# Patient Record
Sex: MALE | Race: WHITE | URBAN - METROPOLITAN AREA
[De-identification: names, ages, dates, MRNs, and addresses within clinical notes are randomized per-mention and may not be internally consistent; named-entity substitution may affect disease eponyms.]

---

## 2017-02-08 ENCOUNTER — EMERGENCY (EMERGENCY)
Facility: HOSPITAL | Age: 52
LOS: 0 days | Discharge: HOME | End: 2017-02-08

## 2017-02-17 PROBLEM — Z00.00 ENCOUNTER FOR PREVENTIVE HEALTH EXAMINATION: Status: ACTIVE | Noted: 2017-02-17

## 2017-02-22 ENCOUNTER — APPOINTMENT (OUTPATIENT)
Dept: ORTHOPEDIC SURGERY | Facility: CLINIC | Age: 52
End: 2017-02-22

## 2017-02-22 DIAGNOSIS — M25.562 PAIN IN RIGHT KNEE: ICD-10-CM

## 2017-02-22 DIAGNOSIS — M25.561 PAIN IN RIGHT KNEE: ICD-10-CM

## 2017-02-22 RX ORDER — INSULIN GLARGINE 100 [IU]/ML
100 INJECTION, SOLUTION SUBCUTANEOUS
Qty: 90 | Refills: 0 | Status: ACTIVE | COMMUNITY
Start: 2016-01-25

## 2017-02-22 RX ORDER — DOXYCYCLINE HYCLATE 100 MG/1
100 CAPSULE ORAL
Qty: 20 | Refills: 0 | Status: DISCONTINUED | COMMUNITY
Start: 2016-11-15

## 2017-02-22 RX ORDER — OMEPRAZOLE 40 MG/1
40 CAPSULE, DELAYED RELEASE ORAL
Qty: 60 | Refills: 0 | Status: DISCONTINUED | COMMUNITY
Start: 2016-04-16

## 2017-03-15 ENCOUNTER — RX RENEWAL (OUTPATIENT)
Age: 52
End: 2017-03-15

## 2017-03-30 ENCOUNTER — OUTPATIENT (OUTPATIENT)
Dept: OUTPATIENT SERVICES | Facility: HOSPITAL | Age: 52
LOS: 1 days | Discharge: HOME | End: 2017-03-30

## 2017-03-30 VITALS
HEIGHT: 72 IN | WEIGHT: 228.4 LBS | HEART RATE: 79 BPM | OXYGEN SATURATION: 98 % | TEMPERATURE: 98 F | RESPIRATION RATE: 18 BRPM | SYSTOLIC BLOOD PRESSURE: 122 MMHG | DIASTOLIC BLOOD PRESSURE: 85 MMHG

## 2017-03-30 DIAGNOSIS — K21.9 GASTRO-ESOPHAGEAL REFLUX DISEASE WITHOUT ESOPHAGITIS: ICD-10-CM

## 2017-03-30 DIAGNOSIS — M17.11 UNILATERAL PRIMARY OSTEOARTHRITIS, RIGHT KNEE: ICD-10-CM

## 2017-03-30 DIAGNOSIS — I10 ESSENTIAL (PRIMARY) HYPERTENSION: ICD-10-CM

## 2017-03-30 DIAGNOSIS — M25.561 PAIN IN RIGHT KNEE: ICD-10-CM

## 2017-03-30 DIAGNOSIS — E78.5 HYPERLIPIDEMIA, UNSPECIFIED: ICD-10-CM

## 2017-03-30 DIAGNOSIS — Z01.818 ENCOUNTER FOR OTHER PREPROCEDURAL EXAMINATION: ICD-10-CM

## 2017-03-30 DIAGNOSIS — B00.1 HERPESVIRAL VESICULAR DERMATITIS: ICD-10-CM

## 2017-03-30 DIAGNOSIS — E11.9 TYPE 2 DIABETES MELLITUS WITHOUT COMPLICATIONS: ICD-10-CM

## 2017-03-30 DIAGNOSIS — S83.231A COMPLEX TEAR OF MEDIAL MENISCUS, CURRENT INJURY, RIGHT KNEE, INITIAL ENCOUNTER: ICD-10-CM

## 2017-03-30 DIAGNOSIS — N20.0 CALCULUS OF KIDNEY: Chronic | ICD-10-CM

## 2017-03-30 DIAGNOSIS — Z98.890 OTHER SPECIFIED POSTPROCEDURAL STATES: Chronic | ICD-10-CM

## 2017-03-30 DIAGNOSIS — F41.9 ANXIETY DISORDER, UNSPECIFIED: ICD-10-CM

## 2017-03-30 LAB
ANION GAP SERPL CALC-SCNC: 9 MMOL/L — SIGNIFICANT CHANGE UP (ref 5–17)
BASOPHILS # BLD AUTO: 0.1 K/UL — SIGNIFICANT CHANGE UP (ref 0–0.2)
BASOPHILS NFR BLD AUTO: 1 % — SIGNIFICANT CHANGE UP (ref 0–2)
BUN SERPL-MCNC: 22 MG/DL — SIGNIFICANT CHANGE UP (ref 7–23)
CALCIUM SERPL-MCNC: 9.1 MG/DL — SIGNIFICANT CHANGE UP (ref 8.5–10.1)
CHLORIDE SERPL-SCNC: 107 MMOL/L — SIGNIFICANT CHANGE UP (ref 96–108)
CO2 SERPL-SCNC: 26 MMOL/L — SIGNIFICANT CHANGE UP (ref 22–31)
CREAT SERPL-MCNC: 1.63 MG/DL — HIGH (ref 0.5–1.3)
EOSINOPHIL # BLD AUTO: 0.2 K/UL — SIGNIFICANT CHANGE UP (ref 0–0.5)
EOSINOPHIL NFR BLD AUTO: 2 % — SIGNIFICANT CHANGE UP (ref 0–6)
GLUCOSE SERPL-MCNC: 70 MG/DL — SIGNIFICANT CHANGE UP (ref 70–99)
HCT VFR BLD CALC: 45.1 % — SIGNIFICANT CHANGE UP (ref 39–50)
HGB BLD-MCNC: 15.5 G/DL — SIGNIFICANT CHANGE UP (ref 13–17)
LYMPHOCYTES # BLD AUTO: 2.9 K/UL — SIGNIFICANT CHANGE UP (ref 1–3.3)
LYMPHOCYTES # BLD AUTO: 29.1 % — SIGNIFICANT CHANGE UP (ref 13–44)
MCHC RBC-ENTMCNC: 28.8 PG — SIGNIFICANT CHANGE UP (ref 27–34)
MCHC RBC-ENTMCNC: 34.4 GM/DL — SIGNIFICANT CHANGE UP (ref 32–36)
MCV RBC AUTO: 84 FL — SIGNIFICANT CHANGE UP (ref 80–100)
MONOCYTES # BLD AUTO: 0.7 K/UL — SIGNIFICANT CHANGE UP (ref 0–0.9)
MONOCYTES NFR BLD AUTO: 7.1 % — SIGNIFICANT CHANGE UP (ref 2–14)
NEUTROPHILS # BLD AUTO: 6.1 K/UL — SIGNIFICANT CHANGE UP (ref 1.8–7.4)
NEUTROPHILS NFR BLD AUTO: 60.9 % — SIGNIFICANT CHANGE UP (ref 43–77)
PLATELET # BLD AUTO: 349 K/UL — SIGNIFICANT CHANGE UP (ref 150–400)
POTASSIUM SERPL-MCNC: 4.3 MMOL/L — SIGNIFICANT CHANGE UP (ref 3.5–5.3)
POTASSIUM SERPL-SCNC: 4.3 MMOL/L — SIGNIFICANT CHANGE UP (ref 3.5–5.3)
RBC # BLD: 5.37 M/UL — SIGNIFICANT CHANGE UP (ref 4.2–5.8)
RBC # FLD: 13.1 % — SIGNIFICANT CHANGE UP (ref 11–15)
SODIUM SERPL-SCNC: 142 MMOL/L — SIGNIFICANT CHANGE UP (ref 135–145)
WBC # BLD: 10.1 K/UL — SIGNIFICANT CHANGE UP (ref 3.8–10.5)
WBC # FLD AUTO: 10.1 K/UL — SIGNIFICANT CHANGE UP (ref 3.8–10.5)

## 2017-03-30 NOTE — H&P PST ADULT - NSANTHOSAYNRD_GEN_A_CORE
No. GUSTAVO screening performed.  STOP BANG Legend: 0-2 = LOW Risk; 3-4 = INTERMEDIATE Risk; 5-8 = HIGH Risk

## 2017-03-30 NOTE — ASU PATIENT PROFILE, ADULT - PMH
Anxiety    DM (diabetes mellitus)    GERD (gastroesophageal reflux disease)    Herpes labialis    HLD (hyperlipidemia)    HTN (hypertension)    Kidney stones  20 years ago

## 2017-03-30 NOTE — H&P PST ADULT - PMH
Anxiety    DM (diabetes mellitus)    GERD (gastroesophageal reflux disease)    Herpes labialis    HLD (hyperlipidemia)    HTN (hypertension)

## 2017-04-12 ENCOUNTER — RESULT REVIEW (OUTPATIENT)
Age: 52
End: 2017-04-12

## 2017-04-13 ENCOUNTER — OUTPATIENT (OUTPATIENT)
Dept: OUTPATIENT SERVICES | Facility: HOSPITAL | Age: 52
LOS: 1 days | Discharge: HOME | End: 2017-04-13
Payer: MEDICARE

## 2017-04-13 ENCOUNTER — APPOINTMENT (OUTPATIENT)
Dept: ORTHOPEDIC SURGERY | Facility: HOSPITAL | Age: 52
End: 2017-04-13

## 2017-04-13 VITALS
HEART RATE: 74 BPM | WEIGHT: 227.96 LBS | DIASTOLIC BLOOD PRESSURE: 85 MMHG | OXYGEN SATURATION: 100 % | HEIGHT: 73 IN | RESPIRATION RATE: 15 BRPM | SYSTOLIC BLOOD PRESSURE: 123 MMHG | TEMPERATURE: 97 F

## 2017-04-13 VITALS
DIASTOLIC BLOOD PRESSURE: 78 MMHG | HEART RATE: 78 BPM | SYSTOLIC BLOOD PRESSURE: 117 MMHG | OXYGEN SATURATION: 98 % | TEMPERATURE: 98 F | RESPIRATION RATE: 17 BRPM

## 2017-04-13 DIAGNOSIS — Z98.890 OTHER SPECIFIED POSTPROCEDURAL STATES: Chronic | ICD-10-CM

## 2017-04-13 DIAGNOSIS — N20.0 CALCULUS OF KIDNEY: Chronic | ICD-10-CM

## 2017-04-13 PROCEDURE — 29875 ARTHRS KNEE SURG SYNVCT LMTD: CPT | Mod: 59,RT

## 2017-04-13 PROCEDURE — 29881 ARTHRS KNE SRG MNISECTMY M/L: CPT | Mod: RT

## 2017-04-13 PROCEDURE — 88304 TISSUE EXAM BY PATHOLOGIST: CPT | Mod: 26

## 2017-04-13 RX ORDER — OXYCODONE HYDROCHLORIDE 5 MG/1
1 TABLET ORAL
Qty: 28 | Refills: 0 | OUTPATIENT
Start: 2017-04-13 | End: 2017-04-20

## 2017-04-13 RX ORDER — SODIUM CHLORIDE 9 MG/ML
1000 INJECTION, SOLUTION INTRAVENOUS
Qty: 0 | Refills: 0 | Status: DISCONTINUED | OUTPATIENT
Start: 2017-04-13 | End: 2017-04-28

## 2017-04-13 RX ORDER — HYDROMORPHONE HYDROCHLORIDE 2 MG/ML
1 INJECTION INTRAMUSCULAR; INTRAVENOUS; SUBCUTANEOUS ONCE
Qty: 0 | Refills: 0 | Status: DISCONTINUED | OUTPATIENT
Start: 2017-04-13 | End: 2017-04-13

## 2017-04-13 RX ORDER — ACETAMINOPHEN 500 MG
1000 TABLET ORAL ONCE
Qty: 0 | Refills: 0 | Status: COMPLETED | OUTPATIENT
Start: 2017-04-13 | End: 2017-04-13

## 2017-04-13 RX ORDER — SODIUM CHLORIDE 9 MG/ML
3 INJECTION INTRAMUSCULAR; INTRAVENOUS; SUBCUTANEOUS EVERY 8 HOURS
Qty: 0 | Refills: 0 | Status: DISCONTINUED | OUTPATIENT
Start: 2017-04-13 | End: 2017-04-13

## 2017-04-13 RX ORDER — INSULIN GLULISINE 100 [IU]/ML
10 INJECTION, SOLUTION SUBCUTANEOUS
Qty: 0 | Refills: 0 | COMMUNITY

## 2017-04-13 RX ADMIN — Medication 400 MILLIGRAM(S): at 10:08

## 2017-04-13 RX ADMIN — SODIUM CHLORIDE 75 MILLILITER(S): 9 INJECTION, SOLUTION INTRAVENOUS at 10:07

## 2017-04-13 RX ADMIN — Medication 1000 MILLIGRAM(S): at 10:31

## 2017-04-13 RX ADMIN — HYDROMORPHONE HYDROCHLORIDE 1 MILLIGRAM(S): 2 INJECTION INTRAMUSCULAR; INTRAVENOUS; SUBCUTANEOUS at 10:31

## 2017-04-13 RX ADMIN — HYDROMORPHONE HYDROCHLORIDE 1 MILLIGRAM(S): 2 INJECTION INTRAMUSCULAR; INTRAVENOUS; SUBCUTANEOUS at 10:07

## 2017-04-13 NOTE — ASU DISCHARGE PLAN (ADULT/PEDIATRIC). - NOTIFY
Numbness, color, or temperature change to extremity/Bleeding that does not stop/Increased Irritability or Sluggishness/Pain not relieved by Medications/Inability to Tolerate Liquids or Foods/Swelling that continues/Persistent Nausea and Vomiting/Unable to Urinate/Excessive Diarrhea/Numbness, tingling/Fever greater than 101

## 2017-04-13 NOTE — BRIEF OPERATIVE NOTE - POST-OP DX
Meniscus tear  04/13/2017    Active  Kahlil Sibley  Plica of knee, right  04/13/2017    Active  Kahlil Sibley

## 2017-04-13 NOTE — ASU DISCHARGE PLAN (ADULT/PEDIATRIC). - MEDICATION SUMMARY - MEDICATIONS TO TAKE
I will START or STAY ON the medications listed below when I get home from the hospital:    oxyCODONE-acetaminophen 10mg-325mg oral tablet  -- 1 tab(s) by mouth every 6 hours, As Needed  -- Indication: For RIGHT KNEE SURGICAL ARTHROSCOPY MENISCECTOMY DEBRIDEMENT    Toujeo SoloStar  -- 60 unit(s) subcutaneous once a day  -- Indication: For Diabetes    pravastatin 40 mg oral tablet  -- 1 tab(s) by mouth once a day (at bedtime)  -- Indication: For cholerterol    Valtrex 500 mg oral tablet  -- 1 tab(s) by mouth once a day  -- Indication: For viral prophylaxis    Xanax 1 mg oral tablet  -- 1 tab(s) by mouth 4 times a day  -- Indication: For anxiety    amLODIPine 10 mg oral tablet  -- 1 tab(s) by mouth once a day  -- Indication: For htn    raNITIdine 300 mg oral tablet  -- 1 tab(s) by mouth 2 times a day  -- Indication: For gerd    omeprazole 40 mg oral delayed release capsule  -- 1 cap(s) by mouth 2 times a day  -- Indication: For gerd

## 2017-04-13 NOTE — BRIEF OPERATIVE NOTE - PROCEDURE
Arthroscopy of knee with either or both synovectomy and meniscectomy if indicated  04/13/2017    Active  AOLSWING  Arthroscopy of knee with debridement of articular cartilage  04/13/2017    Active  AOLSWING  Arthroscopy of knee with debridement of meniscus  04/13/2017    Active  AOLSWING

## 2017-04-14 LAB — SURGICAL PATHOLOGY FINAL REPORT - CH: SIGNIFICANT CHANGE UP

## 2017-04-21 DIAGNOSIS — Z79.82 LONG TERM (CURRENT) USE OF ASPIRIN: ICD-10-CM

## 2017-04-21 DIAGNOSIS — Z79.4 LONG TERM (CURRENT) USE OF INSULIN: ICD-10-CM

## 2017-04-21 DIAGNOSIS — Z87.891 PERSONAL HISTORY OF NICOTINE DEPENDENCE: ICD-10-CM

## 2017-04-21 DIAGNOSIS — E78.5 HYPERLIPIDEMIA, UNSPECIFIED: ICD-10-CM

## 2017-04-21 DIAGNOSIS — K21.9 GASTRO-ESOPHAGEAL REFLUX DISEASE WITHOUT ESOPHAGITIS: ICD-10-CM

## 2017-04-21 DIAGNOSIS — E11.9 TYPE 2 DIABETES MELLITUS WITHOUT COMPLICATIONS: ICD-10-CM

## 2017-04-21 DIAGNOSIS — F41.9 ANXIETY DISORDER, UNSPECIFIED: ICD-10-CM

## 2017-04-21 DIAGNOSIS — M67.51 PLICA SYNDROME, RIGHT KNEE: ICD-10-CM

## 2017-04-21 DIAGNOSIS — M23.221 DERANGEMENT OF POSTERIOR HORN OF MEDIAL MENISCUS DUE TO OLD TEAR OR INJURY, RIGHT KNEE: ICD-10-CM

## 2017-04-21 DIAGNOSIS — I10 ESSENTIAL (PRIMARY) HYPERTENSION: ICD-10-CM

## 2017-04-21 DIAGNOSIS — Z79.891 LONG TERM (CURRENT) USE OF OPIATE ANALGESIC: ICD-10-CM

## 2017-04-21 DIAGNOSIS — M17.11 UNILATERAL PRIMARY OSTEOARTHRITIS, RIGHT KNEE: ICD-10-CM

## 2017-04-21 DIAGNOSIS — M25.561 PAIN IN RIGHT KNEE: ICD-10-CM

## 2017-04-21 DIAGNOSIS — B00.1 HERPESVIRAL VESICULAR DERMATITIS: ICD-10-CM

## 2017-04-26 ENCOUNTER — APPOINTMENT (OUTPATIENT)
Dept: ORTHOPEDIC SURGERY | Facility: CLINIC | Age: 52
End: 2017-04-26

## 2017-04-26 DIAGNOSIS — M17.11 UNILATERAL PRIMARY OSTEOARTHRITIS, RIGHT KNEE: ICD-10-CM

## 2017-04-26 DIAGNOSIS — S83.231A COMPLEX TEAR OF MEDIAL MENISCUS, CURRENT INJURY, RIGHT KNEE, INITIAL ENCOUNTER: ICD-10-CM

## 2017-05-31 ENCOUNTER — APPOINTMENT (OUTPATIENT)
Dept: ORTHOPEDIC SURGERY | Facility: CLINIC | Age: 52
End: 2017-05-31
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
## 2017-06-27 DIAGNOSIS — M25.561 PAIN IN RIGHT KNEE: ICD-10-CM

## 2017-06-27 DIAGNOSIS — S83.91XA SPRAIN OF UNSPECIFIED SITE OF RIGHT KNEE, INITIAL ENCOUNTER: ICD-10-CM
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
## 2018-03-03 ENCOUNTER — OUTPATIENT (OUTPATIENT)
Dept: OUTPATIENT SERVICES | Facility: HOSPITAL | Age: 53
LOS: 1 days | Discharge: HOME | End: 2018-03-03

## 2018-03-03 DIAGNOSIS — E53.9 VITAMIN B DEFICIENCY, UNSPECIFIED: ICD-10-CM

## 2018-03-03 DIAGNOSIS — D64.9 ANEMIA, UNSPECIFIED: ICD-10-CM

## 2018-03-03 DIAGNOSIS — A60.02 HERPESVIRAL INFECTION OF OTHER MALE GENITAL ORGANS: ICD-10-CM

## 2018-03-03 DIAGNOSIS — E78.5 HYPERLIPIDEMIA, UNSPECIFIED: ICD-10-CM

## 2018-03-03 DIAGNOSIS — Z98.890 OTHER SPECIFIED POSTPROCEDURAL STATES: Chronic | ICD-10-CM

## 2018-03-03 DIAGNOSIS — E55.9 VITAMIN D DEFICIENCY, UNSPECIFIED: ICD-10-CM

## 2018-03-03 DIAGNOSIS — R53.83 OTHER FATIGUE: ICD-10-CM

## 2018-03-03 DIAGNOSIS — N20.0 CALCULUS OF KIDNEY: Chronic | ICD-10-CM

## 2018-03-05 LAB
C TRACH RRNA SPEC QL NAA+PROBE: SIGNIFICANT CHANGE UP
N GONORRHOEA RRNA SPEC QL NAA+PROBE: SIGNIFICANT CHANGE UP
SPECIMEN SOURCE: SIGNIFICANT CHANGE UP

## 2018-03-21 ENCOUNTER — EMERGENCY (EMERGENCY)
Facility: HOSPITAL | Age: 53
LOS: 0 days | Discharge: HOME | End: 2018-03-21
Attending: EMERGENCY MEDICINE

## 2018-03-21 VITALS
OXYGEN SATURATION: 100 % | RESPIRATION RATE: 20 BRPM | SYSTOLIC BLOOD PRESSURE: 158 MMHG | TEMPERATURE: 97 F | WEIGHT: 197.98 LBS | DIASTOLIC BLOOD PRESSURE: 97 MMHG | HEIGHT: 73 IN | HEART RATE: 82 BPM

## 2018-03-21 DIAGNOSIS — I10 ESSENTIAL (PRIMARY) HYPERTENSION: ICD-10-CM

## 2018-03-21 DIAGNOSIS — S01.01XA LACERATION WITHOUT FOREIGN BODY OF SCALP, INITIAL ENCOUNTER: ICD-10-CM

## 2018-03-21 DIAGNOSIS — Z23 ENCOUNTER FOR IMMUNIZATION: ICD-10-CM
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
## 2018-03-21 DIAGNOSIS — K21.9 GASTRO-ESOPHAGEAL REFLUX DISEASE WITHOUT ESOPHAGITIS: ICD-10-CM

## 2018-03-21 DIAGNOSIS — Z98.890 OTHER SPECIFIED POSTPROCEDURAL STATES: Chronic | ICD-10-CM
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
## 2018-03-21 DIAGNOSIS — E11.9 TYPE 2 DIABETES MELLITUS WITHOUT COMPLICATIONS: ICD-10-CM

## 2018-03-21 DIAGNOSIS — Z79.891 LONG TERM (CURRENT) USE OF OPIATE ANALGESIC: ICD-10-CM
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
## 2018-03-21 DIAGNOSIS — E78.5 HYPERLIPIDEMIA, UNSPECIFIED: ICD-10-CM

## 2018-03-21 DIAGNOSIS — S09.90XA UNSPECIFIED INJURY OF HEAD, INITIAL ENCOUNTER: ICD-10-CM

## 2018-03-21 DIAGNOSIS — N20.0 CALCULUS OF KIDNEY: Chronic | ICD-10-CM
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
## 2018-03-21 RX ORDER — CEPHALEXIN 500 MG
500 CAPSULE ORAL
Qty: 0 | Refills: 0 | Status: DISCONTINUED | OUTPATIENT
Start: 2018-03-21 | End: 2018-03-21

## 2018-03-21 RX ORDER — TETANUS TOXOID, REDUCED DIPHTHERIA TOXOID AND ACELLULAR PERTUSSIS VACCINE, ADSORBED 5; 2.5; 8; 8; 2.5 [IU]/.5ML; [IU]/.5ML; UG/.5ML; UG/.5ML; UG/.5ML
0.5 SUSPENSION INTRAMUSCULAR ONCE
Qty: 0 | Refills: 0 | Status: COMPLETED | OUTPATIENT
Start: 2018-03-21 | End: 2018-03-21

## 2018-03-21 RX ORDER — CEPHALEXIN 500 MG
1 CAPSULE ORAL
Qty: 28 | Refills: 0 | OUTPATIENT
Start: 2018-03-21 | End: 2018-03-27

## 2018-03-21 RX ADMIN — Medication 500 MILLIGRAM(S): at 02:44

## 2018-03-21 RX ADMIN — TETANUS TOXOID, REDUCED DIPHTHERIA TOXOID AND ACELLULAR PERTUSSIS VACCINE, ADSORBED 0.5 MILLILITER(S): 5; 2.5; 8; 8; 2.5 SUSPENSION INTRAMUSCULAR at 02:44

## 2018-03-21 NOTE — ED PROVIDER NOTE - OBJECTIVE STATEMENT
52 year old male past medical history of DM and Hypertension states that he was bending down in shed and hit head and has laceration. no loss of consciousness, no headache, no nausea and vomiting 52 year old male past medical history of DM and Hypertension states that he was bending down in shed and hit head and has laceration. no loss of consciousness, no headache, no nausea and vomiting no neck pain  no antiplatelet or anticoagulation

## 2018-04-01 ENCOUNTER — EMERGENCY (EMERGENCY)
Facility: HOSPITAL | Age: 53
LOS: 0 days | Discharge: HOME | End: 2018-04-01
Attending: EMERGENCY MEDICINE

## 2018-04-01 VITALS
SYSTOLIC BLOOD PRESSURE: 156 MMHG | HEART RATE: 84 BPM | DIASTOLIC BLOOD PRESSURE: 93 MMHG | TEMPERATURE: 97 F | OXYGEN SATURATION: 98 % | RESPIRATION RATE: 18 BRPM

## 2018-04-01 VITALS — HEIGHT: 73 IN | WEIGHT: 197.98 LBS

## 2018-04-01 DIAGNOSIS — Z79.2 LONG TERM (CURRENT) USE OF ANTIBIOTICS: ICD-10-CM

## 2018-04-01 DIAGNOSIS — F17.200 NICOTINE DEPENDENCE, UNSPECIFIED, UNCOMPLICATED: ICD-10-CM

## 2018-04-01 DIAGNOSIS — Z98.890 OTHER SPECIFIED POSTPROCEDURAL STATES: ICD-10-CM
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
## 2018-04-01 DIAGNOSIS — E11.9 TYPE 2 DIABETES MELLITUS WITHOUT COMPLICATIONS: ICD-10-CM

## 2018-04-01 DIAGNOSIS — Z79.4 LONG TERM (CURRENT) USE OF INSULIN: ICD-10-CM

## 2018-04-01 DIAGNOSIS — N20.0 CALCULUS OF KIDNEY: Chronic | ICD-10-CM

## 2018-04-01 DIAGNOSIS — E78.5 HYPERLIPIDEMIA, UNSPECIFIED: ICD-10-CM

## 2018-04-01 DIAGNOSIS — Z98.890 OTHER SPECIFIED POSTPROCEDURAL STATES: Chronic | ICD-10-CM
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
## 2018-04-01 DIAGNOSIS — I10 ESSENTIAL (PRIMARY) HYPERTENSION: ICD-10-CM

## 2018-04-01 DIAGNOSIS — S01.81XD LACERATION WITHOUT FOREIGN BODY OF OTHER PART OF HEAD, SUBSEQUENT ENCOUNTER: ICD-10-CM

## 2018-04-01 DIAGNOSIS — K21.9 GASTRO-ESOPHAGEAL REFLUX DISEASE WITHOUT ESOPHAGITIS: ICD-10-CM
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
## 2018-04-01 DIAGNOSIS — Z79.891 LONG TERM (CURRENT) USE OF OPIATE ANALGESIC: ICD-10-CM
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
## 2018-04-01 DIAGNOSIS — Z79.899 OTHER LONG TERM (CURRENT) DRUG THERAPY: ICD-10-CM

## 2018-04-01 NOTE — ED PROVIDER NOTE - NS ED ROS FT
Review of Systems:  	•	CONSTITUTIONAL - no fever, no diaphoresis, no weight change  	•	SKIN - no rash  	•	HEMATOLOGIC - no bleeding, no bruising  	•	EYES - no eye pain, no blurred vision  	•	ENT - no change in hearing, no pain  	•	RESPIRATORY - no shortness of breath, no cough  	•	CARDIAC - no chest pain, no palpitations  	•	GI - no abd pain, no nausea, no vomiting, no diarrhea, no constipation, no bleeding  	•	GENITO-URINARY - no discharge, no dysuria; no hematuria, LMP-   	•	ENDO - no polydypsia, no polyurea, no heat/no cold intolerance  	•	MUSCULOSKELETAL - no joint paint, no swelling, no redness  	•	NEUROLOGIC - no weakness, no headache, no anesthesia, no paresthesias  	•	PSYCH - no anxiety, non suicidal, non homicidal, no hallucination, no depression  	•	ALLERGY - no rhinitis

## 2018-04-01 NOTE — ED PROVIDER NOTE - SKIN, MLM
Skin normal color for race, warm, dry and intact. No evidence of rash. well-healed scalp laceration with 5 staples

## 2018-04-01 NOTE — ED ADULT NURSE NOTE - NS ED NURSE DC INFO COMPLEXITY
Verbalized Understanding/Moderate: Comprehensive teaching Simple: Patient demonstrates quick and easy understanding

## 2018-07-18 ENCOUNTER — EMERGENCY (EMERGENCY)
Facility: HOSPITAL | Age: 53
LOS: 0 days | Discharge: HOME | End: 2018-07-18
Attending: EMERGENCY MEDICINE | Admitting: EMERGENCY MEDICINE

## 2018-07-18 VITALS
HEIGHT: 73 IN | WEIGHT: 197.98 LBS | OXYGEN SATURATION: 99 % | DIASTOLIC BLOOD PRESSURE: 112 MMHG | RESPIRATION RATE: 18 BRPM | HEART RATE: 96 BPM | SYSTOLIC BLOOD PRESSURE: 181 MMHG | TEMPERATURE: 93 F

## 2018-07-18 VITALS — TEMPERATURE: 98 F | DIASTOLIC BLOOD PRESSURE: 98 MMHG | HEART RATE: 80 BPM | SYSTOLIC BLOOD PRESSURE: 151 MMHG

## 2018-07-18 DIAGNOSIS — I10 ESSENTIAL (PRIMARY) HYPERTENSION: ICD-10-CM

## 2018-07-18 DIAGNOSIS — Z98.890 OTHER SPECIFIED POSTPROCEDURAL STATES: Chronic | ICD-10-CM

## 2018-07-18 DIAGNOSIS — E78.5 HYPERLIPIDEMIA, UNSPECIFIED: ICD-10-CM

## 2018-07-18 DIAGNOSIS — L98.9 DISORDER OF THE SKIN AND SUBCUTANEOUS TISSUE, UNSPECIFIED: ICD-10-CM

## 2018-07-18 DIAGNOSIS — E11.9 TYPE 2 DIABETES MELLITUS WITHOUT COMPLICATIONS: ICD-10-CM

## 2018-07-18 DIAGNOSIS — L08.9 LOCAL INFECTION OF THE SKIN AND SUBCUTANEOUS TISSUE, UNSPECIFIED: ICD-10-CM

## 2018-07-18 DIAGNOSIS — Z79.899 OTHER LONG TERM (CURRENT) DRUG THERAPY: ICD-10-CM

## 2018-07-18 DIAGNOSIS — N20.0 CALCULUS OF KIDNEY: Chronic | ICD-10-CM

## 2018-07-18 DIAGNOSIS — K21.9 GASTRO-ESOPHAGEAL REFLUX DISEASE WITHOUT ESOPHAGITIS: ICD-10-CM

## 2018-07-18 NOTE — ED PROVIDER NOTE - MEDICAL DECISION MAKING DETAILS
I personally evaluated the patient. I reviewed the Resident’s or Physician Assistant’s note (as assigned above), and agree with the findings and plan except as documented in my note. I have fully discussed the medical management and delivery of care with the patient. I have discussed any available labs, imaging and treatment options with the patient. Patient confirms understanding and has been given detailed return precautions. Patient instructed to return to the ED should symptoms persist or worsen. Patient has demonstrated capacity and has verbalized understanding. Patient is well appearing upon discharge.

## 2018-07-18 NOTE — ED ADULT TRIAGE NOTE - CHIEF COMPLAINT QUOTE
As per patient: "I have a wound ion my head. Staples was removed 2 mos ago and it still not healing."

## 2018-07-18 NOTE — ED PROVIDER NOTE - OBJECTIVE STATEMENT
52 y/o male c/o left scalp lesion x few months. patient s/p laceration repair 3 months ago. patient with throbbing pain with direct pressure. patient with hx of diabetes, no fever

## 2018-07-19 PROBLEM — E78.5 HYPERLIPIDEMIA, UNSPECIFIED: Chronic | Status: ACTIVE | Noted: 2017-03-30

## 2018-07-19 PROBLEM — K21.9 GASTRO-ESOPHAGEAL REFLUX DISEASE WITHOUT ESOPHAGITIS: Chronic | Status: ACTIVE | Noted: 2017-03-30

## 2018-07-19 PROBLEM — N20.0 CALCULUS OF KIDNEY: Chronic | Status: ACTIVE | Noted: 2017-03-30

## 2018-07-19 PROBLEM — B00.1 HERPESVIRAL VESICULAR DERMATITIS: Chronic | Status: ACTIVE | Noted: 2017-03-30

## 2018-07-19 PROBLEM — I10 ESSENTIAL (PRIMARY) HYPERTENSION: Chronic | Status: ACTIVE | Noted: 2017-03-30

## 2018-07-19 PROBLEM — E11.9 TYPE 2 DIABETES MELLITUS WITHOUT COMPLICATIONS: Chronic | Status: ACTIVE | Noted: 2017-03-30

## 2018-07-19 PROBLEM — F41.9 ANXIETY DISORDER, UNSPECIFIED: Chronic | Status: ACTIVE | Noted: 2017-03-30

## 2018-10-04 NOTE — ED ADULT NURSE NOTE - TOBACCO SCREENING (FROM THE ASSIST)
Statement Selected O-T Plasty Text: The defect edges were debeveled with a #15 scalpel blade.  Given the location of the defect, shape of the defect and the proximity to free margins an O-T plasty was deemed most appropriate.  Using a sterile surgical marker, an appropriate O-T plasty was drawn incorporating the defect and placing the expected incisions within the relaxed skin tension lines where possible.    The area thus outlined was incised deep to adipose tissue with a #15 scalpel blade.  The skin margins were undermined to an appropriate distance in all directions utilizing iris scissors.

## 2018-11-26 NOTE — ED ADULT NURSE REASSESSMENT NOTE - TEMPLATE LIST FOR HEAD TO TOE ASSESSMENT
Wound Check/Suture Removal Render In Strict Bullet Format?: No Continue Regimen: Finish the valtrex Otc Regimen: Vaseline daily Discontinue Regimen: Keflex and erythromycin lotion Detail Level: Zone

## 2018-11-27 ENCOUNTER — APPOINTMENT (OUTPATIENT)
Dept: PLASTIC SURGERY | Facility: CLINIC | Age: 53
End: 2018-11-27
Payer: MEDICARE

## 2018-11-27 VITALS — HEIGHT: 72 IN | BODY MASS INDEX: 27.77 KG/M2 | WEIGHT: 205 LBS

## 2018-11-27 DIAGNOSIS — F17.200 NICOTINE DEPENDENCE, UNSPECIFIED, UNCOMPLICATED: ICD-10-CM

## 2018-11-27 DIAGNOSIS — Z78.9 OTHER SPECIFIED HEALTH STATUS: ICD-10-CM

## 2018-11-27 PROCEDURE — 99203 OFFICE O/P NEW LOW 30 MIN: CPT

## 2018-11-27 RX ORDER — RANITIDINE 300 MG/1
300 TABLET ORAL
Qty: 60 | Refills: 0 | Status: DISCONTINUED | COMMUNITY
Start: 2017-01-11 | End: 2018-11-27

## 2018-12-03 ENCOUNTER — FORM ENCOUNTER (OUTPATIENT)
Age: 53
End: 2018-12-03

## 2018-12-04 ENCOUNTER — OUTPATIENT (OUTPATIENT)
Dept: OUTPATIENT SERVICES | Facility: HOSPITAL | Age: 53
LOS: 1 days | Discharge: HOME | End: 2018-12-04

## 2018-12-04 VITALS
DIASTOLIC BLOOD PRESSURE: 82 MMHG | WEIGHT: 200.62 LBS | RESPIRATION RATE: 18 BRPM | OXYGEN SATURATION: 98 % | HEIGHT: 73 IN | HEART RATE: 83 BPM | TEMPERATURE: 98 F | SYSTOLIC BLOOD PRESSURE: 175 MMHG

## 2018-12-04 DIAGNOSIS — Z98.890 OTHER SPECIFIED POSTPROCEDURAL STATES: Chronic | ICD-10-CM

## 2018-12-04 DIAGNOSIS — L72.3 SEBACEOUS CYST: ICD-10-CM

## 2018-12-04 DIAGNOSIS — M67.431 GANGLION, RIGHT WRIST: ICD-10-CM

## 2018-12-04 DIAGNOSIS — N20.0 CALCULUS OF KIDNEY: Chronic | ICD-10-CM

## 2018-12-04 DIAGNOSIS — Z01.818 ENCOUNTER FOR OTHER PREPROCEDURAL EXAMINATION: ICD-10-CM

## 2018-12-04 LAB
ALBUMIN SERPL ELPH-MCNC: 3.8 G/DL — SIGNIFICANT CHANGE UP (ref 3.5–5.2)
ALP SERPL-CCNC: 92 U/L — SIGNIFICANT CHANGE UP (ref 30–115)
ALT FLD-CCNC: 18 U/L — SIGNIFICANT CHANGE UP (ref 0–41)
ANION GAP SERPL CALC-SCNC: 14 MMOL/L — SIGNIFICANT CHANGE UP (ref 7–14)
APTT BLD: 31.2 SEC — SIGNIFICANT CHANGE UP (ref 27–39.2)
AST SERPL-CCNC: 20 U/L — SIGNIFICANT CHANGE UP (ref 0–41)
BASOPHILS # BLD AUTO: 0.05 K/UL — SIGNIFICANT CHANGE UP (ref 0–0.2)
BASOPHILS NFR BLD AUTO: 0.5 % — SIGNIFICANT CHANGE UP (ref 0–1)
BILIRUB SERPL-MCNC: <0.2 MG/DL — SIGNIFICANT CHANGE UP (ref 0.2–1.2)
BUN SERPL-MCNC: 21 MG/DL — HIGH (ref 10–20)
CALCIUM SERPL-MCNC: 9.2 MG/DL — SIGNIFICANT CHANGE UP (ref 8.5–10.1)
CHLORIDE SERPL-SCNC: 104 MMOL/L — SIGNIFICANT CHANGE UP (ref 98–110)
CO2 SERPL-SCNC: 26 MMOL/L — SIGNIFICANT CHANGE UP (ref 17–32)
CREAT SERPL-MCNC: 1.6 MG/DL — HIGH (ref 0.7–1.5)
EOSINOPHIL # BLD AUTO: 0.34 K/UL — SIGNIFICANT CHANGE UP (ref 0–0.7)
EOSINOPHIL NFR BLD AUTO: 3.3 % — SIGNIFICANT CHANGE UP (ref 0–8)
GLUCOSE SERPL-MCNC: 139 MG/DL — HIGH (ref 70–99)
HCT VFR BLD CALC: 34.8 % — LOW (ref 42–52)
HGB BLD-MCNC: 11.6 G/DL — LOW (ref 14–18)
IMM GRANULOCYTES NFR BLD AUTO: 0.6 % — HIGH (ref 0.1–0.3)
INR BLD: 0.87 RATIO — SIGNIFICANT CHANGE UP (ref 0.65–1.3)
LYMPHOCYTES # BLD AUTO: 2.19 K/UL — SIGNIFICANT CHANGE UP (ref 1.2–3.4)
LYMPHOCYTES # BLD AUTO: 21.4 % — SIGNIFICANT CHANGE UP (ref 20.5–51.1)
MCHC RBC-ENTMCNC: 28 PG — SIGNIFICANT CHANGE UP (ref 27–31)
MCHC RBC-ENTMCNC: 33.3 G/DL — SIGNIFICANT CHANGE UP (ref 32–37)
MCV RBC AUTO: 83.9 FL — SIGNIFICANT CHANGE UP (ref 80–94)
MONOCYTES # BLD AUTO: 0.74 K/UL — HIGH (ref 0.1–0.6)
MONOCYTES NFR BLD AUTO: 7.2 % — SIGNIFICANT CHANGE UP (ref 1.7–9.3)
NEUTROPHILS # BLD AUTO: 6.86 K/UL — HIGH (ref 1.4–6.5)
NEUTROPHILS NFR BLD AUTO: 67 % — SIGNIFICANT CHANGE UP (ref 42.2–75.2)
NRBC # BLD: 0 /100 WBCS — SIGNIFICANT CHANGE UP (ref 0–0)
PLATELET # BLD AUTO: 333 K/UL — SIGNIFICANT CHANGE UP (ref 130–400)
POTASSIUM SERPL-MCNC: 4.3 MMOL/L — SIGNIFICANT CHANGE UP (ref 3.5–5)
POTASSIUM SERPL-SCNC: 4.3 MMOL/L — SIGNIFICANT CHANGE UP (ref 3.5–5)
PROT SERPL-MCNC: 6.2 G/DL — SIGNIFICANT CHANGE UP (ref 6–8)
PROTHROM AB SERPL-ACNC: 10 SEC — SIGNIFICANT CHANGE UP (ref 9.95–12.87)
RBC # BLD: 4.15 M/UL — LOW (ref 4.7–6.1)
RBC # FLD: 13.6 % — SIGNIFICANT CHANGE UP (ref 11.5–14.5)
SODIUM SERPL-SCNC: 144 MMOL/L — SIGNIFICANT CHANGE UP (ref 135–146)
WBC # BLD: 10.24 K/UL — SIGNIFICANT CHANGE UP (ref 4.8–10.8)
WBC # FLD AUTO: 10.24 K/UL — SIGNIFICANT CHANGE UP (ref 4.8–10.8)

## 2018-12-04 RX ORDER — RANITIDINE HYDROCHLORIDE 150 MG/1
1 TABLET, FILM COATED ORAL
Qty: 0 | Refills: 0 | COMMUNITY

## 2018-12-04 RX ORDER — INSULIN GLARGINE 100 [IU]/ML
70 INJECTION, SOLUTION SUBCUTANEOUS
Qty: 0 | Refills: 0 | COMMUNITY

## 2018-12-04 RX ORDER — VALACYCLOVIR 500 MG/1
1 TABLET, FILM COATED ORAL
Qty: 0 | Refills: 0 | COMMUNITY

## 2018-12-04 RX ORDER — ALPRAZOLAM 0.25 MG
1 TABLET ORAL
Qty: 0 | Refills: 0 | COMMUNITY

## 2018-12-04 RX ORDER — INSULIN GLARGINE 100 [IU]/ML
60 INJECTION, SOLUTION SUBCUTANEOUS
Qty: 0 | Refills: 0 | COMMUNITY

## 2018-12-04 RX ORDER — OMEPRAZOLE 10 MG/1
1 CAPSULE, DELAYED RELEASE ORAL
Qty: 0 | Refills: 0 | COMMUNITY

## 2018-12-04 NOTE — H&P PST ADULT - PSH
H/O arthroscopy of right knee    Kidney stone  Removed by Laser x 2 ( 20 years ago )  S/P foot surgery, right  x 5 ( 2006 - 2013 )

## 2018-12-04 NOTE — H&P PST ADULT - REASON FOR ADMISSION
54 yo male presents to [facility] for excision of posterior neck and right wrist subcutaneous lesions under GA on 12/12/2018 by Dr. Brice.

## 2018-12-04 NOTE — H&P PST ADULT - HISTORY OF PRESENT ILLNESS
Patient presents with mass on right wrist x 5yrs and posterior neck x 2 yrs. Patient denies any c/o cp, sob, palpitations fever, cough or dysuria. Ex- tolerance of 2 fos walk with out sob. No GUSTAVO. Patient presents with ganglion like mass on right wrist x 5yrs and posterior neck x 2 yrs. Patient denies any c/o cp, sob, palpitations fever, cough or dysuria. Ex- tolerance of 2 fos walk with out sob. No GUSTAVO.

## 2018-12-05 LAB
ESTIMATED AVERAGE GLUCOSE: 266 MG/DL — HIGH (ref 68–114)
HBA1C BLD-MCNC: 10.9 % — HIGH (ref 4–5.6)

## 2018-12-10 ENCOUNTER — APPOINTMENT (OUTPATIENT)
Dept: PLASTIC SURGERY | Facility: AMBULATORY SURGERY CENTER | Age: 53
End: 2018-12-10
Payer: MEDICARE

## 2018-12-10 ENCOUNTER — RESULT REVIEW (OUTPATIENT)
Age: 53
End: 2018-12-10

## 2018-12-10 ENCOUNTER — OUTPATIENT (OUTPATIENT)
Dept: OUTPATIENT SERVICES | Facility: HOSPITAL | Age: 53
LOS: 1 days | Discharge: HOME | End: 2018-12-10

## 2018-12-10 VITALS
RESPIRATION RATE: 101 BRPM | OXYGEN SATURATION: 98 % | HEART RATE: 99 BPM | DIASTOLIC BLOOD PRESSURE: 78 MMHG | SYSTOLIC BLOOD PRESSURE: 141 MMHG

## 2018-12-10 VITALS
HEART RATE: 86 BPM | OXYGEN SATURATION: 96 % | HEIGHT: 73 IN | TEMPERATURE: 97 F | WEIGHT: 200.62 LBS | DIASTOLIC BLOOD PRESSURE: 68 MMHG | RESPIRATION RATE: 18 BRPM | SYSTOLIC BLOOD PRESSURE: 130 MMHG

## 2018-12-10 DIAGNOSIS — Z98.890 OTHER SPECIFIED POSTPROCEDURAL STATES: Chronic | ICD-10-CM

## 2018-12-10 DIAGNOSIS — N20.0 CALCULUS OF KIDNEY: Chronic | ICD-10-CM

## 2018-12-10 LAB — GLUCOSE BLDC GLUCOMTR-MCNC: 132 MG/DL — HIGH (ref 70–99)

## 2018-12-10 PROCEDURE — 14040 TIS TRNFR F/C/C/M/N/A/G/H/F: CPT | Mod: 59

## 2018-12-10 PROCEDURE — 14020 TIS TRNFR S/A/L 10 SQ CM/<: CPT

## 2018-12-10 RX ORDER — ONDANSETRON 8 MG/1
4 TABLET, FILM COATED ORAL ONCE
Qty: 0 | Refills: 0 | Status: DISCONTINUED | OUTPATIENT
Start: 2018-12-10 | End: 2018-12-25

## 2018-12-10 RX ORDER — SODIUM CHLORIDE 9 MG/ML
1000 INJECTION, SOLUTION INTRAVENOUS
Qty: 0 | Refills: 0 | Status: DISCONTINUED | OUTPATIENT
Start: 2018-12-10 | End: 2018-12-25

## 2018-12-10 RX ORDER — OXYCODONE AND ACETAMINOPHEN 5; 325 MG/1; MG/1
1 TABLET ORAL EVERY 4 HOURS
Qty: 0 | Refills: 0 | Status: DISCONTINUED | OUTPATIENT
Start: 2018-12-10 | End: 2018-12-10

## 2018-12-10 RX ORDER — HYDROMORPHONE HYDROCHLORIDE 2 MG/ML
0.5 INJECTION INTRAMUSCULAR; INTRAVENOUS; SUBCUTANEOUS
Qty: 0 | Refills: 0 | Status: DISCONTINUED | OUTPATIENT
Start: 2018-12-10 | End: 2018-12-10

## 2018-12-10 RX ORDER — CEPHALEXIN 500 MG
1 CAPSULE ORAL
Qty: 12 | Refills: 0
Start: 2018-12-10 | End: 2018-12-12

## 2018-12-10 RX ORDER — HYDROMORPHONE HYDROCHLORIDE 2 MG/ML
1 INJECTION INTRAMUSCULAR; INTRAVENOUS; SUBCUTANEOUS
Qty: 0 | Refills: 0 | Status: DISCONTINUED | OUTPATIENT
Start: 2018-12-10 | End: 2018-12-10

## 2018-12-10 RX ADMIN — SODIUM CHLORIDE 100 MILLILITER(S): 9 INJECTION, SOLUTION INTRAVENOUS at 11:58

## 2018-12-10 RX ADMIN — OXYCODONE AND ACETAMINOPHEN 1 TABLET(S): 5; 325 TABLET ORAL at 12:25

## 2018-12-10 RX ADMIN — OXYCODONE AND ACETAMINOPHEN 1 TABLET(S): 5; 325 TABLET ORAL at 12:35

## 2018-12-10 NOTE — ASU DISCHARGE PLAN (ADULT/PEDIATRIC). - SPECIAL INSTRUCTIONS
Keep dressings clean and dry.   Do not remove any dressings or get them wet.   Elevate right hand to reduce swelling.

## 2018-12-10 NOTE — BRIEF OPERATIVE NOTE - PROCEDURE
<<-----Click on this checkbox to enter Procedure Excision of lipoma  12/10/2018  of right dorsal wrist and right posterior neck  Active  OLUOWSKI

## 2018-12-10 NOTE — ASU DISCHARGE PLAN (ADULT/PEDIATRIC). - NOTIFY
Fever greater than 101/Numbness, color, or temperature change to extremity/Swelling that continues/Bleeding that does not stop

## 2018-12-10 NOTE — CHART NOTE - NSCHARTNOTEFT_GEN_A_CORE
PACU ANESTHESIA ADMISSION NOTE      Procedure: Excision of lipoma: of right dorsal wrist and right posterior neck    Post op diagnosis:  Lipoma of neck      ____  Intubated  TV:______       Rate: ______      FiO2: ______    _x___  Patent Airway    _x___  Full return of protective reflexes    _x___  Full recovery from anesthesia / back to baseline status    Vitals:            T: 97.4               BP :  115/57              R:  18            Sat: 95              P:97      Mental Status:  _x___ Awake   _____ Alert   _____ Drowsy   _____ Sedated    Nausea/Vomiting:  _x___  NO       ______Yes,   See Post - Op Orders         Pain Scale (0-10):  __0___    Treatment: _x___ None    ____ See Post - Op/PCA Orders    Post - Operative Fluids:   __x__ Oral   ____ See Post - Op Orders    Plan: Discharge:   _x___Home       _____Floor     _____Critical Care    _____  Other:_________________    Comments:  No anesthesia issues or complications noted.  Discharge when criteria met.

## 2018-12-13 LAB — SURGICAL PATHOLOGY STUDY: SIGNIFICANT CHANGE UP

## 2018-12-18 NOTE — ED PROVIDER NOTE - CHPI ED SYMPTOMS NEG
Addended by: SHANNON CROSS on: 12/18/2018 10:40 AM     Modules accepted: Orders     no purulent drainage/no vomiting/no drainage/no chills/no redness/no red streaks

## 2018-12-20 ENCOUNTER — APPOINTMENT (OUTPATIENT)
Dept: PLASTIC SURGERY | Facility: CLINIC | Age: 53
End: 2018-12-20

## 2018-12-20 DIAGNOSIS — E78.00 PURE HYPERCHOLESTEROLEMIA, UNSPECIFIED: ICD-10-CM

## 2018-12-20 DIAGNOSIS — D17.21 BENIGN LIPOMATOUS NEOPLASM OF SKIN AND SUBCUTANEOUS TISSUE OF RIGHT ARM: ICD-10-CM

## 2018-12-20 DIAGNOSIS — D17.0 BENIGN LIPOMATOUS NEOPLASM OF SKIN AND SUBCUTANEOUS TISSUE OF HEAD, FACE AND NECK: ICD-10-CM

## 2018-12-20 DIAGNOSIS — I10 ESSENTIAL (PRIMARY) HYPERTENSION: ICD-10-CM

## 2018-12-20 DIAGNOSIS — E11.9 TYPE 2 DIABETES MELLITUS WITHOUT COMPLICATIONS: ICD-10-CM

## 2019-01-22 ENCOUNTER — OUTPATIENT (OUTPATIENT)
Dept: OUTPATIENT SERVICES | Facility: HOSPITAL | Age: 54
LOS: 1 days | Discharge: HOME | End: 2019-01-22

## 2019-01-22 DIAGNOSIS — Z98.890 OTHER SPECIFIED POSTPROCEDURAL STATES: Chronic | ICD-10-CM

## 2019-01-22 DIAGNOSIS — N20.0 CALCULUS OF KIDNEY: Chronic | ICD-10-CM

## 2019-01-22 DIAGNOSIS — N40.0 BENIGN PROSTATIC HYPERPLASIA WITHOUT LOWER URINARY TRACT SYMPTOMS: ICD-10-CM

## 2019-01-22 DIAGNOSIS — E06.3 AUTOIMMUNE THYROIDITIS: ICD-10-CM

## 2019-01-22 DIAGNOSIS — E11.65 TYPE 2 DIABETES MELLITUS WITH HYPERGLYCEMIA: ICD-10-CM

## 2019-04-09 NOTE — ASU PATIENT PROFILE, ADULT - CAREGIVER
Detail Level: Detailed Detail Level: Zone Include Location In Plan?: No Include Location In Plan?: Yes No

## 2019-07-01 NOTE — ED ADULT TRIAGE NOTE - NS ED NURSE DIRECT TO ROOM YN
company peer to peer review: [phone], option 2     Please advise      Please call the patient back    [phone] Yes

## 2019-11-10 ENCOUNTER — EMERGENCY (EMERGENCY)
Facility: HOSPITAL | Age: 54
LOS: 0 days | Discharge: HOME | End: 2019-11-10
Attending: EMERGENCY MEDICINE | Admitting: EMERGENCY MEDICINE
Payer: MEDICARE

## 2019-11-10 VITALS
RESPIRATION RATE: 18 BRPM | HEART RATE: 92 BPM | TEMPERATURE: 97 F | OXYGEN SATURATION: 100 % | WEIGHT: 199.96 LBS | SYSTOLIC BLOOD PRESSURE: 124 MMHG | DIASTOLIC BLOOD PRESSURE: 88 MMHG

## 2019-11-10 VITALS
OXYGEN SATURATION: 99 % | DIASTOLIC BLOOD PRESSURE: 86 MMHG | SYSTOLIC BLOOD PRESSURE: 132 MMHG | RESPIRATION RATE: 17 BRPM | HEART RATE: 88 BPM

## 2019-11-10 DIAGNOSIS — Z98.890 OTHER SPECIFIED POSTPROCEDURAL STATES: Chronic | ICD-10-CM
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
## 2019-11-10 DIAGNOSIS — L97.519 NON-PRESSURE CHRONIC ULCER OF OTHER PART OF RIGHT FOOT WITH UNSPECIFIED SEVERITY: ICD-10-CM

## 2019-11-10 DIAGNOSIS — I10 ESSENTIAL (PRIMARY) HYPERTENSION: ICD-10-CM

## 2019-11-10 DIAGNOSIS — Z79.4 LONG TERM (CURRENT) USE OF INSULIN: ICD-10-CM

## 2019-11-10 DIAGNOSIS — M79.671 PAIN IN RIGHT FOOT: ICD-10-CM

## 2019-11-10 DIAGNOSIS — E78.5 HYPERLIPIDEMIA, UNSPECIFIED: ICD-10-CM

## 2019-11-10 DIAGNOSIS — N20.0 CALCULUS OF KIDNEY: Chronic | ICD-10-CM

## 2019-11-10 DIAGNOSIS — M79.673 PAIN IN UNSPECIFIED FOOT: ICD-10-CM
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
## 2019-11-10 DIAGNOSIS — E11.621 TYPE 2 DIABETES MELLITUS WITH FOOT ULCER: ICD-10-CM
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
## 2019-11-10 LAB
ALBUMIN SERPL ELPH-MCNC: 4.2 G/DL — SIGNIFICANT CHANGE UP (ref 3.5–5.2)
ALP SERPL-CCNC: 95 U/L — SIGNIFICANT CHANGE UP (ref 30–115)
ALT FLD-CCNC: 18 U/L — SIGNIFICANT CHANGE UP (ref 0–41)
ANION GAP SERPL CALC-SCNC: 14 MMOL/L — SIGNIFICANT CHANGE UP (ref 7–14)
AST SERPL-CCNC: 18 U/L — SIGNIFICANT CHANGE UP (ref 0–41)
BASOPHILS # BLD AUTO: 0.05 K/UL — SIGNIFICANT CHANGE UP (ref 0–0.2)
BASOPHILS NFR BLD AUTO: 0.6 % — SIGNIFICANT CHANGE UP (ref 0–1)
BILIRUB SERPL-MCNC: 0.3 MG/DL — SIGNIFICANT CHANGE UP (ref 0.2–1.2)
BUN SERPL-MCNC: 52 MG/DL — HIGH (ref 10–20)
CALCIUM SERPL-MCNC: 9.8 MG/DL — SIGNIFICANT CHANGE UP (ref 8.5–10.1)
CHLORIDE SERPL-SCNC: 104 MMOL/L — SIGNIFICANT CHANGE UP (ref 98–110)
CO2 SERPL-SCNC: 21 MMOL/L — SIGNIFICANT CHANGE UP (ref 17–32)
CREAT SERPL-MCNC: 2.5 MG/DL — HIGH (ref 0.7–1.5)
EOSINOPHIL # BLD AUTO: 0.45 K/UL — SIGNIFICANT CHANGE UP (ref 0–0.7)
EOSINOPHIL NFR BLD AUTO: 5.5 % — SIGNIFICANT CHANGE UP (ref 0–8)
ERYTHROCYTE [SEDIMENTATION RATE] IN BLOOD: 28 MM/HR — HIGH (ref 0–10)
GLUCOSE SERPL-MCNC: 156 MG/DL — HIGH (ref 70–99)
HCT VFR BLD CALC: 37.6 % — LOW (ref 42–52)
HGB BLD-MCNC: 12.4 G/DL — LOW (ref 14–18)
IMM GRANULOCYTES NFR BLD AUTO: 0.2 % — SIGNIFICANT CHANGE UP (ref 0.1–0.3)
LYMPHOCYTES # BLD AUTO: 2.93 K/UL — SIGNIFICANT CHANGE UP (ref 1.2–3.4)
LYMPHOCYTES # BLD AUTO: 35.9 % — SIGNIFICANT CHANGE UP (ref 20.5–51.1)
MCHC RBC-ENTMCNC: 27.6 PG — SIGNIFICANT CHANGE UP (ref 27–31)
MCHC RBC-ENTMCNC: 33 G/DL — SIGNIFICANT CHANGE UP (ref 32–37)
MCV RBC AUTO: 83.7 FL — SIGNIFICANT CHANGE UP (ref 80–94)
MONOCYTES # BLD AUTO: 0.49 K/UL — SIGNIFICANT CHANGE UP (ref 0.1–0.6)
MONOCYTES NFR BLD AUTO: 6 % — SIGNIFICANT CHANGE UP (ref 1.7–9.3)
NEUTROPHILS # BLD AUTO: 4.23 K/UL — SIGNIFICANT CHANGE UP (ref 1.4–6.5)
NEUTROPHILS NFR BLD AUTO: 51.8 % — SIGNIFICANT CHANGE UP (ref 42.2–75.2)
NRBC # BLD: 0 /100 WBCS — SIGNIFICANT CHANGE UP (ref 0–0)
PLATELET # BLD AUTO: 347 K/UL — SIGNIFICANT CHANGE UP (ref 130–400)
POTASSIUM SERPL-MCNC: 5.6 MMOL/L — HIGH (ref 3.5–5)
POTASSIUM SERPL-SCNC: 5.6 MMOL/L — HIGH (ref 3.5–5)
PROT SERPL-MCNC: 7 G/DL — SIGNIFICANT CHANGE UP (ref 6–8)
RBC # BLD: 4.49 M/UL — LOW (ref 4.7–6.1)
RBC # FLD: 13.8 % — SIGNIFICANT CHANGE UP (ref 11.5–14.5)
SODIUM SERPL-SCNC: 139 MMOL/L — SIGNIFICANT CHANGE UP (ref 135–146)
WBC # BLD: 8.17 K/UL — SIGNIFICANT CHANGE UP (ref 4.8–10.8)
WBC # FLD AUTO: 8.17 K/UL — SIGNIFICANT CHANGE UP (ref 4.8–10.8)

## 2019-11-10 PROCEDURE — 99284 EMERGENCY DEPT VISIT MOD MDM: CPT

## 2019-11-10 PROCEDURE — 73630 X-RAY EXAM OF FOOT: CPT | Mod: 26,RT

## 2019-11-10 PROCEDURE — 73620 X-RAY EXAM OF FOOT: CPT | Mod: 26,RT

## 2019-11-10 RX ORDER — ACETAMINOPHEN 500 MG
975 TABLET ORAL ONCE
Refills: 0 | Status: COMPLETED | OUTPATIENT
Start: 2019-11-10 | End: 2019-11-10

## 2019-11-10 RX ADMIN — Medication 975 MILLIGRAM(S): at 14:04

## 2019-11-10 NOTE — ED PROVIDER NOTE - OBJECTIVE STATEMENT
Patient is a 53 yo m who presents with right foot pain that is moderate and throbbing in nature worse over the past several days after he sustained a fall he denies any loc he denies any vomiting he notes that the pain is worse on the plantar aspect of the right foot. He denies any ascending redness.  He is able to ambulate he has a history of charcot malformation contacted his podiatrist dr stratton and was sent in for further evaluation

## 2019-11-10 NOTE — ED PROVIDER NOTE - NSFOLLOWUPINSTRUCTIONS_ED_ALL_ED_FT
-Follow up with Dr. Conroy in 1-3 days  -Return to ED for worsening symptoms or concerns.    Foot Care for People with Diabetes    AMBULATORY CARE:    What you need to know about foot care:     Foot care helps protect your feet and prevent foot ulcers or sores. Long-term high blood sugar levels can damage the blood vessels and nerves in your legs and feet. This damage makes it hard to feel pressure, pain, temperature, and touch. You may not be able to feel a cut or sore, or shoes that are too tight. Foot care is needed to prevent serious problems, such as an infection or amputation.       Diabetes may cause your toes to become crooked or curved under. These changes may affect the way you walk and can lead to increased pressure on your foot. The pressure can decrease blood flow to your feet. Lack of blood flow increases your risk for a foot ulcer. Do not ignore small problems, such as dry skin or small wounds. These can become life-threatening over time without proper care.    Call your care team provider if:     Your feet become numb, weak, or hard to move.       You have pus draining from a sore on your foot.       You have a wound on your foot that gets bigger, deeper, or does not heal.       You see blisters, cuts, scratches, calluses, or sores on your foot.       You have a fever, and your feet become red, warm, and swollen.       Your toenails become thick, curled, or yellow.       You find it hard to check your feet because your vision is poor.       You have questions or concerns about your condition or care.     How to care for your feet:     Check your feet each day. Look at your whole foot, including the bottom, and between and under your toes. Check for wounds, corns, and calluses. Use a mirror to see the bottom of your feet. The skin on your feet may be shiny, tight, or darker than normal. Your feet may also be cold and pale. Feel your feet by running your hands along the tops, bottoms, sides, and between your toes. Redness, swelling, and warmth are signs of blood flow problems that can lead to a foot ulcer. Do not try to remove corns or calluses yourself. Diabetic  Foot Care           Wash your feet each day with soap and warm water. Do not use hot water, because this can injure your foot. Dry your feet gently with a towel after you wash them. Dry between and under your toes.       Apply lotion or a moisturizer on your dry feet. Ask your care team provider what lotions are best to use. Do not put lotion or moisturizer between your toes. Moisture between your toes could lead to skin breakdown.       Cut your toenails correctly. File or cut your toenails straight across. Use a soft brush to clean around your toenails. If your toenails are very thick, you may need to have a care team provider or specialist cut them.       Protect your feet. Do not walk barefoot or wear your shoes without socks. Check your shoes for rocks or other objects that can hurt your feet. Wear cotton socks to help keep your feet dry. Wear socks without toe seams, or wear them with the seams inside out. Change your socks each day. Do not wear socks that are dirty or damp.      Wear shoes that fit well. Wear shoes that do not rub against any area of your feet. Your shoes should be ½ to ¾ inch (1 to 2 centimeters) longer than your feet. Your shoes should also have extra space around the widest part of your feet. Walking or athletic shoes with laces or straps that adjust are best. Ask your care team provider for help to choose shoes that fit you best. Ask him or her if you need to wear an insert, orthotic, or bandage on your feet.      Go to your follow-up visits. Your care team provider will do a foot exam at least once a year. You may need a foot exam more often if you have nerve damage, foot deformities, or ulcers. He will check for nerve damage and how well you can feel your feet. He will check your shoes to see if they fit well.      Do not smoke. Smoking can damage your blood vessels and put you at increased risk for foot ulcers. Ask your care team provider for information if you currently smoke and need help to quit. E-cigarettes or smokeless tobacco still contain nicotine. Talk to your care team provider before you use these products.     Follow up with your diabetes care team provider or foot specialist as directed: You will need to have your feet checked at least once a year. You may need a foot exam more often if you have nerve damage, foot deformities, or ulcers. Write down your questions so you remember to ask them during your visits.

## 2019-11-10 NOTE — ED PROVIDER NOTE - PATIENT PORTAL LINK FT
You can access the FollowMyHealth Patient Portal offered by [facility] by registering at the following website: [email or web address]. By joining "[employer]"’s FollowMyHealth portal, you will also be able to view your health information using other applications (apps) compatible with our system.

## 2019-11-10 NOTE — ED PROVIDER NOTE - PROGRESS NOTE DETAILS
spoke with podiatry, will see patient. spoke with podiatry again, still have not seen pt attempted call to podiatry [facility], no answer Spoke with Rafiq regarding DINESH, requests to have patient f/u in office this week.

## 2019-11-10 NOTE — CONSULT NOTE ADULT - SUBJECTIVE AND OBJECTIVE BOX
Podiatry Consult Note    Subjective:   MARIOLA CURRY is a pleasant well-nourished, well developed 54y Male in no acute distress, alert awake, and oriented to person, place and time.   Patient is a 54y old  Male who presents with a chief complaint of Right Foot Plantar Midfoot Pain. Patient was seen bedside in the ED Today, patient describes that a few days ago he had stumbled and subsequently landed on the plantar aspect of his right foot very hard. Patient says he has had pain in that area ever since. Patient describes the pain as a sharp, localized, non-radiating 8/10 pain that hurts whenever applying pressure on it. Currently patient denies F/N/V and shortness of breath.     Past Medical History and Surgical History  Kidney stones: 20 years ago  GERD (gastroesophageal reflux disease)  Anxiety  Herpes labialis  HLD (hyperlipidemia)  HTN (hypertension)  DM (diabetes mellitus)  H/O arthroscopy of right knee  Kidney stone: Removed by Laser x 2 ( 20 years ago )  S/P foot surgery, right: x 5 ( 2006 - 2013 )     Review of Systems:   Constitutional: No weakness, fevers or chills  Eyes / ENT: No visual changes; No vertigo or throat pain   Neck: No pain or stiffness  Respiratory: No cough, wheezing, hemoptysis; No shortness of breath  Cardiovascular: No chest pain or palpitations  Gastrointestinal: No abdominal or epigastric pain. No nausea, vomiting, or hematemesis; No diarrhea or constipation. No melena or hematochezia.  Genitourinary: No dysuria, frequency or hematuria  Neurological: No numbness or weakness  Skin: 0.2 x 0.3 de-roofed blister on the lateral plantar aspect of the midfoot sustained a few days ago / Mild erythema and edema of the Right Foot     Objective:  Vital Signs Last 24 Hrs  T(C): 36 (10 Nov 2019 13:30), Max: 36 (10 Nov 2019 13:30)  T(F): 96.8 (10 Nov 2019 13:30), Max: 96.8 (10 Nov 2019 13:30)  HR: 92 (10 Nov 2019 13:30) (92 - 92)  BP: 124/88 (10 Nov 2019 13:30) (124/88 - 124/88)  BP(mean): --  RR: 18 (10 Nov 2019 13:30) (18 - 18)  SpO2: 100% (10 Nov 2019 13:30) (100% - 100%)  _________________________________________________________  EXAM:  XR FOOT 2 VIEWS RT          PROCEDURE DATE:  11/10/2019        INTERPRETATION:  Clinical History / Reason for exam: Foot    TECHNIQUE: 3 views of the right foot    Comparison 8/18/2012  Findings/:  Soft tissue ulcers apparent at the mid hallux metatarsal and   medial border of hallux IP joint can be correlated clinically.   Periostitis present along the medial border of hallux IP joint suggests   osteitis. Subacute periostitis present along the medial hallux   metatarsal. Bone stimulator present along the posterior calcaneus.   Posterior screw at calcaneus without loosening. Midfoot neuropathic   Charcot arthropathy with advanced degenerative change and   disorganization, with increased sclerosis since prior. Vascular   calcifications present.    IMPRESSION: Hallux IP apparent soft tissue ulcer with periostitis at   medial border of distal phalanx.     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 Nov 10 2019 3:56PM  _________________________________________________________             Physical Exam - Lower Extremity Focused: Right Foot   Derm:   Mild Erythema and Edema noted in the Right Foot - Generalized / Non-Localized  Open Lesion 0.2 x 0.3 cm noted on the lateral plantar midfoot / Blister De-roofed    Vascular: DP and PT Pulses diminished   Neuro: Protective Sensation Mildly Intact     MSK:   Charcot / Collapsed Mid-Foot   Melanie Protrusion noted on the Plantar Midfoot - Distal to the Anterior Calcaneal Process  Pain on Palpation of the Plantar Midfoot     Assessment:   s/p Fall - Few Days ago  Charcot / Collapsed Mid-Foot  Mild Erythema and Edema Right Foot   Pain on Palpation of the Plantar Midfoot     Plan:  Chart reviewed and Patient evaluated. All questions and concerns were addressed and answered.   Discussed diagnosis and treatment with patient  XR Reviewed; See Report Above;   Request Labs Drawn; CBC / BMP / ESR / CRP    Recommend:   Due to Charcot and no previous XR since 2012; Difficult to assess if trauma from the fall a few days ago sustained any injury   Will Review Blood Labs Once Available; If Labs are WNL; Pt can follow up w/ Dr. Valenzuela in his private office   If Patient is admitted, Attending to see Monday 11/11   Discussed w/ Dr. Valenzuela

## 2019-11-10 NOTE — ED PROVIDER NOTE - CARE PROVIDER_API CALL
Polo Conroy)  Internal Medicine  [address], [address]  [place] [address]  Phone: [phone]  Fax: [phone]  Follow Up Time: Urgent

## 2019-11-10 NOTE — ED PROVIDER NOTE - ATTENDING CONTRIBUTION TO CARE
I was present for and supervised the key and critical aspects of the procedures performed during the care of the patient.  Patient is a 53 yo m who presents with right foot pain that is moderate and throbbing in nature worse over the past several days after he sustained a fall he denies any loc he denies any vomiting he notes that the pain is worse on the plantar aspect of the right foot. He denies any ascending redness.  He is able to ambulate he has a history of charcot malformation contacted his podiatrist dr stratton and was sent in for further evaluation  On physical exam the patient is nc/at perrla eomi orpharynx clear cta b/l rrr s1s2, ext- no redness no swelling pedal pulses 2 +=

## 2019-11-20 ENCOUNTER — OUTPATIENT (OUTPATIENT)
Dept: OUTPATIENT SERVICES | Facility: HOSPITAL | Age: 54
LOS: 1 days | Discharge: HOME | End: 2019-11-20

## 2019-11-20 DIAGNOSIS — N20.0 CALCULUS OF KIDNEY: Chronic | ICD-10-CM

## 2019-11-20 DIAGNOSIS — Z98.890 OTHER SPECIFIED POSTPROCEDURAL STATES: Chronic | ICD-10-CM

## 2019-11-20 DIAGNOSIS — K02.63 DENTAL CARIES ON SMOOTH SURFACE PENETRATING INTO PULP: ICD-10-CM

## 2019-12-17 ENCOUNTER — OUTPATIENT (OUTPATIENT)
Dept: OUTPATIENT SERVICES | Facility: HOSPITAL | Age: 54
LOS: 1 days | Discharge: HOME | End: 2019-12-17

## 2019-12-17 DIAGNOSIS — Z98.890 OTHER SPECIFIED POSTPROCEDURAL STATES: Chronic | ICD-10-CM

## 2019-12-17 DIAGNOSIS — K02.63 DENTAL CARIES ON SMOOTH SURFACE PENETRATING INTO PULP: ICD-10-CM

## 2019-12-17 DIAGNOSIS — N20.0 CALCULUS OF KIDNEY: Chronic | ICD-10-CM

## 2020-01-06 NOTE — ED ADULT NURSE NOTE - CINV DISCH MEDS REVIEWED YN
before breakfast., Disp: 90 tablet, Rfl: 2    aspirin 81 MG tablet, Take 81 mg by mouth daily, Disp: , Rfl:     lisinopril (PRINIVIL;ZESTRIL) 2.5 MG tablet, Take 2.5 mg by mouth daily, Disp: , Rfl:     NITROSTAT 0.4 MG SL tablet, place 1 tablet under the tongue if needed every 5 minutes for chest pain for 3 doses IF NO RELIEF AFTER 3RD DOSE CALL PRESCRIBER ., Disp: 25 tablet, Rfl: 3    simvastatin (ZOCOR) 10 MG tablet, Take 10 mg by mouth nightly.  , Disp: , Rfl:     lamivudine-zidovudine (COMBIVIR) 150-300 MG per tablet, Take 1 tablet by mouth 2 times daily. , Disp: , Rfl:     fosamprenavir (LEXIVA) 700 MG tablet, Take by mouth 2 times daily 2 tab, Disp: , Rfl:     olanzapine (ZYPREXA) 5 MG tablet, Take 5 mg by mouth nightly.  , Disp: , Rfl:         Subjective:      Review of Systems   Constitutional: Positive for activity change. Negative for appetite change, chills, diaphoresis, fatigue, fever and unexpected weight change. HENT: Negative. Negative for congestion, ear pain, hearing loss, mouth sores, nosebleeds, rhinorrhea, sinus pressure and sore throat. Eyes: Negative. Negative for photophobia, pain and visual disturbance. Respiratory: Negative. Negative for cough, chest tightness, shortness of breath and wheezing. Cardiovascular: Negative for chest pain and palpitations. Gastrointestinal: Negative for abdominal pain, constipation, diarrhea, nausea and vomiting. GERD   Endocrine: Negative. Negative for cold intolerance, heat intolerance, polydipsia, polyphagia and polyuria. DM    Genitourinary: Negative. Negative for decreased urine volume, difficulty urinating, frequency and hematuria. Musculoskeletal: Positive for arthralgias, back pain, gait problem and myalgias. Negative for joint swelling, neck pain and neck stiffness. Skin: Negative. Negative for color change and rash. Allergic/Immunologic: Negative.   Negative for food allergies and immunocompromised state.   Neurological: Negative for dizziness, tremors, seizures, syncope, facial asymmetry, speech difficulty, weakness, light-headedness, numbness and headaches. Hematological: Negative. Does not bruise/bleed easily. Psychiatric/Behavioral: Negative. Negative for agitation, behavioral problems, confusion, decreased concentration, dysphoric mood, hallucinations, self-injury, sleep disturbance and suicidal ideas. The patient is not nervous/anxious and is not hyperactive.          Objective:      Vitals                                  Weight: 149 lb 14.6 oz (68 kg)   Height: 5' 6\" (1.676 m)            Physical Exam  Vitals signs and nursing note reviewed. Constitutional:       General: He is not in acute distress. Appearance: He is well-developed. He is not diaphoretic. HENT:      Head: Normocephalic and atraumatic. Right Ear: External ear normal.      Left Ear: External ear normal.      Nose: Nose normal.      Mouth/Throat:      Pharynx: No oropharyngeal exudate. Eyes:      General: No scleral icterus. Right eye: No discharge. Left eye: No discharge. Conjunctiva/sclera: Conjunctivae normal.      Pupils: Pupils are equal, round, and reactive to light. Neck:      Musculoskeletal: Full passive range of motion without pain, normal range of motion and neck supple. Normal range of motion. No edema, erythema, neck rigidity or muscular tenderness. Thyroid: No thyromegaly. Cardiovascular:      Rate and Rhythm: Normal rate and regular rhythm. Heart sounds: Normal heart sounds. No murmur. No friction rub. No gallop. Pulmonary:      Effort: Pulmonary effort is normal. No respiratory distress. Breath sounds: Normal breath sounds. No wheezing or rales. Chest:      Chest wall: No tenderness. Abdominal:      General: Bowel sounds are normal. There is no distension. Palpations: Abdomen is soft. Tenderness: There is no tenderness.  There is no guarding or rebound. Musculoskeletal:         General: Tenderness present. Right shoulder: Normal.      Left shoulder: Normal.      Right hip: He exhibits tenderness. Left hip: He exhibits tenderness. Right knee: Normal.      Left knee: Normal.      Right ankle: Normal.      Left ankle: Normal.      Cervical back: Normal.      Thoracic back: Normal.      Lumbar back: He exhibits decreased range of motion, tenderness, bony tenderness, pain and spasm. Back:    Skin:     General: Skin is warm. Coloration: Skin is not pale. Findings: No erythema or rash. Neurological:      Mental Status: He is alert and oriented to person, place, and time. He is not disoriented. Cranial Nerves: No cranial nerve deficit. Sensory: No sensory deficit. Motor: No atrophy or abnormal muscle tone. Coordination: Coordination normal.      Gait: Gait normal.      Deep Tendon Reflexes: Reflexes are normal and symmetric. Reflex Scores:       Tricep reflexes are 2+ on the right side. Comments: SLR-  Motor 4/5 generalized weakness  Feet bilat dusky when dependent   Psychiatric:         Attention and Perception: He is attentive. Mood and Affect: Mood is not anxious or depressed. Affect is not labile, blunt, angry or inappropriate. Speech: Speech normal.         Behavior: Behavior normal. Behavior is not agitated, slowed, aggressive, withdrawn, hyperactive or combative. Thought Content: Thought content normal. Thought content is not paranoid or delusional. Thought content does not include homicidal or suicidal ideation. Thought content does not include homicidal or suicidal plan. Cognition and Memory: Memory is not impaired. He does not exhibit impaired recent memory or impaired remote memory. Judgment: Judgment normal. Judgment is not impulsive or inappropriate.       Comments: Flat affect, h/o depression         HARRISON test:+   Yeomans test: +  Gasukhdev No

## 2020-02-01 ENCOUNTER — RX RENEWAL (OUTPATIENT)
Age: 55
End: 2020-02-01

## 2020-02-04 ENCOUNTER — RX RENEWAL (OUTPATIENT)
Age: 55
End: 2020-02-04

## 2020-08-07 ENCOUNTER — RX RENEWAL (OUTPATIENT)
Age: 55
End: 2020-08-07

## 2020-09-15 ENCOUNTER — OUTPATIENT (OUTPATIENT)
Dept: OUTPATIENT SERVICES | Facility: HOSPITAL | Age: 55
LOS: 1 days | Discharge: HOME | End: 2020-09-15
Payer: MEDICARE

## 2020-09-15 DIAGNOSIS — I25.9 CHRONIC ISCHEMIC HEART DISEASE, UNSPECIFIED: ICD-10-CM

## 2020-09-15 DIAGNOSIS — N20.0 CALCULUS OF KIDNEY: Chronic | ICD-10-CM

## 2020-09-15 DIAGNOSIS — Z98.890 OTHER SPECIFIED POSTPROCEDURAL STATES: Chronic | ICD-10-CM

## 2020-09-15 DIAGNOSIS — R03.0 ELEVATED BLOOD-PRESSURE READING, WITHOUT DIAGNOSIS OF HYPERTENSION: ICD-10-CM

## 2020-09-15 DIAGNOSIS — Z94.0 KIDNEY TRANSPLANT STATUS: ICD-10-CM

## 2020-09-15 PROCEDURE — 78452 HT MUSCLE IMAGE SPECT MULT: CPT | Mod: 26

## 2020-09-22 ENCOUNTER — OUTPATIENT (OUTPATIENT)
Dept: OUTPATIENT SERVICES | Facility: HOSPITAL | Age: 55
LOS: 1 days | Discharge: HOME | End: 2020-09-22

## 2020-09-22 DIAGNOSIS — N20.0 CALCULUS OF KIDNEY: Chronic | ICD-10-CM

## 2020-09-22 DIAGNOSIS — Z98.890 OTHER SPECIFIED POSTPROCEDURAL STATES: Chronic | ICD-10-CM

## 2020-09-28 DIAGNOSIS — Z01.21 ENCOUNTER FOR DENTAL EXAMINATION AND CLEANING WITH ABNORMAL FINDINGS: ICD-10-CM

## 2020-10-24 ENCOUNTER — OUTPATIENT (OUTPATIENT)
Dept: OUTPATIENT SERVICES | Facility: HOSPITAL | Age: 55
LOS: 1 days | Discharge: HOME | End: 2020-10-24

## 2020-10-24 DIAGNOSIS — Z11.59 ENCOUNTER FOR SCREENING FOR OTHER VIRAL DISEASES: ICD-10-CM

## 2020-10-24 DIAGNOSIS — Z98.890 OTHER SPECIFIED POSTPROCEDURAL STATES: Chronic | ICD-10-CM

## 2020-10-24 DIAGNOSIS — N20.0 CALCULUS OF KIDNEY: Chronic | ICD-10-CM

## 2020-10-27 ENCOUNTER — TRANSCRIPTION ENCOUNTER (OUTPATIENT)
Age: 55
End: 2020-10-27

## 2020-10-27 ENCOUNTER — RESULT REVIEW (OUTPATIENT)
Age: 55
End: 2020-10-27

## 2020-10-27 ENCOUNTER — OUTPATIENT (OUTPATIENT)
Dept: OUTPATIENT SERVICES | Facility: HOSPITAL | Age: 55
LOS: 1 days | Discharge: HOME | End: 2020-10-27
Payer: MEDICARE

## 2020-10-27 VITALS
WEIGHT: 199.96 LBS | HEIGHT: 73 IN | SYSTOLIC BLOOD PRESSURE: 136 MMHG | HEART RATE: 76 BPM | RESPIRATION RATE: 20 BRPM | TEMPERATURE: 98 F | DIASTOLIC BLOOD PRESSURE: 106 MMHG

## 2020-10-27 VITALS
SYSTOLIC BLOOD PRESSURE: 114 MMHG | DIASTOLIC BLOOD PRESSURE: 67 MMHG | RESPIRATION RATE: 18 BRPM | OXYGEN SATURATION: 100 % | HEART RATE: 79 BPM

## 2020-10-27 DIAGNOSIS — Z12.11 ENCOUNTER FOR SCREENING FOR MALIGNANT NEOPLASM OF COLON: ICD-10-CM

## 2020-10-27 DIAGNOSIS — B00.1 HERPESVIRAL VESICULAR DERMATITIS: ICD-10-CM

## 2020-10-27 DIAGNOSIS — I12.0 HYPERTENSIVE CHRONIC KIDNEY DISEASE WITH STAGE 5 CHRONIC KIDNEY DISEASE OR END STAGE RENAL DISEASE: ICD-10-CM

## 2020-10-27 DIAGNOSIS — Q43.8 OTHER SPECIFIED CONGENITAL MALFORMATIONS OF INTESTINE: ICD-10-CM

## 2020-10-27 DIAGNOSIS — K29.50 UNSPECIFIED CHRONIC GASTRITIS WITHOUT BLEEDING: ICD-10-CM

## 2020-10-27 DIAGNOSIS — E78.5 HYPERLIPIDEMIA, UNSPECIFIED: ICD-10-CM

## 2020-10-27 DIAGNOSIS — Z98.890 OTHER SPECIFIED POSTPROCEDURAL STATES: Chronic | ICD-10-CM

## 2020-10-27 DIAGNOSIS — Z79.4 LONG TERM (CURRENT) USE OF INSULIN: ICD-10-CM

## 2020-10-27 DIAGNOSIS — N20.0 CALCULUS OF KIDNEY: Chronic | ICD-10-CM

## 2020-10-27 DIAGNOSIS — F41.9 ANXIETY DISORDER, UNSPECIFIED: ICD-10-CM

## 2020-10-27 DIAGNOSIS — E11.22 TYPE 2 DIABETES MELLITUS WITH DIABETIC CHRONIC KIDNEY DISEASE: ICD-10-CM

## 2020-10-27 DIAGNOSIS — N18.6 END STAGE RENAL DISEASE: ICD-10-CM

## 2020-10-27 LAB — GLUCOSE BLDC GLUCOMTR-MCNC: 93 MG/DL — SIGNIFICANT CHANGE UP (ref 70–99)

## 2020-10-27 PROCEDURE — 88305 TISSUE EXAM BY PATHOLOGIST: CPT | Mod: 26

## 2020-10-27 PROCEDURE — 88312 SPECIAL STAINS GROUP 1: CPT | Mod: 26

## 2020-10-27 RX ORDER — AMLODIPINE BESYLATE 2.5 MG/1
1 TABLET ORAL
Qty: 0 | Refills: 0 | DISCHARGE

## 2020-10-27 NOTE — ASU PATIENT PROFILE, ADULT - PMH
Anxiety    DM (diabetes mellitus)    GERD (gastroesophageal reflux disease)    Herpes labialis    HLD (hyperlipidemia)    HTN (hypertension)    Kidney stones  20 years ago Anxiety    DM (diabetes mellitus)    GERD (gastroesophageal reflux disease)    Herpes labialis    HLD (hyperlipidemia)    HTN (hypertension)    Kidney disease  stage 4  Kidney stones  20 years ago

## 2020-10-27 NOTE — CHART NOTE - NSCHARTNOTEFT_GEN_A_CORE
PACU ANESTHESIA ADMISSION NOTE      Procedure:   Post op diagnosis:      ____  Intubated  TV:______       Rate: ______      FiO2: ______    __x__  Patent Airway    __x__  Full return of protective reflexes    ___x_  Full recovery from anesthesia / back to baseline     Vitals:   T:  37         R:     18             BP:   120/80               Sat:     99              P: 85      Mental Status:  ____ Awake   _____ Alert   _____ Drowsy   _____ Sedated    Nausea/Vomiting:  _x___ NO  ______Yes,   See Post - Op Orders          Pain Scale (0-10):  _0____    Treatment: ____ None    ____ See Post - Op/PCA Orders    Post - Operative Fluids:   __x__ Oral   ____ See Post - Op Orders    Plan: Discharge:   x____Home       _____Floor     _____Critical Care    _____  Other:_________________    Comments:

## 2020-10-27 NOTE — ASU DISCHARGE PLAN (ADULT/PEDIATRIC) - D. IF YOU HAD ANY TYPE OF SEDATION, YOU MAY EXPERIENCE LIGHTHEADEDNESS, DIZZINESS, OR SLEEPINESS FOLLOWING YOUR PROCEDURE. A RESPONSIBLE ADULT SHOULD STAY WITH YOU FOR AT LEAST 24 HOURS FOLLOWING YOUR PROCEDURE.
Problem: Patient Care Overview (Adult)  Goal: Plan of Care Review  Outcome: Ongoing (interventions implemented as appropriate)   01/15/18 0355   Coping/Psychosocial Response Interventions   Plan Of Care Reviewed With patient   Patient Care Overview   Progress no change   Outcome Evaluation   Outcome Summary/Follow up Plan Norco for back pain with good relief. Up ad linda. Possible Right corotids today but waiting to see for sure. VSS S/SB 58-73. Continue to monitor.      Goal: Adult Individualization and Mutuality  Outcome: Ongoing (interventions implemented as appropriate)    Goal: Discharge Needs Assessment  Outcome: Ongoing (interventions implemented as appropriate)      Problem: Acute Coronary Syndrome (ACS) (Adult)  Goal: Signs and Symptoms of Listed Potential Problems Will be Absent or Manageable (Acute Coronary Syndrome)  Outcome: Ongoing (interventions implemented as appropriate)      Problem: Pain, Chronic (Adult)  Goal: Acceptable Pain Control/Comfort Level  Outcome: Ongoing (interventions implemented as appropriate)         Statement Selected

## 2020-10-30 LAB — SURGICAL PATHOLOGY STUDY: SIGNIFICANT CHANGE UP

## 2020-10-30 NOTE — ED PROVIDER NOTE - MEDICAL DECISION MAKING DETAILS
Dr. Manoj Quiroz MD    [address] [place] [address]. [place]  10/30/20    Patient:  Sendy Hernandez  MRN: [id number]  YOB: 1959    Surgeon: Dr. Manoj Quiroz MD  Assistant: Whitney Contreras MD    Pre-op Diagnosis: Right distal 4mm ureteral stone  Post-op Diagnosis: Same    Procedure:     1. Cystoscopy with Right Ureteroscopy, Stone Basketing, and Right Ureteral Stent Insertion. Intraoperative Findings:  - Stone removed intact    Anesthesia: General  Complications: None  OR Blood Loss: Minimal  Fluids: Cystalloids  Implants: Right [id number]  Specimens: Stone for analysis    Indication:  57year old male with history of right distal ureteral stone and stent placed. He presents for treatment of stone. Narrative of the Procedure:    After informed consent was obtained in the preoperative area, the patient was taken back to the operating room and transferred to the operating table in supine position. EPC cuffs were placed. The machine was turned on. Anesthesia was induced and antibiotics were given. The patient was placed in modified dorsal lithotomy position and sterilely prepped and draped in a standard fashion. A timeout occurred. Two patient identifiers were used. We entered the urethra with a 22 Western Mili scope. The stent was seen emanating from the ureteral orifice. It was grasped using a foreign body grasper and brought to the urethral meatus. A wire was placed through the stent into the kidney under fluoroscopic guidance. The stent was removed, and a dual lumen catheter was used to place a second wire into the kidney under fluoroscopic guidance. The rigid ureteroscope was assembled, placed next to the Glidewire, and advanced into the ureter carefully. A wire remained in place as a safety. The stone was located in the distal ureter and removed intact with a zero tip nitinol basket. A 22 Syriac rigid cystoscopy was back loaded over the wire.  Under direct visualization the stent was advanced until it was in proper location. The Glidewire was then removed. A curl could be seen in the Right renal pelvis under using fluoroscopic vision, and in the bladder under direct visualization. The patients bladder was drained. All instrumentation was removed. A string was left on the stent. The patient was then awakened, extubated, and discharged back to the PACU in good and stable condition.     Follow-Up:   - Stent can be removed in 5 days after procedure    Felisha Castillo  Electronically signed on 10/30/2020 at 12:53 PM Chart finished.

## 2021-02-27 ENCOUNTER — EMERGENCY (EMERGENCY)
Facility: HOSPITAL | Age: 56
LOS: 0 days | Discharge: HOME | End: 2021-02-28
Attending: EMERGENCY MEDICINE | Admitting: EMERGENCY MEDICINE
Payer: MEDICARE

## 2021-02-27 VITALS
WEIGHT: 214.07 LBS | HEART RATE: 90 BPM | HEIGHT: 73 IN | TEMPERATURE: 96 F | OXYGEN SATURATION: 99 % | DIASTOLIC BLOOD PRESSURE: 77 MMHG | SYSTOLIC BLOOD PRESSURE: 165 MMHG | RESPIRATION RATE: 18 BRPM

## 2021-02-27 DIAGNOSIS — Z79.01 LONG TERM (CURRENT) USE OF ANTICOAGULANTS: ICD-10-CM

## 2021-02-27 DIAGNOSIS — M25.571 PAIN IN RIGHT ANKLE AND JOINTS OF RIGHT FOOT: ICD-10-CM

## 2021-02-27 DIAGNOSIS — Z79.899 OTHER LONG TERM (CURRENT) DRUG THERAPY: ICD-10-CM
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
## 2021-02-27 DIAGNOSIS — E11.9 TYPE 2 DIABETES MELLITUS WITHOUT COMPLICATIONS: ICD-10-CM
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
## 2021-02-27 DIAGNOSIS — K21.9 GASTRO-ESOPHAGEAL REFLUX DISEASE WITHOUT ESOPHAGITIS: ICD-10-CM
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
## 2021-02-27 DIAGNOSIS — Z79.84 LONG TERM (CURRENT) USE OF ORAL HYPOGLYCEMIC DRUGS: ICD-10-CM

## 2021-02-27 DIAGNOSIS — Z98.890 OTHER SPECIFIED POSTPROCEDURAL STATES: ICD-10-CM

## 2021-02-27 DIAGNOSIS — Z98.890 OTHER SPECIFIED POSTPROCEDURAL STATES: Chronic | ICD-10-CM

## 2021-02-27 DIAGNOSIS — N20.0 CALCULUS OF KIDNEY: Chronic | ICD-10-CM

## 2021-02-27 DIAGNOSIS — I10 ESSENTIAL (PRIMARY) HYPERTENSION: ICD-10-CM

## 2021-02-27 DIAGNOSIS — E78.5 HYPERLIPIDEMIA, UNSPECIFIED: ICD-10-CM

## 2021-02-27 PROCEDURE — 73610 X-RAY EXAM OF ANKLE: CPT | Mod: 26,RT

## 2021-02-27 PROCEDURE — 99283 EMERGENCY DEPT VISIT LOW MDM: CPT

## 2021-02-27 RX ORDER — NIFEDIPINE 30 MG
1 TABLET, EXTENDED RELEASE 24 HR ORAL
Qty: 0 | Refills: 0 | DISCHARGE

## 2021-02-27 NOTE — ED PROVIDER NOTE - NSFOLLOWUPINSTRUCTIONS_ED_ALL_ED_FT
Ankle Pain    Many things can cause ankle pain, including an injury to the area and overuse of the ankle. The ankle joint holds your body weight and allows you to move around. Ankle pain can occur on either side or the back of one ankle or both ankles. Ankle pain may be sharp and burning or dull and aching. There may be tenderness, stiffness, redness, or warmth around the ankle.     HOME CARE INSTRUCTIONS  Activity    Rest your ankle as told by your health care provider. Avoid any activities that cause ankle pain.  Do exercises as told by your health care provider.  Ask your health care provider if you can drive.    Using a Brace, a Bandage, or Crutches    If you were given a brace:  Wear it as told by your health care provider.  Remove it when you take a bath or a shower.  Try not to move your ankle very much, but wiggle your toes from time to time. This helps to prevent swelling.  If you were given an elastic bandage:  Remove it when you take a bath or a shower.  Try not to move your ankle very much, but wiggle your toes from time to time. This helps to prevent swelling.  Adjust the bandage to make it more comfortable if it feels too tight.  Loosen the bandage if you have numbness or tingling in your foot or if your foot turns cold and blue.  If you have crutches, use them as told by your health care provider. Continue to use them until you can walk without feeling pain in your ankle.    Managing Pain, Stiffness, and Swelling    Raise (elevate) your ankle above the level of your heart while you are sitting or lying down.  If directed, apply ice to the area:  Put ice in a plastic bag.  Place a towel between your skin and the bag.  Leave the ice on for 20 minutes, 2–3 times per day.    General Instructions    Keep all follow-up visits as told by your health care provider. This is important.  Record this information that may be helpful for you and your health care provider:  How often you have ankle pain.  Where the pain is located.  What the pain feels like.  Take over-the-counter and prescription medicines only as told by your health care provider.    SEEK MEDICAL CARE IF:  Your pain gets worse.  Your pain is not relieved with medicines.  You have a fever or chills.  You are having more trouble with walking.  You have new symptoms.    SEEK IMMEDIATE MEDICAL CARE IF:  Your foot, leg, toes, or ankle tingles or becomes numb.  Your foot, leg, toes, or ankle becomes swollen.  Your foot, leg, toes, or ankle turns pale or blue.    ADDITIONAL NOTES AND INSTRUCTIONS    Please follow up with your Primary MD in 24-48 hr.  Seek immediate medical care for any new/worsening signs or symptoms.     Follow up with your primary medical doctor in 1-2 days

## 2021-02-27 NOTE — ED PROVIDER NOTE - NS ED ROS FT
CONST: No fever, chills or bodyaches  MS: R ankle inversion injury, No joint pain, back pain or extremity pain/injury  SKIN: No rashes  NEURO: No headache, dizziness, paresthesias

## 2021-02-27 NOTE — ED PROVIDER NOTE - CLINICAL SUMMARY MEDICAL DECISION MAKING FREE TEXT BOX
x ray reviewed-calcaneal screw with bone stimulator in place, no acute fx seen.  Results d/w patient, Rec RICE, f/u with his Podiatrist. Pt instructed to return if any worsening symptoms or concerns.  They verbalize understanding.

## 2021-02-27 NOTE — ED ADULT NURSE NOTE - PMH
Anxiety    DM (diabetes mellitus)    GERD (gastroesophageal reflux disease)    Herpes labialis    HLD (hyperlipidemia)    HTN (hypertension)    Kidney disease  stage 4  Kidney stones  20 years ago

## 2021-02-27 NOTE — ED PROVIDER NOTE - CARE PROVIDER_API CALL
Rosy Billy)  Orthopaedic Surgery  [address] [place] [id number]  Phone: [phone]  Fax: [phone]  Follow Up Time: 1-3 Days

## 2021-02-27 NOTE — ED PROVIDER NOTE - OBJECTIVE STATEMENT
55 y.o male w/ hx of HTN, HLD, DM presents to the ED for evaluation of R ankle pain.  States he sustained inversion injury R ankle and did not fall in.  Since then no pain, but believes stimulator in ankle is sticking out more than usual prompting visit to the ED.  No difficulty w/ ambulation.  No pain, paresthesias.

## 2021-02-27 NOTE — ED PROVIDER NOTE - PATIENT PORTAL LINK FT
You can access the FollowMyHealth Patient Portal offered by [facility] by registering at the following website: [email or web address]. By joining [employer]’s FollowMyHealth portal, you will also be able to view your health information using other applications (apps) compatible with our system.

## 2021-02-27 NOTE — ED PROVIDER NOTE - PHYSICAL EXAMINATION
CONST: Well appearing in NAD  EYES:  Sclera and conjunctiva clear.  MS: palpable object next to R achilles tendon, no TTP, achilles intact, no TTP foot or ankle. Normal ROM in all extremities. No edema of lower extremities, no calf pain, pedal pulses 2+ bilaterally  SKIN: Warm, dry, no acute rashes. Good turgor  NEURO: A&Ox3, No focal deficits. Strength 5/5 with no sensory deficits. Steady gait

## 2021-02-27 NOTE — ED PROVIDER NOTE - ATTENDING CONTRIBUTION TO CARE
54 yo M presents with c/o pain to right ankle.  Pt twisted it and thinks that his stimulator is sticking out more than usual.  On exam pt in NAD AAO x 3, + bone stimulator palpated to posterior ankle, no swelling, no skin changes, non tender

## 2021-02-28 VITALS
RESPIRATION RATE: 18 BRPM | HEART RATE: 96 BPM | DIASTOLIC BLOOD PRESSURE: 76 MMHG | SYSTOLIC BLOOD PRESSURE: 135 MMHG | TEMPERATURE: 97 F | OXYGEN SATURATION: 97 %

## 2021-02-28 PROBLEM — N28.9 DISORDER OF KIDNEY AND URETER, UNSPECIFIED: Chronic | Status: ACTIVE | Noted: 2020-10-27

## 2021-06-24 NOTE — ED ADULT TRIAGE NOTE - BMI (KG/M2)
--SBP 120s.    --HOLD Lasix 20mg PO QD and Lisinopril 5mg PO QD as pt is preload dependent in setting of critical AS  --Continue w/ Lopressor 12.5mg PO BID.    #Hyperkalemia  -K 5.4 - 5.8   -Given Lokelma 10mg x1 6/23  -Repeat K 4.2, resolved this AM 26.1

## 2021-06-28 ENCOUNTER — RX RENEWAL (OUTPATIENT)
Age: 56
End: 2021-06-28

## 2021-12-04 NOTE — ED ADULT TRIAGE NOTE - BSA (M2)
PCP: Dwain Ramos  Phone#[phone]    Onset: body aches last night, today dark colored urine and pain on urination  Location / description: urine problems  Precipitating Factors: has not had before  Pain Scale (1-10), 10 highest: 7/10  Associated Symptoms: pain is while urinating  What improves / worsens symptoms: none  Symptom specific medications: none  Recent visits (last 3-4 weeks) for same reason or recent surgery: denies    PLAN:   See/Call Provider within 24 hours    Patient/Caller agrees to follow recommendations.     Reason for Disposition  â¢ Urinating more frequently than usual (i.e., frequency)    Protocols used: [facility] 2.14

## 2021-12-15 ENCOUNTER — OUTPATIENT (OUTPATIENT)
Dept: OUTPATIENT SERVICES | Facility: HOSPITAL | Age: 56
LOS: 1 days | Discharge: HOME | End: 2021-12-15

## 2021-12-15 DIAGNOSIS — N20.0 CALCULUS OF KIDNEY: Chronic | ICD-10-CM

## 2021-12-15 DIAGNOSIS — Z98.890 OTHER SPECIFIED POSTPROCEDURAL STATES: Chronic | ICD-10-CM

## 2021-12-16 DIAGNOSIS — Z01.21 ENCOUNTER FOR DENTAL EXAMINATION AND CLEANING WITH ABNORMAL FINDINGS: ICD-10-CM

## 2022-03-14 ENCOUNTER — OUTPATIENT (OUTPATIENT)
Dept: OUTPATIENT SERVICES | Facility: HOSPITAL | Age: 57
LOS: 1 days | Discharge: HOME | End: 2022-03-14

## 2022-03-14 ENCOUNTER — TRANSCRIPTION ENCOUNTER (OUTPATIENT)
Age: 57
End: 2022-03-14

## 2022-03-14 VITALS
DIASTOLIC BLOOD PRESSURE: 77 MMHG | HEART RATE: 67 BPM | RESPIRATION RATE: 12 BRPM | OXYGEN SATURATION: 100 % | SYSTOLIC BLOOD PRESSURE: 133 MMHG

## 2022-03-14 VITALS
HEART RATE: 83 BPM | DIASTOLIC BLOOD PRESSURE: 77 MMHG | WEIGHT: 214.95 LBS | TEMPERATURE: 97 F | RESPIRATION RATE: 18 BRPM | SYSTOLIC BLOOD PRESSURE: 120 MMHG

## 2022-03-14 DIAGNOSIS — N20.0 CALCULUS OF KIDNEY: Chronic | ICD-10-CM

## 2022-03-14 DIAGNOSIS — Z98.890 OTHER SPECIFIED POSTPROCEDURAL STATES: Chronic | ICD-10-CM

## 2022-03-14 LAB — GLUCOSE BLDC GLUCOMTR-MCNC: 147 MG/DL — HIGH (ref 70–99)

## 2022-03-14 RX ORDER — NIFEDIPINE 30 MG
90 TABLET, EXTENDED RELEASE 24 HR ORAL
Qty: 0 | Refills: 0 | DISCHARGE

## 2022-03-14 RX ORDER — FUROSEMIDE 40 MG
0 TABLET ORAL
Qty: 0 | Refills: 0 | DISCHARGE

## 2022-03-14 RX ORDER — NIFEDIPINE 30 MG
1 TABLET, EXTENDED RELEASE 24 HR ORAL
Qty: 0 | Refills: 0 | DISCHARGE

## 2022-03-14 NOTE — ASU PATIENT PROFILE, ADULT - NSICDXPASTMEDICALHX_GEN_ALL_CORE_FT
PAST MEDICAL HISTORY:  Anxiety     DM (diabetes mellitus)     GERD (gastroesophageal reflux disease)     Herpes labialis     HLD (hyperlipidemia)     HTN (hypertension)     Kidney disease stage 4    Kidney stones 20 years ago

## 2022-03-14 NOTE — CHART NOTE - NSCHARTNOTEFT_GEN_A_CORE
PACU ANESTHESIA ADMISSION NOTE      Procedure: colonoscopy  Post op diagnosis:  screening colon    ____  Intubated  TV:______       Rate: ______      FiO2: ______    x____  Patent Airway    __x__  Full return of protective reflexes    _x___  Full recovery from anesthesia / back to baseline     Vitals:   T: 98         R:   14               BP:   99/63               Sat: 100                  P: 63      Mental Status:  ____x Awake   _____ Alert   _____ Drowsy   _____ Sedated    Nausea/Vomiting:  _x___ NO  ______Yes,   See Post - Op Orders          Pain Scale (0-10):  _____    Treatment: ____ None    x____ See Post - Op/PCA Orders    Post - Operative Fluids:   _x___ Oral   ____ See Post - Op Orders    Plan: Discharge:   ___x_Home       _____Floor     _____Critical Care    _____  Other:_________________    Comments:

## 2022-03-14 NOTE — ASU DISCHARGE PLAN (ADULT/PEDIATRIC) - NS MD DC FALL RISK RISK
For information on Fall & Injury Prevention, visit: [email or web address] OR  [email or web address] OR  https://www.cdc.gov/steadi/patient.html

## 2022-03-14 NOTE — ASU PATIENT PROFILE, ADULT - NSICDXPASTSURGICALHX_GEN_ALL_CORE_FT
PAST SURGICAL HISTORY:  H/O arthroscopy of right knee     Kidney stone Removed by Laser x 2 ( 20 years ago )    S/P foot surgery, right x 5 ( 2006 - 2013 )

## 2022-03-14 NOTE — ASU PATIENT PROFILE, ADULT - FALL HARM RISK - UNIVERSAL INTERVENTIONS
Bed in lowest position, wheels locked, appropriate side rails in place/Call bell, personal items and telephone in reach/Instruct patient to call for assistance before getting out of bed or chair/Non-slip footwear when patient is out of bed/[place] to call system/Physically safe environment - no spills, clutter or unnecessary equipment/Purposeful Proactive Rounding/Room/bathroom lighting operational, light cord in reach

## 2022-03-17 DIAGNOSIS — Z12.11 ENCOUNTER FOR SCREENING FOR MALIGNANT NEOPLASM OF COLON: ICD-10-CM

## 2022-03-17 DIAGNOSIS — Z79.4 LONG TERM (CURRENT) USE OF INSULIN: ICD-10-CM

## 2022-03-17 DIAGNOSIS — F41.9 ANXIETY DISORDER, UNSPECIFIED: ICD-10-CM

## 2022-03-17 DIAGNOSIS — Z87.442 PERSONAL HISTORY OF URINARY CALCULI: ICD-10-CM

## 2022-03-17 DIAGNOSIS — I12.9 HYPERTENSIVE CHRONIC KIDNEY DISEASE WITH STAGE 1 THROUGH STAGE 4 CHRONIC KIDNEY DISEASE, OR UNSPECIFIED CHRONIC KIDNEY DISEASE: ICD-10-CM

## 2022-03-17 DIAGNOSIS — K21.9 GASTRO-ESOPHAGEAL REFLUX DISEASE WITHOUT ESOPHAGITIS: ICD-10-CM

## 2022-03-17 DIAGNOSIS — K64.8 OTHER HEMORRHOIDS: ICD-10-CM

## 2022-03-17 DIAGNOSIS — F17.210 NICOTINE DEPENDENCE, CIGARETTES, UNCOMPLICATED: ICD-10-CM

## 2022-03-17 DIAGNOSIS — E78.5 HYPERLIPIDEMIA, UNSPECIFIED: ICD-10-CM

## 2022-03-17 DIAGNOSIS — N18.30 CHRONIC KIDNEY DISEASE, STAGE 3 UNSPECIFIED: ICD-10-CM

## 2022-03-17 DIAGNOSIS — E11.22 TYPE 2 DIABETES MELLITUS WITH DIABETIC CHRONIC KIDNEY DISEASE: ICD-10-CM

## 2022-03-28 ENCOUNTER — OUTPATIENT (OUTPATIENT)
Dept: OUTPATIENT SERVICES | Facility: HOSPITAL | Age: 57
LOS: 1 days | Discharge: HOME | End: 2022-03-28

## 2022-03-28 DIAGNOSIS — Z98.890 OTHER SPECIFIED POSTPROCEDURAL STATES: Chronic | ICD-10-CM

## 2022-03-28 DIAGNOSIS — N20.0 CALCULUS OF KIDNEY: Chronic | ICD-10-CM

## 2022-04-04 ENCOUNTER — OUTPATIENT (OUTPATIENT)
Dept: OUTPATIENT SERVICES | Facility: HOSPITAL | Age: 57
LOS: 1 days | Discharge: HOME | End: 2022-04-04

## 2022-04-04 DIAGNOSIS — Z98.890 OTHER SPECIFIED POSTPROCEDURAL STATES: Chronic | ICD-10-CM

## 2022-04-04 DIAGNOSIS — N20.0 CALCULUS OF KIDNEY: Chronic | ICD-10-CM

## 2022-04-05 DIAGNOSIS — K05.6 PERIODONTAL DISEASE, UNSPECIFIED: ICD-10-CM

## 2022-04-25 ENCOUNTER — OUTPATIENT (OUTPATIENT)
Dept: OUTPATIENT SERVICES | Facility: HOSPITAL | Age: 57
LOS: 1 days | Discharge: HOME | End: 2022-04-25

## 2022-04-25 DIAGNOSIS — Z98.890 OTHER SPECIFIED POSTPROCEDURAL STATES: Chronic | ICD-10-CM

## 2022-04-25 DIAGNOSIS — K04.5 CHRONIC APICAL PERIODONTITIS: ICD-10-CM

## 2022-04-25 DIAGNOSIS — N20.0 CALCULUS OF KIDNEY: Chronic | ICD-10-CM

## 2022-05-10 ENCOUNTER — EMERGENCY (EMERGENCY)
Facility: HOSPITAL | Age: 57
LOS: 0 days | Discharge: HOME | End: 2022-05-10
Attending: EMERGENCY MEDICINE | Admitting: EMERGENCY MEDICINE
Payer: MEDICARE

## 2022-05-10 VITALS
DIASTOLIC BLOOD PRESSURE: 86 MMHG | WEIGHT: 169.98 LBS | HEART RATE: 88 BPM | OXYGEN SATURATION: 99 % | RESPIRATION RATE: 18 BRPM | SYSTOLIC BLOOD PRESSURE: 172 MMHG | HEIGHT: 73 IN | TEMPERATURE: 98 F

## 2022-05-10 VITALS
OXYGEN SATURATION: 99 % | SYSTOLIC BLOOD PRESSURE: 139 MMHG | DIASTOLIC BLOOD PRESSURE: 76 MMHG | HEART RATE: 72 BPM | RESPIRATION RATE: 20 BRPM

## 2022-05-10 DIAGNOSIS — Z79.4 LONG TERM (CURRENT) USE OF INSULIN: ICD-10-CM

## 2022-05-10 DIAGNOSIS — R06.02 SHORTNESS OF BREATH: ICD-10-CM

## 2022-05-10 DIAGNOSIS — Z20.822 CONTACT WITH AND (SUSPECTED) EXPOSURE TO COVID-19: ICD-10-CM

## 2022-05-10 DIAGNOSIS — R39.12 POOR URINARY STREAM: ICD-10-CM

## 2022-05-10 DIAGNOSIS — Z98.890 OTHER SPECIFIED POSTPROCEDURAL STATES: Chronic | ICD-10-CM

## 2022-05-10 DIAGNOSIS — Z87.891 PERSONAL HISTORY OF NICOTINE DEPENDENCE: ICD-10-CM

## 2022-05-10 DIAGNOSIS — N18.9 CHRONIC KIDNEY DISEASE, UNSPECIFIED: ICD-10-CM

## 2022-05-10 DIAGNOSIS — E11.22 TYPE 2 DIABETES MELLITUS WITH DIABETIC CHRONIC KIDNEY DISEASE: ICD-10-CM

## 2022-05-10 DIAGNOSIS — N20.0 CALCULUS OF KIDNEY: Chronic | ICD-10-CM

## 2022-05-10 DIAGNOSIS — I12.9 HYPERTENSIVE CHRONIC KIDNEY DISEASE WITH STAGE 1 THROUGH STAGE 4 CHRONIC KIDNEY DISEASE, OR UNSPECIFIED CHRONIC KIDNEY DISEASE: ICD-10-CM

## 2022-05-10 LAB
ALBUMIN SERPL ELPH-MCNC: 4.5 G/DL — SIGNIFICANT CHANGE UP (ref 3.5–5.2)
ALP SERPL-CCNC: 123 U/L — HIGH (ref 30–115)
ALT FLD-CCNC: 17 U/L — SIGNIFICANT CHANGE UP (ref 0–41)
ANION GAP SERPL CALC-SCNC: 13 MMOL/L — SIGNIFICANT CHANGE UP (ref 7–14)
APPEARANCE UR: CLEAR — SIGNIFICANT CHANGE UP
AST SERPL-CCNC: 15 U/L — SIGNIFICANT CHANGE UP (ref 0–41)
BASE EXCESS BLDV CALC-SCNC: -4.2 MMOL/L — LOW (ref -2–3)
BASOPHILS # BLD AUTO: 0.04 K/UL — SIGNIFICANT CHANGE UP (ref 0–0.2)
BASOPHILS NFR BLD AUTO: 0.6 % — SIGNIFICANT CHANGE UP (ref 0–1)
BILIRUB SERPL-MCNC: 0.2 MG/DL — SIGNIFICANT CHANGE UP (ref 0.2–1.2)
BILIRUB UR-MCNC: NEGATIVE — SIGNIFICANT CHANGE UP
BUN SERPL-MCNC: 53 MG/DL — HIGH (ref 10–20)
CA-I SERPL-SCNC: 1.21 MMOL/L — SIGNIFICANT CHANGE UP (ref 1.15–1.33)
CALCIUM SERPL-MCNC: 9.5 MG/DL — SIGNIFICANT CHANGE UP (ref 8.5–10.1)
CHLORIDE SERPL-SCNC: 102 MMOL/L — SIGNIFICANT CHANGE UP (ref 98–110)
CO2 SERPL-SCNC: 19 MMOL/L — SIGNIFICANT CHANGE UP (ref 17–32)
COLOR SPEC: YELLOW — SIGNIFICANT CHANGE UP
CREAT SERPL-MCNC: 2.7 MG/DL — HIGH (ref 0.7–1.5)
DIFF PNL FLD: NEGATIVE — SIGNIFICANT CHANGE UP
EGFR: 26 ML/MIN/1.73M2 — LOW
EOSINOPHIL # BLD AUTO: 0.24 K/UL — SIGNIFICANT CHANGE UP (ref 0–0.7)
EOSINOPHIL NFR BLD AUTO: 3.6 % — SIGNIFICANT CHANGE UP (ref 0–8)
EPI CELLS # UR: ABNORMAL /HPF
GAS PNL BLDV: 137 MMOL/L — SIGNIFICANT CHANGE UP (ref 136–145)
GAS PNL BLDV: SIGNIFICANT CHANGE UP
GLUCOSE SERPL-MCNC: 85 MG/DL — SIGNIFICANT CHANGE UP (ref 70–99)
GLUCOSE UR QL: NEGATIVE MG/DL — SIGNIFICANT CHANGE UP
HCO3 BLDV-SCNC: 21 MMOL/L — LOW (ref 22–29)
HCT VFR BLD CALC: 29.5 % — LOW (ref 42–52)
HCT VFR BLDA CALC: 32 % — LOW (ref 39–51)
HGB BLD CALC-MCNC: 10.5 G/DL — LOW (ref 12.6–17.4)
HGB BLD-MCNC: 9.5 G/DL — LOW (ref 14–18)
IMM GRANULOCYTES NFR BLD AUTO: 0.3 % — SIGNIFICANT CHANGE UP (ref 0.1–0.3)
KETONES UR-MCNC: NEGATIVE — SIGNIFICANT CHANGE UP
LACTATE BLDV-MCNC: 0.6 MMOL/L — SIGNIFICANT CHANGE UP (ref 0.5–2)
LEUKOCYTE ESTERASE UR-ACNC: NEGATIVE — SIGNIFICANT CHANGE UP
LYMPHOCYTES # BLD AUTO: 2.34 K/UL — SIGNIFICANT CHANGE UP (ref 1.2–3.4)
LYMPHOCYTES # BLD AUTO: 34.8 % — SIGNIFICANT CHANGE UP (ref 20.5–51.1)
MAGNESIUM SERPL-MCNC: 1.7 MG/DL — LOW (ref 1.8–2.4)
MCHC RBC-ENTMCNC: 26 PG — LOW (ref 27–31)
MCHC RBC-ENTMCNC: 32.2 G/DL — SIGNIFICANT CHANGE UP (ref 32–37)
MCV RBC AUTO: 80.6 FL — SIGNIFICANT CHANGE UP (ref 80–94)
MONOCYTES # BLD AUTO: 0.47 K/UL — SIGNIFICANT CHANGE UP (ref 0.1–0.6)
MONOCYTES NFR BLD AUTO: 7 % — SIGNIFICANT CHANGE UP (ref 1.7–9.3)
NEUTROPHILS # BLD AUTO: 3.62 K/UL — SIGNIFICANT CHANGE UP (ref 1.4–6.5)
NEUTROPHILS NFR BLD AUTO: 53.7 % — SIGNIFICANT CHANGE UP (ref 42.2–75.2)
NITRITE UR-MCNC: NEGATIVE — SIGNIFICANT CHANGE UP
NRBC # BLD: 0 /100 WBCS — SIGNIFICANT CHANGE UP (ref 0–0)
NT-PROBNP SERPL-SCNC: 150 PG/ML — SIGNIFICANT CHANGE UP (ref 0–300)
PCO2 BLDV: 38 MMHG — LOW (ref 42–55)
PH BLDV: 7.35 — SIGNIFICANT CHANGE UP (ref 7.32–7.43)
PH UR: 5.5 — SIGNIFICANT CHANGE UP (ref 5–8)
PHOSPHATE SERPL-MCNC: 3.8 MG/DL — SIGNIFICANT CHANGE UP (ref 2.1–4.9)
PLATELET # BLD AUTO: 306 K/UL — SIGNIFICANT CHANGE UP (ref 130–400)
PO2 BLDV: 53 MMHG — SIGNIFICANT CHANGE UP
POTASSIUM BLDV-SCNC: 4 MMOL/L — SIGNIFICANT CHANGE UP (ref 3.5–5.1)
POTASSIUM SERPL-MCNC: 4.1 MMOL/L — SIGNIFICANT CHANGE UP (ref 3.5–5)
POTASSIUM SERPL-SCNC: 4.1 MMOL/L — SIGNIFICANT CHANGE UP (ref 3.5–5)
PROT SERPL-MCNC: 6.6 G/DL — SIGNIFICANT CHANGE UP (ref 6–8)
PROT UR-MCNC: 100 MG/DL
RBC # BLD: 3.66 M/UL — LOW (ref 4.7–6.1)
RBC # FLD: 15 % — HIGH (ref 11.5–14.5)
SAO2 % BLDV: 88 % — SIGNIFICANT CHANGE UP
SARS-COV-2 RNA SPEC QL NAA+PROBE: SIGNIFICANT CHANGE UP
SODIUM SERPL-SCNC: 134 MMOL/L — LOW (ref 135–146)
SP GR SPEC: 1.02 — SIGNIFICANT CHANGE UP (ref 1.01–1.03)
TROPONIN T SERPL-MCNC: <0.01 NG/ML — SIGNIFICANT CHANGE UP
UROBILINOGEN FLD QL: 0.2 MG/DL — SIGNIFICANT CHANGE UP
WBC # BLD: 6.73 K/UL — SIGNIFICANT CHANGE UP (ref 4.8–10.8)
WBC # FLD AUTO: 6.73 K/UL — SIGNIFICANT CHANGE UP (ref 4.8–10.8)
WBC UR QL: SIGNIFICANT CHANGE UP /HPF

## 2022-05-10 PROCEDURE — 93010 ELECTROCARDIOGRAM REPORT: CPT

## 2022-05-10 PROCEDURE — 99285 EMERGENCY DEPT VISIT HI MDM: CPT

## 2022-05-10 PROCEDURE — 71045 X-RAY EXAM CHEST 1 VIEW: CPT | Mod: 26

## 2022-05-10 NOTE — ED PROVIDER NOTE - PROGRESS NOTE DETAILS
Pt reports improvement in symptoms, currently asymptomatic. Discussed results and provided copy to pt. Pt understands to follow-up with PMD/renal. Pt understands to return to ED if symptoms return/worsen. Agreeable to discharge.

## 2022-05-10 NOTE — ASU PREOP CHECKLIST - ADVANCE DIRECTIVE ADDRESSED/READDRESSED
done pt with pressured induced blister to the bottom of the R 2nd toe x few days, spontaneously unroofed today and now with purulent dc and pain/swelling, s/p local wound debridement and wound care at bedside, site milked, and expressed, wound thoroughly irrigated with NS, dressed with xeroform and DSD, NV intact pre and post wound care, course of clindamycin (anaerobes and gram positives/MRSA coverage), wound cx obtained and sent, instructed f/u with ID clinic, pt verbalized understanding

## 2022-05-10 NOTE — ED PROVIDER NOTE - CLINICAL SUMMARY MEDICAL DECISION MAKING FREE TEXT BOX
57-year-old male with history of CKD, lowest GFR 16 but has been steadily improving with medication and lifestyle change, HTN, DM2, here for the assessment of shortness of breath and change in urination.  Patient notes that for the last 2 days he has had mild exertion no dyspnea.  He initially did not think anything of it until this morning when he noticed that his urine output was significantly less than the day before without change in p.o. liquids.  Denies dysuria, hematuria, frequency, abdominal or pelvic pain.  No chest pain, cough, orthopnea.  Patient had negative nuclear stress test recently as he is currently being evaluated for renal transplant through [facility].    Vital signs notable for elevated blood pressure which improved on repeat testing.  Patient has clear lungs, regular rate and rhythm, soft, nontender, nondistended abdomen.  He is able to lay flat without any dyspnea. he is able to speak in full sentences without dyspnea.  He has no lower extremity, pitting edema.    Labs reviewed, troponin and BNP are normal.  Patient's creatinine is 2.7 with GFR of 26, improved from patient's baseline.  Chest x-ray does not demonstrate any cardiomegaly or fluid overload.  Magnesium is 1.7, it was repleted.    Discussed results with patient, no indication for admission at this time.  Advised on monitoring of symptoms, close follow-up and return precautions

## 2022-05-10 NOTE — ED PROVIDER NOTE - NS ED ROS FT
Review of Systems  Constitutional:  No fever, chills.  Eyes:  No visual changes, eye pain, or discharge.  ENMT:  No hearing changes, pain, or discharge. No nasal congestion, discharge, or bleeding. No throat pain, swelling, or difficulty swallowing.  Cardiac:  No chest pain, palpitations, syncope, or edema.  Respiratory:  No cough. No hemoptysis. (+) SOB  GI:  No nausea, vomiting, diarrhea, or abdominal pain.  :  No dysuria, hematuria, frequency, or burning. (+) decreased urinary output  MS:  No back pain.  Skin:  No skin rash, pruritis, jaundice, or lesions.  Neuro:  No headache, dizziness, loss of sensation, or focal weakness.  No change in mental status.

## 2022-05-10 NOTE — ED PROVIDER NOTE - CARE PROVIDER_API CALL
Tasha Beverly)  Nephrology  [address] [place] [address]  Phone: [phone]  Fax: [phone]  Follow Up Time: 1-3 Days

## 2022-05-10 NOTE — ED PROVIDER NOTE - PATIENT PORTAL LINK FT
You can access the FollowMyHealth Patient Portal offered by [facility] by registering at the following website: [email or web address]. By joining [employer]’s FollowMyHealth portal, you will also be able to view your health information using other applications (apps) compatible with our system.

## 2022-05-10 NOTE — ED PROVIDER NOTE - PHYSICAL EXAMINATION
VITAL SIGNS: I have reviewed nursing notes and confirm.  CONSTITUTIONAL: Well-developed; well-nourished; in no acute distress.  SKIN: Skin exam is warm and dry, no acute rash.  HEAD: Normocephalic; atraumatic.  EYES: Conjunctiva and sclera clear.  ENT: No nasal discharge; airway clear.  CARD: S1, S2 normal; no murmurs, gallops, or rubs. Regular rate and rhythm.  RESP: No wheezes, rales or rhonchi. Speaking in full sentences.   ABD: Normal bowel sounds; soft; non-distended; non-tender; No rebound or guarding. No CVA tenderness.  EXT: Normal ROM. No clubbing, cyanosis or edema. No calf TTP or swelling.   NEURO: Alert, oriented. Grossly unremarkable. No focal deficits.

## 2022-05-10 NOTE — ED ADULT NURSE NOTE - NSICDXFAMILYHX_GEN_ALL_CORE_FT
FAMILY HISTORY:  Father  Still living? No  Family history of cancer in father, Age at diagnosis: Age Unknown

## 2022-05-10 NOTE — ED PROVIDER NOTE - NS ED ATTENDING STATEMENT MOD
This was a shared visit with the ANDREW. I reviewed and verified the documentation and independently performed the documented:

## 2022-05-10 NOTE — ED PROVIDER NOTE - OBJECTIVE STATEMENT
57-year-old male with past medical history of HTN, DM, and CKD presents to the ED for evaluation of mild shortness of breath x2 days with decreased urinary output today.  Patient spoke to sister who is an RN and told him to go to the ED to get checked out.  Patient denies other complaints. Pt denies fever, chills, nausea, vomiting, abdominal pain, diarrhea, headache, dizziness, weakness, chest pain, back pain, LOC, trauma, urinary symptoms, cough, calf pain/swelling, recent travel, recent surgery.

## 2022-05-11 LAB
CULTURE RESULTS: SIGNIFICANT CHANGE UP
SPECIMEN SOURCE: SIGNIFICANT CHANGE UP

## 2022-07-08 ENCOUNTER — OUTPATIENT (OUTPATIENT)
Dept: OUTPATIENT SERVICES | Facility: HOSPITAL | Age: 57
LOS: 1 days | Discharge: HOME | End: 2022-07-08

## 2022-07-08 DIAGNOSIS — N20.0 CALCULUS OF KIDNEY: Chronic | ICD-10-CM

## 2022-07-08 DIAGNOSIS — Z98.890 OTHER SPECIFIED POSTPROCEDURAL STATES: Chronic | ICD-10-CM

## 2022-07-15 ENCOUNTER — OUTPATIENT (OUTPATIENT)
Dept: OUTPATIENT SERVICES | Facility: HOSPITAL | Age: 57
LOS: 1 days | Discharge: HOME | End: 2022-07-15

## 2022-07-15 DIAGNOSIS — N20.0 CALCULUS OF KIDNEY: Chronic | ICD-10-CM

## 2022-07-15 DIAGNOSIS — Z98.890 OTHER SPECIFIED POSTPROCEDURAL STATES: Chronic | ICD-10-CM

## 2022-07-18 ENCOUNTER — OUTPATIENT (OUTPATIENT)
Dept: OUTPATIENT SERVICES | Facility: HOSPITAL | Age: 57
LOS: 1 days | Discharge: HOME | End: 2022-07-18

## 2022-07-18 DIAGNOSIS — N20.0 CALCULUS OF KIDNEY: Chronic | ICD-10-CM

## 2022-07-18 DIAGNOSIS — Z98.890 OTHER SPECIFIED POSTPROCEDURAL STATES: Chronic | ICD-10-CM

## 2022-07-18 DIAGNOSIS — Z01.21 ENCOUNTER FOR DENTAL EXAMINATION AND CLEANING WITH ABNORMAL FINDINGS: ICD-10-CM

## 2022-10-06 ENCOUNTER — OUTPATIENT (OUTPATIENT)
Dept: OUTPATIENT SERVICES | Facility: HOSPITAL | Age: 57
LOS: 1 days | Discharge: HOME | End: 2022-10-06

## 2022-10-06 DIAGNOSIS — N20.0 CALCULUS OF KIDNEY: Chronic | ICD-10-CM

## 2022-10-06 DIAGNOSIS — Z98.890 OTHER SPECIFIED POSTPROCEDURAL STATES: Chronic | ICD-10-CM

## 2023-01-05 NOTE — ED PROVIDER NOTE - CROS ED EYES ALL NEG
negative... Keystone Flap Text: The defect edges were debeveled with a #15 scalpel blade.  Given the location of the defect and shape of the defect a keystone flap was deemed most appropriate.  Using a sterile surgical marker, an appropriate keystone flap was drawn incorporating the defect, outlining the appropriate donor tissue and placing the expected incisions within the relaxed skin tension lines where possible. The area thus outlined was incised deep to adipose tissue with a #15 scalpel blade.  The skin margins were undermined to an appropriate distance in all directions around the primary defect and laterally outward around the flap utilizing iris scissors.

## 2023-06-07 NOTE — ED PROVIDER NOTE - TEMPLATE
Caller: Erick Beal    Relationship to patient: Self    Best call back number: [phone]     Patient is needing: PT REPORTED SHE DOES NOT WANT TO SCHEDULE THE ECHO AT THIS TIME    Wounds

## 2023-08-08 ENCOUNTER — APPOINTMENT (OUTPATIENT)
Dept: CARDIOLOGY | Facility: CLINIC | Age: 58
End: 2023-08-08

## 2023-08-08 ENCOUNTER — APPOINTMENT (OUTPATIENT)
Dept: CARDIOLOGY | Facility: CLINIC | Age: 58
End: 2023-08-08
Payer: MEDICARE

## 2023-08-08 ENCOUNTER — OUTPATIENT (OUTPATIENT)
Dept: OUTPATIENT SERVICES | Facility: HOSPITAL | Age: 58
LOS: 1 days | End: 2023-08-08
Payer: MEDICARE

## 2023-08-08 VITALS
OXYGEN SATURATION: 98 % | BODY MASS INDEX: 27.63 KG/M2 | HEIGHT: 72 IN | HEART RATE: 87 BPM | WEIGHT: 204 LBS | DIASTOLIC BLOOD PRESSURE: 88 MMHG | SYSTOLIC BLOOD PRESSURE: 156 MMHG | TEMPERATURE: 97.9 F

## 2023-08-08 VITALS — DIASTOLIC BLOOD PRESSURE: 85 MMHG | SYSTOLIC BLOOD PRESSURE: 162 MMHG

## 2023-08-08 DIAGNOSIS — Z00.00 ENCOUNTER FOR GENERAL ADULT MEDICAL EXAMINATION WITHOUT ABNORMAL FINDINGS: ICD-10-CM

## 2023-08-08 DIAGNOSIS — Z01.810 ENCOUNTER FOR PREPROCEDURAL CARDIOVASCULAR EXAMINATION: ICD-10-CM

## 2023-08-08 DIAGNOSIS — Z98.890 OTHER SPECIFIED POSTPROCEDURAL STATES: Chronic | ICD-10-CM

## 2023-08-08 DIAGNOSIS — N20.0 CALCULUS OF KIDNEY: Chronic | ICD-10-CM

## 2023-08-08 PROCEDURE — 99204 OFFICE O/P NEW MOD 45 MIN: CPT

## 2023-08-08 PROCEDURE — 93010 ELECTROCARDIOGRAM REPORT: CPT

## 2023-08-08 PROCEDURE — 93005 ELECTROCARDIOGRAM TRACING: CPT

## 2023-08-08 NOTE — END OF VISIT
[] : Resident [FreeTextEntry3] : Briefly, 58 year old man who presents for preoperative evaluation prior to kidney transplant. We will do an echocardiogram and a stress test.

## 2023-08-08 NOTE — REVIEW OF SYSTEMS
[Fever] : no fever [Eye Pain] : no eye pain [Earache] : no earache [SOB] : no shortness of breath [Dyspnea on exertion] : not dyspnea during exertion [Chest Discomfort] : no chest discomfort [Leg Claudication] : no intermittent leg claudication [Palpitations] : no palpitations [Syncope] : no syncope [Cough] : no cough [Wheezing] : no wheezing [Abdominal Pain] : no abdominal pain [Pain During Urination] : no dysuria [Joint Pain] : no joint pain [Rash] : no rash [Dizziness] : no dizziness [Confusion] : no confusion was observed

## 2023-08-08 NOTE — ASSESSMENT
[FreeTextEntry1] : #CKD on transplant list - ordering echo and nuclear exercise stress test -f/u cardiology clinic in a yaer  #HTN -f/u with nephrologist and pcp for further management  The primary prevention of heart disease was discussed in detail with the patient, including adhering to a heart healthy, plant based, or Mediterranean diet, and the importance of 30 minutes of moderate intensity activity for 30 minutes, 5 times a week. All the patient's questions were answered.

## 2023-08-08 NOTE — PHYSICAL EXAM
[Well Developed] : well developed [Well Nourished] : well nourished [No Acute Distress] : no acute distress [Normal Conjunctiva] : normal conjunctiva [No Carotid Bruit] : no carotid bruit [Normal S1, S2] : normal S1, S2 [No Murmur] : no murmur [No Rub] : no rub [No Gallop] : no gallop [Clear Lung Fields] : clear lung fields [Good Air Entry] : good air entry [No Respiratory Distress] : no respiratory distress  [Soft] : abdomen soft [Non Tender] : non-tender [Normal Gait] : normal gait [No Edema] : no edema [No Cyanosis] : no cyanosis [No Skin Lesions] : no skin lesions [Moves all extremities] : moves all extremities [Alert and Oriented] : alert and oriented

## 2023-08-08 NOTE — HISTORY OF PRESENT ILLNESS
[FreeTextEntry1] : 58 year old male PMH of CKD, DM1, HTN, right foot Charcot on list for kidney transplant. Takes nifedipine 90 , losartan 100 for, basal bolus insulin, lokelma 10 every 3 days, sodium bicarb 650 TID. Presents to clinic as wants to switch to clinic for his yearly cardiology appointment.  Denies any chest pain, sob, dyspnea, palpitations. Needs yearly stress test and echo for kidney transplant list.

## 2023-08-14 DIAGNOSIS — I12.0 HYPERTENSIVE CHRONIC KIDNEY DISEASE WITH STAGE 5 CHRONIC KIDNEY DISEASE OR END STAGE RENAL DISEASE: ICD-10-CM

## 2023-08-14 DIAGNOSIS — Z01.810 ENCOUNTER FOR PREPROCEDURAL CARDIOVASCULAR EXAMINATION: ICD-10-CM

## 2023-08-14 DIAGNOSIS — N18.9 CHRONIC KIDNEY DISEASE, UNSPECIFIED: ICD-10-CM

## 2023-09-06 ENCOUNTER — APPOINTMENT (OUTPATIENT)
Dept: CV DIAGNOSTICS | Facility: HOSPITAL | Age: 58
End: 2023-09-06
Payer: MEDICARE

## 2023-09-06 ENCOUNTER — OUTPATIENT (OUTPATIENT)
Dept: OUTPATIENT SERVICES | Facility: HOSPITAL | Age: 58
LOS: 1 days | End: 2023-09-06
Payer: MEDICARE

## 2023-09-06 ENCOUNTER — APPOINTMENT (OUTPATIENT)
Dept: CV DIAGNOSITCS | Facility: HOSPITAL | Age: 58
End: 2023-09-06
Payer: MEDICARE

## 2023-09-06 ENCOUNTER — APPOINTMENT (OUTPATIENT)
Dept: CARDIOLOGY | Facility: CLINIC | Age: 58
End: 2023-09-06
Payer: MEDICARE

## 2023-09-06 VITALS
OXYGEN SATURATION: 95 % | WEIGHT: 195 LBS | DIASTOLIC BLOOD PRESSURE: 62 MMHG | HEIGHT: 72 IN | BODY MASS INDEX: 26.41 KG/M2 | SYSTOLIC BLOOD PRESSURE: 100 MMHG | HEART RATE: 91 BPM

## 2023-09-06 DIAGNOSIS — Z98.890 OTHER SPECIFIED POSTPROCEDURAL STATES: Chronic | ICD-10-CM

## 2023-09-06 DIAGNOSIS — Z01.810 ENCOUNTER FOR PREPROCEDURAL CARDIOVASCULAR EXAMINATION: ICD-10-CM

## 2023-09-06 DIAGNOSIS — M79.604 PAIN IN RIGHT LEG: ICD-10-CM

## 2023-09-06 DIAGNOSIS — M79.605 PAIN IN RIGHT LEG: ICD-10-CM

## 2023-09-06 DIAGNOSIS — N20.0 CALCULUS OF KIDNEY: Chronic | ICD-10-CM

## 2023-09-06 PROCEDURE — 93306 TTE W/DOPPLER COMPLETE: CPT

## 2023-09-06 PROCEDURE — 78452 HT MUSCLE IMAGE SPECT MULT: CPT | Mod: 26,ME

## 2023-09-06 PROCEDURE — 93016 CV STRESS TEST SUPVJ ONLY: CPT

## 2023-09-06 PROCEDURE — 93018 CV STRESS TEST I&R ONLY: CPT

## 2023-09-06 PROCEDURE — 99214 OFFICE O/P EST MOD 30 MIN: CPT

## 2023-09-06 PROCEDURE — G1004: CPT

## 2023-09-06 PROCEDURE — 93017 CV STRESS TEST TRACING ONLY: CPT

## 2023-09-06 PROCEDURE — 93306 TTE W/DOPPLER COMPLETE: CPT | Mod: 26

## 2023-09-06 PROCEDURE — A9500: CPT

## 2023-09-06 PROCEDURE — 78452 HT MUSCLE IMAGE SPECT MULT: CPT | Mod: ME

## 2023-09-06 RX ORDER — OMEPRAZOLE 40 MG/1
40 CAPSULE, DELAYED RELEASE ORAL
Refills: 0 | Status: DISCONTINUED | COMMUNITY
End: 2023-09-06

## 2023-09-06 RX ORDER — AMLODIPINE BESYLATE 10 MG/1
10 TABLET ORAL
Qty: 90 | Refills: 0 | Status: DISCONTINUED | COMMUNITY
Start: 2015-12-19 | End: 2023-09-06

## 2023-09-06 RX ORDER — OXYCODONE AND ACETAMINOPHEN 10; 325 MG/1; MG/1
10-325 TABLET ORAL
Qty: 120 | Refills: 0 | Status: DISCONTINUED | COMMUNITY
Start: 2016-08-24 | End: 2023-09-06

## 2023-09-06 RX ORDER — INSULIN LISPRO 100 [IU]/ML
100 INJECTION, SOLUTION INTRAVENOUS; SUBCUTANEOUS
Qty: 75 | Refills: 0 | Status: DISCONTINUED | COMMUNITY
Start: 2017-01-23 | End: 2023-09-06

## 2023-09-06 RX ORDER — AMLODIPINE BESYLATE 5 MG/1
TABLET ORAL
Refills: 0 | Status: DISCONTINUED | COMMUNITY
End: 2023-09-06

## 2023-09-06 RX ORDER — CLINDAMYCIN AND BENZOYL PEROXIDE 50; 10 MG/G; MG/G
1-5 GEL TOPICAL
Qty: 50 | Refills: 0 | Status: DISCONTINUED | COMMUNITY
Start: 2017-01-11 | End: 2023-09-06

## 2023-09-06 RX ORDER — CYCLOBENZAPRINE HYDROCHLORIDE 10 MG/1
10 TABLET, FILM COATED ORAL
Qty: 60 | Refills: 0 | Status: DISCONTINUED | COMMUNITY
Start: 2016-09-16 | End: 2023-09-06

## 2023-09-06 RX ORDER — VARENICLINE TARTRATE 0.5 (11)-1
0.5 MG X 11 & KIT ORAL
Qty: 53 | Refills: 0 | Status: DISCONTINUED | COMMUNITY
Start: 2016-11-15 | End: 2023-09-06

## 2023-09-06 RX ORDER — PENCICLOVIR 10 MG/G
1 CREAM TOPICAL
Qty: 5 | Refills: 0 | Status: DISCONTINUED | COMMUNITY
Start: 2017-01-11 | End: 2023-09-06

## 2023-09-06 RX ORDER — CELECOXIB 200 MG/1
200 CAPSULE ORAL
Qty: 2 | Refills: 0 | Status: DISCONTINUED | COMMUNITY
Start: 2017-03-15 | End: 2023-09-06

## 2023-09-06 RX ORDER — VARENICLINE TARTRATE 1 MG/1
1 TABLET, FILM COATED ORAL
Qty: 56 | Refills: 0 | Status: DISCONTINUED | COMMUNITY
Start: 2017-01-11 | End: 2023-09-06

## 2023-09-06 RX ORDER — METHYLPREDNISOLONE 4 MG/1
4 TABLET ORAL
Qty: 21 | Refills: 0 | Status: DISCONTINUED | COMMUNITY
Start: 2016-09-16 | End: 2023-09-06

## 2023-09-06 NOTE — REVIEW OF SYSTEMS
[Negative] : Cardiovascular [Feeling Fatigued] : not feeling fatigued [Cough] : no cough [Abdominal Pain] : no abdominal pain [Joint Pain] : joint pain [Rash] : no rash [Skin Lesions] : skin lesion(s): [Dizziness] : no dizziness

## 2023-09-06 NOTE — ASSESSMENT
[FreeTextEntry1] : Assessment: #Diminished pedal pulses of R foot #R Charcot foot with wound  #Type 1 DM, HTN #CKD on transplant list  Plan: - Bilat LE ART US and DALJIT/PVR, will discuss results over the phone - Encouraged good control of BG, BP and cholesterol - Follow-up with Dr. Roca for podiatric care - Return to clinic in 3 months for cardiac visit (patient does not want to continue at [facility] clinic)

## 2023-09-06 NOTE — PHYSICAL EXAM
[Well Developed] : well developed [Well Nourished] : well nourished [No Acute Distress] : no acute distress [Normal Conjunctiva] : normal conjunctiva [Normal Venous Pressure] : normal venous pressure [No Carotid Bruit] : no carotid bruit [Normal S1, S2] : normal S1, S2 [No Murmur] : no murmur [No Rub] : no rub [No Gallop] : no gallop [Clear Lung Fields] : clear lung fields [Good Air Entry] : good air entry [No Respiratory Distress] : no respiratory distress  [Soft] : abdomen soft [Non Tender] : non-tender [No Masses/organomegaly] : no masses/organomegaly [Normal Bowel Sounds] : normal bowel sounds [Moves all extremities] : moves all extremities [No Focal Deficits] : no focal deficits [Normal Speech] : normal speech [No ulcers] : no ulcers [No edema] : no edema [No varicosities] : no varicosities [No chronic venous stasis changes] : no chronic venous stasis changes [No rashes] : no rashes [Diminished] : diminished on the right [Present] : Right Lower Extremity: present

## 2023-09-06 NOTE — HISTORY OF PRESENT ILLNESS
[FreeTextEntry1] : 58M with CKD on transplant list, T1DM, HTN, right foot Charcot with wound, HTN, DLD here for vascular evaluation prior to R foot charcot reconstructive surgery.   Referring: Dr. Zach Roca  9/6/2023 R foot pain with walking. Can walk miles. Steps cause pain in foot as well. No claudication, rest pain. Wound on plantar surface of right foot, last had debridement yesterday.   Former tobacco user, quit 2 years ago.

## 2023-09-07 ENCOUNTER — EMERGENCY (EMERGENCY)
Facility: HOSPITAL | Age: 58
LOS: 0 days | Discharge: HOME | End: 2023-09-07
Attending: EMERGENCY MEDICINE
Payer: MEDICARE

## 2023-09-07 ENCOUNTER — APPOINTMENT (OUTPATIENT)
Dept: CARDIOLOGY | Facility: CLINIC | Age: 58
End: 2023-09-07
Payer: MEDICARE

## 2023-09-07 VITALS
SYSTOLIC BLOOD PRESSURE: 130 MMHG | DIASTOLIC BLOOD PRESSURE: 77 MMHG | OXYGEN SATURATION: 100 % | RESPIRATION RATE: 18 BRPM | HEART RATE: 78 BPM | TEMPERATURE: 98 F

## 2023-09-07 VITALS
TEMPERATURE: 99 F | OXYGEN SATURATION: 100 % | HEART RATE: 85 BPM | RESPIRATION RATE: 18 BRPM | SYSTOLIC BLOOD PRESSURE: 146 MMHG | HEIGHT: 72 IN | DIASTOLIC BLOOD PRESSURE: 92 MMHG | WEIGHT: 199.96 LBS

## 2023-09-07 DIAGNOSIS — Z98.890 OTHER SPECIFIED POSTPROCEDURAL STATES: Chronic | ICD-10-CM

## 2023-09-07 DIAGNOSIS — I12.9 HYPERTENSIVE CHRONIC KIDNEY DISEASE WITH STAGE 1 THROUGH STAGE 4 CHRONIC KIDNEY DISEASE, OR UNSPECIFIED CHRONIC KIDNEY DISEASE: ICD-10-CM

## 2023-09-07 DIAGNOSIS — E11.22 TYPE 2 DIABETES MELLITUS WITH DIABETIC CHRONIC KIDNEY DISEASE: ICD-10-CM

## 2023-09-07 DIAGNOSIS — Z96.651 PRESENCE OF RIGHT ARTIFICIAL KNEE JOINT: ICD-10-CM

## 2023-09-07 DIAGNOSIS — K21.9 GASTRO-ESOPHAGEAL REFLUX DISEASE WITHOUT ESOPHAGITIS: ICD-10-CM

## 2023-09-07 DIAGNOSIS — Z98.890 OTHER SPECIFIED POSTPROCEDURAL STATES: ICD-10-CM

## 2023-09-07 DIAGNOSIS — R39.12 POOR URINARY STREAM: ICD-10-CM

## 2023-09-07 DIAGNOSIS — Z87.442 PERSONAL HISTORY OF URINARY CALCULI: ICD-10-CM

## 2023-09-07 DIAGNOSIS — Z87.2 PERSONAL HISTORY OF DISEASES OF THE SKIN AND SUBCUTANEOUS TISSUE: ICD-10-CM

## 2023-09-07 DIAGNOSIS — F41.9 ANXIETY DISORDER, UNSPECIFIED: ICD-10-CM

## 2023-09-07 DIAGNOSIS — Z01.810 ENCOUNTER FOR PREPROCEDURAL CARDIOVASCULAR EXAMINATION: ICD-10-CM

## 2023-09-07 DIAGNOSIS — N18.9 CHRONIC KIDNEY DISEASE, UNSPECIFIED: ICD-10-CM

## 2023-09-07 DIAGNOSIS — N20.0 CALCULUS OF KIDNEY: Chronic | ICD-10-CM

## 2023-09-07 DIAGNOSIS — Z94.0 KIDNEY TRANSPLANT STATUS: ICD-10-CM

## 2023-09-07 DIAGNOSIS — E78.5 HYPERLIPIDEMIA, UNSPECIFIED: ICD-10-CM

## 2023-09-07 LAB
ALBUMIN SERPL ELPH-MCNC: 4.5 G/DL — SIGNIFICANT CHANGE UP (ref 3.5–5.2)
ALP SERPL-CCNC: 117 U/L — HIGH (ref 30–115)
ALT FLD-CCNC: 10 U/L — SIGNIFICANT CHANGE UP (ref 0–41)
ANION GAP SERPL CALC-SCNC: 13 MMOL/L — SIGNIFICANT CHANGE UP (ref 7–14)
APPEARANCE UR: CLEAR — SIGNIFICANT CHANGE UP
AST SERPL-CCNC: 13 U/L — SIGNIFICANT CHANGE UP (ref 0–41)
BACTERIA # UR AUTO: ABNORMAL /HPF
BASOPHILS # BLD AUTO: 0.04 K/UL — SIGNIFICANT CHANGE UP (ref 0–0.2)
BASOPHILS NFR BLD AUTO: 0.5 % — SIGNIFICANT CHANGE UP (ref 0–1)
BILIRUB SERPL-MCNC: <0.2 MG/DL — SIGNIFICANT CHANGE UP (ref 0.2–1.2)
BILIRUB UR-MCNC: NEGATIVE — SIGNIFICANT CHANGE UP
BUN SERPL-MCNC: 62 MG/DL — CRITICAL HIGH (ref 10–20)
CALCIUM SERPL-MCNC: 9.4 MG/DL — SIGNIFICANT CHANGE UP (ref 8.4–10.5)
CHLORIDE SERPL-SCNC: 104 MMOL/L — SIGNIFICANT CHANGE UP (ref 98–110)
CO2 SERPL-SCNC: 21 MMOL/L — SIGNIFICANT CHANGE UP (ref 17–32)
COLOR SPEC: YELLOW — SIGNIFICANT CHANGE UP
CREAT SERPL-MCNC: 2.9 MG/DL — HIGH (ref 0.7–1.5)
DIFF PNL FLD: NEGATIVE — SIGNIFICANT CHANGE UP
EGFR: 24 ML/MIN/1.73M2 — LOW
EOSINOPHIL # BLD AUTO: 0.29 K/UL — SIGNIFICANT CHANGE UP (ref 0–0.7)
EOSINOPHIL NFR BLD AUTO: 3.5 % — SIGNIFICANT CHANGE UP (ref 0–8)
EPI CELLS # UR: PRESENT
GLUCOSE SERPL-MCNC: 84 MG/DL — SIGNIFICANT CHANGE UP (ref 70–99)
GLUCOSE UR QL: NEGATIVE MG/DL — SIGNIFICANT CHANGE UP
HCT VFR BLD CALC: 30.7 % — LOW (ref 42–52)
HGB BLD-MCNC: 10 G/DL — LOW (ref 14–18)
IMM GRANULOCYTES NFR BLD AUTO: 0.4 % — HIGH (ref 0.1–0.3)
KETONES UR-MCNC: NEGATIVE MG/DL — SIGNIFICANT CHANGE UP
LEUKOCYTE ESTERASE UR-ACNC: NEGATIVE — SIGNIFICANT CHANGE UP
LYMPHOCYTES # BLD AUTO: 2.69 K/UL — SIGNIFICANT CHANGE UP (ref 1.2–3.4)
LYMPHOCYTES # BLD AUTO: 32.7 % — SIGNIFICANT CHANGE UP (ref 20.5–51.1)
MAGNESIUM SERPL-MCNC: 2.3 MG/DL — SIGNIFICANT CHANGE UP (ref 1.8–2.4)
MCHC RBC-ENTMCNC: 27.4 PG — SIGNIFICANT CHANGE UP (ref 27–31)
MCHC RBC-ENTMCNC: 32.6 G/DL — SIGNIFICANT CHANGE UP (ref 32–37)
MCV RBC AUTO: 84.1 FL — SIGNIFICANT CHANGE UP (ref 80–94)
MONOCYTES # BLD AUTO: 0.68 K/UL — HIGH (ref 0.1–0.6)
MONOCYTES NFR BLD AUTO: 8.3 % — SIGNIFICANT CHANGE UP (ref 1.7–9.3)
NEUTROPHILS # BLD AUTO: 4.5 K/UL — SIGNIFICANT CHANGE UP (ref 1.4–6.5)
NEUTROPHILS NFR BLD AUTO: 54.6 % — SIGNIFICANT CHANGE UP (ref 42.2–75.2)
NITRITE UR-MCNC: NEGATIVE — SIGNIFICANT CHANGE UP
NRBC # BLD: 0 /100 WBCS — SIGNIFICANT CHANGE UP (ref 0–0)
PH UR: 5 — SIGNIFICANT CHANGE UP (ref 5–8)
PLATELET # BLD AUTO: 412 K/UL — HIGH (ref 130–400)
POTASSIUM SERPL-MCNC: 4.8 MMOL/L — SIGNIFICANT CHANGE UP (ref 3.5–5)
POTASSIUM SERPL-SCNC: 4.8 MMOL/L — SIGNIFICANT CHANGE UP (ref 3.5–5)
PROT SERPL-MCNC: 7 G/DL — SIGNIFICANT CHANGE UP (ref 6–8)
PROT UR-MCNC: 300 MG/DL
RBC # BLD: 3.65 M/UL — LOW (ref 4.7–6.1)
RBC # FLD: 14.8 % — HIGH (ref 11.5–14.5)
RBC CASTS # UR COMP ASSIST: 1 /HPF — SIGNIFICANT CHANGE UP (ref 0–4)
SODIUM SERPL-SCNC: 138 MMOL/L — SIGNIFICANT CHANGE UP (ref 135–146)
SP GR SPEC: 1.01 — SIGNIFICANT CHANGE UP (ref 1–1.03)
UROBILINOGEN FLD QL: 0.2 MG/DL — SIGNIFICANT CHANGE UP (ref 0.2–1)
WBC # BLD: 8.23 K/UL — SIGNIFICANT CHANGE UP (ref 4.8–10.8)
WBC # FLD AUTO: 8.23 K/UL — SIGNIFICANT CHANGE UP (ref 4.8–10.8)
WBC UR QL: 2 /HPF — SIGNIFICANT CHANGE UP (ref 0–5)

## 2023-09-07 PROCEDURE — 81001 URINALYSIS AUTO W/SCOPE: CPT

## 2023-09-07 PROCEDURE — 99284 EMERGENCY DEPT VISIT MOD MDM: CPT

## 2023-09-07 PROCEDURE — 93923 UPR/LXTR ART STDY 3+ LVLS: CPT

## 2023-09-07 PROCEDURE — 83735 ASSAY OF MAGNESIUM: CPT

## 2023-09-07 PROCEDURE — 99283 EMERGENCY DEPT VISIT LOW MDM: CPT

## 2023-09-07 PROCEDURE — 36415 COLL VENOUS BLD VENIPUNCTURE: CPT

## 2023-09-07 PROCEDURE — 80053 COMPREHEN METABOLIC PANEL: CPT

## 2023-09-07 PROCEDURE — 87086 URINE CULTURE/COLONY COUNT: CPT

## 2023-09-07 PROCEDURE — 85025 COMPLETE CBC W/AUTO DIFF WBC: CPT

## 2023-09-07 NOTE — ED PROVIDER NOTE - CLINICAL SUMMARY MEDICAL DECISION MAKING FREE TEXT BOX
Crystal jane signed out to me - 58-year-old male past medical history of hypertension and diabetes, chronic kidney disease currently on the renal transplant list comes to the emergency room for decrease in urination over the last few days. Here pt able to urinate immediately, ua wnl, creatine is at baseline. pt well appearing exam unremarkable stable for dc.

## 2023-09-07 NOTE — ED PROVIDER NOTE - PROGRESS NOTE DETAILS
pk: pt signed out to me by RONALD Fuentes, resting comfortably, has no complaints, normal p/e, baseline creatinine is 2.8, hgb 10. labs consistent with baseline levels, pt making urine and ua normla, pt appropriate for dc with follow up with Dr. Simmons and agrees with plan. all results d/w pt & copies given, strict return precautions discussed.

## 2023-09-07 NOTE — ED PROVIDER NOTE - CARE PROVIDER_API CALL
Tasha Beverly  Nephrology  [address] [place] [address], [place] [address]  Phone: [phone]  Fax: [phone]  Follow Up Time: Routine

## 2023-09-07 NOTE — ED PROVIDER NOTE - OBJECTIVE STATEMENT
58-year-old male past medical history of hypertension and diabetes, chronic kidney disease currently on the renal transplant list comes to the emergency room for decrease in urination over the last few days.  Patient states that his kidney function is usually between 19 and 20%.  Patient states that he has had no fever no chills no abdominal pain no back pain no nausea no vomiting.  No diarrhea

## 2023-09-07 NOTE — ED ADULT TRIAGE NOTE - PATIENT ON (OXYGEN DELIVERY METHOD)
[FreeTextEntry1] : continue smoking cessation \par \par Pulmonary status maximized. Pulmonary function adequate to tolerate proposed surgical procedure. \par No pulmonary contraindications to surgery\par Continue present bronchodilator therapy.\par \par Pt with DAMION. Anesthesia should be aware of mild DAMION and take DAMION precautions.\par \par 35 minutes spent in evaluation management and review of all studies.
room air

## 2023-09-07 NOTE — ED PROVIDER NOTE - ATTENDING APP SHARED VISIT CONTRIBUTION OF CARE
58-year-old male past medical history of hypertension and diabetes, chronic kidney disease currently on the renal transplant list comes to the emergency room for decrease in urination over the last few days.  Patient states that his kidney function is usually between 19 and 20%.  Patient states that he has had no fever no chills no abdominal pain no back pain no nausea no vomiting.  Pt sts he is drinking several bottles of water each day. His concern is his kidney functioning is getting worse.    VITAL SIGNS: I have reviewed nursing notes and confirm.  CONSTITUTIONAL: Well-developed; well-nourished; in no acute distress.  SKIN: Skin exam is warm and dry, no acute rash.  HEAD: Normocephalic; atraumatic.  EYES: PERRL, EOM intact; conjunctiva and sclera clear.  ENT: No nasal discharge; airway clear.   NECK: Supple; non tender.  CARD:+ S1, S2   RESP: No wheezes, rales or rhonchi.  ABD: Normal bowel sounds; soft; non-distended; non-tender;  EXT: Normal ROM. No cyanosis or edema.  LYMPH: No acute adenopathy.  NEURO: Alert. Grossly unremarkable. No focal deficits.  PSYCH: Cooperative, appropriate.

## 2023-09-07 NOTE — ED PROVIDER NOTE - PATIENT PORTAL LINK FT
You can access the FollowMyHealth Patient Portal offered by [facility] by registering at the following website: [email or web address]. By joining [employer]’s FollowMyHealth portal, you will also be able to view your health information using other applications (apps) compatible with our system.

## 2023-09-07 NOTE — ED ADULT NURSE NOTE - NSFALLUNIVINTERV_ED_ALL_ED
Bed/Stretcher in lowest position, wheels locked, appropriate side rails in place/Call bell, personal items and telephone in reach/Instruct patient to call for assistance before getting out of bed/chair/stretcher/Non-slip footwear applied when patient is off stretcher/[place] to call system/Physically safe environment - no spills, clutter or unnecessary equipment/Purposeful proactive rounding/Room/bathroom lighting operational, light cord in reach

## 2023-09-08 ENCOUNTER — OUTPATIENT (OUTPATIENT)
Dept: OUTPATIENT SERVICES | Facility: HOSPITAL | Age: 58
LOS: 1 days | End: 2023-09-08
Payer: MEDICARE

## 2023-09-08 ENCOUNTER — APPOINTMENT (OUTPATIENT)
Dept: CARDIOLOGY | Facility: CLINIC | Age: 58
End: 2023-09-08
Payer: MEDICARE

## 2023-09-08 DIAGNOSIS — S99.921D UNSPECIFIED INJURY OF RIGHT FOOT, SUBSEQUENT ENCOUNTER: ICD-10-CM

## 2023-09-08 DIAGNOSIS — S99.921A UNSPECIFIED INJURY OF RIGHT FOOT, INITIAL ENCOUNTER: ICD-10-CM

## 2023-09-08 DIAGNOSIS — Z00.8 ENCOUNTER FOR OTHER GENERAL EXAMINATION: ICD-10-CM

## 2023-09-08 DIAGNOSIS — Z98.890 OTHER SPECIFIED POSTPROCEDURAL STATES: Chronic | ICD-10-CM

## 2023-09-08 DIAGNOSIS — N20.0 CALCULUS OF KIDNEY: Chronic | ICD-10-CM

## 2023-09-08 LAB
CULTURE RESULTS: SIGNIFICANT CHANGE UP
SPECIMEN SOURCE: SIGNIFICANT CHANGE UP

## 2023-09-08 PROCEDURE — 93925 LOWER EXTREMITY STUDY: CPT

## 2023-09-08 PROCEDURE — 73700 CT LOWER EXTREMITY W/O DYE: CPT | Mod: RT

## 2023-09-08 PROCEDURE — 73700 CT LOWER EXTREMITY W/O DYE: CPT | Mod: 26,RT

## 2023-09-09 DIAGNOSIS — S99.921A UNSPECIFIED INJURY OF RIGHT FOOT, INITIAL ENCOUNTER: ICD-10-CM

## 2023-09-14 ENCOUNTER — OUTPATIENT (OUTPATIENT)
Dept: OUTPATIENT SERVICES | Facility: HOSPITAL | Age: 58
LOS: 1 days | End: 2023-09-14
Payer: MEDICARE

## 2023-09-14 VITALS
HEART RATE: 84 BPM | SYSTOLIC BLOOD PRESSURE: 150 MMHG | RESPIRATION RATE: 16 BRPM | DIASTOLIC BLOOD PRESSURE: 82 MMHG | WEIGHT: 197.98 LBS | TEMPERATURE: 99 F | OXYGEN SATURATION: 98 % | HEIGHT: 72 IN

## 2023-09-14 DIAGNOSIS — Z98.890 OTHER SPECIFIED POSTPROCEDURAL STATES: Chronic | ICD-10-CM

## 2023-09-14 DIAGNOSIS — Z01.818 ENCOUNTER FOR OTHER PREPROCEDURAL EXAMINATION: ICD-10-CM

## 2023-09-14 DIAGNOSIS — N20.0 CALCULUS OF KIDNEY: Chronic | ICD-10-CM

## 2023-09-14 DIAGNOSIS — M24.574 CONTRACTURE, RIGHT FOOT: ICD-10-CM

## 2023-09-14 LAB
A1C WITH ESTIMATED AVERAGE GLUCOSE RESULT: 9.9 % — HIGH (ref 4–5.6)
ALBUMIN SERPL ELPH-MCNC: 4.5 G/DL — SIGNIFICANT CHANGE UP (ref 3.5–5.2)
ALP SERPL-CCNC: 121 U/L — HIGH (ref 30–115)
ALT FLD-CCNC: 13 U/L — SIGNIFICANT CHANGE UP (ref 0–41)
ANION GAP SERPL CALC-SCNC: 15 MMOL/L — HIGH (ref 7–14)
APPEARANCE UR: CLEAR — SIGNIFICANT CHANGE UP
APTT BLD: 31 SEC — SIGNIFICANT CHANGE UP (ref 27–39.2)
AST SERPL-CCNC: 12 U/L — SIGNIFICANT CHANGE UP (ref 0–41)
BACTERIA # UR AUTO: NEGATIVE /HPF — SIGNIFICANT CHANGE UP
BASOPHILS # BLD AUTO: 0.06 K/UL — SIGNIFICANT CHANGE UP (ref 0–0.2)
BASOPHILS NFR BLD AUTO: 0.8 % — SIGNIFICANT CHANGE UP (ref 0–1)
BILIRUB SERPL-MCNC: <0.2 MG/DL — SIGNIFICANT CHANGE UP (ref 0.2–1.2)
BILIRUB UR-MCNC: NEGATIVE — SIGNIFICANT CHANGE UP
BUN SERPL-MCNC: 54 MG/DL — HIGH (ref 10–20)
CALCIUM SERPL-MCNC: 9.2 MG/DL — SIGNIFICANT CHANGE UP (ref 8.4–10.5)
CAST: 2 /LPF — SIGNIFICANT CHANGE UP (ref 0–4)
CHLORIDE SERPL-SCNC: 102 MMOL/L — SIGNIFICANT CHANGE UP (ref 98–110)
CO2 SERPL-SCNC: 20 MMOL/L — SIGNIFICANT CHANGE UP (ref 17–32)
COLOR SPEC: YELLOW — SIGNIFICANT CHANGE UP
CREAT SERPL-MCNC: 2.4 MG/DL — HIGH (ref 0.7–1.5)
DIFF PNL FLD: ABNORMAL
EGFR: 30 ML/MIN/1.73M2 — LOW
EOSINOPHIL # BLD AUTO: 0.17 K/UL — SIGNIFICANT CHANGE UP (ref 0–0.7)
EOSINOPHIL NFR BLD AUTO: 2.1 % — SIGNIFICANT CHANGE UP (ref 0–8)
ESTIMATED AVERAGE GLUCOSE: 237 MG/DL — HIGH (ref 68–114)
GLUCOSE SERPL-MCNC: 57 MG/DL — LOW (ref 70–99)
GLUCOSE UR QL: 250 MG/DL
HCT VFR BLD CALC: 31.4 % — LOW (ref 42–52)
HGB BLD-MCNC: 10.2 G/DL — LOW (ref 14–18)
IMM GRANULOCYTES NFR BLD AUTO: 0.3 % — SIGNIFICANT CHANGE UP (ref 0.1–0.3)
INR BLD: 0.91 RATIO — SIGNIFICANT CHANGE UP (ref 0.65–1.3)
KETONES UR-MCNC: NEGATIVE MG/DL — SIGNIFICANT CHANGE UP
LEUKOCYTE ESTERASE UR-ACNC: NEGATIVE — SIGNIFICANT CHANGE UP
LYMPHOCYTES # BLD AUTO: 2.2 K/UL — SIGNIFICANT CHANGE UP (ref 1.2–3.4)
LYMPHOCYTES # BLD AUTO: 27.8 % — SIGNIFICANT CHANGE UP (ref 20.5–51.1)
MCHC RBC-ENTMCNC: 27.1 PG — SIGNIFICANT CHANGE UP (ref 27–31)
MCHC RBC-ENTMCNC: 32.5 G/DL — SIGNIFICANT CHANGE UP (ref 32–37)
MCV RBC AUTO: 83.5 FL — SIGNIFICANT CHANGE UP (ref 80–94)
MONOCYTES # BLD AUTO: 0.65 K/UL — HIGH (ref 0.1–0.6)
MONOCYTES NFR BLD AUTO: 8.2 % — SIGNIFICANT CHANGE UP (ref 1.7–9.3)
NEUTROPHILS # BLD AUTO: 4.82 K/UL — SIGNIFICANT CHANGE UP (ref 1.4–6.5)
NEUTROPHILS NFR BLD AUTO: 60.8 % — SIGNIFICANT CHANGE UP (ref 42.2–75.2)
NITRITE UR-MCNC: NEGATIVE — SIGNIFICANT CHANGE UP
NRBC # BLD: 0 /100 WBCS — SIGNIFICANT CHANGE UP (ref 0–0)
PH UR: 6 — SIGNIFICANT CHANGE UP (ref 5–8)
PLATELET # BLD AUTO: 461 K/UL — HIGH (ref 130–400)
PMV BLD: 9.4 FL — SIGNIFICANT CHANGE UP (ref 7.4–10.4)
POTASSIUM SERPL-MCNC: 4.5 MMOL/L — SIGNIFICANT CHANGE UP (ref 3.5–5)
POTASSIUM SERPL-SCNC: 4.5 MMOL/L — SIGNIFICANT CHANGE UP (ref 3.5–5)
PROT SERPL-MCNC: 7.2 G/DL — SIGNIFICANT CHANGE UP (ref 6–8)
PROT UR-MCNC: 300 MG/DL
PROTHROM AB SERPL-ACNC: 10.4 SEC — SIGNIFICANT CHANGE UP (ref 9.95–12.87)
RBC # BLD: 3.76 M/UL — LOW (ref 4.7–6.1)
RBC # FLD: 14.7 % — HIGH (ref 11.5–14.5)
RBC CASTS # UR COMP ASSIST: 1 /HPF — SIGNIFICANT CHANGE UP (ref 0–4)
SODIUM SERPL-SCNC: 137 MMOL/L — SIGNIFICANT CHANGE UP (ref 135–146)
SP GR SPEC: 1.02 — SIGNIFICANT CHANGE UP (ref 1–1.03)
SQUAMOUS # UR AUTO: 3 /HPF — SIGNIFICANT CHANGE UP (ref 0–5)
UROBILINOGEN FLD QL: 0.2 MG/DL — SIGNIFICANT CHANGE UP (ref 0.2–1)
WBC # BLD: 7.92 K/UL — SIGNIFICANT CHANGE UP (ref 4.8–10.8)
WBC # FLD AUTO: 7.92 K/UL — SIGNIFICANT CHANGE UP (ref 4.8–10.8)
WBC UR QL: 4 /HPF — SIGNIFICANT CHANGE UP (ref 0–5)

## 2023-09-14 PROCEDURE — 85610 PROTHROMBIN TIME: CPT

## 2023-09-14 PROCEDURE — 71046 X-RAY EXAM CHEST 2 VIEWS: CPT

## 2023-09-14 PROCEDURE — 36415 COLL VENOUS BLD VENIPUNCTURE: CPT

## 2023-09-14 PROCEDURE — 81001 URINALYSIS AUTO W/SCOPE: CPT

## 2023-09-14 PROCEDURE — 99214 OFFICE O/P EST MOD 30 MIN: CPT | Mod: 25

## 2023-09-14 PROCEDURE — 71046 X-RAY EXAM CHEST 2 VIEWS: CPT | Mod: 26

## 2023-09-14 PROCEDURE — 83036 HEMOGLOBIN GLYCOSYLATED A1C: CPT

## 2023-09-14 PROCEDURE — 85730 THROMBOPLASTIN TIME PARTIAL: CPT

## 2023-09-14 PROCEDURE — 85025 COMPLETE CBC W/AUTO DIFF WBC: CPT

## 2023-09-14 PROCEDURE — 80053 COMPREHEN METABOLIC PANEL: CPT

## 2023-09-14 RX ORDER — INSULIN GLARGINE 100 [IU]/ML
70 INJECTION, SOLUTION SUBCUTANEOUS
Qty: 0 | Refills: 0 | DISCHARGE

## 2023-09-14 NOTE — H&P PST ADULT - NSICDXPASTMEDICALHX_GEN_ALL_CORE_FT
PAST MEDICAL HISTORY:  Anxiety     Chronic kidney disease, unspecified CKD stage     Diabetic Charcot foot     DM (diabetes mellitus)     GERD (gastroesophageal reflux disease)     Herpes labialis     HLD (hyperlipidemia)     HTN (hypertension)     Kidney disease stage 4    Kidney stones 20 years ago

## 2023-09-14 NOTE — H&P PST ADULT - REASON FOR ADMISSION
57 y/o male presents to PAST in preparation for right charcot foot reconstruction in [facility] under LSB 9/23/23

## 2023-09-14 NOTE — H&P PST ADULT - HISTORY OF PRESENT ILLNESS
57 y/o male presents to PAST in preparation for right charcot foot reconstruction in [facility] under LSB 9/23/23    Pt with PMH of CKD, DM1, HTN, right foot Charcot on list for kidney transplant. Pt state that he has "bone moving on right foot" dx of Charcot foot. Pt with pain of 7/10 sharp in nature non radiating. Pt now for above procedure.     PATIENT CURRENTLY DENIES CHEST PAIN  SHORTNESS OF BREATH  PALPITATIONS,  DYSURIA, OR UPPER RESPIRATORY INFECTION IN PAST 2 WEEKS  Patient verbalized understanding of instructions and was given the opportunity to ask questions and have them answered.  As per patient, this is their complete medical and surgical history, including medications both prescribed or over the counter.  written and verbal instructions with teach back on chlorhexidine shampoo provided,  pt verbalized understanding with returned demonstration      Weinberg Activity Status Index (DASI) 9/14/2023    RESULT SUMMARY:  18.95 points  The higher the score (maximum 58.2), the higher the functional status.    5.07 METs    INPUTS:  Take care of self —> 2.75 = Yes  Walk indoors —> 1.75 = Yes  Walk 1&ndash;2 blocks on level ground —> 2.75 = Yes  Climb a flight of stairs or walk up a hill —> 5.5 = Yes  Run a short distance —> 0 = No  Do light work around the house —> 2.7 = Yes  Do moderate work around the house —> 3.5 = Yes  Do heavy work around the house —> 0 = No  Do yardwork —> 0 = No  Have sexual relations —> 0 = No  Participate in moderate recreational activities —> 0 = No  Participate in strenuous sports —> 0 = No      Revised Cardiac Risk Index for Pre-Operative Risk   on 9/14/2023      RESULT SUMMARY:  2 points  Class III Risk    10.1 %  30-day risk of death, MI, or cardiac arrest    INPUTS:  Elevated-risk surgery —> 0 = No  History of ischemic heart disease —> 0 = No  History of congestive heart failure —> 0 = No  History of cerebrovascular disease —> 0 = No  Pre-operative treatment with insulin —> 1 = Yes  Pre-operative creatinine >2 mg/dL / 176.8 µmol/L —> 1 = Yes    Anesthesia Alert  NO--Difficult Airway  NO--History of neck surgery or radiation  NO--Limited ROM of neck  NO--History of Malignant hyperthermia  NO--Personal or family history of Pseudocholinesterase deficiency.  NO--Prior Anesthesia Complication  NO--Latex Allergy  NO--Loose teeth-missing teeth, no loose teeth   NO--History of Rheumatoid Arthritis  NO--GUSTAVO  NO--Bleeding risk  NO--Other_____  Mallampati airway: Class II    Contracture of joint of right foot    Encounter for other preprocedural examination    No pertinent family history in first degree relatives    Family history of cancer in father (Father)    DM (diabetes mellitus)    HTN (hypertension)    HLD (hyperlipidemia)    Herpes labialis    Anxiety    GERD (gastroesophageal reflux disease)    Kidney stones    Kidney disease    S/P foot surgery, right    Kidney stone    H/O arthroscopy of right knee    [id number]; 83046; 84564; 11857; 19516; 14617; 88161    [id number]

## 2023-09-15 DIAGNOSIS — Z01.818 ENCOUNTER FOR OTHER PREPROCEDURAL EXAMINATION: ICD-10-CM

## 2023-09-15 DIAGNOSIS — M24.574 CONTRACTURE, RIGHT FOOT: ICD-10-CM

## 2023-09-28 ENCOUNTER — TRANSCRIPTION ENCOUNTER (OUTPATIENT)
Age: 58
End: 2023-09-28

## 2023-09-28 ENCOUNTER — INPATIENT (INPATIENT)
Facility: HOSPITAL | Age: 58
LOS: 6 days | Discharge: HOME HEALTH | DRG: 41 | End: 2023-10-05
Attending: INTERNAL MEDICINE | Admitting: INTERNAL MEDICINE
Payer: MEDICARE

## 2023-09-28 VITALS
TEMPERATURE: 97 F | RESPIRATION RATE: 17 BRPM | WEIGHT: 203.93 LBS | DIASTOLIC BLOOD PRESSURE: 80 MMHG | HEART RATE: 90 BPM | OXYGEN SATURATION: 96 % | SYSTOLIC BLOOD PRESSURE: 142 MMHG

## 2023-09-28 DIAGNOSIS — M14.671 CHARCOT'S JOINT, RIGHT ANKLE AND FOOT: ICD-10-CM

## 2023-09-28 DIAGNOSIS — N20.0 CALCULUS OF KIDNEY: Chronic | ICD-10-CM

## 2023-09-28 DIAGNOSIS — Z98.890 OTHER SPECIFIED POSTPROCEDURAL STATES: Chronic | ICD-10-CM

## 2023-09-28 DIAGNOSIS — M24.574 CONTRACTURE, RIGHT FOOT: ICD-10-CM

## 2023-09-28 LAB
GLUCOSE BLDC GLUCOMTR-MCNC: 143 MG/DL — HIGH (ref 70–99)
GLUCOSE BLDC GLUCOMTR-MCNC: 214 MG/DL — HIGH (ref 70–99)
GLUCOSE BLDC GLUCOMTR-MCNC: 438 MG/DL — HIGH (ref 70–99)
GLUCOSE BLDC GLUCOMTR-MCNC: 52 MG/DL — CRITICAL LOW (ref 70–99)
GLUCOSE BLDC GLUCOMTR-MCNC: >600 MG/DL — CRITICAL HIGH (ref 70–99)
GLUCOSE BLDC GLUCOMTR-MCNC: >600 MG/DL — CRITICAL HIGH (ref 70–99)

## 2023-09-28 PROCEDURE — 83550 IRON BINDING TEST: CPT

## 2023-09-28 PROCEDURE — 85025 COMPLETE CBC W/AUTO DIFF WBC: CPT

## 2023-09-28 PROCEDURE — 88311 DECALCIFY TISSUE: CPT | Mod: 26

## 2023-09-28 PROCEDURE — 36415 COLL VENOUS BLD VENIPUNCTURE: CPT

## 2023-09-28 PROCEDURE — 97162 PT EVAL MOD COMPLEX 30 MIN: CPT | Mod: GP

## 2023-09-28 PROCEDURE — 84100 ASSAY OF PHOSPHORUS: CPT

## 2023-09-28 PROCEDURE — 84466 ASSAY OF TRANSFERRIN: CPT

## 2023-09-28 PROCEDURE — 82962 GLUCOSE BLOOD TEST: CPT

## 2023-09-28 PROCEDURE — 80048 BASIC METABOLIC PNL TOTAL CA: CPT

## 2023-09-28 PROCEDURE — 88304 TISSUE EXAM BY PATHOLOGIST: CPT | Mod: 26

## 2023-09-28 PROCEDURE — 85027 COMPLETE CBC AUTOMATED: CPT

## 2023-09-28 PROCEDURE — 80053 COMPREHEN METABOLIC PANEL: CPT

## 2023-09-28 PROCEDURE — 82728 ASSAY OF FERRITIN: CPT

## 2023-09-28 PROCEDURE — 83735 ASSAY OF MAGNESIUM: CPT

## 2023-09-28 PROCEDURE — 94760 N-INVAS EAR/PLS OXIMETRY 1: CPT

## 2023-09-28 PROCEDURE — 83540 ASSAY OF IRON: CPT

## 2023-09-28 RX ORDER — DEXTROSE 50 % IN WATER 50 %
15 SYRINGE (ML) INTRAVENOUS ONCE
Refills: 0 | Status: DISCONTINUED | OUTPATIENT
Start: 2023-09-28 | End: 2023-10-05

## 2023-09-28 RX ORDER — INSULIN LISPRO 100/ML
10 VIAL (ML) SUBCUTANEOUS
Refills: 0 | Status: DISCONTINUED | OUTPATIENT
Start: 2023-09-28 | End: 2023-10-01

## 2023-09-28 RX ORDER — CEFAZOLIN SODIUM 1 G
2000 VIAL (EA) INJECTION EVERY 8 HOURS
Refills: 0 | Status: DISCONTINUED | OUTPATIENT
Start: 2023-09-28 | End: 2023-09-29

## 2023-09-28 RX ORDER — SODIUM CHLORIDE 9 MG/ML
1000 INJECTION, SOLUTION INTRAVENOUS
Refills: 0 | Status: DISCONTINUED | OUTPATIENT
Start: 2023-09-28 | End: 2023-10-05

## 2023-09-28 RX ORDER — HYDROMORPHONE HYDROCHLORIDE 2 MG/ML
0.5 INJECTION INTRAMUSCULAR; INTRAVENOUS; SUBCUTANEOUS
Refills: 0 | Status: DISCONTINUED | OUTPATIENT
Start: 2023-09-28 | End: 2023-09-28

## 2023-09-28 RX ORDER — DEXTROSE 50 % IN WATER 50 %
25 SYRINGE (ML) INTRAVENOUS ONCE
Refills: 0 | Status: DISCONTINUED | OUTPATIENT
Start: 2023-09-28 | End: 2023-10-05

## 2023-09-28 RX ORDER — INSULIN LISPRO 100/ML
VIAL (ML) SUBCUTANEOUS
Refills: 0 | Status: DISCONTINUED | OUTPATIENT
Start: 2023-09-28 | End: 2023-10-05

## 2023-09-28 RX ORDER — ALPRAZOLAM 0.25 MG
0.5 TABLET ORAL
Refills: 0 | Status: DISCONTINUED | OUTPATIENT
Start: 2023-09-28 | End: 2023-10-05

## 2023-09-28 RX ORDER — SODIUM BICARBONATE 1 MEQ/ML
650 SYRINGE (ML) INTRAVENOUS THREE TIMES A DAY
Refills: 0 | Status: DISCONTINUED | OUTPATIENT
Start: 2023-09-28 | End: 2023-10-05

## 2023-09-28 RX ORDER — INFLUENZA VIRUS VACCINE 15; 15; 15; 15 UG/.5ML; UG/.5ML; UG/.5ML; UG/.5ML
0.5 SUSPENSION INTRAMUSCULAR ONCE
Refills: 0 | Status: COMPLETED | OUTPATIENT
Start: 2023-09-28 | End: 2023-09-28

## 2023-09-28 RX ORDER — ONDANSETRON 8 MG/1
4 TABLET, FILM COATED ORAL ONCE
Refills: 0 | Status: DISCONTINUED | OUTPATIENT
Start: 2023-09-28 | End: 2023-10-05

## 2023-09-28 RX ORDER — LOSARTAN POTASSIUM 100 MG/1
100 TABLET, FILM COATED ORAL DAILY
Refills: 0 | Status: DISCONTINUED | OUTPATIENT
Start: 2023-09-28 | End: 2023-10-05

## 2023-09-28 RX ORDER — GLUCAGON INJECTION, SOLUTION 0.5 MG/.1ML
1 INJECTION, SOLUTION SUBCUTANEOUS ONCE
Refills: 0 | Status: DISCONTINUED | OUTPATIENT
Start: 2023-09-28 | End: 2023-10-05

## 2023-09-28 RX ORDER — MORPHINE SULFATE 50 MG/1
4 CAPSULE, EXTENDED RELEASE ORAL EVERY 4 HOURS
Refills: 0 | Status: DISCONTINUED | OUTPATIENT
Start: 2023-09-28 | End: 2023-09-30

## 2023-09-28 RX ORDER — INSULIN LISPRO 100/ML
8 VIAL (ML) SUBCUTANEOUS ONCE
Refills: 0 | Status: COMPLETED | OUTPATIENT
Start: 2023-09-28 | End: 2023-09-28

## 2023-09-28 RX ORDER — HYDROMORPHONE HYDROCHLORIDE 2 MG/ML
1 INJECTION INTRAMUSCULAR; INTRAVENOUS; SUBCUTANEOUS
Refills: 0 | Status: DISCONTINUED | OUTPATIENT
Start: 2023-09-28 | End: 2023-09-28

## 2023-09-28 RX ORDER — SODIUM CHLORIDE 9 MG/ML
1000 INJECTION, SOLUTION INTRAVENOUS
Refills: 0 | Status: DISCONTINUED | OUTPATIENT
Start: 2023-09-28 | End: 2023-09-28

## 2023-09-28 RX ORDER — LANOLIN ALCOHOL/MO/W.PET/CERES
3 CREAM (GRAM) TOPICAL AT BEDTIME
Refills: 0 | Status: DISCONTINUED | OUTPATIENT
Start: 2023-09-28 | End: 2023-10-05

## 2023-09-28 RX ORDER — CEFAZOLIN SODIUM 1 G
VIAL (EA) INJECTION
Refills: 0 | Status: DISCONTINUED | OUTPATIENT
Start: 2023-09-28 | End: 2023-09-29

## 2023-09-28 RX ORDER — INSULIN GLARGINE 100 [IU]/ML
50 INJECTION, SOLUTION SUBCUTANEOUS AT BEDTIME
Refills: 0 | Status: DISCONTINUED | OUTPATIENT
Start: 2023-09-28 | End: 2023-09-29

## 2023-09-28 RX ORDER — ATORVASTATIN CALCIUM 80 MG/1
10 TABLET, FILM COATED ORAL AT BEDTIME
Refills: 0 | Status: DISCONTINUED | OUTPATIENT
Start: 2023-09-28 | End: 2023-10-05

## 2023-09-28 RX ORDER — MORPHINE SULFATE 50 MG/1
2 CAPSULE, EXTENDED RELEASE ORAL EVERY 4 HOURS
Refills: 0 | Status: DISCONTINUED | OUTPATIENT
Start: 2023-09-28 | End: 2023-09-30

## 2023-09-28 RX ORDER — DEXTROSE 50 % IN WATER 50 %
12.5 SYRINGE (ML) INTRAVENOUS ONCE
Refills: 0 | Status: DISCONTINUED | OUTPATIENT
Start: 2023-09-28 | End: 2023-10-05

## 2023-09-28 RX ORDER — MEPERIDINE HYDROCHLORIDE 50 MG/ML
12.5 INJECTION INTRAMUSCULAR; INTRAVENOUS; SUBCUTANEOUS ONCE
Refills: 0 | Status: DISCONTINUED | OUTPATIENT
Start: 2023-09-28 | End: 2023-09-28

## 2023-09-28 RX ORDER — NIFEDIPINE 30 MG
90 TABLET, EXTENDED RELEASE 24 HR ORAL DAILY
Refills: 0 | Status: DISCONTINUED | OUTPATIENT
Start: 2023-09-28 | End: 2023-10-05

## 2023-09-28 RX ORDER — ACETAMINOPHEN 500 MG
650 TABLET ORAL EVERY 6 HOURS
Refills: 0 | Status: DISCONTINUED | OUTPATIENT
Start: 2023-09-28 | End: 2023-09-30

## 2023-09-28 RX ORDER — INSULIN LISPRO 100/ML
VIAL (ML) SUBCUTANEOUS AT BEDTIME
Refills: 0 | Status: DISCONTINUED | OUTPATIENT
Start: 2023-09-28 | End: 2023-10-05

## 2023-09-28 RX ORDER — CEFAZOLIN SODIUM 1 G
2000 VIAL (EA) INJECTION ONCE
Refills: 0 | Status: COMPLETED | OUTPATIENT
Start: 2023-09-28 | End: 2023-09-28

## 2023-09-28 RX ADMIN — HYDROMORPHONE HYDROCHLORIDE 0.5 MILLIGRAM(S): 2 INJECTION INTRAMUSCULAR; INTRAVENOUS; SUBCUTANEOUS at 12:45

## 2023-09-28 RX ADMIN — Medication 650 MILLIGRAM(S): at 21:50

## 2023-09-28 RX ADMIN — Medication 100 MILLIGRAM(S): at 17:32

## 2023-09-28 RX ADMIN — HYDROMORPHONE HYDROCHLORIDE 0.5 MILLIGRAM(S): 2 INJECTION INTRAMUSCULAR; INTRAVENOUS; SUBCUTANEOUS at 15:37

## 2023-09-28 RX ADMIN — HYDROMORPHONE HYDROCHLORIDE 0.5 MILLIGRAM(S): 2 INJECTION INTRAMUSCULAR; INTRAVENOUS; SUBCUTANEOUS at 15:07

## 2023-09-28 RX ADMIN — Medication 100 MILLIGRAM(S): at 21:49

## 2023-09-28 RX ADMIN — HYDROMORPHONE HYDROCHLORIDE 0.5 MILLIGRAM(S): 2 INJECTION INTRAMUSCULAR; INTRAVENOUS; SUBCUTANEOUS at 12:18

## 2023-09-28 RX ADMIN — Medication 10 UNIT(S): at 16:45

## 2023-09-28 RX ADMIN — HYDROMORPHONE HYDROCHLORIDE 1 MILLIGRAM(S): 2 INJECTION INTRAMUSCULAR; INTRAVENOUS; SUBCUTANEOUS at 11:26

## 2023-09-28 RX ADMIN — Medication 8 UNIT(S): at 23:10

## 2023-09-28 RX ADMIN — MORPHINE SULFATE 4 MILLIGRAM(S): 50 CAPSULE, EXTENDED RELEASE ORAL at 20:54

## 2023-09-28 RX ADMIN — SODIUM CHLORIDE 100 MILLILITER(S): 9 INJECTION, SOLUTION INTRAVENOUS at 11:06

## 2023-09-28 RX ADMIN — HYDROMORPHONE HYDROCHLORIDE 1 MILLIGRAM(S): 2 INJECTION INTRAMUSCULAR; INTRAVENOUS; SUBCUTANEOUS at 12:05

## 2023-09-28 RX ADMIN — Medication 0.5 MILLIGRAM(S): at 16:49

## 2023-09-28 RX ADMIN — ATORVASTATIN CALCIUM 10 MILLIGRAM(S): 80 TABLET, FILM COATED ORAL at 21:54

## 2023-09-28 RX ADMIN — HYDROMORPHONE HYDROCHLORIDE 1 MILLIGRAM(S): 2 INJECTION INTRAMUSCULAR; INTRAVENOUS; SUBCUTANEOUS at 11:05

## 2023-09-28 RX ADMIN — HYDROMORPHONE HYDROCHLORIDE 1 MILLIGRAM(S): 2 INJECTION INTRAMUSCULAR; INTRAVENOUS; SUBCUTANEOUS at 12:10

## 2023-09-28 RX ADMIN — Medication 6: at 16:45

## 2023-09-28 RX ADMIN — Medication 0.5 MILLIGRAM(S): at 21:54

## 2023-09-28 NOTE — ASU PREOP CHECKLIST - 1.
fs glucose = 52, pt asymptomatic start IV in right a/c 20 gauge, anesthesia made aware at bedside at 0642, administer D50 1 AMP ivp as ordered by anesthesia at 0643

## 2023-09-28 NOTE — ASU PATIENT PROFILE, ADULT - URINARY CATHETER
no
Alert and oriented to person, place and time, memory intact, behavior appropriate to situation, PERRL.

## 2023-09-28 NOTE — PATIENT PROFILE ADULT - FALL HARM RISK - HARM RISK INTERVENTIONS

## 2023-09-29 DIAGNOSIS — Z98.890 OTHER SPECIFIED POSTPROCEDURAL STATES: ICD-10-CM

## 2023-09-29 LAB
ANION GAP SERPL CALC-SCNC: 12 MMOL/L — SIGNIFICANT CHANGE UP (ref 7–14)
BASOPHILS # BLD AUTO: 0.03 K/UL — SIGNIFICANT CHANGE UP (ref 0–0.2)
BASOPHILS NFR BLD AUTO: 0.3 % — SIGNIFICANT CHANGE UP (ref 0–1)
BUN SERPL-MCNC: 47 MG/DL — HIGH (ref 10–20)
CALCIUM SERPL-MCNC: 8.5 MG/DL — SIGNIFICANT CHANGE UP (ref 8.4–10.5)
CHLORIDE SERPL-SCNC: 99 MMOL/L — SIGNIFICANT CHANGE UP (ref 98–110)
CO2 SERPL-SCNC: 22 MMOL/L — SIGNIFICANT CHANGE UP (ref 17–32)
CREAT SERPL-MCNC: 2.6 MG/DL — HIGH (ref 0.7–1.5)
EGFR: 28 ML/MIN/1.73M2 — LOW
EOSINOPHIL # BLD AUTO: 0.03 K/UL — SIGNIFICANT CHANGE UP (ref 0–0.7)
EOSINOPHIL NFR BLD AUTO: 0.3 % — SIGNIFICANT CHANGE UP (ref 0–8)
GLUCOSE BLDC GLUCOMTR-MCNC: 123 MG/DL — HIGH (ref 70–99)
GLUCOSE BLDC GLUCOMTR-MCNC: 319 MG/DL — HIGH (ref 70–99)
GLUCOSE BLDC GLUCOMTR-MCNC: 354 MG/DL — HIGH (ref 70–99)
GLUCOSE BLDC GLUCOMTR-MCNC: 38 MG/DL — CRITICAL LOW (ref 70–99)
GLUCOSE BLDC GLUCOMTR-MCNC: 566 MG/DL — CRITICAL HIGH (ref 70–99)
GLUCOSE BLDC GLUCOMTR-MCNC: 578 MG/DL — CRITICAL HIGH (ref 70–99)
GLUCOSE BLDC GLUCOMTR-MCNC: 85 MG/DL — SIGNIFICANT CHANGE UP (ref 70–99)
GLUCOSE SERPL-MCNC: 623 MG/DL — CRITICAL HIGH (ref 70–99)
GRAM STN FLD: SIGNIFICANT CHANGE UP
HCT VFR BLD CALC: 27.8 % — LOW (ref 42–52)
HGB BLD-MCNC: 9.1 G/DL — LOW (ref 14–18)
IMM GRANULOCYTES NFR BLD AUTO: 0.6 % — HIGH (ref 0.1–0.3)
LYMPHOCYTES # BLD AUTO: 1.67 K/UL — SIGNIFICANT CHANGE UP (ref 1.2–3.4)
LYMPHOCYTES # BLD AUTO: 15.5 % — LOW (ref 20.5–51.1)
MCHC RBC-ENTMCNC: 27.2 PG — SIGNIFICANT CHANGE UP (ref 27–31)
MCHC RBC-ENTMCNC: 32.7 G/DL — SIGNIFICANT CHANGE UP (ref 32–37)
MCV RBC AUTO: 83 FL — SIGNIFICANT CHANGE UP (ref 80–94)
MONOCYTES # BLD AUTO: 0.9 K/UL — HIGH (ref 0.1–0.6)
MONOCYTES NFR BLD AUTO: 8.4 % — SIGNIFICANT CHANGE UP (ref 1.7–9.3)
NEUTROPHILS # BLD AUTO: 8.07 K/UL — HIGH (ref 1.4–6.5)
NEUTROPHILS NFR BLD AUTO: 74.9 % — SIGNIFICANT CHANGE UP (ref 42.2–75.2)
NRBC # BLD: 0 /100 WBCS — SIGNIFICANT CHANGE UP (ref 0–0)
PLATELET # BLD AUTO: 324 K/UL — SIGNIFICANT CHANGE UP (ref 130–400)
PMV BLD: 9.9 FL — SIGNIFICANT CHANGE UP (ref 7.4–10.4)
POTASSIUM SERPL-MCNC: 4.9 MMOL/L — SIGNIFICANT CHANGE UP (ref 3.5–5)
POTASSIUM SERPL-SCNC: 4.9 MMOL/L — SIGNIFICANT CHANGE UP (ref 3.5–5)
RBC # BLD: 3.35 M/UL — LOW (ref 4.7–6.1)
RBC # FLD: 14.7 % — HIGH (ref 11.5–14.5)
SODIUM SERPL-SCNC: 133 MMOL/L — LOW (ref 135–146)
SPECIMEN SOURCE: SIGNIFICANT CHANGE UP
WBC # BLD: 10.76 K/UL — SIGNIFICANT CHANGE UP (ref 4.8–10.8)
WBC # FLD AUTO: 10.76 K/UL — SIGNIFICANT CHANGE UP (ref 4.8–10.8)

## 2023-09-29 RX ORDER — INSULIN HUMAN 100 [IU]/ML
13 INJECTION, SOLUTION SUBCUTANEOUS ONCE
Refills: 0 | Status: COMPLETED | OUTPATIENT
Start: 2023-09-29 | End: 2023-09-29

## 2023-09-29 RX ORDER — SODIUM CHLORIDE 9 MG/ML
1000 INJECTION INTRAMUSCULAR; INTRAVENOUS; SUBCUTANEOUS ONCE
Refills: 0 | Status: COMPLETED | OUTPATIENT
Start: 2023-09-29 | End: 2023-09-29

## 2023-09-29 RX ORDER — INSULIN LISPRO 100/ML
10 VIAL (ML) SUBCUTANEOUS ONCE
Refills: 0 | Status: COMPLETED | OUTPATIENT
Start: 2023-09-29 | End: 2023-09-29

## 2023-09-29 RX ORDER — CEFAZOLIN SODIUM 1 G
1000 VIAL (EA) INJECTION EVERY 8 HOURS
Refills: 0 | Status: COMPLETED | OUTPATIENT
Start: 2023-09-29 | End: 2023-10-02

## 2023-09-29 RX ORDER — INSULIN GLARGINE 100 [IU]/ML
50 INJECTION, SOLUTION SUBCUTANEOUS EVERY MORNING
Refills: 0 | Status: DISCONTINUED | OUTPATIENT
Start: 2023-09-29 | End: 2023-10-03

## 2023-09-29 RX ADMIN — Medication 100 MILLIGRAM(S): at 21:22

## 2023-09-29 RX ADMIN — MORPHINE SULFATE 4 MILLIGRAM(S): 50 CAPSULE, EXTENDED RELEASE ORAL at 21:19

## 2023-09-29 RX ADMIN — Medication 100 MILLIGRAM(S): at 05:51

## 2023-09-29 RX ADMIN — MORPHINE SULFATE 4 MILLIGRAM(S): 50 CAPSULE, EXTENDED RELEASE ORAL at 15:14

## 2023-09-29 RX ADMIN — Medication 10 UNIT(S): at 12:14

## 2023-09-29 RX ADMIN — Medication 0.5 MILLIGRAM(S): at 06:28

## 2023-09-29 RX ADMIN — INSULIN HUMAN 13 UNIT(S): 100 INJECTION, SOLUTION SUBCUTANEOUS at 07:31

## 2023-09-29 RX ADMIN — Medication 0.5 MILLIGRAM(S): at 21:20

## 2023-09-29 RX ADMIN — Medication 10 UNIT(S): at 07:44

## 2023-09-29 RX ADMIN — MORPHINE SULFATE 4 MILLIGRAM(S): 50 CAPSULE, EXTENDED RELEASE ORAL at 06:29

## 2023-09-29 RX ADMIN — Medication 650 MILLIGRAM(S): at 05:52

## 2023-09-29 RX ADMIN — INSULIN GLARGINE 50 UNIT(S): 100 INJECTION, SOLUTION SUBCUTANEOUS at 10:47

## 2023-09-29 RX ADMIN — ATORVASTATIN CALCIUM 10 MILLIGRAM(S): 80 TABLET, FILM COATED ORAL at 21:23

## 2023-09-29 RX ADMIN — Medication 650 MILLIGRAM(S): at 15:08

## 2023-09-29 RX ADMIN — SODIUM CHLORIDE 1000 MILLILITER(S): 9 INJECTION INTRAMUSCULAR; INTRAVENOUS; SUBCUTANEOUS at 08:01

## 2023-09-29 RX ADMIN — Medication 10 UNIT(S): at 08:03

## 2023-09-29 RX ADMIN — LOSARTAN POTASSIUM 100 MILLIGRAM(S): 100 TABLET, FILM COATED ORAL at 05:51

## 2023-09-29 RX ADMIN — MORPHINE SULFATE 4 MILLIGRAM(S): 50 CAPSULE, EXTENDED RELEASE ORAL at 01:14

## 2023-09-29 RX ADMIN — Medication 100 MILLIGRAM(S): at 15:08

## 2023-09-29 RX ADMIN — Medication 90 MILLIGRAM(S): at 05:52

## 2023-09-29 RX ADMIN — Medication 650 MILLIGRAM(S): at 21:23

## 2023-09-29 RX ADMIN — MORPHINE SULFATE 4 MILLIGRAM(S): 50 CAPSULE, EXTENDED RELEASE ORAL at 07:46

## 2023-09-29 RX ADMIN — MORPHINE SULFATE 4 MILLIGRAM(S): 50 CAPSULE, EXTENDED RELEASE ORAL at 10:55

## 2023-09-29 RX ADMIN — Medication 3: at 21:21

## 2023-09-29 NOTE — CONSULT NOTE ADULT - ASSESSMENT
IMPRESSION: Rehab of 57 y/o m  rehab  for  gd  sp  rt  ankle sx     PRECAUTIONS: [  ] Cardiac  [  ] Respiratory  [  ] Seizures [  ] Contact Isolation  [  ] Droplet Isolation  [ FALL ] Other    Weight Bearing Status: NWB rt le     RECOMMENDATION:    Out of Bed to Chair     DVT/Decubiti Prophylaxis    REHAB PLAN:     [  x ] Bedside P/T 3-5 times a week   [   ]   Bedside O/T  2-3 times a week             [   ] No Rehab Therapy Indicated                   [   ]  Speech Therapy   Conditioning/ROM                                    ADL  Bed Mobility                                               Conditioning/ROM  Transfers                                                     Bed Mobility  Sitting /Standing Balance                         Transfers                                        Gait Training                                               Sitting/Standing Balance  Stair Training [   ]Applicable                    Home equipment Eval                                                                        Splinting  [   ] Only      GOALS:   ADL   [   x]   Independent                    Transfers  [ x  ] Independent                          Ambulation  [   ] Independent     [    ] With device                            [   ]  CG                                                         [   ]  CG                                                                  [   ] CG                            [    ] Min A                                                   [   ] Min A                                                              [   ] Min  A          DISCHARGE PLAN:   [   ]  Good candidate for Intensive Rehabilitation/Hospital based-4A [facility]                                             Will tolerate 3hrs Intensive Rehab Daily                                       [    ]  Short Term Rehab in Skilled Nursing Facility                                       [  xx  ]  Home with Outpatient or VN servicesand  cont currant care need  ax  crutches                                          [    ]  Possible Candidate for Intensive Hospital based Rehab

## 2023-09-29 NOTE — CONSULT NOTE ADULT - SUBJECTIVE AND OBJECTIVE BOX
57 y/o male s/p right charcot foot reconstruction iPMH of CKD, DM1, HTN, right foot Charcot on list for kidney transplant  Today is hospital day 1d. This morning patient was seen and examined at bedside, resting comfortably in bed.    No acute or major events overnight.    PAST MEDICAL & SURGICAL HISTORY  DM (diabetes mellitus)    HTN (hypertension)    HLD (hyperlipidemia)    Herpes labialis    Anxiety    GERD (gastroesophageal reflux disease)    Kidney stones  20 years ago    Kidney disease  stage 4    Chronic kidney disease, unspecified CKD stage    Diabetic Charcot foot    S/P foot surgery, right  x 5 ( 2006 - 2013 )    Kidney stone  Removed by Laser x 2 ( 20 years ago )    H/O arthroscopy of right knee      SOCIAL HISTORY:  Social History:      ALLERGIES:  No Known Allergies    MEDICATIONS:  STANDING MEDICATIONS  atorvastatin 10 milliGRAM(s) Oral at bedtime  ceFAZolin   IVPB 1000 milliGRAM(s) IV Intermittent every 8 hours  dextrose 5%. 1000 milliLiter(s) IV Continuous <Continuous>  dextrose 5%. 1000 milliLiter(s) IV Continuous <Continuous>  dextrose 50% Injectable 12.5 Gram(s) IV Push once  dextrose 50% Injectable 25 Gram(s) IV Push once  dextrose 50% Injectable 25 Gram(s) IV Push once  glucagon  Injectable 1 milliGRAM(s) IntraMuscular once  influenza   Vaccine 0.5 milliLiter(s) IntraMuscular once  insulin glargine Injectable (LANTUS) 50 Unit(s) SubCutaneous every morning  insulin lispro (ADMELOG) corrective regimen sliding scale   SubCutaneous three times a day before meals  insulin lispro (ADMELOG) corrective regimen sliding scale   SubCutaneous at bedtime  insulin lispro Injectable (ADMELOG) 10 Unit(s) SubCutaneous before lunch  insulin lispro Injectable (ADMELOG) 10 Unit(s) SubCutaneous before breakfast  insulin lispro Injectable (ADMELOG) 10 Unit(s) SubCutaneous before dinner  losartan 100 milliGRAM(s) Oral daily  NIFEdipine XL 90 milliGRAM(s) Oral daily  sodium bicarbonate 650 milliGRAM(s) Oral three times a day    PRN MEDICATIONS  acetaminophen     Tablet .. 650 milliGRAM(s) Oral every 6 hours PRN  ALPRAZolam 0.5 milliGRAM(s) Oral four times a day PRN  aluminum hydroxide/magnesium hydroxide/simethicone Suspension 30 milliLiter(s) Oral every 4 hours PRN  dextrose Oral Gel 15 Gram(s) Oral once PRN  melatonin 3 milliGRAM(s) Oral at bedtime PRN  morphine  - Injectable 2 milliGRAM(s) IV Push every 4 hours PRN  morphine  - Injectable 4 milliGRAM(s) IV Push every 4 hours PRN  ondansetron Injectable 4 milliGRAM(s) IV Push once PRN    VITALS:   T(F): 97.8  HR: 95  BP: 142/66  RR: 18  SpO2: --    PHYSICAL EXAM:  GENERAL: NAD, well-groomed, well-developed  HEAD:  Atraumatic, Normocephalic  EYES: EOMI  NECK: Supple  NERVOUS SYSTEM:  Alert & Oriented X3, non focal   CHEST/LUNG: Clear to auscultation bilaterally; No rales, rhonchi, wheezing, or rubs  HEART: Regular rate and rhythm; No murmurs, rubs, or gallops  ABDOMEN: Soft, Nontender, Nondistended; Bowel sounds present  EXTREMITIES:  2+ Peripheral Pulses, No clubbing, cyanosis, or edema  LYMPH: No lymphadenopathy noted  SKIN: No rashes or lesions  LABS:                        9.1    10.76 )-----------( 324      ( 29 Sep 2023 04:30 )             27.8     09-29    133<L>  |  99  |  47<H>  ----------------------------<  623<HH>  4.9   |  22  |  2.6<H>    Ca    8.5      29 Sep 2023 04:30        Urinalysis Basic - ( 29 Sep 2023 04:30 )    Color: x / Appearance: x / SG: x / pH: x  Gluc: 623 mg/dL / Ketone: x  / Bili: x / Urobili: x   Blood: x / Protein: x / Nitrite: x   Leuk Esterase: x / RBC: x / WBC x   Sq Epi: x / Non Sq Epi: x / Bacteria: x            Culture - Tissue with Gram Stain (collected 28 Sep 2023 09:40)  Source: .Tissue None  Gram Stain (29 Sep 2023 04:00):    No polymorphonuclear cells seen per low power field    No organisms seen per oil power field          RADIOLOGY:

## 2023-09-29 NOTE — CONSULT NOTE ADULT - ASSESSMENT
57 y/o male s/p right charcot foot reconstruction iPMH of CKD, DM1, HTN, right foot Charcot on list for kidney transplant. He is s/p charcot's foot repair and currently admitted post op . Endocrinology was consulted for assistance with hyperglycemia management. Pateint states he was diagnosed wit diabetes mellitus more than 30 years ago. Initially oral meds were tried but they did not work and he has been on insulin ever since. He takes tresiba 50 units every morning and fiasp 8-12 units with meals but states that his home readings have always been high as he consumes a lot of carbs     #Type 1 DM   - under poor control A1c 9.9  - did not get basal Insulin till today morning, sugars were very high and then got multiple insulin doses leading to lows  - Will follow glucose readings on 50 units of lantus and lispro 10 units with meals and sliding scale of 1 unit for every 50 mg/dl above 150 mg/dl   - will need to be discharged on tresiba and fiasp and followup with Dr. Szymanski in 1-2 weeks

## 2023-09-29 NOTE — CONSULT NOTE ADULT - ASSESSMENT
ASSESSMENT  This a a 59 y/o male with PMH of CKD, DM, HTN, right Charcot foot with reconstruction on list for kidney transplant is admitted for right charcot foot reconstruction on 9/28/23 with removal of bone stimulater implant.    IMPRESSION  #Charcot foot  #Sepsis ruled out on admission  #Obesity BMI (kg/m2): 40.9, 26.8, 27.1  #DM   #CKD/HTN    RECOMMENDATIONS  This is an incomplete consult note. All final recommendations to follow after interview and examination of the patient. Please follow recommendations noted below.    If any questions, please send a message or call on [employer] [employer] Teams  Please continue to update ID with any pertinent new laboratory or radiographic findings.    Betty Chandler M.D  Infectious Diseases Attending  [facility]    ASSESSMENT  This a a 57 y/o male with PMH of CKD, DM, HTN, right Charcot foot with reconstruction on list for kidney transplant is admitted for right charcot foot reconstruction on 9/28/23 with removal of bone stimulater implant.    IMPRESSION  #Charcot foot  #Sepsis ruled out on admission  #Obesity BMI (kg/m2): 40.9, 26.8, 27.1  #DM   #CKD/HTN    RECOMMENDATIONS  -Perioperatively given cefazolin, Follow up with cultures. Do not suspect he has deep seated infection.   -For now, can continue with IV cefazolin 1 gram q 8 hours.   -Once cleared by podiatry team, can be discharged on PO cephalexin 500mg Q 6 hours for total of 14 days from the surgery.     If any questions, please send a message or call on [employer] Teams  Please continue to update ID with any pertinent new laboratory or radiographic findings.    Betty Chandler M.D  Infectious Diseases Attending  [facility]

## 2023-09-29 NOTE — CONSULT NOTE ADULT - SUBJECTIVE AND OBJECTIVE BOX
PAIN MANAGEMENT CONSULT NOTE    charcot foot s/p surgical repair w/ ex fix      PAST MEDICAL & SURGICAL HISTORY:  DM (diabetes mellitus)      HTN (hypertension)      HLD (hyperlipidemia)      Herpes labialis      Anxiety      GERD (gastroesophageal reflux disease)      Kidney stones  20 years ago      Kidney disease  stage 4      Chronic kidney disease, unspecified CKD stage      Diabetic Charcot foot      S/P foot surgery, right  x 5 ( 2006 - 2013 )      Kidney stone  Removed by Laser x 2 ( 20 years ago )      H/O arthroscopy of right knee          FAMILY HISTORY:  Family history of cancer in father (Father)  Lung        SOCIAL HISTORY:  [ ] Denies Smoking, Alcohol, or Drug Use    HOME MEDICATIONS:   Please refer to initial HNP    PAIN HOME MEDICATIONS:    Allergies    No Known Allergies    Intolerances        PAIN MEDICATIONS:  acetaminophen     Tablet .. 650 milliGRAM(s) Oral every 6 hours PRN  ALPRAZolam 0.5 milliGRAM(s) Oral four times a day PRN  melatonin 3 milliGRAM(s) Oral at bedtime PRN  morphine  - Injectable 4 milliGRAM(s) IV Push every 4 hours PRN  morphine  - Injectable 2 milliGRAM(s) IV Push every 4 hours PRN  ondansetron Injectable 4 milliGRAM(s) IV Push once PRN    Heme:    Antibiotics:  ceFAZolin   IVPB 1000 milliGRAM(s) IV Intermittent every 8 hours    Cardiovascular:  losartan 100 milliGRAM(s) Oral daily  NIFEdipine XL 90 milliGRAM(s) Oral daily    GI:  aluminum hydroxide/magnesium hydroxide/simethicone Suspension 30 milliLiter(s) Oral every 4 hours PRN    Endocrine:  atorvastatin 10 milliGRAM(s) Oral at bedtime  dextrose 50% Injectable 25 Gram(s) IV Push once  dextrose 50% Injectable 25 Gram(s) IV Push once  dextrose 50% Injectable 12.5 Gram(s) IV Push once  dextrose Oral Gel 15 Gram(s) Oral once PRN  glucagon  Injectable 1 milliGRAM(s) IntraMuscular once  insulin glargine Injectable (LANTUS) 50 Unit(s) SubCutaneous every morning  insulin lispro (ADMELOG) corrective regimen sliding scale   SubCutaneous at bedtime  insulin lispro (ADMELOG) corrective regimen sliding scale   SubCutaneous three times a day before meals  insulin lispro Injectable (ADMELOG) 10 Unit(s) SubCutaneous before lunch  insulin lispro Injectable (ADMELOG) 10 Unit(s) SubCutaneous before breakfast  insulin lispro Injectable (ADMELOG) 10 Unit(s) SubCutaneous before dinner    All Other Medications:  dextrose 5%. 1000 milliLiter(s) IV Continuous <Continuous>  dextrose 5%. 1000 milliLiter(s) IV Continuous <Continuous>  influenza   Vaccine 0.5 milliLiter(s) IntraMuscular once  sodium bicarbonate 650 milliGRAM(s) Oral three times a day      Vital Signs Last 24 Hrs  T(C): 36.6 (29 Sep 2023 14:08), Max: 36.6 (29 Sep 2023 14:08)  T(F): 97.8 (29 Sep 2023 14:08), Max: 97.8 (29 Sep 2023 14:08)  HR: 95 (29 Sep 2023 14:08) (94 - 96)  BP: 142/66 (29 Sep 2023 14:08) (142/66 - 183/84)  BP(mean): --  RR: 18 (29 Sep 2023 14:08) (18 - 18)  SpO2: --        LABS:                        9.1    10.76 )-----------( 324      ( 29 Sep 2023 04:30 )             27.8     09-29    133<L>  |  99  |  47<H>  ----------------------------<  623<HH>  4.9   |  22  |  2.6<H>    Ca    8.5      29 Sep 2023 04:30        Urinalysis Basic - ( 29 Sep 2023 04:30 )    Color: x / Appearance: x / SG: x / pH: x  Gluc: 623 mg/dL / Ketone: x  / Bili: x / Urobili: x   Blood: x / Protein: x / Nitrite: x   Leuk Esterase: x / RBC: x / WBC x   Sq Epi: x / Non Sq Epi: x / Bacteria: x    RADIOLOGY:  CT foot  IMPRESSION:    1. Neuropathic arthropathy throughout the midfoot and first IP joint.  2. No acute fracture.      Right foot charcot foot reconstruction  external fixator application D.O.S 9.28.23    Recommendations  Acetaminophen 1000mg q8h standing  Duloxetine 60mg qD  pregabalin 50mg qD  Methocarbamol 750mg q6h standing  Oxycodone 10mg q4h  Can use 5mg IV morphine q8h PRN for severe breakthrough, despite use of oxycodone 10 q4  The goal would be to avoid IV PRN with adequate pain control  d/c IV morphine  Bowel regimen: miralax / colace

## 2023-09-29 NOTE — CONSULT NOTE ADULT - SUBJECTIVE AND OBJECTIVE BOX
MARIOLA CURRY  58y, Male  Allergies    No Known Allergies    Intolerances      LOS  1d    HPI  This a a 59 y/o male with PMH of CKD, DM, HTN, right Charcot foot with reconstruction on list for kidney transplant is admitted for right charcot foot reconstruction on 9/28/23 with removal of bone stimulater implant      Prior hospital charts reviewed [Yes]  Primary team notes reviewed [Yes]  Other consultant notes reviewed [Yes]    REVIEW OF SYSTEMS:  CONSTITUTIONAL: No fever or chills  HEENT: No sore throat  RESPIRATORY: No cough, no shortness of breath  CARDIOVASCULAR: No chest pain or palpitations  GASTROINTESTINAL: No abdominal or epigastric pain  GENITOURINARY: No dysuria  NEUROLOGICAL: No headache/dizziness  MSK: No joint pain, erythema, or swelling; no back pain  SKIN: No itching, rashes  All other ROS negative except noted above    PAST MEDICAL & SURGICAL HISTORY:  DM (diabetes mellitus)      HTN (hypertension)      HLD (hyperlipidemia)      Herpes labialis      Anxiety      GERD (gastroesophageal reflux disease)      Kidney stones  20 years ago      Kidney disease  stage 4      Chronic kidney disease, unspecified CKD stage      Diabetic Charcot foot      S/P foot surgery, right  x 5 ( 2006 - 2013 )      Kidney stone  Removed by Laser x 2 ( 20 years ago )      H/O arthroscopy of right knee          SOCIAL HISTORY:  - No recent travel  - Denies tobacco use, alcohol use, illicit drug use  - Currently sexually active, has one male/female sexual partner    FAMILY HISTORY:Family history of cancer in father (Father)  Lung    ANTIMICROBIALS:  ceFAZolin   IVPB    ceFAZolin   IVPB 2000 every 8 hours      ANTIMICROBIALS (past 90 days):  MEDICATIONS  (STANDING):  ceFAZolin   IVPB   100 mL/Hr IV Intermittent (09-29-23 @ 05:51)   100 mL/Hr IV Intermittent (09-28-23 @ 21:49)    ceFAZolin   IVPB   100 mL/Hr IV Intermittent (09-28-23 @ 17:32)    OTHER MEDS:   MEDICATIONS  (STANDING):  acetaminophen     Tablet .. 650 every 6 hours PRN  ALPRAZolam 0.5 four times a day PRN  aluminum hydroxide/magnesium hydroxide/simethicone Suspension 30 every 4 hours PRN  atorvastatin 10 at bedtime  dextrose 50% Injectable 12.5 once  dextrose 50% Injectable 25 once  dextrose 50% Injectable 25 once  dextrose Oral Gel 15 once PRN  glucagon  Injectable 1 once  influenza   Vaccine 0.5 once  insulin glargine Injectable (LANTUS) 50 at bedtime  insulin lispro (ADMELOG) corrective regimen sliding scale  at bedtime  insulin lispro (ADMELOG) corrective regimen sliding scale  three times a day before meals  insulin lispro Injectable (ADMELOG) 10 before lunch  insulin lispro Injectable (ADMELOG) 10 before breakfast  insulin lispro Injectable (ADMELOG) 10 before dinner  losartan 100 daily  melatonin 3 at bedtime PRN  morphine  - Injectable 2 every 4 hours PRN  morphine  - Injectable 4 every 4 hours PRN  NIFEdipine XL 90 daily  ondansetron Injectable 4 once PRN      VITALS:  Vital Signs Last 24 Hrs  T(F): 97.5 (09-29-23 @ 05:07), Max: 98.5 (09-28-23 @ 11:00)    Vital Signs Last 24 Hrs  HR: 96 (09-29-23 @ 05:07) (70 - 96)  BP: 183/84 (09-29-23 @ 05:07) (105/57 - 183/84)  RR: 18 (09-29-23 @ 05:07)  SpO2: 98% (09-28-23 @ 14:20) (96% - 100%)  Wt(kg): --    EXAM:  GENERAL: NAD, lying in bed  HEAD: No head lesions  NECK: Supple, nontender to palpation; no JVD  CHEST/LUNG: Clear to auscultation bilaterally  HEART: S1 S2  ABDOMEN: Soft, nontender, nondistended; normoactive bowel sounds  EXTREMITIES: No clubbing, cyanosis, or petal edema  NERVOUS SYSTEM: Alert and oriented to person, time, place and situation, speech clear. No focal deficits   MSK: No joint erythema, swelling or pain  SKIN: No rashes or lesions, no superficial thrombophlebitis    Labs:                        9.1    10.76 )-----------( 324      ( 29 Sep 2023 04:30 )             27.8             WBC Trend:  WBC Count: 10.76 (09-29-23 @ 04:30)      Auto Neutrophil #: 8.07 K/uL (09-29-23 @ 04:30)  Auto Neutrophil #: 4.82 K/uL (09-14-23 @ 14:39)  Auto Neutrophil #: 4.50 K/uL (09-07-23 @ 06:46)      Creatine Trend:      Liver Biochemical Testing Trend:  Alanine Aminotransferase (ALT/SGPT): 13 (09-14)  Alanine Aminotransferase (ALT/SGPT): 10 (09-07)  Aspartate Aminotransferase (AST/SGOT): 12 (09-14-23 @ 14:39)  Aspartate Aminotransferase (AST/SGOT): 13 (09-07-23 @ 06:27)  Bilirubin Total: <0.2 (09-14)  Bilirubin Total: <0.2 (09-07)      Trend LDH      Auto Eosinophil %: 0.3 % (09-29-23 @ 04:30)          MICROBIOLOGY:    Male    Culture - Tissue with Gram Stain (collected 28 Sep 2023 09:40)  Source: .Tissue None  Gram Stain (29 Sep 2023 04:00):    No polymorphonuclear cells seen per low power field    No organisms seen per oil power field      A1C with Estimated Average Glucose Result: 9.9 % (09-14-23 @ 14:39)      INFLAMMATORY MARKERS      RADIOLOGY & ADDITIONAL TESTS:  I have personally reviewed the imagings.  CXR      CT  < from: Xray Chest 2 Views PA/Lat (09.14.23 @ 15:43) >  Impression:    No radiographic evidence of acute cardiopulmonary disease.    < end of copied text >  < from: CT Foot No Cont, Right (09.08.23 @ 17:20) >  IMPRESSION:    1. Neuropathic arthropathy throughout the midfoot and first IP joint.  2. No acute fracture.    < end of copied text >  < from: CT Foot No Cont, Right (09.08.23 @ 17:20) >  Bone graft stimulator with battery pack just deep to the Achilles tendon   and leads along the lateral aspect of the calcaneus. Calcaneal screw   across a healed fracture deformity of the posterior calcaneus. Healed   fracture of the distal fibula and proximal fifth phalanx also noted.    Severe neuropathic arthropathy throughout the midfoot. Neuropathic   arthropathy in the first IP joint also noted.    Scattered chronic ossific fragments along the foot and ankle. No acute   fracture.    Fatty atrophy of the musculature also consistent with neuropathy.    Vascular calcifications.    < end of copied text >      CARDIOLOGY TESTING             MARIOLA CURRY  58y, Male  Allergies    No Known Allergies    Intolerances      LOS  1d    HPI  This a a 57 y/o male with PMH of CKD, DM, HTN, right Charcot foot with reconstruction on list for kidney transplant is admitted for right charcot foot reconstruction on 9/28/23 with removal of bone stimulater implant      Prior hospital charts reviewed [Yes]  Primary team notes reviewed [Yes]  Other consultant notes reviewed [Yes]    REVIEW OF SYSTEMS:  CONSTITUTIONAL: No fever or chills  HEENT: No sore throat  RESPIRATORY: No cough, no shortness of breath  CARDIOVASCULAR: No chest pain or palpitations  GASTROINTESTINAL: No abdominal or epigastric pain  GENITOURINARY: No dysuria  NEUROLOGICAL: No headache/dizziness  MSK: No joint pain, erythema, or swelling; no back pain  SKIN: No itching, rashes  All other ROS negative except noted above    PAST MEDICAL & SURGICAL HISTORY:  DM (diabetes mellitus)      HTN (hypertension)      HLD (hyperlipidemia)      Herpes labialis      Anxiety      GERD (gastroesophageal reflux disease)      Kidney stones  20 years ago      Kidney disease  stage 4      Chronic kidney disease, unspecified CKD stage      Diabetic Charcot foot      S/P foot surgery, right  x 5 ( 2006 - 2013 )      Kidney stone  Removed by Laser x 2 ( 20 years ago )      H/O arthroscopy of right knee          SOCIAL HISTORY:  - No recent travel  - Denies tobacco use, alcohol use, illicit drug use  - Currently sexually active, has one male/female sexual partner    FAMILY HISTORY:Family history of cancer in father (Father)  Lung    ANTIMICROBIALS:  ceFAZolin   IVPB    ceFAZolin   IVPB 2000 every 8 hours      ANTIMICROBIALS (past 90 days):  MEDICATIONS  (STANDING):  ceFAZolin   IVPB   100 mL/Hr IV Intermittent (09-29-23 @ 05:51)   100 mL/Hr IV Intermittent (09-28-23 @ 21:49)    ceFAZolin   IVPB   100 mL/Hr IV Intermittent (09-28-23 @ 17:32)    OTHER MEDS:   MEDICATIONS  (STANDING):  acetaminophen     Tablet .. 650 every 6 hours PRN  ALPRAZolam 0.5 four times a day PRN  aluminum hydroxide/magnesium hydroxide/simethicone Suspension 30 every 4 hours PRN  atorvastatin 10 at bedtime  dextrose 50% Injectable 12.5 once  dextrose 50% Injectable 25 once  dextrose 50% Injectable 25 once  dextrose Oral Gel 15 once PRN  glucagon  Injectable 1 once  influenza   Vaccine 0.5 once  insulin glargine Injectable (LANTUS) 50 at bedtime  insulin lispro (ADMELOG) corrective regimen sliding scale  at bedtime  insulin lispro (ADMELOG) corrective regimen sliding scale  three times a day before meals  insulin lispro Injectable (ADMELOG) 10 before lunch  insulin lispro Injectable (ADMELOG) 10 before breakfast  insulin lispro Injectable (ADMELOG) 10 before dinner  losartan 100 daily  melatonin 3 at bedtime PRN  morphine  - Injectable 2 every 4 hours PRN  morphine  - Injectable 4 every 4 hours PRN  NIFEdipine XL 90 daily  ondansetron Injectable 4 once PRN      VITALS:  Vital Signs Last 24 Hrs  T(F): 97.5 (09-29-23 @ 05:07), Max: 98.5 (09-28-23 @ 11:00)    Vital Signs Last 24 Hrs  HR: 96 (09-29-23 @ 05:07) (70 - 96)  BP: 183/84 (09-29-23 @ 05:07) (105/57 - 183/84)  RR: 18 (09-29-23 @ 05:07)  SpO2: 98% (09-28-23 @ 14:20) (96% - 100%)  Wt(kg): --    EXAM:  GENERAL: NAD, lying in bed  HEAD: No head lesions  NECK: Supple, nontender to palpation  CHEST/LUNG: Clear to auscultation bilaterally  HEART: S1 S2  ABDOMEN: Soft, nontender, nondistended; normoactive bowel sounds  EXTREMITIES: No clubbing, cyanosis, or petal edema, right foot wrapped in dressing with pins attached for support. Toes non-tender.   NERVOUS SYSTEM: Alert and oriented to person, time, place and situation, speech clear. No focal deficits   MSK: No joint erythema, swelling or pain  SKIN: No rashes or lesions, no superficial thrombophlebitis    Labs:                        9.1    10.76 )-----------( 324      ( 29 Sep 2023 04:30 )             27.8             WBC Trend:  WBC Count: 10.76 (09-29-23 @ 04:30)      Auto Neutrophil #: 8.07 K/uL (09-29-23 @ 04:30)  Auto Neutrophil #: 4.82 K/uL (09-14-23 @ 14:39)  Auto Neutrophil #: 4.50 K/uL (09-07-23 @ 06:46)      Creatine Trend:      Liver Biochemical Testing Trend:  Alanine Aminotransferase (ALT/SGPT): 13 (09-14)  Alanine Aminotransferase (ALT/SGPT): 10 (09-07)  Aspartate Aminotransferase (AST/SGOT): 12 (09-14-23 @ 14:39)  Aspartate Aminotransferase (AST/SGOT): 13 (09-07-23 @ 06:27)  Bilirubin Total: <0.2 (09-14)  Bilirubin Total: <0.2 (09-07)      Trend LDH      Auto Eosinophil %: 0.3 % (09-29-23 @ 04:30)          MICROBIOLOGY:    Male    Culture - Tissue with Gram Stain (collected 28 Sep 2023 09:40)  Source: .Tissue None  Gram Stain (29 Sep 2023 04:00):    No polymorphonuclear cells seen per low power field    No organisms seen per oil power field      A1C with Estimated Average Glucose Result: 9.9 % (09-14-23 @ 14:39)      INFLAMMATORY MARKERS      RADIOLOGY & ADDITIONAL TESTS:  I have personally reviewed the imagings.  CXR      CT  < from: Xray Chest 2 Views PA/Lat (09.14.23 @ 15:43) >  Impression:    No radiographic evidence of acute cardiopulmonary disease.    < end of copied text >  < from: CT Foot No Cont, Right (09.08.23 @ 17:20) >  IMPRESSION:    1. Neuropathic arthropathy throughout the midfoot and first IP joint.  2. No acute fracture.    < end of copied text >  < from: CT Foot No Cont, Right (09.08.23 @ 17:20) >  Bone graft stimulator with battery pack just deep to the Achilles tendon   and leads along the lateral aspect of the calcaneus. Calcaneal screw   across a healed fracture deformity of the posterior calcaneus. Healed   fracture of the distal fibula and proximal fifth phalanx also noted.    Severe neuropathic arthropathy throughout the midfoot. Neuropathic   arthropathy in the first IP joint also noted.    Scattered chronic ossific fragments along the foot and ankle. No acute   fracture.    Fatty atrophy of the musculature also consistent with neuropathy.    Vascular calcifications.    < end of copied text >      CARDIOLOGY TESTING

## 2023-09-29 NOTE — PHYSICAL THERAPY INITIAL EVALUATION ADULT - GENERAL OBSERVATIONS, REHAB EVAL
9:35-9:55 Chart reviewed. Patient available to be seen for physical therapy, denies pain, confirmed with RN.  Pt rec'd in bed +R foot cage in place. Pt in NAD.

## 2023-09-29 NOTE — PROGRESS NOTE ADULT - SUBJECTIVE AND OBJECTIVE BOX
59 y/o male s/p right charcot foot reconstruction iPMH of CKD, DM1, HTN, right foot Charcot on list for kidney transplant. Pt state that he has "bone moving on right foot" dx of Charcot foot. Pt with pain of 7/10 sharp in nature non radiating.      PAST MEDICAL & SURGICAL HISTORY:    DM (diabetes mellitus)  HTN (hypertension)  HLD (hyperlipidemia)  Herpes labialis  Anxiety  GERD (gastroesophageal reflux disease)  Kidney stones  20 years ago  Kidney disease  stage 4  Chronic kidney disease, unspecified CKD stage  Diabetic Charcot foot  S/P foot surgery, right  x 5 ( 2006 - 2013 )  Kidney stone  Removed by Laser x 2 ( 20 years ago )  H/O arthroscopy of right knee    Social History:    HX of Tobacco use 50 pack year history  NO ALCOHOL NO SUBSTANCE    MEDICATIONS  (STANDING):  atorvastatin 10 milliGRAM(s) Oral at bedtime  ceFAZolin   IVPB      ceFAZolin   IVPB 2000 milliGRAM(s) IV Intermittent every 8 hours  dextrose 5%. 1000 milliLiter(s) (50 mL/Hr) IV Continuous <Continuous>  dextrose 5%. 1000 milliLiter(s) (100 mL/Hr) IV Continuous <Continuous>  dextrose 50% Injectable 12.5 Gram(s) IV Push once  dextrose 50% Injectable 25 Gram(s) IV Push once  dextrose 50% Injectable 25 Gram(s) IV Push once  glucagon  Injectable 1 milliGRAM(s) IntraMuscular once  influenza   Vaccine 0.5 milliLiter(s) IntraMuscular once  insulin glargine Injectable (LANTUS) 50 Unit(s) SubCutaneous at bedtime  insulin lispro (ADMELOG) corrective regimen sliding scale   SubCutaneous at bedtime  insulin lispro (ADMELOG) corrective regimen sliding scale   SubCutaneous three times a day before meals  insulin lispro Injectable (ADMELOG) 10 Unit(s) SubCutaneous before lunch  insulin lispro Injectable (ADMELOG) 10 Unit(s) SubCutaneous before breakfast  insulin lispro Injectable (ADMELOG) 10 Unit(s) SubCutaneous before dinner  insulin lispro Injectable (ADMELOG). 10 Unit(s) SubCutaneous once  losartan 100 milliGRAM(s) Oral daily  NIFEdipine XL 90 milliGRAM(s) Oral daily  sodium bicarbonate 650 milliGRAM(s) Oral three times a day  sodium chloride 0.9% Bolus 1000 milliLiter(s) IV Bolus once    MEDICATIONS  (PRN):  acetaminophen     Tablet .. 650 milliGRAM(s) Oral every 6 hours PRN Temp greater or equal to 38C (100.4F), Mild Pain (1 - 3)  ALPRAZolam 0.5 milliGRAM(s) Oral four times a day PRN anxiety  aluminum hydroxide/magnesium hydroxide/simethicone Suspension 30 milliLiter(s) Oral every 4 hours PRN Dyspepsia  dextrose Oral Gel 15 Gram(s) Oral once PRN Blood Glucose LESS THAN 70 milliGRAM(s)/deciliter  melatonin 3 milliGRAM(s) Oral at bedtime PRN Insomnia  morphine  - Injectable 4 milliGRAM(s) IV Push every 4 hours PRN Severe Pain (7 - 10)  morphine  - Injectable 2 milliGRAM(s) IV Push every 4 hours PRN Moderate Pain (4 - 6)  ondansetron Injectable 4 milliGRAM(s) IV Push once PRN Nausea and/or Vomiting    Allergies    No Known Allergies    Intolerances      Vital Signs Last 24 Hrs  T(C): 36.4 (29 Sep 2023 05:07), Max: 36.9 (28 Sep 2023 11:00)  T(F): 97.5 (29 Sep 2023 05:07), Max: 98.5 (28 Sep 2023 11:00)  HR: 96 (29 Sep 2023 05:07) (70 - 96)  BP: 183/84 (29 Sep 2023 05:07) (105/57 - 183/84)  BP(mean): --  RR: 18 (29 Sep 2023 05:07) (16 - 20)  SpO2: 98% (28 Sep 2023 14:20) (96% - 100%)    Parameters below as of 28 Sep 2023 12:00  Patient On (Oxygen Delivery Method): room air    CAPILLARY BLOOD GLUCOSE      POCT Blood Glucose.: 566 mg/dL (29 Sep 2023 07:34)  POCT Blood Glucose.: 578 mg/dL (29 Sep 2023 06:26)  POCT Blood Glucose.: >600 mg/dL (28 Sep 2023 22:56)  POCT Blood Glucose.: >600 mg/dL (28 Sep 2023 22:22)  POCT Blood Glucose.: 438 mg/dL (28 Sep 2023 16:14)  POCT Blood Glucose.: 143 mg/dL (28 Sep 2023 11:22)      PHYSICAL EXAM    General: lying in BED recumbent  HEENT: PERRLA   NECK: no JVD  LUNG: CTA no wheezing  CARD: S1 S2  ABD: Soft nt nd   EXT: Right LW in external fixator  NEURO: grossly normal non focal + decrease sensation left lower    LABS:     P    RADIOLOGY:

## 2023-09-29 NOTE — PHYSICAL THERAPY INITIAL EVALUATION ADULT - ADDITIONAL COMMENTS
Pt reports he lives with his mother in house with steps outside and inside. Pt amb independently PTA.

## 2023-09-29 NOTE — CONSULT NOTE ADULT - SUBJECTIVE AND OBJECTIVE BOX
Podiatry Consult Note    Subjective:  MARIOLA CURRY  Seen Bedside 58y Male  .   Patient is a 58y old  Male who presents with a chief complaint of   HPI:  Charcot foot surgical post op care of his right foot. Pateint states today that one of his prongs is loose and needs to be tightened. He states he is in pain in his achilles and is waiting for pain management to see him.       Past Medical History and Surgical History  PAST MEDICAL & SURGICAL HISTORY:  DM (diabetes mellitus)      HTN (hypertension)      HLD (hyperlipidemia)      Herpes labialis      Anxiety      GERD (gastroesophageal reflux disease)      Kidney stones  20 years ago      Kidney disease  stage 4      Chronic kidney disease, unspecified CKD stage      Diabetic Charcot foot      S/P foot surgery, right  x 5 ( 2006 - 2013 )      Kidney stone  Removed by Laser x 2 ( 20 years ago )      H/O arthroscopy of right knee           Review of Systems:  [X] Ten point review of systems is otherwise negative except as noted     Objective:  Vital Signs Last 24 Hrs  T(C): 35.9 (29 Sep 2023 10:19), Max: 36.7 (28 Sep 2023 14:05)  T(F): 96.7 (29 Sep 2023 10:19), Max: 98.1 (28 Sep 2023 14:05)  HR: 94 (29 Sep 2023 10:19) (73 - 96)  BP: 143/73 (29 Sep 2023 10:19) (133/77 - 183/84)  BP(mean): --  RR: 18 (29 Sep 2023 10:19) (16 - 20)  SpO2: 98% (28 Sep 2023 14:20) (96% - 98%)                            9.1    10.76 )-----------( 324      ( 29 Sep 2023 04:30 )             27.8                 09-29    133<L>  |  99  |  47<H>  ----------------------------<  623<HH>  4.9   |  22  |  2.6<H>    Ca    8.5      29 Sep 2023 04:30          Physical Exam - Lower Extremity Focused:   Derm:    Right foot charcot foot with external fixator applied.  Vascular: DP and PT Pulses Diminished; Foot is Warm to Warm to the touch; Capillary Refill Time < 3 Seconds;    Neuro: Protective Sensation Diminished / Moderately Neuropathic   MSK: Pain On Palpation of posterior foot.     Assessment:  Right foot charcot foot reconstruction  external fixator application D.O.S 9.28.23    Plan:  Chart reviewed and Patient evaluated. All Questions and Concerns Addressed and Answered     Local Wound Care;  External fixator dressed with xeroform to pin sites and jennifer wrapping  Will follow pt while inhouse.  Pending pain management consult.  Weight Bearing Status; WBAT   Attending requesting cardiology consult.   Discussed Plan w/   Dr. Roca.     Podiatry  Podiatry Consult Note    Subjective:  MARIOLA CURRY  Seen Bedside 58y Male  .   Patient is a 58y old  Male who presents with a chief complaint of   HPI:  Charcot foot surgical post op care of his right foot. Pateint states today that one of his prongs is loose and needs to be tightened. He states he is in pain in his achilles and is waiting for pain management to see him.       Past Medical History and Surgical History  PAST MEDICAL & SURGICAL HISTORY:  DM (diabetes mellitus)      HTN (hypertension)      HLD (hyperlipidemia)      Herpes labialis      Anxiety      GERD (gastroesophageal reflux disease)      Kidney stones  20 years ago      Kidney disease  stage 4      Chronic kidney disease, unspecified CKD stage      Diabetic Charcot foot      S/P foot surgery, right  x 5 ( 2006 - 2013 )      Kidney stone  Removed by Laser x 2 ( 20 years ago )      H/O arthroscopy of right knee           Review of Systems:  [X] Ten point review of systems is otherwise negative except as noted     Objective:  Vital Signs Last 24 Hrs  T(C): 35.9 (29 Sep 2023 10:19), Max: 36.7 (28 Sep 2023 14:05)  T(F): 96.7 (29 Sep 2023 10:19), Max: 98.1 (28 Sep 2023 14:05)  HR: 94 (29 Sep 2023 10:19) (73 - 96)  BP: 143/73 (29 Sep 2023 10:19) (133/77 - 183/84)  BP(mean): --  RR: 18 (29 Sep 2023 10:19) (16 - 20)  SpO2: 98% (28 Sep 2023 14:20) (96% - 98%)                            9.1    10.76 )-----------( 324      ( 29 Sep 2023 04:30 )             27.8                 09-29    133<L>  |  99  |  47<H>  ----------------------------<  623<HH>  4.9   |  22  |  2.6<H>    Ca    8.5      29 Sep 2023 04:30          Physical Exam - Lower Extremity Focused:   Derm:    Right foot charcot foot with external fixator applied.  Vascular: DP and PT Pulses Diminished; Foot is Warm to Warm to the touch; Capillary Refill Time < 3 Seconds;    Neuro: Protective Sensation Diminished / Moderately Neuropathic   MSK: Pain On Palpation of posterior foot.     Assessment:  Right foot charcot foot reconstruction  external fixator application D.O.S 9.28.23    Plan:  Chart reviewed and Patient evaluated. All Questions and Concerns Addressed and Answered     Local Wound Care;  External fixator dressed with xeroform to pin sites and jennifer wrapping  Will follow pt while inhouse.  Next dressing change Monday 10/02  Pending pain management consult.  Weight Bearing Status; WBAT   Attending requesting cardiology consult.   Discussed Plan w/ Dr. Roca.     Podiatry

## 2023-09-29 NOTE — CONSULT NOTE ADULT - SUBJECTIVE AND OBJECTIVE BOX
HPI: 58  y.o  m  ptn admitted  to [facility]  for  rt  ankle  charcots  joint  sp  rt  ankle  joints  reconstruction  with  ext  fixation ptn referred  to acute  rehab  for  eval and  tx  ptn  is  aox3 notce  rt ankle  in ext  fixsation no new  c.o  ptn wish  to  go  home  refused  rehab       PTN  REFERRED TO ACUTE  REHAB  FOR  EVAL AND  TX   PAST MEDICAL & SURGICAL HISTORY:  DM (diabetes mellitus)      HTN (hypertension)      HLD (hyperlipidemia)      Herpes labialis      Anxiety      GERD (gastroesophageal reflux disease)      Kidney stones  20 years ago      Kidney disease  stage 4      Chronic kidney disease, unspecified CKD stage      Diabetic Charcot foot      S/P foot surgery, right  x 5 ( 2006 - 2013 )      Kidney stone  Removed by Laser x 2 ( 20 years ago )      H/O arthroscopy of right knee          Hospital Course:    TODAY'S SUBJECTIVE & REVIEW OF SYMPTOMS:     Constitutional WNL   Cardio WNL   Resp WNL   GI WNL  Heme WNL  Endo WNL  Skin WNL  MSK WNL  Neuro WNL  Cognitive WNL  Psych WNL      MEDICATIONS  (STANDING):  atorvastatin 10 milliGRAM(s) Oral at bedtime  ceFAZolin   IVPB      ceFAZolin   IVPB 2000 milliGRAM(s) IV Intermittent every 8 hours  dextrose 5%. 1000 milliLiter(s) (50 mL/Hr) IV Continuous <Continuous>  dextrose 5%. 1000 milliLiter(s) (100 mL/Hr) IV Continuous <Continuous>  dextrose 50% Injectable 12.5 Gram(s) IV Push once  dextrose 50% Injectable 25 Gram(s) IV Push once  dextrose 50% Injectable 25 Gram(s) IV Push once  glucagon  Injectable 1 milliGRAM(s) IntraMuscular once  influenza   Vaccine 0.5 milliLiter(s) IntraMuscular once  insulin glargine Injectable (LANTUS) 50 Unit(s) SubCutaneous at bedtime  insulin lispro (ADMELOG) corrective regimen sliding scale   SubCutaneous three times a day before meals  insulin lispro (ADMELOG) corrective regimen sliding scale   SubCutaneous at bedtime  insulin lispro Injectable (ADMELOG) 10 Unit(s) SubCutaneous before lunch  insulin lispro Injectable (ADMELOG) 10 Unit(s) SubCutaneous before breakfast  insulin lispro Injectable (ADMELOG) 10 Unit(s) SubCutaneous before dinner  losartan 100 milliGRAM(s) Oral daily  NIFEdipine XL 90 milliGRAM(s) Oral daily  sodium bicarbonate 650 milliGRAM(s) Oral three times a day    MEDICATIONS  (PRN):  acetaminophen     Tablet .. 650 milliGRAM(s) Oral every 6 hours PRN Temp greater or equal to 38C (100.4F), Mild Pain (1 - 3)  ALPRAZolam 0.5 milliGRAM(s) Oral four times a day PRN anxiety  aluminum hydroxide/magnesium hydroxide/simethicone Suspension 30 milliLiter(s) Oral every 4 hours PRN Dyspepsia  dextrose Oral Gel 15 Gram(s) Oral once PRN Blood Glucose LESS THAN 70 milliGRAM(s)/deciliter  melatonin 3 milliGRAM(s) Oral at bedtime PRN Insomnia  morphine  - Injectable 2 milliGRAM(s) IV Push every 4 hours PRN Moderate Pain (4 - 6)  morphine  - Injectable 4 milliGRAM(s) IV Push every 4 hours PRN Severe Pain (7 - 10)  ondansetron Injectable 4 milliGRAM(s) IV Push once PRN Nausea and/or Vomiting      FAMILY HISTORY:  Family history of cancer in father (Father)  Lung        Allergies    No Known Allergies    Intolerances        SOCIAL HISTORY:    [  ] Etoh  [  ] Smoking  [  ] Substance abuse     Home Environment:  [  ] Home Alone  [ x ] Lives with Family wife  [  ] Home Health Aid    Dwelling:  [  ] Apartment  [ x ] Private House  [  ] Adult Home  [  ] Skilled Nursing Facility      [  ] Short Term  [  ] Long Term  [  x] Stairs       Elevator [  ]    FUNCTIONAL STATUS PTA: (Check all that apply)  Ambulation: [ x  ]Independent    [  ] Dependent     [  ] Non-Ambulatory  Assistive Device: [  ] SA Cane  [  ]  Q Cane  [  ] Walker  [  ]  Wheelchair  ADL : [ x ] Independent  [  ]  Dependent       Vital Signs Last 24 Hrs  T(C): 36.4 (29 Sep 2023 05:07), Max: 36.9 (28 Sep 2023 11:00)  T(F): 97.5 (29 Sep 2023 05:07), Max: 98.5 (28 Sep 2023 11:00)  HR: 96 (29 Sep 2023 05:07) (70 - 96)  BP: 183/84 (29 Sep 2023 05:07) (105/57 - 183/84)  BP(mean): --  RR: 18 (29 Sep 2023 05:07) (16 - 20)  SpO2: 98% (28 Sep 2023 14:20) (96% - 100%)    Parameters below as of 28 Sep 2023 12:00  Patient On (Oxygen Delivery Method): room air          PHYSICAL EXAM: Alert & Oriented X3  GENERAL: NAD, well-groomed, well-developed  HEAD:  Atraumatic, Normocephalic  EYES: EOMI, PERRLA, conjunctiva and sclera clear  NECK: Supple, No JVD, Normal thyroid  CHEST/LUNG: Clear to percussion bilaterally; No rales, rhonchi, wheezing, or rubs  HEART: Regular rate and rhythm; No murmurs, rubs, or gallops  ABDOMEN: Soft, Nontender, Nondistended; Bowel sounds present  EXTREMITIES:  2+ Peripheral Pulses, No clubbing, cyanosis, or edema    NERVOUS SYSTEM:  Cranial Nerves 2-12 intact [ x ] Abnormal  [  ]  ROM: WFL all extremities [  ]  Abnormal [limited  rt  le   ]  Motor Strength: WFL all extremities  [ x ]  Abnormal [  ]  Sensation: intact to light touch [  ] Abnormal [ x]  Reflexes: Symmetric [  ]  Abnormal [x ]    FUNCTIONAL STATUS:  Bed Mobility: Independent [  ]  Supervision [  ]  Needs Assistance [  ]  N/A [ x ]  Transfers: Independent [  ]  Supervision [  ]  Needs Assistance [  ]  N/A [ x ]   Ambulation: Independent [  ]  Supervision [  ]  Needs Assistance [  ]  N/A [x  ]  ADL: Independent [  ] Requires Assistance [  ] N/A [ x ]  SEE PT/OT IE NOTES    LABS:                RADIOLOGY & ADDITIONAL STUDIES:< from: Xray Foot AP + Lateral, Right (11.10.19 @ 14:31) >  IMPRESSION: Hallux IP apparent soft tissue ulcer with periostitis at   medial border of distal phalanx.     Apparent ulcer medial hallux metatarsal with subacute periosteal reaction   no radiographic evidence of osteomyelitis    Midfoot neuropathic osteoarthropathy with increased bone ankylosis since   prior 8/18/2012      < end of copied text >      Assesment:

## 2023-09-30 LAB
ALBUMIN SERPL ELPH-MCNC: 3.8 G/DL — SIGNIFICANT CHANGE UP (ref 3.5–5.2)
ALP SERPL-CCNC: 111 U/L — SIGNIFICANT CHANGE UP (ref 30–115)
ALT FLD-CCNC: <5 U/L — SIGNIFICANT CHANGE UP (ref 0–41)
ANION GAP SERPL CALC-SCNC: 11 MMOL/L — SIGNIFICANT CHANGE UP (ref 7–14)
AST SERPL-CCNC: 9 U/L — SIGNIFICANT CHANGE UP (ref 0–41)
BILIRUB SERPL-MCNC: <0.2 MG/DL — SIGNIFICANT CHANGE UP (ref 0.2–1.2)
BUN SERPL-MCNC: 39 MG/DL — HIGH (ref 10–20)
CALCIUM SERPL-MCNC: 9 MG/DL — SIGNIFICANT CHANGE UP (ref 8.4–10.5)
CHLORIDE SERPL-SCNC: 102 MMOL/L — SIGNIFICANT CHANGE UP (ref 98–110)
CO2 SERPL-SCNC: 27 MMOL/L — SIGNIFICANT CHANGE UP (ref 17–32)
CREAT SERPL-MCNC: 2.5 MG/DL — HIGH (ref 0.7–1.5)
EGFR: 29 ML/MIN/1.73M2 — LOW
GLUCOSE BLDC GLUCOMTR-MCNC: 123 MG/DL — HIGH (ref 70–99)
GLUCOSE BLDC GLUCOMTR-MCNC: 137 MG/DL — HIGH (ref 70–99)
GLUCOSE BLDC GLUCOMTR-MCNC: 222 MG/DL — HIGH (ref 70–99)
GLUCOSE BLDC GLUCOMTR-MCNC: 43 MG/DL — CRITICAL LOW (ref 70–99)
GLUCOSE BLDC GLUCOMTR-MCNC: 72 MG/DL — SIGNIFICANT CHANGE UP (ref 70–99)
GLUCOSE BLDC GLUCOMTR-MCNC: 83 MG/DL — SIGNIFICANT CHANGE UP (ref 70–99)
GLUCOSE SERPL-MCNC: 63 MG/DL — LOW (ref 70–99)
HCT VFR BLD CALC: 28 % — LOW (ref 42–52)
HGB BLD-MCNC: 9.3 G/DL — LOW (ref 14–18)
MCHC RBC-ENTMCNC: 27.5 PG — SIGNIFICANT CHANGE UP (ref 27–31)
MCHC RBC-ENTMCNC: 33.2 G/DL — SIGNIFICANT CHANGE UP (ref 32–37)
MCV RBC AUTO: 82.8 FL — SIGNIFICANT CHANGE UP (ref 80–94)
NRBC # BLD: 0 /100 WBCS — SIGNIFICANT CHANGE UP (ref 0–0)
PLATELET # BLD AUTO: 343 K/UL — SIGNIFICANT CHANGE UP (ref 130–400)
PMV BLD: 9.8 FL — SIGNIFICANT CHANGE UP (ref 7.4–10.4)
POTASSIUM SERPL-MCNC: 4.2 MMOL/L — SIGNIFICANT CHANGE UP (ref 3.5–5)
POTASSIUM SERPL-SCNC: 4.2 MMOL/L — SIGNIFICANT CHANGE UP (ref 3.5–5)
PROT SERPL-MCNC: 6.3 G/DL — SIGNIFICANT CHANGE UP (ref 6–8)
RBC # BLD: 3.38 M/UL — LOW (ref 4.7–6.1)
RBC # FLD: 15.1 % — HIGH (ref 11.5–14.5)
SODIUM SERPL-SCNC: 140 MMOL/L — SIGNIFICANT CHANGE UP (ref 135–146)
WBC # BLD: 12.5 K/UL — HIGH (ref 4.8–10.8)
WBC # FLD AUTO: 12.5 K/UL — HIGH (ref 4.8–10.8)

## 2023-09-30 PROCEDURE — 99223 1ST HOSP IP/OBS HIGH 75: CPT

## 2023-09-30 RX ORDER — OXYCODONE HYDROCHLORIDE 5 MG/1
10 TABLET ORAL EVERY 4 HOURS
Refills: 0 | Status: DISCONTINUED | OUTPATIENT
Start: 2023-09-30 | End: 2023-10-05

## 2023-09-30 RX ORDER — METHOCARBAMOL 500 MG/1
750 TABLET, FILM COATED ORAL EVERY 6 HOURS
Refills: 0 | Status: DISCONTINUED | OUTPATIENT
Start: 2023-09-30 | End: 2023-10-05

## 2023-09-30 RX ORDER — HEPARIN SODIUM 5000 [USP'U]/ML
5000 INJECTION INTRAVENOUS; SUBCUTANEOUS EVERY 12 HOURS
Refills: 0 | Status: DISCONTINUED | OUTPATIENT
Start: 2023-09-30 | End: 2023-10-05

## 2023-09-30 RX ORDER — POLYETHYLENE GLYCOL 3350 17 G/17G
17 POWDER, FOR SOLUTION ORAL AT BEDTIME
Refills: 0 | Status: DISCONTINUED | OUTPATIENT
Start: 2023-09-30 | End: 2023-10-05

## 2023-09-30 RX ORDER — MORPHINE SULFATE 50 MG/1
5 CAPSULE, EXTENDED RELEASE ORAL EVERY 8 HOURS
Refills: 0 | Status: DISCONTINUED | OUTPATIENT
Start: 2023-09-30 | End: 2023-10-05

## 2023-09-30 RX ORDER — ACETAMINOPHEN 500 MG
1000 TABLET ORAL EVERY 8 HOURS
Refills: 0 | Status: DISCONTINUED | OUTPATIENT
Start: 2023-09-30 | End: 2023-10-05

## 2023-09-30 RX ORDER — DULOXETINE HYDROCHLORIDE 30 MG/1
60 CAPSULE, DELAYED RELEASE ORAL DAILY
Refills: 0 | Status: DISCONTINUED | OUTPATIENT
Start: 2023-09-30 | End: 2023-10-05

## 2023-09-30 RX ADMIN — INSULIN GLARGINE 50 UNIT(S): 100 INJECTION, SOLUTION SUBCUTANEOUS at 09:18

## 2023-09-30 RX ADMIN — Medication 650 MILLIGRAM(S): at 14:01

## 2023-09-30 RX ADMIN — MORPHINE SULFATE 4 MILLIGRAM(S): 50 CAPSULE, EXTENDED RELEASE ORAL at 06:38

## 2023-09-30 RX ADMIN — OXYCODONE HYDROCHLORIDE 10 MILLIGRAM(S): 5 TABLET ORAL at 14:00

## 2023-09-30 RX ADMIN — OXYCODONE HYDROCHLORIDE 10 MILLIGRAM(S): 5 TABLET ORAL at 10:00

## 2023-09-30 RX ADMIN — Medication 1000 MILLIGRAM(S): at 22:45

## 2023-09-30 RX ADMIN — Medication 100 MILLIGRAM(S): at 05:39

## 2023-09-30 RX ADMIN — METHOCARBAMOL 750 MILLIGRAM(S): 500 TABLET, FILM COATED ORAL at 18:09

## 2023-09-30 RX ADMIN — ATORVASTATIN CALCIUM 10 MILLIGRAM(S): 80 TABLET, FILM COATED ORAL at 21:54

## 2023-09-30 RX ADMIN — Medication 650 MILLIGRAM(S): at 05:39

## 2023-09-30 RX ADMIN — OXYCODONE HYDROCHLORIDE 10 MILLIGRAM(S): 5 TABLET ORAL at 13:15

## 2023-09-30 RX ADMIN — Medication 1000 MILLIGRAM(S): at 14:01

## 2023-09-30 RX ADMIN — Medication 650 MILLIGRAM(S): at 21:55

## 2023-09-30 RX ADMIN — OXYCODONE HYDROCHLORIDE 10 MILLIGRAM(S): 5 TABLET ORAL at 18:09

## 2023-09-30 RX ADMIN — OXYCODONE HYDROCHLORIDE 10 MILLIGRAM(S): 5 TABLET ORAL at 09:24

## 2023-09-30 RX ADMIN — METHOCARBAMOL 750 MILLIGRAM(S): 500 TABLET, FILM COATED ORAL at 12:16

## 2023-09-30 RX ADMIN — Medication 1000 MILLIGRAM(S): at 16:45

## 2023-09-30 RX ADMIN — Medication 10 UNIT(S): at 16:44

## 2023-09-30 RX ADMIN — MORPHINE SULFATE 2 MILLIGRAM(S): 50 CAPSULE, EXTENDED RELEASE ORAL at 03:23

## 2023-09-30 RX ADMIN — MORPHINE SULFATE 2 MILLIGRAM(S): 50 CAPSULE, EXTENDED RELEASE ORAL at 02:34

## 2023-09-30 RX ADMIN — METHOCARBAMOL 750 MILLIGRAM(S): 500 TABLET, FILM COATED ORAL at 23:43

## 2023-09-30 RX ADMIN — Medication 100 MILLIGRAM(S): at 14:01

## 2023-09-30 RX ADMIN — MORPHINE SULFATE 4 MILLIGRAM(S): 50 CAPSULE, EXTENDED RELEASE ORAL at 07:39

## 2023-09-30 RX ADMIN — LOSARTAN POTASSIUM 100 MILLIGRAM(S): 100 TABLET, FILM COATED ORAL at 05:41

## 2023-09-30 RX ADMIN — Medication 100 MILLIGRAM(S): at 21:56

## 2023-09-30 RX ADMIN — MORPHINE SULFATE 5 MILLIGRAM(S): 50 CAPSULE, EXTENDED RELEASE ORAL at 11:00

## 2023-09-30 RX ADMIN — Medication 10 UNIT(S): at 09:10

## 2023-09-30 RX ADMIN — Medication 90 MILLIGRAM(S): at 05:39

## 2023-09-30 RX ADMIN — Medication 0.5 MILLIGRAM(S): at 15:53

## 2023-09-30 RX ADMIN — Medication 1000 MILLIGRAM(S): at 21:55

## 2023-09-30 RX ADMIN — MORPHINE SULFATE 5 MILLIGRAM(S): 50 CAPSULE, EXTENDED RELEASE ORAL at 20:03

## 2023-09-30 RX ADMIN — MORPHINE SULFATE 5 MILLIGRAM(S): 50 CAPSULE, EXTENDED RELEASE ORAL at 20:20

## 2023-09-30 RX ADMIN — MORPHINE SULFATE 4 MILLIGRAM(S): 50 CAPSULE, EXTENDED RELEASE ORAL at 00:33

## 2023-09-30 RX ADMIN — MORPHINE SULFATE 5 MILLIGRAM(S): 50 CAPSULE, EXTENDED RELEASE ORAL at 10:46

## 2023-09-30 NOTE — CONSULT NOTE ADULT - SUBJECTIVE AND OBJECTIVE BOX
NEPHROLOGY CONSULTATION NOTE    59 y/o male s/p right charcot foot reconstruction iPMH of CKD, DM1, HTN, right foot Charcot on list for kidney transplant  Today is hospital day 1d. This morning patient was seen and examined at bedside, resting comfortably in bed.    No acute or major events overnight.        PAST MEDICAL & SURGICAL HISTORY:  DM (diabetes mellitus)      HTN (hypertension)      HLD (hyperlipidemia)      Herpes labialis      Anxiety      GERD (gastroesophageal reflux disease)      Kidney stones  20 years ago      Kidney disease  stage 4      Chronic kidney disease, unspecified CKD stage      Diabetic Charcot foot      S/P foot surgery, right  x 5 ( 2006 - 2013 )      Kidney stone  Removed by Laser x 2 ( 20 years ago )      H/O arthroscopy of right knee        Allergies:  No Known Allergies    Home Medications Reviewed  Hospital Medications:   MEDICATIONS  (STANDING):  acetaminophen     Tablet .. 1000 milliGRAM(s) Oral every 8 hours  atorvastatin 10 milliGRAM(s) Oral at bedtime  ceFAZolin   IVPB 1000 milliGRAM(s) IV Intermittent every 8 hours  dextrose 5%. 1000 milliLiter(s) (50 mL/Hr) IV Continuous <Continuous>  dextrose 5%. 1000 milliLiter(s) (100 mL/Hr) IV Continuous <Continuous>  dextrose 50% Injectable 12.5 Gram(s) IV Push once  dextrose 50% Injectable 25 Gram(s) IV Push once  dextrose 50% Injectable 25 Gram(s) IV Push once  DULoxetine 60 milliGRAM(s) Oral daily  glucagon  Injectable 1 milliGRAM(s) IntraMuscular once  heparin   Injectable 5000 Unit(s) SubCutaneous every 12 hours  influenza   Vaccine 0.5 milliLiter(s) IntraMuscular once  insulin glargine Injectable (LANTUS) 50 Unit(s) SubCutaneous every morning  insulin lispro (ADMELOG) corrective regimen sliding scale   SubCutaneous three times a day before meals  insulin lispro (ADMELOG) corrective regimen sliding scale   SubCutaneous at bedtime  insulin lispro Injectable (ADMELOG) 10 Unit(s) SubCutaneous before lunch  insulin lispro Injectable (ADMELOG) 10 Unit(s) SubCutaneous before breakfast  insulin lispro Injectable (ADMELOG) 10 Unit(s) SubCutaneous before dinner  losartan 100 milliGRAM(s) Oral daily  methocarbamol 750 milliGRAM(s) Oral every 6 hours  NIFEdipine XL 90 milliGRAM(s) Oral daily  polyethylene glycol 3350 17 Gram(s) Oral at bedtime  pregabalin 50 milliGRAM(s) Oral daily  sodium bicarbonate 650 milliGRAM(s) Oral three times a day      SOCIAL HISTORY:  Denies ETOH,Smoking,   FAMILY HISTORY:  Family history of cancer in father (Father)  Lung          REVIEW OF SYSTEMS:  CONSTITUTIONAL: No weakness, fevers or chills  EYES/ENT: No visual changes;  No vertigo or throat pain   NECK: No pain or stiffness  RESPIRATORY: No cough, wheezing, hemoptysis; No shortness of breath  CARDIOVASCULAR: No chest pain or palpitations.  GASTROINTESTINAL: No abdominal or epigastric pain. No nausea, vomiting, or hematemesis; No diarrhea or constipation. No melena or hematochezia.  GENITOURINARY: No dysuria, frequency, foamy urine, urinary urgency, incontinence or hematuria  NEUROLOGICAL: No numbness or weakness  SKIN: No itching, burning, rashes, or lesions   VASCULAR: No bilateral lower extremity edema.   All other review of systems is negative unless indicated above.    VITALS:  T(F): 97.2 (09-30-23 @ 13:23), Max: 99.5 (09-29-23 @ 20:20)  HR: 90 (09-30-23 @ 13:23)  BP: 130/64 (09-30-23 @ 13:23)  RR: 16 (09-30-23 @ 13:23)  SpO2: 96% (09-29-23 @ 20:20)        I&O's Detail        PHYSICAL EXAM:  Constitutional: NAD  HEENT: anicteric sclera, oropharynx clear, MMM  Neck: No JVD  Respiratory: CTAB, no wheezes, rales or rhonchi  Cardiovascular: S1, S2, RRR  Gastrointestinal: BS+, soft, NT/ND  Extremities: No cyanosis or clubbing. No peripheral edema  Neurological: A/O x 3, no focal deficits  Psychiatric: Normal mood, normal affect  : No CVA tenderness. No urbano.   Skin: No rashes  Vascular Access:    LABS:  09-30    140  |  102  |  39<H>  ----------------------------<  63<L>  4.2   |  27  |  2.5<H>    Ca    9.0      30 Sep 2023 10:58    TPro  6.3  /  Alb  3.8  /  TBili  <0.2  /  DBili      /  AST  9   /  ALT  <5  /  AlkPhos  111  09-30    Creatinine Trend: 2.5 <--, 2.6 <--, 2.4 <--, 2.9 <--                        9.3    12.50 )-----------( 343      ( 30 Sep 2023 10:58 )             28.0     Urine Studies:  Urinalysis Basic - ( 30 Sep 2023 10:58 )    Color:  / Appearance:  / SG:  / pH:   Gluc: 63 mg/dL / Ketone:   / Bili:  / Urobili:    Blood:  / Protein:  / Nitrite:    Leuk Esterase:  / RBC:  / WBC    Sq Epi:  / Non Sq Epi:  / Bacteria:                 RADIOLOGY & ADDITIONAL STUDIES:                 NEPHROLOGY CONSULTATION NOTE    57 y/o male s/p right charcot foot reconstruction iPMH of CKD, DM1, HTN, right foot Charcot on list for kidney transplant  Today is hospital day 1d. This morning patient was seen and examined at bedside, resting comfortably in bed.    No acute or major events overnight.    Ot with CKD 4 due to diabetic nephropathy, HN, HPT admitted for Charcot reconstruction. On prophylaxis with Cefazoline. No SOB or difficulties voiding.        PAST MEDICAL & SURGICAL HISTORY:  DM (diabetes mellitus)      HTN (hypertension)      HLD (hyperlipidemia)      Herpes labialis      Anxiety      GERD (gastroesophageal reflux disease)      Kidney stones  20 years ago      Kidney disease  stage 4      Chronic kidney disease, unspecified CKD stage      Diabetic Charcot foot      S/P foot surgery, right  x 5 ( 2006 - 2013 )      Kidney stone  Removed by Laser x 2 ( 20 years ago )      H/O arthroscopy of right knee        Allergies:  No Known Allergies    Home Medications Reviewed  Hospital Medications:   MEDICATIONS  (STANDING):  acetaminophen     Tablet .. 1000 milliGRAM(s) Oral every 8 hours  atorvastatin 10 milliGRAM(s) Oral at bedtime  ceFAZolin   IVPB 1000 milliGRAM(s) IV Intermittent every 8 hours  dextrose 5%. 1000 milliLiter(s) (50 mL/Hr) IV Continuous <Continuous>  dextrose 5%. 1000 milliLiter(s) (100 mL/Hr) IV Continuous <Continuous>  dextrose 50% Injectable 12.5 Gram(s) IV Push once  dextrose 50% Injectable 25 Gram(s) IV Push once  dextrose 50% Injectable 25 Gram(s) IV Push once  DULoxetine 60 milliGRAM(s) Oral daily  glucagon  Injectable 1 milliGRAM(s) IntraMuscular once  heparin   Injectable 5000 Unit(s) SubCutaneous every 12 hours  influenza   Vaccine 0.5 milliLiter(s) IntraMuscular once  insulin glargine Injectable (LANTUS) 50 Unit(s) SubCutaneous every morning  insulin lispro (ADMELOG) corrective regimen sliding scale   SubCutaneous three times a day before meals  insulin lispro (ADMELOG) corrective regimen sliding scale   SubCutaneous at bedtime  insulin lispro Injectable (ADMELOG) 10 Unit(s) SubCutaneous before lunch  insulin lispro Injectable (ADMELOG) 10 Unit(s) SubCutaneous before breakfast  insulin lispro Injectable (ADMELOG) 10 Unit(s) SubCutaneous before dinner  losartan 100 milliGRAM(s) Oral daily  methocarbamol 750 milliGRAM(s) Oral every 6 hours  NIFEdipine XL 90 milliGRAM(s) Oral daily  polyethylene glycol 3350 17 Gram(s) Oral at bedtime  pregabalin 50 milliGRAM(s) Oral daily  sodium bicarbonate 650 milliGRAM(s) Oral three times a day      SOCIAL HISTORY:  Denies ETOH,Smoking,   FAMILY HISTORY:  Family history of cancer in father (Father)  Lung          REVIEW OF SYSTEMS:  CONSTITUTIONAL: No weakness, fevers or chills  EYES/ENT: No visual changes;  No vertigo or throat pain   NECK: No pain or stiffness  RESPIRATORY: No cough, wheezing, hemoptysis; No shortness of breath  CARDIOVASCULAR: No chest pain or palpitations.  GASTROINTESTINAL: No abdominal or epigastric pain. No nausea, vomiting, or hematemesis; No diarrhea or constipation. No melena or hematochezia.  GENITOURINARY: No dysuria, frequency, foamy urine, urinary urgency, incontinence or hematuria  NEUROLOGICAL: c/o pain in his rt foot  SKIN: No itching, burning, rashes, or lesions   VASCULAR: No bilateral lower extremity edema.   All other review of systems is negative unless indicated above.    VITALS:  T(F): 97.2 (09-30-23 @ 13:23), Max: 99.5 (09-29-23 @ 20:20)  HR: 90 (09-30-23 @ 13:23)  BP: 130/64 (09-30-23 @ 13:23)  RR: 16 (09-30-23 @ 13:23)  SpO2: 96% (09-29-23 @ 20:20)        I&O's Detail        PHYSICAL EXAM:  Constitutional: NAD  HEENT: anicteric sclera, oropharynx clear, MMM  Neck: No JVD  Respiratory: CTAB, no wheezes  Cardiovascular: S1, S2, RRR  Gastrointestinal: BS+, soft, NT/ND  Extremities: Rt foot external hardware No peripheral edema  Neurological: A/O x 3, no focal deficits  Psychiatric: Normal mood, normal affect  : No CVA tenderness. No urbano.   Skin: No rashes  Vascular Access:    LABS:  09-30    140  |  102  |  39<H>  ----------------------------<  63<L>  4.2   |  27  |  2.5<H>    Ca    9.0      30 Sep 2023 10:58    TPro  6.3  /  Alb  3.8  /  TBili  <0.2  /  DBili      /  AST  9   /  ALT  <5  /  AlkPhos  111  09-30    Creatinine Trend: 2.5 <--, 2.6 <--, 2.4 <--, 2.9 <--                        9.3    12.50 )-----------( 343      ( 30 Sep 2023 10:58 )             28.0     Urine Studies:  Urinalysis Basic - ( 30 Sep 2023 10:58 )    Color:  / Appearance:  / SG:  / pH:   Gluc: 63 mg/dL / Ketone:   / Bili:  / Urobili:    Blood:  / Protein:  / Nitrite:    Leuk Esterase:  / RBC:  / WBC    Sq Epi:  / Non Sq Epi:  / Bacteria:                 RADIOLOGY & ADDITIONAL STUDIES:

## 2023-09-30 NOTE — CONSULT NOTE ADULT - CONSULT REASON
followup
rt  ankle  pain sp  rt  ankle  sx
Cardiac risk stratification
Antibiotic recommendations.
Hyperglycemia
CKD
post operative acute pain

## 2023-09-30 NOTE — DIETITIAN INITIAL EVALUATION ADULT - PERTINENT LABORATORY DATA
09-30    140  |  102  |  39<H>  ----------------------------<  63<L>  4.2   |  27  |  2.5<H>    Ca    9.0      30 Sep 2023 10:58    TPro  6.3  /  Alb  3.8  /  TBili  <0.2  /  DBili  x   /  AST  9   /  ALT  <5  /  AlkPhos  111  09-30  POCT Blood Glucose.: 123 mg/dL (09-30-23 @ 16:13)  A1C with Estimated Average Glucose Result: 9.9 % (09-14-23 @ 14:39)                          9.3    12.50 )-----------( 343      ( 30 Sep 2023 10:58 )             28.0

## 2023-09-30 NOTE — PROGRESS NOTE ADULT - SUBJECTIVE AND OBJECTIVE BOX
57 y/o male s/p right charcot foot reconstruction iPMH of CKD, DM1, HTN, right foot Charcot on list for kidney transplant. Pt state that he has "bone moving on right foot" dx of Charcot foot.    Interval: POD 2 ; seen by PMR, PT, Endocrine, ID, PAIN    PAST MEDICAL & SURGICAL HISTORY:    DM (diabetes mellitus)  HTN (hypertension)  HLD (hyperlipidemia)  Herpes labialis  Anxiety  GERD (gastroesophageal reflux disease)  Kidney stones  20 years ago  Kidney disease  stage 4  Chronic kidney disease, unspecified CKD stage  Diabetic Charcot foot  S/P foot surgery, right  x 5 ( 2006 - 2013 )  Kidney stone  Removed by Laser x 2 ( 20 years ago )  H/O arthroscopy of right knee    Social History:    HX of Tobacco use 50 pack year history  NO ALCOHOL NO SUBSTANCE    MEDICATIONS  (STANDING):  acetaminophen     Tablet .. 1000 milliGRAM(s) Oral every 8 hours  atorvastatin 10 milliGRAM(s) Oral at bedtime  ceFAZolin   IVPB 1000 milliGRAM(s) IV Intermittent every 8 hours  dextrose 5%. 1000 milliLiter(s) (50 mL/Hr) IV Continuous <Continuous>  dextrose 5%. 1000 milliLiter(s) (100 mL/Hr) IV Continuous <Continuous>  dextrose 50% Injectable 12.5 Gram(s) IV Push once  dextrose 50% Injectable 25 Gram(s) IV Push once  dextrose 50% Injectable 25 Gram(s) IV Push once  DULoxetine 60 milliGRAM(s) Oral daily  glucagon  Injectable 1 milliGRAM(s) IntraMuscular once  influenza   Vaccine 0.5 milliLiter(s) IntraMuscular once  insulin glargine Injectable (LANTUS) 50 Unit(s) SubCutaneous every morning  insulin lispro (ADMELOG) corrective regimen sliding scale   SubCutaneous three times a day before meals  insulin lispro (ADMELOG) corrective regimen sliding scale   SubCutaneous at bedtime  insulin lispro Injectable (ADMELOG) 10 Unit(s) SubCutaneous before lunch  insulin lispro Injectable (ADMELOG) 10 Unit(s) SubCutaneous before breakfast  insulin lispro Injectable (ADMELOG) 10 Unit(s) SubCutaneous before dinner  losartan 100 milliGRAM(s) Oral daily  methocarbamol 750 milliGRAM(s) Oral every 6 hours  NIFEdipine XL 90 milliGRAM(s) Oral daily  polyethylene glycol 3350 17 Gram(s) Oral at bedtime  pregabalin 50 milliGRAM(s) Oral daily  sodium bicarbonate 650 milliGRAM(s) Oral three times a day    MEDICATIONS  (PRN):  ALPRAZolam 0.5 milliGRAM(s) Oral four times a day PRN anxiety  aluminum hydroxide/magnesium hydroxide/simethicone Suspension 30 milliLiter(s) Oral every 4 hours PRN Dyspepsia  bisacodyl 5 milliGRAM(s) Oral every 12 hours PRN Constipation  dextrose Oral Gel 15 Gram(s) Oral once PRN Blood Glucose LESS THAN 70 milliGRAM(s)/deciliter  melatonin 3 milliGRAM(s) Oral at bedtime PRN Insomnia  morphine  - Injectable 5 milliGRAM(s) IV Push every 8 hours PRN Severe Pain (7 - 10)  ondansetron Injectable 4 milliGRAM(s) IV Push once PRN Nausea and/or Vomiting  oxyCODONE    IR 10 milliGRAM(s) Oral every 4 hours PRN Moderate Pain (4 - 6)      Allergies    No Known Allergies    Intolerances    Vital Signs Last 24 Hrs  T(C): 37.2 (30 Sep 2023 05:00), Max: 37.5 (29 Sep 2023 20:20)  T(F): 99 (30 Sep 2023 05:00), Max: 99.5 (29 Sep 2023 20:20)  HR: 91 (30 Sep 2023 05:00) (91 - 95)  BP: 158/72 (30 Sep 2023 05:00) (142/66 - 158/72)  BP(mean): --  RR: 18 (30 Sep 2023 05:00) (18 - 18)  SpO2: 96% (29 Sep 2023 20:20) (96% - 96%)    CAPILLARY BLOOD GLUCOSE      POCT Blood Glucose.: 137 mg/dL (30 Sep 2023 08:12)  POCT Blood Glucose.: 354 mg/dL (29 Sep 2023 20:38)  POCT Blood Glucose.: 85 mg/dL (29 Sep 2023 17:05)  POCT Blood Glucose.: 38 mg/dL (29 Sep 2023 16:23)  POCT Blood Glucose.: 123 mg/dL (29 Sep 2023 11:37)  POCT Blood Glucose.: 319 mg/dL (29 Sep 2023 08:53)      PHYSICAL EXAM    General: lying in BED recumbent  HEENT: PERRLA   NECK: no JVD  LUNG: CTA no wheezing  CARD: S1 S2  ABD: Soft nt nd   EXT: Right LW in external fixator  NEURO: grossly normal non focal + decrease sensation left lower    LABS:                           9.1    10.76 )-----------( 324      ( 29 Sep 2023 04:30 )             27.8     09-29    133<L>  |  99  |  47<H>  ----------------------------<  623<HH>  4.9   |  22  |  2.6<H>    Ca    8.5      29 Sep 2023 04:30        RADIOLOGY:

## 2023-09-30 NOTE — DIETITIAN INITIAL EVALUATION ADULT - PERTINENT MEDS FT
MEDICATIONS  (STANDING):  acetaminophen     Tablet .. 1000 milliGRAM(s) Oral every 8 hours  atorvastatin 10 milliGRAM(s) Oral at bedtime  ceFAZolin   IVPB 1000 milliGRAM(s) IV Intermittent every 8 hours  DULoxetine 60 milliGRAM(s) Oral daily  insulin glargine Injectable (LANTUS) 50 Unit(s) SubCutaneous every morning  insulin lispro (ADMELOG) corrective regimen sliding scale   SubCutaneous three times a day before meals  insulin lispro (ADMELOG) corrective regimen sliding scale   SubCutaneous at bedtime  insulin lispro Injectable (ADMELOG) 10 Unit(s) SubCutaneous before lunch  insulin lispro Injectable (ADMELOG) 10 Unit(s) SubCutaneous before breakfast  insulin lispro Injectable (ADMELOG) 10 Unit(s) SubCutaneous before dinner  losartan 100 milliGRAM(s) Oral daily  methocarbamol 750 milliGRAM(s) Oral every 6 hours  NIFEdipine XL 90 milliGRAM(s) Oral daily  polyethylene glycol 3350 17 Gram(s) Oral at bedtime  pregabalin 50 milliGRAM(s) Oral daily  sodium bicarbonate 650 milliGRAM(s) Oral three times a day    MEDICATIONS  (PRN):  ALPRAZolam 0.5 milliGRAM(s) Oral four times a day PRN anxiety  aluminum hydroxide/magnesium hydroxide/simethicone Suspension 30 milliLiter(s) Oral every 4 hours PRN Dyspepsia  bisacodyl 5 milliGRAM(s) Oral every 12 hours PRN Constipation  dextrose Oral Gel 15 Gram(s) Oral once PRN Blood Glucose LESS THAN 70 milliGRAM(s)/deciliter  melatonin 3 milliGRAM(s) Oral at bedtime PRN Insomnia  morphine  - Injectable 5 milliGRAM(s) IV Push every 8 hours PRN Severe Pain (7 - 10)  ondansetron Injectable 4 milliGRAM(s) IV Push once PRN Nausea and/or Vomiting  oxyCODONE    IR 10 milliGRAM(s) Oral every 4 hours PRN Moderate Pain (4 - 6)

## 2023-09-30 NOTE — DIETITIAN INITIAL EVALUATION ADULT - ORAL INTAKE PTA/DIET HISTORY
as per pt follows a diabetic renal diet PTA with good po intake, denies any recent weight changes. NKFA or intolerances    presently on a CHO Consistent diet tolerating well consumed >75% of meals

## 2023-09-30 NOTE — CONSULT NOTE ADULT - CONSULT REQUESTED DATE/TIME
29-Sep-2023 13:05
29-Sep-2023 08:38
29-Sep-2023 08:19
29-Sep-2023 16:38
29-Sep-2023 07:50
30-Sep-2023 09:37
30-Sep-2023 17:01

## 2023-09-30 NOTE — CONSULT NOTE ADULT - NS ATTEND AMEND GEN_ALL_CORE FT
Patient was seen and examined and testing results were reviewed as documented above. Cardiology risk assessment provided as documented.  Outpatient follow up with the patient's physician, Dr. Conroy.

## 2023-09-30 NOTE — CONSULT NOTE ADULT - ASSESSMENT
· Assessment	  59 y/o male s/p right charcot foot reconstruction iPMH of CKD, DM1, HTN, right foot Charcot on list for kidney transplant. He is s/p charcot's foot repair and currently admitted post op .  · Assessment	  59 y/o male s/p right charcot foot reconstruction iPMH of CKD, DM1, HTN, right foot Charcot on list for kidney transplant. He is s/p charcot's foot repair and currently admitted post op .     CKD stage 4 - due to diabetic nephropathy  creat is at the baseline  check phos  cont low K diabetic diet  cont Losartan 100 mg qd    Anemia - mils - check iron stores  may need IV Iron  on the active Kidney Tx list, avoid blood tx    DM - better controlled now  ID on prophylaxis with Cefazolin on  renal dose for ABx if needed for OP  Thank you

## 2023-09-30 NOTE — CONSULT NOTE ADULT - SUBJECTIVE AND OBJECTIVE BOX
HPI:  HPI-Cardiology   Pt evaluated at bedside. Consulted for cardiac risk stratification for podiatry procedure. Pt denies any CP, SOB, palpitations, dizziness/lightheadedness or nausea/vomiting. Radiology tests and hospital records, were reviewed, as well as previous notes on this patient.      PAST MEDICAL & SURGICAL HISTORY  DM (diabetes mellitus)    HTN (hypertension)    HLD (hyperlipidemia)    Herpes labialis    Anxiety    GERD (gastroesophageal reflux disease)    Kidney stones  20 years ago    Kidney disease  stage 4    Chronic kidney disease, unspecified CKD stage    Diabetic Charcot foot    S/P foot surgery, right  x 5 ( 2006 - 2013 )    Kidney stone  Removed by Laser x 2 ( 20 years ago )    H/O arthroscopy of right knee        FAMILY HISTORY:  FAMILY HISTORY:  Family history of cancer in father (Father)  Lung          ALLERGIES:  No Known Allergies      MEDICATIONS:  MEDICATIONS  (STANDING):  acetaminophen     Tablet .. 1000 milliGRAM(s) Oral every 8 hours  atorvastatin 10 milliGRAM(s) Oral at bedtime  ceFAZolin   IVPB 1000 milliGRAM(s) IV Intermittent every 8 hours  dextrose 5%. 1000 milliLiter(s) (50 mL/Hr) IV Continuous <Continuous>  dextrose 5%. 1000 milliLiter(s) (100 mL/Hr) IV Continuous <Continuous>  dextrose 50% Injectable 25 Gram(s) IV Push once  dextrose 50% Injectable 25 Gram(s) IV Push once  dextrose 50% Injectable 12.5 Gram(s) IV Push once  DULoxetine 60 milliGRAM(s) Oral daily  glucagon  Injectable 1 milliGRAM(s) IntraMuscular once  heparin   Injectable 5000 Unit(s) SubCutaneous every 12 hours  influenza   Vaccine 0.5 milliLiter(s) IntraMuscular once  insulin glargine Injectable (LANTUS) 50 Unit(s) SubCutaneous every morning  insulin lispro (ADMELOG) corrective regimen sliding scale   SubCutaneous three times a day before meals  insulin lispro (ADMELOG) corrective regimen sliding scale   SubCutaneous at bedtime  insulin lispro Injectable (ADMELOG) 10 Unit(s) SubCutaneous before breakfast  insulin lispro Injectable (ADMELOG) 10 Unit(s) SubCutaneous before dinner  insulin lispro Injectable (ADMELOG) 10 Unit(s) SubCutaneous before lunch  losartan 100 milliGRAM(s) Oral daily  methocarbamol 750 milliGRAM(s) Oral every 6 hours  NIFEdipine XL 90 milliGRAM(s) Oral daily  polyethylene glycol 3350 17 Gram(s) Oral at bedtime  pregabalin 50 milliGRAM(s) Oral daily  sodium bicarbonate 650 milliGRAM(s) Oral three times a day    MEDICATIONS  (PRN):  ALPRAZolam 0.5 milliGRAM(s) Oral four times a day PRN anxiety  aluminum hydroxide/magnesium hydroxide/simethicone Suspension 30 milliLiter(s) Oral every 4 hours PRN Dyspepsia  bisacodyl 5 milliGRAM(s) Oral every 12 hours PRN Constipation  dextrose Oral Gel 15 Gram(s) Oral once PRN Blood Glucose LESS THAN 70 milliGRAM(s)/deciliter  melatonin 3 milliGRAM(s) Oral at bedtime PRN Insomnia  morphine  - Injectable 5 milliGRAM(s) IV Push every 8 hours PRN Severe Pain (7 - 10)  ondansetron Injectable 4 milliGRAM(s) IV Push once PRN Nausea and/or Vomiting  oxyCODONE    IR 10 milliGRAM(s) Oral every 4 hours PRN Moderate Pain (4 - 6)      HOME MEDICATIONS:  Home Medications:  Apidra 100 units/mL injectable solution: 10 unit(s) injectable 4 times a day (28 Sep 2023 06:38)  Lantus Solostar Pen 100 units/mL subcutaneous solution: 50 unit(s) subcutaneous once a day (28 Sep 2023 06:38)  Lokelma 10 g oral powder for reconstitution: 10 gram(s) orally 2 times a week (28 Sep 2023 06:38)  losartan 100 mg oral tablet: 1 tab(s) orally once a day (28 Sep 2023 06:38)  NIFEdipine 90 mg oral tablet, extended release: 1 tab(s) orally once a day (28 Sep 2023 06:38)  pravastatin 40 mg oral tablet: 1 tab(s) orally once a day (28 Sep 2023 06:38)  sodium bicarbonate 650 mg oral tablet: 1 tab(s) orally 3 times a day (28 Sep 2023 06:38)  Xanax 1 mg oral tablet: 0.5 tab(s) orally 4 times a day, As Needed (28 Sep 2023 06:38)      VITALS:   T(F): 99 (09-30 @ 05:00), Max: 99.5 (09-29 @ 20:20)  HR: 91 (09-30 @ 05:00) (70 - 96)  BP: 158/72 (09-30 @ 05:00) (105/57 - 183/84)  BP(mean): --  RR: 18 (09-30 @ 05:00) (16 - 20)  SpO2: 96% (09-29 @ 20:20) (96% - 100%)    I&O's Summary      REVIEW OF SYSTEMS:  CONSTITUTIONAL: No weakness, fevers or chills  EYES: No visual changes  ENT: No vertigo or throat pain   NECK: No pain or stiffness  RESPIRATORY: No cough, wheezing, hemoptysis; No shortness of breath  CARDIOVASCULAR: No chest pain or palpitations  GASTROINTESTINAL: No abdominal or epigastric pain. No nausea, vomiting, or hematemesis; No diarrhea or constipation. No melena or hematochezia.  GENITOURINARY: No dysuria, frequency or hematuria  NEUROLOGICAL: No numbness or weakness  SKIN: No itching, no rashes  MSK: no    PHYSICAL EXAM:  NEURO: patient is awake , alert and oriented  GEN: Not in acute distress  NECK: no thyroid enlargement, no JVD  LUNGS: Clear to auscultation bilaterally   CARDIOVASCULAR: S1/S2 present, RRR , no murmurs or rubs, no carotid bruits,  + PP bilaterally  ABD: Soft, non-tender, non-distended, +BS  EXT: No REY  SKIN: Intact    LABS:                        9.1    10.76 )-----------( 324      ( 29 Sep 2023 04:30 )             27.8     09-29    133<L>  |  99  |  47<H>  ----------------------------<  623<HH>  4.9   |  22  |  2.6<H>    Ca    8.5      29 Sep 2023 04:30            RADIOLOGY:  -CXR:  < from: Xray Chest 2 Views PA/Lat (09.14.23 @ 15:43) >    Impression:    No radiographic evidence of acute cardiopulmonary disease.    --- End of Report ---      < end of copied text >      -TTE:  < from: TTE Echo Complete w/o Contrast w/ Doppler (09.06.23 @ 08:42) >    Summary:   1. Normal global left ventricular systolic function.   2.LV Ejection Fraction by Knutson's Method with a biplane EF of 55 %.   3. Mildly increased LV wall thickness.   4. Normal left ventricular internal cavity size.   5. Spectral Doppler shows impaired relaxation pattern of left   ventricular myocardial filling (Grade I diastolic dysfunction).   6. Normal left atrial size.   7. Normal right atrial size.   8. Mild thickening of the anterior mitral valve leaflet.   9. No evidence of mitral valve regurgitation.    < end of copied text >      -STRESS TEST:  < from: NM Nuclear Stress Multiple (09.06.23 @ 11:41) >  Impression:  1. NORMAL STRESS AND REST MYOCARDIAL PERFUSION SPECT TOMOGRAPHY, WITH NO   EVIDENCE FOR ISCHEMIA AT THE LEVEL OF EXERCISE ATTAINED.  2. NORMAL RESTING LEFT VENTRICULAR WALL MOTION AND WALL THICKENING.  3. LEFT VENTRICULAREJECTION FRACTION OF 68 % WHICH IS WITHIN RANGE OF   NORMAL.    --- End of Report ---    < end of copied text >      ECG:  < from: 12 Lead ECG (08.08.23 @ 09:52) >  Normal sinus rhythm  Normal ECG    Confirmed by MIRIAN DEVI MD ([id number]) on 8/10/2023 7:05:54 AM    < end of copied text >

## 2023-09-30 NOTE — DIETITIAN INITIAL EVALUATION ADULT - OTHER INFO
pt is 58 year old male with hx of DM, CKD IV on list for kidney transplant, HTN s/p R foot repair of Charcot joint with removal of bone stimulator implant on 9/28 wotj post-op admission for abx and pain control

## 2023-09-30 NOTE — CONSULT NOTE ADULT - ASSESSMENT
#This consult serves only as a perioperative cardiac risk stratification and evaluation to predict 30-day cardiac complications risk and mortality. The decision to proceed with the surgery/procedure is made by the performing physician and the patient.       ECHO 9/6/2023: EF 55%, Normal global LVSF, Grade 1 DD LVMF  NM Nuclear Stress Test 9/6/2023: No evidence of ischemia     - Currently no active cardiac conditions. No signs of ischemia, or ADHF  - Able to walk >4 METS without any anginal symptoms   - At low-intermediate risk for perioperative major adverse cardiac events  - No further cardiac workup is indicated at this time. Further cardiac workup will depend on clinical course  - All other workup per primary team    Discussed with Dr Carter

## 2023-10-01 LAB
ANION GAP SERPL CALC-SCNC: 12 MMOL/L — SIGNIFICANT CHANGE UP (ref 7–14)
BASOPHILS # BLD AUTO: 0.01 K/UL — SIGNIFICANT CHANGE UP (ref 0–0.2)
BASOPHILS NFR BLD AUTO: 0.1 % — SIGNIFICANT CHANGE UP (ref 0–1)
BUN SERPL-MCNC: 46 MG/DL — HIGH (ref 10–20)
CALCIUM SERPL-MCNC: 8.6 MG/DL — SIGNIFICANT CHANGE UP (ref 8.4–10.5)
CHLORIDE SERPL-SCNC: 101 MMOL/L — SIGNIFICANT CHANGE UP (ref 98–110)
CO2 SERPL-SCNC: 25 MMOL/L — SIGNIFICANT CHANGE UP (ref 17–32)
CREAT SERPL-MCNC: 2.9 MG/DL — HIGH (ref 0.7–1.5)
EGFR: 24 ML/MIN/1.73M2 — LOW
EOSINOPHIL # BLD AUTO: 0.13 K/UL — SIGNIFICANT CHANGE UP (ref 0–0.7)
EOSINOPHIL NFR BLD AUTO: 1.5 % — SIGNIFICANT CHANGE UP (ref 0–8)
GLUCOSE BLDC GLUCOMTR-MCNC: 137 MG/DL — HIGH (ref 70–99)
GLUCOSE BLDC GLUCOMTR-MCNC: 138 MG/DL — HIGH (ref 70–99)
GLUCOSE BLDC GLUCOMTR-MCNC: 456 MG/DL — CRITICAL HIGH (ref 70–99)
GLUCOSE BLDC GLUCOMTR-MCNC: 75 MG/DL — SIGNIFICANT CHANGE UP (ref 70–99)
GLUCOSE SERPL-MCNC: 180 MG/DL — HIGH (ref 70–99)
HCT VFR BLD CALC: 24.8 % — LOW (ref 42–52)
HGB BLD-MCNC: 8.3 G/DL — LOW (ref 14–18)
IMM GRANULOCYTES NFR BLD AUTO: 0.3 % — SIGNIFICANT CHANGE UP (ref 0.1–0.3)
IRON SATN MFR SERPL: 13 UG/DL — LOW (ref 35–150)
IRON SATN MFR SERPL: 6 % — LOW (ref 15–50)
LYMPHOCYTES # BLD AUTO: 1.84 K/UL — SIGNIFICANT CHANGE UP (ref 1.2–3.4)
LYMPHOCYTES # BLD AUTO: 20.8 % — SIGNIFICANT CHANGE UP (ref 20.5–51.1)
MAGNESIUM SERPL-MCNC: 1.7 MG/DL — LOW (ref 1.8–2.4)
MCHC RBC-ENTMCNC: 27.8 PG — SIGNIFICANT CHANGE UP (ref 27–31)
MCHC RBC-ENTMCNC: 33.5 G/DL — SIGNIFICANT CHANGE UP (ref 32–37)
MCV RBC AUTO: 82.9 FL — SIGNIFICANT CHANGE UP (ref 80–94)
MONOCYTES # BLD AUTO: 0.75 K/UL — HIGH (ref 0.1–0.6)
MONOCYTES NFR BLD AUTO: 8.5 % — SIGNIFICANT CHANGE UP (ref 1.7–9.3)
NEUTROPHILS # BLD AUTO: 6.08 K/UL — SIGNIFICANT CHANGE UP (ref 1.4–6.5)
NEUTROPHILS NFR BLD AUTO: 68.8 % — SIGNIFICANT CHANGE UP (ref 42.2–75.2)
NRBC # BLD: 0 /100 WBCS — SIGNIFICANT CHANGE UP (ref 0–0)
PHOSPHATE SERPL-MCNC: 3.9 MG/DL — SIGNIFICANT CHANGE UP (ref 2.1–4.9)
PLATELET # BLD AUTO: 300 K/UL — SIGNIFICANT CHANGE UP (ref 130–400)
PMV BLD: 9.6 FL — SIGNIFICANT CHANGE UP (ref 7.4–10.4)
POTASSIUM SERPL-MCNC: 4.1 MMOL/L — SIGNIFICANT CHANGE UP (ref 3.5–5)
POTASSIUM SERPL-SCNC: 4.1 MMOL/L — SIGNIFICANT CHANGE UP (ref 3.5–5)
RBC # BLD: 2.99 M/UL — LOW (ref 4.7–6.1)
RBC # FLD: 14.7 % — HIGH (ref 11.5–14.5)
SODIUM SERPL-SCNC: 138 MMOL/L — SIGNIFICANT CHANGE UP (ref 135–146)
TIBC SERPL-MCNC: 203 UG/DL — LOW (ref 220–430)
TRANSFERRIN SERPL-MCNC: 171 MG/DL — LOW (ref 200–360)
UIBC SERPL-MCNC: 190 UG/DL — SIGNIFICANT CHANGE UP (ref 110–370)
WBC # BLD: 8.84 K/UL — SIGNIFICANT CHANGE UP (ref 4.8–10.8)
WBC # FLD AUTO: 8.84 K/UL — SIGNIFICANT CHANGE UP (ref 4.8–10.8)

## 2023-10-01 RX ORDER — INSULIN LISPRO 100/ML
8 VIAL (ML) SUBCUTANEOUS
Refills: 0 | Status: DISCONTINUED | OUTPATIENT
Start: 2023-10-01 | End: 2023-10-05

## 2023-10-01 RX ADMIN — OXYCODONE HYDROCHLORIDE 10 MILLIGRAM(S): 5 TABLET ORAL at 15:26

## 2023-10-01 RX ADMIN — Medication 100 MILLIGRAM(S): at 21:08

## 2023-10-01 RX ADMIN — Medication 90 MILLIGRAM(S): at 05:45

## 2023-10-01 RX ADMIN — Medication 8 UNIT(S): at 12:39

## 2023-10-01 RX ADMIN — METHOCARBAMOL 750 MILLIGRAM(S): 500 TABLET, FILM COATED ORAL at 18:13

## 2023-10-01 RX ADMIN — HEPARIN SODIUM 5000 UNIT(S): 5000 INJECTION INTRAVENOUS; SUBCUTANEOUS at 18:13

## 2023-10-01 RX ADMIN — MORPHINE SULFATE 5 MILLIGRAM(S): 50 CAPSULE, EXTENDED RELEASE ORAL at 06:40

## 2023-10-01 RX ADMIN — MORPHINE SULFATE 5 MILLIGRAM(S): 50 CAPSULE, EXTENDED RELEASE ORAL at 06:59

## 2023-10-01 RX ADMIN — HEPARIN SODIUM 5000 UNIT(S): 5000 INJECTION INTRAVENOUS; SUBCUTANEOUS at 05:46

## 2023-10-01 RX ADMIN — METHOCARBAMOL 750 MILLIGRAM(S): 500 TABLET, FILM COATED ORAL at 11:09

## 2023-10-01 RX ADMIN — Medication 1000 MILLIGRAM(S): at 06:30

## 2023-10-01 RX ADMIN — OXYCODONE HYDROCHLORIDE 10 MILLIGRAM(S): 5 TABLET ORAL at 11:08

## 2023-10-01 RX ADMIN — Medication 6: at 08:31

## 2023-10-01 RX ADMIN — MORPHINE SULFATE 5 MILLIGRAM(S): 50 CAPSULE, EXTENDED RELEASE ORAL at 18:03

## 2023-10-01 RX ADMIN — Medication 10 UNIT(S): at 08:31

## 2023-10-01 RX ADMIN — Medication 8 UNIT(S): at 17:59

## 2023-10-01 RX ADMIN — OXYCODONE HYDROCHLORIDE 10 MILLIGRAM(S): 5 TABLET ORAL at 03:20

## 2023-10-01 RX ADMIN — Medication 1000 MILLIGRAM(S): at 14:35

## 2023-10-01 RX ADMIN — Medication 650 MILLIGRAM(S): at 21:06

## 2023-10-01 RX ADMIN — Medication 100 MILLIGRAM(S): at 14:36

## 2023-10-01 RX ADMIN — Medication 650 MILLIGRAM(S): at 05:46

## 2023-10-01 RX ADMIN — Medication 650 MILLIGRAM(S): at 14:35

## 2023-10-01 RX ADMIN — OXYCODONE HYDROCHLORIDE 10 MILLIGRAM(S): 5 TABLET ORAL at 20:12

## 2023-10-01 RX ADMIN — LOSARTAN POTASSIUM 100 MILLIGRAM(S): 100 TABLET, FILM COATED ORAL at 05:45

## 2023-10-01 RX ADMIN — Medication 1000 MILLIGRAM(S): at 21:07

## 2023-10-01 RX ADMIN — INSULIN GLARGINE 50 UNIT(S): 100 INJECTION, SOLUTION SUBCUTANEOUS at 08:54

## 2023-10-01 RX ADMIN — Medication 1000 MILLIGRAM(S): at 15:59

## 2023-10-01 RX ADMIN — OXYCODONE HYDROCHLORIDE 10 MILLIGRAM(S): 5 TABLET ORAL at 15:59

## 2023-10-01 RX ADMIN — OXYCODONE HYDROCHLORIDE 10 MILLIGRAM(S): 5 TABLET ORAL at 12:00

## 2023-10-01 RX ADMIN — Medication 100 MILLIGRAM(S): at 05:45

## 2023-10-01 RX ADMIN — MORPHINE SULFATE 5 MILLIGRAM(S): 50 CAPSULE, EXTENDED RELEASE ORAL at 16:47

## 2023-10-01 RX ADMIN — METHOCARBAMOL 750 MILLIGRAM(S): 500 TABLET, FILM COATED ORAL at 05:46

## 2023-10-01 RX ADMIN — Medication 1000 MILLIGRAM(S): at 05:45

## 2023-10-01 RX ADMIN — OXYCODONE HYDROCHLORIDE 10 MILLIGRAM(S): 5 TABLET ORAL at 02:35

## 2023-10-01 NOTE — PROGRESS NOTE ADULT - SUBJECTIVE AND OBJECTIVE BOX
59 y/o male s/p right charcot foot reconstruction iPMH of CKD, DM1, HTN, right foot Charcot on list for kidney transplant  Today is hospital day 1d. This morning patient was seen and examined at bedside, resting comfortably in bed.    No acute or major events overnight.    Blood glucose readings very erratic. PAtient seems to go low post prandial ( likely not finishing his meals ) and then high after missing insulin doses. Also suspect overnight snacking     PAST MEDICAL & SURGICAL HISTORY  DM (diabetes mellitus)    HTN (hypertension)    HLD (hyperlipidemia)    Herpes labialis    Anxiety    GERD (gastroesophageal reflux disease)    Kidney stones  20 years ago    Kidney disease  stage 4    Chronic kidney disease, unspecified CKD stage    Diabetic Charcot foot    S/P foot surgery, right  x 5 ( 2006 - 2013 )    Kidney stone  Removed by Laser x 2 ( 20 years ago )    H/O arthroscopy of right knee      SOCIAL HISTORY:  Social History:      ALLERGIES:  No Known Allergies    MEDICATIONS:  STANDING MEDICATIONS  atorvastatin 10 milliGRAM(s) Oral at bedtime  ceFAZolin   IVPB 1000 milliGRAM(s) IV Intermittent every 8 hours  dextrose 5%. 1000 milliLiter(s) IV Continuous <Continuous>  dextrose 5%. 1000 milliLiter(s) IV Continuous <Continuous>  dextrose 50% Injectable 12.5 Gram(s) IV Push once  dextrose 50% Injectable 25 Gram(s) IV Push once  dextrose 50% Injectable 25 Gram(s) IV Push once  glucagon  Injectable 1 milliGRAM(s) IntraMuscular once  influenza   Vaccine 0.5 milliLiter(s) IntraMuscular once  insulin glargine Injectable (LANTUS) 50 Unit(s) SubCutaneous every morning  insulin lispro (ADMELOG) corrective regimen sliding scale   SubCutaneous three times a day before meals  insulin lispro (ADMELOG) corrective regimen sliding scale   SubCutaneous at bedtime  insulin lispro Injectable (ADMELOG) 10 Unit(s) SubCutaneous before lunch  insulin lispro Injectable (ADMELOG) 10 Unit(s) SubCutaneous before breakfast  insulin lispro Injectable (ADMELOG) 10 Unit(s) SubCutaneous before dinner  losartan 100 milliGRAM(s) Oral daily  NIFEdipine XL 90 milliGRAM(s) Oral daily  sodium bicarbonate 650 milliGRAM(s) Oral three times a day    PRN MEDICATIONS  acetaminophen     Tablet .. 650 milliGRAM(s) Oral every 6 hours PRN  ALPRAZolam 0.5 milliGRAM(s) Oral four times a day PRN  aluminum hydroxide/magnesium hydroxide/simethicone Suspension 30 milliLiter(s) Oral every 4 hours PRN  dextrose Oral Gel 15 Gram(s) Oral once PRN  melatonin 3 milliGRAM(s) Oral at bedtime PRN  morphine  - Injectable 2 milliGRAM(s) IV Push every 4 hours PRN  morphine  - Injectable 4 milliGRAM(s) IV Push every 4 hours PRN  ondansetron Injectable 4 milliGRAM(s) IV Push once PRN    VITALS:   T(F): 97.8  HR: 95  BP: 142/66  RR: 18  SpO2: --    PHYSICAL EXAM:  GENERAL: NAD, well-groomed, well-developed  HEAD:  Atraumatic, Normocephalic  EYES: EOMI  NECK: Supple  NERVOUS SYSTEM:  Alert & Oriented X3, non focal   CHEST/LUNG: Clear to auscultation bilaterally; No rales, rhonchi, wheezing, or rubs  HEART: Regular rate and rhythm; No murmurs, rubs, or gallops  ABDOMEN: Soft, Nontender, Nondistended; Bowel sounds present  EXTREMITIES:  2+ Peripheral Pulses, No clubbing, cyanosis, or edema  LYMPH: No lymphadenopathy noted  SKIN: No rashes or lesions  LABS:                        9.1    10.76 )-----------( 324      ( 29 Sep 2023 04:30 )             27.8     09-29    133<L>  |  99  |  47<H>  ----------------------------<  623<HH>  4.9   |  22  |  2.6<H>    Ca    8.5      29 Sep 2023 04:30        Urinalysis Basic - ( 29 Sep 2023 04:30 )    Color: x / Appearance: x / SG: x / pH: x  Gluc: 623 mg/dL / Ketone: x  / Bili: x / Urobili: x   Blood: x / Protein: x / Nitrite: x   Leuk Esterase: x / RBC: x / WBC x   Sq Epi: x / Non Sq Epi: x / Bacteria: x            Culture - Tissue with Gram Stain (collected 28 Sep 2023 09:40)  Source: .Tissue None  Gram Stain (29 Sep 2023 04:00):    No polymorphonuclear cells seen per low power field    No organisms seen per oil power field          RADIOLOGY:

## 2023-10-01 NOTE — PROGRESS NOTE ADULT - SUBJECTIVE AND OBJECTIVE BOX
59 y/o male s/p right charcot foot reconstruction iPMH of CKD, DM1, HTN, right foot Charcot on list for kidney transplant. Pt state that he has "bone moving on right foot" dx of Charcot foot.    Interval: POD 3 ; seen by Cardiology, Nephrology and dietician    PAST MEDICAL & SURGICAL HISTORY:    DM (diabetes mellitus)  HTN (hypertension)  HLD (hyperlipidemia)  Herpes labialis  Anxiety  GERD (gastroesophageal reflux disease)  Kidney stones  20 years ago  Kidney disease  stage 4  Chronic kidney disease, unspecified CKD stage  Diabetic Charcot foot  S/P foot surgery, right  x 5 ( 2006 - 2013 )  Kidney stone  Removed by Laser x 2 ( 20 years ago )  H/O arthroscopy of right knee    Social History:    HX of Tobacco use 50 pack year history  NO ALCOHOL NO SUBSTANCE    MEDICATIONS  (STANDING):  acetaminophen     Tablet .. 1000 milliGRAM(s) Oral every 8 hours  atorvastatin 10 milliGRAM(s) Oral at bedtime  ceFAZolin   IVPB 1000 milliGRAM(s) IV Intermittent every 8 hours  dextrose 5%. 1000 milliLiter(s) (50 mL/Hr) IV Continuous <Continuous>  dextrose 5%. 1000 milliLiter(s) (100 mL/Hr) IV Continuous <Continuous>  dextrose 50% Injectable 25 Gram(s) IV Push once  dextrose 50% Injectable 25 Gram(s) IV Push once  dextrose 50% Injectable 12.5 Gram(s) IV Push once  DULoxetine 60 milliGRAM(s) Oral daily  glucagon  Injectable 1 milliGRAM(s) IntraMuscular once  heparin   Injectable 5000 Unit(s) SubCutaneous every 12 hours  influenza   Vaccine 0.5 milliLiter(s) IntraMuscular once  insulin glargine Injectable (LANTUS) 50 Unit(s) SubCutaneous every morning  insulin lispro (ADMELOG) corrective regimen sliding scale   SubCutaneous at bedtime  insulin lispro (ADMELOG) corrective regimen sliding scale   SubCutaneous three times a day before meals  insulin lispro Injectable (ADMELOG) 10 Unit(s) SubCutaneous before lunch  insulin lispro Injectable (ADMELOG) 10 Unit(s) SubCutaneous before breakfast  insulin lispro Injectable (ADMELOG) 10 Unit(s) SubCutaneous before dinner  losartan 100 milliGRAM(s) Oral daily  methocarbamol 750 milliGRAM(s) Oral every 6 hours  NIFEdipine XL 90 milliGRAM(s) Oral daily  polyethylene glycol 3350 17 Gram(s) Oral at bedtime  pregabalin 50 milliGRAM(s) Oral daily  sodium bicarbonate 650 milliGRAM(s) Oral three times a day    MEDICATIONS  (PRN):  ALPRAZolam 0.5 milliGRAM(s) Oral four times a day PRN anxiety  aluminum hydroxide/magnesium hydroxide/simethicone Suspension 30 milliLiter(s) Oral every 4 hours PRN Dyspepsia  bisacodyl 5 milliGRAM(s) Oral every 12 hours PRN Constipation  dextrose Oral Gel 15 Gram(s) Oral once PRN Blood Glucose LESS THAN 70 milliGRAM(s)/deciliter  melatonin 3 milliGRAM(s) Oral at bedtime PRN Insomnia  morphine  - Injectable 5 milliGRAM(s) IV Push every 8 hours PRN Severe Pain (7 - 10)  ondansetron Injectable 4 milliGRAM(s) IV Push once PRN Nausea and/or Vomiting  oxyCODONE    IR 10 milliGRAM(s) Oral every 4 hours PRN Moderate Pain (4 - 6)      Allergies    No Known Allergies    Intolerances    Vital Signs Last 24 Hrs  T(C): 35.7 (01 Oct 2023 05:00), Max: 36.2 (30 Sep 2023 13:23)  T(F): 96.3 (01 Oct 2023 05:00), Max: 97.2 (30 Sep 2023 13:23)  HR: 83 (01 Oct 2023 05:00) (83 - 90)  BP: 138/65 (01 Oct 2023 05:00) (127/71 - 138/65)  BP(mean): --  RR: 18 (01 Oct 2023 07:43) (16 - 18)  SpO2: 95% (01 Oct 2023 07:43) (95% - 95%)    Parameters below as of 01 Oct 2023 07:43  Patient On (Oxygen Delivery Method): room air    CAPILLARY BLOOD GLUCOSE      POCT Blood Glucose.: 222 mg/dL (30 Sep 2023 23:45)  POCT Blood Glucose.: 83 mg/dL (30 Sep 2023 20:41)  POCT Blood Glucose.: 43 mg/dL (30 Sep 2023 19:08)  POCT Blood Glucose.: 123 mg/dL (30 Sep 2023 16:13)  POCT Blood Glucose.: 72 mg/dL (30 Sep 2023 12:20)    Diet, Regular:   Consistent Carbohydrate No Snacks  No Concentrated Potassium (10-01-23 @ 08:02) [Active]      PHYSICAL EXAM    General: lying in BED recumbent  HEENT: PERRLA   NECK: no JVD  LUNG: CTA no wheezing  CARD: S1 S2  ABD: Soft nt nd   EXT: Right LW in external fixator  NEURO: grossly normal non focal + decrease sensation left lower    LABS:                           9.3    12.50 )-----------( 343      ( 30 Sep 2023 10:58 )             28.0                           9.1    10.76 )-----------( 324      ( 29 Sep 2023 04:30 )             27.8     09-30    140  |  102  |  39<H>  ----------------------------<  63<L>  4.2   |  27  |  2.5<H>    Ca    9.0      30 Sep 2023 10:58    TPro  6.3  /  Alb  3.8  /  TBili  <0.2  /  DBili  x   /  AST  9   /  ALT  <5  /  AlkPhos  111  09-30 09-29    133<L>  |  99  |  47<H>  ----------------------------<  623<HH>  4.9   |  22  |  2.6<H>    Ca    8.5      29 Sep 2023 04:30        RADIOLOGY:

## 2023-10-02 LAB
ANION GAP SERPL CALC-SCNC: 11 MMOL/L — SIGNIFICANT CHANGE UP (ref 7–14)
BASOPHILS # BLD AUTO: 0.02 K/UL — SIGNIFICANT CHANGE UP (ref 0–0.2)
BASOPHILS NFR BLD AUTO: 0.2 % — SIGNIFICANT CHANGE UP (ref 0–1)
BUN SERPL-MCNC: 46 MG/DL — HIGH (ref 10–20)
CALCIUM SERPL-MCNC: 8.7 MG/DL — SIGNIFICANT CHANGE UP (ref 8.4–10.5)
CHLORIDE SERPL-SCNC: 101 MMOL/L — SIGNIFICANT CHANGE UP (ref 98–110)
CO2 SERPL-SCNC: 27 MMOL/L — SIGNIFICANT CHANGE UP (ref 17–32)
CREAT SERPL-MCNC: 3 MG/DL — HIGH (ref 0.7–1.5)
EGFR: 23 ML/MIN/1.73M2 — LOW
EOSINOPHIL # BLD AUTO: 0.07 K/UL — SIGNIFICANT CHANGE UP (ref 0–0.7)
EOSINOPHIL NFR BLD AUTO: 0.7 % — SIGNIFICANT CHANGE UP (ref 0–8)
FERRITIN SERPL-MCNC: 132 NG/ML — SIGNIFICANT CHANGE UP (ref 30–400)
GLUCOSE BLDC GLUCOMTR-MCNC: 61 MG/DL — LOW (ref 70–99)
GLUCOSE BLDC GLUCOMTR-MCNC: 70 MG/DL — SIGNIFICANT CHANGE UP (ref 70–99)
GLUCOSE BLDC GLUCOMTR-MCNC: 73 MG/DL — SIGNIFICANT CHANGE UP (ref 70–99)
GLUCOSE BLDC GLUCOMTR-MCNC: 73 MG/DL — SIGNIFICANT CHANGE UP (ref 70–99)
GLUCOSE BLDC GLUCOMTR-MCNC: 85 MG/DL — SIGNIFICANT CHANGE UP (ref 70–99)
GLUCOSE BLDC GLUCOMTR-MCNC: 97 MG/DL — SIGNIFICANT CHANGE UP (ref 70–99)
GLUCOSE SERPL-MCNC: 60 MG/DL — LOW (ref 70–99)
HCT VFR BLD CALC: 26.9 % — LOW (ref 42–52)
HGB BLD-MCNC: 8.6 G/DL — LOW (ref 14–18)
IMM GRANULOCYTES NFR BLD AUTO: 0.3 % — SIGNIFICANT CHANGE UP (ref 0.1–0.3)
LYMPHOCYTES # BLD AUTO: 0.89 K/UL — LOW (ref 1.2–3.4)
LYMPHOCYTES # BLD AUTO: 8.9 % — LOW (ref 20.5–51.1)
MCHC RBC-ENTMCNC: 26.8 PG — LOW (ref 27–31)
MCHC RBC-ENTMCNC: 32 G/DL — SIGNIFICANT CHANGE UP (ref 32–37)
MCV RBC AUTO: 83.8 FL — SIGNIFICANT CHANGE UP (ref 80–94)
MONOCYTES # BLD AUTO: 1.06 K/UL — HIGH (ref 0.1–0.6)
MONOCYTES NFR BLD AUTO: 10.6 % — HIGH (ref 1.7–9.3)
NEUTROPHILS # BLD AUTO: 7.9 K/UL — HIGH (ref 1.4–6.5)
NEUTROPHILS NFR BLD AUTO: 79.3 % — HIGH (ref 42.2–75.2)
NRBC # BLD: 0 /100 WBCS — SIGNIFICANT CHANGE UP (ref 0–0)
PHOSPHATE SERPL-MCNC: 4.7 MG/DL — SIGNIFICANT CHANGE UP (ref 2.1–4.9)
PLATELET # BLD AUTO: 401 K/UL — HIGH (ref 130–400)
PMV BLD: 9.6 FL — SIGNIFICANT CHANGE UP (ref 7.4–10.4)
POTASSIUM SERPL-MCNC: 5 MMOL/L — SIGNIFICANT CHANGE UP (ref 3.5–5)
POTASSIUM SERPL-SCNC: 5 MMOL/L — SIGNIFICANT CHANGE UP (ref 3.5–5)
RBC # BLD: 3.21 M/UL — LOW (ref 4.7–6.1)
RBC # FLD: 14.6 % — HIGH (ref 11.5–14.5)
SODIUM SERPL-SCNC: 139 MMOL/L — SIGNIFICANT CHANGE UP (ref 135–146)
WBC # BLD: 9.97 K/UL — SIGNIFICANT CHANGE UP (ref 4.8–10.8)
WBC # FLD AUTO: 9.97 K/UL — SIGNIFICANT CHANGE UP (ref 4.8–10.8)

## 2023-10-02 RX ORDER — MAGNESIUM SULFATE 500 MG/ML
2 VIAL (ML) INJECTION ONCE
Refills: 0 | Status: COMPLETED | OUTPATIENT
Start: 2023-10-02 | End: 2023-10-02

## 2023-10-02 RX ORDER — IRON SUCROSE 20 MG/ML
200 INJECTION, SOLUTION INTRAVENOUS EVERY 24 HOURS
Refills: 0 | Status: COMPLETED | OUTPATIENT
Start: 2023-10-02 | End: 2023-10-04

## 2023-10-02 RX ORDER — CHLORHEXIDINE GLUCONATE 213 G/1000ML
1 SOLUTION TOPICAL
Refills: 0 | Status: DISCONTINUED | OUTPATIENT
Start: 2023-10-02 | End: 2023-10-05

## 2023-10-02 RX ORDER — BACITRACIN ZINC 500 UNIT/G
1 OINTMENT IN PACKET (EA) TOPICAL DAILY
Refills: 0 | Status: DISCONTINUED | OUTPATIENT
Start: 2023-10-02 | End: 2023-10-05

## 2023-10-02 RX ADMIN — Medication 0.5 MILLIGRAM(S): at 20:39

## 2023-10-02 RX ADMIN — Medication 8 UNIT(S): at 09:30

## 2023-10-02 RX ADMIN — OXYCODONE HYDROCHLORIDE 10 MILLIGRAM(S): 5 TABLET ORAL at 00:32

## 2023-10-02 RX ADMIN — Medication 650 MILLIGRAM(S): at 16:20

## 2023-10-02 RX ADMIN — ATORVASTATIN CALCIUM 10 MILLIGRAM(S): 80 TABLET, FILM COATED ORAL at 21:36

## 2023-10-02 RX ADMIN — METHOCARBAMOL 750 MILLIGRAM(S): 500 TABLET, FILM COATED ORAL at 00:32

## 2023-10-02 RX ADMIN — OXYCODONE HYDROCHLORIDE 10 MILLIGRAM(S): 5 TABLET ORAL at 05:55

## 2023-10-02 RX ADMIN — MORPHINE SULFATE 5 MILLIGRAM(S): 50 CAPSULE, EXTENDED RELEASE ORAL at 03:47

## 2023-10-02 RX ADMIN — Medication 8 UNIT(S): at 11:53

## 2023-10-02 RX ADMIN — Medication 100 MILLIGRAM(S): at 05:54

## 2023-10-02 RX ADMIN — INSULIN GLARGINE 50 UNIT(S): 100 INJECTION, SOLUTION SUBCUTANEOUS at 10:04

## 2023-10-02 RX ADMIN — Medication 1000 MILLIGRAM(S): at 05:46

## 2023-10-02 RX ADMIN — Medication 650 MILLIGRAM(S): at 05:53

## 2023-10-02 RX ADMIN — METHOCARBAMOL 750 MILLIGRAM(S): 500 TABLET, FILM COATED ORAL at 19:10

## 2023-10-02 RX ADMIN — HYDROMORPHONE HYDROCHLORIDE 0.5 MILLIGRAM(S): 2 INJECTION INTRAMUSCULAR; INTRAVENOUS; SUBCUTANEOUS at 16:19

## 2023-10-02 RX ADMIN — LOSARTAN POTASSIUM 100 MILLIGRAM(S): 100 TABLET, FILM COATED ORAL at 05:53

## 2023-10-02 RX ADMIN — Medication 25 GRAM(S): at 16:22

## 2023-10-02 RX ADMIN — METHOCARBAMOL 750 MILLIGRAM(S): 500 TABLET, FILM COATED ORAL at 05:53

## 2023-10-02 RX ADMIN — MORPHINE SULFATE 5 MILLIGRAM(S): 50 CAPSULE, EXTENDED RELEASE ORAL at 20:39

## 2023-10-02 RX ADMIN — IRON SUCROSE 110 MILLIGRAM(S): 20 INJECTION, SOLUTION INTRAVENOUS at 10:34

## 2023-10-02 RX ADMIN — OXYCODONE HYDROCHLORIDE 10 MILLIGRAM(S): 5 TABLET ORAL at 09:33

## 2023-10-02 RX ADMIN — METHOCARBAMOL 750 MILLIGRAM(S): 500 TABLET, FILM COATED ORAL at 11:53

## 2023-10-02 RX ADMIN — OXYCODONE HYDROCHLORIDE 10 MILLIGRAM(S): 5 TABLET ORAL at 16:20

## 2023-10-02 RX ADMIN — HEPARIN SODIUM 5000 UNIT(S): 5000 INJECTION INTRAVENOUS; SUBCUTANEOUS at 19:10

## 2023-10-02 RX ADMIN — Medication 8 UNIT(S): at 16:59

## 2023-10-02 RX ADMIN — Medication 1000 MILLIGRAM(S): at 17:35

## 2023-10-02 RX ADMIN — HEPARIN SODIUM 5000 UNIT(S): 5000 INJECTION INTRAVENOUS; SUBCUTANEOUS at 05:53

## 2023-10-02 RX ADMIN — Medication 1000 MILLIGRAM(S): at 21:36

## 2023-10-02 RX ADMIN — Medication 90 MILLIGRAM(S): at 05:53

## 2023-10-02 RX ADMIN — Medication 650 MILLIGRAM(S): at 21:35

## 2023-10-02 NOTE — PROGRESS NOTE ADULT - SUBJECTIVE AND OBJECTIVE BOX
Podiatry Progress Note    Subjective:   MARIOLA CURRY is a pleasant well-nourished, well developed 58y Male in no acute distress, alert awake, and oriented to person, place and time.  Patient is a 58y old  Male who presents with a chief complaint of Charcot's joint of right ankle or foot. Pt seen & evaluated at bedside. Dressings D/C/I to RLE.  No new pedal complaints.  Pt denies N/V/F/C/pain.  Pt reports nut came loose from ex-fix and he put it back on by hand.  No Bacitracin at bedside.      (30 Sep 2023 17:56)      PAST MEDICAL & SURGICAL HISTORY:  DM (diabetes mellitus)      HTN (hypertension)      HLD (hyperlipidemia)      Herpes labialis      Anxiety      GERD (gastroesophageal reflux disease)      Kidney stones  20 years ago      Kidney disease  stage 4      Chronic kidney disease, unspecified CKD stage      Diabetic Charcot foot      S/P foot surgery, right  x 5 ( 2006 - 2013 )      Kidney stone  Removed by Laser x 2 ( 20 years ago )      H/O arthroscopy of right knee           Objective:  Vital Signs Last 24 Hrs  T(C): 36.8 (02 Oct 2023 14:22), Max: 36.8 (02 Oct 2023 14:22)  T(F): 98.3 (02 Oct 2023 14:22), Max: 98.3 (02 Oct 2023 14:22)  HR: 86 (02 Oct 2023 14:22) (82 - 90)  BP: 140/67 (02 Oct 2023 14:22) (139/67 - 174/80)  BP(mean): --  RR: 18 (02 Oct 2023 14:22) (18 - 18)  SpO2: 96% (02 Oct 2023 14:22) (96% - 96%)                            8.3    8.84  )-----------( 300      ( 01 Oct 2023 10:30 )             24.8                 10-01    138  |  101  |  46<H>  ----------------------------<  180<H>  4.1   |  25  |  2.9<H>    Ca    8.6      01 Oct 2023 10:30  Phos  3.9     10-01  Mg     1.7     10-01              Physical Exam - Lower Extremity Focused:   #Right Lower Extremity  Derm:   -Skin to pin sites dry, clean, intact. No erythema. No drainage/bleeding. No malodor  -Incision sites to dorsal medial midfoot & dorsal lateral midfoot; skin edges well-coapted, mild ischemic changes along incision lines  -Wound to plantar central midfoot; Wound base granular, probes to skin; No active drainage/bleeding; No signs of infx  Toe are warm, capillary refill is instant to the toes     Vascular: DP and PT Pulses Diminished; Foot is Warm to Warm to the touch   Neuro: Protective Sensation Diminished / Moderately Neuropathic   MSK: No Pain On Palpation at Wound Sites    Assessment:   Charcot arthropathy, RLE   s/p RLE recon with External fixator application D.O.S 9.28.23    Plan:  Chart reviewed and Patient evaluated. All Questions and Concerns Addressed and Answered  Discussed diagnosis and treatment with patient  Wound Flushed w/ NS; Pin sites dressed w/Xeroform; RLE dressed w/ Iliana / Tape  Wound Care: Cleanse pin sites w/NS; dress w/Bacitracin / Xeroform / DSD / Iliana / Tape;  Loose Ex-Fix lcuia left unattached; will contact [employer] rep;  ID Consult appreciated;   -Abx recs appreciated;   -Once cleared by podiatry team, can be discharged on PO cephalexin 500mg Q6 hours for total of 14 days from the surgery.   Patient stable from Podiatry standpoint;  No further intervention per Podiatry;  Patient can f/u with Dr. Roca in o/p Clinic 1 week post-DSC;  Discussed Plan w/ Attending    Podiatry      Podiatry Progress Note    Subjective:   MARIOLA CURRY is a pleasant well-nourished, well developed 58y Male in no acute distress, alert awake, and oriented to person, place and time.  Patient is a 58y old  Male who presents with a chief complaint of Charcot's joint of right ankle or foot. Pt seen & evaluated at bedside. Dressings D/C/I to RLE.  No new pedal complaints.  Pt denies N/V/F/C/pain.  Pt reports nut came loose from ex-fix and he put it back on by hand.  No Bacitracin at bedside.      (30 Sep 2023 17:56)      PAST MEDICAL & SURGICAL HISTORY:  DM (diabetes mellitus)      HTN (hypertension)      HLD (hyperlipidemia)      Herpes labialis      Anxiety      GERD (gastroesophageal reflux disease)      Kidney stones  20 years ago      Kidney disease  stage 4      Chronic kidney disease, unspecified CKD stage      Diabetic Charcot foot      S/P foot surgery, right  x 5 ( 2006 - 2013 )      Kidney stone  Removed by Laser x 2 ( 20 years ago )      H/O arthroscopy of right knee           Objective:  Vital Signs Last 24 Hrs  T(C): 36.8 (02 Oct 2023 14:22), Max: 36.8 (02 Oct 2023 14:22)  T(F): 98.3 (02 Oct 2023 14:22), Max: 98.3 (02 Oct 2023 14:22)  HR: 86 (02 Oct 2023 14:22) (82 - 90)  BP: 140/67 (02 Oct 2023 14:22) (139/67 - 174/80)  BP(mean): --  RR: 18 (02 Oct 2023 14:22) (18 - 18)  SpO2: 96% (02 Oct 2023 14:22) (96% - 96%)                            8.3    8.84  )-----------( 300      ( 01 Oct 2023 10:30 )             24.8                 10-01    138  |  101  |  46<H>  ----------------------------<  180<H>  4.1   |  25  |  2.9<H>    Ca    8.6      01 Oct 2023 10:30  Phos  3.9     10-01  Mg     1.7     10-01              Physical Exam - Lower Extremity Focused:   #Right Lower Extremity  Derm:   -One lucia loose/unattached to anterior section of circular ring fixator  -Skin to pin sites dry, clean, intact. No erythema. No drainage/bleeding. No malodor  -Incision sites to dorsal medial midfoot & dorsal lateral midfoot; skin edges well-coapted, mild ischemic changes along incision lines  -Wound to plantar central midfoot; Wound base granular, probes to skin; No active drainage/bleeding; No signs of infx  Toe are warm, capillary refill is instant to the toes     Vascular: DP and PT Pulses Diminished; Foot is Warm to Warm to the touch   Neuro: Protective Sensation Diminished / Moderately Neuropathic   MSK: No Pain On Palpation at Wound Sites    Assessment:   Charcot arthropathy, RLE   s/p RLE recon with External fixator application D.O.S 9.28.23    Plan:  Chart reviewed and Patient evaluated. All Questions and Concerns Addressed and Answered  Discussed diagnosis and treatment with patient  Wound Flushed w/ NS; Pin sites dressed w/Xeroform; RLE dressed w/ Iliana / Tape  Wound Care: Cleanse pin sites w/NS; dress w/Bacitracin / Xeroform / DSD / Iliana / Tape;  Loose Ex-Fix lucia left unattached; will contact [employer] rep;  ID Consult appreciated;   -Abx recs appreciated;   -Once cleared by podiatry team, can be discharged on PO cephalexin 500mg Q6 hours for total of 14 days from the surgery.   Patient stable from Podiatry standpoint;  No further intervention per Podiatry;  Patient can f/u with Dr. Roca in o/p Clinic 1 week post-DSC;  Discussed Plan w/ Attending    Podiatry      Podiatry Progress Note    Subjective:   MARIOLA CURRY is a pleasant well-nourished, well developed 58y Male in no acute distress, alert awake, and oriented to person, place and time.  Patient is a 58y old  Male who presents with a chief complaint of Charcot's joint of right ankle or foot. Pt seen & evaluated at bedside. Dressings D/C/I to RLE.  No new pedal complaints.  Pt denies N/V/F/C/pain.  Pt reports nut came loose from ex-fix and he put it back on by hand.  No Bacitracin at bedside.      (30 Sep 2023 17:56)      PAST MEDICAL & SURGICAL HISTORY:  DM (diabetes mellitus)      HTN (hypertension)      HLD (hyperlipidemia)      Herpes labialis      Anxiety      GERD (gastroesophageal reflux disease)      Kidney stones  20 years ago      Kidney disease  stage 4      Chronic kidney disease, unspecified CKD stage      Diabetic Charcot foot      S/P foot surgery, right  x 5 ( 2006 - 2013 )      Kidney stone  Removed by Laser x 2 ( 20 years ago )      H/O arthroscopy of right knee           Objective:  Vital Signs Last 24 Hrs  T(C): 36.8 (02 Oct 2023 14:22), Max: 36.8 (02 Oct 2023 14:22)  T(F): 98.3 (02 Oct 2023 14:22), Max: 98.3 (02 Oct 2023 14:22)  HR: 86 (02 Oct 2023 14:22) (82 - 90)  BP: 140/67 (02 Oct 2023 14:22) (139/67 - 174/80)  BP(mean): --  RR: 18 (02 Oct 2023 14:22) (18 - 18)  SpO2: 96% (02 Oct 2023 14:22) (96% - 96%)                            8.3    8.84  )-----------( 300      ( 01 Oct 2023 10:30 )             24.8                 10-01    138  |  101  |  46<H>  ----------------------------<  180<H>  4.1   |  25  |  2.9<H>    Ca    8.6      01 Oct 2023 10:30  Phos  3.9     10-01  Mg     1.7     10-01              Physical Exam - Lower Extremity Focused:   #Right Lower Extremity  Derm:   -One lucia loose/unattached to anterior section of circular ring fixator  -Skin to pin sites dry, clean, intact. No erythema. No drainage/bleeding. No malodor  -Incision sites to dorsal medial midfoot & dorsal lateral midfoot; skin edges well-coapted, mild ischemic changes along incision lines  -Wound to plantar central midfoot; Wound base granular, probes to skin; No active drainage/bleeding; No signs of infx  Toe are warm, capillary refill is instant to the toes     Vascular: DP and PT Pulses Diminished; Foot is Warm to Warm to the touch   Neuro: Protective Sensation Diminished / Moderately Neuropathic   MSK: No Pain On Palpation at Wound Sites    Assessment:   Charcot arthropathy, RLE   s/p RLE recon with External fixator application D.O.S 9.28.23    Plan:  Chart reviewed and Patient evaluated. All Questions and Concerns Addressed and Answered  Discussed diagnosis and treatment with patient  Wound Flushed w/ NS; Pin sites dressed w/Xeroform; RLE dressed w/ Iliana / Tape  Wound Care: Cleanse pin sites w/NS; dress w/Bacitracin / Xeroform / DSD / Iliana / Tape;  Loose Ex-Fix lucia left unattached; Will attempt to re-tighten Tues 10/3/23;  ID Consult appreciated;   -Abx recs appreciated;   -Once cleared by podiatry team, can be discharged on PO cephalexin 500mg Q6 hours for total of 14 days from the surgery.   Patient stable from Podiatry standpoint;  No further intervention per Podiatry;  Patient can f/u with Dr. Roca in o/p Clinic 1 week post-DSC;  Discussed Plan w/ Attending    Podiatry

## 2023-10-02 NOTE — PROGRESS NOTE ADULT - SUBJECTIVE AND OBJECTIVE BOX
Reason for Endocrinology Consult: Diabetes    HPI: 58y Male    Hypoglycemia?    Neuroglycopenia within past year?    Outpatient Endocrinologist?    PAST MEDICAL & SURGICAL HISTORY:  DM (diabetes mellitus)      HTN (hypertension)      HLD (hyperlipidemia)      Herpes labialis      Anxiety      GERD (gastroesophageal reflux disease)      Kidney stones  20 years ago      Kidney disease  stage 4      Chronic kidney disease, unspecified CKD stage      Diabetic Charcot foot      S/P foot surgery, right  x 5 ( 2006 - 2013 )      Kidney stone  Removed by Laser x 2 ( 20 years ago )      H/O arthroscopy of right knee        FAMILY HISTORY:  Family history of cancer in father (Father)  Lung        SH:  Smoking  Etoh:  Recreational Drugs:    Home Medications:  Apidra 100 units/mL injectable solution: 10 unit(s) injectable 4 times a day (28 Sep 2023 06:38)  Lantus Solostar Pen 100 units/mL subcutaneous solution: 50 unit(s) subcutaneous once a day (28 Sep 2023 06:38)  Lokelma 10 g oral powder for reconstitution: 10 gram(s) orally 2 times a week (28 Sep 2023 06:38)  losartan 100 mg oral tablet: 1 tab(s) orally once a day (28 Sep 2023 06:38)  NIFEdipine 90 mg oral tablet, extended release: 1 tab(s) orally once a day (28 Sep 2023 06:38)  pravastatin 40 mg oral tablet: 1 tab(s) orally once a day (28 Sep 2023 06:38)  sodium bicarbonate 650 mg oral tablet: 1 tab(s) orally 3 times a day (28 Sep 2023 06:38)  Xanax 1 mg oral tablet: 0.5 tab(s) orally 4 times a day, As Needed (28 Sep 2023 06:38)      Current (Non-Endocrine) Meds:  acetaminophen     Tablet .. 1000 milliGRAM(s) Oral every 8 hours  ALPRAZolam 0.5 milliGRAM(s) Oral four times a day PRN  aluminum hydroxide/magnesium hydroxide/simethicone Suspension 30 milliLiter(s) Oral every 4 hours PRN  bacitracin   Ointment 1 Application(s) Topical daily  bisacodyl 5 milliGRAM(s) Oral every 12 hours PRN  chlorhexidine 2% Cloths 1 Application(s) Topical <User Schedule>  dextrose 5%. 1000 milliLiter(s) IV Continuous <Continuous>  dextrose 5%. 1000 milliLiter(s) IV Continuous <Continuous>  DULoxetine 60 milliGRAM(s) Oral daily  heparin   Injectable 5000 Unit(s) SubCutaneous every 12 hours  influenza   Vaccine 0.5 milliLiter(s) IntraMuscular once  iron sucrose IVPB 200 milliGRAM(s) IV Intermittent every 24 hours  losartan 100 milliGRAM(s) Oral daily  melatonin 3 milliGRAM(s) Oral at bedtime PRN  methocarbamol 750 milliGRAM(s) Oral every 6 hours  morphine  - Injectable 5 milliGRAM(s) IV Push every 8 hours PRN  NIFEdipine XL 90 milliGRAM(s) Oral daily  ondansetron Injectable 4 milliGRAM(s) IV Push once PRN  oxyCODONE    IR 10 milliGRAM(s) Oral every 4 hours PRN  polyethylene glycol 3350 17 Gram(s) Oral at bedtime  pregabalin 50 milliGRAM(s) Oral daily  sodium bicarbonate 650 milliGRAM(s) Oral three times a day      Current Endocrine Meds:   atorvastatin 10 milliGRAM(s) Oral at bedtime  dextrose 50% Injectable 25 Gram(s) IV Push once  dextrose 50% Injectable 25 Gram(s) IV Push once  dextrose 50% Injectable 12.5 Gram(s) IV Push once  dextrose Oral Gel 15 Gram(s) Oral once PRN  glucagon  Injectable 1 milliGRAM(s) IntraMuscular once  insulin glargine Injectable (LANTUS) 50 Unit(s) SubCutaneous every morning  insulin lispro (ADMELOG) corrective regimen sliding scale   SubCutaneous three times a day before meals  insulin lispro (ADMELOG) corrective regimen sliding scale   SubCutaneous at bedtime  insulin lispro Injectable (ADMELOG) 8 Unit(s) SubCutaneous before lunch  insulin lispro Injectable (ADMELOG) 8 Unit(s) SubCutaneous before breakfast  insulin lispro Injectable (ADMELOG) 8 Unit(s) SubCutaneous before dinner      Allergies:  No Known Allergies      ROS:  Denies the following except as indicated.    General: weight loss/weight gain, decreased appetite, fatigue, fever  Eyes: blurry vision, double vision  ENT: neck swelling, dysphagia, voice changes   CV: palpitations, SOB, chest pain, cough  GI: nausea, vomiting, diarrhea, constipation, abdominal pain  : nocturia,  polyuria, dysuria  Endo: decreased libido, heat/cold intolerance, jitteriness  MSK: arthralgias, myalgias  Skin: rash, dryness, diaphoresis  Neuro: pedal numbness,pedal paresthesias, pedal pain    Height (cm): 152.4 (09-28 @ 14:48)  Weight (kg): 95 (09-28 @ 14:48)  BMI (kg/m2): 40.9 (09-28 @ 14:48)    Vital Signs Last 24 Hrs  T(C): 36.8 (02 Oct 2023 14:22), Max: 36.8 (02 Oct 2023 14:22)  T(F): 98.3 (02 Oct 2023 14:22), Max: 98.3 (02 Oct 2023 14:22)  HR: 86 (02 Oct 2023 14:22) (82 - 90)  BP: 140/67 (02 Oct 2023 14:22) (139/67 - 174/80)  BP(mean): --  RR: 18 (02 Oct 2023 14:22) (18 - 18)  SpO2: 96% (02 Oct 2023 14:22) (96% - 96%)      Constitutional: WN/WD in NAD.   Neck: no thyromegaly or palpable thyroid nodules   Respiratory: lungs CTAB.  Cardiovascular: regular rate and rhythm, normal S1 and S2, no audible murmurs  GI: soft, NT/ND, no masses/HSM appreciated.  Ext: no edema, no ulcers, pedal pulses palpable bilaterally  Neurology: no tremor, monofilament sensation intact in feet  Psychiatric: A&O x 3, normal affect/mood.        LABS:                        8.6    9.97  )-----------( 401      ( 02 Oct 2023 16:00 )             26.9     10-01    138  |  101  |  46<H>  ----------------------------<  180<H>  4.1   |  25  |  2.9<H>    Ca    8.6      01 Oct 2023 10:30  Phos  3.9     10-01  Mg     1.7     10-01        Urinalysis Basic - ( 01 Oct 2023 10:30 )    Color: x / Appearance: x / SG: x / pH: x  Gluc: 180 mg/dL / Ketone: x  / Bili: x / Urobili: x   Blood: x / Protein: x / Nitrite: x   Leuk Esterase: x / RBC: x / WBC x   Sq Epi: x / Non Sq Epi: x / Bacteria: x                                     RADIOLOGY & ADDITIONAL STUDIES:    A/P:58yMale    Above discussed with resident.

## 2023-10-02 NOTE — PROGRESS NOTE ADULT - SUBJECTIVE AND OBJECTIVE BOX
57 y/o male s/p right charcot foot reconstruction iPMH of CKD, DM1, HTN, right foot Charcot on list for kidney transplant. Pt state that he has "bone moving on right foot" dx of Charcot foot.    Interval: POD4; sugar labile , consumption varies     PAST MEDICAL & SURGICAL HISTORY:    DM (diabetes mellitus)  HTN (hypertension)  HLD (hyperlipidemia)  Herpes labialis  Anxiety  GERD (gastroesophageal reflux disease)  Kidney stones  20 years ago  Kidney disease  stage 4  Chronic kidney disease, unspecified CKD stage  Diabetic Charcot foot  S/P foot surgery, right  x 5 ( 2006 - 2013 )  Kidney stone  Removed by Laser x 2 ( 20 years ago )  H/O arthroscopy of right knee    Social History:    HX of Tobacco use 50 pack year history  NO ALCOHOL NO SUBSTANCE    MEDICATIONS  (STANDING):  acetaminophen     Tablet .. 1000 milliGRAM(s) Oral every 8 hours  atorvastatin 10 milliGRAM(s) Oral at bedtime  bacitracin   Ointment 1 Application(s) Topical daily  dextrose 5%. 1000 milliLiter(s) (50 mL/Hr) IV Continuous <Continuous>  dextrose 5%. 1000 milliLiter(s) (100 mL/Hr) IV Continuous <Continuous>  dextrose 50% Injectable 12.5 Gram(s) IV Push once  dextrose 50% Injectable 25 Gram(s) IV Push once  dextrose 50% Injectable 25 Gram(s) IV Push once  DULoxetine 60 milliGRAM(s) Oral daily  glucagon  Injectable 1 milliGRAM(s) IntraMuscular once  heparin   Injectable 5000 Unit(s) SubCutaneous every 12 hours  influenza   Vaccine 0.5 milliLiter(s) IntraMuscular once  insulin glargine Injectable (LANTUS) 50 Unit(s) SubCutaneous every morning  insulin lispro (ADMELOG) corrective regimen sliding scale   SubCutaneous three times a day before meals  insulin lispro (ADMELOG) corrective regimen sliding scale   SubCutaneous at bedtime  insulin lispro Injectable (ADMELOG) 8 Unit(s) SubCutaneous before lunch  insulin lispro Injectable (ADMELOG) 8 Unit(s) SubCutaneous before dinner  insulin lispro Injectable (ADMELOG) 8 Unit(s) SubCutaneous before breakfast  losartan 100 milliGRAM(s) Oral daily  methocarbamol 750 milliGRAM(s) Oral every 6 hours  NIFEdipine XL 90 milliGRAM(s) Oral daily  polyethylene glycol 3350 17 Gram(s) Oral at bedtime  pregabalin 50 milliGRAM(s) Oral daily  sodium bicarbonate 650 milliGRAM(s) Oral three times a day    MEDICATIONS  (PRN):  ALPRAZolam 0.5 milliGRAM(s) Oral four times a day PRN anxiety  aluminum hydroxide/magnesium hydroxide/simethicone Suspension 30 milliLiter(s) Oral every 4 hours PRN Dyspepsia  bisacodyl 5 milliGRAM(s) Oral every 12 hours PRN Constipation  dextrose Oral Gel 15 Gram(s) Oral once PRN Blood Glucose LESS THAN 70 milliGRAM(s)/deciliter  melatonin 3 milliGRAM(s) Oral at bedtime PRN Insomnia  morphine  - Injectable 5 milliGRAM(s) IV Push every 8 hours PRN Severe Pain (7 - 10)  ondansetron Injectable 4 milliGRAM(s) IV Push once PRN Nausea and/or Vomiting  oxyCODONE    IR 10 milliGRAM(s) Oral every 4 hours PRN Moderate Pain (4 - 6)      Allergies    No Known Allergies    Intolerances    Vital Signs Last 24 Hrs  T(C): 36.4 (02 Oct 2023 05:28), Max: 36.4 (02 Oct 2023 05:28)  T(F): 97.6 (02 Oct 2023 05:28), Max: 97.6 (02 Oct 2023 05:28)  HR: 90 (02 Oct 2023 05:28) (82 - 90)  BP: 174/80 (02 Oct 2023 05:28) (120/67 - 174/80)  BP(mean): --  RR: 18 (02 Oct 2023 05:28) (16 - 18)  SpO2: 95% (01 Oct 2023 07:43) (95% - 95%)    Parameters below as of 01 Oct 2023 07:43  Patient On (Oxygen Delivery Method): room air    CAPILLARY BLOOD GLUCOSE      POCT Blood Glucose.: 75 mg/dL (01 Oct 2023 20:44)  POCT Blood Glucose.: 137 mg/dL (01 Oct 2023 17:00)  POCT Blood Glucose.: 138 mg/dL (01 Oct 2023 12:04)  POCT Blood Glucose.: 456 mg/dL (01 Oct 2023 08:24)      Diet, Regular:   Consistent Carbohydrate No Snacks  No Concentrated Potassium (10-01-23 @ 08:02) [Active]      PHYSICAL EXAM    General: lying in BED recumbent  HEENT: PERRLA   NECK: no JVD  LUNG: CTA no wheezing  CARD: S1 S2  ABD: Soft nt nd   EXT: Right LW in external fixator  NEURO: grossly normal non focal + decrease sensation left lower    LABS:                             8.3    8.84  )-----------( 300      ( 01 Oct 2023 10:30 )             24.8                       9.3    12.50 )-----------( 343      ( 30 Sep 2023 10:58 )             28.0                           9.1    10.76 )-----------( 324      ( 29 Sep 2023 04:30 )             27.8     10-01    138  |  101  |  46<H>  ----------------------------<  180<H>  4.1   |  25  |  2.9<H>    Ca    8.6      01 Oct 2023 10:30  Phos  3.9     10-01  Mg     1.7     10-01    TPro  6.3  /  Alb  3.8  /  TBili  <0.2  /  DBili  x   /  AST  9   /  ALT  <5  /  AlkPhos  111 09-30 09-30    140  |  102  |  39<H>  ----------------------------<  63<L>  4.2   |  27  |  2.5<H>    Ca    9.0      30 Sep 2023 10:58    TPro  6.3  /  Alb  3.8  /  TBili  <0.2  /  DBili  x   /  AST  9   /  ALT  <5  /  AlkPhos  111 09-30 09-29    133<L>  |  99  |  47<H>  ----------------------------<  623<HH>  4.9   |  22  |  2.6<H>    Ca    8.5      29 Sep 2023 04:30        RADIOLOGY:

## 2023-10-02 NOTE — PROGRESS NOTE ADULT - SUBJECTIVE AND OBJECTIVE BOX
Nephrology progress note    Patient is seen and examined, events over the last 24 h noted .    Allergies:  No Known Allergies    Hospital Medications:   MEDICATIONS  (STANDING):  acetaminophen     Tablet .. 1000 milliGRAM(s) Oral every 8 hours  atorvastatin 10 milliGRAM(s) Oral at bedtime  bacitracin   Ointment 1 Application(s) Topical daily  chlorhexidine 2% Cloths 1 Application(s) Topical <User Schedule>  dextrose 5%. 1000 milliLiter(s) (50 mL/Hr) IV Continuous <Continuous>  dextrose 5%. 1000 milliLiter(s) (100 mL/Hr) IV Continuous <Continuous>  dextrose 50% Injectable 12.5 Gram(s) IV Push once  dextrose 50% Injectable 25 Gram(s) IV Push once  dextrose 50% Injectable 25 Gram(s) IV Push once  DULoxetine 60 milliGRAM(s) Oral daily  glucagon  Injectable 1 milliGRAM(s) IntraMuscular once  heparin   Injectable 5000 Unit(s) SubCutaneous every 12 hours  influenza   Vaccine 0.5 milliLiter(s) IntraMuscular once  insulin glargine Injectable (LANTUS) 50 Unit(s) SubCutaneous every morning  insulin lispro (ADMELOG) corrective regimen sliding scale   SubCutaneous three times a day before meals  insulin lispro (ADMELOG) corrective regimen sliding scale   SubCutaneous at bedtime  insulin lispro Injectable (ADMELOG) 8 Unit(s) SubCutaneous before dinner  insulin lispro Injectable (ADMELOG) 8 Unit(s) SubCutaneous before breakfast  insulin lispro Injectable (ADMELOG) 8 Unit(s) SubCutaneous before lunch  iron sucrose IVPB 200 milliGRAM(s) IV Intermittent every 24 hours  losartan 100 milliGRAM(s) Oral daily  methocarbamol 750 milliGRAM(s) Oral every 6 hours  NIFEdipine XL 90 milliGRAM(s) Oral daily  polyethylene glycol 3350 17 Gram(s) Oral at bedtime  pregabalin 50 milliGRAM(s) Oral daily  sodium bicarbonate 650 milliGRAM(s) Oral three times a day        VITALS:  T(F): 97.6 (10-02-23 @ 05:28), Max: 97.6 (10-02-23 @ 05:28)  HR: 90 (10-02-23 @ 05:28)  BP: 174/80 (10-02-23 @ 05:28)  RR: 18 (10-02-23 @ 05:28)  SpO2: --  Wt(kg): --        PHYSICAL EXAM:  Constitutional: NAD  HEENT: anicteric sclera  Neck: No JVD  Respiratory: CTA  Cardiovascular: S1, S2, RRR  Gastrointestinal: BS+, soft, NT/ND  Extremities: Rt LE external hardware No peripheral edema  Neurological: resting in bed  : No CVA tenderness. No urbano.   Skin: No rashes  Vascular Access:    LABS:  10-01    138  |  101  |  46<H>  ----------------------------<  180<H>  4.1   |  25  |  2.9<H>    Ca    8.6      01 Oct 2023 10:30  Phos  3.9     10-01  Mg     1.7     10-01    TPro  6.3  /  Alb  3.8  /  TBili  <0.2  /  DBili      /  AST  9   /  ALT  <5  /  AlkPhos  111  09-30                          8.3    8.84  )-----------( 300      ( 01 Oct 2023 10:30 )             24.8       Urine Studies:  Urinalysis Basic - ( 01 Oct 2023 10:30 )    Color:  / Appearance:  / SG:  / pH:   Gluc: 180 mg/dL / Ketone:   / Bili:  / Urobili:    Blood:  / Protein:  / Nitrite:    Leuk Esterase:  / RBC:  / WBC    Sq Epi:  / Non Sq Epi:  / Bacteria:         RADIOLOGY & ADDITIONAL STUDIES:  < from: Xray Chest 2 Views PA/Lat (09.14.23 @ 15:43) >    Impression:    No radiographic evidence of acute cardiopulmonary disease.    < end of copied text >

## 2023-10-03 LAB
ANION GAP SERPL CALC-SCNC: 12 MMOL/L — SIGNIFICANT CHANGE UP (ref 7–14)
BASOPHILS # BLD AUTO: 0.02 K/UL — SIGNIFICANT CHANGE UP (ref 0–0.2)
BASOPHILS NFR BLD AUTO: 0.2 % — SIGNIFICANT CHANGE UP (ref 0–1)
BUN SERPL-MCNC: 47 MG/DL — HIGH (ref 10–20)
CALCIUM SERPL-MCNC: 8.8 MG/DL — SIGNIFICANT CHANGE UP (ref 8.4–10.5)
CHLORIDE SERPL-SCNC: 101 MMOL/L — SIGNIFICANT CHANGE UP (ref 98–110)
CO2 SERPL-SCNC: 24 MMOL/L — SIGNIFICANT CHANGE UP (ref 17–32)
CREAT SERPL-MCNC: 2.8 MG/DL — HIGH (ref 0.7–1.5)
EGFR: 25 ML/MIN/1.73M2 — LOW
EOSINOPHIL # BLD AUTO: 0.27 K/UL — SIGNIFICANT CHANGE UP (ref 0–0.7)
EOSINOPHIL NFR BLD AUTO: 3.2 % — SIGNIFICANT CHANGE UP (ref 0–8)
GLUCOSE BLDC GLUCOMTR-MCNC: 116 MG/DL — HIGH (ref 70–99)
GLUCOSE BLDC GLUCOMTR-MCNC: 125 MG/DL — HIGH (ref 70–99)
GLUCOSE BLDC GLUCOMTR-MCNC: 221 MG/DL — HIGH (ref 70–99)
GLUCOSE BLDC GLUCOMTR-MCNC: 57 MG/DL — LOW (ref 70–99)
GLUCOSE BLDC GLUCOMTR-MCNC: 77 MG/DL — SIGNIFICANT CHANGE UP (ref 70–99)
GLUCOSE SERPL-MCNC: 64 MG/DL — LOW (ref 70–99)
HCT VFR BLD CALC: 25.7 % — LOW (ref 42–52)
HGB BLD-MCNC: 8.3 G/DL — LOW (ref 14–18)
IMM GRANULOCYTES NFR BLD AUTO: 0.4 % — HIGH (ref 0.1–0.3)
LYMPHOCYTES # BLD AUTO: 1.64 K/UL — SIGNIFICANT CHANGE UP (ref 1.2–3.4)
LYMPHOCYTES # BLD AUTO: 19.4 % — LOW (ref 20.5–51.1)
MAGNESIUM SERPL-MCNC: 2.1 MG/DL — SIGNIFICANT CHANGE UP (ref 1.8–2.4)
MCHC RBC-ENTMCNC: 27.6 PG — SIGNIFICANT CHANGE UP (ref 27–31)
MCHC RBC-ENTMCNC: 32.3 G/DL — SIGNIFICANT CHANGE UP (ref 32–37)
MCV RBC AUTO: 85.4 FL — SIGNIFICANT CHANGE UP (ref 80–94)
MONOCYTES # BLD AUTO: 0.97 K/UL — HIGH (ref 0.1–0.6)
MONOCYTES NFR BLD AUTO: 11.5 % — HIGH (ref 1.7–9.3)
NEUTROPHILS # BLD AUTO: 5.52 K/UL — SIGNIFICANT CHANGE UP (ref 1.4–6.5)
NEUTROPHILS NFR BLD AUTO: 65.3 % — SIGNIFICANT CHANGE UP (ref 42.2–75.2)
NRBC # BLD: 0 /100 WBCS — SIGNIFICANT CHANGE UP (ref 0–0)
PLATELET # BLD AUTO: 406 K/UL — HIGH (ref 130–400)
PMV BLD: 9.9 FL — SIGNIFICANT CHANGE UP (ref 7.4–10.4)
POTASSIUM SERPL-MCNC: 5 MMOL/L — SIGNIFICANT CHANGE UP (ref 3.5–5)
POTASSIUM SERPL-SCNC: 5 MMOL/L — SIGNIFICANT CHANGE UP (ref 3.5–5)
RBC # BLD: 3.01 M/UL — LOW (ref 4.7–6.1)
RBC # FLD: 14.6 % — HIGH (ref 11.5–14.5)
SODIUM SERPL-SCNC: 137 MMOL/L — SIGNIFICANT CHANGE UP (ref 135–146)
WBC # BLD: 8.45 K/UL — SIGNIFICANT CHANGE UP (ref 4.8–10.8)
WBC # FLD AUTO: 8.45 K/UL — SIGNIFICANT CHANGE UP (ref 4.8–10.8)

## 2023-10-03 RX ORDER — INSULIN GLARGINE 100 [IU]/ML
24 INJECTION, SOLUTION SUBCUTANEOUS EVERY MORNING
Refills: 0 | Status: DISCONTINUED | OUTPATIENT
Start: 2023-10-04 | End: 2023-10-04

## 2023-10-03 RX ORDER — INSULIN GLARGINE 100 [IU]/ML
24 INJECTION, SOLUTION SUBCUTANEOUS AT BEDTIME
Refills: 0 | Status: DISCONTINUED | OUTPATIENT
Start: 2023-10-04 | End: 2023-10-04

## 2023-10-03 RX ADMIN — METHOCARBAMOL 750 MILLIGRAM(S): 500 TABLET, FILM COATED ORAL at 05:15

## 2023-10-03 RX ADMIN — OXYCODONE HYDROCHLORIDE 10 MILLIGRAM(S): 5 TABLET ORAL at 22:18

## 2023-10-03 RX ADMIN — METHOCARBAMOL 750 MILLIGRAM(S): 500 TABLET, FILM COATED ORAL at 11:58

## 2023-10-03 RX ADMIN — LOSARTAN POTASSIUM 100 MILLIGRAM(S): 100 TABLET, FILM COATED ORAL at 05:14

## 2023-10-03 RX ADMIN — Medication 1000 MILLIGRAM(S): at 05:13

## 2023-10-03 RX ADMIN — Medication 650 MILLIGRAM(S): at 21:59

## 2023-10-03 RX ADMIN — Medication 650 MILLIGRAM(S): at 05:14

## 2023-10-03 RX ADMIN — OXYCODONE HYDROCHLORIDE 10 MILLIGRAM(S): 5 TABLET ORAL at 18:18

## 2023-10-03 RX ADMIN — OXYCODONE HYDROCHLORIDE 10 MILLIGRAM(S): 5 TABLET ORAL at 22:48

## 2023-10-03 RX ADMIN — Medication 8 UNIT(S): at 12:00

## 2023-10-03 RX ADMIN — Medication 1000 MILLIGRAM(S): at 21:58

## 2023-10-03 RX ADMIN — OXYCODONE HYDROCHLORIDE 10 MILLIGRAM(S): 5 TABLET ORAL at 14:45

## 2023-10-03 RX ADMIN — Medication 2: at 11:59

## 2023-10-03 RX ADMIN — HEPARIN SODIUM 5000 UNIT(S): 5000 INJECTION INTRAVENOUS; SUBCUTANEOUS at 18:18

## 2023-10-03 RX ADMIN — MORPHINE SULFATE 5 MILLIGRAM(S): 50 CAPSULE, EXTENDED RELEASE ORAL at 09:32

## 2023-10-03 RX ADMIN — METHOCARBAMOL 750 MILLIGRAM(S): 500 TABLET, FILM COATED ORAL at 18:17

## 2023-10-03 RX ADMIN — IRON SUCROSE 110 MILLIGRAM(S): 20 INJECTION, SOLUTION INTRAVENOUS at 11:59

## 2023-10-03 RX ADMIN — OXYCODONE HYDROCHLORIDE 10 MILLIGRAM(S): 5 TABLET ORAL at 05:13

## 2023-10-03 RX ADMIN — Medication 90 MILLIGRAM(S): at 05:14

## 2023-10-03 RX ADMIN — Medication 1000 MILLIGRAM(S): at 14:24

## 2023-10-03 RX ADMIN — OXYCODONE HYDROCHLORIDE 10 MILLIGRAM(S): 5 TABLET ORAL at 14:24

## 2023-10-03 RX ADMIN — HEPARIN SODIUM 5000 UNIT(S): 5000 INJECTION INTRAVENOUS; SUBCUTANEOUS at 05:17

## 2023-10-03 RX ADMIN — MORPHINE SULFATE 5 MILLIGRAM(S): 50 CAPSULE, EXTENDED RELEASE ORAL at 09:45

## 2023-10-03 RX ADMIN — Medication 0.5 MILLIGRAM(S): at 05:14

## 2023-10-03 RX ADMIN — Medication 8 UNIT(S): at 09:24

## 2023-10-03 RX ADMIN — INSULIN GLARGINE 50 UNIT(S): 100 INJECTION, SOLUTION SUBCUTANEOUS at 09:26

## 2023-10-03 RX ADMIN — Medication 650 MILLIGRAM(S): at 14:24

## 2023-10-03 NOTE — PROGRESS NOTE ADULT - SUBJECTIVE AND OBJECTIVE BOX
57 y/o male s/p right charcot foot reconstruction iPMH of CKD, DM1, HTN, right foot Charcot on list for kidney transplant. Pt state that he has "bone moving on right foot" dx of Charcot foot.    Interval: POD 5; sugar labile , consumption varies     PAST MEDICAL & SURGICAL HISTORY:    DM (diabetes mellitus)  HTN (hypertension)  HLD (hyperlipidemia)  Herpes labialis  Anxiety  GERD (gastroesophageal reflux disease)  Kidney stones  20 years ago  Kidney disease  stage 4  Chronic kidney disease, unspecified CKD stage  Diabetic Charcot foot  S/P foot surgery, right  x 5 ( 2006 - 2013 )  Kidney stone  Removed by Laser x 2 ( 20 years ago )  H/O arthroscopy of right knee    Social History:    HX of Tobacco use 50 pack year history  NO ALCOHOL NO SUBSTANCE    MEDICATIONS  (STANDING):  acetaminophen     Tablet .. 1000 milliGRAM(s) Oral every 8 hours  atorvastatin 10 milliGRAM(s) Oral at bedtime  bacitracin   Ointment 1 Application(s) Topical daily  chlorhexidine 2% Cloths 1 Application(s) Topical <User Schedule>  dextrose 5%. 1000 milliLiter(s) (50 mL/Hr) IV Continuous <Continuous>  dextrose 5%. 1000 milliLiter(s) (100 mL/Hr) IV Continuous <Continuous>  dextrose 50% Injectable 12.5 Gram(s) IV Push once  dextrose 50% Injectable 25 Gram(s) IV Push once  dextrose 50% Injectable 25 Gram(s) IV Push once  DULoxetine 60 milliGRAM(s) Oral daily  glucagon  Injectable 1 milliGRAM(s) IntraMuscular once  heparin   Injectable 5000 Unit(s) SubCutaneous every 12 hours  influenza   Vaccine 0.5 milliLiter(s) IntraMuscular once  insulin glargine Injectable (LANTUS) 50 Unit(s) SubCutaneous every morning  insulin lispro (ADMELOG) corrective regimen sliding scale   SubCutaneous at bedtime  insulin lispro (ADMELOG) corrective regimen sliding scale   SubCutaneous three times a day before meals  insulin lispro Injectable (ADMELOG) 8 Unit(s) SubCutaneous before dinner  insulin lispro Injectable (ADMELOG) 8 Unit(s) SubCutaneous before breakfast  insulin lispro Injectable (ADMELOG) 8 Unit(s) SubCutaneous before lunch  iron sucrose IVPB 200 milliGRAM(s) IV Intermittent every 24 hours  losartan 100 milliGRAM(s) Oral daily  methocarbamol 750 milliGRAM(s) Oral every 6 hours  NIFEdipine XL 90 milliGRAM(s) Oral daily  polyethylene glycol 3350 17 Gram(s) Oral at bedtime  pregabalin 50 milliGRAM(s) Oral daily  sodium bicarbonate 650 milliGRAM(s) Oral three times a day    MEDICATIONS  (PRN):  ALPRAZolam 0.5 milliGRAM(s) Oral four times a day PRN anxiety  aluminum hydroxide/magnesium hydroxide/simethicone Suspension 30 milliLiter(s) Oral every 4 hours PRN Dyspepsia  bisacodyl 5 milliGRAM(s) Oral every 12 hours PRN Constipation  dextrose Oral Gel 15 Gram(s) Oral once PRN Blood Glucose LESS THAN 70 milliGRAM(s)/deciliter  melatonin 3 milliGRAM(s) Oral at bedtime PRN Insomnia  morphine  - Injectable 5 milliGRAM(s) IV Push every 8 hours PRN Severe Pain (7 - 10)  ondansetron Injectable 4 milliGRAM(s) IV Push once PRN Nausea and/or Vomiting  oxyCODONE    IR 10 milliGRAM(s) Oral every 4 hours PRN Moderate Pain (4 - 6)      Allergies    No Known Allergies    Intolerances    Vital Signs Last 24 Hrs  T(C): 36.9 (03 Oct 2023 05:05), Max: 36.9 (03 Oct 2023 05:05)  T(F): 98.4 (03 Oct 2023 05:05), Max: 98.4 (03 Oct 2023 05:05)  HR: 87 (03 Oct 2023 05:05) (84 - 87)  BP: 168/87 (03 Oct 2023 05:05) (129/65 - 168/87)  BP(mean): --  RR: 18 (03 Oct 2023 05:05) (18 - 18)  SpO2: 94% (02 Oct 2023 21:48) (94% - 96%)    Parameters below as of 02 Oct 2023 21:48  Patient On (Oxygen Delivery Method): room air    CAPILLARY BLOOD GLUCOSE      POCT Blood Glucose.: 97 mg/dL (02 Oct 2023 22:45)  POCT Blood Glucose.: 73 mg/dL (02 Oct 2023 22:10)  POCT Blood Glucose.: 61 mg/dL (02 Oct 2023 21:02)  POCT Blood Glucose.: 85 mg/dL (02 Oct 2023 16:24)  POCT Blood Glucose.: 73 mg/dL (02 Oct 2023 11:28)  POCT Blood Glucose.: 70 mg/dL (02 Oct 2023 07:44)      Diet, Regular:   Consistent Carbohydrate No Snacks  No Concentrated Potassium (10-01-23 @ 08:02) [Active]      PHYSICAL EXAM    General: lying in BED recumbent  HEENT: PERRLA   NECK: no JVD  LUNG: CTA no wheezing  CARD: S1 S2  ABD: Soft nt nd   EXT: Right LW in external fixator  NEURO: grossly normal non focal + decrease sensation left lower    LABS:                             8.3    8.84  )-----------( 300      ( 01 Oct 2023 10:30 )             24.8                       9.3    12.50 )-----------( 343      ( 30 Sep 2023 10:58 )             28.0                           9.1    10.76 )-----------( 324      ( 29 Sep 2023 04:30 )             27.8     10-01    138  |  101  |  46<H>  ----------------------------<  180<H>  4.1   |  25  |  2.9<H>    Ca    8.6      01 Oct 2023 10:30  Phos  3.9     10-01  Mg     1.7     10-01    TPro  6.3  /  Alb  3.8  /  TBili  <0.2  /  DBili  x   /  AST  9   /  ALT  <5  /  AlkPhos  111 09-30 09-30    140  |  102  |  39<H>  ----------------------------<  63<L>  4.2   |  27  |  2.5<H>    Ca    9.0      30 Sep 2023 10:58    TPro  6.3  /  Alb  3.8  /  TBili  <0.2  /  DBili  x   /  AST  9   /  ALT  <5  /  AlkPhos  111 09-30 09-29    133<L>  |  99  |  47<H>  ----------------------------<  623<HH>  4.9   |  22  |  2.6<H>    Ca    8.5      29 Sep 2023 04:30        RADIOLOGY:

## 2023-10-04 LAB
CULTURE RESULTS: SIGNIFICANT CHANGE UP
GLUCOSE BLDC GLUCOMTR-MCNC: 176 MG/DL — HIGH (ref 70–99)
GLUCOSE BLDC GLUCOMTR-MCNC: 199 MG/DL — HIGH (ref 70–99)
GLUCOSE BLDC GLUCOMTR-MCNC: 264 MG/DL — HIGH (ref 70–99)
GLUCOSE BLDC GLUCOMTR-MCNC: 39 MG/DL — CRITICAL LOW (ref 70–99)
GLUCOSE BLDC GLUCOMTR-MCNC: 45 MG/DL — CRITICAL LOW (ref 70–99)
GLUCOSE BLDC GLUCOMTR-MCNC: 55 MG/DL — LOW (ref 70–99)
GLUCOSE BLDC GLUCOMTR-MCNC: 82 MG/DL — SIGNIFICANT CHANGE UP (ref 70–99)
GLUCOSE BLDC GLUCOMTR-MCNC: 89 MG/DL — SIGNIFICANT CHANGE UP (ref 70–99)
SPECIMEN SOURCE: SIGNIFICANT CHANGE UP

## 2023-10-04 RX ORDER — DEXTROSE 50 % IN WATER 50 %
15 SYRINGE (ML) INTRAVENOUS ONCE
Refills: 0 | Status: COMPLETED | OUTPATIENT
Start: 2023-10-04 | End: 2023-10-04

## 2023-10-04 RX ORDER — DEXTROSE 50 % IN WATER 50 %
25 SYRINGE (ML) INTRAVENOUS ONCE
Refills: 0 | Status: DISCONTINUED | OUTPATIENT
Start: 2023-10-04 | End: 2023-10-05

## 2023-10-04 RX ORDER — INSULIN GLARGINE 100 [IU]/ML
20 INJECTION, SOLUTION SUBCUTANEOUS AT BEDTIME
Refills: 0 | Status: DISCONTINUED | OUTPATIENT
Start: 2023-10-04 | End: 2023-10-05

## 2023-10-04 RX ORDER — INSULIN GLARGINE 100 [IU]/ML
20 INJECTION, SOLUTION SUBCUTANEOUS EVERY MORNING
Refills: 0 | Status: DISCONTINUED | OUTPATIENT
Start: 2023-10-04 | End: 2023-10-05

## 2023-10-04 RX ADMIN — OXYCODONE HYDROCHLORIDE 10 MILLIGRAM(S): 5 TABLET ORAL at 06:40

## 2023-10-04 RX ADMIN — MORPHINE SULFATE 5 MILLIGRAM(S): 50 CAPSULE, EXTENDED RELEASE ORAL at 00:56

## 2023-10-04 RX ADMIN — Medication 650 MILLIGRAM(S): at 14:10

## 2023-10-04 RX ADMIN — Medication 15 GRAM(S): at 08:25

## 2023-10-04 RX ADMIN — METHOCARBAMOL 750 MILLIGRAM(S): 500 TABLET, FILM COATED ORAL at 23:34

## 2023-10-04 RX ADMIN — INSULIN GLARGINE 24 UNIT(S): 100 INJECTION, SOLUTION SUBCUTANEOUS at 11:47

## 2023-10-04 RX ADMIN — METHOCARBAMOL 750 MILLIGRAM(S): 500 TABLET, FILM COATED ORAL at 03:41

## 2023-10-04 RX ADMIN — MORPHINE SULFATE 5 MILLIGRAM(S): 50 CAPSULE, EXTENDED RELEASE ORAL at 09:18

## 2023-10-04 RX ADMIN — Medication 1000 MILLIGRAM(S): at 14:10

## 2023-10-04 RX ADMIN — LOSARTAN POTASSIUM 100 MILLIGRAM(S): 100 TABLET, FILM COATED ORAL at 05:49

## 2023-10-04 RX ADMIN — CHLORHEXIDINE GLUCONATE 1 APPLICATION(S): 213 SOLUTION TOPICAL at 05:52

## 2023-10-04 RX ADMIN — Medication 1000 MILLIGRAM(S): at 05:48

## 2023-10-04 RX ADMIN — Medication 1000 MILLIGRAM(S): at 14:45

## 2023-10-04 RX ADMIN — MORPHINE SULFATE 5 MILLIGRAM(S): 50 CAPSULE, EXTENDED RELEASE ORAL at 01:26

## 2023-10-04 RX ADMIN — Medication 650 MILLIGRAM(S): at 05:48

## 2023-10-04 RX ADMIN — Medication 8 UNIT(S): at 14:11

## 2023-10-04 RX ADMIN — METHOCARBAMOL 750 MILLIGRAM(S): 500 TABLET, FILM COATED ORAL at 05:49

## 2023-10-04 RX ADMIN — Medication 90 MILLIGRAM(S): at 05:50

## 2023-10-04 RX ADMIN — OXYCODONE HYDROCHLORIDE 10 MILLIGRAM(S): 5 TABLET ORAL at 14:10

## 2023-10-04 RX ADMIN — IRON SUCROSE 110 MILLIGRAM(S): 20 INJECTION, SOLUTION INTRAVENOUS at 11:48

## 2023-10-04 NOTE — PROGRESS NOTE ADULT - SUBJECTIVE AND OBJECTIVE BOX
Podiatry Progress Note    Subjective:   MARIOLA CURRY is a pleasant well-nourished, well developed 58y Male in no acute distress, alert awake, and oriented to person, place and time.  Pt appears fidgety, anxious, asking for pain medication.  Patient is a 58y old  Male who presents with a chief complaint of uncontrolled type 1 diabetes. (02 Oct 2023 16:57). No new pedal complaints. Podiatry paged for heavy bleeding R foot.  Pt denies N/V/F/C. Reports complete numbness to foot yet states part of his foot is extremely painful (points to medial pins).  Pt states he has not been walking on foot, only to bathroom.  While talking to patient he moved R leg back and forth rapidly across bed with jerking motions.  Per nurse patient has been seen walking around his room and unit without crutches.  Per nurse patient has also been seen repeatedly unscrewing nuts and struts by hand from external fixator.       PAST MEDICAL & SURGICAL HISTORY:  DM (diabetes mellitus)      HTN (hypertension)      HLD (hyperlipidemia)      Herpes labialis      Anxiety      GERD (gastroesophageal reflux disease)      Kidney stones  20 years ago      Kidney disease  stage 4      Chronic kidney disease, unspecified CKD stage      Diabetic Charcot foot      S/P foot surgery, right  x 5 ( 2006 - 2013 )      Kidney stone  Removed by Laser x 2 ( 20 years ago )      H/O arthroscopy of right knee           Objective:  Vital Signs Last 24 Hrs  T(C): 36.6 (04 Oct 2023 04:37), Max: 36.6 (04 Oct 2023 04:37)  T(F): 97.8 (04 Oct 2023 04:37), Max: 97.8 (04 Oct 2023 04:37)  HR: 95 (04 Oct 2023 04:37) (90 - 95)  BP: 154/84 (04 Oct 2023 04:37) (138/61 - 154/84)  BP(mean): --  RR: 18 (04 Oct 2023 04:37) (18 - 18)  SpO2: 96% (03 Oct 2023 20:20) (96% - 96%)                            8.3    8.45  )-----------( 406      ( 03 Oct 2023 04:30 )             25.7                 10-03    137  |  101  |  47<H>  ----------------------------<  64<L>  5.0   |  24  |  2.8<H>    Ca    8.8      03 Oct 2023 04:30  Mg     2.1     10-03          Physical Exam - Lower Extremity Focused:   #Right Lower Extremity  Derm:   -Two rods loose/unattached to medial & lateral aspect of circular ring fixator  -Skin to pin sites macerated, with mild increased gapping b/w pins & skin interface. No erythema. No active drainage/bleeding. No malodor  -Incision sites to dorsal medial midfoot & dorsal lateral midfoot; skin edges well-coapted, mild ischemic changes along incision lines, improved  -Ecchymosis to lateral forefoot, midfoot & rearfoot  -Decreased space b/w pins & foot medially  -Wound to plantar central midfoot; Wound base granular, probes to skin; No active drainage/bleeding; No signs of infx; Unchanged  Toe are warm, capillary refill is instant to the toes     Vascular: DP and PT Pulses Diminished; Foot is Warm to Warm to the touch   Neuro: Protective Sensation Diminished / Moderately Neuropathic   MSK:   -Rectus foot with circular ring external fixator  -Entire foot appears translated medially along pins  -Mild Pain On Palpation to medial pin sites    Assessment:   Charcot arthropathy, RLE   s/p RLE recon with External fixator application D.O.S 9.28.23    Plan:  Chart reviewed and Patient evaluated. All Questions and Concerns Addressed and Answered  Discussed diagnosis and treatment with patient  Anterior dressings removed;   Foot Wounds Flushed w/ NS; Anterior foot Pin sites dressed w/Betadine; Iliana / ABD / Tape  Wound Care: Cleanse pin sites w/NS; dress w/Betadine / DSD / Iliana / Tape;  Lateral Ex-Fix lucia left unattached; screw not bedside;   Medial Ex-Fix lucia re-attached w/10mm [place] Ex-Fix wrench; All nuts vigorously tightened w/wrench;  ID Consult appreciated;   -Abx recs appreciated;   -Once cleared by podiatry team, can be discharged on PO cephalexin 500mg Q6 hours for total of 14 days from the surgery.   Dr. Roca to round Thurs 10/5 & perform full dressing change;  Patient stable from Podiatry standpoint;  No further intervention per Podiatry;  Patient can f/u with Dr. Roca in o/p Clinic 1 week post-DSC;  Discussed Plan w/ Attending    Podiatry

## 2023-10-04 NOTE — PROGRESS NOTE ADULT - SUBJECTIVE AND OBJECTIVE BOX
59 y/o male s/p right charcot foot reconstruction iPMH of CKD, DM1, HTN, right foot Charcot on list for kidney transplant. Pt state that he has "bone moving on right foot" dx of Charcot foot.    Interval: POD 6: sugar labile , consumption varies     PAST MEDICAL & SURGICAL HISTORY:    DM (diabetes mellitus)  HTN (hypertension)  HLD (hyperlipidemia)  Herpes labialis  Anxiety  GERD (gastroesophageal reflux disease)  Kidney stones  20 years ago  Kidney disease  stage 4  Chronic kidney disease, unspecified CKD stage  Diabetic Charcot foot  S/P foot surgery, right  x 5 ( 2006 - 2013 )  Kidney stone  Removed by Laser x 2 ( 20 years ago )  H/O arthroscopy of right knee    Social History:    HX of Tobacco use 50 pack year history  NO ALCOHOL NO SUBSTANCE    MEDICATIONS  (STANDING):  acetaminophen     Tablet .. 1000 milliGRAM(s) Oral every 8 hours  atorvastatin 10 milliGRAM(s) Oral at bedtime  bacitracin   Ointment 1 Application(s) Topical daily  chlorhexidine 2% Cloths 1 Application(s) Topical <User Schedule>  dextrose 5%. 1000 milliLiter(s) (50 mL/Hr) IV Continuous <Continuous>  dextrose 5%. 1000 milliLiter(s) (100 mL/Hr) IV Continuous <Continuous>  dextrose 50% Injectable 12.5 Gram(s) IV Push once  dextrose 50% Injectable 25 Gram(s) IV Push once  dextrose 50% Injectable 25 Gram(s) IV Push once  DULoxetine 60 milliGRAM(s) Oral daily  glucagon  Injectable 1 milliGRAM(s) IntraMuscular once  heparin   Injectable 5000 Unit(s) SubCutaneous every 12 hours  influenza   Vaccine 0.5 milliLiter(s) IntraMuscular once  insulin glargine Injectable (LANTUS) 50 Unit(s) SubCutaneous every morning  insulin lispro (ADMELOG) corrective regimen sliding scale   SubCutaneous at bedtime  insulin lispro (ADMELOG) corrective regimen sliding scale   SubCutaneous three times a day before meals  insulin lispro Injectable (ADMELOG) 8 Unit(s) SubCutaneous before dinner  insulin lispro Injectable (ADMELOG) 8 Unit(s) SubCutaneous before breakfast  insulin lispro Injectable (ADMELOG) 8 Unit(s) SubCutaneous before lunch  iron sucrose IVPB 200 milliGRAM(s) IV Intermittent every 24 hours  losartan 100 milliGRAM(s) Oral daily  methocarbamol 750 milliGRAM(s) Oral every 6 hours  NIFEdipine XL 90 milliGRAM(s) Oral daily  polyethylene glycol 3350 17 Gram(s) Oral at bedtime  pregabalin 50 milliGRAM(s) Oral daily  sodium bicarbonate 650 milliGRAM(s) Oral three times a day    MEDICATIONS  (PRN):  ALPRAZolam 0.5 milliGRAM(s) Oral four times a day PRN anxiety  aluminum hydroxide/magnesium hydroxide/simethicone Suspension 30 milliLiter(s) Oral every 4 hours PRN Dyspepsia  bisacodyl 5 milliGRAM(s) Oral every 12 hours PRN Constipation  dextrose Oral Gel 15 Gram(s) Oral once PRN Blood Glucose LESS THAN 70 milliGRAM(s)/deciliter  melatonin 3 milliGRAM(s) Oral at bedtime PRN Insomnia  morphine  - Injectable 5 milliGRAM(s) IV Push every 8 hours PRN Severe Pain (7 - 10)  ondansetron Injectable 4 milliGRAM(s) IV Push once PRN Nausea and/or Vomiting  oxyCODONE    IR 10 milliGRAM(s) Oral every 4 hours PRN Moderate Pain (4 - 6)      Allergies    No Known Allergies    Intolerances    Vital Signs Last 24 Hrs  T(C): 36.6 (04 Oct 2023 04:37), Max: 36.6 (04 Oct 2023 04:37)  T(F): 97.8 (04 Oct 2023 04:37), Max: 97.8 (04 Oct 2023 04:37)  HR: 95 (04 Oct 2023 04:37) (86 - 95)  BP: 154/84 (04 Oct 2023 04:37) (138/61 - 154/84)  BP(mean): --  RR: 18 (04 Oct 2023 04:37) (18 - 18)  SpO2: 96% (03 Oct 2023 20:20) (96% - 96%)          CAPILLARY BLOOD GLUCOSE      POCT Blood Glucose.: 125 mg/dL (03 Oct 2023 21:32)  POCT Blood Glucose.: 77 mg/dL (03 Oct 2023 16:21)  POCT Blood Glucose.: 221 mg/dL (03 Oct 2023 11:36)  POCT Blood Glucose.: 116 mg/dL (03 Oct 2023 09:12)  POCT Blood Glucose.: 57 mg/dL (03 Oct 2023 08:01)        Diet, Regular:   Consistent Carbohydrate No Snacks  No Concentrated Potassium (10-01-23 @ 08:02) [Active]      PHYSICAL EXAM    General: lying in BED recumbent  HEENT: PERRLA   NECK: no JVD  LUNG: CTA no wheezing  CARD: S1 S2  ABD: Soft nt nd   EXT: Right LW in external fixator  NEURO: grossly normal non focal + decrease sensation left lower    LABS:                             8.3    8.84  )-----------( 300      ( 01 Oct 2023 10:30 )             24.8                       9.3    12.50 )-----------( 343      ( 30 Sep 2023 10:58 )             28.0                           9.1    10.76 )-----------( 324      ( 29 Sep 2023 04:30 )             27.8     10-01    138  |  101  |  46<H>  ----------------------------<  180<H>  4.1   |  25  |  2.9<H>    Ca    8.6      01 Oct 2023 10:30  Phos  3.9     10-01  Mg     1.7     10-01    TPro  6.3  /  Alb  3.8  /  TBili  <0.2  /  DBili  x   /  AST  9   /  ALT  <5  /  AlkPhos  111  09-30 09-30    140  |  102  |  39<H>  ----------------------------<  63<L>  4.2   |  27  |  2.5<H>    Ca    9.0      30 Sep 2023 10:58    TPro  6.3  /  Alb  3.8  /  TBili  <0.2  /  DBili  x   /  AST  9   /  ALT  <5  /  AlkPhos  111  09-30 09-29    133<L>  |  99  |  47<H>  ----------------------------<  623<HH>  4.9   |  22  |  2.6<H>    Ca    8.5      29 Sep 2023 04:30        RADIOLOGY:

## 2023-10-05 ENCOUNTER — TRANSCRIPTION ENCOUNTER (OUTPATIENT)
Age: 58
End: 2023-10-05

## 2023-10-05 VITALS
RESPIRATION RATE: 18 BRPM | DIASTOLIC BLOOD PRESSURE: 80 MMHG | TEMPERATURE: 97 F | HEART RATE: 89 BPM | SYSTOLIC BLOOD PRESSURE: 152 MMHG

## 2023-10-05 LAB
ANION GAP SERPL CALC-SCNC: 13 MMOL/L — SIGNIFICANT CHANGE UP (ref 7–14)
BUN SERPL-MCNC: 52 MG/DL — HIGH (ref 10–20)
CALCIUM SERPL-MCNC: 8.9 MG/DL — SIGNIFICANT CHANGE UP (ref 8.4–10.5)
CHLORIDE SERPL-SCNC: 97 MMOL/L — LOW (ref 98–110)
CO2 SERPL-SCNC: 23 MMOL/L — SIGNIFICANT CHANGE UP (ref 17–32)
CREAT SERPL-MCNC: 3.1 MG/DL — HIGH (ref 0.7–1.5)
EGFR: 22 ML/MIN/1.73M2 — LOW
GLUCOSE BLDC GLUCOMTR-MCNC: 219 MG/DL — HIGH (ref 70–99)
GLUCOSE BLDC GLUCOMTR-MCNC: 228 MG/DL — HIGH (ref 70–99)
GLUCOSE SERPL-MCNC: 222 MG/DL — HIGH (ref 70–99)
HCT VFR BLD CALC: 24.9 % — LOW (ref 42–52)
HGB BLD-MCNC: 8.1 G/DL — LOW (ref 14–18)
MCHC RBC-ENTMCNC: 26.9 PG — LOW (ref 27–31)
MCHC RBC-ENTMCNC: 32.5 G/DL — SIGNIFICANT CHANGE UP (ref 32–37)
MCV RBC AUTO: 82.7 FL — SIGNIFICANT CHANGE UP (ref 80–94)
NRBC # BLD: 0 /100 WBCS — SIGNIFICANT CHANGE UP (ref 0–0)
PLATELET # BLD AUTO: 483 K/UL — HIGH (ref 130–400)
PMV BLD: 9.6 FL — SIGNIFICANT CHANGE UP (ref 7.4–10.4)
POTASSIUM SERPL-MCNC: 5 MMOL/L — SIGNIFICANT CHANGE UP (ref 3.5–5)
POTASSIUM SERPL-SCNC: 5 MMOL/L — SIGNIFICANT CHANGE UP (ref 3.5–5)
RBC # BLD: 3.01 M/UL — LOW (ref 4.7–6.1)
RBC # FLD: 14.3 % — SIGNIFICANT CHANGE UP (ref 11.5–14.5)
SODIUM SERPL-SCNC: 133 MMOL/L — LOW (ref 135–146)
SURGICAL PATHOLOGY STUDY: SIGNIFICANT CHANGE UP
WBC # BLD: 9 K/UL — SIGNIFICANT CHANGE UP (ref 4.8–10.8)
WBC # FLD AUTO: 9 K/UL — SIGNIFICANT CHANGE UP (ref 4.8–10.8)

## 2023-10-05 RX ORDER — INSULIN GLARGINE 100 [IU]/ML
50 INJECTION, SOLUTION SUBCUTANEOUS
Refills: 0 | DISCHARGE

## 2023-10-05 RX ORDER — CEPHALEXIN 500 MG
1 CAPSULE ORAL
Qty: 28 | Refills: 0
Start: 2023-10-05 | End: 2023-10-11

## 2023-10-05 RX ORDER — INSULIN DEGLUDEC 100 U/ML
20 INJECTION, SOLUTION SUBCUTANEOUS
Qty: 3 | Refills: 0
Start: 2023-10-05 | End: 2023-11-03

## 2023-10-05 RX ORDER — SODIUM ZIRCONIUM CYCLOSILICATE 10 G/10G
10 POWDER, FOR SUSPENSION ORAL
Refills: 0 | DISCHARGE

## 2023-10-05 RX ORDER — INSULIN GLULISINE 100 [IU]/ML
10 INJECTION, SOLUTION SUBCUTANEOUS
Refills: 0 | DISCHARGE

## 2023-10-05 RX ADMIN — INSULIN GLARGINE 20 UNIT(S): 100 INJECTION, SOLUTION SUBCUTANEOUS at 09:04

## 2023-10-05 RX ADMIN — METHOCARBAMOL 750 MILLIGRAM(S): 500 TABLET, FILM COATED ORAL at 05:33

## 2023-10-05 RX ADMIN — Medication 1000 MILLIGRAM(S): at 05:34

## 2023-10-05 RX ADMIN — LOSARTAN POTASSIUM 100 MILLIGRAM(S): 100 TABLET, FILM COATED ORAL at 05:33

## 2023-10-05 RX ADMIN — Medication 90 MILLIGRAM(S): at 05:33

## 2023-10-05 RX ADMIN — Medication 2: at 09:02

## 2023-10-05 RX ADMIN — METHOCARBAMOL 750 MILLIGRAM(S): 500 TABLET, FILM COATED ORAL at 12:03

## 2023-10-05 RX ADMIN — Medication 1000 MILLIGRAM(S): at 05:33

## 2023-10-05 RX ADMIN — Medication 650 MILLIGRAM(S): at 05:33

## 2023-10-05 NOTE — DISCHARGE NOTE NURSING/CASE MANAGEMENT/SOCIAL WORK - NSDCVIVACCINE_GEN_ALL_CORE_FT
Tdap; 21-Mar-2018 02:44; Wesley Castañeda (RN); [employer]; [id number]; IntraMuscular; Deltoid Right.; 0.5 milliLiter(s); VIS (VIS Published: 09-May-2013, VIS Presented: 21-Mar-2018);

## 2023-10-05 NOTE — DISCHARGE NOTE PROVIDER - NSDCFUADDINST_GEN_ALL_CORE_FT
weight bearing as tolerated   - Return to Emergency room if develop any persistent fever > 101, chills, tremors, persistent nausea/vomiting, severe pain not relieved by pain medication, inability to urinate, chest pains, difficulty breathing or any bleeding.

## 2023-10-05 NOTE — DISCHARGE NOTE PROVIDER - CARE PROVIDER_API CALL
Polo Conroy.  Internal Medicine  [address] [facility], [facility] [address]  [place] [address]  Phone: [phone]  Fax: [phone]  Follow Up Time: 1 week    Zach Roca  Podiatric Medicine  [address] [place] [address]  Phone: [phone]  Fax: [phone]  Follow Up Time: 1 week   Polo Conroy.  Internal Medicine  [address] [facility], [facility] [address]  [place] [address]  Phone: [phone]  Fax: [phone]  Follow Up Time: 1 week    Zach Roca  Podiatric Medicine  [address] [place] [address]  Phone: [phone]  Fax: [phone]  Follow Up Time: 2 weeks

## 2023-10-05 NOTE — DISCHARGE NOTE PROVIDER - PROVIDER TOKENS
PROVIDER:[TOKEN:[[id number]],FOLLOWUP:[1 week]],PROVIDER:[TOKEN:[[id number]],FOLLOWUP:[1 week]] PROVIDER:[TOKEN:[[id number]],FOLLOWUP:[1 week]],PROVIDER:[TOKEN:[[id number]],FOLLOWUP:[2 weeks]]

## 2023-10-05 NOTE — DISCHARGE NOTE PROVIDER - NSDCFUSCHEDAPPT_GEN_ALL_CORE_FT
Rosie Wilkins Physician [facility]  CARDIOLOGY [address] [facility]  Scheduled Appointment: 12/18/2023

## 2023-10-05 NOTE — DISCHARGE NOTE NURSING/CASE MANAGEMENT/SOCIAL WORK - PATIENT PORTAL LINK FT
You can access the FollowMyHealth Patient Portal offered by [facility] by registering at the following website: [email or web address]. By joining [employer]’s FollowMyHealth portal, you will also be able to view your health information using other applications (apps) compatible with our system.

## 2023-10-05 NOTE — DISCHARGE NOTE NURSING/CASE MANAGEMENT/SOCIAL WORK - NSDCPEPTCAREGIVEDUMATLIST _GEN_ALL_CORE
Diabetes Post-Care Instructions: Patient instructed to not lie down for 4 hours and limit physical activity for 24 hours. Patient instructed not to travel by airplane for 48 hours.

## 2023-10-05 NOTE — CHART NOTE - NSCHARTNOTEFT_GEN_A_CORE
58M pmhx dm htn admitted s/p podiatry surgery for charcot's joint repair today 9/28/32 for pain control and antibiotics.       #charcots joint repair R foot   - NWB RLE   - continue ancef   - podiatry to follow   - pain control     #dm   - basal bolus insulin   - fingersticks     #htn   - continue losartan nifedipine    #hld  - continue statin     #anxiety   - continue xanax
Called by nurse for glucose >575.Pt had glucose of >600 yesterday evening.  Plan as per Dr Conroy  FS   ACHS  regular insulin 13 Units SQ x 1 dose
Evaluation for hardware loosening on patients left foot external fixator;   Tightened all bolts and rods using L-socket wrench;  Can be retightened by hand if hardware becomes loose again.   Dressings clean/dry/intact; No strike through.     Podiatry   [phone]
PACU ANESTHESIA ADMISSION NOTE      Procedure: Repair, Charcot's joint      Post op diagnosis:  Diabetic Charcot foot        ____  Intubated  TV:______       Rate: ______      FiO2: ______    _x___  Patent Airway    _x___  Full return of protective reflexes    ____  Full recovery from anesthesia / back to baseline status    Vitals:P 82 r 14 t 98.5 /71 SpO2 100%  T(C): 36.9 (09-28-23 @ 11:00), Max: 36.9 (09-28-23 @ 11:00)  HR: 89 (09-28-23 @ 11:00) (89 - 90)  BP: 149/72 (09-28-23 @ 11:00) (142/80 - 149/72)  RR: 20 (09-28-23 @ 11:00) (17 - 20)  SpO2: 96% (09-28-23 @ 11:00) (96% - 96%)    Mental Status:  __x__ Awake   _____ Alert   _____ Drowsy   _____ Sedated    Nausea/Vomiting:  _x___  NO       ______Yes,   See Post - Op Orders         Pain Scale (0-10):  __0___    Treatment: _x___ None    ____ See Post - Op/PCA Orders    Post - Operative Fluids:   ____ Oral   ____ See Post - Op Orders  -------------as per surgeon    Plan: Discharge:   ____Home       _____Floor     _____Critical Care    _____  Other:_________________    Comments:  Pt was disoriented in RR on arrival 50 mg of propofol and 30 Mcg of predex IV given  No anesthesia issues or complications noted.  Discharge when criteria met.
RN called about pt with FS 39, pt alert and oriented.   Pt s/p Lantus 24 units at 11am.  Stat order of D50 25 mg IV push placed. Encourage pt to eat. Will recheck FS.  Will also decrease lantus from 24 units to 20 units at bedtime and AM.   Will continue to monitor FS.    Case discussed with Dr. Conroy.
acetaminophen 1000mg q8h standing  Duloxetine 60mg qD  pregabalin 50mg qD  Methocarbamol 750mg q6h standing  Oxycodone 10mg q4h  Can use 5mg IV morphine q8h PRN for severe breakthrough, despite use of oxycodone 10 q4  The goal would be to avoid IV PRN with adequate pain control  d/c IV morphine  Bowel regimen: miralax / duloclax     above Pain Management consult recc applied     and ordered
spoke to podiatry resident [phone] , informed patient Dr. Conroy's PCP needs to call directly for admit private physician  podiatry service aware
Was unable to send DM supplies (glucometer, lancets, needles, ETOH swabs) via [facility].   Called [facility] and was requested to fax over the Rx.   Prescriptions were faxed .

## 2023-10-05 NOTE — DISCHARGE NOTE PROVIDER - NSDCMRMEDTOKEN_GEN_ALL_CORE_FT
Apidra 100 units/mL injectable solution: 10 unit(s) injectable 4 times a day  Lantus Solostar Pen 100 units/mL subcutaneous solution: 50 unit(s) subcutaneous once a day  Lokelma 10 g oral powder for reconstitution: 10 gram(s) orally 2 times a week  losartan 100 mg oral tablet: 1 tab(s) orally once a day  NIFEdipine 90 mg oral tablet, extended release: 1 tab(s) orally once a day  pravastatin 40 mg oral tablet: 1 tab(s) orally once a day  sodium bicarbonate 650 mg oral tablet: 1 tab(s) orally 3 times a day  Xanax 1 mg oral tablet: 0.5 tab(s) orally 4 times a day, As Needed   cephalexin 500 mg oral tablet: 1 tab(s) orally every 6 hours  Insulin Lispro KwikPen 100 units/mL injectable solution: 8 unit(s) injectable 3 times a day (before meals)  losartan 100 mg oral tablet: 1 tab(s) orally once a day  NIFEdipine 90 mg oral tablet, extended release: 1 tab(s) orally once a day  pravastatin 40 mg oral tablet: 1 tab(s) orally once a day  sodium bicarbonate 650 mg oral tablet: 1 tab(s) orally 3 times a day  Tresiba FlexTouch 200 units/mL subcutaneous solution: 20 subcutaneous once a day  Xanax 1 mg oral tablet: 0.5 tab(s) orally 4 times a day, As Needed

## 2023-10-05 NOTE — PROGRESS NOTE ADULT - PROVIDER SPECIALTY LIST ADULT
Internal Medicine
Internal Medicine
Nephrology
Podiatry
Podiatry
Endocrinology
Endocrinology
Internal Medicine
Internal Medicine
Podiatry
Internal Medicine

## 2023-10-05 NOTE — PROGRESS NOTE ADULT - ASSESSMENT
58 year old male s/p reconstruction    # charcots joint repair R foot     - WBAT  - PMR - recommended Home with services  - continue ancef   - podiatry to follow   - pain control - reviewed     # TYPE 2 DM with complications ; labile Blood sugar     - basal bolus insulin   - fingersticks   - endocrine noted   - nutrition support     #htn   - continue losartan nifedipine    # CKD     - renal consult reviewed   - start venofer    #hld  - continue statin     #anxiety   - continue xanax.    DVT: prophylaxis: Heparin    ADVANCE CAR : CPR   DISPOSITION: d/c planning  
59 y/o male s/p right charcot foot reconstruction iPMH of CKD, DM1, HTN, right foot Charcot on list for kidney transplant. He is s/p charcot's foot repair and currently admitted post op . Endocrinology was consulted for assistance with hyperglycemia management. Pateint states he was diagnosed wit diabetes mellitus more than 30 years ago. Initially oral meds were tried but they did not work and he has been on insulin ever since. He takes tresiba 50 units every morning and fiasp 8-12 units with meals but states that his home readings have always been high as he consumes a lot of carbs     #Type 1 DM   - under poor control A1c 9.9  - did not get basal Insulin till today morning, sugars were very high and then got multiple insulin doses leading to lows  - Continue with lantus 50 units for now, consider decreasing prandial insulin to 8 units with current sliding scale, hold insulin if paitent is not consuming meals (seems like he skipped or ate very little yesterday evening and day before afternoon)
58 year old male s/p reconstruction    # charcots joint repair R foot     - NWB RLE   - Consult PT  - CONSULT PMR  - continue ancef   - podiatry to follow   - pain control - Consult PAin management    # TYPE 2 DM with complications      - basal bolus insulin   - fingersticks   - sees dr Su will need endocrine consult    #htn   - continue losartan nifedipine    #hld  - continue statin     #anxiety   - continue xanax.    DVT: prophylaxis: Heparin    ADVANCE CAR : CPR   DISPOSITION: PENDING 
58 year old male s/p reconstruction    # charcots joint repair R foot     - WBAT  - PMR - recommended Home with services  - continue ancef   - podiatry to follow   - pain control - reviewed     # TYPE 2 DM with complications ; labile Blood sugar     - basal bolus insulin   - fingersticks   - endocrine noted   - nutrition support     #htn   - continue losartan nifedipine    # CKD     - renal consult reviewed     #hld  - continue statin     #anxiety   - continue xanax.    DVT: prophylaxis: Heparin    ADVANCE CAR : CPR   DISPOSITION: PENDING       
58 year old male s/p reconstruction    # charcots joint repair R foot     - WBAT  - PMR - recommended Home with services  - continue ancef   - podiatry to follow   - pain control - reviewed     # TYPE 2 DM with complications ; labile Blood sugar     - basal bolus insulin   - fingersticks   - endocrine noted   - nutrition support     #htn   - continue losartan nifedipine    # CKD     - renal consult reviewed   - start venofer    #hld  - continue statin     #anxiety   - continue xanax.    DVT: prophylaxis: Heparin    ADVANCE CAR : CPR   DISPOSITION: d/c planning    
· Assessment	  57 y/o male s/p right charcot foot reconstruction iPMH of CKD, DM1, HTN, right foot Charcot on list for kidney transplant. He is s/p charcot's foot repair and currently admitted post op .     CKD stage 4 - due to diabetic nephropathy  creat is close to baseline  phos at goal  cont low K diabetic diet  cont Losartan 100 mg qd    Anemia - mils - iron stores bery low, Tsat 6% Ferritin 203  -start Loading dose of Venofer 200 mg IV qd for 3 doses   (assume pt will be discharged soon) otherwise full loading dose if 5 doses  on the active Kidney Tx list, avoid blood tx    DM - better controlled now  ID on prophylaxis s/p Cefazolin   HTN - fluctuating, likely related to pain, overall well controlled, cont same dose of meds    Thank you  
58 year old male s/p reconstruction    # charcots joint repair R foot     - WBAT  - PMR - recommended Home with services  - continue ancef   - podiatry to follow   - pain control - reviewed     # TYPE 2 DM with complications ; labile Blood sugar     - basal bolus insulin   - fingersticks   - endocrine noted   - nutrition support     #htn   - continue losartan nifedipine    # CKD     - renal consult    #hld  - continue statin     #anxiety   - continue xanax.    DVT: prophylaxis: Heparin    ADVANCE CAR : CPR   DISPOSITION: PENDING   
58 year old male s/p reconstruction    # charcots joint repair R foot     - WBAT  - PMR - recommended Home with services  - continue ancef   - podiatry to follow   - pain control - reviewed     # TYPE 2 DM with complications ; labile Blood sugar     - basal bolus insulin   - fingersticks   - endocrine noted   - nutrition support     #htn   - continue losartan nifedipine    # CKD     - renal consult reviewed     #hld  - continue statin     #anxiety   - continue xanax.    DVT: prophylaxis: Heparin    ADVANCE CAR : CPR   DISPOSITION: PENDING     
58 year old male s/p reconstruction    # charcots joint repair R foot     - WBAT  - PMR - recommended Home with services  - continue ancef   - podiatry to follow   - pain control - reviewed     # TYPE 2 DM with complications ; labile Blood sugar     - basal bolus insulin   - fingersticks   - endocrine noted   - nutrition support     #htn   - continue losartan nifedipine    # CKD     - renal consult reviewed   - start venofer    #hld  - continue statin     #anxiety   - continue xanax.    DVT: prophylaxis: Heparin    ADVANCE CAR : CPR   DISPOSITION: d/c planning discussed with patient need wound care orders from podiatry case management to setup services    
tight dm control, pleaase change glargine to 24 units twice daily, starting tomorrow, continue lispro, when he goes home, he will be on a similar regimen, but take tresiba fklex touch u-200 once a day.

## 2023-10-05 NOTE — DISCHARGE NOTE PROVIDER - HOSPITAL COURSE
57 y/o male s/p right charcot foot reconstruction iPMH of CKD, DM1, HTN, right foot Charcot on list for kidney transplant.    # Charcot arthropathy, RLE s/p RLE recon with External fixator application D.O.S 9.28.23  - WBAT  - PMR - recommended Home with services  - on ancef   - podiatry follow up as outpatient  - wound care    # TYPE 2 DM with complications ; labile Blood sugar  - basal bolus insulin   - fingersticks   - endocrine noted   - nutrition support     #htn   - continue losartan nifedipine    # CKD   - renal consult reviewed   - s/p venofer      Dc planning home with home services. Pt will be discharged on Cephalexin 500 mg po q6 h x 7 days. Outpatient follow up with Dr. Conroy and Dr. Roca in 1 week. 57 y/o male s/p right charcot foot reconstruction iPMH of CKD, DM1, HTN, right foot Charcot on list for kidney transplant.    # Charcot arthropathy, RLE s/p RLE recon with External fixator application D.O.S 9.28.23  - WBAT  - PMR - recommended Home with services  - on ancef   - podiatry follow up as outpatient  - wound care: Cleanse pin sites w/NS; dress w/Xeroform / DSD / Iliana / Tape; every other day    # TYPE 2 DM with complications ; labile Blood sugar  - basal bolus insulin   - fingersticks   - endocrine noted   - nutrition support     #htn   - continue losartan nifedipine    # CKD   - renal consult reviewed   - s/p venofer      Dc planning home with [facility]NS.   Pt will be discharged on Cephalexin 500 mg po q6 h x 7 days.   Outpatient follow up with Dr. Conroy in 1 week and Dr. Roca in 2weeks.

## 2023-10-05 NOTE — PROGRESS NOTE ADULT - SUBJECTIVE AND OBJECTIVE BOX
59 y/o male s/p right charcot foot reconstruction iPMH of CKD, DM1, HTN, right foot Charcot on list for kidney transplant. Pt state that he has "bone moving on right foot" dx of Charcot foot.    Interval: POD 7: sugar labile , consumption varies     PAST MEDICAL & SURGICAL HISTORY:    DM (diabetes mellitus)  HTN (hypertension)  HLD (hyperlipidemia)  Herpes labialis  Anxiety  GERD (gastroesophageal reflux disease)  Kidney stones  20 years ago  Kidney disease  stage 4  Chronic kidney disease, unspecified CKD stage  Diabetic Charcot foot  S/P foot surgery, right  x 5 ( 2006 - 2013 )  Kidney stone  Removed by Laser x 2 ( 20 years ago )  H/O arthroscopy of right knee    Social History:    HX of Tobacco use 50 pack year history  NO ALCOHOL NO SUBSTANCE    MEDICATIONS  (STANDING):  acetaminophen     Tablet .. 1000 milliGRAM(s) Oral every 8 hours  atorvastatin 10 milliGRAM(s) Oral at bedtime  bacitracin   Ointment 1 Application(s) Topical daily  chlorhexidine 2% Cloths 1 Application(s) Topical <User Schedule>  dextrose 5%. 1000 milliLiter(s) (50 mL/Hr) IV Continuous <Continuous>  dextrose 5%. 1000 milliLiter(s) (100 mL/Hr) IV Continuous <Continuous>  dextrose 50% Injectable 25 Gram(s) IV Push once  dextrose 50% Injectable 25 Gram(s) IV Push once  dextrose 50% Injectable 12.5 Gram(s) IV Push once  dextrose 50% Injectable 25 Gram(s) IV Push once  DULoxetine 60 milliGRAM(s) Oral daily  glucagon  Injectable 1 milliGRAM(s) IntraMuscular once  heparin   Injectable 5000 Unit(s) SubCutaneous every 12 hours  influenza   Vaccine 0.5 milliLiter(s) IntraMuscular once  insulin glargine Injectable (LANTUS) 20 Unit(s) SubCutaneous every morning  insulin glargine Injectable (LANTUS) 20 Unit(s) SubCutaneous at bedtime  insulin lispro (ADMELOG) corrective regimen sliding scale   SubCutaneous at bedtime  insulin lispro (ADMELOG) corrective regimen sliding scale   SubCutaneous three times a day before meals  insulin lispro Injectable (ADMELOG) 8 Unit(s) SubCutaneous before dinner  insulin lispro Injectable (ADMELOG) 8 Unit(s) SubCutaneous before lunch  insulin lispro Injectable (ADMELOG) 8 Unit(s) SubCutaneous before breakfast  losartan 100 milliGRAM(s) Oral daily  methocarbamol 750 milliGRAM(s) Oral every 6 hours  NIFEdipine XL 90 milliGRAM(s) Oral daily  polyethylene glycol 3350 17 Gram(s) Oral at bedtime  pregabalin 50 milliGRAM(s) Oral daily  sodium bicarbonate 650 milliGRAM(s) Oral three times a day    MEDICATIONS  (PRN):  ALPRAZolam 0.5 milliGRAM(s) Oral four times a day PRN anxiety  aluminum hydroxide/magnesium hydroxide/simethicone Suspension 30 milliLiter(s) Oral every 4 hours PRN Dyspepsia  bisacodyl 5 milliGRAM(s) Oral every 12 hours PRN Constipation  dextrose Oral Gel 15 Gram(s) Oral once PRN Blood Glucose LESS THAN 70 milliGRAM(s)/deciliter  melatonin 3 milliGRAM(s) Oral at bedtime PRN Insomnia  morphine  - Injectable 5 milliGRAM(s) IV Push every 8 hours PRN Severe Pain (7 - 10)  ondansetron Injectable 4 milliGRAM(s) IV Push once PRN Nausea and/or Vomiting  oxyCODONE    IR 10 milliGRAM(s) Oral every 4 hours PRN Moderate Pain (4 - 6)      Allergies    No Known Allergies    Intolerances    Vital Signs Last 24 Hrs  T(C): 36.1 (05 Oct 2023 04:40), Max: 36.1 (05 Oct 2023 04:40)  T(F): 96.9 (05 Oct 2023 04:40), Max: 96.9 (05 Oct 2023 04:40)  HR: 89 (05 Oct 2023 04:40) (86 - 89)  BP: 152/80 (05 Oct 2023 04:40) (152/80 - 167/76)  BP(mean): --  RR: 18 (05 Oct 2023 04:40) (18 - 18)  SpO2: 97% (04 Oct 2023 20:20) (97% - 97%)    CAPILLARY BLOOD GLUCOSE      POCT Blood Glucose.: 264 mg/dL (04 Oct 2023 20:26)  POCT Blood Glucose.: 89 mg/dL (04 Oct 2023 16:53)  POCT Blood Glucose.: 55 mg/dL (04 Oct 2023 16:34)  POCT Blood Glucose.: 39 mg/dL (04 Oct 2023 16:19)  POCT Blood Glucose.: 199 mg/dL (04 Oct 2023 14:00)  POCT Blood Glucose.: 176 mg/dL (04 Oct 2023 11:20)  POCT Blood Glucose.: 82 mg/dL (04 Oct 2023 08:28)  POCT Blood Glucose.: 45 mg/dL (04 Oct 2023 07:58)        Diet, Regular:   Consistent Carbohydrate No Snacks  No Concentrated Potassium (10-01-23 @ 08:02) [Active]      PHYSICAL EXAM    General: lying in BED recumbent  HEENT: PERRLA   NECK: no JVD  LUNG: CTA no wheezing  CARD: S1 S2  ABD: Soft nt nd   EXT: Right LW in external fixator  NEURO: grossly normal non focal + decrease sensation left lower    LABS:                             8.3    8.84  )-----------( 300      ( 01 Oct 2023 10:30 )             24.8                       9.3    12.50 )-----------( 343      ( 30 Sep 2023 10:58 )             28.0                           9.1    10.76 )-----------( 324      ( 29 Sep 2023 04:30 )             27.8     10-01    138  |  101  |  46<H>  ----------------------------<  180<H>  4.1   |  25  |  2.9<H>    Ca    8.6      01 Oct 2023 10:30  Phos  3.9     10-01  Mg     1.7     10-01    TPro  6.3  /  Alb  3.8  /  TBili  <0.2  /  DBili  x   /  AST  9   /  ALT  <5  /  AlkPhos  111 09-30 09-30    140  |  102  |  39<H>  ----------------------------<  63<L>  4.2   |  27  |  2.5<H>    Ca    9.0      30 Sep 2023 10:58    TPro  6.3  /  Alb  3.8  /  TBili  <0.2  /  DBili  x   /  AST  9   /  ALT  <5  /  AlkPhos  111 09-30 09-29    133<L>  |  99  |  47<H>  ----------------------------<  623<HH>  4.9   |  22  |  2.6<H>    Ca    8.5      29 Sep 2023 04:30        RADIOLOGY:

## 2023-10-05 NOTE — CHART NOTE - NSCHARTNOTESELECT_GEN_ALL_CORE
PA Note/Event Note
Event Note
Event Note
PA Note/Event Note
Transfer Note
elevated glucose>575
pa note
post op admission

## 2023-10-05 NOTE — DISCHARGE NOTE PROVIDER - NSDCCPCAREPLAN_GEN_ALL_CORE_FT
PRINCIPAL DISCHARGE DIAGNOSIS  Diagnosis: Diabetic Charcot foot  Assessment and Plan of Treatment: You had reconstruction done on 9/28  please conitnue wound care  continue antibiotic as directed  Dr. Roca in o/p Clinic 1 week post-DSC      SECONDARY DISCHARGE DIAGNOSES  Diagnosis: DM (diabetes mellitus)  Assessment and Plan of Treatment: continue insulin as directed  monitor finger sticks before each meal and sleep time, goal 100-160  please follow up with Dr. Conroy in 1 week for reassessment     PRINCIPAL DISCHARGE DIAGNOSIS  Diagnosis: Diabetic Charcot foot  Assessment and Plan of Treatment: You had reconstruction done on 9/28  please continue wound care: Cleanse pin sites w/NS; dress w/Xeroform / DSD / Iliana / Tape; every other day  - visiting nurse is being set up  continue antibiotic as directed  Dr. Roca in outpatient clinic in 2 weeks      SECONDARY DISCHARGE DIAGNOSES  Diagnosis: DM (diabetes mellitus)  Assessment and Plan of Treatment: continue insulin as directed  monitor finger sticks before each meal and sleep time, goal 100-150  please follow up with Dr. Conroy in 1 week for reassessment

## 2023-10-05 NOTE — DISCHARGE NOTE NURSING/CASE MANAGEMENT/SOCIAL WORK - NSDCPEFALRISK_GEN_ALL_CORE
For information on Fall & Injury Prevention, visit: [email or web address] OR  [email or web address] OR  https://www.cdc.gov/steadi/patient.html

## 2023-10-05 NOTE — PROGRESS NOTE ADULT - SUBJECTIVE AND OBJECTIVE BOX
Podiatry Progress Note    Subjective:   MARIOLA CURRY is a pleasant well-nourished, well developed 58y Male in no acute distress, alert awake, and oriented to person, place and time.  Patient is a 58y old  Male who presents with a chief complaint of uncontrolled type 1 diabetes. (02 Oct 2023 16:57). Pt seen & evaluated at bedside w/Attending. Dressings D/C/I to LLE.       PAST MEDICAL & SURGICAL HISTORY:  DM (diabetes mellitus)      HTN (hypertension)      HLD (hyperlipidemia)      Herpes labialis      Anxiety      GERD (gastroesophageal reflux disease)      Kidney stones  20 years ago      Kidney disease  stage 4      Chronic kidney disease, unspecified CKD stage      Diabetic Charcot foot      S/P foot surgery, right  x 5 ( 2006 - 2013 )      Kidney stone  Removed by Laser x 2 ( 20 years ago )      H/O arthroscopy of right knee           Objective:  Vital Signs Last 24 Hrs  T(C): 36.1 (05 Oct 2023 04:40), Max: 36.1 (05 Oct 2023 04:40)  T(F): 96.9 (05 Oct 2023 04:40), Max: 96.9 (05 Oct 2023 04:40)  HR: 89 (05 Oct 2023 04:40) (86 - 89)  BP: 152/80 (05 Oct 2023 04:40) (152/80 - 167/76)  BP(mean): --  RR: 18 (05 Oct 2023 04:40) (18 - 18)  SpO2: 97% (04 Oct 2023 20:20) (97% - 97%)                                  Physical Exam - Lower Extremity Focused:   #Right Lower Extremity  Derm:     Vascular: DP and PT Pulses Diminished; Foot is Warm to Warm to the touch   Neuro: Protective Sensation Diminished / Moderately Neuropathic   MSK:   -Rectus foot with circular ring external fixator in place  -Entire foot appears translated medially along pins  -Mild Pain On Palpation to medial pin sites    Assessment:   Charcot arthropathy, RLE   s/p RLE recon with External fixator application D.O.S 9.28.23    Plan:  Chart reviewed and Patient evaluated. All Questions and Concerns Addressed and Answered  Discussed diagnosis and treatment with patient      Podiatry      Podiatry Progress Note    Subjective:   MARIOLA CURRY is a pleasant well-nourished, well developed 58y Male in no acute distress, alert awake, and oriented to person, place and time.  Patient is a 58y old  Male who presents with a chief complaint of uncontrolled type 1 diabetes. (02 Oct 2023 16:57). Pt seen & evaluated at bedside w/Attending. Dressings D/C/I to LLE.       PAST MEDICAL & SURGICAL HISTORY:  DM (diabetes mellitus)      HTN (hypertension)      HLD (hyperlipidemia)      Herpes labialis      Anxiety      GERD (gastroesophageal reflux disease)      Kidney stones  20 years ago      Kidney disease  stage 4      Chronic kidney disease, unspecified CKD stage      Diabetic Charcot foot      S/P foot surgery, right  x 5 ( 2006 - 2013 )      Kidney stone  Removed by Laser x 2 ( 20 years ago )      H/O arthroscopy of right knee           Objective:  Vital Signs Last 24 Hrs  T(C): 36.1 (05 Oct 2023 04:40), Max: 36.1 (05 Oct 2023 04:40)  T(F): 96.9 (05 Oct 2023 04:40), Max: 96.9 (05 Oct 2023 04:40)  HR: 89 (05 Oct 2023 04:40) (86 - 89)  BP: 152/80 (05 Oct 2023 04:40) (152/80 - 167/76)  BP(mean): --  RR: 18 (05 Oct 2023 04:40) (18 - 18)  SpO2: 97% (04 Oct 2023 20:20) (97% - 97%)                      Physical Exam - Lower Extremity Focused:   #Right Lower Extremity  Derm:   -One rods loose/unattached to lateral aspect of circular ring fixator  -Skin to pin sites macerated, with mild increased gapping b/w pins & skin interface; Improved.  No erythema. No active drainage/bleeding. No malodor  -Incision sites to dorsal medial midfoot & dorsal lateral midfoot; skin edges well-coapted, mild ischemic changes along incision lines, continued improvement  -Ecchymosis to lateral forefoot; slight improvement, midfoot & rearfoot  -Decreased space b/w pins & foot medially  -Wound to plantar central midfoot; Wound base granular, probes to skin; No active drainage/bleeding; No signs of infx; Unchanged  Toe are warm, capillary refill is instant to the toes     Vascular: DP and PT Pulses Diminished; Foot is Warm to Warm to the touch   Neuro: Protective Sensation Diminished / Moderately Neuropathic   MSK:   -Rectus foot with circular ring external fixator  -Entire foot appears translated medially along pins  -Mild Pain On Palpation to medial pin sites    Assessment:   Charcot arthropathy, RLE   s/p RLE recon with External fixator application D.O.S 9.28.23    Plan:  Chart reviewed and Patient evaluated. All Questions and Concerns Addressed and Answered  Discussed diagnosis and treatment with patient  RLE Sx Sites Flushed w/ NS; Pin sites dressed w/Xeroform / DSD / Iliana / Tape  Wound Care: Cleanse pin sites w/NS; dress w/Xeroform / DSD / Iliana / Tape; Q24  Lateral Ex-Fix lucia re-attached w/10mm Norm Ex-Fix wrench; All nuts vigorously tightened w/wrench;  ID Consult appreciated;   -Abx recs appreciated;   -Once cleared by podiatry team, can be discharged on PO cephalexin 500mg Q6 hours for total of 14 days from the surgery.   Patient stable from Podiatry standpoint;  No further intervention per Podiatry;  Dressings dispensed to patient;  Per Attending, request VNS for 1 week of wound care post-DSC;  Patient can f/u with Dr. Roca in o/p Clinic 2 weeks post-DSC;  Discussed Plan w/ Attending    Podiatry        Podiatry Progress Note    Subjective:   MARIOLA CURRY is a pleasant well-nourished, well developed 58y Male in no acute distress, alert awake, and oriented to person, place and time.  Patient is a 58y old  Male who presents with a chief complaint of uncontrolled type 1 diabetes. (02 Oct 2023 16:57). Pt seen & evaluated at bedside w/Attending. Dressings D/C/I to LLE.       PAST MEDICAL & SURGICAL HISTORY:  DM (diabetes mellitus)      HTN (hypertension)      HLD (hyperlipidemia)      Herpes labialis      Anxiety      GERD (gastroesophageal reflux disease)      Kidney stones  20 years ago      Kidney disease  stage 4      Chronic kidney disease, unspecified CKD stage      Diabetic Charcot foot      S/P foot surgery, right  x 5 ( 2006 - 2013 )      Kidney stone  Removed by Laser x 2 ( 20 years ago )      H/O arthroscopy of right knee           Objective:  Vital Signs Last 24 Hrs  T(C): 36.1 (05 Oct 2023 04:40), Max: 36.1 (05 Oct 2023 04:40)  T(F): 96.9 (05 Oct 2023 04:40), Max: 96.9 (05 Oct 2023 04:40)  HR: 89 (05 Oct 2023 04:40) (86 - 89)  BP: 152/80 (05 Oct 2023 04:40) (152/80 - 167/76)  BP(mean): --  RR: 18 (05 Oct 2023 04:40) (18 - 18)  SpO2: 97% (04 Oct 2023 20:20) (97% - 97%)                      Physical Exam - Lower Extremity Focused:   #Right Lower Extremity  Derm:   -One rods loose/unattached to lateral aspect of circular ring fixator  -Skin to pin sites macerated, with mild increased gapping b/w pins & skin interface; Improved.  No erythema. No active drainage/bleeding. No malodor  -Incision sites to dorsal medial midfoot & dorsal lateral midfoot; skin edges well-coapted, mild ischemic changes along incision lines, continued improvement  -Ecchymosis to lateral forefoot; slight improvement, midfoot & rearfoot  -Decreased space b/w pins & foot medially  -Wound to plantar central midfoot; Wound base granular, probes to skin; No active drainage/bleeding; No signs of infx; Unchanged  Toe are warm, capillary refill is instant to the toes     Vascular: DP and PT Pulses Diminished; Foot is Warm to Warm to the touch   Neuro: Protective Sensation Diminished / Moderately Neuropathic   MSK:   -Rectus foot with circular ring external fixator  -Entire foot appears translated medially along pins  -Mild Pain On Palpation to medial pin sites    Assessment:   Charcot arthropathy, RLE   s/p RLE recon with External fixator application D.O.S 9.28.23    Plan:  Chart reviewed and Patient evaluated. All Questions and Concerns Addressed and Answered  Discussed diagnosis and treatment with patient  RLE Sx Sites Flushed w/ NS; Pin sites dressed w/Xeroform / DSD / Iliana / Tape  Wound Care: Cleanse pin sites w/NS; dress w/Xeroform / DSD / Iliana / Tape; Q48  Lateral Ex-Fix lucia re-attached w/10mm Norm Ex-Fix wrench; All nuts vigorously tightened w/wrench;  ID Consult appreciated;   -Abx recs appreciated;   -Once cleared by podiatry team, can be discharged on PO cephalexin 500mg Q6 hours for total of 14 days from the surgery.   Patient stable from Podiatry standpoint;  No further intervention per Podiatry;  Dressings dispensed to patient;  Per Attending, request VNS for 1 week of wound care post-DSC;  Patient can f/u with Dr. Roca in o/p Clinic 2 weeks post-DSC;  Discussed Plan w/ Attending    Podiatry

## 2023-10-06 RX ORDER — INSULIN LISPRO 100/ML
8 VIAL (ML) SUBCUTANEOUS
Qty: 8 | Refills: 0
Start: 2023-10-06 | End: 2023-11-04

## 2023-10-11 DIAGNOSIS — E10.65 TYPE 1 DIABETES MELLITUS WITH HYPERGLYCEMIA: ICD-10-CM

## 2023-10-11 DIAGNOSIS — E10.22 TYPE 1 DIABETES MELLITUS WITH DIABETIC CHRONIC KIDNEY DISEASE: ICD-10-CM

## 2023-10-11 DIAGNOSIS — E10.610 TYPE 1 DIABETES MELLITUS WITH DIABETIC NEUROPATHIC ARTHROPATHY: ICD-10-CM
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
## 2023-10-11 DIAGNOSIS — N18.4 CHRONIC KIDNEY DISEASE, STAGE 4 (SEVERE): ICD-10-CM

## 2023-10-11 DIAGNOSIS — F41.9 ANXIETY DISORDER, UNSPECIFIED: ICD-10-CM

## 2023-10-11 DIAGNOSIS — T84.498A OTHER MECHANICAL COMPLICATION OF OTHER INTERNAL ORTHOPEDIC DEVICES, IMPLANTS AND GRAFTS, INITIAL ENCOUNTER: ICD-10-CM

## 2023-10-11 DIAGNOSIS — K21.9 GASTRO-ESOPHAGEAL REFLUX DISEASE WITHOUT ESOPHAGITIS: ICD-10-CM

## 2023-10-11 DIAGNOSIS — M19.072 PRIMARY OSTEOARTHRITIS, LEFT ANKLE AND FOOT: ICD-10-CM

## 2023-10-11 DIAGNOSIS — Z23 ENCOUNTER FOR IMMUNIZATION: ICD-10-CM

## 2023-10-11 DIAGNOSIS — I12.9 HYPERTENSIVE CHRONIC KIDNEY DISEASE WITH STAGE 1 THROUGH STAGE 4 CHRONIC KIDNEY DISEASE, OR UNSPECIFIED CHRONIC KIDNEY DISEASE: ICD-10-CM

## 2023-10-11 DIAGNOSIS — D64.9 ANEMIA, UNSPECIFIED: ICD-10-CM

## 2023-10-11 DIAGNOSIS — G60.0 HEREDITARY MOTOR AND SENSORY NEUROPATHY: ICD-10-CM

## 2023-10-11 DIAGNOSIS — Z87.891 PERSONAL HISTORY OF NICOTINE DEPENDENCE: ICD-10-CM

## 2023-10-11 DIAGNOSIS — E78.5 HYPERLIPIDEMIA, UNSPECIFIED: ICD-10-CM
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
## 2023-10-11 DIAGNOSIS — Z76.82 AWAITING ORGAN TRANSPLANT STATUS: ICD-10-CM

## 2023-10-11 DIAGNOSIS — E10.21 TYPE 1 DIABETES MELLITUS WITH DIABETIC NEPHROPATHY: ICD-10-CM

## 2023-10-11 DIAGNOSIS — E66.9 OBESITY, UNSPECIFIED: ICD-10-CM

## 2023-10-11 DIAGNOSIS — E10.649 TYPE 1 DIABETES MELLITUS WITH HYPOGLYCEMIA WITHOUT COMA: ICD-10-CM

## 2023-10-11 DIAGNOSIS — Z79.4 LONG TERM (CURRENT) USE OF INSULIN: ICD-10-CM

## 2023-10-15 ENCOUNTER — EMERGENCY (EMERGENCY)
Facility: HOSPITAL | Age: 58
LOS: 0 days | Discharge: HOME | End: 2023-10-16
Attending: EMERGENCY MEDICINE
Payer: MEDICARE

## 2023-10-15 DIAGNOSIS — Z87.891 PERSONAL HISTORY OF NICOTINE DEPENDENCE: ICD-10-CM

## 2023-10-15 DIAGNOSIS — E11.22 TYPE 2 DIABETES MELLITUS WITH DIABETIC CHRONIC KIDNEY DISEASE: ICD-10-CM

## 2023-10-15 DIAGNOSIS — M79.89 OTHER SPECIFIED SOFT TISSUE DISORDERS: ICD-10-CM

## 2023-10-15 DIAGNOSIS — Z86.19 PERSONAL HISTORY OF OTHER INFECTIOUS AND PARASITIC DISEASES: ICD-10-CM

## 2023-10-15 DIAGNOSIS — N18.4 CHRONIC KIDNEY DISEASE, STAGE 4 (SEVERE): ICD-10-CM

## 2023-10-15 DIAGNOSIS — E78.00 PURE HYPERCHOLESTEROLEMIA, UNSPECIFIED: ICD-10-CM

## 2023-10-15 DIAGNOSIS — Z87.39 PERSONAL HISTORY OF OTHER DISEASES OF THE MUSCULOSKELETAL SYSTEM AND CONNECTIVE TISSUE: ICD-10-CM

## 2023-10-15 DIAGNOSIS — Z79.4 LONG TERM (CURRENT) USE OF INSULIN: ICD-10-CM

## 2023-10-15 DIAGNOSIS — Z98.890 OTHER SPECIFIED POSTPROCEDURAL STATES: Chronic | ICD-10-CM

## 2023-10-15 DIAGNOSIS — N20.0 CALCULUS OF KIDNEY: Chronic | ICD-10-CM

## 2023-10-15 DIAGNOSIS — Z87.442 PERSONAL HISTORY OF URINARY CALCULI: ICD-10-CM

## 2023-10-15 DIAGNOSIS — I12.9 HYPERTENSIVE CHRONIC KIDNEY DISEASE WITH STAGE 1 THROUGH STAGE 4 CHRONIC KIDNEY DISEASE, OR UNSPECIFIED CHRONIC KIDNEY DISEASE: ICD-10-CM

## 2023-10-15 PROCEDURE — 86901 BLOOD TYPING SEROLOGIC RH(D): CPT

## 2023-10-15 PROCEDURE — 86900 BLOOD TYPING SEROLOGIC ABO: CPT

## 2023-10-15 PROCEDURE — 99284 EMERGENCY DEPT VISIT MOD MDM: CPT

## 2023-10-15 PROCEDURE — 81001 URINALYSIS AUTO W/SCOPE: CPT

## 2023-10-15 PROCEDURE — 83605 ASSAY OF LACTIC ACID: CPT

## 2023-10-15 PROCEDURE — 99283 EMERGENCY DEPT VISIT LOW MDM: CPT

## 2023-10-15 PROCEDURE — 36415 COLL VENOUS BLD VENIPUNCTURE: CPT

## 2023-10-15 PROCEDURE — 86850 RBC ANTIBODY SCREEN: CPT

## 2023-10-15 PROCEDURE — 85025 COMPLETE CBC W/AUTO DIFF WBC: CPT

## 2023-10-15 PROCEDURE — 80053 COMPREHEN METABOLIC PANEL: CPT

## 2023-10-15 PROCEDURE — 82962 GLUCOSE BLOOD TEST: CPT

## 2023-10-16 VITALS
TEMPERATURE: 98 F | WEIGHT: 199.96 LBS | HEIGHT: 60 IN | OXYGEN SATURATION: 99 % | RESPIRATION RATE: 20 BRPM | DIASTOLIC BLOOD PRESSURE: 78 MMHG | SYSTOLIC BLOOD PRESSURE: 152 MMHG | HEART RATE: 94 BPM

## 2023-10-16 VITALS
RESPIRATION RATE: 18 BRPM | DIASTOLIC BLOOD PRESSURE: 69 MMHG | TEMPERATURE: 97 F | HEART RATE: 87 BPM | OXYGEN SATURATION: 99 % | SYSTOLIC BLOOD PRESSURE: 136 MMHG

## 2023-10-16 PROBLEM — N18.9 CHRONIC KIDNEY DISEASE, UNSPECIFIED: Chronic | Status: ACTIVE | Noted: 2023-09-14

## 2023-10-16 PROBLEM — E11.610 TYPE 2 DIABETES MELLITUS WITH DIABETIC NEUROPATHIC ARTHROPATHY: Chronic | Status: ACTIVE | Noted: 2023-09-14

## 2023-10-16 LAB
ALBUMIN SERPL ELPH-MCNC: 3.4 G/DL — LOW (ref 3.5–5.2)
ALP SERPL-CCNC: 247 U/L — HIGH (ref 30–115)
ALT FLD-CCNC: 21 U/L — SIGNIFICANT CHANGE UP (ref 0–41)
ANION GAP SERPL CALC-SCNC: 13 MMOL/L — SIGNIFICANT CHANGE UP (ref 7–14)
APPEARANCE UR: CLEAR — SIGNIFICANT CHANGE UP
AST SERPL-CCNC: 16 U/L — SIGNIFICANT CHANGE UP (ref 0–41)
BACTERIA # UR AUTO: ABNORMAL /HPF
BASOPHILS # BLD AUTO: 0.05 K/UL — SIGNIFICANT CHANGE UP (ref 0–0.2)
BASOPHILS NFR BLD AUTO: 0.3 % — SIGNIFICANT CHANGE UP (ref 0–1)
BILIRUB SERPL-MCNC: <0.2 MG/DL — SIGNIFICANT CHANGE UP (ref 0.2–1.2)
BILIRUB UR-MCNC: NEGATIVE — SIGNIFICANT CHANGE UP
BLD GP AB SCN SERPL QL: SIGNIFICANT CHANGE UP
BUN SERPL-MCNC: 53 MG/DL — HIGH (ref 10–20)
CALCIUM SERPL-MCNC: 9 MG/DL — SIGNIFICANT CHANGE UP (ref 8.4–10.5)
CHLORIDE SERPL-SCNC: 98 MMOL/L — SIGNIFICANT CHANGE UP (ref 98–110)
CO2 SERPL-SCNC: 27 MMOL/L — SIGNIFICANT CHANGE UP (ref 17–32)
COARSE GRAN CASTS #/AREA URNS AUTO: PRESENT
COLOR SPEC: YELLOW — SIGNIFICANT CHANGE UP
CREAT SERPL-MCNC: 3.5 MG/DL — HIGH (ref 0.7–1.5)
DIFF PNL FLD: NEGATIVE — SIGNIFICANT CHANGE UP
EGFR: 19 ML/MIN/1.73M2 — LOW
EOSINOPHIL # BLD AUTO: 0.11 K/UL — SIGNIFICANT CHANGE UP (ref 0–0.7)
EOSINOPHIL NFR BLD AUTO: 0.8 % — SIGNIFICANT CHANGE UP (ref 0–8)
EPI CELLS # UR: PRESENT
GLUCOSE BLDC GLUCOMTR-MCNC: 71 MG/DL — SIGNIFICANT CHANGE UP (ref 70–99)
GLUCOSE SERPL-MCNC: 39 MG/DL — CRITICAL LOW (ref 70–99)
GLUCOSE UR QL: NEGATIVE MG/DL — SIGNIFICANT CHANGE UP
HCT VFR BLD CALC: 24.4 % — LOW (ref 42–52)
HGB BLD-MCNC: 7.7 G/DL — LOW (ref 14–18)
IMM GRANULOCYTES NFR BLD AUTO: 0.5 % — HIGH (ref 0.1–0.3)
KETONES UR-MCNC: NEGATIVE MG/DL — SIGNIFICANT CHANGE UP
LACTATE SERPL-SCNC: 0.5 MMOL/L — LOW (ref 0.7–2)
LEUKOCYTE ESTERASE UR-ACNC: NEGATIVE — SIGNIFICANT CHANGE UP
LYMPHOCYTES # BLD AUTO: 15.3 % — LOW (ref 20.5–51.1)
LYMPHOCYTES # BLD AUTO: 2.2 K/UL — SIGNIFICANT CHANGE UP (ref 1.2–3.4)
MCHC RBC-ENTMCNC: 27.3 PG — SIGNIFICANT CHANGE UP (ref 27–31)
MCHC RBC-ENTMCNC: 31.6 G/DL — LOW (ref 32–37)
MCV RBC AUTO: 86.5 FL — SIGNIFICANT CHANGE UP (ref 80–94)
MONOCYTES # BLD AUTO: 1.37 K/UL — HIGH (ref 0.1–0.6)
MONOCYTES NFR BLD AUTO: 9.5 % — HIGH (ref 1.7–9.3)
NEUTROPHILS # BLD AUTO: 10.58 K/UL — HIGH (ref 1.4–6.5)
NEUTROPHILS NFR BLD AUTO: 73.6 % — SIGNIFICANT CHANGE UP (ref 42.2–75.2)
NITRITE UR-MCNC: NEGATIVE — SIGNIFICANT CHANGE UP
NRBC # BLD: 0 /100 WBCS — SIGNIFICANT CHANGE UP (ref 0–0)
PH UR: 6 — SIGNIFICANT CHANGE UP (ref 5–8)
PLATELET # BLD AUTO: 885 K/UL — HIGH (ref 130–400)
PMV BLD: 9 FL — SIGNIFICANT CHANGE UP (ref 7.4–10.4)
POTASSIUM SERPL-MCNC: 4.7 MMOL/L — SIGNIFICANT CHANGE UP (ref 3.5–5)
POTASSIUM SERPL-SCNC: 4.7 MMOL/L — SIGNIFICANT CHANGE UP (ref 3.5–5)
PROT SERPL-MCNC: 7.1 G/DL — SIGNIFICANT CHANGE UP (ref 6–8)
PROT UR-MCNC: 300 MG/DL
RBC # BLD: 2.82 M/UL — LOW (ref 4.7–6.1)
RBC # FLD: 14 % — SIGNIFICANT CHANGE UP (ref 11.5–14.5)
RBC CASTS # UR COMP ASSIST: 1 /HPF — SIGNIFICANT CHANGE UP (ref 0–4)
SODIUM SERPL-SCNC: 138 MMOL/L — SIGNIFICANT CHANGE UP (ref 135–146)
SP GR SPEC: 1.01 — SIGNIFICANT CHANGE UP (ref 1–1.03)
UROBILINOGEN FLD QL: 1 MG/DL — SIGNIFICANT CHANGE UP (ref 0.2–1)
WBC # BLD: 14.38 K/UL — HIGH (ref 4.8–10.8)
WBC # FLD AUTO: 14.38 K/UL — HIGH (ref 4.8–10.8)
WBC UR QL: 1 /HPF — SIGNIFICANT CHANGE UP (ref 0–5)

## 2023-10-16 RX ORDER — OXYCODONE HYDROCHLORIDE 5 MG/1
5 TABLET ORAL ONCE
Refills: 0 | Status: DISCONTINUED | OUTPATIENT
Start: 2023-10-16 | End: 2023-10-16

## 2023-10-16 RX ADMIN — OXYCODONE HYDROCHLORIDE 5 MILLIGRAM(S): 5 TABLET ORAL at 01:50

## 2023-10-16 NOTE — ED PROVIDER NOTE - NSFOLLOWUPINSTRUCTIONS_ED_ALL_ED_FT
Leg Edema    WHAT YOU NEED TO KNOW:    Leg edema is swelling caused by fluid buildup. Your legs may swell if you sit or stand for long periods of time, are pregnant, or are injured. Swelling may also occur if you have heart failure or circulation problems. This means that your heart does not pump blood through your body as it should.    DISCHARGE INSTRUCTIONS:    Self-care:     Elevate your legs: Raise your legs above the level of your heart as often as you can. This will help decrease swelling and pain. Prop your legs on pillows or blankets to keep them elevated comfortably.    Wear pressure stockings: These tight stockings put pressure on your legs to promote blood flow and prevent blood clots. Wear the stockings during the day. Do not wear them while you sleep.    Apply heat: Heat helps decrease pain and swelling. Apply heat on the area for 20 to 30 minutes every 2 hours for as many days as directed.     Stay active: Do not stand or sit for long periods of time. Ask your healthcare provider about the best exercise plan for you.    Eat healthy foods: Healthy foods include fruits, vegetables, whole-grain breads, low-fat dairy products, beans, lean meats, and fish. Ask if you need to be on a special diet. Limit salt. Salt will make your body hold even more fluid.    Follow up with your healthcare provider as directed: Write down your questions so you remember to ask them during your visits.     Contact your healthcare provider if:  You have a fever or feel more tired than usual.  The veins in your legs look larger than usual. They may look full or bulging.  Your legs itch or feel heavy.  You have red or white areas or sores on your legs. The skin may also appear dimpled or have indentations.  You are gaining weight.  You have trouble moving your ankles.  The swelling does not go away, or other parts of your body swell.  You have questions or concerns about your condition or care.    Return to the emergency department if:   You cannot walk.  You feel faint or confused.   Your skin turns blue or gray.  Your leg feels warm, tender, and painful. It may be swollen and red.  You have chest pain or trouble breathing that is worse when you lie down.  You suddenly feel lightheaded and have trouble breathing.  You have new and sudden chest pain. You may have more pain when you take deep breaths or cough. You may also cough up blood.

## 2023-10-16 NOTE — ED PROVIDER NOTE - OBJECTIVE STATEMENT
58 years old male history of diabetes, hypertension, high cholesterol, stage IV CKD, right foot Charcot foot s/p surgery 6 weeks ago, present request blood work to make sure his kidney function is good.  As per patient, he noticed some mild swelling to right lower extremity around the dressing of the right foot.  Patient otherwise denies headache, dizziness, chest pain, shortness of breath, abdominal pain, nausea, vomiting, diarrhea or urinary symptoms.  Also reports he finished antibiotics 3 days ago.

## 2023-10-16 NOTE — ED PROVIDER NOTE - NS ED ATTENDING STATEMENT MOD
I have seen and examined this patient and fully participated in the care of this patient as the teaching attending.  The service was shared with the ANDREW.  I reviewed and verified the documentation and independently performed the documented:

## 2023-10-16 NOTE — ED ADULT TRIAGE NOTE - CHIEF COMPLAINT QUOTE
"I want to make sure I don't have renal failure".  Also, my foot is swollen (right foot from surgery 60 days ago).

## 2023-10-16 NOTE — ED PROVIDER NOTE - PHYSICAL EXAMINATION
CONSTITUTIONAL: Well-appearing; chronic ill; in no apparent distress.   EYES: PERRL; EOM intact.   CARDIOVASCULAR: Normal S1, S2; no murmurs, rubs, or gallops.   RESPIRATORY: Normal chest excursion with respiration; breath sounds clear and equal bilaterally; no wheezes, rhonchi, or rales.  GI/: Normal bowel sounds; non-distended; non-tender; no palpable organomegaly.   MS : right lower extremity is in a external fixator with clean dry dressings.  Total exposed we have good cap refill.  No calf swelling or tenderness.  Mild pitting/dependent edema noted surrounding the dressing.  SKIN: Right lower extremity is covered with clean dry dressing.  Dressing is attached to the external fixator.  Toes and right calf without erythema, warmness and tenderness.  Mild ecchymosis also noted to right second and third toe which is chronic since 4 days ago after patient reported he stubbed his toe.  NEURO/PSYCH: A & O x 4; grossly unremarkable.

## 2023-10-16 NOTE — ED ADULT NURSE NOTE - TEMPLATE
KIKE MOE is a  59 year old male who is being seen in the office today for symptomatic hemorrhoids.  He complains of fecal soilage with occasional itching and bleeding.  He had significant discomfort to his first banding which required repositioning.4/15/2022 colonoscopy showed 2 diminutive tubular adenomas and grade 2 internal hemorrhoids
General

## 2023-10-16 NOTE — ED PROVIDER NOTE - ATTENDING CONTRIBUTION TO CARE
58-year-old male, history of CKD, DM, HTN Charcot foot status post surgery 6 weeks ago, here in ED requesting blood work, wants to make sure his kidney function is good.  Patient has not been eating well for the past few days.  No fever, chest pain, abdominal pain, vomiting or diarrhea.  Exam shows alert patient in no distress, HEENT NCAT PERRL, neck supple, throat no exudates, lungs clear, RR S1S2, abdomen soft NT +BS, external fixator right foot with mild swelling, capillary refill <2seconds, neuro A&OX3 GCS 15 no deficits.

## 2023-10-16 NOTE — ED PROVIDER NOTE - PROGRESS NOTE DETAILS
External fixator cannot be removed during your evaluation.  Unable to fully examine patient scans of right lower extremity.  Unable to obtain x-ray for right foot secondary to external fixator.  Patient has appointment to see his podiatrist in 2 days.  Follow-up with his podiatrist.  Lab work stable. encourage patient to return ED for worsening/concerning symptoms.

## 2023-10-16 NOTE — ED PROVIDER NOTE - PATIENT PORTAL LINK FT
You can access the FollowMyHealth Patient Portal offered by [facility] by registering at the following website: [email or web address]. By joining [facility]’s FollowMyHealth portal, you will also be able to view your health information using other applications (apps) compatible with our system.

## 2023-10-18 ENCOUNTER — INPATIENT (INPATIENT)
Facility: HOSPITAL | Age: 58
LOS: 0 days | Discharge: HOME | DRG: 560 | End: 2023-10-19
Attending: INTERNAL MEDICINE | Admitting: INTERNAL MEDICINE
Payer: MEDICARE

## 2023-10-18 VITALS
SYSTOLIC BLOOD PRESSURE: 145 MMHG | HEIGHT: 60 IN | RESPIRATION RATE: 18 BRPM | HEART RATE: 88 BPM | WEIGHT: 199.96 LBS | DIASTOLIC BLOOD PRESSURE: 75 MMHG | OXYGEN SATURATION: 100 % | TEMPERATURE: 98 F

## 2023-10-18 DIAGNOSIS — Z98.890 OTHER SPECIFIED POSTPROCEDURAL STATES: Chronic | ICD-10-CM

## 2023-10-18 DIAGNOSIS — L08.9 LOCAL INFECTION OF THE SKIN AND SUBCUTANEOUS TISSUE, UNSPECIFIED: ICD-10-CM

## 2023-10-18 DIAGNOSIS — N20.0 CALCULUS OF KIDNEY: Chronic | ICD-10-CM

## 2023-10-18 LAB
ALBUMIN SERPL ELPH-MCNC: 3.5 G/DL — SIGNIFICANT CHANGE UP (ref 3.5–5.2)
ALBUMIN SERPL ELPH-MCNC: 3.5 G/DL — SIGNIFICANT CHANGE UP (ref 3.5–5.2)
ALP SERPL-CCNC: 248 U/L — HIGH (ref 30–115)
ALP SERPL-CCNC: 248 U/L — HIGH (ref 30–115)
ALT FLD-CCNC: 23 U/L — SIGNIFICANT CHANGE UP (ref 0–41)
ALT FLD-CCNC: 23 U/L — SIGNIFICANT CHANGE UP (ref 0–41)
ANION GAP SERPL CALC-SCNC: 16 MMOL/L — HIGH (ref 7–14)
ANION GAP SERPL CALC-SCNC: 16 MMOL/L — HIGH (ref 7–14)
APTT BLD: 36.4 SEC — SIGNIFICANT CHANGE UP (ref 27–39.2)
APTT BLD: 36.4 SEC — SIGNIFICANT CHANGE UP (ref 27–39.2)
APTT BLD: 37.1 SEC — SIGNIFICANT CHANGE UP (ref 27–39.2)
APTT BLD: 37.1 SEC — SIGNIFICANT CHANGE UP (ref 27–39.2)
AST SERPL-CCNC: 17 U/L — SIGNIFICANT CHANGE UP (ref 0–41)
AST SERPL-CCNC: 17 U/L — SIGNIFICANT CHANGE UP (ref 0–41)
BASOPHILS # BLD AUTO: 0.04 K/UL — SIGNIFICANT CHANGE UP (ref 0–0.2)
BASOPHILS # BLD AUTO: 0.04 K/UL — SIGNIFICANT CHANGE UP (ref 0–0.2)
BASOPHILS NFR BLD AUTO: 0.3 % — SIGNIFICANT CHANGE UP (ref 0–1)
BASOPHILS NFR BLD AUTO: 0.3 % — SIGNIFICANT CHANGE UP (ref 0–1)
BILIRUB SERPL-MCNC: <0.2 MG/DL — SIGNIFICANT CHANGE UP (ref 0.2–1.2)
BILIRUB SERPL-MCNC: <0.2 MG/DL — SIGNIFICANT CHANGE UP (ref 0.2–1.2)
BLD GP AB SCN SERPL QL: SIGNIFICANT CHANGE UP
BLD GP AB SCN SERPL QL: SIGNIFICANT CHANGE UP
BUN SERPL-MCNC: 61 MG/DL — CRITICAL HIGH (ref 10–20)
BUN SERPL-MCNC: 61 MG/DL — CRITICAL HIGH (ref 10–20)
CALCIUM SERPL-MCNC: 8.7 MG/DL — SIGNIFICANT CHANGE UP (ref 8.4–10.5)
CALCIUM SERPL-MCNC: 8.7 MG/DL — SIGNIFICANT CHANGE UP (ref 8.4–10.5)
CHLORIDE SERPL-SCNC: 95 MMOL/L — LOW (ref 98–110)
CHLORIDE SERPL-SCNC: 95 MMOL/L — LOW (ref 98–110)
CO2 SERPL-SCNC: 25 MMOL/L — SIGNIFICANT CHANGE UP (ref 17–32)
CO2 SERPL-SCNC: 25 MMOL/L — SIGNIFICANT CHANGE UP (ref 17–32)
CREAT SERPL-MCNC: 3.4 MG/DL — HIGH (ref 0.7–1.5)
CREAT SERPL-MCNC: 3.4 MG/DL — HIGH (ref 0.7–1.5)
EGFR: 20 ML/MIN/1.73M2 — LOW
EGFR: 20 ML/MIN/1.73M2 — LOW
EOSINOPHIL # BLD AUTO: 0.16 K/UL — SIGNIFICANT CHANGE UP (ref 0–0.7)
EOSINOPHIL # BLD AUTO: 0.16 K/UL — SIGNIFICANT CHANGE UP (ref 0–0.7)
EOSINOPHIL NFR BLD AUTO: 1.4 % — SIGNIFICANT CHANGE UP (ref 0–8)
EOSINOPHIL NFR BLD AUTO: 1.4 % — SIGNIFICANT CHANGE UP (ref 0–8)
GLUCOSE BLDC GLUCOMTR-MCNC: 92 MG/DL — SIGNIFICANT CHANGE UP (ref 70–99)
GLUCOSE BLDC GLUCOMTR-MCNC: 92 MG/DL — SIGNIFICANT CHANGE UP (ref 70–99)
GLUCOSE SERPL-MCNC: 75 MG/DL — SIGNIFICANT CHANGE UP (ref 70–99)
GLUCOSE SERPL-MCNC: 75 MG/DL — SIGNIFICANT CHANGE UP (ref 70–99)
HCT VFR BLD CALC: 23 % — LOW (ref 42–52)
HCT VFR BLD CALC: 23 % — LOW (ref 42–52)
HGB BLD-MCNC: 7.3 G/DL — LOW (ref 14–18)
HGB BLD-MCNC: 7.3 G/DL — LOW (ref 14–18)
IMM GRANULOCYTES NFR BLD AUTO: 0.3 % — SIGNIFICANT CHANGE UP (ref 0.1–0.3)
IMM GRANULOCYTES NFR BLD AUTO: 0.3 % — SIGNIFICANT CHANGE UP (ref 0.1–0.3)
INR BLD: 1.14 RATIO — SIGNIFICANT CHANGE UP (ref 0.65–1.3)
INR BLD: 1.14 RATIO — SIGNIFICANT CHANGE UP (ref 0.65–1.3)
INR BLD: 1.16 RATIO — SIGNIFICANT CHANGE UP (ref 0.65–1.3)
INR BLD: 1.16 RATIO — SIGNIFICANT CHANGE UP (ref 0.65–1.3)
LACTATE SERPL-SCNC: 0.5 MMOL/L — LOW (ref 0.7–2)
LACTATE SERPL-SCNC: 0.5 MMOL/L — LOW (ref 0.7–2)
LYMPHOCYTES # BLD AUTO: 1.96 K/UL — SIGNIFICANT CHANGE UP (ref 1.2–3.4)
LYMPHOCYTES # BLD AUTO: 1.96 K/UL — SIGNIFICANT CHANGE UP (ref 1.2–3.4)
LYMPHOCYTES # BLD AUTO: 16.9 % — LOW (ref 20.5–51.1)
LYMPHOCYTES # BLD AUTO: 16.9 % — LOW (ref 20.5–51.1)
MCHC RBC-ENTMCNC: 27.3 PG — SIGNIFICANT CHANGE UP (ref 27–31)
MCHC RBC-ENTMCNC: 27.3 PG — SIGNIFICANT CHANGE UP (ref 27–31)
MCHC RBC-ENTMCNC: 31.7 G/DL — LOW (ref 32–37)
MCHC RBC-ENTMCNC: 31.7 G/DL — LOW (ref 32–37)
MCV RBC AUTO: 86.1 FL — SIGNIFICANT CHANGE UP (ref 80–94)
MCV RBC AUTO: 86.1 FL — SIGNIFICANT CHANGE UP (ref 80–94)
MONOCYTES # BLD AUTO: 1.19 K/UL — HIGH (ref 0.1–0.6)
MONOCYTES # BLD AUTO: 1.19 K/UL — HIGH (ref 0.1–0.6)
MONOCYTES NFR BLD AUTO: 10.3 % — HIGH (ref 1.7–9.3)
MONOCYTES NFR BLD AUTO: 10.3 % — HIGH (ref 1.7–9.3)
NEUTROPHILS # BLD AUTO: 8.18 K/UL — HIGH (ref 1.4–6.5)
NEUTROPHILS # BLD AUTO: 8.18 K/UL — HIGH (ref 1.4–6.5)
NEUTROPHILS NFR BLD AUTO: 70.8 % — SIGNIFICANT CHANGE UP (ref 42.2–75.2)
NEUTROPHILS NFR BLD AUTO: 70.8 % — SIGNIFICANT CHANGE UP (ref 42.2–75.2)
NRBC # BLD: 0 /100 WBCS — SIGNIFICANT CHANGE UP (ref 0–0)
NRBC # BLD: 0 /100 WBCS — SIGNIFICANT CHANGE UP (ref 0–0)
PLATELET # BLD AUTO: 820 K/UL — HIGH (ref 130–400)
PLATELET # BLD AUTO: 820 K/UL — HIGH (ref 130–400)
PMV BLD: 8.6 FL — SIGNIFICANT CHANGE UP (ref 7.4–10.4)
PMV BLD: 8.6 FL — SIGNIFICANT CHANGE UP (ref 7.4–10.4)
POTASSIUM SERPL-MCNC: 5 MMOL/L — SIGNIFICANT CHANGE UP (ref 3.5–5)
POTASSIUM SERPL-MCNC: 5 MMOL/L — SIGNIFICANT CHANGE UP (ref 3.5–5)
POTASSIUM SERPL-SCNC: 5 MMOL/L — SIGNIFICANT CHANGE UP (ref 3.5–5)
POTASSIUM SERPL-SCNC: 5 MMOL/L — SIGNIFICANT CHANGE UP (ref 3.5–5)
PROT SERPL-MCNC: 6.9 G/DL — SIGNIFICANT CHANGE UP (ref 6–8)
PROT SERPL-MCNC: 6.9 G/DL — SIGNIFICANT CHANGE UP (ref 6–8)
PROTHROM AB SERPL-ACNC: 13.1 SEC — HIGH (ref 9.95–12.87)
PROTHROM AB SERPL-ACNC: 13.1 SEC — HIGH (ref 9.95–12.87)
PROTHROM AB SERPL-ACNC: 13.3 SEC — HIGH (ref 9.95–12.87)
PROTHROM AB SERPL-ACNC: 13.3 SEC — HIGH (ref 9.95–12.87)
RBC # BLD: 2.67 M/UL — LOW (ref 4.7–6.1)
RBC # BLD: 2.67 M/UL — LOW (ref 4.7–6.1)
RBC # FLD: 14.1 % — SIGNIFICANT CHANGE UP (ref 11.5–14.5)
RBC # FLD: 14.1 % — SIGNIFICANT CHANGE UP (ref 11.5–14.5)
SODIUM SERPL-SCNC: 136 MMOL/L — SIGNIFICANT CHANGE UP (ref 135–146)
SODIUM SERPL-SCNC: 136 MMOL/L — SIGNIFICANT CHANGE UP (ref 135–146)
WBC # BLD: 11.57 K/UL — HIGH (ref 4.8–10.8)
WBC # BLD: 11.57 K/UL — HIGH (ref 4.8–10.8)
WBC # FLD AUTO: 11.57 K/UL — HIGH (ref 4.8–10.8)
WBC # FLD AUTO: 11.57 K/UL — HIGH (ref 4.8–10.8)

## 2023-10-18 PROCEDURE — 73610 X-RAY EXAM OF ANKLE: CPT | Mod: RT

## 2023-10-18 PROCEDURE — 93925 LOWER EXTREMITY STUDY: CPT | Mod: 26

## 2023-10-18 PROCEDURE — 71045 X-RAY EXAM CHEST 1 VIEW: CPT | Mod: 26

## 2023-10-18 PROCEDURE — 73630 X-RAY EXAM OF FOOT: CPT | Mod: 26,RT

## 2023-10-18 PROCEDURE — 86900 BLOOD TYPING SEROLOGIC ABO: CPT

## 2023-10-18 PROCEDURE — 86901 BLOOD TYPING SEROLOGIC RH(D): CPT

## 2023-10-18 PROCEDURE — 85730 THROMBOPLASTIN TIME PARTIAL: CPT

## 2023-10-18 PROCEDURE — 86850 RBC ANTIBODY SCREEN: CPT

## 2023-10-18 PROCEDURE — 82962 GLUCOSE BLOOD TEST: CPT

## 2023-10-18 PROCEDURE — 36415 COLL VENOUS BLD VENIPUNCTURE: CPT

## 2023-10-18 PROCEDURE — 73630 X-RAY EXAM OF FOOT: CPT | Mod: RT

## 2023-10-18 PROCEDURE — 93925 LOWER EXTREMITY STUDY: CPT

## 2023-10-18 PROCEDURE — 93010 ELECTROCARDIOGRAM REPORT: CPT

## 2023-10-18 PROCEDURE — 85610 PROTHROMBIN TIME: CPT

## 2023-10-18 PROCEDURE — 99285 EMERGENCY DEPT VISIT HI MDM: CPT

## 2023-10-18 PROCEDURE — 73610 X-RAY EXAM OF ANKLE: CPT | Mod: 26,RT

## 2023-10-18 RX ORDER — ACETAMINOPHEN 500 MG
650 TABLET ORAL EVERY 6 HOURS
Refills: 0 | Status: DISCONTINUED | OUTPATIENT
Start: 2023-10-18 | End: 2023-10-19

## 2023-10-18 RX ORDER — NIFEDIPINE 30 MG
90 TABLET, EXTENDED RELEASE 24 HR ORAL DAILY
Refills: 0 | Status: DISCONTINUED | OUTPATIENT
Start: 2023-10-18 | End: 2023-10-19

## 2023-10-18 RX ORDER — GLUCAGON INJECTION, SOLUTION 0.5 MG/.1ML
1 INJECTION, SOLUTION SUBCUTANEOUS ONCE
Refills: 0 | Status: DISCONTINUED | OUTPATIENT
Start: 2023-10-18 | End: 2023-10-19

## 2023-10-18 RX ORDER — DEXTROSE 50 % IN WATER 50 %
25 SYRINGE (ML) INTRAVENOUS ONCE
Refills: 0 | Status: DISCONTINUED | OUTPATIENT
Start: 2023-10-18 | End: 2023-10-19

## 2023-10-18 RX ORDER — HEPARIN SODIUM 5000 [USP'U]/ML
5000 INJECTION INTRAVENOUS; SUBCUTANEOUS EVERY 12 HOURS
Refills: 0 | Status: DISCONTINUED | OUTPATIENT
Start: 2023-10-18 | End: 2023-10-19

## 2023-10-18 RX ORDER — VANCOMYCIN HCL 1 G
1000 VIAL (EA) INTRAVENOUS ONCE
Refills: 0 | Status: COMPLETED | OUTPATIENT
Start: 2023-10-18 | End: 2023-10-18

## 2023-10-18 RX ORDER — INSULIN LISPRO 100/ML
8 VIAL (ML) SUBCUTANEOUS
Refills: 0 | Status: DISCONTINUED | OUTPATIENT
Start: 2023-10-18 | End: 2023-10-19

## 2023-10-18 RX ORDER — MORPHINE SULFATE 50 MG/1
6 CAPSULE, EXTENDED RELEASE ORAL ONCE
Refills: 0 | Status: DISCONTINUED | OUTPATIENT
Start: 2023-10-18 | End: 2023-10-18

## 2023-10-18 RX ORDER — INSULIN LISPRO 100/ML
VIAL (ML) SUBCUTANEOUS
Refills: 0 | Status: DISCONTINUED | OUTPATIENT
Start: 2023-10-18 | End: 2023-10-19

## 2023-10-18 RX ORDER — SODIUM CHLORIDE 9 MG/ML
1000 INJECTION, SOLUTION INTRAVENOUS
Refills: 0 | Status: DISCONTINUED | OUTPATIENT
Start: 2023-10-18 | End: 2023-10-19

## 2023-10-18 RX ORDER — VANCOMYCIN HCL 1 G
1000 VIAL (EA) INTRAVENOUS EVERY 24 HOURS
Refills: 0 | Status: DISCONTINUED | OUTPATIENT
Start: 2023-10-19 | End: 2023-10-19

## 2023-10-18 RX ORDER — DEXTROSE 50 % IN WATER 50 %
15 SYRINGE (ML) INTRAVENOUS ONCE
Refills: 0 | Status: DISCONTINUED | OUTPATIENT
Start: 2023-10-18 | End: 2023-10-19

## 2023-10-18 RX ORDER — INSULIN GLARGINE 100 [IU]/ML
20 INJECTION, SOLUTION SUBCUTANEOUS EVERY MORNING
Refills: 0 | Status: DISCONTINUED | OUTPATIENT
Start: 2023-10-19 | End: 2023-10-19

## 2023-10-18 RX ORDER — CEFEPIME 1 G/1
2000 INJECTION, POWDER, FOR SOLUTION INTRAMUSCULAR; INTRAVENOUS ONCE
Refills: 0 | Status: COMPLETED | OUTPATIENT
Start: 2023-10-18 | End: 2023-10-18

## 2023-10-18 RX ORDER — DEXTROSE 50 % IN WATER 50 %
12.5 SYRINGE (ML) INTRAVENOUS ONCE
Refills: 0 | Status: DISCONTINUED | OUTPATIENT
Start: 2023-10-18 | End: 2023-10-19

## 2023-10-18 RX ORDER — PANTOPRAZOLE SODIUM 20 MG/1
40 TABLET, DELAYED RELEASE ORAL
Refills: 0 | Status: DISCONTINUED | OUTPATIENT
Start: 2023-10-18 | End: 2023-10-19

## 2023-10-18 RX ORDER — SODIUM BICARBONATE 1 MEQ/ML
650 SYRINGE (ML) INTRAVENOUS THREE TIMES A DAY
Refills: 0 | Status: DISCONTINUED | OUTPATIENT
Start: 2023-10-18 | End: 2023-10-19

## 2023-10-18 RX ORDER — LOSARTAN POTASSIUM 100 MG/1
100 TABLET, FILM COATED ORAL DAILY
Refills: 0 | Status: DISCONTINUED | OUTPATIENT
Start: 2023-10-18 | End: 2023-10-19

## 2023-10-18 RX ORDER — CEFEPIME 1 G/1
1000 INJECTION, POWDER, FOR SOLUTION INTRAMUSCULAR; INTRAVENOUS EVERY 12 HOURS
Refills: 0 | Status: DISCONTINUED | OUTPATIENT
Start: 2023-10-19 | End: 2023-10-19

## 2023-10-18 RX ORDER — ALPRAZOLAM 0.25 MG
0.5 TABLET ORAL
Refills: 0 | Status: DISCONTINUED | OUTPATIENT
Start: 2023-10-18 | End: 2023-10-19

## 2023-10-18 RX ADMIN — MORPHINE SULFATE 6 MILLIGRAM(S): 50 CAPSULE, EXTENDED RELEASE ORAL at 17:53

## 2023-10-18 RX ADMIN — Medication 250 MILLIGRAM(S): at 23:50

## 2023-10-18 RX ADMIN — CEFEPIME 100 MILLIGRAM(S): 1 INJECTION, POWDER, FOR SOLUTION INTRAMUSCULAR; INTRAVENOUS at 18:24

## 2023-10-18 RX ADMIN — Medication 650 MILLIGRAM(S): at 22:54

## 2023-10-18 NOTE — CONSULT NOTE ADULT - ASSESSMENT
Pt is a 57yo M with DM, charcot foot, s/p placement of external fixator by podiatry in September whom has been c/o discharge from the foot and was sent in by podiatrist for removal of external fixator. Pt was supposed to have an angiogram.    Charcot Neuroarthropathy, RLE   Diabetes Mellitus  Charcot Reconstruction, RLE   r/o Peripheral artery Disease    -arterial duplex  -podiatry planning on removing external fixator on 10/19/23, recommending angiogram  -will follow

## 2023-10-18 NOTE — ED PROVIDER NOTE - CLINICAL SUMMARY MEDICAL DECISION MAKING FREE TEXT BOX
58-year-old male history of diabetes hypertension hyperlipidemia stage IV CKD history of right foot Charcot foot surgery several weeks ago is presenting here after his podiatrist Dr. Alberto sent him in for removal of hardware. As per patient he was offered angioplasty however refused because he does not want to further damage his kidney No fevers no chills no nausea no vomiting vs reviewed labs imaging ekg obtained and reviewed antibiotics given podiatry consulted patient admitted.

## 2023-10-18 NOTE — H&P ADULT - HISTORY OF PRESENT ILLNESS
patient sent to ED for adm by podiatry today, Has Ex fix placed 9/28 for foot reconstruction, Noted to have severe ischemic changes, refused angioplasty, CT with IV contrast according to podiatry, Will Take to OR tomorrow, removed hardware,  Wants consults to ID and vascular surgery 58 year old male with PMH of DM, charcot foot, CKD, gerd, s/p placement of external fixator by podiatry in 9/28  was sent in by podiatrist for removal of external fixator. Pt noticed Right foot oozing from Ex-Fix pin sites & RLE pain. He was seen by Dr. Roca in o/p Podiatry clinic today who told patient to come to ED for admission & RLE removal of hardware.  Pt reports mild pain RLE.   He denies fever, chills, N/V/D/C, abdominal pain, CP, SOB, urinary complaints.   In ED pt was seen by podiatry who is planning OR tomorrow 10/19/23 for Removal of External Fixator, RLE.

## 2023-10-18 NOTE — H&P ADULT - ASSESSMENT
58 year old male s/p reconstruction    # charcots joint repair R foot     - WBAT  - PMR - recommended Home with services  - continue ancef   - podiatry to follow   - pain control - reviewed     # TYPE 2 DM with complications ; labile Blood sugar     - basal bolus insulin   - fingersticks   - endocrine noted   - nutrition support     #htn   - continue losartan nifedipine    # CKD     - renal consult reviewed   - start venofer    #hld  - continue statin     #anxiety   - continue xanax.    DVT: prophylaxis: Heparin 58 year old male with PMH of DM, charcot foot, CKD, gerd, s/p placement of external fixator by podiatry in 9/28  was sent in by podiatrist for removal of external fixator.     # Charcot foot s/p placement of external fixator by podiatry in 9/28   - admit to medicine  - podiatry consulted in ED      WBAT-RLE     RLE Lower Extremity Duplex; Pt to get RLE Angiogram as o/p;     OR Thurs 10/19/23; Add-On #1; Removal of External Fixator, RLE     NPO after MN 10/18/23;  - continue Vancomycin  - ID consult  - vascular cardiology consult ( pt follows with Dr. Rosie Farley)  - follow up cultures  - f/u arterial duplex  - f/u XR right ankle and foot  - AM labs    # Normocytic anemia, likely secondary to renal disease  - trend H/H  - active type and screen    # Hx DM   - continue insulin  - monitor FS    #HTN/HLD  - continue home medications  - monitor BP    # CKD   - renal consult     # anxiety   - continue xanax prn    DVT: prophylaxis: Heparin  GI ppx: ppi 58 year old male with PMH of DM, charcot foot, CKD, gerd, s/p placement of external fixator by podiatry in 9/28  was sent in by podiatrist for removal of external fixator.     # Charcot foot s/p placement of external fixator by podiatry in 9/28   - admit to medicine  - podiatry consulted in ED      WBAT-RLE     RLE Lower Extremity Duplex; Pt to get RLE Angiogram as o/p;     OR Thurs 10/19/23; Add-On #1; Removal of External Fixator, RLE     NPO after MN 10/18/23;  - continue Vancomycin, Cefepime  - ID consult  - vascular cardiology consult ( pt follows with Dr. Rosie Farley)  - follow up cultures  - f/u arterial duplex  - f/u XR right ankle and foot  - AM labs    # Normocytic anemia, likely secondary to renal disease  - trend H/H  - active type and screen    # Hx DM   - continue insulin  - monitor FS    #HTN/HLD  - continue home medications  - monitor BP    # CKD   - renal consult     # anxiety   - continue xanax prn    DVT: prophylaxis: Heparin  GI ppx: ppi

## 2023-10-18 NOTE — CONSULT NOTE ADULT - SUBJECTIVE AND OBJECTIVE BOX
Podiatry Consult Note    Subjective:  MARIOLA CURRY is a pleasant well-nourished, well developed 58y Male in no acute distress, alert awake, and oriented to person, place and time.   Patient is a 58y old  Male who presents with a chief complaint of Right foot oozing from Ex-Fix pin sites & RLE pain. Recent application of RLE external fixator 9/28/23 with Dr. Roca.  Pt was seen by Dr. Roca in o/p Podiatry clinic today who told patient to come to ED for admission & RLE removal of hardware.  Pt reports mild pain RLE. Denies N/V/F/C. No other pedal complaints. Dressings damp to bottom of R foot.  Per pt he has been ambulating with crutches, minimal weight to RLE. No other pedal complaints.     HPI:  PAST MEDICAL & SURGICAL HISTORY:  DM (diabetes mellitus)      HTN (hypertension)      HLD (hyperlipidemia)      Herpes labialis      Anxiety      GERD (gastroesophageal reflux disease)      Kidney stones  20 years ago      Kidney disease  stage 4      Chronic kidney disease, unspecified CKD stage      Diabetic Charcot foot      S/P foot surgery, right  x 5 ( 2006 - 2013 )      Kidney stone  Removed by Laser x 2 ( 20 years ago )      H/O arthroscopy of right knee         Objective:  Vital Signs Last 24 Hrs  T(C): 36.7 (18 Oct 2023 16:06), Max: 36.7 (18 Oct 2023 16:06)  T(F): 98 (18 Oct 2023 16:06), Max: 98 (18 Oct 2023 16:06)  HR: 88 (18 Oct 2023 16:06) (88 - 88)  BP: 145/75 (18 Oct 2023 16:06) (145/75 - 145/75)  BP(mean): --  RR: 18 (18 Oct 2023 16:06) (18 - 18)  SpO2: 100% (18 Oct 2023 16:06) (100% - 100%)    Parameters below as of 18 Oct 2023 16:06  Patient On (Oxygen Delivery Method): room air                            7.3    11.57 )-----------( 820      ( 18 Oct 2023 17:07 )             23.0                   Physical Exam - Lower Extremity Focused:   #Right Lower Extremity  Derm:   -External Fixator intact; no struts or pins loose  -Skin to dorsal foot pin sites macerated, skin slough  -Skin slough to dorsal midfoot and forefoot   -Maceration toes 1-5; 2nd toenail avulsion  -Ischemic changes to dorsal midfoot pin sites, josiane-incision sites at dorsal medial midfoot & dorsal lateral midfoot; plantar forefoot, toes 1-5  -No erythema. No active drainage/bleeding. No malodor  -Ecchymosis to lateral forefoot, midfoot & rearfoot  -Hyperkeratosis to plantar central midfoot;    Vascular: DP and PT Pulses Diminished; Foot is Cold to Cold to the touch; Capillary refill absent to the toes   Neuro: Protective Sensation Diminished / Moderately Neuropathic   MSK:   -Rectus foot with circular ring external fixator   -Mild Pain On Palpation to medial pin sites    Assessment:   Charcot Neuroarthropathy, RLE   Diabetes Mellitus  Charcot Reconstruction, RLE   Peripheral Vascular Disease    Plan:  Chart reviewed and Patient evaluated. All Questions and Concerns Addressed and Answered  Discussed diagnosis and treatment with patient  Pin sites cleansed w/ NS; dressed w/Betadine; Iliana / ABD / Tape; Q24  Wound Care: as stated above;   WBAT-RLE  Per Attending:  -RLE Lower Extremity Duplex  -OR Thurs 10/19/23; Add-On #1; Removal of External Fixator, RLE  -Request Medical Clearance  -NPO after MN 10/18/23;  -Request pre-lab optimization & OR stratification prior to sx;  Discussed Plan w/ Attending    Podiatry      Podiatry Consult Note    Subjective:  MARIOLA CURRY is a pleasant well-nourished, well developed 58y Male in no acute distress, alert awake, and oriented to person, place and time.   Patient is a 58y old  Male who presents with a chief complaint of Right foot oozing from Ex-Fix pin sites & RLE pain. Recent application of RLE external fixator 9/28/23 with Dr. Roca.  Pt was seen by Dr. Roca in o/p Podiatry clinic today who told patient to come to ED for admission & RLE removal of hardware.  Pt reports mild pain RLE. Denies N/V/F/C. No other pedal complaints. Dressings damp to bottom of R foot.  Per pt he has been ambulating with crutches, minimal weight to RLE. No other pedal complaints.     HPI:  PAST MEDICAL & SURGICAL HISTORY:  DM (diabetes mellitus)      HTN (hypertension)      HLD (hyperlipidemia)      Herpes labialis      Anxiety      GERD (gastroesophageal reflux disease)      Kidney stones  20 years ago      Kidney disease  stage 4      Chronic kidney disease, unspecified CKD stage      Diabetic Charcot foot      S/P foot surgery, right  x 5 ( 2006 - 2013 )      Kidney stone  Removed by Laser x 2 ( 20 years ago )      H/O arthroscopy of right knee         Objective:  Vital Signs Last 24 Hrs  T(C): 36.7 (18 Oct 2023 16:06), Max: 36.7 (18 Oct 2023 16:06)  T(F): 98 (18 Oct 2023 16:06), Max: 98 (18 Oct 2023 16:06)  HR: 88 (18 Oct 2023 16:06) (88 - 88)  BP: 145/75 (18 Oct 2023 16:06) (145/75 - 145/75)  BP(mean): --  RR: 18 (18 Oct 2023 16:06) (18 - 18)  SpO2: 100% (18 Oct 2023 16:06) (100% - 100%)    Parameters below as of 18 Oct 2023 16:06  Patient On (Oxygen Delivery Method): room air                            7.3    11.57 )-----------( 820      ( 18 Oct 2023 17:07 )             23.0                   Physical Exam - Lower Extremity Focused:   #Right Lower Extremity  Derm:   -External Fixator intact; no struts or pins loose  -Skin to dorsal foot pin sites macerated, skin slough  -Skin slough to dorsal midfoot and forefoot   -Maceration toes 1-5; 2nd toenail avulsion  -Ischemic changes to dorsal midfoot pin sites, josiane-incision sites at dorsal medial midfoot & dorsal lateral midfoot; plantar forefoot, toes 1-5  -No erythema. No active drainage/bleeding. No malodor  -Ecchymosis to lateral forefoot, midfoot & rearfoot  -Hyperkeratosis to plantar central midfoot;    Vascular: DP and PT Pulses Diminished; Foot is Cold to Cold to the touch; Capillary refill absent to the toes   Neuro: Protective Sensation Diminished / Moderately Neuropathic   MSK:   -Rectus foot with circular ring external fixator   -Mild Pain On Palpation to medial pin sites    Assessment:   Charcot Neuroarthropathy, RLE   Diabetes Mellitus  Charcot Reconstruction, RLE   Peripheral Vascular Disease    Plan:  Chart reviewed and Patient evaluated. All Questions and Concerns Addressed and Answered  Discussed diagnosis and treatment with patient  Pin sites cleansed w/ NS; dressed w/Betadine; Iliana / ABD / Tape; Q24  Wound Care: as stated above;   FXR-R; AXR-R;  WBAT-RLE  Per Attending:  -RLE Lower Extremity Duplex  -OR Thurs 10/19/23; Add-On #1; Removal of External Fixator, RLE  -Request Medical Clearance  -NPO after MN 10/18/23;  -Request pre-lab optimization & OR stratification prior to sx;  Discussed Plan w/ Attending    Podiatry      Podiatry Consult Note    Subjective:  MARIOLA CURRY is a pleasant well-nourished, well developed 58y Male in no acute distress, alert awake, and oriented to person, place and time.   Patient is a 58y old  Male who presents with a chief complaint of Right foot oozing from Ex-Fix pin sites & RLE pain. Recent application of RLE external fixator 9/28/23 with Dr. Roca.  Pt was seen by Dr. Roca in o/p Podiatry clinic today who told patient to come to ED for admission & RLE removal of hardware.  Pt reports mild pain RLE. Denies N/V/F/C. No other pedal complaints. Dressings damp to bottom of R foot.  Per pt he has been ambulating with crutches, minimal weight to RLE. No other pedal complaints.     HPI:  PAST MEDICAL & SURGICAL HISTORY:  DM (diabetes mellitus)      HTN (hypertension)      HLD (hyperlipidemia)      Herpes labialis      Anxiety      GERD (gastroesophageal reflux disease)      Kidney stones  20 years ago      Kidney disease  stage 4      Chronic kidney disease, unspecified CKD stage      Diabetic Charcot foot      S/P foot surgery, right  x 5 ( 2006 - 2013 )      Kidney stone  Removed by Laser x 2 ( 20 years ago )      H/O arthroscopy of right knee         Objective:  Vital Signs Last 24 Hrs  T(C): 36.7 (18 Oct 2023 16:06), Max: 36.7 (18 Oct 2023 16:06)  T(F): 98 (18 Oct 2023 16:06), Max: 98 (18 Oct 2023 16:06)  HR: 88 (18 Oct 2023 16:06) (88 - 88)  BP: 145/75 (18 Oct 2023 16:06) (145/75 - 145/75)  BP(mean): --  RR: 18 (18 Oct 2023 16:06) (18 - 18)  SpO2: 100% (18 Oct 2023 16:06) (100% - 100%)    Parameters below as of 18 Oct 2023 16:06  Patient On (Oxygen Delivery Method): room air                            7.3    11.57 )-----------( 820      ( 18 Oct 2023 17:07 )             23.0                   Physical Exam - Lower Extremity Focused:   #Right Lower Extremity  Derm:   -External Fixator intact; no struts or pins loose  -Skin to dorsal foot pin sites macerated, skin slough  -Skin slough to dorsal midfoot and forefoot   -Maceration toes 1-5; 2nd toenail avulsion  -Ischemic changes to dorsal midfoot pin sites, josiane-incision sites at dorsal medial midfoot & dorsal lateral midfoot; plantar forefoot, toes 1-5  -No erythema. No active drainage/bleeding. No malodor  -Ecchymosis to lateral forefoot, midfoot & rearfoot  -Hyperkeratosis to plantar central midfoot;    Vascular: DP and PT Pulses Diminished; Foot is Cold to Cold to the touch; Capillary refill absent to the toes   Neuro: Protective Sensation Diminished / Moderately Neuropathic   MSK:   -Rectus foot with circular ring external fixator   -Mild Pain On Palpation to medial pin sites    Assessment:   Charcot Neuroarthropathy, RLE   Diabetes Mellitus  Charcot Reconstruction, RLE   Peripheral Vascular Disease    Plan:  Chart reviewed and Patient evaluated. All Questions and Concerns Addressed and Answered  Discussed diagnosis and treatment with patient  Pin sites cleansed w/ NS; dressed w/Betadine; Iliana / ABD / Tape; Q24  Wound Care: as stated above;   FXR-R; AXR-R;  WBAT-RLE  Per Attending:  -RLE Lower Extremity Duplex; Pt to get RLE Angiogram as o/p;  -OR Thurs 10/19/23; Add-On #1; Removal of External Fixator, RLE  -Request Medical Clearance  -NPO after MN 10/18/23;  -Request pre-lab optimization & OR stratification prior to sx;  Discussed Plan w/ Attending    Podiatry

## 2023-10-18 NOTE — ED PROVIDER NOTE - PROGRESS NOTE DETAILS
SR Discussed case with podiatry aware there is no vascular resident here states patient will be an add-on for the OR tomorrow Dr Conroy accepts,   Surgery PA aware of vascular consult

## 2023-10-18 NOTE — ED ADULT NURSE REASSESSMENT NOTE - NS ED NURSE REASSESS COMMENT FT1
Introduced self to patient, educated patient about need for IV access placement. IV access to be placed in right hand, patient states "No you are not going to put it there". No other IV access on right side of arm. Podiatry doctor at bedside completing procedure/dressing change. Unable to move stretcher related to podiatry procedure.  Patient verbalized back understanding RN will look for IV access on left side once podiatry doctor is done.

## 2023-10-18 NOTE — ED PROVIDER NOTE - ATTENDING APP SHARED VISIT CONTRIBUTION OF CARE
58-year-old male history of diabetes hypertension hyperlipidemia stage IV CKD history of right foot Charcot foot surgery several weeks ago is presenting here after his podiatrist Dr. Alberto sent him in for removal of hardware.  No fevers no chills no nausea no vomiting  CONSTITUTIONAL: WA / WN / NAD  HEAD: NCAT  EYES: PERRL; EOMI;   ENT: Normal pharynx; mucous membranes pink/moist, no erythema.  NECK: Supple;  MSK/EXT: +right foot with halo hardware and wrapping   SKIN: Warm and dry;   NEURO: AAOx3  PSYCH: Memory Intact, Normal Affect 58-year-old male history of diabetes hypertension hyperlipidemia stage IV CKD history of right foot Charcot foot surgery several weeks ago is presenting here after his podiatrist Dr. Alberto sent him in for removal of hardware. As per patient he was offered angioplasty however refused because he does not want to further damage his kidney No fevers no chills no nausea no vomiting  CONSTITUTIONAL: WA / WN / NAD  HEAD: NCAT  EYES: PERRL; EOMI;   ENT: Normal pharynx; mucous membranes pink/moist, no erythema.  NECK: Supple;  MSK/EXT: +right foot with halo hardware and wrapping   SKIN: Warm and dry;   NEURO: AAOx3  PSYCH: Memory Intact, Normal Affect

## 2023-10-18 NOTE — ED PROVIDER NOTE - OBJECTIVE STATEMENT
patient sent to ED for adm by podiatry today, Has Ex fix placed 9/28 for foot reconstruction, Noted to have severe ischemic changes, refused angioplasty, CT with IV contrast according to podiatry, Will Take to OR tomorrow, removed hardware,  Wants consults to ID and vascular surgery

## 2023-10-18 NOTE — H&P ADULT - NSHPLABSRESULTS_GEN_ALL_CORE
7.3    11.57 )-----------( 820      ( 18 Oct 2023 17:07 )             23.0       10-18    136  |  95<L>  |  61<HH>  ----------------------------<  75  5.0   |  25  |  3.4<H>    Ca    8.7      18 Oct 2023 17:07    TPro  6.9  /  Alb  3.5  /  TBili  <0.2  /  DBili  x   /  AST  17  /  ALT  23  /  AlkPhos  248<H>  10-18              Urinalysis Basic - ( 18 Oct 2023 17:07 )    Color: x / Appearance: x / SG: x / pH: x  Gluc: 75 mg/dL / Ketone: x  / Bili: x / Urobili: x   Blood: x / Protein: x / Nitrite: x   Leuk Esterase: x / RBC: x / WBC x   Sq Epi: x / Non Sq Epi: x / Bacteria: x        PT/INR - ( 18 Oct 2023 17:07 )   PT: 13.10 sec;   INR: 1.14 ratio         PTT - ( 18 Oct 2023 17:07 )  PTT:36.4 sec    Lactate Trend  10-18 @ 17:07 Lactate:0.5   10-16 @ 01:00 Lactate:0.5             CAPILLARY BLOOD GLUCOSE            Culture Results:   No growth at 5 days (09-28 @ 09:40) 7.3    11.57 )-----------( 820      ( 18 Oct 2023 17:07 )             23.0       10-18    136  |  95<L>  |  61<HH>  ----------------------------<  75  5.0   |  25  |  3.4<H>    Ca    8.7      18 Oct 2023 17:07    TPro  6.9  /  Alb  3.5  /  TBili  <0.2  /  DBili  x   /  AST  17  /  ALT  23  /  AlkPhos  248<H>  10-18              Urinalysis Basic - ( 18 Oct 2023 17:07 )    Color: x / Appearance: x / SG: x / pH: x  Gluc: 75 mg/dL / Ketone: x  / Bili: x / Urobili: x   Blood: x / Protein: x / Nitrite: x   Leuk Esterase: x / RBC: x / WBC x   Sq Epi: x / Non Sq Epi: x / Bacteria: x        PT/INR - ( 18 Oct 2023 17:07 )   PT: 13.10 sec;   INR: 1.14 ratio         PTT - ( 18 Oct 2023 17:07 )  PTT:36.4 sec    Lactate Trend  10-18 @ 17:07 Lactate:0.5   10-16 @ 01:00 Lactate:0.5       Culture Results:   No growth at 5 days (09-28 @ 09:40)

## 2023-10-18 NOTE — H&P ADULT - NSHPPHYSICALEXAM_GEN_ALL_CORE
VITALS:   T(C): 36.7 (10-18-23 @ 16:06), Max: 36.7 (10-18-23 @ 16:06)  HR: 88 (10-18-23 @ 16:06) (88 - 88)  BP: 145/75 (10-18-23 @ 16:06) (145/75 - 145/75)  RR: 18 (10-18-23 @ 16:06) (18 - 18)  SpO2: 100% (10-18-23 @ 16:06) (100% - 100%)    GENERAL: NAD, lying in bed comfortably  HEAD:  Atraumatic, Normocephalic  EYES: EOMI, PERRLA, conjunctiva and sclera clear  ENT: Moist mucous membranes  NECK: Supple, No JVD  CHEST/LUNG: Clear to auscultation bilaterally; No rales, rhonchi, wheezing, or rubs. Unlabored respirations  HEART: Regular rate and rhythm; No murmurs, rubs, or gallops  ABDOMEN: Bowel sounds present; Soft, Nontender, Nondistended. No hepatomegally  EXTREMITIES:  2+ Peripheral Pulses, brisk capillary refill. No clubbing, cyanosis, or edema  NERVOUS SYSTEM:  Alert & Oriented X3, speech clear. No deficits   MSK: FROM all 4 extremities, full and equal strength  SKIN: No rashes or lesions VITALS:   T(C): 36.7 (10-18-23 @ 16:06), Max: 36.7 (10-18-23 @ 16:06)  HR: 88 (10-18-23 @ 16:06) (88 - 88)  BP: 145/75 (10-18-23 @ 16:06) (145/75 - 145/75)  RR: 18 (10-18-23 @ 16:06) (18 - 18)  SpO2: 100% (10-18-23 @ 16:06) (100% - 100%)    GENERAL: NAD, lying in bed comfortably  HEAD:  Atraumatic, Normocephalic  EYES: EOMI  ENT: Moist mucous membranes  NECK: Supple  CHEST/LUNG: Clear to auscultation bilaterally; No rales, rhonchi, wheezing, or rubs. Unlabored respirations  HEART: Regular rate and rhythm; no rubs, or gallops  ABDOMEN: Bowel sounds present; Soft, Nontender, Nondistended  EXTREMITIES:  RLE with  Ischemic changes to dorsal midfoot pin sites, external fixator intact  NERVOUS SYSTEM:  Alert & Oriented X3, speech clear. No deficits   MSK: FROM all 4 extremities, full and equal strength  SKIN: No rashes or lesions

## 2023-10-18 NOTE — ED PROVIDER NOTE - CARE PLAN
Principal Discharge DX:	Foot infection  Secondary Diagnosis:	Peripheral vascular insufficiency  Secondary Diagnosis:	DM (diabetes mellitus)   1

## 2023-10-18 NOTE — CONSULT NOTE ADULT - SUBJECTIVE AND OBJECTIVE BOX
HPI:  Pt is a 59yo M with DM, charcot foot, s/p placement of external fixator by podiatry in September whom has been c/o discharge from the foot and was sent in by podiatrist for removal of external fixator. Pt was supposed to have an angiogram.      PAST MEDICAL & SURGICAL HISTORY:  DM (diabetes mellitus)    HTN (hypertension)    HLD (hyperlipidemia)    Herpes labialis    Anxiety    GERD (gastroesophageal reflux disease)    Kidney stones  20 years ago    Kidney disease  stage 4    Chronic kidney disease, unspecified CKD stage    Diabetic Charcot foot    S/P foot surgery, right  x 5 ( 2006 - 2013 )    Kidney stone  Removed by Laser x 2 ( 20 years ago )    H/O arthroscopy of right knee        Allergies    No Known Allergies        MEDICATIONS  (STANDING):  vancomycin  IVPB. 1000 milliGRAM(s) IV Intermittent once    MEDICATIONS  (PRN):      General:	no fever, weight loss,  chills  Skin:+foot ulcer  Ophthalmologic: no visual changes  ENMT:	no sore throat  Respiratory and Thorax: no cough, wheeze,  sob  Cardiovascular:	no chest pain, palpitations, dizziness  Gastrointestinal:	no nausea, vomiting, diarrhea, abd pain  Genitourinary:	no dysuria, hematuria  Musculoskeletal:	+ joint pains  Neurological:	 no speech disturbance, focal weakness, numbness  Psychiatric:	no depression, anxiety, psychosis  Hematology/Lymphatics:	no anemia  Endocrine:	no polyuria, polydipsia        Vital Signs Last 24 Hrs  T(C): 36.7 (18 Oct 2023 16:06), Max: 36.7 (18 Oct 2023 16:06)  T(F): 98 (18 Oct 2023 16:06), Max: 98 (18 Oct 2023 16:06)  HR: 88 (18 Oct 2023 16:06) (88 - 88)  BP: 145/75 (18 Oct 2023 16:06) (145/75 - 145/75)  BP(mean): --  RR: 18 (18 Oct 2023 16:06) (18 - 18)  SpO2: 100% (18 Oct 2023 16:06) (100% - 100%)    PHYSICAL EXAM:      Constitutional: A&Ox4  Respiratory: cta b/l  Cardiovascular: s1 s2 rrr  Gastrointestinal: soft nt  nd + bs no rebound or guarding  Genitourinary: no cva tenderness  Extremities: external fixator in place on RLE, some swelling in leg noted, +discharge on dressing   Neurological:no focal deficits  Skin: no rash                            7.3    11.57 )-----------( 820      ( 18 Oct 2023 17:07 )             23.0       10-18    136  |  95<L>  |  61<HH>  ----------------------------<  75  5.0   |  25  |  3.4<H>    Ca    8.7      18 Oct 2023 17:07    TPro  6.9  /  Alb  3.5  /  TBili  <0.2  /  DBili  x   /  AST  17  /  ALT  23  /  AlkPhos  248<H>  10-18      PT/INR - ( 18 Oct 2023 17:07 )   PT: 13.10 sec;   INR: 1.14 ratio         PTT - ( 18 Oct 2023 17:07 )  PTT:36.4 sec    Urinalysis Basic - ( 18 Oct 2023 17:07 )    Color: x / Appearance: x / SG: x / pH: x  Gluc: 75 mg/dL / Ketone: x  / Bili: x / Urobili: x   Blood: x / Protein: x / Nitrite: x   Leuk Esterase: x / RBC: x / WBC x   Sq Epi: x / Non Sq Epi: x / Bacteria: x

## 2023-10-19 ENCOUNTER — TRANSCRIPTION ENCOUNTER (OUTPATIENT)
Age: 58
End: 2023-10-19

## 2023-10-19 VITALS — HEART RATE: 86 BPM | SYSTOLIC BLOOD PRESSURE: 134 MMHG | OXYGEN SATURATION: 95 % | DIASTOLIC BLOOD PRESSURE: 63 MMHG

## 2023-10-19 LAB
GLUCOSE BLDC GLUCOMTR-MCNC: 74 MG/DL — SIGNIFICANT CHANGE UP (ref 70–99)
GLUCOSE BLDC GLUCOMTR-MCNC: 74 MG/DL — SIGNIFICANT CHANGE UP (ref 70–99)
GLUCOSE BLDC GLUCOMTR-MCNC: 75 MG/DL — SIGNIFICANT CHANGE UP (ref 70–99)
GLUCOSE BLDC GLUCOMTR-MCNC: 75 MG/DL — SIGNIFICANT CHANGE UP (ref 70–99)

## 2023-10-19 PROCEDURE — 73630 X-RAY EXAM OF FOOT: CPT | Mod: 26,RT

## 2023-10-19 PROCEDURE — 73610 X-RAY EXAM OF ANKLE: CPT | Mod: 26,RT

## 2023-10-19 PROCEDURE — 99222 1ST HOSP IP/OBS MODERATE 55: CPT

## 2023-10-19 RX ORDER — HYDROMORPHONE HYDROCHLORIDE 2 MG/ML
1 INJECTION INTRAMUSCULAR; INTRAVENOUS; SUBCUTANEOUS
Refills: 0 | Status: DISCONTINUED | OUTPATIENT
Start: 2023-10-19 | End: 2023-10-19

## 2023-10-19 RX ORDER — ACETAMINOPHEN 500 MG
1000 TABLET ORAL ONCE
Refills: 0 | Status: DISCONTINUED | OUTPATIENT
Start: 2023-10-19 | End: 2023-10-19

## 2023-10-19 RX ORDER — HYDROMORPHONE HYDROCHLORIDE 2 MG/ML
0.5 INJECTION INTRAMUSCULAR; INTRAVENOUS; SUBCUTANEOUS
Refills: 0 | Status: DISCONTINUED | OUTPATIENT
Start: 2023-10-19 | End: 2023-10-19

## 2023-10-19 RX ORDER — MORPHINE SULFATE 50 MG/1
2 CAPSULE, EXTENDED RELEASE ORAL ONCE
Refills: 0 | Status: DISCONTINUED | OUTPATIENT
Start: 2023-10-19 | End: 2023-10-19

## 2023-10-19 RX ADMIN — Medication 0: at 11:46

## 2023-10-19 RX ADMIN — Medication 650 MILLIGRAM(S): at 06:07

## 2023-10-19 RX ADMIN — LOSARTAN POTASSIUM 100 MILLIGRAM(S): 100 TABLET, FILM COATED ORAL at 06:06

## 2023-10-19 RX ADMIN — PANTOPRAZOLE SODIUM 40 MILLIGRAM(S): 20 TABLET, DELAYED RELEASE ORAL at 06:06

## 2023-10-19 RX ADMIN — HYDROMORPHONE HYDROCHLORIDE 1 MILLIGRAM(S): 2 INJECTION INTRAMUSCULAR; INTRAVENOUS; SUBCUTANEOUS at 10:46

## 2023-10-19 RX ADMIN — CEFEPIME 100 MILLIGRAM(S): 1 INJECTION, POWDER, FOR SOLUTION INTRAMUSCULAR; INTRAVENOUS at 06:06

## 2023-10-19 RX ADMIN — Medication 90 MILLIGRAM(S): at 06:06

## 2023-10-19 RX ADMIN — HYDROMORPHONE HYDROCHLORIDE 1 MILLIGRAM(S): 2 INJECTION INTRAMUSCULAR; INTRAVENOUS; SUBCUTANEOUS at 10:17

## 2023-10-19 RX ADMIN — HYDROMORPHONE HYDROCHLORIDE 1 MILLIGRAM(S): 2 INJECTION INTRAMUSCULAR; INTRAVENOUS; SUBCUTANEOUS at 10:02

## 2023-10-19 NOTE — DISCHARGE NOTE PROVIDER - CARE PROVIDER_API CALL
Zach Roca  Podiatric Medicine  [address] [place] [address]  Phone: [phone]  Fax: [phone]  Follow Up Time: 1-3 days    Polo Conroy  Internal Medicine  [address] [facility], [place], [facility] [address]  [place] [address]  Phone: [phone]  Fax: [phone]  Follow Up Time: 1 week    Rosie Wilkins  Cardiovascular Disease  [address] [place] [id number]  Phone: [phone]  Fax: [phone]  Follow Up Time: 1 week

## 2023-10-19 NOTE — PATIENT PROFILE ADULT - FALL HARM RISK - FALLEN IN PAST
Detail Level: Zone Detail Level: Detailed Quality 337: Tuberculosis Prevention For Psoriasis And Psoriatic Arthritis Patients On A Biological Immune Response Modifier: Patient has a documented negative annual TB screening. No

## 2023-10-19 NOTE — DISCHARGE NOTE PROVIDER - NSDCCPCAREPLAN_GEN_ALL_CORE_FT
PRINCIPAL DISCHARGE DIAGNOSIS  Diagnosis: Foot infection  Assessment and Plan of Treatment: complete 14 d of Doxy and Augmentin  folllow surgical post op instructions given by podiatry  [facility] Podiatry  [facility] Vascular Cardio/Vascular surgery  [facility] PMD      SECONDARY DISCHARGE DIAGNOSES  Diagnosis: Peripheral vascular insufficiency  Assessment and Plan of Treatment:     Diagnosis: DM (diabetes mellitus)  Assessment and Plan of Treatment:

## 2023-10-19 NOTE — PATIENT PROFILE ADULT - FALL HARM RISK - HARM RISK INTERVENTIONS

## 2023-10-19 NOTE — PATIENT PROFILE ADULT - NSPROPTRIGHTCAREGIVER_GEN_A_NUR
declines Complex Repair And Epidermal Autograft Text: The defect edges were debeveled with a #15 scalpel blade.  The primary defect was closed partially with a complex linear closure.  Given the location of the defect, shape of the defect and the proximity to free margins an epidermal autograft was deemed most appropriate to repair the remaining defect.  The graft was trimmed to fit the size of the remaining defect.  The graft was then placed in the primary defect, oriented appropriately, and sutured into place.

## 2023-10-19 NOTE — CONSULT NOTE ADULT - ASSESSMENT
#This consult serves only as a perioperative cardiac risk stratification and evaluation to predict 30-day cardiac complications risk and mortality. The decision to proceed with the surgery/procedure is made by the performing physician and the patient.       ECHO 9/6/2023: EF 55%, Normal global LVSF, Grade 1 DD LVMF  NM Nuclear Stress Test 9/6/2023: No evidence of ischemia   Pt was previously advised to go RLE peripheral angiography/angioplasty, but is refusing, as he is fearful it will affect his kidney function.      - Currently no active cardiac conditions. No signs of ischemia, or ADHF  - Able to walk >4 METS without any anginal symptoms   - At low-intermediate risk for perioperative major adverse cardiac events  - No further cardiac workup is indicated at this time  - All other workup per primary team

## 2023-10-19 NOTE — CONSULT NOTE ADULT - SUBJECTIVE AND OBJECTIVE BOX
INFECTIOUS DISEASE CONSULT NOTE    Patient is a 58y old  Male who presents with a chief complaint of   HPI:  58 year old male with PMH of DM, charcot foot, CKD, gerd, s/p placement of external fixator by podiatry in 9/28  was sent in by podiatrist for removal of external fixator. Pt noticed Right foot oozing from Ex-Fix pin sites & RLE pain. He was seen by Dr. Roca in o/p Podiatry clinic today who told patient to come to ED for admission & RLE removal of hardware.  Pt reports mild pain RLE.   He denies fever, chills, N/V/D/C, abdominal pain, CP, SOB, urinary complaints.   In ED pt was seen by podiatry who is planning OR tomorrow 10/19/23 for Removal of External Fixator, RLE. (18 Oct 2023 18:38)         Prior hospital charts reviewed [Yes]  Primary team notes reviewed [Yes]  Other consultant notes reviewed [Yes]    REVIEW OF SYSTEMS:  CONSTITUTIONAL: No fever or chills  HEAD: No lesion on scalp  EYES: No visual disturbance  ENT: No sore throat  RESPIRATORY: No cough, no shortness of breath  CARDIOVASCULAR: No chest pain or palpitations  GASTROINTESTINAL: No abdominal or epigastric pain  GENITOURINARY: No dysuria  NEUROLOGICAL: No headache/dizziness  MUSCULOSKELETAL: No joint pain, erythema, or swelling; no back pain  SKIN: No itching, rashes  All other ROS negative except noted above    PAST MEDICAL & SURGICAL HISTORY:  DM (diabetes mellitus)      HTN (hypertension)      HLD (hyperlipidemia)      Herpes labialis      Anxiety      GERD (gastroesophageal reflux disease)      Kidney stones  20 years ago      Kidney disease  stage 4      Chronic kidney disease, unspecified CKD stage      Diabetic Charcot foot      S/P foot surgery, right  x 5 ( 2006 - 2013 )      Kidney stone  Removed by Laser x 2 ( 20 years ago )      H/O arthroscopy of right knee          SOCIAL HISTORY:  - Born in [place], migrated [place] in 19XX  - Currently working as / Retired  - Lives with _____; no pets  - No recent travel  - Denies tobacco use  - Denies alcohol use  - Denies illicit drug use  - Currently sexually active, has one male/female sexual partner    FAMILY HISTORY:  Family history of cancer in father (Father)  Lung        Allergies:  No Known Allergies      ANTIMICROBIALS:  cefepime   IVPB 1000 every 12 hours  vancomycin  IVPB 1000 every 24 hours      ANTIMICROBIALS (past 90 days):  MEDICATIONS  (STANDING):  cefepime   IVPB   100 mL/Hr IV Intermittent (10-18-23 @ 18:24)    cefepime   IVPB   100 mL/Hr IV Intermittent (10-19-23 @ 06:06)    vancomycin  IVPB.   250 mL/Hr IV Intermittent (10-18-23 @ 23:50)        OTHER MEDS:   MEDICATIONS  (STANDING):  acetaminophen     Tablet .. 650 every 6 hours PRN  acetaminophen   IVPB .. 1000 once PRN  ALPRAZolam 0.5 four times a day PRN  dextrose 50% Injectable 25 once  dextrose 50% Injectable 12.5 once  dextrose 50% Injectable 25 once  dextrose Oral Gel 15 once PRN  glucagon  Injectable 1 once  heparin   Injectable 5000 every 12 hours  HYDROmorphone  Injectable 1 every 10 minutes PRN  HYDROmorphone  Injectable 0.5 every 10 minutes PRN  insulin glargine Injectable (LANTUS) 20 every morning  insulin lispro (ADMELOG) corrective regimen sliding scale  three times a day before meals  insulin lispro Injectable (ADMELOG) 8 three times a day before meals  losartan 100 daily  morphine  - Injectable 2 once PRN  NIFEdipine XL 90 daily  pantoprazole    Tablet 40 before breakfast      VITALS:  Vital Signs Last 24 Hrs  T(F): 99 (10-19-23 @ 05:11), Max: 100.1 (10-18-23 @ 22:53)    Vital Signs Last 24 Hrs  HR: 91 (10-19-23 @ 08:54) (86 - 91)  BP: 149/69 (10-19-23 @ 08:54) (129/67 - 149/69)  RR: 18 (10-19-23 @ 08:54)  SpO2: 94% (10-19-23 @ 08:54) (94% - 100%)  Wt(kg): --    EXAM:  GENERAL: NAD, lying in bed  HEAD: No head lesions  EYES: Conjunctiva pink and cornea white  EAR, NOSE, MOUTH, THROAT: Normal external ears and nose, no discharges; moist mucous membranes  NECK: Supple, nontender to palpation; no JVD  RESPIRATORY: Clear to auscultation bilaterally  CARDIOVASCULAR: S1 S2  GASTROINTESTINAL: Soft, nontender, nondistended; normoactive bowel sounds  EXTREMITIES: No clubbing, cyanosis, or petal edema  NERVOUS SYSTEM: Alert and oriented to person, time, place and situation, speech clear. No focal deficits   MUSCULOSKELETAL: No joint erythema, swelling or pain  SKIN: No rashes or lesions, no superficial thrombophlebitis  PSYCH: Normal affect    Labs:                        7.3    11.57 )-----------( 820      ( 18 Oct 2023 17:07 )             23.0     10-18    136  |  95<L>  |  61<HH>  ----------------------------<  75  5.0   |  25  |  3.4<H>    Ca    8.7      18 Oct 2023 17:07    TPro  6.9  /  Alb  3.5  /  TBili  <0.2  /  DBili  x   /  AST  17  /  ALT  23  /  AlkPhos  248<H>  10-18      WBC Trend:  WBC Count: 11.57 (10-18-23 @ 17:07)  WBC Count: 14.38 (10-16-23 @ 01:00)      Auto Neutrophil #: 8.18 K/uL (10-18-23 @ 17:07)  Auto Neutrophil #: 10.58 K/uL (10-16-23 @ 01:00)  Auto Neutrophil #: 5.52 K/uL (10-03-23 @ 04:30)  Auto Neutrophil #: 7.90 K/uL (10-02-23 @ 16:00)  Auto Neutrophil #: 6.08 K/uL (10-01-23 @ 10:30)      Creatine Trend:  Creatinine: 3.4 (10-18)  Creatinine: 3.5 (10-16)      Liver Biochemical Testing Trend:  Alanine Aminotransferase (ALT/SGPT): 23 (10-18)  Alanine Aminotransferase (ALT/SGPT): 21 (10-16)  Alanine Aminotransferase (ALT/SGPT): <5 (09-30)  Alanine Aminotransferase (ALT/SGPT): 13 (09-14)  Alanine Aminotransferase (ALT/SGPT): 10 (09-07)  Aspartate Aminotransferase (AST/SGOT): 17 (10-18-23 @ 17:07)  Aspartate Aminotransferase (AST/SGOT): 16 (10-16-23 @ 01:00)  Aspartate Aminotransferase (AST/SGOT): 9 (09-30-23 @ 10:58)  Aspartate Aminotransferase (AST/SGOT): 12 (09-14-23 @ 14:39)  Aspartate Aminotransferase (AST/SGOT): 13 (09-07-23 @ 06:27)  Bilirubin Total: <0.2 (10-18)  Bilirubin Total: <0.2 (10-16)  Bilirubin Total: <0.2 (09-30)  Bilirubin Total: <0.2 (09-14)  Bilirubin Total: <0.2 (09-07)      Trend LDH      Auto Eosinophil %: 1.4 % (10-18-23 @ 17:07)      Urinalysis Basic - ( 18 Oct 2023 17:07 )    Color: x / Appearance: x / SG: x / pH: x  Gluc: 75 mg/dL / Ketone: x  / Bili: x / Urobili: x   Blood: x / Protein: x / Nitrite: x   Leuk Esterase: x / RBC: x / WBC x   Sq Epi: x / Non Sq Epi: x / Bacteria: x        MICROBIOLOGY:      Lactate, Blood: 0.5 (10-18 @ 17:07)    A1C with Estimated Average Glucose Result: 9.9 % (09-14-23 @ 14:39)      RADIOLOGY:  imaging below personally reviewed    < from: VA Duplex Lower Extrem Arterial, Bilat (10.18.23 @ 19:19) >  Impression:    Possible right posterior tibial artery occlusion.    Mild left common femoral artery stenosis. Moderate superficial femoral   artery stenosis.    ICD-10:I73.89    < end of copied text >      < from: Xray Chest 1 View AP/PA (10.18.23 @ 16:32) >  IMPRESSION:    No evidence of acute cardiopulmonary disease.    < end of copied text >     INFECTIOUS DISEASE CONSULT NOTE    Patient is a 58y old  Male who presents with a chief complaint of   HPI:  58 year old male with PMH of DM, charcot foot, CKD, gerd, s/p placement of external fixator by podiatry in 9/28  was sent in by podiatrist for removal of external fixator. Pt noticed Right foot oozing from Ex-Fix pin sites & RLE pain. He was seen by Dr. Roca in o/p Podiatry clinic today who told patient to come to ED for admission & RLE removal of hardware.  Pt reports mild pain RLE.   He denies fever, chills, N/V/D/C, abdominal pain, CP, SOB, urinary complaints.   In ED pt was seen by podiatry who is planning OR tomorrow 10/19/23 for Removal of External Fixator, RLE. (18 Oct 2023 18:38)     Prior hospital charts reviewed [Yes]  Primary team notes reviewed [Yes]  Other consultant notes reviewed [Yes]    REVIEW OF SYSTEMS:  CONSTITUTIONAL: No fever or chills  HEAD: No lesion on scalp  EYES: No visual disturbance  ENT: No sore throat  RESPIRATORY: No cough, no shortness of breath  CARDIOVASCULAR: No chest pain or palpitations  GASTROINTESTINAL: No abdominal or epigastric pain  GENITOURINARY: No dysuria  NEUROLOGICAL: No headache/dizziness  MUSCULOSKELETAL: No joint pain, erythema, or swelling; no back pain  SKIN: Post surgical changes on right foot, distal right foot is pale  All other ROS negative except noted above    PAST MEDICAL & SURGICAL HISTORY:  DM (diabetes mellitus)  HTN (hypertension)  HLD (hyperlipidemia)  Herpes labialis  Anxiety  GERD (gastroesophageal reflux disease)  Kidney stones  20 years ago  Kidney disease  stage 4  Chronic kidney disease, unspecified CKD stage  Diabetic Charcot foot  S/P foot surgery, right  x 5 ( 2006 - 2013 )  Kidney stone  Removed by Laser x 2 ( 20 years ago )  H/O arthroscopy of right knee      SOCIAL HISTORY:  - No recent travel  - Former tobacco use  - Occasional alcohol use  - Denies illicit drug use    FAMILY HISTORY:  Family history of cancer in father (Father)  Lung    Allergies:  No Known Allergies      ANTIMICROBIALS:  cefepime   IVPB 1000 every 12 hours  vancomycin  IVPB 1000 every 24 hours      ANTIMICROBIALS (past 90 days):  MEDICATIONS  (STANDING):  cefepime   IVPB   100 mL/Hr IV Intermittent (10-18-23 @ 18:24)    cefepime   IVPB   100 mL/Hr IV Intermittent (10-19-23 @ 06:06)    vancomycin  IVPB.   250 mL/Hr IV Intermittent (10-18-23 @ 23:50)      OTHER MEDS:   MEDICATIONS  (STANDING):  acetaminophen     Tablet .. 650 every 6 hours PRN  acetaminophen   IVPB .. 1000 once PRN  ALPRAZolam 0.5 four times a day PRN  dextrose 50% Injectable 25 once  dextrose 50% Injectable 12.5 once  dextrose 50% Injectable 25 once  dextrose Oral Gel 15 once PRN  glucagon  Injectable 1 once  heparin   Injectable 5000 every 12 hours  HYDROmorphone  Injectable 1 every 10 minutes PRN  HYDROmorphone  Injectable 0.5 every 10 minutes PRN  insulin glargine Injectable (LANTUS) 20 every morning  insulin lispro (ADMELOG) corrective regimen sliding scale  three times a day before meals  insulin lispro Injectable (ADMELOG) 8 three times a day before meals  losartan 100 daily  morphine  - Injectable 2 once PRN  NIFEdipine XL 90 daily  pantoprazole    Tablet 40 before breakfast      VITALS:  Vital Signs Last 24 Hrs  T(F): 99 (10-19-23 @ 05:11), Max: 100.1 (10-18-23 @ 22:53)    Vital Signs Last 24 Hrs  HR: 91 (10-19-23 @ 08:54) (86 - 91)  BP: 149/69 (10-19-23 @ 08:54) (129/67 - 149/69)  RR: 18 (10-19-23 @ 08:54)  SpO2: 94% (10-19-23 @ 08:54) (94% - 100%)  Wt(kg): --    EXAM:  GENERAL: NAD, lying in bed  HEAD: No head lesions  EYES: Conjunctiva pink and cornea white  EAR, NOSE, MOUTH, THROAT: Normal external ears and nose, no discharges; moist mucous membranes;   NECK: Supple, nontender to palpation; no JVD  RESPIRATORY: Clear to auscultation bilaterally  CARDIOVASCULAR: S1 S2  GASTROINTESTINAL: Soft, nontender, nondistended; normoactive bowel sounds  EXTREMITIES: No clubbing, cyanosis, or petal edema  NERVOUS SYSTEM: Alert and oriented to person, time, place and situation, speech clear. No focal deficits   MUSCULOSKELETAL: Right foot post surgical changes. ischemic changes at distal right foot  SKIN: No rashes or lesions, no superficial thrombophlebitis  PSYCH: Normal affect    Labs:                        7.3    11.57 )-----------( 820      ( 18 Oct 2023 17:07 )             23.0     10-18    136  |  95<L>  |  61<HH>  ----------------------------<  75  5.0   |  25  |  3.4<H>    Ca    8.7      18 Oct 2023 17:07    TPro  6.9  /  Alb  3.5  /  TBili  <0.2  /  DBili  x   /  AST  17  /  ALT  23  /  AlkPhos  248<H>  10-18      WBC Trend:  WBC Count: 11.57 (10-18-23 @ 17:07)  WBC Count: 14.38 (10-16-23 @ 01:00)      Auto Neutrophil #: 8.18 K/uL (10-18-23 @ 17:07)  Auto Neutrophil #: 10.58 K/uL (10-16-23 @ 01:00)  Auto Neutrophil #: 5.52 K/uL (10-03-23 @ 04:30)  Auto Neutrophil #: 7.90 K/uL (10-02-23 @ 16:00)  Auto Neutrophil #: 6.08 K/uL (10-01-23 @ 10:30)      Creatine Trend:  Creatinine: 3.4 (10-18)  Creatinine: 3.5 (10-16)      Liver Biochemical Testing Trend:  Alanine Aminotransferase (ALT/SGPT): 23 (10-18)  Alanine Aminotransferase (ALT/SGPT): 21 (10-16)  Alanine Aminotransferase (ALT/SGPT): <5 (09-30)  Alanine Aminotransferase (ALT/SGPT): 13 (09-14)  Alanine Aminotransferase (ALT/SGPT): 10 (09-07)  Aspartate Aminotransferase (AST/SGOT): 17 (10-18-23 @ 17:07)  Aspartate Aminotransferase (AST/SGOT): 16 (10-16-23 @ 01:00)  Aspartate Aminotransferase (AST/SGOT): 9 (09-30-23 @ 10:58)  Aspartate Aminotransferase (AST/SGOT): 12 (09-14-23 @ 14:39)  Aspartate Aminotransferase (AST/SGOT): 13 (09-07-23 @ 06:27)  Bilirubin Total: <0.2 (10-18)  Bilirubin Total: <0.2 (10-16)  Bilirubin Total: <0.2 (09-30)  Bilirubin Total: <0.2 (09-14)  Bilirubin Total: <0.2 (09-07)      Trend LDH      Auto Eosinophil %: 1.4 % (10-18-23 @ 17:07)      Urinalysis Basic - ( 18 Oct 2023 17:07 )    Color: x / Appearance: x / SG: x / pH: x  Gluc: 75 mg/dL / Ketone: x  / Bili: x / Urobili: x   Blood: x / Protein: x / Nitrite: x   Leuk Esterase: x / RBC: x / WBC x   Sq Epi: x / Non Sq Epi: x / Bacteria: x        MICROBIOLOGY:      Lactate, Blood: 0.5 (10-18 @ 17:07)    A1C with Estimated Average Glucose Result: 9.9 % (09-14-23 @ 14:39)      RADIOLOGY:  imaging below personally reviewed    < from: VA Duplex Lower Extrem Arterial, Bilat (10.18.23 @ 19:19) >  Impression:    Possible right posterior tibial artery occlusion.    Mild left common femoral artery stenosis. Moderate superficial femoral   artery stenosis.    ICD-10:I73.89    < end of copied text >      < from: Xray Chest 1 View AP/PA (10.18.23 @ 16:32) >  IMPRESSION:    No evidence of acute cardiopulmonary disease.    < end of copied text >

## 2023-10-19 NOTE — CONSULT NOTE ADULT - SUBJECTIVE AND OBJECTIVE BOX
HPI:  58 year old male with PMH of DM, charcot foot, CKD, gerd, s/p placement of external fixator by podiatry in 9/28  was sent in by podiatrist for removal of external fixator. Pt noticed Right foot oozing from Ex-Fix pin sites & RLE pain. He was seen by Dr. Roca in o/p Podiatry clinic today who told patient to come to ED for admission & RLE removal of hardware.  Pt reports mild pain RLE.   He denies fever, chills, N/V/D/C, abdominal pain, CP, SOB, urinary complaints.   In ED pt was seen by podiatry who is planning OR tomorrow 10/19/23 for Removal of External Fixator, RLE. (18 Oct 2023 18:38)        HPI-Cardiology   Pt with the above hx, evaluated at bedside. Consulted for risk stratification. Pt denies CP, SOB, palpitations or dizziness/lightheadedness. Pt was advised for RLE peripheral angiography/angioplasty, but is refusing, as Pt is fearful it will affect his kidney function. Radiology tests and hospital records, were reviewed, as well as previous notes on this patient.        PAST MEDICAL & SURGICAL HISTORY  DM (diabetes mellitus)    HTN (hypertension)    HLD (hyperlipidemia)    Herpes labialis    Anxiety    GERD (gastroesophageal reflux disease)    Kidney stones  20 years ago    Kidney disease  stage 4    Chronic kidney disease, unspecified CKD stage    Diabetic Charcot foot    S/P foot surgery, right  x 5 ( 2006 - 2013 )    Kidney stone  Removed by Laser x 2 ( 20 years ago )    H/O arthroscopy of right knee        FAMILY HISTORY:  FAMILY HISTORY:  Family history of cancer in father (Father)  Lung          ALLERGIES:  No Known Allergies      MEDICATIONS:  MEDICATIONS  (STANDING):  cefepime   IVPB 1000 milliGRAM(s) IV Intermittent every 12 hours  dextrose 5%. 1000 milliLiter(s) (50 mL/Hr) IV Continuous <Continuous>  dextrose 5%. 1000 milliLiter(s) (100 mL/Hr) IV Continuous <Continuous>  dextrose 50% Injectable 12.5 Gram(s) IV Push once  dextrose 50% Injectable 25 Gram(s) IV Push once  dextrose 50% Injectable 25 Gram(s) IV Push once  glucagon  Injectable 1 milliGRAM(s) IntraMuscular once  heparin   Injectable 5000 Unit(s) SubCutaneous every 12 hours  insulin glargine Injectable (LANTUS) 20 Unit(s) SubCutaneous every morning  insulin lispro (ADMELOG) corrective regimen sliding scale   SubCutaneous three times a day before meals  insulin lispro Injectable (ADMELOG) 8 Unit(s) SubCutaneous three times a day before meals  losartan 100 milliGRAM(s) Oral daily  NIFEdipine XL 90 milliGRAM(s) Oral daily  pantoprazole    Tablet 40 milliGRAM(s) Oral before breakfast  sodium bicarbonate 650 milliGRAM(s) Oral three times a day  vancomycin  IVPB 1000 milliGRAM(s) IV Intermittent every 24 hours    MEDICATIONS  (PRN):  acetaminophen     Tablet .. 650 milliGRAM(s) Oral every 6 hours PRN Temp greater or equal to 38C (100.4F), Mild Pain (1 - 3)  ALPRAZolam 0.5 milliGRAM(s) Oral four times a day PRN anxiety  dextrose Oral Gel 15 Gram(s) Oral once PRN Blood Glucose LESS THAN 70 milliGRAM(s)/deciliter  morphine  - Injectable 2 milliGRAM(s) IV Push once PRN Severe Pain (7 - 10)      HOME MEDICATIONS:  Home Medications:  losartan 100 mg oral tablet: 1 tab(s) orally once a day (18 Oct 2023 19:25)  NIFEdipine 90 mg oral tablet, extended release: 1 tab(s) orally once a day (18 Oct 2023 19:25)  pravastatin 40 mg oral tablet: 1 tab(s) orally once a day (18 Oct 2023 19:25)  sodium bicarbonate 650 mg oral tablet: 1 tab(s) orally 3 times a day (18 Oct 2023 19:25)  Xanax 1 mg oral tablet: 0.5 tab(s) orally 4 times a day, As Needed (18 Oct 2023 19:25)      VITALS:   T(F): 99 (10-19 @ 05:11), Max: 100.1 (10-18 @ 22:53)  HR: 91 (10-19 @ 08:54) (86 - 91)  BP: 149/69 (10-19 @ 08:54) (129/67 - 149/69)  BP(mean): --  RR: 18 (10-19 @ 08:54) (18 - 18)  SpO2: 94% (10-19 @ 08:54) (94% - 100%)        REVIEW OF SYSTEMS:  See HPI      PHYSICAL EXAM:  NEURO: patient is awake , alert and oriented  GEN: Not in acute distress  NECK: no thyroid enlargement, no JVD  LUNGS: Clear to auscultation bilaterally   CARDIOVASCULAR: S1/S2 present, RRR , no murmurs or rubs, no carotid bruits,  + PP bilaterally  ABD: Soft, non-tender, non-distended, +BS  EXT: No REY  SKIN: Intact      LABS:                        7.3    11.57 )-----------( 820      ( 18 Oct 2023 17:07 )             23.0     10-18    136  |  95<L>  |  61<HH>  ----------------------------<  75  5.0   |  25  |  3.4<H>    Ca    8.7      18 Oct 2023 17:07    TPro  6.9  /  Alb  3.5  /  TBili  <0.2  /  DBili  x   /  AST  17  /  ALT  23  /  AlkPhos  248<H>  10-18    PT/INR - ( 18 Oct 2023 21:12 )   PT: 13.30 sec;   INR: 1.16 ratio         PTT - ( 18 Oct 2023 21:12 )  PTT:37.1 sec  Lactate, Blood: 0.5 mmol/L *L* (10-18-23 @ 17:07)          RADIOLOGY:  -CXR:  < from: TTE Echo Complete w/o Contrast w/ Doppler (09.06.23 @ 08:42) >  Summary:   1. Normal global left ventricular systolic function.   2.LV Ejection Fraction by Knutson's Method with a biplane EF of 55 %.   3. Mildly increased LV wall thickness.   4. Normal left ventricular internal cavity size.   5. Spectral Doppler shows impaired relaxation pattern of left   ventricular myocardial filling (Grade I diastolic dysfunction).   6. Normal left atrial size.   7. Normal right atrial size.   8. Mild thickening of the anterior mitral valve leaflet.   9. No evidence of mitral valve regurgitation.    < end of copied text >            < from: NM Nuclear Stress Multiple (09.06.23 @ 11:41) >  Impression:  1. NORMAL STRESS AND REST MYOCARDIAL PERFUSION SPECT TOMOGRAPHY, WITH NO   EVIDENCE FOR ISCHEMIA AT THE LEVEL OF EXERCISE ATTAINED.  2. NORMAL RESTING LEFT VENTRICULAR WALL MOTION AND WALL THICKENING.  3. LEFT VENTRICULAREJECTION FRACTION OF 68 % WHICH IS WITHIN RANGE OF   NORMAL.    --- End of Report ---    < end of copied text >      ECG:  < from: 12 Lead ECG (10.18.23 @ 17:21) >  Normal sinus rhythm  Normal ECG    Confirmed by ISIAH HILLS, BRENDAN ([id number]) on 10/19/2023 7:00:19 AM    < end of copied text >

## 2023-10-19 NOTE — DISCHARGE NOTE NURSING/CASE MANAGEMENT/SOCIAL WORK - PATIENT PORTAL LINK FT
You can access the FollowMyHealth Patient Portal offered by [facility] by registering at the following website: [email or web address]. By joining [facility]’s FollowMyHealth portal, you will also be able to view your health information using other applications (apps) compatible with our system.

## 2023-10-19 NOTE — DISCHARGE NOTE PROVIDER - PROVIDER TOKENS
PROVIDER:[TOKEN:[[id number]],FOLLOWUP:[1-3 days]],PROVIDER:[TOKEN:[[id number]],FOLLOWUP:[1 week]],PROVIDER:[TOKEN:[[id number]],FOLLOWUP:[1 week]]

## 2023-10-19 NOTE — PATIENT PROFILE ADULT - NSPROGENBLOODRESTRICT_GEN_A_NUR
[facility] [address] EMERGENCY DEPARTMENT    Patient: Valdo Baldwin  MRN: [id number]  : 1961  Date of Evaluation: 5/10/2022  ED Provider: Lisa Drew PA-C    Chief Complaint       Chief Complaint   Patient presents with    Leg Pain    Other     cellulitis of both feet       iKmberley Singleton is a 57 y.o. female who presents to the emergency department for bilateral foot pain. Onset was 2 days ago while she was at [employer]. Atraumatic. Constant pain. Localized to the entire right foot and to the left ankle. Sharp, shooting. Severity 10/10. She states she was at the [facility] ED earlier this evening and was diagnosed with cellulitis--started on Bactrim and Keflex. She reports she has been taking the ABX without relief. She couldn't sleep so she came in here for evaluation. Denies fever or chills. Denies cp or sob. She has a history of gout but states gout is a walk in the park compared to this pain. ROS     CONSTITUTIONAL:  Denies fever. MUSCULOSKELETAL:  + bilateral foot pain. INTEGUMENT:  Denies skin changes. NEUROLOGIC:  Denies any numbness/tingling. Past History     Past Medical History:   Diagnosis Date    Arthritis     Asthma     Bipolar 1 disorder ([address]     COPD (chronic obstructive pulmonary disease) [facility]     Depression     Diabetes mellitus (HCC)     Edema     Glaucoma     History of Doppler ultrasound 2018    Arterial-bilateral VERONICA's show normal arterial flow. No signif occlusive arterial disease.     History of nuclear stress test 2018    cardiolite-EF45%,normal    Hx of Doppler echocardiogram 2018    EF50-60%,no valvular disease    Hyperlipidemia     Hypertension     IBS (irritable bowel syndrome)      Past Surgical History:   Procedure Laterality Date    TUBAL LIGATION       Social History     Socioeconomic History    Marital status: Legally      Spouse name: Not on file    Number of children: Not on file    Years of education: Not on file    Highest education level: Not on file   Occupational History    Not on file   Tobacco Use    Smoking status: Former Smoker    Smokeless tobacco: Never Used   Vaping Use    Vaping Use: Never used   Substance and Sexual Activity    Alcohol use: No    Drug use: No    Sexual activity: Not on file   Other Topics Concern    Not on file   Social History Narrative    Not on file     Social Determinants of Health     Financial Resource Strain:     Difficulty of Paying Living Expenses: Not on file   Food Insecurity:     Worried About [address] in the Last Year: Not on file    [address] in the Last Year: Not on file   Transportation Needs:     Lack of Transportation (Medical): Not on file    Lack of Transportation (Non-Medical):  Not on file   Physical Activity:     Days of Exercise per Week: Not on file    Minutes of Exercise per Session: Not on file   Stress:     Feeling of Stress : Not on file   Social Connections:     Frequency of Communication with Friends and Family: Not on file    Frequency of Social Gatherings with Friends and Family: Not on file    Attends Druze Services: Not on file    Active Member of [address] [place] [address] or Organizations: Not on file    Attends Club or Organization Meetings: Not on file    Marital Status: Not on file   Intimate Partner Violence:     Fear of Current or Ex-Partner: Not on file    Emotionally Abused: Not on file    Physically Abused: Not on file    Sexually Abused: Not on file   Housing Stability:     Unable to Pay for Housing in the Last Year: Not on file    Number of [place] in the Last Year: Not on file    Unstable Housing in the Last Year: Not on file       Medications/Allergies     Previous Medications    ACETAMINOPHEN (APAP EXTRA STRENGTH) 500 MG TABLET    Take 1 tablet by mouth every 6 hours as needed for Pain    ALBUTEROL (PROVENTIL) (2.5 MG/3ML) 0.083% NEBULIZER SOLUTION        AMLODIPINE-BENAZEPRIL (LOTREL) 10-40 MG PER CAPSULE    take 1 capsule by mouth once daily    ASCORBIC ACID (VITAMIN C) 250 MG TABLET    Take 1,000 mg by mouth daily    BACITRACIN-NEOMYCIN-POLYMYXIN B-HYDROCORTISONE 1 % OINTMENT    Apply topically 2 times daily. CHOLECALCIFEROL (VITAMIN D3) 50 MCG (2000 UT) CAPS    Take 1 capsule by mouth    DICYCLOMINE (BENTYL) 20 MG TABLET    Take 20 mg by mouth every 6 hours    DULAGLUTIDE (TRULICITY) 1.5 NO/2.4CP SOPN    Inject 1.5 mg into the skin once a week    DULOXETINE (CYMBALTA) 60 MG CAPSULE    Take 60 mg by mouth daily. FUROSEMIDE (LASIX) 20 MG TABLET    take 1/2 tablet every few weeks    GUAIFENESIN (MUCINEX) 600 MG EXTENDED RELEASE TABLET    Take 1 tablet by mouth 2 times daily    INDOMETHACIN (INDOCIN) 50 MG CAPSULE    Take 1 capsule by mouth 2 times daily (with meals) for 10 days    INSULIN GLARGINE (LANTUS) 100 UNIT/ML INJECTION    Inject 50 Units into the skin nightly     INSULIN LISPRO (HUMALOG) 100 UNIT/ML INJECTION VIAL    Inject 30 Units into the skin 3 times daily (before meals)    LINAGLIPTIN (TRADJENTA) 5 MG TABLET    Take 1 tablet by mouth daily    LUMIGAN 0.01 % SOLN OPHTHALMIC DROPS    Place 1 drop into both eyes daily     METFORMIN (GLUCOPHAGE) 850 MG TABLET    Take 1 tablet by mouth 2 times daily (with meals)    MISC. DEVICES (CANE) MISC    1 Device by Does not apply route daily as needed.     MOMETASONE-FORMOTEROL (DULERA) 100-5 MCG/ACT INHALER    Inhale 2 puffs into the lungs 2 times daily    ONDANSETRON (ZOFRAN ODT) 4 MG DISINTEGRATING TABLET    Take 1 tablet by mouth every 8 hours as needed for Nausea    PANTOPRAZOLE (PROTONIX) 40 MG TABLET    Take 40 mg by mouth daily    RA ALCOHOL SWABS 70 % PADS    use as directed three times a day    SIMVASTATIN (ZOCOR) 40 MG TABLET    Take 1 tablet by mouth nightly    TIOTROPIUM (SPIRIVA RESPIMAT) 2.5 MCG/ACT AERS INHALER    Inhale 2 puffs into the lungs daily    VENTOLIN  (90 BASE) MCG/ACT INHALER         Allergies   Allergen Reactions    Codeine     Depakote [Divalproex Sodium]     Adhesive Tape Itching    Toradol [Ketorolac Tromethamine] Other (See Comments)     Headache for 5-6 days        Physical Exam       ED Triage Vitals [05/10/22 0315]   BP Temp Temp Source Pulse Resp SpO2 Height Weight   (!) 163/82 97.6 °F (36.4 °C) Oral 100 18 97 % -- --     GENERAL APPEARANCE:  Well-developed, well-nourished, no acute distress. HEAD:  NC/AT. LUNGS:   Respirations unlabored. EXTREMITIES:  No acute deformities. No appreciable swelling. Diffuse tenderness to the dorsal right foot and to the lateral left ankle. DP intact. Normal ROM. SKIN:  Warm and dry. No bruising, erythema, warmth, abrasions, lacerations, ulcers. NEUROLOGICAL:  Alert and oriented. PSYCHIATRIC:  Normal mood. Diagnostics     Labs:  Labs Reviewed   URIC ACID - Abnormal; Notable for the following components:       Result Value    Uric Acid 6.6 (*)     All other components within normal limits     Radiographs:  XR FOOT LEFT (MIN 3 VIEWS)    Result Date: 5/10/2022  EXAMINATION: THREE XRAY VIEWS OF THE LEFT FOOT 5/10/2022 3:56 am COMPARISON: None. HISTORY: ORDERING SYSTEM PROVIDED HISTORY: pain x 2 days TECHNOLOGIST PROVIDED HISTORY: Reason for exam:->pain x 2 days Reason for Exam: pain x 2 days FINDINGS: Calcaneal enthesopathy is seen. There is no acute fracture, dislocation, or bone destruction. The joint spaces are well maintained. There is no radiopaque foreign body. Calcaneal enthesopathy. No acute osseous abnormality. XR FOOT RIGHT (MIN 3 VIEWS)    Result Date: 5/10/2022  EXAMINATION: THREE XRAY VIEWS OF THE RIGHT FOOT 5/10/2022 3:56 am COMPARISON: None. HISTORY: ORDERING SYSTEM PROVIDED HISTORY: pain x 2 days TECHNOLOGIST PROVIDED HISTORY: Reason for exam:->pain x 2 days Reason for Exam: pain x 2 days Right foot pain FINDINGS: Calcaneal enthesopathy is noted.   There is no acute fracture, dislocation, or bone destruction. Calcaneal enthesopathy. No acute osseous abnormality. XR FOOT RIGHT (MIN 3 VIEWS)    Result Date: 4/22/2022  EXAMINATION: THREE XRAY VIEWS OF THE RIGHT FOOT 4/21/2022 10:51 am COMPARISON: 05/27/2019 HISTORY: ORDERING SYSTEM PROVIDED HISTORY: Right foot pain TECHNOLOGIST PROVIDED HISTORY: Reason for Exam: patient reports she has gout, she had a knot right dorsal midfoot for 10 days after her gout began FINDINGS: No stress or traumatic fractures are detected within the right foot. Despite the given history, no pressure erosions are seen to suggest bony involvement of gout. No soft tissue calcifications are identified. There is polyarticular osteoarthrosis, greatest within the midfoot There are dorsal osteophytes in that region, which may explain the patient's palpable abnormality. Mild dorsal soft tissue swelling is suggested. No soft tissue gas or radiopaque foreign body. There is mild dorsal soft tissue swelling. No soft tissue gas or radiopaque foreign body. No bony changes of gout are found. No soft tissue calcifications are seen. There is polyarticular osteoarthrosis, greatest within the midfoot, where dorsal osteophytes are present, which could explain the patient's palpable abnormality. Nonetheless, if there is concern for a soft tissue nodule, that would not be well evaluated radiography, and in that case MRI would be the preferred examination. ED Course and MDM   -  Patient seen and evaluated in the emergency department. -  Triage and nursing notes reviewed and incorporated. -  Old chart records reviewed and incorporated. -  Supervising physician was Dr. Thong Chavez. Patient was seen independently. -  Work-up included:  see above  -  ED medications:  Norco, morphine  -  Results discussed with patient. Uric slightly elevated. XRs show bilateral calcaneal enthesopathy. We discussed these could both be contributory to her pain.  I do not appreciate any clinical signs of cellulitis but encouraged to complete ABX as prescribed earlier. She was advised on close FU with PCP. Rx Maribel, Indocin. Return as needed. She is agreeable with plan of care and disposition.  -  Disposition:  Home    In light of current events, I did utilize appropriate PPE (including N95 and surgical face mask, safety glasses, and gloves, as recommended by the health facility/national standard best practice, during my bedside interactions with the patient. Final Impression      1.  Bilateral foot pain            DISPOSITION        Fernando Lewis PA-C  [address] [place] [address]  05/10/22 0293 none

## 2023-10-19 NOTE — DISCHARGE NOTE PROVIDER - NSDCFUSCHEDAPPT_GEN_ALL_CORE_FT
Thomas Villalobos  [facility]  CARDIOLOGY [address]  Scheduled Appointment: 10/20/2023    Rosie Wilkins  [facility]  CARDIOLOGY [facility] [address]  Scheduled Appointment: 12/18/2023

## 2023-10-19 NOTE — DISCHARGE NOTE NURSING/CASE MANAGEMENT/SOCIAL WORK - NSDCVIVACCINE_GEN_ALL_CORE_FT
Tdap; 21-Mar-2018 02:44; Wesley Castañeda (RN); Hatch; [id number]; IntraMuscular; Deltoid Right.; 0.5 milliLiter(s); VIS (VIS Published: 09-May-2013, VIS Presented: 21-Mar-2018);

## 2023-10-19 NOTE — PROGRESS NOTE ADULT - ASSESSMENT
58 year old male with PMH of DM, charcot foot, CKD, gerd, s/p placement of external fixator by podiatry in 9/28  was sent in by podiatrist for removal of external fixator.     # Charcot foot s/p placement of external fixator by podiatry in 9/28   - admit to medicine  - podiatry consulted in ED      WBAT-RLE     RLE Lower Extremity Duplex; Pt to get RLE Angiogram as o/p;     OR Thurs 10/19/23; Add-On #1; Removal of External Fixator, RLE     NPO after MN 10/18/23;  - continue Vancomycin, Cefepime  - ID consult  - vascular cardiology consult ( pt follows with Dr. Rosie Farley)  - follow up cultures  - f/u arterial duplex  - f/u XR right ankle and foot  - AM labs    # Normocytic anemia, likely secondary to renal disease  - trend H/H  - active type and screen    # Hx DM   - continue insulin  - monitor FS    #HTN/HLD  - continue home medications  - monitor BP    # CKD   - renal consult     # anxiety   - continue xanax prn    DVT: prophylaxis: Heparin  GI ppx: ppi

## 2023-10-19 NOTE — DISCHARGE NOTE NURSING/CASE MANAGEMENT/SOCIAL WORK - NSDCPEFALRISK_GEN_ALL_CORE
For information on Fall & Injury Prevention, visit: [email or web address] OR  [email or web address] OR  https://www.cdc.gov/steadi/patient.html

## 2023-10-19 NOTE — DISCHARGE NOTE PROVIDER - NSDCMRMEDTOKEN_GEN_ALL_CORE_FT
amoxicillin-clavulanate 500 mg-125 mg oral tablet: 1 tab(s) orally 2 times a day  doxycycline hyclate 100 mg oral tablet: 1 tab(s) orally every 12 hours  Insulin Lispro KwikPen 100 units/mL injectable solution: 8 unit(s) injectable 3 times a day (before meals)  losartan 100 mg oral tablet: 1 tab(s) orally once a day  NIFEdipine 90 mg oral tablet, extended release: 1 tab(s) orally once a day  pravastatin 40 mg oral tablet: 1 tab(s) orally once a day  sodium bicarbonate 650 mg oral tablet: 1 tab(s) orally 3 times a day  Tresiba FlexTouch 200 units/mL subcutaneous solution: 20 subcutaneous once a day  Xanax 1 mg oral tablet: 0.5 tab(s) orally 4 times a day, As Needed

## 2023-10-19 NOTE — CONSULT NOTE ADULT - NS ATTEND AMEND GEN_ALL_CORE FT
58 year old male with PMH of DM, charcot foot, CKD, gerd, s/p placement of external fixator by podiatry in 9/28  was sent in by podiatrist for removal of external fixator. Echo ef 55%, NM Nuclear Stress Test 9/6/2023: No evidence of ischemia . Patient offered stent in leg, He refuses. He denies angina or chf symptoms. Cardiac risk surgery low to intermediate.

## 2023-10-19 NOTE — DISCHARGE NOTE PROVIDER - HOSPITAL COURSE
58 year old male with PMH of DM, charcot foot, CKD, gerd, s/p placement of external fixator by podiatry in 9/28  was sent in by podiatrist for removal of external fixator. Pt noticed Right foot oozing from Ex-Fix pin sites & RLE pain. He was seen by Dr. Roca in o/p Podiatry clinic today who told patient to come to ED for admission & RLE removal of hardware.  Pt reports mild pain RLE.   He denies fever, chills, N/V/D/C, abdominal pain, CP, SOB, urinary complaints.   In ED pt was seen by podiatry and was taken to OR today for Removal of External Fixator, RLE.    Podiatry cleared pt for DC  ID saw pt and also felt he could be DC'd on PO antibx    D/w Dr Conroy he is ok with DC as well    Pt to fup with Vascular and Podiatry  FUP PMD

## 2023-10-19 NOTE — PROGRESS NOTE ADULT - SUBJECTIVE AND OBJECTIVE BOX
58 year old male with PMH of DM, charcot foot, CKD, gerd, s/p placement of external fixator by podiatry in 9/28  was sent in by podiatrist for removal of external fixator. Pt noticed Right foot oozing from Ex-Fix pin sites & RLE pain. He was seen by Dr. Roca in o/p Podiatry clinic today who told patient to come to ED for admission & RLE removal of hardware.  Pt reports mild pain RLE.  He denies fever, chills, N/V/D/C, abdominal pain, CP, SOB, urinary complaints.   In ED pt was seen by podiatry who is planning OR tomorrow 10/19/23 for Removal of External Fixator, RLE    PAST MEDICAL & SURGICAL HISTORY:    DM (diabetes mellitus)  HTN (hypertension)  HLD (hyperlipidemia)  Herpes labialis  Anxiety  GERD (gastroesophageal reflux disease)  Kidney stones  20 years ago  Kidney disease  stage 4  Chronic kidney disease, unspecified CKD stage  Diabetic Charcot foot  S/P foot surgery, right  x 5 ( 2006 - 2013 )  Kidney stone  Removed by Laser x 2 ( 20 years ago )  H/O arthroscopy of right knee    Social History:  Tobacco use: denies  EtOH use: denies  Illicit drug use: denies (18 Oct 2023 18:38)    MEDICATIONS  (STANDING):  cefepime   IVPB 1000 milliGRAM(s) IV Intermittent every 12 hours  dextrose 5%. 1000 milliLiter(s) (50 mL/Hr) IV Continuous <Continuous>  dextrose 5%. 1000 milliLiter(s) (100 mL/Hr) IV Continuous <Continuous>  dextrose 50% Injectable 12.5 Gram(s) IV Push once  dextrose 50% Injectable 25 Gram(s) IV Push once  dextrose 50% Injectable 25 Gram(s) IV Push once  glucagon  Injectable 1 milliGRAM(s) IntraMuscular once  heparin   Injectable 5000 Unit(s) SubCutaneous every 12 hours  insulin glargine Injectable (LANTUS) 20 Unit(s) SubCutaneous every morning  insulin lispro (ADMELOG) corrective regimen sliding scale   SubCutaneous three times a day before meals  insulin lispro Injectable (ADMELOG) 8 Unit(s) SubCutaneous three times a day before meals  losartan 100 milliGRAM(s) Oral daily  NIFEdipine XL 90 milliGRAM(s) Oral daily  pantoprazole    Tablet 40 milliGRAM(s) Oral before breakfast  sodium bicarbonate 650 milliGRAM(s) Oral three times a day  vancomycin  IVPB 1000 milliGRAM(s) IV Intermittent every 24 hours    MEDICATIONS  (PRN):  acetaminophen     Tablet .. 650 milliGRAM(s) Oral every 6 hours PRN Temp greater or equal to 38C (100.4F), Mild Pain (1 - 3)  ALPRAZolam 0.5 milliGRAM(s) Oral four times a day PRN anxiety  dextrose Oral Gel 15 Gram(s) Oral once PRN Blood Glucose LESS THAN 70 milliGRAM(s)/deciliter  morphine  - Injectable 2 milliGRAM(s) IV Push once PRN Severe Pain (7 - 10)    Allergies    No Known Allergies    Intolerances  Vital Signs Last 24 Hrs  T(C): 37.2 (19 Oct 2023 05:11), Max: 37.8 (18 Oct 2023 22:53)  T(F): 99 (19 Oct 2023 05:11), Max: 100.1 (18 Oct 2023 22:53)  HR: 91 (19 Oct 2023 05:11) (86 - 91)  BP: 149/69 (19 Oct 2023 05:11) (129/67 - 149/69)  BP(mean): --  RR: 18 (19 Oct 2023 05:11) (18 - 18)  SpO2: 94% (19 Oct 2023 05:11) (94% - 100%)    Parameters below as of 19 Oct 2023 05:11  Patient On (Oxygen Delivery Method): room air      CAPILLARY BLOOD GLUCOSE      POCT Blood Glucose.: 92 mg/dL (18 Oct 2023 22:57)    GENERAL: NAD, lying in bed comfortably  HEAD:  Atraumatic, Normocephalic  EYES: EOMI  ENT: Moist mucous membranes  NECK: Supple  CHEST/LUNG: Clear to auscultation bilaterally; No rales, rhonchi, wheezing, or rubs. Unlabored respirations  HEART: Regular rate and rhythm; no rubs, or gallops  ABDOMEN: Bowel sounds present; Soft, Nontender, Nondistended  EXTREMITIES:  RLE with  Ischemic changes to dorsal midfoot pin sites, external fixator intact  NERVOUS SYSTEM:  Alert & Oriented X3, speech clear. No deficits   MSK: FROM all 4 extremities, full and equal strength  SKIN: No rashes or lesions    LABS:                           7.3    11.57 )-----------( 820      ( 18 Oct 2023 17:07 )             23.0     10-18    136  |  95<L>  |  61<HH>  ----------------------------<  75  5.0   |  25  |  3.4<H>    Ca    8.7      18 Oct 2023 17:07    TPro  6.9  /  Alb  3.5  /  TBili  <0.2  /  DBili  x   /  AST  17  /  ALT  23  /  AlkPhos  248<H>  10-18    PT/INR - ( 18 Oct 2023 21:12 )   PT: 13.30 sec;   INR: 1.16 ratio         PTT - ( 18 Oct 2023 21:12 )  PTT:37.1 sec    RADIOLOGY    ACC: [id number] EXAM:  DUPLEX LOW ARTERIES COMP BILAT   ORDERED BY:   KYRA MULLINS     PROCEDURE DATE:  10/18/2023          INTERPRETATION:  Clinical History / Reason for exam: This patient is a   58-year-old male with lower extremity pain. Lowerextremity arterial   duplex ultrasound was performed to assess for arterial occlusive disease.    The right external iliac, common femoral, deep femoral, superficial   femoral, popliteal, and anterior tibial arteries appear patent. Low flow   is visualized within the right posterior tibial artery.    Mild arterial stenosis of the left common femoral artery with velocities   of 212 cm/s poststenotic turbulence. Mid superficial femoral artery   stenosis with velocities of 307 cm/s. The left popliteal, anterior   tibial, posterior tibial and peroneal arteries appear patent with no   evidence arterial occlusive disease.    Impression:    Possible right posterior tibial artery occlusion.    Mild left common femoral artery stenosis. Moderate superficial femoral   artery stenosis.    ICD-10:I73.89    --- End of Report ---          ANTONETTE MACDONALD MD; Vascular Fellow  This document has been electronically signed.  VANESSA YA MD; Attending Vascular Surgery  This document has been electronically signed. Oct 19 2023  7:05AM      ACC: [id number] EXAM:  XR CHEST PA LAT 2V   ORDERED BY: VLADIMIR ROCA     PROCEDURE DATE:  09/14/2023          INTERPRETATION:  Clinical History / Reason for exam: Preoperative   evaluation    Comparison : Chest radiograph 5/10/2022.    Technique/Positioning: AP and lateral radiograph of the chest.    Findings:    Support devices: None.    Cardiac/mediastinum/hilum: Unremarkable.    Lung parenchyma/Pleura: No focal consolidation, pleural effusion, or   pneumothorax.    Skeleton/soft tissues: No acute osseous abnormality.    Impression:    No radiographic evidence of acute cardiopulmonary disease.    --- End of Report ---          DIANNE JEFFERSON MD; Resident Radiologist  This document has been electronically signed.  LIZ GERMAN MD; Attending Radiologist  This document has been electronically signed. Sep 14 2023  6:15PM

## 2023-10-19 NOTE — DISCHARGE NOTE PROVIDER - CARE PROVIDERS DIRECT ADDRESSES
,DirectAddress_Unknown,[email or web address],DirectAddress_Unknown

## 2023-10-19 NOTE — CHART NOTE - NSCHARTNOTEFT_GEN_A_CORE
Patient is stable for discharge from podiatry standpoint, no further intervention on this admission. He will follow up with Dr. Roca next week in clinic. Discussed with patient need for vascular intervention, he will follow up for procedure outpatient.     Podiatry   [phone]
Patient is not in ED (?OR)  Will complete Nephrology consult tomorrow if needed

## 2023-10-19 NOTE — CONSULT NOTE ADULT - ASSESSMENT
58 year old male with PMH of DM, charcot foot, CKD, gerd, s/p placement of external fixator by podiatry in 9/28  was sent in by podiatrist for removal of external fixator.    ID is consulted for right foot infection  S/p right foot external fixator for Charcot's foot 9/28, discharged on PO Keflex x 14 days (presumed until 10/11)  Low grade fever 100.1F on admission, mild leukocytosis, was higher on 10/16  Prior tissue culture (9/28) NGTD  BCx x 2 pending  S/p fixator removal 10/18, serous drainage from pin sites, no bleeding, no purulence. Toes cold to touch; no signs of pin tract infection    Antibiotics:  Vancomycin 10/18 ->  Cefepime 10/18 ->      IMPRESSION:      RECOMMENDATIONS:        * THIS IS AN INCOMPLETE NOTE. FINAL RECOMMENDATION IS PENDING *       58 year old male with PMH of DM, charcot foot, CKD, gerd, s/p placement of external fixator by podiatry in 9/28  was sent in by podiatrist for removal of external fixator.    ID is consulted for right foot infection  S/p right foot external fixator for Charcot's foot 9/28, discharged on PO Keflex x 14 days (presumed until 10/11)  Low grade fever 100.1F on admission, mild leukocytosis, was higher on 10/16  Prior tissue culture (9/28) NGTD  BCx x 2 pending  S/p fixator removal 10/18, serous drainage from pin sites, no bleeding, no purulence. Toes cold to touch; no signs of pin tract infection    Antibiotics:  Vancomycin 10/18 ->  Cefepime 10/18 ->      IMPRESSION:  Right Charcot food  Peripheral artery disease  Leukocytosis  CKD    RECOMMENDATIONS:  - Low suspicion for acute infection, but due to poor vasculature, will keep on antibiotics  - Can send patient home on PO Doxycycline 100mg q12hrs and PO Augmentin 500mg q12hrs (CrCl ~22) for 14 days post-surgery  - Follow up with Vascular and Podiatry outpatient      Silvana Snell D.O.  Attending Physician  Division of Infectious Diseases  [facility]  Please contact me via [employer] Teams

## 2023-10-20 ENCOUNTER — APPOINTMENT (OUTPATIENT)
Dept: CARDIOLOGY | Facility: CLINIC | Age: 58
End: 2023-10-20
Payer: MEDICARE

## 2023-10-20 VITALS
SYSTOLIC BLOOD PRESSURE: 118 MMHG | HEART RATE: 90 BPM | BODY MASS INDEX: 28.85 KG/M2 | WEIGHT: 213 LBS | OXYGEN SATURATION: 93 % | DIASTOLIC BLOOD PRESSURE: 58 MMHG | HEIGHT: 72 IN

## 2023-10-20 DIAGNOSIS — N18.5 HYPERTENSIVE CHRONIC KIDNEY DISEASE WITH STAGE 5 CHRONIC KIDNEY DISEASE OR END STAGE RENAL DISEASE: ICD-10-CM

## 2023-10-20 DIAGNOSIS — I12.0 HYPERTENSIVE CHRONIC KIDNEY DISEASE WITH STAGE 5 CHRONIC KIDNEY DISEASE OR END STAGE RENAL DISEASE: ICD-10-CM

## 2023-10-20 PROCEDURE — 99204 OFFICE O/P NEW MOD 45 MIN: CPT

## 2023-10-20 RX ORDER — VALACYCLOVIR 1 G/1
1 TABLET, FILM COATED ORAL
Refills: 0 | Status: ACTIVE | COMMUNITY
Start: 2017-01-11

## 2023-10-20 RX ORDER — LOSARTAN POTASSIUM 100 MG/1
100 TABLET, FILM COATED ORAL DAILY
Refills: 0 | Status: ACTIVE | COMMUNITY

## 2023-10-20 RX ORDER — NIFEDIPINE 90 MG
90 TABLET, EXTENDED RELEASE ORAL DAILY
Refills: 0 | Status: ACTIVE | COMMUNITY

## 2023-10-20 RX ORDER — PRAVASTATIN SODIUM 40 MG/1
40 TABLET ORAL DAILY
Refills: 0 | Status: ACTIVE | COMMUNITY
Start: 2016-07-27

## 2023-10-20 RX ORDER — SODIUM ZIRCONIUM CYCLOSILICATE 10 G/10G
10 POWDER, FOR SUSPENSION ORAL
Refills: 0 | Status: ACTIVE | COMMUNITY

## 2023-10-20 RX ORDER — INSULIN GLARGINE 100 [IU]/ML
INJECTION, SOLUTION SUBCUTANEOUS
Refills: 0 | Status: DISCONTINUED | COMMUNITY
End: 2023-10-20

## 2023-10-20 RX ORDER — OXYCODONE HYDROCHLORIDE 5 MG/1
5 CAPSULE ORAL 4 TIMES DAILY
Refills: 0 | Status: ACTIVE | COMMUNITY

## 2023-10-20 RX ORDER — ALPRAZOLAM 0.5 MG/1
0.5 TABLET ORAL TWICE DAILY
Refills: 0 | Status: ACTIVE | COMMUNITY
Start: 2016-08-27

## 2023-10-20 RX ORDER — INSULIN GLULISINE 100 [IU]/ML
100 INJECTION, SOLUTION SUBCUTANEOUS 3 TIMES DAILY
Refills: 0 | Status: ACTIVE | COMMUNITY

## 2023-10-20 RX ORDER — BLOOD SUGAR DIAGNOSTIC
STRIP MISCELLANEOUS
Qty: 600 | Refills: 0 | Status: DISCONTINUED | COMMUNITY
Start: 2016-07-20 | End: 2023-10-20

## 2023-10-20 RX ORDER — ERGOCALCIFEROL 1.25 MG/1
50000 CAPSULE ORAL
Refills: 0 | Status: ACTIVE | COMMUNITY

## 2023-10-20 RX ORDER — SYRING-NEEDL,DISP,INSUL,0.3 ML 31GX15/64"
31G X 15/64" SYRINGE, EMPTY DISPOSABLE MISCELLANEOUS
Qty: 10 | Refills: 0 | Status: DISCONTINUED | COMMUNITY
Start: 2017-03-22 | End: 2023-10-20

## 2023-10-20 RX ORDER — SODIUM BICARBONATE 650 MG/1
650 TABLET ORAL 3 TIMES DAILY
Refills: 0 | Status: ACTIVE | COMMUNITY

## 2023-10-22 ENCOUNTER — INPATIENT (INPATIENT)
Facility: HOSPITAL | Age: 58
LOS: 1 days | Discharge: HOME HEALTH | DRG: 253 | End: 2023-10-24
Attending: STUDENT IN AN ORGANIZED HEALTH CARE EDUCATION/TRAINING PROGRAM | Admitting: STUDENT IN AN ORGANIZED HEALTH CARE EDUCATION/TRAINING PROGRAM
Payer: MEDICARE

## 2023-10-22 VITALS
SYSTOLIC BLOOD PRESSURE: 147 MMHG | WEIGHT: 210.1 LBS | TEMPERATURE: 98 F | HEIGHT: 60 IN | OXYGEN SATURATION: 100 % | RESPIRATION RATE: 16 BRPM | HEART RATE: 92 BPM | DIASTOLIC BLOOD PRESSURE: 67 MMHG

## 2023-10-22 DIAGNOSIS — Z98.890 OTHER SPECIFIED POSTPROCEDURAL STATES: Chronic | ICD-10-CM

## 2023-10-22 DIAGNOSIS — I73.9 PERIPHERAL VASCULAR DISEASE, UNSPECIFIED: ICD-10-CM

## 2023-10-22 DIAGNOSIS — N20.0 CALCULUS OF KIDNEY: Chronic | ICD-10-CM

## 2023-10-22 LAB
ALBUMIN SERPL ELPH-MCNC: 3.5 G/DL — SIGNIFICANT CHANGE UP (ref 3.5–5.2)
ALBUMIN SERPL ELPH-MCNC: 3.5 G/DL — SIGNIFICANT CHANGE UP (ref 3.5–5.2)
ALP SERPL-CCNC: 218 U/L — HIGH (ref 30–115)
ALP SERPL-CCNC: 218 U/L — HIGH (ref 30–115)
ALT FLD-CCNC: 17 U/L — SIGNIFICANT CHANGE UP (ref 0–41)
ALT FLD-CCNC: 17 U/L — SIGNIFICANT CHANGE UP (ref 0–41)
ANION GAP SERPL CALC-SCNC: 16 MMOL/L — HIGH (ref 7–14)
ANION GAP SERPL CALC-SCNC: 16 MMOL/L — HIGH (ref 7–14)
APTT BLD: 31.4 SEC — SIGNIFICANT CHANGE UP (ref 27–39.2)
APTT BLD: 31.4 SEC — SIGNIFICANT CHANGE UP (ref 27–39.2)
AST SERPL-CCNC: 13 U/L — SIGNIFICANT CHANGE UP (ref 0–41)
AST SERPL-CCNC: 13 U/L — SIGNIFICANT CHANGE UP (ref 0–41)
BASOPHILS # BLD AUTO: 0.06 K/UL — SIGNIFICANT CHANGE UP (ref 0–0.2)
BASOPHILS # BLD AUTO: 0.06 K/UL — SIGNIFICANT CHANGE UP (ref 0–0.2)
BASOPHILS NFR BLD AUTO: 0.4 % — SIGNIFICANT CHANGE UP (ref 0–1)
BASOPHILS NFR BLD AUTO: 0.4 % — SIGNIFICANT CHANGE UP (ref 0–1)
BILIRUB SERPL-MCNC: 0.2 MG/DL — SIGNIFICANT CHANGE UP (ref 0.2–1.2)
BILIRUB SERPL-MCNC: 0.2 MG/DL — SIGNIFICANT CHANGE UP (ref 0.2–1.2)
BLD GP AB SCN SERPL QL: SIGNIFICANT CHANGE UP
BLD GP AB SCN SERPL QL: SIGNIFICANT CHANGE UP
BUN SERPL-MCNC: 53 MG/DL — HIGH (ref 10–20)
BUN SERPL-MCNC: 53 MG/DL — HIGH (ref 10–20)
CALCIUM SERPL-MCNC: 9.6 MG/DL — SIGNIFICANT CHANGE UP (ref 8.4–10.4)
CALCIUM SERPL-MCNC: 9.6 MG/DL — SIGNIFICANT CHANGE UP (ref 8.4–10.4)
CHLORIDE SERPL-SCNC: 98 MMOL/L — SIGNIFICANT CHANGE UP (ref 98–110)
CHLORIDE SERPL-SCNC: 98 MMOL/L — SIGNIFICANT CHANGE UP (ref 98–110)
CO2 SERPL-SCNC: 24 MMOL/L — SIGNIFICANT CHANGE UP (ref 17–32)
CO2 SERPL-SCNC: 24 MMOL/L — SIGNIFICANT CHANGE UP (ref 17–32)
CREAT SERPL-MCNC: 3 MG/DL — HIGH (ref 0.7–1.5)
CREAT SERPL-MCNC: 3 MG/DL — HIGH (ref 0.7–1.5)
EGFR: 23 ML/MIN/1.73M2 — LOW
EGFR: 23 ML/MIN/1.73M2 — LOW
EOSINOPHIL # BLD AUTO: 0.16 K/UL — SIGNIFICANT CHANGE UP (ref 0–0.7)
EOSINOPHIL # BLD AUTO: 0.16 K/UL — SIGNIFICANT CHANGE UP (ref 0–0.7)
EOSINOPHIL NFR BLD AUTO: 1.1 % — SIGNIFICANT CHANGE UP (ref 0–8)
EOSINOPHIL NFR BLD AUTO: 1.1 % — SIGNIFICANT CHANGE UP (ref 0–8)
GLUCOSE BLDC GLUCOMTR-MCNC: 79 MG/DL — SIGNIFICANT CHANGE UP (ref 70–99)
GLUCOSE BLDC GLUCOMTR-MCNC: 79 MG/DL — SIGNIFICANT CHANGE UP (ref 70–99)
GLUCOSE SERPL-MCNC: 36 MG/DL — CRITICAL LOW (ref 70–99)
GLUCOSE SERPL-MCNC: 36 MG/DL — CRITICAL LOW (ref 70–99)
HCT VFR BLD CALC: 25.4 % — LOW (ref 42–52)
HCT VFR BLD CALC: 25.4 % — LOW (ref 42–52)
HGB BLD-MCNC: 8 G/DL — LOW (ref 14–18)
HGB BLD-MCNC: 8 G/DL — LOW (ref 14–18)
IMM GRANULOCYTES NFR BLD AUTO: 0.3 % — SIGNIFICANT CHANGE UP (ref 0.1–0.3)
IMM GRANULOCYTES NFR BLD AUTO: 0.3 % — SIGNIFICANT CHANGE UP (ref 0.1–0.3)
INR BLD: 1.15 RATIO — SIGNIFICANT CHANGE UP (ref 0.65–1.3)
INR BLD: 1.15 RATIO — SIGNIFICANT CHANGE UP (ref 0.65–1.3)
LYMPHOCYTES # BLD AUTO: 14.6 % — LOW (ref 20.5–51.1)
LYMPHOCYTES # BLD AUTO: 14.6 % — LOW (ref 20.5–51.1)
LYMPHOCYTES # BLD AUTO: 2.1 K/UL — SIGNIFICANT CHANGE UP (ref 1.2–3.4)
LYMPHOCYTES # BLD AUTO: 2.1 K/UL — SIGNIFICANT CHANGE UP (ref 1.2–3.4)
MCHC RBC-ENTMCNC: 26.4 PG — LOW (ref 27–31)
MCHC RBC-ENTMCNC: 26.4 PG — LOW (ref 27–31)
MCHC RBC-ENTMCNC: 31.5 G/DL — LOW (ref 32–37)
MCHC RBC-ENTMCNC: 31.5 G/DL — LOW (ref 32–37)
MCV RBC AUTO: 83.8 FL — SIGNIFICANT CHANGE UP (ref 80–94)
MCV RBC AUTO: 83.8 FL — SIGNIFICANT CHANGE UP (ref 80–94)
MONOCYTES # BLD AUTO: 1.37 K/UL — HIGH (ref 0.1–0.6)
MONOCYTES # BLD AUTO: 1.37 K/UL — HIGH (ref 0.1–0.6)
MONOCYTES NFR BLD AUTO: 9.5 % — HIGH (ref 1.7–9.3)
MONOCYTES NFR BLD AUTO: 9.5 % — HIGH (ref 1.7–9.3)
NEUTROPHILS # BLD AUTO: 10.68 K/UL — HIGH (ref 1.4–6.5)
NEUTROPHILS # BLD AUTO: 10.68 K/UL — HIGH (ref 1.4–6.5)
NEUTROPHILS NFR BLD AUTO: 74.1 % — SIGNIFICANT CHANGE UP (ref 42.2–75.2)
NEUTROPHILS NFR BLD AUTO: 74.1 % — SIGNIFICANT CHANGE UP (ref 42.2–75.2)
NRBC # BLD: 0 /100 WBCS — SIGNIFICANT CHANGE UP (ref 0–0)
NRBC # BLD: 0 /100 WBCS — SIGNIFICANT CHANGE UP (ref 0–0)
PLATELET # BLD AUTO: 816 K/UL — HIGH (ref 130–400)
PLATELET # BLD AUTO: 816 K/UL — HIGH (ref 130–400)
PMV BLD: 8.9 FL — SIGNIFICANT CHANGE UP (ref 7.4–10.4)
PMV BLD: 8.9 FL — SIGNIFICANT CHANGE UP (ref 7.4–10.4)
POTASSIUM SERPL-MCNC: 4.5 MMOL/L — SIGNIFICANT CHANGE UP (ref 3.5–5)
POTASSIUM SERPL-MCNC: 4.5 MMOL/L — SIGNIFICANT CHANGE UP (ref 3.5–5)
POTASSIUM SERPL-SCNC: 4.5 MMOL/L — SIGNIFICANT CHANGE UP (ref 3.5–5)
POTASSIUM SERPL-SCNC: 4.5 MMOL/L — SIGNIFICANT CHANGE UP (ref 3.5–5)
PROT SERPL-MCNC: 7.7 G/DL — SIGNIFICANT CHANGE UP (ref 6–8)
PROT SERPL-MCNC: 7.7 G/DL — SIGNIFICANT CHANGE UP (ref 6–8)
PROTHROM AB SERPL-ACNC: 13.2 SEC — HIGH (ref 9.95–12.87)
PROTHROM AB SERPL-ACNC: 13.2 SEC — HIGH (ref 9.95–12.87)
RBC # BLD: 3.03 M/UL — LOW (ref 4.7–6.1)
RBC # BLD: 3.03 M/UL — LOW (ref 4.7–6.1)
RBC # FLD: 13.7 % — SIGNIFICANT CHANGE UP (ref 11.5–14.5)
RBC # FLD: 13.7 % — SIGNIFICANT CHANGE UP (ref 11.5–14.5)
SODIUM SERPL-SCNC: 138 MMOL/L — SIGNIFICANT CHANGE UP (ref 135–146)
SODIUM SERPL-SCNC: 138 MMOL/L — SIGNIFICANT CHANGE UP (ref 135–146)
WBC # BLD: 14.41 K/UL — HIGH (ref 4.8–10.8)
WBC # BLD: 14.41 K/UL — HIGH (ref 4.8–10.8)
WBC # FLD AUTO: 14.41 K/UL — HIGH (ref 4.8–10.8)
WBC # FLD AUTO: 14.41 K/UL — HIGH (ref 4.8–10.8)

## 2023-10-22 PROCEDURE — 82728 ASSAY OF FERRITIN: CPT

## 2023-10-22 PROCEDURE — 85730 THROMBOPLASTIN TIME PARTIAL: CPT

## 2023-10-22 PROCEDURE — 37228: CPT | Mod: RT

## 2023-10-22 PROCEDURE — 82962 GLUCOSE BLOOD TEST: CPT

## 2023-10-22 PROCEDURE — 37224: CPT | Mod: RT

## 2023-10-22 PROCEDURE — 86900 BLOOD TYPING SEROLOGIC ABO: CPT

## 2023-10-22 PROCEDURE — C1887: CPT

## 2023-10-22 PROCEDURE — C1894: CPT

## 2023-10-22 PROCEDURE — 86923 COMPATIBILITY TEST ELECTRIC: CPT

## 2023-10-22 PROCEDURE — 86803 HEPATITIS C AB TEST: CPT

## 2023-10-22 PROCEDURE — 80048 BASIC METABOLIC PNL TOTAL CA: CPT

## 2023-10-22 PROCEDURE — 83970 ASSAY OF PARATHORMONE: CPT

## 2023-10-22 PROCEDURE — 84100 ASSAY OF PHOSPHORUS: CPT

## 2023-10-22 PROCEDURE — 85027 COMPLETE CBC AUTOMATED: CPT

## 2023-10-22 PROCEDURE — 86850 RBC ANTIBODY SCREEN: CPT

## 2023-10-22 PROCEDURE — 73630 X-RAY EXAM OF FOOT: CPT | Mod: 26,RT

## 2023-10-22 PROCEDURE — 83735 ASSAY OF MAGNESIUM: CPT

## 2023-10-22 PROCEDURE — C1760: CPT

## 2023-10-22 PROCEDURE — 80061 LIPID PANEL: CPT

## 2023-10-22 PROCEDURE — 76937 US GUIDE VASCULAR ACCESS: CPT

## 2023-10-22 PROCEDURE — P9016: CPT

## 2023-10-22 PROCEDURE — C1725: CPT

## 2023-10-22 PROCEDURE — 86901 BLOOD TYPING SEROLOGIC RH(D): CPT

## 2023-10-22 PROCEDURE — C2623: CPT

## 2023-10-22 PROCEDURE — C1769: CPT

## 2023-10-22 PROCEDURE — 82310 ASSAY OF CALCIUM: CPT

## 2023-10-22 PROCEDURE — 75710 ARTERY X-RAYS ARM/LEG: CPT | Mod: 59

## 2023-10-22 PROCEDURE — 83036 HEMOGLOBIN GLYCOSYLATED A1C: CPT

## 2023-10-22 PROCEDURE — 82306 VITAMIN D 25 HYDROXY: CPT

## 2023-10-22 PROCEDURE — 85025 COMPLETE CBC W/AUTO DIFF WBC: CPT

## 2023-10-22 PROCEDURE — 36415 COLL VENOUS BLD VENIPUNCTURE: CPT

## 2023-10-22 PROCEDURE — 36430 TRANSFUSION BLD/BLD COMPNT: CPT

## 2023-10-22 PROCEDURE — 85610 PROTHROMBIN TIME: CPT

## 2023-10-22 PROCEDURE — 83550 IRON BINDING TEST: CPT

## 2023-10-22 PROCEDURE — 99285 EMERGENCY DEPT VISIT HI MDM: CPT

## 2023-10-22 PROCEDURE — 83540 ASSAY OF IRON: CPT

## 2023-10-22 PROCEDURE — 87040 BLOOD CULTURE FOR BACTERIA: CPT

## 2023-10-22 PROCEDURE — 37232: CPT | Mod: RT

## 2023-10-22 PROCEDURE — 80053 COMPREHEN METABOLIC PANEL: CPT

## 2023-10-22 RX ORDER — DEXTROSE 50 % IN WATER 50 %
25 SYRINGE (ML) INTRAVENOUS ONCE
Refills: 0 | Status: DISCONTINUED | OUTPATIENT
Start: 2023-10-22 | End: 2023-10-24

## 2023-10-22 RX ORDER — SODIUM CHLORIDE 9 MG/ML
1000 INJECTION INTRAMUSCULAR; INTRAVENOUS; SUBCUTANEOUS
Refills: 0 | Status: DISCONTINUED | OUTPATIENT
Start: 2023-10-22 | End: 2023-10-24

## 2023-10-22 RX ORDER — SODIUM CHLORIDE 9 MG/ML
1000 INJECTION, SOLUTION INTRAVENOUS
Refills: 0 | Status: DISCONTINUED | OUTPATIENT
Start: 2023-10-22 | End: 2023-10-24

## 2023-10-22 RX ORDER — DEXTROSE 50 % IN WATER 50 %
15 SYRINGE (ML) INTRAVENOUS ONCE
Refills: 0 | Status: DISCONTINUED | OUTPATIENT
Start: 2023-10-22 | End: 2023-10-24

## 2023-10-22 RX ORDER — NIFEDIPINE 30 MG
90 TABLET, EXTENDED RELEASE 24 HR ORAL DAILY
Refills: 0 | Status: DISCONTINUED | OUTPATIENT
Start: 2023-10-22 | End: 2023-10-24

## 2023-10-22 RX ORDER — HEPARIN SODIUM 5000 [USP'U]/ML
5000 INJECTION INTRAVENOUS; SUBCUTANEOUS EVERY 12 HOURS
Refills: 0 | Status: DISCONTINUED | OUTPATIENT
Start: 2023-10-22 | End: 2023-10-24

## 2023-10-22 RX ORDER — LOSARTAN POTASSIUM 100 MG/1
100 TABLET, FILM COATED ORAL DAILY
Refills: 0 | Status: DISCONTINUED | OUTPATIENT
Start: 2023-10-22 | End: 2023-10-24

## 2023-10-22 RX ORDER — INSULIN LISPRO 100/ML
VIAL (ML) SUBCUTANEOUS AT BEDTIME
Refills: 0 | Status: DISCONTINUED | OUTPATIENT
Start: 2023-10-22 | End: 2023-10-23

## 2023-10-22 RX ORDER — ALPRAZOLAM 0.25 MG
0.5 TABLET ORAL
Refills: 0 | Status: DISCONTINUED | OUTPATIENT
Start: 2023-10-22 | End: 2023-10-24

## 2023-10-22 RX ORDER — INSULIN LISPRO 100/ML
VIAL (ML) SUBCUTANEOUS
Refills: 0 | Status: DISCONTINUED | OUTPATIENT
Start: 2023-10-22 | End: 2023-10-23

## 2023-10-22 RX ORDER — GLUCAGON INJECTION, SOLUTION 0.5 MG/.1ML
1 INJECTION, SOLUTION SUBCUTANEOUS ONCE
Refills: 0 | Status: DISCONTINUED | OUTPATIENT
Start: 2023-10-22 | End: 2023-10-24

## 2023-10-22 RX ORDER — INSULIN GLARGINE 100 [IU]/ML
20 INJECTION, SOLUTION SUBCUTANEOUS AT BEDTIME
Refills: 0 | Status: DISCONTINUED | OUTPATIENT
Start: 2023-10-23 | End: 2023-10-23

## 2023-10-22 RX ORDER — SODIUM BICARBONATE 1 MEQ/ML
650 SYRINGE (ML) INTRAVENOUS THREE TIMES A DAY
Refills: 0 | Status: DISCONTINUED | OUTPATIENT
Start: 2023-10-22 | End: 2023-10-24

## 2023-10-22 RX ORDER — DEXTROSE 50 % IN WATER 50 %
12.5 SYRINGE (ML) INTRAVENOUS ONCE
Refills: 0 | Status: DISCONTINUED | OUTPATIENT
Start: 2023-10-22 | End: 2023-10-24

## 2023-10-22 RX ADMIN — SODIUM CHLORIDE 50 MILLILITER(S): 9 INJECTION INTRAMUSCULAR; INTRAVENOUS; SUBCUTANEOUS at 23:58

## 2023-10-22 NOTE — ED PROVIDER NOTE - PHYSICAL EXAMINATION
CONST: well appearing for age, NAD  HEAD:  normocephalic, atraumatic  EYES:  conjunctivae without injection, drainage or discharge  ENMT:  nasal mucosa moist; mouth moist without ulcerations or lesions; throat moist without erythema, exudate, ulcerations or lesions  NECK:  supple  CARDIAC:  regular rate and rhythm, normal S1 and S2, no murmurs, rubs or gallops  RESP:  respiratory rate and effort appear normal for age; lungs are clear to auscultation bilaterally; no rales or wheezes  ABDOMEN:  soft, nontender, nondistended  NEURO: awake and alert  EXTREMITIES: + RLE splint in place (pt refusing to take it off)  SKIN: no rash

## 2023-10-22 NOTE — PATIENT PROFILE ADULT - FALL HARM RISK - HARM RISK INTERVENTIONS

## 2023-10-22 NOTE — H&P ADULT - HISTORY OF PRESENT ILLNESS
58 year old male with PMH of CKD stage 4 ( baseline Cr 2.5-3.5) on list for kidney transplant, GERD, IDDM ( recent HgA1C  8), charcot foot  s/p placement of external fixator by podiatry in 9/28 and now s/p removal on 10/19 due to infection referred to the ED due to foot discoloration   Patient denies fever, chills, recent injury or trauma, chest pain, shortness of breath, palpitation, dizziness, LOC/syncope, numbness, tingling, weakness.     of note patient was admitted to medicine from 10/18 due to external fixator due to infection and now patient is s/p removal of external fixator of the RLE on 10/19 discharged on Augmentin and Doxycycline.      in ED vs /67, HR 92, RR 16, O2sat 100% RA, Temp 98.3  lab notable for   xray of    No medication given  58 year old male with PMH of CKD stage 4 ( baseline Cr 2.5-3.5) on list for kidney transplant, GERD, IDDM ( recent HgA1C  8), charcot foot  s/p placement of external fixator by podiatry in 9/28 and now s/p removal on 10/19 due to infection referred to the ED due to foot discoloration he notice at the bottom of his feet. patient report changing of dressing weekly, last change was when he left the hospital. Patient report mild pain on the right foot and usually take percocet as needed for pain.   Patient denies fever, chills, recent injury or trauma, chest pain, shortness of breath, palpitation, dizziness, LOC/syncope, numbness, tingling, weakness.     of note patient was admitted to medicine from 10/18 due to external fixator due to infection and now patient is s/p removal of external fixator of the RLE on 10/19 discharged on Augmentin and Doxycycline.      in ED vs /67, HR 92, RR 16, O2sat 100% RA, Temp 98.3  lab notable for   xray of    No medication given  58 year old former smoke male with PMH of CKD stage 4 ( baseline Cr 2.5-3.5) on list for kidney transplant, GERD, IDDM ( recent HgA1C  8), charcot foot  s/p placement of external fixator by podiatry in 9/28 and now s/p removal on 10/19 due to infection referred to the ED due to foot discoloration he notice at the bottom of his feet. patient reports changing of dressing weekly, last change was when he left the hospital. Patient report mild pain on the right foot and usually take percocet as needed for pain.   Patient denies fever, chills, recent injury or trauma, chest pain, shortness of breath, palpitation, dizziness, LOC/syncope, numbness, tingling, weakness.     of note patient was admitted to medicine from 10/18 due to external fixator due to infection and now patient is s/p removal of external fixator of the RLE on 10/19 discharged on Augmentin and Doxycycline.      in ED vs /67, HR 92, RR 16, O2sat 100% RA, Temp 98.3  lab notable for wbc 14.41, h/h 8/25.4, bun/cr 53/3  xray of  right foot done  No medication given

## 2023-10-22 NOTE — H&P ADULT - NS ATTEND AMEND GEN_ALL_CORE FT
58yoM with Charcot foot s/p placement of an external fixator on 9/28/23 which had to be removed on 10/19 due to infection, CKD (baseline Cr 2.5-3.5, on list for kidney transplant), and IDDM (A1c 9.9) who presented for work-up of nonhealing wounds. Patient underwent peripheral angiogram today with balloon angioplasty of PTA and DCB of R SFA and PTA of R PT and R AT via R groin and R pedal access. He also received 2u pRBCs for Hgb 7.3.     Plan:   - Trend CBC and BMP in the morning  - Patient has been hypoglycemic (without receiving Lantus), will hold standing insulin and give high dose insulin sliding scale  - To clarify outpatient insulin regimen and compliance  - Podiatry consulted  - Seen by Nephrology

## 2023-10-22 NOTE — H&P ADULT - NSHPLABSRESULTS_GEN_ALL_CORE
xray of right foot ( 10/22/2023) ***     Xray of right ankle and right foot ( 10/19/2023) : impression: Status post external fixator removal. No definite acute fracture or dislocation. Ankle and foot soft tissue swelling is noted.  Degenerative changes are noted in the hind, mid and forefoot. Vascular  calcifications. Likely amended leads from previous noted stimulator  overlying the calcaneus/Achilles. Postsurgical changes of the calcaneus.    Bilateral arterial duplex ( 10/18/2023) : Impression: Possible right posterior tibial artery occlusion. Mild left common femoral artery stenosis. Moderate superficial femoral  artery stenosis.     TTE Echo Complete w/o Contrast w/ Doppler (09.06.23 )  Summary:   1. Normal global left ventricular systolic function.   2.LV Ejection Fraction by Knutson's Method with a biplane EF of 55 %.   3. Mildly increased LV wall thickness.   4. Normal left ventricular internal cavity size.   5. Spectral Doppler shows impaired relaxation pattern of left  ventricular myocardial filling (Grade I diastolic dysfunction).   6. Normal left atrial size.   7. Normal right atrial size.   8. Mild thickening of the anterior mitral valve leaflet.   9. No evidence of mitral valve regurgitation.       NM Nuclear Stress Multiple (09.06.23)   Impression:  1. NORMAL STRESS AND REST MYOCARDIAL PERFUSION SPECT TOMOGRAPHY, WITH NO  EVIDENCE FOR ISCHEMIA AT THE LEVEL OF EXERCISE ATTAINED.  2. NORMAL RESTING LEFT VENTRICULAR WALL MOTION AND WALL THICKENING.  3. LEFT VENTRICULAREJECTION FRACTION OF 68 % WHICH IS WITHIN RANGE OF  NORMAL.    - Labs:                        8.0    14.41 )-----------( 816      ( 22 Oct 2023 21:12 )             25.4     10-22    138  |  98  |  53<H>  ----------------------------<  36<LL>  4.5   |  24  |  3.0<H>    Ca    9.6      22 Oct 2023 21:12    TPro  7.7  /  Alb  3.5  /  TBili  0.2  /  DBili  x   /  AST  13  /  ALT  17  /  AlkPhos  218<H>  10-22    LIVER FUNCTIONS - ( 22 Oct 2023 21:12 )  Alb: 3.5 g/dL / Pro: 7.7 g/dL / ALK PHOS: 218 U/L / ALT: 17 U/L / AST: 13 U/L / GGT: x           PT/INR - ( 22 Oct 2023 21:12 )   PT: 13.20 sec;   INR: 1.15 ratio         PTT - ( 22 Oct 2023 21:12 )  PTT:31.4 sec          Lactate Trend  10-18 @ 17:07 Lactate:0.5     Urinalysis Basic - ( 22 Oct 2023 21:12 )    Color: x / Appearance: x / SG: x / pH: x  Gluc: 36 mg/dL / Ketone: x  / Bili: x / Urobili: x   Blood: x / Protein: x / Nitrite: x   Leuk Esterase: x / RBC: x / WBC x   Sq Epi: x / Non Sq Epi: x / Bacteria: x Xray of right ankle and right foot ( 10/19/2023) : impression: Status post external fixator removal. No definite acute fracture or dislocation. Ankle and foot soft tissue swelling is noted.  Degenerative changes are noted in the hind, mid and forefoot. Vascular  calcifications. Likely amended leads from previous noted stimulator  overlying the calcaneus/Achilles. Postsurgical changes of the calcaneus.    Bilateral arterial duplex ( 10/18/2023) : Impression: Possible right posterior tibial artery occlusion. Mild left common femoral artery stenosis. Moderate superficial femoral  artery stenosis.     TTE Echo Complete w/o Contrast w/ Doppler (09.06.23 )  Summary:   1. Normal global left ventricular systolic function.   2.LV Ejection Fraction by Knutson's Method with a biplane EF of 55 %.   3. Mildly increased LV wall thickness.   4. Normal left ventricular internal cavity size.   5. Spectral Doppler shows impaired relaxation pattern of left  ventricular myocardial filling (Grade I diastolic dysfunction).   6. Normal left atrial size.   7. Normal right atrial size.   8. Mild thickening of the anterior mitral valve leaflet.   9. No evidence of mitral valve regurgitation.       NM Nuclear Stress Multiple (09.06.23)   Impression:  1. NORMAL STRESS AND REST MYOCARDIAL PERFUSION SPECT TOMOGRAPHY, WITH NO  EVIDENCE FOR ISCHEMIA AT THE LEVEL OF EXERCISE ATTAINED.  2. NORMAL RESTING LEFT VENTRICULAR WALL MOTION AND WALL THICKENING.  3. LEFT VENTRICULAREJECTION FRACTION OF 68 % WHICH IS WITHIN RANGE OF  NORMAL.    - Labs:                        8.0    14.41 )-----------( 816      ( 22 Oct 2023 21:12 )             25.4     10-22    138  |  98  |  53<H>  ----------------------------<  36<LL>  4.5   |  24  |  3.0<H>    Ca    9.6      22 Oct 2023 21:12    TPro  7.7  /  Alb  3.5  /  TBili  0.2  /  DBili  x   /  AST  13  /  ALT  17  /  AlkPhos  218<H>  10-22    LIVER FUNCTIONS - ( 22 Oct 2023 21:12 )  Alb: 3.5 g/dL / Pro: 7.7 g/dL / ALK PHOS: 218 U/L / ALT: 17 U/L / AST: 13 U/L / GGT: x           PT/INR - ( 22 Oct 2023 21:12 )   PT: 13.20 sec;   INR: 1.15 ratio         PTT - ( 22 Oct 2023 21:12 )  PTT:31.4 sec          Lactate Trend  10-18 @ 17:07 Lactate:0.5     Urinalysis Basic - ( 22 Oct 2023 21:12 )    Color: x / Appearance: x / SG: x / pH: x  Gluc: 36 mg/dL / Ketone: x  / Bili: x / Urobili: x   Blood: x / Protein: x / Nitrite: x   Leuk Esterase: x / RBC: x / WBC x   Sq Epi: x / Non Sq Epi: x / Bacteria: x

## 2023-10-22 NOTE — ED PROVIDER NOTE - PROGRESS NOTE DETAILS
BF: hypogylcemia noted, pt eating right now, will recheck. Didn't eat today BF: patient was accepted for admission prior to lab results. Cardiac tele team to follow up work-up.

## 2023-10-22 NOTE — ED ADULT NURSE NOTE - OBJECTIVE STATEMENT
Patient Patient reports he had an external fixator removed and came in due to right foot becoming discolored/ purple, sent in by podiatrist for vascular intervention.

## 2023-10-22 NOTE — ED PROVIDER NOTE - CLINICAL SUMMARY MEDICAL DECISION MAKING FREE TEXT BOX
59 y/o M h/o DM, CKD, GERD, charot foot s/p removal of exfix on 10/19, discharged on augmentin and doxycycline p/w foot discoloration. Per chart review, pt previously refused RLE angioplasty because of concerns of kidney injury from contrast. Now ammenable. No F/C.     On exam, pt refuses to unwrap dressing or let me or resident unwrap it. He shows pictures of the progression of his RLE which started as dusky blue discoloration and now there appears to be some skin sloughing; but exam limited. Unclear if infected or PAD alone based on pictures.     Discussed case with Podiatry and cardiac NP who recommended labs, heparin drip, XR and admission. They will follow work-up.

## 2023-10-22 NOTE — H&P ADULT - ASSESSMENT
58 year old male with PMH of CKD stage 4 ( baseline Cr 2.5-3.5) on list for kidney transplant, GERD, IDDM ( recent HgA1C  8), charcot foot  s/p placement of external fixator by podiatry in 9/28 and now s/p removal on 10/19 due to infection with foot discoloration.       # Right foot discoloration/ PAD/ Charcot foot s/p placement of external fixator by podiatry in 9/28 now s/p removal 10/19  - admit to Cards tele   -vitals as per floor protocol   -xray of right foot ***   - podiatry consulted in the morning   -recent arterial duplex with Possible right posterior tibial artery occlusion  -NPO   -will hold off hep gtt  and plavix load for now per Dr. Villalobos   -Plan for possible peripheral angiogram in the morning        #leukocytosis   -Monitor and trend cbc   -blood culture x2   -continue home Augmentin and doxycycline for now   -ID consult     # CKD stage 4 ( baseline cr 2.5-3.5) ( on transplant list) Cr at baseline    - Monitor and trend Cr     # Normocytic anemia, likely secondary to renal disease ( baseline Hg 7-8)   - trend H/H  -Maintain active type and screen  -Plan to transfuse Hg <7  -check iron panel     # IDDM ( recent hga1c 8)   - FS  - ISS     #HTN   -Continue losartan 100 mg daily       #HLD  - continue pravastatin 40 mg daily     # anxiety  - continue xanax prn    DVT: prophylaxis: Heparin  GI ppx: Protonix   NPO after MN     Disp : Pending Peripheral angiogram    58 year old male with PMH of CKD stage 4 ( baseline Cr 2.5-3.5) on list for kidney transplant, GERD, IDDM ( recent HgA1C  8), charcot foot  s/p placement of external fixator by podiatry in 9/28 and now s/p removal on 10/19 due to infection with foot discoloration.       # Right foot discoloration/ PAD/ Charcot foot s/p placement of external fixator by podiatry in 9/28 now s/p removal 10/19  - admit to Cards tele   -vitals as per floor protocol   -FU xray of right foot   - podiatry consulted in the morning   -recent arterial duplex with Possible right posterior tibial artery occlusion  -NPO   -will hold off hep gtt  and Plavix load for now per Dr. Villalobos   - pre-hydration w/  IVF   -Plan for possible peripheral angiogram in the morning        #leukocytosis   -Monitor and trend cbc   -blood culture x2   -continue home Augmentin and doxycycline for now ( to be completed 11/1)   -ID consult     # CKD stage 4 ( baseline cr 2.5-3.5) ( on transplant list) Cr at baseline    - Monitor and trend Cr   -start IVF 50cc/hr for now ( pre-renal hydration prior procedure)     # Normocytic anemia, likely secondary to renal disease ( baseline Hg 7-8)   - trend H/H  -Maintain active type and screen  -Plan to transfuse Hg <7  -check iron panel     # IDDM ( recent hga1c 8)   - FS  - ISS     #HTN   -Continue losartan 100 mg daily       #HLD  - continue pravastatin 40 mg daily     # anxiety  - continue xanax prn    DVT: prophylaxis: Heparin  GI ppx: Protonix   NPO after MN     Disp : Pending Peripheral angiogram

## 2023-10-22 NOTE — ED ADULT TRIAGE NOTE - CHIEF COMPLAINT QUOTE
pt has discoloration to the toes on his right foot. pt had recent sx here 3 weeks ago. pt had charcot reconstruction sx on 9/28. pt says his toes are turning purple

## 2023-10-22 NOTE — H&P ADULT - NSHPPHYSICALEXAM_GEN_ALL_CORE
VITALS:   T(C): 37.4 (10-22-23 @ 22:29), Max: 37.4 (10-22-23 @ 22:29)  HR: 85 (10-22-23 @ 22:29) (80 - 92)  BP: 123/63 (10-22-23 @ 22:29) (123/63 - 147/67)  RR: 18 (10-22-23 @ 21:31) (16 - 18)  SpO2: 98% (10-22-23 @ 21:31) (98% - 100%)    GENERAL: NAD, lying in bed comfortably  HEAD:  Atraumatic, normocephalic  EYES: EOMI, PERRLA, conjunctiva and sclera clear  NECK: Supple, no JVD  HEART: Regular rate and rhythm, no murmurs, rubs, or gallops  LUNGS: Unlabored respirations.  Clear to auscultation bilaterally, no crackles, wheezing, or rhonchi  ABDOMEN: Soft, nontender, nondistended, +BS  EXTREMITIES:+ RLE splint in place, ulcer on the great toe, 2nd, 3rd and 4th toe with discoloration on the plantar area,   NERVOUS SYSTEM:  A&Ox3, no focal deficits

## 2023-10-22 NOTE — H&P ADULT - NSHPSOCIALHISTORY_GEN_ALL_CORE
Patient lives with mother   Patient denies smoking, illicit or etoh abuse   ambulate with crutches since foot surgery Patient lives with mother   Patient denies smoking, illicit or Etoh abuse   ambulate with crutches since foot surgery

## 2023-10-22 NOTE — ED PROVIDER NOTE - OBJECTIVE STATEMENT
Pt is a 57 y/o male with PMH of DM, CKD, GERD and charcot foot s/p removal of external fixator on 10/19, discharged on augmentin and doxycycline presenting for foot discoloration. Pt says he changed his dressing today and saw his toes were purple, associated with mild pain. No fever or chills. No pus or drainage. Sent in by podiatrist Dr. Roca.

## 2023-10-23 LAB
ANION GAP SERPL CALC-SCNC: 14 MMOL/L — SIGNIFICANT CHANGE UP (ref 7–14)
ANION GAP SERPL CALC-SCNC: 14 MMOL/L — SIGNIFICANT CHANGE UP (ref 7–14)
BASOPHILS # BLD AUTO: 0.05 K/UL — SIGNIFICANT CHANGE UP (ref 0–0.2)
BASOPHILS # BLD AUTO: 0.05 K/UL — SIGNIFICANT CHANGE UP (ref 0–0.2)
BASOPHILS NFR BLD AUTO: 0.5 % — SIGNIFICANT CHANGE UP (ref 0–1)
BASOPHILS NFR BLD AUTO: 0.5 % — SIGNIFICANT CHANGE UP (ref 0–1)
BUN SERPL-MCNC: 50 MG/DL — HIGH (ref 10–20)
BUN SERPL-MCNC: 50 MG/DL — HIGH (ref 10–20)
CALCIUM SERPL-MCNC: 8.9 MG/DL — SIGNIFICANT CHANGE UP (ref 8.4–10.5)
CALCIUM SERPL-MCNC: 8.9 MG/DL — SIGNIFICANT CHANGE UP (ref 8.4–10.5)
CHLORIDE SERPL-SCNC: 102 MMOL/L — SIGNIFICANT CHANGE UP (ref 98–110)
CHLORIDE SERPL-SCNC: 102 MMOL/L — SIGNIFICANT CHANGE UP (ref 98–110)
CHOLEST SERPL-MCNC: 106 MG/DL — SIGNIFICANT CHANGE UP
CHOLEST SERPL-MCNC: 106 MG/DL — SIGNIFICANT CHANGE UP
CO2 SERPL-SCNC: 24 MMOL/L — SIGNIFICANT CHANGE UP (ref 17–32)
CO2 SERPL-SCNC: 24 MMOL/L — SIGNIFICANT CHANGE UP (ref 17–32)
CREAT SERPL-MCNC: 2.9 MG/DL — HIGH (ref 0.7–1.5)
CREAT SERPL-MCNC: 2.9 MG/DL — HIGH (ref 0.7–1.5)
EGFR: 24 ML/MIN/1.73M2 — LOW
EGFR: 24 ML/MIN/1.73M2 — LOW
EOSINOPHIL # BLD AUTO: 0.12 K/UL — SIGNIFICANT CHANGE UP (ref 0–0.7)
EOSINOPHIL # BLD AUTO: 0.12 K/UL — SIGNIFICANT CHANGE UP (ref 0–0.7)
EOSINOPHIL NFR BLD AUTO: 1.3 % — SIGNIFICANT CHANGE UP (ref 0–8)
EOSINOPHIL NFR BLD AUTO: 1.3 % — SIGNIFICANT CHANGE UP (ref 0–8)
FERRITIN SERPL-MCNC: 500 NG/ML — HIGH (ref 30–400)
FERRITIN SERPL-MCNC: 500 NG/ML — HIGH (ref 30–400)
GLUCOSE BLDC GLUCOMTR-MCNC: 112 MG/DL — HIGH (ref 70–99)
GLUCOSE BLDC GLUCOMTR-MCNC: 112 MG/DL — HIGH (ref 70–99)
GLUCOSE BLDC GLUCOMTR-MCNC: 113 MG/DL — HIGH (ref 70–99)
GLUCOSE BLDC GLUCOMTR-MCNC: 113 MG/DL — HIGH (ref 70–99)
GLUCOSE BLDC GLUCOMTR-MCNC: 121 MG/DL — HIGH (ref 70–99)
GLUCOSE BLDC GLUCOMTR-MCNC: 121 MG/DL — HIGH (ref 70–99)
GLUCOSE BLDC GLUCOMTR-MCNC: 132 MG/DL — HIGH (ref 70–99)
GLUCOSE BLDC GLUCOMTR-MCNC: 132 MG/DL — HIGH (ref 70–99)
GLUCOSE BLDC GLUCOMTR-MCNC: 297 MG/DL — HIGH (ref 70–99)
GLUCOSE BLDC GLUCOMTR-MCNC: 297 MG/DL — HIGH (ref 70–99)
GLUCOSE BLDC GLUCOMTR-MCNC: 49 MG/DL — CRITICAL LOW (ref 70–99)
GLUCOSE BLDC GLUCOMTR-MCNC: 49 MG/DL — CRITICAL LOW (ref 70–99)
GLUCOSE BLDC GLUCOMTR-MCNC: 96 MG/DL — SIGNIFICANT CHANGE UP (ref 70–99)
GLUCOSE BLDC GLUCOMTR-MCNC: 96 MG/DL — SIGNIFICANT CHANGE UP (ref 70–99)
GLUCOSE SERPL-MCNC: 108 MG/DL — HIGH (ref 70–99)
GLUCOSE SERPL-MCNC: 108 MG/DL — HIGH (ref 70–99)
HCT VFR BLD CALC: 22.9 % — LOW (ref 42–52)
HCT VFR BLD CALC: 22.9 % — LOW (ref 42–52)
HCV AB S/CO SERPL IA: 0.04 COI — SIGNIFICANT CHANGE UP
HCV AB S/CO SERPL IA: 0.04 COI — SIGNIFICANT CHANGE UP
HCV AB SERPL-IMP: SIGNIFICANT CHANGE UP
HCV AB SERPL-IMP: SIGNIFICANT CHANGE UP
HDLC SERPL-MCNC: 35 MG/DL — LOW
HDLC SERPL-MCNC: 35 MG/DL — LOW
HGB BLD-MCNC: 7.3 G/DL — LOW (ref 14–18)
HGB BLD-MCNC: 7.3 G/DL — LOW (ref 14–18)
IMM GRANULOCYTES NFR BLD AUTO: 0.3 % — SIGNIFICANT CHANGE UP (ref 0.1–0.3)
IMM GRANULOCYTES NFR BLD AUTO: 0.3 % — SIGNIFICANT CHANGE UP (ref 0.1–0.3)
IRON SATN MFR SERPL: 18 % — SIGNIFICANT CHANGE UP (ref 15–50)
IRON SATN MFR SERPL: 18 % — SIGNIFICANT CHANGE UP (ref 15–50)
IRON SATN MFR SERPL: 26 UG/DL — LOW (ref 35–150)
IRON SATN MFR SERPL: 26 UG/DL — LOW (ref 35–150)
LIPID PNL WITH DIRECT LDL SERPL: 53 MG/DL — SIGNIFICANT CHANGE UP
LIPID PNL WITH DIRECT LDL SERPL: 53 MG/DL — SIGNIFICANT CHANGE UP
LYMPHOCYTES # BLD AUTO: 1.71 K/UL — SIGNIFICANT CHANGE UP (ref 1.2–3.4)
LYMPHOCYTES # BLD AUTO: 1.71 K/UL — SIGNIFICANT CHANGE UP (ref 1.2–3.4)
LYMPHOCYTES # BLD AUTO: 18 % — LOW (ref 20.5–51.1)
LYMPHOCYTES # BLD AUTO: 18 % — LOW (ref 20.5–51.1)
MAGNESIUM SERPL-MCNC: 1.7 MG/DL — LOW (ref 1.8–2.4)
MAGNESIUM SERPL-MCNC: 1.7 MG/DL — LOW (ref 1.8–2.4)
MCHC RBC-ENTMCNC: 27.2 PG — SIGNIFICANT CHANGE UP (ref 27–31)
MCHC RBC-ENTMCNC: 27.2 PG — SIGNIFICANT CHANGE UP (ref 27–31)
MCHC RBC-ENTMCNC: 31.9 G/DL — LOW (ref 32–37)
MCHC RBC-ENTMCNC: 31.9 G/DL — LOW (ref 32–37)
MCV RBC AUTO: 85.4 FL — SIGNIFICANT CHANGE UP (ref 80–94)
MCV RBC AUTO: 85.4 FL — SIGNIFICANT CHANGE UP (ref 80–94)
MONOCYTES # BLD AUTO: 0.84 K/UL — HIGH (ref 0.1–0.6)
MONOCYTES # BLD AUTO: 0.84 K/UL — HIGH (ref 0.1–0.6)
MONOCYTES NFR BLD AUTO: 8.9 % — SIGNIFICANT CHANGE UP (ref 1.7–9.3)
MONOCYTES NFR BLD AUTO: 8.9 % — SIGNIFICANT CHANGE UP (ref 1.7–9.3)
NEUTROPHILS # BLD AUTO: 6.74 K/UL — HIGH (ref 1.4–6.5)
NEUTROPHILS # BLD AUTO: 6.74 K/UL — HIGH (ref 1.4–6.5)
NEUTROPHILS NFR BLD AUTO: 71 % — SIGNIFICANT CHANGE UP (ref 42.2–75.2)
NEUTROPHILS NFR BLD AUTO: 71 % — SIGNIFICANT CHANGE UP (ref 42.2–75.2)
NON HDL CHOLESTEROL: 71 MG/DL — SIGNIFICANT CHANGE UP
NON HDL CHOLESTEROL: 71 MG/DL — SIGNIFICANT CHANGE UP
NRBC # BLD: 0 /100 WBCS — SIGNIFICANT CHANGE UP (ref 0–0)
NRBC # BLD: 0 /100 WBCS — SIGNIFICANT CHANGE UP (ref 0–0)
PLATELET # BLD AUTO: 651 K/UL — HIGH (ref 130–400)
PLATELET # BLD AUTO: 651 K/UL — HIGH (ref 130–400)
PMV BLD: 8.9 FL — SIGNIFICANT CHANGE UP (ref 7.4–10.4)
PMV BLD: 8.9 FL — SIGNIFICANT CHANGE UP (ref 7.4–10.4)
POTASSIUM SERPL-MCNC: 4.5 MMOL/L — SIGNIFICANT CHANGE UP (ref 3.5–5)
POTASSIUM SERPL-MCNC: 4.5 MMOL/L — SIGNIFICANT CHANGE UP (ref 3.5–5)
POTASSIUM SERPL-SCNC: 4.5 MMOL/L — SIGNIFICANT CHANGE UP (ref 3.5–5)
POTASSIUM SERPL-SCNC: 4.5 MMOL/L — SIGNIFICANT CHANGE UP (ref 3.5–5)
RBC # BLD: 2.68 M/UL — LOW (ref 4.7–6.1)
RBC # BLD: 2.68 M/UL — LOW (ref 4.7–6.1)
RBC # FLD: 13.8 % — SIGNIFICANT CHANGE UP (ref 11.5–14.5)
RBC # FLD: 13.8 % — SIGNIFICANT CHANGE UP (ref 11.5–14.5)
SODIUM SERPL-SCNC: 140 MMOL/L — SIGNIFICANT CHANGE UP (ref 135–146)
SODIUM SERPL-SCNC: 140 MMOL/L — SIGNIFICANT CHANGE UP (ref 135–146)
TIBC SERPL-MCNC: 148 UG/DL — LOW (ref 220–430)
TIBC SERPL-MCNC: 148 UG/DL — LOW (ref 220–430)
TRIGL SERPL-MCNC: 93 MG/DL — SIGNIFICANT CHANGE UP
TRIGL SERPL-MCNC: 93 MG/DL — SIGNIFICANT CHANGE UP
UIBC SERPL-MCNC: 122 UG/DL — SIGNIFICANT CHANGE UP (ref 110–370)
UIBC SERPL-MCNC: 122 UG/DL — SIGNIFICANT CHANGE UP (ref 110–370)
WBC # BLD: 9.49 K/UL — SIGNIFICANT CHANGE UP (ref 4.8–10.8)
WBC # BLD: 9.49 K/UL — SIGNIFICANT CHANGE UP (ref 4.8–10.8)
WBC # FLD AUTO: 9.49 K/UL — SIGNIFICANT CHANGE UP (ref 4.8–10.8)
WBC # FLD AUTO: 9.49 K/UL — SIGNIFICANT CHANGE UP (ref 4.8–10.8)

## 2023-10-23 PROCEDURE — 75710 ARTERY X-RAYS ARM/LEG: CPT | Mod: 26,XU

## 2023-10-23 PROCEDURE — 99222 1ST HOSP IP/OBS MODERATE 55: CPT

## 2023-10-23 PROCEDURE — 37224: CPT | Mod: RT

## 2023-10-23 PROCEDURE — 36246 INS CATH ABD/L-EXT ART 2ND: CPT | Mod: 59

## 2023-10-23 PROCEDURE — 36140 INTRO NDL ICATH UPR/LXTR ART: CPT | Mod: 59

## 2023-10-23 PROCEDURE — 37228: CPT | Mod: RT

## 2023-10-23 PROCEDURE — 37232: CPT | Mod: RT

## 2023-10-23 RX ORDER — INSULIN LISPRO 100/ML
VIAL (ML) SUBCUTANEOUS
Refills: 0 | Status: DISCONTINUED | OUTPATIENT
Start: 2023-10-23 | End: 2023-10-24

## 2023-10-23 RX ORDER — MAGNESIUM SULFATE 500 MG/ML
2 VIAL (ML) INJECTION ONCE
Refills: 0 | Status: COMPLETED | OUTPATIENT
Start: 2023-10-23 | End: 2023-10-23

## 2023-10-23 RX ORDER — DEXTROSE 50 % IN WATER 50 %
25 SYRINGE (ML) INTRAVENOUS ONCE
Refills: 0 | Status: COMPLETED | OUTPATIENT
Start: 2023-10-23 | End: 2023-10-23

## 2023-10-23 RX ORDER — ASPIRIN/CALCIUM CARB/MAGNESIUM 324 MG
81 TABLET ORAL DAILY
Refills: 0 | Status: DISCONTINUED | OUTPATIENT
Start: 2023-10-24 | End: 2023-10-24

## 2023-10-23 RX ORDER — CLOPIDOGREL BISULFATE 75 MG/1
75 TABLET, FILM COATED ORAL DAILY
Refills: 0 | Status: DISCONTINUED | OUTPATIENT
Start: 2023-10-24 | End: 2023-10-24

## 2023-10-23 RX ORDER — DEXTROSE 50 % IN WATER 50 %
25 SYRINGE (ML) INTRAVENOUS ONCE
Refills: 0 | Status: DISCONTINUED | OUTPATIENT
Start: 2023-10-23 | End: 2023-10-24

## 2023-10-23 RX ORDER — ATORVASTATIN CALCIUM 80 MG/1
10 TABLET, FILM COATED ORAL AT BEDTIME
Refills: 0 | Status: DISCONTINUED | OUTPATIENT
Start: 2023-10-23 | End: 2023-10-23

## 2023-10-23 RX ORDER — CLOPIDOGREL BISULFATE 75 MG/1
600 TABLET, FILM COATED ORAL ONCE
Refills: 0 | Status: COMPLETED | OUTPATIENT
Start: 2023-10-23 | End: 2023-10-23

## 2023-10-23 RX ORDER — ATORVASTATIN CALCIUM 80 MG/1
80 TABLET, FILM COATED ORAL AT BEDTIME
Refills: 0 | Status: DISCONTINUED | OUTPATIENT
Start: 2023-10-23 | End: 2023-10-24

## 2023-10-23 RX ORDER — INSULIN LISPRO 100/ML
VIAL (ML) SUBCUTANEOUS AT BEDTIME
Refills: 0 | Status: DISCONTINUED | OUTPATIENT
Start: 2023-10-23 | End: 2023-10-24

## 2023-10-23 RX ADMIN — LOSARTAN POTASSIUM 100 MILLIGRAM(S): 100 TABLET, FILM COATED ORAL at 05:05

## 2023-10-23 RX ADMIN — Medication 90 MILLIGRAM(S): at 05:04

## 2023-10-23 RX ADMIN — CLOPIDOGREL BISULFATE 600 MILLIGRAM(S): 75 TABLET, FILM COATED ORAL at 07:50

## 2023-10-23 RX ADMIN — Medication 100 MILLIGRAM(S): at 05:07

## 2023-10-23 RX ADMIN — Medication 25 GRAM(S): at 05:38

## 2023-10-23 RX ADMIN — Medication 650 MILLIGRAM(S): at 18:39

## 2023-10-23 RX ADMIN — HEPARIN SODIUM 5000 UNIT(S): 5000 INJECTION INTRAVENOUS; SUBCUTANEOUS at 05:06

## 2023-10-23 RX ADMIN — Medication 2: at 21:29

## 2023-10-23 RX ADMIN — Medication 0.5 MILLIGRAM(S): at 14:33

## 2023-10-23 RX ADMIN — Medication 0.5 MILLIGRAM(S): at 03:29

## 2023-10-23 RX ADMIN — Medication 25 GRAM(S): at 15:28

## 2023-10-23 RX ADMIN — Medication 650 MILLIGRAM(S): at 05:04

## 2023-10-23 RX ADMIN — Medication 1 TABLET(S): at 18:54

## 2023-10-23 RX ADMIN — Medication 100 MILLIGRAM(S): at 18:54

## 2023-10-23 RX ADMIN — Medication 1 TABLET(S): at 05:06

## 2023-10-23 RX ADMIN — HEPARIN SODIUM 5000 UNIT(S): 5000 INJECTION INTRAVENOUS; SUBCUTANEOUS at 19:06

## 2023-10-23 NOTE — CHART NOTE - NSCHARTNOTEFT_GEN_A_CORE
PRE-OP DIAGNOSIS:  CLI      PROCEDURE:     [x ] Peripheral Angiogram     [ ] Intervention (see below)         PHYSICIAN: Wilfredo    ASSISTANT: Trav       PROCEDURE DESCRIPTION:     Consent:      [x] Patient     [] Family Member     []  Used        Anesthesia:     [] General     [x] Sedation     [] Local        Access & Closure:     [x]  5 Fr right Femoral Artery antegrade access (perclose)  [x]  right Pedal access    [] Fr Femoral Vein       IV Contrast: 70 mL     FINDINGS:   Right SFA: 80% stenosis s/p PTA and DCB  Right PT: 100% stenosis s/p PTA  Right AT: 80% stenosis s/p PTA       ESTIMATED BLOOD LOSS: < 10 mL        CONDITION:     [x] Good     [] Fair     [] Critical        SPECIMEN REMOVED: N/A       POST-OP DIAGNOSIS:      [] Peripheral vascular disease       PLAN OF CARE:     [] Admit to T for observation     [] Medications: ASA, Plavix, start high intensity statin  (30 days supply of ASA/Plavix given to patient to take home)    [] IV Fluids: 50ml /hr until  transfuse 1 unit packed RBC --> repeat CBC --> then transfuse 2nd unit packed RBC    Bedrest x 4 hrs.     Admit to [facility] for wound care, hydration, and care for antegrade access and pedal access site.   Follow-up with Dr. Thomas Villalobos in 2 weeks.

## 2023-10-23 NOTE — CONSULT NOTE ADULT - SUBJECTIVE AND OBJECTIVE BOX
NEPHROLOGY CONSULTATION NOTE    58 year old former smoke male with PMH of CKD stage 4 ( baseline Cr 2.5-3.5) on list for kidney transplant, GERD, IDDM ( recent HgA1C  8), charcot foot  s/p placement of external fixator by podiatry in 9/28 and now s/p removal on 10/19 due to infection referred to the ED due to foot discoloration he notice at the bottom of his feet. patient reports changing of dressing weekly, last change was when he left the hospital. Patient report mild pain on the right foot and usually take percocet as needed for pain. Admitted for right foot PDA.     PAST MEDICAL & SURGICAL HISTORY:  DM (diabetes mellitus)  HTN (hypertension)  HLD (hyperlipidemia)  Herpes labialis  Anxiety  GERD (gastroesophageal reflux disease)  Kidney stones  20 years ago  Kidney disease  stage 4  Chronic kidney disease, unspecified CKD stage  Diabetic Charcot foot  S/P foot surgery, right  x 5 ( 2006 - 2013 )  Kidney stone  Removed by Laser x 2 ( 20 years ago )  H/O arthroscopy of right knee    Allergies:  No Known Allergies    Home Medications Reviewed  Hospital Medications:   MEDICATIONS  (STANDING):  amoxicillin  500 milliGRAM(s)/clavulanate 1 Tablet(s) Oral two times a day  dextrose 5%. 1000 milliLiter(s) (100 mL/Hr) IV Continuous <Continuous>  dextrose 5%. 1000 milliLiter(s) (50 mL/Hr) IV Continuous <Continuous>  dextrose 50% Injectable 25 Gram(s) IV Push once  dextrose 50% Injectable 25 Gram(s) IV Push once  dextrose 50% Injectable 25 Gram(s) IV Push once  dextrose 50% Injectable 12.5 Gram(s) IV Push once  doxycycline monohydrate Capsule 100 milliGRAM(s) Oral every 12 hours  glucagon  Injectable 1 milliGRAM(s) IntraMuscular once  heparin   Injectable 5000 Unit(s) SubCutaneous every 12 hours  insulin glargine Injectable (LANTUS) 20 Unit(s) SubCutaneous at bedtime  insulin lispro (ADMELOG) corrective regimen sliding scale   SubCutaneous at bedtime  insulin lispro (ADMELOG) corrective regimen sliding scale   SubCutaneous three times a day before meals  losartan 100 milliGRAM(s) Oral daily  magnesium sulfate  IVPB 2 Gram(s) IV Intermittent once  NIFEdipine XL 90 milliGRAM(s) Oral daily  sodium bicarbonate 650 milliGRAM(s) Oral three times a day  sodium chloride 0.9%. 1000 milliLiter(s) (50 mL/Hr) IV Continuous <Continuous>    SOCIAL HISTORY:  Denies ETOH,Smoking,   FAMILY HISTORY:  Family history of cancer in father (Father)  Lung        REVIEW OF SYSTEMS:  CONSTITUTIONAL: No weakness, fevers or chills  EYES/ENT: No visual changes;  No vertigo or throat pain   NECK: No pain or stiffness  RESPIRATORY: No cough, wheezing, hemoptysis; No shortness of breath  CARDIOVASCULAR: No chest pain or palpitations.  GASTROINTESTINAL: No abdominal or epigastric pain. No nausea, vomiting, or hematemesis; No diarrhea or constipation. No melena or hematochezia.  GENITOURINARY: No dysuria, frequency, foamy urine, urinary urgency, incontinence or hematuria  NEUROLOGICAL: No numbness or weakness  SKIN: LL right - ulcer       VITALS:  Vital Signs Last 24 Hrs  T(C): 36.9 (23 Oct 2023 09:49), Max: 37.4 (22 Oct 2023 22:29)  T(F): 98.4 (23 Oct 2023 09:49), Max: 99.3 (22 Oct 2023 22:29)  HR: 81 (23 Oct 2023 09:49) (74 - 92)  BP: 156/71 (23 Oct 2023 09:49) (123/63 - 156/71)  BP(mean): 112 (23 Oct 2023 09:15) (87 - 112)  RR: 16 (23 Oct 2023 09:49) (16 - 20)  SpO2: 96% (23 Oct 2023 09:49) (96% - 100%)    Parameters below as of 23 Oct 2023 09:15  Patient On (Oxygen Delivery Method): room air        10-22 @ 07:01  -  10-23 @ 07:00  --------------------------------------------------------  IN: 50 mL / OUT: 750 mL / NET: -700 mL    10-23 @ 07:01  -  10-23 @ 12:36  --------------------------------------------------------  IN: 50 mL / OUT: 250 mL / NET: -200 mL      Height (cm): 152.4 (10-22 @ 19:37)  Weight (kg): 95.3 (10-22 @ 19:37)  BMI (kg/m2): 41 (10-22 @ 19:37)  BSA (m2): 1.91 (10-22 @ 19:37)    PHYSICAL EXAM:    Respiratory: CTAB, no wheezes, rales or rhonchi  Cardiovascular: S1, S2, RRR  Gastrointestinal: BS+, soft, NT/ND  Extremities: Grade1 pedal edema   : No CVA tenderness. No urbano.   Skin: RLL ulcer       LABS:  10-23    140  |  102  |  50<H>  ----------------------------<  108<H>  4.5   |  24  |  2.9<H>    Ca    8.9      23 Oct 2023 06:35  Mg     1.7     10-23    TPro  7.7  /  Alb  3.5  /  TBili  0.2  /  DBili      /  AST  13  /  ALT  17  /  AlkPhos  218<H>  10-22    Creatinine Trend: 2.9 <--, 3.0 <--, 3.4 <--, 3.5 <--, 3.1 <--, 2.8 <--, 3.0 <--, 2.9 <--, 2.5 <--, 2.6 <--                        7.3    9.49  )-----------( 651      ( 23 Oct 2023 06:35 )             22.9     Urine Studies:  Urinalysis Basic - ( 23 Oct 2023 06:35 )    Color:  / Appearance:  / SG:  / pH:   Gluc: 108 mg/dL / Ketone:   / Bili:  / Urobili:    Blood:  / Protein:  / Nitrite:    Leuk Esterase:  / RBC:  / WBC    Sq Epi:  / Non Sq Epi:  / Bacteria:                 RADIOLOGY & ADDITIONAL STUDIES:                  NEPHROLOGY CONSULTATION NOTE    58 year old former smoke male with PMH of CKD stage 4 ( baseline Cr 2.5-3.5) on list for kidney transplant, GERD, IDDM ( recent HgA1C  8), charcot foot  s/p placement of external fixator by podiatry in 9/28 and now s/p removal on 10/19 due to infection referred to the ED due to foot discoloration he notice at the bottom of his feet. patient reports changing of dressing weekly, last change was when he left the hospital. Patient report mild pain on the right foot and usually take percocet as needed for pain. Admitted for right foot PDA.     PAST MEDICAL & SURGICAL HISTORY:  DM (diabetes mellitus)  HTN (hypertension)  HLD (hyperlipidemia)  Herpes labialis  Anxiety  GERD (gastroesophageal reflux disease)  Kidney stones  20 years ago  Kidney disease  stage 4  Chronic kidney disease, unspecified CKD stage  Diabetic Charcot foot  S/P foot surgery, right  x 5 ( 2006 - 2013 )  Kidney stone  Removed by Laser x 2 ( 20 years ago )  H/O arthroscopy of right knee    Allergies:  No Known Allergies    Home Medications Reviewed  Hospital Medications:   MEDICATIONS  (STANDING):  amoxicillin  500 milliGRAM(s)/clavulanate 1 Tablet(s) Oral two times a day  dextrose 5%. 1000 milliLiter(s) (100 mL/Hr) IV Continuous <Continuous>  dextrose 5%. 1000 milliLiter(s) (50 mL/Hr) IV Continuous <Continuous>  dextrose 50% Injectable 25 Gram(s) IV Push once  dextrose 50% Injectable 25 Gram(s) IV Push once  dextrose 50% Injectable 25 Gram(s) IV Push once  dextrose 50% Injectable 12.5 Gram(s) IV Push once  doxycycline monohydrate Capsule 100 milliGRAM(s) Oral every 12 hours  glucagon  Injectable 1 milliGRAM(s) IntraMuscular once  heparin   Injectable 5000 Unit(s) SubCutaneous every 12 hours  insulin glargine Injectable (LANTUS) 20 Unit(s) SubCutaneous at bedtime  insulin lispro (ADMELOG) corrective regimen sliding scale   SubCutaneous at bedtime  insulin lispro (ADMELOG) corrective regimen sliding scale   SubCutaneous three times a day before meals  losartan 100 milliGRAM(s) Oral daily  magnesium sulfate  IVPB 2 Gram(s) IV Intermittent once  NIFEdipine XL 90 milliGRAM(s) Oral daily  sodium bicarbonate 650 milliGRAM(s) Oral three times a day  sodium chloride 0.9%. 1000 milliLiter(s) (50 mL/Hr) IV Continuous <Continuous>    SOCIAL HISTORY:  Denies ETOH,Smoking,   FAMILY HISTORY:  Family history of cancer in father (Father)  Lung        REVIEW OF SYSTEMS:  CONSTITUTIONAL: No weakness, fevers or chills  EYES/ENT: No visual changes;  No vertigo or throat pain   NECK: No pain or stiffness  RESPIRATORY: No cough, wheezing, hemoptysis; No shortness of breath  CARDIOVASCULAR: No chest pain or palpitations.  GASTROINTESTINAL: No abdominal or epigastric pain. No nausea, vomiting, or hematemesis; No diarrhea or constipation. No melena or hematochezia.  GENITOURINARY: No dysuria, frequency, foamy urine, urinary urgency, incontinence or hematuria  NEUROLOGICAL: No numbness or weakness  SKIN: LL right - ulcer       VITALS:  Vital Signs Last 24 Hrs  T(C): 36.9 (23 Oct 2023 09:49), Max: 37.4 (22 Oct 2023 22:29)  T(F): 98.4 (23 Oct 2023 09:49), Max: 99.3 (22 Oct 2023 22:29)  HR: 81 (23 Oct 2023 09:49) (74 - 92)  BP: 156/71 (23 Oct 2023 09:49) (123/63 - 156/71)  BP(mean): 112 (23 Oct 2023 09:15) (87 - 112)  RR: 16 (23 Oct 2023 09:49) (16 - 20)  SpO2: 96% (23 Oct 2023 09:49) (96% - 100%)    Parameters below as of 23 Oct 2023 09:15  Patient On (Oxygen Delivery Method): room air        10-22 @ 07:01  -  10-23 @ 07:00  --------------------------------------------------------  IN: 50 mL / OUT: 750 mL / NET: -700 mL    10-23 @ 07:01  -  10-23 @ 12:36  --------------------------------------------------------  IN: 50 mL / OUT: 250 mL / NET: -200 mL      Height (cm): 152.4 (10-22 @ 19:37)  Weight (kg): 95.3 (10-22 @ 19:37)  BMI (kg/m2): 41 (10-22 @ 19:37)  BSA (m2): 1.91 (10-22 @ 19:37)    PHYSICAL EXAM:    Respiratory: CTAB, no wheezes, rales or rhonchi  Cardiovascular: S1, S2, RRR  Gastrointestinal: BS+, soft, NT/ND  Extremities: Grade1 pedal edema   : No CVA tenderness. No urbano.   Skin: RLL ulcer       LABS:  10-23    140  |  102  |  50<H>  ----------------------------<  108<H>  4.5   |  24  |  2.9<H>    Ca    8.9      23 Oct 2023 06:35  Mg     1.7     10-23    TPro  7.7  /  Alb  3.5  /  TBili  0.2  /  DBili      /  AST  13  /  ALT  17  /  AlkPhos  218<H>  10-22    Creatinine Trend: 2.9 <--, 3.0 <--, 3.4 <--, 3.5 <--, 3.1 <--, 2.8 <--, 3.0 <--, 2.9 <--, 2.5 <--, 2.6 <--                        7.3    9.49  )-----------( 651      ( 23 Oct 2023 06:35 )             22.9     Urine Studies:  Urinalysis Basic - ( 23 Oct 2023 06:35 )    Color:  / Appearance:  / SG:  / pH:   Gluc: 108 mg/dL / Ketone:   / Bili:  / Urobili:    Blood:  / Protein:  / Nitrite:    Leuk Esterase:  / RBC:  / WBC    Sq Epi:  / Non Sq Epi:  / Bacteria:                 RADIOLOGY & ADDITIONAL STUDIES:    CXR right foot    INTERPRETATION: Clinical History / Reason for exam: Foot pain.    3 views of the right foot. Comparison is made with a study dated 3 days prior.    Findings/  impression:    Postsurgical change of the calcaneus. Midfoot arthrosis with erosion and destruction. Osteopenia. Soft tissue swelling. The extremity is in a cast.

## 2023-10-23 NOTE — CHART NOTE - NSCHARTNOTEFT_GEN_A_CORE
PREOPERATIVE DAY OF PROCEDURE EVALUATION:  I have personally seen and examined the patient.  I agree with the history and physical which I have reviewed and noted any changes below.  (Signed electronically by __________)  10-23-23 @ 08:35      Cath Bleeding Risk: 7.2%    Prehydration: NS at 50ml/hr continuous    58 year old former smoke male with PMH of CKD stage 4 ( baseline Cr 2.5-3.5) on list for kidney transplant, GERD, IDDM ( recent HgA1C  8), charcot foot  s/p placement of external fixator by podiatry in 9/28 and now s/p removal on 10/19 due to infection referred to the ED due to foot discoloration he notice at the bottom of his feet. patient reports changing of dressing weekly, last change was when he left the hospital. Patient report mild pain on the right foot and usually take percocet as needed for pain.   Patient denies fever, chills, recent injury or trauma, chest pain, shortness of breath, palpitation, dizziness, LOC/syncope, numbness, tingling, weakness.     of note patient was admitted to medicine from 10/18 due to external fixator due to infection and now patient is s/p removal of external fixator of the RLE on 10/19 discharged on Augmentin and Doxycycline.        Plavix 600mg po given this morning PREOPERATIVE DAY OF PROCEDURE EVALUATION:  I have personally seen and examined the patient.  I agree with the history and physical which I have reviewed and noted any changes below.  (Signed electronically by __________)  10-23-23 @ 08:35      Cath Bleeding Risk: 7.2%    Prehydration: NS at 50ml/hr continuous    58 year old former smoke male with PMH of CKD stage 4 ( baseline Cr 2.5-3.5) on list for kidney transplant, GERD, IDDM ( recent HgA1C  8), charcot foot  s/p placement of external fixator by podiatry in 9/28 and now s/p removal on 10/19 due to infection referred to the ED due to foot discoloration he notice at the bottom of his feet. patient reports changing of dressing weekly, last change was when he left the hospital. Patient report mild pain on the right foot and usually take percocet as needed for pain.   Patient denies fever, chills, recent injury or trauma, chest pain, shortness of breath, palpitation, dizziness, LOC/syncope, numbness, tingling, weakness.     of note patient was admitted to medicine from 10/18 due to external fixator due to infection and now patient is s/p removal of external fixator of the RLE on 10/19 discharged on Augmentin and Doxycycline.        Plavix 600mg po given this morning    < from: [facility] Duplex Lower Extrem Arterial, Bilat (10.18.23 @ 19:19) >      Impression:    Possible right posterior tibial artery occlusion.    Mild left common femoral artery stenosis. Moderate superficial femoral   artery stenosis. 24.8

## 2023-10-23 NOTE — PROGRESS NOTE ADULT - SUBJECTIVE AND OBJECTIVE BOX
Chief complaint: Patient is a 58y old  Male who presents with a chief complaint of right foot discoloration (23 Oct 2023 12:36)    Interval history:    Review of systems: A complete 10-point review of systems was obtained and is negative except as stated in the interval history.    Vitals:  T(F): 98.4, Max: 99.3 (10-22 @ 22:29)  HR: 66 (66 - 92)  BP: 146/67 (123/63 - 156/71)  RR: 18 (16 - 20)  SpO2: 95% (95% - 100%)    Ins & outs:     10-22 @ 07:01  -  10-23 @ 07:00  --------------------------------------------------------  IN: 50 mL / OUT: 750 mL / NET: -700 mL    10-23 @ 07:01  -  10-23 @ 13:41  --------------------------------------------------------  IN: 50 mL / OUT: 250 mL / NET: -200 mL      Weight trend:  Weight (kg): 95.3 (10-22), 90.7 (10-19)    Physical exam:  General: No apparent distress  HEENT: Anicteric sclera. Moist mucous membranes. JVP *** cm.   Cardiac: Regular rate and rhythm. No murmurs, rubs, or gallops.   Vascular: Symmetric radial pulses. Dorsalis pedis pulses palpable.   Respiratory: Normal effort. Bibasilar crackles. Clear to ascultation.   Abdomen: Soft, nontender. Audible bowel sounds.   Extremities: Warm with *** edema. No cyanosis or clubbing.   Skin: Warm and dry. No rash.   Neurologic: Grossly normal motor function.   Psychiatric: Oriented to person, place, and time.     Data reviewed:  - Telemetry:   - ECG (date***):   - TTE (date***):   - Chest x-ray (date***):   - Stress test:   - CCTA:  - Cardiac catheterization:  - Cardiac MRI:    - Labs:                        7.3    9.49  )-----------( 651      ( 23 Oct 2023 06:35 )             22.9     10-23    140  |  102  |  50<H>  ----------------------------<  108<H>  4.5   |  24  |  2.9<H>    Ca    8.9      23 Oct 2023 06:35  Mg     1.7     10-23    TPro  7.7  /  Alb  3.5  /  TBili  0.2  /  DBili  x   /  AST  13  /  ALT  17  /  AlkPhos  218<H>  10-22    PT/INR - ( 22 Oct 2023 21:12 )   PT: 13.20 sec;   INR: 1.15 ratio         PTT - ( 22 Oct 2023 21:12 )  PTT:31.4 sec        Triglycerides, Serum: 93 mg/dL (10-23-23 @ 06:35)  LDL Cholesterol Calculated: 53 mg/dL (10-23-23 @ 06:35)      Urinalysis Basic - ( 23 Oct 2023 06:35 )    Color: x / Appearance: x / SG: x / pH: x  Gluc: 108 mg/dL / Ketone: x  / Bili: x / Urobili: x   Blood: x / Protein: x / Nitrite: x   Leuk Esterase: x / RBC: x / WBC x   Sq Epi: x / Non Sq Epi: x / Bacteria: x        Medications:  amoxicillin  500 milliGRAM(s)/clavulanate 1 Tablet(s) Oral two times a day  dextrose 50% Injectable 12.5 Gram(s) IV Push once  dextrose 50% Injectable 25 Gram(s) IV Push once  dextrose 50% Injectable 25 Gram(s) IV Push once  dextrose 50% Injectable 25 Gram(s) IV Push once  doxycycline monohydrate Capsule 100 milliGRAM(s) Oral every 12 hours  glucagon  Injectable 1 milliGRAM(s) IntraMuscular once  heparin   Injectable 5000 Unit(s) SubCutaneous every 12 hours  insulin glargine Injectable (LANTUS) 20 Unit(s) SubCutaneous at bedtime  insulin lispro (ADMELOG) corrective regimen sliding scale   SubCutaneous three times a day before meals  insulin lispro (ADMELOG) corrective regimen sliding scale   SubCutaneous at bedtime  losartan 100 milliGRAM(s) Oral daily  magnesium sulfate  IVPB 2 Gram(s) IV Intermittent once  NIFEdipine XL 90 milliGRAM(s) Oral daily  sodium bicarbonate 650 milliGRAM(s) Oral three times a day    Drips:  dextrose 5%. 1000 milliLiter(s) (50 mL/Hr) IV Continuous <Continuous>  dextrose 5%. 1000 milliLiter(s) (100 mL/Hr) IV Continuous <Continuous>  sodium chloride 0.9%. 1000 milliLiter(s) (50 mL/Hr) IV Continuous <Continuous>    PRN:     Allergies    No Known Allergies    Intolerances       Chief complaint: Patient is a 58y old  Male who presents with a chief complaint of right foot discoloration (23 Oct 2023 12:36)    Interval history:    Review of systems: A complete 10-point review of systems was obtained and is negative except as stated in the interval history.    Vitals:  T(F): 98.4, Max: 99.3 (10-22 @ 22:29)  HR: 66 (66 - 92)  BP: 146/67 (123/63 - 156/71)   RR: 18 (16 - 20)  SpO2: 95% (95% - 100%)    Ins & outs:     10-22 @ 07:01  -  10-23 @ 07:00  --------------------------------------------------------  IN: 50 mL / OUT: 750 mL / NET: -700 mL    10-23 @ 07:01  -  10-23 @ 13:41  --------------------------------------------------------  IN: 50 mL / OUT: 250 mL / NET: -200 mL      Weight trend:  Weight (kg): 95.3 (10-22), 90.7 (10-19)    Physical exam:  General: No apparent distress  HEENT: Anicteric sclera. Moist mucous membranes. JVP *** cm.   Cardiac: Regular rate and rhythm. No murmurs, rubs, or gallops.   Vascular: Symmetric radial pulses. Dorsalis pedis pulses palpable.   Respiratory: Normal effort. Bibasilar crackles. Clear to ascultation.   Abdomen: Soft, nontender. Audible bowel sounds.   Extremities: Warm with *** edema. No cyanosis or clubbing.   Skin: Warm and dry. No rash.   Neurologic: Grossly normal motor function.   Psychiatric: Oriented to person, place, and time.     Data reviewed:  - Telemetry:   - ECG (date***):   - TTE (date***):   - Chest x-ray (date***):   - Stress test:   - CCTA:  - Cardiac catheterization:  - Cardiac MRI:    - Labs:                        7.3    9.49  )-----------( 651      ( 23 Oct 2023 06:35 )             22.9     10-23    140  |  102  |  50<H>  ----------------------------<  108<H>  4.5   |  24  |  2.9<H>    Ca    8.9      23 Oct 2023 06:35  Mg     1.7     10-23    TPro  7.7  /  Alb  3.5  /  TBili  0.2  /  DBili  x   /  AST  13  /  ALT  17  /  AlkPhos  218<H>  10-22    PT/INR - ( 22 Oct 2023 21:12 )   PT: 13.20 sec;   INR: 1.15 ratio         PTT - ( 22 Oct 2023 21:12 )  PTT:31.4 sec        Triglycerides, Serum: 93 mg/dL (10-23-23 @ 06:35)  LDL Cholesterol Calculated: 53 mg/dL (10-23-23 @ 06:35)      Urinalysis Basic - ( 23 Oct 2023 06:35 )    Color: x / Appearance: x / SG: x / pH: x  Gluc: 108 mg/dL / Ketone: x  / Bili: x / Urobili: x   Blood: x / Protein: x / Nitrite: x   Leuk Esterase: x / RBC: x / WBC x   Sq Epi: x / Non Sq Epi: x / Bacteria: x        Medications:  amoxicillin  500 milliGRAM(s)/clavulanate 1 Tablet(s) Oral two times a day  dextrose 50% Injectable 12.5 Gram(s) IV Push once  dextrose 50% Injectable 25 Gram(s) IV Push once  dextrose 50% Injectable 25 Gram(s) IV Push once  dextrose 50% Injectable 25 Gram(s) IV Push once  doxycycline monohydrate Capsule 100 milliGRAM(s) Oral every 12 hours  glucagon  Injectable 1 milliGRAM(s) IntraMuscular once  heparin   Injectable 5000 Unit(s) SubCutaneous every 12 hours  insulin glargine Injectable (LANTUS) 20 Unit(s) SubCutaneous at bedtime  insulin lispro (ADMELOG) corrective regimen sliding scale   SubCutaneous three times a day before meals  insulin lispro (ADMELOG) corrective regimen sliding scale   SubCutaneous at bedtime  losartan 100 milliGRAM(s) Oral daily  magnesium sulfate  IVPB 2 Gram(s) IV Intermittent once  NIFEdipine XL 90 milliGRAM(s) Oral daily  sodium bicarbonate 650 milliGRAM(s) Oral three times a day    Drips:  dextrose 5%. 1000 milliLiter(s) (50 mL/Hr) IV Continuous <Continuous>  dextrose 5%. 1000 milliLiter(s) (100 mL/Hr) IV Continuous <Continuous>  sodium chloride 0.9%. 1000 milliLiter(s) (50 mL/Hr) IV Continuous <Continuous>    PRN:     Allergies    No Known Allergies    Intolerances       Chief complaint: Patient is a 58y old  Male who presents with a chief complaint of right foot discoloration (23 Oct 2023 12:36)    Interval history: Patient seen and examined. Peripheral angiogram today.    Review of systems: A complete 10-point review of systems was obtained and is negative except as stated in the interval history.    Vitals:  ICU Vital Signs Last 24 Hrs  T(C): 36.6 (23 Oct 2023 13:50), Max: 37.4 (22 Oct 2023 22:29)  T(F): 97.8 (23 Oct 2023 13:50), Max: 99.3 (22 Oct 2023 22:29)  HR: 72 (23 Oct 2023 13:50) (66 - 92)  BP: 151/67 (23 Oct 2023 13:50) (123/57 - 156/71)  BP(mean): 97 (23 Oct 2023 12:25) (87 - 112)  RR: 18 (23 Oct 2023 13:50) (16 - 28)  SpO2: 98% (23 Oct 2023 13:50) (95% - 100%)    O2 Parameters below as of 23 Oct 2023 13:50  Patient On (Oxygen Delivery Method): room air      Ins & outs:     10-22 @ 07:01  -  10-23 @ 07:00  --------------------------------------------------------  IN: 50 mL / OUT: 750 mL / NET: -700 mL    10-23 @ 07:01  -  10-23 @ 13:41  --------------------------------------------------------  IN: 50 mL / OUT: 250 mL / NET: -200 mL        Weight trend:  Weight (kg): 95.3 (10-22), 90.7 (10-19)      Physical exam:  General: No apparent distress  HEENT: Anicteric sclera. Moist mucous membranes. no JVP.   Cardiac: Regular rate and rhythm. No murmurs, rubs, or gallops.   Vascular: Symmetric radial pulses. Dorsalis pedis pulses palpable.   Respiratory: Normal effort. Bibasilar crackles. Clear to ascultation.   Abdomen: Soft, nontender. Audible bowel sounds.   Extremities: Warm with no edema. No cyanosis or clubbing.   Skin: Warm and dry. + RLE splint in place, ulcer on the great toe, 2nd, 3rd and 4th toe with discoloration on the plantar area,   Neurologic: Grossly normal motor function.   Psychiatric: Oriented to person, place, and time.     Data reviewed:  - Telemetry: NS 67-84bpm    - ECG (10/18/23):   Ventricular Rate 87 BPM  Atrial Rate 87 BPM  P-R Interval 184 ms  QRS Duration 90 ms  Q-T Interval 350 ms  QTC Calculation(Bazett) 421 ms  P Axis 12 degrees  R Axis 28 degrees  T Axis 48 degrees  Diagnosis Line Normal sinus rhythm  Normal ECG      - TTE (9/6/23):   Summary:   1. Normal global left ventricular systolic function.   2.LV Ejection Fraction by Knutson's Method with a biplane EF of 55 %.   3. Mildly increased LV wall thickness.   4. Normal left ventricular internal cavity size.   5. Spectral Doppler shows impaired relaxation pattern of left   ventricular myocardial filling (Grade I diastolic dysfunction).   6. Normal left atrial size.   7. Normal right atrial size.   8. Mild thickening of the anterior mitral valve leaflet.   9. No evidence of mitral valve regurgitation.    - x-ray Foot AP+ Lateral+ Oblique, right (10/22/23):  Findings/  impression:    Postsurgical change of the calcaneus. Midfoot arthrosis with erosion and   destruction. Osteopenia. Soft tissue swelling. The extremity is in a cast.    Xray 3 views (10/19/23):  Findings/  impression:    Status post external fixator removal.    No definite acute fracture or dislocation. Ankle and foot soft tissue   swelling is noted.    Degenerative changes are noted in the hind, mid and forefoot. Vascular   calcifications. Likely amended leads from previous noted stimulator   overlyingthe calcaneus/Achilles. Postsurgical changes of the calcaneus.          - Labs:                        7.3    9.49  )-----------( 651      ( 23 Oct 2023 06:35 )             22.9     10-23    140  |  102  |  50<H>  ----------------------------<  108<H>  4.5   |  24  |  2.9<H>    Ca    8.9      23 Oct 2023 06:35  Mg     1.7     10-23    TPro  7.7  /  Alb  3.5  /  TBili  0.2  /  DBili  x   /  AST  13  /  ALT  17  /  AlkPhos  218<H>  10-22    PT/INR - ( 22 Oct 2023 21:12 )   PT: 13.20 sec;   INR: 1.15 ratio         PTT - ( 22 Oct 2023 21:12 )  PTT:31.4 sec        Triglycerides, Serum: 93 mg/dL (10-23-23 @ 06:35)  LDL Cholesterol Calculated: 53 mg/dL (10-23-23 @ 06:35)      Urinalysis Basic - ( 23 Oct 2023 06:35 )    Color: x / Appearance: x / SG: x / pH: x  Gluc: 108 mg/dL / Ketone: x  / Bili: x / Urobili: x   Blood: x / Protein: x / Nitrite: x   Leuk Esterase: x / RBC: x / WBC x   Sq Epi: x / Non Sq Epi: x / Bacteria: x        Medications:  amoxicillin  500 milliGRAM(s)/clavulanate 1 Tablet(s) Oral two times a day  dextrose 50% Injectable 12.5 Gram(s) IV Push once  dextrose 50% Injectable 25 Gram(s) IV Push once  dextrose 50% Injectable 25 Gram(s) IV Push once  dextrose 50% Injectable 25 Gram(s) IV Push once  doxycycline monohydrate Capsule 100 milliGRAM(s) Oral every 12 hours  glucagon  Injectable 1 milliGRAM(s) IntraMuscular once  heparin   Injectable 5000 Unit(s) SubCutaneous every 12 hours  insulin glargine Injectable (LANTUS) 20 Unit(s) SubCutaneous at bedtime  insulin lispro (ADMELOG) corrective regimen sliding scale   SubCutaneous three times a day before meals  insulin lispro (ADMELOG) corrective regimen sliding scale   SubCutaneous at bedtime  losartan 100 milliGRAM(s) Oral daily  magnesium sulfate  IVPB 2 Gram(s) IV Intermittent once  NIFEdipine XL 90 milliGRAM(s) Oral daily  sodium bicarbonate 650 milliGRAM(s) Oral three times a day    Drips:  dextrose 5%. 1000 milliLiter(s) (50 mL/Hr) IV Continuous <Continuous>  dextrose 5%. 1000 milliLiter(s) (100 mL/Hr) IV Continuous <Continuous>  sodium chloride 0.9%. 1000 milliLiter(s) (50 mL/Hr) IV Continuous <Continuous>    PRN:     Allergies    No Known Allergies    Intolerances

## 2023-10-23 NOTE — PROGRESS NOTE ADULT - SUBJECTIVE AND OBJECTIVE BOX
Podiatry Progress Note    Subjective:  MARIOLA CURRY is a  58y Male who was seen bedside today (23 Oct 2023 13:40)  Patient is S/P ex fix application (DOS:9/28) and removal due to infection (DOS:10/19).  Overall, patient is doing well. Just underwent an angiogram today 10/23/23.   Denies any increase in pain, drainage or swelling to the RLE.   Denies any recent f/n/v/c/sob.        PAST MEDICAL & SURGICAL HISTORY:  DM (diabetes mellitus)  HTN (hypertension)  HLD (hyperlipidemia)  Herpes labialis  Anxiety  GERD (gastroesophageal reflux disease)  stones  20 years ago  Kidney disease  stage 4  Chronic kidney disease, unspecified CKD stage  Diabetic Charcot foot  S/P foot surgery, right  x 5 ( 2006 - 2013 )  Kidney stone  Removed by Laser x 2 ( 20 years ago )  H/O arthroscopy of right knee           Objective:  Vital Signs Last 24 Hrs  T(C): 36.4 (23 Oct 2023 15:45), Max: 37.4 (22 Oct 2023 22:29)  T(F): 97.5 (23 Oct 2023 15:45), Max: 99.3 (22 Oct 2023 22:29)  HR: 73 (23 Oct 2023 15:45) (66 - 87)  BP: 124/68 (23 Oct 2023 15:45) (123/57 - 156/71)  BP(mean): 90 (23 Oct 2023 15:45) (87 - 112)  RR: 15 (23 Oct 2023 15:45) (15 - 28)  SpO2: 96% (23 Oct 2023 15:45) (95% - 99%)    Parameters below as of 23 Oct 2023 15:45  Patient On (Oxygen Delivery Method): room air                            7.3    9.49  )-----------( 651      ( 23 Oct 2023 06:35 )             22.9                 10-23    140  |  102  |  50<H>  ----------------------------<  108<H>  4.5   |  24  |  2.9<H>    Ca    8.9      23 Oct 2023 06:35  Mg     1.7     10-23    TPro  7.7  /  Alb  3.5  /  TBili  0.2  /  DBili  x   /  AST  13  /  ALT  17  /  AlkPhos  218<H>  10-22      Physical Exam - Lower Extremity Focused:   #Right Lower Extremity  Derm:   -Skin slough to dorsal midfoot and forefoot   -Maceration toes 1-5; 2nd toenail avulsion  -Ischemic changes to dorsal midfoot pin sites, josiane-incision sites at dorsal medial midfoot & dorsal lateral midfoot; plantar forefoot, toes 1-5  -No erythema. No active drainage/bleeding. No malodor  -Ecchymosis to lateral forefoot, midfoot & rearfoot  -Hyperkeratosis to plantar central midfoot;  Vascular: DP and PT Pulses Diminished; Foot is Cool to the touch; Capillary refill absent to the toes   Neuro: Protective Sensation Diminished / Moderately Neuropathic   MSK: Pain On Palpation at Wound Site     Assessment:   S/P ex fix application (DOS:9/28) and removal due to infection (DOS:10/19), RLE    Plan:  Chart reviewed and Patient evaluated. All Questions and Concerns Addressed and Answered  Local Wound Care; Wound Flushed w/ NS; Wound Packed w/ Betadine Soaked Gauze / dsd / abd / jennifer secured with tape.  Weight Bearing Status; NWB RLE  Continue w/ Local Wound Care; Q24 Dressing Changes;  ID consult for antibiotic recs  Discussed Plan w/ Attending Dr. Roca    Podiatry

## 2023-10-23 NOTE — CONSULT NOTE ADULT - ASSESSMENT
58 year old former smoke male with PMH of CKD stage 4 ( baseline Cr 2.5-3.5) on list for kidney transplant, GERD, IDDM ( recent HgA1C  8), charcot foot  s/p placement of external fixator by podiatry in 9/28 and now s/p removal on 10/19 due to infection referred to the ED due to foot discoloration he notice at the bottom of his feet. patient reports changing of dressing weekly, last change was when he left the hospital. Patient report mild pain on the right foot and usually take percocet as needed for pain. Admitted for right foot PDA.       CKD stage 4/ Right LL PDA/ DM  - Goal - to stabilize GFR , not to worsen renal function  - Avoid nephrotoxic meds/NSAIDs/contrast , maintain hydration , BP control, BS control  - Get UA, UPCR, microalbuminuria. Goal UPCR <0.3  - Anemia of CKD- Get iron studies and can start ESS. Goal -hemoglobin 10-11  - Bicarb 24- C/w sodium bicarbonate 650 mg TID  - Metabolic bone disease. Get Vitamin D, PTH and phosphorus levels.   - Moderate to righ for MINERVA from contrast study ( Manjinder score 14 % of MINERVA) but can go ahead as the angiography ofLL to evaluate PDA is an emergent procedure. Use as minimum as possible of contrast  - Pre-hydrate with 50 ml/hr LR 12 hrs before and 12 hours after the contrast study. Reassess fluid status   - Nephrology will follow 58 year old former smoke male with PMH of CKD stage 4 ( baseline Cr 2.5-3.5) on list for kidney transplant, GERD, IDDM ( recent HgA1C  8), charcot foot  s/p placement of external fixator by podiatry in 9/28 and now s/p removal on 10/19 due to infection referred to the ED due to foot discoloration he notice at the bottom of his feet. patient reports changing of dressing weekly, last change was when he left the hospital. Patient report mild pain on the right foot and usually take percocet as needed for pain. Admitted for right foot PDA.       CKD stage 4/ Right LL PDA/ DM  - Goal - to stabilize GFR , not to worsen renal function  - Avoid nephrotoxic meds/NSAIDs/contrast , maintain hydration , BP control, BS control  - Anemia of CKD- Get iron studies and can start ALISSON if Iron stores adequate .   - Bicarb 24- C/w sodium bicarbonate 650 mg TID  - Metabolic bone disease. Get Vitamin D, PTH and phosphorus levels.   - Moderate to righ for MINERVA from contrast study ( Manjinder score 14 % of MINERVA) but can go ahead as the angiography ofLL to evaluate PDA is an emergent procedure. Use as minimum as possible of contrast  - Pre-hydrate with 50 ml/hr LR 12 hrs before and 12 hours after the contrast study. Reassess fluid status   - Nephrology will follow

## 2023-10-23 NOTE — PROGRESS NOTE ADULT - ASSESSMENT
Assessment:      Problems discussed and associated plan:      Please contact me with any questions or concerns at [phone]. Assessment:  58 year old male with PMH of CKD stage 4 ( baseline Cr 2.5-3.5) on list for kidney transplant, GERD, IDDM ( recent HgA1C  8), charcot foot  s/p placement of external fixator by podiatry in 9/28 and now s/p removal on 10/19 due to infection with foot discoloration. Admitted to cardiac telemetry. Peripheral angio scheduled for today.    Problems discussed and associated plan:  # Right foot discoloration/ PAD/ Charcot foot s/p placement of external fixator by podiatry in 9/28 now s/p removal 10/19  - admit to Cards tele   - Monitor on tele  - podiatry consulted in the morning   - recent arterial duplex with Possible right posterior tibial artery occlusion  - Peripheral today  - Podiatry consult      #leukocytosis   -Monitor and trend cbc   -blood culture x2   -continue home Augmentin and doxycycline for now (to be completed 11/1)     # CKD stage 4 ( baseline cr 2.5-3.5) ( on transplant list) Cr at baseline    - Monitor and trend Cr   - Nephro consult    # Normocytic anemia, likely secondary to renal disease ( baseline Hg 7-8)   - trend H/H  - Maintain active type and screen every 3 days  - Plan to transfuse Hg <7  - check iron panel (do not give IV iron if possible infection)  - 2U PRBCs today    # IDDM ( recent hga1c 8)   - FS  - ISS     #HTN   -Continue losartan 100 mg daily     #HLD  - Home pravastatin 40 mg daily therapeutic interchange to Atorvastatin 10mg inpatient    # anxiety  - continue xanax prn      Please contact me with any questions or concerns at [phone]. Assessment:  58 year old male with PMH of CKD stage 4 ( baseline Cr 2.5-3.5) on list for kidney transplant, GERD, IDDM ( recent HgA1C  8), charcot foot  s/p placement of external fixator by podiatry in 9/28 and now s/p removal on 10/19 due to infection with foot discoloration. Admitted to cardiac telemetry. Peripheral angio scheduled for today.    Problems discussed and associated plan:  # Right foot discoloration/ PAD/ Charcot foot s/p placement of external fixator by podiatry in 9/28 now s/p removal 10/19  - admit to Cards tele   - Monitor on tele  - podiatry consulted in the morning   - recent arterial duplex with Possible right posterior tibial artery occlusion  - Peripheral today  - Podiatry consult      #leukocytosis   -Monitor and trend cbc   -blood culture x2   -continue home Augmentin and doxycycline for now (to be completed 11/1)     # CKD stage 4 ( baseline cr 2.5-3.5) ( on transplant list) Cr at baseline    - Monitor and trend Cr   - Nephro consult    # Normocytic anemia, likely secondary to renal disease ( baseline Hg 7-8)   - trend H/H  - Maintain active type and screen every 3 days  - Plan to transfuse Hg <7  - check iron panel (do not give IV iron if possible infection)  - 2U PRBCs today    # IDDM ( recent hga1c 8)   - FS  - ISS     #HTN   -Continue losartan 100 mg daily     #HLD  - Home pravastatin 40 mg daily therapeutic interchange to Atorvastatin 10mg inpatient    # anxiety  - continue xanax prn      Please contact me with any questions or concerns at [phone].

## 2023-10-24 ENCOUNTER — TRANSCRIPTION ENCOUNTER (OUTPATIENT)
Age: 58
End: 2023-10-24

## 2023-10-24 VITALS
DIASTOLIC BLOOD PRESSURE: 81 MMHG | HEART RATE: 74 BPM | RESPIRATION RATE: 20 BRPM | SYSTOLIC BLOOD PRESSURE: 171 MMHG | TEMPERATURE: 98 F

## 2023-10-24 LAB
24R-OH-CALCIDIOL SERPL-MCNC: 25 NG/ML — LOW (ref 30–80)
24R-OH-CALCIDIOL SERPL-MCNC: 25 NG/ML — LOW (ref 30–80)
A1C WITH ESTIMATED AVERAGE GLUCOSE RESULT: 8.2 % — HIGH (ref 4–5.6)
A1C WITH ESTIMATED AVERAGE GLUCOSE RESULT: 8.2 % — HIGH (ref 4–5.6)
ANION GAP SERPL CALC-SCNC: 10 MMOL/L — SIGNIFICANT CHANGE UP (ref 7–14)
ANION GAP SERPL CALC-SCNC: 10 MMOL/L — SIGNIFICANT CHANGE UP (ref 7–14)
BUN SERPL-MCNC: 44 MG/DL — HIGH (ref 10–20)
BUN SERPL-MCNC: 44 MG/DL — HIGH (ref 10–20)
CALCIUM SERPL-MCNC: 9 MG/DL — SIGNIFICANT CHANGE UP (ref 8.4–10.5)
CALCIUM SERPL-MCNC: 9 MG/DL — SIGNIFICANT CHANGE UP (ref 8.4–10.5)
CALCIUM SERPL-MCNC: 9.2 MG/DL — SIGNIFICANT CHANGE UP (ref 8.4–10.4)
CALCIUM SERPL-MCNC: 9.2 MG/DL — SIGNIFICANT CHANGE UP (ref 8.4–10.4)
CHLORIDE SERPL-SCNC: 102 MMOL/L — SIGNIFICANT CHANGE UP (ref 98–110)
CHLORIDE SERPL-SCNC: 102 MMOL/L — SIGNIFICANT CHANGE UP (ref 98–110)
CO2 SERPL-SCNC: 25 MMOL/L — SIGNIFICANT CHANGE UP (ref 17–32)
CO2 SERPL-SCNC: 25 MMOL/L — SIGNIFICANT CHANGE UP (ref 17–32)
CREAT SERPL-MCNC: 2.8 MG/DL — HIGH (ref 0.7–1.5)
CREAT SERPL-MCNC: 2.8 MG/DL — HIGH (ref 0.7–1.5)
CULTURE RESULTS: SIGNIFICANT CHANGE UP
EGFR: 25 ML/MIN/1.73M2 — LOW
EGFR: 25 ML/MIN/1.73M2 — LOW
ESTIMATED AVERAGE GLUCOSE: 189 MG/DL — HIGH (ref 68–114)
ESTIMATED AVERAGE GLUCOSE: 189 MG/DL — HIGH (ref 68–114)
GLUCOSE BLDC GLUCOMTR-MCNC: 206 MG/DL — HIGH (ref 70–99)
GLUCOSE BLDC GLUCOMTR-MCNC: 206 MG/DL — HIGH (ref 70–99)
GLUCOSE BLDC GLUCOMTR-MCNC: 361 MG/DL — HIGH (ref 70–99)
GLUCOSE BLDC GLUCOMTR-MCNC: 361 MG/DL — HIGH (ref 70–99)
GLUCOSE SERPL-MCNC: 204 MG/DL — HIGH (ref 70–99)
GLUCOSE SERPL-MCNC: 204 MG/DL — HIGH (ref 70–99)
HCT VFR BLD CALC: 28.6 % — LOW (ref 42–52)
HCT VFR BLD CALC: 28.6 % — LOW (ref 42–52)
HGB BLD-MCNC: 9.1 G/DL — LOW (ref 14–18)
HGB BLD-MCNC: 9.1 G/DL — LOW (ref 14–18)
IRON SATN MFR SERPL: 34 % — SIGNIFICANT CHANGE UP (ref 15–50)
IRON SATN MFR SERPL: 34 % — SIGNIFICANT CHANGE UP (ref 15–50)
IRON SATN MFR SERPL: 41 UG/DL — SIGNIFICANT CHANGE UP (ref 35–150)
IRON SATN MFR SERPL: 41 UG/DL — SIGNIFICANT CHANGE UP (ref 35–150)
MAGNESIUM SERPL-MCNC: 2 MG/DL — SIGNIFICANT CHANGE UP (ref 1.8–2.4)
MAGNESIUM SERPL-MCNC: 2 MG/DL — SIGNIFICANT CHANGE UP (ref 1.8–2.4)
MCHC RBC-ENTMCNC: 27.2 PG — SIGNIFICANT CHANGE UP (ref 27–31)
MCHC RBC-ENTMCNC: 27.2 PG — SIGNIFICANT CHANGE UP (ref 27–31)
MCHC RBC-ENTMCNC: 31.8 G/DL — LOW (ref 32–37)
MCHC RBC-ENTMCNC: 31.8 G/DL — LOW (ref 32–37)
MCV RBC AUTO: 85.4 FL — SIGNIFICANT CHANGE UP (ref 80–94)
MCV RBC AUTO: 85.4 FL — SIGNIFICANT CHANGE UP (ref 80–94)
NRBC # BLD: 0 /100 WBCS — SIGNIFICANT CHANGE UP (ref 0–0)
NRBC # BLD: 0 /100 WBCS — SIGNIFICANT CHANGE UP (ref 0–0)
PHOSPHATE SERPL-MCNC: 4 MG/DL — SIGNIFICANT CHANGE UP (ref 2.1–4.9)
PHOSPHATE SERPL-MCNC: 4 MG/DL — SIGNIFICANT CHANGE UP (ref 2.1–4.9)
PLATELET # BLD AUTO: 586 K/UL — HIGH (ref 130–400)
PLATELET # BLD AUTO: 586 K/UL — HIGH (ref 130–400)
PMV BLD: 8.8 FL — SIGNIFICANT CHANGE UP (ref 7.4–10.4)
PMV BLD: 8.8 FL — SIGNIFICANT CHANGE UP (ref 7.4–10.4)
POTASSIUM SERPL-MCNC: 5.6 MMOL/L — HIGH (ref 3.5–5)
POTASSIUM SERPL-MCNC: 5.6 MMOL/L — HIGH (ref 3.5–5)
POTASSIUM SERPL-SCNC: 5.6 MMOL/L — HIGH (ref 3.5–5)
POTASSIUM SERPL-SCNC: 5.6 MMOL/L — HIGH (ref 3.5–5)
PTH-INTACT FLD-MCNC: 103 PG/ML — HIGH (ref 15–65)
PTH-INTACT FLD-MCNC: 103 PG/ML — HIGH (ref 15–65)
RBC # BLD: 3.35 M/UL — LOW (ref 4.7–6.1)
RBC # BLD: 3.35 M/UL — LOW (ref 4.7–6.1)
RBC # FLD: 14.2 % — SIGNIFICANT CHANGE UP (ref 11.5–14.5)
RBC # FLD: 14.2 % — SIGNIFICANT CHANGE UP (ref 11.5–14.5)
SODIUM SERPL-SCNC: 137 MMOL/L — SIGNIFICANT CHANGE UP (ref 135–146)
SODIUM SERPL-SCNC: 137 MMOL/L — SIGNIFICANT CHANGE UP (ref 135–146)
SPECIMEN SOURCE: SIGNIFICANT CHANGE UP
TIBC SERPL-MCNC: 121 UG/DL — LOW (ref 220–430)
TIBC SERPL-MCNC: 121 UG/DL — LOW (ref 220–430)
UIBC SERPL-MCNC: 80 UG/DL — LOW (ref 110–370)
UIBC SERPL-MCNC: 80 UG/DL — LOW (ref 110–370)
WBC # BLD: 8.25 K/UL — SIGNIFICANT CHANGE UP (ref 4.8–10.8)
WBC # BLD: 8.25 K/UL — SIGNIFICANT CHANGE UP (ref 4.8–10.8)
WBC # FLD AUTO: 8.25 K/UL — SIGNIFICANT CHANGE UP (ref 4.8–10.8)
WBC # FLD AUTO: 8.25 K/UL — SIGNIFICANT CHANGE UP (ref 4.8–10.8)

## 2023-10-24 PROCEDURE — 99239 HOSP IP/OBS DSCHRG MGMT >30: CPT

## 2023-10-24 RX ORDER — SODIUM ZIRCONIUM CYCLOSILICATE 10 G/10G
5 POWDER, FOR SUSPENSION ORAL ONCE
Refills: 0 | Status: COMPLETED | OUTPATIENT
Start: 2023-10-24 | End: 2023-10-24

## 2023-10-24 RX ORDER — ASPIRIN/CALCIUM CARB/MAGNESIUM 324 MG
1 TABLET ORAL
Qty: 30 | Refills: 1
Start: 2023-10-24 | End: 2023-12-22

## 2023-10-24 RX ORDER — CLOPIDOGREL BISULFATE 75 MG/1
1 TABLET, FILM COATED ORAL
Qty: 30 | Refills: 1
Start: 2023-10-24 | End: 2023-12-22

## 2023-10-24 RX ORDER — ROSUVASTATIN CALCIUM 5 MG/1
1 TABLET ORAL
Qty: 30 | Refills: 1
Start: 2023-10-24 | End: 2023-12-22

## 2023-10-24 RX ADMIN — Medication 4: at 08:28

## 2023-10-24 RX ADMIN — Medication 650 MILLIGRAM(S): at 00:16

## 2023-10-24 RX ADMIN — SODIUM ZIRCONIUM CYCLOSILICATE 5 GRAM(S): 10 POWDER, FOR SUSPENSION ORAL at 12:14

## 2023-10-24 RX ADMIN — HEPARIN SODIUM 5000 UNIT(S): 5000 INJECTION INTRAVENOUS; SUBCUTANEOUS at 05:29

## 2023-10-24 RX ADMIN — Medication 100 MILLIGRAM(S): at 05:30

## 2023-10-24 RX ADMIN — Medication 0.5 MILLIGRAM(S): at 01:19

## 2023-10-24 RX ADMIN — Medication 90 MILLIGRAM(S): at 05:28

## 2023-10-24 RX ADMIN — CLOPIDOGREL BISULFATE 75 MILLIGRAM(S): 75 TABLET, FILM COATED ORAL at 12:14

## 2023-10-24 RX ADMIN — Medication 650 MILLIGRAM(S): at 13:29

## 2023-10-24 RX ADMIN — Medication 0.5 MILLIGRAM(S): at 13:32

## 2023-10-24 RX ADMIN — Medication 81 MILLIGRAM(S): at 12:14

## 2023-10-24 RX ADMIN — Medication 1 TABLET(S): at 05:29

## 2023-10-24 RX ADMIN — Medication 10: at 12:01

## 2023-10-24 RX ADMIN — Medication 650 MILLIGRAM(S): at 05:28

## 2023-10-24 RX ADMIN — LOSARTAN POTASSIUM 100 MILLIGRAM(S): 100 TABLET, FILM COATED ORAL at 05:29

## 2023-10-24 NOTE — DISCHARGE NOTE PROVIDER - NSDCFUSCHEDAPPT_GEN_ALL_CORE_FT
Rosie Wilkins Physician [facility]  CARDIOLOGY [address]  Scheduled Appointment: 12/18/2023

## 2023-10-24 NOTE — DISCHARGE NOTE PROVIDER - HOSPITAL COURSE
58yoM with Charcot foot s/p placement of an external fixator on 9/28/23 which had to be removed on 10/19 due to infection, CKD (baseline Cr 2.5-3.5, on list for kidney transplant), and IDDM (A1c 9.9) who presented for work-up of nonhealing wounds.  Patient underwent peripheral angiogram on 10/23/2023 with balloon angioplasty of PTA and DCB of R SFA and PTA of R PT and R AT via R groin and R pedal access. See report below. On 10/23 Hgb 7.3. patient received 2u pRBCS.     Patient was monitored overnight. On POD 1 patient remains HD stable with no complaints. Patient remains in SR with no arrhythmias noted on tele. EKG performed showed no acute ST changes. Examination of Rt femoral artery and Rt pedal access showed a C/DI site with no hematoma, erythema. pulses are 2+ bilaterally. Potassium 5.6 and Lokelma was given. Hgb 9.1 stable. Podiatry  was consulted and wound care was recommended changed every 24 hrs , Wound Flushed w/ normal saline ;  Packed w/ Betadine Soaked Gauze / dsd / abd / jennifer secured with tape. Infectious disease was consulted and recommended :_______________________    Patient will be discharged home on DAPT with ASA, plavix and statin. Patient is being DC home in stable condition.      Peripheral Angiogram:  FINDINGS:   Right SFA: 80% stenosis s/p PTA and DCB  Right PT: 100% stenosis s/p PTA  Right AT: 80% stenosis s/p PTA          58yoM with Charcot foot s/p placement of an external fixator on 9/28/23 which had to be removed on 10/19 due to infection, CKD (baseline Cr 2.5-3.5, on list for kidney transplant), and IDDM (A1c 9.9) who presented for work-up of nonhealing wounds.  Patient underwent peripheral angiogram on 10/23/2023 with balloon angioplasty of PTA and DCB of R SFA and PTA of R PT and R AT via R groin and R pedal access. See report below. On 10/23 Hgb 7.3. patient received 2u pRBCS.     Patient was monitored overnight. On POD 1 patient remains HD stable with no complaints. Patient remains in SR with no arrhythmias noted on tele. EKG performed showed no acute ST changes. Examination of Rt femoral artery and Rt pedal access showed a C/DI site with no hematoma, erythema. pulses are 2+ bilaterally. Potassium 5.6 and Lokelma was given. Hgb 9.1 stable. Podiatry  was consulted and wound care was recommended changed every 24 hrs , Wound Flushed w/ normal saline ;  Packed w/ Betadine Soaked Gauze / dsd / abd / jennifer secured with tape. Infectious disease was consulted and recommended to continue  po Augmentin 500 mg q12h and po Doxycycline 100 mg q12h for 6 weeks starting 10/19.    Patient will be discharged home on DAPT with ASA, plavix and statin. Patient is being DC home in stable condition.      Peripheral Angiogram:  FINDINGS:   Right SFA: 80% stenosis s/p PTA and DCB  Right PT: 100% stenosis s/p PTA  Right AT: 80% stenosis s/p PTA

## 2023-10-24 NOTE — PROGRESS NOTE ADULT - SUBJECTIVE AND OBJECTIVE BOX
Chief complaint: Patient is a 58y old  Male who presents with a chief complaint of right foot discoloration (23 Oct 2023 12:36)    Interval history: Patient seen and examined. Peripheral angiogram today.    Review of systems: A complete 10-point review of systems was obtained and is negative except as stated in the interval history.    Vitals:  T(C): 36.7 (24 Oct 2023 08:20), Max: 36.9 (23 Oct 2023 09:15)  T(F): 98 (24 Oct 2023 08:20), Max: 98.4 (23 Oct 2023 09:15)  HR: 74 (24 Oct 2023 08:20) (66 - 82)  BP: 171/81 (24 Oct 2023 08:20) (123/57 - 171/86)  BP(mean): 122 (24 Oct 2023 04:08) (90 - 122)  RR: 20 (24 Oct 2023 08:20) (15 - 28)  SpO2: 98% (24 Oct 2023 04:08) (95% - 99%)    Parameters below as of 24 Oct 2023 04:08  Patient On (Oxygen Delivery Method): room air      Ins & outs:     10-22 @ 07:01  -  10-23 @ 07:00  --------------------------------------------------------  IN: 50 mL / OUT: 750 mL / NET: -700 mL    10-23 @ 07:01  -  10-23 @ 13:41  --------------------------------------------------------  IN: 50 mL / OUT: 250 mL / NET: -200 mL    23 Oct 2023 07:01  -  24 Oct 2023 07:00  --------------------------------------------------------  IN: 851 mL / OUT: 1150 mL / NET: -299 mL            Weight trend:  Weight (kg): 95.3 (10-22), 90.7 (10-19)      Physical exam:  General: No apparent distress  HEENT: Anicteric sclera. Moist mucous membranes. no JVP.   Cardiac: Regular rate and rhythm. No murmurs, rubs, or gallops.   Vascular: Symmetric radial pulses. Dorsalis pedis pulses palpable.   Respiratory: Normal effort. Bibasilar crackles. Clear to ascultation.   Abdomen: Soft, nontender. Audible bowel sounds.   Extremities: Warm with no edema. No cyanosis or clubbing.   Skin: Warm and dry. + RLE splint in place, ulcer on the great toe, 2nd, 3rd and 4th toe with discoloration on the plantar area,   Neurologic: Grossly normal motor function.   Psychiatric: Oriented to person, place, and time.     Data reviewed:  - Telemetry: NS 67-84bpm    - ECG (10/18/23):   Ventricular Rate 87 BPM  Atrial Rate 87 BPM  P-R Interval 184 ms  QRS Duration 90 ms  Q-T Interval 350 ms  QTC Calculation(Bazett) 421 ms  P Axis 12 degrees  R Axis 28 degrees  T Axis 48 degrees  Diagnosis Line Normal sinus rhythm  Normal ECG      - TTE (9/6/23):   Summary:   1. Normal global left ventricular systolic function.   2.LV Ejection Fraction by Knutson's Method with a biplane EF of 55 %.   3. Mildly increased LV wall thickness.   4. Normal left ventricular internal cavity size.   5. Spectral Doppler shows impaired relaxation pattern of left   ventricular myocardial filling (Grade I diastolic dysfunction).   6. Normal left atrial size.   7. Normal right atrial size.   8. Mild thickening of the anterior mitral valve leaflet.   9. No evidence of mitral valve regurgitation.    - x-ray Foot AP+ Lateral+ Oblique, right (10/22/23):  Findings/  impression:    Postsurgical change of the calcaneus. Midfoot arthrosis with erosion and   destruction. Osteopenia. Soft tissue swelling. The extremity is in a cast.    Xray 3 views (10/19/23):  Findings/  impression:    Status post external fixator removal.    No definite acute fracture or dislocation. Ankle and foot soft tissue   swelling is noted.    Degenerative changes are noted in the hind, mid and forefoot. Vascular   calcifications. Likely amended leads from previous noted stimulator   overlyingthe calcaneus/Achilles. Postsurgical changes of the calcaneus.    - Labs:                        9.1    8.25  )-----------( 586      ( 24 Oct 2023 06:34 )             28.6     10-24    137  |  102  |  44<H>  ----------------------------<  204<H>  5.6<H>   |  25  |  2.8<H>    Ca    9.2      24 Oct 2023 06:34  Phos  4.0     10-24  Mg     2.0     10-24    TPro  7.7  /  Alb  3.5  /  TBili  0.2  /  DBili  x   /  AST  13  /  ALT  17  /  AlkPhos  218<H>  10-22    LIVER FUNCTIONS - ( 22 Oct 2023 21:12 )  Alb: 3.5 g/dL / Pro: 7.7 g/dL / ALK PHOS: 218 U/L / ALT: 17 U/L / AST: 13 U/L / GGT: x           PT/INR - ( 22 Oct 2023 21:12 )   PT: 13.20 sec;   INR: 1.15 ratio         PTT - ( 22 Oct 2023 21:12 )  PTT:31.4 sec          Lactate Trend    Urinalysis Basic - ( 24 Oct 2023 06:34 )    Color: x / Appearance: x / SG: x / pH: x  Gluc: 204 mg/dL / Ketone: x  / Bili: x / Urobili: x   Blood: x / Protein: x / Nitrite: x   Leuk Esterase: x / RBC: x / WBC x   Sq Epi: x / Non Sq Epi: x / Bacteria: x          Medications:  MEDICATIONS  (STANDING):  amoxicillin  500 milliGRAM(s)/clavulanate 1 Tablet(s) Oral two times a day  aspirin  chewable 81 milliGRAM(s) Oral daily  atorvastatin 80 milliGRAM(s) Oral at bedtime  clopidogrel Tablet 75 milliGRAM(s) Oral daily  dextrose 5%. 1000 milliLiter(s) (100 mL/Hr) IV Continuous <Continuous>  dextrose 5%. 1000 milliLiter(s) (50 mL/Hr) IV Continuous <Continuous>  dextrose 50% Injectable 25 Gram(s) IV Push once  dextrose 50% Injectable 25 Gram(s) IV Push once  dextrose 50% Injectable 25 Gram(s) IV Push once  dextrose 50% Injectable 12.5 Gram(s) IV Push once  doxycycline monohydrate Capsule 100 milliGRAM(s) Oral every 12 hours  glucagon  Injectable 1 milliGRAM(s) IntraMuscular once  heparin   Injectable 5000 Unit(s) SubCutaneous every 12 hours  insulin lispro (ADMELOG) corrective regimen sliding scale   SubCutaneous three times a day before meals  insulin lispro (ADMELOG) corrective regimen sliding scale   SubCutaneous at bedtime  losartan 100 milliGRAM(s) Oral daily  NIFEdipine XL 90 milliGRAM(s) Oral daily  sodium bicarbonate 650 milliGRAM(s) Oral three times a day  sodium chloride 0.9%. 1000 milliLiter(s) (50 mL/Hr) IV Continuous <Continuous>    MEDICATIONS  (PRN):  ALPRAZolam 0.5 milliGRAM(s) Oral four times a day PRN anxiety  dextrose Oral Gel 15 Gram(s) Oral once PRN Blood Glucose LESS THAN 70 milliGRAM(s)/deciliter  oxycodone    5 mG/acetaminophen 325 mG 1 Tablet(s) Oral every 4 hours PRN Severe Pain (7 - 10)        PRN:     Allergies    No Known Allergies    Intolerances

## 2023-10-24 NOTE — DISCHARGE NOTE PROVIDER - NSDCMRMEDTOKEN_GEN_ALL_CORE_FT
amoxicillin-clavulanate 500 mg-125 mg oral tablet: 1 tab(s) orally 2 times a day  doxycycline hyclate 100 mg oral tablet: 1 tab(s) orally every 12 hours  Insulin Lispro KwikPen 100 units/mL injectable solution: 8 unit(s) injectable 3 times a day (before meals)  losartan 100 mg oral tablet: 1 tab(s) orally once a day  NIFEdipine 90 mg oral tablet, extended release: 1 tab(s) orally once a day  Percocet 5 mg-325 mg oral tablet: 1 tab(s) orally every 4 hours as needed for  moderate pain  pravastatin 40 mg oral tablet: 1 tab(s) orally once a day  sodium bicarbonate 650 mg oral tablet: 1 tab(s) orally 3 times a day  Tresiba FlexTouch 200 units/mL subcutaneous solution: 20 subcutaneous once a day  Xanax 1 mg oral tablet: 0.5 tab(s) orally 4 times a day, As Needed   amoxicillin-clavulanate 500 mg-125 mg oral tablet: 1 tab(s) orally 2 times a day  aspirin 81 mg oral tablet, chewable: 1 tab(s) orally once a day  clopidogrel 75 mg oral tablet: 1 tab(s) orally once a day  doxycycline hyclate 100 mg oral tablet: 1 tab(s) orally every 12 hours  Insulin Lispro KwikPen 100 units/mL injectable solution: 8 unit(s) injectable 3 times a day (before meals)  losartan 100 mg oral tablet: 1 tab(s) orally once a day  NIFEdipine 90 mg oral tablet, extended release: 1 tab(s) orally once a day  Percocet 5 mg-325 mg oral tablet: 1 tab(s) orally every 4 hours as needed for  moderate pain  rosuvastatin 20 mg oral tablet: 1 tab(s) orally once a day  sodium bicarbonate 650 mg oral tablet: 1 tab(s) orally 3 times a day  Tresiba FlexTouch 200 units/mL subcutaneous solution: 20 subcutaneous once a day  Xanax 1 mg oral tablet: 0.5 tab(s) orally 4 times a day, As Needed   amoxicillin-clavulanate 500 mg-125 mg oral tablet: 1 tab(s) orally 2 times a day  aspirin 81 mg oral tablet, chewable: 1 tab(s) orally once a day  clopidogrel 75 mg oral tablet: 1 tab(s) orally once a day  doxycycline hyclate 100 mg oral tablet: 1 tab(s) orally every 12 hours please take for 6 weeks starting on 10/19/2023  Insulin Lispro KwikPen 100 units/mL injectable solution: 8 unit(s) injectable 3 times a day (before meals)  losartan 100 mg oral tablet: 1 tab(s) orally once a day  NIFEdipine 90 mg oral tablet, extended release: 1 tab(s) orally once a day  Percocet 5 mg-325 mg oral tablet: 1 tab(s) orally every 4 hours as needed for  moderate pain  rosuvastatin 20 mg oral tablet: 1 tab(s) orally once a day  sodium bicarbonate 650 mg oral tablet: 1 tab(s) orally 3 times a day  Tresiba FlexTouch 200 units/mL subcutaneous solution: 20 subcutaneous once a day  Xanax 1 mg oral tablet: 0.5 tab(s) orally 4 times a day, As Needed   amoxicillin-clavulanate 500 mg-125 mg oral tablet: 1 tab(s) orally 2 times a day  amoxicillin-clavulanate 500 mg-125 mg oral tablet: 1 tab(s) orally 2 times a day please take for 6 weeks starting on 10/19  aspirin 81 mg oral tablet, chewable: 1 tab(s) orally once a day  clopidogrel 75 mg oral tablet: 1 tab(s) orally once a day  doxycycline hyclate 100 mg oral tablet: 1 tab(s) orally every 12 hours please take for 6 weeks starting on 10/19/2023  Insulin Lispro KwikPen 100 units/mL injectable solution: 8 unit(s) injectable 3 times a day (before meals)  losartan 100 mg oral tablet: 1 tab(s) orally once a day  NIFEdipine 90 mg oral tablet, extended release: 1 tab(s) orally once a day  Percocet 5 mg-325 mg oral tablet: 1 tab(s) orally every 4 hours as needed for  moderate pain  rosuvastatin 20 mg oral tablet: 1 tab(s) orally once a day  sodium bicarbonate 650 mg oral tablet: 1 tab(s) orally 3 times a day  Tresiba FlexTouch 200 units/mL subcutaneous solution: 20 subcutaneous once a day  Xanax 1 mg oral tablet: 0.5 tab(s) orally 4 times a day, As Needed

## 2023-10-24 NOTE — PROGRESS NOTE ADULT - ASSESSMENT
58 year old former smoke male with PMH of CKD stage 4 ( baseline Cr 2.5-3.5) on list for kidney transplant, GERD, IDDM ( recent HgA1C  8), charcot foot  s/p placement of external fixator by podiatry in 9/28 and now s/p removal on 10/19 due to infection referred to the ED due to foot discoloration he notice at the bottom of his feet. patient reports changing of dressing weekly, last change was when he left the hospital. Patient report mild pain on the right foot and usually take percocet as needed for pain.   Patient denies fever, chills, recent injury or trauma, chest pain, shortness of breath, palpitation, dizziness, LOC/syncope, numbness, tingling, weakness.     of note patient was admitted to medicine from 10/18 due to external fixator due to infection and now patient is s/p removal of external fixator of the RLE on 10/19     IMPRESSION/RECOMMENDATIONS  Right foot with significant ischemic changes  1-4 digits gangrenous dry  Ischemic changes along mid foot medial and laterally  No pulses  High probability will need amputation soon  No cultures taked recently  9/28 tissue  NG  10/18 BCx NG  WBC 8.2    ABx of limited value but will recommend po Augmentin 500 mg q12h and po Doxycycline 100 mg q12h for 6 weeks starting 10/19  Discussed with his wife at the bedside    Please do not hesitate to recall ID if any questions arise either through my [employer] or through [employer]

## 2023-10-24 NOTE — DISCHARGE NOTE PROVIDER - ATTENDING DISCHARGE PHYSICAL EXAMINATION:
Right femoral arterial access site is clean, dry, and intact with no hematoma or tenderness. Right pedal site is currently underneath foot dressing. No tenderness, no hematoma palpated.

## 2023-10-24 NOTE — DISCHARGE NOTE NURSING/CASE MANAGEMENT/SOCIAL WORK - PATIENT PORTAL LINK FT
You can access the FollowMyHealth Patient Portal offered by [facility] by registering at the following website: [email or web address]. By joining [facility]’s FollowMyHealth portal, you will also be able to view your health information using other applications (apps) compatible with our system.

## 2023-10-24 NOTE — DISCHARGE NOTE NURSING/CASE MANAGEMENT/SOCIAL WORK - NSDCPEFALRISK_GEN_ALL_CORE
For information on Fall & Injury Prevention, visit: [email or web address] OR  [email or web address] OR  https://www.cdc.gov/steadi/patient.html

## 2023-10-24 NOTE — DISCHARGE NOTE PROVIDER - COLLABORATE WITH
Denies known Latex allergy or symptoms of Latex sensitivity.  Medications and allergies updated and verified.   History   Smoking Status   • Former Smoker   • Packs/day: 2.00   • Years: 15.00   • Types: Cigarettes   • Quit date: 1980   Smokeless Tobacco   • Never Used     Cardiac history reviewed and updated    PMD:Ching Hackett MD    Reason for visit: follow up cardiomyopathy   Last office visit:17     Patient denies SOB with activity or at rest, denies headaches, dizziness or cough, denies palpitations, without edema to lower extremities. With  A lot of stress did get some fatigue and discomfort to upper left  Shoulder area. Cold recently. Last 2 weeks sleeping poor 2 days.      Previous procedures:   2016 Defibrillator implanted by Dr. Ware for dilated nonischemic cardiomyopathy with an ejection fraction of 26%   2015 /[facility] by Dr Starks [place] [facility] Mild single vessel coronary artery disease. 2. Severely dilated left ventricle with severe left ventricular systolic dysfunction. 3. Mild pulmonary hypertension. 4. Mildly elevated pulmonary capillary wedge pressure. 5. Moderately elevated left ventricular end-diastolic pressure. 6. Mild to moderate reduction in cardiac output and cardiac index. (EF 19%) PLAN: 1. The patient will continue medical therapy for idiopathic dilated cardiomyopathy. 2. The patient will follow-up with Dr. Khan in the next 2-4 weeks for further recommendations. 3. The patient will continue atorvastatin and aspirin for mild coronary artery disease     Last HOLTER: 2015   The basic rhythm in this 24 hour scan is a sinus rhythm. Only 93   PVCs were seen and they were all singles. 83 PACs were also seen   with one pair noted. No complex ventricular atrial arrhythmias   were noted. Despite the patient recording that she was short of   breath twice, this did not correlate with any electrical events.     Last ECHO: 17   EF:54%     Last lipid panel: 2016    Cholesterol: 151   LDL: 65   HDL: 63   Triglycerides: 117     Last BMP: 10/31/2017 (Creatinine 0.0.80)     6/20/2016 Last A1C: 5.5    ACP

## 2023-10-24 NOTE — DISCHARGE NOTE PROVIDER - NSDCCPTREATMENT_GEN_ALL_CORE_FT
PRINCIPAL PROCEDURE  Procedure: Angiogram, peripheral, with PTA if indicated  Findings and Treatment: Medications:  - DO NOT stop your dual antiplatelet medication  Plavix, Aspiri), unless directed by your Cardiologist  - Soreness or tenderness at the site is possible, it will diminish over time. You may take Tylenol every 4-6 hours as needed. Nothing stronger should be required.   Activity:  - Do not drive for 3 days.  - Support the groin site with your hand when you  sneeze or cough. No heavy lifting (objects more than 30 pounds) x 2 weeks.  - May resume routine walking today and increase activity as tolerated on Monday.  Hygiene:  - Leave dressing in place for 48 hours, if does not fall off on own may remove after 48 hours.   - After 24 hours, you may shower. Do not tub bathe for one week. Do not rub or apply lotion, cream, powder to the affected site. Leave it open to air.   Diet:   - You may resume your regular diet.   - Drink extra fluid unless othrwise advised.   Special Instructions:  - Bruising or black and blue at the puncture site is possible.  - If there is bleeding from the puncture site apply direct, firm pressure on the site and call 911.  - Any sudden swelling, redness, fever, discharge or severe pain, call your physician or call the cath lab.  - If you notice any scab formation in the area avoid touching the site and allow it to heal.  - If Numbness or "pins and needle" sensation occurs in the affected leg; or if the affected site becomes cool to touch or pale that persists for an extended period of time, call your physician immediately to be checked.   - Some swelling to the leg is expected.    - Inform your Dentist or Surgeon if you are taking Aspirin or any antiplatelet medications. Report any bleeding in your urine or stool.   Follow-up:  Please follow up with your Cardiologist in 1-2 weeks after discharge.

## 2023-10-24 NOTE — DISCHARGE NOTE PROVIDER - NSDCADMDATE_GEN_ALL_CORE_FT
22-Oct-2023 20:54 IM OK , I WANT TO LEAVE        VITAL SIGNS        Telemetry:  NO        Vital Signs Last 24 Hrs    T(C): 36.9 (20 @ 11:42), Max: 36.9 (-20 @ 11:42)    T(F): 98.4 (--20 @ 11:42), Max: 98.4 (-20 @ 11:42)    HR: 79 (-20 @ 11:42) (69 - 81)    BP: 130/73 (-20 @ 11:42) (130/73 - 176/84)    RR: 16 (-20 @ 11:42) (16 - 18)    SpO2: 100% (20 @ 11:42) (96% - 100%)                                @ 07:01  -   @ 07:00    --------------------------------------------------------    IN: 1540 mL / OUT: 3100 mL / NET: -1560 mL         @ 07:01  -   @ 12:31    --------------------------------------------------------    IN: 240 mL / OUT: 0 mL / NET: 240 mL                    Daily       Daily Weight in k.7 (2020 23:15)                        CAPILLARY BLOOD GLUCOSE                                        Coumadin    [ ] YES          [X ]      NO                                               PHYSICAL EXAM                Neurology: alert and oriented x 3, nonfocal, no gross deficits    CV : s1 s2 RRR        Lungs: cta    Abdomen: soft, nontender, nondistended, positive bowel sounds                          :    voiding     Extremities:     -edema   /  -   calve tenderness ,                       abacavir 600 milliGRAM(s) Oral daily    acetaminophen   Tablet .. 650 milliGRAM(s) Oral every 6 hours PRN    atovaquone 250 mG/proguanil 100 mG 750 Tablet(s) Oral daily    budesonide 160 MICROgram(s)/formoterol 4.5 MICROgram(s) Inhaler 2 Puff(s) Inhalation two times a day    carvedilol 25 milliGRAM(s) Oral every 12 hours    chlorhexidine 2% Cloths 1 Application(s) Topical daily    dolutegravir 50 milliGRAM(s) Oral daily    epoetin-neha-epbx (RETACRIT) Injectable 83495 Unit(s) IV Push <User Schedule>    hydrALAZINE 50 milliGRAM(s) Oral three times a day    lamiVUDine 150 milliGRAM(s) Oral two times a day    sevelamer carbonate 1600 milliGRAM(s) Oral three times a day with meals    tacrolimus 1 milliGRAM(s) Oral two times a day                                      Physical Therapy Rec:   Home  [  ]   Home w/ PT  [  ]  Rehab  [  ]    Discussed with Cardiothoracic Team at AM rounds.

## 2023-10-24 NOTE — PROGRESS NOTE ADULT - REASON FOR ADMISSION
right foot discoloration

## 2023-10-24 NOTE — PROGRESS NOTE ADULT - SUBJECTIVE AND OBJECTIVE BOX
Nephrology Progress Note    MARIOLA CURRY  [id number]  58y  Male    S:  Patient is seen and examined, events over the last 24h noted.    O:  Allergies:  No Known Allergies    Hospital Medications:   MEDICATIONS  (STANDING):  amoxicillin  500 milliGRAM(s)/clavulanate 1 Tablet(s) Oral two times a day  aspirin  chewable 81 milliGRAM(s) Oral daily  atorvastatin 80 milliGRAM(s) Oral at bedtime  clopidogrel Tablet 75 milliGRAM(s) Oral daily  dextrose 5%. 1000 milliLiter(s) (50 mL/Hr) IV Continuous <Continuous>  dextrose 5%. 1000 milliLiter(s) (100 mL/Hr) IV Continuous <Continuous>  dextrose 50% Injectable 25 Gram(s) IV Push once  dextrose 50% Injectable 25 Gram(s) IV Push once  dextrose 50% Injectable 25 Gram(s) IV Push once  dextrose 50% Injectable 12.5 Gram(s) IV Push once  doxycycline monohydrate Capsule 100 milliGRAM(s) Oral every 12 hours  glucagon  Injectable 1 milliGRAM(s) IntraMuscular once  heparin   Injectable 5000 Unit(s) SubCutaneous every 12 hours  insulin lispro (ADMELOG) corrective regimen sliding scale   SubCutaneous three times a day before meals  insulin lispro (ADMELOG) corrective regimen sliding scale   SubCutaneous at bedtime  losartan 100 milliGRAM(s) Oral daily  NIFEdipine XL 90 milliGRAM(s) Oral daily  sodium bicarbonate 650 milliGRAM(s) Oral three times a day  sodium chloride 0.9%. 1000 milliLiter(s) (50 mL/Hr) IV Continuous <Continuous>    MEDICATIONS  (PRN):  ALPRAZolam 0.5 milliGRAM(s) Oral four times a day PRN anxiety  dextrose Oral Gel 15 Gram(s) Oral once PRN Blood Glucose LESS THAN 70 milliGRAM(s)/deciliter  oxycodone    5 mG/acetaminophen 325 mG 1 Tablet(s) Oral every 4 hours PRN Severe Pain (7 - 10)    Home Medications:  losartan 100 mg oral tablet: 1 tab(s) orally once a day (22 Oct 2023 22:59)  NIFEdipine 90 mg oral tablet, extended release: 1 tab(s) orally once a day (22 Oct 2023 22:59)  Percocet 5 mg-325 mg oral tablet: 1 tab(s) orally every 4 hours as needed for  moderate pain (22 Oct 2023 22:58)  pravastatin 40 mg oral tablet: 1 tab(s) orally once a day (22 Oct 2023 22:59)  sodium bicarbonate 650 mg oral tablet: 1 tab(s) orally 3 times a day (22 Oct 2023 22:59)  Xanax 1 mg oral tablet: 0.5 tab(s) orally 4 times a day, As Needed (22 Oct 2023 22:59)      VITALS:  Daily     Daily   T(F): 98 (10-24-23 @ 08:20), Max: 98.4 (10-23-23 @ 12:15)  HR: 74 (10-24-23 @ 08:20)  BP: 171/81 (10-24-23 @ 08:20)  RR: 20 (10-24-23 @ 08:20)  SpO2: 98% (10-24-23 @ 04:08)  Wt(kg): --  I&O's Detail    23 Oct 2023 07:01  -  24 Oct 2023 07:00  --------------------------------------------------------  IN:    PRBCs (Packed Red Blood Cells): 701 mL    sodium chloride 0.9%: 150 mL  Total IN: 851 mL    OUT:    Voided (mL): 1150 mL  Total OUT: 1150 mL    Total NET: -299 mL        I&O's Summary    23 Oct 2023 07:01  -  24 Oct 2023 07:00  --------------------------------------------------------  IN: 851 mL / OUT: 1150 mL / NET: -299 mL          PHYSICAL EXAM:  Gen: NAD  Chest: b/l breath sounds  Abd: soft  Extremities: no edema  Vascular access:       LABS:      10-24    137  |  102  |  44<H>  ----------------------------<  204<H>  5.6<H>   |  25  |  2.8<H>    Ca    9.2      24 Oct 2023 06:34  Phos  4.0     10-24  Mg     2.0     10-24    TPro  7.7  /  Alb  3.5  /  TBili  0.2  /  DBili      /  AST  13  /  ALT  17  /  AlkPhos  218<H>  10-22    eGFR: 25 mL/min/1.73m2 (10-24-23 @ 06:34)  eGFR: 24 mL/min/1.73m2 (10-23-23 @ 06:35)    Phosphorus: 4.0 mg/dL (10-24-23 @ 06:34)  Phosphorus: 4.7 mg/dL (10-02-23 @ 16:00)                            9.1    8.25  )-----------( 586      ( 24 Oct 2023 06:34 )             28.6     Mean Cell Volume: 85.4 fL (10-24-23 @ 06:34)    Iron Total: 41 ug/dL (10-24-23 @ 06:34)  % Saturation, Iron: 34 % (10-24-23 @ 06:34)      % Saturation, Iron: 34 % (10-24-23 @ 06:34)  % Saturation, Iron: 18 % (10-23-23 @ 06:35)    Urine Studies:  Protein, Urine: 300 mg/dL (10-16-23 @ 02:56)  White Blood Cell - Urine: 1 /HPF (10-16-23 @ 02:56)  Red Blood Cell - Urine: 1 /HPF (10-16-23 @ 02:56)  Protein, Urine: 300 mg/dL (09-14-23 @ 17:41)  White Blood Cell - Urine: 4 /HPF (09-14-23 @ 17:41)  Red Blood Cell - Urine: 1 /HPF (09-14-23 @ 17:41)  Protein, Urine: 300 mg/dL (09-07-23 @ 06:27)  White Blood Cell - Urine: 2 /HPF (09-07-23 @ 06:27)  Red Blood Cell - Urine: 1 /HPF (09-07-23 @ 06:27)  Protein, Urine: 100 mg/dL (05-10-22 @ 02:40)  White Blood Cell - Urine: 1-2 /HPF (05-10-22 @ 02:40)        Culture Results:   No growth at 5 days (10-18 @ 17:07)  Culture Results:   No growth at 5 days (10-18 @ 17:07)    Creatinine trend:  Creatinine: 2.8 mg/dL (10-24-23 @ 06:34)  Creatinine: 2.9 mg/dL (10-23-23 @ 06:35)  Creatinine: 3.0 mg/dL (10-22-23 @ 21:12)  Creatinine: 3.4 mg/dL (10-18-23 @ 17:07)  Creatinine: 3.5 mg/dL (10-16-23 @ 01:00)  Creatinine: 3.1 mg/dL (10-05-23 @ 04:30)  Creatinine: 2.8 mg/dL (10-03-23 @ 04:30)  Creatinine: 3.0 mg/dL (10-02-23 @ 16:00)  Creatinine: 2.9 mg/dL (10-01-23 @ 10:30)  Creatinine: 2.5 mg/dL (09-30-23 @ 10:58)  Creatinine: 2.6 mg/dL (09-29-23 @ 04:30)  Creatinine: 2.4 mg/dL (09-14-23 @ 14:39)  Creatinine: 2.9 mg/dL (09-07-23 @ 06:27)  Creatinine, Serum: 2.7 mg/dL (05-10-22 @ 01:25)  Creatinine, Serum: 2.5 mg/dL (11-10-19 @ 18:30)  Creatinine, Serum: 1.6 mg/dL (12-04-18 @ 19:15)  Creatinine, Serum: 1.63 mg/dL (03-30-17 @ 13:14)    Hepatitis C Virus Cutoff Index: .04 COI (10-23-23 @ 06:35)  Hepatitis C Virus Interpretation Result: Nonreact (10-23-23 @ 06:35)  Hemoglobin A1C, Whole Blood: 10.9 % (12-04-18 @ 19:15)

## 2023-10-24 NOTE — DISCHARGE NOTE NURSING/CASE MANAGEMENT/SOCIAL WORK - NSDCVIVACCINE_GEN_ALL_CORE_FT
Tdap; 21-Mar-2018 02:44; Wesley Castañeda (RN); [place]; [id number]; IntraMuscular; Deltoid Right.; 0.5 milliLiter(s); VIS (VIS Published: 09-May-2013, VIS Presented: 21-Mar-2018);

## 2023-10-24 NOTE — PROGRESS NOTE ADULT - ASSESSMENT
Assessment:  58 year old male with PMH of CKD stage 4 ( baseline Cr 2.5-3.5) on list for kidney transplant, GERD, IDDM ( recent HgA1C  8), charcot foot  s/p placement of external fixator by podiatry in 9/28 and now s/p removal on 10/19 due to infection with foot discoloration. Admitted to cardiac telemetry. Peripheral angio scheduled for today.    Problems discussed and associated plan:  # Right foot discoloration/ PAD/ Charcot foot s/p placement of external fixator by podiatry in 9/28 now s/p removal 10/19  - admit to Cards tele   - Monitor on tele  - podiatry consulted in the morning   - recent arterial duplex with Possible right posterior tibial artery occlusion  - Peripheral today  - Podiatry consult      #leukocytosis   -Monitor and trend cbc   -blood culture x2   -continue home Augmentin and doxycycline for now (to be completed 11/1)     # CKD stage 4 ( baseline cr 2.5-3.5) ( on transplant list) Cr at baseline    - Monitor and trend Cr   - Nephro consult    # Normocytic anemia, likely secondary to renal disease ( baseline Hg 7-8)   - trend H/H  - Maintain active type and screen every 3 days  - Plan to transfuse Hg <7  - check iron panel (do not give IV iron if possible infection)  - 2U PRBCs today    # IDDM ( recent hga1c 8)   - FS  - ISS     #HTN   -Continue losartan 100 mg daily     #HLD  - Home pravastatin 40 mg daily therapeutic interchange to Atorvastatin 10mg inpatient    # anxiety  - continue xanax prn      Please contact me with any questions or concerns at [phone].

## 2023-10-24 NOTE — DISCHARGE NOTE PROVIDER - NSDCCPCAREPLAN_GEN_ALL_CORE_FT
PRINCIPAL DISCHARGE DIAGNOSIS  Diagnosis: Peripheral arterial disease  Assessment and Plan of Treatment:

## 2023-10-24 NOTE — PROGRESS NOTE ADULT - SUBJECTIVE AND OBJECTIVE BOX
PATRICIO, JACK  58y, Male  Allergy: No Known Allergies      All historical available data reviewed.    HPI:  58 year old former smoke male with PMH of CKD stage 4 ( baseline Cr 2.5-3.5) on list for kidney transplant, GERD, IDDM ( recent HgA1C  8), charcot foot  s/p placement of external fixator by podiatry in 9/28 and now s/p removal on 10/19 due to infection referred to the ED due to foot discoloration he notice at the bottom of his feet. patient reports changing of dressing weekly, last change was when he left the hospital. Patient report mild pain on the right foot and usually take percocet as needed for pain.   Patient denies fever, chills, recent injury or trauma, chest pain, shortness of breath, palpitation, dizziness, LOC/syncope, numbness, tingling, weakness.     of note patient was admitted to medicine from 10/18 due to external fixator due to infection and now patient is s/p removal of external fixator of the RLE on 10/19 discharged on Augmentin and Doxycycline.      in ED vs /67, HR 92, RR 16, O2sat 100% RA, Temp 98.3  lab notable for wbc 14.41, h/h 8/25.4, bun/cr 53/3  xray of  right foot done  No medication given  (22 Oct 2023 22:57)    FAMILY HISTORY:  Family history of cancer in father (Father)  Lung      PAST MEDICAL & SURGICAL HISTORY:  DM (diabetes mellitus)      HTN (hypertension)      HLD (hyperlipidemia)      Herpes labialis      Anxiety      GERD (gastroesophageal reflux disease)      Kidney stones  20 years ago      Kidney disease  stage 4      Chronic kidney disease, unspecified CKD stage      Diabetic Charcot foot      S/P foot surgery, right  x 5 ( 2006 - 2013 )      Kidney stone  Removed by Laser x 2 ( 20 years ago )      H/O arthroscopy of right knee            VITALS:  T(F): 98, Max: 98.2 (10-23-23 @ 23:30)  HR: 74  BP: 171/81  RR: 20Vital Signs Last 24 Hrs  T(C): 36.7 (24 Oct 2023 08:20), Max: 36.8 (23 Oct 2023 23:30)  T(F): 98 (24 Oct 2023 08:20), Max: 98.2 (23 Oct 2023 23:30)  HR: 74 (24 Oct 2023 08:20) (68 - 79)  BP: 171/81 (24 Oct 2023 08:20) (124/68 - 171/86)  BP(mean): 122 (24 Oct 2023 04:08) (90 - 122)  RR: 20 (24 Oct 2023 08:20) (15 - 28)  SpO2: 98% (24 Oct 2023 04:08) (96% - 99%)    Parameters below as of 24 Oct 2023 04:08  Patient On (Oxygen Delivery Method): room air        TESTS & MEASUREMENTS:                        9.1    8.25  )-----------( 586      ( 24 Oct 2023 06:34 )             28.6     10-24    137  |  102  |  44<H>  ----------------------------<  204<H>  5.6<H>   |  25  |  2.8<H>    Ca    9.2      24 Oct 2023 06:34  Phos  4.0     10-24  Mg     2.0     10-24    TPro  7.7  /  Alb  3.5  /  TBili  0.2  /  DBili  x   /  AST  13  /  ALT  17  /  AlkPhos  218<H>  10-22    LIVER FUNCTIONS - ( 22 Oct 2023 21:12 )  Alb: 3.5 g/dL / Pro: 7.7 g/dL / ALK PHOS: 218 U/L / ALT: 17 U/L / AST: 13 U/L / GGT: x             Culture - Blood (collected 10-18-23 @ 17:07)  Source: .Blood Blood  Final Report (10-24-23 @ 02:00):    No growth at 5 days    Culture - Blood (collected 10-18-23 @ 17:07)  Source: .Blood Blood  Final Report (10-24-23 @ 02:00):    No growth at 5 days      Urinalysis Basic - ( 24 Oct 2023 06:34 )    Color: x / Appearance: x / SG: x / pH: x  Gluc: 204 mg/dL / Ketone: x  / Bili: x / Urobili: x   Blood: x / Protein: x / Nitrite: x   Leuk Esterase: x / RBC: x / WBC x   Sq Epi: x / Non Sq Epi: x / Bacteria: x          RADIOLOGY & ADDITIONAL TESTS:  Personal review of radiological diagnostics performed  Echo and EKG results noted when applicable.     MEDICATIONS:  ALPRAZolam 0.5 milliGRAM(s) Oral four times a day PRN  amoxicillin  500 milliGRAM(s)/clavulanate 1 Tablet(s) Oral two times a day  aspirin  chewable 81 milliGRAM(s) Oral daily  atorvastatin 80 milliGRAM(s) Oral at bedtime  clopidogrel Tablet 75 milliGRAM(s) Oral daily  dextrose 5%. 1000 milliLiter(s) IV Continuous <Continuous>  dextrose 5%. 1000 milliLiter(s) IV Continuous <Continuous>  dextrose 50% Injectable 12.5 Gram(s) IV Push once  dextrose 50% Injectable 25 Gram(s) IV Push once  dextrose 50% Injectable 25 Gram(s) IV Push once  dextrose 50% Injectable 25 Gram(s) IV Push once  dextrose Oral Gel 15 Gram(s) Oral once PRN  doxycycline monohydrate Capsule 100 milliGRAM(s) Oral every 12 hours  glucagon  Injectable 1 milliGRAM(s) IntraMuscular once  heparin   Injectable 5000 Unit(s) SubCutaneous every 12 hours  insulin lispro (ADMELOG) corrective regimen sliding scale   SubCutaneous three times a day before meals  insulin lispro (ADMELOG) corrective regimen sliding scale   SubCutaneous at bedtime  losartan 100 milliGRAM(s) Oral daily  NIFEdipine XL 90 milliGRAM(s) Oral daily  oxycodone    5 mG/acetaminophen 325 mG 1 Tablet(s) Oral every 4 hours PRN  sodium bicarbonate 650 milliGRAM(s) Oral three times a day  sodium chloride 0.9%. 1000 milliLiter(s) IV Continuous <Continuous>      ANTIBIOTICS:  amoxicillin  500 milliGRAM(s)/clavulanate 1 Tablet(s) Oral two times a day  doxycycline monohydrate Capsule 100 milliGRAM(s) Oral every 12 hours

## 2023-10-24 NOTE — DISCHARGE NOTE PROVIDER - CARE PROVIDER_API CALL
Thomas Villalobos.  Interventional Cardiology  [address] [place] [id number]  Phone: [phone]  Fax: [phone]  Established Patient  Follow Up Time: 2 weeks

## 2023-10-25 LAB
FERRITIN SERPL-MCNC: 483 NG/ML — HIGH (ref 30–400)
FERRITIN SERPL-MCNC: 483 NG/ML — HIGH (ref 30–400)

## 2023-10-26 ENCOUNTER — INPATIENT (INPATIENT)
Facility: HOSPITAL | Age: 58
LOS: 19 days | Discharge: NURSING, REHAB OR LONG-TERM CARE | DRG: 240 | End: 2023-11-15
Attending: INTERNAL MEDICINE | Admitting: INTERNAL MEDICINE
Payer: MEDICARE

## 2023-10-26 VITALS
OXYGEN SATURATION: 99 % | RESPIRATION RATE: 18 BRPM | WEIGHT: 179.9 LBS | HEART RATE: 78 BPM | TEMPERATURE: 98 F | HEIGHT: 70 IN | DIASTOLIC BLOOD PRESSURE: 80 MMHG | SYSTOLIC BLOOD PRESSURE: 159 MMHG

## 2023-10-26 DIAGNOSIS — E11.51 TYPE 2 DIABETES MELLITUS WITH DIABETIC PERIPHERAL ANGIOPATHY WITHOUT GANGRENE: ICD-10-CM

## 2023-10-26 DIAGNOSIS — L97.509 NON-PRESSURE CHRONIC ULCER OF OTHER PART OF UNSPECIFIED FOOT WITH UNSPECIFIED SEVERITY: ICD-10-CM

## 2023-10-26 DIAGNOSIS — E11.22 TYPE 2 DIABETES MELLITUS WITH DIABETIC CHRONIC KIDNEY DISEASE: ICD-10-CM

## 2023-10-26 DIAGNOSIS — F41.9 ANXIETY DISORDER, UNSPECIFIED: ICD-10-CM

## 2023-10-26 DIAGNOSIS — F32.A DEPRESSION, UNSPECIFIED: ICD-10-CM

## 2023-10-26 DIAGNOSIS — D63.1 ANEMIA IN CHRONIC KIDNEY DISEASE: ICD-10-CM

## 2023-10-26 DIAGNOSIS — E11.610 TYPE 2 DIABETES MELLITUS WITH DIABETIC NEUROPATHIC ARTHROPATHY: ICD-10-CM

## 2023-10-26 DIAGNOSIS — B96.5 PSEUDOMONAS (AERUGINOSA) (MALLEI) (PSEUDOMALLEI) AS THE CAUSE OF DISEASES CLASSIFIED ELSEWHERE: ICD-10-CM

## 2023-10-26 DIAGNOSIS — E83.42 HYPOMAGNESEMIA: ICD-10-CM

## 2023-10-26 DIAGNOSIS — K21.9 GASTRO-ESOPHAGEAL REFLUX DISEASE WITHOUT ESOPHAGITIS: ICD-10-CM

## 2023-10-26 DIAGNOSIS — R33.8 OTHER RETENTION OF URINE: ICD-10-CM

## 2023-10-26 DIAGNOSIS — Z98.890 OTHER SPECIFIED POSTPROCEDURAL STATES: Chronic | ICD-10-CM

## 2023-10-26 DIAGNOSIS — I12.9 HYPERTENSIVE CHRONIC KIDNEY DISEASE WITH STAGE 1 THROUGH STAGE 4 CHRONIC KIDNEY DISEASE, OR UNSPECIFIED CHRONIC KIDNEY DISEASE: ICD-10-CM

## 2023-10-26 DIAGNOSIS — Z79.4 LONG TERM (CURRENT) USE OF INSULIN: ICD-10-CM

## 2023-10-26 DIAGNOSIS — B37.89 OTHER SITES OF CANDIDIASIS: ICD-10-CM

## 2023-10-26 DIAGNOSIS — E11.65 TYPE 2 DIABETES MELLITUS WITH HYPERGLYCEMIA: ICD-10-CM

## 2023-10-26 DIAGNOSIS — N31.9 NEUROMUSCULAR DYSFUNCTION OF BLADDER, UNSPECIFIED: ICD-10-CM

## 2023-10-26 DIAGNOSIS — I70.201 UNSPECIFIED ATHEROSCLEROSIS OF NATIVE ARTERIES OF EXTREMITIES, RIGHT LEG: ICD-10-CM

## 2023-10-26 DIAGNOSIS — N18.9 CHRONIC KIDNEY DISEASE, UNSPECIFIED: ICD-10-CM

## 2023-10-26 DIAGNOSIS — E11.52 TYPE 2 DIABETES MELLITUS WITH DIABETIC PERIPHERAL ANGIOPATHY WITH GANGRENE: ICD-10-CM

## 2023-10-26 DIAGNOSIS — I73.9 PERIPHERAL VASCULAR DISEASE, UNSPECIFIED: ICD-10-CM

## 2023-10-26 DIAGNOSIS — L97.514 NON-PRESSURE CHRONIC ULCER OF OTHER PART OF RIGHT FOOT WITH NECROSIS OF BONE: ICD-10-CM

## 2023-10-26 DIAGNOSIS — I70.261 ATHEROSCLEROSIS OF NATIVE ARTERIES OF EXTREMITIES WITH GANGRENE, RIGHT LEG: ICD-10-CM

## 2023-10-26 DIAGNOSIS — Z87.891 PERSONAL HISTORY OF NICOTINE DEPENDENCE: ICD-10-CM

## 2023-10-26 DIAGNOSIS — D72.829 ELEVATED WHITE BLOOD CELL COUNT, UNSPECIFIED: ICD-10-CM

## 2023-10-26 DIAGNOSIS — N20.0 CALCULUS OF KIDNEY: Chronic | ICD-10-CM

## 2023-10-26 DIAGNOSIS — I70.92 CHRONIC TOTAL OCCLUSION OF ARTERY OF THE EXTREMITIES: ICD-10-CM

## 2023-10-26 DIAGNOSIS — G89.18 OTHER ACUTE POSTPROCEDURAL PAIN: ICD-10-CM

## 2023-10-26 DIAGNOSIS — E11.649 TYPE 2 DIABETES MELLITUS WITH HYPOGLYCEMIA WITHOUT COMA: ICD-10-CM

## 2023-10-26 DIAGNOSIS — E11.621 TYPE 2 DIABETES MELLITUS WITH FOOT ULCER: ICD-10-CM

## 2023-10-26 DIAGNOSIS — B00.1 HERPESVIRAL VESICULAR DERMATITIS: ICD-10-CM

## 2023-10-26 DIAGNOSIS — Z98.62 PERIPHERAL VASCULAR ANGIOPLASTY STATUS: Chronic | ICD-10-CM

## 2023-10-26 DIAGNOSIS — N18.4 CHRONIC KIDNEY DISEASE, STAGE 4 (SEVERE): ICD-10-CM

## 2023-10-26 DIAGNOSIS — Z76.82 AWAITING ORGAN TRANSPLANT STATUS: ICD-10-CM

## 2023-10-26 DIAGNOSIS — E78.5 HYPERLIPIDEMIA, UNSPECIFIED: ICD-10-CM

## 2023-10-26 DIAGNOSIS — Z79.82 LONG TERM (CURRENT) USE OF ASPIRIN: ICD-10-CM

## 2023-10-26 LAB
ALBUMIN SERPL ELPH-MCNC: 3.3 G/DL — LOW (ref 3.5–5.2)
ALBUMIN SERPL ELPH-MCNC: 3.3 G/DL — LOW (ref 3.5–5.2)
ALP SERPL-CCNC: 168 U/L — HIGH (ref 30–115)
ALP SERPL-CCNC: 168 U/L — HIGH (ref 30–115)
ALT FLD-CCNC: 16 U/L — SIGNIFICANT CHANGE UP (ref 0–41)
ALT FLD-CCNC: 16 U/L — SIGNIFICANT CHANGE UP (ref 0–41)
ANION GAP SERPL CALC-SCNC: 14 MMOL/L — SIGNIFICANT CHANGE UP (ref 7–14)
ANION GAP SERPL CALC-SCNC: 14 MMOL/L — SIGNIFICANT CHANGE UP (ref 7–14)
APTT BLD: 35 SEC — SIGNIFICANT CHANGE UP (ref 27–39.2)
APTT BLD: 35 SEC — SIGNIFICANT CHANGE UP (ref 27–39.2)
AST SERPL-CCNC: 16 U/L — SIGNIFICANT CHANGE UP (ref 0–41)
AST SERPL-CCNC: 16 U/L — SIGNIFICANT CHANGE UP (ref 0–41)
BASOPHILS # BLD AUTO: 0.03 K/UL — SIGNIFICANT CHANGE UP (ref 0–0.2)
BASOPHILS # BLD AUTO: 0.03 K/UL — SIGNIFICANT CHANGE UP (ref 0–0.2)
BASOPHILS NFR BLD AUTO: 0.3 % — SIGNIFICANT CHANGE UP (ref 0–1)
BASOPHILS NFR BLD AUTO: 0.3 % — SIGNIFICANT CHANGE UP (ref 0–1)
BILIRUB SERPL-MCNC: <0.2 MG/DL — SIGNIFICANT CHANGE UP (ref 0.2–1.2)
BILIRUB SERPL-MCNC: <0.2 MG/DL — SIGNIFICANT CHANGE UP (ref 0.2–1.2)
BLD GP AB SCN SERPL QL: SIGNIFICANT CHANGE UP
BLD GP AB SCN SERPL QL: SIGNIFICANT CHANGE UP
BUN SERPL-MCNC: 50 MG/DL — HIGH (ref 10–20)
BUN SERPL-MCNC: 50 MG/DL — HIGH (ref 10–20)
CALCIUM SERPL-MCNC: 9.4 MG/DL — SIGNIFICANT CHANGE UP (ref 8.4–10.5)
CALCIUM SERPL-MCNC: 9.4 MG/DL — SIGNIFICANT CHANGE UP (ref 8.4–10.5)
CHLORIDE SERPL-SCNC: 99 MMOL/L — SIGNIFICANT CHANGE UP (ref 98–110)
CHLORIDE SERPL-SCNC: 99 MMOL/L — SIGNIFICANT CHANGE UP (ref 98–110)
CO2 SERPL-SCNC: 25 MMOL/L — SIGNIFICANT CHANGE UP (ref 17–32)
CO2 SERPL-SCNC: 25 MMOL/L — SIGNIFICANT CHANGE UP (ref 17–32)
CREAT SERPL-MCNC: 2.9 MG/DL — HIGH (ref 0.7–1.5)
CREAT SERPL-MCNC: 2.9 MG/DL — HIGH (ref 0.7–1.5)
EGFR: 24 ML/MIN/1.73M2 — LOW
EGFR: 24 ML/MIN/1.73M2 — LOW
EOSINOPHIL # BLD AUTO: 0.19 K/UL — SIGNIFICANT CHANGE UP (ref 0–0.7)
EOSINOPHIL # BLD AUTO: 0.19 K/UL — SIGNIFICANT CHANGE UP (ref 0–0.7)
EOSINOPHIL NFR BLD AUTO: 1.8 % — SIGNIFICANT CHANGE UP (ref 0–8)
EOSINOPHIL NFR BLD AUTO: 1.8 % — SIGNIFICANT CHANGE UP (ref 0–8)
GLUCOSE SERPL-MCNC: 230 MG/DL — HIGH (ref 70–99)
GLUCOSE SERPL-MCNC: 230 MG/DL — HIGH (ref 70–99)
HCT VFR BLD CALC: 27.6 % — LOW (ref 42–52)
HCT VFR BLD CALC: 27.6 % — LOW (ref 42–52)
HGB BLD-MCNC: 9.1 G/DL — LOW (ref 14–18)
HGB BLD-MCNC: 9.1 G/DL — LOW (ref 14–18)
IMM GRANULOCYTES NFR BLD AUTO: 0.5 % — HIGH (ref 0.1–0.3)
IMM GRANULOCYTES NFR BLD AUTO: 0.5 % — HIGH (ref 0.1–0.3)
INR BLD: 1.2 RATIO — SIGNIFICANT CHANGE UP (ref 0.65–1.3)
INR BLD: 1.2 RATIO — SIGNIFICANT CHANGE UP (ref 0.65–1.3)
LACTATE SERPL-SCNC: 1.4 MMOL/L — SIGNIFICANT CHANGE UP (ref 0.7–2)
LACTATE SERPL-SCNC: 1.4 MMOL/L — SIGNIFICANT CHANGE UP (ref 0.7–2)
LYMPHOCYTES # BLD AUTO: 1.5 K/UL — SIGNIFICANT CHANGE UP (ref 1.2–3.4)
LYMPHOCYTES # BLD AUTO: 1.5 K/UL — SIGNIFICANT CHANGE UP (ref 1.2–3.4)
LYMPHOCYTES # BLD AUTO: 14 % — LOW (ref 20.5–51.1)
LYMPHOCYTES # BLD AUTO: 14 % — LOW (ref 20.5–51.1)
MAGNESIUM SERPL-MCNC: 1.6 MG/DL — LOW (ref 1.8–2.4)
MAGNESIUM SERPL-MCNC: 1.6 MG/DL — LOW (ref 1.8–2.4)
MCHC RBC-ENTMCNC: 27.6 PG — SIGNIFICANT CHANGE UP (ref 27–31)
MCHC RBC-ENTMCNC: 27.6 PG — SIGNIFICANT CHANGE UP (ref 27–31)
MCHC RBC-ENTMCNC: 33 G/DL — SIGNIFICANT CHANGE UP (ref 32–37)
MCHC RBC-ENTMCNC: 33 G/DL — SIGNIFICANT CHANGE UP (ref 32–37)
MCV RBC AUTO: 83.6 FL — SIGNIFICANT CHANGE UP (ref 80–94)
MCV RBC AUTO: 83.6 FL — SIGNIFICANT CHANGE UP (ref 80–94)
MONOCYTES # BLD AUTO: 0.72 K/UL — HIGH (ref 0.1–0.6)
MONOCYTES # BLD AUTO: 0.72 K/UL — HIGH (ref 0.1–0.6)
MONOCYTES NFR BLD AUTO: 6.7 % — SIGNIFICANT CHANGE UP (ref 1.7–9.3)
MONOCYTES NFR BLD AUTO: 6.7 % — SIGNIFICANT CHANGE UP (ref 1.7–9.3)
NEUTROPHILS # BLD AUTO: 8.24 K/UL — HIGH (ref 1.4–6.5)
NEUTROPHILS # BLD AUTO: 8.24 K/UL — HIGH (ref 1.4–6.5)
NEUTROPHILS NFR BLD AUTO: 76.7 % — HIGH (ref 42.2–75.2)
NEUTROPHILS NFR BLD AUTO: 76.7 % — HIGH (ref 42.2–75.2)
NRBC # BLD: 0 /100 WBCS — SIGNIFICANT CHANGE UP (ref 0–0)
NRBC # BLD: 0 /100 WBCS — SIGNIFICANT CHANGE UP (ref 0–0)
PLATELET # BLD AUTO: 497 K/UL — HIGH (ref 130–400)
PLATELET # BLD AUTO: 497 K/UL — HIGH (ref 130–400)
PMV BLD: 8.8 FL — SIGNIFICANT CHANGE UP (ref 7.4–10.4)
PMV BLD: 8.8 FL — SIGNIFICANT CHANGE UP (ref 7.4–10.4)
POTASSIUM SERPL-MCNC: 4.5 MMOL/L — SIGNIFICANT CHANGE UP (ref 3.5–5)
POTASSIUM SERPL-MCNC: 4.5 MMOL/L — SIGNIFICANT CHANGE UP (ref 3.5–5)
POTASSIUM SERPL-SCNC: 4.5 MMOL/L — SIGNIFICANT CHANGE UP (ref 3.5–5)
POTASSIUM SERPL-SCNC: 4.5 MMOL/L — SIGNIFICANT CHANGE UP (ref 3.5–5)
PROT SERPL-MCNC: 6.8 G/DL — SIGNIFICANT CHANGE UP (ref 6–8)
PROT SERPL-MCNC: 6.8 G/DL — SIGNIFICANT CHANGE UP (ref 6–8)
PROTHROM AB SERPL-ACNC: 13.7 SEC — HIGH (ref 9.95–12.87)
PROTHROM AB SERPL-ACNC: 13.7 SEC — HIGH (ref 9.95–12.87)
RBC # BLD: 3.3 M/UL — LOW (ref 4.7–6.1)
RBC # BLD: 3.3 M/UL — LOW (ref 4.7–6.1)
RBC # FLD: 14.6 % — HIGH (ref 11.5–14.5)
RBC # FLD: 14.6 % — HIGH (ref 11.5–14.5)
SODIUM SERPL-SCNC: 138 MMOL/L — SIGNIFICANT CHANGE UP (ref 135–146)
SODIUM SERPL-SCNC: 138 MMOL/L — SIGNIFICANT CHANGE UP (ref 135–146)
WBC # BLD: 10.73 K/UL — SIGNIFICANT CHANGE UP (ref 4.8–10.8)
WBC # BLD: 10.73 K/UL — SIGNIFICANT CHANGE UP (ref 4.8–10.8)
WBC # FLD AUTO: 10.73 K/UL — SIGNIFICANT CHANGE UP (ref 4.8–10.8)
WBC # FLD AUTO: 10.73 K/UL — SIGNIFICANT CHANGE UP (ref 4.8–10.8)

## 2023-10-26 PROCEDURE — 97530 THERAPEUTIC ACTIVITIES: CPT | Mod: GP

## 2023-10-26 PROCEDURE — 87070 CULTURE OTHR SPECIMN AEROBIC: CPT

## 2023-10-26 PROCEDURE — 86901 BLOOD TYPING SEROLOGIC RH(D): CPT

## 2023-10-26 PROCEDURE — 86923 COMPATIBILITY TEST ELECTRIC: CPT

## 2023-10-26 PROCEDURE — 97164 PT RE-EVAL EST PLAN CARE: CPT | Mod: GP

## 2023-10-26 PROCEDURE — C9399: CPT

## 2023-10-26 PROCEDURE — 83735 ASSAY OF MAGNESIUM: CPT

## 2023-10-26 PROCEDURE — 84466 ASSAY OF TRANSFERRIN: CPT

## 2023-10-26 PROCEDURE — 85610 PROTHROMBIN TIME: CPT

## 2023-10-26 PROCEDURE — 82962 GLUCOSE BLOOD TEST: CPT

## 2023-10-26 PROCEDURE — 97162 PT EVAL MOD COMPLEX 30 MIN: CPT | Mod: GP

## 2023-10-26 PROCEDURE — 83540 ASSAY OF IRON: CPT

## 2023-10-26 PROCEDURE — 36430 TRANSFUSION BLD/BLD COMPNT: CPT

## 2023-10-26 PROCEDURE — 97116 GAIT TRAINING THERAPY: CPT | Mod: GP

## 2023-10-26 PROCEDURE — 82310 ASSAY OF CALCIUM: CPT

## 2023-10-26 PROCEDURE — 80048 BASIC METABOLIC PNL TOTAL CA: CPT

## 2023-10-26 PROCEDURE — 93926 LOWER EXTREMITY STUDY: CPT | Mod: RT

## 2023-10-26 PROCEDURE — 99285 EMERGENCY DEPT VISIT HI MDM: CPT

## 2023-10-26 PROCEDURE — 84100 ASSAY OF PHOSPHORUS: CPT

## 2023-10-26 PROCEDURE — 97165 OT EVAL LOW COMPLEX 30 MIN: CPT | Mod: GO

## 2023-10-26 PROCEDURE — 88307 TISSUE EXAM BY PATHOLOGIST: CPT

## 2023-10-26 PROCEDURE — 73630 X-RAY EXAM OF FOOT: CPT | Mod: RT

## 2023-10-26 PROCEDURE — 83970 ASSAY OF PARATHORMONE: CPT

## 2023-10-26 PROCEDURE — C1889: CPT

## 2023-10-26 PROCEDURE — 87186 SC STD MICRODIL/AGAR DIL: CPT

## 2023-10-26 PROCEDURE — 82728 ASSAY OF FERRITIN: CPT

## 2023-10-26 PROCEDURE — 80202 ASSAY OF VANCOMYCIN: CPT

## 2023-10-26 PROCEDURE — 36415 COLL VENOUS BLD VENIPUNCTURE: CPT

## 2023-10-26 PROCEDURE — 82652 VIT D 1 25-DIHYDROXY: CPT

## 2023-10-26 PROCEDURE — 88304 TISSUE EXAM BY PATHOLOGIST: CPT

## 2023-10-26 PROCEDURE — 80053 COMPREHEN METABOLIC PANEL: CPT

## 2023-10-26 PROCEDURE — 86850 RBC ANTIBODY SCREEN: CPT

## 2023-10-26 PROCEDURE — 85027 COMPLETE CBC AUTOMATED: CPT

## 2023-10-26 PROCEDURE — 76770 US EXAM ABDO BACK WALL COMP: CPT

## 2023-10-26 PROCEDURE — 83550 IRON BINDING TEST: CPT

## 2023-10-26 PROCEDURE — 87077 CULTURE AEROBIC IDENTIFY: CPT

## 2023-10-26 PROCEDURE — 85730 THROMBOPLASTIN TIME PARTIAL: CPT

## 2023-10-26 PROCEDURE — 86900 BLOOD TYPING SEROLOGIC ABO: CPT

## 2023-10-26 PROCEDURE — 93005 ELECTROCARDIOGRAM TRACING: CPT

## 2023-10-26 PROCEDURE — 88311 DECALCIFY TISSUE: CPT

## 2023-10-26 PROCEDURE — 85025 COMPLETE CBC W/AUTO DIFF WBC: CPT

## 2023-10-26 PROCEDURE — 97110 THERAPEUTIC EXERCISES: CPT | Mod: GP

## 2023-10-26 PROCEDURE — P9016: CPT

## 2023-10-26 RX ORDER — ALPRAZOLAM 0.25 MG
0.5 TABLET ORAL
Refills: 0 | Status: DISCONTINUED | OUTPATIENT
Start: 2023-10-26 | End: 2023-10-27

## 2023-10-26 RX ORDER — ATORVASTATIN CALCIUM 80 MG/1
80 TABLET, FILM COATED ORAL AT BEDTIME
Refills: 0 | Status: DISCONTINUED | OUTPATIENT
Start: 2023-10-26 | End: 2023-10-27

## 2023-10-26 RX ORDER — LOSARTAN POTASSIUM 100 MG/1
100 TABLET, FILM COATED ORAL DAILY
Refills: 0 | Status: DISCONTINUED | OUTPATIENT
Start: 2023-10-27 | End: 2023-10-27

## 2023-10-26 RX ORDER — INSULIN LISPRO 100/ML
8 VIAL (ML) SUBCUTANEOUS
Refills: 0 | Status: DISCONTINUED | OUTPATIENT
Start: 2023-10-26 | End: 2023-10-27

## 2023-10-26 RX ORDER — VANCOMYCIN HCL 1 G
1000 VIAL (EA) INTRAVENOUS ONCE
Refills: 0 | Status: COMPLETED | OUTPATIENT
Start: 2023-10-26 | End: 2023-10-26

## 2023-10-26 RX ORDER — INSULIN GLARGINE 100 [IU]/ML
20 INJECTION, SOLUTION SUBCUTANEOUS EVERY MORNING
Refills: 0 | Status: CANCELLED | OUTPATIENT
Start: 2023-10-28 | End: 2023-10-27

## 2023-10-26 RX ORDER — DEXTROSE 50 % IN WATER 50 %
15 SYRINGE (ML) INTRAVENOUS ONCE
Refills: 0 | Status: DISCONTINUED | OUTPATIENT
Start: 2023-10-26 | End: 2023-10-27

## 2023-10-26 RX ORDER — ACETAMINOPHEN 500 MG
650 TABLET ORAL EVERY 6 HOURS
Refills: 0 | Status: DISCONTINUED | OUTPATIENT
Start: 2023-10-26 | End: 2023-10-27

## 2023-10-26 RX ORDER — HEPARIN SODIUM 5000 [USP'U]/ML
5000 INJECTION INTRAVENOUS; SUBCUTANEOUS EVERY 8 HOURS
Refills: 0 | Status: DISCONTINUED | OUTPATIENT
Start: 2023-10-26 | End: 2023-10-27

## 2023-10-26 RX ORDER — SODIUM CHLORIDE 9 MG/ML
1000 INJECTION, SOLUTION INTRAVENOUS
Refills: 0 | Status: DISCONTINUED | OUTPATIENT
Start: 2023-10-26 | End: 2023-10-27

## 2023-10-26 RX ORDER — VANCOMYCIN HCL 1 G
1250 VIAL (EA) INTRAVENOUS EVERY 24 HOURS
Refills: 0 | Status: DISCONTINUED | OUTPATIENT
Start: 2023-10-27 | End: 2023-10-27

## 2023-10-26 RX ORDER — DEXTROSE 50 % IN WATER 50 %
25 SYRINGE (ML) INTRAVENOUS ONCE
Refills: 0 | Status: DISCONTINUED | OUTPATIENT
Start: 2023-10-26 | End: 2023-10-27

## 2023-10-26 RX ORDER — ASPIRIN/CALCIUM CARB/MAGNESIUM 324 MG
81 TABLET ORAL DAILY
Refills: 0 | Status: DISCONTINUED | OUTPATIENT
Start: 2023-10-26 | End: 2023-10-27

## 2023-10-26 RX ORDER — CEFEPIME 1 G/1
2000 INJECTION, POWDER, FOR SOLUTION INTRAMUSCULAR; INTRAVENOUS ONCE
Refills: 0 | Status: COMPLETED | OUTPATIENT
Start: 2023-10-26 | End: 2023-10-26

## 2023-10-26 RX ORDER — DEXTROSE 50 % IN WATER 50 %
12.5 SYRINGE (ML) INTRAVENOUS ONCE
Refills: 0 | Status: DISCONTINUED | OUTPATIENT
Start: 2023-10-26 | End: 2023-10-27

## 2023-10-26 RX ORDER — ONDANSETRON 8 MG/1
4 TABLET, FILM COATED ORAL EVERY 8 HOURS
Refills: 0 | Status: DISCONTINUED | OUTPATIENT
Start: 2023-10-26 | End: 2023-10-27

## 2023-10-26 RX ORDER — LANOLIN ALCOHOL/MO/W.PET/CERES
3 CREAM (GRAM) TOPICAL AT BEDTIME
Refills: 0 | Status: DISCONTINUED | OUTPATIENT
Start: 2023-10-26 | End: 2023-10-27

## 2023-10-26 RX ORDER — MAGNESIUM OXIDE 400 MG ORAL TABLET 241.3 MG
400 TABLET ORAL ONCE
Refills: 0 | Status: COMPLETED | OUTPATIENT
Start: 2023-10-26 | End: 2023-10-26

## 2023-10-26 RX ORDER — SODIUM BICARBONATE 1 MEQ/ML
650 SYRINGE (ML) INTRAVENOUS THREE TIMES A DAY
Refills: 0 | Status: DISCONTINUED | OUTPATIENT
Start: 2023-10-26 | End: 2023-10-27

## 2023-10-26 RX ORDER — SODIUM CHLORIDE 9 MG/ML
1000 INJECTION, SOLUTION INTRAVENOUS ONCE
Refills: 0 | Status: COMPLETED | OUTPATIENT
Start: 2023-10-26 | End: 2023-10-26

## 2023-10-26 RX ORDER — CLOPIDOGREL BISULFATE 75 MG/1
75 TABLET, FILM COATED ORAL DAILY
Refills: 0 | Status: DISCONTINUED | OUTPATIENT
Start: 2023-10-27 | End: 2023-10-27

## 2023-10-26 RX ORDER — NIFEDIPINE 30 MG
90 TABLET, EXTENDED RELEASE 24 HR ORAL DAILY
Refills: 0 | Status: DISCONTINUED | OUTPATIENT
Start: 2023-10-27 | End: 2023-10-27

## 2023-10-26 RX ORDER — CEFEPIME 1 G/1
1000 INJECTION, POWDER, FOR SOLUTION INTRAMUSCULAR; INTRAVENOUS EVERY 12 HOURS
Refills: 0 | Status: DISCONTINUED | OUTPATIENT
Start: 2023-10-27 | End: 2023-10-27

## 2023-10-26 RX ORDER — SODIUM CHLORIDE 9 MG/ML
1000 INJECTION INTRAMUSCULAR; INTRAVENOUS; SUBCUTANEOUS
Refills: 0 | Status: DISCONTINUED | OUTPATIENT
Start: 2023-10-27 | End: 2023-10-27

## 2023-10-26 RX ORDER — INSULIN GLARGINE 100 [IU]/ML
10 INJECTION, SOLUTION SUBCUTANEOUS ONCE
Refills: 0 | Status: COMPLETED | OUTPATIENT
Start: 2023-10-27 | End: 2023-10-27

## 2023-10-26 RX ORDER — MAGNESIUM SULFATE 500 MG/ML
2 VIAL (ML) INJECTION ONCE
Refills: 0 | Status: COMPLETED | OUTPATIENT
Start: 2023-10-26 | End: 2023-10-26

## 2023-10-26 RX ORDER — GLUCAGON INJECTION, SOLUTION 0.5 MG/.1ML
1 INJECTION, SOLUTION SUBCUTANEOUS ONCE
Refills: 0 | Status: DISCONTINUED | OUTPATIENT
Start: 2023-10-26 | End: 2023-10-27

## 2023-10-26 RX ADMIN — Medication 650 MILLIGRAM(S): at 21:19

## 2023-10-26 RX ADMIN — HEPARIN SODIUM 5000 UNIT(S): 5000 INJECTION INTRAVENOUS; SUBCUTANEOUS at 21:19

## 2023-10-26 RX ADMIN — Medication 250 MILLIGRAM(S): at 17:43

## 2023-10-26 RX ADMIN — SODIUM CHLORIDE 1000 MILLILITER(S): 9 INJECTION, SOLUTION INTRAVENOUS at 16:09

## 2023-10-26 RX ADMIN — Medication 150 GRAM(S): at 20:19

## 2023-10-26 RX ADMIN — CEFEPIME 100 MILLIGRAM(S): 1 INJECTION, POWDER, FOR SOLUTION INTRAMUSCULAR; INTRAVENOUS at 16:09

## 2023-10-26 RX ADMIN — Medication 0.5 MILLIGRAM(S): at 20:19

## 2023-10-26 NOTE — H&P ADULT - ASSESSMENT
Patient  is a 58yoM with Charcot foot s/p placement of an external fixator on 9/28/23 which had to be removed on 10/19 due to infection, CKD (baseline Cr 2.5-3.5, on list for kidney transplant), and IDDM (A1c 9.9), PAD, s/p  peripheral angiogram on 10/23/2023 with balloon angioplasty of PTA and DCB of R SFA and PTA of R PT and R AT via R groin and R pedal access, anemia recently received 2u pRBCS, pt was dc from hospital on 10/24/23 and now returns to hospital as directed by his podiatrist Dr Benson for TMA which will be done on 10/27/23 as pt has non healing wound and gangrene of the right foot.       #gangrene of the right foot.   -podiatry consult appreciated, pt for OR TMA on 10/27/23  -npo p midnite  -pain meds prn  -wound care as per podiatry  -IV ABX, c/w vanco and cefepime  -d/w Dr Conroy    #PAD, s/p angioplasty  -c/w asa and plavix    #anemia  -hgb stable, 9.1 today  -monitor    #HTN  -low sodium diet  -monitor bp  -cont meds from home nifedipine and cozaar    #DM  -ada diet  -monitor BS  -cont meds from home basal 20u daily  and lispro 8 qac  -insulin coverage prn eleavted blood sugar        #CKD  -creatinine stable at 2.9 today    #anxiety disorder  -xanax prn    #DVT prophylaxis  -sq heparin and scd

## 2023-10-26 NOTE — ED PROVIDER NOTE - CLINICAL SUMMARY MEDICAL DECISION MAKING FREE TEXT BOX
Patient started on antibiotics.  Podiatry service contacted.  Plan is to admit for medical optimization with plan for OR tomorrow

## 2023-10-26 NOTE — ED ADULT NURSE NOTE - NSFALLUNIVINTERV_ED_ALL_ED
Bed/Stretcher in lowest position, wheels locked, appropriate side rails in place/Call bell, personal items and telephone in reach/Instruct patient to call for assistance before getting out of bed/chair/stretcher/Non-slip footwear applied when patient is off stretcher/[place] to call system/Physically safe environment - no spills, clutter or unnecessary equipment/Purposeful proactive rounding/Room/bathroom lighting operational, light cord in reach

## 2023-10-26 NOTE — H&P ADULT - NSICDXPASTMEDICALHX_GEN_ALL_CORE_FT
PAST MEDICAL HISTORY:  Anxiety     Chronic kidney disease, unspecified CKD stage     Diabetic Charcot foot     DM (diabetes mellitus)     GERD (gastroesophageal reflux disease)     Herpes labialis     HLD (hyperlipidemia)     HTN (hypertension)     Kidney disease stage 4    Kidney stones 20 years ago    PAD (peripheral artery disease)

## 2023-10-26 NOTE — ED PROVIDER NOTE - ATTENDING APP SHARED VISIT CONTRIBUTION OF CARE
58-year-old male past medical history noted including CKD, diabetes, Charcot foot presents from podiatrist office for gangrenous right foot.  Patient is to have transmetatarsal amputation of foot tomorrow.  On exam patient in NAD, AAOx3, foot is bandaged

## 2023-10-26 NOTE — H&P ADULT - NSICDXPASTSURGICALHX_GEN_ALL_CORE_FT
PAST SURGICAL HISTORY:  H/O arthroscopy of right knee     Kidney stone Removed by Laser x 2 ( 20 years ago )    S/P foot surgery, right x 5 ( 2006 - 2013 )    Status post peripheral artery angioplasty

## 2023-10-26 NOTE — H&P ADULT - HISTORY OF PRESENT ILLNESS
Patient  is a 58yoM with Charcot foot s/p placement of an external fixator on 9/28/23 which had to be removed on 10/19 due to infection, CKD (baseline Cr 2.5-3.5, on list for kidney transplant), and IDDM (A1c 9.9), PAD, s/p  peripheral angiogram on 10/23/2023 with balloon angioplasty of PTA and DCB of R SFA and PTA of R PT and R AT via R groin and R pedal access, anemia recently received 2u pRBCS, pt was dc from hospital on 10/24/23 and now returns to hospital as directed by his podiatrist Dr Benson for TMA which will be done on 10/27/23 as pt has non healing wound and gangrene of the right foot. Patient complains of moderate to sever pain of foot which is partially relieved with percocet and associated wound discharge.  Pt denies fevers or chills.

## 2023-10-26 NOTE — ED PROVIDER NOTE - CARE PLAN
1 Principal Discharge DX:	Gangrene of foot  Secondary Diagnosis:	DM (diabetes mellitus)  Secondary Diagnosis:	Hypomagnesemia

## 2023-10-26 NOTE — H&P ADULT - NSHPPHYSICALEXAM_GEN_ALL_CORE
Vital Signs Last 24 Hrs  T(C): 36.4 (26 Oct 2023 15:35), Max: 36.4 (26 Oct 2023 15:35)  T(F): 97.6 (26 Oct 2023 15:35), Max: 97.6 (26 Oct 2023 15:35)  HR: 78 (26 Oct 2023 15:35) (78 - 78)  BP: 159/80 (26 Oct 2023 15:35) (159/80 - 159/80)  BP(mean): --  RR: 18 (26 Oct 2023 15:35) (18 - 18)  SpO2: 99% (26 Oct 2023 15:35) (99% - 99%)    PHYSICAL EXAM:      Constitutional: A&Ox4  Respiratory: cta b/l  Cardiovascular: s1 s2 rrr  Gastrointestinal: soft nt  nd + bs no rebound or guarding  Genitourinary: no cva tenderness  Extremities: normal rom, no edema, calf tenderness  Neurological:no focal deficits  Skin: +Right foot wound with gangrene  Vascular, no cyanosis

## 2023-10-26 NOTE — CONSULT NOTE ADULT - SUBJECTIVE AND OBJECTIVE BOX
Podiatry Consult Note    Subjective:  MARIOLA CURRY  Seen Bedside 58y Male  .   Patient is a 58y old  Male who presents with a chief complaint of   HPI: Pt previously seen for charcot recon of the right foot. Pt reports foot turning dusky and cold over the past 2 weeks. Pt was sent here by his podiatrist for amputation of gangrenous limb.     Past Medical History and Surgical History  PAST MEDICAL & SURGICAL HISTORY:  DM (diabetes mellitus)      HTN (hypertension)      HLD (hyperlipidemia)      Herpes labialis      Anxiety      GERD (gastroesophageal reflux disease)      Kidney stones  20 years ago      Kidney disease  stage 4      Chronic kidney disease, unspecified CKD stage      Diabetic Charcot foot      S/P foot surgery, right  x 5 ( 2006 - 2013 )      Kidney stone  Removed by Laser x 2 ( 20 years ago )      H/O arthroscopy of right knee           Review of Systems:  [X] Ten point review of systems is otherwise negative except as noted     Objective:  Vital Signs Last 24 Hrs  T(C): 36.4 (26 Oct 2023 15:35), Max: 36.4 (26 Oct 2023 15:35)  T(F): 97.6 (26 Oct 2023 15:35), Max: 97.6 (26 Oct 2023 15:35)  HR: 78 (26 Oct 2023 15:35) (78 - 78)  BP: 159/80 (26 Oct 2023 15:35) (159/80 - 159/80)  BP(mean): --  RR: 18 (26 Oct 2023 15:35) (18 - 18)  SpO2: 99% (26 Oct 2023 15:35) (99% - 99%)    Parameters below as of 26 Oct 2023 15:35  Patient On (Oxygen Delivery Method): room air                            9.1    10.73 )-----------( 497      ( 26 Oct 2023 16:15 )             27.6                 10-26    138  |  99  |  50<H>  ----------------------------<  230<H>  4.5   |  25  |  2.9<H>    Ca    9.4      26 Oct 2023 16:15  Mg     1.6     10-26    TPro  6.8  /  Alb  3.3<L>  /  TBili  <0.2  /  DBili  x   /  AST  16  /  ALT  16  /  AlkPhos  168<H>  10-26        Physical Exam - Lower Extremity Focused:   Derm: Right forefoot gangrene to forefoot.  Vascular: DP and PT Pulses Diminished; Foot is Warm to Warm to the touch; Capillary Refill Time < 3 Seconds;    Neuro: Protective Sensation Diminished / Moderately Neuropathic   MSK: Pain On Palpation at Wound Site     Assessment:  Right Forefoot  gangrene.     Plan:  Chart reviewed and Patient evaluated. All Questions and Concerns Addressed and Answered  XR Imaging  Foot; Pending Results  Local Wound Care; Wound Flushed w/ NS; Wound Packed w/ Betadine Soaked Gauze / DSD / Kerlix   Weight Bearing Status; WBAT w/ Heel Touch w/ Surgical Shoe;   Recommend; Lower Extremity Arterial Duplex B/L; ESR/CRP   Request ID Consult;    Plan for surgical Transmetatarsal amputation Friday. 10/27/23  Request Medical clearance prior to OR  Please optimize lab values prior to OR.   will order T&S and Coag studies.   Pt will be NPO MN tonight     Discussed Plan w/     Podiatry  Podiatry Consult Note    Subjective:  MARIOLA CURRY  Seen Bedside 58y Male  .   Patient is a 58y old  Male who presents with a chief complaint of   HPI: Pt previously seen for charcot recon of the right foot. Pt reports foot turning dusky and cold over the past 2 weeks. Pt was sent here by his podiatrist for amputation of gangrenous limb.     Past Medical History and Surgical History  PAST MEDICAL & SURGICAL HISTORY:  DM (diabetes mellitus)      HTN (hypertension)      HLD (hyperlipidemia)      Herpes labialis      Anxiety      GERD (gastroesophageal reflux disease)      Kidney stones  20 years ago      Kidney disease  stage 4      Chronic kidney disease, unspecified CKD stage      Diabetic Charcot foot      S/P foot surgery, right  x 5 ( 2006 - 2013 )      Kidney stone  Removed by Laser x 2 ( 20 years ago )      H/O arthroscopy of right knee           Review of Systems:  [X] Ten point review of systems is otherwise negative except as noted     Objective:  Vital Signs Last 24 Hrs  T(C): 36.4 (26 Oct 2023 15:35), Max: 36.4 (26 Oct 2023 15:35)  T(F): 97.6 (26 Oct 2023 15:35), Max: 97.6 (26 Oct 2023 15:35)  HR: 78 (26 Oct 2023 15:35) (78 - 78)  BP: 159/80 (26 Oct 2023 15:35) (159/80 - 159/80)  BP(mean): --  RR: 18 (26 Oct 2023 15:35) (18 - 18)  SpO2: 99% (26 Oct 2023 15:35) (99% - 99%)    Parameters below as of 26 Oct 2023 15:35  Patient On (Oxygen Delivery Method): room air                            9.1    10.73 )-----------( 497      ( 26 Oct 2023 16:15 )             27.6                 10-26    138  |  99  |  50<H>  ----------------------------<  230<H>  4.5   |  25  |  2.9<H>    Ca    9.4      26 Oct 2023 16:15  Mg     1.6     10-26    TPro  6.8  /  Alb  3.3<L>  /  TBili  <0.2  /  DBili  x   /  AST  16  /  ALT  16  /  AlkPhos  168<H>  10-26        Physical Exam - Lower Extremity Focused:   Derm: Right  foot gangrene to entire forefoot.  Vascular: DP and PT Pulses Diminished; Foot is Warm to Warm to the touch; Capillary Refill Time < 3 Seconds;    Neuro: Protective Sensation Diminished / Moderately Neuropathic   MSK: Pain On Palpation at Wound Site     Assessment:  Right Forefoot  gangrene.     Plan:  Chart reviewed and Patient evaluated. All Questions and Concerns Addressed and Answered  XR Imaging  Foot; Pending Results  Local Wound Care; Wound Flushed w/ NS; Wound Packed w/ Betadine Soaked Gauze / DSD / Kerlix   Weight Bearing Status; WBAT w/ Heel Touch w/ Surgical Shoe;   Recommend; Lower Extremity Arterial Duplex B/L; ESR/CRP   Request ID Consult;    Plan for surgical Transmetatarsal amputation Friday. 10/27/23  Request Medical clearance prior to OR  Please optimize lab values prior to OR.   will order T&S and Coag studies.   Please make pt NPO Midnight tonight 10/26    Discussed Plan w/     Podiatry

## 2023-10-26 NOTE — ED PROVIDER NOTE - OBJECTIVE STATEMENT
Patient sent  from podiatry today for admission to go to OR tomorrow for TMA of foot, H/o DM< arterial occlusive dz, with angioplasty last week, Foot gangrene,  sent to ED for adm and med clearance for OR tomorrow

## 2023-10-26 NOTE — H&P ADULT - NSHPLABSRESULTS_GEN_ALL_CORE
9.1    10.73 )-----------( 497      ( 26 Oct 2023 16:15 )             27.6       10-26    138  |  99  |  50<H>  ----------------------------<  230<H>  4.5   |  25  |  2.9<H>    Ca    9.4      26 Oct 2023 16:15  Mg     1.6     10-26    TPro  6.8  /  Alb  3.3<L>  /  TBili  <0.2  /  DBili  x   /  AST  16  /  ALT  16  /  AlkPhos  168<H>  10-26          Urinalysis Basic - ( 26 Oct 2023 16:15 )    Color: x / Appearance: x / SG: x / pH: x  Gluc: 230 mg/dL / Ketone: x  / Bili: x / Urobili: x   Blood: x / Protein: x / Nitrite: x   Leuk Esterase: x / RBC: x / WBC x   Sq Epi: x / Non Sq Epi: x / Bacteria: x        PT/INR - ( 26 Oct 2023 16:15 )   PT: 13.70 sec;   INR: 1.20 ratio         PTT - ( 26 Oct 2023 16:15 )  PTT:35.0 sec    Lactate Trend  10-26 @ 16:15 Lactate:1.4

## 2023-10-26 NOTE — H&P ADULT - NSHPREVIEWOFSYSTEMS_GEN_ALL_CORE
General:	no fever, weight loss,  chills  Skin: no rash, +ulcers  Ophthalmologic: no visual changes  ENMT:	no sore throat  Respiratory and Thorax: no cough, wheeze,  sob  Cardiovascular:	no chest pain, palpitations, dizziness  Gastrointestinal:	no nausea, vomiting, diarrhea, abd pain  Genitourinary:	no dysuria, hematuria  Musculoskeletal:	no joint pains  Neurological:	 no speech disturbance, focal weakness, numbness  Psychiatric:	no depression, +anxiety, no psychosis  Hematology/Lymphatics:	no anemia  Endocrine:	no polyuria, polydipsia

## 2023-10-27 ENCOUNTER — TRANSCRIPTION ENCOUNTER (OUTPATIENT)
Age: 58
End: 2023-10-27

## 2023-10-27 ENCOUNTER — RESULT REVIEW (OUTPATIENT)
Age: 58
End: 2023-10-27

## 2023-10-27 DIAGNOSIS — Z87.442 PERSONAL HISTORY OF URINARY CALCULI: ICD-10-CM

## 2023-10-27 DIAGNOSIS — E11.51 TYPE 2 DIABETES MELLITUS WITH DIABETIC PERIPHERAL ANGIOPATHY WITHOUT GANGRENE: ICD-10-CM

## 2023-10-27 DIAGNOSIS — E11.610 TYPE 2 DIABETES MELLITUS WITH DIABETIC NEUROPATHIC ARTHROPATHY: ICD-10-CM

## 2023-10-27 DIAGNOSIS — E78.5 HYPERLIPIDEMIA, UNSPECIFIED: ICD-10-CM

## 2023-10-27 DIAGNOSIS — Z79.4 LONG TERM (CURRENT) USE OF INSULIN: ICD-10-CM

## 2023-10-27 DIAGNOSIS — D63.1 ANEMIA IN CHRONIC KIDNEY DISEASE: ICD-10-CM

## 2023-10-27 DIAGNOSIS — I12.9 HYPERTENSIVE CHRONIC KIDNEY DISEASE WITH STAGE 1 THROUGH STAGE 4 CHRONIC KIDNEY DISEASE, OR UNSPECIFIED CHRONIC KIDNEY DISEASE: ICD-10-CM

## 2023-10-27 DIAGNOSIS — E11.22 TYPE 2 DIABETES MELLITUS WITH DIABETIC CHRONIC KIDNEY DISEASE: ICD-10-CM

## 2023-10-27 DIAGNOSIS — K21.9 GASTRO-ESOPHAGEAL REFLUX DISEASE WITHOUT ESOPHAGITIS: ICD-10-CM

## 2023-10-27 DIAGNOSIS — Z87.891 PERSONAL HISTORY OF NICOTINE DEPENDENCE: ICD-10-CM

## 2023-10-27 DIAGNOSIS — Z47.2 ENCOUNTER FOR REMOVAL OF INTERNAL FIXATION DEVICE: ICD-10-CM

## 2023-10-27 DIAGNOSIS — T81.40XA INFECTION FOLLOWING A PROCEDURE, UNSPECIFIED, INITIAL ENCOUNTER: ICD-10-CM
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
## 2023-10-27 DIAGNOSIS — Z79.85 LONG-TERM (CURRENT) USE OF INJECTABLE NON-INSULIN ANTIDIABETIC DRUGS: ICD-10-CM
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
## 2023-10-27 DIAGNOSIS — N18.4 CHRONIC KIDNEY DISEASE, STAGE 4 (SEVERE): ICD-10-CM

## 2023-10-27 DIAGNOSIS — F41.9 ANXIETY DISORDER, UNSPECIFIED: ICD-10-CM

## 2023-10-27 LAB
BASOPHILS # BLD AUTO: 0.04 K/UL — SIGNIFICANT CHANGE UP (ref 0–0.2)
BASOPHILS # BLD AUTO: 0.04 K/UL — SIGNIFICANT CHANGE UP (ref 0–0.2)
BASOPHILS NFR BLD AUTO: 0.5 % — SIGNIFICANT CHANGE UP (ref 0–1)
BASOPHILS NFR BLD AUTO: 0.5 % — SIGNIFICANT CHANGE UP (ref 0–1)
BLD GP AB SCN SERPL QL: SIGNIFICANT CHANGE UP
BLD GP AB SCN SERPL QL: SIGNIFICANT CHANGE UP
EOSINOPHIL # BLD AUTO: 0.25 K/UL — SIGNIFICANT CHANGE UP (ref 0–0.7)
EOSINOPHIL # BLD AUTO: 0.25 K/UL — SIGNIFICANT CHANGE UP (ref 0–0.7)
EOSINOPHIL NFR BLD AUTO: 3.1 % — SIGNIFICANT CHANGE UP (ref 0–8)
EOSINOPHIL NFR BLD AUTO: 3.1 % — SIGNIFICANT CHANGE UP (ref 0–8)
GLUCOSE BLDC GLUCOMTR-MCNC: 177 MG/DL — HIGH (ref 70–99)
GLUCOSE BLDC GLUCOMTR-MCNC: 177 MG/DL — HIGH (ref 70–99)
GLUCOSE BLDC GLUCOMTR-MCNC: 92 MG/DL — SIGNIFICANT CHANGE UP (ref 70–99)
GLUCOSE BLDC GLUCOMTR-MCNC: 92 MG/DL — SIGNIFICANT CHANGE UP (ref 70–99)
GLUCOSE BLDC GLUCOMTR-MCNC: 99 MG/DL — SIGNIFICANT CHANGE UP (ref 70–99)
HCT VFR BLD CALC: 24.2 % — LOW (ref 42–52)
HCT VFR BLD CALC: 24.2 % — LOW (ref 42–52)
HCT VFR BLD CALC: 27.2 % — LOW (ref 42–52)
HCT VFR BLD CALC: 27.2 % — LOW (ref 42–52)
HGB BLD-MCNC: 8.1 G/DL — LOW (ref 14–18)
HGB BLD-MCNC: 8.1 G/DL — LOW (ref 14–18)
HGB BLD-MCNC: 9.1 G/DL — LOW (ref 14–18)
HGB BLD-MCNC: 9.1 G/DL — LOW (ref 14–18)
IMM GRANULOCYTES NFR BLD AUTO: 0.4 % — HIGH (ref 0.1–0.3)
IMM GRANULOCYTES NFR BLD AUTO: 0.4 % — HIGH (ref 0.1–0.3)
LYMPHOCYTES # BLD AUTO: 1.64 K/UL — SIGNIFICANT CHANGE UP (ref 1.2–3.4)
LYMPHOCYTES # BLD AUTO: 1.64 K/UL — SIGNIFICANT CHANGE UP (ref 1.2–3.4)
LYMPHOCYTES # BLD AUTO: 20 % — LOW (ref 20.5–51.1)
LYMPHOCYTES # BLD AUTO: 20 % — LOW (ref 20.5–51.1)
MAGNESIUM SERPL-MCNC: 1.8 MG/DL — SIGNIFICANT CHANGE UP (ref 1.8–2.4)
MAGNESIUM SERPL-MCNC: 1.8 MG/DL — SIGNIFICANT CHANGE UP (ref 1.8–2.4)
MCHC RBC-ENTMCNC: 27 PG — SIGNIFICANT CHANGE UP (ref 27–31)
MCHC RBC-ENTMCNC: 27 PG — SIGNIFICANT CHANGE UP (ref 27–31)
MCHC RBC-ENTMCNC: 27.2 PG — SIGNIFICANT CHANGE UP (ref 27–31)
MCHC RBC-ENTMCNC: 27.2 PG — SIGNIFICANT CHANGE UP (ref 27–31)
MCHC RBC-ENTMCNC: 33.5 G/DL — SIGNIFICANT CHANGE UP (ref 32–37)
MCV RBC AUTO: 80.7 FL — SIGNIFICANT CHANGE UP (ref 80–94)
MCV RBC AUTO: 80.7 FL — SIGNIFICANT CHANGE UP (ref 80–94)
MCV RBC AUTO: 81.2 FL — SIGNIFICANT CHANGE UP (ref 80–94)
MCV RBC AUTO: 81.2 FL — SIGNIFICANT CHANGE UP (ref 80–94)
MONOCYTES # BLD AUTO: 0.7 K/UL — HIGH (ref 0.1–0.6)
MONOCYTES # BLD AUTO: 0.7 K/UL — HIGH (ref 0.1–0.6)
MONOCYTES NFR BLD AUTO: 8.5 % — SIGNIFICANT CHANGE UP (ref 1.7–9.3)
MONOCYTES NFR BLD AUTO: 8.5 % — SIGNIFICANT CHANGE UP (ref 1.7–9.3)
NEUTROPHILS # BLD AUTO: 5.53 K/UL — SIGNIFICANT CHANGE UP (ref 1.4–6.5)
NEUTROPHILS # BLD AUTO: 5.53 K/UL — SIGNIFICANT CHANGE UP (ref 1.4–6.5)
NEUTROPHILS NFR BLD AUTO: 67.5 % — SIGNIFICANT CHANGE UP (ref 42.2–75.2)
NEUTROPHILS NFR BLD AUTO: 67.5 % — SIGNIFICANT CHANGE UP (ref 42.2–75.2)
NRBC # BLD: 0 /100 WBCS — SIGNIFICANT CHANGE UP (ref 0–0)
PLATELET # BLD AUTO: 457 K/UL — HIGH (ref 130–400)
PLATELET # BLD AUTO: 457 K/UL — HIGH (ref 130–400)
PLATELET # BLD AUTO: 503 K/UL — HIGH (ref 130–400)
PLATELET # BLD AUTO: 503 K/UL — HIGH (ref 130–400)
PMV BLD: 8.8 FL — SIGNIFICANT CHANGE UP (ref 7.4–10.4)
PMV BLD: 8.8 FL — SIGNIFICANT CHANGE UP (ref 7.4–10.4)
PMV BLD: 9.1 FL — SIGNIFICANT CHANGE UP (ref 7.4–10.4)
PMV BLD: 9.1 FL — SIGNIFICANT CHANGE UP (ref 7.4–10.4)
RBC # BLD: 2.98 M/UL — LOW (ref 4.7–6.1)
RBC # BLD: 2.98 M/UL — LOW (ref 4.7–6.1)
RBC # BLD: 3.37 M/UL — LOW (ref 4.7–6.1)
RBC # BLD: 3.37 M/UL — LOW (ref 4.7–6.1)
RBC # FLD: 14.6 % — HIGH (ref 11.5–14.5)
WBC # BLD: 8.19 K/UL — SIGNIFICANT CHANGE UP (ref 4.8–10.8)
WBC # BLD: 8.19 K/UL — SIGNIFICANT CHANGE UP (ref 4.8–10.8)
WBC # BLD: 8.36 K/UL — SIGNIFICANT CHANGE UP (ref 4.8–10.8)
WBC # BLD: 8.36 K/UL — SIGNIFICANT CHANGE UP (ref 4.8–10.8)
WBC # FLD AUTO: 8.19 K/UL — SIGNIFICANT CHANGE UP (ref 4.8–10.8)
WBC # FLD AUTO: 8.19 K/UL — SIGNIFICANT CHANGE UP (ref 4.8–10.8)
WBC # FLD AUTO: 8.36 K/UL — SIGNIFICANT CHANGE UP (ref 4.8–10.8)
WBC # FLD AUTO: 8.36 K/UL — SIGNIFICANT CHANGE UP (ref 4.8–10.8)

## 2023-10-27 PROCEDURE — 88307 TISSUE EXAM BY PATHOLOGIST: CPT | Mod: 26

## 2023-10-27 PROCEDURE — 93010 ELECTROCARDIOGRAM REPORT: CPT

## 2023-10-27 PROCEDURE — 73630 X-RAY EXAM OF FOOT: CPT | Mod: 26,RT

## 2023-10-27 RX ORDER — ONDANSETRON 8 MG/1
4 TABLET, FILM COATED ORAL EVERY 8 HOURS
Refills: 0 | Status: DISCONTINUED | OUTPATIENT
Start: 2023-10-27 | End: 2023-10-30

## 2023-10-27 RX ORDER — SODIUM CHLORIDE 9 MG/ML
1000 INJECTION INTRAMUSCULAR; INTRAVENOUS; SUBCUTANEOUS
Refills: 0 | Status: DISCONTINUED | OUTPATIENT
Start: 2023-10-27 | End: 2023-11-03

## 2023-10-27 RX ORDER — SODIUM CHLORIDE 9 MG/ML
1000 INJECTION, SOLUTION INTRAVENOUS
Refills: 0 | Status: DISCONTINUED | OUTPATIENT
Start: 2023-10-27 | End: 2023-10-30

## 2023-10-27 RX ORDER — INSULIN LISPRO 100/ML
8 VIAL (ML) SUBCUTANEOUS
Refills: 0 | Status: DISCONTINUED | OUTPATIENT
Start: 2023-10-27 | End: 2023-10-30

## 2023-10-27 RX ORDER — HEPARIN SODIUM 5000 [USP'U]/ML
5000 INJECTION INTRAVENOUS; SUBCUTANEOUS EVERY 8 HOURS
Refills: 0 | Status: DISCONTINUED | OUTPATIENT
Start: 2023-10-27 | End: 2023-10-30

## 2023-10-27 RX ORDER — SODIUM CHLORIDE 9 MG/ML
1000 INJECTION, SOLUTION INTRAVENOUS
Refills: 0 | Status: DISCONTINUED | OUTPATIENT
Start: 2023-10-27 | End: 2023-10-27

## 2023-10-27 RX ORDER — ONDANSETRON 8 MG/1
4 TABLET, FILM COATED ORAL ONCE
Refills: 0 | Status: DISCONTINUED | OUTPATIENT
Start: 2023-10-27 | End: 2023-10-27

## 2023-10-27 RX ORDER — LANOLIN ALCOHOL/MO/W.PET/CERES
3 CREAM (GRAM) TOPICAL AT BEDTIME
Refills: 0 | Status: DISCONTINUED | OUTPATIENT
Start: 2023-10-27 | End: 2023-10-30

## 2023-10-27 RX ORDER — ACETAMINOPHEN 500 MG
650 TABLET ORAL EVERY 6 HOURS
Refills: 0 | Status: DISCONTINUED | OUTPATIENT
Start: 2023-10-27 | End: 2023-10-30

## 2023-10-27 RX ORDER — GLUCAGON INJECTION, SOLUTION 0.5 MG/.1ML
1 INJECTION, SOLUTION SUBCUTANEOUS ONCE
Refills: 0 | Status: DISCONTINUED | OUTPATIENT
Start: 2023-10-27 | End: 2023-10-30

## 2023-10-27 RX ORDER — DEXTROSE 50 % IN WATER 50 %
25 SYRINGE (ML) INTRAVENOUS ONCE
Refills: 0 | Status: DISCONTINUED | OUTPATIENT
Start: 2023-10-27 | End: 2023-10-30

## 2023-10-27 RX ORDER — SODIUM BICARBONATE 1 MEQ/ML
650 SYRINGE (ML) INTRAVENOUS THREE TIMES A DAY
Refills: 0 | Status: DISCONTINUED | OUTPATIENT
Start: 2023-10-27 | End: 2023-10-30

## 2023-10-27 RX ORDER — CHLORHEXIDINE GLUCONATE 213 G/1000ML
1 SOLUTION TOPICAL
Refills: 0 | Status: DISCONTINUED | OUTPATIENT
Start: 2023-10-27 | End: 2023-10-27

## 2023-10-27 RX ORDER — NIFEDIPINE 30 MG
90 TABLET, EXTENDED RELEASE 24 HR ORAL DAILY
Refills: 0 | Status: DISCONTINUED | OUTPATIENT
Start: 2023-10-27 | End: 2023-10-30

## 2023-10-27 RX ORDER — LOSARTAN POTASSIUM 100 MG/1
100 TABLET, FILM COATED ORAL DAILY
Refills: 0 | Status: DISCONTINUED | OUTPATIENT
Start: 2023-10-27 | End: 2023-10-30

## 2023-10-27 RX ORDER — ASPIRIN/CALCIUM CARB/MAGNESIUM 324 MG
81 TABLET ORAL DAILY
Refills: 0 | Status: DISCONTINUED | OUTPATIENT
Start: 2023-10-27 | End: 2023-10-30

## 2023-10-27 RX ORDER — CLOPIDOGREL BISULFATE 75 MG/1
75 TABLET, FILM COATED ORAL DAILY
Refills: 0 | Status: DISCONTINUED | OUTPATIENT
Start: 2023-10-27 | End: 2023-10-30

## 2023-10-27 RX ORDER — HYDROMORPHONE HYDROCHLORIDE 2 MG/ML
0.5 INJECTION INTRAMUSCULAR; INTRAVENOUS; SUBCUTANEOUS
Refills: 0 | Status: DISCONTINUED | OUTPATIENT
Start: 2023-10-27 | End: 2023-10-27

## 2023-10-27 RX ORDER — ALPRAZOLAM 0.25 MG
0.5 TABLET ORAL
Refills: 0 | Status: DISCONTINUED | OUTPATIENT
Start: 2023-10-27 | End: 2023-10-30

## 2023-10-27 RX ORDER — ATORVASTATIN CALCIUM 80 MG/1
80 TABLET, FILM COATED ORAL AT BEDTIME
Refills: 0 | Status: DISCONTINUED | OUTPATIENT
Start: 2023-10-27 | End: 2023-10-30

## 2023-10-27 RX ORDER — OXYCODONE AND ACETAMINOPHEN 5; 325 MG/1; MG/1
1 TABLET ORAL EVERY 4 HOURS
Refills: 0 | Status: DISCONTINUED | OUTPATIENT
Start: 2023-10-27 | End: 2023-10-27

## 2023-10-27 RX ORDER — DEXTROSE 50 % IN WATER 50 %
15 SYRINGE (ML) INTRAVENOUS ONCE
Refills: 0 | Status: DISCONTINUED | OUTPATIENT
Start: 2023-10-27 | End: 2023-10-30

## 2023-10-27 RX ORDER — DEXTROSE 50 % IN WATER 50 %
12.5 SYRINGE (ML) INTRAVENOUS ONCE
Refills: 0 | Status: DISCONTINUED | OUTPATIENT
Start: 2023-10-27 | End: 2023-10-30

## 2023-10-27 RX ORDER — HYDROMORPHONE HYDROCHLORIDE 2 MG/ML
1 INJECTION INTRAMUSCULAR; INTRAVENOUS; SUBCUTANEOUS ONCE
Refills: 0 | Status: DISCONTINUED | OUTPATIENT
Start: 2023-10-27 | End: 2023-10-27

## 2023-10-27 RX ORDER — CEFEPIME 1 G/1
1000 INJECTION, POWDER, FOR SOLUTION INTRAMUSCULAR; INTRAVENOUS EVERY 12 HOURS
Refills: 0 | Status: DISCONTINUED | OUTPATIENT
Start: 2023-10-27 | End: 2023-10-30

## 2023-10-27 RX ORDER — VANCOMYCIN HCL 1 G
1250 VIAL (EA) INTRAVENOUS EVERY 24 HOURS
Refills: 0 | Status: DISCONTINUED | OUTPATIENT
Start: 2023-10-27 | End: 2023-10-30

## 2023-10-27 RX ADMIN — HYDROMORPHONE HYDROCHLORIDE 0.5 MILLIGRAM(S): 2 INJECTION INTRAMUSCULAR; INTRAVENOUS; SUBCUTANEOUS at 15:47

## 2023-10-27 RX ADMIN — SODIUM CHLORIDE 75 MILLILITER(S): 9 INJECTION, SOLUTION INTRAVENOUS at 15:31

## 2023-10-27 RX ADMIN — HYDROMORPHONE HYDROCHLORIDE 0.5 MILLIGRAM(S): 2 INJECTION INTRAMUSCULAR; INTRAVENOUS; SUBCUTANEOUS at 16:10

## 2023-10-27 RX ADMIN — Medication 650 MILLIGRAM(S): at 05:54

## 2023-10-27 RX ADMIN — HYDROMORPHONE HYDROCHLORIDE 1 MILLIGRAM(S): 2 INJECTION INTRAMUSCULAR; INTRAVENOUS; SUBCUTANEOUS at 15:25

## 2023-10-27 RX ADMIN — Medication 0.5 MILLIGRAM(S): at 20:04

## 2023-10-27 RX ADMIN — SODIUM CHLORIDE 50 MILLILITER(S): 9 INJECTION INTRAMUSCULAR; INTRAVENOUS; SUBCUTANEOUS at 11:15

## 2023-10-27 RX ADMIN — Medication 0.5 MILLIGRAM(S): at 05:55

## 2023-10-27 RX ADMIN — LOSARTAN POTASSIUM 100 MILLIGRAM(S): 100 TABLET, FILM COATED ORAL at 05:54

## 2023-10-27 RX ADMIN — CEFEPIME 100 MILLIGRAM(S): 1 INJECTION, POWDER, FOR SOLUTION INTRAMUSCULAR; INTRAVENOUS at 05:55

## 2023-10-27 RX ADMIN — HYDROMORPHONE HYDROCHLORIDE 0.5 MILLIGRAM(S): 2 INJECTION INTRAMUSCULAR; INTRAVENOUS; SUBCUTANEOUS at 16:35

## 2023-10-27 RX ADMIN — CEFEPIME 100 MILLIGRAM(S): 1 INJECTION, POWDER, FOR SOLUTION INTRAMUSCULAR; INTRAVENOUS at 21:46

## 2023-10-27 RX ADMIN — HEPARIN SODIUM 5000 UNIT(S): 5000 INJECTION INTRAVENOUS; SUBCUTANEOUS at 21:46

## 2023-10-27 RX ADMIN — Medication 90 MILLIGRAM(S): at 05:55

## 2023-10-27 RX ADMIN — HYDROMORPHONE HYDROCHLORIDE 0.5 MILLIGRAM(S): 2 INJECTION INTRAMUSCULAR; INTRAVENOUS; SUBCUTANEOUS at 15:59

## 2023-10-27 RX ADMIN — HYDROMORPHONE HYDROCHLORIDE 1 MILLIGRAM(S): 2 INJECTION INTRAMUSCULAR; INTRAVENOUS; SUBCUTANEOUS at 15:40

## 2023-10-27 RX ADMIN — Medication 650 MILLIGRAM(S): at 21:44

## 2023-10-27 NOTE — PROGRESS NOTE ADULT - ASSESSMENT
58-year-old male with Charcot foot s/p placement of an external fixator on 9/28/23 which had to be removed on 10/19 due to infection, CKD (baseline Cr 2.5-3.5, on list for kidney transplant), and IDDM (A1c 9.9), PAD, s/p  peripheral angiogram on 10/23/2023 with balloon angioplasty of PTA and DCB of R SFA and PTA of R PT and R AT via R groin and R pedal access, anemia recently received 2u pRBCS, pt was dc from hospital on 10/24/23 and now returns to hospital as directed by his podiatrist Dr Benson for TMA which will be done on 10/27/23 as pt has non healing wound and gangrene of the right foot.       #gangrene of the right foot.   -podiatry consult appreciated, pt for OR TMA on 10/27/23  -npo p midnite  -pain meds prn  -wound care as per podiatry  -IV ABX, c/w vanco and cefepime    Patient is preoperatively optimized and has had multiple procedures within the last 2 weeks ; seen by cardiology     #PAD, s/p angioplasty  -c/w asa and plavix    #anemia  -hgb stable, 9.1 today  -monitor    #HTN  -low sodium diet  -monitor bp  -cont meds from home nifedipine and cozaar    #DM  -ada diet  -monitor BS  -cont meds from home basal 20u daily  and lispro 8 qac  -insulin coverage prn eleavted blood sugar        #CKD  -creatinine stable at 2.9 today    #anxiety disorder  -xanax prn    #DVT prophylaxis  -sq heparin and scd    ADVANCE CARE: CPR

## 2023-10-27 NOTE — CHART NOTE - NSCHARTNOTEFT_GEN_A_CORE
Called by Podiatry resident. Pt had some bleeding in OR - asked to monitor H/H.    Will order CBC at 10pm and 7AM

## 2023-10-27 NOTE — PATIENT PROFILE ADULT - DO YOU FEEL LIKE HURTING YOURSELF OR OTHERS?
I saw pt on 3-28-17 with Mary Ramey prior to C2D8 Erbitux treatment. He appears to be doing well without complaints other than fatigue. He feels his throat discomfort is well controlled with medication. He is still working part-time however and tries to remain busy. We will transfuse 1 unit PRBCS on 3-29-17 for Hgb 7.3 and pt will be sent for type and Cross today. Pt encouraged to call with questions or concerns.  Nurse navigation is following
no

## 2023-10-27 NOTE — PROGRESS NOTE ADULT - SUBJECTIVE AND OBJECTIVE BOX
Podiatry Progress Note    Subjective:   MARIOLA CURRY is a pleasant well-nourished, well developed 58y Male in no acute distress, alert awake, and oriented to person, place and time.  Patient is a 58y old  Male who presents with a chief complaint of RF bleeding. Pt s/p R TMA 10/27/23. Paged for strikethrough bleeding. Pt denies N/V/F/C/pain. No new pedal complaints.     PAST MEDICAL & SURGICAL HISTORY:  DM (diabetes mellitus)      HTN (hypertension)      HLD (hyperlipidemia)      Herpes labialis      Anxiety      GERD (gastroesophageal reflux disease)      Kidney stones  20 years ago      Kidney disease  stage 4      Chronic kidney disease, unspecified CKD stage      Diabetic Charcot foot      PAD (peripheral artery disease)      S/P foot surgery, right  x 5 ( 2006 - 2013 )      Kidney stone  Removed by Laser x 2 ( 20 years ago )      H/O arthroscopy of right knee      Status post peripheral artery angioplasty           Objective:  Vital Signs Last 24 Hrs  T(C): 36.7 (27 Oct 2023 15:50), Max: 37 (27 Oct 2023 15:05)  T(F): 98 (27 Oct 2023 15:50), Max: 98.6 (27 Oct 2023 15:05)  HR: 70 (27 Oct 2023 18:15) (68 - 98)  BP: 130/74 (27 Oct 2023 18:15) (130/71 - 173/74)  BP(mean): --  RR: 20 (27 Oct 2023 18:15) (16 - 20)  SpO2: 97% (27 Oct 2023 18:15) (96% - 99%)    Parameters below as of 27 Oct 2023 05:00  Patient On (Oxygen Delivery Method): room air                            9.1    8.19  )-----------( 503      ( 27 Oct 2023 06:35 )             27.2                 10-26    138  |  99  |  50<H>  ----------------------------<  230<H>  4.5   |  25  |  2.9<H>    Ca    9.4      26 Oct 2023 16:15  Mg     1.8     10-27    TPro  6.8  /  Alb  3.3<L>  /  TBili  <0.2  /  DBili  x   /  AST  16  /  ALT  16  /  AlkPhos  168<H>  10-26            Physical Exam - Lower Extremity Focused:   #Right Lower Extremity  Derm:   Open TMA site surgical wound  -Wound base granular  -Resected MTs 1-5 visible in wound bed; sanguinous weeping noted from all medullary canals  -Mild sanguinous drainage from wound margins  -Two actively bleeding arterioles identified in central wound bed    Vascular: DP and PT Pulses Diminished; Foot is Warm to Warm to the touch   Neuro: Protective Sensation Diminished / Moderately Neuropathic   MSK: R TMA; No Pain On Palpation at Wound Site     Assessment:   Right Foot Gangrene  s/p R TMA, 10/27/23    Plan:  Chart reviewed and Patient evaluated. All Questions and Concerns Addressed and Answered  Discussed diagnosis and treatment with patient  Wound Flushed w/ NS;   -Bone wax applied to medullary canals MTs 1-5  -4-0 Vicryl utilized to ligate arterioles x2  -Fibrillar Surgicel applied to remainder of wound bed  -Compression dressing; DSD / ABD / Kerlix / ACE  Hemostasis achieved; R foot elevated above level of heart; Pt strongly advised to keep RLE elvated  XR Imaging Right Foot; Pending Results  Possible further Surgical Debridement; Pending soft tissue demarcation  Dr. Valenzuela to round on patient Sat 10/28/23  Discussed Plan w/ Attending    Podiatry

## 2023-10-27 NOTE — PROGRESS NOTE ADULT - SUBJECTIVE AND OBJECTIVE BOX
58-year-old male with Charcot foot s/p placement of an external fixator on 9/28/23 which had to be removed on 10/19 due to infection, CKD (baseline Cr 2.5-3.5, on list for kidney transplant), and IDDM (A1c 9.9), PAD, s/p  peripheral angiogram on 10/23/2023 with balloon angioplasty of PTA and DCB of R SFA and PTA of R PT and R AT via R groin and R pedal access, anemia recently received 2u pRBCS, pt was dc from hospital on 10/24/23 and now returns to hospital as directed by his podiatrist Dr Benson for TMA which will be done on 10/27/23 as pt has non healing wound and gangrene of the right foot. Patient complains of moderate to sever pain of foot which is partially relieved with percocet and associated wound discharge.  Pt denies fevers or chills.     PAST MEDICAL & SURGICAL HISTORY:    DM (diabetes mellitus)  HTN (hypertension)  HLD (hyperlipidemia)  Herpes labialis  Anxiety  GERD (gastroesophageal reflux disease)  Kidney stones  20 years ago  Kidney disease  stage 4  Chronic kidney disease, unspecified CKD stage  Diabetic Charcot foot  PAD (peripheral artery disease)  S/P foot surgery, right  x 5 ( 2006 - 2013 )  Kidney stone  Removed by Laser x 2 ( 20 years ago )  H/O arthroscopy of right knee  Status post peripheral artery angioplasty    Social History:    reviewed     MEDICATIONS  (STANDING):  aspirin  chewable 81 milliGRAM(s) Oral daily  atorvastatin 80 milliGRAM(s) Oral at bedtime  cefepime   IVPB 1000 milliGRAM(s) IV Intermittent every 12 hours  clopidogrel Tablet 75 milliGRAM(s) Oral daily  dextrose 5%. 1000 milliLiter(s) (100 mL/Hr) IV Continuous <Continuous>  dextrose 5%. 1000 milliLiter(s) (50 mL/Hr) IV Continuous <Continuous>  dextrose 50% Injectable 12.5 Gram(s) IV Push once  dextrose 50% Injectable 25 Gram(s) IV Push once  dextrose 50% Injectable 25 Gram(s) IV Push once  glucagon  Injectable 1 milliGRAM(s) IntraMuscular once  heparin   Injectable 5000 Unit(s) SubCutaneous every 8 hours  insulin glargine Injectable (LANTUS) 10 Unit(s) SubCutaneous once  insulin lispro Injectable (ADMELOG) 8 Unit(s) SubCutaneous before breakfast  insulin lispro Injectable (ADMELOG) 8 Unit(s) SubCutaneous before dinner  insulin lispro Injectable (ADMELOG) 8 Unit(s) SubCutaneous before lunch  losartan 100 milliGRAM(s) Oral daily  magnesium oxide 400 milliGRAM(s) Oral Once  NIFEdipine XL 90 milliGRAM(s) Oral daily  sodium bicarbonate 650 milliGRAM(s) Oral three times a day  sodium chloride 0.9%. 1000 milliLiter(s) (50 mL/Hr) IV Continuous <Continuous>  vancomycin  IVPB 1250 milliGRAM(s) IV Intermittent every 24 hours    MEDICATIONS  (PRN):  acetaminophen     Tablet .. 650 milliGRAM(s) Oral every 6 hours PRN Temp greater or equal to 38C (100.4F), Mild Pain (1 - 3)  ALPRAZolam 0.5 milliGRAM(s) Oral four times a day PRN anxiety  dextrose Oral Gel 15 Gram(s) Oral once PRN Blood Glucose LESS THAN 70 milliGRAM(s)/deciliter  melatonin 3 milliGRAM(s) Oral at bedtime PRN Insomnia  ondansetron Injectable 4 milliGRAM(s) IV Push every 8 hours PRN Nausea and/or Vomiting  oxycodone    5 mG/acetaminophen 325 mG 1 Tablet(s) Oral every 4 hours PRN for moderate pain    Allergies    No Known Allergies    Intolerances    Vital Signs Last 24 Hrs  T(C): 35.9 (27 Oct 2023 05:00), Max: 36.4 (26 Oct 2023 15:35)  T(F): 96.6 (27 Oct 2023 05:00), Max: 97.6 (26 Oct 2023 15:35)  HR: 72 (27 Oct 2023 05:00) (72 - 78)  BP: 161/84 (27 Oct 2023 05:00) (159/80 - 172/79)  BP(mean): --  RR: 18 (27 Oct 2023 05:00) (18 - 18)  SpO2: 99% (27 Oct 2023 05:00) (99% - 99%)    Parameters below as of 27 Oct 2023 05:00  Patient On (Oxygen Delivery Method): room air    CAPILLARY BLOOD GLUCOSE      Diet, Regular:     Consistent Carbohydrate Evening Snack  DASH/TLC Sodium & Cholesterol Restricted (10-26-23 @ 19:02) [Active]  Diet, NPO after Midnight:      NPO Start Date: 26-Oct-2023,   NPO Start Time: 23:59 (10-26-23 @ 19:02) [Active]    PHYSICAL EXAM      Constitutional: A&Ox4  Respiratory: cta b/l  Cardiovascular: s1 s2 rrr  Gastrointestinal: soft nt  nd + bs no rebound or guarding  Genitourinary: no cva tenderness  Extremities: normal rom, no edema, calf tenderness  Neurological:no focal deficits  Skin: +Right foot wound with gangrene  Vascular, no cyanosis    LABS:                           9.1    10.73 )-----------( 497      ( 26 Oct 2023 16:15 )             27.6   10-26    138  |  99  |  50<H>  ----------------------------<  230<H>  4.5   |  25  |  2.9<H>    Ca    9.4      26 Oct 2023 16:15  Mg     1.6     10-26    TPro  6.8  /  Alb  3.3<L>  /  TBili  <0.2  /  DBili  x   /  AST  16  /  ALT  16  /  AlkPhos  168<H>  10-26    PT/INR - ( 26 Oct 2023 16:15 )   PT: 13.70 sec;   INR: 1.20 ratio         PTT - ( 26 Oct 2023 16:15 )  PTT:35.0 sec    CARD:    Ventricular Rate 73 BPM    Atrial Rate 73 BPM    P-R Interval 184 ms    QRS Duration 88 ms    Q-T Interval 404 ms    QTC Calculation(Bazett) 445 ms    P Axis 41 degrees    R Axis 71 degrees    T Axis 62 degrees    Diagnosis Line Normal sinus rhythm  Normal ECG    Confirmed by Adam Morrissey ([id number]) on 10/26/2023 5:26:47 PM      ACC: [id number] EXAM:  ECHO TTE WO CON COMP W DOPP                          PROCEDURE DATE:  09/06/2023          INTERPRETATION:   [place] [address]                Phone: [phone].   TRANSTHORACIC ECHOCARDIOGRAM REPORT        Patient Name:   MARIOLA CURRY Accession #: [id number]  Medical Rec #:  [id number]     Height:      72.0 in 182.9 cm  YOB: 1965    Weight:      204.0 lb 92.53 kg  Patient Age:    58 years     BSA:         2.15 m²  Patient Gender: M            BP:          131/75 mmHg      Date of Exam:        9/6/2023 8:42:12 AM  Referring Physician: Supreeti Behuria  Sonographer:         Milagro Kay  Reading Physician:Kahlil Carter MD.    Procedure:   2D Echo/Doppler/Color Doppler Complete.  Indications: R01.1 - Cardiac murmur, unspecified  Diagnosis:   R01.1 - Cardiac murmur, unspecified        Summary:   1. Normal global left ventricular systolic function.   2.LV Ejection Fraction by Knutson's Method with a biplane EF of 55 %.   3. Mildly increased LV wall thickness.   4. Normal left ventricular internal cavity size.   5. Spectral Doppler shows impaired relaxation pattern of left   ventricular myocardial filling (Grade I diastolic dysfunction).   6. Normal left atrial size.   7. Normal right atrial size.   8. Mild thickening of the anterior mitral valve leaflet.   9. No evidence of mitral valve regurgitation.    PHYSICIAN INTERPRETATION:  Left Ventricle: The left ventricular internal cavity size is normal. Left   ventricular wall thickness is mildly increased. Global LV systolic   function was normal. Spectral Doppler shows impaired relaxation pattern   of left ventricular myocardial filling (GradeI diastolic dysfunction).  Right Ventricle: The right ventricular size is normal. RV systolic   function is normal.  Left Atrium: Normal left atrial size.  Right Atrium: Normal right atrial size.  Mitral Valve: Mild thickening of the anterior mitral valve leaflet. No   evidence of mitral valve regurgitation is seen.  Tricuspid Valve: The tricuspid valve is normal in structure. No tricuspid   regurgitation is visualized.  Aortic Valve: The aortic valve is trileaflet. No evidence of aortic   stenosis. No evidence of aortic valve regurgitation is seen.  Pulmonic Valve: The pulmonic valve is thickened with good excursion. No   indication of pulmonic valve regurgitation.  Aorta: The aortic root is normal in size and structure.  Venous: The inferior vena cava was normal sized, with respiratory size   variation greater than 50%.      2D AND M-MODE MEASUREMENTS (normal ranges within parentheses):  Left Ventricle:                  Normal   Aorta/Left Atrium:            Normal  IVSd (2D):   1.20 cm (0.7-1.1) Aortic Root (2D):  3.40 cm   (2.4-3.7)  LVPWd (2D):             1.20 cm (0.7-1.1) Left Atrium (2D):  4.10 cm   (1.9-4.0)  LVIDd (2D):             5.10 cm (3.4-5.7)  LVIDs (2D):             3.40 cm  LV FS (2D):             33.3 % (>25%)  Relative Wall Thickness  0.47    (<0.42)    SPECTRAL DOPPLER ANALYSIS:  LV DIASTOLIC FUNCTION:  MV Peak E: 0.52 m/s Decel Time: 293 msec  MV Peak A: 0.79 m/s  E/A Ratio: 0.66    Aortic Valve:  AoV VMax:    1.46 m/s AoV Area, Vmax:    3.38 cm² Vmax Indx:    1.58   cm²/m²  AoV VTI:     0.29 m   AoV Area, VTI:     2.94 cm² VTI Indx:     1.37   cm²/m²  AoV Pk Grad: 8.5 mmHg AoV Area, Mn Grad: 2.88 cm² Mn Grad Indx: 1.34   cm²/m²  AoV Mn Grad: 5.0 mmHg    LVOT Vmax: 1.30 m/s  LVOT VTI:  0.22 m  LVOT Diam: 2.20 cm    Mitral Valve:  MV P1/2 Time: 84.97 msec  MV Area, PHT: 2.59 cm²    Tricuspid Valve and PA/RV Systolic Pressure: TR Max Velocity: 2.17 m/s RA   Pressure:  RVSP/PASP:      [id number] Kahlil Carter MD, Electronically signed on 9/6/2023 at   2:25:20 PM            *** Final ***    RADIOLOGY    ACC: [id number] EXAM:  XR FOOT COMP MIN 3 VIEWS RT   ORDERED BY: HAILY WISEMAN     PROCEDURE DATE:  10/22/2023          INTERPRETATION:  Clinical History / Reason for exam: Foot pain.    3 views of the right foot. Comparison is made with a study dated 3 days   prior.    Findings/  impression:    Postsurgical change of the calcaneus. Midfoot arthrosis with erosion and   destruction. Osteopenia. Soft tissue swelling. The extremity is in a cast.    --- End of Report ---            JOHANNY CHATMAN MD; Attending Interventional Radiologist  This document has been electronically signed. Oct 23 2023 10:05AM    ACC: [id number] EXAM:  DUPLEX LOW ARTERIES COMP BILAT   ORDERED BY:   KYRA MULLINS     PROCEDURE DATE:  10/18/2023          INTERPRETATION:  Clinical History / Reason for exam: This patient is a   58-year-old male with lower extremity pain. Lowerextremity arterial   duplex ultrasound was performed to assess for arterial occlusive disease.    The right external iliac, common femoral, deep femoral, superficial   femoral, popliteal, and anterior tibial arteries appear patent. Low flow   is visualized within the right posterior tibial artery.    Mild arterial stenosis of the left common femoral artery with velocities   of 212 cm/s poststenotic turbulence. Mid superficial femoral artery   stenosis with velocities of 307 cm/s. The left popliteal, anterior   tibial, posterior tibial and peroneal arteries appear patent with no   evidence arterial occlusive disease.    Impression:    Possible right posterior tibial artery occlusion.    Mild left common femoral artery stenosis. Moderate superficial femoral   artery stenosis.    ICD-10:I73.89    --- End of Report ---          ANTONETTE MACDONALD MD; Vascular Fellow  This document has been electronically signed.  VANESSA YA MD; Attending Vascular Surgery  This document has been electronically signed. Oct 19 2023  7:05AM

## 2023-10-28 LAB
ANION GAP SERPL CALC-SCNC: 14 MMOL/L — SIGNIFICANT CHANGE UP (ref 7–14)
ANION GAP SERPL CALC-SCNC: 14 MMOL/L — SIGNIFICANT CHANGE UP (ref 7–14)
BUN SERPL-MCNC: 42 MG/DL — HIGH (ref 10–20)
BUN SERPL-MCNC: 42 MG/DL — HIGH (ref 10–20)
CALCIUM SERPL-MCNC: 8.7 MG/DL — SIGNIFICANT CHANGE UP (ref 8.4–10.5)
CALCIUM SERPL-MCNC: 8.7 MG/DL — SIGNIFICANT CHANGE UP (ref 8.4–10.5)
CHLORIDE SERPL-SCNC: 101 MMOL/L — SIGNIFICANT CHANGE UP (ref 98–110)
CHLORIDE SERPL-SCNC: 101 MMOL/L — SIGNIFICANT CHANGE UP (ref 98–110)
CO2 SERPL-SCNC: 23 MMOL/L — SIGNIFICANT CHANGE UP (ref 17–32)
CO2 SERPL-SCNC: 23 MMOL/L — SIGNIFICANT CHANGE UP (ref 17–32)
CREAT SERPL-MCNC: 2.7 MG/DL — HIGH (ref 0.7–1.5)
CREAT SERPL-MCNC: 2.7 MG/DL — HIGH (ref 0.7–1.5)
CULTURE RESULTS: SIGNIFICANT CHANGE UP
EGFR: 26 ML/MIN/1.73M2 — LOW
EGFR: 26 ML/MIN/1.73M2 — LOW
GLUCOSE BLDC GLUCOMTR-MCNC: 137 MG/DL — HIGH (ref 70–99)
GLUCOSE BLDC GLUCOMTR-MCNC: 137 MG/DL — HIGH (ref 70–99)
GLUCOSE BLDC GLUCOMTR-MCNC: 304 MG/DL — HIGH (ref 70–99)
GLUCOSE BLDC GLUCOMTR-MCNC: 304 MG/DL — HIGH (ref 70–99)
GLUCOSE BLDC GLUCOMTR-MCNC: 308 MG/DL — HIGH (ref 70–99)
GLUCOSE BLDC GLUCOMTR-MCNC: 308 MG/DL — HIGH (ref 70–99)
GLUCOSE BLDC GLUCOMTR-MCNC: 320 MG/DL — HIGH (ref 70–99)
GLUCOSE BLDC GLUCOMTR-MCNC: 320 MG/DL — HIGH (ref 70–99)
GLUCOSE BLDC GLUCOMTR-MCNC: 331 MG/DL — HIGH (ref 70–99)
GLUCOSE BLDC GLUCOMTR-MCNC: 331 MG/DL — HIGH (ref 70–99)
GLUCOSE BLDC GLUCOMTR-MCNC: 98 MG/DL — SIGNIFICANT CHANGE UP (ref 70–99)
GLUCOSE BLDC GLUCOMTR-MCNC: 98 MG/DL — SIGNIFICANT CHANGE UP (ref 70–99)
GLUCOSE SERPL-MCNC: 332 MG/DL — HIGH (ref 70–99)
GLUCOSE SERPL-MCNC: 332 MG/DL — HIGH (ref 70–99)
HCT VFR BLD CALC: 24.2 % — LOW (ref 42–52)
HCT VFR BLD CALC: 24.2 % — LOW (ref 42–52)
HGB BLD-MCNC: 7.9 G/DL — LOW (ref 14–18)
HGB BLD-MCNC: 7.9 G/DL — LOW (ref 14–18)
MAGNESIUM SERPL-MCNC: 1.7 MG/DL — LOW (ref 1.8–2.4)
MAGNESIUM SERPL-MCNC: 1.7 MG/DL — LOW (ref 1.8–2.4)
MCHC RBC-ENTMCNC: 27.4 PG — SIGNIFICANT CHANGE UP (ref 27–31)
MCHC RBC-ENTMCNC: 27.4 PG — SIGNIFICANT CHANGE UP (ref 27–31)
MCHC RBC-ENTMCNC: 32.6 G/DL — SIGNIFICANT CHANGE UP (ref 32–37)
MCHC RBC-ENTMCNC: 32.6 G/DL — SIGNIFICANT CHANGE UP (ref 32–37)
MCV RBC AUTO: 84 FL — SIGNIFICANT CHANGE UP (ref 80–94)
MCV RBC AUTO: 84 FL — SIGNIFICANT CHANGE UP (ref 80–94)
NRBC # BLD: 0 /100 WBCS — SIGNIFICANT CHANGE UP (ref 0–0)
NRBC # BLD: 0 /100 WBCS — SIGNIFICANT CHANGE UP (ref 0–0)
PLATELET # BLD AUTO: 496 K/UL — HIGH (ref 130–400)
PLATELET # BLD AUTO: 496 K/UL — HIGH (ref 130–400)
PMV BLD: 9 FL — SIGNIFICANT CHANGE UP (ref 7.4–10.4)
PMV BLD: 9 FL — SIGNIFICANT CHANGE UP (ref 7.4–10.4)
POTASSIUM SERPL-MCNC: 4.8 MMOL/L — SIGNIFICANT CHANGE UP (ref 3.5–5)
POTASSIUM SERPL-MCNC: 4.8 MMOL/L — SIGNIFICANT CHANGE UP (ref 3.5–5)
POTASSIUM SERPL-SCNC: 4.8 MMOL/L — SIGNIFICANT CHANGE UP (ref 3.5–5)
POTASSIUM SERPL-SCNC: 4.8 MMOL/L — SIGNIFICANT CHANGE UP (ref 3.5–5)
RBC # BLD: 2.88 M/UL — LOW (ref 4.7–6.1)
RBC # BLD: 2.88 M/UL — LOW (ref 4.7–6.1)
RBC # FLD: 14.7 % — HIGH (ref 11.5–14.5)
RBC # FLD: 14.7 % — HIGH (ref 11.5–14.5)
SODIUM SERPL-SCNC: 138 MMOL/L — SIGNIFICANT CHANGE UP (ref 135–146)
SODIUM SERPL-SCNC: 138 MMOL/L — SIGNIFICANT CHANGE UP (ref 135–146)
SPECIMEN SOURCE: SIGNIFICANT CHANGE UP
WBC # BLD: 6.41 K/UL — SIGNIFICANT CHANGE UP (ref 4.8–10.8)
WBC # BLD: 6.41 K/UL — SIGNIFICANT CHANGE UP (ref 4.8–10.8)
WBC # FLD AUTO: 6.41 K/UL — SIGNIFICANT CHANGE UP (ref 4.8–10.8)
WBC # FLD AUTO: 6.41 K/UL — SIGNIFICANT CHANGE UP (ref 4.8–10.8)

## 2023-10-28 RX ORDER — MAGNESIUM OXIDE 400 MG ORAL TABLET 241.3 MG
400 TABLET ORAL
Refills: 0 | Status: DISCONTINUED | OUTPATIENT
Start: 2023-10-28 | End: 2023-10-30

## 2023-10-28 RX ORDER — INSULIN GLARGINE 100 [IU]/ML
25 INJECTION, SOLUTION SUBCUTANEOUS ONCE
Refills: 0 | Status: COMPLETED | OUTPATIENT
Start: 2023-10-28 | End: 2023-10-28

## 2023-10-28 RX ORDER — INSULIN LISPRO 100/ML
VIAL (ML) SUBCUTANEOUS
Refills: 0 | Status: DISCONTINUED | OUTPATIENT
Start: 2023-10-28 | End: 2023-10-30

## 2023-10-28 RX ORDER — ALPRAZOLAM 0.25 MG
0.5 TABLET ORAL ONCE
Refills: 0 | Status: DISCONTINUED | OUTPATIENT
Start: 2023-10-28 | End: 2023-10-28

## 2023-10-28 RX ORDER — HYDROMORPHONE HYDROCHLORIDE 2 MG/ML
1 INJECTION INTRAMUSCULAR; INTRAVENOUS; SUBCUTANEOUS EVERY 6 HOURS
Refills: 0 | Status: DISCONTINUED | OUTPATIENT
Start: 2023-10-28 | End: 2023-10-28

## 2023-10-28 RX ORDER — HYDROMORPHONE HYDROCHLORIDE 2 MG/ML
1 INJECTION INTRAMUSCULAR; INTRAVENOUS; SUBCUTANEOUS EVERY 8 HOURS
Refills: 0 | Status: DISCONTINUED | OUTPATIENT
Start: 2023-10-28 | End: 2023-10-30

## 2023-10-28 RX ORDER — INSULIN GLARGINE 100 [IU]/ML
25 INJECTION, SOLUTION SUBCUTANEOUS EVERY MORNING
Refills: 0 | Status: DISCONTINUED | OUTPATIENT
Start: 2023-10-29 | End: 2023-10-30

## 2023-10-28 RX ADMIN — Medication 166.67 MILLIGRAM(S): at 07:45

## 2023-10-28 RX ADMIN — CEFEPIME 100 MILLIGRAM(S): 1 INJECTION, POWDER, FOR SOLUTION INTRAMUSCULAR; INTRAVENOUS at 17:11

## 2023-10-28 RX ADMIN — Medication 650 MILLIGRAM(S): at 13:22

## 2023-10-28 RX ADMIN — HEPARIN SODIUM 5000 UNIT(S): 5000 INJECTION INTRAVENOUS; SUBCUTANEOUS at 05:10

## 2023-10-28 RX ADMIN — Medication 0.5 MILLIGRAM(S): at 18:39

## 2023-10-28 RX ADMIN — LOSARTAN POTASSIUM 100 MILLIGRAM(S): 100 TABLET, FILM COATED ORAL at 05:12

## 2023-10-28 RX ADMIN — SODIUM CHLORIDE 50 MILLILITER(S): 9 INJECTION INTRAMUSCULAR; INTRAVENOUS; SUBCUTANEOUS at 10:15

## 2023-10-28 RX ADMIN — CEFEPIME 100 MILLIGRAM(S): 1 INJECTION, POWDER, FOR SOLUTION INTRAMUSCULAR; INTRAVENOUS at 05:09

## 2023-10-28 RX ADMIN — MAGNESIUM OXIDE 400 MG ORAL TABLET 400 MILLIGRAM(S): 241.3 TABLET ORAL at 13:23

## 2023-10-28 RX ADMIN — Medication 90 MILLIGRAM(S): at 05:13

## 2023-10-28 RX ADMIN — Medication 0.5 MILLIGRAM(S): at 01:50

## 2023-10-28 RX ADMIN — Medication 8: at 12:12

## 2023-10-28 RX ADMIN — Medication 0.5 MILLIGRAM(S): at 05:51

## 2023-10-28 RX ADMIN — Medication 8 UNIT(S): at 08:01

## 2023-10-28 RX ADMIN — Medication 0.5 MILLIGRAM(S): at 14:21

## 2023-10-28 RX ADMIN — Medication 650 MILLIGRAM(S): at 23:00

## 2023-10-28 RX ADMIN — INSULIN GLARGINE 25 UNIT(S): 100 INJECTION, SOLUTION SUBCUTANEOUS at 10:05

## 2023-10-28 RX ADMIN — HEPARIN SODIUM 5000 UNIT(S): 5000 INJECTION INTRAVENOUS; SUBCUTANEOUS at 13:23

## 2023-10-28 RX ADMIN — Medication 650 MILLIGRAM(S): at 05:13

## 2023-10-28 RX ADMIN — Medication 81 MILLIGRAM(S): at 12:09

## 2023-10-28 RX ADMIN — Medication 8 UNIT(S): at 17:12

## 2023-10-28 RX ADMIN — Medication 0.5 MILLIGRAM(S): at 10:05

## 2023-10-28 RX ADMIN — Medication 8 UNIT(S): at 12:12

## 2023-10-28 RX ADMIN — MAGNESIUM OXIDE 400 MG ORAL TABLET 400 MILLIGRAM(S): 241.3 TABLET ORAL at 18:00

## 2023-10-28 RX ADMIN — HEPARIN SODIUM 5000 UNIT(S): 5000 INJECTION INTRAVENOUS; SUBCUTANEOUS at 22:59

## 2023-10-28 RX ADMIN — CLOPIDOGREL BISULFATE 75 MILLIGRAM(S): 75 TABLET, FILM COATED ORAL at 12:09

## 2023-10-28 NOTE — PROGRESS NOTE ADULT - ASSESSMENT
58-year-old male with Charcot foot s/p placement of an external fixator on 9/28/23 which had to be removed on 10/19 due to infection, CKD (baseline Cr 2.5-3.5, on list for kidney transplant), and IDDM (A1c 9.9), PAD, s/p  peripheral angiogram on 10/23/2023 with balloon angioplasty of PTA and DCB of R SFA and PTA of R PT and R AT via R groin and R pedal access, anemia recently received 2u pRBCS, pt was dc from hospital on 10/24/23 and now returns to hospital as directed by his podiatrist Dr Benson for TMA which will be done on 10/27/23 as pt has non healing wound and gangrene of the right foot.       #gangrene of the right foot.     - post operative amputation  - reviewed labs   - continue monitoring     #PAD, s/p angioplasty  -c/w asa and plavix    #anemia  -hgb dropped post op bleeding   -monitor    #HTN  -low sodium diet  -monitor bp  -cont meds from home nifedipine and cozaar    #DM  -ada diet  -monitor BS  -cont meds from home basal 20u daily  and lispro 8 qac  -insulin coverage prn eleavted blood sugar        #CKD  -creatinine stable at 2.9 today    #anxiety disorder  -xanax prn    #DVT prophylaxis  -sq heparin and scd    ADVANCE CARE: CPR    DISPOSITION: ACUTE

## 2023-10-28 NOTE — CHART NOTE - NSCHARTNOTEFT_GEN_A_CORE
Ellen San Patient Age: 78 year old  MESSAGE: Interpreting service used: No      Obstetrics & Gynecology- Reason for call: Symptom- Yellow symptoms-   Gyne- UTI/Vaginal symptoms    NPT is having yeast infection symptoms.         Caller connected to triage- Yes- Connected call to Call Center Triage Queue.  Routed to provider's clinical pool.       ALLERGIES:  Patient has no allergy information on record.  No current outpatient medications on file.     No current facility-administered medications for this visit.      PHARMACY to use:       [facility]  Pharmacy preference(s) on file:   CALL BACK INFO: Ok to leave response (including medical information) with family member or on answering machine      PCP: Verify Pcp         INS: Payor: [employer] [place] / Plan: [employer] / Product Type: COMM   PATIENT ADDRESS:  [id number] [place] [address]     Patient seen and examined throughout the course of the day.    Pain is controlled  Insulin regimen reviewed with pt    Vital Signs Last 24 Hrs  T(C): 36.1 (28 Oct 2023 04:30), Max: 37.1 (27 Oct 2023 21:13)  T(F): 97 (28 Oct 2023 04:30), Max: 98.7 (27 Oct 2023 21:13)  HR: 68 (28 Oct 2023 04:30) (67 - 98)  BP: 169/87 (28 Oct 2023 04:30) (130/71 - 173/74)  BP(mean): --  RR: 18 (28 Oct 2023 04:30) (16 - 20)  SpO2: 100% (28 Oct 2023 04:30) (96% - 100%)    Parameters below as of 28 Oct 2023 04:30  Patient On (Oxygen Delivery Method): room air        Results of Labs/Imaging discussed as well as patient's plan.    Mg 1.7  Hgb 7.9    Mag oxide ordered Q8h  cont to monitor H/H as pt is hemodynamically stable    All patient's/family questions answered.  Patient and family encouraged to contact PA with any further issues.

## 2023-10-28 NOTE — CHART NOTE - NSCHARTNOTEFT_GEN_A_CORE
Pt with c/o severe "bone pain". Pt takes oxycodone 10mg Q4h standing    Will order Dilaudid 1mg Q8H PRN severe pain  D/W Dr Conroy

## 2023-10-28 NOTE — PROGRESS NOTE ADULT - SUBJECTIVE AND OBJECTIVE BOX
58-year-old male with Charcot foot s/p placement of an external fixator on 9/28/23 which had to be removed on 10/19 due to infection, CKD (baseline Cr 2.5-3.5, on list for kidney transplant), and IDDM (A1c 9.9), PAD, s/p  peripheral angiogram on 10/23/2023 with balloon angioplasty of PTA and DCB of R SFA and PTA of R PT and R AT via R groin and R pedal access, anemia recently received 2u pRBCS, pt was dc from hospital on 10/24/23.    Interval: s/p surgery      PAST MEDICAL & SURGICAL HISTORY:    DM (diabetes mellitus)  HTN (hypertension)  HLD (hyperlipidemia)  Herpes labialis  Anxiety  GERD (gastroesophageal reflux disease)  Kidney stones  20 years ago  Kidney disease  stage 4  Chronic kidney disease, unspecified CKD stage  Diabetic Charcot foot  PAD (peripheral artery disease)  S/P foot surgery, right  x 5 ( 2006 - 2013 )  Kidney stone  Removed by Laser x 2 ( 20 years ago )  H/O arthroscopy of right knee  Status post peripheral artery angioplasty    Social History:    reviewed     MEDICATIONS  (STANDING):  aspirin  chewable 81 milliGRAM(s) Oral daily  atorvastatin 80 milliGRAM(s) Oral at bedtime  cefepime   IVPB 1000 milliGRAM(s) IV Intermittent every 12 hours  clopidogrel Tablet 75 milliGRAM(s) Oral daily  dextrose 5%. 1000 milliLiter(s) (50 mL/Hr) IV Continuous <Continuous>  dextrose 5%. 1000 milliLiter(s) (100 mL/Hr) IV Continuous <Continuous>  dextrose 50% Injectable 12.5 Gram(s) IV Push once  dextrose 50% Injectable 25 Gram(s) IV Push once  dextrose 50% Injectable 25 Gram(s) IV Push once  glucagon  Injectable 1 milliGRAM(s) IntraMuscular once  heparin   Injectable 5000 Unit(s) SubCutaneous every 8 hours  insulin lispro Injectable (ADMELOG) 8 Unit(s) SubCutaneous before breakfast  insulin lispro Injectable (ADMELOG) 8 Unit(s) SubCutaneous before dinner  insulin lispro Injectable (ADMELOG) 8 Unit(s) SubCutaneous before lunch  losartan 100 milliGRAM(s) Oral daily  magnesium oxide 400 milliGRAM(s) Oral Once  NIFEdipine XL 90 milliGRAM(s) Oral daily  sodium bicarbonate 650 milliGRAM(s) Oral three times a day  sodium chloride 0.9%. 1000 milliLiter(s) (50 mL/Hr) IV Continuous <Continuous>  vancomycin  IVPB 1250 milliGRAM(s) IV Intermittent every 24 hours    MEDICATIONS  (PRN):  acetaminophen     Tablet .. 650 milliGRAM(s) Oral every 6 hours PRN Temp greater or equal to 38C (100.4F), Mild Pain (1 - 3)  ALPRAZolam 0.5 milliGRAM(s) Oral four times a day PRN anxiety  dextrose Oral Gel 15 Gram(s) Oral once PRN Blood Glucose LESS THAN 70 milliGRAM(s)/deciliter  melatonin 3 milliGRAM(s) Oral at bedtime PRN Insomnia  ondansetron Injectable 4 milliGRAM(s) IV Push every 8 hours PRN Nausea and/or Vomiting  oxycodone    5 mG/acetaminophen 325 mG 2 Tablet(s) Oral every 4 hours PRN Severe Pain (7 - 10)      Allergies    No Known Allergies    Intolerances    Vital Signs Last 24 Hrs  T(C): 36.1 (28 Oct 2023 04:30), Max: 37.1 (27 Oct 2023 21:13)  T(F): 97 (28 Oct 2023 04:30), Max: 98.7 (27 Oct 2023 21:13)  HR: 68 (28 Oct 2023 04:30) (67 - 98)  BP: 169/87 (28 Oct 2023 04:30) (130/71 - 173/74)  BP(mean): --  RR: 18 (28 Oct 2023 04:30) (16 - 20)  SpO2: 100% (28 Oct 2023 04:30) (96% - 100%)    Parameters below as of 28 Oct 2023 04:30  Patient On (Oxygen Delivery Method): room air    CAPILLARY BLOOD GLUCOSE      POCT Blood Glucose.: 331 mg/dL (28 Oct 2023 04:47)  POCT Blood Glucose.: 304 mg/dL (28 Oct 2023 00:15)  POCT Blood Glucose.: 177 mg/dL (27 Oct 2023 20:58)  POCT Blood Glucose.: 99 mg/dL (27 Oct 2023 11:26)  POCT Blood Glucose.: 99 mg/dL (27 Oct 2023 10:04)  POCT Blood Glucose.: 92 mg/dL (27 Oct 2023 07:47)    Diet, Regular:   Consistent Carbohydrate Evening Snack  DASH/TLC Sodium & Cholesterol Restricted (10-27-23 @ 15:22) [Active]      PHYSICAL EXAM  Constitutional: A&Ox4  Respiratory: cta b/l  Cardiovascular: s1 s2 rrr  Gastrointestinal: soft nt  nd + bs no rebound or guarding  Genitourinary: no cva tenderness  Extremities: normal rom, no edema, calf tenderness  Neurological:no focal deficits  Skin: +Right foot post surgical   Vascular, no cyanosis    LABS:                           8.1    8.36  )-----------( 457      ( 27 Oct 2023 21:32 )             24.2                          9.1    10.73 )-----------( 497      ( 26 Oct 2023 16:15 )             27.6     10-26    138  |  99  |  50<H>  ----------------------------<  230<H>  4.5   |  25  |  2.9<H>    Ca    9.4      26 Oct 2023 16:15  Mg     1.8     10-27    TPro  6.8  /  Alb  3.3<L>  /  TBili  <0.2  /  DBili  x   /  AST  16  /  ALT  16  /  AlkPhos  168<H>  10-26      10-26    138  |  99  |  50<H>  ----------------------------<  230<H>  4.5   |  25  |  2.9<H>    Ca    9.4      26 Oct 2023 16:15  Mg     1.6     10-26    TPro  6.8  /  Alb  3.3<L>  /  TBili  <0.2  /  DBili  x   /  AST  16  /  ALT  16  /  AlkPhos  168<H>  10-26    PT/INR - ( 26 Oct 2023 16:15 )   PT: 13.70 sec;   INR: 1.20 ratio         PTT - ( 26 Oct 2023 16:15 )  PTT:35.0 sec    CARD:    Ventricular Rate 73 BPM    Atrial Rate 73 BPM    P-R Interval 184 ms    QRS Duration 88 ms    Q-T Interval 404 ms    QTC Calculation(Bazett) 445 ms    P Axis 41 degrees    R Axis 71 degrees    T Axis 62 degrees    Diagnosis Line Normal sinus rhythm  Normal ECG    Confirmed by Adam Morrissey ([id number]) on 10/26/2023 5:26:47 PM      ACC: [id number] EXAM:  ECHO TTE WO CON COMP W DOPP                          PROCEDURE DATE:  09/06/2023          INTERPRETATION:   [place] [address]                Phone: [phone].   TRANSTHORACIC ECHOCARDIOGRAM REPORT        Patient Name:   MARIOLA CURRY Accession #: [id number]  Medical Rec #:  [id number]     Height:      72.0 in 182.9 cm  YOB: 1965    Weight:      204.0 lb 92.53 kg  Patient Age:    58 years     BSA:         2.15 m²  Patient Gender: M            BP:          131/75 mmHg      Date of Exam:        9/6/2023 8:42:12 AM  Referring Physician: Supreeti Behuria  Sonographer:         Milagro Kay  Reading Physician:Kahlil Carter MD.    Procedure:   2D Echo/Doppler/Color Doppler Complete.  Indications: R01.1 - Cardiac murmur, unspecified  Diagnosis:   R01.1 - Cardiac murmur, unspecified        Summary:   1. Normal global left ventricular systolic function.   2.LV Ejection Fraction by Knutson's Method with a biplane EF of 55 %.   3. Mildly increased LV wall thickness.   4. Normal left ventricular internal cavity size.   5. Spectral Doppler shows impaired relaxation pattern of left   ventricular myocardial filling (Grade I diastolic dysfunction).   6. Normal left atrial size.   7. Normal right atrial size.   8. Mild thickening of the anterior mitral valve leaflet.   9. No evidence of mitral valve regurgitation.    PHYSICIAN INTERPRETATION:  Left Ventricle: The left ventricular internal cavity size is normal. Left   ventricular wall thickness is mildly increased. Global LV systolic   function was normal. Spectral Doppler shows impaired relaxation pattern   of left ventricular myocardial filling (GradeI diastolic dysfunction).  Right Ventricle: The right ventricular size is normal. RV systolic   function is normal.  Left Atrium: Normal left atrial size.  Right Atrium: Normal right atrial size.  Mitral Valve: Mild thickening of the anterior mitral valve leaflet. No   evidence of mitral valve regurgitation is seen.  Tricuspid Valve: The tricuspid valve is normal in structure. No tricuspid   regurgitation is visualized.  Aortic Valve: The aortic valve is trileaflet. No evidence of aortic   stenosis. No evidence of aortic valve regurgitation is seen.  Pulmonic Valve: The pulmonic valve is thickened with good excursion. No   indication of pulmonic valve regurgitation.  Aorta: The aortic root is normal in size and structure.  Venous: The inferior vena cava was normal sized, with respiratory size   variation greater than 50%.      2D AND M-MODE MEASUREMENTS (normal ranges within parentheses):  Left Ventricle:                  Normal   Aorta/Left Atrium:            Normal  IVSd (2D):   1.20 cm (0.7-1.1) Aortic Root (2D):  3.40 cm   (2.4-3.7)  LVPWd (2D):             1.20 cm (0.7-1.1) Left Atrium (2D):  4.10 cm   (1.9-4.0)  LVIDd (2D):             5.10 cm (3.4-5.7)  LVIDs (2D):             3.40 cm  LV FS (2D):             33.3 % (>25%)  Relative Wall Thickness  0.47    (<0.42)    SPECTRAL DOPPLER ANALYSIS:  LV DIASTOLIC FUNCTION:  MV Peak E: 0.52 m/s Decel Time: 293 msec  MV Peak A: 0.79 m/s  E/A Ratio: 0.66    Aortic Valve:  AoV VMax:    1.46 m/s AoV Area, Vmax:    3.38 cm² Vmax Indx:    1.58   cm²/m²  AoV VTI:     0.29 m   AoV Area, VTI:     2.94 cm² VTI Indx:     1.37   cm²/m²  AoV Pk Grad: 8.5 mmHg AoV Area, Mn Grad: 2.88 cm² Mn Grad Indx: 1.34   cm²/m²  AoV Mn Grad: 5.0 mmHg    LVOT Vmax: 1.30 m/s  LVOT VTI:  0.22 m  LVOT Diam: 2.20 cm    Mitral Valve:  MV P1/2 Time: 84.97 msec  MV Area, PHT: 2.59 cm²    Tricuspid Valve and PA/RV Systolic Pressure: TR Max Velocity: 2.17 m/s RA   Pressure:  RVSP/PASP:      [id number] Kahlil Carter MD, Electronically signed on 9/6/2023 at   2:25:20 PM            *** Final ***    RADIOLOGY    ACC: [id number] EXAM:  XR FOOT COMP MIN 3 VIEWS RT   ORDERED BY: HAILY WISEMAN     PROCEDURE DATE:  10/22/2023          INTERPRETATION:  Clinical History / Reason for exam: Foot pain.    3 views of the right foot. Comparison is made with a study dated 3 days   prior.    Findings/  impression:    Postsurgical change of the calcaneus. Midfoot arthrosis with erosion and   destruction. Osteopenia. Soft tissue swelling. The extremity is in a cast.    --- End of Report ---            JOHANNY CHATMAN MD; Attending Interventional Radiologist  This document has been electronically signed. Oct 23 2023 10:05AM    ACC: [id number] EXAM:  DUPLEX LOW ARTERIES COMP BILAT   ORDERED BY:   KYRA MULLINS     PROCEDURE DATE:  10/18/2023          INTERPRETATION:  Clinical History / Reason for exam: This patient is a   58-year-old male with lower extremity pain. Lowerextremity arterial   duplex ultrasound was performed to assess for arterial occlusive disease.    The right external iliac, common femoral, deep femoral, superficial   femoral, popliteal, and anterior tibial arteries appear patent. Low flow   is visualized within the right posterior tibial artery.    Mild arterial stenosis of the left common femoral artery with velocities   of 212 cm/s poststenotic turbulence. Mid superficial femoral artery   stenosis with velocities of 307 cm/s. The left popliteal, anterior   tibial, posterior tibial and peroneal arteries appear patent with no   evidence arterial occlusive disease.    Impression:    Possible right posterior tibial artery occlusion.    Mild left common femoral artery stenosis. Moderate superficial femoral   artery stenosis.    ICD-10:I73.89    --- End of Report ---          ANTONETTE MACDONALD MD; Vascular Fellow  This document has been electronically signed.  VANSESA YA MD; Attending Vascular Surgery  This document has been electronically signed. Oct 19 2023  7:05AM

## 2023-10-28 NOTE — PROGRESS NOTE ADULT - SUBJECTIVE AND OBJECTIVE BOX
PROGRESS NOTE   Patient is a 58y old  Male who presents with a chief complaint of     HPI:   Patient  is a 58yoM with Charcot foot s/p placement of an external fixator on 9/28/23 which had to be removed on 10/19 due to infection, CKD (baseline Cr 2.5-3.5, on list for kidney transplant), and IDDM (A1c 9.9), PAD, s/p  peripheral angiogram on 10/23/2023 with balloon angioplasty of PTA and DCB of R SFA and PTA of R PT and R AT via R groin and R pedal access, anemia recently received 2u pRBCS, pt was dc from hospital on 10/24/23 and now returns to hospital as directed by his podiatrist Dr Benson for TMA which will be done on 10/27/23 as pt has non healing wound and gangrene of the right foot. Patient complains of moderate to sever pain of foot which is partially relieved with percocet and associated wound discharge.  Pt denies fevers or chills.  (26 Oct 2023 18:37)      Vital Signs Last 24 Hrs  T(C): 36.1 (28 Oct 2023 04:30), Max: 37.1 (27 Oct 2023 21:13)  T(F): 97 (28 Oct 2023 04:30), Max: 98.7 (27 Oct 2023 21:13)  HR: 68 (28 Oct 2023 04:30) (67 - 98)  BP: 169/87 (28 Oct 2023 04:30) (130/71 - 173/74)  BP(mean): --  RR: 18 (28 Oct 2023 04:30) (16 - 20)  SpO2: 100% (28 Oct 2023 04:30) (96% - 100%)    Parameters below as of 28 Oct 2023 04:30  Patient On (Oxygen Delivery Method): room air                              7.9    6.41  )-----------( 496      ( 28 Oct 2023 07:11 )             24.2               10-28    138  |  101  |  42<H>  ----------------------------<  332<H>  4.8   |  23  |  2.7<H>    Ca    8.7      28 Oct 2023 07:11  Mg     1.7     10-28    TPro  6.8  /  Alb  3.3<L>  /  TBili  <0.2  /  DBili  x   /  AST  16  /  ALT  16  /  AlkPhos  168<H>  10-26      PHYSICAL EXAM  GEN: MARIOLA CURRY is a pleasant well-nourished, well developed 58y Male in no acute distress, alert awake, and oriented to person, place and time.   LE Focused:  S/P TMA right foot POD#1. Pt seen at bedside resting comfortably with no complaints of pain. Bandage was clean dry and intact. Wound is stable with no new necrosis noted. Edema of the extremity is reduced. No malodor noted. There is surgicel on the wound and several cauterized vessels.     Sterile redress performed. Observe over the next 24 to 48 hours. Will require revisional surgery.

## 2023-10-29 DIAGNOSIS — Z89.439 ACQUIRED ABSENCE OF UNSPECIFIED FOOT: ICD-10-CM

## 2023-10-29 LAB
ANION GAP SERPL CALC-SCNC: 12 MMOL/L — SIGNIFICANT CHANGE UP (ref 7–14)
ANION GAP SERPL CALC-SCNC: 12 MMOL/L — SIGNIFICANT CHANGE UP (ref 7–14)
BUN SERPL-MCNC: 41 MG/DL — HIGH (ref 10–20)
BUN SERPL-MCNC: 41 MG/DL — HIGH (ref 10–20)
CALCIUM SERPL-MCNC: 8.7 MG/DL — SIGNIFICANT CHANGE UP (ref 8.4–10.5)
CALCIUM SERPL-MCNC: 8.7 MG/DL — SIGNIFICANT CHANGE UP (ref 8.4–10.5)
CHLORIDE SERPL-SCNC: 102 MMOL/L — SIGNIFICANT CHANGE UP (ref 98–110)
CHLORIDE SERPL-SCNC: 102 MMOL/L — SIGNIFICANT CHANGE UP (ref 98–110)
CO2 SERPL-SCNC: 24 MMOL/L — SIGNIFICANT CHANGE UP (ref 17–32)
CO2 SERPL-SCNC: 24 MMOL/L — SIGNIFICANT CHANGE UP (ref 17–32)
CREAT SERPL-MCNC: 2.9 MG/DL — HIGH (ref 0.7–1.5)
CREAT SERPL-MCNC: 2.9 MG/DL — HIGH (ref 0.7–1.5)
EGFR: 24 ML/MIN/1.73M2 — LOW
EGFR: 24 ML/MIN/1.73M2 — LOW
GLUCOSE BLDC GLUCOMTR-MCNC: 144 MG/DL — HIGH (ref 70–99)
GLUCOSE BLDC GLUCOMTR-MCNC: 144 MG/DL — HIGH (ref 70–99)
GLUCOSE BLDC GLUCOMTR-MCNC: 163 MG/DL — HIGH (ref 70–99)
GLUCOSE BLDC GLUCOMTR-MCNC: 163 MG/DL — HIGH (ref 70–99)
GLUCOSE BLDC GLUCOMTR-MCNC: 189 MG/DL — HIGH (ref 70–99)
GLUCOSE BLDC GLUCOMTR-MCNC: 189 MG/DL — HIGH (ref 70–99)
GLUCOSE BLDC GLUCOMTR-MCNC: 41 MG/DL — CRITICAL LOW (ref 70–99)
GLUCOSE BLDC GLUCOMTR-MCNC: 41 MG/DL — CRITICAL LOW (ref 70–99)
GLUCOSE BLDC GLUCOMTR-MCNC: 74 MG/DL — SIGNIFICANT CHANGE UP (ref 70–99)
GLUCOSE BLDC GLUCOMTR-MCNC: 74 MG/DL — SIGNIFICANT CHANGE UP (ref 70–99)
GLUCOSE BLDC GLUCOMTR-MCNC: 93 MG/DL — SIGNIFICANT CHANGE UP (ref 70–99)
GLUCOSE BLDC GLUCOMTR-MCNC: 93 MG/DL — SIGNIFICANT CHANGE UP (ref 70–99)
GLUCOSE SERPL-MCNC: 169 MG/DL — HIGH (ref 70–99)
GLUCOSE SERPL-MCNC: 169 MG/DL — HIGH (ref 70–99)
HCT VFR BLD CALC: 24.7 % — LOW (ref 42–52)
HCT VFR BLD CALC: 24.7 % — LOW (ref 42–52)
HGB BLD-MCNC: 7.9 G/DL — LOW (ref 14–18)
HGB BLD-MCNC: 7.9 G/DL — LOW (ref 14–18)
MAGNESIUM SERPL-MCNC: 1.8 MG/DL — SIGNIFICANT CHANGE UP (ref 1.8–2.4)
MAGNESIUM SERPL-MCNC: 1.8 MG/DL — SIGNIFICANT CHANGE UP (ref 1.8–2.4)
MCHC RBC-ENTMCNC: 27.2 PG — SIGNIFICANT CHANGE UP (ref 27–31)
MCHC RBC-ENTMCNC: 27.2 PG — SIGNIFICANT CHANGE UP (ref 27–31)
MCHC RBC-ENTMCNC: 32 G/DL — SIGNIFICANT CHANGE UP (ref 32–37)
MCHC RBC-ENTMCNC: 32 G/DL — SIGNIFICANT CHANGE UP (ref 32–37)
MCV RBC AUTO: 85.2 FL — SIGNIFICANT CHANGE UP (ref 80–94)
MCV RBC AUTO: 85.2 FL — SIGNIFICANT CHANGE UP (ref 80–94)
NRBC # BLD: 0 /100 WBCS — SIGNIFICANT CHANGE UP (ref 0–0)
NRBC # BLD: 0 /100 WBCS — SIGNIFICANT CHANGE UP (ref 0–0)
PLATELET # BLD AUTO: 436 K/UL — HIGH (ref 130–400)
PLATELET # BLD AUTO: 436 K/UL — HIGH (ref 130–400)
PMV BLD: 9 FL — SIGNIFICANT CHANGE UP (ref 7.4–10.4)
PMV BLD: 9 FL — SIGNIFICANT CHANGE UP (ref 7.4–10.4)
POTASSIUM SERPL-MCNC: 4.6 MMOL/L — SIGNIFICANT CHANGE UP (ref 3.5–5)
POTASSIUM SERPL-MCNC: 4.6 MMOL/L — SIGNIFICANT CHANGE UP (ref 3.5–5)
POTASSIUM SERPL-SCNC: 4.6 MMOL/L — SIGNIFICANT CHANGE UP (ref 3.5–5)
POTASSIUM SERPL-SCNC: 4.6 MMOL/L — SIGNIFICANT CHANGE UP (ref 3.5–5)
RBC # BLD: 2.9 M/UL — LOW (ref 4.7–6.1)
RBC # BLD: 2.9 M/UL — LOW (ref 4.7–6.1)
RBC # FLD: 14.6 % — HIGH (ref 11.5–14.5)
RBC # FLD: 14.6 % — HIGH (ref 11.5–14.5)
SODIUM SERPL-SCNC: 138 MMOL/L — SIGNIFICANT CHANGE UP (ref 135–146)
SODIUM SERPL-SCNC: 138 MMOL/L — SIGNIFICANT CHANGE UP (ref 135–146)
WBC # BLD: 6.59 K/UL — SIGNIFICANT CHANGE UP (ref 4.8–10.8)
WBC # BLD: 6.59 K/UL — SIGNIFICANT CHANGE UP (ref 4.8–10.8)
WBC # FLD AUTO: 6.59 K/UL — SIGNIFICANT CHANGE UP (ref 4.8–10.8)
WBC # FLD AUTO: 6.59 K/UL — SIGNIFICANT CHANGE UP (ref 4.8–10.8)

## 2023-10-29 RX ORDER — ERYTHROPOIETIN 10000 [IU]/ML
2000 INJECTION, SOLUTION INTRAVENOUS; SUBCUTANEOUS
Refills: 0 | Status: DISCONTINUED | OUTPATIENT
Start: 2023-10-29 | End: 2023-10-30

## 2023-10-29 RX ORDER — SODIUM CHLORIDE 9 MG/ML
1000 INJECTION, SOLUTION INTRAVENOUS
Refills: 0 | Status: DISCONTINUED | OUTPATIENT
Start: 2023-10-29 | End: 2023-10-30

## 2023-10-29 RX ADMIN — Medication 650 MILLIGRAM(S): at 13:06

## 2023-10-29 RX ADMIN — Medication 0.5 MILLIGRAM(S): at 11:37

## 2023-10-29 RX ADMIN — HEPARIN SODIUM 5000 UNIT(S): 5000 INJECTION INTRAVENOUS; SUBCUTANEOUS at 13:06

## 2023-10-29 RX ADMIN — MAGNESIUM OXIDE 400 MG ORAL TABLET 400 MILLIGRAM(S): 241.3 TABLET ORAL at 17:16

## 2023-10-29 RX ADMIN — Medication 650 MILLIGRAM(S): at 22:27

## 2023-10-29 RX ADMIN — Medication 166.67 MILLIGRAM(S): at 08:03

## 2023-10-29 RX ADMIN — Medication 8 UNIT(S): at 11:38

## 2023-10-29 RX ADMIN — Medication 0.5 MILLIGRAM(S): at 04:23

## 2023-10-29 RX ADMIN — CLOPIDOGREL BISULFATE 75 MILLIGRAM(S): 75 TABLET, FILM COATED ORAL at 11:37

## 2023-10-29 RX ADMIN — CEFEPIME 100 MILLIGRAM(S): 1 INJECTION, POWDER, FOR SOLUTION INTRAMUSCULAR; INTRAVENOUS at 05:09

## 2023-10-29 RX ADMIN — CEFEPIME 100 MILLIGRAM(S): 1 INJECTION, POWDER, FOR SOLUTION INTRAMUSCULAR; INTRAVENOUS at 17:16

## 2023-10-29 RX ADMIN — Medication 0.5 MILLIGRAM(S): at 22:28

## 2023-10-29 RX ADMIN — LOSARTAN POTASSIUM 100 MILLIGRAM(S): 100 TABLET, FILM COATED ORAL at 05:08

## 2023-10-29 RX ADMIN — MAGNESIUM OXIDE 400 MG ORAL TABLET 400 MILLIGRAM(S): 241.3 TABLET ORAL at 11:37

## 2023-10-29 RX ADMIN — Medication 2: at 11:38

## 2023-10-29 RX ADMIN — Medication 8 UNIT(S): at 08:01

## 2023-10-29 RX ADMIN — Medication 81 MILLIGRAM(S): at 11:37

## 2023-10-29 RX ADMIN — INSULIN GLARGINE 25 UNIT(S): 100 INJECTION, SOLUTION SUBCUTANEOUS at 08:01

## 2023-10-29 RX ADMIN — Medication 90 MILLIGRAM(S): at 05:08

## 2023-10-29 RX ADMIN — Medication 0.5 MILLIGRAM(S): at 00:01

## 2023-10-29 RX ADMIN — Medication 2: at 08:02

## 2023-10-29 RX ADMIN — Medication 650 MILLIGRAM(S): at 05:07

## 2023-10-29 RX ADMIN — ATORVASTATIN CALCIUM 80 MILLIGRAM(S): 80 TABLET, FILM COATED ORAL at 22:27

## 2023-10-29 RX ADMIN — HEPARIN SODIUM 5000 UNIT(S): 5000 INJECTION INTRAVENOUS; SUBCUTANEOUS at 05:08

## 2023-10-29 RX ADMIN — ERYTHROPOIETIN 2000 UNIT(S): 10000 INJECTION, SOLUTION INTRAVENOUS; SUBCUTANEOUS at 17:20

## 2023-10-29 RX ADMIN — MAGNESIUM OXIDE 400 MG ORAL TABLET 400 MILLIGRAM(S): 241.3 TABLET ORAL at 08:05

## 2023-10-29 RX ADMIN — Medication 8 UNIT(S): at 17:15

## 2023-10-29 RX ADMIN — HEPARIN SODIUM 5000 UNIT(S): 5000 INJECTION INTRAVENOUS; SUBCUTANEOUS at 22:27

## 2023-10-29 NOTE — CONSULT NOTE ADULT - SUBJECTIVE AND OBJECTIVE BOX
NEUROSURGERY PAIN MANAGEMENT CONSULT NOTE    Chief Complaint:    HPI:   Patient  is a 58yoM with Charcot foot s/p placement of an external fixator on 9/28/23 which had to be removed on 10/19 due to infection, CKD (baseline Cr 2.5-3.5, on list for kidney transplant), and IDDM (A1c 9.9), PAD, s/p  peripheral angiogram on 10/23/2023 with balloon angioplasty of PTA and DCB of R SFA and PTA of R PT and R AT via R groin and R pedal access, anemia recently received 2u pRBCS, pt was dc from hospital on 10/24/23 and now returns to hospital as directed by his podiatrist Dr Benson for TMA which will be done on 10/27/23 as pt has non healing wound and gangrene of the right foot. Patient complains of moderate to sever pain of foot which is partially relieved with percocet and associated wound discharge.  Pt denies fevers or chills.  (26 Oct 2023 18:37)    PAST MEDICAL & SURGICAL HISTORY:  DM (diabetes mellitus)      HTN (hypertension)      HLD (hyperlipidemia)      Herpes labialis      Anxiety      GERD (gastroesophageal reflux disease)      Kidney stones  20 years ago      Kidney disease  stage 4      Chronic kidney disease, unspecified CKD stage      Diabetic Charcot foot      PAD (peripheral artery disease)      S/P foot surgery, right  x 5 ( 2006 - 2013 )      Kidney stone  Removed by Laser x 2 ( 20 years ago )      H/O arthroscopy of right knee      Status post peripheral artery angioplasty          FAMILY HISTORY:  Family history of cancer in father (Father)  Lung        SOCIAL HISTORY:  [ ] Denies Smoking, Alcohol, or Drug Use    HOME MEDICATIONS:   Please refer to initial HNP    PAIN HOME MEDICATIONS:    Allergies    No Known Allergies    Intolerances        PAIN MEDICATIONS:  acetaminophen     Tablet .. 650 milliGRAM(s) Oral every 6 hours PRN  ALPRAZolam 0.5 milliGRAM(s) Oral four times a day PRN  HYDROmorphone  Injectable 1 milliGRAM(s) IV Push every 8 hours PRN  melatonin 3 milliGRAM(s) Oral at bedtime PRN  ondansetron Injectable 4 milliGRAM(s) IV Push every 8 hours PRN  oxycodone    5 mG/acetaminophen 325 mG 2 Tablet(s) Oral every 4 hours PRN    Heme:  aspirin  chewable 81 milliGRAM(s) Oral daily  clopidogrel Tablet 75 milliGRAM(s) Oral daily  heparin   Injectable 5000 Unit(s) SubCutaneous every 8 hours    Antibiotics:  cefepime   IVPB 1000 milliGRAM(s) IV Intermittent every 12 hours  vancomycin  IVPB 1250 milliGRAM(s) IV Intermittent every 24 hours    Cardiovascular:  losartan 100 milliGRAM(s) Oral daily  NIFEdipine XL 90 milliGRAM(s) Oral daily    GI:    Endocrine:  atorvastatin 80 milliGRAM(s) Oral at bedtime  dextrose 50% Injectable 25 Gram(s) IV Push once  dextrose 50% Injectable 12.5 Gram(s) IV Push once  dextrose 50% Injectable 25 Gram(s) IV Push once  dextrose Oral Gel 15 Gram(s) Oral once PRN  glucagon  Injectable 1 milliGRAM(s) IntraMuscular once  insulin glargine Injectable (LANTUS) 25 Unit(s) SubCutaneous every morning  insulin lispro (ADMELOG) corrective regimen sliding scale   SubCutaneous three times a day before meals  insulin lispro Injectable (ADMELOG) 8 Unit(s) SubCutaneous before breakfast  insulin lispro Injectable (ADMELOG) 8 Unit(s) SubCutaneous before dinner  insulin lispro Injectable (ADMELOG) 8 Unit(s) SubCutaneous before lunch    All Other Medications:  dextrose 5% + sodium chloride 0.45%. 1000 milliLiter(s) IV Continuous <Continuous>  dextrose 5%. 1000 milliLiter(s) IV Continuous <Continuous>  dextrose 5%. 1000 milliLiter(s) IV Continuous <Continuous>  epoetin ban (PROCRIT) Injectable 2000 Unit(s) SubCutaneous every 7 days  magnesium oxide 400 milliGRAM(s) Oral Once  magnesium oxide 400 milliGRAM(s) Oral three times a day with meals  sodium bicarbonate 650 milliGRAM(s) Oral three times a day  sodium chloride 0.9%. 1000 milliLiter(s) IV Continuous <Continuous>      Vital Signs Last 24 Hrs  T(C): 36.1 (29 Oct 2023 13:45), Max: 36.1 (29 Oct 2023 13:45)  T(F): 96.9 (29 Oct 2023 13:45), Max: 96.9 (29 Oct 2023 13:45)  HR: 80 (29 Oct 2023 13:45) (66 - 80)  BP: 131/62 (29 Oct 2023 13:45) (131/62 - 162/76)  BP(mean): --  RR: 18 (29 Oct 2023 13:45) (18 - 18)  SpO2: --        LABS:                        7.9    6.59  )-----------( 436      ( 29 Oct 2023 07:25 )             24.7     10-29    138  |  102  |  41<H>  ----------------------------<  169<H>  4.6   |  24  |  2.9<H>    Ca    8.7      29 Oct 2023 07:25  Mg     1.8     10-29        Urinalysis Basic - ( 29 Oct 2023 07:25 )    Color: x / Appearance: x / SG: x / pH: x  Gluc: 169 mg/dL / Ketone: x  / Bili: x / Urobili: x   Blood: x / Protein: x / Nitrite: x   Leuk Esterase: x / RBC: x / WBC x   Sq Epi: x / Non Sq Epi: x / Bacteria: x    Rads  impression:    Status post right transmetatarsal amputation with adjacent surgical   changes and soft tissue swelling.    No evidence of acute fracture or dislocation. Degenerative changes of the   mid and hindfoot. Postsurgical changes of the calcaneus. Likely abandoned   leads from previous noted stimulator overlying the calcaneus/Achilles.   Vascular calcifications      ASSESSMENT:   HPI:   Patient  is a 58yoM with Charcot foot s/p placement of an external fixator on 9/28/23 which had to be removed on 10/19 due to infection, CKD (baseline Cr 2.5-3.5, on list for kidney transplant), and IDDM (A1c 9.9), PAD, s/p  peripheral angiogram on 10/23/2023 with balloon angioplasty of PTA and DCB of R SFA and PTA of R PT and R AT via R groin and R pedal access, anemia recently received 2u pRBCS, pt was dc from hospital on 10/24/23 and now returns to hospital as directed by his podiatrist Dr Benson for TMA which will be done on 10/27/23 as pt has non healing wound and gangrene of the right foot. Patient complains of moderate to sever pain of foot which is partially relieved with percocet and associated wound discharge.  Pt denies fevers or chills.  (26 Oct 2023 18:37)  Patient not examined @ bedside by myself. Recommendations are placed based on chart review.     s/p TMA  Recommendations  Acetaminophen 1000mg q8h standing  Duloxetine 30mg q12h standing  Oxycodone 10mg q4h PRN (4-6)  IV Hydromorphone 1mg q4h PRN (7-10)  acetaminophen     Tablet .. 650 milliGRAM(s) Oral every 6 hours PRN    Bowel regimen: miralax / colace

## 2023-10-29 NOTE — CHART NOTE - NSCHARTNOTEFT_GEN_A_CORE
Patient seen and examined throughout the course of the day.    Results of Labs/Imaging discussed as well as patient's plan.      -hgb 7.9 same as yesterday , pt going to OR tomorrow will transfuse 1 unit  -cr 2.9 at baseline   All patient's/family questions answered.  Patient and family encouraged to contact PA with any further issues. Patient seen and examined throughout the course of the day.    Results of Labs/Imaging discussed as well as patient's plan.      -hgb 7.9 same as yesterday , pt going to OR tomorrow will transfuse 1 unit  will start on epoetin 2000 units weekly    -cr 2.9 at baseline   All patient's/family questions answered.  Patient and family encouraged to contact PA with any further issues.

## 2023-10-29 NOTE — PROGRESS NOTE ADULT - SUBJECTIVE AND OBJECTIVE BOX
58-year-old male with Charcot foot s/p placement of an external fixator on 9/28/23 which had to be removed on 10/19 due to infection, CKD (baseline Cr 2.5-3.5, on list for kidney transplant), and IDDM (A1c 9.9), PAD, s/p  peripheral angiogram on 10/23/2023 with balloon angioplasty of PTA and DCB of R SFA and PTA of R PT and R AT via R groin and R pedal access, anemia recently received 2u pRBCS, pt was dc from hospital on 10/24/23.    Interval: s/p surgery  , episodes of pain     PAST MEDICAL & SURGICAL HISTORY:    DM (diabetes mellitus)  HTN (hypertension)  HLD (hyperlipidemia)  Herpes labialis  Anxiety  GERD (gastroesophageal reflux disease)  Kidney stones  20 years ago  Kidney disease  stage 4  Chronic kidney disease, unspecified CKD stage  Diabetic Charcot foot  PAD (peripheral artery disease)  S/P foot surgery, right  x 5 ( 2006 - 2013 )  Kidney stone  Removed by Laser x 2 ( 20 years ago )  H/O arthroscopy of right knee  Status post peripheral artery angioplasty    Social History:    reviewed     MEDICATIONS  (STANDING):  aspirin  chewable 81 milliGRAM(s) Oral daily  atorvastatin 80 milliGRAM(s) Oral at bedtime  cefepime   IVPB 1000 milliGRAM(s) IV Intermittent every 12 hours  clopidogrel Tablet 75 milliGRAM(s) Oral daily  dextrose 5%. 1000 milliLiter(s) (50 mL/Hr) IV Continuous <Continuous>  dextrose 5%. 1000 milliLiter(s) (100 mL/Hr) IV Continuous <Continuous>  dextrose 50% Injectable 12.5 Gram(s) IV Push once  dextrose 50% Injectable 25 Gram(s) IV Push once  dextrose 50% Injectable 25 Gram(s) IV Push once  glucagon  Injectable 1 milliGRAM(s) IntraMuscular once  heparin   Injectable 5000 Unit(s) SubCutaneous every 8 hours  insulin glargine Injectable (LANTUS) 25 Unit(s) SubCutaneous every morning  insulin lispro (ADMELOG) corrective regimen sliding scale   SubCutaneous three times a day before meals  insulin lispro Injectable (ADMELOG) 8 Unit(s) SubCutaneous before breakfast  insulin lispro Injectable (ADMELOG) 8 Unit(s) SubCutaneous before dinner  insulin lispro Injectable (ADMELOG) 8 Unit(s) SubCutaneous before lunch  losartan 100 milliGRAM(s) Oral daily  magnesium oxide 400 milliGRAM(s) Oral Once  magnesium oxide 400 milliGRAM(s) Oral three times a day with meals  NIFEdipine XL 90 milliGRAM(s) Oral daily  sodium bicarbonate 650 milliGRAM(s) Oral three times a day  sodium chloride 0.9%. 1000 milliLiter(s) (50 mL/Hr) IV Continuous <Continuous>  vancomycin  IVPB 1250 milliGRAM(s) IV Intermittent every 24 hours    MEDICATIONS  (PRN):  acetaminophen     Tablet .. 650 milliGRAM(s) Oral every 6 hours PRN Temp greater or equal to 38C (100.4F), Mild Pain (1 - 3)  ALPRAZolam 0.5 milliGRAM(s) Oral four times a day PRN anxiety  dextrose Oral Gel 15 Gram(s) Oral once PRN Blood Glucose LESS THAN 70 milliGRAM(s)/deciliter  HYDROmorphone  Injectable 1 milliGRAM(s) IV Push every 8 hours PRN Severe Pain (7 - 10)  melatonin 3 milliGRAM(s) Oral at bedtime PRN Insomnia  ondansetron Injectable 4 milliGRAM(s) IV Push every 8 hours PRN Nausea and/or Vomiting  oxycodone    5 mG/acetaminophen 325 mG 2 Tablet(s) Oral every 4 hours PRN Severe Pain (7 - 10)      Allergies    No Known Allergies    Intolerances    Vital Signs Last 24 Hrs  T(C): 35.6 (29 Oct 2023 05:00), Max: 36.2 (28 Oct 2023 14:34)  T(F): 96.1 (29 Oct 2023 05:00), Max: 97.1 (28 Oct 2023 14:34)  HR: 66 (29 Oct 2023 05:00) (66 - 77)  BP: 162/76 (29 Oct 2023 05:00) (104/58 - 162/76)  BP(mean): --  RR: 18 (29 Oct 2023 05:00) (18 - 18)  SpO2: 100% (28 Oct 2023 21:00) (100% - 100%)    Parameters below as of 28 Oct 2023 21:00  Patient On (Oxygen Delivery Method): room air    CAPILLARY BLOOD GLUCOSE      POCT Blood Glucose.: 137 mg/dL (28 Oct 2023 20:55)  POCT Blood Glucose.: 98 mg/dL (28 Oct 2023 16:26)  POCT Blood Glucose.: 308 mg/dL (28 Oct 2023 12:10)      Diet, Regular:   Consistent Carbohydrate Evening Snack  DASH/TLC Sodium & Cholesterol Restricted (10-27-23 @ 15:22) [Active]      PHYSICAL EXAM  Constitutional: A&Ox4  Respiratory: cta b/l  Cardiovascular: s1 s2 rrr  Gastrointestinal: soft nt  nd + bs no rebound or guarding  Genitourinary: no cva tenderness  Extremities: normal rom, no edema, calf tenderness  Neurological:no focal deficits  Skin: +Right foot post surgical   Vascular, no cyanosis    LABS:                           7.9    6.41  )-----------( 496      ( 28 Oct 2023 07:11 )             24.2   10-28    138  |  101  |  42<H>  ----------------------------<  332<H>  4.8   |  23  |  2.7<H>    Ca    8.7      28 Oct 2023 07:11  Mg     1.7     10-28

## 2023-10-29 NOTE — PROGRESS NOTE ADULT - SUBJECTIVE AND OBJECTIVE BOX
PROGRESS NOTE   Patient is a 58y old  Male who presents with a chief complaint of     HPI:   Patient  is a 58yoM with Charcot foot s/p placement of an external fixator on 9/28/23 which had to be removed on 10/19 due to infection, CKD (baseline Cr 2.5-3.5, on list for kidney transplant), and IDDM (A1c 9.9), PAD, s/p  peripheral angiogram on 10/23/2023 with balloon angioplasty of PTA and DCB of R SFA and PTA of R PT and R AT via R groin and R pedal access, anemia recently received 2u pRBCS, pt was dc from hospital on 10/24/23 and now returns to hospital as directed by his podiatrist Dr Benson for TMA which will be done on 10/27/23 as pt has non healing wound and gangrene of the right foot. Patient complains of moderate to sever pain of foot which is partially relieved with percocet and associated wound discharge.  Pt denies fevers or chills.  (26 Oct 2023 18:37)      Vital Signs Last 24 Hrs  T(C): 35.6 (29 Oct 2023 05:00), Max: 36.2 (28 Oct 2023 14:34)  T(F): 96.1 (29 Oct 2023 05:00), Max: 97.1 (28 Oct 2023 14:34)  HR: 66 (29 Oct 2023 05:00) (66 - 77)  BP: 162/76 (29 Oct 2023 05:00) (104/58 - 162/76)  BP(mean): --  RR: 18 (29 Oct 2023 05:00) (18 - 18)  SpO2: 100% (28 Oct 2023 21:00) (100% - 100%)    Parameters below as of 28 Oct 2023 21:00  Patient On (Oxygen Delivery Method): room air                              7.9    6.59  )-----------( 436      ( 29 Oct 2023 07:25 )             24.7               10-29    138  |  102  |  41<H>  ----------------------------<  169<H>  4.6   |  24  |  2.9<H>    Ca    8.7      29 Oct 2023 07:25  Mg     1.8     10-29        PHYSICAL EXAM  GEN: MARIOLA CURRY is a pleasant well-nourished, well developed 58y Male in no acute distress, alert awake, and oriented to person, place and time.   LE Focused:  POD#2 s/p rt TMA.  No new progression of necrosis noted. Wound bed with patchy granulation. No malodor. No pus. Will need to be revised tommorow. Plan for OR tommorw.  Will need pain management consult.

## 2023-10-30 ENCOUNTER — RESULT REVIEW (OUTPATIENT)
Age: 58
End: 2023-10-30

## 2023-10-30 ENCOUNTER — TRANSCRIPTION ENCOUNTER (OUTPATIENT)
Age: 58
End: 2023-10-30

## 2023-10-30 LAB
-  AMIKACIN: SIGNIFICANT CHANGE UP
-  AMIKACIN: SIGNIFICANT CHANGE UP
-  AZTREONAM: SIGNIFICANT CHANGE UP
-  AZTREONAM: SIGNIFICANT CHANGE UP
-  CEFEPIME: SIGNIFICANT CHANGE UP
-  CEFEPIME: SIGNIFICANT CHANGE UP
-  CEFTAZIDIME: SIGNIFICANT CHANGE UP
-  CEFTAZIDIME: SIGNIFICANT CHANGE UP
-  CIPROFLOXACIN: SIGNIFICANT CHANGE UP
-  CIPROFLOXACIN: SIGNIFICANT CHANGE UP
-  GENTAMICIN: SIGNIFICANT CHANGE UP
-  GENTAMICIN: SIGNIFICANT CHANGE UP
-  IMIPENEM: SIGNIFICANT CHANGE UP
-  IMIPENEM: SIGNIFICANT CHANGE UP
-  LEVOFLOXACIN: SIGNIFICANT CHANGE UP
-  LEVOFLOXACIN: SIGNIFICANT CHANGE UP
-  MEROPENEM: SIGNIFICANT CHANGE UP
-  MEROPENEM: SIGNIFICANT CHANGE UP
-  PIPERACILLIN/TAZOBACTAM: SIGNIFICANT CHANGE UP
-  PIPERACILLIN/TAZOBACTAM: SIGNIFICANT CHANGE UP
-  TOBRAMYCIN: SIGNIFICANT CHANGE UP
-  TOBRAMYCIN: SIGNIFICANT CHANGE UP
ALBUMIN SERPL ELPH-MCNC: 3.2 G/DL — LOW (ref 3.5–5.2)
ALBUMIN SERPL ELPH-MCNC: 3.2 G/DL — LOW (ref 3.5–5.2)
ALP SERPL-CCNC: 152 U/L — HIGH (ref 30–115)
ALP SERPL-CCNC: 152 U/L — HIGH (ref 30–115)
ALT FLD-CCNC: 12 U/L — SIGNIFICANT CHANGE UP (ref 0–41)
ALT FLD-CCNC: 12 U/L — SIGNIFICANT CHANGE UP (ref 0–41)
ANION GAP SERPL CALC-SCNC: 10 MMOL/L — SIGNIFICANT CHANGE UP (ref 7–14)
ANION GAP SERPL CALC-SCNC: 10 MMOL/L — SIGNIFICANT CHANGE UP (ref 7–14)
APTT BLD: 33.1 SEC — SIGNIFICANT CHANGE UP (ref 27–39.2)
APTT BLD: 33.1 SEC — SIGNIFICANT CHANGE UP (ref 27–39.2)
AST SERPL-CCNC: 13 U/L — SIGNIFICANT CHANGE UP (ref 0–41)
AST SERPL-CCNC: 13 U/L — SIGNIFICANT CHANGE UP (ref 0–41)
BILIRUB SERPL-MCNC: 0.3 MG/DL — SIGNIFICANT CHANGE UP (ref 0.2–1.2)
BILIRUB SERPL-MCNC: 0.3 MG/DL — SIGNIFICANT CHANGE UP (ref 0.2–1.2)
BUN SERPL-MCNC: 34 MG/DL — HIGH (ref 10–20)
BUN SERPL-MCNC: 34 MG/DL — HIGH (ref 10–20)
CALCIUM SERPL-MCNC: 9.3 MG/DL — SIGNIFICANT CHANGE UP (ref 8.4–10.5)
CALCIUM SERPL-MCNC: 9.3 MG/DL — SIGNIFICANT CHANGE UP (ref 8.4–10.5)
CHLORIDE SERPL-SCNC: 102 MMOL/L — SIGNIFICANT CHANGE UP (ref 98–110)
CHLORIDE SERPL-SCNC: 102 MMOL/L — SIGNIFICANT CHANGE UP (ref 98–110)
CO2 SERPL-SCNC: 27 MMOL/L — SIGNIFICANT CHANGE UP (ref 17–32)
CO2 SERPL-SCNC: 27 MMOL/L — SIGNIFICANT CHANGE UP (ref 17–32)
CREAT SERPL-MCNC: 2.5 MG/DL — HIGH (ref 0.7–1.5)
CREAT SERPL-MCNC: 2.5 MG/DL — HIGH (ref 0.7–1.5)
CULTURE RESULTS: ABNORMAL
CULTURE RESULTS: ABNORMAL
EGFR: 29 ML/MIN/1.73M2 — LOW
EGFR: 29 ML/MIN/1.73M2 — LOW
GLUCOSE BLDC GLUCOMTR-MCNC: 155 MG/DL — HIGH (ref 70–99)
GLUCOSE BLDC GLUCOMTR-MCNC: 155 MG/DL — HIGH (ref 70–99)
GLUCOSE BLDC GLUCOMTR-MCNC: 179 MG/DL — HIGH (ref 70–99)
GLUCOSE BLDC GLUCOMTR-MCNC: 179 MG/DL — HIGH (ref 70–99)
GLUCOSE BLDC GLUCOMTR-MCNC: 192 MG/DL — HIGH (ref 70–99)
GLUCOSE BLDC GLUCOMTR-MCNC: 192 MG/DL — HIGH (ref 70–99)
GLUCOSE BLDC GLUCOMTR-MCNC: 230 MG/DL — HIGH (ref 70–99)
GLUCOSE BLDC GLUCOMTR-MCNC: 230 MG/DL — HIGH (ref 70–99)
GLUCOSE BLDC GLUCOMTR-MCNC: 282 MG/DL — HIGH (ref 70–99)
GLUCOSE BLDC GLUCOMTR-MCNC: 282 MG/DL — HIGH (ref 70–99)
GLUCOSE BLDC GLUCOMTR-MCNC: 305 MG/DL — HIGH (ref 70–99)
GLUCOSE BLDC GLUCOMTR-MCNC: 305 MG/DL — HIGH (ref 70–99)
GLUCOSE SERPL-MCNC: 197 MG/DL — HIGH (ref 70–99)
GLUCOSE SERPL-MCNC: 197 MG/DL — HIGH (ref 70–99)
HCT VFR BLD CALC: 28.7 % — LOW (ref 42–52)
HCT VFR BLD CALC: 28.7 % — LOW (ref 42–52)
HGB BLD-MCNC: 9.2 G/DL — LOW (ref 14–18)
HGB BLD-MCNC: 9.2 G/DL — LOW (ref 14–18)
INR BLD: 1.14 RATIO — SIGNIFICANT CHANGE UP (ref 0.65–1.3)
INR BLD: 1.14 RATIO — SIGNIFICANT CHANGE UP (ref 0.65–1.3)
MAGNESIUM SERPL-MCNC: 1.8 MG/DL — SIGNIFICANT CHANGE UP (ref 1.8–2.4)
MAGNESIUM SERPL-MCNC: 1.8 MG/DL — SIGNIFICANT CHANGE UP (ref 1.8–2.4)
MCHC RBC-ENTMCNC: 26.4 PG — LOW (ref 27–31)
MCHC RBC-ENTMCNC: 26.4 PG — LOW (ref 27–31)
MCHC RBC-ENTMCNC: 32.1 G/DL — SIGNIFICANT CHANGE UP (ref 32–37)
MCHC RBC-ENTMCNC: 32.1 G/DL — SIGNIFICANT CHANGE UP (ref 32–37)
MCV RBC AUTO: 82.5 FL — SIGNIFICANT CHANGE UP (ref 80–94)
MCV RBC AUTO: 82.5 FL — SIGNIFICANT CHANGE UP (ref 80–94)
METHOD TYPE: SIGNIFICANT CHANGE UP
METHOD TYPE: SIGNIFICANT CHANGE UP
NRBC # BLD: 0 /100 WBCS — SIGNIFICANT CHANGE UP (ref 0–0)
NRBC # BLD: 0 /100 WBCS — SIGNIFICANT CHANGE UP (ref 0–0)
ORGANISM # SPEC MICROSCOPIC CNT: ABNORMAL
ORGANISM # SPEC MICROSCOPIC CNT: ABNORMAL
ORGANISM # SPEC MICROSCOPIC CNT: SIGNIFICANT CHANGE UP
ORGANISM # SPEC MICROSCOPIC CNT: SIGNIFICANT CHANGE UP
PLATELET # BLD AUTO: 453 K/UL — HIGH (ref 130–400)
PLATELET # BLD AUTO: 453 K/UL — HIGH (ref 130–400)
PMV BLD: 9 FL — SIGNIFICANT CHANGE UP (ref 7.4–10.4)
PMV BLD: 9 FL — SIGNIFICANT CHANGE UP (ref 7.4–10.4)
POTASSIUM SERPL-MCNC: 4.9 MMOL/L — SIGNIFICANT CHANGE UP (ref 3.5–5)
POTASSIUM SERPL-MCNC: 4.9 MMOL/L — SIGNIFICANT CHANGE UP (ref 3.5–5)
POTASSIUM SERPL-SCNC: 4.9 MMOL/L — SIGNIFICANT CHANGE UP (ref 3.5–5)
POTASSIUM SERPL-SCNC: 4.9 MMOL/L — SIGNIFICANT CHANGE UP (ref 3.5–5)
PROT SERPL-MCNC: 6.2 G/DL — SIGNIFICANT CHANGE UP (ref 6–8)
PROT SERPL-MCNC: 6.2 G/DL — SIGNIFICANT CHANGE UP (ref 6–8)
PROTHROM AB SERPL-ACNC: 13 SEC — HIGH (ref 9.95–12.87)
PROTHROM AB SERPL-ACNC: 13 SEC — HIGH (ref 9.95–12.87)
RBC # BLD: 3.48 M/UL — LOW (ref 4.7–6.1)
RBC # BLD: 3.48 M/UL — LOW (ref 4.7–6.1)
RBC # FLD: 18.3 % — HIGH (ref 11.5–14.5)
RBC # FLD: 18.3 % — HIGH (ref 11.5–14.5)
SODIUM SERPL-SCNC: 139 MMOL/L — SIGNIFICANT CHANGE UP (ref 135–146)
SODIUM SERPL-SCNC: 139 MMOL/L — SIGNIFICANT CHANGE UP (ref 135–146)
SPECIMEN SOURCE: SIGNIFICANT CHANGE UP
SPECIMEN SOURCE: SIGNIFICANT CHANGE UP
WBC # BLD: 7.89 K/UL — SIGNIFICANT CHANGE UP (ref 4.8–10.8)
WBC # BLD: 7.89 K/UL — SIGNIFICANT CHANGE UP (ref 4.8–10.8)
WBC # FLD AUTO: 7.89 K/UL — SIGNIFICANT CHANGE UP (ref 4.8–10.8)
WBC # FLD AUTO: 7.89 K/UL — SIGNIFICANT CHANGE UP (ref 4.8–10.8)

## 2023-10-30 PROCEDURE — 88311 DECALCIFY TISSUE: CPT | Mod: 26

## 2023-10-30 PROCEDURE — 88304 TISSUE EXAM BY PATHOLOGIST: CPT | Mod: 26

## 2023-10-30 RX ORDER — INSULIN GLARGINE 100 [IU]/ML
25 INJECTION, SOLUTION SUBCUTANEOUS EVERY MORNING
Refills: 0 | Status: DISCONTINUED | OUTPATIENT
Start: 2023-10-30 | End: 2023-11-04

## 2023-10-30 RX ORDER — INSULIN LISPRO 100/ML
VIAL (ML) SUBCUTANEOUS
Refills: 0 | Status: DISCONTINUED | OUTPATIENT
Start: 2023-10-30 | End: 2023-11-04

## 2023-10-30 RX ORDER — SODIUM CHLORIDE 9 MG/ML
1000 INJECTION INTRAMUSCULAR; INTRAVENOUS; SUBCUTANEOUS
Refills: 0 | Status: DISCONTINUED | OUTPATIENT
Start: 2023-10-30 | End: 2023-10-30

## 2023-10-30 RX ORDER — HEPARIN SODIUM 5000 [USP'U]/ML
5000 INJECTION INTRAVENOUS; SUBCUTANEOUS EVERY 8 HOURS
Refills: 0 | Status: DISCONTINUED | OUTPATIENT
Start: 2023-10-30 | End: 2023-11-04

## 2023-10-30 RX ORDER — ATORVASTATIN CALCIUM 80 MG/1
80 TABLET, FILM COATED ORAL AT BEDTIME
Refills: 0 | Status: DISCONTINUED | OUTPATIENT
Start: 2023-10-30 | End: 2023-11-02

## 2023-10-30 RX ORDER — ASPIRIN/CALCIUM CARB/MAGNESIUM 324 MG
81 TABLET ORAL DAILY
Refills: 0 | Status: DISCONTINUED | OUTPATIENT
Start: 2023-10-30 | End: 2023-11-04

## 2023-10-30 RX ORDER — CEFEPIME 1 G/1
1000 INJECTION, POWDER, FOR SOLUTION INTRAMUSCULAR; INTRAVENOUS EVERY 12 HOURS
Refills: 0 | Status: DISCONTINUED | OUTPATIENT
Start: 2023-10-30 | End: 2023-11-02

## 2023-10-30 RX ORDER — MORPHINE SULFATE 50 MG/1
2 CAPSULE, EXTENDED RELEASE ORAL
Refills: 0 | Status: DISCONTINUED | OUTPATIENT
Start: 2023-10-30 | End: 2023-10-31

## 2023-10-30 RX ORDER — CLOPIDOGREL BISULFATE 75 MG/1
75 TABLET, FILM COATED ORAL EVERY 24 HOURS
Refills: 0 | Status: DISCONTINUED | OUTPATIENT
Start: 2023-10-30 | End: 2023-11-03

## 2023-10-30 RX ORDER — SODIUM BICARBONATE 1 MEQ/ML
650 SYRINGE (ML) INTRAVENOUS THREE TIMES A DAY
Refills: 0 | Status: DISCONTINUED | OUTPATIENT
Start: 2023-10-30 | End: 2023-11-04

## 2023-10-30 RX ORDER — CHLORHEXIDINE GLUCONATE 213 G/1000ML
1 SOLUTION TOPICAL
Refills: 0 | Status: DISCONTINUED | OUTPATIENT
Start: 2023-10-30 | End: 2023-10-30

## 2023-10-30 RX ORDER — SODIUM CHLORIDE 9 MG/ML
1000 INJECTION, SOLUTION INTRAVENOUS
Refills: 0 | Status: DISCONTINUED | OUTPATIENT
Start: 2023-10-30 | End: 2023-10-31

## 2023-10-30 RX ORDER — ERYTHROPOIETIN 10000 [IU]/ML
2000 INJECTION, SOLUTION INTRAVENOUS; SUBCUTANEOUS
Refills: 0 | Status: DISCONTINUED | OUTPATIENT
Start: 2023-10-30 | End: 2023-11-04

## 2023-10-30 RX ORDER — VANCOMYCIN HCL 1 G
1250 VIAL (EA) INTRAVENOUS EVERY 24 HOURS
Refills: 0 | Status: DISCONTINUED | OUTPATIENT
Start: 2023-10-30 | End: 2023-11-02

## 2023-10-30 RX ORDER — NIFEDIPINE 30 MG
90 TABLET, EXTENDED RELEASE 24 HR ORAL DAILY
Refills: 0 | Status: DISCONTINUED | OUTPATIENT
Start: 2023-10-30 | End: 2023-11-04

## 2023-10-30 RX ORDER — LANOLIN ALCOHOL/MO/W.PET/CERES
3 CREAM (GRAM) TOPICAL AT BEDTIME
Refills: 0 | Status: DISCONTINUED | OUTPATIENT
Start: 2023-10-30 | End: 2023-11-04

## 2023-10-30 RX ORDER — ALPRAZOLAM 0.25 MG
0.5 TABLET ORAL
Refills: 0 | Status: DISCONTINUED | OUTPATIENT
Start: 2023-10-30 | End: 2023-11-04

## 2023-10-30 RX ORDER — GLUCAGON INJECTION, SOLUTION 0.5 MG/.1ML
1 INJECTION, SOLUTION SUBCUTANEOUS ONCE
Refills: 0 | Status: DISCONTINUED | OUTPATIENT
Start: 2023-10-30 | End: 2023-11-04

## 2023-10-30 RX ORDER — MAGNESIUM OXIDE 400 MG ORAL TABLET 241.3 MG
400 TABLET ORAL
Refills: 0 | Status: DISCONTINUED | OUTPATIENT
Start: 2023-10-30 | End: 2023-11-04

## 2023-10-30 RX ORDER — DEXTROSE 50 % IN WATER 50 %
25 SYRINGE (ML) INTRAVENOUS ONCE
Refills: 0 | Status: DISCONTINUED | OUTPATIENT
Start: 2023-10-30 | End: 2023-11-04

## 2023-10-30 RX ORDER — ONDANSETRON 8 MG/1
4 TABLET, FILM COATED ORAL ONCE
Refills: 0 | Status: DISCONTINUED | OUTPATIENT
Start: 2023-10-30 | End: 2023-10-31

## 2023-10-30 RX ORDER — ACETAMINOPHEN 500 MG
650 TABLET ORAL EVERY 6 HOURS
Refills: 0 | Status: DISCONTINUED | OUTPATIENT
Start: 2023-10-30 | End: 2023-10-30

## 2023-10-30 RX ORDER — DEXTROSE 50 % IN WATER 50 %
15 SYRINGE (ML) INTRAVENOUS ONCE
Refills: 0 | Status: DISCONTINUED | OUTPATIENT
Start: 2023-10-30 | End: 2023-11-04

## 2023-10-30 RX ORDER — INSULIN LISPRO 100/ML
5 VIAL (ML) SUBCUTANEOUS ONCE
Refills: 0 | Status: COMPLETED | OUTPATIENT
Start: 2023-10-30 | End: 2023-10-30

## 2023-10-30 RX ORDER — HYDROMORPHONE HYDROCHLORIDE 2 MG/ML
0.5 INJECTION INTRAMUSCULAR; INTRAVENOUS; SUBCUTANEOUS
Refills: 0 | Status: DISCONTINUED | OUTPATIENT
Start: 2023-10-30 | End: 2023-10-31

## 2023-10-30 RX ORDER — INSULIN LISPRO 100/ML
8 VIAL (ML) SUBCUTANEOUS
Refills: 0 | Status: DISCONTINUED | OUTPATIENT
Start: 2023-10-30 | End: 2023-11-04

## 2023-10-30 RX ORDER — LOSARTAN POTASSIUM 100 MG/1
100 TABLET, FILM COATED ORAL DAILY
Refills: 0 | Status: DISCONTINUED | OUTPATIENT
Start: 2023-10-30 | End: 2023-11-04

## 2023-10-30 RX ADMIN — Medication 0.5 MILLIGRAM(S): at 10:09

## 2023-10-30 RX ADMIN — Medication 0.5 MILLIGRAM(S): at 16:13

## 2023-10-30 RX ADMIN — Medication 81 MILLIGRAM(S): at 11:53

## 2023-10-30 RX ADMIN — CLOPIDOGREL BISULFATE 75 MILLIGRAM(S): 75 TABLET, FILM COATED ORAL at 11:53

## 2023-10-30 RX ADMIN — SODIUM CHLORIDE 50 MILLILITER(S): 9 INJECTION INTRAMUSCULAR; INTRAVENOUS; SUBCUTANEOUS at 16:09

## 2023-10-30 RX ADMIN — Medication 650 MILLIGRAM(S): at 22:37

## 2023-10-30 RX ADMIN — Medication 0.5 MILLIGRAM(S): at 23:22

## 2023-10-30 RX ADMIN — HEPARIN SODIUM 5000 UNIT(S): 5000 INJECTION INTRAVENOUS; SUBCUTANEOUS at 05:09

## 2023-10-30 RX ADMIN — Medication 166.67 MILLIGRAM(S): at 09:59

## 2023-10-30 RX ADMIN — Medication 4: at 16:29

## 2023-10-30 RX ADMIN — Medication 650 MILLIGRAM(S): at 05:11

## 2023-10-30 RX ADMIN — Medication 650 MILLIGRAM(S): at 16:13

## 2023-10-30 RX ADMIN — CEFEPIME 100 MILLIGRAM(S): 1 INJECTION, POWDER, FOR SOLUTION INTRAMUSCULAR; INTRAVENOUS at 17:19

## 2023-10-30 RX ADMIN — LOSARTAN POTASSIUM 100 MILLIGRAM(S): 100 TABLET, FILM COATED ORAL at 05:11

## 2023-10-30 RX ADMIN — Medication 8 UNIT(S): at 23:23

## 2023-10-30 RX ADMIN — ATORVASTATIN CALCIUM 80 MILLIGRAM(S): 80 TABLET, FILM COATED ORAL at 22:37

## 2023-10-30 RX ADMIN — SODIUM CHLORIDE 75 MILLILITER(S): 9 INJECTION, SOLUTION INTRAVENOUS at 00:58

## 2023-10-30 RX ADMIN — HEPARIN SODIUM 5000 UNIT(S): 5000 INJECTION INTRAVENOUS; SUBCUTANEOUS at 22:37

## 2023-10-30 RX ADMIN — Medication 5 UNIT(S): at 13:51

## 2023-10-30 RX ADMIN — Medication 90 MILLIGRAM(S): at 05:11

## 2023-10-30 RX ADMIN — CEFEPIME 100 MILLIGRAM(S): 1 INJECTION, POWDER, FOR SOLUTION INTRAMUSCULAR; INTRAVENOUS at 05:09

## 2023-10-30 NOTE — PROGRESS NOTE ADULT - SUBJECTIVE AND OBJECTIVE BOX
Podiatry Progress Note    Subjective:  MARIOLA CURRY is a  58y Male.   Seen bedside.   Patient is a 58y old  Male who presents with a chief complaint of     Past Medical History and Surgical History  PAST MEDICAL & SURGICAL HISTORY:  DM (diabetes mellitus)      HTN (hypertension)      HLD (hyperlipidemia)      Herpes labialis      Anxiety      GERD (gastroesophageal reflux disease)      Kidney stones  20 years ago      Kidney disease  stage 4      Chronic kidney disease, unspecified CKD stage      Diabetic Charcot foot      PAD (peripheral artery disease)      S/P foot surgery, right  x 5 ( 2006 - 2013 )      Kidney stone  Removed by Laser x 2 ( 20 years ago )      H/O arthroscopy of right knee      Status post peripheral artery angioplasty           Objective:  Vital Signs Last 24 Hrs  T(C): 35.7 (30 Oct 2023 05:00), Max: 36.2 (29 Oct 2023 19:40)  T(F): 96.3 (30 Oct 2023 05:00), Max: 97.1 (29 Oct 2023 19:40)  HR: 85 (30 Oct 2023 06:20) (67 - 85)  BP: 145/65 (30 Oct 2023 06:20) (131/62 - 188/90)  BP(mean): --  RR: 18 (30 Oct 2023 06:20) (18 - 18)  SpO2: 99% (29 Oct 2023 19:40) (99% - 99%)    Parameters below as of 29 Oct 2023 19:40  Patient On (Oxygen Delivery Method): room air                            9.2    7.89  )-----------( 453      ( 30 Oct 2023 08:28 )             28.7                 10-30    139  |  102  |  34<H>  ----------------------------<  197<H>  4.9   |  27  |  2.5<H>    Ca    9.3      30 Oct 2023 08:28  Mg     1.8     10-30    TPro  6.2  /  Alb  3.2<L>  /  TBili  0.3  /  DBili  x   /  AST  13  /  ALT  12  /  AlkPhos  152<H>  10-30       Assessment:  Right Forefoot  gangrene.   s/p TMA 10/27/23    Plan:  Chart reviewed and Patient evaluated. All Questions and Concerns Addressed and Answered     Local Wound Care; Wound Flushed w/ NS; Wound Packed w/ Betadine Soaked Gauze / DSD / Kerlix   Weight Bearing Status; WBAT w/ Heel Touch w/ Surgical Shoe;   Recommend; Lower Extremity Arterial Duplex B/L; ESR/CRP    Plan for surgical debridement of R foot wound. josefina 10/30/23 @ 6pm.   Please optimize lab values prior to OR.   will order T&S and Coag studies.   Will make pt NPO.     [phone]  Podiatry

## 2023-10-30 NOTE — PROGRESS NOTE ADULT - ASSESSMENT
58-year-old male with Charcot foot s/p placement of an external fixator on 9/28/23 which had to be removed on 10/19 due to infection, CKD (baseline Cr 2.5-3.5, on list for kidney transplant), and IDDM (A1c 9.9), PAD, s/p  peripheral angiogram on 10/23/2023 with balloon angioplasty of PTA and DCB of R SFA and PTA of R PT and R AT via R groin and R pedal access, anemia recently received 2u pRBCS, pt was dc from hospital on 10/24/23 and now returns to hospital as directed by his podiatrist Dr Benson for TMA which will be done on 10/27/23 as pt has non healing wound and gangrene of the right foot.       #gangrene of the right foot.     - post operative amputation, returning to OR  - reviewed labs   - continue monitoring     #PAD, s/p angioplasty  -c/w asa and plavix    #anemia  -hgb dropped post op bleeding   -monitor    #HTN  -low sodium diet  -monitor bp  -cont meds from home nifedipine and cozaar    #DM  -ada diet  -monitor BS  -cont meds from home basal 20u daily  and lispro 8 qac  -insulin coverage prn eleavted blood sugar        #CKD  -creatinine stable at 2.9 today    #anxiety disorder  -xanax prn    #DVT prophylaxis  -sq heparin and scd    ADVANCE CARE: CPR    DISPOSITION: ACUTE

## 2023-10-30 NOTE — PROGRESS NOTE ADULT - SUBJECTIVE AND OBJECTIVE BOX
58-year-old male with Charcot foot s/p placement of an external fixator on 9/28/23 which had to be removed on 10/19 due to infection, CKD (baseline Cr 2.5-3.5, on list for kidney transplant), and IDDM (A1c 9.9), PAD, s/p  peripheral angiogram on 10/23/2023 with balloon angioplasty of PTA and DCB of R SFA and PTA of R PT and R AT via R groin and R pedal access, anemia recently received 2u pRBCS, pt was dc from hospital on 10/24/23.    Interval: s/p surgery  ,for OR Today    PAST MEDICAL & SURGICAL HISTORY:    DM (diabetes mellitus)  HTN (hypertension)  HLD (hyperlipidemia)  Herpes labialis  Anxiety  GERD (gastroesophageal reflux disease)  Kidney stones  20 years ago  Kidney disease  stage 4  Chronic kidney disease, unspecified CKD stage  Diabetic Charcot foot  PAD (peripheral artery disease)  S/P foot surgery, right  x 5 ( 2006 - 2013 )  Kidney stone  Removed by Laser x 2 ( 20 years ago )  H/O arthroscopy of right knee  Status post peripheral artery angioplasty    Social History:    reviewed     MEDICATIONS  (STANDING):  aspirin  chewable 81 milliGRAM(s) Oral daily  atorvastatin 80 milliGRAM(s) Oral at bedtime  cefepime   IVPB 1000 milliGRAM(s) IV Intermittent every 12 hours  clopidogrel Tablet 75 milliGRAM(s) Oral daily  dextrose 5%. 1000 milliLiter(s) (50 mL/Hr) IV Continuous <Continuous>  dextrose 5%. 1000 milliLiter(s) (100 mL/Hr) IV Continuous <Continuous>  dextrose 50% Injectable 12.5 Gram(s) IV Push once  dextrose 50% Injectable 25 Gram(s) IV Push once  dextrose 50% Injectable 25 Gram(s) IV Push once  glucagon  Injectable 1 milliGRAM(s) IntraMuscular once  heparin   Injectable 5000 Unit(s) SubCutaneous every 8 hours  insulin glargine Injectable (LANTUS) 25 Unit(s) SubCutaneous every morning  insulin lispro (ADMELOG) corrective regimen sliding scale   SubCutaneous three times a day before meals  insulin lispro Injectable (ADMELOG) 8 Unit(s) SubCutaneous before breakfast  insulin lispro Injectable (ADMELOG) 8 Unit(s) SubCutaneous before dinner  insulin lispro Injectable (ADMELOG) 8 Unit(s) SubCutaneous before lunch  losartan 100 milliGRAM(s) Oral daily  magnesium oxide 400 milliGRAM(s) Oral Once  magnesium oxide 400 milliGRAM(s) Oral three times a day with meals  NIFEdipine XL 90 milliGRAM(s) Oral daily  sodium bicarbonate 650 milliGRAM(s) Oral three times a day  sodium chloride 0.9%. 1000 milliLiter(s) (50 mL/Hr) IV Continuous <Continuous>  vancomycin  IVPB 1250 milliGRAM(s) IV Intermittent every 24 hours    MEDICATIONS  (PRN):  acetaminophen     Tablet .. 650 milliGRAM(s) Oral every 6 hours PRN Temp greater or equal to 38C (100.4F), Mild Pain (1 - 3)  ALPRAZolam 0.5 milliGRAM(s) Oral four times a day PRN anxiety  dextrose Oral Gel 15 Gram(s) Oral once PRN Blood Glucose LESS THAN 70 milliGRAM(s)/deciliter  HYDROmorphone  Injectable 1 milliGRAM(s) IV Push every 8 hours PRN Severe Pain (7 - 10)  melatonin 3 milliGRAM(s) Oral at bedtime PRN Insomnia  ondansetron Injectable 4 milliGRAM(s) IV Push every 8 hours PRN Nausea and/or Vomiting  oxycodone    5 mG/acetaminophen 325 mG 2 Tablet(s) Oral every 4 hours PRN Severe Pain (7 - 10)      Allergies    No Known Allergies    Intolerances    Vital Signs Last 24 Hrs  T(C): 35.7 (30 Oct 2023 05:00), Max: 36.2 (29 Oct 2023 19:40)  T(F): 96.3 (30 Oct 2023 05:00), Max: 97.1 (29 Oct 2023 19:40)  HR: 67 (30 Oct 2023 05:00) (67 - 80)  BP: 188/90 (30 Oct 2023 05:00) (131/62 - 188/90)  BP(mean): --  RR: 18 (30 Oct 2023 05:00) (18 - 18)  SpO2: 99% (29 Oct 2023 19:40) (99% - 99%)    Parameters below as of 29 Oct 2023 19:40  Patient On (Oxygen Delivery Method): room air        CAPILLARY BLOOD GLUCOSE      POCT Blood Glucose.: 155 mg/dL (30 Oct 2023 04:03)  POCT Blood Glucose.: 144 mg/dL (29 Oct 2023 20:58)  POCT Blood Glucose.: 74 mg/dL (29 Oct 2023 19:47)  POCT Blood Glucose.: 41 mg/dL (29 Oct 2023 19:22)  POCT Blood Glucose.: 93 mg/dL (29 Oct 2023 16:37)  POCT Blood Glucose.: 163 mg/dL (29 Oct 2023 11:25)  POCT Blood Glucose.: 189 mg/dL (29 Oct 2023 07:47)    Diet, NPO after Midnight:      NPO Start Date: 29-Oct-2023,   NPO Start Time: 23:59 (10-29-23 @ 13:29) [Active]  Diet, Regular:   Consistent Carbohydrate Evening Snack  DASH/TLC Sodium & Cholesterol Restricted (10-27-23 @ 15:22) [Active]    PHYSICAL EXAM  Constitutional: A&Ox4  Respiratory: cta b/l  Cardiovascular: s1 s2 rrr  Gastrointestinal: soft nt  nd + bs no rebound or guarding  Genitourinary: no cva tenderness  Extremities: normal rom, no edema, calf tenderness  Neurological:no focal deficits  Skin: +Right foot post surgical   Vascular, no cyanosis    LABS:                           7.9    6.41  )-----------( 496      ( 28 Oct 2023 07:11 )             24.2   10-28    138  |  101  |  42<H>  ----------------------------<  332<H>  4.8   |  23  |  2.7<H>    Ca    8.7      28 Oct 2023 07:11  Mg     1.7     10-28

## 2023-10-30 NOTE — CHART NOTE - NSCHARTNOTEFT_GEN_A_CORE
PACU ANESTHESIA ADMISSION NOTE      Procedure: Transmetatarsal amputation  R foot      Post op diagnosis:  Gangrene    Gangrene of foot        ____  Intubated  TV:______       Rate: ______      FiO2: ______    __x__  Patent Airway    ___x_  Full return of protective reflexes    __x__  Full recovery from anesthesia / back to baseline     Vitals:   T: 97.7          R:  18                BP:  148/84                Sat:    97               P: 85      Mental Status:  _x___ Awake   _____ Alert   _____ Drowsy   _____ Sedated    Nausea/Vomiting:  _x___ NO  ______Yes,   See Post - Op Orders          Pain Scale (0-10):  __0___    Treatment: ____ None    ____ See Post - Op/PCA Orders    Post - Operative Fluids:   ____ Oral   ___x_ See Post - Op Orders    Plan: Discharge:   ____Home       _x____Floor     _____Critical Care    _____  Other:_________________    Comments:

## 2023-10-31 LAB
ANION GAP SERPL CALC-SCNC: 14 MMOL/L — SIGNIFICANT CHANGE UP (ref 7–14)
ANION GAP SERPL CALC-SCNC: 14 MMOL/L — SIGNIFICANT CHANGE UP (ref 7–14)
BASOPHILS # BLD AUTO: 0.05 K/UL — SIGNIFICANT CHANGE UP (ref 0–0.2)
BASOPHILS # BLD AUTO: 0.05 K/UL — SIGNIFICANT CHANGE UP (ref 0–0.2)
BASOPHILS NFR BLD AUTO: 0.6 % — SIGNIFICANT CHANGE UP (ref 0–1)
BASOPHILS NFR BLD AUTO: 0.6 % — SIGNIFICANT CHANGE UP (ref 0–1)
BUN SERPL-MCNC: 35 MG/DL — HIGH (ref 10–20)
BUN SERPL-MCNC: 35 MG/DL — HIGH (ref 10–20)
CALCIUM SERPL-MCNC: 9 MG/DL — SIGNIFICANT CHANGE UP (ref 8.4–10.5)
CALCIUM SERPL-MCNC: 9 MG/DL — SIGNIFICANT CHANGE UP (ref 8.4–10.5)
CHLORIDE SERPL-SCNC: 97 MMOL/L — LOW (ref 98–110)
CHLORIDE SERPL-SCNC: 97 MMOL/L — LOW (ref 98–110)
CO2 SERPL-SCNC: 21 MMOL/L — SIGNIFICANT CHANGE UP (ref 17–32)
CO2 SERPL-SCNC: 21 MMOL/L — SIGNIFICANT CHANGE UP (ref 17–32)
CREAT SERPL-MCNC: 2.5 MG/DL — HIGH (ref 0.7–1.5)
CREAT SERPL-MCNC: 2.5 MG/DL — HIGH (ref 0.7–1.5)
EGFR: 29 ML/MIN/1.73M2 — LOW
EGFR: 29 ML/MIN/1.73M2 — LOW
EOSINOPHIL # BLD AUTO: 0.2 K/UL — SIGNIFICANT CHANGE UP (ref 0–0.7)
EOSINOPHIL # BLD AUTO: 0.2 K/UL — SIGNIFICANT CHANGE UP (ref 0–0.7)
EOSINOPHIL NFR BLD AUTO: 2.4 % — SIGNIFICANT CHANGE UP (ref 0–8)
EOSINOPHIL NFR BLD AUTO: 2.4 % — SIGNIFICANT CHANGE UP (ref 0–8)
GLUCOSE BLDC GLUCOMTR-MCNC: 124 MG/DL — HIGH (ref 70–99)
GLUCOSE BLDC GLUCOMTR-MCNC: 124 MG/DL — HIGH (ref 70–99)
GLUCOSE BLDC GLUCOMTR-MCNC: 192 MG/DL — HIGH (ref 70–99)
GLUCOSE BLDC GLUCOMTR-MCNC: 192 MG/DL — HIGH (ref 70–99)
GLUCOSE BLDC GLUCOMTR-MCNC: 306 MG/DL — HIGH (ref 70–99)
GLUCOSE BLDC GLUCOMTR-MCNC: 306 MG/DL — HIGH (ref 70–99)
GLUCOSE BLDC GLUCOMTR-MCNC: 431 MG/DL — HIGH (ref 70–99)
GLUCOSE BLDC GLUCOMTR-MCNC: 431 MG/DL — HIGH (ref 70–99)
GLUCOSE SERPL-MCNC: 451 MG/DL — CRITICAL HIGH (ref 70–99)
GLUCOSE SERPL-MCNC: 451 MG/DL — CRITICAL HIGH (ref 70–99)
HCT VFR BLD CALC: 28.9 % — LOW (ref 42–52)
HCT VFR BLD CALC: 28.9 % — LOW (ref 42–52)
HGB BLD-MCNC: 9.2 G/DL — LOW (ref 14–18)
HGB BLD-MCNC: 9.2 G/DL — LOW (ref 14–18)
IMM GRANULOCYTES NFR BLD AUTO: 0.4 % — HIGH (ref 0.1–0.3)
IMM GRANULOCYTES NFR BLD AUTO: 0.4 % — HIGH (ref 0.1–0.3)
LYMPHOCYTES # BLD AUTO: 2.06 K/UL — SIGNIFICANT CHANGE UP (ref 1.2–3.4)
LYMPHOCYTES # BLD AUTO: 2.06 K/UL — SIGNIFICANT CHANGE UP (ref 1.2–3.4)
LYMPHOCYTES # BLD AUTO: 24.4 % — SIGNIFICANT CHANGE UP (ref 20.5–51.1)
LYMPHOCYTES # BLD AUTO: 24.4 % — SIGNIFICANT CHANGE UP (ref 20.5–51.1)
MCHC RBC-ENTMCNC: 26.7 PG — LOW (ref 27–31)
MCHC RBC-ENTMCNC: 26.7 PG — LOW (ref 27–31)
MCHC RBC-ENTMCNC: 31.8 G/DL — LOW (ref 32–37)
MCHC RBC-ENTMCNC: 31.8 G/DL — LOW (ref 32–37)
MCV RBC AUTO: 83.8 FL — SIGNIFICANT CHANGE UP (ref 80–94)
MCV RBC AUTO: 83.8 FL — SIGNIFICANT CHANGE UP (ref 80–94)
MONOCYTES # BLD AUTO: 0.51 K/UL — SIGNIFICANT CHANGE UP (ref 0.1–0.6)
MONOCYTES # BLD AUTO: 0.51 K/UL — SIGNIFICANT CHANGE UP (ref 0.1–0.6)
MONOCYTES NFR BLD AUTO: 6 % — SIGNIFICANT CHANGE UP (ref 1.7–9.3)
MONOCYTES NFR BLD AUTO: 6 % — SIGNIFICANT CHANGE UP (ref 1.7–9.3)
NEUTROPHILS # BLD AUTO: 5.6 K/UL — SIGNIFICANT CHANGE UP (ref 1.4–6.5)
NEUTROPHILS # BLD AUTO: 5.6 K/UL — SIGNIFICANT CHANGE UP (ref 1.4–6.5)
NEUTROPHILS NFR BLD AUTO: 66.2 % — SIGNIFICANT CHANGE UP (ref 42.2–75.2)
NEUTROPHILS NFR BLD AUTO: 66.2 % — SIGNIFICANT CHANGE UP (ref 42.2–75.2)
NRBC # BLD: 0 /100 WBCS — SIGNIFICANT CHANGE UP (ref 0–0)
NRBC # BLD: 0 /100 WBCS — SIGNIFICANT CHANGE UP (ref 0–0)
PLATELET # BLD AUTO: 467 K/UL — HIGH (ref 130–400)
PLATELET # BLD AUTO: 467 K/UL — HIGH (ref 130–400)
PMV BLD: 9.2 FL — SIGNIFICANT CHANGE UP (ref 7.4–10.4)
PMV BLD: 9.2 FL — SIGNIFICANT CHANGE UP (ref 7.4–10.4)
POTASSIUM SERPL-MCNC: 5.4 MMOL/L — HIGH (ref 3.5–5)
POTASSIUM SERPL-MCNC: 5.4 MMOL/L — HIGH (ref 3.5–5)
POTASSIUM SERPL-SCNC: 5.4 MMOL/L — HIGH (ref 3.5–5)
POTASSIUM SERPL-SCNC: 5.4 MMOL/L — HIGH (ref 3.5–5)
RBC # BLD: 3.45 M/UL — LOW (ref 4.7–6.1)
RBC # BLD: 3.45 M/UL — LOW (ref 4.7–6.1)
RBC # FLD: 17.7 % — HIGH (ref 11.5–14.5)
RBC # FLD: 17.7 % — HIGH (ref 11.5–14.5)
SODIUM SERPL-SCNC: 132 MMOL/L — LOW (ref 135–146)
SODIUM SERPL-SCNC: 132 MMOL/L — LOW (ref 135–146)
WBC # BLD: 8.45 K/UL — SIGNIFICANT CHANGE UP (ref 4.8–10.8)
WBC # BLD: 8.45 K/UL — SIGNIFICANT CHANGE UP (ref 4.8–10.8)
WBC # FLD AUTO: 8.45 K/UL — SIGNIFICANT CHANGE UP (ref 4.8–10.8)
WBC # FLD AUTO: 8.45 K/UL — SIGNIFICANT CHANGE UP (ref 4.8–10.8)

## 2023-10-31 RX ORDER — INSULIN LISPRO 100/ML
8 VIAL (ML) SUBCUTANEOUS
Refills: 0 | Status: DISCONTINUED | OUTPATIENT
Start: 2023-10-31 | End: 2023-11-04

## 2023-10-31 RX ADMIN — Medication 8 UNIT(S): at 17:13

## 2023-10-31 RX ADMIN — MAGNESIUM OXIDE 400 MG ORAL TABLET 400 MILLIGRAM(S): 241.3 TABLET ORAL at 08:24

## 2023-10-31 RX ADMIN — HEPARIN SODIUM 5000 UNIT(S): 5000 INJECTION INTRAVENOUS; SUBCUTANEOUS at 05:08

## 2023-10-31 RX ADMIN — Medication 650 MILLIGRAM(S): at 21:20

## 2023-10-31 RX ADMIN — CEFEPIME 100 MILLIGRAM(S): 1 INJECTION, POWDER, FOR SOLUTION INTRAMUSCULAR; INTRAVENOUS at 05:09

## 2023-10-31 RX ADMIN — ATORVASTATIN CALCIUM 80 MILLIGRAM(S): 80 TABLET, FILM COATED ORAL at 21:29

## 2023-10-31 RX ADMIN — Medication 12: at 08:12

## 2023-10-31 RX ADMIN — CLOPIDOGREL BISULFATE 75 MILLIGRAM(S): 75 TABLET, FILM COATED ORAL at 06:14

## 2023-10-31 RX ADMIN — MAGNESIUM OXIDE 400 MG ORAL TABLET 400 MILLIGRAM(S): 241.3 TABLET ORAL at 12:17

## 2023-10-31 RX ADMIN — Medication 650 MILLIGRAM(S): at 05:07

## 2023-10-31 RX ADMIN — MAGNESIUM OXIDE 400 MG ORAL TABLET 400 MILLIGRAM(S): 241.3 TABLET ORAL at 17:14

## 2023-10-31 RX ADMIN — Medication 0.5 MILLIGRAM(S): at 19:57

## 2023-10-31 RX ADMIN — HEPARIN SODIUM 5000 UNIT(S): 5000 INJECTION INTRAVENOUS; SUBCUTANEOUS at 21:20

## 2023-10-31 RX ADMIN — Medication 0.5 MILLIGRAM(S): at 05:07

## 2023-10-31 RX ADMIN — HEPARIN SODIUM 5000 UNIT(S): 5000 INJECTION INTRAVENOUS; SUBCUTANEOUS at 14:35

## 2023-10-31 RX ADMIN — Medication 0.1 MILLIGRAM(S): at 00:34

## 2023-10-31 RX ADMIN — LOSARTAN POTASSIUM 100 MILLIGRAM(S): 100 TABLET, FILM COATED ORAL at 05:07

## 2023-10-31 RX ADMIN — Medication 166.67 MILLIGRAM(S): at 06:10

## 2023-10-31 RX ADMIN — Medication 2: at 17:13

## 2023-10-31 RX ADMIN — Medication 81 MILLIGRAM(S): at 12:06

## 2023-10-31 RX ADMIN — Medication 650 MILLIGRAM(S): at 14:35

## 2023-10-31 RX ADMIN — Medication 8 UNIT(S): at 08:11

## 2023-10-31 RX ADMIN — Medication 0.5 MILLIGRAM(S): at 14:36

## 2023-10-31 RX ADMIN — CEFEPIME 100 MILLIGRAM(S): 1 INJECTION, POWDER, FOR SOLUTION INTRAMUSCULAR; INTRAVENOUS at 17:14

## 2023-10-31 RX ADMIN — Medication 90 MILLIGRAM(S): at 05:06

## 2023-10-31 RX ADMIN — Medication 8: at 12:05

## 2023-10-31 RX ADMIN — INSULIN GLARGINE 25 UNIT(S): 100 INJECTION, SOLUTION SUBCUTANEOUS at 09:22

## 2023-10-31 RX ADMIN — SODIUM CHLORIDE 75 MILLILITER(S): 9 INJECTION, SOLUTION INTRAVENOUS at 00:35

## 2023-10-31 NOTE — PROGRESS NOTE ADULT - SUBJECTIVE AND OBJECTIVE BOX
58-year-old male with Charcot foot s/p placement of an external fixator on 9/28/23 which had to be removed on 10/19 due to infection, CKD (baseline Cr 2.5-3.5, on list for kidney transplant), and IDDM (A1c 9.9), PAD, s/p  peripheral angiogram on 10/23/2023 with balloon angioplasty of PTA and DCB of R SFA and PTA of R PT and R AT via R groin and R pedal access, anemia recently received 2u pRBCS, pt was dc from hospital on 10/24/23.    Interval: s/p revision    PAST MEDICAL & SURGICAL HISTORY:    DM (diabetes mellitus)  HTN (hypertension)  HLD (hyperlipidemia)  Herpes labialis  Anxiety  GERD (gastroesophageal reflux disease)  Kidney stones  20 years ago  Kidney disease  stage 4  Chronic kidney disease, unspecified CKD stage  Diabetic Charcot foot  PAD (peripheral artery disease)  S/P foot surgery, right  x 5 ( 2006 - 2013 )  Kidney stone  Removed by Laser x 2 ( 20 years ago )  H/O arthroscopy of right knee  Status post peripheral artery angioplasty    Social History:    reviewed     MEDICATIONS  (STANDING):  aspirin  chewable 81 milliGRAM(s) Oral daily  atorvastatin 80 milliGRAM(s) Oral at bedtime  cefepime   IVPB 1000 milliGRAM(s) IV Intermittent every 12 hours  clopidogrel Tablet 75 milliGRAM(s) Oral every 24 hours  dextrose 50% Injectable 25 Gram(s) IV Push once  epoetin ban (PROCRIT) Injectable 2000 Unit(s) SubCutaneous every 7 days  glucagon  Injectable 1 milliGRAM(s) IntraMuscular once  heparin   Injectable 5000 Unit(s) SubCutaneous every 8 hours  insulin glargine Injectable (LANTUS) 25 Unit(s) SubCutaneous every morning  insulin lispro (ADMELOG) corrective regimen sliding scale   SubCutaneous three times a day before meals  insulin lispro Injectable (ADMELOG) 8 Unit(s) SubCutaneous before breakfast  lactated ringers. 1000 milliLiter(s) (75 mL/Hr) IV Continuous <Continuous>  losartan 100 milliGRAM(s) Oral daily  magnesium oxide 400 milliGRAM(s) Oral Once  magnesium oxide 400 milliGRAM(s) Oral three times a day with meals  NIFEdipine XL 90 milliGRAM(s) Oral daily  sodium bicarbonate 650 milliGRAM(s) Oral three times a day  sodium chloride 0.9%. 1000 milliLiter(s) (50 mL/Hr) IV Continuous <Continuous>  vancomycin  IVPB 1250 milliGRAM(s) IV Intermittent every 24 hours    MEDICATIONS  (PRN):  ALPRAZolam 0.5 milliGRAM(s) Oral four times a day PRN anxiety  cloNIDine 0.1 milliGRAM(s) Oral every 8 hours PRN sbp>150  dextrose Oral Gel 15 Gram(s) Oral once PRN Blood Glucose LESS THAN 70 milliGRAM(s)/deciliter  HYDROmorphone  Injectable 0.5 milliGRAM(s) IV Push every 10 minutes PRN Severe Pain (7 - 10)  melatonin 3 milliGRAM(s) Oral at bedtime PRN Insomnia  morphine  - Injectable 2 milliGRAM(s) IV Push every 10 minutes PRN Moderate Pain (4 - 6)  ondansetron Injectable 4 milliGRAM(s) IV Push once PRN Nausea and/or Vomiting  oxycodone    5 mG/acetaminophen 325 mG 2 Tablet(s) Oral every 4 hours PRN Moderate Pain (4 - 6)      Allergies    No Known Allergies    Intolerances    Vital Signs Last 24 Hrs  T(C): 36.4 (31 Oct 2023 04:33), Max: 36.8 (30 Oct 2023 21:52)  T(F): 97.6 (31 Oct 2023 04:33), Max: 98.2 (30 Oct 2023 21:52)  HR: 60 (31 Oct 2023 04:33) (60 - 95)  BP: 165/78 (31 Oct 2023 04:33) (132/79 - 185/89)  BP(mean): --  RR: 18 (31 Oct 2023 04:33) (18 - 20)  SpO2: 98% (30 Oct 2023 22:41) (98% - 100%)    Parameters below as of 30 Oct 2023 22:41  Patient On (Oxygen Delivery Method): room air    CAPILLARY BLOOD GLUCOSE      POCT Blood Glucose.: 192 mg/dL (30 Oct 2023 23:21)  POCT Blood Glucose.: 230 mg/dL (30 Oct 2023 16:03)  POCT Blood Glucose.: 305 mg/dL (30 Oct 2023 13:48)  POCT Blood Glucose.: 282 mg/dL (30 Oct 2023 11:54)  POCT Blood Glucose.: 179 mg/dL (30 Oct 2023 07:23)    Diet, Regular:   Consistent Carbohydrate Evening Snack  DASH/TLC Sodium & Cholesterol Restricted (10-30-23 @ 22:02) [Active]      PHYSICAL EXAM  Constitutional: A&Ox4  Respiratory: cta b/l  Cardiovascular: s1 s2 rrr  Gastrointestinal: soft nt  nd + bs no rebound or guarding  Genitourinary: no cva tenderness  Extremities: normal rom, no edema, calf tenderness  Neurological:no focal deficits  Skin: +Right foot post surgical   Vascular, no cyanosis    LABS:                           9.2    7.89  )-----------( 453      ( 30 Oct 2023 08:28 )             28.7                           7.9    6.41  )-----------( 496      ( 28 Oct 2023 07:11 )             24.2     10-30    139  |  102  |  34<H>  ----------------------------<  197<H>  4.9   |  27  |  2.5<H>    Ca    9.3      30 Oct 2023 08:28  Mg     1.8     10-30    TPro  6.2  /  Alb  3.2<L>  /  TBili  0.3  /  DBili  x   /  AST  13  /  ALT  12  /  AlkPhos  152<H>  10-30      10-28    138  |  101  |  42<H>  ----------------------------<  332<H>  4.8   |  23  |  2.7<H>    Ca    8.7      28 Oct 2023 07:11  Mg     1.7     10-28

## 2023-10-31 NOTE — PROGRESS NOTE ADULT - ASSESSMENT
58-year-old male with Charcot foot s/p placement of an external fixator on 9/28/23 which had to be removed on 10/19 due to infection, CKD (baseline Cr 2.5-3.5, on list for kidney transplant), and IDDM (A1c 9.9), PAD, s/p  peripheral angiogram on 10/23/2023 with balloon angioplasty of PTA and DCB of R SFA and PTA of R PT and R AT via R groin and R pedal access, anemia recently received 2u pRBCS, pt was dc from hospital on 10/24/23 and now returns to hospital as directed by his podiatrist Dr Benson for TMA which will be done on 10/27/23 as pt has non healing wound and gangrene of the right foot.       #gangrene of the right foot.     - post operative amputation,    - reviewed labs   - continue monitoring     #PAD, s/p angioplasty  -c/w asa and plavix    #anemia  -s/p transfusion  -monitor    #HTN  -low sodium diet  -monitor bp  -cont meds from home nifedipine and cozaar    #DM  -ada diet  -monitor BS  -cont meds from home basal 20u daily  and lispro 8 qac  -insulin coverage prn eleavted blood sugar        #CKD  -creatinine stable at 2.9 today    #anxiety disorder  -xanax prn    #DVT prophylaxis  -sq heparin and scd    ADVANCE CARE: CPR    DISPOSITION: ACUTE

## 2023-10-31 NOTE — PROGRESS NOTE ADULT - SUBJECTIVE AND OBJECTIVE BOX
PROGRESS NOTE   Patient is a 58y old  Male who presents with a chief complaint of     HPI:   Patient  is a 58yoM with Charcot foot s/p placement of an external fixator on 9/28/23 which had to be removed on 10/19 due to infection, CKD (baseline Cr 2.5-3.5, on list for kidney transplant), and IDDM (A1c 9.9), PAD, s/p  peripheral angiogram on 10/23/2023 with balloon angioplasty of PTA and DCB of R SFA and PTA of R PT and R AT via R groin and R pedal access, anemia recently received 2u pRBCS, pt was dc from hospital on 10/24/23 and now returns to hospital as directed by his podiatrist Dr Benson for TMA which will be done on 10/27/23 as pt has non healing wound and gangrene of the right foot. Patient complains of moderate to sever pain of foot which is partially relieved with percocet and associated wound discharge.  Pt denies fevers or chills.  (26 Oct 2023 18:37)      Vital Signs Last 24 Hrs  T(C): 36.4 (31 Oct 2023 13:18), Max: 36.8 (30 Oct 2023 21:52)  T(F): 97.5 (31 Oct 2023 13:18), Max: 98.2 (30 Oct 2023 21:52)  HR: 68 (31 Oct 2023 13:18) (60 - 95)  BP: 120/61 (31 Oct 2023 13:18) (120/61 - 185/89)  BP(mean): --  RR: 16 (31 Oct 2023 13:18) (16 - 20)  SpO2: 98% (30 Oct 2023 22:41) (98% - 100%)    Parameters below as of 30 Oct 2023 22:41  Patient On (Oxygen Delivery Method): room air                              9.2    8.45  )-----------( 467      ( 31 Oct 2023 04:30 )             28.9               10-31    132<L>  |  97<L>  |  35<H>  ----------------------------<  451<HH>  5.4<H>   |  21  |  2.5<H>    Ca    9.0      31 Oct 2023 04:30  Mg     1.8     10-30    TPro  6.2  /  Alb  3.2<L>  /  TBili  0.3  /  DBili  x   /  AST  13  /  ALT  12  /  AlkPhos  152<H>  10-30      PHYSICAL EXAM  GEN: MARIOLA CURRY is a pleasant well-nourished, well developed 58y Male in no acute distress, alert awake, and oriented to person, place and time.   LE Focused:  S/P revision of TMA. Pt doing well with no complaints. Surgical site with mild bleeding noted. No malodor, no pus. Lateral wound shows some necrosis.  Will probably need serial debridements. Redress with xeroform. Pt is concerned that his plavix was stopped although his cardiologist staed he should be on it.  Will follow.

## 2023-11-01 LAB
ANION GAP SERPL CALC-SCNC: 11 MMOL/L — SIGNIFICANT CHANGE UP (ref 7–14)
ANION GAP SERPL CALC-SCNC: 11 MMOL/L — SIGNIFICANT CHANGE UP (ref 7–14)
BASOPHILS # BLD AUTO: 0.04 K/UL — SIGNIFICANT CHANGE UP (ref 0–0.2)
BASOPHILS # BLD AUTO: 0.04 K/UL — SIGNIFICANT CHANGE UP (ref 0–0.2)
BASOPHILS NFR BLD AUTO: 0.5 % — SIGNIFICANT CHANGE UP (ref 0–1)
BASOPHILS NFR BLD AUTO: 0.5 % — SIGNIFICANT CHANGE UP (ref 0–1)
BUN SERPL-MCNC: 38 MG/DL — HIGH (ref 10–20)
BUN SERPL-MCNC: 38 MG/DL — HIGH (ref 10–20)
CALCIUM SERPL-MCNC: 9.1 MG/DL — SIGNIFICANT CHANGE UP (ref 8.4–10.5)
CALCIUM SERPL-MCNC: 9.1 MG/DL — SIGNIFICANT CHANGE UP (ref 8.4–10.5)
CHLORIDE SERPL-SCNC: 101 MMOL/L — SIGNIFICANT CHANGE UP (ref 98–110)
CHLORIDE SERPL-SCNC: 101 MMOL/L — SIGNIFICANT CHANGE UP (ref 98–110)
CO2 SERPL-SCNC: 25 MMOL/L — SIGNIFICANT CHANGE UP (ref 17–32)
CO2 SERPL-SCNC: 25 MMOL/L — SIGNIFICANT CHANGE UP (ref 17–32)
CREAT SERPL-MCNC: 2.9 MG/DL — HIGH (ref 0.7–1.5)
CREAT SERPL-MCNC: 2.9 MG/DL — HIGH (ref 0.7–1.5)
CULTURE RESULTS: SIGNIFICANT CHANGE UP
EGFR: 24 ML/MIN/1.73M2 — LOW
EGFR: 24 ML/MIN/1.73M2 — LOW
EOSINOPHIL # BLD AUTO: 0.23 K/UL — SIGNIFICANT CHANGE UP (ref 0–0.7)
EOSINOPHIL # BLD AUTO: 0.23 K/UL — SIGNIFICANT CHANGE UP (ref 0–0.7)
EOSINOPHIL NFR BLD AUTO: 2.6 % — SIGNIFICANT CHANGE UP (ref 0–8)
EOSINOPHIL NFR BLD AUTO: 2.6 % — SIGNIFICANT CHANGE UP (ref 0–8)
GLUCOSE BLDC GLUCOMTR-MCNC: 145 MG/DL — HIGH (ref 70–99)
GLUCOSE BLDC GLUCOMTR-MCNC: 145 MG/DL — HIGH (ref 70–99)
GLUCOSE BLDC GLUCOMTR-MCNC: 252 MG/DL — HIGH (ref 70–99)
GLUCOSE BLDC GLUCOMTR-MCNC: 252 MG/DL — HIGH (ref 70–99)
GLUCOSE BLDC GLUCOMTR-MCNC: 280 MG/DL — HIGH (ref 70–99)
GLUCOSE BLDC GLUCOMTR-MCNC: 280 MG/DL — HIGH (ref 70–99)
GLUCOSE SERPL-MCNC: 230 MG/DL — HIGH (ref 70–99)
GLUCOSE SERPL-MCNC: 230 MG/DL — HIGH (ref 70–99)
HCT VFR BLD CALC: 25.2 % — LOW (ref 42–52)
HCT VFR BLD CALC: 25.2 % — LOW (ref 42–52)
HGB BLD-MCNC: 8.2 G/DL — LOW (ref 14–18)
HGB BLD-MCNC: 8.2 G/DL — LOW (ref 14–18)
IMM GRANULOCYTES NFR BLD AUTO: 0.5 % — HIGH (ref 0.1–0.3)
IMM GRANULOCYTES NFR BLD AUTO: 0.5 % — HIGH (ref 0.1–0.3)
LYMPHOCYTES # BLD AUTO: 1.66 K/UL — SIGNIFICANT CHANGE UP (ref 1.2–3.4)
LYMPHOCYTES # BLD AUTO: 1.66 K/UL — SIGNIFICANT CHANGE UP (ref 1.2–3.4)
LYMPHOCYTES # BLD AUTO: 18.8 % — LOW (ref 20.5–51.1)
LYMPHOCYTES # BLD AUTO: 18.8 % — LOW (ref 20.5–51.1)
MCHC RBC-ENTMCNC: 26.1 PG — LOW (ref 27–31)
MCHC RBC-ENTMCNC: 26.1 PG — LOW (ref 27–31)
MCHC RBC-ENTMCNC: 32.5 G/DL — SIGNIFICANT CHANGE UP (ref 32–37)
MCHC RBC-ENTMCNC: 32.5 G/DL — SIGNIFICANT CHANGE UP (ref 32–37)
MCV RBC AUTO: 80.3 FL — SIGNIFICANT CHANGE UP (ref 80–94)
MCV RBC AUTO: 80.3 FL — SIGNIFICANT CHANGE UP (ref 80–94)
MONOCYTES # BLD AUTO: 0.81 K/UL — HIGH (ref 0.1–0.6)
MONOCYTES # BLD AUTO: 0.81 K/UL — HIGH (ref 0.1–0.6)
MONOCYTES NFR BLD AUTO: 9.2 % — SIGNIFICANT CHANGE UP (ref 1.7–9.3)
MONOCYTES NFR BLD AUTO: 9.2 % — SIGNIFICANT CHANGE UP (ref 1.7–9.3)
NEUTROPHILS # BLD AUTO: 6.05 K/UL — SIGNIFICANT CHANGE UP (ref 1.4–6.5)
NEUTROPHILS # BLD AUTO: 6.05 K/UL — SIGNIFICANT CHANGE UP (ref 1.4–6.5)
NEUTROPHILS NFR BLD AUTO: 68.4 % — SIGNIFICANT CHANGE UP (ref 42.2–75.2)
NEUTROPHILS NFR BLD AUTO: 68.4 % — SIGNIFICANT CHANGE UP (ref 42.2–75.2)
NRBC # BLD: 0 /100 WBCS — SIGNIFICANT CHANGE UP (ref 0–0)
NRBC # BLD: 0 /100 WBCS — SIGNIFICANT CHANGE UP (ref 0–0)
PLATELET # BLD AUTO: 452 K/UL — HIGH (ref 130–400)
PLATELET # BLD AUTO: 452 K/UL — HIGH (ref 130–400)
PMV BLD: 9 FL — SIGNIFICANT CHANGE UP (ref 7.4–10.4)
PMV BLD: 9 FL — SIGNIFICANT CHANGE UP (ref 7.4–10.4)
POTASSIUM SERPL-MCNC: 4.9 MMOL/L — SIGNIFICANT CHANGE UP (ref 3.5–5)
POTASSIUM SERPL-MCNC: 4.9 MMOL/L — SIGNIFICANT CHANGE UP (ref 3.5–5)
POTASSIUM SERPL-SCNC: 4.9 MMOL/L — SIGNIFICANT CHANGE UP (ref 3.5–5)
POTASSIUM SERPL-SCNC: 4.9 MMOL/L — SIGNIFICANT CHANGE UP (ref 3.5–5)
RBC # BLD: 3.14 M/UL — LOW (ref 4.7–6.1)
RBC # BLD: 3.14 M/UL — LOW (ref 4.7–6.1)
RBC # FLD: 17.5 % — HIGH (ref 11.5–14.5)
RBC # FLD: 17.5 % — HIGH (ref 11.5–14.5)
SODIUM SERPL-SCNC: 137 MMOL/L — SIGNIFICANT CHANGE UP (ref 135–146)
SODIUM SERPL-SCNC: 137 MMOL/L — SIGNIFICANT CHANGE UP (ref 135–146)
SPECIMEN SOURCE: SIGNIFICANT CHANGE UP
VANCOMYCIN FLD-MCNC: 39.8 UG/ML — HIGH (ref 5–10)
VANCOMYCIN FLD-MCNC: 39.8 UG/ML — HIGH (ref 5–10)
WBC # BLD: 8.83 K/UL — SIGNIFICANT CHANGE UP (ref 4.8–10.8)
WBC # BLD: 8.83 K/UL — SIGNIFICANT CHANGE UP (ref 4.8–10.8)
WBC # FLD AUTO: 8.83 K/UL — SIGNIFICANT CHANGE UP (ref 4.8–10.8)
WBC # FLD AUTO: 8.83 K/UL — SIGNIFICANT CHANGE UP (ref 4.8–10.8)

## 2023-11-01 RX ORDER — SODIUM CHLORIDE 9 MG/ML
1000 INJECTION, SOLUTION INTRAVENOUS
Refills: 0 | Status: DISCONTINUED | OUTPATIENT
Start: 2023-11-01 | End: 2023-11-03

## 2023-11-01 RX ADMIN — SODIUM CHLORIDE 75 MILLILITER(S): 9 INJECTION, SOLUTION INTRAVENOUS at 11:09

## 2023-11-01 RX ADMIN — Medication 0.5 MILLIGRAM(S): at 17:06

## 2023-11-01 RX ADMIN — Medication 650 MILLIGRAM(S): at 21:54

## 2023-11-01 RX ADMIN — CEFEPIME 100 MILLIGRAM(S): 1 INJECTION, POWDER, FOR SOLUTION INTRAMUSCULAR; INTRAVENOUS at 05:40

## 2023-11-01 RX ADMIN — CLOPIDOGREL BISULFATE 75 MILLIGRAM(S): 75 TABLET, FILM COATED ORAL at 05:51

## 2023-11-01 RX ADMIN — MAGNESIUM OXIDE 400 MG ORAL TABLET 400 MILLIGRAM(S): 241.3 TABLET ORAL at 07:48

## 2023-11-01 RX ADMIN — LOSARTAN POTASSIUM 100 MILLIGRAM(S): 100 TABLET, FILM COATED ORAL at 05:51

## 2023-11-01 RX ADMIN — Medication 6: at 08:03

## 2023-11-01 RX ADMIN — MAGNESIUM OXIDE 400 MG ORAL TABLET 400 MILLIGRAM(S): 241.3 TABLET ORAL at 11:09

## 2023-11-01 RX ADMIN — Medication 0.5 MILLIGRAM(S): at 21:54

## 2023-11-01 RX ADMIN — Medication 8 UNIT(S): at 08:01

## 2023-11-01 RX ADMIN — Medication 650 MILLIGRAM(S): at 14:25

## 2023-11-01 RX ADMIN — INSULIN GLARGINE 25 UNIT(S): 100 INJECTION, SOLUTION SUBCUTANEOUS at 10:19

## 2023-11-01 RX ADMIN — HEPARIN SODIUM 5000 UNIT(S): 5000 INJECTION INTRAVENOUS; SUBCUTANEOUS at 22:15

## 2023-11-01 RX ADMIN — Medication 0.5 MILLIGRAM(S): at 11:09

## 2023-11-01 RX ADMIN — Medication 6: at 11:58

## 2023-11-01 RX ADMIN — HEPARIN SODIUM 5000 UNIT(S): 5000 INJECTION INTRAVENOUS; SUBCUTANEOUS at 14:29

## 2023-11-01 RX ADMIN — CEFEPIME 100 MILLIGRAM(S): 1 INJECTION, POWDER, FOR SOLUTION INTRAMUSCULAR; INTRAVENOUS at 17:05

## 2023-11-01 RX ADMIN — Medication 90 MILLIGRAM(S): at 05:51

## 2023-11-01 RX ADMIN — Medication 8 UNIT(S): at 16:52

## 2023-11-01 RX ADMIN — Medication 0.5 MILLIGRAM(S): at 05:52

## 2023-11-01 RX ADMIN — SODIUM CHLORIDE 75 MILLILITER(S): 9 INJECTION, SOLUTION INTRAVENOUS at 22:35

## 2023-11-01 RX ADMIN — Medication 166.67 MILLIGRAM(S): at 07:41

## 2023-11-01 RX ADMIN — HEPARIN SODIUM 5000 UNIT(S): 5000 INJECTION INTRAVENOUS; SUBCUTANEOUS at 05:51

## 2023-11-01 RX ADMIN — Medication 81 MILLIGRAM(S): at 11:08

## 2023-11-01 RX ADMIN — MAGNESIUM OXIDE 400 MG ORAL TABLET 400 MILLIGRAM(S): 241.3 TABLET ORAL at 17:05

## 2023-11-01 RX ADMIN — Medication 8 UNIT(S): at 11:58

## 2023-11-01 RX ADMIN — Medication 650 MILLIGRAM(S): at 05:40

## 2023-11-01 NOTE — PROGRESS NOTE ADULT - SUBJECTIVE AND OBJECTIVE BOX
58-year-old male with Charcot foot s/p placement of an external fixator on 9/28/23 which had to be removed on 10/19 due to infection, CKD (baseline Cr 2.5-3.5, on list for kidney transplant), and IDDM (A1c 9.9), PAD, s/p  peripheral angiogram on 10/23/2023 with balloon angioplasty of PTA and DCB of R SFA and PTA of R PT and R AT via R groin and R pedal access, anemia recently received 2u pRBCS, pt was dc from hospital on 10/24/23.    Interval: s/p revision    PAST MEDICAL & SURGICAL HISTORY:    DM (diabetes mellitus)  HTN (hypertension)  HLD (hyperlipidemia)  Herpes labialis  Anxiety  GERD (gastroesophageal reflux disease)  Kidney stones  20 years ago  Kidney disease  stage 4  Chronic kidney disease, unspecified CKD stage  Diabetic Charcot foot  PAD (peripheral artery disease)  S/P foot surgery, right  x 5 ( 2006 - 2013 )  Kidney stone  Removed by Laser x 2 ( 20 years ago )  H/O arthroscopy of right knee  Status post peripheral artery angioplasty    Social History:    reviewed     MEDICATIONS  (STANDING):  aspirin  chewable 81 milliGRAM(s) Oral daily  atorvastatin 80 milliGRAM(s) Oral at bedtime  cefepime   IVPB 1000 milliGRAM(s) IV Intermittent every 12 hours  clopidogrel Tablet 75 milliGRAM(s) Oral every 24 hours  dextrose 50% Injectable 25 Gram(s) IV Push once  epoetin ban (PROCRIT) Injectable 2000 Unit(s) SubCutaneous every 7 days  glucagon  Injectable 1 milliGRAM(s) IntraMuscular once  heparin   Injectable 5000 Unit(s) SubCutaneous every 8 hours  insulin glargine Injectable (LANTUS) 25 Unit(s) SubCutaneous every morning  insulin lispro (ADMELOG) corrective regimen sliding scale   SubCutaneous three times a day before meals  insulin lispro Injectable (ADMELOG) 8 Unit(s) SubCutaneous before dinner  insulin lispro Injectable (ADMELOG) 8 Unit(s) SubCutaneous before breakfast  insulin lispro Injectable (ADMELOG) 8 Unit(s) SubCutaneous before lunch  losartan 100 milliGRAM(s) Oral daily  magnesium oxide 400 milliGRAM(s) Oral three times a day with meals  NIFEdipine XL 90 milliGRAM(s) Oral daily  sodium bicarbonate 650 milliGRAM(s) Oral three times a day  sodium chloride 0.9%. 1000 milliLiter(s) (50 mL/Hr) IV Continuous <Continuous>  vancomycin  IVPB 1250 milliGRAM(s) IV Intermittent every 24 hours    MEDICATIONS  (PRN):  ALPRAZolam 0.5 milliGRAM(s) Oral four times a day PRN anxiety  cloNIDine 0.1 milliGRAM(s) Oral every 8 hours PRN sbp>150  dextrose Oral Gel 15 Gram(s) Oral once PRN Blood Glucose LESS THAN 70 milliGRAM(s)/deciliter  melatonin 3 milliGRAM(s) Oral at bedtime PRN Insomnia  oxycodone    5 mG/acetaminophen 325 mG 2 Tablet(s) Oral every 4 hours PRN Moderate Pain (4 - 6)      Allergies    No Known Allergies    Intolerances    Vital Signs Last 24 Hrs  T(C): 36.9 (01 Nov 2023 04:49), Max: 36.9 (01 Nov 2023 04:49)  T(F): 98.4 (01 Nov 2023 04:49), Max: 98.4 (01 Nov 2023 04:49)  HR: 78 (01 Nov 2023 04:49) (68 - 78)  BP: 161/74 (01 Nov 2023 04:49) (120/61 - 161/74)  BP(mean): --  RR: 18 (01 Nov 2023 04:49) (16 - 18)  SpO2: 96% (01 Nov 2023 04:49) (96% - 96%)    Parameters below as of 01 Nov 2023 04:49  Patient On (Oxygen Delivery Method): room air    CAPILLARY BLOOD GLUCOSE      POCT Blood Glucose.: 124 mg/dL (31 Oct 2023 21:07)  POCT Blood Glucose.: 192 mg/dL (31 Oct 2023 16:32)  POCT Blood Glucose.: 306 mg/dL (31 Oct 2023 11:54)  POCT Blood Glucose.: 431 mg/dL (31 Oct 2023 07:49)        Diet, Regular:   Consistent Carbohydrate Evening Snack  DASH/TLC Sodium & Cholesterol Restricted (10-30-23 @ 22:02) [Active]      PHYSICAL EXAM  Constitutional: A&Ox4  Respiratory: cta b/l  Cardiovascular: s1 s2 rrr  Gastrointestinal: soft nt  nd + bs no rebound or guarding  Genitourinary: no cva tenderness  Extremities: normal rom, no edema, calf tenderness  Neurological:no focal deficits  Skin: +Right foot post surgical   Vascular, no cyanosis    LABS:                           9.2    8.45  )-----------( 467      ( 31 Oct 2023 04:30 )             28.9                           9.2    7.89  )-----------( 453      ( 30 Oct 2023 08:28 )             28.7                           7.9    6.41  )-----------( 496      ( 28 Oct 2023 07:11 )             24.2     10-31    132<L>  |  97<L>  |  35<H>  ----------------------------<  451<HH>  5.4<H>   |  21  |  2.5<H>    Ca    9.0      31 Oct 2023 04:30  Mg     1.8     10-30    TPro  6.2  /  Alb  3.2<L>  /  TBili  0.3  /  DBili  x   /  AST  13  /  ALT  12  /  AlkPhos  152<H>  10-30      10-30    139  |  102  |  34<H>  ----------------------------<  197<H>  4.9   |  27  |  2.5<H>    Ca    9.3      30 Oct 2023 08:28  Mg     1.8     10-30    TPro  6.2  /  Alb  3.2<L>  /  TBili  0.3  /  DBili  x   /  AST  13  /  ALT  12  /  AlkPhos  152<H>  10-30      10-28    138  |  101  |  42<H>  ----------------------------<  332<H>  4.8   |  23  |  2.7<H>    Ca    8.7      28 Oct 2023 07:11  Mg     1.7     10-28

## 2023-11-02 LAB
-  AMIKACIN: SIGNIFICANT CHANGE UP
-  AMIKACIN: SIGNIFICANT CHANGE UP
-  AZTREONAM: SIGNIFICANT CHANGE UP
-  AZTREONAM: SIGNIFICANT CHANGE UP
-  CEFEPIME: SIGNIFICANT CHANGE UP
-  CEFEPIME: SIGNIFICANT CHANGE UP
-  CEFTAZIDIME: SIGNIFICANT CHANGE UP
-  CEFTAZIDIME: SIGNIFICANT CHANGE UP
-  CIPROFLOXACIN: SIGNIFICANT CHANGE UP
-  CIPROFLOXACIN: SIGNIFICANT CHANGE UP
-  GENTAMICIN: SIGNIFICANT CHANGE UP
-  GENTAMICIN: SIGNIFICANT CHANGE UP
-  IMIPENEM: SIGNIFICANT CHANGE UP
-  IMIPENEM: SIGNIFICANT CHANGE UP
-  LEVOFLOXACIN: SIGNIFICANT CHANGE UP
-  LEVOFLOXACIN: SIGNIFICANT CHANGE UP
-  MEROPENEM: SIGNIFICANT CHANGE UP
-  MEROPENEM: SIGNIFICANT CHANGE UP
-  PIPERACILLIN/TAZOBACTAM: SIGNIFICANT CHANGE UP
-  PIPERACILLIN/TAZOBACTAM: SIGNIFICANT CHANGE UP
-  TOBRAMYCIN: SIGNIFICANT CHANGE UP
-  TOBRAMYCIN: SIGNIFICANT CHANGE UP
ANION GAP SERPL CALC-SCNC: 10 MMOL/L — SIGNIFICANT CHANGE UP (ref 7–14)
ANION GAP SERPL CALC-SCNC: 10 MMOL/L — SIGNIFICANT CHANGE UP (ref 7–14)
BASOPHILS # BLD AUTO: 0.05 K/UL — SIGNIFICANT CHANGE UP (ref 0–0.2)
BASOPHILS # BLD AUTO: 0.05 K/UL — SIGNIFICANT CHANGE UP (ref 0–0.2)
BASOPHILS NFR BLD AUTO: 0.6 % — SIGNIFICANT CHANGE UP (ref 0–1)
BASOPHILS NFR BLD AUTO: 0.6 % — SIGNIFICANT CHANGE UP (ref 0–1)
BLD GP AB SCN SERPL QL: SIGNIFICANT CHANGE UP
BLD GP AB SCN SERPL QL: SIGNIFICANT CHANGE UP
BUN SERPL-MCNC: 35 MG/DL — HIGH (ref 10–20)
BUN SERPL-MCNC: 35 MG/DL — HIGH (ref 10–20)
CALCIUM SERPL-MCNC: 8.8 MG/DL — SIGNIFICANT CHANGE UP (ref 8.4–10.5)
CALCIUM SERPL-MCNC: 8.8 MG/DL — SIGNIFICANT CHANGE UP (ref 8.4–10.5)
CHLORIDE SERPL-SCNC: 99 MMOL/L — SIGNIFICANT CHANGE UP (ref 98–110)
CHLORIDE SERPL-SCNC: 99 MMOL/L — SIGNIFICANT CHANGE UP (ref 98–110)
CO2 SERPL-SCNC: 26 MMOL/L — SIGNIFICANT CHANGE UP (ref 17–32)
CO2 SERPL-SCNC: 26 MMOL/L — SIGNIFICANT CHANGE UP (ref 17–32)
CREAT SERPL-MCNC: 2.8 MG/DL — HIGH (ref 0.7–1.5)
CREAT SERPL-MCNC: 2.8 MG/DL — HIGH (ref 0.7–1.5)
CULTURE RESULTS: ABNORMAL
CULTURE RESULTS: ABNORMAL
EGFR: 25 ML/MIN/1.73M2 — LOW
EGFR: 25 ML/MIN/1.73M2 — LOW
EOSINOPHIL # BLD AUTO: 0.23 K/UL — SIGNIFICANT CHANGE UP (ref 0–0.7)
EOSINOPHIL # BLD AUTO: 0.23 K/UL — SIGNIFICANT CHANGE UP (ref 0–0.7)
EOSINOPHIL NFR BLD AUTO: 2.9 % — SIGNIFICANT CHANGE UP (ref 0–8)
EOSINOPHIL NFR BLD AUTO: 2.9 % — SIGNIFICANT CHANGE UP (ref 0–8)
GLUCOSE BLDC GLUCOMTR-MCNC: 101 MG/DL — HIGH (ref 70–99)
GLUCOSE BLDC GLUCOMTR-MCNC: 101 MG/DL — HIGH (ref 70–99)
GLUCOSE BLDC GLUCOMTR-MCNC: 107 MG/DL — HIGH (ref 70–99)
GLUCOSE BLDC GLUCOMTR-MCNC: 107 MG/DL — HIGH (ref 70–99)
GLUCOSE BLDC GLUCOMTR-MCNC: 259 MG/DL — HIGH (ref 70–99)
GLUCOSE BLDC GLUCOMTR-MCNC: 259 MG/DL — HIGH (ref 70–99)
GLUCOSE BLDC GLUCOMTR-MCNC: 61 MG/DL — LOW (ref 70–99)
GLUCOSE BLDC GLUCOMTR-MCNC: 61 MG/DL — LOW (ref 70–99)
GLUCOSE SERPL-MCNC: 272 MG/DL — HIGH (ref 70–99)
GLUCOSE SERPL-MCNC: 272 MG/DL — HIGH (ref 70–99)
HCT VFR BLD CALC: 25.3 % — LOW (ref 42–52)
HCT VFR BLD CALC: 25.3 % — LOW (ref 42–52)
HGB BLD-MCNC: 7.9 G/DL — LOW (ref 14–18)
HGB BLD-MCNC: 7.9 G/DL — LOW (ref 14–18)
IMM GRANULOCYTES NFR BLD AUTO: 0.5 % — HIGH (ref 0.1–0.3)
IMM GRANULOCYTES NFR BLD AUTO: 0.5 % — HIGH (ref 0.1–0.3)
LYMPHOCYTES # BLD AUTO: 1.87 K/UL — SIGNIFICANT CHANGE UP (ref 1.2–3.4)
LYMPHOCYTES # BLD AUTO: 1.87 K/UL — SIGNIFICANT CHANGE UP (ref 1.2–3.4)
LYMPHOCYTES # BLD AUTO: 23.8 % — SIGNIFICANT CHANGE UP (ref 20.5–51.1)
LYMPHOCYTES # BLD AUTO: 23.8 % — SIGNIFICANT CHANGE UP (ref 20.5–51.1)
MAGNESIUM SERPL-MCNC: 2 MG/DL — SIGNIFICANT CHANGE UP (ref 1.8–2.4)
MAGNESIUM SERPL-MCNC: 2 MG/DL — SIGNIFICANT CHANGE UP (ref 1.8–2.4)
MCHC RBC-ENTMCNC: 26.4 PG — LOW (ref 27–31)
MCHC RBC-ENTMCNC: 26.4 PG — LOW (ref 27–31)
MCHC RBC-ENTMCNC: 31.2 G/DL — LOW (ref 32–37)
MCHC RBC-ENTMCNC: 31.2 G/DL — LOW (ref 32–37)
MCV RBC AUTO: 84.6 FL — SIGNIFICANT CHANGE UP (ref 80–94)
MCV RBC AUTO: 84.6 FL — SIGNIFICANT CHANGE UP (ref 80–94)
METHOD TYPE: SIGNIFICANT CHANGE UP
METHOD TYPE: SIGNIFICANT CHANGE UP
MONOCYTES # BLD AUTO: 0.75 K/UL — HIGH (ref 0.1–0.6)
MONOCYTES # BLD AUTO: 0.75 K/UL — HIGH (ref 0.1–0.6)
MONOCYTES NFR BLD AUTO: 9.6 % — HIGH (ref 1.7–9.3)
MONOCYTES NFR BLD AUTO: 9.6 % — HIGH (ref 1.7–9.3)
NEUTROPHILS # BLD AUTO: 4.91 K/UL — SIGNIFICANT CHANGE UP (ref 1.4–6.5)
NEUTROPHILS # BLD AUTO: 4.91 K/UL — SIGNIFICANT CHANGE UP (ref 1.4–6.5)
NEUTROPHILS NFR BLD AUTO: 62.6 % — SIGNIFICANT CHANGE UP (ref 42.2–75.2)
NEUTROPHILS NFR BLD AUTO: 62.6 % — SIGNIFICANT CHANGE UP (ref 42.2–75.2)
NRBC # BLD: 0 /100 WBCS — SIGNIFICANT CHANGE UP (ref 0–0)
NRBC # BLD: 0 /100 WBCS — SIGNIFICANT CHANGE UP (ref 0–0)
ORGANISM # SPEC MICROSCOPIC CNT: ABNORMAL
ORGANISM # SPEC MICROSCOPIC CNT: ABNORMAL
ORGANISM # SPEC MICROSCOPIC CNT: SIGNIFICANT CHANGE UP
ORGANISM # SPEC MICROSCOPIC CNT: SIGNIFICANT CHANGE UP
PLATELET # BLD AUTO: 454 K/UL — HIGH (ref 130–400)
PLATELET # BLD AUTO: 454 K/UL — HIGH (ref 130–400)
PMV BLD: 9 FL — SIGNIFICANT CHANGE UP (ref 7.4–10.4)
PMV BLD: 9 FL — SIGNIFICANT CHANGE UP (ref 7.4–10.4)
POTASSIUM SERPL-MCNC: 4.9 MMOL/L — SIGNIFICANT CHANGE UP (ref 3.5–5)
POTASSIUM SERPL-MCNC: 4.9 MMOL/L — SIGNIFICANT CHANGE UP (ref 3.5–5)
POTASSIUM SERPL-SCNC: 4.9 MMOL/L — SIGNIFICANT CHANGE UP (ref 3.5–5)
POTASSIUM SERPL-SCNC: 4.9 MMOL/L — SIGNIFICANT CHANGE UP (ref 3.5–5)
RBC # BLD: 2.99 M/UL — LOW (ref 4.7–6.1)
RBC # BLD: 2.99 M/UL — LOW (ref 4.7–6.1)
RBC # FLD: 17.3 % — HIGH (ref 11.5–14.5)
RBC # FLD: 17.3 % — HIGH (ref 11.5–14.5)
SODIUM SERPL-SCNC: 135 MMOL/L — SIGNIFICANT CHANGE UP (ref 135–146)
SODIUM SERPL-SCNC: 135 MMOL/L — SIGNIFICANT CHANGE UP (ref 135–146)
SPECIMEN SOURCE: SIGNIFICANT CHANGE UP
SPECIMEN SOURCE: SIGNIFICANT CHANGE UP
SURGICAL PATHOLOGY STUDY: SIGNIFICANT CHANGE UP
SURGICAL PATHOLOGY STUDY: SIGNIFICANT CHANGE UP
WBC # BLD: 7.85 K/UL — SIGNIFICANT CHANGE UP (ref 4.8–10.8)
WBC # BLD: 7.85 K/UL — SIGNIFICANT CHANGE UP (ref 4.8–10.8)
WBC # FLD AUTO: 7.85 K/UL — SIGNIFICANT CHANGE UP (ref 4.8–10.8)
WBC # FLD AUTO: 7.85 K/UL — SIGNIFICANT CHANGE UP (ref 4.8–10.8)

## 2023-11-02 PROCEDURE — 99222 1ST HOSP IP/OBS MODERATE 55: CPT

## 2023-11-02 RX ORDER — DEXTROSE 50 % IN WATER 50 %
15 SYRINGE (ML) INTRAVENOUS ONCE
Refills: 0 | Status: DISCONTINUED | OUTPATIENT
Start: 2023-11-02 | End: 2023-11-04

## 2023-11-02 RX ORDER — CHLORHEXIDINE GLUCONATE 213 G/1000ML
1 SOLUTION TOPICAL
Refills: 0 | Status: DISCONTINUED | OUTPATIENT
Start: 2023-11-02 | End: 2023-11-04

## 2023-11-02 RX ORDER — VANCOMYCIN HCL 1 G
1000 VIAL (EA) INTRAVENOUS ONCE
Refills: 0 | Status: COMPLETED | OUTPATIENT
Start: 2023-11-02 | End: 2023-11-02

## 2023-11-02 RX ORDER — MEROPENEM 1 G/30ML
1000 INJECTION INTRAVENOUS EVERY 12 HOURS
Refills: 0 | Status: DISCONTINUED | OUTPATIENT
Start: 2023-11-02 | End: 2023-11-04

## 2023-11-02 RX ORDER — ROSUVASTATIN CALCIUM 5 MG/1
20 TABLET ORAL AT BEDTIME
Refills: 0 | Status: DISCONTINUED | OUTPATIENT
Start: 2023-11-02 | End: 2023-11-04

## 2023-11-02 RX ADMIN — Medication 6: at 08:45

## 2023-11-02 RX ADMIN — INSULIN GLARGINE 25 UNIT(S): 100 INJECTION, SOLUTION SUBCUTANEOUS at 08:51

## 2023-11-02 RX ADMIN — Medication 250 MILLIGRAM(S): at 06:22

## 2023-11-02 RX ADMIN — Medication 650 MILLIGRAM(S): at 20:45

## 2023-11-02 RX ADMIN — HEPARIN SODIUM 5000 UNIT(S): 5000 INJECTION INTRAVENOUS; SUBCUTANEOUS at 14:02

## 2023-11-02 RX ADMIN — MAGNESIUM OXIDE 400 MG ORAL TABLET 400 MILLIGRAM(S): 241.3 TABLET ORAL at 18:17

## 2023-11-02 RX ADMIN — Medication 0.5 MILLIGRAM(S): at 20:45

## 2023-11-02 RX ADMIN — Medication 81 MILLIGRAM(S): at 11:32

## 2023-11-02 RX ADMIN — Medication 650 MILLIGRAM(S): at 05:22

## 2023-11-02 RX ADMIN — Medication 0.5 MILLIGRAM(S): at 05:22

## 2023-11-02 RX ADMIN — MAGNESIUM OXIDE 400 MG ORAL TABLET 400 MILLIGRAM(S): 241.3 TABLET ORAL at 08:55

## 2023-11-02 RX ADMIN — ROSUVASTATIN CALCIUM 20 MILLIGRAM(S): 5 TABLET ORAL at 20:48

## 2023-11-02 RX ADMIN — Medication 8 UNIT(S): at 08:45

## 2023-11-02 RX ADMIN — CEFEPIME 100 MILLIGRAM(S): 1 INJECTION, POWDER, FOR SOLUTION INTRAMUSCULAR; INTRAVENOUS at 05:25

## 2023-11-02 RX ADMIN — MAGNESIUM OXIDE 400 MG ORAL TABLET 400 MILLIGRAM(S): 241.3 TABLET ORAL at 11:32

## 2023-11-02 RX ADMIN — MEROPENEM 100 MILLIGRAM(S): 1 INJECTION INTRAVENOUS at 18:16

## 2023-11-02 RX ADMIN — HEPARIN SODIUM 5000 UNIT(S): 5000 INJECTION INTRAVENOUS; SUBCUTANEOUS at 20:45

## 2023-11-02 RX ADMIN — SODIUM CHLORIDE 75 MILLILITER(S): 9 INJECTION, SOLUTION INTRAVENOUS at 13:52

## 2023-11-02 RX ADMIN — Medication 650 MILLIGRAM(S): at 14:02

## 2023-11-02 RX ADMIN — LOSARTAN POTASSIUM 100 MILLIGRAM(S): 100 TABLET, FILM COATED ORAL at 05:21

## 2023-11-02 RX ADMIN — CLOPIDOGREL BISULFATE 75 MILLIGRAM(S): 75 TABLET, FILM COATED ORAL at 05:21

## 2023-11-02 RX ADMIN — Medication 90 MILLIGRAM(S): at 05:22

## 2023-11-02 RX ADMIN — HEPARIN SODIUM 5000 UNIT(S): 5000 INJECTION INTRAVENOUS; SUBCUTANEOUS at 05:23

## 2023-11-02 NOTE — PROGRESS NOTE ADULT - SUBJECTIVE AND OBJECTIVE BOX
PODIATRY PROGRESS NOTE   [id number] MARIOLA CURRY is a pleasant well-nourished, well developed 58y Male in no acute distress, alert awake, and oriented to person, place and time.   Patient is a 58y old  Male who presents with a chief complaint of DFU (02 Nov 2023 09:55)    HPI:   Patient  is a 58yoM with Charcot foot s/p placement of an external fixator on 9/28/23 which had to be removed on 10/19 due to infection, CKD (baseline Cr 2.5-3.5, on list for kidney transplant), and IDDM (A1c 9.9), PAD, s/p  peripheral angiogram on 10/23/2023 with balloon angioplasty of PTA and DCB of R SFA and PTA of R PT and R AT via R groin and R pedal access, anemia recently received 2u pRBCS, pt was dc from hospital on 10/24/23 and now returns to hospital as directed by his podiatrist Dr Benson for TMA which will be done on 10/27/23 as pt has non healing wound and gangrene of the right foot. Patient complains of moderate to sever pain of foot which is partially relieved with percocet and associated wound discharge.  Pt denies fevers or chills.  (26 Oct 2023 18:37)    Patient is being followed by Podiatry Service for management of: __.  Vital Signs Last 24 Hrs  T(C): 36.7 (02 Nov 2023 05:00), Max: 36.7 (02 Nov 2023 05:00)  T(F): 98 (02 Nov 2023 05:00), Max: 98 (02 Nov 2023 05:00)  HR: 67 (02 Nov 2023 05:00) (67 - 73)  BP: 160/82 (02 Nov 2023 05:00) (119/65 - 160/82)  BP(mean): --  RR: 16 (02 Nov 2023 05:00) (16 - 16)  SpO2: 99% (02 Nov 2023 05:00) (99% - 99%)    Parameters below as of 02 Nov 2023 05:00  Patient On (Oxygen Delivery Method): room air                            7.9    7.85  )-----------( 454      ( 02 Nov 2023 04:30 )             25.3                 11-02    135  |  99  |  35<H>  ----------------------------<  272<H>  4.9   |  26  |  2.8<H>    Ca    8.8      02 Nov 2023 04:30  Mg     2.0     11-02        A:  LE Focused examination:  __ extremity examined.   P:  Wound Care Orders:  Podiatry to follow up in q__h for continued evaluation and management.    Attending updated and added to note for review.   11-02-23 @ 12:18 PODIATRY PROGRESS NOTE   [id number] MARIOLA CURRY is a pleasant well-nourished, well developed 58y Male in no acute distress, alert awake, and oriented to person, place and time.   Patient is a 58y old  Male who presents with a chief complaint of DFU (02 Nov 2023 09:55)    HPI:   Patient  is a 58yoM with Charcot foot s/p placement of an external fixator on 9/28/23 which had to be removed on 10/19 due to infection, CKD (baseline Cr 2.5-3.5, on list for kidney transplant), and IDDM (A1c 9.9), PAD, s/p  peripheral angiogram on 10/23/2023 with balloon angioplasty of PTA and DCB of R SFA and PTA of R PT and R AT via R groin and R pedal access, anemia recently received 2u pRBCS, pt was dc from hospital on 10/24/23 and now returns to hospital as directed by his podiatrist Dr Benson for TMA which will be done on 10/27/23 as pt has non healing wound and gangrene of the right foot. Patient complains of moderate to sever pain of foot which is partially relieved with percocet and associated wound discharge.  Pt denies fevers or chills.  (26 Oct 2023 18:37)    Patient is being followed by Podiatry Service for management of: __.  Vital Signs Last 24 Hrs  T(C): 36.7 (02 Nov 2023 05:00), Max: 36.7 (02 Nov 2023 05:00)  T(F): 98 (02 Nov 2023 05:00), Max: 98 (02 Nov 2023 05:00)  HR: 67 (02 Nov 2023 05:00) (67 - 73)  BP: 160/82 (02 Nov 2023 05:00) (119/65 - 160/82)  BP(mean): --  RR: 16 (02 Nov 2023 05:00) (16 - 16)  SpO2: 99% (02 Nov 2023 05:00) (99% - 99%)    Parameters below as of 02 Nov 2023 05:00  Patient On (Oxygen Delivery Method): room air                            7.9    7.85  )-----------( 454      ( 02 Nov 2023 04:30 )             25.3                 11-02    135  |  99  |  35<H>  ----------------------------<  272<H>  4.9   |  26  |  2.8<H>    Ca    8.8      02 Nov 2023 04:30  Mg     2.0     11-02

## 2023-11-02 NOTE — PROVIDER CONTACT NOTE (OTHER) - SITUATION
FS of 61 noted prior to lunch.
Pt stating he wants regular ginger ale because he feels shakey after not completing dinner tray

## 2023-11-02 NOTE — PROVIDER CONTACT NOTE (OTHER) - BACKGROUND
JOSH, FS MEHRDAD, TORIN, LANTUS IN AM     here for RLE sx
AM FS prior breakfast 259. 14u lispro, 25u lantus administered. Breakfast consumed

## 2023-11-02 NOTE — PROVIDER CONTACT NOTE (OTHER) - ASSESSMENT
Pt alert, responsive. Refusing prn dextrose or juice. Pt requesting and accepting soda and lunch.
FS 41  pt tolerating PO , orange juice w/ sugar admin. Pt states feeling better   Repeat FS 74 & 144  Started ordered IVF earlier than 12am, d5+1/2 NS now infusing @ 75

## 2023-11-02 NOTE — PROGRESS NOTE ADULT - SUBJECTIVE AND OBJECTIVE BOX
58-year-old male with Charcot foot s/p placement of an external fixator on 9/28/23 which had to be removed on 10/19 due to infection, CKD (baseline Cr 2.5-3.5, on list for kidney transplant), and IDDM (A1c 9.9), PAD, s/p  peripheral angiogram on 10/23/2023 with balloon angioplasty of PTA and DCB of R SFA and PTA of R PT and R AT via R groin and R pedal access, anemia recently received 2u pRBCS, pt was dc from hospital on 10/24/23.    Interval: s/p revision    PAST MEDICAL & SURGICAL HISTORY:    DM (diabetes mellitus)  HTN (hypertension)  HLD (hyperlipidemia)  Herpes labialis  Anxiety  GERD (gastroesophageal reflux disease)  Kidney stones  20 years ago  Kidney disease  stage 4  Chronic kidney disease, unspecified CKD stage  Diabetic Charcot foot  PAD (peripheral artery disease)  S/P foot surgery, right  x 5 ( 2006 - 2013 )  Kidney stone  Removed by Laser x 2 ( 20 years ago )  H/O arthroscopy of right knee  MEDICATIONS  (STANDING):  aspirin  chewable 81 milliGRAM(s) Oral daily  cefepime   IVPB 1000 milliGRAM(s) IV Intermittent every 12 hours  clopidogrel Tablet 75 milliGRAM(s) Oral every 24 hours  dextrose 50% Injectable 25 Gram(s) IV Push once  epoetin ban (PROCRIT) Injectable 2000 Unit(s) SubCutaneous every 7 days  glucagon  Injectable 1 milliGRAM(s) IntraMuscular once  heparin   Injectable 5000 Unit(s) SubCutaneous every 8 hours  insulin glargine Injectable (LANTUS) 25 Unit(s) SubCutaneous every morning  insulin lispro (ADMELOG) corrective regimen sliding scale   SubCutaneous three times a day before meals  insulin lispro Injectable (ADMELOG) 8 Unit(s) SubCutaneous before breakfast  insulin lispro Injectable (ADMELOG) 8 Unit(s) SubCutaneous before lunch  insulin lispro Injectable (ADMELOG) 8 Unit(s) SubCutaneous before dinner  lactated ringers. 1000 milliLiter(s) (75 mL/Hr) IV Continuous <Continuous>  losartan 100 milliGRAM(s) Oral daily  magnesium oxide 400 milliGRAM(s) Oral three times a day with meals  NIFEdipine XL 90 milliGRAM(s) Oral daily  rosuvastatin 20 milliGRAM(s) Oral at bedtime  sodium bicarbonate 650 milliGRAM(s) Oral three times a day  sodium chloride 0.9%. 1000 milliLiter(s) (50 mL/Hr) IV Continuous <Continuous>  vancomycin  IVPB 1000 milliGRAM(s) IV Intermittent once    MEDICATIONS  (PRN):  ALPRAZolam 0.5 milliGRAM(s) Oral four times a day PRN anxiety  cloNIDine 0.1 milliGRAM(s) Oral every 8 hours PRN sbp>150  dextrose Oral Gel 15 Gram(s) Oral once PRN Blood Glucose LESS THAN 70 milliGRAM(s)/deciliter  melatonin 3 milliGRAM(s) Oral at bedtime PRN Insomnia  oxycodone    5 mG/acetaminophen 325 mG 2 Tablet(s) Oral every 4 hours PRN Moderate Pain (4 - 6)  Status post peripheral artery angioplasty    Social History:    reviewed         Allergies    No Known Allergies    Intolerances    Vital Signs Last 24 Hrs  T(C): 36.7 (02 Nov 2023 05:00), Max: 36.7 (02 Nov 2023 05:00)  T(F): 98 (02 Nov 2023 05:00), Max: 98 (02 Nov 2023 05:00)  HR: 67 (02 Nov 2023 05:00) (67 - 73)  BP: 160/82 (02 Nov 2023 05:00) (119/65 - 160/82)  BP(mean): --  RR: 16 (02 Nov 2023 05:00) (16 - 16)  SpO2: 99% (02 Nov 2023 05:00) (99% - 99%)    Parameters below as of 02 Nov 2023 05:00  Patient On (Oxygen Delivery Method): room air    CAPILLARY BLOOD GLUCOSE      POCT Blood Glucose.: 145 mg/dL (01 Nov 2023 16:36)  POCT Blood Glucose.: 280 mg/dL (01 Nov 2023 11:52)  POCT Blood Glucose.: 252 mg/dL (01 Nov 2023 07:53)          Diet, Regular:   Consistent Carbohydrate Evening Snack  DASH/TLC Sodium & Cholesterol Restricted (10-30-23 @ 22:02) [Active]      PHYSICAL EXAM  Constitutional: A&Ox4  Respiratory: cta b/l  Cardiovascular: s1 s2 rrr  Gastrointestinal: soft nt  nd + bs no rebound or guarding  Genitourinary: no cva tenderness  Extremities: normal rom, no edema, calf tenderness  Neurological:no focal deficits  Skin: +Right foot post surgical   Vascular, no cyanosis    LABS:                           9.2    8.45  )-----------( 467      ( 31 Oct 2023 04:30 )             28.9                           9.2    7.89  )-----------( 453      ( 30 Oct 2023 08:28 )             28.7                           7.9    6.41  )-----------( 496      ( 28 Oct 2023 07:11 )             24.2     10-31    132<L>  |  97<L>  |  35<H>  ----------------------------<  451<HH>  5.4<H>   |  21  |  2.5<H>    Ca    9.0      31 Oct 2023 04:30  Mg     1.8     10-30    TPro  6.2  /  Alb  3.2<L>  /  TBili  0.3  /  DBili  x   /  AST  13  /  ALT  12  /  AlkPhos  152<H>  10-30      10-30    139  |  102  |  34<H>  ----------------------------<  197<H>  4.9   |  27  |  2.5<H>    Ca    9.3      30 Oct 2023 08:28  Mg     1.8     10-30    TPro  6.2  /  Alb  3.2<L>  /  TBili  0.3  /  DBili  x   /  AST  13  /  ALT  12  /  AlkPhos  152<H>  10-30      10-28    138  |  101  |  42<H>  ----------------------------<  332<H>  4.8   |  23  |  2.7<H>    Ca    8.7      28 Oct 2023 07:11  Mg     1.7     10-28

## 2023-11-02 NOTE — CONSULT NOTE ADULT - SUBJECTIVE AND OBJECTIVE BOX
INFECTIOUS DISEASE CONSULT NOTE    Patient is a 58y old  Male who presents with a chief complaint of   HPI:   Patient  is a 58yoM with Charcot foot s/p placement of an external fixator on 9/28/23 which had to be removed on 10/19 due to infection, CKD (baseline Cr 2.5-3.5, on list for kidney transplant), and IDDM (A1c 9.9), PAD, s/p  peripheral angiogram on 10/23/2023 with balloon angioplasty of PTA and DCB of R SFA and PTA of R PT and R AT via R groin and R pedal access, anemia recently received 2u pRBCS, pt was dc from hospital on 10/24/23 and now returns to hospital as directed by his podiatrist Dr Benson for TMA which will be done on 10/27/23 as pt has non healing wound and gangrene of the right foot. Patient complains of moderate to sever pain of foot which is partially relieved with percocet and associated wound discharge.  Pt denies fevers or chills.  (26 Oct 2023 18:37)         Prior hospital charts reviewed [Yes]  Primary team notes reviewed [Yes]  Other consultant notes reviewed [Yes]    REVIEW OF SYSTEMS:      PAST MEDICAL & SURGICAL HISTORY:  DM (diabetes mellitus)      HTN (hypertension)      HLD (hyperlipidemia)      Herpes labialis      Anxiety      GERD (gastroesophageal reflux disease)      Kidney stones  20 years ago      Kidney disease  stage 4      Chronic kidney disease, unspecified CKD stage      Diabetic Charcot foot      PAD (peripheral artery disease)      S/P foot surgery, right  x 5 ( 2006 - 2013 )      Kidney stone  Removed by Laser x 2 ( 20 years ago )      H/O arthroscopy of right knee      Status post peripheral artery angioplasty          SOCIAL HISTORY:  - Born in _____, migrated [place] in 19XX  - Currently working as / Retired  - Lives with _____; no pets  - No recent travel  - Denies tobacco use  - Denies alcohol use  - Denies illicit drug use  - Currently sexually active, has one male/female sexual partner    FAMILY HISTORY:  Family history of cancer in father (Father)  Lung        Allergies:  No Known Allergies      ANTIMICROBIALS:  cefepime   IVPB 1000 every 12 hours      ANTIMICROBIALS (past 90 days):  MEDICATIONS  (STANDING):  cefepime   IVPB   100 mL/Hr IV Intermittent (10-30-23 @ 17:19)   100 mL/Hr IV Intermittent (10-30-23 @ 05:09)   100 mL/Hr IV Intermittent (10-29-23 @ 17:16)   100 mL/Hr IV Intermittent (10-29-23 @ 05:09)   100 mL/Hr IV Intermittent (10-28-23 @ 17:11)   100 mL/Hr IV Intermittent (10-28-23 @ 05:09)   100 mL/Hr IV Intermittent (10-27-23 @ 21:46)    cefepime   IVPB   100 mL/Hr IV Intermittent (11-02-23 @ 05:25)   100 mL/Hr IV Intermittent (11-01-23 @ 17:05)   100 mL/Hr IV Intermittent (11-01-23 @ 05:40)   100 mL/Hr IV Intermittent (10-31-23 @ 17:14)   100 mL/Hr IV Intermittent (10-31-23 @ 05:09)    cefepime   IVPB   100 mL/Hr IV Intermittent (10-27-23 @ 05:55)    cefepime   IVPB   100 mL/Hr IV Intermittent (10-26-23 @ 16:09)    vancomycin  IVPB   250 mL/Hr IV Intermittent (11-02-23 @ 06:22)    vancomycin  IVPB   166.67 mL/Hr IV Intermittent (11-01-23 @ 07:41)   166.67 mL/Hr IV Intermittent (10-31-23 @ 06:10)    vancomycin  IVPB   166.67 mL/Hr IV Intermittent (10-30-23 @ 09:59)   166.67 mL/Hr IV Intermittent (10-29-23 @ 08:03)   166.67 mL/Hr IV Intermittent (10-28-23 @ 07:45)    vancomycin  IVPB.   250 mL/Hr IV Intermittent (10-26-23 @ 17:43)        OTHER MEDS:   MEDICATIONS  (STANDING):  ALPRAZolam 0.5 four times a day PRN  aspirin  chewable 81 daily  cloNIDine 0.1 every 8 hours PRN  clopidogrel Tablet 75 every 24 hours  dextrose 50% Injectable 25 once  dextrose Oral Gel 15 once PRN  epoetin ban (PROCRIT) Injectable 2000 every 7 days  glucagon  Injectable 1 once  heparin   Injectable 5000 every 8 hours  insulin glargine Injectable (LANTUS) 25 every morning  insulin lispro (ADMELOG) corrective regimen sliding scale  three times a day before meals  insulin lispro Injectable (ADMELOG) 8 before dinner  insulin lispro Injectable (ADMELOG) 8 before breakfast  insulin lispro Injectable (ADMELOG) 8 before lunch  losartan 100 daily  melatonin 3 at bedtime PRN  NIFEdipine XL 90 daily  oxycodone    5 mG/acetaminophen 325 mG 2 every 4 hours PRN  rosuvastatin 20 at bedtime      VITALS:  Vital Signs Last 24 Hrs  T(F): 98 (11-02-23 @ 05:00), Max: 98.7 (10-27-23 @ 21:13)    Vital Signs Last 24 Hrs  HR: 67 (11-02-23 @ 05:00) (67 - 73)  BP: 160/82 (11-02-23 @ 05:00) (119/65 - 160/82)  RR: 16 (11-02-23 @ 05:00)  SpO2: 99% (11-02-23 @ 05:00) (99% - 99%)  Wt(kg): --    EXAM:    Labs:                        7.9    7.85  )-----------( 454      ( 02 Nov 2023 04:30 )             25.3     11-01    137  |  101  |  38<H>  ----------------------------<  230<H>  4.9   |  25  |  2.9<H>    Ca    9.1      01 Nov 2023 07:33        WBC Trend:  WBC Count: 7.85 (11-02-23 @ 04:30)  WBC Count: 8.83 (11-01-23 @ 07:33)  WBC Count: 8.45 (10-31-23 @ 04:30)  WBC Count: 7.89 (10-30-23 @ 08:28)      Auto Neutrophil #: 4.91 K/uL (11-02-23 @ 04:30)  Auto Neutrophil #: 6.05 K/uL (11-01-23 @ 07:33)  Auto Neutrophil #: 5.60 K/uL (10-31-23 @ 04:30)  Auto Neutrophil #: 5.53 K/uL (10-27-23 @ 06:35)  Auto Neutrophil #: 8.24 K/uL (10-26-23 @ 16:15)      Creatine Trend:  Creatinine: 2.9 (11-01)  Creatinine: 2.5 (10-31)  Creatinine: 2.5 (10-30)  Creatinine: 2.9 (10-29)      Liver Biochemical Testing Trend:  Alanine Aminotransferase (ALT/SGPT): 12 (10-30)  Alanine Aminotransferase (ALT/SGPT): 16 (10-26)  Alanine Aminotransferase (ALT/SGPT): 17 (10-22)  Alanine Aminotransferase (ALT/SGPT): 23 (10-18)  Alanine Aminotransferase (ALT/SGPT): 21 (10-16)  Aspartate Aminotransferase (AST/SGOT): 13 (10-30-23 @ 08:28)  Aspartate Aminotransferase (AST/SGOT): 16 (10-26-23 @ 16:15)  Aspartate Aminotransferase (AST/SGOT): 13 (10-22-23 @ 21:12)  Aspartate Aminotransferase (AST/SGOT): 17 (10-18-23 @ 17:07)  Aspartate Aminotransferase (AST/SGOT): 16 (10-16-23 @ 01:00)  Bilirubin Total: 0.3 (10-30)  Bilirubin Total: <0.2 (10-26)  Bilirubin Total: 0.2 (10-22)  Bilirubin Total: <0.2 (10-18)  Bilirubin Total: <0.2 (10-16)      Trend LDH      Auto Eosinophil %: 2.9 % (11-02-23 @ 04:30)  Auto Eosinophil %: 2.6 % (11-01-23 @ 07:33)  Auto Eosinophil %: 2.4 % (10-31-23 @ 04:30)      Urinalysis Basic - ( 01 Nov 2023 07:33 )    Color: x / Appearance: x / SG: x / pH: x  Gluc: 230 mg/dL / Ketone: x  / Bili: x / Urobili: x   Blood: x / Protein: x / Nitrite: x   Leuk Esterase: x / RBC: x / WBC x   Sq Epi: x / Non Sq Epi: x / Bacteria: x        MICROBIOLOGY:  Vancomycin Level, Random: 39.8 (11-01 @ 07:33)      Culture - Other (collected 30 Oct 2023 21:34)  Source: .Other None  Preliminary Report (01 Nov 2023 23:08):    Numerous Pseudomonas aeruginosa    Numerous Yeast      A1C with Estimated Average Glucose Result: 8.2 % (10-24-23 @ 06:34)  A1C with Estimated Average Glucose Result: 9.9 % (09-14-23 @ 14:39)      RADIOLOGY:  imaging below personally reviewed    < from: Xray Foot AP + Lateral + Oblique, Right (10.22.23 @ 20:57) >  Findings/  impression:    Postsurgical change of the calcaneus. Midfoot arthrosis with erosion and   destruction. Osteopenia. Soft tissue swelling. The extremity is in a cast.    < end of copied text >    < from: Xray Foot AP + Lateral + Oblique, Right (10.27.23 @ 15:30) >  Findings/  impression:    Status post right transmetatarsal amputation with adjacent surgical   changes and soft tissue swelling.    No evidence of acute fracture or dislocation. Degenerative changes of the   mid and hindfoot. Postsurgical changes of the calcaneus. Likely abandoned   leads from previous noted stimulator overlying the calcaneus/Achilles.   Vascular calcifications.    < end of copied text >     INFECTIOUS DISEASE CONSULT NOTE    Patient is a 58y old  Male who presents with a chief complaint of right foot infection  HPI:   Patient  is a 58yoM with Charcot foot s/p placement of an external fixator on 9/28/23 which had to be removed on 10/19 due to infection, CKD (baseline Cr 2.5-3.5, on list for kidney transplant), and IDDM (A1c 9.9), PAD, s/p  peripheral angiogram on 10/23/2023 with balloon angioplasty of PTA and DCB of R SFA and PTA of R PT and R AT via R groin and R pedal access, anemia recently received 2u pRBCS, pt was dc from hospital on 10/24/23 and now returns to hospital as directed by his podiatrist Dr Benson for TMA which will be done on 10/27/23 as pt has non healing wound and gangrene of the right foot. Patient complains of moderate to sever pain of foot which is partially relieved with percocet and associated wound discharge.  Pt denies fevers or chills.  (26 Oct 2023 18:37)         Prior hospital charts reviewed [Yes]  Primary team notes reviewed [Yes]  Other consultant notes reviewed [Yes]    REVIEW OF SYSTEMS:  CONSTITUTIONAL: No fever or chills  HEAD: No lesion on scalp  EYES: No visual disturbance  ENT: No sore throat  RESPIRATORY: No cough, no shortness of breath  CARDIOVASCULAR: No chest pain or palpitations  GASTROINTESTINAL: No abdominal or epigastric pain  GENITOURINARY: No dysuria  NEUROLOGICAL: No headache/dizziness  MUSCULOSKELETAL: + Right foot post-amputation, wrapped with ACE  SKIN: No itching, rashes  All other ROS negative except noted above      PAST MEDICAL & SURGICAL HISTORY:  DM (diabetes mellitus)      HTN (hypertension)      HLD (hyperlipidemia)      Herpes labialis      Anxiety      GERD (gastroesophageal reflux disease)      Kidney stones  20 years ago      Kidney disease  stage 4      Chronic kidney disease, unspecified CKD stage      Diabetic Charcot foot      PAD (peripheral artery disease)      S/P foot surgery, right  x 5 ( 2006 - 2013 )      Kidney stone  Removed by Laser x 2 ( 20 years ago )      H/O arthroscopy of right knee      Status post peripheral artery angioplasty          SOCIAL HISTORY:  - No recent travel  - Former tobacco use  - Occasional alcohol use  - Denies illicit drug use    FAMILY HISTORY:  Family history of cancer in father (Father)  Lung          Allergies:  No Known Allergies      ANTIMICROBIALS:  cefepime   IVPB 1000 every 12 hours      ANTIMICROBIALS (past 90 days):  MEDICATIONS  (STANDING):  cefepime   IVPB   100 mL/Hr IV Intermittent (10-30-23 @ 17:19)   100 mL/Hr IV Intermittent (10-30-23 @ 05:09)   100 mL/Hr IV Intermittent (10-29-23 @ 17:16)   100 mL/Hr IV Intermittent (10-29-23 @ 05:09)   100 mL/Hr IV Intermittent (10-28-23 @ 17:11)   100 mL/Hr IV Intermittent (10-28-23 @ 05:09)   100 mL/Hr IV Intermittent (10-27-23 @ 21:46)    cefepime   IVPB   100 mL/Hr IV Intermittent (11-02-23 @ 05:25)   100 mL/Hr IV Intermittent (11-01-23 @ 17:05)   100 mL/Hr IV Intermittent (11-01-23 @ 05:40)   100 mL/Hr IV Intermittent (10-31-23 @ 17:14)   100 mL/Hr IV Intermittent (10-31-23 @ 05:09)    cefepime   IVPB   100 mL/Hr IV Intermittent (10-27-23 @ 05:55)    cefepime   IVPB   100 mL/Hr IV Intermittent (10-26-23 @ 16:09)    vancomycin  IVPB   250 mL/Hr IV Intermittent (11-02-23 @ 06:22)    vancomycin  IVPB   166.67 mL/Hr IV Intermittent (11-01-23 @ 07:41)   166.67 mL/Hr IV Intermittent (10-31-23 @ 06:10)    vancomycin  IVPB   166.67 mL/Hr IV Intermittent (10-30-23 @ 09:59)   166.67 mL/Hr IV Intermittent (10-29-23 @ 08:03)   166.67 mL/Hr IV Intermittent (10-28-23 @ 07:45)    vancomycin  IVPB.   250 mL/Hr IV Intermittent (10-26-23 @ 17:43)        OTHER MEDS:   MEDICATIONS  (STANDING):  ALPRAZolam 0.5 four times a day PRN  aspirin  chewable 81 daily  cloNIDine 0.1 every 8 hours PRN  clopidogrel Tablet 75 every 24 hours  dextrose 50% Injectable 25 once  dextrose Oral Gel 15 once PRN  epoetin ban (PROCRIT) Injectable 2000 every 7 days  glucagon  Injectable 1 once  heparin   Injectable 5000 every 8 hours  insulin glargine Injectable (LANTUS) 25 every morning  insulin lispro (ADMELOG) corrective regimen sliding scale  three times a day before meals  insulin lispro Injectable (ADMELOG) 8 before dinner  insulin lispro Injectable (ADMELOG) 8 before breakfast  insulin lispro Injectable (ADMELOG) 8 before lunch  losartan 100 daily  melatonin 3 at bedtime PRN  NIFEdipine XL 90 daily  oxycodone    5 mG/acetaminophen 325 mG 2 every 4 hours PRN  rosuvastatin 20 at bedtime      VITALS:  Vital Signs Last 24 Hrs  T(F): 98 (11-02-23 @ 05:00), Max: 98.7 (10-27-23 @ 21:13)    Vital Signs Last 24 Hrs  HR: 67 (11-02-23 @ 05:00) (67 - 73)  BP: 160/82 (11-02-23 @ 05:00) (119/65 - 160/82)  RR: 16 (11-02-23 @ 05:00)  SpO2: 99% (11-02-23 @ 05:00) (99% - 99%)  Wt(kg): --    EXAM:  GENERAL: NAD, lying in bed  HEAD: No head lesions  EYES: Conjunctiva pink and cornea white  EAR, NOSE, MOUTH, THROAT: Normal external ears and nose, no discharges; moist mucous membranes  NECK: Supple, nontender to palpation; no JVD  RESPIRATORY: Clear to auscultation bilaterally  CARDIOVASCULAR: S1 S2  GASTROINTESTINAL: Soft, nontender, nondistended; normoactive bowel sounds  EXTREMITIES: No clubbing, cyanosis, or petal edema  NERVOUS SYSTEM: Alert and oriented to person, time, place and situation, speech clear. No focal deficits   MUSCULOSKELETAL: + Right foot wrapped with ACE, reports pain.   SKIN: No rashes or lesions, no superficial thrombophlebitis  PSYCH: Normal affect        Labs:                        7.9    7.85  )-----------( 454      ( 02 Nov 2023 04:30 )             25.3     11-01    137  |  101  |  38<H>  ----------------------------<  230<H>  4.9   |  25  |  2.9<H>    Ca    9.1      01 Nov 2023 07:33        WBC Trend:  WBC Count: 7.85 (11-02-23 @ 04:30)  WBC Count: 8.83 (11-01-23 @ 07:33)  WBC Count: 8.45 (10-31-23 @ 04:30)  WBC Count: 7.89 (10-30-23 @ 08:28)      Auto Neutrophil #: 4.91 K/uL (11-02-23 @ 04:30)  Auto Neutrophil #: 6.05 K/uL (11-01-23 @ 07:33)  Auto Neutrophil #: 5.60 K/uL (10-31-23 @ 04:30)  Auto Neutrophil #: 5.53 K/uL (10-27-23 @ 06:35)  Auto Neutrophil #: 8.24 K/uL (10-26-23 @ 16:15)      Creatine Trend:  Creatinine: 2.9 (11-01)  Creatinine: 2.5 (10-31)  Creatinine: 2.5 (10-30)  Creatinine: 2.9 (10-29)      Liver Biochemical Testing Trend:  Alanine Aminotransferase (ALT/SGPT): 12 (10-30)  Alanine Aminotransferase (ALT/SGPT): 16 (10-26)  Alanine Aminotransferase (ALT/SGPT): 17 (10-22)  Alanine Aminotransferase (ALT/SGPT): 23 (10-18)  Alanine Aminotransferase (ALT/SGPT): 21 (10-16)  Aspartate Aminotransferase (AST/SGOT): 13 (10-30-23 @ 08:28)  Aspartate Aminotransferase (AST/SGOT): 16 (10-26-23 @ 16:15)  Aspartate Aminotransferase (AST/SGOT): 13 (10-22-23 @ 21:12)  Aspartate Aminotransferase (AST/SGOT): 17 (10-18-23 @ 17:07)  Aspartate Aminotransferase (AST/SGOT): 16 (10-16-23 @ 01:00)  Bilirubin Total: 0.3 (10-30)  Bilirubin Total: <0.2 (10-26)  Bilirubin Total: 0.2 (10-22)  Bilirubin Total: <0.2 (10-18)  Bilirubin Total: <0.2 (10-16)      Trend LDH      Auto Eosinophil %: 2.9 % (11-02-23 @ 04:30)  Auto Eosinophil %: 2.6 % (11-01-23 @ 07:33)  Auto Eosinophil %: 2.4 % (10-31-23 @ 04:30)      Urinalysis Basic - ( 01 Nov 2023 07:33 )    Color: x / Appearance: x / SG: x / pH: x  Gluc: 230 mg/dL / Ketone: x  / Bili: x / Urobili: x   Blood: x / Protein: x / Nitrite: x   Leuk Esterase: x / RBC: x / WBC x   Sq Epi: x / Non Sq Epi: x / Bacteria: x        MICROBIOLOGY:  Vancomycin Level, Random: 39.8 (11-01 @ 07:33)      Culture - Other (collected 30 Oct 2023 21:34)  Source: .Other None  Preliminary Report (01 Nov 2023 23:08):    Numerous Pseudomonas aeruginosa    Numerous Yeast      A1C with Estimated Average Glucose Result: 8.2 % (10-24-23 @ 06:34)  A1C with Estimated Average Glucose Result: 9.9 % (09-14-23 @ 14:39)      RADIOLOGY:  imaging below personally reviewed    < from: Xray Foot AP + Lateral + Oblique, Right (10.22.23 @ 20:57) >  Findings/  impression:    Postsurgical change of the calcaneus. Midfoot arthrosis with erosion and   destruction. Osteopenia. Soft tissue swelling. The extremity is in a cast.    < end of copied text >    < from: Xray Foot AP + Lateral + Oblique, Right (10.27.23 @ 15:30) >  Findings/  impression:    Status post right transmetatarsal amputation with adjacent surgical   changes and soft tissue swelling.    No evidence of acute fracture or dislocation. Degenerative changes of the   mid and hindfoot. Postsurgical changes of the calcaneus. Likely abandoned   leads from previous noted stimulator overlying the calcaneus/Achilles.   Vascular calcifications.    < end of copied text >

## 2023-11-03 LAB
ALBUMIN SERPL ELPH-MCNC: 3.2 G/DL — LOW (ref 3.5–5.2)
ALBUMIN SERPL ELPH-MCNC: 3.2 G/DL — LOW (ref 3.5–5.2)
ALP SERPL-CCNC: 168 U/L — HIGH (ref 30–115)
ALP SERPL-CCNC: 168 U/L — HIGH (ref 30–115)
ALT FLD-CCNC: 18 U/L — SIGNIFICANT CHANGE UP (ref 0–41)
ALT FLD-CCNC: 18 U/L — SIGNIFICANT CHANGE UP (ref 0–41)
ANION GAP SERPL CALC-SCNC: 11 MMOL/L — SIGNIFICANT CHANGE UP (ref 7–14)
ANION GAP SERPL CALC-SCNC: 11 MMOL/L — SIGNIFICANT CHANGE UP (ref 7–14)
AST SERPL-CCNC: 17 U/L — SIGNIFICANT CHANGE UP (ref 0–41)
AST SERPL-CCNC: 17 U/L — SIGNIFICANT CHANGE UP (ref 0–41)
BASOPHILS # BLD AUTO: 0.05 K/UL — SIGNIFICANT CHANGE UP (ref 0–0.2)
BASOPHILS # BLD AUTO: 0.05 K/UL — SIGNIFICANT CHANGE UP (ref 0–0.2)
BASOPHILS NFR BLD AUTO: 0.7 % — SIGNIFICANT CHANGE UP (ref 0–1)
BASOPHILS NFR BLD AUTO: 0.7 % — SIGNIFICANT CHANGE UP (ref 0–1)
BILIRUB SERPL-MCNC: <0.2 MG/DL — SIGNIFICANT CHANGE UP (ref 0.2–1.2)
BILIRUB SERPL-MCNC: <0.2 MG/DL — SIGNIFICANT CHANGE UP (ref 0.2–1.2)
BUN SERPL-MCNC: 34 MG/DL — HIGH (ref 10–20)
BUN SERPL-MCNC: 34 MG/DL — HIGH (ref 10–20)
CALCIUM SERPL-MCNC: 8.9 MG/DL — SIGNIFICANT CHANGE UP (ref 8.4–10.5)
CALCIUM SERPL-MCNC: 8.9 MG/DL — SIGNIFICANT CHANGE UP (ref 8.4–10.5)
CHLORIDE SERPL-SCNC: 100 MMOL/L — SIGNIFICANT CHANGE UP (ref 98–110)
CHLORIDE SERPL-SCNC: 100 MMOL/L — SIGNIFICANT CHANGE UP (ref 98–110)
CO2 SERPL-SCNC: 26 MMOL/L — SIGNIFICANT CHANGE UP (ref 17–32)
CO2 SERPL-SCNC: 26 MMOL/L — SIGNIFICANT CHANGE UP (ref 17–32)
CREAT SERPL-MCNC: 2.8 MG/DL — HIGH (ref 0.7–1.5)
CREAT SERPL-MCNC: 2.8 MG/DL — HIGH (ref 0.7–1.5)
EGFR: 25 ML/MIN/1.73M2 — LOW
EGFR: 25 ML/MIN/1.73M2 — LOW
EOSINOPHIL # BLD AUTO: 0.26 K/UL — SIGNIFICANT CHANGE UP (ref 0–0.7)
EOSINOPHIL # BLD AUTO: 0.26 K/UL — SIGNIFICANT CHANGE UP (ref 0–0.7)
EOSINOPHIL NFR BLD AUTO: 3.4 % — SIGNIFICANT CHANGE UP (ref 0–8)
EOSINOPHIL NFR BLD AUTO: 3.4 % — SIGNIFICANT CHANGE UP (ref 0–8)
GLUCOSE BLDC GLUCOMTR-MCNC: 133 MG/DL — HIGH (ref 70–99)
GLUCOSE BLDC GLUCOMTR-MCNC: 133 MG/DL — HIGH (ref 70–99)
GLUCOSE BLDC GLUCOMTR-MCNC: 134 MG/DL — HIGH (ref 70–99)
GLUCOSE BLDC GLUCOMTR-MCNC: 134 MG/DL — HIGH (ref 70–99)
GLUCOSE BLDC GLUCOMTR-MCNC: 140 MG/DL — HIGH (ref 70–99)
GLUCOSE BLDC GLUCOMTR-MCNC: 140 MG/DL — HIGH (ref 70–99)
GLUCOSE BLDC GLUCOMTR-MCNC: 206 MG/DL — HIGH (ref 70–99)
GLUCOSE BLDC GLUCOMTR-MCNC: 206 MG/DL — HIGH (ref 70–99)
GLUCOSE BLDC GLUCOMTR-MCNC: 324 MG/DL — HIGH (ref 70–99)
GLUCOSE BLDC GLUCOMTR-MCNC: 324 MG/DL — HIGH (ref 70–99)
GLUCOSE SERPL-MCNC: 297 MG/DL — HIGH (ref 70–99)
GLUCOSE SERPL-MCNC: 297 MG/DL — HIGH (ref 70–99)
HCT VFR BLD CALC: 25.5 % — LOW (ref 42–52)
HCT VFR BLD CALC: 25.5 % — LOW (ref 42–52)
HGB BLD-MCNC: 8 G/DL — LOW (ref 14–18)
HGB BLD-MCNC: 8 G/DL — LOW (ref 14–18)
IMM GRANULOCYTES NFR BLD AUTO: 0.4 % — HIGH (ref 0.1–0.3)
IMM GRANULOCYTES NFR BLD AUTO: 0.4 % — HIGH (ref 0.1–0.3)
LYMPHOCYTES # BLD AUTO: 1.86 K/UL — SIGNIFICANT CHANGE UP (ref 1.2–3.4)
LYMPHOCYTES # BLD AUTO: 1.86 K/UL — SIGNIFICANT CHANGE UP (ref 1.2–3.4)
LYMPHOCYTES # BLD AUTO: 24.4 % — SIGNIFICANT CHANGE UP (ref 20.5–51.1)
LYMPHOCYTES # BLD AUTO: 24.4 % — SIGNIFICANT CHANGE UP (ref 20.5–51.1)
MAGNESIUM SERPL-MCNC: 2 MG/DL — SIGNIFICANT CHANGE UP (ref 1.8–2.4)
MAGNESIUM SERPL-MCNC: 2 MG/DL — SIGNIFICANT CHANGE UP (ref 1.8–2.4)
MCHC RBC-ENTMCNC: 26.4 PG — LOW (ref 27–31)
MCHC RBC-ENTMCNC: 26.4 PG — LOW (ref 27–31)
MCHC RBC-ENTMCNC: 31.4 G/DL — LOW (ref 32–37)
MCHC RBC-ENTMCNC: 31.4 G/DL — LOW (ref 32–37)
MCV RBC AUTO: 84.2 FL — SIGNIFICANT CHANGE UP (ref 80–94)
MCV RBC AUTO: 84.2 FL — SIGNIFICANT CHANGE UP (ref 80–94)
MONOCYTES # BLD AUTO: 0.66 K/UL — HIGH (ref 0.1–0.6)
MONOCYTES # BLD AUTO: 0.66 K/UL — HIGH (ref 0.1–0.6)
MONOCYTES NFR BLD AUTO: 8.7 % — SIGNIFICANT CHANGE UP (ref 1.7–9.3)
MONOCYTES NFR BLD AUTO: 8.7 % — SIGNIFICANT CHANGE UP (ref 1.7–9.3)
NEUTROPHILS # BLD AUTO: 4.76 K/UL — SIGNIFICANT CHANGE UP (ref 1.4–6.5)
NEUTROPHILS # BLD AUTO: 4.76 K/UL — SIGNIFICANT CHANGE UP (ref 1.4–6.5)
NEUTROPHILS NFR BLD AUTO: 62.4 % — SIGNIFICANT CHANGE UP (ref 42.2–75.2)
NEUTROPHILS NFR BLD AUTO: 62.4 % — SIGNIFICANT CHANGE UP (ref 42.2–75.2)
NRBC # BLD: 0 /100 WBCS — SIGNIFICANT CHANGE UP (ref 0–0)
NRBC # BLD: 0 /100 WBCS — SIGNIFICANT CHANGE UP (ref 0–0)
PLATELET # BLD AUTO: 491 K/UL — HIGH (ref 130–400)
PLATELET # BLD AUTO: 491 K/UL — HIGH (ref 130–400)
PMV BLD: 8.9 FL — SIGNIFICANT CHANGE UP (ref 7.4–10.4)
PMV BLD: 8.9 FL — SIGNIFICANT CHANGE UP (ref 7.4–10.4)
POTASSIUM SERPL-MCNC: 5.1 MMOL/L — HIGH (ref 3.5–5)
POTASSIUM SERPL-MCNC: 5.1 MMOL/L — HIGH (ref 3.5–5)
POTASSIUM SERPL-SCNC: 5.1 MMOL/L — HIGH (ref 3.5–5)
POTASSIUM SERPL-SCNC: 5.1 MMOL/L — HIGH (ref 3.5–5)
PROT SERPL-MCNC: 6 G/DL — SIGNIFICANT CHANGE UP (ref 6–8)
PROT SERPL-MCNC: 6 G/DL — SIGNIFICANT CHANGE UP (ref 6–8)
RBC # BLD: 3.03 M/UL — LOW (ref 4.7–6.1)
RBC # BLD: 3.03 M/UL — LOW (ref 4.7–6.1)
RBC # FLD: 17.3 % — HIGH (ref 11.5–14.5)
RBC # FLD: 17.3 % — HIGH (ref 11.5–14.5)
SODIUM SERPL-SCNC: 137 MMOL/L — SIGNIFICANT CHANGE UP (ref 135–146)
SODIUM SERPL-SCNC: 137 MMOL/L — SIGNIFICANT CHANGE UP (ref 135–146)
SURGICAL PATHOLOGY STUDY: SIGNIFICANT CHANGE UP
SURGICAL PATHOLOGY STUDY: SIGNIFICANT CHANGE UP
WBC # BLD: 7.62 K/UL — SIGNIFICANT CHANGE UP (ref 4.8–10.8)
WBC # BLD: 7.62 K/UL — SIGNIFICANT CHANGE UP (ref 4.8–10.8)
WBC # FLD AUTO: 7.62 K/UL — SIGNIFICANT CHANGE UP (ref 4.8–10.8)
WBC # FLD AUTO: 7.62 K/UL — SIGNIFICANT CHANGE UP (ref 4.8–10.8)

## 2023-11-03 PROCEDURE — 99222 1ST HOSP IP/OBS MODERATE 55: CPT | Mod: 57

## 2023-11-03 PROCEDURE — 93926 LOWER EXTREMITY STUDY: CPT | Mod: 26,RT

## 2023-11-03 RX ORDER — SODIUM ZIRCONIUM CYCLOSILICATE 10 G/10G
10 POWDER, FOR SUSPENSION ORAL ONCE
Refills: 0 | Status: DISCONTINUED | OUTPATIENT
Start: 2023-11-03 | End: 2023-11-04

## 2023-11-03 RX ORDER — OXYCODONE HYDROCHLORIDE 5 MG/1
2.5 TABLET ORAL EVERY 4 HOURS
Refills: 0 | Status: DISCONTINUED | OUTPATIENT
Start: 2023-11-03 | End: 2023-11-04

## 2023-11-03 RX ADMIN — MEROPENEM 100 MILLIGRAM(S): 1 INJECTION INTRAVENOUS at 21:27

## 2023-11-03 RX ADMIN — Medication 0.5 MILLIGRAM(S): at 14:47

## 2023-11-03 RX ADMIN — OXYCODONE HYDROCHLORIDE 2.5 MILLIGRAM(S): 5 TABLET ORAL at 21:55

## 2023-11-03 RX ADMIN — CLOPIDOGREL BISULFATE 75 MILLIGRAM(S): 75 TABLET, FILM COATED ORAL at 05:34

## 2023-11-03 RX ADMIN — LOSARTAN POTASSIUM 100 MILLIGRAM(S): 100 TABLET, FILM COATED ORAL at 05:34

## 2023-11-03 RX ADMIN — HEPARIN SODIUM 5000 UNIT(S): 5000 INJECTION INTRAVENOUS; SUBCUTANEOUS at 21:23

## 2023-11-03 RX ADMIN — Medication 81 MILLIGRAM(S): at 11:43

## 2023-11-03 RX ADMIN — MAGNESIUM OXIDE 400 MG ORAL TABLET 400 MILLIGRAM(S): 241.3 TABLET ORAL at 16:45

## 2023-11-03 RX ADMIN — Medication 90 MILLIGRAM(S): at 05:34

## 2023-11-03 RX ADMIN — Medication 650 MILLIGRAM(S): at 21:23

## 2023-11-03 RX ADMIN — Medication 8: at 08:12

## 2023-11-03 RX ADMIN — Medication 650 MILLIGRAM(S): at 14:48

## 2023-11-03 RX ADMIN — ROSUVASTATIN CALCIUM 20 MILLIGRAM(S): 5 TABLET ORAL at 21:23

## 2023-11-03 RX ADMIN — HEPARIN SODIUM 5000 UNIT(S): 5000 INJECTION INTRAVENOUS; SUBCUTANEOUS at 14:50

## 2023-11-03 RX ADMIN — MEROPENEM 100 MILLIGRAM(S): 1 INJECTION INTRAVENOUS at 05:35

## 2023-11-03 RX ADMIN — INSULIN GLARGINE 25 UNIT(S): 100 INJECTION, SOLUTION SUBCUTANEOUS at 09:10

## 2023-11-03 RX ADMIN — OXYCODONE HYDROCHLORIDE 2.5 MILLIGRAM(S): 5 TABLET ORAL at 16:44

## 2023-11-03 RX ADMIN — MAGNESIUM OXIDE 400 MG ORAL TABLET 400 MILLIGRAM(S): 241.3 TABLET ORAL at 11:43

## 2023-11-03 RX ADMIN — Medication 8 UNIT(S): at 16:44

## 2023-11-03 RX ADMIN — OXYCODONE HYDROCHLORIDE 2.5 MILLIGRAM(S): 5 TABLET ORAL at 21:32

## 2023-11-03 RX ADMIN — MAGNESIUM OXIDE 400 MG ORAL TABLET 400 MILLIGRAM(S): 241.3 TABLET ORAL at 08:13

## 2023-11-03 RX ADMIN — Medication 0.5 MILLIGRAM(S): at 02:22

## 2023-11-03 RX ADMIN — Medication 650 MILLIGRAM(S): at 05:34

## 2023-11-03 RX ADMIN — HEPARIN SODIUM 5000 UNIT(S): 5000 INJECTION INTRAVENOUS; SUBCUTANEOUS at 05:34

## 2023-11-03 RX ADMIN — Medication 8 UNIT(S): at 08:11

## 2023-11-03 NOTE — CONSULT NOTE ADULT - ASSESSMENT
Assessment:    This is a 57 y/o male with Charcot foot now s/p R TMA on 10/27/23 for gangrene of R foot which found occluded dorsal and plantar metatarsal arteries, s/p revision of TMA on 10/30/23 and serial debridements likely needed per chart review of podiatry consults, s/p angiogram on 10/23 with [place] Wilfredo with balloon and no stents per the patient due to resting leg pain and prolonged nonhealing foot ulcers, patient reports improvement of pain post angio. The rest of his history as stated below.        Plan:  #PAD:  s/p angiogram 10/23, s/p R TMA 10/27, s/p revision R TMA 10/30  - F/U arterial doppler once performed  - c/w asa, plavix  - wound care per podiatry; per chart review probable staged debridement of wound d/t worsening necrosis of the distal amputation site  - pain mgmt per medicine  - surgery will follow        Will be discussed with vascular surgeon attending Assessment:    This is a 59 y/o male with Charcot foot now s/p R TMA on 10/27/23 for gangrene of R foot which found occluded dorsal and plantar metatarsal arteries, s/p revision of TMA on 10/30/23 and serial debridements likely needed per chart review of podiatry consults, s/p angiogram on 10/23 with [place] Wilfredo with balloon and no stents per the patient due to resting leg pain and prolonged nonhealing foot ulcers, patient reports improvement of pain post angio. The rest of his history as stated below.        Plan:  #Claudication, r/o PAD:  s/p angiogram 10/23, s/p R TMA 10/27, s/p revision R TMA 10/30  - F/U B/L LE arterial US once performed  - c/w asa, plavix  - Heparin gtt   - active type and screen  - monitor CBC, CMP  - wound care per podiatry; per chart review probable staged debridement of wound d/t worsening necrosis of the distal amputation site  - pain mgmt per medicine  - surgery will follow          Above plan will be discussed with vascular surgeon attending         Assessment:    This is a 57 y/o male with Charcot foot now s/p R TMA on 10/27/23 for gangrene of R foot which found occluded dorsal and plantar metatarsal arteries, s/p revision of TMA on 10/30/23 and serial debridements likely needed per chart review of podiatry consults, s/p angiogram on 10/23 with [place] Wilfredo with balloon and no stents per the patient due to resting leg pain and prolonged nonhealing foot wounds, patient reports improvement of pain post angio. The rest of his history as stated below.         Plan:  #Claudication, r/o PAD:  s/p angiogram 10/23, s/p R TMA 10/27, s/p revision R TMA 10/30  - F/U B/L LE arterial US once performed  - on asa, plavix  - Heparin gtt   - active type and screen  - monitor CBC, CMP  - wound care per podiatry; per chart review probable staged debridement of wound d/t worsening necrosis of the distal amputation site  - pain mgmt per medicine  - surgery will follow          Above plan will be discussed with vascular surgeon attending         Assessment:    58yoM w/PMHX: DM, HTN, HLD, CKD (baseline Cr 2.5-3.5, on list for kidney transplant), PAD (s/p  peripheral angiogram on 10/23/2023 with balloon angioplasty of PTA and DCB of R SFA and PTA of R PT and R AT via R groin and R pedal access - by Dr. Thomas Villalobos), Charcot foot, s/p placement of an external fixator to RLE on 9/28/23 which had to be removed on 10/19 due to infection, , anemia recently received 2u pRBCS, pt was dc from hospital on 10/24/23 and now returns to hospital as directed by his podiatrist Dr Benson for TMA which was done on 10/27/23.  Pt had subsequent additional I&D on 10/30 and tomorrow 11/4.  Vascular consult requested for possible RIght BKA    Plan:  #Claudication, r/o PAD:  s/p angiogram 10/23, s/p R TMA 10/27, s/p revision R TMA 10/30    -Per podiatry:  - Plan for Surgical Debridement; Add on #TBD Saturday 11/4/23; Excisional debridement of soft tissue & bone, Right foot;  - NPO @ [facility] 11/3/23;  - Likely recommend BKA-R;      - Will D/W vascular team re: need for BKA  - Pt on asa, plavix  - cont wound care per podiatry; per chart review probable staged debridement of wound d/t worsening necrosis of the distal amputation site on 11/4  - pain mgmt per medicine    Above plan will be discussed with vascular surgeon attending for further recommendations         Assessment:    58yoM w/PMHX: DM, HTN, HLD, CKD (baseline Cr 2.5-3.5, on list for kidney transplant), PAD (s/p  peripheral angiogram on 10/23/2023 with balloon angioplasty of PTA and DCB of R SFA and PTA of R PT and R AT via R groin and R pedal access - by Dr. Thomas Villalobos), Charcot foot, s/p placement of an external fixator to RLE on 9/28/23 which had to be removed on 10/19 due to infection, , anemia recently received 2u pRBCS, pt was dc from hospital on 10/24/23 and now returns to hospital as directed by his podiatrist Dr Benson for TMA which was done on 10/27/23.  Pt had subsequent additional I&D on 10/30 and tomorrow 11/4.  Vascular consult requested for possible RIght BKA    Plan:  #Claudication, r/o PAD:  s/p angiogram 10/23, s/p R TMA 10/27, s/p revision R TMA 10/30    -Per podiatry:  - Plan for Surgical Debridement; Add on #TBD Saturday 11/4/23; Excisional debridement of soft tissue & bone, Right foot;  - NPO @ [facility] 11/3/23;  - Likely recommend BKA-R;      - Will D/W vascular team re: need for BKA  - Pt on asa, plavix  - cont wound care per podiatry; per chart review probable staged debridement of wound d/t worsening necrosis of the distal amputation site on 11/4  - pain mgmt per medicine    ADDENDUM:  Case D/w Vascular team:   - Will tentatively book for Right BKA at [facility] on Tuesday 11/7; please transfer patient to [facility] on Monday 11/3  - please obtain cardio consult for pre op risk stratification  -recommend to hold Plavix   - will follow   Assessment:    58yoM w/PMHX: DM, HTN, HLD, CKD (baseline Cr 2.5-3.5, on list for kidney transplant), PAD (s/p  peripheral angiogram on 10/23/2023 with balloon angioplasty of PTA and DCB of R SFA and PTA of R PT and R AT via R groin and R pedal access - by Dr. Thomas Villalobos), Charcot foot, s/p placement of an external fixator to RLE on 9/28/23 which had to be removed on 10/19 due to infection, , anemia recently received 2u pRBCS, pt was dc from hospital on 10/24/23 and now returns to hospital as directed by his podiatrist Dr Benson for TMA which was done on 10/27/23.  Pt had subsequent additional I&D on 10/30 and tomorrow 11/4.  Vascular consult requested for possible RIght BKA    Plan:  #Claudication, r/o PAD:  s/p angiogram 10/23, s/p R TMA 10/27, s/p revision R TMA 10/30    -Per podiatry:  - Plan for Surgical Debridement; Add on #TBD Saturday 11/4/23; Excisional debridement of soft tissue & bone, Right foot;  - NPO @ [facility] 11/3/23;  - Likely recommend BKA-R;      - Will D/W vascular team re: need for BKA  - Pt on asa, plavix  - cont wound care per podiatry; per chart review probable staged debridement of wound d/t worsening necrosis of the distal amputation site on 11/4  - pain mgmt per medicine    ADDENDUM:  Case D/w Vascular team:   - Will tentatively book for Right BKA at [facility] on Tuesday 11/7; please transfer patient to [facility] on Monday 11/3  - please obtain cardio consult for pre op risk stratification  -recommend to hold Plavix  -PER PODIATRY: THEY WILL SPEAK W/PATIENT ON 11/4 RE: POSS BKA -- IF PT AGREES, VASC TEAM WILL THEN DISCUSS SCHEDULING   - will follow   Assessment:    58yoM w/PMHX: DM, HTN, HLD, CKD (baseline Cr 2.5-3.5, on list for kidney transplant), PAD (s/p  peripheral angiogram on 10/23/2023 with balloon angioplasty of PTA and DCB of R SFA and PTA of R PT and R AT via R groin and R pedal access - by Dr. Thomas Villalobos), Charcot foot, s/p placement of an external fixator to RLE on 9/28/23 which had to be removed on 10/19 due to infection, , anemia recently received 2u pRBCS, pt was dc from hospital on 10/24/23 and now returns to hospital as directed by his podiatrist Dr Benson for TMA which was done on 10/27/23.  Pt had subsequent additional I&D on 10/30 and tomorrow 11/4.  Vascular consult requested for possible RIght BKA    Plan:  #PAD:  s/p angiogram 10/23, s/p R TMA 10/27, s/p revision R TMA 10/30    -Per podiatry:  - Plan for Surgical Debridement; Add on #TBD Saturday 11/4/23; Excisional debridement of soft tissue & bone, Right foot;  - NPO @ [facility] 11/3/23;  - Likely recommend BKA-R;      - Will D/W vascular team re: need for BKA  - Pt on asa, plavix  - cont wound care per podiatry; per chart review probable staged debridement of wound d/t worsening necrosis of the distal amputation site on 11/4  - pain mgmt per medicine    ADDENDUM:  Case D/w Vascular team:   - Will tentatively book for Right BKA at [facility] on Tuesday 11/7; please transfer patient to [facility] on Monday 11/3  - please obtain cardio consult for pre op risk stratification  -recommend to hold Plavix  -PER PODIATRY: THEY WILL SPEAK W/PATIENT ON 11/4 RE: POSS BKA -- IF PT AGREES, VASC TEAM WILL THEN DISCUSS SCHEDULING   - will follow   Assessment:    58yoM w/PMHX: DM, HTN, HLD, CKD (baseline Cr 2.5-3.5, on list for kidney transplant), PAD (s/p  peripheral angiogram on 10/23/2023 with balloon angioplasty of PTA and DCB of R SFA and PTA of R PT and R AT via R groin and R pedal access - by Dr. Thomas Villalobos), Charcot foot, s/p placement of an external fixator to RLE on 9/28/23 which had to be removed on 10/19 due to infection, , anemia recently received 2u pRBCS, pt was dc from hospital on 10/24/23 and now returns to hospital as directed by his podiatrist Dr Benson for TMA which was done on 10/27/23.  Pt had subsequent additional I&D on 10/30 and tomorrow 11/4.  Vascular consult requested for possible RIght BKA    Plan:  #PAD:  s/p angiogram 10/23, s/p R TMA 10/27, s/p revision R TMA 10/30    -Per podiatry:  - Plan for Surgical Debridement; Add on #TBD Saturday 11/4/23; Excisional debridement of soft tissue & bone, Right foot;  - NPO @ [facility] 11/3/23;  - Likely recommend BKA-R;      - Will D/W vascular team re: need for BKA  - Pt on asa, plavix  - cont wound care per podiatry; per chart review probable staged debridement of wound d/t worsening necrosis of the distal amputation site on 11/4  - pain mgmt per medicine    ADDENDUM:  Case D/w Vascular team:  -PER PODIATRY: THEY WILL SPEAK W/PATIENT ON 11/4 RE: POSS BKA -- IF PT AGREES, VASC TEAM WILL THEN DISCUSS SCHEDULING   - Will tentatively book for Right BKA at [facility] on Tuesday 11/7; please transfer patient to [facility] on Monday 11/6 (once agreeable)  - please obtain cardio consult for pre op risk stratification  -recommend to hold Plavix   - will follow

## 2023-11-03 NOTE — PROGRESS NOTE ADULT - SUBJECTIVE AND OBJECTIVE BOX
Podiatry Progress Note    Subjective:   MARIOLA CURRY is a pleasant well-nourished, well developed 58y Male in no acute distress, alert awake, and oriented to person, place and time.  Patient is a 58y old  Male who presents with a chief complaint of DFU (02 Nov 2023 09:55). Pt seen & evaluated at bedside. Dressings D/C/I to R foot. Pt states he has not been walking and has been keeping foot elevated. Denies N/V/F/C/pain. No other pedal complaints.       PAST MEDICAL & SURGICAL HISTORY:  DM (diabetes mellitus)      HTN (hypertension)      HLD (hyperlipidemia)      Herpes labialis      Anxiety      GERD (gastroesophageal reflux disease)      Kidney stones  20 years ago      Kidney disease  stage 4      Chronic kidney disease, unspecified CKD stage      Diabetic Charcot foot      PAD (peripheral artery disease)      S/P foot surgery, right  x 5 ( 2006 - 2013 )      Kidney stone  Removed by Laser x 2 ( 20 years ago )      H/O arthroscopy of right knee      Status post peripheral artery angioplasty           Objective:  Vital Signs Last 24 Hrs  T(C): 35.8 (03 Nov 2023 04:53), Max: 36.8 (02 Nov 2023 13:02)  T(F): 96.5 (03 Nov 2023 04:53), Max: 98.3 (02 Nov 2023 13:02)  HR: 72 (03 Nov 2023 04:53) (68 - 79)  BP: 128/74 (03 Nov 2023 04:53) (111/63 - 171/59)  BP(mean): --  RR: 18 (03 Nov 2023 04:53) (16 - 18)  SpO2: 100% (02 Nov 2023 20:05) (100% - 100%)    Parameters below as of 02 Nov 2023 20:05  Patient On (Oxygen Delivery Method): room air                            8.0    7.62  )-----------( 491      ( 03 Nov 2023 04:30 )             25.5                 11-03    137  |  100  |  34<H>  ----------------------------<  297<H>  5.1<H>   |  26  |  2.8<H>    Ca    8.9      03 Nov 2023 04:30  Mg     2.0     11-03    TPro  6.0  /  Alb  3.2<L>  /  TBili  <0.2  /  DBili  x   /  AST  17  /  ALT  18  /  AlkPhos  168<H>  11-03        -----------      Physical Exam - Lower Extremity Focused:   #Right Lower Extremity  Derm:   Open Right Transmetatarsal Amputation Site    -Distal amputation site: Wound fibrogranular/necrotic (35% fibrous, 15% granular, 50% necrotic). Metatarsal stumps 1-5 visible in wound bed;   -Lateral midfoot wound: 100% necrosis    -Ischemic changes to circumferential wound margins   -Wound Probes Deep to Bone   -No active drainage or bleeding; No malodor;     Vascular: DP and PT Pulses Diminished; Foot is Cool to Tepid to the touch;   Neuro: Protective Sensation Diminished / Moderately Neuropathic   MSK: s/p TMA. No Pain On Palpation at Wound Site     Assessment:   Right Foot Gangrene  s/p TMA, R foot; 10/27/23  s/p Excisional debridement of soft tissue/bone, R foot; 10/30/23    Plan:  Chart reviewed and Patient evaluated. All Questions and Concerns Addressed and Answered  Discussed diagnosis and treatment with patient  Wound Flushed w/ NS; Wound Packed w/ Saline-Soaked Gauze / DSD / Kerlix / ABD / Light ACE  Local Wound Care; As Stated Above   POXR Imaging Right Foot 10/30/23;    -Status post right transmetatarsal amputation with adjacent surgical changes and soft tissue swelling.   -No evidence of acute fracture or dislocation. Degenerative changes of the mid and hindfoot. Postsurgical changes of the calcaneus. Likely abandoned leads from previous noted stimulator overlying the calcaneus/Achilles. Vascular calcifications.  Vascular Consult appreciated;    -F/u Lower Extremity Arterial Duplex B/L; Will f/u recs;  ID recs appreciated;    -D/C cefepime due to high JACOB, start IV meropenem 1gm q12hrs (CrCl ~34) for now.    -Follow up path and wound Cx  Plan for Surgical Debridement; Add-On   Will likely recommend BKA-R;       Discussed Plan w/ Dr. Valenzuela    Podiatry      Podiatry Progress Note    Subjective:   MARIOLA CURRY is a pleasant well-nourished, well developed 58y Male in no acute distress, alert awake, and oriented to person, place and time.  Patient is a 58y old  Male who presents with a chief complaint of DFU (02 Nov 2023 09:55). Pt seen & evaluated at bedside. Dressings D/C/I to R foot. Pt states he has not been walking and has been keeping foot elevated. Denies N/V/F/C/pain. No other pedal complaints.       PAST MEDICAL & SURGICAL HISTORY:  DM (diabetes mellitus)      HTN (hypertension)      HLD (hyperlipidemia)      Herpes labialis      Anxiety      GERD (gastroesophageal reflux disease)      Kidney stones  20 years ago      Kidney disease  stage 4      Chronic kidney disease, unspecified CKD stage      Diabetic Charcot foot      PAD (peripheral artery disease)      S/P foot surgery, right  x 5 ( 2006 - 2013 )      Kidney stone  Removed by Laser x 2 ( 20 years ago )      H/O arthroscopy of right knee      Status post peripheral artery angioplasty           Objective:  Vital Signs Last 24 Hrs  T(C): 35.8 (03 Nov 2023 04:53), Max: 36.8 (02 Nov 2023 13:02)  T(F): 96.5 (03 Nov 2023 04:53), Max: 98.3 (02 Nov 2023 13:02)  HR: 72 (03 Nov 2023 04:53) (68 - 79)  BP: 128/74 (03 Nov 2023 04:53) (111/63 - 171/59)  BP(mean): --  RR: 18 (03 Nov 2023 04:53) (16 - 18)  SpO2: 100% (02 Nov 2023 20:05) (100% - 100%)    Parameters below as of 02 Nov 2023 20:05  Patient On (Oxygen Delivery Method): room air                            8.0    7.62  )-----------( 491      ( 03 Nov 2023 04:30 )             25.5                 11-03    137  |  100  |  34<H>  ----------------------------<  297<H>  5.1<H>   |  26  |  2.8<H>    Ca    8.9      03 Nov 2023 04:30  Mg     2.0     11-03    TPro  6.0  /  Alb  3.2<L>  /  TBili  <0.2  /  DBili  x   /  AST  17  /  ALT  18  /  AlkPhos  168<H>  11-03        -----------      Physical Exam - Lower Extremity Focused:   #Right Lower Extremity  Derm:   Open Right Transmetatarsal Amputation Site    -Distal amputation site: Wound fibrogranular/necrotic (35% fibrous, 15% granular, 50% necrotic). Metatarsal stumps 1-5 visible in wound bed;   -Lateral midfoot wound: 100% necrosis    -Ischemic changes to circumferential wound margins   -Wound Probes Deep to Bone   -No active drainage or bleeding; No malodor;     Vascular: DP and PT Pulses Diminished; Foot is Cool to Tepid to the touch;   Neuro: Protective Sensation Diminished / Moderately Neuropathic   MSK: s/p TMA. No Pain On Palpation at Wound Site     Assessment:   Right Foot Gangrene  s/p TMA, R foot; 10/27/23  s/p Excisional debridement of soft tissue/bone, R foot; 10/30/23    Plan:  Chart reviewed and Patient evaluated. All Questions and Concerns Addressed and Answered  Discussed diagnosis and treatment with patient  Wound Flushed w/ NS; Wound Packed w/ Saline-Soaked Gauze / DSD / Kerlix / ABD / Light ACE  Local Wound Care; As Stated Above   POXR Imaging Right Foot 10/30/23;    -Status post right transmetatarsal amputation with adjacent surgical changes and soft tissue swelling.   -No evidence of acute fracture or dislocation. Degenerative changes of the mid and hindfoot. Postsurgical changes of the calcaneus. Likely abandoned leads from previous noted stimulator overlying the calcaneus/Achilles. Vascular calcifications.  Vascular Consult appreciated;    -F/u Lower Extremity Arterial Duplex B/L; Will f/u recs;  ID recs appreciated;    -D/C cefepime due to high JACOB, start IV meropenem 1gm q12hrs (CrCl ~34) for now.    -Follow up path and wound Cx  Plan for Surgical Debridement; Add on #TBD Saturday 11/4/23; Excisional debridement of soft tissue & bone, Right foot;  Will run sticker later this afternoon;  Will update when time finalized;  Request pre-lab optimization & OR stratification prior to sx;  Likely recommend BKA-R;   Discussed Plan w/ Dr. Valenzuela    Podiatry      Podiatry Progress Note    Subjective:   MARIOLA CURRY is a pleasant well-nourished, well developed 58y Male in no acute distress, alert awake, and oriented to person, place and time.  Patient is a 58y old  Male who presents with a chief complaint of DFU (02 Nov 2023 09:55). Pt seen & evaluated at bedside. Dressings D/C/I to R foot. Pt states he has not been walking and has been keeping foot elevated. Denies N/V/F/C/pain. No other pedal complaints.       PAST MEDICAL & SURGICAL HISTORY:  DM (diabetes mellitus)      HTN (hypertension)      HLD (hyperlipidemia)      Herpes labialis      Anxiety      GERD (gastroesophageal reflux disease)      Kidney stones  20 years ago      Kidney disease  stage 4      Chronic kidney disease, unspecified CKD stage      Diabetic Charcot foot      PAD (peripheral artery disease)      S/P foot surgery, right  x 5 ( 2006 - 2013 )      Kidney stone  Removed by Laser x 2 ( 20 years ago )      H/O arthroscopy of right knee      Status post peripheral artery angioplasty           Objective:  Vital Signs Last 24 Hrs  T(C): 35.8 (03 Nov 2023 04:53), Max: 36.8 (02 Nov 2023 13:02)  T(F): 96.5 (03 Nov 2023 04:53), Max: 98.3 (02 Nov 2023 13:02)  HR: 72 (03 Nov 2023 04:53) (68 - 79)  BP: 128/74 (03 Nov 2023 04:53) (111/63 - 171/59)  BP(mean): --  RR: 18 (03 Nov 2023 04:53) (16 - 18)  SpO2: 100% (02 Nov 2023 20:05) (100% - 100%)    Parameters below as of 02 Nov 2023 20:05  Patient On (Oxygen Delivery Method): room air                            8.0    7.62  )-----------( 491      ( 03 Nov 2023 04:30 )             25.5                 11-03    137  |  100  |  34<H>  ----------------------------<  297<H>  5.1<H>   |  26  |  2.8<H>    Ca    8.9      03 Nov 2023 04:30  Mg     2.0     11-03    TPro  6.0  /  Alb  3.2<L>  /  TBili  <0.2  /  DBili  x   /  AST  17  /  ALT  18  /  AlkPhos  168<H>  11-03        -----------      Physical Exam - Lower Extremity Focused:   #Right Lower Extremity  Derm:   Open Right Transmetatarsal Amputation Site    -Distal amputation site: Wound fibrogranular/necrotic (35% fibrous, 15% granular, 50% necrotic). Metatarsal stumps 1-5 visible in wound bed;   -Lateral midfoot wound: 100% necrosis    -Ischemic changes to circumferential wound margins   -Wound Probes Deep to Bone   -No active drainage or bleeding; No malodor;     Vascular: DP and PT Pulses Diminished; Foot is Cool to Tepid to the touch;   Neuro: Protective Sensation Diminished / Moderately Neuropathic   MSK: s/p TMA. No Pain On Palpation at Wound Site     Assessment:   Right Foot Gangrene  s/p TMA, R foot; 10/27/23  s/p Excisional debridement of soft tissue/bone, R foot; 10/30/23    Plan:  Chart reviewed and Patient evaluated. All Questions and Concerns Addressed and Answered  Discussed diagnosis and treatment with patient  Wound Flushed w/ NS; Wound Packed w/ Saline-Soaked Gauze / DSD / Kerlix / ABD / Light ACE  Local Wound Care; As Stated Above   POXR Imaging Right Foot 10/30/23;    -Status post right transmetatarsal amputation with adjacent surgical changes and soft tissue swelling.   -No evidence of acute fracture or dislocation. Degenerative changes of the mid and hindfoot. Postsurgical changes of the calcaneus. Likely abandoned leads from previous noted stimulator overlying the calcaneus/Achilles. Vascular calcifications.  Vascular Consult appreciated;    -F/u Lower Extremity Arterial Duplex B/L; Will f/u recs;  ID recs appreciated;    -D/C cefepime due to high JACOB, start IV meropenem 1gm q12hrs (CrCl ~34) for now.    -Follow up path and wound Cx  Plan for Surgical Debridement; Add on #TBD Saturday 11/4/23; Excisional debridement of soft tissue & bone, Right foot;  Will run sticker later this afternoon;  Will update when time finalized;  Request pre-lab optimization & OR stratification prior to sx;  NPO @ [facility] 11/3/23;  Likely recommend BKA-R;   Discussed Plan w/ Dr. Valenzuela    Podiatry

## 2023-11-03 NOTE — CHART NOTE - NSCHARTNOTEFT_GEN_A_CORE
Patient seen and assessed at bedside this morning. Overall feels well, no significant complaints apart from increased pain when his Percocet wears off. Does not wish to receive any IV pain meds, states percocet works fine for him, but wears off about a half hour before next dose and then takes 30mins to set in when he does take it, leaving him with one hour of uncontrolled pain. Requests a prn breakthrough dose to cover this time period     Seen by vascular surgery. Arterial duplex ordered. Podiatry planning further debridement on 11/4. Appears vascular surgery will recommend a R BKA, tentatively planned for Tuesday 11/7. Will prep over the weekend for procedure. Podiatry and vascular to discuss with patient about procedure on 11/4      Plan   - Order Oxy 2.5mg prn for breakthrough pain -- patient knows to request if needed in between percocet doses  - Appreciate podiatry reccs-- plan for further debridement on 11/4  - F/up arterial US of LE   - Cardiology consulted for pre-op clearance   - Will hold Plavix in anticipation for procedure  - Lokelma cx 1 today for K of 5.1  - Cont meropenem

## 2023-11-03 NOTE — CONSULT NOTE ADULT - SUBJECTIVE AND OBJECTIVE BOX
HPI:     Patient  is a 58yoM with Charcot foot s/p placement of an external fixator on 9/28/23 which had to be removed on 10/19 due to infection, CKD (baseline Cr 2.5-3.5, on list for kidney transplant), and IDDM (A1c 9.9), PAD, s/p  peripheral angiogram on 10/23/2023 with balloon angioplasty of PTA and DCB of R SFA and PTA of R PT and R AT via R groin and R pedal access, anemia recently received 2u pRBCS, pt was dc from hospital on 10/24/23 and now returns to hospital as directed by his podiatrist Dr Benson for TMA which will be done on 10/27/23 as pt has non healing wound and gangrene of the right foot. Patient complains of moderate to sever pain of foot which is partially relieved with percocet and associated wound discharge.  Pt denies fevers or chills.  (26 Oct 2023 18:37)                       HPI-Cardiology      Pt evaluated at bedside. Currently admitted for gangrenous R foot.  HPI as above.  Cardiology consulted for risk stratification for R BKA.  Denies lightheadedness, CP, palpitations, SOB, URENA, orthopnea, N/V, fever or recent sick contacts.  Radiology tests and hospital records, were reviewed, as well as previous notes on this patient.                 PAST MEDICAL & SURGICAL HISTORY     DM (diabetes mellitus)           HTN (hypertension)           HLD (hyperlipidemia)           Herpes labialis           Anxiety           GERD (gastroesophageal reflux disease)           Kidney stones     20 years ago           Kidney disease     stage 4           Chronic kidney disease, unspecified CKD stage           Diabetic Charcot foot           PAD (peripheral artery disease)           S/P foot surgery, right     x 5 ( 2006 - 2013 )           Kidney stone     Removed by Laser x 2 ( 20 years ago )           H/O arthroscopy of right knee           Status post peripheral artery angioplasty                       FAMILY HISTORY:     FAMILY HISTORY:     Family history of cancer in father (Father)     Lung                       SOCIAL HISTORY:     []smoker     []Alcohol     []Drug           ALLERGIES:     No Known Allergies                 MEDICATIONS:     MEDICATIONS  (STANDING):     aspirin  chewable 81 milliGRAM(s) Oral daily     chlorhexidine 2% Cloths 1 Application(s) Topical <User Schedule>     dextrose 50% Injectable 25 Gram(s) IV Push once     epoetin ban (PROCRIT) Injectable 2000 Unit(s) SubCutaneous every 7 days     glucagon  Injectable 1 milliGRAM(s) IntraMuscular once     heparin   Injectable 5000 Unit(s) SubCutaneous every 8 hours     insulin glargine Injectable (LANTUS) 25 Unit(s) SubCutaneous every morning     insulin lispro (ADMELOG) corrective regimen sliding scale   SubCutaneous three times a day before meals     insulin lispro Injectable (ADMELOG) 8 Unit(s) SubCutaneous before dinner     insulin lispro Injectable (ADMELOG) 8 Unit(s) SubCutaneous before lunch     insulin lispro Injectable (ADMELOG) 8 Unit(s) SubCutaneous before breakfast     losartan 100 milliGRAM(s) Oral daily     magnesium oxide 400 milliGRAM(s) Oral three times a day with meals     meropenem  IVPB 1000 milliGRAM(s) IV Intermittent every 12 hours     NIFEdipine XL 90 milliGRAM(s) Oral daily     rosuvastatin 20 milliGRAM(s) Oral at bedtime     sodium bicarbonate 650 milliGRAM(s) Oral three times a day     sodium zirconium cyclosilicate 10 Gram(s) Oral once           MEDICATIONS  (PRN):     ALPRAZolam 0.5 milliGRAM(s) Oral four times a day PRN anxiety     cloNIDine 0.1 milliGRAM(s) Oral every 8 hours PRN sbp>150     dextrose Oral Gel 15 Gram(s) Oral once PRN Blood Glucose LESS THAN 70 milliGRAM(s)/deciliter     dextrose Oral Gel 15 Gram(s) Oral once PRN Blood Glucose LESS THAN 70 milliGRAM(s)/deciliter     dextrose Oral Gel 15 Gram(s) Oral once PRN Blood Glucose LESS THAN 70 milliGRAM(s)/deciliter     dextrose Oral Gel 15 Gram(s) Oral once PRN Blood Glucose LESS THAN 70 milliGRAM(s)/deciliter     dextrose Oral Gel 15 Gram(s) Oral once PRN Blood Glucose LESS THAN 70 milliGRAM(s)/deciliter     melatonin 3 milliGRAM(s) Oral at bedtime PRN Insomnia     oxycodone    5 mG/acetaminophen 325 mG 2 Tablet(s) Oral every 4 hours PRN Moderate Pain (4 - 6)     oxyCODONE    IR 2.5 milliGRAM(s) Oral every 4 hours PRN Severe Pain (7 - 10)                 HOME MEDICATIONS:     Home Medications:     losartan 100 mg oral tablet: 1 tab(s) orally once a day (26 Oct 2023 18:32)     NIFEdipine 90 mg oral tablet, extended release: 1 tab(s) orally once a day (26 Oct 2023 18:32)     Percocet 5 mg-325 mg oral tablet: 1 tab(s) orally every 4 hours as needed for  moderate pain (26 Oct 2023 18:32)     sodium bicarbonate 650 mg oral tablet: 1 tab(s) orally 3 times a day (26 Oct 2023 18:32)     Xanax 1 mg oral tablet: 0.5 tab(s) orally 4 times a day, As Needed (26 Oct 2023 18:32)                 VITALS:      T(F): 97.3 (11-03 @ 14:00), Max: 98.4 (11-01 @ 04:49)     HR: 69 (11-03 @ 14:00) (67 - 79)     BP: 118/59 (11-03 @ 14:00) (111/63 - 171/59)     BP(mean): --     RR: 18 (11-03 @ 14:00) (16 - 18)     SpO2: 100% (11-02 @ 20:05) (96% - 100%)           I&O's Summary                 REVIEW OF SYSTEMS:     CONSTITUTIONAL: No weakness, fevers or chills     EYES: No visual changes     ENT: No vertigo or throat pain      NECK: No pain or stiffness     RESPIRATORY: No cough, wheezing, hemoptysis; No shortness of breath     CARDIOVASCULAR: No chest pain or palpitations     GASTROINTESTINAL: No abdominal or epigastric pain. No nausea, vomiting, or hematemesis; No diarrhea or constipation. No melena or hematochezia.     GENITOURINARY: No dysuria, frequency or hematuria     NEUROLOGICAL: No numbness or weakness     SKIN: No itching, no rashes     MSK: no           PHYSICAL EXAM:     NEURO: patient is awake , alert and oriented     GEN: Not in acute distress     NECK: no thyroid enlargement, no JVD     LUNGS: Clear to auscultation bilaterally      CARDIOVASCULAR: S1/S2 present, RRR , no murmurs or rubs, no carotid bruits,  +distal pulse BL     ABD: Soft, non-tender, non-distended, +BS     EXT: No REY     SKIN: Intact           LABS:                              8.0       7.62  )-----------( 491      ( 03 Nov 2023 04:30 )                25.5            11-03           137  |  100  |  34<H>     ----------------------------<  297<H>     5.1<H>   |  26  |  2.8<H>           Ca    8.9      03 Nov 2023 04:30     Mg     2.0     11-03           TPro  6.0  /  Alb  3.2<L>  /  TBili  <0.2  /  DBili  x   /  AST  17  /  ALT  18  /  AlkPhos  168<H>  11-03                 10-23 Chol 106 LDL -- HDL 35<L> Trig 93                 RADIOLOGY:     -CXR:     < from: Xray Chest 1 View AP/PA (10.18.23 @ 16:32) >     IMPRESSION:           No evidence of acute cardiopulmonary disease.           < end of copied text >                 -TTE:     < from: TTE Echo Complete w/o Contrast w/ Doppler (09.06.23 @ 08:42) >     Summary:     1. Normal global left ventricular systolic function.     2.LV Ejection Fraction by Knutson's Method with a biplane EF of 55 %.     3. Mildly increased LV wall thickness.     4. Normal left ventricular internal cavity size.     5. Spectral Doppler shows impaired relaxation pattern of left      ventricular myocardial filling (Grade I diastolic dysfunction).     6. Normal left atrial size.     7. Normal right atrial size.     8. Mild thickening of the anterior mitral valve leaflet.     9. No evidence of mitral valve regurgitation.           < end of copied text >                 -CCTA:     -STRESS TEST:     < from: NM Nuclear Stress Multiple (09.06.23 @ 11:41) >     Impression:     1. NORMAL STRESS AND REST MYOCARDIAL PERFUSION SPECT TOMOGRAPHY, WITH NO      EVIDENCE FOR ISCHEMIA AT THE LEVEL OF EXERCISE ATTAINED.     2. NORMAL RESTING LEFT VENTRICULAR WALL MOTION AND WALL THICKENING.     3. LEFT VENTRICULAREJECTION FRACTION OF 68 % WHICH IS WITHIN RANGE OF      NORMAL.           < end of copied text >                 -CATHETERIZATION:     < from: Invasive Peripheral Vascular Reporting (10.23.23 @ 10:01) >     -Successful drug coated balloon therapy  to the SFA with 5.0mm     balloon.     -Successful PTA of the TP trunk with a 3.5mm balloon.       -Successful PTA of the anterior tibial artery.       -Successful PTA of the Posterior Tibial artery.            < end of copied text >                 ECG:     < from: 12 Lead ECG (10.27.23 @ 07:31) >     Ventricular Rate 70 BPM           Atrial Rate 70 BPM           P-R Interval 196 ms           QRS Duration 94 ms           Q-T Interval 406 ms           QTC Calculation(Bazett) 438 ms           P Axis 16 degrees           R Axis 20 degrees           T Axis 52 degrees           Diagnosis Line Normal sinus rhythm     Normal ECG           < end of copied text >                       TELEMETRY EVENTS:

## 2023-11-03 NOTE — CHART NOTE - NSCHARTNOTEFT_GEN_A_CORE
Patient is scheduled for surgical debridement of Right foot, tomorrow 11/4/23 @12:00pm  Please make the pt NPO by MN  Optimize lab values prior to OR  Thank You    Podiatry#[id number]

## 2023-11-03 NOTE — CONSULT NOTE ADULT - SUBJECTIVE AND OBJECTIVE BOX
Vascular:    This is a 59 y/o male with Charcot foot now s/p R TMA on 10/27/23 for gangrene of R foot which found occluded dorsal and plantar metatarsal arteries, s/p revision of TMA on 10/30/23 and serial debridements likely needed per chart review of podiatry consults, s/p angiogram on 10/23 with Chico Villalobos with balloon and no stents per the patient due to resting leg pain and prolonged nonhealing foot ulcers, patient reports improvement of pain post angio. The rest of his history as stated below.        HPI:   Patient  is a 58yoM with Charcot foot s/p placement of an external fixator on 9/28/23 which had to be removed on 10/19 due to infection, CKD (baseline Cr 2.5-3.5, on list for kidney transplant), and IDDM (A1c 9.9), PAD, s/p  peripheral angiogram on 10/23/2023 with balloon angioplasty of PTA and DCB of R SFA and PTA of R PT and R AT via R groin and R pedal access, anemia recently received 2u pRBCS, pt was dc from hospital on 10/24/23 and now returns to hospital as directed by his podiatrist Dr Benson for TMA which will be done on 10/27/23 as pt has non healing wound and gangrene of the right foot. Patient complains of moderate to sever pain of foot which is partially relieved with percocet and associated wound discharge.  Pt denies fevers or chills.  (26 Oct 2023 18:37)        PAST MEDICAL & SURGICAL HISTORY:  DM (diabetes mellitus)      HTN (hypertension)      HLD (hyperlipidemia)      Herpes labialis      Anxiety      GERD (gastroesophageal reflux disease)      Kidney stones  20 years ago      Kidney disease  stage 4      Chronic kidney disease, unspecified CKD stage      Diabetic Charcot foot      PAD (peripheral artery disease)      S/P foot surgery, right  x 5 ( 2006 - 2013 )      Kidney stone  Removed by Laser x 2 ( 20 years ago )      H/O arthroscopy of right knee      Status post peripheral artery angioplasty            MEDICATIONS  (STANDING):  aspirin  chewable 81 milliGRAM(s) Oral daily  chlorhexidine 2% Cloths 1 Application(s) Topical <User Schedule>  clopidogrel Tablet 75 milliGRAM(s) Oral every 24 hours  dextrose 50% Injectable 25 Gram(s) IV Push once  epoetin ban (PROCRIT) Injectable 2000 Unit(s) SubCutaneous every 7 days  glucagon  Injectable 1 milliGRAM(s) IntraMuscular once  heparin   Injectable 5000 Unit(s) SubCutaneous every 8 hours  insulin glargine Injectable (LANTUS) 25 Unit(s) SubCutaneous every morning  insulin lispro (ADMELOG) corrective regimen sliding scale   SubCutaneous three times a day before meals  insulin lispro Injectable (ADMELOG) 8 Unit(s) SubCutaneous before breakfast  insulin lispro Injectable (ADMELOG) 8 Unit(s) SubCutaneous before lunch  insulin lispro Injectable (ADMELOG) 8 Unit(s) SubCutaneous before dinner  lactated ringers. 1000 milliLiter(s) (75 mL/Hr) IV Continuous <Continuous>  losartan 100 milliGRAM(s) Oral daily  magnesium oxide 400 milliGRAM(s) Oral three times a day with meals  meropenem  IVPB 1000 milliGRAM(s) IV Intermittent every 12 hours  NIFEdipine XL 90 milliGRAM(s) Oral daily  rosuvastatin 20 milliGRAM(s) Oral at bedtime  sodium bicarbonate 650 milliGRAM(s) Oral three times a day  sodium chloride 0.9%. 1000 milliLiter(s) (50 mL/Hr) IV Continuous <Continuous>    MEDICATIONS  (PRN):  ALPRAZolam 0.5 milliGRAM(s) Oral four times a day PRN anxiety  cloNIDine 0.1 milliGRAM(s) Oral every 8 hours PRN sbp>150  dextrose Oral Gel 15 Gram(s) Oral once PRN Blood Glucose LESS THAN 70 milliGRAM(s)/deciliter  dextrose Oral Gel 15 Gram(s) Oral once PRN Blood Glucose LESS THAN 70 milliGRAM(s)/deciliter  dextrose Oral Gel 15 Gram(s) Oral once PRN Blood Glucose LESS THAN 70 milliGRAM(s)/deciliter  dextrose Oral Gel 15 Gram(s) Oral once PRN Blood Glucose LESS THAN 70 milliGRAM(s)/deciliter  dextrose Oral Gel 15 Gram(s) Oral once PRN Blood Glucose LESS THAN 70 milliGRAM(s)/deciliter  melatonin 3 milliGRAM(s) Oral at bedtime PRN Insomnia  oxycodone    5 mG/acetaminophen 325 mG 2 Tablet(s) Oral every 4 hours PRN Moderate Pain (4 - 6)  oxyCODONE    IR 2.5 milliGRAM(s) Oral every 4 hours PRN Severe Pain (7 - 10)        Allergies    No Known Allergies    Intolerances          SOCIAL HISTORY:  Tobacco use:   Alcohol use:  Drug use:      FAMILY HISTORY:  Family history of cancer in father (Father)  Lung              Vital Signs Last 24 Hrs  T(C): 35.8 (03 Nov 2023 04:53), Max: 36.8 (02 Nov 2023 13:02)  T(F): 96.5 (03 Nov 2023 04:53), Max: 98.3 (02 Nov 2023 13:02)  HR: 72 (03 Nov 2023 04:53) (68 - 79)  BP: 128/74 (03 Nov 2023 04:53) (111/63 - 171/59)  RR: 18 (03 Nov 2023 04:53) (16 - 18)  SpO2: 100% (02 Nov 2023 20:05) (100% - 100%)    Parameters below as of 02 Nov 2023 20:05  Patient On (Oxygen Delivery Method): room air            PHYSICAL EXAM:    General:  WD, WN Conversant in NAD.  Musculoskeletal:  Free painless ROM B/L upper and lower extremities, no current C/C/E. palpable femoral and popliteal pulses  Neurological: No focal deficits.           LABS:                        8.0    7.62  )-----------( 491      ( 03 Nov 2023 04:30 )             25.5     11-03    137  |  100  |  34<H>  ----------------------------<  297<H>  5.1<H>   |  26  |  2.8<H>    Ca    8.9      03 Nov 2023 04:30  Mg     2.0     11-03    TPro  6.0  /  Alb  3.2<L>  /  TBili  <0.2  /  DBili  x   /  AST  17  /  ALT  18  /  AlkPhos  168<H>  11-03      Urinalysis Basic - ( 03 Nov 2023 04:30 )    Color: x / Appearance: x / SG: x / pH: x  Gluc: 297 mg/dL / Ketone: x  / Bili: x / Urobili: x   Blood: x / Protein: x / Nitrite: x   Leuk Esterase: x / RBC: x / WBC x   Sq Epi: x / Non Sq Epi: x / Bacteria: x              RADIOLOGY & ADDITIONAL STUDIES  < from: Xray Foot AP + Lateral + Oblique, Right (10.27.23 @ 15:30) >  Findings/  impression:    Status post right transmetatarsal amputation with adjacent surgical   changes and soft tissue swelling.    No evidence of acute fracture or dislocation. Degenerative changes of the   mid and hindfoot. Postsurgical changes of the calcaneus. Likely abandoned   leads from previous noted stimulator overlying the calcaneus/Achilles.   Vascular calcifications.    --- End of Report ---          < from: Xray Foot AP + Lateral + Oblique, Right (10.22.23 @ 20:57) >  Findings/  impression:    Postsurgical change of the calcaneus. Midfoot arthrosis with erosion and   destruction. Osteopenia. Soft tissue swelling. The extremity is in a cast.    --- End of Report ---          < from: Xray Ankle Complete 3 Views, Right (10.19.23 @ 11:10) >  Findings/  impression:    Status post external fixator removal.    No definite acute fracture or dislocation. Ankle and foot soft tissue   swelling is noted.    Degenerative changes are noted in the hind, mid and forefoot. Vascular   calcifications. Likely amended leads from previous noted stimulator   overlyingthe calcaneus/Achilles. Postsurgical changes of the calcaneus.    --- End of Report ---         Vascular:    This is a 59 y/o male with Charcot foot now s/p R TMA on 10/27/23 for gangrene of R foot which found occluded dorsal and plantar metatarsal arteries, s/p revision of TMA on 10/30/23 and serial debridements likely needed per chart review of podiatry consults, s/p angiogram on 10/23 with Chico Villalobos with balloon and no stents per the patient due to resting leg pain and prolonged nonhealing foot ulcers, patient reports improvement of pain post angio. The rest of his history as stated below.        HPI:   Patient  is a 58yoM with Charcot foot s/p placement of an external fixator on 9/28/23 which had to be removed on 10/19 due to infection, CKD (baseline Cr 2.5-3.5, on list for kidney transplant), and IDDM (A1c 9.9), PAD, s/p  peripheral angiogram on 10/23/2023 with balloon angioplasty of PTA and DCB of R SFA and PTA of R PT and R AT via R groin and R pedal access, anemia recently received 2u pRBCS, pt was dc from hospital on 10/24/23 and now returns to hospital as directed by his podiatrist Dr Benson for TMA which will be done on 10/27/23 as pt has non healing wound and gangrene of the right foot. Patient complains of moderate to sever pain of foot which is partially relieved with percocet and associated wound discharge.  Pt denies fevers or chills.  (26 Oct 2023 18:37)        PAST MEDICAL & SURGICAL HISTORY:  DM (diabetes mellitus)      HTN (hypertension)      HLD (hyperlipidemia)      Herpes labialis      Anxiety      GERD (gastroesophageal reflux disease)      Kidney stones  20 years ago      Kidney disease  stage 4      Chronic kidney disease, unspecified CKD stage      Diabetic Charcot foot      PAD (peripheral artery disease)      S/P foot surgery, right  x 5 ( 2006 - 2013 )      Kidney stone  Removed by Laser x 2 ( 20 years ago )      H/O arthroscopy of right knee      Status post peripheral artery angioplasty            MEDICATIONS  (STANDING):  aspirin  chewable 81 milliGRAM(s) Oral daily  chlorhexidine 2% Cloths 1 Application(s) Topical <User Schedule>  clopidogrel Tablet 75 milliGRAM(s) Oral every 24 hours  dextrose 50% Injectable 25 Gram(s) IV Push once  epoetin ban (PROCRIT) Injectable 2000 Unit(s) SubCutaneous every 7 days  glucagon  Injectable 1 milliGRAM(s) IntraMuscular once  heparin   Injectable 5000 Unit(s) SubCutaneous every 8 hours  insulin glargine Injectable (LANTUS) 25 Unit(s) SubCutaneous every morning  insulin lispro (ADMELOG) corrective regimen sliding scale   SubCutaneous three times a day before meals  insulin lispro Injectable (ADMELOG) 8 Unit(s) SubCutaneous before breakfast  insulin lispro Injectable (ADMELOG) 8 Unit(s) SubCutaneous before lunch  insulin lispro Injectable (ADMELOG) 8 Unit(s) SubCutaneous before dinner  lactated ringers. 1000 milliLiter(s) (75 mL/Hr) IV Continuous <Continuous>  losartan 100 milliGRAM(s) Oral daily  magnesium oxide 400 milliGRAM(s) Oral three times a day with meals  meropenem  IVPB 1000 milliGRAM(s) IV Intermittent every 12 hours  NIFEdipine XL 90 milliGRAM(s) Oral daily  rosuvastatin 20 milliGRAM(s) Oral at bedtime  sodium bicarbonate 650 milliGRAM(s) Oral three times a day  sodium chloride 0.9%. 1000 milliLiter(s) (50 mL/Hr) IV Continuous <Continuous>    MEDICATIONS  (PRN):  ALPRAZolam 0.5 milliGRAM(s) Oral four times a day PRN anxiety  cloNIDine 0.1 milliGRAM(s) Oral every 8 hours PRN sbp>150  dextrose Oral Gel 15 Gram(s) Oral once PRN Blood Glucose LESS THAN 70 milliGRAM(s)/deciliter  dextrose Oral Gel 15 Gram(s) Oral once PRN Blood Glucose LESS THAN 70 milliGRAM(s)/deciliter  dextrose Oral Gel 15 Gram(s) Oral once PRN Blood Glucose LESS THAN 70 milliGRAM(s)/deciliter  dextrose Oral Gel 15 Gram(s) Oral once PRN Blood Glucose LESS THAN 70 milliGRAM(s)/deciliter  dextrose Oral Gel 15 Gram(s) Oral once PRN Blood Glucose LESS THAN 70 milliGRAM(s)/deciliter  melatonin 3 milliGRAM(s) Oral at bedtime PRN Insomnia  oxycodone    5 mG/acetaminophen 325 mG 2 Tablet(s) Oral every 4 hours PRN Moderate Pain (4 - 6)  oxyCODONE    IR 2.5 milliGRAM(s) Oral every 4 hours PRN Severe Pain (7 - 10)        Allergies    No Known Allergies    Intolerances          SOCIAL HISTORY:  Tobacco use:   Alcohol use:  Drug use:      FAMILY HISTORY:  Family history of cancer in father (Father)  Lung              Vital Signs Last 24 Hrs  T(C): 35.8 (03 Nov 2023 04:53), Max: 36.8 (02 Nov 2023 13:02)  T(F): 96.5 (03 Nov 2023 04:53), Max: 98.3 (02 Nov 2023 13:02)  HR: 72 (03 Nov 2023 04:53) (68 - 79)  BP: 128/74 (03 Nov 2023 04:53) (111/63 - 171/59)  RR: 18 (03 Nov 2023 04:53) (16 - 18)  SpO2: 100% (02 Nov 2023 20:05) (100% - 100%)    Parameters below as of 02 Nov 2023 20:05  Patient On (Oxygen Delivery Method): room air            PHYSICAL EXAM:    General:  WD, WN Conversant in NAD.  Musculoskeletal:  Free painless ROM B/L upper and lower extremities, palpable femoral and popliteal pulses  Neurological: No focal deficits.           LABS:                        8.0    7.62  )-----------( 491      ( 03 Nov 2023 04:30 )             25.5     11-03    137  |  100  |  34<H>  ----------------------------<  297<H>  5.1<H>   |  26  |  2.8<H>    Ca    8.9      03 Nov 2023 04:30  Mg     2.0     11-03    TPro  6.0  /  Alb  3.2<L>  /  TBili  <0.2  /  DBili  x   /  AST  17  /  ALT  18  /  AlkPhos  168<H>  11-03      Urinalysis Basic - ( 03 Nov 2023 04:30 )    Color: x / Appearance: x / SG: x / pH: x  Gluc: 297 mg/dL / Ketone: x  / Bili: x / Urobili: x   Blood: x / Protein: x / Nitrite: x   Leuk Esterase: x / RBC: x / WBC x   Sq Epi: x / Non Sq Epi: x / Bacteria: x              RADIOLOGY & ADDITIONAL STUDIES  < from: Xray Foot AP + Lateral + Oblique, Right (10.27.23 @ 15:30) >  Findings/  impression:    Status post right transmetatarsal amputation with adjacent surgical   changes and soft tissue swelling.    No evidence of acute fracture or dislocation. Degenerative changes of the   mid and hindfoot. Postsurgical changes of the calcaneus. Likely abandoned   leads from previous noted stimulator overlying the calcaneus/Achilles.   Vascular calcifications.    --- End of Report ---          < from: Xray Foot AP + Lateral + Oblique, Right (10.22.23 @ 20:57) >  Findings/  impression:    Postsurgical change of the calcaneus. Midfoot arthrosis with erosion and   destruction. Osteopenia. Soft tissue swelling. The extremity is in a cast.    --- End of Report ---          < from: Xray Ankle Complete 3 Views, Right (10.19.23 @ 11:10) >  Findings/  impression:    Status post external fixator removal.    No definite acute fracture or dislocation. Ankle and foot soft tissue   swelling is noted.    Degenerative changes are noted in the hind, mid and forefoot. Vascular   calcifications. Likely amended leads from previous noted stimulator   overlyingthe calcaneus/Achilles. Postsurgical changes of the calcaneus.    --- End of Report ---         Vascular:    This is a 59 y/o male with Charcot foot now s/p R TMA on 10/27/23 for gangrene of R foot which found occluded dorsal and plantar metatarsal arteries, s/p revision of TMA on 10/30/23 and serial debridements likely needed per chart review of podiatry consults, s/p angiogram on 10/23 with Chcio Villalobos with balloon and no stents per the patient due to resting leg pain and prolonged nonhealing foot wounds, patient reports improvement of pain post angio. The rest of his history as stated below.        HPI:   Patient  is a 58yoM with Charcot foot s/p placement of an external fixator on 9/28/23 which had to be removed on 10/19 due to infection, CKD (baseline Cr 2.5-3.5, on list for kidney transplant), and IDDM (A1c 9.9), PAD, s/p  peripheral angiogram on 10/23/2023 with balloon angioplasty of PTA and DCB of R SFA and PTA of R PT and R AT via R groin and R pedal access, anemia recently received 2u pRBCS, pt was dc from hospital on 10/24/23 and now returns to hospital as directed by his podiatrist Dr Benson for TMA which will be done on 10/27/23 as pt has non healing wound and gangrene of the right foot. Patient complains of moderate to sever pain of foot which is partially relieved with percocet and associated wound discharge.  Pt denies fevers or chills.  (26 Oct 2023 18:37)        PAST MEDICAL & SURGICAL HISTORY:  DM (diabetes mellitus)      HTN (hypertension)      HLD (hyperlipidemia)      Herpes labialis      Anxiety      GERD (gastroesophageal reflux disease)      Kidney stones  20 years ago      Kidney disease  stage 4      Chronic kidney disease, unspecified CKD stage      Diabetic Charcot foot      PAD (peripheral artery disease)      S/P foot surgery, right  x 5 ( 2006 - 2013 )      Kidney stone  Removed by Laser x 2 ( 20 years ago )      H/O arthroscopy of right knee      Status post peripheral artery angioplasty            MEDICATIONS  (STANDING):  aspirin  chewable 81 milliGRAM(s) Oral daily  chlorhexidine 2% Cloths 1 Application(s) Topical <User Schedule>  clopidogrel Tablet 75 milliGRAM(s) Oral every 24 hours  dextrose 50% Injectable 25 Gram(s) IV Push once  epoetin ban (PROCRIT) Injectable 2000 Unit(s) SubCutaneous every 7 days  glucagon  Injectable 1 milliGRAM(s) IntraMuscular once  heparin   Injectable 5000 Unit(s) SubCutaneous every 8 hours  insulin glargine Injectable (LANTUS) 25 Unit(s) SubCutaneous every morning  insulin lispro (ADMELOG) corrective regimen sliding scale   SubCutaneous three times a day before meals  insulin lispro Injectable (ADMELOG) 8 Unit(s) SubCutaneous before breakfast  insulin lispro Injectable (ADMELOG) 8 Unit(s) SubCutaneous before lunch  insulin lispro Injectable (ADMELOG) 8 Unit(s) SubCutaneous before dinner  lactated ringers. 1000 milliLiter(s) (75 mL/Hr) IV Continuous <Continuous>  losartan 100 milliGRAM(s) Oral daily  magnesium oxide 400 milliGRAM(s) Oral three times a day with meals  meropenem  IVPB 1000 milliGRAM(s) IV Intermittent every 12 hours  NIFEdipine XL 90 milliGRAM(s) Oral daily  rosuvastatin 20 milliGRAM(s) Oral at bedtime  sodium bicarbonate 650 milliGRAM(s) Oral three times a day  sodium chloride 0.9%. 1000 milliLiter(s) (50 mL/Hr) IV Continuous <Continuous>    MEDICATIONS  (PRN):  ALPRAZolam 0.5 milliGRAM(s) Oral four times a day PRN anxiety  cloNIDine 0.1 milliGRAM(s) Oral every 8 hours PRN sbp>150  dextrose Oral Gel 15 Gram(s) Oral once PRN Blood Glucose LESS THAN 70 milliGRAM(s)/deciliter  dextrose Oral Gel 15 Gram(s) Oral once PRN Blood Glucose LESS THAN 70 milliGRAM(s)/deciliter  dextrose Oral Gel 15 Gram(s) Oral once PRN Blood Glucose LESS THAN 70 milliGRAM(s)/deciliter  dextrose Oral Gel 15 Gram(s) Oral once PRN Blood Glucose LESS THAN 70 milliGRAM(s)/deciliter  dextrose Oral Gel 15 Gram(s) Oral once PRN Blood Glucose LESS THAN 70 milliGRAM(s)/deciliter  melatonin 3 milliGRAM(s) Oral at bedtime PRN Insomnia  oxycodone    5 mG/acetaminophen 325 mG 2 Tablet(s) Oral every 4 hours PRN Moderate Pain (4 - 6)  oxyCODONE    IR 2.5 milliGRAM(s) Oral every 4 hours PRN Severe Pain (7 - 10)        Allergies    No Known Allergies    Intolerances          SOCIAL HISTORY:  Tobacco use:   Alcohol use:  Drug use:      FAMILY HISTORY:  Family history of cancer in father (Father)  Lung              Vital Signs Last 24 Hrs  T(C): 35.8 (03 Nov 2023 04:53), Max: 36.8 (02 Nov 2023 13:02)  T(F): 96.5 (03 Nov 2023 04:53), Max: 98.3 (02 Nov 2023 13:02)  HR: 72 (03 Nov 2023 04:53) (68 - 79)  BP: 128/74 (03 Nov 2023 04:53) (111/63 - 171/59)  RR: 18 (03 Nov 2023 04:53) (16 - 18)  SpO2: 100% (02 Nov 2023 20:05) (100% - 100%)    Parameters below as of 02 Nov 2023 20:05  Patient On (Oxygen Delivery Method): room air            PHYSICAL EXAM:    General:  WD, WN Conversant in NAD.  Musculoskeletal:  Free painless ROM B/L upper and lower extremities, palpable femoral and popliteal pulses  Neurological: No focal deficits.           LABS:                        8.0    7.62  )-----------( 491      ( 03 Nov 2023 04:30 )             25.5     11-03    137  |  100  |  34<H>  ----------------------------<  297<H>  5.1<H>   |  26  |  2.8<H>    Ca    8.9      03 Nov 2023 04:30  Mg     2.0     11-03    TPro  6.0  /  Alb  3.2<L>  /  TBili  <0.2  /  DBili  x   /  AST  17  /  ALT  18  /  AlkPhos  168<H>  11-03      Urinalysis Basic - ( 03 Nov 2023 04:30 )    Color: x / Appearance: x / SG: x / pH: x  Gluc: 297 mg/dL / Ketone: x  / Bili: x / Urobili: x   Blood: x / Protein: x / Nitrite: x   Leuk Esterase: x / RBC: x / WBC x   Sq Epi: x / Non Sq Epi: x / Bacteria: x              RADIOLOGY & ADDITIONAL STUDIES  < from: Xray Foot AP + Lateral + Oblique, Right (10.27.23 @ 15:30) >  Findings/  impression:    Status post right transmetatarsal amputation with adjacent surgical   changes and soft tissue swelling.    No evidence of acute fracture or dislocation. Degenerative changes of the   mid and hindfoot. Postsurgical changes of the calcaneus. Likely abandoned   leads from previous noted stimulator overlying the calcaneus/Achilles.   Vascular calcifications.    --- End of Report ---          < from: Xray Foot AP + Lateral + Oblique, Right (10.22.23 @ 20:57) >  Findings/  impression:    Postsurgical change of the calcaneus. Midfoot arthrosis with erosion and   destruction. Osteopenia. Soft tissue swelling. The extremity is in a cast.    --- End of Report ---          < from: Xray Ankle Complete 3 Views, Right (10.19.23 @ 11:10) >  Findings/  impression:    Status post external fixator removal.    No definite acute fracture or dislocation. Ankle and foot soft tissue   swelling is noted.    Degenerative changes are noted in the hind, mid and forefoot. Vascular   calcifications. Likely amended leads from previous noted stimulator   overlyingthe calcaneus/Achilles. Postsurgical changes of the calcaneus.    --- End of Report ---         Vascular:    This is a 59 y/o male with Charcot foot now s/p R TMA on 10/27/23 for gangrene of R foot which found occluded dorsal and plantar metatarsal arteries, s/p revision of TMA on 10/30/23 and serial debridements likely needed per chart review of podiatry consults, s/p angiogram on 10/23 with Chico Villalobos with balloon and no stents per the patient due to resting leg pain and prolonged nonhealing foot wounds, patient reports improvement of pain post angio. The rest of his history as stated below.      Patient was seen and examined at bedside this AM. No acute events overnight.  Patient does not complain of any pain at this time. He was comfortable and conversive in bed this morning.   Patient denies N/V/F/C, SOB, and CP at this time.     HPI:   Patient  is a 58yoM with Charcot foot s/p placement of an external fixator on 9/28/23 which had to be removed on 10/19 due to infection, CKD (baseline Cr 2.5-3.5, on list for kidney transplant), and IDDM (A1c 9.9), PAD, s/p  peripheral angiogram on 10/23/2023 with balloon angioplasty of PTA and DCB of R SFA and PTA of R PT and R AT via R groin and R pedal access, anemia recently received 2u pRBCS, pt was dc from hospital on 10/24/23 and now returns to hospital as directed by his podiatrist Dr Benson for TMA which will be done on 10/27/23 as pt has non healing wound and gangrene of the right foot. Patient complains of moderate to sever pain of foot which is partially relieved with percocet and associated wound discharge.  Pt denies fevers or chills.  (26 Oct 2023 18:37)        PAST MEDICAL & SURGICAL HISTORY:  DM (diabetes mellitus)      HTN (hypertension)      HLD (hyperlipidemia)      Herpes labialis      Anxiety      GERD (gastroesophageal reflux disease)      Kidney stones  20 years ago      Kidney disease  stage 4      Chronic kidney disease, unspecified CKD stage      Diabetic Charcot foot      PAD (peripheral artery disease)      S/P foot surgery, right  x 5 ( 2006 - 2013 )      Kidney stone  Removed by Laser x 2 ( 20 years ago )      H/O arthroscopy of right knee      Status post peripheral artery angioplasty            MEDICATIONS  (STANDING):  aspirin  chewable 81 milliGRAM(s) Oral daily  chlorhexidine 2% Cloths 1 Application(s) Topical <User Schedule>  clopidogrel Tablet 75 milliGRAM(s) Oral every 24 hours  dextrose 50% Injectable 25 Gram(s) IV Push once  epoetin ban (PROCRIT) Injectable 2000 Unit(s) SubCutaneous every 7 days  glucagon  Injectable 1 milliGRAM(s) IntraMuscular once  heparin   Injectable 5000 Unit(s) SubCutaneous every 8 hours  insulin glargine Injectable (LANTUS) 25 Unit(s) SubCutaneous every morning  insulin lispro (ADMELOG) corrective regimen sliding scale   SubCutaneous three times a day before meals  insulin lispro Injectable (ADMELOG) 8 Unit(s) SubCutaneous before breakfast  insulin lispro Injectable (ADMELOG) 8 Unit(s) SubCutaneous before lunch  insulin lispro Injectable (ADMELOG) 8 Unit(s) SubCutaneous before dinner  lactated ringers. 1000 milliLiter(s) (75 mL/Hr) IV Continuous <Continuous>  losartan 100 milliGRAM(s) Oral daily  magnesium oxide 400 milliGRAM(s) Oral three times a day with meals  meropenem  IVPB 1000 milliGRAM(s) IV Intermittent every 12 hours  NIFEdipine XL 90 milliGRAM(s) Oral daily  rosuvastatin 20 milliGRAM(s) Oral at bedtime  sodium bicarbonate 650 milliGRAM(s) Oral three times a day  sodium chloride 0.9%. 1000 milliLiter(s) (50 mL/Hr) IV Continuous <Continuous>    MEDICATIONS  (PRN):  ALPRAZolam 0.5 milliGRAM(s) Oral four times a day PRN anxiety  cloNIDine 0.1 milliGRAM(s) Oral every 8 hours PRN sbp>150  dextrose Oral Gel 15 Gram(s) Oral once PRN Blood Glucose LESS THAN 70 milliGRAM(s)/deciliter  dextrose Oral Gel 15 Gram(s) Oral once PRN Blood Glucose LESS THAN 70 milliGRAM(s)/deciliter  dextrose Oral Gel 15 Gram(s) Oral once PRN Blood Glucose LESS THAN 70 milliGRAM(s)/deciliter  dextrose Oral Gel 15 Gram(s) Oral once PRN Blood Glucose LESS THAN 70 milliGRAM(s)/deciliter  dextrose Oral Gel 15 Gram(s) Oral once PRN Blood Glucose LESS THAN 70 milliGRAM(s)/deciliter  melatonin 3 milliGRAM(s) Oral at bedtime PRN Insomnia  oxycodone    5 mG/acetaminophen 325 mG 2 Tablet(s) Oral every 4 hours PRN Moderate Pain (4 - 6)  oxyCODONE    IR 2.5 milliGRAM(s) Oral every 4 hours PRN Severe Pain (7 - 10)        Allergies    No Known Allergies    Intolerances          SOCIAL HISTORY:  Tobacco use:   Alcohol use:  Drug use:      FAMILY HISTORY:  Family history of cancer in father (Father)  Lung              Vital Signs Last 24 Hrs  T(C): 35.8 (03 Nov 2023 04:53), Max: 36.8 (02 Nov 2023 13:02)  T(F): 96.5 (03 Nov 2023 04:53), Max: 98.3 (02 Nov 2023 13:02)  HR: 72 (03 Nov 2023 04:53) (68 - 79)  BP: 128/74 (03 Nov 2023 04:53) (111/63 - 171/59)  RR: 18 (03 Nov 2023 04:53) (16 - 18)  SpO2: 100% (02 Nov 2023 20:05) (100% - 100%)    Parameters below as of 02 Nov 2023 20:05  Patient On (Oxygen Delivery Method): room air            PHYSICAL EXAM:    General:  WD, WN Conversant in NAD.  Musculoskeletal:  Free painless ROM B/L upper and lower extremities, palpable femoral and popliteal pulses  Neurological: No focal deficits.           LABS:                        8.0    7.62  )-----------( 491      ( 03 Nov 2023 04:30 )             25.5     11-03    137  |  100  |  34<H>  ----------------------------<  297<H>  5.1<H>   |  26  |  2.8<H>    Ca    8.9      03 Nov 2023 04:30  Mg     2.0     11-03    TPro  6.0  /  Alb  3.2<L>  /  TBili  <0.2  /  DBili  x   /  AST  17  /  ALT  18  /  AlkPhos  168<H>  11-03      Urinalysis Basic - ( 03 Nov 2023 04:30 )    Color: x / Appearance: x / SG: x / pH: x  Gluc: 297 mg/dL / Ketone: x  / Bili: x / Urobili: x   Blood: x / Protein: x / Nitrite: x   Leuk Esterase: x / RBC: x / WBC x   Sq Epi: x / Non Sq Epi: x / Bacteria: x              RADIOLOGY & ADDITIONAL STUDIES  < from: Xray Foot AP + Lateral + Oblique, Right (10.27.23 @ 15:30) >  Findings/  impression:    Status post right transmetatarsal amputation with adjacent surgical   changes and soft tissue swelling.    No evidence of acute fracture or dislocation. Degenerative changes of the   mid and hindfoot. Postsurgical changes of the calcaneus. Likely abandoned   leads from previous noted stimulator overlying the calcaneus/Achilles.   Vascular calcifications.    --- End of Report ---          < from: Xray Foot AP + Lateral + Oblique, Right (10.22.23 @ 20:57) >  Findings/  impression:    Postsurgical change of the calcaneus. Midfoot arthrosis with erosion and   destruction. Osteopenia. Soft tissue swelling. The extremity is in a cast.    --- End of Report ---          < from: Xray Ankle Complete 3 Views, Right (10.19.23 @ 11:10) >  Findings/  impression:    Status post external fixator removal.    No definite acute fracture or dislocation. Ankle and foot soft tissue   swelling is noted.    Degenerative changes are noted in the hind, mid and forefoot. Vascular   calcifications. Likely amended leads from previous noted stimulator   overlyingthe calcaneus/Achilles. Postsurgical changes of the calcaneus.    --- End of Report ---         HPI: 58yoM w/PMHX: DM, HTN, HLD, CKD (baseline Cr 2.5-3.5, on list for kidney transplant), PAD (s/p  peripheral angiogram on 10/23/2023 with balloon angioplasty of PTA and DCB of R SFA and PTA of R PT and R AT via R groin and R pedal access - by Dr. Thomas Villalobos), Charcot foot, s/p placement of an external fixator to RLE on 9/28/23 which had to be removed on 10/19 due to infection, , anemia recently received 2u pRBCS, pt was dc from hospital on 10/24/23 and now returns to hospital as directed by his podiatrist Dr Benson for TMA which was done on 10/27/23.  Pt had subsequent additional I&D on 10/30 and tomorrow 11/4.  Vascular consult requested for possible RIght BKA      Patient was seen and examined at bedside this AM. No acute events overnight.  Patient does not complain of any pain at this time. He was comfortable and conversive in bed this morning.   Patient denies N/V/F/C, SOB, and CP at this time.     PAST MEDICAL & SURGICAL HISTORY:  DM (diabetes mellitus)      HTN (hypertension)      HLD (hyperlipidemia)      Herpes labialis      Anxiety      GERD (gastroesophageal reflux disease)      Kidney stones  20 years ago      Kidney disease  stage 4      Chronic kidney disease, unspecified CKD stage      Diabetic Charcot foot      PAD (peripheral artery disease)      S/P foot surgery, right  x 5 ( 2006 - 2013 )      Kidney stone  Removed by Laser x 2 ( 20 years ago )      H/O arthroscopy of right knee      Status post peripheral artery angioplasty    MEDICATIONS  (STANDING):  aspirin  chewable 81 milliGRAM(s) Oral daily  chlorhexidine 2% Cloths 1 Application(s) Topical <User Schedule>  clopidogrel Tablet 75 milliGRAM(s) Oral every 24 hours  dextrose 50% Injectable 25 Gram(s) IV Push once  epoetin ban (PROCRIT) Injectable 2000 Unit(s) SubCutaneous every 7 days  glucagon  Injectable 1 milliGRAM(s) IntraMuscular once  heparin   Injectable 5000 Unit(s) SubCutaneous every 8 hours  insulin glargine Injectable (LANTUS) 25 Unit(s) SubCutaneous every morning  insulin lispro (ADMELOG) corrective regimen sliding scale   SubCutaneous three times a day before meals  insulin lispro Injectable (ADMELOG) 8 Unit(s) SubCutaneous before breakfast  insulin lispro Injectable (ADMELOG) 8 Unit(s) SubCutaneous before lunch  insulin lispro Injectable (ADMELOG) 8 Unit(s) SubCutaneous before dinner  lactated ringers. 1000 milliLiter(s) (75 mL/Hr) IV Continuous <Continuous>  losartan 100 milliGRAM(s) Oral daily  magnesium oxide 400 milliGRAM(s) Oral three times a day with meals  meropenem  IVPB 1000 milliGRAM(s) IV Intermittent every 12 hours  NIFEdipine XL 90 milliGRAM(s) Oral daily  rosuvastatin 20 milliGRAM(s) Oral at bedtime  sodium bicarbonate 650 milliGRAM(s) Oral three times a day  sodium chloride 0.9%. 1000 milliLiter(s) (50 mL/Hr) IV Continuous <Continuous>    MEDICATIONS  (PRN):  ALPRAZolam 0.5 milliGRAM(s) Oral four times a day PRN anxiety  cloNIDine 0.1 milliGRAM(s) Oral every 8 hours PRN sbp>150  dextrose Oral Gel 15 Gram(s) Oral once PRN Blood Glucose LESS THAN 70 milliGRAM(s)/deciliter  dextrose Oral Gel 15 Gram(s) Oral once PRN Blood Glucose LESS THAN 70 milliGRAM(s)/deciliter  dextrose Oral Gel 15 Gram(s) Oral once PRN Blood Glucose LESS THAN 70 milliGRAM(s)/deciliter  dextrose Oral Gel 15 Gram(s) Oral once PRN Blood Glucose LESS THAN 70 milliGRAM(s)/deciliter  dextrose Oral Gel 15 Gram(s) Oral once PRN Blood Glucose LESS THAN 70 milliGRAM(s)/deciliter  melatonin 3 milliGRAM(s) Oral at bedtime PRN Insomnia  oxycodone    5 mG/acetaminophen 325 mG 2 Tablet(s) Oral every 4 hours PRN Moderate Pain (4 - 6)  oxyCODONE    IR 2.5 milliGRAM(s) Oral every 4 hours PRN Severe Pain (7 - 10)    Allergies  No Known Allergies      SOCIAL HISTORY:  Tobacco use: denies  Alcohol use: denies  Drug use: denies      FAMILY HISTORY:  Family history of cancer in father (Father)  Lung      Vital Signs Last 24 Hrs  T(C): 35.8 (03 Nov 2023 04:53), Max: 36.8 (02 Nov 2023 13:02)  T(F): 96.5 (03 Nov 2023 04:53), Max: 98.3 (02 Nov 2023 13:02)  HR: 72 (03 Nov 2023 04:53) (68 - 79)  BP: 128/74 (03 Nov 2023 04:53) (111/63 - 171/59)  RR: 18 (03 Nov 2023 04:53) (16 - 18)  SpO2: 100% (02 Nov 2023 20:05) (100% - 100%)    Parameters below as of 02 Nov 2023 20:05  Patient On (Oxygen Delivery Method): room air      PHYSICAL EXAM:    General:  WD, WN Conversant in NAD.  Musculoskeletal:  Free painless ROM B/L upper and lower extremities, palpable right femoral and popliteal pulses; unable to palpate right PT; Right foot w/bulky dressing intact          LABS:                        8.0    7.62  )-----------( 491      ( 03 Nov 2023 04:30 )             25.5     11-03    137  |  100  |  34<H>  ----------------------------<  297<H>  5.1<H>   |  26  |  2.8<H>    Ca    8.9      03 Nov 2023 04:30  Mg     2.0     11-03    TPro  6.0  /  Alb  3.2<L>  /  TBili  <0.2  /  DBili  x   /  AST  17  /  ALT  18  /  AlkPhos  168<H>  11-03      Urinalysis Basic - ( 03 Nov 2023 04:30 )    Color: x / Appearance: x / SG: x / pH: x  Gluc: 297 mg/dL / Ketone: x  / Bili: x / Urobili: x   Blood: x / Protein: x / Nitrite: x   Leuk Esterase: x / RBC: x / WBC x   Sq Epi: x / Non Sq Epi: x / Bacteria: x              RADIOLOGY & ADDITIONAL STUDIES  < from: Xray Foot AP + Lateral + Oblique, Right (10.27.23 @ 15:30) >  Findings/  impression:    Status post right transmetatarsal amputation with adjacent surgical   changes and soft tissue swelling.    No evidence of acute fracture or dislocation. Degenerative changes of the   mid and hindfoot. Postsurgical changes of the calcaneus. Likely abandoned   leads from previous noted stimulator overlying the calcaneus/Achilles.   Vascular calcifications.    --- End of Report ---          < from: Xray Foot AP + Lateral + Oblique, Right (10.22.23 @ 20:57) >  Findings/  impression:    Postsurgical change of the calcaneus. Midfoot arthrosis with erosion and   destruction. Osteopenia. Soft tissue swelling. The extremity is in a cast.    --- End of Report ---          < from: Xray Ankle Complete 3 Views, Right (10.19.23 @ 11:10) >  Findings/  impression:    Status post external fixator removal.    No definite acute fracture or dislocation. Ankle and foot soft tissue   swelling is noted.    Degenerative changes are noted in the hind, mid and forefoot. Vascular   calcifications. Likely amended leads from previous noted stimulator   overlyingthe calcaneus/Achilles. Postsurgical changes of the calcaneus.    --- End of Report ---         HPI: 58yoM w/PMHX: DM, HTN, HLD, CKD (baseline Cr 2.5-3.5, on list for kidney transplant), PAD (s/p  peripheral angiogram on 10/23/2023 with balloon angioplasty of PTA and DCB of R SFA and PTA of R PT and R AT via R groin and R pedal access - by Dr. Thomas Villalobos), Charcot foot, s/p placement of an external fixator to RLE on 9/28/23 which had to be removed on 10/19 due to infection, , anemia recently received 2u pRBCS, pt was dc from hospital on 10/24/23 and now returns to hospital as directed by his podiatrist Dr Benson for TMA which was done on 10/27/23.  Pt had subsequent additional I&D on 10/30 and tomorrow 11/4.  Vascular consult requested for possible RIght BKA      Patient was seen and examined at bedside this AM. No acute events overnight.  Patient does not complain of any pain at this time. He was comfortable and conversive in bed this morning.   Patient denies N/V/F/C, SOB, and CP at this time.     PAST MEDICAL & SURGICAL HISTORY:  DM (diabetes mellitus)      HTN (hypertension)      HLD (hyperlipidemia)      Herpes labialis      Anxiety      GERD (gastroesophageal reflux disease)      Kidney stones  20 years ago      Kidney disease  stage 4      Chronic kidney disease, unspecified CKD stage      Diabetic Charcot foot      PAD (peripheral artery disease)      S/P foot surgery, right  x 5 ( 2006 - 2013 )      Kidney stone  Removed by Laser x 2 ( 20 years ago )      H/O arthroscopy of right knee      Status post peripheral artery angioplasty    MEDICATIONS  (STANDING):  aspirin  chewable 81 milliGRAM(s) Oral daily  chlorhexidine 2% Cloths 1 Application(s) Topical <User Schedule>  clopidogrel Tablet 75 milliGRAM(s) Oral every 24 hours  dextrose 50% Injectable 25 Gram(s) IV Push once  epoetin ban (PROCRIT) Injectable 2000 Unit(s) SubCutaneous every 7 days  glucagon  Injectable 1 milliGRAM(s) IntraMuscular once  heparin   Injectable 5000 Unit(s) SubCutaneous every 8 hours  insulin glargine Injectable (LANTUS) 25 Unit(s) SubCutaneous every morning  insulin lispro (ADMELOG) corrective regimen sliding scale   SubCutaneous three times a day before meals  insulin lispro Injectable (ADMELOG) 8 Unit(s) SubCutaneous before breakfast  insulin lispro Injectable (ADMELOG) 8 Unit(s) SubCutaneous before lunch  insulin lispro Injectable (ADMELOG) 8 Unit(s) SubCutaneous before dinner  lactated ringers. 1000 milliLiter(s) (75 mL/Hr) IV Continuous <Continuous>  losartan 100 milliGRAM(s) Oral daily  magnesium oxide 400 milliGRAM(s) Oral three times a day with meals  meropenem  IVPB 1000 milliGRAM(s) IV Intermittent every 12 hours  NIFEdipine XL 90 milliGRAM(s) Oral daily  rosuvastatin 20 milliGRAM(s) Oral at bedtime  sodium bicarbonate 650 milliGRAM(s) Oral three times a day  sodium chloride 0.9%. 1000 milliLiter(s) (50 mL/Hr) IV Continuous <Continuous>    MEDICATIONS  (PRN):  ALPRAZolam 0.5 milliGRAM(s) Oral four times a day PRN anxiety  cloNIDine 0.1 milliGRAM(s) Oral every 8 hours PRN sbp>150  dextrose Oral Gel 15 Gram(s) Oral once PRN Blood Glucose LESS THAN 70 milliGRAM(s)/deciliter  dextrose Oral Gel 15 Gram(s) Oral once PRN Blood Glucose LESS THAN 70 milliGRAM(s)/deciliter  dextrose Oral Gel 15 Gram(s) Oral once PRN Blood Glucose LESS THAN 70 milliGRAM(s)/deciliter  dextrose Oral Gel 15 Gram(s) Oral once PRN Blood Glucose LESS THAN 70 milliGRAM(s)/deciliter  dextrose Oral Gel 15 Gram(s) Oral once PRN Blood Glucose LESS THAN 70 milliGRAM(s)/deciliter  melatonin 3 milliGRAM(s) Oral at bedtime PRN Insomnia  oxycodone    5 mG/acetaminophen 325 mG 2 Tablet(s) Oral every 4 hours PRN Moderate Pain (4 - 6)  oxyCODONE    IR 2.5 milliGRAM(s) Oral every 4 hours PRN Severe Pain (7 - 10)    Allergies  No Known Allergies      SOCIAL HISTORY:  Tobacco use: denies  Alcohol use: denies  Drug use: denies      FAMILY HISTORY:  Family history of cancer in father (Father)  Lung      Vital Signs Last 24 Hrs  T(C): 35.8 (03 Nov 2023 04:53), Max: 36.8 (02 Nov 2023 13:02)  T(F): 96.5 (03 Nov 2023 04:53), Max: 98.3 (02 Nov 2023 13:02)  HR: 72 (03 Nov 2023 04:53) (68 - 79)  BP: 128/74 (03 Nov 2023 04:53) (111/63 - 171/59)  RR: 18 (03 Nov 2023 04:53) (16 - 18)  SpO2: 100% (02 Nov 2023 20:05) (100% - 100%)    Parameters below as of 02 Nov 2023 20:05  Patient On (Oxygen Delivery Method): room air      PHYSICAL EXAM:    General:  WD, WN Conversant in NAD.  Musculoskeletal:  Free painless ROM B/L upper and lower extremities, palpable right femoral and popliteal pulses; unable to palpate right PT; Right foot w/bulky dressing intact; +venous stasis skin changes to RLE    PER PODIATRY EXAM 11/3/23:  Open Right Transmetatarsal Amputation Site    -Distal amputation site: Wound fibrogranular/necrotic (35% fibrous, 15% granular, 50% necrotic). Metatarsal stumps 1-5 visible in wound bed;   -Lateral midfoot wound: 100% necrosis    -Ischemic changes to circumferential wound margins   -Wound Probes Deep to Bone   -No active drainage or bleeding; No malodor;     Vascular: DP and PT Pulses Diminished; Foot is Cool to Tepid to the touch;   Neuro: Protective Sensation Diminished / Moderately Neuropathic   MSK: s/p TMA. No Pain On Palpation at Wound Site       LABS:                        8.0    7.62  )-----------( 491      ( 03 Nov 2023 04:30 )             25.5     11-03    137  |  100  |  34<H>  ----------------------------<  297<H>  5.1<H>   |  26  |  2.8<H>    Ca    8.9      03 Nov 2023 04:30  Mg     2.0     11-03    TPro  6.0  /  Alb  3.2<L>  /  TBili  <0.2  /  DBili  x   /  AST  17  /  ALT  18  /  AlkPhos  168<H>  11-03      Urinalysis Basic - ( 03 Nov 2023 04:30 )    Color: x / Appearance: x / SG: x / pH: x  Gluc: 297 mg/dL / Ketone: x  / Bili: x / Urobili: x   Blood: x / Protein: x / Nitrite: x   Leuk Esterase: x / RBC: x / WBC x   Sq Epi: x / Non Sq Epi: x / Bacteria: x      RADIOLOGY & ADDITIONAL STUDIES  < from: VA Duplex Low Ext Arterial, Ltd, Right (11.03.23 @ 10:48) >  The bilateral common femoral, deep femoral, superficial femoral,   popliteal, anterior tibial, posterior tibial and peroneal arteries were   visualized.    Moderate to severe stenosis seen in the right anterior tibial artery with   a velocity of 244 cm/s.    Moderate stenosis of the left anterior tibial artery with a velocity of   205 cm/s.    Impression:    Moderate stenosis of the bilateral anterior tibial arteries.    < end of copied text >        Xray Foot AP + Lateral + Oblique, Right (10.27.23 @ 15:30) >  Findings/  impression:    Status post right transmetatarsal amputation with adjacent surgical   changes and soft tissue swelling.    No evidence of acute fracture or dislocation. Degenerative changes of the   mid and hindfoot. Postsurgical changes of the calcaneus. Likely abandoned   leads from previous noted stimulator overlying the calcaneus/Achilles.   Vascular calcifications.    --- End of Report ---          < from: Xray Foot AP + Lateral + Oblique, Right (10.22.23 @ 20:57) >  Findings/  impression:    Postsurgical change of the calcaneus. Midfoot arthrosis with erosion and   destruction. Osteopenia. Soft tissue swelling. The extremity is in a cast.    --- End of Report ---          < from: Xray Ankle Complete 3 Views, Right (10.19.23 @ 11:10) >  Findings/  impression:    Status post external fixator removal.    No definite acute fracture or dislocation. Ankle and foot soft tissue   swelling is noted.    Degenerative changes are noted in the hind, mid and forefoot. Vascular   calcifications. Likely amended leads from previous noted stimulator   overlyingthe calcaneus/Achilles. Postsurgical changes of the calcaneus.    --- End of Report ---

## 2023-11-03 NOTE — CONSULT NOTE ADULT - NS ATTEND AMEND GEN_ALL_CORE FT
Patient denies chest pain sos. He had recent vascular angioplasty and foot surgery. No cardiac issues.   ECHO 9/6/2023: EF 55%, Normal global LVSF, Grade 1 DD LVMF  NM Nuclear Stress Test 9/6/2023: No evidence of ischemia .  He needs a BKA. Cardiac risk intermediate. Follow cbc preop . Transfuse if needed Patient denies chest pain sos. He had recent vascular angioplasty and foot surgery. No cardiac issues.   ECHO 9/6/2023: EF 55%, Normal global LVSF, Grade 1 DD LVMF  NM Nuclear Stress Test 9/6/2023: No evidence of ischemia .  See also consult 11/19/23.  He needs a BKA. Cardiac risk intermediate. Follow cbc preop . Transfuse if needed

## 2023-11-03 NOTE — CONSULT NOTE ADULT - ASSESSMENT
#This consult serves only as a perioperative cardiac risk stratification and evaluation to predict 30-day cardiac complications risk and mortality.  The decision to proceed with the surgery/procedure is made by the performing physician and the patient.     Seen and examined in NAD on RA.      - Currently no active cardiac conditions. No signs of ischemia, ADHF, clinical exam not consistent with stenotic valvular disease, no stable arrhythmias noted  - Able to walk >4 METS without any anginal symptoms   - Moderate-risk for perioperative major adverse cardiac events  - RCRI:  - No further cardiac workup is indicated at this time. Further cardiac workup will depend on clinical course  - All other workup per primary team #This consult serves only as a perioperative cardiac risk stratification and evaluation to predict 30-day cardiac complications risk and mortality.  The decision to proceed with the surgery/procedure is made by the performing physician and the patient.            Seen and examined in NAD on RA.  BS CTA.  RRR.  No JVD.  No REY.           ECHO 9/6/2023: EF 55%, Normal global LVSF, Grade 1 DD LVMF     NM Nuclear Stress Test 9/6/2023: No evidence of ischemia            - Currently no active cardiac conditions. No signs of ischemia, or ADHF     - Able to walk >4 METS without any anginal symptoms      - At low-intermediate risk for perioperative major adverse cardiac events     - RCRI:  2 points; Class III risk; 10.1% 30-day risk of death, MI or cardiac arrest     - No further cardiac workup is indicated at this time     - All other workup per primary team

## 2023-11-03 NOTE — PROGRESS NOTE ADULT - SUBJECTIVE AND OBJECTIVE BOX
58-year-old male with Charcot foot s/p placement of an external fixator on 9/28/23 which had to be removed on 10/19 due to infection, CKD (baseline Cr 2.5-3.5, on list for kidney transplant), and IDDM (A1c 9.9), PAD, s/p  peripheral angiogram on 10/23/2023 with balloon angioplasty of PTA and DCB of R SFA and PTA of R PT and R AT via R groin and R pedal access, anemia recently received 2u pRBCS, pt was dc from hospital on 10/24/23.    Interval: s/p revision    PAST MEDICAL & SURGICAL HISTORY:    DM (diabetes mellitus)  HTN (hypertension)  HLD (hyperlipidemia)  Herpes labialis  Anxiety  GERD (gastroesophageal reflux disease)  Kidney stones  20 years ago  Kidney disease  stage 4  Chronic kidney disease, unspecified CKD stage  Diabetic Charcot foot  PAD (peripheral artery disease)  S/P foot surgery, right  x 5 ( 2006 - 2013 )  Kidney stone  Removed by Laser x 2 ( 20 years ago )  H/O arthroscopy of right knee    MEDICATIONS  (STANDING):  aspirin  chewable 81 milliGRAM(s) Oral daily  chlorhexidine 2% Cloths 1 Application(s) Topical <User Schedule>  clopidogrel Tablet 75 milliGRAM(s) Oral every 24 hours  dextrose 50% Injectable 25 Gram(s) IV Push once  epoetin ban (PROCRIT) Injectable 2000 Unit(s) SubCutaneous every 7 days  glucagon  Injectable 1 milliGRAM(s) IntraMuscular once  heparin   Injectable 5000 Unit(s) SubCutaneous every 8 hours  insulin glargine Injectable (LANTUS) 25 Unit(s) SubCutaneous every morning  insulin lispro (ADMELOG) corrective regimen sliding scale   SubCutaneous three times a day before meals  insulin lispro Injectable (ADMELOG) 8 Unit(s) SubCutaneous before breakfast  insulin lispro Injectable (ADMELOG) 8 Unit(s) SubCutaneous before lunch  insulin lispro Injectable (ADMELOG) 8 Unit(s) SubCutaneous before dinner  lactated ringers. 1000 milliLiter(s) (75 mL/Hr) IV Continuous <Continuous>  losartan 100 milliGRAM(s) Oral daily  magnesium oxide 400 milliGRAM(s) Oral three times a day with meals  meropenem  IVPB 1000 milliGRAM(s) IV Intermittent every 12 hours  NIFEdipine XL 90 milliGRAM(s) Oral daily  rosuvastatin 20 milliGRAM(s) Oral at bedtime  sodium bicarbonate 650 milliGRAM(s) Oral three times a day  sodium chloride 0.9%. 1000 milliLiter(s) (50 mL/Hr) IV Continuous <Continuous>    MEDICATIONS  (PRN):  ALPRAZolam 0.5 milliGRAM(s) Oral four times a day PRN anxiety  cloNIDine 0.1 milliGRAM(s) Oral every 8 hours PRN sbp>150  dextrose Oral Gel 15 Gram(s) Oral once PRN Blood Glucose LESS THAN 70 milliGRAM(s)/deciliter  dextrose Oral Gel 15 Gram(s) Oral once PRN Blood Glucose LESS THAN 70 milliGRAM(s)/deciliter  dextrose Oral Gel 15 Gram(s) Oral once PRN Blood Glucose LESS THAN 70 milliGRAM(s)/deciliter  dextrose Oral Gel 15 Gram(s) Oral once PRN Blood Glucose LESS THAN 70 milliGRAM(s)/deciliter  dextrose Oral Gel 15 Gram(s) Oral once PRN Blood Glucose LESS THAN 70 milliGRAM(s)/deciliter  melatonin 3 milliGRAM(s) Oral at bedtime PRN Insomnia  oxycodone    5 mG/acetaminophen 325 mG 2 Tablet(s) Oral every 4 hours PRN Moderate Pain (4 - 6)      Social History:    reviewed         Allergies    No Known Allergies    Intolerances    Vital Signs Last 24 Hrs  T(C): 35.8 (03 Nov 2023 04:53), Max: 36.8 (02 Nov 2023 13:02)  T(F): 96.5 (03 Nov 2023 04:53), Max: 98.3 (02 Nov 2023 13:02)  HR: 72 (03 Nov 2023 04:53) (68 - 79)  BP: 128/74 (03 Nov 2023 04:53) (111/63 - 171/59)  BP(mean): --  RR: 18 (03 Nov 2023 04:53) (16 - 18)  SpO2: 100% (02 Nov 2023 20:05) (100% - 100%)    Parameters below as of 02 Nov 2023 20:05  Patient On (Oxygen Delivery Method): room air          CAPILLARY BLOOD GLUCOSE      POCT Blood Glucose.: 206 mg/dL (03 Nov 2023 01:55)  POCT Blood Glucose.: 101 mg/dL (02 Nov 2023 16:21)  POCT Blood Glucose.: 107 mg/dL (02 Nov 2023 12:55)  POCT Blood Glucose.: 61 mg/dL (02 Nov 2023 11:51)  POCT Blood Glucose.: 259 mg/dL (02 Nov 2023 07:49)        Diet, Regular:   Consistent Carbohydrate Evening Snack  DASH/TLC Sodium & Cholesterol Restricted (10-30-23 @ 22:02) [Active]      PHYSICAL EXAM  Constitutional: A&Ox4  Respiratory: cta b/l  Cardiovascular: s1 s2 rrr  Gastrointestinal: soft nt  nd + bs no rebound or guarding  Genitourinary: no cva tenderness  Extremities: normal rom, no edema, calf tenderness  Neurological:no focal deficits  Skin: +Right foot post surgical   Vascular, no cyanosis    LABS:                           9.2    8.45  )-----------( 467      ( 31 Oct 2023 04:30 )             28.9                           9.2    7.89  )-----------( 453      ( 30 Oct 2023 08:28 )             28.7                           7.9    6.41  )-----------( 496      ( 28 Oct 2023 07:11 )             24.2     10-31    132<L>  |  97<L>  |  35<H>  ----------------------------<  451<HH>  5.4<H>   |  21  |  2.5<H>    Ca    9.0      31 Oct 2023 04:30  Mg     1.8     10-30    TPro  6.2  /  Alb  3.2<L>  /  TBili  0.3  /  DBili  x   /  AST  13  /  ALT  12  /  AlkPhos  152<H>  10-30      10-30    139  |  102  |  34<H>  ----------------------------<  197<H>  4.9   |  27  |  2.5<H>    Ca    9.3      30 Oct 2023 08:28  Mg     1.8     10-30    TPro  6.2  /  Alb  3.2<L>  /  TBili  0.3  /  DBili  x   /  AST  13  /  ALT  12  /  AlkPhos  152<H>  10-30      10-28    138  |  101  |  42<H>  ----------------------------<  332<H>  4.8   |  23  |  2.7<H>    Ca    8.7      28 Oct 2023 07:11  Mg     1.7     10-28

## 2023-11-03 NOTE — CONSULT NOTE ADULT - NS ATTEND AMEND GEN_ALL_CORE FT
Delayed entry- Patient had endovascular interventions by cardiology service and TMA by podiatry. Per podiatry service, the foot is not salvageable and they requested R BKA.  Will arrange for transfer to [facility] for R BKA on 11/7/2023.

## 2023-11-04 ENCOUNTER — TRANSCRIPTION ENCOUNTER (OUTPATIENT)
Age: 58
End: 2023-11-04

## 2023-11-04 ENCOUNTER — RESULT REVIEW (OUTPATIENT)
Age: 58
End: 2023-11-04

## 2023-11-04 LAB
ALBUMIN SERPL ELPH-MCNC: 3.6 G/DL — SIGNIFICANT CHANGE UP (ref 3.5–5.2)
ALBUMIN SERPL ELPH-MCNC: 3.6 G/DL — SIGNIFICANT CHANGE UP (ref 3.5–5.2)
ALP SERPL-CCNC: 182 U/L — HIGH (ref 30–115)
ALP SERPL-CCNC: 182 U/L — HIGH (ref 30–115)
ALT FLD-CCNC: 18 U/L — SIGNIFICANT CHANGE UP (ref 0–41)
ALT FLD-CCNC: 18 U/L — SIGNIFICANT CHANGE UP (ref 0–41)
ANION GAP SERPL CALC-SCNC: 19 MMOL/L — HIGH (ref 7–14)
ANION GAP SERPL CALC-SCNC: 19 MMOL/L — HIGH (ref 7–14)
APTT BLD: 32.2 SEC — SIGNIFICANT CHANGE UP (ref 27–39.2)
APTT BLD: 32.2 SEC — SIGNIFICANT CHANGE UP (ref 27–39.2)
AST SERPL-CCNC: 18 U/L — SIGNIFICANT CHANGE UP (ref 0–41)
AST SERPL-CCNC: 18 U/L — SIGNIFICANT CHANGE UP (ref 0–41)
BILIRUB SERPL-MCNC: <0.2 MG/DL — SIGNIFICANT CHANGE UP (ref 0.2–1.2)
BILIRUB SERPL-MCNC: <0.2 MG/DL — SIGNIFICANT CHANGE UP (ref 0.2–1.2)
BUN SERPL-MCNC: 34 MG/DL — HIGH (ref 10–20)
BUN SERPL-MCNC: 34 MG/DL — HIGH (ref 10–20)
CALCIUM SERPL-MCNC: 9.2 MG/DL — SIGNIFICANT CHANGE UP (ref 8.4–10.5)
CALCIUM SERPL-MCNC: 9.2 MG/DL — SIGNIFICANT CHANGE UP (ref 8.4–10.5)
CHLORIDE SERPL-SCNC: 99 MMOL/L — SIGNIFICANT CHANGE UP (ref 98–110)
CHLORIDE SERPL-SCNC: 99 MMOL/L — SIGNIFICANT CHANGE UP (ref 98–110)
CO2 SERPL-SCNC: 18 MMOL/L — SIGNIFICANT CHANGE UP (ref 17–32)
CO2 SERPL-SCNC: 18 MMOL/L — SIGNIFICANT CHANGE UP (ref 17–32)
CREAT SERPL-MCNC: 3.1 MG/DL — HIGH (ref 0.7–1.5)
CREAT SERPL-MCNC: 3.1 MG/DL — HIGH (ref 0.7–1.5)
EGFR: 22 ML/MIN/1.73M2 — LOW
EGFR: 22 ML/MIN/1.73M2 — LOW
GLUCOSE BLDC GLUCOMTR-MCNC: 119 MG/DL — HIGH (ref 70–99)
GLUCOSE BLDC GLUCOMTR-MCNC: 119 MG/DL — HIGH (ref 70–99)
GLUCOSE BLDC GLUCOMTR-MCNC: 129 MG/DL — HIGH (ref 70–99)
GLUCOSE BLDC GLUCOMTR-MCNC: 129 MG/DL — HIGH (ref 70–99)
GLUCOSE BLDC GLUCOMTR-MCNC: 171 MG/DL — HIGH (ref 70–99)
GLUCOSE BLDC GLUCOMTR-MCNC: 171 MG/DL — HIGH (ref 70–99)
GLUCOSE BLDC GLUCOMTR-MCNC: 186 MG/DL — HIGH (ref 70–99)
GLUCOSE BLDC GLUCOMTR-MCNC: 186 MG/DL — HIGH (ref 70–99)
GLUCOSE BLDC GLUCOMTR-MCNC: 194 MG/DL — HIGH (ref 70–99)
GLUCOSE BLDC GLUCOMTR-MCNC: 194 MG/DL — HIGH (ref 70–99)
GLUCOSE BLDC GLUCOMTR-MCNC: 228 MG/DL — HIGH (ref 70–99)
GLUCOSE BLDC GLUCOMTR-MCNC: 228 MG/DL — HIGH (ref 70–99)
GLUCOSE BLDC GLUCOMTR-MCNC: 253 MG/DL — HIGH (ref 70–99)
GLUCOSE BLDC GLUCOMTR-MCNC: 253 MG/DL — HIGH (ref 70–99)
GLUCOSE SERPL-MCNC: 179 MG/DL — HIGH (ref 70–99)
GLUCOSE SERPL-MCNC: 179 MG/DL — HIGH (ref 70–99)
HCT VFR BLD CALC: 27.6 % — LOW (ref 42–52)
HCT VFR BLD CALC: 27.6 % — LOW (ref 42–52)
HGB BLD-MCNC: 8.8 G/DL — LOW (ref 14–18)
HGB BLD-MCNC: 8.8 G/DL — LOW (ref 14–18)
INR BLD: 1.01 RATIO — SIGNIFICANT CHANGE UP (ref 0.65–1.3)
INR BLD: 1.01 RATIO — SIGNIFICANT CHANGE UP (ref 0.65–1.3)
MCHC RBC-ENTMCNC: 25.9 PG — LOW (ref 27–31)
MCHC RBC-ENTMCNC: 25.9 PG — LOW (ref 27–31)
MCHC RBC-ENTMCNC: 31.9 G/DL — LOW (ref 32–37)
MCHC RBC-ENTMCNC: 31.9 G/DL — LOW (ref 32–37)
MCV RBC AUTO: 81.2 FL — SIGNIFICANT CHANGE UP (ref 80–94)
MCV RBC AUTO: 81.2 FL — SIGNIFICANT CHANGE UP (ref 80–94)
NRBC # BLD: 0 /100 WBCS — SIGNIFICANT CHANGE UP (ref 0–0)
NRBC # BLD: 0 /100 WBCS — SIGNIFICANT CHANGE UP (ref 0–0)
PLATELET # BLD AUTO: 571 K/UL — HIGH (ref 130–400)
PLATELET # BLD AUTO: 571 K/UL — HIGH (ref 130–400)
PMV BLD: 8.9 FL — SIGNIFICANT CHANGE UP (ref 7.4–10.4)
PMV BLD: 8.9 FL — SIGNIFICANT CHANGE UP (ref 7.4–10.4)
POTASSIUM SERPL-MCNC: 5.5 MMOL/L — HIGH (ref 3.5–5)
POTASSIUM SERPL-MCNC: 5.5 MMOL/L — HIGH (ref 3.5–5)
POTASSIUM SERPL-SCNC: 5.5 MMOL/L — HIGH (ref 3.5–5)
POTASSIUM SERPL-SCNC: 5.5 MMOL/L — HIGH (ref 3.5–5)
PROT SERPL-MCNC: 6.8 G/DL — SIGNIFICANT CHANGE UP (ref 6–8)
PROT SERPL-MCNC: 6.8 G/DL — SIGNIFICANT CHANGE UP (ref 6–8)
PROTHROM AB SERPL-ACNC: 11.5 SEC — SIGNIFICANT CHANGE UP (ref 9.95–12.87)
PROTHROM AB SERPL-ACNC: 11.5 SEC — SIGNIFICANT CHANGE UP (ref 9.95–12.87)
RBC # BLD: 3.4 M/UL — LOW (ref 4.7–6.1)
RBC # BLD: 3.4 M/UL — LOW (ref 4.7–6.1)
RBC # FLD: 17.5 % — HIGH (ref 11.5–14.5)
RBC # FLD: 17.5 % — HIGH (ref 11.5–14.5)
SODIUM SERPL-SCNC: 136 MMOL/L — SIGNIFICANT CHANGE UP (ref 135–146)
SODIUM SERPL-SCNC: 136 MMOL/L — SIGNIFICANT CHANGE UP (ref 135–146)
WBC # BLD: 10.06 K/UL — SIGNIFICANT CHANGE UP (ref 4.8–10.8)
WBC # BLD: 10.06 K/UL — SIGNIFICANT CHANGE UP (ref 4.8–10.8)
WBC # FLD AUTO: 10.06 K/UL — SIGNIFICANT CHANGE UP (ref 4.8–10.8)
WBC # FLD AUTO: 10.06 K/UL — SIGNIFICANT CHANGE UP (ref 4.8–10.8)

## 2023-11-04 PROCEDURE — 88304 TISSUE EXAM BY PATHOLOGIST: CPT | Mod: 26

## 2023-11-04 PROCEDURE — 88311 DECALCIFY TISSUE: CPT | Mod: 26

## 2023-11-04 RX ORDER — INSULIN GLARGINE 100 [IU]/ML
12 INJECTION, SOLUTION SUBCUTANEOUS ONCE
Refills: 0 | Status: COMPLETED | OUTPATIENT
Start: 2023-11-04 | End: 2023-11-04

## 2023-11-04 RX ORDER — SODIUM ZIRCONIUM CYCLOSILICATE 10 G/10G
10 POWDER, FOR SUSPENSION ORAL ONCE
Refills: 0 | Status: COMPLETED | OUTPATIENT
Start: 2023-11-04 | End: 2023-11-04

## 2023-11-04 RX ORDER — LOSARTAN POTASSIUM 100 MG/1
100 TABLET, FILM COATED ORAL DAILY
Refills: 0 | Status: DISCONTINUED | OUTPATIENT
Start: 2023-11-04 | End: 2023-11-09

## 2023-11-04 RX ORDER — INSULIN LISPRO 100/ML
VIAL (ML) SUBCUTANEOUS
Refills: 0 | Status: DISCONTINUED | OUTPATIENT
Start: 2023-11-04 | End: 2023-11-09

## 2023-11-04 RX ORDER — SODIUM BICARBONATE 1 MEQ/ML
650 SYRINGE (ML) INTRAVENOUS THREE TIMES A DAY
Refills: 0 | Status: DISCONTINUED | OUTPATIENT
Start: 2023-11-04 | End: 2023-11-09

## 2023-11-04 RX ORDER — DEXTROSE 50 % IN WATER 50 %
25 SYRINGE (ML) INTRAVENOUS ONCE
Refills: 0 | Status: DISCONTINUED | OUTPATIENT
Start: 2023-11-04 | End: 2023-11-09

## 2023-11-04 RX ORDER — LANOLIN ALCOHOL/MO/W.PET/CERES
3 CREAM (GRAM) TOPICAL AT BEDTIME
Refills: 0 | Status: DISCONTINUED | OUTPATIENT
Start: 2023-11-04 | End: 2023-11-09

## 2023-11-04 RX ORDER — HEPARIN SODIUM 5000 [USP'U]/ML
5000 INJECTION INTRAVENOUS; SUBCUTANEOUS EVERY 8 HOURS
Refills: 0 | Status: DISCONTINUED | OUTPATIENT
Start: 2023-11-04 | End: 2023-11-08

## 2023-11-04 RX ORDER — INSULIN LISPRO 100/ML
2 VIAL (ML) SUBCUTANEOUS ONCE
Refills: 0 | Status: COMPLETED | OUTPATIENT
Start: 2023-11-04 | End: 2023-11-04

## 2023-11-04 RX ORDER — ASPIRIN/CALCIUM CARB/MAGNESIUM 324 MG
81 TABLET ORAL DAILY
Refills: 0 | Status: DISCONTINUED | OUTPATIENT
Start: 2023-11-04 | End: 2023-11-09

## 2023-11-04 RX ORDER — ERYTHROPOIETIN 10000 [IU]/ML
2000 INJECTION, SOLUTION INTRAVENOUS; SUBCUTANEOUS
Refills: 0 | Status: DISCONTINUED | OUTPATIENT
Start: 2023-11-04 | End: 2023-11-09

## 2023-11-04 RX ORDER — GLUCAGON INJECTION, SOLUTION 0.5 MG/.1ML
1 INJECTION, SOLUTION SUBCUTANEOUS ONCE
Refills: 0 | Status: DISCONTINUED | OUTPATIENT
Start: 2023-11-04 | End: 2023-11-09

## 2023-11-04 RX ORDER — NIFEDIPINE 30 MG
90 TABLET, EXTENDED RELEASE 24 HR ORAL DAILY
Refills: 0 | Status: DISCONTINUED | OUTPATIENT
Start: 2023-11-04 | End: 2023-11-09

## 2023-11-04 RX ORDER — MAGNESIUM OXIDE 400 MG ORAL TABLET 241.3 MG
400 TABLET ORAL
Refills: 0 | Status: DISCONTINUED | OUTPATIENT
Start: 2023-11-04 | End: 2023-11-09

## 2023-11-04 RX ORDER — DEXTROSE 50 % IN WATER 50 %
15 SYRINGE (ML) INTRAVENOUS ONCE
Refills: 0 | Status: DISCONTINUED | OUTPATIENT
Start: 2023-11-04 | End: 2023-11-09

## 2023-11-04 RX ORDER — OXYCODONE HYDROCHLORIDE 5 MG/1
2.5 TABLET ORAL EVERY 4 HOURS
Refills: 0 | Status: DISCONTINUED | OUTPATIENT
Start: 2023-11-04 | End: 2023-11-09

## 2023-11-04 RX ORDER — ALPRAZOLAM 0.25 MG
0.5 TABLET ORAL
Refills: 0 | Status: DISCONTINUED | OUTPATIENT
Start: 2023-11-04 | End: 2023-11-09

## 2023-11-04 RX ORDER — INSULIN LISPRO 100/ML
8 VIAL (ML) SUBCUTANEOUS
Refills: 0 | Status: DISCONTINUED | OUTPATIENT
Start: 2023-11-04 | End: 2023-11-08

## 2023-11-04 RX ORDER — MEROPENEM 1 G/30ML
1000 INJECTION INTRAVENOUS EVERY 12 HOURS
Refills: 0 | Status: DISCONTINUED | OUTPATIENT
Start: 2023-11-04 | End: 2023-11-09

## 2023-11-04 RX ORDER — SODIUM CHLORIDE 9 MG/ML
1000 INJECTION, SOLUTION INTRAVENOUS
Refills: 0 | Status: DISCONTINUED | OUTPATIENT
Start: 2023-11-04 | End: 2023-11-04

## 2023-11-04 RX ADMIN — MEROPENEM 100 MILLIGRAM(S): 1 INJECTION INTRAVENOUS at 08:49

## 2023-11-04 RX ADMIN — MAGNESIUM OXIDE 400 MG ORAL TABLET 400 MILLIGRAM(S): 241.3 TABLET ORAL at 11:54

## 2023-11-04 RX ADMIN — SODIUM ZIRCONIUM CYCLOSILICATE 10 GRAM(S): 10 POWDER, FOR SUSPENSION ORAL at 19:56

## 2023-11-04 RX ADMIN — Medication 650 MILLIGRAM(S): at 05:13

## 2023-11-04 RX ADMIN — Medication 81 MILLIGRAM(S): at 11:55

## 2023-11-04 RX ADMIN — MAGNESIUM OXIDE 400 MG ORAL TABLET 400 MILLIGRAM(S): 241.3 TABLET ORAL at 08:16

## 2023-11-04 RX ADMIN — Medication 650 MILLIGRAM(S): at 22:13

## 2023-11-04 RX ADMIN — OXYCODONE HYDROCHLORIDE 2.5 MILLIGRAM(S): 5 TABLET ORAL at 10:35

## 2023-11-04 RX ADMIN — HEPARIN SODIUM 5000 UNIT(S): 5000 INJECTION INTRAVENOUS; SUBCUTANEOUS at 22:13

## 2023-11-04 RX ADMIN — SODIUM CHLORIDE 75 MILLILITER(S): 9 INJECTION, SOLUTION INTRAVENOUS at 16:24

## 2023-11-04 RX ADMIN — LOSARTAN POTASSIUM 100 MILLIGRAM(S): 100 TABLET, FILM COATED ORAL at 20:25

## 2023-11-04 RX ADMIN — OXYCODONE HYDROCHLORIDE 2.5 MILLIGRAM(S): 5 TABLET ORAL at 19:36

## 2023-11-04 RX ADMIN — Medication 0.5 MILLIGRAM(S): at 08:13

## 2023-11-04 RX ADMIN — MEROPENEM 100 MILLIGRAM(S): 1 INJECTION INTRAVENOUS at 17:13

## 2023-11-04 RX ADMIN — CHLORHEXIDINE GLUCONATE 1 APPLICATION(S): 213 SOLUTION TOPICAL at 05:14

## 2023-11-04 RX ADMIN — LOSARTAN POTASSIUM 100 MILLIGRAM(S): 100 TABLET, FILM COATED ORAL at 05:13

## 2023-11-04 RX ADMIN — HEPARIN SODIUM 5000 UNIT(S): 5000 INJECTION INTRAVENOUS; SUBCUTANEOUS at 05:13

## 2023-11-04 RX ADMIN — OXYCODONE HYDROCHLORIDE 2.5 MILLIGRAM(S): 5 TABLET ORAL at 20:10

## 2023-11-04 RX ADMIN — Medication 0.5 MILLIGRAM(S): at 17:11

## 2023-11-04 RX ADMIN — Medication 2 UNIT(S): at 22:19

## 2023-11-04 RX ADMIN — INSULIN GLARGINE 12 UNIT(S): 100 INJECTION, SOLUTION SUBCUTANEOUS at 09:07

## 2023-11-04 RX ADMIN — Medication 4: at 08:16

## 2023-11-04 RX ADMIN — Medication 90 MILLIGRAM(S): at 05:13

## 2023-11-04 RX ADMIN — MAGNESIUM OXIDE 400 MG ORAL TABLET 400 MILLIGRAM(S): 241.3 TABLET ORAL at 17:12

## 2023-11-04 NOTE — BRIEF OPERATIVE NOTE - NSICDXBRIEFPOSTOP_GEN_ALL_CORE_FT
POST-OP DIAGNOSIS:  Gangrene of foot 27-Oct-2023 15:08:52 Right Tyesha Jorgensen  
POST-OP DIAGNOSIS:  Gangrene 27-Oct-2023 15:08:43  Tyesha Jorgensen  Gangrene of foot 27-Oct-2023 15:08:52 Right Tyesha Jorgensen  
POST-OP DIAGNOSIS:  Gangrene 27-Oct-2023 15:08:43  Tyesha Jorgensen  Gangrene of foot 27-Oct-2023 15:08:52 Right Tyesha Jorgensen

## 2023-11-04 NOTE — BRIEF OPERATIVE NOTE - OPERATION/FINDINGS
devitalized soft tissue, poor blood flow devitalized soft tissue, poor blood flow, poor prognosis for limb pedal salvage.

## 2023-11-04 NOTE — PROGRESS NOTE ADULT - SUBJECTIVE AND OBJECTIVE BOX
58-year-old male with Charcot foot s/p placement of an external fixator on 9/28/23 which had to be removed on 10/19 due to infection, CKD (baseline Cr 2.5-3.5, on list for kidney transplant), and IDDM (A1c 9.9), PAD, s/p  peripheral angiogram on 10/23/2023 with balloon angioplasty of PTA and DCB of R SFA and PTA of R PT and R AT via R groin and R pedal access, anemia recently received 2u pRBCS, pt was dc from hospital on 10/24/23.    Interval: s/p revision; will need further debridement; for possible BKA next week patient is aware     PAST MEDICAL & SURGICAL HISTORY:    DM (diabetes mellitus)  HTN (hypertension)  HLD (hyperlipidemia)  Herpes labialis  Anxiety  GERD (gastroesophageal reflux disease)  Kidney stones  20 years ago  Kidney disease  stage 4  Chronic kidney disease, unspecified CKD stage  Diabetic Charcot foot  PAD (peripheral artery disease)  S/P foot surgery, right  x 5 ( 2006 - 2013 )  Kidney stone  Removed by Laser x 2 ( 20 years ago )  H/O arthroscopy of right knee        Social History:    reviewed           MEDICATIONS  (STANDING):  aspirin  chewable 81 milliGRAM(s) Oral daily  chlorhexidine 2% Cloths 1 Application(s) Topical <User Schedule>  dextrose 50% Injectable 25 Gram(s) IV Push once  epoetin ban (PROCRIT) Injectable 2000 Unit(s) SubCutaneous every 7 days  glucagon  Injectable 1 milliGRAM(s) IntraMuscular once  heparin   Injectable 5000 Unit(s) SubCutaneous every 8 hours  insulin glargine Injectable (LANTUS) 25 Unit(s) SubCutaneous every morning  insulin lispro (ADMELOG) corrective regimen sliding scale   SubCutaneous three times a day before meals  insulin lispro Injectable (ADMELOG) 8 Unit(s) SubCutaneous before breakfast  insulin lispro Injectable (ADMELOG) 8 Unit(s) SubCutaneous before lunch  insulin lispro Injectable (ADMELOG) 8 Unit(s) SubCutaneous before dinner  losartan 100 milliGRAM(s) Oral daily  magnesium oxide 400 milliGRAM(s) Oral three times a day with meals  meropenem  IVPB 1000 milliGRAM(s) IV Intermittent every 12 hours  NIFEdipine XL 90 milliGRAM(s) Oral daily  rosuvastatin 20 milliGRAM(s) Oral at bedtime  sodium bicarbonate 650 milliGRAM(s) Oral three times a day  sodium zirconium cyclosilicate 10 Gram(s) Oral once    MEDICATIONS  (PRN):  ALPRAZolam 0.5 milliGRAM(s) Oral four times a day PRN anxiety  cloNIDine 0.1 milliGRAM(s) Oral every 8 hours PRN sbp>150  dextrose Oral Gel 15 Gram(s) Oral once PRN Blood Glucose LESS THAN 70 milliGRAM(s)/deciliter  dextrose Oral Gel 15 Gram(s) Oral once PRN Blood Glucose LESS THAN 70 milliGRAM(s)/deciliter  dextrose Oral Gel 15 Gram(s) Oral once PRN Blood Glucose LESS THAN 70 milliGRAM(s)/deciliter  dextrose Oral Gel 15 Gram(s) Oral once PRN Blood Glucose LESS THAN 70 milliGRAM(s)/deciliter  dextrose Oral Gel 15 Gram(s) Oral once PRN Blood Glucose LESS THAN 70 milliGRAM(s)/deciliter  melatonin 3 milliGRAM(s) Oral at bedtime PRN Insomnia  oxycodone    5 mG/acetaminophen 325 mG 2 Tablet(s) Oral every 4 hours PRN Moderate Pain (4 - 6)  oxyCODONE    IR 2.5 milliGRAM(s) Oral every 4 hours PRN Severe Pain (7 - 10)    Allergies    No Known Allergies    Intolerances    Vital Signs Last 24 Hrs  T(C): 35.9 (03 Nov 2023 20:05), Max: 36.3 (03 Nov 2023 14:00)  T(F): 96.6 (03 Nov 2023 20:05), Max: 97.3 (03 Nov 2023 14:00)  HR: 80 (03 Nov 2023 20:05) (69 - 80)  BP: 161/82 (03 Nov 2023 20:05) (118/59 - 161/82)  BP(mean): --  RR: 18 (03 Nov 2023 20:05) (18 - 18)  SpO2: 97% (03 Nov 2023 20:05) (97% - 97%)    Parameters below as of 03 Nov 2023 20:05  Patient On (Oxygen Delivery Method): room air    CAPILLARY BLOOD GLUCOSE      POCT Blood Glucose.: 228 mg/dL (04 Nov 2023 07:38)  POCT Blood Glucose.: 134 mg/dL (03 Nov 2023 21:20)  POCT Blood Glucose.: 140 mg/dL (03 Nov 2023 16:15)  POCT Blood Glucose.: 133 mg/dL (03 Nov 2023 11:38)        Diet, Regular:   Consistent Carbohydrate Evening Snack  DASH/TLC Sodium & Cholesterol Restricted (10-30-23 @ 22:02) [Active]      PHYSICAL EXAM  Constitutional: A&Ox4  Respiratory: cta b/l  Cardiovascular: s1 s2 rrr  Gastrointestinal: soft nt  nd + bs no rebound or guarding  Genitourinary: no cva tenderness  Extremities: normal rom, no edema, calf tenderness  Neurological:no focal deficits  Skin: +Right foot post surgical   Vascular, no cyanosis    LABS:                           8.0    7.62  )-----------( 491      ( 03 Nov 2023 04:30 )             25.5                           9.2    8.45  )-----------( 467      ( 31 Oct 2023 04:30 )             28.9                           9.2    7.89  )-----------( 453      ( 30 Oct 2023 08:28 )             28.7                           7.9    6.41  )-----------( 496      ( 28 Oct 2023 07:11 )             24.2     11-03    137  |  100  |  34<H>  ----------------------------<  297<H>  5.1<H>   |  26  |  2.8<H>    Ca    8.9      03 Nov 2023 04:30  Mg     2.0     11-03    TPro  6.0  /  Alb  3.2<L>  /  TBili  <0.2  /  DBili  x   /  AST  17  /  ALT  18  /  AlkPhos  168<H>  11-03      10-31    132<L>  |  97<L>  |  35<H>  ----------------------------<  451<HH>  5.4<H>   |  21  |  2.5<H>    Ca    9.0      31 Oct 2023 04:30  Mg     1.8     10-30    TPro  6.2  /  Alb  3.2<L>  /  TBili  0.3  /  DBili  x   /  AST  13  /  ALT  12  /  AlkPhos  152<H>  10-30      10-30    139  |  102  |  34<H>  ----------------------------<  197<H>  4.9   |  27  |  2.5<H>    Ca    9.3      30 Oct 2023 08:28  Mg     1.8     10-30    TPro  6.2  /  Alb  3.2<L>  /  TBili  0.3  /  DBili  x   /  AST  13  /  ALT  12  /  AlkPhos  152<H>  10-30      10-28    138  |  101  |  42<H>  ----------------------------<  332<H>  4.8   |  23  |  2.7<H>    Ca    8.7      28 Oct 2023 07:11  Mg     1.7     10-28

## 2023-11-04 NOTE — PROGRESS NOTE ADULT - ASSESSMENT
58-year-old male with Charcot foot s/p placement of an external fixator on 9/28/23 which had to be removed on 10/19 due to infection, CKD (baseline Cr 2.5-3.5, on list for kidney transplant), and IDDM (A1c 9.9), PAD, s/p  peripheral angiogram on 10/23/2023 with balloon angioplasty of PTA and DCB of R SFA and PTA of R PT and R AT via R groin and R pedal access, anemia recently received 2u pRBCS, pt was dc from hospital on 10/24/23 and now returns to hospital as directed by his podiatrist Dr Benson for TMA which will be done on 10/27/23 as pt has non healing wound and gangrene of the right foot.       #gangrene of the right foot.     - post operative amputation,    - reviewed labs   - continue monitoring     # PAD     - vascular for BKA     #PAD, s/p angioplasty  -c/w asa and plavix    #anemia  -s/p transfusion  -monitor    #HTN  -low sodium diet  -monitor bp  -cont meds from home nifedipine and cozaar    #DM  -ada diet  -monitor BS  -cont meds from home basal 20u daily  and lispro 8 qac  -insulin coverage prn eleavted blood sugar        #CKD  -creatinine stable at 2.9 today    #anxiety disorder  -xanax prn    #DVT prophylaxis  -sq heparin and scd    ADVANCE CARE: CPR    DISPOSITION: ACUTE

## 2023-11-05 LAB
ALBUMIN SERPL ELPH-MCNC: 3.5 G/DL — SIGNIFICANT CHANGE UP (ref 3.5–5.2)
ALBUMIN SERPL ELPH-MCNC: 3.5 G/DL — SIGNIFICANT CHANGE UP (ref 3.5–5.2)
ALP SERPL-CCNC: 183 U/L — HIGH (ref 30–115)
ALP SERPL-CCNC: 183 U/L — HIGH (ref 30–115)
ALT FLD-CCNC: 15 U/L — SIGNIFICANT CHANGE UP (ref 0–41)
ALT FLD-CCNC: 15 U/L — SIGNIFICANT CHANGE UP (ref 0–41)
ANION GAP SERPL CALC-SCNC: 12 MMOL/L — SIGNIFICANT CHANGE UP (ref 7–14)
ANION GAP SERPL CALC-SCNC: 12 MMOL/L — SIGNIFICANT CHANGE UP (ref 7–14)
ANION GAP SERPL CALC-SCNC: 13 MMOL/L — SIGNIFICANT CHANGE UP (ref 7–14)
ANION GAP SERPL CALC-SCNC: 13 MMOL/L — SIGNIFICANT CHANGE UP (ref 7–14)
APTT BLD: 37.2 SEC — SIGNIFICANT CHANGE UP (ref 27–39.2)
APTT BLD: 37.2 SEC — SIGNIFICANT CHANGE UP (ref 27–39.2)
AST SERPL-CCNC: 14 U/L — SIGNIFICANT CHANGE UP (ref 0–41)
AST SERPL-CCNC: 14 U/L — SIGNIFICANT CHANGE UP (ref 0–41)
BASOPHILS # BLD AUTO: 0.08 K/UL — SIGNIFICANT CHANGE UP (ref 0–0.2)
BASOPHILS # BLD AUTO: 0.08 K/UL — SIGNIFICANT CHANGE UP (ref 0–0.2)
BASOPHILS NFR BLD AUTO: 0.9 % — SIGNIFICANT CHANGE UP (ref 0–1)
BASOPHILS NFR BLD AUTO: 0.9 % — SIGNIFICANT CHANGE UP (ref 0–1)
BILIRUB SERPL-MCNC: 0.2 MG/DL — SIGNIFICANT CHANGE UP (ref 0.2–1.2)
BILIRUB SERPL-MCNC: 0.2 MG/DL — SIGNIFICANT CHANGE UP (ref 0.2–1.2)
BLD GP AB SCN SERPL QL: SIGNIFICANT CHANGE UP
BLD GP AB SCN SERPL QL: SIGNIFICANT CHANGE UP
BUN SERPL-MCNC: 36 MG/DL — HIGH (ref 10–20)
BUN SERPL-MCNC: 36 MG/DL — HIGH (ref 10–20)
BUN SERPL-MCNC: 38 MG/DL — HIGH (ref 10–20)
BUN SERPL-MCNC: 38 MG/DL — HIGH (ref 10–20)
CALCIUM SERPL-MCNC: 8.9 MG/DL — SIGNIFICANT CHANGE UP (ref 8.4–10.5)
CALCIUM SERPL-MCNC: 8.9 MG/DL — SIGNIFICANT CHANGE UP (ref 8.4–10.5)
CALCIUM SERPL-MCNC: 9.3 MG/DL — SIGNIFICANT CHANGE UP (ref 8.4–10.5)
CALCIUM SERPL-MCNC: 9.3 MG/DL — SIGNIFICANT CHANGE UP (ref 8.4–10.5)
CHLORIDE SERPL-SCNC: 102 MMOL/L — SIGNIFICANT CHANGE UP (ref 98–110)
CHLORIDE SERPL-SCNC: 102 MMOL/L — SIGNIFICANT CHANGE UP (ref 98–110)
CHLORIDE SERPL-SCNC: 98 MMOL/L — SIGNIFICANT CHANGE UP (ref 98–110)
CHLORIDE SERPL-SCNC: 98 MMOL/L — SIGNIFICANT CHANGE UP (ref 98–110)
CO2 SERPL-SCNC: 25 MMOL/L — SIGNIFICANT CHANGE UP (ref 17–32)
CREAT SERPL-MCNC: 3 MG/DL — HIGH (ref 0.7–1.5)
CREAT SERPL-MCNC: 3 MG/DL — HIGH (ref 0.7–1.5)
CREAT SERPL-MCNC: 3.3 MG/DL — HIGH (ref 0.7–1.5)
CREAT SERPL-MCNC: 3.3 MG/DL — HIGH (ref 0.7–1.5)
EGFR: 21 ML/MIN/1.73M2 — LOW
EGFR: 21 ML/MIN/1.73M2 — LOW
EGFR: 23 ML/MIN/1.73M2 — LOW
EGFR: 23 ML/MIN/1.73M2 — LOW
EOSINOPHIL # BLD AUTO: 0.2 K/UL — SIGNIFICANT CHANGE UP (ref 0–0.7)
EOSINOPHIL # BLD AUTO: 0.2 K/UL — SIGNIFICANT CHANGE UP (ref 0–0.7)
EOSINOPHIL NFR BLD AUTO: 2.1 % — SIGNIFICANT CHANGE UP (ref 0–8)
EOSINOPHIL NFR BLD AUTO: 2.1 % — SIGNIFICANT CHANGE UP (ref 0–8)
GLUCOSE BLDC GLUCOMTR-MCNC: 217 MG/DL — HIGH (ref 70–99)
GLUCOSE BLDC GLUCOMTR-MCNC: 217 MG/DL — HIGH (ref 70–99)
GLUCOSE BLDC GLUCOMTR-MCNC: 233 MG/DL — HIGH (ref 70–99)
GLUCOSE BLDC GLUCOMTR-MCNC: 233 MG/DL — HIGH (ref 70–99)
GLUCOSE BLDC GLUCOMTR-MCNC: 292 MG/DL — HIGH (ref 70–99)
GLUCOSE BLDC GLUCOMTR-MCNC: 292 MG/DL — HIGH (ref 70–99)
GLUCOSE BLDC GLUCOMTR-MCNC: 296 MG/DL — HIGH (ref 70–99)
GLUCOSE BLDC GLUCOMTR-MCNC: 296 MG/DL — HIGH (ref 70–99)
GLUCOSE BLDC GLUCOMTR-MCNC: 388 MG/DL — HIGH (ref 70–99)
GLUCOSE BLDC GLUCOMTR-MCNC: 388 MG/DL — HIGH (ref 70–99)
GLUCOSE SERPL-MCNC: 175 MG/DL — HIGH (ref 70–99)
GLUCOSE SERPL-MCNC: 175 MG/DL — HIGH (ref 70–99)
GLUCOSE SERPL-MCNC: 325 MG/DL — HIGH (ref 70–99)
GLUCOSE SERPL-MCNC: 325 MG/DL — HIGH (ref 70–99)
HCT VFR BLD CALC: 25.1 % — LOW (ref 42–52)
HCT VFR BLD CALC: 25.1 % — LOW (ref 42–52)
HGB BLD-MCNC: 7.8 G/DL — LOW (ref 14–18)
HGB BLD-MCNC: 7.8 G/DL — LOW (ref 14–18)
IMM GRANULOCYTES NFR BLD AUTO: 0.4 % — HIGH (ref 0.1–0.3)
IMM GRANULOCYTES NFR BLD AUTO: 0.4 % — HIGH (ref 0.1–0.3)
INR BLD: 1.03 RATIO — SIGNIFICANT CHANGE UP (ref 0.65–1.3)
INR BLD: 1.03 RATIO — SIGNIFICANT CHANGE UP (ref 0.65–1.3)
LYMPHOCYTES # BLD AUTO: 2.02 K/UL — SIGNIFICANT CHANGE UP (ref 1.2–3.4)
LYMPHOCYTES # BLD AUTO: 2.02 K/UL — SIGNIFICANT CHANGE UP (ref 1.2–3.4)
LYMPHOCYTES # BLD AUTO: 21.6 % — SIGNIFICANT CHANGE UP (ref 20.5–51.1)
LYMPHOCYTES # BLD AUTO: 21.6 % — SIGNIFICANT CHANGE UP (ref 20.5–51.1)
MAGNESIUM SERPL-MCNC: 2.4 MG/DL — SIGNIFICANT CHANGE UP (ref 1.8–2.4)
MAGNESIUM SERPL-MCNC: 2.4 MG/DL — SIGNIFICANT CHANGE UP (ref 1.8–2.4)
MCHC RBC-ENTMCNC: 26.4 PG — LOW (ref 27–31)
MCHC RBC-ENTMCNC: 26.4 PG — LOW (ref 27–31)
MCHC RBC-ENTMCNC: 31.1 G/DL — LOW (ref 32–37)
MCHC RBC-ENTMCNC: 31.1 G/DL — LOW (ref 32–37)
MCV RBC AUTO: 84.8 FL — SIGNIFICANT CHANGE UP (ref 80–94)
MCV RBC AUTO: 84.8 FL — SIGNIFICANT CHANGE UP (ref 80–94)
MONOCYTES # BLD AUTO: 0.7 K/UL — HIGH (ref 0.1–0.6)
MONOCYTES # BLD AUTO: 0.7 K/UL — HIGH (ref 0.1–0.6)
MONOCYTES NFR BLD AUTO: 7.5 % — SIGNIFICANT CHANGE UP (ref 1.7–9.3)
MONOCYTES NFR BLD AUTO: 7.5 % — SIGNIFICANT CHANGE UP (ref 1.7–9.3)
NEUTROPHILS # BLD AUTO: 6.31 K/UL — SIGNIFICANT CHANGE UP (ref 1.4–6.5)
NEUTROPHILS # BLD AUTO: 6.31 K/UL — SIGNIFICANT CHANGE UP (ref 1.4–6.5)
NEUTROPHILS NFR BLD AUTO: 67.5 % — SIGNIFICANT CHANGE UP (ref 42.2–75.2)
NEUTROPHILS NFR BLD AUTO: 67.5 % — SIGNIFICANT CHANGE UP (ref 42.2–75.2)
NRBC # BLD: 0 /100 WBCS — SIGNIFICANT CHANGE UP (ref 0–0)
NRBC # BLD: 0 /100 WBCS — SIGNIFICANT CHANGE UP (ref 0–0)
PHOSPHATE SERPL-MCNC: 4.5 MG/DL — SIGNIFICANT CHANGE UP (ref 2.1–4.9)
PHOSPHATE SERPL-MCNC: 4.5 MG/DL — SIGNIFICANT CHANGE UP (ref 2.1–4.9)
PLATELET # BLD AUTO: 534 K/UL — HIGH (ref 130–400)
PLATELET # BLD AUTO: 534 K/UL — HIGH (ref 130–400)
PMV BLD: 8.9 FL — SIGNIFICANT CHANGE UP (ref 7.4–10.4)
PMV BLD: 8.9 FL — SIGNIFICANT CHANGE UP (ref 7.4–10.4)
POTASSIUM SERPL-MCNC: 5.4 MMOL/L — HIGH (ref 3.5–5)
POTASSIUM SERPL-MCNC: 5.4 MMOL/L — HIGH (ref 3.5–5)
POTASSIUM SERPL-MCNC: 6.3 MMOL/L — CRITICAL HIGH (ref 3.5–5)
POTASSIUM SERPL-MCNC: 6.3 MMOL/L — CRITICAL HIGH (ref 3.5–5)
POTASSIUM SERPL-SCNC: 5.4 MMOL/L — HIGH (ref 3.5–5)
POTASSIUM SERPL-SCNC: 5.4 MMOL/L — HIGH (ref 3.5–5)
POTASSIUM SERPL-SCNC: 6.3 MMOL/L — CRITICAL HIGH (ref 3.5–5)
POTASSIUM SERPL-SCNC: 6.3 MMOL/L — CRITICAL HIGH (ref 3.5–5)
PROT SERPL-MCNC: 6.6 G/DL — SIGNIFICANT CHANGE UP (ref 6–8)
PROT SERPL-MCNC: 6.6 G/DL — SIGNIFICANT CHANGE UP (ref 6–8)
PROTHROM AB SERPL-ACNC: 11.8 SEC — SIGNIFICANT CHANGE UP (ref 9.95–12.87)
PROTHROM AB SERPL-ACNC: 11.8 SEC — SIGNIFICANT CHANGE UP (ref 9.95–12.87)
RBC # BLD: 2.96 M/UL — LOW (ref 4.7–6.1)
RBC # BLD: 2.96 M/UL — LOW (ref 4.7–6.1)
RBC # FLD: 17.5 % — HIGH (ref 11.5–14.5)
RBC # FLD: 17.5 % — HIGH (ref 11.5–14.5)
SODIUM SERPL-SCNC: 135 MMOL/L — SIGNIFICANT CHANGE UP (ref 135–146)
SODIUM SERPL-SCNC: 135 MMOL/L — SIGNIFICANT CHANGE UP (ref 135–146)
SODIUM SERPL-SCNC: 140 MMOL/L — SIGNIFICANT CHANGE UP (ref 135–146)
SODIUM SERPL-SCNC: 140 MMOL/L — SIGNIFICANT CHANGE UP (ref 135–146)
WBC # BLD: 9.35 K/UL — SIGNIFICANT CHANGE UP (ref 4.8–10.8)
WBC # BLD: 9.35 K/UL — SIGNIFICANT CHANGE UP (ref 4.8–10.8)
WBC # FLD AUTO: 9.35 K/UL — SIGNIFICANT CHANGE UP (ref 4.8–10.8)
WBC # FLD AUTO: 9.35 K/UL — SIGNIFICANT CHANGE UP (ref 4.8–10.8)

## 2023-11-05 RX ORDER — SODIUM ZIRCONIUM CYCLOSILICATE 10 G/10G
10 POWDER, FOR SUSPENSION ORAL ONCE
Refills: 0 | Status: COMPLETED | OUTPATIENT
Start: 2023-11-05 | End: 2023-11-06

## 2023-11-05 RX ORDER — SODIUM POLYSTYRENE SULFONATE 4.1 MEQ/G
30 POWDER, FOR SUSPENSION ORAL ONCE
Refills: 0 | Status: COMPLETED | OUTPATIENT
Start: 2023-11-05 | End: 2023-11-05

## 2023-11-05 RX ORDER — INSULIN GLARGINE 100 [IU]/ML
25 INJECTION, SOLUTION SUBCUTANEOUS EVERY MORNING
Refills: 0 | Status: DISCONTINUED | OUTPATIENT
Start: 2023-11-05 | End: 2023-11-08

## 2023-11-05 RX ORDER — INSULIN GLARGINE 100 [IU]/ML
25 INJECTION, SOLUTION SUBCUTANEOUS ONCE
Refills: 0 | Status: COMPLETED | OUTPATIENT
Start: 2023-11-05 | End: 2023-11-05

## 2023-11-05 RX ORDER — INSULIN LISPRO 100/ML
3 VIAL (ML) SUBCUTANEOUS ONCE
Refills: 0 | Status: COMPLETED | OUTPATIENT
Start: 2023-11-05 | End: 2023-11-05

## 2023-11-05 RX ADMIN — MAGNESIUM OXIDE 400 MG ORAL TABLET 400 MILLIGRAM(S): 241.3 TABLET ORAL at 18:11

## 2023-11-05 RX ADMIN — HEPARIN SODIUM 5000 UNIT(S): 5000 INJECTION INTRAVENOUS; SUBCUTANEOUS at 13:18

## 2023-11-05 RX ADMIN — MEROPENEM 100 MILLIGRAM(S): 1 INJECTION INTRAVENOUS at 06:13

## 2023-11-05 RX ADMIN — MEROPENEM 100 MILLIGRAM(S): 1 INJECTION INTRAVENOUS at 18:12

## 2023-11-05 RX ADMIN — Medication 4: at 16:30

## 2023-11-05 RX ADMIN — Medication 90 MILLIGRAM(S): at 06:12

## 2023-11-05 RX ADMIN — ERYTHROPOIETIN 2000 UNIT(S): 10000 INJECTION, SOLUTION INTRAVENOUS; SUBCUTANEOUS at 11:59

## 2023-11-05 RX ADMIN — MAGNESIUM OXIDE 400 MG ORAL TABLET 400 MILLIGRAM(S): 241.3 TABLET ORAL at 08:13

## 2023-11-05 RX ADMIN — Medication 0.5 MILLIGRAM(S): at 08:10

## 2023-11-05 RX ADMIN — OXYCODONE HYDROCHLORIDE 2.5 MILLIGRAM(S): 5 TABLET ORAL at 01:12

## 2023-11-05 RX ADMIN — Medication 6: at 11:55

## 2023-11-05 RX ADMIN — HEPARIN SODIUM 5000 UNIT(S): 5000 INJECTION INTRAVENOUS; SUBCUTANEOUS at 22:28

## 2023-11-05 RX ADMIN — OXYCODONE HYDROCHLORIDE 2.5 MILLIGRAM(S): 5 TABLET ORAL at 00:34

## 2023-11-05 RX ADMIN — INSULIN GLARGINE 25 UNIT(S): 100 INJECTION, SOLUTION SUBCUTANEOUS at 12:13

## 2023-11-05 RX ADMIN — Medication 8 UNIT(S): at 07:46

## 2023-11-05 RX ADMIN — Medication 81 MILLIGRAM(S): at 11:57

## 2023-11-05 RX ADMIN — HEPARIN SODIUM 5000 UNIT(S): 5000 INJECTION INTRAVENOUS; SUBCUTANEOUS at 06:12

## 2023-11-05 RX ADMIN — Medication 650 MILLIGRAM(S): at 22:28

## 2023-11-05 RX ADMIN — LOSARTAN POTASSIUM 100 MILLIGRAM(S): 100 TABLET, FILM COATED ORAL at 06:12

## 2023-11-05 RX ADMIN — SODIUM POLYSTYRENE SULFONATE 30 GRAM(S): 4.1 POWDER, FOR SUSPENSION ORAL at 13:51

## 2023-11-05 RX ADMIN — OXYCODONE HYDROCHLORIDE 2.5 MILLIGRAM(S): 5 TABLET ORAL at 10:44

## 2023-11-05 RX ADMIN — Medication 650 MILLIGRAM(S): at 13:18

## 2023-11-05 RX ADMIN — Medication 650 MILLIGRAM(S): at 06:12

## 2023-11-05 RX ADMIN — Medication 10: at 07:46

## 2023-11-05 RX ADMIN — MAGNESIUM OXIDE 400 MG ORAL TABLET 400 MILLIGRAM(S): 241.3 TABLET ORAL at 11:57

## 2023-11-05 RX ADMIN — Medication 3 UNIT(S): at 23:02

## 2023-11-05 RX ADMIN — OXYCODONE HYDROCHLORIDE 2.5 MILLIGRAM(S): 5 TABLET ORAL at 16:08

## 2023-11-05 NOTE — CHART NOTE - NSCHARTNOTEFT_GEN_A_CORE
Called by Vascular Surgery, patient scheduled for BKA 11/7, requesting transfer [facility] today for eval tomorrow and OR Tuesday.  Approved by Dr. Huber.  Initiated transfer via transfer center.  Attending aware and agrees.

## 2023-11-05 NOTE — PROGRESS NOTE ADULT - SUBJECTIVE AND OBJECTIVE BOX
58-year-old male with Charcot foot s/p placement of an external fixator on 9/28/23 which had to be removed on 10/19 due to infection, CKD (baseline Cr 2.5-3.5, on list for kidney transplant), and IDDM (A1c 9.9), PAD, s/p  peripheral angiogram on 10/23/2023 with balloon angioplasty of PTA and DCB of R SFA and PTA of R PT and R AT via R groin and R pedal access, anemia recently received 2u pRBCS, pt was dc from hospital on 10/24/23.    Interval: s/p revision; possible BKA this week    PAST MEDICAL & SURGICAL HISTORY:    DM (diabetes mellitus)  HTN (hypertension)  HLD (hyperlipidemia)  Herpes labialis  Anxiety  GERD (gastroesophageal reflux disease)  Kidney stones  20 years ago  Kidney disease  stage 4  Chronic kidney disease, unspecified CKD stage  Diabetic Charcot foot  PAD (peripheral artery disease)  S/P foot surgery, right  x 5 ( 2006 - 2013 )  Kidney stone  Removed by Laser x 2 ( 20 years ago )  H/O arthroscopy of right knee        Social History:    reviewed     MEDICATIONS  (STANDING):  aspirin  chewable 81 milliGRAM(s) Oral daily  dextrose 50% Injectable 25 Gram(s) IV Push once  epoetin ban (PROCRIT) Injectable 2000 Unit(s) SubCutaneous every 7 days  glucagon  Injectable 1 milliGRAM(s) IntraMuscular once  heparin   Injectable 5000 Unit(s) SubCutaneous every 8 hours  insulin lispro (ADMELOG) corrective regimen sliding scale   SubCutaneous three times a day before meals  insulin lispro Injectable (ADMELOG) 8 Unit(s) SubCutaneous before breakfast  losartan 100 milliGRAM(s) Oral daily  magnesium oxide 400 milliGRAM(s) Oral three times a day with meals  meropenem  IVPB 1000 milliGRAM(s) IV Intermittent every 12 hours  NIFEdipine XL 90 milliGRAM(s) Oral daily  sodium bicarbonate 650 milliGRAM(s) Oral three times a day    MEDICATIONS  (PRN):  ALPRAZolam 0.5 milliGRAM(s) Oral four times a day PRN anxiety  cloNIDine 0.1 milliGRAM(s) Oral every 8 hours PRN sbp>150  dextrose Oral Gel 15 Gram(s) Oral once PRN Blood Glucose LESS THAN 70 milliGRAM(s)/deciliter  melatonin 3 milliGRAM(s) Oral at bedtime PRN Insomnia  oxycodone    5 mG/acetaminophen 325 mG 2 Tablet(s) Oral every 4 hours PRN Moderate Pain (4 - 6)  oxyCODONE    IR 2.5 milliGRAM(s) Oral every 4 hours PRN Severe Pain (7 - 10)    Allergies    No Known Allergies    Intolerances    Vital Signs Last 24 Hrs  T(C): 37 (05 Nov 2023 04:40), Max: 37 (05 Nov 2023 04:40)  T(F): 98.6 (05 Nov 2023 04:40), Max: 98.6 (05 Nov 2023 04:40)  HR: 70 (05 Nov 2023 04:40) (70 - 91)  BP: 138/67 (05 Nov 2023 04:40) (115/57 - 169/78)  BP(mean): 78 (04 Nov 2023 16:03) (78 - 78)  RR: 18 (05 Nov 2023 04:40) (16 - 18)  SpO2: 97% (05 Nov 2023 04:40) (95% - 99%)    Parameters below as of 04 Nov 2023 20:05  Patient On (Oxygen Delivery Method): room air      CAPILLARY BLOOD GLUCOSE      POCT Blood Glucose.: 388 mg/dL (05 Nov 2023 07:20)  POCT Blood Glucose.: 171 mg/dL (04 Nov 2023 22:17)  POCT Blood Glucose.: 186 mg/dL (04 Nov 2023 21:07)  POCT Blood Glucose.: 253 mg/dL (04 Nov 2023 18:50)  POCT Blood Glucose.: 129 mg/dL (04 Nov 2023 17:10)  POCT Blood Glucose.: 119 mg/dL (04 Nov 2023 11:26)  POCT Blood Glucose.: 194 mg/dL (04 Nov 2023 09:05)        Diet, Regular:   Consistent Carbohydrate Evening Snack  DASH/TLC Sodium & Cholesterol Restricted (10-30-23 @ 22:02) [Active]      PHYSICAL EXAM  Constitutional: A&Ox4  Respiratory: cta b/l  Cardiovascular: s1 s2 rrr  Gastrointestinal: soft nt  nd + bs no rebound or guarding  Genitourinary: no cva tenderness  Extremities: normal rom, no edema, calf tenderness  Neurological:no focal deficits  Skin: +Right foot post surgical   Vascular, no cyanosis    LABS:                           8.0    7.62  )-----------( 491      ( 03 Nov 2023 04:30 )             25.5                           9.2    8.45  )-----------( 467      ( 31 Oct 2023 04:30 )             28.9                           9.2    7.89  )-----------( 453      ( 30 Oct 2023 08:28 )             28.7                           7.9    6.41  )-----------( 496      ( 28 Oct 2023 07:11 )             24.2     11-03    137  |  100  |  34<H>  ----------------------------<  297<H>  5.1<H>   |  26  |  2.8<H>    Ca    8.9      03 Nov 2023 04:30  Mg     2.0     11-03    TPro  6.0  /  Alb  3.2<L>  /  TBili  <0.2  /  DBili  x   /  AST  17  /  ALT  18  /  AlkPhos  168<H>  11-03      10-31    132<L>  |  97<L>  |  35<H>  ----------------------------<  451<HH>  5.4<H>   |  21  |  2.5<H>    Ca    9.0      31 Oct 2023 04:30  Mg     1.8     10-30    TPro  6.2  /  Alb  3.2<L>  /  TBili  0.3  /  DBili  x   /  AST  13  /  ALT  12  /  AlkPhos  152<H>  10-30      10-30    139  |  102  |  34<H>  ----------------------------<  197<H>  4.9   |  27  |  2.5<H>    Ca    9.3      30 Oct 2023 08:28  Mg     1.8     10-30    TPro  6.2  /  Alb  3.2<L>  /  TBili  0.3  /  DBili  x   /  AST  13  /  ALT  12  /  AlkPhos  152<H>  10-30      10-28    138  |  101  |  42<H>  ----------------------------<  332<H>  4.8   |  23  |  2.7<H>    Ca    8.7      28 Oct 2023 07:11  Mg     1.7     10-28

## 2023-11-05 NOTE — CHART NOTE - NSCHARTNOTEFT_GEN_A_CORE
Called by lab for elevated K+, will give Kayexalate 30g PO x 1 now and recheck K+ at 1900.  d/w medicine attending.

## 2023-11-05 NOTE — PROGRESS NOTE ADULT - ASSESSMENT
58-year-old male with Charcot foot s/p placement of an external fixator on 9/28/23 which had to be removed on 10/19 due to infection, CKD (baseline Cr 2.5-3.5, on list for kidney transplant), and IDDM (A1c 9.9), PAD, s/p  peripheral angiogram on 10/23/2023 with balloon angioplasty of PTA and DCB of R SFA and PTA of R PT and R AT via R groin and R pedal access, anemia recently received 2u pRBCS, pt was dc from hospital on 10/24/23 and now returns to hospital as directed by his podiatrist Dr Benson for TMA which will be done on 10/27/23 as pt has non healing wound and gangrene of the right foot.       #gangrene of the right foot.     - post operative amputation, revision   - reviewed labs   - continue monitoring     # PAD     - vascular for BKA     #PAD, s/p angioplasty  -c/w asa and plavix    #anemia  -s/p transfusion  -monitor    #HTN  -low sodium diet  -monitor bp  -cont meds from home nifedipine and cozaar    #DM  -ada diet  -monitor BS  -cont meds from home basal 20u daily  and lispro 8 qac  -insulin coverage prn eleavted blood sugar        #CKD  -creatinine stable at 2.9 today    #anxiety disorder  -xanax prn    #DVT prophylaxis  -sq heparin and scd    ADVANCE CARE: CPR    DISPOSITION: ACUTE

## 2023-11-05 NOTE — PROGRESS NOTE ADULT - SUBJECTIVE AND OBJECTIVE BOX
S; Pt agreeable to BKA  O; Vital Signs Last 24 Hrs  T(C): 37 (05 Nov 2023 04:40), Max: 37 (05 Nov 2023 04:40)  T(F): 98.6 (05 Nov 2023 04:40), Max: 98.6 (05 Nov 2023 04:40)  HR: 70 (05 Nov 2023 04:40) (70 - 91)  BP: 138/67 (05 Nov 2023 04:40) (115/57 - 169/78)  BP(mean): 78 (04 Nov 2023 16:03) (78 - 78)  RR: 18 (05 Nov 2023 04:40) (16 - 18)  SpO2: 97% (05 Nov 2023 04:40) (95% - 99%)    Parameters below as of 04 Nov 2023 20:05  Patient On (Oxygen Delivery Method): room air    MEDICATIONS  (STANDING):  aspirin  chewable 81 milliGRAM(s) Oral daily  dextrose 50% Injectable 25 Gram(s) IV Push once  epoetin ban (PROCRIT) Injectable 2000 Unit(s) SubCutaneous every 7 days  glucagon  Injectable 1 milliGRAM(s) IntraMuscular once  heparin   Injectable 5000 Unit(s) SubCutaneous every 8 hours  insulin glargine Injectable (LANTUS) 25 Unit(s) SubCutaneous every morning  insulin lispro (ADMELOG) corrective regimen sliding scale   SubCutaneous three times a day before meals  insulin lispro Injectable (ADMELOG) 8 Unit(s) SubCutaneous before breakfast  losartan 100 milliGRAM(s) Oral daily  magnesium oxide 400 milliGRAM(s) Oral three times a day with meals  meropenem  IVPB 1000 milliGRAM(s) IV Intermittent every 12 hours  NIFEdipine XL 90 milliGRAM(s) Oral daily  sodium bicarbonate 650 milliGRAM(s) Oral three times a day  sodium polystyrene sulfonate Suspension 30 Gram(s) Oral once    MEDICATIONS  (PRN):  ALPRAZolam 0.5 milliGRAM(s) Oral four times a day PRN anxiety  cloNIDine 0.1 milliGRAM(s) Oral every 8 hours PRN sbp>150  dextrose Oral Gel 15 Gram(s) Oral once PRN Blood Glucose LESS THAN 70 milliGRAM(s)/deciliter  melatonin 3 milliGRAM(s) Oral at bedtime PRN Insomnia  oxycodone    5 mG/acetaminophen 325 mG 2 Tablet(s) Oral every 4 hours PRN Moderate Pain (4 - 6)  oxyCODONE    IR 2.5 milliGRAM(s) Oral every 4 hours PRN Severe Pain (7 - 10)    Labs:  CAPILLARY BLOOD GLUCOSE      POCT Blood Glucose.: 292 mg/dL (05 Nov 2023 11:32)  POCT Blood Glucose.: 388 mg/dL (05 Nov 2023 07:20)  POCT Blood Glucose.: 171 mg/dL (04 Nov 2023 22:17)  POCT Blood Glucose.: 186 mg/dL (04 Nov 2023 21:07)  POCT Blood Glucose.: 253 mg/dL (04 Nov 2023 18:50)  POCT Blood Glucose.: 129 mg/dL (04 Nov 2023 17:10)                          7.8    9.35  )-----------( 534      ( 05 Nov 2023 10:00 )             25.1       Auto Neutrophil %: 67.5 % (11-05-23 @ 10:00)  Auto Immature Granulocyte %: 0.4 % (11-05-23 @ 10:00)    11-05    135  |  98  |  36<H>  ----------------------------<  325<H>  6.3<HH>   |  25  |  3.0<H>      Calcium: 9.3 mg/dL (11-05-23 @ 10:00)      LFTs:             6.6  | 0.2  | 14       ------------------[183     ( 05 Nov 2023 10:00 )  3.5  | x    | 15          Lipase:x      Amylase:x             Coags:     11.80  ----< 1.03    ( 05 Nov 2023 10:00 )     37.2            Urinalysis Basic - ( 05 Nov 2023 10:00 )    Color: x / Appearance: x / SG: x / pH: x  Gluc: 325 mg/dL / Ketone: x  / Bili: x / Urobili: x   Blood: x / Protein: x / Nitrite: x   Leuk Esterase: x / RBC: x / WBC x   Sq Epi: x / Non Sq Epi: x / Bacteria: x        < from: VA Duplex Low Ext Arterial, Ltd, Right (11.03.23 @ 10:48) >  Impression:    Moderate stenosis of the bilateral anterior tibial arteries.    < end of copied text >

## 2023-11-05 NOTE — PROGRESS NOTE ADULT - SUBJECTIVE AND OBJECTIVE BOX
PROGRESS NOTE   Patient is a 58y old  Male who presents with a chief complaint of DFU (02 Nov 2023 09:55)      HPI:   Patient  is a 58yoM with Charcot foot s/p placement of an external fixator on 9/28/23 which had to be removed on 10/19 due to infection, CKD (baseline Cr 2.5-3.5, on list for kidney transplant), and IDDM (A1c 9.9), PAD, s/p  peripheral angiogram on 10/23/2023 with balloon angioplasty of PTA and DCB of R SFA and PTA of R PT and R AT via R groin and R pedal access, anemia recently received 2u pRBCS, pt was dc from hospital on 10/24/23 and now returns to hospital as directed by his podiatrist Dr Benson for TMA which will be done on 10/27/23 as pt has non healing wound and gangrene of the right foot. Patient complains of moderate to sever pain of foot which is partially relieved with percocet and associated wound discharge.  Pt denies fevers or chills.  (26 Oct 2023 18:37)      Vital Signs Last 24 Hrs  T(C): 37 (05 Nov 2023 04:40), Max: 37 (05 Nov 2023 04:40)  T(F): 98.6 (05 Nov 2023 04:40), Max: 98.6 (05 Nov 2023 04:40)  HR: 70 (05 Nov 2023 04:40) (70 - 91)  BP: 138/67 (05 Nov 2023 04:40) (115/57 - 169/78)  BP(mean): 78 (04 Nov 2023 16:03) (78 - 78)  RR: 18 (05 Nov 2023 04:40) (16 - 18)  SpO2: 97% (05 Nov 2023 04:40) (95% - 99%)    Parameters below as of 04 Nov 2023 20:05  Patient On (Oxygen Delivery Method): room air                              7.8    9.35  )-----------( 534      ( 05 Nov 2023 10:00 )             25.1               11-05    135  |  98  |  36<H>  ----------------------------<  325<H>  6.3<HH>   |  25  |  3.0<H>    Ca    9.3      05 Nov 2023 10:00  Phos  4.5     11-05  Mg     2.4     11-05    TPro  6.6  /  Alb  3.5  /  TBili  0.2  /  DBili  x   /  AST  14  /  ALT  15  /  AlkPhos  183<H>  11-05      PHYSICAL EXAM  GEN: MARIOLA CURRY is a pleasant well-nourished, well developed 58y Male in no acute distress, alert awake, and oriented to person, place and time.   LE Focused:  S/P revision of TMA with versajet debridement. Bleeding noted to bandage. Source of blood was medulary bleeding. The rest of the tissue continues to become necrotic over 80% of the wound, especially laterally where there is mummified tissue and bone exposure. At this point the notion of further debridement will leave him with a non-weight bearing foot and the level of necrosis puts him at risk for sepsis. I discussed the risks and benefits of a BKA and the patient understands that the BKA is the best option for him at this time. He agrees to the BKA at this time. I spoke to the surgical PA to notify the vascular team. I also spoke to his sister Lelia to appraise her of the situation.

## 2023-11-05 NOTE — PROGRESS NOTE ADULT - ASSESSMENT
58yoM w/PMHX: DM, HTN, HLD, CKD (baseline Cr 2.5-3.5, on list for kidney transplant), PAD (s/p  peripheral angiogram on 10/23/2023 with balloon angioplasty of PTA and DCB of R SFA and PTA of R PT and R AT via R groin and R pedal access - by Dr. Thomas Villalobos), Charcot foot, s/p placement of an external fixator to RLE on 9/28/23 which had to be removed on 10/19 due to infection, , anemia recently received 2u pRBCS, pt was dc from hospital on 10/24/23 and now returns to hospital as directed by his podiatrist Dr Benson for TMA which was done on 10/27/23.  Pt had subsequent additional I&D on 10/30 and tomorrow 11/4.  Vascular consult requested for possible RIght BKA    Plan:  #PAD:  s/p angiogram 10/23, s/p R TMA 10/27, s/p revision R TMA 10/30, 11/4    -Pt agreeable to ROB  -Vascular fellow made aware; medical team aware  -please transfer patient to [facility] by tomorrow 11/6; pt scheduled for surgery on tuesday 11/7  - Plavix on hold; on ASA 81 mg  -Per cardio: He needs a BKA. Cardiac risk intermediate. Follow cbc preop . Transfuse if needed.

## 2023-11-06 LAB
ALBUMIN SERPL ELPH-MCNC: 3.1 G/DL — LOW (ref 3.5–5.2)
ALBUMIN SERPL ELPH-MCNC: 3.1 G/DL — LOW (ref 3.5–5.2)
ALBUMIN SERPL ELPH-MCNC: 3.5 G/DL — SIGNIFICANT CHANGE UP (ref 3.5–5.2)
ALBUMIN SERPL ELPH-MCNC: 3.5 G/DL — SIGNIFICANT CHANGE UP (ref 3.5–5.2)
ALP SERPL-CCNC: 157 U/L — HIGH (ref 30–115)
ALP SERPL-CCNC: 157 U/L — HIGH (ref 30–115)
ALP SERPL-CCNC: 181 U/L — HIGH (ref 30–115)
ALP SERPL-CCNC: 181 U/L — HIGH (ref 30–115)
ALT FLD-CCNC: 13 U/L — SIGNIFICANT CHANGE UP (ref 0–41)
ALT FLD-CCNC: 13 U/L — SIGNIFICANT CHANGE UP (ref 0–41)
ALT FLD-CCNC: 15 U/L — SIGNIFICANT CHANGE UP (ref 0–41)
ALT FLD-CCNC: 15 U/L — SIGNIFICANT CHANGE UP (ref 0–41)
ANION GAP SERPL CALC-SCNC: 14 MMOL/L — SIGNIFICANT CHANGE UP (ref 7–14)
ANION GAP SERPL CALC-SCNC: 14 MMOL/L — SIGNIFICANT CHANGE UP (ref 7–14)
ANION GAP SERPL CALC-SCNC: 7 MMOL/L — SIGNIFICANT CHANGE UP (ref 7–14)
ANION GAP SERPL CALC-SCNC: 7 MMOL/L — SIGNIFICANT CHANGE UP (ref 7–14)
APTT BLD: 36 SEC — SIGNIFICANT CHANGE UP (ref 27–39.2)
APTT BLD: 36 SEC — SIGNIFICANT CHANGE UP (ref 27–39.2)
AST SERPL-CCNC: 11 U/L — SIGNIFICANT CHANGE UP (ref 0–41)
AST SERPL-CCNC: 11 U/L — SIGNIFICANT CHANGE UP (ref 0–41)
AST SERPL-CCNC: 12 U/L — SIGNIFICANT CHANGE UP (ref 0–41)
AST SERPL-CCNC: 12 U/L — SIGNIFICANT CHANGE UP (ref 0–41)
BASOPHILS # BLD AUTO: 0.04 K/UL — SIGNIFICANT CHANGE UP (ref 0–0.2)
BASOPHILS # BLD AUTO: 0.04 K/UL — SIGNIFICANT CHANGE UP (ref 0–0.2)
BASOPHILS NFR BLD AUTO: 0.6 % — SIGNIFICANT CHANGE UP (ref 0–1)
BASOPHILS NFR BLD AUTO: 0.6 % — SIGNIFICANT CHANGE UP (ref 0–1)
BILIRUB SERPL-MCNC: 0.2 MG/DL — SIGNIFICANT CHANGE UP (ref 0.2–1.2)
BILIRUB SERPL-MCNC: 0.2 MG/DL — SIGNIFICANT CHANGE UP (ref 0.2–1.2)
BILIRUB SERPL-MCNC: <0.2 MG/DL — SIGNIFICANT CHANGE UP (ref 0.2–1.2)
BILIRUB SERPL-MCNC: <0.2 MG/DL — SIGNIFICANT CHANGE UP (ref 0.2–1.2)
BUN SERPL-MCNC: 42 MG/DL — HIGH (ref 10–20)
CALCIUM SERPL-MCNC: 8.6 MG/DL — SIGNIFICANT CHANGE UP (ref 8.4–10.5)
CALCIUM SERPL-MCNC: 8.6 MG/DL — SIGNIFICANT CHANGE UP (ref 8.4–10.5)
CALCIUM SERPL-MCNC: 8.9 MG/DL — SIGNIFICANT CHANGE UP (ref 8.4–10.5)
CALCIUM SERPL-MCNC: 8.9 MG/DL — SIGNIFICANT CHANGE UP (ref 8.4–10.5)
CHLORIDE SERPL-SCNC: 101 MMOL/L — SIGNIFICANT CHANGE UP (ref 98–110)
CHLORIDE SERPL-SCNC: 101 MMOL/L — SIGNIFICANT CHANGE UP (ref 98–110)
CHLORIDE SERPL-SCNC: 96 MMOL/L — LOW (ref 98–110)
CHLORIDE SERPL-SCNC: 96 MMOL/L — LOW (ref 98–110)
CO2 SERPL-SCNC: 25 MMOL/L — SIGNIFICANT CHANGE UP (ref 17–32)
CO2 SERPL-SCNC: 25 MMOL/L — SIGNIFICANT CHANGE UP (ref 17–32)
CO2 SERPL-SCNC: 31 MMOL/L — SIGNIFICANT CHANGE UP (ref 17–32)
CO2 SERPL-SCNC: 31 MMOL/L — SIGNIFICANT CHANGE UP (ref 17–32)
CREAT SERPL-MCNC: 3 MG/DL — HIGH (ref 0.7–1.5)
CREAT SERPL-MCNC: 3 MG/DL — HIGH (ref 0.7–1.5)
CREAT SERPL-MCNC: 3.2 MG/DL — HIGH (ref 0.7–1.5)
CREAT SERPL-MCNC: 3.2 MG/DL — HIGH (ref 0.7–1.5)
EGFR: 21 ML/MIN/1.73M2 — LOW
EGFR: 21 ML/MIN/1.73M2 — LOW
EGFR: 23 ML/MIN/1.73M2 — LOW
EGFR: 23 ML/MIN/1.73M2 — LOW
EOSINOPHIL # BLD AUTO: 0.23 K/UL — SIGNIFICANT CHANGE UP (ref 0–0.7)
EOSINOPHIL # BLD AUTO: 0.23 K/UL — SIGNIFICANT CHANGE UP (ref 0–0.7)
EOSINOPHIL NFR BLD AUTO: 3.3 % — SIGNIFICANT CHANGE UP (ref 0–8)
EOSINOPHIL NFR BLD AUTO: 3.3 % — SIGNIFICANT CHANGE UP (ref 0–8)
GLUCOSE BLDC GLUCOMTR-MCNC: 120 MG/DL — HIGH (ref 70–99)
GLUCOSE BLDC GLUCOMTR-MCNC: 120 MG/DL — HIGH (ref 70–99)
GLUCOSE BLDC GLUCOMTR-MCNC: 178 MG/DL — HIGH (ref 70–99)
GLUCOSE BLDC GLUCOMTR-MCNC: 178 MG/DL — HIGH (ref 70–99)
GLUCOSE BLDC GLUCOMTR-MCNC: 243 MG/DL — HIGH (ref 70–99)
GLUCOSE BLDC GLUCOMTR-MCNC: 243 MG/DL — HIGH (ref 70–99)
GLUCOSE BLDC GLUCOMTR-MCNC: 377 MG/DL — HIGH (ref 70–99)
GLUCOSE BLDC GLUCOMTR-MCNC: 377 MG/DL — HIGH (ref 70–99)
GLUCOSE SERPL-MCNC: 250 MG/DL — HIGH (ref 70–99)
GLUCOSE SERPL-MCNC: 250 MG/DL — HIGH (ref 70–99)
GLUCOSE SERPL-MCNC: 403 MG/DL — HIGH (ref 70–99)
GLUCOSE SERPL-MCNC: 403 MG/DL — HIGH (ref 70–99)
HCT VFR BLD CALC: 24.2 % — LOW (ref 42–52)
HCT VFR BLD CALC: 24.2 % — LOW (ref 42–52)
HCT VFR BLD CALC: 25.2 % — LOW (ref 42–52)
HCT VFR BLD CALC: 25.2 % — LOW (ref 42–52)
HGB BLD-MCNC: 7.7 G/DL — LOW (ref 14–18)
HGB BLD-MCNC: 7.7 G/DL — LOW (ref 14–18)
HGB BLD-MCNC: 8.1 G/DL — LOW (ref 14–18)
HGB BLD-MCNC: 8.1 G/DL — LOW (ref 14–18)
IMM GRANULOCYTES NFR BLD AUTO: 0.9 % — HIGH (ref 0.1–0.3)
IMM GRANULOCYTES NFR BLD AUTO: 0.9 % — HIGH (ref 0.1–0.3)
INR BLD: 1.02 RATIO — SIGNIFICANT CHANGE UP (ref 0.65–1.3)
INR BLD: 1.02 RATIO — SIGNIFICANT CHANGE UP (ref 0.65–1.3)
LYMPHOCYTES # BLD AUTO: 2.1 K/UL — SIGNIFICANT CHANGE UP (ref 1.2–3.4)
LYMPHOCYTES # BLD AUTO: 2.1 K/UL — SIGNIFICANT CHANGE UP (ref 1.2–3.4)
LYMPHOCYTES # BLD AUTO: 29.9 % — SIGNIFICANT CHANGE UP (ref 20.5–51.1)
LYMPHOCYTES # BLD AUTO: 29.9 % — SIGNIFICANT CHANGE UP (ref 20.5–51.1)
MAGNESIUM SERPL-MCNC: 2.2 MG/DL — SIGNIFICANT CHANGE UP (ref 1.8–2.4)
MAGNESIUM SERPL-MCNC: 2.2 MG/DL — SIGNIFICANT CHANGE UP (ref 1.8–2.4)
MAGNESIUM SERPL-MCNC: 2.3 MG/DL — SIGNIFICANT CHANGE UP (ref 1.8–2.4)
MAGNESIUM SERPL-MCNC: 2.3 MG/DL — SIGNIFICANT CHANGE UP (ref 1.8–2.4)
MCHC RBC-ENTMCNC: 26.6 PG — LOW (ref 27–31)
MCHC RBC-ENTMCNC: 26.6 PG — LOW (ref 27–31)
MCHC RBC-ENTMCNC: 27.5 PG — SIGNIFICANT CHANGE UP (ref 27–31)
MCHC RBC-ENTMCNC: 27.5 PG — SIGNIFICANT CHANGE UP (ref 27–31)
MCHC RBC-ENTMCNC: 31.8 G/DL — LOW (ref 32–37)
MCHC RBC-ENTMCNC: 31.8 G/DL — LOW (ref 32–37)
MCHC RBC-ENTMCNC: 32.1 G/DL — SIGNIFICANT CHANGE UP (ref 32–37)
MCHC RBC-ENTMCNC: 32.1 G/DL — SIGNIFICANT CHANGE UP (ref 32–37)
MCV RBC AUTO: 83.7 FL — SIGNIFICANT CHANGE UP (ref 80–94)
MCV RBC AUTO: 83.7 FL — SIGNIFICANT CHANGE UP (ref 80–94)
MCV RBC AUTO: 85.4 FL — SIGNIFICANT CHANGE UP (ref 80–94)
MCV RBC AUTO: 85.4 FL — SIGNIFICANT CHANGE UP (ref 80–94)
MONOCYTES # BLD AUTO: 0.62 K/UL — HIGH (ref 0.1–0.6)
MONOCYTES # BLD AUTO: 0.62 K/UL — HIGH (ref 0.1–0.6)
MONOCYTES NFR BLD AUTO: 8.8 % — SIGNIFICANT CHANGE UP (ref 1.7–9.3)
MONOCYTES NFR BLD AUTO: 8.8 % — SIGNIFICANT CHANGE UP (ref 1.7–9.3)
NEUTROPHILS # BLD AUTO: 3.98 K/UL — SIGNIFICANT CHANGE UP (ref 1.4–6.5)
NEUTROPHILS # BLD AUTO: 3.98 K/UL — SIGNIFICANT CHANGE UP (ref 1.4–6.5)
NEUTROPHILS NFR BLD AUTO: 56.5 % — SIGNIFICANT CHANGE UP (ref 42.2–75.2)
NEUTROPHILS NFR BLD AUTO: 56.5 % — SIGNIFICANT CHANGE UP (ref 42.2–75.2)
NRBC # BLD: 0 /100 WBCS — SIGNIFICANT CHANGE UP (ref 0–0)
PHOSPHATE SERPL-MCNC: 4.3 MG/DL — SIGNIFICANT CHANGE UP (ref 2.1–4.9)
PHOSPHATE SERPL-MCNC: 4.3 MG/DL — SIGNIFICANT CHANGE UP (ref 2.1–4.9)
PLATELET # BLD AUTO: 408 K/UL — HIGH (ref 130–400)
PLATELET # BLD AUTO: 408 K/UL — HIGH (ref 130–400)
PLATELET # BLD AUTO: 530 K/UL — HIGH (ref 130–400)
PLATELET # BLD AUTO: 530 K/UL — HIGH (ref 130–400)
PMV BLD: 8.6 FL — SIGNIFICANT CHANGE UP (ref 7.4–10.4)
PMV BLD: 8.6 FL — SIGNIFICANT CHANGE UP (ref 7.4–10.4)
PMV BLD: 8.7 FL — SIGNIFICANT CHANGE UP (ref 7.4–10.4)
PMV BLD: 8.7 FL — SIGNIFICANT CHANGE UP (ref 7.4–10.4)
POTASSIUM SERPL-MCNC: 4.6 MMOL/L — SIGNIFICANT CHANGE UP (ref 3.5–5)
POTASSIUM SERPL-MCNC: 4.6 MMOL/L — SIGNIFICANT CHANGE UP (ref 3.5–5)
POTASSIUM SERPL-MCNC: 4.9 MMOL/L — SIGNIFICANT CHANGE UP (ref 3.5–5)
POTASSIUM SERPL-MCNC: 4.9 MMOL/L — SIGNIFICANT CHANGE UP (ref 3.5–5)
POTASSIUM SERPL-SCNC: 4.6 MMOL/L — SIGNIFICANT CHANGE UP (ref 3.5–5)
POTASSIUM SERPL-SCNC: 4.6 MMOL/L — SIGNIFICANT CHANGE UP (ref 3.5–5)
POTASSIUM SERPL-SCNC: 4.9 MMOL/L — SIGNIFICANT CHANGE UP (ref 3.5–5)
POTASSIUM SERPL-SCNC: 4.9 MMOL/L — SIGNIFICANT CHANGE UP (ref 3.5–5)
PROT SERPL-MCNC: 6.4 G/DL — SIGNIFICANT CHANGE UP (ref 6–8)
PROT SERPL-MCNC: 6.4 G/DL — SIGNIFICANT CHANGE UP (ref 6–8)
PROT SERPL-MCNC: 6.7 G/DL — SIGNIFICANT CHANGE UP (ref 6–8)
PROT SERPL-MCNC: 6.7 G/DL — SIGNIFICANT CHANGE UP (ref 6–8)
PROTHROM AB SERPL-ACNC: 11.6 SEC — SIGNIFICANT CHANGE UP (ref 9.95–12.87)
PROTHROM AB SERPL-ACNC: 11.6 SEC — SIGNIFICANT CHANGE UP (ref 9.95–12.87)
RBC # BLD: 2.89 M/UL — LOW (ref 4.7–6.1)
RBC # BLD: 2.89 M/UL — LOW (ref 4.7–6.1)
RBC # BLD: 2.95 M/UL — LOW (ref 4.7–6.1)
RBC # BLD: 2.95 M/UL — LOW (ref 4.7–6.1)
RBC # FLD: 16.8 % — HIGH (ref 11.5–14.5)
RBC # FLD: 16.8 % — HIGH (ref 11.5–14.5)
RBC # FLD: 17.3 % — HIGH (ref 11.5–14.5)
RBC # FLD: 17.3 % — HIGH (ref 11.5–14.5)
SODIUM SERPL-SCNC: 135 MMOL/L — SIGNIFICANT CHANGE UP (ref 135–146)
SODIUM SERPL-SCNC: 135 MMOL/L — SIGNIFICANT CHANGE UP (ref 135–146)
SODIUM SERPL-SCNC: 139 MMOL/L — SIGNIFICANT CHANGE UP (ref 135–146)
SODIUM SERPL-SCNC: 139 MMOL/L — SIGNIFICANT CHANGE UP (ref 135–146)
WBC # BLD: 7.03 K/UL — SIGNIFICANT CHANGE UP (ref 4.8–10.8)
WBC # BLD: 7.03 K/UL — SIGNIFICANT CHANGE UP (ref 4.8–10.8)
WBC # BLD: 8.52 K/UL — SIGNIFICANT CHANGE UP (ref 4.8–10.8)
WBC # BLD: 8.52 K/UL — SIGNIFICANT CHANGE UP (ref 4.8–10.8)
WBC # FLD AUTO: 7.03 K/UL — SIGNIFICANT CHANGE UP (ref 4.8–10.8)
WBC # FLD AUTO: 7.03 K/UL — SIGNIFICANT CHANGE UP (ref 4.8–10.8)
WBC # FLD AUTO: 8.52 K/UL — SIGNIFICANT CHANGE UP (ref 4.8–10.8)
WBC # FLD AUTO: 8.52 K/UL — SIGNIFICANT CHANGE UP (ref 4.8–10.8)

## 2023-11-06 RX ORDER — ROSUVASTATIN CALCIUM 5 MG/1
20 TABLET ORAL AT BEDTIME
Refills: 0 | Status: DISCONTINUED | OUTPATIENT
Start: 2023-11-06 | End: 2023-11-09

## 2023-11-06 RX ADMIN — LOSARTAN POTASSIUM 100 MILLIGRAM(S): 100 TABLET, FILM COATED ORAL at 06:27

## 2023-11-06 RX ADMIN — Medication 650 MILLIGRAM(S): at 06:27

## 2023-11-06 RX ADMIN — Medication 650 MILLIGRAM(S): at 13:07

## 2023-11-06 RX ADMIN — Medication 650 MILLIGRAM(S): at 21:47

## 2023-11-06 RX ADMIN — OXYCODONE HYDROCHLORIDE 2.5 MILLIGRAM(S): 5 TABLET ORAL at 02:11

## 2023-11-06 RX ADMIN — Medication 81 MILLIGRAM(S): at 11:35

## 2023-11-06 RX ADMIN — Medication 90 MILLIGRAM(S): at 06:28

## 2023-11-06 RX ADMIN — HEPARIN SODIUM 5000 UNIT(S): 5000 INJECTION INTRAVENOUS; SUBCUTANEOUS at 06:28

## 2023-11-06 RX ADMIN — MAGNESIUM OXIDE 400 MG ORAL TABLET 400 MILLIGRAM(S): 241.3 TABLET ORAL at 11:31

## 2023-11-06 RX ADMIN — MAGNESIUM OXIDE 400 MG ORAL TABLET 400 MILLIGRAM(S): 241.3 TABLET ORAL at 07:45

## 2023-11-06 RX ADMIN — HEPARIN SODIUM 5000 UNIT(S): 5000 INJECTION INTRAVENOUS; SUBCUTANEOUS at 13:07

## 2023-11-06 RX ADMIN — MAGNESIUM OXIDE 400 MG ORAL TABLET 400 MILLIGRAM(S): 241.3 TABLET ORAL at 17:07

## 2023-11-06 RX ADMIN — Medication 2: at 11:31

## 2023-11-06 RX ADMIN — SODIUM ZIRCONIUM CYCLOSILICATE 10 GRAM(S): 10 POWDER, FOR SUSPENSION ORAL at 00:11

## 2023-11-06 RX ADMIN — MEROPENEM 100 MILLIGRAM(S): 1 INJECTION INTRAVENOUS at 06:28

## 2023-11-06 RX ADMIN — ROSUVASTATIN CALCIUM 20 MILLIGRAM(S): 5 TABLET ORAL at 21:58

## 2023-11-06 RX ADMIN — Medication 0.5 MILLIGRAM(S): at 17:34

## 2023-11-06 RX ADMIN — Medication 0.5 MILLIGRAM(S): at 13:05

## 2023-11-06 RX ADMIN — INSULIN GLARGINE 25 UNIT(S): 100 INJECTION, SOLUTION SUBCUTANEOUS at 07:45

## 2023-11-06 RX ADMIN — MEROPENEM 100 MILLIGRAM(S): 1 INJECTION INTRAVENOUS at 20:08

## 2023-11-06 RX ADMIN — OXYCODONE HYDROCHLORIDE 2.5 MILLIGRAM(S): 5 TABLET ORAL at 02:47

## 2023-11-06 RX ADMIN — Medication 8 UNIT(S): at 07:46

## 2023-11-06 RX ADMIN — HEPARIN SODIUM 5000 UNIT(S): 5000 INJECTION INTRAVENOUS; SUBCUTANEOUS at 21:46

## 2023-11-06 RX ADMIN — Medication 10: at 07:46

## 2023-11-06 RX ADMIN — OXYCODONE HYDROCHLORIDE 2.5 MILLIGRAM(S): 5 TABLET ORAL at 20:08

## 2023-11-06 NOTE — CHART NOTE - NSCHARTNOTEFT_GEN_A_CORE
Pt is scheduled for Right BKA on Tuesday  Being transferred to [facility] today under medical service  Hgb 7.7  Needs 1 U PRBC transfusion prior surgery  active type and screen, repeat CBC post transfusion  NPO post MN    We'll speak with the pt and his sister when pt arrives    CALL [phone]

## 2023-11-06 NOTE — CHART NOTE - NSCHARTNOTEFT_GEN_A_CORE
Podiatry    Attempted seeing patient bedside. Pt was being transported by EMS to [place] for BKA-R scheduled Tues 11/7/23.    [phone]

## 2023-11-06 NOTE — PROGRESS NOTE ADULT - ASSESSMENT
58-year-old male with Charcot foot s/p placement of an external fixator on 9/28/23 which had to be removed on 10/19 due to infection, CKD (baseline Cr 2.5-3.5, on list for kidney transplant), and IDDM (A1c 9.9), PAD, s/p  peripheral angiogram on 10/23/2023 with balloon angioplasty of PTA and DCB of R SFA and PTA of R PT and R AT via R groin and R pedal access, anemia recently received 2u pRBCS, pt was dc from hospital on 10/24/23 and now returns to hospital as directed by his podiatrist Dr Benson for TMA which will be done on 10/27/23 as pt has non healing wound and gangrene of the right foot.       #gangrene of the right foot.     - post operative amputation, revision   - reviewed labs   - continue monitoring     # PAD     - vascular for BKA     #PAD, s/p angioplasty  -c/w asa and plavix    #anemia  -s/p transfusion  -monitor    #HTN  -low sodium diet  -monitor bp  -cont meds from home nifedipine and cozaar    #DM  -ada diet  -monitor BS  -cont meds from home basal 20u daily  and lispro 8 qac  -insulin coverage prn eleavted blood sugar        #CKD  -creatinine stable at 2.9 today    #anxiety disorder  -xanax prn    #DVT prophylaxis  -sq heparin and scd    ADVANCE CARE: CPR    DISPOSITION: ACUTE transfer [place] for opitmization

## 2023-11-06 NOTE — PROGRESS NOTE ADULT - SUBJECTIVE AND OBJECTIVE BOX
58-year-old male with Charcot foot s/p placement of an external fixator on 9/28/23 which had to be removed on 10/19 due to infection, CKD (baseline Cr 2.5-3.5, on list for kidney transplant), and IDDM (A1c 9.9), PAD, s/p  peripheral angiogram on 10/23/2023 with balloon angioplasty of PTA and DCB of R SFA and PTA of R PT and R AT via R groin and R pedal access, anemia recently received 2u pRBCS, pt was dc from hospital on 10/24/23.    Interval: s/p revision K elevated repeat improved ;  BKA this week    PAST MEDICAL & SURGICAL HISTORY:    DM (diabetes mellitus)  HTN (hypertension)  HLD (hyperlipidemia)  Herpes labialis  Anxiety  GERD (gastroesophageal reflux disease)  Kidney stones  20 years ago  Kidney disease  stage 4  Chronic kidney disease, unspecified CKD stage  Diabetic Charcot foot  PAD (peripheral artery disease)  S/P foot surgery, right  x 5 ( 2006 - 2013 )  Kidney stone  Removed by Laser x 2 ( 20 years ago )  H/O arthroscopy of right knee        Social History:    reviewed     MEDICATIONS  (STANDING):  aspirin  chewable 81 milliGRAM(s) Oral daily  dextrose 50% Injectable 25 Gram(s) IV Push once  epoetin ban (PROCRIT) Injectable 2000 Unit(s) SubCutaneous every 7 days  glucagon  Injectable 1 milliGRAM(s) IntraMuscular once  heparin   Injectable 5000 Unit(s) SubCutaneous every 8 hours  insulin lispro (ADMELOG) corrective regimen sliding scale   SubCutaneous three times a day before meals  insulin lispro Injectable (ADMELOG) 8 Unit(s) SubCutaneous before breakfast  losartan 100 milliGRAM(s) Oral daily  magnesium oxide 400 milliGRAM(s) Oral three times a day with meals  meropenem  IVPB 1000 milliGRAM(s) IV Intermittent every 12 hours  NIFEdipine XL 90 milliGRAM(s) Oral daily  sodium bicarbonate 650 milliGRAM(s) Oral three times a day    MEDICATIONS  (PRN):  ALPRAZolam 0.5 milliGRAM(s) Oral four times a day PRN anxiety  cloNIDine 0.1 milliGRAM(s) Oral every 8 hours PRN sbp>150  dextrose Oral Gel 15 Gram(s) Oral once PRN Blood Glucose LESS THAN 70 milliGRAM(s)/deciliter  melatonin 3 milliGRAM(s) Oral at bedtime PRN Insomnia  oxycodone    5 mG/acetaminophen 325 mG 2 Tablet(s) Oral every 4 hours PRN Moderate Pain (4 - 6)  oxyCODONE    IR 2.5 milliGRAM(s) Oral every 4 hours PRN Severe Pain (7 - 10)    Allergies    No Known Allergies    Intolerances    Vital Signs Last 24 Hrs  T(C): 36.5 (06 Nov 2023 04:29), Max: 36.5 (06 Nov 2023 04:29)  T(F): 97.7 (06 Nov 2023 04:29), Max: 97.7 (06 Nov 2023 04:29)  HR: 77 (06 Nov 2023 04:29) (77 - 84)  BP: 131/84 (06 Nov 2023 04:29) (131/84 - 140/70)  BP(mean): --  RR: 18 (06 Nov 2023 04:29) (18 - 18)  SpO2: 99% (05 Nov 2023 20:35) (99% - 99%)    Parameters below as of 05 Nov 2023 20:35  Patient On (Oxygen Delivery Method): room air    CAPILLARY BLOOD GLUCOSE      POCT Blood Glucose.: 296 mg/dL (05 Nov 2023 22:33)  POCT Blood Glucose.: 233 mg/dL (05 Nov 2023 20:47)  POCT Blood Glucose.: 217 mg/dL (05 Nov 2023 16:21)  POCT Blood Glucose.: 292 mg/dL (05 Nov 2023 11:32)  POCT Blood Glucose.: 388 mg/dL (05 Nov 2023 07:20)          Diet, Regular:   Consistent Carbohydrate Evening Snack  DASH/TLC Sodium & Cholesterol Restricted (10-30-23 @ 22:02) [Active]      PHYSICAL EXAM  Constitutional: A&Ox4  Respiratory: cta b/l  Cardiovascular: s1 s2 rrr  Gastrointestinal: soft nt  nd + bs no rebound or guarding  Genitourinary: no cva tenderness  Extremities: normal rom, no edema, calf tenderness  Neurological:no focal deficits  Skin: +Right foot post surgical   Vascular, no cyanosis    LABS:                              7.8    9.35  )-----------( 534      ( 05 Nov 2023 10:00 )             25.1                      8.0    7.62  )-----------( 491      ( 03 Nov 2023 04:30 )             25.5                           9.2    8.45  )-----------( 467      ( 31 Oct 2023 04:30 )             28.9                           9.2    7.89  )-----------( 453      ( 30 Oct 2023 08:28 )             28.7                           7.9    6.41  )-----------( 496      ( 28 Oct 2023 07:11 )             24.2     11-05    140  |  102  |  38<H>  ----------------------------<  175<H>  5.4<H>   |  25  |  3.3<H>    Ca    8.9      05 Nov 2023 19:22  Phos  4.5     11-05  Mg     2.4     11-05    TPro  6.6  /  Alb  3.5  /  TBili  0.2  /  DBili  x   /  AST  14  /  ALT  15  /  AlkPhos  183<H>  11-05 11-03    137  |  100  |  34<H>  ----------------------------<  297<H>  5.1<H>   |  26  |  2.8<H>    Ca    8.9      03 Nov 2023 04:30  Mg     2.0     11-03    TPro  6.0  /  Alb  3.2<L>  /  TBili  <0.2  /  DBili  x   /  AST  17  /  ALT  18  /  AlkPhos  168<H>  11-03      10-31    132<L>  |  97<L>  |  35<H>  ----------------------------<  451<HH>  5.4<H>   |  21  |  2.5<H>    Ca    9.0      31 Oct 2023 04:30  Mg     1.8     10-30    TPro  6.2  /  Alb  3.2<L>  /  TBili  0.3  /  DBili  x   /  AST  13  /  ALT  12  /  AlkPhos  152<H>  10-30      10-30    139  |  102  |  34<H>  ----------------------------<  197<H>  4.9   |  27  |  2.5<H>    Ca    9.3      30 Oct 2023 08:28  Mg     1.8     10-30    TPro  6.2  /  Alb  3.2<L>  /  TBili  0.3  /  DBili  x   /  AST  13  /  ALT  12  /  AlkPhos  152<H>  10-30      10-28    138  |  101  |  42<H>  ----------------------------<  332<H>  4.8   |  23  |  2.7<H>    Ca    8.7      28 Oct 2023 07:11  Mg     1.7     10-28   Include Location In Plan?: No Detail Level: Zone

## 2023-11-07 LAB
ALBUMIN SERPL ELPH-MCNC: 3.8 G/DL — SIGNIFICANT CHANGE UP (ref 3.5–5.2)
ALBUMIN SERPL ELPH-MCNC: 3.8 G/DL — SIGNIFICANT CHANGE UP (ref 3.5–5.2)
ALP SERPL-CCNC: 186 U/L — HIGH (ref 30–115)
ALP SERPL-CCNC: 186 U/L — HIGH (ref 30–115)
ALT FLD-CCNC: 18 U/L — SIGNIFICANT CHANGE UP (ref 0–41)
ALT FLD-CCNC: 18 U/L — SIGNIFICANT CHANGE UP (ref 0–41)
ANION GAP SERPL CALC-SCNC: 13 MMOL/L — SIGNIFICANT CHANGE UP (ref 7–14)
ANION GAP SERPL CALC-SCNC: 13 MMOL/L — SIGNIFICANT CHANGE UP (ref 7–14)
AST SERPL-CCNC: 15 U/L — SIGNIFICANT CHANGE UP (ref 0–41)
AST SERPL-CCNC: 15 U/L — SIGNIFICANT CHANGE UP (ref 0–41)
BASOPHILS # BLD AUTO: 0.08 K/UL — SIGNIFICANT CHANGE UP (ref 0–0.2)
BASOPHILS # BLD AUTO: 0.08 K/UL — SIGNIFICANT CHANGE UP (ref 0–0.2)
BASOPHILS NFR BLD AUTO: 0.9 % — SIGNIFICANT CHANGE UP (ref 0–1)
BASOPHILS NFR BLD AUTO: 0.9 % — SIGNIFICANT CHANGE UP (ref 0–1)
BILIRUB SERPL-MCNC: 0.3 MG/DL — SIGNIFICANT CHANGE UP (ref 0.2–1.2)
BILIRUB SERPL-MCNC: 0.3 MG/DL — SIGNIFICANT CHANGE UP (ref 0.2–1.2)
BLD GP AB SCN SERPL QL: SIGNIFICANT CHANGE UP
BLD GP AB SCN SERPL QL: SIGNIFICANT CHANGE UP
BUN SERPL-MCNC: 36 MG/DL — HIGH (ref 10–20)
BUN SERPL-MCNC: 36 MG/DL — HIGH (ref 10–20)
CALCIUM SERPL-MCNC: 9.7 MG/DL — SIGNIFICANT CHANGE UP (ref 8.4–10.4)
CALCIUM SERPL-MCNC: 9.7 MG/DL — SIGNIFICANT CHANGE UP (ref 8.4–10.4)
CHLORIDE SERPL-SCNC: 100 MMOL/L — SIGNIFICANT CHANGE UP (ref 98–110)
CHLORIDE SERPL-SCNC: 100 MMOL/L — SIGNIFICANT CHANGE UP (ref 98–110)
CO2 SERPL-SCNC: 26 MMOL/L — SIGNIFICANT CHANGE UP (ref 17–32)
CO2 SERPL-SCNC: 26 MMOL/L — SIGNIFICANT CHANGE UP (ref 17–32)
CREAT SERPL-MCNC: 2.7 MG/DL — HIGH (ref 0.7–1.5)
CREAT SERPL-MCNC: 2.7 MG/DL — HIGH (ref 0.7–1.5)
EGFR: 26 ML/MIN/1.73M2 — LOW
EGFR: 26 ML/MIN/1.73M2 — LOW
EOSINOPHIL # BLD AUTO: 0.25 K/UL — SIGNIFICANT CHANGE UP (ref 0–0.7)
EOSINOPHIL # BLD AUTO: 0.25 K/UL — SIGNIFICANT CHANGE UP (ref 0–0.7)
EOSINOPHIL NFR BLD AUTO: 2.9 % — SIGNIFICANT CHANGE UP (ref 0–8)
EOSINOPHIL NFR BLD AUTO: 2.9 % — SIGNIFICANT CHANGE UP (ref 0–8)
GLUCOSE BLDC GLUCOMTR-MCNC: 124 MG/DL — HIGH (ref 70–99)
GLUCOSE BLDC GLUCOMTR-MCNC: 124 MG/DL — HIGH (ref 70–99)
GLUCOSE BLDC GLUCOMTR-MCNC: 147 MG/DL — HIGH (ref 70–99)
GLUCOSE BLDC GLUCOMTR-MCNC: 147 MG/DL — HIGH (ref 70–99)
GLUCOSE BLDC GLUCOMTR-MCNC: 226 MG/DL — HIGH (ref 70–99)
GLUCOSE BLDC GLUCOMTR-MCNC: 226 MG/DL — HIGH (ref 70–99)
GLUCOSE BLDC GLUCOMTR-MCNC: 229 MG/DL — HIGH (ref 70–99)
GLUCOSE BLDC GLUCOMTR-MCNC: 229 MG/DL — HIGH (ref 70–99)
GLUCOSE SERPL-MCNC: 236 MG/DL — HIGH (ref 70–99)
GLUCOSE SERPL-MCNC: 236 MG/DL — HIGH (ref 70–99)
HCT VFR BLD CALC: 30.4 % — LOW (ref 42–52)
HCT VFR BLD CALC: 30.4 % — LOW (ref 42–52)
HGB BLD-MCNC: 9.9 G/DL — LOW (ref 14–18)
HGB BLD-MCNC: 9.9 G/DL — LOW (ref 14–18)
IMM GRANULOCYTES NFR BLD AUTO: 0.4 % — HIGH (ref 0.1–0.3)
IMM GRANULOCYTES NFR BLD AUTO: 0.4 % — HIGH (ref 0.1–0.3)
INR BLD: 1 RATIO — SIGNIFICANT CHANGE UP (ref 0.65–1.3)
INR BLD: 1 RATIO — SIGNIFICANT CHANGE UP (ref 0.65–1.3)
LYMPHOCYTES # BLD AUTO: 2.18 K/UL — SIGNIFICANT CHANGE UP (ref 1.2–3.4)
LYMPHOCYTES # BLD AUTO: 2.18 K/UL — SIGNIFICANT CHANGE UP (ref 1.2–3.4)
LYMPHOCYTES # BLD AUTO: 25.5 % — SIGNIFICANT CHANGE UP (ref 20.5–51.1)
LYMPHOCYTES # BLD AUTO: 25.5 % — SIGNIFICANT CHANGE UP (ref 20.5–51.1)
MAGNESIUM SERPL-MCNC: 2.2 MG/DL — SIGNIFICANT CHANGE UP (ref 1.8–2.4)
MAGNESIUM SERPL-MCNC: 2.2 MG/DL — SIGNIFICANT CHANGE UP (ref 1.8–2.4)
MCHC RBC-ENTMCNC: 27.3 PG — SIGNIFICANT CHANGE UP (ref 27–31)
MCHC RBC-ENTMCNC: 27.3 PG — SIGNIFICANT CHANGE UP (ref 27–31)
MCHC RBC-ENTMCNC: 32.6 G/DL — SIGNIFICANT CHANGE UP (ref 32–37)
MCHC RBC-ENTMCNC: 32.6 G/DL — SIGNIFICANT CHANGE UP (ref 32–37)
MCV RBC AUTO: 83.7 FL — SIGNIFICANT CHANGE UP (ref 80–94)
MCV RBC AUTO: 83.7 FL — SIGNIFICANT CHANGE UP (ref 80–94)
MONOCYTES # BLD AUTO: 0.67 K/UL — HIGH (ref 0.1–0.6)
MONOCYTES # BLD AUTO: 0.67 K/UL — HIGH (ref 0.1–0.6)
MONOCYTES NFR BLD AUTO: 7.8 % — SIGNIFICANT CHANGE UP (ref 1.7–9.3)
MONOCYTES NFR BLD AUTO: 7.8 % — SIGNIFICANT CHANGE UP (ref 1.7–9.3)
NEUTROPHILS # BLD AUTO: 5.34 K/UL — SIGNIFICANT CHANGE UP (ref 1.4–6.5)
NEUTROPHILS # BLD AUTO: 5.34 K/UL — SIGNIFICANT CHANGE UP (ref 1.4–6.5)
NEUTROPHILS NFR BLD AUTO: 62.5 % — SIGNIFICANT CHANGE UP (ref 42.2–75.2)
NEUTROPHILS NFR BLD AUTO: 62.5 % — SIGNIFICANT CHANGE UP (ref 42.2–75.2)
NRBC # BLD: 0 /100 WBCS — SIGNIFICANT CHANGE UP (ref 0–0)
NRBC # BLD: 0 /100 WBCS — SIGNIFICANT CHANGE UP (ref 0–0)
PLATELET # BLD AUTO: 470 K/UL — HIGH (ref 130–400)
PLATELET # BLD AUTO: 470 K/UL — HIGH (ref 130–400)
PMV BLD: 8.8 FL — SIGNIFICANT CHANGE UP (ref 7.4–10.4)
PMV BLD: 8.8 FL — SIGNIFICANT CHANGE UP (ref 7.4–10.4)
POTASSIUM SERPL-MCNC: 5 MMOL/L — SIGNIFICANT CHANGE UP (ref 3.5–5)
POTASSIUM SERPL-MCNC: 5 MMOL/L — SIGNIFICANT CHANGE UP (ref 3.5–5)
POTASSIUM SERPL-SCNC: 5 MMOL/L — SIGNIFICANT CHANGE UP (ref 3.5–5)
POTASSIUM SERPL-SCNC: 5 MMOL/L — SIGNIFICANT CHANGE UP (ref 3.5–5)
PROT SERPL-MCNC: 7 G/DL — SIGNIFICANT CHANGE UP (ref 6–8)
PROT SERPL-MCNC: 7 G/DL — SIGNIFICANT CHANGE UP (ref 6–8)
PROTHROM AB SERPL-ACNC: 11.4 SEC — SIGNIFICANT CHANGE UP (ref 9.95–12.87)
PROTHROM AB SERPL-ACNC: 11.4 SEC — SIGNIFICANT CHANGE UP (ref 9.95–12.87)
RBC # BLD: 3.63 M/UL — LOW (ref 4.7–6.1)
RBC # BLD: 3.63 M/UL — LOW (ref 4.7–6.1)
RBC # FLD: 17.1 % — HIGH (ref 11.5–14.5)
RBC # FLD: 17.1 % — HIGH (ref 11.5–14.5)
SODIUM SERPL-SCNC: 139 MMOL/L — SIGNIFICANT CHANGE UP (ref 135–146)
SODIUM SERPL-SCNC: 139 MMOL/L — SIGNIFICANT CHANGE UP (ref 135–146)
SURGICAL PATHOLOGY STUDY: SIGNIFICANT CHANGE UP
SURGICAL PATHOLOGY STUDY: SIGNIFICANT CHANGE UP
WBC # BLD: 8.55 K/UL — SIGNIFICANT CHANGE UP (ref 4.8–10.8)
WBC # BLD: 8.55 K/UL — SIGNIFICANT CHANGE UP (ref 4.8–10.8)
WBC # FLD AUTO: 8.55 K/UL — SIGNIFICANT CHANGE UP (ref 4.8–10.8)
WBC # FLD AUTO: 8.55 K/UL — SIGNIFICANT CHANGE UP (ref 4.8–10.8)

## 2023-11-07 RX ADMIN — OXYCODONE HYDROCHLORIDE 2.5 MILLIGRAM(S): 5 TABLET ORAL at 11:59

## 2023-11-07 RX ADMIN — HEPARIN SODIUM 5000 UNIT(S): 5000 INJECTION INTRAVENOUS; SUBCUTANEOUS at 13:56

## 2023-11-07 RX ADMIN — Medication 8 UNIT(S): at 08:44

## 2023-11-07 RX ADMIN — Medication 81 MILLIGRAM(S): at 11:59

## 2023-11-07 RX ADMIN — Medication 90 MILLIGRAM(S): at 05:30

## 2023-11-07 RX ADMIN — Medication 650 MILLIGRAM(S): at 21:45

## 2023-11-07 RX ADMIN — Medication 0.5 MILLIGRAM(S): at 19:02

## 2023-11-07 RX ADMIN — Medication 4: at 08:45

## 2023-11-07 RX ADMIN — MEROPENEM 100 MILLIGRAM(S): 1 INJECTION INTRAVENOUS at 08:46

## 2023-11-07 RX ADMIN — Medication 0.5 MILLIGRAM(S): at 03:28

## 2023-11-07 RX ADMIN — MEROPENEM 100 MILLIGRAM(S): 1 INJECTION INTRAVENOUS at 21:46

## 2023-11-07 RX ADMIN — HEPARIN SODIUM 5000 UNIT(S): 5000 INJECTION INTRAVENOUS; SUBCUTANEOUS at 21:45

## 2023-11-07 RX ADMIN — LOSARTAN POTASSIUM 100 MILLIGRAM(S): 100 TABLET, FILM COATED ORAL at 05:30

## 2023-11-07 RX ADMIN — MAGNESIUM OXIDE 400 MG ORAL TABLET 400 MILLIGRAM(S): 241.3 TABLET ORAL at 19:02

## 2023-11-07 RX ADMIN — OXYCODONE HYDROCHLORIDE 2.5 MILLIGRAM(S): 5 TABLET ORAL at 21:43

## 2023-11-07 RX ADMIN — Medication 0.5 MILLIGRAM(S): at 13:56

## 2023-11-07 RX ADMIN — Medication 0.5 MILLIGRAM(S): at 08:44

## 2023-11-07 RX ADMIN — ROSUVASTATIN CALCIUM 20 MILLIGRAM(S): 5 TABLET ORAL at 22:14

## 2023-11-07 RX ADMIN — Medication 650 MILLIGRAM(S): at 05:30

## 2023-11-07 RX ADMIN — MAGNESIUM OXIDE 400 MG ORAL TABLET 400 MILLIGRAM(S): 241.3 TABLET ORAL at 12:00

## 2023-11-07 RX ADMIN — MAGNESIUM OXIDE 400 MG ORAL TABLET 400 MILLIGRAM(S): 241.3 TABLET ORAL at 08:45

## 2023-11-07 RX ADMIN — Medication 650 MILLIGRAM(S): at 14:13

## 2023-11-07 RX ADMIN — INSULIN GLARGINE 25 UNIT(S): 100 INJECTION, SOLUTION SUBCUTANEOUS at 10:46

## 2023-11-07 NOTE — PROGRESS NOTE ADULT - ASSESSMENT
58-year-old male with Charcot foot s/p placement of an external fixator on 9/28/23 which had to be removed on 10/19 due to infection, CKD (baseline Cr 2.5-3.5, on list for kidney transplant), and IDDM (A1c 9.9), PAD, with infected and necrotic right TMA  Awaiting BKA        Plan:  - I reviewed labs  - I reviewed radiology imagings  - I personally visualized the imagings  - I discussed the findings and imagings with Dr. Machado:  - Right BKA rescheduled to Thursday  - pre-op labs for Wed  - I spoke with the pt and his sister Lelia  - family requested podiatry for dressing changes and PT/OT    SPECTRA [id number]

## 2023-11-07 NOTE — PROGRESS NOTE ADULT - ASSESSMENT
58-year-old male with Charcot foot s/p placement of an external fixator on 9/28/23 which had to be removed on 10/19 due to infection, CKD (baseline Cr 2.5-3.5, on list for kidney transplant), and IDDM (A1c 9.9), PAD, s/p  peripheral angiogram on 10/23/2023 with balloon angioplasty of PTA and DCB of R SFA and PTA of R PT and R AT via R groin and R pedal access, anemia recently received 2u pRBCS, pt was dc from hospital on 10/24/23 and now returns to hospital as directed by his podiatrist Dr Benson for TMA which will be done on 10/27/23 as pt has non healing wound and gangrene of the right foot.       #gangrene of the right foot.   - s/p multiple post operative amputation, revisions   - continue monitoring     # PAD   - vascular for BKA   - BKA moved to thursday    #PAD, s/p angioplasty  -c/w asa and plavix    #anemia  -s/p transfusion  -monitor    #HTN  -low sodium diet  -monitor bp  -cont meds from home nifedipine and cozaar    #DM  -ada diet  -monitor BS  -cont meds from home basal 20u daily  and lispro 8 qac  -insulin coverage prn eleavted blood sugar    #CKD  -creatinine stable at 2.9 today    #anxiety disorder  -xanax prn    #DVT prophylaxis: sq heparin  #Diet: consistent carb and DASH  #Activity: IAT    Pending: BKA Thursday with vascular team

## 2023-11-07 NOTE — PROGRESS NOTE ADULT - SUBJECTIVE AND OBJECTIVE BOX
VASCULAR SURGERY PROGRESS NOTE    CC: non-healing right TMA      Events of past 24 hours: seen and examined.           ROS otherwise negative except per subjective and HPI      PAST MEDICAL & SURGICAL HISTORY:  DM (diabetes mellitus)      HTN (hypertension)      HLD (hyperlipidemia)      Herpes labialis      Anxiety      GERD (gastroesophageal reflux disease)      Kidney stones  20 years ago      Kidney disease  stage 4      Chronic kidney disease, unspecified CKD stage      Diabetic Charcot foot      PAD (peripheral artery disease)      S/P foot surgery, right  x 5 ( 2006 - 2013 )      Kidney stone  Removed by Laser x 2 ( 20 years ago )      H/O arthroscopy of right knee      Status post peripheral artery angioplasty          Vital Signs Last 24 Hrs  T(C): 36.3 (07 Nov 2023 09:08), Max: 36.7 (07 Nov 2023 06:07)  T(F): 97.3 (07 Nov 2023 09:08), Max: 98.1 (07 Nov 2023 06:07)  HR: 79 (07 Nov 2023 09:08) (68 - 79)  BP: 128/84 (07 Nov 2023 09:08) (128/84 - 168/81)  BP(mean): 78 (07 Nov 2023 08:59) (78 - 78)  RR: 18 (07 Nov 2023 09:08) (18 - 18)  SpO2: 98% (07 Nov 2023 08:59) (98% - 98%)        Pain (0-10):            Pain Control Adequate: [] YES [] N    Diet: regular    I&O's Detail    PHYSICAL EXAM    Appearance: Normal	  HEENT:   Normal oral mucosa, PERRL, EOMI	  Neck: Supple, - JVD;  Cardiovascular: Normal S1 S2, No JVD, No murmurs,   Respiratory: Lungs clear to auscultation/No Rales, Rhonchi, Wheezing	  Gastrointestinal:  Soft, Non-tender, + BS	  Skin: No rashes, No ecchymoses, No cyanosis  Extremities: Normal range of motion, No clubbing, cyanosis or edema  Right foot: s/p TMA, necrotic and infected. Distal amputation site: Wound fibrogranular/necrotic (35% fibrous, 15% granular, 50% necrotic). Metatarsal stumps 1-5 visible in wound bed;  Neurologic: Non-focal  Psychiatry: A & O x 3, Mood & affect appropriate    MEDICATIONS:   MEDICATIONS  (STANDING):  aspirin  chewable 81 milliGRAM(s) Oral daily  dextrose 50% Injectable 25 Gram(s) IV Push once  epoetin ban (PROCRIT) Injectable 2000 Unit(s) SubCutaneous every 7 days  glucagon  Injectable 1 milliGRAM(s) IntraMuscular once  heparin   Injectable 5000 Unit(s) SubCutaneous every 8 hours  insulin glargine Injectable (LANTUS) 25 Unit(s) SubCutaneous every morning  insulin lispro (ADMELOG) corrective regimen sliding scale   SubCutaneous three times a day before meals  insulin lispro Injectable (ADMELOG) 8 Unit(s) SubCutaneous before breakfast  losartan 100 milliGRAM(s) Oral daily  magnesium oxide 400 milliGRAM(s) Oral three times a day with meals  meropenem  IVPB 1000 milliGRAM(s) IV Intermittent every 12 hours  NIFEdipine XL 90 milliGRAM(s) Oral daily  rosuvastatin 20 milliGRAM(s) Oral at bedtime  sodium bicarbonate 650 milliGRAM(s) Oral three times a day    MEDICATIONS  (PRN):  ALPRAZolam 0.5 milliGRAM(s) Oral four times a day PRN anxiety  cloNIDine 0.1 milliGRAM(s) Oral every 8 hours PRN sbp>150  dextrose Oral Gel 15 Gram(s) Oral once PRN Blood Glucose LESS THAN 70 milliGRAM(s)/deciliter  melatonin 3 milliGRAM(s) Oral at bedtime PRN Insomnia  oxycodone    5 mG/acetaminophen 325 mG 2 Tablet(s) Oral every 4 hours PRN Moderate Pain (4 - 6)  oxyCODONE    IR 2.5 milliGRAM(s) Oral every 4 hours PRN Severe Pain (7 - 10)      DVT PROPHYLAXIS: [x] YES [] NO  GI PROPHYLAXIS: [] YES [x] NO  ANTIPLATELETS: [] YES [x] NO  ANTICOAGULATION: [] YES [x] NO  ANTIBIOTICS: [] YES [] NO    LAB/STUDIES:                        9.9    8.55  )-----------( 470      ( 07 Nov 2023 08:35 )             30.4     11-07    139  |  100  |  36<H>  ----------------------------<  236<H>  5.0   |  26  |  2.7<H>    Ca    9.7      07 Nov 2023 08:35  Phos  4.3     11-06  Mg     2.2     11-07    TPro  7.0  /  Alb  3.8  /  TBili  0.3  /  DBili  x   /  AST  15  /  ALT  18  /  AlkPhos  186<H>  11-07    PT/INR - ( 07 Nov 2023 08:35 )   PT: 11.40 sec;   INR: 1.00 ratio         PTT - ( 06 Nov 2023 22:30 )  PTT:36.0 sec  LIVER FUNCTIONS - ( 07 Nov 2023 08:35 )  Alb: 3.8 g/dL / Pro: 7.0 g/dL / ALK PHOS: 186 U/L / ALT: 18 U/L / AST: 15 U/L / GGT: x             Urinalysis Basic - ( 07 Nov 2023 08:35 )    Color: x / Appearance: x / SG: x / pH: x  Gluc: 236 mg/dL / Ketone: x  / Bili: x / Urobili: x   Blood: x / Protein: x / Nitrite: x   Leuk Esterase: x / RBC: x / WBC x   Sq Epi: x / Non Sq Epi: x / Bacteria: x                    IMAGING:

## 2023-11-07 NOTE — PROGRESS NOTE ADULT - SUBJECTIVE AND OBJECTIVE BOX
Podiatry Progress Note    Subjective:  MARIOLA CURRY is a  58y Male.   Seen bedside.   Patient is a 58y old  Male who presents with a chief complaint of DFU (02 Nov 2023 09:55)      Past Medical History and Surgical History  PAST MEDICAL & SURGICAL HISTORY:  DM (diabetes mellitus)      HTN (hypertension)      HLD (hyperlipidemia)      Herpes labialis      Anxiety      GERD (gastroesophageal reflux disease)      Kidney stones  20 years ago      Kidney disease  stage 4      Chronic kidney disease, unspecified CKD stage      Diabetic Charcot foot      PAD (peripheral artery disease)      S/P foot surgery, right  x 5 ( 2006 - 2013 )      Kidney stone  Removed by Laser x 2 ( 20 years ago )      H/O arthroscopy of right knee      Status post peripheral artery angioplasty           Objective:  Vital Signs Last 24 Hrs  T(C): 36.3 (07 Nov 2023 09:08), Max: 36.7 (07 Nov 2023 06:07)  T(F): 97.3 (07 Nov 2023 09:08), Max: 98.1 (07 Nov 2023 06:07)  HR: 79 (07 Nov 2023 09:08) (68 - 79)  BP: 128/84 (07 Nov 2023 09:08) (128/61 - 168/81)  BP(mean): 78 (07 Nov 2023 08:59) (78 - 78)  RR: 18 (07 Nov 2023 09:08) (18 - 18)  SpO2: 98% (07 Nov 2023 08:59) (98% - 98%)    Parameters below as of 06 Nov 2023 11:36  Patient On (Oxygen Delivery Method): room air                            9.9    8.55  )-----------( 470      ( 07 Nov 2023 08:35 )             30.4                 11-07    139  |  100  |  36<H>  ----------------------------<  236<H>  5.0   |  26  |  2.7<H>    Ca    9.7      07 Nov 2023 08:35  Phos  4.3     11-06  Mg     2.2     11-07    TPro  7.0  /  Alb  3.8  /  TBili  0.3  /  DBili  x   /  AST  15  /  ALT  18  /  AlkPhos  186<H>  11-07        PHYSICAL EXAM  GEN: MARIOLA CURRY is a pleasant well-nourished, well developed 58y Male in no acute distress, alert awake, and oriented to person, place and time.   LE Focused:  S/P revision of TMA with versajet debridement. NO active bleeding noted upon removal of dressing    Patient originally scheduled for BKA but that is now scheduled for 11/9  Dressing changed today and patient is aware of the schedule change  No further plans for podiatric debridement

## 2023-11-07 NOTE — PROGRESS NOTE ADULT - SUBJECTIVE AND OBJECTIVE BOX
24H events:    Patient is a 58y old Male who presents with a chief complaint of DFU (02 Nov 2023 09:55)    Primary diagnosis of Gangrene of foot    Today is hospital day 12d. This morning patient was seen and examined at bedside, resting comfortably in bed.    No acute or major events overnight.    PAST MEDICAL & SURGICAL HISTORY  DM (diabetes mellitus)    HTN (hypertension)    HLD (hyperlipidemia)    Herpes labialis    Anxiety    GERD (gastroesophageal reflux disease)    Kidney stones  20 years ago    Kidney disease  stage 4    Chronic kidney disease, unspecified CKD stage    Diabetic Charcot foot    PAD (peripheral artery disease)    S/P foot surgery, right  x 5 ( 2006 - 2013 )    Kidney stone  Removed by Laser x 2 ( 20 years ago )    H/O arthroscopy of right knee    Status post peripheral artery angioplasty      SOCIAL HISTORY:  Social History:      ALLERGIES:  No Known Allergies    MEDICATIONS:  STANDING MEDICATIONS  aspirin  chewable 81 milliGRAM(s) Oral daily  dextrose 50% Injectable 25 Gram(s) IV Push once  epoetin ban (PROCRIT) Injectable 2000 Unit(s) SubCutaneous every 7 days  glucagon  Injectable 1 milliGRAM(s) IntraMuscular once  heparin   Injectable 5000 Unit(s) SubCutaneous every 8 hours  insulin glargine Injectable (LANTUS) 25 Unit(s) SubCutaneous every morning  insulin lispro (ADMELOG) corrective regimen sliding scale   SubCutaneous three times a day before meals  insulin lispro Injectable (ADMELOG) 8 Unit(s) SubCutaneous before breakfast  losartan 100 milliGRAM(s) Oral daily  magnesium oxide 400 milliGRAM(s) Oral three times a day with meals  meropenem  IVPB 1000 milliGRAM(s) IV Intermittent every 12 hours  NIFEdipine XL 90 milliGRAM(s) Oral daily  rosuvastatin 20 milliGRAM(s) Oral at bedtime  sodium bicarbonate 650 milliGRAM(s) Oral three times a day    PRN MEDICATIONS  ALPRAZolam 0.5 milliGRAM(s) Oral four times a day PRN  cloNIDine 0.1 milliGRAM(s) Oral every 8 hours PRN  dextrose Oral Gel 15 Gram(s) Oral once PRN  melatonin 3 milliGRAM(s) Oral at bedtime PRN  oxycodone    5 mG/acetaminophen 325 mG 2 Tablet(s) Oral every 4 hours PRN  oxyCODONE    IR 2.5 milliGRAM(s) Oral every 4 hours PRN    VITALS:   T(F): 97.3  HR: 79  BP: 128/84  RR: 18  SpO2: 98%    PHYSICAL EXAM:  GENERAL:   ( x ) NAD, lying in bed comfortably     (  ) obtunded     (  ) lethargic     (  ) somnolent    HEAD:   ( x ) Atraumatic     (  ) hematoma     (  ) laceration (specify location:       )     NECK:  (  ) Supple     (  ) neck stiffness     (  ) nuchal rigidity     (  )  no JVD     (  ) JVD present ( -- cm)    HEART:  Rate -->     ( x ) normal rate     (  ) bradycardic     (  ) tachycardic  Rhythm -->     ( x ) regular     (  ) regularly irregular     (  ) irregularly irregular  Murmurs -->     ( x ) normal s1s2     (  ) systolic murmur     (  ) diastolic murmur     (  ) continuous murmur      (  ) S3 present     (  ) S4 present    LUNGS:   ( x )Unlabored respirations     (  ) tachypnea  (x  ) B/L air entry     (  ) decreased breath sounds in:  (location     )    (  ) no adventitious sound     (  ) crackles     (  ) wheezing      (  ) rhonchi      (specify location:       )  (  ) chest wall tenderness (specify location:       )    ABDOMEN:   ( x ) Soft     (  ) tense   |   ( x ) nondistended     (  ) distended   |   (  ) +BS     (  ) hypoactive bowel sounds     (  ) hyperactive bowel sounds  (  ) nontender     (  ) RUQ tenderness     (  ) RLQ tenderness     (  ) LLQ tenderness     (  ) epigastric tenderness     (  ) diffuse tenderness  (  ) Splenomegaly      (  ) Hepatomegaly      (  ) Jaundice     (  ) ecchymosis     EXTREMITIES:  (  ) Normal     (  ) Rash     (  ) ecchymosis     (  ) varicose veins      (  ) pitting edema     (  ) non-pitting edema   (  ) ulceration     (  ) gangrene:     (location:     )    NERVOUS SYSTEM:  left lower extremity wrapped  ( x ) A&Ox3     (  ) confused     (  ) lethargic  CN II-XII:     (  ) Intact     (  ) deficits found     (Specify:     )   Upper extremities:     (  ) no sensorimotor deficits     (  ) weakness     (  ) loss of proprioception/vibration     (  ) loss of touch/temperature (specify:    )  Lower extremities:     (  ) no sensorimotor deficits     (  ) weakness     (  ) loss of proprioception/vibration     (  ) loss of touch/temperature (specify:    )        (  ) Indwelling [place] Catheter:   Date insterted:    Reason (  ) Critical illness     (  ) urinary retention    (  ) Accurate Ins/Outs Monitoring     (  ) CMO patient    (  ) Central Line:   Date inserted:  Location: (  ) Right IJ     (  ) Left IJ     (  ) Right Fem     (  ) Left Fem    (  ) SPC        (  ) pigtail       (  ) PEG tube       (  ) colostomy       (  ) jejunostomy  (  ) U-Dall    LABS:                        9.9    8.55  )-----------( 470      ( 07 Nov 2023 08:35 )             30.4     11-07    139  |  100  |  36<H>  ----------------------------<  236<H>  5.0   |  26  |  2.7<H>    Ca    9.7      07 Nov 2023 08:35  Phos  4.3     11-06  Mg     2.2     11-07    TPro  7.0  /  Alb  3.8  /  TBili  0.3  /  DBili  x   /  AST  15  /  ALT  18  /  AlkPhos  186<H>  11-07    PT/INR - ( 07 Nov 2023 08:35 )   PT: 11.40 sec;   INR: 1.00 ratio         PTT - ( 06 Nov 2023 22:30 )  PTT:36.0 sec  Urinalysis Basic - ( 07 Nov 2023 08:35 )    Color: x / Appearance: x / SG: x / pH: x  Gluc: 236 mg/dL / Ketone: x  / Bili: x / Urobili: x   Blood: x / Protein: x / Nitrite: x   Leuk Esterase: x / RBC: x / WBC x   Sq Epi: x / Non Sq Epi: x / Bacteria: x                RADIOLOGY:

## 2023-11-08 LAB
ALBUMIN SERPL ELPH-MCNC: 3 G/DL — LOW (ref 3.5–5.2)
ALBUMIN SERPL ELPH-MCNC: 3 G/DL — LOW (ref 3.5–5.2)
ALBUMIN SERPL ELPH-MCNC: 3.9 G/DL — SIGNIFICANT CHANGE UP (ref 3.5–5.2)
ALBUMIN SERPL ELPH-MCNC: 3.9 G/DL — SIGNIFICANT CHANGE UP (ref 3.5–5.2)
ALP SERPL-CCNC: 161 U/L — HIGH (ref 30–115)
ALP SERPL-CCNC: 161 U/L — HIGH (ref 30–115)
ALP SERPL-CCNC: 193 U/L — HIGH (ref 30–115)
ALP SERPL-CCNC: 193 U/L — HIGH (ref 30–115)
ALT FLD-CCNC: 15 U/L — SIGNIFICANT CHANGE UP (ref 0–41)
ALT FLD-CCNC: 15 U/L — SIGNIFICANT CHANGE UP (ref 0–41)
ALT FLD-CCNC: 17 U/L — SIGNIFICANT CHANGE UP (ref 0–41)
ALT FLD-CCNC: 17 U/L — SIGNIFICANT CHANGE UP (ref 0–41)
ANION GAP SERPL CALC-SCNC: 13 MMOL/L — SIGNIFICANT CHANGE UP (ref 7–14)
ANION GAP SERPL CALC-SCNC: 13 MMOL/L — SIGNIFICANT CHANGE UP (ref 7–14)
ANION GAP SERPL CALC-SCNC: 8 MMOL/L — SIGNIFICANT CHANGE UP (ref 7–14)
ANION GAP SERPL CALC-SCNC: 8 MMOL/L — SIGNIFICANT CHANGE UP (ref 7–14)
APTT BLD: 35.9 SEC — SIGNIFICANT CHANGE UP (ref 27–39.2)
APTT BLD: 35.9 SEC — SIGNIFICANT CHANGE UP (ref 27–39.2)
AST SERPL-CCNC: 12 U/L — SIGNIFICANT CHANGE UP (ref 0–41)
AST SERPL-CCNC: 12 U/L — SIGNIFICANT CHANGE UP (ref 0–41)
AST SERPL-CCNC: 15 U/L — SIGNIFICANT CHANGE UP (ref 0–41)
AST SERPL-CCNC: 15 U/L — SIGNIFICANT CHANGE UP (ref 0–41)
BASOPHILS # BLD AUTO: 0.05 K/UL — SIGNIFICANT CHANGE UP (ref 0–0.2)
BASOPHILS # BLD AUTO: 0.05 K/UL — SIGNIFICANT CHANGE UP (ref 0–0.2)
BASOPHILS NFR BLD AUTO: 0.7 % — SIGNIFICANT CHANGE UP (ref 0–1)
BASOPHILS NFR BLD AUTO: 0.7 % — SIGNIFICANT CHANGE UP (ref 0–1)
BILIRUB SERPL-MCNC: 0.2 MG/DL — SIGNIFICANT CHANGE UP (ref 0.2–1.2)
BILIRUB SERPL-MCNC: 0.2 MG/DL — SIGNIFICANT CHANGE UP (ref 0.2–1.2)
BILIRUB SERPL-MCNC: 0.3 MG/DL — SIGNIFICANT CHANGE UP (ref 0.2–1.2)
BILIRUB SERPL-MCNC: 0.3 MG/DL — SIGNIFICANT CHANGE UP (ref 0.2–1.2)
BUN SERPL-MCNC: 37 MG/DL — HIGH (ref 10–20)
CALCIUM SERPL-MCNC: 8.5 MG/DL — SIGNIFICANT CHANGE UP (ref 8.4–10.5)
CALCIUM SERPL-MCNC: 8.5 MG/DL — SIGNIFICANT CHANGE UP (ref 8.4–10.5)
CALCIUM SERPL-MCNC: 9.6 MG/DL — SIGNIFICANT CHANGE UP (ref 8.4–10.4)
CALCIUM SERPL-MCNC: 9.6 MG/DL — SIGNIFICANT CHANGE UP (ref 8.4–10.4)
CHLORIDE SERPL-SCNC: 100 MMOL/L — SIGNIFICANT CHANGE UP (ref 98–110)
CO2 SERPL-SCNC: 27 MMOL/L — SIGNIFICANT CHANGE UP (ref 17–32)
CO2 SERPL-SCNC: 27 MMOL/L — SIGNIFICANT CHANGE UP (ref 17–32)
CO2 SERPL-SCNC: 28 MMOL/L — SIGNIFICANT CHANGE UP (ref 17–32)
CO2 SERPL-SCNC: 28 MMOL/L — SIGNIFICANT CHANGE UP (ref 17–32)
CREAT SERPL-MCNC: 2.6 MG/DL — HIGH (ref 0.7–1.5)
CREAT SERPL-MCNC: 2.6 MG/DL — HIGH (ref 0.7–1.5)
CREAT SERPL-MCNC: 2.7 MG/DL — HIGH (ref 0.7–1.5)
CREAT SERPL-MCNC: 2.7 MG/DL — HIGH (ref 0.7–1.5)
EGFR: 26 ML/MIN/1.73M2 — LOW
EGFR: 26 ML/MIN/1.73M2 — LOW
EGFR: 28 ML/MIN/1.73M2 — LOW
EGFR: 28 ML/MIN/1.73M2 — LOW
EOSINOPHIL # BLD AUTO: 0.29 K/UL — SIGNIFICANT CHANGE UP (ref 0–0.7)
EOSINOPHIL # BLD AUTO: 0.29 K/UL — SIGNIFICANT CHANGE UP (ref 0–0.7)
EOSINOPHIL NFR BLD AUTO: 4.2 % — SIGNIFICANT CHANGE UP (ref 0–8)
EOSINOPHIL NFR BLD AUTO: 4.2 % — SIGNIFICANT CHANGE UP (ref 0–8)
GLUCOSE BLDC GLUCOMTR-MCNC: 175 MG/DL — HIGH (ref 70–99)
GLUCOSE BLDC GLUCOMTR-MCNC: 175 MG/DL — HIGH (ref 70–99)
GLUCOSE BLDC GLUCOMTR-MCNC: 319 MG/DL — HIGH (ref 70–99)
GLUCOSE BLDC GLUCOMTR-MCNC: 319 MG/DL — HIGH (ref 70–99)
GLUCOSE BLDC GLUCOMTR-MCNC: 348 MG/DL — HIGH (ref 70–99)
GLUCOSE BLDC GLUCOMTR-MCNC: 348 MG/DL — HIGH (ref 70–99)
GLUCOSE BLDC GLUCOMTR-MCNC: 77 MG/DL — SIGNIFICANT CHANGE UP (ref 70–99)
GLUCOSE BLDC GLUCOMTR-MCNC: 77 MG/DL — SIGNIFICANT CHANGE UP (ref 70–99)
GLUCOSE SERPL-MCNC: 171 MG/DL — HIGH (ref 70–99)
GLUCOSE SERPL-MCNC: 171 MG/DL — HIGH (ref 70–99)
GLUCOSE SERPL-MCNC: 340 MG/DL — HIGH (ref 70–99)
GLUCOSE SERPL-MCNC: 340 MG/DL — HIGH (ref 70–99)
HCT VFR BLD CALC: 26.6 % — LOW (ref 42–52)
HCT VFR BLD CALC: 26.6 % — LOW (ref 42–52)
HCT VFR BLD CALC: 31 % — LOW (ref 42–52)
HCT VFR BLD CALC: 31 % — LOW (ref 42–52)
HGB BLD-MCNC: 8.4 G/DL — LOW (ref 14–18)
HGB BLD-MCNC: 8.4 G/DL — LOW (ref 14–18)
HGB BLD-MCNC: 9.8 G/DL — LOW (ref 14–18)
HGB BLD-MCNC: 9.8 G/DL — LOW (ref 14–18)
IMM GRANULOCYTES NFR BLD AUTO: 0.3 % — SIGNIFICANT CHANGE UP (ref 0.1–0.3)
IMM GRANULOCYTES NFR BLD AUTO: 0.3 % — SIGNIFICANT CHANGE UP (ref 0.1–0.3)
INR BLD: 0.97 RATIO — SIGNIFICANT CHANGE UP (ref 0.65–1.3)
INR BLD: 0.97 RATIO — SIGNIFICANT CHANGE UP (ref 0.65–1.3)
LYMPHOCYTES # BLD AUTO: 1.79 K/UL — SIGNIFICANT CHANGE UP (ref 1.2–3.4)
LYMPHOCYTES # BLD AUTO: 1.79 K/UL — SIGNIFICANT CHANGE UP (ref 1.2–3.4)
LYMPHOCYTES # BLD AUTO: 25.9 % — SIGNIFICANT CHANGE UP (ref 20.5–51.1)
LYMPHOCYTES # BLD AUTO: 25.9 % — SIGNIFICANT CHANGE UP (ref 20.5–51.1)
MAGNESIUM SERPL-MCNC: 2.2 MG/DL — SIGNIFICANT CHANGE UP (ref 1.8–2.4)
MAGNESIUM SERPL-MCNC: 2.2 MG/DL — SIGNIFICANT CHANGE UP (ref 1.8–2.4)
MCHC RBC-ENTMCNC: 26.6 PG — LOW (ref 27–31)
MCHC RBC-ENTMCNC: 26.6 PG — LOW (ref 27–31)
MCHC RBC-ENTMCNC: 27.1 PG — SIGNIFICANT CHANGE UP (ref 27–31)
MCHC RBC-ENTMCNC: 27.1 PG — SIGNIFICANT CHANGE UP (ref 27–31)
MCHC RBC-ENTMCNC: 31.6 G/DL — LOW (ref 32–37)
MCV RBC AUTO: 84.2 FL — SIGNIFICANT CHANGE UP (ref 80–94)
MCV RBC AUTO: 84.2 FL — SIGNIFICANT CHANGE UP (ref 80–94)
MCV RBC AUTO: 85.8 FL — SIGNIFICANT CHANGE UP (ref 80–94)
MCV RBC AUTO: 85.8 FL — SIGNIFICANT CHANGE UP (ref 80–94)
MONOCYTES # BLD AUTO: 0.64 K/UL — HIGH (ref 0.1–0.6)
MONOCYTES # BLD AUTO: 0.64 K/UL — HIGH (ref 0.1–0.6)
MONOCYTES NFR BLD AUTO: 9.3 % — SIGNIFICANT CHANGE UP (ref 1.7–9.3)
MONOCYTES NFR BLD AUTO: 9.3 % — SIGNIFICANT CHANGE UP (ref 1.7–9.3)
NEUTROPHILS # BLD AUTO: 4.11 K/UL — SIGNIFICANT CHANGE UP (ref 1.4–6.5)
NEUTROPHILS # BLD AUTO: 4.11 K/UL — SIGNIFICANT CHANGE UP (ref 1.4–6.5)
NEUTROPHILS NFR BLD AUTO: 59.6 % — SIGNIFICANT CHANGE UP (ref 42.2–75.2)
NEUTROPHILS NFR BLD AUTO: 59.6 % — SIGNIFICANT CHANGE UP (ref 42.2–75.2)
NRBC # BLD: 0 /100 WBCS — SIGNIFICANT CHANGE UP (ref 0–0)
PHOSPHATE SERPL-MCNC: 4.1 MG/DL — SIGNIFICANT CHANGE UP (ref 2.1–4.9)
PHOSPHATE SERPL-MCNC: 4.1 MG/DL — SIGNIFICANT CHANGE UP (ref 2.1–4.9)
PLATELET # BLD AUTO: 460 K/UL — HIGH (ref 130–400)
PLATELET # BLD AUTO: 460 K/UL — HIGH (ref 130–400)
PLATELET # BLD AUTO: 532 K/UL — HIGH (ref 130–400)
PLATELET # BLD AUTO: 532 K/UL — HIGH (ref 130–400)
PMV BLD: 8.6 FL — SIGNIFICANT CHANGE UP (ref 7.4–10.4)
POTASSIUM SERPL-MCNC: 4.8 MMOL/L — SIGNIFICANT CHANGE UP (ref 3.5–5)
POTASSIUM SERPL-MCNC: 4.8 MMOL/L — SIGNIFICANT CHANGE UP (ref 3.5–5)
POTASSIUM SERPL-MCNC: 4.9 MMOL/L — SIGNIFICANT CHANGE UP (ref 3.5–5)
POTASSIUM SERPL-MCNC: 4.9 MMOL/L — SIGNIFICANT CHANGE UP (ref 3.5–5)
POTASSIUM SERPL-SCNC: 4.8 MMOL/L — SIGNIFICANT CHANGE UP (ref 3.5–5)
POTASSIUM SERPL-SCNC: 4.8 MMOL/L — SIGNIFICANT CHANGE UP (ref 3.5–5)
POTASSIUM SERPL-SCNC: 4.9 MMOL/L — SIGNIFICANT CHANGE UP (ref 3.5–5)
POTASSIUM SERPL-SCNC: 4.9 MMOL/L — SIGNIFICANT CHANGE UP (ref 3.5–5)
PROT SERPL-MCNC: 5.9 G/DL — LOW (ref 6–8)
PROT SERPL-MCNC: 5.9 G/DL — LOW (ref 6–8)
PROT SERPL-MCNC: 7.3 G/DL — SIGNIFICANT CHANGE UP (ref 6–8)
PROT SERPL-MCNC: 7.3 G/DL — SIGNIFICANT CHANGE UP (ref 6–8)
PROTHROM AB SERPL-ACNC: 11 SEC — SIGNIFICANT CHANGE UP (ref 9.95–12.87)
PROTHROM AB SERPL-ACNC: 11 SEC — SIGNIFICANT CHANGE UP (ref 9.95–12.87)
RBC # BLD: 3.1 M/UL — LOW (ref 4.7–6.1)
RBC # BLD: 3.1 M/UL — LOW (ref 4.7–6.1)
RBC # BLD: 3.68 M/UL — LOW (ref 4.7–6.1)
RBC # BLD: 3.68 M/UL — LOW (ref 4.7–6.1)
RBC # FLD: 16.9 % — HIGH (ref 11.5–14.5)
RBC # FLD: 16.9 % — HIGH (ref 11.5–14.5)
RBC # FLD: 17.1 % — HIGH (ref 11.5–14.5)
RBC # FLD: 17.1 % — HIGH (ref 11.5–14.5)
SODIUM SERPL-SCNC: 136 MMOL/L — SIGNIFICANT CHANGE UP (ref 135–146)
SODIUM SERPL-SCNC: 136 MMOL/L — SIGNIFICANT CHANGE UP (ref 135–146)
SODIUM SERPL-SCNC: 140 MMOL/L — SIGNIFICANT CHANGE UP (ref 135–146)
SODIUM SERPL-SCNC: 140 MMOL/L — SIGNIFICANT CHANGE UP (ref 135–146)
WBC # BLD: 6.9 K/UL — SIGNIFICANT CHANGE UP (ref 4.8–10.8)
WBC # BLD: 6.9 K/UL — SIGNIFICANT CHANGE UP (ref 4.8–10.8)
WBC # BLD: 8.78 K/UL — SIGNIFICANT CHANGE UP (ref 4.8–10.8)
WBC # BLD: 8.78 K/UL — SIGNIFICANT CHANGE UP (ref 4.8–10.8)
WBC # FLD AUTO: 6.9 K/UL — SIGNIFICANT CHANGE UP (ref 4.8–10.8)
WBC # FLD AUTO: 6.9 K/UL — SIGNIFICANT CHANGE UP (ref 4.8–10.8)
WBC # FLD AUTO: 8.78 K/UL — SIGNIFICANT CHANGE UP (ref 4.8–10.8)
WBC # FLD AUTO: 8.78 K/UL — SIGNIFICANT CHANGE UP (ref 4.8–10.8)

## 2023-11-08 RX ORDER — INSULIN LISPRO 100/ML
9 VIAL (ML) SUBCUTANEOUS
Refills: 0 | Status: DISCONTINUED | OUTPATIENT
Start: 2023-11-08 | End: 2023-11-09

## 2023-11-08 RX ORDER — INSULIN GLARGINE 100 [IU]/ML
27 INJECTION, SOLUTION SUBCUTANEOUS EVERY MORNING
Refills: 0 | Status: DISCONTINUED | OUTPATIENT
Start: 2023-11-09 | End: 2023-11-09

## 2023-11-08 RX ORDER — INSULIN LISPRO 100/ML
8 VIAL (ML) SUBCUTANEOUS ONCE
Refills: 0 | Status: COMPLETED | OUTPATIENT
Start: 2023-11-08 | End: 2023-11-08

## 2023-11-08 RX ORDER — LACTOBACILLUS ACIDOPHILUS 100MM CELL
1 CAPSULE ORAL
Refills: 0 | Status: DISCONTINUED | OUTPATIENT
Start: 2023-11-08 | End: 2023-11-09

## 2023-11-08 RX ADMIN — Medication 0.5 MILLIGRAM(S): at 05:25

## 2023-11-08 RX ADMIN — OXYCODONE HYDROCHLORIDE 2.5 MILLIGRAM(S): 5 TABLET ORAL at 20:17

## 2023-11-08 RX ADMIN — MEROPENEM 100 MILLIGRAM(S): 1 INJECTION INTRAVENOUS at 17:03

## 2023-11-08 RX ADMIN — Medication 650 MILLIGRAM(S): at 11:21

## 2023-11-08 RX ADMIN — Medication 90 MILLIGRAM(S): at 05:26

## 2023-11-08 RX ADMIN — MAGNESIUM OXIDE 400 MG ORAL TABLET 400 MILLIGRAM(S): 241.3 TABLET ORAL at 08:10

## 2023-11-08 RX ADMIN — Medication 10: at 08:05

## 2023-11-08 RX ADMIN — Medication 81 MILLIGRAM(S): at 11:09

## 2023-11-08 RX ADMIN — Medication 0.5 MILLIGRAM(S): at 22:04

## 2023-11-08 RX ADMIN — Medication 1 TABLET(S): at 11:23

## 2023-11-08 RX ADMIN — Medication 0.5 MILLIGRAM(S): at 10:59

## 2023-11-08 RX ADMIN — MAGNESIUM OXIDE 400 MG ORAL TABLET 400 MILLIGRAM(S): 241.3 TABLET ORAL at 11:24

## 2023-11-08 RX ADMIN — Medication 2: at 17:04

## 2023-11-08 RX ADMIN — Medication 8 UNIT(S): at 08:05

## 2023-11-08 RX ADMIN — Medication 650 MILLIGRAM(S): at 05:26

## 2023-11-08 RX ADMIN — ROSUVASTATIN CALCIUM 20 MILLIGRAM(S): 5 TABLET ORAL at 22:05

## 2023-11-08 RX ADMIN — INSULIN GLARGINE 25 UNIT(S): 100 INJECTION, SOLUTION SUBCUTANEOUS at 08:10

## 2023-11-08 RX ADMIN — HEPARIN SODIUM 5000 UNIT(S): 5000 INJECTION INTRAVENOUS; SUBCUTANEOUS at 05:25

## 2023-11-08 RX ADMIN — MAGNESIUM OXIDE 400 MG ORAL TABLET 400 MILLIGRAM(S): 241.3 TABLET ORAL at 17:03

## 2023-11-08 RX ADMIN — Medication 8 UNIT(S): at 22:20

## 2023-11-08 RX ADMIN — LOSARTAN POTASSIUM 100 MILLIGRAM(S): 100 TABLET, FILM COATED ORAL at 05:26

## 2023-11-08 RX ADMIN — Medication 1 TABLET(S): at 17:03

## 2023-11-08 RX ADMIN — Medication 650 MILLIGRAM(S): at 22:03

## 2023-11-08 RX ADMIN — HEPARIN SODIUM 5000 UNIT(S): 5000 INJECTION INTRAVENOUS; SUBCUTANEOUS at 11:24

## 2023-11-08 RX ADMIN — Medication 0.5 MILLIGRAM(S): at 17:02

## 2023-11-08 RX ADMIN — MEROPENEM 100 MILLIGRAM(S): 1 INJECTION INTRAVENOUS at 08:10

## 2023-11-08 NOTE — PROGRESS NOTE ADULT - ASSESSMENT
57 YO WM with IDDM, CKD4, PAD, Charcot Foot Deformity, underwent R MTA 10/27 with revision and debridement, un healing wound,  noted to have aa occ and trans ferred N for R BKA 11/9.    R Foot unhealing DFU with gangrene  R TMA 10/27, revision 10/30, debridement 11/4  severe PAD  Anemia  Hx of HTN, ASHD  Hx of DLD  Hx of IDDM, d neuropathy, R Charcot Foot Deformity, R DFU with gancrene  Hx of CKD 4, nephrolithiasis, on renal transplant list  Hx of Ch Anemia  Hx of OA, DJD, mobility dysfunction  Hx of anxiety, depression    pt underwent R TMA with several debridements and cont unhealing wound  Podiatry ff  BKA rescheduled for 11/9, NB pt has prior Cardio clearance from S/ DR Kathleen  pt has occ of dorsal and metatarsal aa,in context of PAD prior balloon-plasty  pt has + wound pseudomonas and yeast  cont Meropenem  cont wound changes as per podiatry  cont all meds  att to bowel regimen  add lactobacillus q AC  incentive spirometry  OOB to chair  elevation of R leg  monitor renal parameters, electrolytes and H/H

## 2023-11-08 NOTE — CHART NOTE - NSCHARTNOTEFT_GEN_A_CORE
Vascular Surgery Pre-op Note    Patient is a 58y old  Male who presents with a chief complaint of R Charcot foot Deformity with gangrenous ulcer for R TMA, IDDM, CKD 4, PAD (08 Nov 2023 00:34)      Procedure: right BKA  Surgeon: Dr. Machado    Vitals/Labs:  Vital Signs Last 24 Hrs  T(C): 36.3 (08 Nov 2023 07:48), Max: 36.6 (08 Nov 2023 00:43)  T(F): 97.3 (08 Nov 2023 07:48), Max: 97.9 (08 Nov 2023 00:43)  HR: 78 (08 Nov 2023 00:43) (71 - 78)  BP: 179/87 (08 Nov 2023 06:48) (136/65 - 179/87)  BP(mean): --  RR: 18 (08 Nov 2023 00:43) (18 - 18)  SpO2: --        I&O's Detail    07 Nov 2023 07:01  -  08 Nov 2023 07:00  --------------------------------------------------------  IN:  Total IN: 0 mL    OUT:    Voided (mL): 400 mL  Total OUT: 400 mL    Total NET: -400 mL                                8.4    6.90  )-----------( 460      ( 08 Nov 2023 06:43 )             26.6       11-08    136  |  100  |  37<H>  ----------------------------<  340<H>  4.8   |  28  |  2.7<H>    Ca    8.5      08 Nov 2023 06:43  Phos  4.1     11-08  Mg     2.2     11-08    TPro  5.9<L>  /  Alb  3.0<L>  /  TBili  0.2  /  DBili  x   /  AST  12  /  ALT  15  /  AlkPhos  161<H>  11-08      CAPILLARY BLOOD GLUCOSE      POCT Blood Glucose.: 348 mg/dL (08 Nov 2023 06:50)  POCT Blood Glucose.: 229 mg/dL (07 Nov 2023 21:41)  POCT Blood Glucose.: 147 mg/dL (07 Nov 2023 16:13)  POCT Blood Glucose.: 124 mg/dL (07 Nov 2023 11:05)      LIVER FUNCTIONS - ( 08 Nov 2023 06:43 )  Alb: 3.0 g/dL / Pro: 5.9 g/dL / ALK PHOS: 161 U/L / ALT: 15 U/L / AST: 12 U/L / GGT: x             PT/INR - ( 07 Nov 2023 08:35 )   PT: 11.40 sec;   INR: 1.00 ratio         PTT - ( 06 Nov 2023 22:30 )  PTT:36.0 sec    Urinalysis Basic - ( 08 Nov 2023 06:43 )    Color: x / Appearance: x / SG: x / pH: x  Gluc: 340 mg/dL / Ketone: x  / Bili: x / Urobili: x   Blood: x / Protein: x / Nitrite: x   Leuk Esterase: x / RBC: x / WBC x   Sq Epi: x / Non Sq Epi: x / Bacteria: x        T&S: 11/7      Imaging:   Chest X RAY:  Chest 1 View AP/PA (10.18.23 @ 16:32) >    No evidence of acute cardiopulmonary disease.        EKG: 10/27    Assessment & Plan:  MARIOLA CURRY 58y Male    - NPO after midnight  - IVF while NPO  - Pain Control  - Inputs and outputs  - DVT ppx  - 2 u pRBC on hold to OR     MEDICATIONS  (STANDING):  aspirin  chewable 81 milliGRAM(s) Oral daily  dextrose 50% Injectable 25 Gram(s) IV Push once  epoetin ban (PROCRIT) Injectable 2000 Unit(s) SubCutaneous every 7 days  glucagon  Injectable 1 milliGRAM(s) IntraMuscular once  heparin   Injectable 5000 Unit(s) SubCutaneous every 8 hours  insulin glargine Injectable (LANTUS) 25 Unit(s) SubCutaneous every morning  insulin lispro (ADMELOG) corrective regimen sliding scale   SubCutaneous three times a day before meals  insulin lispro Injectable (ADMELOG) 8 Unit(s) SubCutaneous before breakfast  losartan 100 milliGRAM(s) Oral daily  magnesium oxide 400 milliGRAM(s) Oral three times a day with meals  meropenem  IVPB 1000 milliGRAM(s) IV Intermittent every 12 hours  NIFEdipine XL 90 milliGRAM(s) Oral daily  rosuvastatin 20 milliGRAM(s) Oral at bedtime  sodium bicarbonate 650 milliGRAM(s) Oral three times a day    MEDICATIONS  (PRN):  ALPRAZolam 0.5 milliGRAM(s) Oral four times a day PRN anxiety  cloNIDine 0.1 milliGRAM(s) Oral every 8 hours PRN sbp>150  dextrose Oral Gel 15 Gram(s) Oral once PRN Blood Glucose LESS THAN 70 milliGRAM(s)/deciliter  melatonin 3 milliGRAM(s) Oral at bedtime PRN Insomnia  oxycodone    5 mG/acetaminophen 325 mG 2 Tablet(s) Oral every 4 hours PRN Moderate Pain (4 - 6)  oxyCODONE    IR 2.5 milliGRAM(s) Oral every 4 hours PRN Severe Pain (7 - 10)

## 2023-11-08 NOTE — PROGRESS NOTE ADULT - ASSESSMENT
8yoM with Charcot foot s/p placement of an external fixator on 9/28/23 which had to be removed on 10/19 due to infection, CKD (baseline Cr 2.5-3.5, on list for kidney transplant), and IDDM (A1c 9.9), severe PAD, presented for right foot infection    ID is following for antibiotic management  Requested by Dr. Benson for TMA due to non-healing R foot wound  Afebrile, no leukocytosis  BCx 10/26 NGTD  Podiatry: right foot gangrene for at right forefoot  S/p 10/27 transmetatarsal amputation, found to have occluded dorsal and plantar metatarsal arteries  Wound Cx grew Pseudomonas (cefepime JACOB 16, S to FQs and meropenem)  S/p 10/30 revision of TMA, as per Dr. Pink, will likely need serial debridement  Wound Cx grew Pseudomonas and Yeast    Antibiotics:  Vancomycin 10/26 - 11/2; vancomycin level 39.8 (11/1 7:33am), possibly a peak (dose was given at 11/1 7:44am)  Cefepime 10/26 ->      IMPRESSION:  Right foot gangrene  PAD  CKD  DM  Therapeutic drug monitoring    RECOMMENDATIONS:  - Planned for BKA tomorrow  - Continue IV meropenem 1gm q12hrs (CrCl ~34) until 48hrs post-op, then can D/C abx  - Follow up path and wound Cx  - Vascular and Podiatry follow up  - PT  - Offloading and frequent position changes, aspiration precaution  - Trend WBC, fever curve, transaminases, creatinine daily  - Will continue to follow        Silvana Snell D.O.  Attending Physician  Division of Infectious Diseases  [facility]  Please contact me via [employer] Teams

## 2023-11-08 NOTE — PROGRESS NOTE ADULT - SUBJECTIVE AND OBJECTIVE BOX
MARIOLA CURRY 58y Male ad to [facility] for unhealing gangrenous R foot ulcer in context of R Charcot Foot Deformity  and PAD for TMA with Podiatry/R THAK.  The R TMA took place on 10/27, revision on 10/30, further debridement on 11/4.   The C&S grew pseudomonas and yeast.  The pt has severe PAD with occ of dorsal and metatarsal aa.  The pt is o Meropenem IV.  [place] is [place] for [place]KA.  The surgery was to take place 11/7 and now is rescheduled to  THURS 11/9.    PMHx :  HTN, ASHD, DLD, PAD, sp balloon angioplasty 10/23/23,  IDDM, CKD 4, (2.5-3.5), nephrolithiasis, on renal transplant list, Chr Anemia, sp prior PRBCs, GERD, anxiety, depression    INTERVAL HPI/OVERNIGHT EVENTS: pt stable, podiatry ff, R BKA rescheduled to THURS 11/9, cont Meropenem IV    MEDICATIONS  (STANDING):  aspirin  chewable 81 milliGRAM(s) Oral daily  dextrose 50% Injectable 25 Gram(s) IV Push once  epoetin ban (PROCRIT) Injectable 2000 Unit(s) SubCutaneous every 7 days  glucagon  Injectable 1 milliGRAM(s) IntraMuscular once  heparin   Injectable 5000 Unit(s) SubCutaneous every 8 hours  insulin glargine Injectable (LANTUS) 25 Unit(s) SubCutaneous every morning  insulin lispro (ADMELOG) corrective regimen sliding scale   SubCutaneous three times a day before meals  insulin lispro Injectable (ADMELOG) 8 Unit(s) SubCutaneous before breakfast  losartan 100 milliGRAM(s) Oral daily  magnesium oxide 400 milliGRAM(s) Oral three times a day with meals  meropenem  IVPB 1000 milliGRAM(s) IV Intermittent every 12 hours  NIFEdipine XL 90 milliGRAM(s) Oral daily  rosuvastatin 20 milliGRAM(s) Oral at bedtime  sodium bicarbonate 650 milliGRAM(s) Oral three times a day    MEDICATIONS  (PRN):  ALPRAZolam 0.5 milliGRAM(s) Oral four times a day PRN anxiety  cloNIDine 0.1 milliGRAM(s) Oral every 8 hours PRN sbp>150  dextrose Oral Gel 15 Gram(s) Oral once PRN Blood Glucose LESS THAN 70 milliGRAM(s)/deciliter  melatonin 3 milliGRAM(s) Oral at bedtime PRN Insomnia  oxycodone    5 mG/acetaminophen 325 mG 2 Tablet(s) Oral every 4 hours PRN Moderate Pain (4 - 6)  oxyCODONE    IR 2.5 milliGRAM(s) Oral every 4 hours PRN Severe Pain (7 - 10)      Allergies    No Known Allergies	    Vital Signs Last 24 Hrs  T(C): 36.2 (07 Nov 2023 15:53), Max: 36.7 (07 Nov 2023 06:07)  T(F): 97.2 (07 Nov 2023 15:53), Max: 98.1 (07 Nov 2023 06:07)  HR: 71 (07 Nov 2023 15:53) (68 - 79)  BP: 136/65 (07 Nov 2023 15:53) (128/84 - 168/81)  BP(mean): 78 (07 Nov 2023 08:59) (78 - 78)  RR: 18 (07 Nov 2023 15:53) (18 - 18)  SpO2: 98% (07 Nov 2023 08:59) (98% - 98%)        PHYSICAL EXAM:      Constitutional: A&O, chr ill looking but in NAD    Eyes: nonicteric    ENMT: dry oral mucosa, dental defects    Neck: supple, no JVD/bruit/stridor    Respiratory: shallow resp, no wheezing/crackles/rales    Cardiovascular: S1S2 reg    Gastrointestinal: globose, soft and benign, + BS, no organomegaly    Genitourinary: no urbano    Extremities: moves all limbs, R mid foot bandage,    Vascular:    Neurological: nonfocal    Skin:    Lymph Nodes: not enlarged    Psychiatric: stable        LABS:                        9.9    8.55  )-----------( 470      ( 07 Nov 2023 08:35 )             30.4     11-07    139  |  100  |  36<H>  ----------------------------<  236<H>  5.0   |  26  |  2.7<H>    Ca    9.7      07 Nov 2023 08:35  Phos  4.3     11-06  Mg     2.2     11-07    TPro  7.0  /  Alb  3.8  /  TBili  0.3  /  DBili  x   /  AST  15  /  ALT  18  /  AlkPhos  186<H>  11-07    PT/INR - ( 07 Nov 2023 08:35 )   PT: 11.40 sec;   INR: 1.00 ratio         PTT - ( 06 Nov 2023 22:30 )  PTT:36.0 sec  Urinalysis Basic - ( 07 Nov 2023 08:35 )    Color: x / Appearance: x / SG: x / pH: x  Gluc: 236 mg/dL / Ketone: x  / Bili: x / Urobili: x   Blood: x / Protein: x / Nitrite: x   Leuk Esterase: x / RBC: x / WBC x   Sq Epi: x / Non Sq Epi: x / Bacteria: x        RADIOLOGY & ADDITIONAL TESTS:

## 2023-11-08 NOTE — PROGRESS NOTE ADULT - SUBJECTIVE AND OBJECTIVE BOX
INFECTIOUS DISEASE FOLLOW UP NOTE:    Interval History/ROS: Patient is a 58y old  Male who presents with a chief complaint of R Charcot foot Deformity with gangrenous ulcer for R TMA, IDDM, CKD 4, PAD (08 Nov 2023 00:34)      Overnight events:    REVIEW OF SYSTEMS:        Prior hospital charts reviewed [Yes]  Primary team notes reviewed [Yes]  Other consultant notes reviewed [Yes]    Allergies:  No Known Allergies      ANTIMICROBIALS:   meropenem  IVPB 1000 every 12 hours      OTHER MEDS: MEDICATIONS  (STANDING):  ALPRAZolam 0.5 four times a day PRN  aspirin  chewable 81 daily  cloNIDine 0.1 every 8 hours PRN  dextrose 50% Injectable 25 once  dextrose Oral Gel 15 once PRN  epoetin ban (PROCRIT) Injectable 2000 every 7 days  glucagon  Injectable 1 once  heparin   Injectable 5000 every 8 hours  insulin glargine Injectable (LANTUS) 25 every morning  insulin lispro (ADMELOG) corrective regimen sliding scale  three times a day before meals  insulin lispro Injectable (ADMELOG) 8 before breakfast  losartan 100 daily  melatonin 3 at bedtime PRN  NIFEdipine XL 90 daily  oxycodone    5 mG/acetaminophen 325 mG 2 every 4 hours PRN  oxyCODONE    IR 2.5 every 4 hours PRN  rosuvastatin 20 at bedtime      Vital Signs Last 24 Hrs  T(F): 97.3 (11-08-23 @ 07:48), Max: 98.6 (11-05-23 @ 04:40)    Vital Signs Last 24 Hrs  HR: 78 (11-08-23 @ 00:43) (71 - 78)  BP: 179/87 (11-08-23 @ 06:48) (136/65 - 179/87)  RR: 18 (11-08-23 @ 00:43)  SpO2: --  Wt(kg): --    EXAM:      Labs:                        8.4    6.90  )-----------( 460      ( 08 Nov 2023 06:43 )             26.6     11-08    136  |  100  |  37<H>  ----------------------------<  340<H>  4.8   |  28  |  2.7<H>    Ca    8.5      08 Nov 2023 06:43  Phos  4.1     11-08  Mg     2.2     11-08    TPro  5.9<L>  /  Alb  3.0<L>  /  TBili  0.2  /  DBili  x   /  AST  12  /  ALT  15  /  AlkPhos  161<H>  11-08      WBC Trend:  WBC Count: 6.90 (11-08-23 @ 06:43)  WBC Count: 8.55 (11-07-23 @ 08:35)  WBC Count: 7.03 (11-06-23 @ 22:30)  WBC Count: 8.52 (11-06-23 @ 04:30)      Creatine Trend:  Creatinine: 2.7 (11-08)  Creatinine: 2.7 (11-07)  Creatinine: 3.2 (11-06)  Creatinine: 3.0 (11-06)      Liver Biochemical Testing Trend:  Alanine Aminotransferase (ALT/SGPT): 15 (11-08)  Alanine Aminotransferase (ALT/SGPT): 18 (11-07)  Alanine Aminotransferase (ALT/SGPT): 13 (11-06)  Alanine Aminotransferase (ALT/SGPT): 15 (11-06)  Alanine Aminotransferase (ALT/SGPT): 15 (11-05)  Aspartate Aminotransferase (AST/SGOT): 12 (11-08-23 @ 06:43)  Aspartate Aminotransferase (AST/SGOT): 15 (11-07-23 @ 08:35)  Aspartate Aminotransferase (AST/SGOT): 11 (11-06-23 @ 22:30)  Aspartate Aminotransferase (AST/SGOT): 12 (11-06-23 @ 04:30)  Aspartate Aminotransferase (AST/SGOT): 14 (11-05-23 @ 10:00)  Bilirubin Total: 0.2 (11-08)  Bilirubin Total: 0.3 (11-07)  Bilirubin Total: 0.2 (11-06)  Bilirubin Total: <0.2 (11-06)  Bilirubin Total: 0.2 (11-05)      Trend LDH      Urinalysis Basic - ( 08 Nov 2023 06:43 )    Color: x / Appearance: x / SG: x / pH: x  Gluc: 340 mg/dL / Ketone: x  / Bili: x / Urobili: x   Blood: x / Protein: x / Nitrite: x   Leuk Esterase: x / RBC: x / WBC x   Sq Epi: x / Non Sq Epi: x / Bacteria: x        MICROBIOLOGY:  Vancomycin Level, Random: 39.8 (11-01 @ 07:33)        Culture - Other (collected 30 Oct 2023 21:34)  Source: .Other None  Final Report:    Numerous Pseudomonas aeruginosa    Numerous Candida parapsilosis "Susceptibilities not performed"  Organism: Pseudomonas aeruginosa  Organism: Pseudomonas aeruginosa    Sensitivities:      Method Type: JACOB      -  Amikacin: S <=16      -  Aztreonam: I 16      -  Cefepime: I 16      -  Ceftazidime: R >16      -  Ciprofloxacin: S <=0.25      -  Gentamicin: S <=2      -  Imipenem: S 2      -  Levofloxacin: S 1      -  Meropenem: S <=1      -  Piperacillin/Tazobactam: I 64      -  Tobramycin: S <=2    Culture - Other (collected 27 Oct 2023 14:50)  Source: .Other None  Final Report:    Rare Pseudomonas aeruginosa    Normal skin lotus isolated  Organism: Pseudomonas aeruginosa  Organism: Pseudomonas aeruginosa    Sensitivities:      Method Type: JACOB      -  Amikacin: S <=16      -  Aztreonam: I 16      -  Cefepime: I 16      -  Ceftazidime: R >16      -  Ciprofloxacin: S 0.5      -  Gentamicin: S <=2      -  Imipenem: S 2      -  Levofloxacin: S <=0.5      -  Meropenem: S <=1      -  Piperacillin/Tazobactam: I 64      -  Tobramycin: S <=2    Culture - Blood (collected 26 Oct 2023 16:15)  Source: .Blood Blood  Final Report:    No growth at 5 days    Culture - Blood (collected 26 Oct 2023 16:15)  Source: .Blood Blood  Final Report:    No growth at 5 days    Culture - Blood (collected 23 Oct 2023 06:35)  Source: .Blood Blood  Final Report:    No growth at 5 days    Culture - Blood (collected 23 Oct 2023 06:35)  Source: .Blood Blood  Final Report:    No growth at 5 days    Culture - Blood (collected 18 Oct 2023 17:07)  Source: .Blood Blood  Final Report:    No growth at 5 days    Culture - Blood (collected 18 Oct 2023 17:07)  Source: .Blood Blood  Final Report:    No growth at 5 days    Culture - Tissue with Gram Stain (collected 28 Sep 2023 09:40)  Source: .Tissue None  Final Report:    No growth at 5 days    Culture - Urine (collected 07 Sep 2023 06:27)  Source: Clean Catch Clean Catch (Midstream)  Final Report:    <10,000 CFU/mL Normal Urogenital Lotus      RADIOLOGY:  imaging below personally reviewed   INFECTIOUS DISEASE FOLLOW UP NOTE:    Interval History/ROS: Patient is a 58y old  Male who presents with a chief complaint of R Charcot foot Deformity with gangrenous ulcer for R TMA, IDDM, CKD 4, PAD (08 Nov 2023 00:34)      Overnight events: No overnight event. Feels well today, participating in PT    REVIEW OF SYSTEMS:  CONSTITUTIONAL: No fever or chills  HEAD: No lesion on scalp  EYES: No visual disturbance  ENT: No sore throat  RESPIRATORY: No cough, no shortness of breath  CARDIOVASCULAR: No chest pain or palpitations  GASTROINTESTINAL: No abdominal or epigastric pain  GENITOURINARY: No dysuria  NEUROLOGICAL: No headache/dizziness  MUSCULOSKELETAL: No joint pain, erythema, or swelling; no back pain; S/p right TMA  SKIN: No itching, rashes  All other ROS negative except noted above        Prior hospital charts reviewed [Yes]  Primary team notes reviewed [Yes]  Other consultant notes reviewed [Yes]    Allergies:  No Known Allergies      ANTIMICROBIALS:   meropenem  IVPB 1000 every 12 hours      OTHER MEDS: MEDICATIONS  (STANDING):  ALPRAZolam 0.5 four times a day PRN  aspirin  chewable 81 daily  cloNIDine 0.1 every 8 hours PRN  dextrose 50% Injectable 25 once  dextrose Oral Gel 15 once PRN  epoetin ban (PROCRIT) Injectable 2000 every 7 days  glucagon  Injectable 1 once  heparin   Injectable 5000 every 8 hours  insulin glargine Injectable (LANTUS) 25 every morning  insulin lispro (ADMELOG) corrective regimen sliding scale  three times a day before meals  insulin lispro Injectable (ADMELOG) 8 before breakfast  losartan 100 daily  melatonin 3 at bedtime PRN  NIFEdipine XL 90 daily  oxycodone    5 mG/acetaminophen 325 mG 2 every 4 hours PRN  oxyCODONE    IR 2.5 every 4 hours PRN  rosuvastatin 20 at bedtime      Vital Signs Last 24 Hrs  T(F): 97.3 (11-08-23 @ 07:48), Max: 98.6 (11-05-23 @ 04:40)    Vital Signs Last 24 Hrs  HR: 78 (11-08-23 @ 00:43) (71 - 78)  BP: 179/87 (11-08-23 @ 06:48) (136/65 - 179/87)  RR: 18 (11-08-23 @ 00:43)  SpO2: --  Wt(kg): --    EXAM:  GENERAL: NAD, lying in bed  HEAD: No head lesions  EYES: Conjunctiva pink and cornea white  EAR, NOSE, MOUTH, THROAT: Normal external ears and nose, no discharges; moist mucous membranes  NECK: Supple, nontender to palpation; no JVD  RESPIRATORY: Clear to auscultation bilaterally  CARDIOVASCULAR: S1 S2  GASTROINTESTINAL: Soft, nontender, nondistended; normoactive bowel sounds  EXTREMITIES: No clubbing, cyanosis, or petal edema  NERVOUS SYSTEM: Alert and oriented to person, time, place and situation, speech clear. No focal deficits   MUSCULOSKELETAL: + Right foot wrapped with ACE, reports no pain.   SKIN: No rashes or lesions, no superficial thrombophlebitis  PSYCH: Normal affect    Labs:                        8.4    6.90  )-----------( 460      ( 08 Nov 2023 06:43 )             26.6     11-08    136  |  100  |  37<H>  ----------------------------<  340<H>  4.8   |  28  |  2.7<H>    Ca    8.5      08 Nov 2023 06:43  Phos  4.1     11-08  Mg     2.2     11-08    TPro  5.9<L>  /  Alb  3.0<L>  /  TBili  0.2  /  DBili  x   /  AST  12  /  ALT  15  /  AlkPhos  161<H>  11-08      WBC Trend:  WBC Count: 6.90 (11-08-23 @ 06:43)  WBC Count: 8.55 (11-07-23 @ 08:35)  WBC Count: 7.03 (11-06-23 @ 22:30)  WBC Count: 8.52 (11-06-23 @ 04:30)      Creatine Trend:  Creatinine: 2.7 (11-08)  Creatinine: 2.7 (11-07)  Creatinine: 3.2 (11-06)  Creatinine: 3.0 (11-06)      Liver Biochemical Testing Trend:  Alanine Aminotransferase (ALT/SGPT): 15 (11-08)  Alanine Aminotransferase (ALT/SGPT): 18 (11-07)  Alanine Aminotransferase (ALT/SGPT): 13 (11-06)  Alanine Aminotransferase (ALT/SGPT): 15 (11-06)  Alanine Aminotransferase (ALT/SGPT): 15 (11-05)  Aspartate Aminotransferase (AST/SGOT): 12 (11-08-23 @ 06:43)  Aspartate Aminotransferase (AST/SGOT): 15 (11-07-23 @ 08:35)  Aspartate Aminotransferase (AST/SGOT): 11 (11-06-23 @ 22:30)  Aspartate Aminotransferase (AST/SGOT): 12 (11-06-23 @ 04:30)  Aspartate Aminotransferase (AST/SGOT): 14 (11-05-23 @ 10:00)  Bilirubin Total: 0.2 (11-08)  Bilirubin Total: 0.3 (11-07)  Bilirubin Total: 0.2 (11-06)  Bilirubin Total: <0.2 (11-06)  Bilirubin Total: 0.2 (11-05)      Trend LDH      Urinalysis Basic - ( 08 Nov 2023 06:43 )    Color: x / Appearance: x / SG: x / pH: x  Gluc: 340 mg/dL / Ketone: x  / Bili: x / Urobili: x   Blood: x / Protein: x / Nitrite: x   Leuk Esterase: x / RBC: x / WBC x   Sq Epi: x / Non Sq Epi: x / Bacteria: x        MICROBIOLOGY:  Vancomycin Level, Random: 39.8 (11-01 @ 07:33)        Culture - Other (collected 30 Oct 2023 21:34)  Source: .Other None  Final Report:    Numerous Pseudomonas aeruginosa    Numerous Candida parapsilosis "Susceptibilities not performed"  Organism: Pseudomonas aeruginosa  Organism: Pseudomonas aeruginosa    Sensitivities:      Method Type: JACOB      -  Amikacin: S <=16      -  Aztreonam: I 16      -  Cefepime: I 16      -  Ceftazidime: R >16      -  Ciprofloxacin: S <=0.25      -  Gentamicin: S <=2      -  Imipenem: S 2      -  Levofloxacin: S 1      -  Meropenem: S <=1      -  Piperacillin/Tazobactam: I 64      -  Tobramycin: S <=2    Culture - Other (collected 27 Oct 2023 14:50)  Source: .Other None  Final Report:    Rare Pseudomonas aeruginosa    Normal skin lotus isolated  Organism: Pseudomonas aeruginosa  Organism: Pseudomonas aeruginosa    Sensitivities:      Method Type: JACOB      -  Amikacin: S <=16      -  Aztreonam: I 16      -  Cefepime: I 16      -  Ceftazidime: R >16      -  Ciprofloxacin: S 0.5      -  Gentamicin: S <=2      -  Imipenem: S 2      -  Levofloxacin: S <=0.5      -  Meropenem: S <=1      -  Piperacillin/Tazobactam: I 64      -  Tobramycin: S <=2    Culture - Blood (collected 26 Oct 2023 16:15)  Source: .Blood Blood  Final Report:    No growth at 5 days    Culture - Blood (collected 26 Oct 2023 16:15)  Source: .Blood Blood  Final Report:    No growth at 5 days    Culture - Blood (collected 23 Oct 2023 06:35)  Source: .Blood Blood  Final Report:    No growth at 5 days    Culture - Blood (collected 23 Oct 2023 06:35)  Source: .Blood Blood  Final Report:    No growth at 5 days    Culture - Blood (collected 18 Oct 2023 17:07)  Source: .Blood Blood  Final Report:    No growth at 5 days    Culture - Blood (collected 18 Oct 2023 17:07)  Source: .Blood Blood  Final Report:    No growth at 5 days    Culture - Tissue with Gram Stain (collected 28 Sep 2023 09:40)  Source: .Tissue None  Final Report:    No growth at 5 days    Culture - Urine (collected 07 Sep 2023 06:27)  Source: Clean Catch Clean Catch (Midstream)  Final Report:    <10,000 CFU/mL Normal Urogenital Lotus      RADIOLOGY:  imaging below personally reviewed

## 2023-11-09 ENCOUNTER — TRANSCRIPTION ENCOUNTER (OUTPATIENT)
Age: 58
End: 2023-11-09

## 2023-11-09 ENCOUNTER — RESULT REVIEW (OUTPATIENT)
Age: 58
End: 2023-11-09

## 2023-11-09 LAB
GLUCOSE BLDC GLUCOMTR-MCNC: 114 MG/DL — HIGH (ref 70–99)
GLUCOSE BLDC GLUCOMTR-MCNC: 114 MG/DL — HIGH (ref 70–99)
GLUCOSE BLDC GLUCOMTR-MCNC: 125 MG/DL — HIGH (ref 70–99)
GLUCOSE BLDC GLUCOMTR-MCNC: 125 MG/DL — HIGH (ref 70–99)
GLUCOSE BLDC GLUCOMTR-MCNC: 181 MG/DL — HIGH (ref 70–99)
GLUCOSE BLDC GLUCOMTR-MCNC: 181 MG/DL — HIGH (ref 70–99)
GLUCOSE BLDC GLUCOMTR-MCNC: 243 MG/DL — HIGH (ref 70–99)
GLUCOSE BLDC GLUCOMTR-MCNC: 243 MG/DL — HIGH (ref 70–99)

## 2023-11-09 PROCEDURE — 88311 DECALCIFY TISSUE: CPT | Mod: 26

## 2023-11-09 PROCEDURE — 27880 AMPUTATION OF LOWER LEG: CPT | Mod: GC

## 2023-11-09 PROCEDURE — 88307 TISSUE EXAM BY PATHOLOGIST: CPT | Mod: 26

## 2023-11-09 RX ORDER — HYDROMORPHONE HYDROCHLORIDE 2 MG/ML
1 INJECTION INTRAMUSCULAR; INTRAVENOUS; SUBCUTANEOUS EVERY 4 HOURS
Refills: 0 | Status: DISCONTINUED | OUTPATIENT
Start: 2023-11-09 | End: 2023-11-15

## 2023-11-09 RX ORDER — MEROPENEM 1 G/30ML
1000 INJECTION INTRAVENOUS EVERY 12 HOURS
Refills: 0 | Status: DISCONTINUED | OUTPATIENT
Start: 2023-11-09 | End: 2023-11-12

## 2023-11-09 RX ORDER — INSULIN LISPRO 100/ML
9 VIAL (ML) SUBCUTANEOUS
Refills: 0 | Status: DISCONTINUED | OUTPATIENT
Start: 2023-11-09 | End: 2023-11-12

## 2023-11-09 RX ORDER — MAGNESIUM OXIDE 400 MG ORAL TABLET 241.3 MG
400 TABLET ORAL
Refills: 0 | Status: DISCONTINUED | OUTPATIENT
Start: 2023-11-09 | End: 2023-11-15

## 2023-11-09 RX ORDER — SODIUM CHLORIDE 9 MG/ML
1000 INJECTION, SOLUTION INTRAVENOUS
Refills: 0 | Status: DISCONTINUED | OUTPATIENT
Start: 2023-11-09 | End: 2023-11-09

## 2023-11-09 RX ORDER — ASPIRIN/CALCIUM CARB/MAGNESIUM 324 MG
81 TABLET ORAL DAILY
Refills: 0 | Status: DISCONTINUED | OUTPATIENT
Start: 2023-11-09 | End: 2023-11-15

## 2023-11-09 RX ORDER — ONDANSETRON 8 MG/1
4 TABLET, FILM COATED ORAL ONCE
Refills: 0 | Status: DISCONTINUED | OUTPATIENT
Start: 2023-11-09 | End: 2023-11-09

## 2023-11-09 RX ORDER — OXYCODONE HYDROCHLORIDE 5 MG/1
2.5 TABLET ORAL EVERY 4 HOURS
Refills: 0 | Status: DISCONTINUED | OUTPATIENT
Start: 2023-11-09 | End: 2023-11-10

## 2023-11-09 RX ORDER — DEXTROSE 50 % IN WATER 50 %
15 SYRINGE (ML) INTRAVENOUS ONCE
Refills: 0 | Status: DISCONTINUED | OUTPATIENT
Start: 2023-11-09 | End: 2023-11-15

## 2023-11-09 RX ORDER — SODIUM BICARBONATE 1 MEQ/ML
650 SYRINGE (ML) INTRAVENOUS THREE TIMES A DAY
Refills: 0 | Status: DISCONTINUED | OUTPATIENT
Start: 2023-11-09 | End: 2023-11-15

## 2023-11-09 RX ORDER — HEPARIN SODIUM 5000 [USP'U]/ML
5000 INJECTION INTRAVENOUS; SUBCUTANEOUS EVERY 8 HOURS
Refills: 0 | Status: DISCONTINUED | OUTPATIENT
Start: 2023-11-09 | End: 2023-11-15

## 2023-11-09 RX ORDER — HYDROMORPHONE HYDROCHLORIDE 2 MG/ML
1 INJECTION INTRAMUSCULAR; INTRAVENOUS; SUBCUTANEOUS
Refills: 0 | Status: DISCONTINUED | OUTPATIENT
Start: 2023-11-09 | End: 2023-11-09

## 2023-11-09 RX ORDER — MEPERIDINE HYDROCHLORIDE 50 MG/ML
12.5 INJECTION INTRAMUSCULAR; INTRAVENOUS; SUBCUTANEOUS
Refills: 0 | Status: DISCONTINUED | OUTPATIENT
Start: 2023-11-09 | End: 2023-11-09

## 2023-11-09 RX ORDER — LOSARTAN POTASSIUM 100 MG/1
100 TABLET, FILM COATED ORAL DAILY
Refills: 0 | Status: DISCONTINUED | OUTPATIENT
Start: 2023-11-09 | End: 2023-11-15

## 2023-11-09 RX ORDER — HYDROMORPHONE HYDROCHLORIDE 2 MG/ML
2 INJECTION INTRAMUSCULAR; INTRAVENOUS; SUBCUTANEOUS
Refills: 0 | Status: DISCONTINUED | OUTPATIENT
Start: 2023-11-09 | End: 2023-11-09

## 2023-11-09 RX ORDER — NIFEDIPINE 30 MG
90 TABLET, EXTENDED RELEASE 24 HR ORAL DAILY
Refills: 0 | Status: DISCONTINUED | OUTPATIENT
Start: 2023-11-09 | End: 2023-11-11

## 2023-11-09 RX ORDER — INSULIN GLARGINE 100 [IU]/ML
27 INJECTION, SOLUTION SUBCUTANEOUS EVERY MORNING
Refills: 0 | Status: DISCONTINUED | OUTPATIENT
Start: 2023-11-09 | End: 2023-11-12

## 2023-11-09 RX ORDER — DEXTROSE 50 % IN WATER 50 %
25 SYRINGE (ML) INTRAVENOUS ONCE
Refills: 0 | Status: DISCONTINUED | OUTPATIENT
Start: 2023-11-09 | End: 2023-11-15

## 2023-11-09 RX ORDER — INSULIN LISPRO 100/ML
VIAL (ML) SUBCUTANEOUS
Refills: 0 | Status: DISCONTINUED | OUTPATIENT
Start: 2023-11-09 | End: 2023-11-15

## 2023-11-09 RX ORDER — LACTOBACILLUS ACIDOPHILUS 100MM CELL
1 CAPSULE ORAL
Refills: 0 | Status: DISCONTINUED | OUTPATIENT
Start: 2023-11-09 | End: 2023-11-15

## 2023-11-09 RX ORDER — HYDROMORPHONE HYDROCHLORIDE 2 MG/ML
30 INJECTION INTRAMUSCULAR; INTRAVENOUS; SUBCUTANEOUS
Refills: 0 | Status: DISCONTINUED | OUTPATIENT
Start: 2023-11-09 | End: 2023-11-10

## 2023-11-09 RX ORDER — ALPRAZOLAM 0.25 MG
0.5 TABLET ORAL
Refills: 0 | Status: DISCONTINUED | OUTPATIENT
Start: 2023-11-09 | End: 2023-11-15

## 2023-11-09 RX ORDER — GLUCAGON INJECTION, SOLUTION 0.5 MG/.1ML
1 INJECTION, SOLUTION SUBCUTANEOUS ONCE
Refills: 0 | Status: DISCONTINUED | OUTPATIENT
Start: 2023-11-09 | End: 2023-11-15

## 2023-11-09 RX ADMIN — HYDROMORPHONE HYDROCHLORIDE 1 MILLIGRAM(S): 2 INJECTION INTRAMUSCULAR; INTRAVENOUS; SUBCUTANEOUS at 09:55

## 2023-11-09 RX ADMIN — HYDROMORPHONE HYDROCHLORIDE 1 MILLIGRAM(S): 2 INJECTION INTRAMUSCULAR; INTRAVENOUS; SUBCUTANEOUS at 09:50

## 2023-11-09 RX ADMIN — OXYCODONE HYDROCHLORIDE 2.5 MILLIGRAM(S): 5 TABLET ORAL at 18:23

## 2023-11-09 RX ADMIN — HYDROMORPHONE HYDROCHLORIDE 1 MILLIGRAM(S): 2 INJECTION INTRAMUSCULAR; INTRAVENOUS; SUBCUTANEOUS at 09:40

## 2023-11-09 RX ADMIN — HYDROMORPHONE HYDROCHLORIDE 1 MILLIGRAM(S): 2 INJECTION INTRAMUSCULAR; INTRAVENOUS; SUBCUTANEOUS at 10:10

## 2023-11-09 RX ADMIN — Medication 90 MILLIGRAM(S): at 05:02

## 2023-11-09 RX ADMIN — MAGNESIUM OXIDE 400 MG ORAL TABLET 400 MILLIGRAM(S): 241.3 TABLET ORAL at 16:47

## 2023-11-09 RX ADMIN — Medication 0.5 MILLIGRAM(S): at 03:11

## 2023-11-09 RX ADMIN — Medication 650 MILLIGRAM(S): at 21:30

## 2023-11-09 RX ADMIN — Medication 650 MILLIGRAM(S): at 05:02

## 2023-11-09 RX ADMIN — Medication 0.5 MILLIGRAM(S): at 21:31

## 2023-11-09 RX ADMIN — OXYCODONE HYDROCHLORIDE 2.5 MILLIGRAM(S): 5 TABLET ORAL at 01:12

## 2023-11-09 RX ADMIN — MEROPENEM 100 MILLIGRAM(S): 1 INJECTION INTRAVENOUS at 17:52

## 2023-11-09 RX ADMIN — MEROPENEM 100 MILLIGRAM(S): 1 INJECTION INTRAVENOUS at 05:02

## 2023-11-09 RX ADMIN — HYDROMORPHONE HYDROCHLORIDE 30 MILLILITER(S): 2 INJECTION INTRAMUSCULAR; INTRAVENOUS; SUBCUTANEOUS at 10:30

## 2023-11-09 RX ADMIN — LOSARTAN POTASSIUM 100 MILLIGRAM(S): 100 TABLET, FILM COATED ORAL at 05:02

## 2023-11-09 RX ADMIN — HYDROMORPHONE HYDROCHLORIDE 1 MILLIGRAM(S): 2 INJECTION INTRAMUSCULAR; INTRAVENOUS; SUBCUTANEOUS at 09:35

## 2023-11-09 RX ADMIN — HYDROMORPHONE HYDROCHLORIDE 1 MILLIGRAM(S): 2 INJECTION INTRAMUSCULAR; INTRAVENOUS; SUBCUTANEOUS at 10:05

## 2023-11-09 RX ADMIN — HYDROMORPHONE HYDROCHLORIDE 1 MILLIGRAM(S): 2 INJECTION INTRAMUSCULAR; INTRAVENOUS; SUBCUTANEOUS at 10:00

## 2023-11-09 RX ADMIN — HYDROMORPHONE HYDROCHLORIDE 30 MILLILITER(S): 2 INJECTION INTRAMUSCULAR; INTRAVENOUS; SUBCUTANEOUS at 19:29

## 2023-11-09 RX ADMIN — HYDROMORPHONE HYDROCHLORIDE 1 MILLIGRAM(S): 2 INJECTION INTRAMUSCULAR; INTRAVENOUS; SUBCUTANEOUS at 09:47

## 2023-11-09 RX ADMIN — Medication 1 TABLET(S): at 16:47

## 2023-11-09 RX ADMIN — Medication 2: at 16:46

## 2023-11-09 NOTE — PROGRESS NOTE ADULT - SUBJECTIVE AND OBJECTIVE BOX
MARIOLA CURRY 58y Male ad to [facility] for unhealing gangrenous R foot ulcer in context of R Charcot Foot Deformity  and PAD for TMA with Podiatry/R NOELK.  The R TMA took place on 10/27, revision on 10/30, further debridement on 11/4.   The C&S grew pseudomonas and yeast.  The pt has severe PAD with occ of dorsal and metatarsal aa.  The pt is on Meropenem IV.  [place] for [place]KA on  [place] 11/9.    PMHx :  HTN, ASHD, DLD, PAD, sp balloon angioplasty 10/23/23,  IDDM, CKD 4, (2.5-3.5), nephrolithiasis, on renal transplant list, Chr Anemia, sp prior PRBCs, GERD, anxiety, depression    INTERVAL HPI/OVERNIGHT EVENTS: pt is sp RBKA with Vasc Dr Machado, check labs in the AM    MEDICATIONS  (STANDING):  aspirin  chewable 81 milliGRAM(s) Oral daily  dextrose 50% Injectable 25 Gram(s) IV Push once  epoetin ban (PROCRIT) Injectable 2000 Unit(s) SubCutaneous every 7 days  glucagon  Injectable 1 milliGRAM(s) IntraMuscular once  heparin   Injectable 5000 Unit(s) SubCutaneous every 8 hours  insulin glargine Injectable (LANTUS) 25 Unit(s) SubCutaneous every morning  insulin lispro (ADMELOG) corrective regimen sliding scale   SubCutaneous three times a day before meals  insulin lispro Injectable (ADMELOG) 8 Unit(s) SubCutaneous before breakfast  losartan 100 milliGRAM(s) Oral daily  magnesium oxide 400 milliGRAM(s) Oral three times a day with meals  meropenem  IVPB 1000 milliGRAM(s) IV Intermittent every 12 hours  NIFEdipine XL 90 milliGRAM(s) Oral daily  rosuvastatin 20 milliGRAM(s) Oral at bedtime  sodium bicarbonate 650 milliGRAM(s) Oral three times a day    MEDICATIONS  (PRN):  ALPRAZolam 0.5 milliGRAM(s) Oral four times a day PRN anxiety  cloNIDine 0.1 milliGRAM(s) Oral every 8 hours PRN sbp>150  dextrose Oral Gel 15 Gram(s) Oral once PRN Blood Glucose LESS THAN 70 milliGRAM(s)/deciliter  melatonin 3 milliGRAM(s) Oral at bedtime PRN Insomnia  oxycodone    5 mG/acetaminophen 325 mG 2 Tablet(s) Oral every 4 hours PRN Moderate Pain (4 - 6)  oxyCODONE    IR 2.5 milliGRAM(s) Oral every 4 hours PRN Severe Pain (7 - 10)      Allergies    No Known Allergies	    Vital Signs Last 24 Hrs    T(F): 98.4  HR:  65  BP: 140/69    RR: 18   SpO2: 96-98%        PHYSICAL EXAM:      Constitutional: stable post op today    Eyes: nonicteric    ENMT: dry oral mucosa, dental defects    Neck: supple, no JVD/bruit/stridor    Respiratory: shallow resp, no wheezing/crackles/rales    Cardiovascular: S1S2 reg    Gastrointestinal: globose, soft and benign, + BS, no organomegaly    Genitourinary: no urbano    Extremities: moves all limbs, R mid foot bandage,    Vascular:    Neurological: nonfocal    Skin:    Lymph Nodes: not enlarged    Psychiatric: stable        LABS:     11/8                   9.8   8.78 )-----------( 532             31     140  |  100  |  37  ----------------------------< 171  4.9   |  27  |  2.6  GFR 28  Ca    9.7        Phos  4.3     11-06  Mg     2.2     11-07  O+  TPro  7.0  /  Alb  3.8  /  TBili  0.3  /  DBili  x   /  AST  15  /  ALT  18  /  AlkPhos  186<H>  11-07    PT/INR - ( 07 Nov 2023 08:35 )   PT: 11.40 sec;   INR: 1.00 ratio         PTT - ( 06 Nov 2023 22:30 )  PTT:36.0 sec  Urinalysis Basic - ( 07 Nov 2023 08:35 )    Color: x / Appearance: x / SG: x / pH: x  Gluc: 236 mg/dL / Ketone: x  / Bili: x / Urobili: x   Blood: x / Protein: x / Nitrite: x   Leuk Esterase: x / RBC: x / WBC x   Sq Epi: x / Non Sq Epi: x / Bacteria: x        RADIOLOGY & ADDITIONAL TESTS:

## 2023-11-09 NOTE — CHART NOTE - NSCHARTNOTEFT_GEN_A_CORE
Vascular Surgery Post-Op Note,  SPECTRA [id number]    CC:  Pre-Op Dx: Hypomagnesemia    Gangrene of foot    Gangrene of foot      Procedure: Transmetatarsal amputation    Revision of transmetatarsal amputation of right foot    Amputation below knee      Surgeon: Dr. Machado    Subjective: seen and examined at bedrest. Currently pt is comfortable. Pain is 3/10    Vital Signs Last 24 Hrs  T(C): 36.5 (09 Nov 2023 09:32), Max: 36.9 (09 Nov 2023 00:41)  T(F): 97.7 (09 Nov 2023 09:32), Max: 98.4 (09 Nov 2023 00:41)  HR: 72 (09 Nov 2023 11:00) (58 - 81)  BP: 148/73 (09 Nov 2023 11:00) (113/63 - 177/83)  BP(mean): 78 (09 Nov 2023 07:20) (78 - 78)  RR: 21 (09 Nov 2023 11:00) (17 - 21)  SpO2: 98% (09 Nov 2023 11:00) (94% - 100%)    Parameters below as of 09 Nov 2023 11:00  Patient On (Oxygen Delivery Method): room air        Physical Exam:  General: NAD, resting comfortably in bed  Pulmonary: Nonlabored breathing, no respiratory distress  Cardiovascular: NSR  Abdominal: soft, NT/ND  Extremities: WWP, normal strength  Right BKA stump is in a knee immobilizer, dressing is clean  Skin: no hematoma, rash, ecchymosis  Neuro: A/O x 3, CNs II-XII grossly intact, normal motor/sensation, no focal deficits        LABS:                        9.8    8.78  )-----------( 532      ( 08 Nov 2023 17:05 )             31.0     11-08    140  |  100  |  37<H>  ----------------------------<  171<H>  4.9   |  27  |  2.6<H>    Ca    9.6      08 Nov 2023 17:05  Phos  4.1     11-08  Mg     2.2     11-08    TPro  7.3  /  Alb  3.9  /  TBili  0.3  /  DBili  x   /  AST  15  /  ALT  17  /  AlkPhos  193<H>  11-08    PT/INR - ( 08 Nov 2023 17:05 )   PT: 11.00 sec;   INR: 0.97 ratio         PTT - ( 08 Nov 2023 17:05 )  PTT:35.9 sec  CAPILLARY BLOOD GLUCOSE      POCT Blood Glucose.: 125 mg/dL (09 Nov 2023 10:22)  POCT Blood Glucose.: 114 mg/dL (09 Nov 2023 06:45)  POCT Blood Glucose.: 319 mg/dL (08 Nov 2023 21:11)  POCT Blood Glucose.: 175 mg/dL (08 Nov 2023 16:21)    LIVER FUNCTIONS - ( 08 Nov 2023 17:05 )  Alb: 3.9 g/dL / Pro: 7.3 g/dL / ALK PHOS: 193 U/L / ALT: 17 U/L / AST: 15 U/L / GGT: x           Urinalysis Basic - ( 08 Nov 2023 17:05 )    Color: x / Appearance: x / SG: x / pH: x  Gluc: 171 mg/dL / Ketone: x  / Bili: x / Urobili: x   Blood: x / Protein: x / Nitrite: x   Leuk Esterase: x / RBC: x / WBC x   Sq Epi: x / Non Sq Epi: x / Bacteria: x        Radiology and Additional Studies:    Assessment:58y Male s/p above procedure    Plan:  Pain/nausea control PRN  Home meds resumed  Incentive spirometer  Bed rest  Vitals per protocol  IVF, Diet: advance to regular  AM labs

## 2023-11-09 NOTE — BRIEF OPERATIVE NOTE - NSICDXBRIEFPREOP_GEN_ALL_CORE_FT
PRE-OP DIAGNOSIS:  Gangrene of foot 27-Oct-2023 15:08:31 Right Tyesha Jorgensen  

## 2023-11-09 NOTE — PRE-ANESTHESIA EVALUATION ADULT - NSANTHASARD_GEN_ALL_CORE
-- DO NOT REPLY / DO NOT REPLY ALL --  -- Message is from the [facility]--    Patient is requesting a medication refill - medication is on active medication list    Patient is currently OUT of the requested medication.    Was Medication Pended?  Yes.    Rx Name and Dose: albutorol 108 (90 base)    Duration: 90 days    Pharmacy  [id number] - [place], [place] - [address] [facility]    Patient confirmed the above pharmacy as correct?  Yes    Does this request need an existing or new prescription at a pharmacy to be sent to a new pharmacy location?   No    Caller Information       Type Contact Phone    01/27/2022 11:01 AM CST Phone (Incoming) King Hatchett, Dawn (Self) [phone] (M)          Alternative phone number: none    Turnaround time given to caller:   \"This message will be sent to [state Provider's name]. The clinical team will fulfill your request as soon as they review your message.\"  
3

## 2023-11-09 NOTE — BRIEF OPERATIVE NOTE - NSICDXBRIEFPROCEDURE_GEN_ALL_CORE_FT
PROCEDURES:  Revision of transmetatarsal amputation of right foot 30-Oct-2023 22:05:57  Dulce Ramsey  
PROCEDURES:  Transmetatarsal amputation 27-Oct-2023 15:08:20 Tyesha Obando  
PROCEDURES:  Amputation below knee 09-Nov-2023 09:34:31  Tyrone Mathis  
PROCEDURES:  Revision of transmetatarsal amputation of right foot 30-Oct-2023 22:05:57  Dulce Ramsey

## 2023-11-09 NOTE — PRE-ANESTHESIA EVALUATION ADULT - NSANTHRISKNONERD_GEN_ALL_CORE
1
No risk alerts present

## 2023-11-09 NOTE — PROGRESS NOTE ADULT - SUBJECTIVE AND OBJECTIVE BOX
24H events:    Patient is a 58y old Male who presents with a chief complaint of Right foot infection (08 Nov 2023 09:27)    Primary diagnosis of Gangrene of foot    Today is hospital day 14d. This morning patient was seen and examined at bedside, resting comfortably in bed.    No acute or major events overnight.      PAST MEDICAL & SURGICAL HISTORY  DM (diabetes mellitus)    HTN (hypertension)    HLD (hyperlipidemia)    Herpes labialis    Anxiety    GERD (gastroesophageal reflux disease)    Kidney stones  20 years ago    Kidney disease  stage 4    Chronic kidney disease, unspecified CKD stage    Diabetic Charcot foot    PAD (peripheral artery disease)    S/P foot surgery, right  x 5 ( 2006 - 2013 )    Kidney stone  Removed by Laser x 2 ( 20 years ago )    H/O arthroscopy of right knee    Status post peripheral artery angioplasty      SOCIAL HISTORY:  Social History:      ALLERGIES:  No Known Allergies    MEDICATIONS:  STANDING MEDICATIONS  aspirin  chewable 81 milliGRAM(s) Oral daily  dextrose 50% Injectable 25 Gram(s) IV Push once  glucagon  Injectable 1 milliGRAM(s) IntraMuscular once  HYDROmorphone PCA (1 mG/mL) 30 milliLiter(s) PCA Continuous PCA Continuous  insulin glargine Injectable (LANTUS) 27 Unit(s) SubCutaneous every morning  insulin lispro (ADMELOG) corrective regimen sliding scale   SubCutaneous three times a day before meals  insulin lispro Injectable (ADMELOG) 9 Unit(s) SubCutaneous before breakfast  lactated ringers. 1000 milliLiter(s) IV Continuous <Continuous>  lactobacillus acidophilus 1 Tablet(s) Oral three times a day with meals  losartan 100 milliGRAM(s) Oral daily  magnesium oxide 400 milliGRAM(s) Oral three times a day with meals  meropenem  IVPB 1000 milliGRAM(s) IV Intermittent every 12 hours  NIFEdipine XL 90 milliGRAM(s) Oral daily  sodium bicarbonate 650 milliGRAM(s) Oral three times a day    PRN MEDICATIONS  ALPRAZolam 0.5 milliGRAM(s) Oral four times a day PRN  dextrose Oral Gel 15 Gram(s) Oral once PRN  HYDROmorphone   Tablet 2 milliGRAM(s) Oral every 3 hours PRN  HYDROmorphone  Injectable 1 milliGRAM(s) IV Push every 4 hours PRN  HYDROmorphone  Injectable 1 milliGRAM(s) IV Push every 10 minutes PRN  meperidine     Injectable 12.5 milliGRAM(s) IV Push every 10 minutes PRN  ondansetron Injectable 4 milliGRAM(s) IV Push once PRN  oxycodone    5 mG/acetaminophen 325 mG 1 Tablet(s) Oral every 4 hours PRN  oxycodone    5 mG/acetaminophen 325 mG 2 Tablet(s) Oral every 4 hours PRN  oxyCODONE    IR 2.5 milliGRAM(s) Oral every 4 hours PRN    VITALS:   T(F): 97.7  HR: 81  BP: 166/77  RR: 20  SpO2: 100%    PHYSICAL EXAM: PT was taken to OR and still in OR/PACU - could not do physical exam this AM  GENERAL:   (  ) NAD, lying in bed comfortably     (  ) obtunded     (  ) lethargic     (  ) somnolent    HEAD:   (  ) Atraumatic     (  ) hematoma     (  ) laceration (specify location:       )     NECK:  (  ) Supple     (  ) neck stiffness     (  ) nuchal rigidity     (  )  no JVD     (  ) JVD present ( -- cm)    HEART:  Rate -->     (  ) normal rate     (  ) bradycardic     (  ) tachycardic  Rhythm -->     (  ) regular     (  ) regularly irregular     (  ) irregularly irregular  Murmurs -->     (  ) normal s1s2     (  ) systolic murmur     (  ) diastolic murmur     (  ) continuous murmur      (  ) S3 present     (  ) S4 present    LUNGS:   (  )Unlabored respirations     (  ) tachypnea  (  ) B/L air entry     (  ) decreased breath sounds in:  (location     )    (  ) no adventitious sound     (  ) crackles     (  ) wheezing      (  ) rhonchi      (specify location:       )  (  ) chest wall tenderness (specify location:       )    ABDOMEN:   (  ) Soft     (  ) tense   |   (  ) nondistended     (  ) distended   |   (  ) +BS     (  ) hypoactive bowel sounds     (  ) hyperactive bowel sounds  (  ) nontender     (  ) RUQ tenderness     (  ) RLQ tenderness     (  ) LLQ tenderness     (  ) epigastric tenderness     (  ) diffuse tenderness  (  ) Splenomegaly      (  ) Hepatomegaly      (  ) Jaundice     (  ) ecchymosis     EXTREMITIES:  (  ) Normal     (  ) Rash     (  ) ecchymosis     (  ) varicose veins      (  ) pitting edema     (  ) non-pitting edema   (  ) ulceration     (  ) gangrene:     (location:     )    NERVOUS SYSTEM:    (  ) A&Ox3     (  ) confused     (  ) lethargic  CN II-XII:     (  ) Intact     (  ) deficits found     (Specify:     )   Upper extremities:     (  ) no sensorimotor deficits     (  ) weakness     (  ) loss of proprioception/vibration     (  ) loss of touch/temperature (specify:    )  Lower extremities:     (  ) no sensorimotor deficits     (  ) weakness     (  ) loss of proprioception/vibration     (  ) loss of touch/temperature (specify:    )    SKIN:   (  ) No rashes or lesions     (  ) maculopapular rash     (  ) pustules     (  ) vesicles     (  ) ulcer     (  ) ecchymosis     (specify location:     )    AMPAC score:    (  ) Indwelling [place] Catheter:   Date insterted:    Reason (  ) Critical illness     (  ) urinary retention    (  ) Accurate Ins/Outs Monitoring     (  ) CMO patient    (  ) Central Line:   Date inserted:  Location: (  ) Right IJ     (  ) Left IJ     (  ) Right Fem     (  ) Left Fem    (  ) SPC        (  ) pigtail       (  ) PEG tube       (  ) colostomy       (  ) jejunostomy  (  ) U-Dall    LABS:                        9.8    8.78  )-----------( 532      ( 08 Nov 2023 17:05 )             31.0     11-08    140  |  100  |  37<H>  ----------------------------<  171<H>  4.9   |  27  |  2.6<H>    Ca    9.6      08 Nov 2023 17:05  Phos  4.1     11-08  Mg     2.2     11-08    TPro  7.3  /  Alb  3.9  /  TBili  0.3  /  DBili  x   /  AST  15  /  ALT  17  /  AlkPhos  193<H>  11-08    PT/INR - ( 08 Nov 2023 17:05 )   PT: 11.00 sec;   INR: 0.97 ratio         PTT - ( 08 Nov 2023 17:05 )  PTT:35.9 sec  Urinalysis Basic - ( 08 Nov 2023 17:05 )    Color: x / Appearance: x / SG: x / pH: x  Gluc: 171 mg/dL / Ketone: x  / Bili: x / Urobili: x   Blood: x / Protein: x / Nitrite: x   Leuk Esterase: x / RBC: x / WBC x   Sq Epi: x / Non Sq Epi: x / Bacteria: x                RADIOLOGY:

## 2023-11-09 NOTE — PROGRESS NOTE ADULT - ASSESSMENT
57 YO WM with IDDM, CKD4, PAD, Charcot Foot Deformity, underwent R MTA 10/27 with revision and debridement, un healing wound,  noted to have aa occ and trans ferred N for R BKA 11/9.    R Foot unhealing DFU with gangrene  R TMA 10/27, revision 10/30, debridement 11/4/23  R BKA 11/9/23  severe PAD  Anemia  Hx of HTN, ASHD  Hx of DLD  Hx of IDDM, d neuropathy, R Charcot Foot Deformity, R DFU with gangrene  Hx of CKD 4, nephrolithiasis, on renal transplant list  Hx of Ch Anemia  Hx of OA, DJD, mobility dysfunction  Hx of anxiety, depression    Vasc Dr Machado, R BKA today  pt underwent R TMA with several debridements and cont unhealing wound at the [facility]  Podiatry ff  Cardio clearaned pt,  DR Kathleen  pt has occ of dorsal and metatarsal aa,in context of PAD prior balloon-plasty  pt has + wound pseudomonas and yeast  cont Meropenem  cont wound changes as per podiatry  cont all meds  att to bowel regimen  add lactobacillus q AC  incentive spirometry  monitor renal parameters, electrolytes and H/H

## 2023-11-09 NOTE — PROGRESS NOTE ADULT - ASSESSMENT
IMPRESSION: Rehab of R BKA / Charcot foot s/p placement of an external fixator on 9/28/23 which had to be removed on 10/19 due to infection, CKD (baseline Cr 2.5-3.5, on list for kidney transplant), and IDDM (A1c 9.9), PAD, s/p  peripheral angiogram on 10/23/2023 with balloon angioplasty of PTA and DCB of R SFA and PTA of R PT and R AT via R groin and R pedal access, anemia     PRECAUTIONS: [   ] Cardiac  [   ] Respiratory  [   ] Seizures [   ] Contact Isolation  [   ] Droplet Isolation  [   ] Other    Weight Bearing Status:     RECOMMENDATION:    Out of Bed to Chair     DVT/Decubiti Prophylaxis    REHAB PLAN:     [ x   ] Bedside P/T 3-5 times a week   [ x  ]   Bedside O/T  2-3 times a week             [    ] Speech Therapy               [    ]  No Rehab Therapy Indicated   Conditioning/ROM                                    ADL  Bed Mobility                                               Conditioning/ROM  Transfers                                                     Bed Mobility  Sitting /Standing Balance                         Transfers                                        Gait Training                                               Sitting/Standing Balance  Stair Training [   ]Applicable                    Home equipment Eval                                                                        Splinting  [   ] Only      GOALS:   ADL   [ x   ]   Independent                    Transfers  [ x   ] Independent                          Ambulation  [ x   ] Independent     [ x    ] With device                            [    ]  CG                                                         [    ]  CG                                                                  [    ] CG                            [    ] Min A                                                   [    ] Min A                                                              [    ] Min  A          DISCHARGE PLAN:   [    ]  Good candidate for Intensive Rehabilitation/Hospital based                                             Will tolerate 3hrs Intensive Rehab Daily                                       [     ]  Short Term Rehab in Skilled Nursing Facility                                       [     ]  Home with Outpatient or VN services                                         [   x  ]  Possible Candidate for Intensive Hospital based Rehab

## 2023-11-09 NOTE — BRIEF OPERATIVE NOTE - SPECIMENS
Soft tissue and and bone
Soft tissue and bone, Deep wound culture
Right leg
Soft tissue & bone path, Deep WCx

## 2023-11-09 NOTE — PRE-ANESTHESIA EVALUATION ADULT - NS MD HP INPLANTS MED DEV
bone stimulator on right foot

## 2023-11-09 NOTE — PROGRESS NOTE ADULT - SUBJECTIVE AND OBJECTIVE BOX
HPI:   Patient  is a 58yoM with Charcot foot s/p placement of an external fixator on 9/28/23 which had to be removed on 10/19 due to infection, CKD (baseline Cr 2.5-3.5, on list for kidney transplant), and IDDM (A1c 9.9), PAD, s/p  peripheral angiogram on 10/23/2023 with balloon angioplasty of PTA and DCB of R SFA and PTA of R PT and R AT via R groin and R pedal access, anemia recently received 2u pRBCS, pt was dc from hospital on 10/24/23 and now returns to hospital as directed by his podiatrist Dr Benson for TMA which will be done on 10/27/23 as pt has non healing wound and gangrene of the right foot. Patient complains of moderate to sever pain of foot which is partially relieved with percocet and associated wound discharge.  Pt denies fevers or chills.      R Foot unhealing DFU with gangrene  R TMA 10/27, revision 10/30, debridement 11/4/23  R BKA 11/9/23  severe PAD      PAST MEDICAL & SURGICAL HISTORY:  DM (diabetes mellitus)      HTN (hypertension)      HLD (hyperlipidemia)      Herpes labialis      Anxiety      GERD (gastroesophageal reflux disease)      Kidney stones  20 years ago      Kidney disease  stage 4      Chronic kidney disease, unspecified CKD stage      Diabetic Charcot foot      PAD (peripheral artery disease)      S/P foot surgery, right  x 5 ( 2006 - 2013 )      Kidney stone  Removed by Laser x 2 ( 20 years ago )      H/O arthroscopy of right knee      Status post peripheral artery angioplasty          Hospital Course:    TODAY'S SUBJECTIVE & REVIEW OF SYMPTOMS:     Constitutional WNL   Cardio WNL   Resp WNL   GI WNL  Heme WNL  Endo WNL  Skin right foot gangrene  MSK WNL  Neuro WNL  Cognitive WNL  Psych WNL      MEDICATIONS  (STANDING):  aspirin  chewable 81 milliGRAM(s) Oral daily  dextrose 50% Injectable 25 Gram(s) IV Push once  glucagon  Injectable 1 milliGRAM(s) IntraMuscular once  heparin   Injectable 5000 Unit(s) SubCutaneous every 8 hours  HYDROmorphone PCA (1 mG/mL) 30 milliLiter(s) PCA Continuous PCA Continuous  insulin glargine Injectable (LANTUS) 27 Unit(s) SubCutaneous every morning  insulin lispro (ADMELOG) corrective regimen sliding scale   SubCutaneous three times a day before meals  insulin lispro Injectable (ADMELOG) 9 Unit(s) SubCutaneous before breakfast  lactobacillus acidophilus 1 Tablet(s) Oral three times a day with meals  losartan 100 milliGRAM(s) Oral daily  magnesium oxide 400 milliGRAM(s) Oral three times a day with meals  meropenem  IVPB 1000 milliGRAM(s) IV Intermittent every 12 hours  NIFEdipine XL 90 milliGRAM(s) Oral daily  sodium bicarbonate 650 milliGRAM(s) Oral three times a day    MEDICATIONS  (PRN):  ALPRAZolam 0.5 milliGRAM(s) Oral four times a day PRN anxiety  dextrose Oral Gel 15 Gram(s) Oral once PRN Blood Glucose LESS THAN 70 milliGRAM(s)/deciliter  HYDROmorphone  Injectable 1 milliGRAM(s) IV Push every 4 hours PRN Severe Pain (7 - 10)  oxycodone    5 mG/acetaminophen 325 mG 2 Tablet(s) Oral every 4 hours PRN Moderate Pain (4 - 6)  oxycodone    5 mG/acetaminophen 325 mG 1 Tablet(s) Oral every 4 hours PRN Moderate Pain (4 - 6)  oxyCODONE    IR 2.5 milliGRAM(s) Oral every 4 hours PRN Severe Pain (7 - 10)      FAMILY HISTORY:  Family history of cancer in father (Father)  Lung        Allergies    No Known Allergies    Intolerances        SOCIAL HISTORY:    [  ] Etoh  [  ] Smoking  [  ] Substance abuse     Home Environment:  [   ] Home Alone  [ x  ] Lives with Family  [   ] Home Health Aid    Dwelling:  [   ] Apartment  [  x ] Private House  [   ] Adult Home  [   ] Skilled Nursing Facility      [   ] Short Term  [   ] Long Term  [  x ] Stairs       Elevator [   ]    FUNCTIONAL STATUS PTA: (Check all that apply)  Ambulation: [  x  ]Independent    [   ] Dependent     [   ] Non-Ambulatory  Assistive Device: [ x  ] crutches   [   ]  Q Cane  [   ] Walker  [   ]  Wheelchair  ADL : [ x  ] Independent  [    ]  Dependent       Vital Signs Last 24 Hrs  T(C): 36.7 (09 Nov 2023 15:00), Max: 36.9 (09 Nov 2023 00:41)  T(F): 98.1 (09 Nov 2023 15:00), Max: 98.4 (09 Nov 2023 00:41)  HR: 81 (09 Nov 2023 15:00) (58 - 81)  BP: 155/81 (09 Nov 2023 15:00) (113/63 - 177/83)  BP(mean): 78 (09 Nov 2023 07:20) (78 - 78)  RR: 18 (09 Nov 2023 15:00) (17 - 21)  SpO2: 98% (09 Nov 2023 11:00) (94% - 100%)    Parameters below as of 09 Nov 2023 11:00  Patient On (Oxygen Delivery Method): room air          PHYSICAL EXAM: Awake & Alert  GENERAL: NAD  HEAD:  Normocephalic  CHEST/LUNG: Clear   HEART: S1S2+  ABDOMEN: Soft, Nontender  EXTREMITIES:  R BKA    NERVOUS SYSTEM:  Cranial Nerves 2-12 intact [   ] Abnormal  [   ]  ROM: WFL all extremities [   ]  Abnormal [  x ]limited RLE  Motor Strength: WFL all extremities  [   ]  Abnormal [ x  ]limited RLE  Sensation: intact to light touch [ x  ] Abnormal [   ]    FUNCTIONAL STATUS:  Bed Mobility: Independent [   ]  Supervision [   ]  Needs Assistance [ x  ]  N/A [   ]  Transfers: Independent [   ]  Supervision [   ]  Needs Assistance [   ]  N/A [   ]   Ambulation: Independent [   ]  Supervision [   ]  Needs Assistance [   ]  N/A [   ]  ADL: Independent [   ] Requires Assistance [   ] N/A [   ]      LABS:                        9.8    8.78  )-----------( 532      ( 08 Nov 2023 17:05 )             31.0     11-08    140  |  100  |  37<H>  ----------------------------<  171<H>  4.9   |  27  |  2.6<H>    Ca    9.6      08 Nov 2023 17:05  Phos  4.1     11-08  Mg     2.2     11-08    TPro  7.3  /  Alb  3.9  /  TBili  0.3  /  DBili  x   /  AST  15  /  ALT  17  /  AlkPhos  193<H>  11-08    PT/INR - ( 08 Nov 2023 17:05 )   PT: 11.00 sec;   INR: 0.97 ratio         PTT - ( 08 Nov 2023 17:05 )  PTT:35.9 sec  Urinalysis Basic - ( 08 Nov 2023 17:05 )    Color: x / Appearance: x / SG: x / pH: x  Gluc: 171 mg/dL / Ketone: x  / Bili: x / Urobili: x   Blood: x / Protein: x / Nitrite: x   Leuk Esterase: x / RBC: x / WBC x   Sq Epi: x / Non Sq Epi: x / Bacteria: x        RADIOLOGY & ADDITIONAL STUDIES:

## 2023-11-09 NOTE — PROGRESS NOTE ADULT - ASSESSMENT
58-year-old male with Charcot foot s/p placement of an external fixator on 9/28/23 which had to be removed on 10/19 due to infection, CKD (baseline Cr 2.5-3.5, on list for kidney transplant), and IDDM (A1c 9.9), PAD, s/p  peripheral angiogram on 10/23/2023 with balloon angioplasty of PTA and DCB of R SFA and PTA of R PT and R AT via R groin and R pedal access, anemia recently received 2u pRBCS, pt was dc from hospital on 10/24/23 and now returns to hospital as directed by his podiatrist Dr Benson for TMA which will be done on 10/27/23 as pt has non healing wound and gangrene of the right foot.     #gangrene of the right foot.   - s/p multiple post operative amputation, revisions   - continue monitoring     # PAD   - vascular for BKA   - BKA today    #PAD, s/p angioplasty  -c/w asa and plavix    #anemia  -s/p transfusion  -monitor    #HTN  -low sodium diet  -monitor bp  -cont meds from home nifedipine and cozaar    #DM  -ada diet  -monitor BS  -cont meds from home basal 20u daily  and lispro 8 qac  -insulin coverage prn eleavted blood sugar    #CKD  -creatinine stable at 2.9 today    #anxiety disorder  -xanax prn    #DVT prophylaxis: sq heparin  #Diet: consistent carb and DASH  #Activity: IAT    Pending: BKA Today - will moniot to see how pt is doing after

## 2023-11-10 ENCOUNTER — APPOINTMENT (OUTPATIENT)
Dept: CARDIOLOGY | Facility: CLINIC | Age: 58
End: 2023-11-10

## 2023-11-10 DIAGNOSIS — Z89.511 ACQUIRED ABSENCE OF RIGHT LEG BELOW KNEE: ICD-10-CM

## 2023-11-10 LAB
ALBUMIN SERPL ELPH-MCNC: 3.6 G/DL — SIGNIFICANT CHANGE UP (ref 3.5–5.2)
ALBUMIN SERPL ELPH-MCNC: 3.6 G/DL — SIGNIFICANT CHANGE UP (ref 3.5–5.2)
ALP SERPL-CCNC: 178 U/L — HIGH (ref 30–115)
ALP SERPL-CCNC: 178 U/L — HIGH (ref 30–115)
ALT FLD-CCNC: 11 U/L — SIGNIFICANT CHANGE UP (ref 0–41)
ALT FLD-CCNC: 11 U/L — SIGNIFICANT CHANGE UP (ref 0–41)
ANION GAP SERPL CALC-SCNC: 20 MMOL/L — HIGH (ref 7–14)
ANION GAP SERPL CALC-SCNC: 20 MMOL/L — HIGH (ref 7–14)
AST SERPL-CCNC: 10 U/L — SIGNIFICANT CHANGE UP (ref 0–41)
AST SERPL-CCNC: 10 U/L — SIGNIFICANT CHANGE UP (ref 0–41)
BASOPHILS # BLD AUTO: 0.06 K/UL — SIGNIFICANT CHANGE UP (ref 0–0.2)
BASOPHILS # BLD AUTO: 0.06 K/UL — SIGNIFICANT CHANGE UP (ref 0–0.2)
BASOPHILS NFR BLD AUTO: 0.6 % — SIGNIFICANT CHANGE UP (ref 0–1)
BASOPHILS NFR BLD AUTO: 0.6 % — SIGNIFICANT CHANGE UP (ref 0–1)
BILIRUB SERPL-MCNC: 0.4 MG/DL — SIGNIFICANT CHANGE UP (ref 0.2–1.2)
BILIRUB SERPL-MCNC: 0.4 MG/DL — SIGNIFICANT CHANGE UP (ref 0.2–1.2)
BUN SERPL-MCNC: 36 MG/DL — HIGH (ref 10–20)
BUN SERPL-MCNC: 36 MG/DL — HIGH (ref 10–20)
CALCIUM SERPL-MCNC: 8.9 MG/DL — SIGNIFICANT CHANGE UP (ref 8.4–10.5)
CALCIUM SERPL-MCNC: 8.9 MG/DL — SIGNIFICANT CHANGE UP (ref 8.4–10.5)
CHLORIDE SERPL-SCNC: 94 MMOL/L — LOW (ref 98–110)
CHLORIDE SERPL-SCNC: 94 MMOL/L — LOW (ref 98–110)
CO2 SERPL-SCNC: 19 MMOL/L — SIGNIFICANT CHANGE UP (ref 17–32)
CO2 SERPL-SCNC: 19 MMOL/L — SIGNIFICANT CHANGE UP (ref 17–32)
CREAT SERPL-MCNC: 2.6 MG/DL — HIGH (ref 0.7–1.5)
CREAT SERPL-MCNC: 2.6 MG/DL — HIGH (ref 0.7–1.5)
EGFR: 28 ML/MIN/1.73M2 — LOW
EGFR: 28 ML/MIN/1.73M2 — LOW
EOSINOPHIL # BLD AUTO: 0.1 K/UL — SIGNIFICANT CHANGE UP (ref 0–0.7)
EOSINOPHIL # BLD AUTO: 0.1 K/UL — SIGNIFICANT CHANGE UP (ref 0–0.7)
EOSINOPHIL NFR BLD AUTO: 0.9 % — SIGNIFICANT CHANGE UP (ref 0–8)
EOSINOPHIL NFR BLD AUTO: 0.9 % — SIGNIFICANT CHANGE UP (ref 0–8)
GLUCOSE BLDC GLUCOMTR-MCNC: 228 MG/DL — HIGH (ref 70–99)
GLUCOSE BLDC GLUCOMTR-MCNC: 228 MG/DL — HIGH (ref 70–99)
GLUCOSE BLDC GLUCOMTR-MCNC: 286 MG/DL — HIGH (ref 70–99)
GLUCOSE BLDC GLUCOMTR-MCNC: 286 MG/DL — HIGH (ref 70–99)
GLUCOSE BLDC GLUCOMTR-MCNC: 312 MG/DL — HIGH (ref 70–99)
GLUCOSE BLDC GLUCOMTR-MCNC: 312 MG/DL — HIGH (ref 70–99)
GLUCOSE BLDC GLUCOMTR-MCNC: 502 MG/DL — CRITICAL HIGH (ref 70–99)
GLUCOSE BLDC GLUCOMTR-MCNC: 502 MG/DL — CRITICAL HIGH (ref 70–99)
GLUCOSE SERPL-MCNC: 434 MG/DL — HIGH (ref 70–99)
GLUCOSE SERPL-MCNC: 434 MG/DL — HIGH (ref 70–99)
HCT VFR BLD CALC: 29.3 % — LOW (ref 42–52)
HCT VFR BLD CALC: 29.3 % — LOW (ref 42–52)
HGB BLD-MCNC: 9 G/DL — LOW (ref 14–18)
HGB BLD-MCNC: 9 G/DL — LOW (ref 14–18)
IMM GRANULOCYTES NFR BLD AUTO: 0.4 % — HIGH (ref 0.1–0.3)
IMM GRANULOCYTES NFR BLD AUTO: 0.4 % — HIGH (ref 0.1–0.3)
IRON SATN MFR SERPL: 17 % — SIGNIFICANT CHANGE UP (ref 15–50)
IRON SATN MFR SERPL: 17 % — SIGNIFICANT CHANGE UP (ref 15–50)
IRON SATN MFR SERPL: 28 UG/DL — LOW (ref 35–150)
IRON SATN MFR SERPL: 28 UG/DL — LOW (ref 35–150)
LYMPHOCYTES # BLD AUTO: 1.8 K/UL — SIGNIFICANT CHANGE UP (ref 1.2–3.4)
LYMPHOCYTES # BLD AUTO: 1.8 K/UL — SIGNIFICANT CHANGE UP (ref 1.2–3.4)
LYMPHOCYTES # BLD AUTO: 17.1 % — LOW (ref 20.5–51.1)
LYMPHOCYTES # BLD AUTO: 17.1 % — LOW (ref 20.5–51.1)
MAGNESIUM SERPL-MCNC: 2.2 MG/DL — SIGNIFICANT CHANGE UP (ref 1.8–2.4)
MAGNESIUM SERPL-MCNC: 2.2 MG/DL — SIGNIFICANT CHANGE UP (ref 1.8–2.4)
MCHC RBC-ENTMCNC: 26.8 PG — LOW (ref 27–31)
MCHC RBC-ENTMCNC: 26.8 PG — LOW (ref 27–31)
MCHC RBC-ENTMCNC: 30.7 G/DL — LOW (ref 32–37)
MCHC RBC-ENTMCNC: 30.7 G/DL — LOW (ref 32–37)
MCV RBC AUTO: 87.2 FL — SIGNIFICANT CHANGE UP (ref 80–94)
MCV RBC AUTO: 87.2 FL — SIGNIFICANT CHANGE UP (ref 80–94)
MONOCYTES # BLD AUTO: 0.71 K/UL — HIGH (ref 0.1–0.6)
MONOCYTES # BLD AUTO: 0.71 K/UL — HIGH (ref 0.1–0.6)
MONOCYTES NFR BLD AUTO: 6.7 % — SIGNIFICANT CHANGE UP (ref 1.7–9.3)
MONOCYTES NFR BLD AUTO: 6.7 % — SIGNIFICANT CHANGE UP (ref 1.7–9.3)
NEUTROPHILS # BLD AUTO: 7.83 K/UL — HIGH (ref 1.4–6.5)
NEUTROPHILS # BLD AUTO: 7.83 K/UL — HIGH (ref 1.4–6.5)
NEUTROPHILS NFR BLD AUTO: 74.3 % — SIGNIFICANT CHANGE UP (ref 42.2–75.2)
NEUTROPHILS NFR BLD AUTO: 74.3 % — SIGNIFICANT CHANGE UP (ref 42.2–75.2)
NRBC # BLD: 0 /100 WBCS — SIGNIFICANT CHANGE UP (ref 0–0)
NRBC # BLD: 0 /100 WBCS — SIGNIFICANT CHANGE UP (ref 0–0)
PHOSPHATE SERPL-MCNC: 4.1 MG/DL — SIGNIFICANT CHANGE UP (ref 2.1–4.9)
PHOSPHATE SERPL-MCNC: 4.1 MG/DL — SIGNIFICANT CHANGE UP (ref 2.1–4.9)
PHOSPHATE SERPL-MCNC: 4.5 MG/DL — SIGNIFICANT CHANGE UP (ref 2.1–4.9)
PHOSPHATE SERPL-MCNC: 4.5 MG/DL — SIGNIFICANT CHANGE UP (ref 2.1–4.9)
PLATELET # BLD AUTO: 504 K/UL — HIGH (ref 130–400)
PLATELET # BLD AUTO: 504 K/UL — HIGH (ref 130–400)
PMV BLD: 8.9 FL — SIGNIFICANT CHANGE UP (ref 7.4–10.4)
PMV BLD: 8.9 FL — SIGNIFICANT CHANGE UP (ref 7.4–10.4)
POTASSIUM SERPL-MCNC: 5.3 MMOL/L — HIGH (ref 3.5–5)
POTASSIUM SERPL-MCNC: 5.3 MMOL/L — HIGH (ref 3.5–5)
POTASSIUM SERPL-SCNC: 5.3 MMOL/L — HIGH (ref 3.5–5)
POTASSIUM SERPL-SCNC: 5.3 MMOL/L — HIGH (ref 3.5–5)
PROT SERPL-MCNC: 7 G/DL — SIGNIFICANT CHANGE UP (ref 6–8)
PROT SERPL-MCNC: 7 G/DL — SIGNIFICANT CHANGE UP (ref 6–8)
RBC # BLD: 3.36 M/UL — LOW (ref 4.7–6.1)
RBC # BLD: 3.36 M/UL — LOW (ref 4.7–6.1)
RBC # FLD: 17.8 % — HIGH (ref 11.5–14.5)
RBC # FLD: 17.8 % — HIGH (ref 11.5–14.5)
SODIUM SERPL-SCNC: 133 MMOL/L — LOW (ref 135–146)
SODIUM SERPL-SCNC: 133 MMOL/L — LOW (ref 135–146)
TIBC SERPL-MCNC: 163 UG/DL — LOW (ref 220–430)
TIBC SERPL-MCNC: 163 UG/DL — LOW (ref 220–430)
TRANSFERRIN SERPL-MCNC: 134 MG/DL — LOW (ref 200–360)
TRANSFERRIN SERPL-MCNC: 134 MG/DL — LOW (ref 200–360)
UIBC SERPL-MCNC: 135 UG/DL — SIGNIFICANT CHANGE UP (ref 110–370)
UIBC SERPL-MCNC: 135 UG/DL — SIGNIFICANT CHANGE UP (ref 110–370)
WBC # BLD: 10.54 K/UL — SIGNIFICANT CHANGE UP (ref 4.8–10.8)
WBC # BLD: 10.54 K/UL — SIGNIFICANT CHANGE UP (ref 4.8–10.8)
WBC # FLD AUTO: 10.54 K/UL — SIGNIFICANT CHANGE UP (ref 4.8–10.8)
WBC # FLD AUTO: 10.54 K/UL — SIGNIFICANT CHANGE UP (ref 4.8–10.8)

## 2023-11-10 PROCEDURE — 99222 1ST HOSP IP/OBS MODERATE 55: CPT

## 2023-11-10 PROCEDURE — 99232 SBSQ HOSP IP/OBS MODERATE 35: CPT

## 2023-11-10 PROCEDURE — 76770 US EXAM ABDO BACK WALL COMP: CPT | Mod: 26

## 2023-11-10 RX ORDER — SENNA PLUS 8.6 MG/1
2 TABLET ORAL AT BEDTIME
Refills: 0 | Status: DISCONTINUED | OUTPATIENT
Start: 2023-11-10 | End: 2023-11-15

## 2023-11-10 RX ORDER — HYDROMORPHONE HYDROCHLORIDE 2 MG/ML
30 INJECTION INTRAMUSCULAR; INTRAVENOUS; SUBCUTANEOUS
Refills: 0 | Status: DISCONTINUED | OUTPATIENT
Start: 2023-11-10 | End: 2023-11-13

## 2023-11-10 RX ORDER — DULOXETINE HYDROCHLORIDE 30 MG/1
40 CAPSULE, DELAYED RELEASE ORAL EVERY 12 HOURS
Refills: 0 | Status: DISCONTINUED | OUTPATIENT
Start: 2023-11-10 | End: 2023-11-15

## 2023-11-10 RX ORDER — POLYETHYLENE GLYCOL 3350 17 G/17G
17 POWDER, FOR SOLUTION ORAL DAILY
Refills: 0 | Status: DISCONTINUED | OUTPATIENT
Start: 2023-11-10 | End: 2023-11-15

## 2023-11-10 RX ADMIN — HYDROMORPHONE HYDROCHLORIDE 30 MILLILITER(S): 2 INJECTION INTRAMUSCULAR; INTRAVENOUS; SUBCUTANEOUS at 13:57

## 2023-11-10 RX ADMIN — MAGNESIUM OXIDE 400 MG ORAL TABLET 400 MILLIGRAM(S): 241.3 TABLET ORAL at 17:18

## 2023-11-10 RX ADMIN — HEPARIN SODIUM 5000 UNIT(S): 5000 INJECTION INTRAVENOUS; SUBCUTANEOUS at 22:31

## 2023-11-10 RX ADMIN — Medication 50 MILLIGRAM(S): at 17:17

## 2023-11-10 RX ADMIN — Medication 90 MILLIGRAM(S): at 06:13

## 2023-11-10 RX ADMIN — MAGNESIUM OXIDE 400 MG ORAL TABLET 400 MILLIGRAM(S): 241.3 TABLET ORAL at 11:33

## 2023-11-10 RX ADMIN — LOSARTAN POTASSIUM 100 MILLIGRAM(S): 100 TABLET, FILM COATED ORAL at 06:18

## 2023-11-10 RX ADMIN — Medication 81 MILLIGRAM(S): at 11:33

## 2023-11-10 RX ADMIN — Medication 12: at 08:00

## 2023-11-10 RX ADMIN — MEROPENEM 100 MILLIGRAM(S): 1 INJECTION INTRAVENOUS at 06:10

## 2023-11-10 RX ADMIN — HEPARIN SODIUM 5000 UNIT(S): 5000 INJECTION INTRAVENOUS; SUBCUTANEOUS at 13:56

## 2023-11-10 RX ADMIN — INSULIN GLARGINE 27 UNIT(S): 100 INJECTION, SOLUTION SUBCUTANEOUS at 07:58

## 2023-11-10 RX ADMIN — Medication 1 TABLET(S): at 11:35

## 2023-11-10 RX ADMIN — HEPARIN SODIUM 5000 UNIT(S): 5000 INJECTION INTRAVENOUS; SUBCUTANEOUS at 06:18

## 2023-11-10 RX ADMIN — Medication 6: at 17:18

## 2023-11-10 RX ADMIN — Medication 1 TABLET(S): at 08:01

## 2023-11-10 RX ADMIN — Medication 1 TABLET(S): at 17:18

## 2023-11-10 RX ADMIN — HYDROMORPHONE HYDROCHLORIDE 30 MILLILITER(S): 2 INJECTION INTRAMUSCULAR; INTRAVENOUS; SUBCUTANEOUS at 19:12

## 2023-11-10 RX ADMIN — MAGNESIUM OXIDE 400 MG ORAL TABLET 400 MILLIGRAM(S): 241.3 TABLET ORAL at 08:01

## 2023-11-10 RX ADMIN — HYDROMORPHONE HYDROCHLORIDE 30 MILLILITER(S): 2 INJECTION INTRAMUSCULAR; INTRAVENOUS; SUBCUTANEOUS at 07:51

## 2023-11-10 RX ADMIN — MEROPENEM 100 MILLIGRAM(S): 1 INJECTION INTRAVENOUS at 17:19

## 2023-11-10 RX ADMIN — Medication 650 MILLIGRAM(S): at 13:57

## 2023-11-10 RX ADMIN — Medication 9 UNIT(S): at 08:00

## 2023-11-10 RX ADMIN — DULOXETINE HYDROCHLORIDE 40 MILLIGRAM(S): 30 CAPSULE, DELAYED RELEASE ORAL at 17:18

## 2023-11-10 RX ADMIN — SENNA PLUS 2 TABLET(S): 8.6 TABLET ORAL at 21:52

## 2023-11-10 RX ADMIN — Medication 50 MILLIGRAM(S): at 21:52

## 2023-11-10 RX ADMIN — Medication 8: at 11:31

## 2023-11-10 RX ADMIN — Medication 650 MILLIGRAM(S): at 21:52

## 2023-11-10 RX ADMIN — Medication 650 MILLIGRAM(S): at 06:17

## 2023-11-10 NOTE — CONSULT NOTE ADULT - ASSESSMENT
Pt is a 59yo Male with PMH/PSH including DM, CKD4, charcot foot & PAD s/p multiple procedures within the last month p/w right foot gangrene s/p right BKA now POD#1. UROLOGY consulted for urinary retention - has been getting CIC'd q8-12hrs.    PLAN:  -Pt not interested in urbano placement at this time - c/w CIC q4-6hrs titrating to residuals <300ccs, pt can also self-cath on this schedule after education by RN  -Flomax if not contraindicated  -RBUS ordered, f/u  -Nephro following  -Remainder of care per primary team  -Follow-up outpatient with  for further evaluation and management  -Will d/w attending Pt is a 57yo Male with PMH/PSH including DM, CKD4, charcot foot & PAD s/p multiple procedures within the last month p/w right foot gangrene s/p right BKA now POD#1. UROLOGY consulted for urinary retention - has been getting CIC'd q8-12hrs.    PLAN:  -Pt not interested in urbano placement at this time - c/w CIC q4-6hrs titrating to residuals <300ccs, pt can also self-cath on this schedule after education by RN  -Flomax if not contraindicated  -RBUS ordered, f/u  -Nephro following  -Remainder of care per primary team  -Follow-up outpatient with  for further evaluation and management  -Will d/w attending    ADDENDUM   Patient seen and examined at bedside w/ Dr. Holden, continue recommendations as mentioned above     < from: US Kidney and Bladder (11.10.23 @ 22:13) >  ACC: [id number] EXAM:  US KIDNEYS AND BLADDER   ORDERED BY: ALONSO ADAME     PROCEDURE DATE:  11/10/2023          INTERPRETATION:  CLINICAL INFORMATION: Rule out hydronephrosis    COMPARISON: None available.    TECHNIQUE: Sonography of the kidneys and bladder.    FINDINGS:    Right kidney: 11.4 x 4.8 x 3.8 cm. No hydronephrosis or calculi.    Left kidney:  9.7 x 5.6 x 3.5 cm. No hydronephrosis or calculi.    Urinary bladder: No debris or calculus. Bilateral ureteral jets are   visualized. Prevoidvolume of approximately 148.5 mL.  Postvoid volume   not obtained as the patient intermittently catheterizes.  The bladder   wall measures 0.35 cm.      IMPRESSION:    No evidence of hydronephrosis bilaterally.  Mild bladder wall thickening.    --- End of Report ---    SHMUEL RODGERS MD; Attending Radiologist  This document has been electronically signed. Nov 11 2023  6:58AM    < end of copied text >

## 2023-11-10 NOTE — CONSULT NOTE ADULT - SUBJECTIVE AND OBJECTIVE BOX
PAIN MANAGEMENT CONSULT NOTE    Chief Complaint:    HPI:   Patient  is a 58yoM with Charcot foot s/p placement of an external fixator on 9/28/23 which had to be removed on 10/19 due to infection, CKD (baseline Cr 2.5-3.5, on list for kidney transplant), and IDDM (A1c 9.9), PAD, s/p  peripheral angiogram on 10/23/2023 with balloon angioplasty of PTA and DCB of R SFA and PTA of R PT and R AT via R groin and R pedal access, anemia recently received 2u pRBCS, pt was dc from hospital on 10/24/23 and now returns to hospital as directed by his podiatrist Dr Benson for TMA which will be done on 10/27/23 as pt has non healing wound and gangrene of the right foot. Patient complains of moderate to sever pain of foot which is partially relieved with percocet and associated wound discharge.  Pt denies fevers or chills.       PAST MEDICAL & SURGICAL HISTORY:  DM (diabetes mellitus)      HTN (hypertension)      HLD (hyperlipidemia)      Herpes labialis      Anxiety      GERD (gastroesophageal reflux disease)      Kidney stones  20 years ago      Kidney disease  stage 4      Chronic kidney disease, unspecified CKD stage      Diabetic Charcot foot      PAD (peripheral artery disease)      S/P foot surgery, right  x 5 ( 2006 - 2013 )      Kidney stone  Removed by Laser x 2 ( 20 years ago )      H/O arthroscopy of right knee      Status post peripheral artery angioplasty          FAMILY HISTORY:  Family history of cancer in father (Father)  Lung        SOCIAL HISTORY:  [ ] Denies Smoking, Alcohol, or Drug Use    HOME MEDICATIONS:   Please refer to initial HNP    PAIN HOME MEDICATIONS:    Allergies    No Known Allergies    Intolerances        PAIN MEDICATIONS:  ALPRAZolam 0.5 milliGRAM(s) Oral four times a day PRN  HYDROmorphone  Injectable 1 milliGRAM(s) IV Push every 4 hours PRN  HYDROmorphone PCA (1 mG/mL) 30 milliLiter(s) PCA Continuous PCA Continuous  oxycodone    5 mG/acetaminophen 325 mG 1 Tablet(s) Oral every 4 hours PRN  oxycodone    5 mG/acetaminophen 325 mG 2 Tablet(s) Oral every 4 hours PRN  oxyCODONE    IR 2.5 milliGRAM(s) Oral every 4 hours PRN    Heme:  aspirin  chewable 81 milliGRAM(s) Oral daily  heparin   Injectable 5000 Unit(s) SubCutaneous every 8 hours    Antibiotics:  meropenem  IVPB 1000 milliGRAM(s) IV Intermittent every 12 hours    Cardiovascular:  losartan 100 milliGRAM(s) Oral daily  NIFEdipine XL 90 milliGRAM(s) Oral daily    GI:    Endocrine:  dextrose 50% Injectable 25 Gram(s) IV Push once  dextrose Oral Gel 15 Gram(s) Oral once PRN  glucagon  Injectable 1 milliGRAM(s) IntraMuscular once  insulin glargine Injectable (LANTUS) 27 Unit(s) SubCutaneous every morning  insulin lispro (ADMELOG) corrective regimen sliding scale   SubCutaneous three times a day before meals  insulin lispro Injectable (ADMELOG) 9 Unit(s) SubCutaneous before breakfast    All Other Medications:  lactobacillus acidophilus 1 Tablet(s) Oral three times a day with meals  magnesium oxide 400 milliGRAM(s) Oral three times a day with meals  sodium bicarbonate 650 milliGRAM(s) Oral three times a day      Vital Signs Last 24 Hrs  T(C): 37.1 (10 Nov 2023 08:27), Max: 37.2 (09 Nov 2023 23:16)  T(F): 98.7 (10 Nov 2023 08:27), Max: 98.9 (09 Nov 2023 23:16)  HR: 97 (10 Nov 2023 08:27) (58 - 97)  BP: 179/85 (10 Nov 2023 08:27) (110/59 - 188/90)  BP(mean): --  RR: 18 (10 Nov 2023 08:27) (18 - 19)  SpO2: --        LABS:                        9.0    10.54 )-----------( 504      ( 10 Nov 2023 05:53 )             29.3     11-10    133<L>  |  94<L>  |  36<H>  ----------------------------<  434<H>  5.3<H>   |  19  |  2.6<H>    Ca    8.9      10 Nov 2023 05:53  Phos  4.5     11-10  Mg     2.2     11-10    TPro  7.0  /  Alb  3.6  /  TBili  0.4  /  DBili  x   /  AST  10  /  ALT  11  /  AlkPhos  178<H>  11-10    PT/INR - ( 08 Nov 2023 17:05 )   PT: 11.00 sec;   INR: 0.97 ratio         PTT - ( 08 Nov 2023 17:05 )  PTT:35.9 sec  Urinalysis Basic - ( 10 Nov 2023 05:53 )    Color: x / Appearance: x / SG: x / pH: x  Gluc: 434 mg/dL / Ketone: x  / Bili: x / Urobili: x   Blood: x / Protein: x / Nitrite: x   Leuk Esterase: x / RBC: x / WBC x   Sq Epi: x / Non Sq Epi: x / Bacteria: x    Rads  Impression:    Moderate stenosis of the bilateral anterior tibial arteries.        PHYSICAL EXAM  GENERAL: Laying in bed  Cranial nerves grossly intact  Motor exam: 5/5 b/l UE. 5/5 b/l LE  Sensation intact to LT in UE/LE in 3 dermatomes  CHEST/LUNG: Clear to auscultation bilaterally  ABDOMEN: Soft, Nontender, Nondistended  EXTREMITIES: RLE BKA  SKIN: No rashes or lesions      ASSESSMENT:   HPI:   Patient  is a 58yoM with Charcot foot s/p placement of an external fixator on 9/28/23 which had to be removed on 10/19 due to infection, CKD (baseline Cr 2.5-3.5, on list for kidney transplant), and IDDM (A1c 9.9), PAD, s/p  peripheral angiogram on 10/23/2023 with balloon angioplasty of PTA and DCB of R SFA and PTA of R PT and R AT via R groin and R pedal access, anemia recently received 2u pRBCS, pt was dc from hospital on 10/24/23 and now returns to hospital as directed by his podiatrist Dr Benson for TMA which will be done on 10/27/23 as pt has non healing wound and gangrene of the right foot. Patient complains of moderate to sever pain of foot which is partially relieved with percocet and associated wound discharge.  Pt denies fevers or chills.  (26 Oct 2023 18:37)    s/p BKA  Recommendations  1. Ofirmev 1g q8h standing x 5 days  2. Pregabalin 50mg q8h standing  3. duloxetine 40mg q12h  4. PCA hydromorphone  Initial bolus 1.0mg  Demand: 0.4mg  Lockout: 10 min  4 hour lockout: 6.0mg    Bowel regimen: miralax / colace

## 2023-11-10 NOTE — CONSULT NOTE ADULT - PROVIDER SPECIALTY LIST ADULT
Cardiology
Podiatry
Nephrology
Infectious Disease
Urology
Vascular Surgery
Pain Medicine
Pain Medicine

## 2023-11-10 NOTE — CONSULT NOTE ADULT - CONSULT REQUESTED DATE/TIME
10-Nov-2023 09:59
10-Nov-2023 16:20
02-Nov-2023 09:55
03-Nov-2023 10:33
03-Nov-2023 17:03
10-Nov-2023 08:50
26-Oct-2023 17:12
29-Oct-2023 13:29

## 2023-11-10 NOTE — PROGRESS NOTE ADULT - ATTENDING COMMENTS
Patient seen and assessed bedside.   Worsening necrosis of the distal amputation site.   Distal stump and posterior/plantar heel intact without necrosis.   Wound care: xeroform DSD protective dressing daily.  Patient NWB at this time.   Patient would benefit from heel off .   Probable repeated staged debridement of wound on this admission.   Podiatry to follow up q24-48.
LE Focused:  S/P revision of Right TMA. Pt doing well with no complaints. Surgical site with mild bleeding noted. No malodor, no pus. Lateral wound shows some necrosis.  Will probably need serial debridements. Redress with xeroform.  Patient is NWB to the Right foot. Pt is concerned that his plavix was stopped although his cardiologist staed he should be on it.  Will follow.
58-year-old male with Charcot foot s/p placement of an external fixator on 9/28/23 which had to be removed on 10/19 due to infection, CKD (baseline Cr 2.5-3.5, on list for kidney transplant), and IDDM (A1c 9.9), PAD, s/p  peripheral angiogram on 10/23/2023 with balloon angioplasty of PTA and DCB of R SFA and PTA of R PT and R AT via R groin and R pedal access, anemia recently received 2u pRBCS, pt was dc from hospital on 10/24/23 and now returns to hospital as directed by his podiatrist Dr Benson for TMA which will be done on 10/27/23 as pt has non healing wound and gangrene of the right foot.     R Foot unhealing DFU with gangrene  R TMA 10/27, revision 10/30, debridement 11/4/23  R BKA 11/9/23  Severe PAD  - Pain Management consulted for pain reccs - currently on dilaudid PCA   - Vascular following- has R Knee immobilizer   - ID following - Continue IV meropenem 1gm q12hrs (CrCl ~34) until 48hrs post-op, then can D/C abx    DM with hyperglycemia   - Missed lantus yesterday due to OR  -  today AM - restarted basal/bolus and SSI, BG improving     Hx of CKD 4, nephrolithiasis, on renal transplant list- appreciate nephro reccs     PAD, s/p angioplasty-c/w asa, holding plavix, restart as per vascular     Anemia-s/p transfusion    HTN-cont meds from home nifedipine and cozaar    Anxiety disorder-xanax prn    #DVT prophylaxis: sq heparin    Pending: improved pain control, and physical therapy     Total time spent to complete patient's bedside assessment, review medical chart, discuss medical plan of care with covering medical team was more than 35 minutes  with >50% of time spent face to face with patient, discussion with patient/family and/or coordination of care      Hoa Barnett DO
Patient for surgery 11/4/2023 with Dr. Valenzuela.  Worsening necrosis of the site with exposed bone.   Guarded prognosis and likely BKA candidate.   Please NPO MN.

## 2023-11-10 NOTE — PROGRESS NOTE ADULT - ASSESSMENT
Assessment:  58y Male s/p R BKA for non healing RLE wound, PVD.    Plan:  Pain/nausea control PRN  Home meds resumed  Incentive spirometer  Bed rest  Vitals per protocol  Change dressings as needed  R Knee immobilizer

## 2023-11-10 NOTE — CONSULT NOTE ADULT - ATTENDING COMMENTS
Patient  is a 58yoM with Charcot foot, s/p Rt BKA, CKD (baseline Cr 2.5-3.5, on list for kidney transplant), and IDDM (A1c 9.9), PAD, s/p  peripheral angiogram on 10/23/2023 , seen for CKD management.    CKD stage 4/ S/p right  BKA for gangrene / DM  - severe Urinary Retention 1400 cc and 800 cc yesterday  - 2 to neurogenic bladder, bedbound state,  Hydromorphone drip  - awaiting kidney bladder sono for prostate size and bladder volume  - I spoke to Urology PA - pt can do Self Cath in house after trained by the RN  - need official Urology consult  - Urodynamic studies as OP  - creat near baseline  High K - change diet to renal diabetic, Lokelma 1o gr daily  - Anemia of CKD- Get iron studies   - On renal transplant list  - AVOID blood tx  - iron studies please, if adequate - needs Retacrit 10, 000 Units sq once a week  Severely uncontrolled DM - on Insulin  On Hydromorphone drip - laxatives to be used, not fleet enema, Dulcolax, Senna, Miralax  - - Avoid nephrotoxic meds/NSAIDs/contrast , maintain hydration , BP control,  - Vascular surgery recommendations  appreciated  will follow

## 2023-11-10 NOTE — OCCUPATIONAL THERAPY INITIAL EVALUATION ADULT - SPECIFY REASON(S)
Attempted to see pt for OT IE. Following initial contact and discussion, OT will follow up. Pt in process of altering pain management regimen, c/o urine retention pending straight catheterization.

## 2023-11-10 NOTE — CONSULT NOTE ADULT - CONSULT REASON
gangrene
urinary retention
CKD Stage 4
Pt s/p BKA on Pump requesting pain management with uncontrolled pain
s/p R foot transmetatarsal amputation with pain
Antibiotic management
risk stratification for R BKA
severe PAD

## 2023-11-10 NOTE — PROGRESS NOTE ADULT - SUBJECTIVE AND OBJECTIVE BOX
GENERAL SURGERY PROGRESS NOTE     MARIOLA CURRY  58y  Male  Hospital day :15d  POD: 1  Procedure: Transmetatarsal amputation    Revision of transmetatarsal amputation of right foot    Amputation below knee      OVERNIGHT EVENTS: No acute events overnight. Patient stood to attempt to urinate and R BKA dressing fell off. Dressing and knee immobilizer replaced.     T(F): 98.9 (11-09-23 @ 23:16), Max: 98.9 (11-09-23 @ 23:16)  HR: 58 (11-09-23 @ 23:16) (58 - 81)  BP: 110/59 (11-09-23 @ 23:16) (110/59 - 173/73)  ABP: --  ABP(mean): --  RR: 18 (11-09-23 @ 23:16) (17 - 21)  SpO2: 98% (11-09-23 @ 11:00) (94% - 100%)    DIET/FLUIDS: magnesium oxide 400 milliGRAM(s) Oral three times a day with meals  sodium bicarbonate 650 milliGRAM(s) Oral three times a day    NG:                                                                                DRAINS:     BM:     EMESIS:     URINE:      GI proph:    AC/ proph: aspirin  chewable 81 milliGRAM(s) Oral daily  heparin   Injectable 5000 Unit(s) SubCutaneous every 8 hours    ABx: meropenem  IVPB 1000 milliGRAM(s) IV Intermittent every 12 hours      PHYSICAL EXAM:  GENERAL: NAD, well-appearing  CHEST/LUNG: Clear to auscultation bilaterally  HEART: Regular rate and rhythm  ABDOMEN: Soft, Nontender, Nondistended;   EXTREMITIES:  R BKA incision intact, no signs of active bleeding, infection.       LABS  Labs:  CAPILLARY BLOOD GLUCOSE      POCT Blood Glucose.: 243 mg/dL (09 Nov 2023 20:57)  POCT Blood Glucose.: 181 mg/dL (09 Nov 2023 16:35)  POCT Blood Glucose.: 125 mg/dL (09 Nov 2023 10:22)  POCT Blood Glucose.: 114 mg/dL (09 Nov 2023 06:45)                          9.8    8.78  )-----------( 532      ( 08 Nov 2023 17:05 )             31.0         11-08    140  |  100  |  37<H>  ----------------------------<  171<H>  4.9   |  27  |  2.6<H>          LFTs:             7.3  | 0.3  | 15       ------------------[193     ( 08 Nov 2023 17:05 )  3.9  | x    | 17          Lipase:x      Amylase:x             Coags:     11.00  ----< 0.97    ( 08 Nov 2023 17:05 )     35.9                Urinalysis Basic - ( 08 Nov 2023 17:05 )    Color: x / Appearance: x / SG: x / pH: x  Gluc: 171 mg/dL / Ketone: x  / Bili: x / Urobili: x   Blood: x / Protein: x / Nitrite: x   Leuk Esterase: x / RBC: x / WBC x   Sq Epi: x / Non Sq Epi: x / Bacteria: x            RADIOLOGY & ADDITIONAL TESTS:    no new imaging

## 2023-11-10 NOTE — CONSULT NOTE ADULT - NS ATTEND AMEND GEN_ALL_CORE FT
58M w/ hx of DM, CKD4, charcot foot & PAD s/p right BKA. Post op course c/b urinary retention, pt has been refusing indwelling catheter. CIC residuals have been high ~800s. Pt should have CIC at least q6h, recommend flomax qhs. Fu renal recs.

## 2023-11-10 NOTE — PHYSICAL THERAPY INITIAL EVALUATION ADULT - SPECIFY REASON(S)
Pt is in too much pain s/p R BKA, reports he did not sleep all night, the PCA pump is not providing him with relief. Reported 9.5/10 pain at this time. PT will attempt to follow-up in the afternoon.

## 2023-11-10 NOTE — CONSULT NOTE ADULT - ASSESSMENT
Patient  is a 58yoM with Charcot foot s/p placement of an external fixator on 9/28/23 which had to be removed on 10/19 due to infection, CKD (baseline Cr 2.5-3.5, on list for kidney transplant), and IDDM (A1c 9.9), PAD, s/p  peripheral angiogram on 10/23/2023 with balloon angioplasty of PTA and DCB of R SFA and PTA of R PT and R AT via R groin and R pedal access, anemia recently received 2u pRBCS, pt was dc from hospital on 10/24/23 and now returns to hospital as directed by his podiatrist Dr Benson for BKA.  Pt denies fevers or chills. Nephrology was consulted for DINESH       INCOMPLETE NOTE    Patient  is a 58yoM with Charcot foot s/p placement of an external fixator on 9/28/23 which had to be removed on 10/19 due to infection, CKD (baseline Cr 2.5-3.5, on list for kidney transplant), and IDDM (A1c 9.9), PAD, s/p  peripheral angiogram on 10/23/2023 with balloon angioplasty of PTA and DCB of R SFA and PTA of R PT and R AT via R groin and R pedal access, anemia recently received 2u pRBCS, pt was dc from hospital on 10/24/23 and now returns to hospital as directed by his podiatrist Dr Benson for BKA.  Pt denies fevers or chills.       CKD stage 4/ S/p right  BKA for gangrene / DM  - Anemia of CKD- Get iron studies   - Metabolic bone disease. Get Vitamin D, PTH and phosphorus levels.   - Patient retaining urine yesterday and today and s/p straight cath that revealed 1400 ml and 800 ml of urine each time. Will need straight cath or urbano if retaining chronically   - Urology consult and US kidney and bladder  - On renal transplant list   - Bicarb 19 probably from NS infusion . If repeat bicarbonate is less than 22 can consider  increasing sodium bicarbonate 1300 mg TID  - Goal - to stabilize GFR , not to worsen renal function  - Avoid nephrotoxic meds/NSAIDs/contrast , maintain hydration , BP control,  - Vascular surgery recommendations  appreciated   - C/w losartan for HTN  - Nephrology will follow     Patient  is a 58yoM with Charcot foot s/p placement of an external fixator on 9/28/23 which had to be removed on 10/19 due to infection, CKD (baseline Cr 2.5-3.5, on list for kidney transplant), and IDDM (A1c 9.9), PAD, s/p  peripheral angiogram on 10/23/2023 with balloon angioplasty of PTA and DCB of R SFA and PTA of R PT and R AT via R groin and R pedal access, anemia recently received 2u pRBCS, pt was dc from hospital on 10/24/23 and now returns to hospital as directed by his podiatrist Dr Benson for BKA.  Pt denies fevers or chills.       CKD stage 4/ S/p right  BKA for gangrene / DM  - Anemia of CKD- Get iron studies   - Metabolic bone disease. Get Vitamin D, PTH and phosphorus levels.   - Patient retaining urine yesterday and today and s/p straight cath that revealed 1400 ml and 800 ml of urine each time. Will need straight cath or urbano if retaining chronically   - Urology consult and US kidney and bladder   - On renal transplant list   - Bicarb 19 probably from NS infusion . If repeat bicarbonate is less than 22 can consider  increasing sodium bicarbonate 1300 mg TID  - Goal - to stabilize GFR , not to worsen renal function  - Avoid nephrotoxic meds/NSAIDs/contrast , maintain hydration , BP control,  - Vascular surgery recommendations  appreciated   - C/w losartan for HTN, when K<5.0  - Nephrology will follow

## 2023-11-10 NOTE — CONSULT NOTE ADULT - SUBJECTIVE AND OBJECTIVE BOX
Pt is a 57yo Male with PMH/PSH including DM, CKD4, charcot foot & PAD s/p multiple procedures within the last month p/w right foot gangrene s/p right BKA now POD#1.    UROLOGY consulted for urinary retention. Patient has been getting intermittently catheterized by RNs q8-12 hours with residuals >800ccs-1L. Patient states that at baseline he had difficulties urinating for the last 3-4 years - states that stream is slow to start, only voids small volumes every few hours. States that he used to see Dr. Gudino 5-6 years ago for erectile dysfunction and was receiving injections, but has not followed recently.    PAST MEDICAL & SURGICAL HISTORY:  DM (diabetes mellitus)  HTN (hypertension)  HLD (hyperlipidemia)  Herpes labialis  Anxiety  GERD (gastroesophageal reflux disease)  Kidney stones 20 years ago  Kidney disease stage 4  Chronic kidney disease, unspecified CKD stage  Diabetic Charcot foot  PAD (peripheral artery disease)  S/P foot surgery, right x 5 ( 2006 - 2013 )  Kidney stone - Removed by Laser x 2 ( 20 years ago )  H/O arthroscopy of right knee  Status post peripheral artery angioplasty    MEDICATIONS  (STANDING):  aspirin  chewable 81 milliGRAM(s) Oral daily  dextrose 50% Injectable 25 Gram(s) IV Push once  DULoxetine 40 milliGRAM(s) Oral every 12 hours  glucagon  Injectable 1 milliGRAM(s) IntraMuscular once  heparin   Injectable 5000 Unit(s) SubCutaneous every 8 hours  HYDROmorphone PCA (1 mG/mL) 30 milliLiter(s) PCA Continuous PCA Continuous  insulin glargine Injectable (LANTUS) 27 Unit(s) SubCutaneous every morning  insulin lispro (ADMELOG) corrective regimen sliding scale   SubCutaneous three times a day before meals  insulin lispro Injectable (ADMELOG) 9 Unit(s) SubCutaneous before breakfast  lactobacillus acidophilus 1 Tablet(s) Oral three times a day with meals  losartan 100 milliGRAM(s) Oral daily  magnesium oxide 400 milliGRAM(s) Oral three times a day with meals  meropenem  IVPB 1000 milliGRAM(s) IV Intermittent every 12 hours  NIFEdipine XL 90 milliGRAM(s) Oral daily  polyethylene glycol 3350 17 Gram(s) Oral daily  pregabalin 50 milliGRAM(s) Oral every 8 hours  senna 2 Tablet(s) Oral at bedtime  sodium bicarbonate 650 milliGRAM(s) Oral three times a day    MEDICATIONS  (PRN):  ALPRAZolam 0.5 milliGRAM(s) Oral four times a day PRN anxiety  dextrose Oral Gel 15 Gram(s) Oral once PRN Blood Glucose LESS THAN 70 milliGRAM(s)/deciliter  HYDROmorphone  Injectable 1 milliGRAM(s) IV Push every 4 hours PRN Severe Pain (7 - 10)  oxycodone    5 mG/acetaminophen 325 mG 2 Tablet(s) Oral every 4 hours PRN Moderate Pain (4 - 6)    Allergies  No Known Allergies    SOCIAL HISTORY: No illicit drug use    FAMILY HISTORY:  Family history of cancer in father (Father)  Lung    REVIEW OF SYSTEMS   [x] A ten-point review of systems was otherwise negative except as noted.    Vital Signs Last 24 Hrs  T(C): 37.1 (10 Nov 2023 08:27), Max: 37.2 (09 Nov 2023 23:16)  T(F): 98.7 (10 Nov 2023 08:27), Max: 98.9 (09 Nov 2023 23:16)  HR: 97 (10 Nov 2023 08:27) (58 - 97)  BP: 179/85 (10 Nov 2023 08:27) (110/59 - 188/90)  RR: 18 (10 Nov 2023 08:27) (18 - 19)    PHYSICAL EXAM:  GEN: NAD, awake and alert.  SKIN: Good color, non diaphoretic.  RESP: Non-labored breathing. No use of accessory muscles.  CARDIO: +S1/S2.  ABDO: Soft, NT/ND, no palpable bladder, no suprapubic tenderness.  BACK: No CVAT B/L.  : No indwelling urbano in place.  EXT: Right BKA, wrapped in knee immobilizer.    I&O's Summary  09 Nov 2023 07:01  -  10 Nov 2023 07:00  --------------------------------------------------------  IN: 0 mL / OUT: 2700 mL / NET: -2700 mL    LABS:             9.0    10.54 )-----------( 504      ( 10 Nov 2023 05:53 )             29.3     11-10  133<L>  |  94<L>  |  36<H>  ----------------------------<  434<H>  5.3<H>   |  19  |  2.6<H>    Ca    8.9      10 Nov 2023 05:53  Phos  4.5     11-10  Mg     2.2     11-10    TPro  7.0  /  Alb  3.6  /  TBili  0.4  /  DBili  x   /  AST  10  /  ALT  11  /  AlkPhos  178<H>  11-10    PT/INR - ( 08 Nov 2023 17:05 )   PT: 11.00 sec;   INR: 0.97 ratio    PTT - ( 08 Nov 2023 17:05 )  PTT:35.9 sec  Urinalysis (10.16.23 @ 02:56)   Glucose Qualitative, Urine: Negative mg/dL  Blood, Urine: Negative  pH Urine: 6.0  Color: Yellow  Urine Appearance: Clear  Bilirubin: Negative  Ketone - Urine: Negative mg/dL  Specific Gravity: 1.010  Protein, Urine: 300 mg/dL  Urobilinogen: 1.0 mg/dL  Nitrite: Negative  Leukocyte Esterase Concentration: Negative  RADIOLOGY & ADDITIONAL STUDIES:  RBUS pending

## 2023-11-10 NOTE — CONSULT NOTE ADULT - SUBJECTIVE AND OBJECTIVE BOX
NEPHROLOGY CONSULTATION NOTE    Patient  is a 58yoM with Charcot foot s/p placement of an external fixator on 9/28/23 which had to be removed on 10/19 due to infection, CKD (baseline Cr 2.5-3.5, on list for kidney transplant), and IDDM (A1c 9.9), PAD, s/p  peripheral angiogram on 10/23/2023 with balloon angioplasty of PTA and DCB of R SFA and PTA of R PT and R AT via R groin and R pedal access, anemia recently received 2u pRBCS, pt was dc from hospital on 10/24/23 and now returns to hospital as directed by his podiatrist Dr Benson for BKA.  Pt denies fevers or chills. Nephrology was consulted for DINESH       PAST MEDICAL & SURGICAL HISTORY:  DM (diabetes mellitus)  HTN (hypertension)  HLD (hyperlipidemia)  Herpes labialis  Anxiety  GERD (gastroesophageal reflux disease)  Kidney stones  20 years ago  Kidney disease  stage 4  Chronic kidney disease, unspecified CKD stage  Diabetic Charcot foot  PAD (peripheral artery disease)  S/P foot surgery, right  x 5 ( 2006 - 2013 )  Kidney stone  Removed by Laser x 2 ( 20 years ago )  H/O arthroscopy of right knee  Status post peripheral artery angioplasty    Allergies:  No Known Allergies    Home Medications Reviewed  Hospital Medications:   MEDICATIONS  (STANDING):  aspirin  chewable 81 milliGRAM(s) Oral daily  dextrose 50% Injectable 25 Gram(s) IV Push once  glucagon  Injectable 1 milliGRAM(s) IntraMuscular once  heparin   Injectable 5000 Unit(s) SubCutaneous every 8 hours  HYDROmorphone PCA (1 mG/mL) 30 milliLiter(s) PCA Continuous PCA Continuous  insulin glargine Injectable (LANTUS) 27 Unit(s) SubCutaneous every morning  insulin lispro (ADMELOG) corrective regimen sliding scale   SubCutaneous three times a day before meals  insulin lispro Injectable (ADMELOG) 9 Unit(s) SubCutaneous before breakfast  lactobacillus acidophilus 1 Tablet(s) Oral three times a day with meals  losartan 100 milliGRAM(s) Oral daily  magnesium oxide 400 milliGRAM(s) Oral three times a day with meals  meropenem  IVPB 1000 milliGRAM(s) IV Intermittent every 12 hours  NIFEdipine XL 90 milliGRAM(s) Oral daily  sodium bicarbonate 650 milliGRAM(s) Oral three times a day    SOCIAL HISTORY:  Denies ETOH,Smoking,   FAMILY HISTORY:  Family history of cancer in father (Father)  Lung        REVIEW OF SYSTEMS:  CONSTITUTIONAL: No weakness, fevers or chills  EYES/ENT: No visual changes;  No vertigo or throat pain   NECK: No pain or stiffness  RESPIRATORY: No cough, wheezing, hemoptysis; No shortness of breath  CARDIOVASCULAR: No chest pain or palpitations.  GASTROINTESTINAL: No abdominal or epigastric pain. No nausea, vomiting, or hematemesis; No diarrhea or constipation. No melena or hematochezia.  GENITOURINARY: No dysuria, frequency, foamy urine, urinary urgency, incontinence or hematuria  NEUROLOGICAL: No numbness or weakness  SKIN: No itching, burning, rashes, or lesions   VASCULAR: No bilateral lower extremity edema.   All other review of systems is negative unless indicated above.    VITALS:  Vital Signs Last 24 Hrs  T(C): 37.1 (10 Nov 2023 08:27), Max: 37.2 (09 Nov 2023 23:16)  T(F): 98.7 (10 Nov 2023 08:27), Max: 98.9 (09 Nov 2023 23:16)  HR: 97 (10 Nov 2023 08:27) (58 - 97)  BP: 179/85 (10 Nov 2023 08:27) (110/59 - 188/90)  BP(mean): --  RR: 18 (10 Nov 2023 08:27) (18 - 21)  SpO2: 98% (09 Nov 2023 11:00) (98% - 100%)    Parameters below as of 09 Nov 2023 11:00  Patient On (Oxygen Delivery Method): room air        11-09 @ 07:01  -  11-10 @ 07:00  --------------------------------------------------------  IN: 0 mL / OUT: 2700 mL / NET: -2700 mL        PHYSICAL EXAM:  Constitutional: NAD  HEENT: anicteric sclera, oropharynx clear, MMM  Neck: No JVD  Respiratory: CTAB, no wheezes, rales or rhonchi  Cardiovascular: S1, S2, RRR  Gastrointestinal: BS+, soft, NT/ND  Extremities: No cyanosis or clubbing. No peripheral edema  Neurological: A/O x 3, no focal deficits  Psychiatric: Normal mood, normal affect  : No CVA tenderness. No urbano.   Skin: No rashes  Vascular Access:    LABS:  11-10    133<L>  |  94<L>  |  36<H>  ----------------------------<  434<H>  5.3<H>   |  19  |  2.6<H>    Ca    8.9      10 Nov 2023 05:53  Phos  4.5     11-10  Mg     2.2     11-10    TPro  7.0  /  Alb  3.6  /  TBili  0.4  /  DBili      /  AST  10  /  ALT  11  /  AlkPhos  178<H>  11-10    Creatinine Trend: 2.6 <--, 2.6 <--, 2.7 <--, 2.7 <--, 3.2 <--, 3.0 <--, 3.3 <--, 3.0 <--, 3.1 <--, 2.8 <--, 2.8 <--, 2.9 <--, 2.5 <--, 2.5 <--, 2.9 <--, 2.7 <--, 2.9 <--, 2.8 <--, 2.9 <--, 3.0 <--, 3.4 <--, 3.5 <--                        9.0    10.54 )-----------( 504      ( 10 Nov 2023 05:53 )             29.3     Urine Studies:  Urinalysis Basic - ( 10 Nov 2023 05:53 )    Color:  / Appearance:  / SG:  / pH:   Gluc: 434 mg/dL / Ketone:   / Bili:  / Urobili:    Blood:  / Protein:  / Nitrite:    Leuk Esterase:  / RBC:  / WBC    Sq Epi:  / Non Sq Epi:  / Bacteria:           10-23 @ 23:10  9.0  103  --        RADIOLOGY & ADDITIONAL STUDIES:                 NEPHROLOGY CONSULTATION NOTE    Patient  is a 58yoM with Charcot foot s/p placement of an external fixator on 9/28/23 which had to be removed on 10/19 due to infection, CKD (baseline Cr 2.5-3.5, on list for kidney transplant), and IDDM (A1c 9.9), PAD, s/p  peripheral angiogram on 10/23/2023 with balloon angioplasty of PTA and DCB of R SFA and PTA of R PT and R AT via R groin and R pedal access, anemia recently received 2u pRBCS, pt was dc from hospital on 10/24/23 and now returns to hospital as directed by his podiatrist Dr Benson for BKA.  Pt denies fevers or chills.       PAST MEDICAL & SURGICAL HISTORY:  DM (diabetes mellitus)  HTN (hypertension)  HLD (hyperlipidemia)  Herpes labialis  Anxiety  GERD (gastroesophageal reflux disease)  Kidney stones  20 years ago  Kidney disease  stage 4  Chronic kidney disease, unspecified CKD stage  Diabetic Charcot foot  PAD (peripheral artery disease)  S/P foot surgery, right  x 5 ( 2006 - 2013 )  Kidney stone  Removed by Laser x 2 ( 20 years ago )  H/O arthroscopy of right knee  Status post peripheral artery angioplasty    Allergies:  No Known Allergies    Home Medications Reviewed  Hospital Medications:   MEDICATIONS  (STANDING):  aspirin  chewable 81 milliGRAM(s) Oral daily  dextrose 50% Injectable 25 Gram(s) IV Push once  glucagon  Injectable 1 milliGRAM(s) IntraMuscular once  heparin   Injectable 5000 Unit(s) SubCutaneous every 8 hours  HYDROmorphone PCA (1 mG/mL) 30 milliLiter(s) PCA Continuous PCA Continuous  insulin glargine Injectable (LANTUS) 27 Unit(s) SubCutaneous every morning  insulin lispro (ADMELOG) corrective regimen sliding scale   SubCutaneous three times a day before meals  insulin lispro Injectable (ADMELOG) 9 Unit(s) SubCutaneous before breakfast  lactobacillus acidophilus 1 Tablet(s) Oral three times a day with meals  losartan 100 milliGRAM(s) Oral daily  magnesium oxide 400 milliGRAM(s) Oral three times a day with meals  meropenem  IVPB 1000 milliGRAM(s) IV Intermittent every 12 hours  NIFEdipine XL 90 milliGRAM(s) Oral daily  sodium bicarbonate 650 milliGRAM(s) Oral three times a day    SOCIAL HISTORY:  Denies ETOH,Smoking,   FAMILY HISTORY:  Family history of cancer in father (Father)  Lung        REVIEW OF SYSTEMS:    RESPIRATORY: No cough, wheezing, hemoptysis; No shortness of breath  CARDIOVASCULAR: No chest pain or palpitations.  GASTROINTESTINAL: No abdominal or epigastric pain. No nausea, vomiting, or hematemesis; No diarrhea or constipation. No melena or hematochezia.  GENITOURINARY: Urinary retention   NEUROLOGICAL: No numbness or weakness  SKIN: No itching, burning, rashes, or lesions   VASCULAR: Right BKA      VITALS:  Vital Signs Last 24 Hrs  T(C): 37.1 (10 Nov 2023 08:27), Max: 37.2 (09 Nov 2023 23:16)  T(F): 98.7 (10 Nov 2023 08:27), Max: 98.9 (09 Nov 2023 23:16)  HR: 97 (10 Nov 2023 08:27) (58 - 97)  BP: 179/85 (10 Nov 2023 08:27) (110/59 - 188/90)  BP(mean): --  RR: 18 (10 Nov 2023 08:27) (18 - 21)  SpO2: 98% (09 Nov 2023 11:00) (98% - 100%)    Parameters below as of 09 Nov 2023 11:00  Patient On (Oxygen Delivery Method): room air        11-09 @ 07:01  -  11-10 @ 07:00  --------------------------------------------------------  IN: 0 mL / OUT: 2700 mL / NET: -2700 mL        PHYSICAL EXAM:    Respiratory: CTAB, no wheezes, rales or rhonchi  Cardiovascular: S1, S2, RRR  Gastrointestinal: BS+, soft, NT/ND  Extremities: Right leg BKA  : No CVA tenderness. No urbano.   Skin: No rashes    LABS:  11-10    133<L>  |  94<L>  |  36<H>  ----------------------------<  434<H>  5.3<H>   |  19  |  2.6<H>    Ca    8.9      10 Nov 2023 05:53  Phos  4.5     11-10  Mg     2.2     11-10    TPro  7.0  /  Alb  3.6  /  TBili  0.4  /  DBili      /  AST  10  /  ALT  11  /  AlkPhos  178<H>  11-10    Creatinine Trend: 2.6 <--, 2.6 <--, 2.7 <--, 2.7 <--, 3.2 <--, 3.0 <--, 3.3 <--, 3.0 <--, 3.1 <--, 2.8 <--, 2.8 <--, 2.9 <--, 2.5 <--, 2.5 <--, 2.9 <--, 2.7 <--, 2.9 <--, 2.8 <--, 2.9 <--, 3.0 <--, 3.4 <--, 3.5 <--                        9.0    10.54 )-----------( 504      ( 10 Nov 2023 05:53 )             29.3     Urine Studies:  Urinalysis Basic - ( 10 Nov 2023 05:53 )    Color:  / Appearance:  / SG:  / pH:   Gluc: 434 mg/dL / Ketone:   / Bili:  / Urobili:    Blood:  / Protein:  / Nitrite:    Leuk Esterase:  / RBC:  / WBC    Sq Epi:  / Non Sq Epi:  / Bacteria:           10-23 @ 23:10  9.0  103  --

## 2023-11-10 NOTE — PROGRESS NOTE ADULT - ASSESSMENT
58-year-old male with Charcot foot s/p placement of an external fixator on 9/28/23 which had to be removed on 10/19 due to infection, CKD (baseline Cr 2.5-3.5, on list for kidney transplant), and IDDM (A1c 9.9), PAD, s/p  peripheral angiogram on 10/23/2023 with balloon angioplasty of PTA and DCB of R SFA and PTA of R PT and R AT via R groin and R pedal access, anemia recently received 2u pRBCS, pt was dc from hospital on 10/24/23 and now returns to hospital as directed by his podiatrist Dr Benson for TMA which will be done on 10/27/23 as pt has non healing wound and gangrene of the right foot.     #gangrene of the right foot.   # PAD   - s/p multiple post operative amputation, revisions   - continue monitoring   - s/p BKA  - still in severe pain s/p BKA with pump - Pain management    #Elevated Glucose  - Missed lantus yesterday due to OR  -  today AM - got lantus today - will continue to monitor    #Hx of CKD 4, nephrolithiasis, on renal transplant list  - nephro consult    #PAD, s/p angioplasty  -c/w asa and plavix    #anemia  -s/p transfusion  -monitor    #HTN  -low sodium diet  -monitor bp  -cont meds from home nifedipine and cozaar    #DM  -ada diet  -monitor BS  -cont meds from home basal 20u daily  and lispro 8 qac  -insulin coverage prn eleavted blood sugar    #CKD  -creatinine stable at 2.9 today    #anxiety disorder  -xanax prn    #DVT prophylaxis: sq heparin  #Diet: consistent carb and DASH  #Activity: IAT    Pending: Pain management consult, nephro consult

## 2023-11-10 NOTE — PROGRESS NOTE ADULT - SUBJECTIVE AND OBJECTIVE BOX
24H events:    Patient is a 58y old Male who presents with a chief complaint of Non healing R TMA (10 Nov 2023 02:07)    Primary diagnosis of Gangrene of foot    Today is hospital day 15d. This morning patient was seen and examined at bedside, resting comfortably in bed.    No acute or major events overnight.      PAST MEDICAL & SURGICAL HISTORY  DM (diabetes mellitus)    HTN (hypertension)    HLD (hyperlipidemia)    Herpes labialis    Anxiety    GERD (gastroesophageal reflux disease)    Kidney stones  20 years ago    Kidney disease  stage 4    Chronic kidney disease, unspecified CKD stage    Diabetic Charcot foot    PAD (peripheral artery disease)    S/P foot surgery, right  x 5 ( 2006 - 2013 )    Kidney stone  Removed by Laser x 2 ( 20 years ago )    H/O arthroscopy of right knee    Status post peripheral artery angioplasty      SOCIAL HISTORY:  Social History:      ALLERGIES:  No Known Allergies    MEDICATIONS:  STANDING MEDICATIONS  aspirin  chewable 81 milliGRAM(s) Oral daily  dextrose 50% Injectable 25 Gram(s) IV Push once  glucagon  Injectable 1 milliGRAM(s) IntraMuscular once  heparin   Injectable 5000 Unit(s) SubCutaneous every 8 hours  HYDROmorphone PCA (1 mG/mL) 30 milliLiter(s) PCA Continuous PCA Continuous  insulin glargine Injectable (LANTUS) 27 Unit(s) SubCutaneous every morning  insulin lispro (ADMELOG) corrective regimen sliding scale   SubCutaneous three times a day before meals  insulin lispro Injectable (ADMELOG) 9 Unit(s) SubCutaneous before breakfast  lactobacillus acidophilus 1 Tablet(s) Oral three times a day with meals  losartan 100 milliGRAM(s) Oral daily  magnesium oxide 400 milliGRAM(s) Oral three times a day with meals  meropenem  IVPB 1000 milliGRAM(s) IV Intermittent every 12 hours  NIFEdipine XL 90 milliGRAM(s) Oral daily  sodium bicarbonate 650 milliGRAM(s) Oral three times a day    PRN MEDICATIONS  ALPRAZolam 0.5 milliGRAM(s) Oral four times a day PRN  dextrose Oral Gel 15 Gram(s) Oral once PRN  HYDROmorphone  Injectable 1 milliGRAM(s) IV Push every 4 hours PRN  oxycodone    5 mG/acetaminophen 325 mG 2 Tablet(s) Oral every 4 hours PRN  oxycodone    5 mG/acetaminophen 325 mG 1 Tablet(s) Oral every 4 hours PRN  oxyCODONE    IR 2.5 milliGRAM(s) Oral every 4 hours PRN    VITALS:   T(F): 98.7  HR: 97  BP: 179/85  RR: 18  SpO2: 98%    PHYSICAL EXAM:  GENERAL:   ( x ) NAD, lying in bed comfortably     (  ) obtunded     (  ) lethargic     (  ) somnolent    HEAD:   ( x ) Atraumatic     (  ) hematoma     (  ) laceration (specify location:       )     NECK:  (  ) Supple     (  ) neck stiffness     (  ) nuchal rigidity     (  )  no JVD     (  ) JVD present ( -- cm)    HEART:  Rate -->     ( x ) normal rate     (  ) bradycardic     (  ) tachycardic  Rhythm -->     ( x ) regular     (  ) regularly irregular     (  ) irregularly irregular  Murmurs -->     ( x ) normal s1s2     (  ) systolic murmur     (  ) diastolic murmur     (  ) continuous murmur      (  ) S3 present     (  ) S4 present    LUNGS:   ( x )Unlabored respirations     (  ) tachypnea  ( x ) B/L air entry     (  ) decreased breath sounds in:  (location     )    (  ) no adventitious sound     (  ) crackles     (  ) wheezing      (  ) rhonchi      (specify location:       )  (  ) chest wall tenderness (specify location:       )    ABDOMEN:   ( x ) Soft     (  ) tense   |   (  ) nondistended     (  ) distended   |   (  ) +BS     (  ) hypoactive bowel sounds     (  ) hyperactive bowel sounds  (  ) nontender     (  ) RUQ tenderness     (  ) RLQ tenderness     (  ) LLQ tenderness     (  ) epigastric tenderness     (  ) diffuse tenderness  (  ) Splenomegaly      (  ) Hepatomegaly      (  ) Jaundice     (  ) ecchymosis     EXTREMITIES:  ( x ) Normal     (  ) Rash     (  ) ecchymosis     (  ) varicose veins      (  ) pitting edema     (  ) non-pitting edema   (  ) ulceration     (  ) gangrene:     (location:     )    NERVOUS SYSTEM:    ( x ) A&Ox3     (  ) confused     (  ) lethargic  CN II-XII:     (  ) Intact     (  ) deficits found     (Specify:     )   Upper extremities:     (  ) no sensorimotor deficits     (  ) weakness     (  ) loss of proprioception/vibration     (  ) loss of touch/temperature (specify:    )  Lower extremities:     (  ) no sensorimotor deficits     (  ) weakness     (  ) loss of proprioception/vibration     (  ) loss of touch/temperature (specify:    )      (  ) Indwelling [place] Catheter:   Date insterted:    Reason (  ) Critical illness     (  ) urinary retention    (  ) Accurate Ins/Outs Monitoring     (  ) CMO patient    (  ) Central Line:   Date inserted:  Location: (  ) Right IJ     (  ) Left IJ     (  ) Right Fem     (  ) Left Fem    (  ) SPC        (  ) pigtail       (  ) PEG tube       (  ) colostomy       (  ) jejunostomy  (  ) U-Dall    LABS:                        9.0    10.54 )-----------( 504      ( 10 Nov 2023 05:53 )             29.3     11-10    133<L>  |  94<L>  |  36<H>  ----------------------------<  434<H>  5.3<H>   |  19  |  2.6<H>    Ca    8.9      10 Nov 2023 05:53  Phos  4.5     11-10  Mg     2.2     11-10    TPro  7.0  /  Alb  3.6  /  TBili  0.4  /  DBili  x   /  AST  10  /  ALT  11  /  AlkPhos  178<H>  11-10    PT/INR - ( 08 Nov 2023 17:05 )   PT: 11.00 sec;   INR: 0.97 ratio         PTT - ( 08 Nov 2023 17:05 )  PTT:35.9 sec  Urinalysis Basic - ( 10 Nov 2023 05:53 )    Color: x / Appearance: x / SG: x / pH: x  Gluc: 434 mg/dL / Ketone: x  / Bili: x / Urobili: x   Blood: x / Protein: x / Nitrite: x   Leuk Esterase: x / RBC: x / WBC x   Sq Epi: x / Non Sq Epi: x / Bacteria: x                RADIOLOGY:

## 2023-11-11 LAB
ALBUMIN SERPL ELPH-MCNC: 3.2 G/DL — LOW (ref 3.5–5.2)
ALBUMIN SERPL ELPH-MCNC: 3.2 G/DL — LOW (ref 3.5–5.2)
ALP SERPL-CCNC: 156 U/L — HIGH (ref 30–115)
ALP SERPL-CCNC: 156 U/L — HIGH (ref 30–115)
ALT FLD-CCNC: 9 U/L — SIGNIFICANT CHANGE UP (ref 0–41)
ALT FLD-CCNC: 9 U/L — SIGNIFICANT CHANGE UP (ref 0–41)
ANION GAP SERPL CALC-SCNC: 11 MMOL/L — SIGNIFICANT CHANGE UP (ref 7–14)
ANION GAP SERPL CALC-SCNC: 11 MMOL/L — SIGNIFICANT CHANGE UP (ref 7–14)
AST SERPL-CCNC: 12 U/L — SIGNIFICANT CHANGE UP (ref 0–41)
AST SERPL-CCNC: 12 U/L — SIGNIFICANT CHANGE UP (ref 0–41)
BASOPHILS # BLD AUTO: 0.06 K/UL — SIGNIFICANT CHANGE UP (ref 0–0.2)
BASOPHILS # BLD AUTO: 0.06 K/UL — SIGNIFICANT CHANGE UP (ref 0–0.2)
BASOPHILS NFR BLD AUTO: 0.7 % — SIGNIFICANT CHANGE UP (ref 0–1)
BASOPHILS NFR BLD AUTO: 0.7 % — SIGNIFICANT CHANGE UP (ref 0–1)
BILIRUB SERPL-MCNC: 0.3 MG/DL — SIGNIFICANT CHANGE UP (ref 0.2–1.2)
BILIRUB SERPL-MCNC: 0.3 MG/DL — SIGNIFICANT CHANGE UP (ref 0.2–1.2)
BUN SERPL-MCNC: 42 MG/DL — HIGH (ref 10–20)
BUN SERPL-MCNC: 42 MG/DL — HIGH (ref 10–20)
CALCIUM SERPL-MCNC: 9.3 MG/DL — SIGNIFICANT CHANGE UP (ref 8.4–10.5)
CALCIUM SERPL-MCNC: 9.3 MG/DL — SIGNIFICANT CHANGE UP (ref 8.4–10.5)
CHLORIDE SERPL-SCNC: 97 MMOL/L — LOW (ref 98–110)
CHLORIDE SERPL-SCNC: 97 MMOL/L — LOW (ref 98–110)
CO2 SERPL-SCNC: 26 MMOL/L — SIGNIFICANT CHANGE UP (ref 17–32)
CO2 SERPL-SCNC: 26 MMOL/L — SIGNIFICANT CHANGE UP (ref 17–32)
CREAT SERPL-MCNC: 2.5 MG/DL — HIGH (ref 0.7–1.5)
CREAT SERPL-MCNC: 2.5 MG/DL — HIGH (ref 0.7–1.5)
EGFR: 29 ML/MIN/1.73M2 — LOW
EGFR: 29 ML/MIN/1.73M2 — LOW
EOSINOPHIL # BLD AUTO: 0.28 K/UL — SIGNIFICANT CHANGE UP (ref 0–0.7)
EOSINOPHIL # BLD AUTO: 0.28 K/UL — SIGNIFICANT CHANGE UP (ref 0–0.7)
EOSINOPHIL NFR BLD AUTO: 3.3 % — SIGNIFICANT CHANGE UP (ref 0–8)
EOSINOPHIL NFR BLD AUTO: 3.3 % — SIGNIFICANT CHANGE UP (ref 0–8)
FERRITIN SERPL-MCNC: 436 NG/ML — HIGH (ref 30–400)
FERRITIN SERPL-MCNC: 436 NG/ML — HIGH (ref 30–400)
GLUCOSE BLDC GLUCOMTR-MCNC: 197 MG/DL — HIGH (ref 70–99)
GLUCOSE BLDC GLUCOMTR-MCNC: 197 MG/DL — HIGH (ref 70–99)
GLUCOSE BLDC GLUCOMTR-MCNC: 251 MG/DL — HIGH (ref 70–99)
GLUCOSE BLDC GLUCOMTR-MCNC: 251 MG/DL — HIGH (ref 70–99)
GLUCOSE BLDC GLUCOMTR-MCNC: 59 MG/DL — LOW (ref 70–99)
GLUCOSE BLDC GLUCOMTR-MCNC: 59 MG/DL — LOW (ref 70–99)
GLUCOSE BLDC GLUCOMTR-MCNC: 98 MG/DL — SIGNIFICANT CHANGE UP (ref 70–99)
GLUCOSE BLDC GLUCOMTR-MCNC: 98 MG/DL — SIGNIFICANT CHANGE UP (ref 70–99)
GLUCOSE SERPL-MCNC: 187 MG/DL — HIGH (ref 70–99)
GLUCOSE SERPL-MCNC: 187 MG/DL — HIGH (ref 70–99)
HCT VFR BLD CALC: 26.7 % — LOW (ref 42–52)
HCT VFR BLD CALC: 26.7 % — LOW (ref 42–52)
HGB BLD-MCNC: 8.5 G/DL — LOW (ref 14–18)
HGB BLD-MCNC: 8.5 G/DL — LOW (ref 14–18)
IMM GRANULOCYTES NFR BLD AUTO: 0.4 % — HIGH (ref 0.1–0.3)
IMM GRANULOCYTES NFR BLD AUTO: 0.4 % — HIGH (ref 0.1–0.3)
LYMPHOCYTES # BLD AUTO: 1.61 K/UL — SIGNIFICANT CHANGE UP (ref 1.2–3.4)
LYMPHOCYTES # BLD AUTO: 1.61 K/UL — SIGNIFICANT CHANGE UP (ref 1.2–3.4)
LYMPHOCYTES # BLD AUTO: 18.8 % — LOW (ref 20.5–51.1)
LYMPHOCYTES # BLD AUTO: 18.8 % — LOW (ref 20.5–51.1)
MAGNESIUM SERPL-MCNC: 2.2 MG/DL — SIGNIFICANT CHANGE UP (ref 1.8–2.4)
MAGNESIUM SERPL-MCNC: 2.2 MG/DL — SIGNIFICANT CHANGE UP (ref 1.8–2.4)
MCHC RBC-ENTMCNC: 26.8 PG — LOW (ref 27–31)
MCHC RBC-ENTMCNC: 26.8 PG — LOW (ref 27–31)
MCHC RBC-ENTMCNC: 31.8 G/DL — LOW (ref 32–37)
MCHC RBC-ENTMCNC: 31.8 G/DL — LOW (ref 32–37)
MCV RBC AUTO: 84.2 FL — SIGNIFICANT CHANGE UP (ref 80–94)
MCV RBC AUTO: 84.2 FL — SIGNIFICANT CHANGE UP (ref 80–94)
MONOCYTES # BLD AUTO: 0.59 K/UL — SIGNIFICANT CHANGE UP (ref 0.1–0.6)
MONOCYTES # BLD AUTO: 0.59 K/UL — SIGNIFICANT CHANGE UP (ref 0.1–0.6)
MONOCYTES NFR BLD AUTO: 6.9 % — SIGNIFICANT CHANGE UP (ref 1.7–9.3)
MONOCYTES NFR BLD AUTO: 6.9 % — SIGNIFICANT CHANGE UP (ref 1.7–9.3)
NEUTROPHILS # BLD AUTO: 5.98 K/UL — SIGNIFICANT CHANGE UP (ref 1.4–6.5)
NEUTROPHILS # BLD AUTO: 5.98 K/UL — SIGNIFICANT CHANGE UP (ref 1.4–6.5)
NEUTROPHILS NFR BLD AUTO: 69.9 % — SIGNIFICANT CHANGE UP (ref 42.2–75.2)
NEUTROPHILS NFR BLD AUTO: 69.9 % — SIGNIFICANT CHANGE UP (ref 42.2–75.2)
NRBC # BLD: 0 /100 WBCS — SIGNIFICANT CHANGE UP (ref 0–0)
NRBC # BLD: 0 /100 WBCS — SIGNIFICANT CHANGE UP (ref 0–0)
PHOSPHATE SERPL-MCNC: 3.3 MG/DL — SIGNIFICANT CHANGE UP (ref 2.1–4.9)
PHOSPHATE SERPL-MCNC: 3.3 MG/DL — SIGNIFICANT CHANGE UP (ref 2.1–4.9)
PLATELET # BLD AUTO: 481 K/UL — HIGH (ref 130–400)
PLATELET # BLD AUTO: 481 K/UL — HIGH (ref 130–400)
PMV BLD: 8.6 FL — SIGNIFICANT CHANGE UP (ref 7.4–10.4)
PMV BLD: 8.6 FL — SIGNIFICANT CHANGE UP (ref 7.4–10.4)
POTASSIUM SERPL-MCNC: 4.6 MMOL/L — SIGNIFICANT CHANGE UP (ref 3.5–5)
POTASSIUM SERPL-MCNC: 4.6 MMOL/L — SIGNIFICANT CHANGE UP (ref 3.5–5)
POTASSIUM SERPL-SCNC: 4.6 MMOL/L — SIGNIFICANT CHANGE UP (ref 3.5–5)
POTASSIUM SERPL-SCNC: 4.6 MMOL/L — SIGNIFICANT CHANGE UP (ref 3.5–5)
PROT SERPL-MCNC: 6.7 G/DL — SIGNIFICANT CHANGE UP (ref 6–8)
PROT SERPL-MCNC: 6.7 G/DL — SIGNIFICANT CHANGE UP (ref 6–8)
PTH-INTACT FLD-MCNC: 81 PG/ML — HIGH (ref 15–65)
PTH-INTACT FLD-MCNC: 81 PG/ML — HIGH (ref 15–65)
RBC # BLD: 3.17 M/UL — LOW (ref 4.7–6.1)
RBC # BLD: 3.17 M/UL — LOW (ref 4.7–6.1)
RBC # FLD: 17.3 % — HIGH (ref 11.5–14.5)
RBC # FLD: 17.3 % — HIGH (ref 11.5–14.5)
SODIUM SERPL-SCNC: 134 MMOL/L — LOW (ref 135–146)
SODIUM SERPL-SCNC: 134 MMOL/L — LOW (ref 135–146)
VIT D25+D1,25 OH+D1,25 PNL SERPL-MCNC: 39.4 PG/ML — SIGNIFICANT CHANGE UP (ref 19.9–79.3)
VIT D25+D1,25 OH+D1,25 PNL SERPL-MCNC: 39.4 PG/ML — SIGNIFICANT CHANGE UP (ref 19.9–79.3)
WBC # BLD: 8.55 K/UL — SIGNIFICANT CHANGE UP (ref 4.8–10.8)
WBC # BLD: 8.55 K/UL — SIGNIFICANT CHANGE UP (ref 4.8–10.8)
WBC # FLD AUTO: 8.55 K/UL — SIGNIFICANT CHANGE UP (ref 4.8–10.8)
WBC # FLD AUTO: 8.55 K/UL — SIGNIFICANT CHANGE UP (ref 4.8–10.8)

## 2023-11-11 PROCEDURE — 99222 1ST HOSP IP/OBS MODERATE 55: CPT

## 2023-11-11 PROCEDURE — 99233 SBSQ HOSP IP/OBS HIGH 50: CPT

## 2023-11-11 RX ORDER — NIFEDIPINE 30 MG
30 TABLET, EXTENDED RELEASE 24 HR ORAL ONCE
Refills: 0 | Status: COMPLETED | OUTPATIENT
Start: 2023-11-11 | End: 2023-11-11

## 2023-11-11 RX ORDER — SODIUM ZIRCONIUM CYCLOSILICATE 10 G/10G
10 POWDER, FOR SUSPENSION ORAL ONCE
Refills: 0 | Status: COMPLETED | OUTPATIENT
Start: 2023-11-11 | End: 2023-11-11

## 2023-11-11 RX ORDER — NIFEDIPINE 30 MG
120 TABLET, EXTENDED RELEASE 24 HR ORAL DAILY
Refills: 0 | Status: DISCONTINUED | OUTPATIENT
Start: 2023-11-12 | End: 2023-11-15

## 2023-11-11 RX ADMIN — MAGNESIUM OXIDE 400 MG ORAL TABLET 400 MILLIGRAM(S): 241.3 TABLET ORAL at 07:55

## 2023-11-11 RX ADMIN — Medication 81 MILLIGRAM(S): at 11:47

## 2023-11-11 RX ADMIN — Medication 1 TABLET(S): at 07:55

## 2023-11-11 RX ADMIN — Medication 650 MILLIGRAM(S): at 21:23

## 2023-11-11 RX ADMIN — HYDROMORPHONE HYDROCHLORIDE 30 MILLILITER(S): 2 INJECTION INTRAMUSCULAR; INTRAVENOUS; SUBCUTANEOUS at 07:17

## 2023-11-11 RX ADMIN — SODIUM ZIRCONIUM CYCLOSILICATE 10 GRAM(S): 10 POWDER, FOR SUSPENSION ORAL at 07:58

## 2023-11-11 RX ADMIN — Medication 650 MILLIGRAM(S): at 05:52

## 2023-11-11 RX ADMIN — Medication 50 MILLIGRAM(S): at 13:37

## 2023-11-11 RX ADMIN — Medication 1 TABLET(S): at 17:01

## 2023-11-11 RX ADMIN — Medication 6: at 07:53

## 2023-11-11 RX ADMIN — Medication 30 MILLIGRAM(S): at 11:46

## 2023-11-11 RX ADMIN — Medication 75 MILLIGRAM(S): at 21:25

## 2023-11-11 RX ADMIN — DULOXETINE HYDROCHLORIDE 40 MILLIGRAM(S): 30 CAPSULE, DELAYED RELEASE ORAL at 17:02

## 2023-11-11 RX ADMIN — MAGNESIUM OXIDE 400 MG ORAL TABLET 400 MILLIGRAM(S): 241.3 TABLET ORAL at 17:01

## 2023-11-11 RX ADMIN — DULOXETINE HYDROCHLORIDE 40 MILLIGRAM(S): 30 CAPSULE, DELAYED RELEASE ORAL at 05:52

## 2023-11-11 RX ADMIN — Medication 50 MILLIGRAM(S): at 05:52

## 2023-11-11 RX ADMIN — MEROPENEM 100 MILLIGRAM(S): 1 INJECTION INTRAVENOUS at 05:56

## 2023-11-11 RX ADMIN — INSULIN GLARGINE 27 UNIT(S): 100 INJECTION, SOLUTION SUBCUTANEOUS at 07:56

## 2023-11-11 RX ADMIN — Medication 9 UNIT(S): at 07:53

## 2023-11-11 RX ADMIN — Medication 1 TABLET(S): at 11:47

## 2023-11-11 RX ADMIN — HEPARIN SODIUM 5000 UNIT(S): 5000 INJECTION INTRAVENOUS; SUBCUTANEOUS at 13:36

## 2023-11-11 RX ADMIN — MAGNESIUM OXIDE 400 MG ORAL TABLET 400 MILLIGRAM(S): 241.3 TABLET ORAL at 11:46

## 2023-11-11 RX ADMIN — HEPARIN SODIUM 5000 UNIT(S): 5000 INJECTION INTRAVENOUS; SUBCUTANEOUS at 21:22

## 2023-11-11 RX ADMIN — Medication 650 MILLIGRAM(S): at 13:36

## 2023-11-11 RX ADMIN — Medication 90 MILLIGRAM(S): at 05:52

## 2023-11-11 RX ADMIN — Medication 0.1 MILLIGRAM(S): at 08:24

## 2023-11-11 RX ADMIN — LOSARTAN POTASSIUM 100 MILLIGRAM(S): 100 TABLET, FILM COATED ORAL at 05:57

## 2023-11-11 RX ADMIN — HYDROMORPHONE HYDROCHLORIDE 30 MILLILITER(S): 2 INJECTION INTRAMUSCULAR; INTRAVENOUS; SUBCUTANEOUS at 19:07

## 2023-11-11 RX ADMIN — HEPARIN SODIUM 5000 UNIT(S): 5000 INJECTION INTRAVENOUS; SUBCUTANEOUS at 05:53

## 2023-11-11 RX ADMIN — MEROPENEM 100 MILLIGRAM(S): 1 INJECTION INTRAVENOUS at 17:02

## 2023-11-11 NOTE — PROGRESS NOTE ADULT - SUBJECTIVE AND OBJECTIVE BOX
Patient  is a 58yoM with Charcot foot s/p placement of an external fixator on 9/28/23 which had to be removed on 10/19 due to infection, CKD (baseline Cr 2.5-3.5, on list for kidney transplant), and IDDM (A1c 9.9), PAD, s/p  peripheral angiogram on 10/23/2023 with balloon angioplasty of PTA and DCB of R SFA and PTA of R PT and R AT via R groin and R pedal access, anemia recently received 2u pRBCS, pt was dc from hospital on 10/24/23 and now returns to hospital as directed by his podiatrist Dr Benson for TMA which will be done on 10/27/23 as pt has non healing wound and gangrene of the right foot. Patient complains of moderate to sever pain of foot which is partially relieved with percocet and associated wound discharge.  Pt denies fevers or chills.  (26 Oct 2023 18:37)    s/p BKA    Patient planned for d/c / rehab and needs recommendations since PCA cannot be had on rehab floor.    Recommendations    1. Ofirmev 1g q8h standing x 5 days  2. Pregabalin 75mg q8h standing  3. duloxetine 40mg q12h  4. PO hydromorphone 4mg q4h PRN for pain (4-6)  5. IV hydromorphone 1mg q6h PRN for severe pain (7-10)    Bowel regimen: miralax / colace    Goal is to reduce IV intake. PO hydromorphone should be enough to help manage acute severe breakthrough pain, but if inadequate IV is reasonable while in patient for those acute severe pain episodes.

## 2023-11-11 NOTE — PROGRESS NOTE ADULT - SUBJECTIVE AND OBJECTIVE BOX
GENERAL SURGERY PROGRESS NOTE     MARIOLA CURRY  [address] [place] [address]  Hospital day :16d    POD:  Procedure: Transmetatarsal amputation    Revision of transmetatarsal amputation of right foot    Amputation below knee      Surgical Attending: Myrna Shoemaker  Overnight events:    T(F): 97.6 (11-11-23 @ 05:50), Max: 99.1 (11-10-23 @ 23:21)  HR: 91 (11-11-23 @ 09:20) (81 - 91)  BP: 152/71 (11-11-23 @ 09:20) (142/75 - 186/87)  ABP: --  ABP(mean): --  RR: 20 (11-11-23 @ 05:50) (16 - 20)  SpO2: 98% (11-11-23 @ 05:50) (97% - 98%)    IN'S / OUT's:    11-10-23 @ 07:01  -  11-11-23 @ 07:00  --------------------------------------------------------  IN:    Oral Fluid: 250 mL  Total IN: 250 mL    OUT:    Intermittent Catheterization - Urethral (mL): 950 mL    Voided (mL): 750 mL  Total OUT: 1700 mL    Total NET: -1450 mL      11-11-23 @ 07:01  -  11-11-23 @ 13:46  --------------------------------------------------------  IN:    Oral Fluid: 240 mL  Total IN: 240 mL    OUT:    Voided (mL): 150 mL  Total OUT: 150 mL    Total NET: 90 mL          PHYSICAL EXAM:  GENERAL: NAD, well-appearing  CHEST/LUNG: Clear to auscultation bilaterally  HEART: Regular rate and rhythm  ABDOMEN: Soft, Nontender, Nondistended;   EXTREMITIES:  No clubbing, cyanosis, or edema      LABS  Labs:  CAPILLARY BLOOD GLUCOSE      POCT Blood Glucose.: 59 mg/dL (11 Nov 2023 11:38)  POCT Blood Glucose.: 251 mg/dL (11 Nov 2023 07:32)  POCT Blood Glucose.: 228 mg/dL (10 Nov 2023 22:03)  POCT Blood Glucose.: 286 mg/dL (10 Nov 2023 17:06)                          8.5    8.55  )-----------( 481      ( 11 Nov 2023 09:18 )             26.7       Auto Neutrophil %: 69.9 % (11-11-23 @ 09:18)  Auto Immature Granulocyte %: 0.4 % (11-11-23 @ 09:18)    11-11    134<L>  |  97<L>  |  42<H>  ----------------------------<  187<H>  4.6   |  26  |  2.5<H>      Calcium: 9.3 mg/dL (11-11-23 @ 09:18)      LFTs:             6.7  | 0.3  | 12       ------------------[156     ( 11 Nov 2023 09:18 )  3.2  | x    | 9           Lipase:x      Amylase:x             Coags:            Urinalysis Basic - ( 11 Nov 2023 09:18 )    Color: x / Appearance: x / SG: x / pH: x  Gluc: 187 mg/dL / Ketone: x  / Bili: x / Urobili: x   Blood: x / Protein: x / Nitrite: x   Leuk Esterase: x / RBC: x / WBC x   Sq Epi: x / Non Sq Epi: x / Bacteria: x            RADIOLOGY & ADDITIONAL TESTS:      A/P:  MARIOLA CURRY is a 58y Male s/p R BKA for non healing RLE wound, PVD.      PLAN:   - resume plavix  - continue with right knee immobilizer  - Physiatry recs appreciated - possible 4A candidate  - multi modal pain control  - DVT ppx  - encourage activity and IS as tolerated   - rest of management per primary team  - no further Vascular Surgery interventions, please recall as necessary      #Antibiotics: meropenem  IVPB 1000 milliGRAM(s) IV Intermittent every 12 hours, 11-09-23 @ 09:36   Day **  #DVT ppx: aspirin  chewable 81 milliGRAM(s) Oral daily  heparin   Injectable 5000 Unit(s) SubCutaneous every 8 hours    Disposition:  ***    Above plan discussed with Attending Surgeon  ***  , patient, patient family, and Primary team    Blue Spectra [id number]  TAP (Trauma, Acute care, Pediatrics) Spectra [id number]  Green Spectra [phone]  Vascular [id number]   GENERAL SURGERY PROGRESS NOTE     MARIOLA CURRY  [address] [place] [address]  Hospital day :17d      Procedure: Transmetatarsal amputation    Revision of transmetatarsal amputation of right foot    Amputation below knee      Surgical Attending: Tenisha Machado  Overnight events: No acute events overnight. Pt is wearing his knee immobilizer. Dressing changed this am. Wound looks clean with no evidence of bleeding or drainage. He reports that his pain is well controlled on current pain regimen.     T(F): 97.6 (11-11-23 @ 05:50), Max: 99.1 (11-10-23 @ 23:21)  HR: 91 (11-11-23 @ 09:20) (81 - 91)  BP: 152/71 (11-11-23 @ 09:20) (142/75 - 186/87)  ABP: --  ABP(mean): --  RR: 20 (11-11-23 @ 05:50) (16 - 20)  SpO2: 98% (11-11-23 @ 05:50) (97% - 98%)    IN'S / OUT's:    11-10-23 @ 07:01  -  11-11-23 @ 07:00  --------------------------------------------------------  IN:    Oral Fluid: 250 mL  Total IN: 250 mL    OUT:    Intermittent Catheterization - Urethral (mL): 950 mL    Voided (mL): 750 mL  Total OUT: 1700 mL    Total NET: -1450 mL      11-11-23 @ 07:01  -  11-11-23 @ 13:46  --------------------------------------------------------  IN:    Oral Fluid: 240 mL  Total IN: 240 mL    OUT:    Voided (mL): 150 mL  Total OUT: 150 mL    Total NET: 90 mL          PHYSICAL EXAM:  GENERAL: NAD, well-appearing  CHEST/LUNG: Clear to auscultation bilaterally  HEART: Regular rate and rhythm  ABDOMEN: Soft, Nontender, Nondistended;   EXTREMITIES:  No clubbing, cyanosis, or edema      LABS  Labs:  CAPILLARY BLOOD GLUCOSE      POCT Blood Glucose.: 59 mg/dL (11 Nov 2023 11:38)  POCT Blood Glucose.: 251 mg/dL (11 Nov 2023 07:32)  POCT Blood Glucose.: 228 mg/dL (10 Nov 2023 22:03)  POCT Blood Glucose.: 286 mg/dL (10 Nov 2023 17:06)                          8.5    8.55  )-----------( 481      ( 11 Nov 2023 09:18 )             26.7       Auto Neutrophil %: 69.9 % (11-11-23 @ 09:18)  Auto Immature Granulocyte %: 0.4 % (11-11-23 @ 09:18)    11-11    134<L>  |  97<L>  |  42<H>  ----------------------------<  187<H>  4.6   |  26  |  2.5<H>      Calcium: 9.3 mg/dL (11-11-23 @ 09:18)      LFTs:             6.7  | 0.3  | 12       ------------------[156     ( 11 Nov 2023 09:18 )  3.2  | x    | 9           Lipase:x      Amylase:x             Coags:            Urinalysis Basic - ( 11 Nov 2023 09:18 )    Color: x / Appearance: x / SG: x / pH: x  Gluc: 187 mg/dL / Ketone: x  / Bili: x / Urobili: x   Blood: x / Protein: x / Nitrite: x   Leuk Esterase: x / RBC: x / WBC x   Sq Epi: x / Non Sq Epi: x / Bacteria: x            RADIOLOGY & ADDITIONAL TESTS:      A/P:  MARIOLA CURRY is a 58y Male s/p R BKA for non healing RLE wound, PVD.      PLAN:   - resume plavix  - continue with right knee immobilizer  - Physiatry recs appreciated - possible 4A candidate  - multi modal pain control  - DVT ppx  - encourage activity and IS as tolerated   - rest of management per primary team  - no further Vascular Surgery interventions, please recall as necessary      #Antibiotics: meropenem  IVPB 1000 milliGRAM(s) IV Intermittent every 12 hours, 11-09-23 @ 09:36   Day **  #DVT ppx: aspirin  chewable 81 milliGRAM(s) Oral daily  heparin   Injectable 5000 Unit(s) SubCutaneous every 8 hours    Disposition:  ***    Above plan discussed with Attending Surgeon  ***  , patient, patient family, and Primary team    Blue Spectra [id number]  TAP (Trauma, Acute care, Pediatrics) Spectra [id number]  Green Spectra [id number]  Vascular 6078

## 2023-11-11 NOTE — PROGRESS NOTE ADULT - SUBJECTIVE AND OBJECTIVE BOX
24H events:    Patient is a 58y old Male who presents with a chief complaint of Non healing R TMA (10 Nov 2023 02:07)    Primary diagnosis of Gangrene of foot    Today is hospital day 16d. This morning patient was seen and examined at bedside, resting comfortably in bed.    No acute or major events overnight.    PAST MEDICAL & SURGICAL HISTORY  DM (diabetes mellitus)    HTN (hypertension)    HLD (hyperlipidemia)    Herpes labialis    Anxiety    GERD (gastroesophageal reflux disease)    Kidney stones  20 years ago    Kidney disease  stage 4    Chronic kidney disease, unspecified CKD stage    Diabetic Charcot foot    PAD (peripheral artery disease)    S/P foot surgery, right  x 5 ( 2006 - 2013 )    Kidney stone  Removed by Laser x 2 ( 20 years ago )    H/O arthroscopy of right knee    Status post peripheral artery angioplasty      ALLERGIES:  No Known Allergies    MEDICATIONS:  STANDING MEDICATIONS  aspirin  chewable 81 milliGRAM(s) Oral daily  dextrose 50% Injectable 25 Gram(s) IV Push once  DULoxetine 40 milliGRAM(s) Oral every 12 hours  glucagon  Injectable 1 milliGRAM(s) IntraMuscular once  heparin   Injectable 5000 Unit(s) SubCutaneous every 8 hours  HYDROmorphone PCA (1 mG/mL) 30 milliLiter(s) PCA Continuous PCA Continuous  insulin glargine Injectable (LANTUS) 27 Unit(s) SubCutaneous every morning  insulin lispro (ADMELOG) corrective regimen sliding scale   SubCutaneous three times a day before meals  insulin lispro Injectable (ADMELOG) 9 Unit(s) SubCutaneous before breakfast  lactobacillus acidophilus 1 Tablet(s) Oral three times a day with meals  losartan 100 milliGRAM(s) Oral daily  magnesium oxide 400 milliGRAM(s) Oral three times a day with meals  meropenem  IVPB 1000 milliGRAM(s) IV Intermittent every 12 hours  NIFEdipine XL 90 milliGRAM(s) Oral daily  polyethylene glycol 3350 17 Gram(s) Oral daily  pregabalin 50 milliGRAM(s) Oral every 8 hours  senna 2 Tablet(s) Oral at bedtime  sodium bicarbonate 650 milliGRAM(s) Oral three times a day    PRN MEDICATIONS  ALPRAZolam 0.5 milliGRAM(s) Oral four times a day PRN  dextrose Oral Gel 15 Gram(s) Oral once PRN  HYDROmorphone  Injectable 1 milliGRAM(s) IV Push every 4 hours PRN  oxycodone    5 mG/acetaminophen 325 mG 2 Tablet(s) Oral every 4 hours PRN    VITALS:   T(F): 97.6  HR: 81  BP: 186/87  RR: 20  SpO2: 98%    PHYSICAL EXAM:  GENERAL:   ( x ) NAD, lying in bed comfortably     (  ) obtunded     (  ) lethargic     (  ) somnolent    HEAD:   ( x ) Atraumatic     (  ) hematoma     (  ) laceration (specify location:       )     NECK:  ( x ) Supple     (  ) neck stiffness     (  ) nuchal rigidity     (  )  no JVD     (  ) JVD present ( -- cm)    HEART:  Rate -->     ( x ) normal rate     (  ) bradycardic     (  ) tachycardic  Rhythm -->     ( x ) regular     (  ) regularly irregular     (  ) irregularly irregular  Murmurs -->     ( x ) normal s1s2     (  ) systolic murmur     (  ) diastolic murmur     (  ) continuous murmur      (  ) S3 present     (  ) S4 present    LUNGS:   ( x )Unlabored respirations     (  ) tachypnea  ( x ) B/L air entry     (  ) decreased breath sounds in:  (location     )    (  ) no adventitious sound     (  ) crackles     (  ) wheezing      (  ) rhonchi      (specify location:       )  (  ) chest wall tenderness (specify location:       )    ABDOMEN:   ( x ) Soft     (  ) tense   |   (x  ) nondistended     (  ) distended   |   (  ) +BS     (  ) hypoactive bowel sounds     (  ) hyperactive bowel sounds  (  ) nontender     (  ) RUQ tenderness     (  ) RLQ tenderness     (  ) LLQ tenderness     (  ) epigastric tenderness     (  ) diffuse tenderness  (  ) Splenomegaly      (  ) Hepatomegaly      (  ) Jaundice     (  ) ecchymosis     EXTREMITIES: s/p BKA  (  ) Normal     (  ) Rash     (  ) ecchymosis     (  ) varicose veins      (  ) pitting edema     (  ) non-pitting edema   (  ) ulceration     (  ) gangrene:     (location:     )    NERVOUS SYSTEM:    ( x ) A&Ox3     (  ) confused     (  ) lethargic  CN II-XII:     (  ) Intact     (  ) deficits found     (Specify:     )   Upper extremities:     (  ) no sensorimotor deficits     (  ) weakness     (  ) loss of proprioception/vibration     (  ) loss of touch/temperature (specify:    )  Lower extremities:     (  ) no sensorimotor deficits     (  ) weakness     (  ) loss of proprioception/vibration     (  ) loss of touch/temperature (specify:    )      (  ) Indwelling [place] Catheter:   Date insterted:    Reason (  ) Critical illness     (  ) urinary retention    (  ) Accurate Ins/Outs Monitoring     (  ) CMO patient    (  ) Central Line:   Date inserted:  Location: (  ) Right IJ     (  ) Left IJ     (  ) Right Fem     (  ) Left Fem    (  ) SPC        (  ) pigtail       (  ) PEG tube       (  ) colostomy       (  ) jejunostomy  (  ) U-Dall    LABS:                        9.0    10.54 )-----------( 504      ( 10 Nov 2023 05:53 )             29.3     11-10    133<L>  |  94<L>  |  36<H>  ----------------------------<  434<H>  5.3<H>   |  19  |  2.6<H>    Ca    8.9      10 Nov 2023 05:53  Phos  4.1     11-10  Mg     2.2     11-10    TPro  7.0  /  Alb  3.6  /  TBili  0.4  /  DBili  x   /  AST  10  /  ALT  11  /  AlkPhos  178<H>  11-10      Urinalysis Basic - ( 10 Nov 2023 05:53 )    Color: x / Appearance: x / SG: x / pH: x  Gluc: 434 mg/dL / Ketone: x  / Bili: x / Urobili: x   Blood: x / Protein: x / Nitrite: x   Leuk Esterase: x / RBC: x / WBC x   Sq Epi: x / Non Sq Epi: x / Bacteria: x                RADIOLOGY:

## 2023-11-11 NOTE — PROGRESS NOTE ADULT - ASSESSMENT
58-year-old male with Charcot foot s/p placement of an external fixator on 9/28/23 which had to be removed on 10/19 due to infection, CKD (baseline Cr 2.5-3.5, on list for kidney transplant), and IDDM (A1c 9.9), PAD, s/p  peripheral angiogram on 10/23/2023 with balloon angioplasty of PTA and DCB of R SFA and PTA of R PT and R AT via R groin and R pedal access, anemia recently received 2u pRBCS, pt was dc from hospital on 10/24/23 and now returns to hospital as directed by his podiatrist Dr Benson for TMA which will be done on 10/27/23 as pt has non healing wound and gangrene of the right foot.     #gangrene of the right foot.   # PAD   - s/p multiple post operative amputation, revisions   - continue monitoring   - s/p BKA  - still in severe pain s/p BKA with pump - Pain management recs appreciated    #Elevated Glucose  - Missed lantus yesterday due to OR  -  today AM - got lantus today - will continue to monitor    #Hx of CKD 4, nephrolithiasis, on renal transplant list  - nephro consult recs appreciated  - f/u RBUS  - f/u iron studies  - f/u Vit D and PTH  - renal and diabetic diet - Lokelma daily  - urology recs appreciated - pt urinating on own now    #PAD, s/p angioplasty  -c/w asa and plavix    #anemia  -s/p transfusion  -monitor    #HTN  -low sodium diet  -monitor bp  -cont meds from home nifedipine and cozaar    #DM  -ada diet  -monitor BS  -cont meds from home basal 20u daily  and lispro 8 qac  -insulin coverage prn eleavted blood sugar    #CKD  -creatinine stable at 2.9 today    #anxiety disorder  -xanax prn    #DVT prophylaxis: sq heparin  #Diet: consistent carb and DASH  #Activity: IAT    Pending: Vascular follow up, Pain control, RBUS, and 4A follow up

## 2023-11-11 NOTE — PROGRESS NOTE ADULT - ASSESSMENT
58-year-old male with Charcot foot s/p placement of an external fixator on 9/28/23 which had to be removed on 10/19 due to infection, CKD (baseline Cr 2.5-3.5, on list for kidney transplant), and IDDM (A1c 9.9), PAD, s/p  peripheral angiogram on 10/23/2023 with balloon angioplasty of PTA and DCB of R SFA and PTA of R PT and R AT via R groin and R pedal access, anemia recently received 2u pRBCS, pt was dc from hospital on 10/24/23 and now returns to hospital as directed by his podiatrist Dr Benson for TMA which will be done on 10/27/23 as pt has non healing wound and gangrene of the right foot.     #R Foot DFU with gangrene  #R TMA 10/27, revision 10/30, debridement 11/4/23  #R BKA 11/9/23  #Severe PAD  - Pain Management following - on Dilaudid PCA   - Vascular following- has R Knee immobilizer   - ID following - Continue IV meropenem 1gm q12hrs (CrCl ~34) until 48hrs post-op, then can D/C abx  - pain management to follow up today  - PT eval today   - patient interested in 4A placement     #DM II with hyperglycemia   - missed lantus on 11/10  - monitor fs today and adjust as needed     #Hx of CKD IV  #nephrolithiasis  #on renal transplant list  - nephrology following  - cr stable  - avoid nephrotoxics   - continue sodium bicarb  - low K diet    #PAD, s/p angioplasty  -c/w asa  -resume plavix today     #Anemia  -stable post transfusion    #HTN  - BP uncontrolled for several days   - add hydralazine if needed     #Anxiety disorder  #depression - duloxetine   -xanax prn    Progress Note Handoff  Pending Consults: vascular follow up, PT  Pending Tests: labs  Pending Results: labs  Family Discussion: Discussed labs, meds, PT follow up and possible 4A placement  Disposition: Home_____/SNF______/Other_4a____/Unknown at this time_____  Spent over 55 min reviewing chart, speaking with patient/family and on coordinating patient care during interdisciplinary rounds     Please call me with any questions at extension [phone]

## 2023-11-11 NOTE — PROGRESS NOTE ADULT - SUBJECTIVE AND OBJECTIVE BOX
MARIOLA CURRY  58y  Male      Patient is a 58y old Male who presents with a chief complaint of Non healing R TMA (10 Nov 2023 02:07)      INTERVAL HPI/OVERNIGHT EVENTS:  Patient seen and examined earlier this morning  lying in bed  in nad  eager to work with PT today       REVIEW OF SYSTEMS:  CONSTITUTIONAL: No fever, weight loss, or fatigue  EYES: No eye pain, visual disturbances, or discharge  ENMT:  No difficulty hearing, tinnitus, vertigo; No sinus or throat pain  NECK: No pain or stiffness  RESPIRATORY: No cough, wheezing, chills or hemoptysis; No shortness of breath  CARDIOVASCULAR: No chest pain, palpitations, dizziness, or leg swelling  GASTROINTESTINAL: No abdominal or epigastric pain. No nausea, vomiting, or hematemesis; No diarrhea or constipation. No melena or hematochezia.  GENITOURINARY: No dysuria, frequency, hematuria, or incontinence  NEUROLOGICAL: No headaches, memory loss, loss of strength, numbness, or tremors  SKIN: No itching, burning, rashes, or lesions   LYMPH NODES: No enlarged glands  ENDOCRINE: No heat or cold intolerance; No hair loss  MUSCULOSKELETAL: No joint pain or swelling; No muscle, back, or extremity pain  PSYCHIATRIC: No depression, anxiety, mood swings, or difficulty sleeping  HEME/LYMPH: No easy bruising, or bleeding gums  ALLERY AND IMMUNOLOGIC: No hives or eczema    T(C): 36.4 (11-11-23 @ 05:50), Max: 37.3 (11-10-23 @ 23:21)  HR: 91 (11-11-23 @ 09:20) (81 - 91)  BP: 152/71 (11-11-23 @ 09:20) (142/75 - 186/87)  RR: 20 (11-11-23 @ 05:50) (16 - 20)  SpO2: 98% (11-11-23 @ 05:50) (97% - 98%)    PHYSICAL EXAM:  GENERAL: NAD, well-groomed,   HEAD:  Atraumatic, Normocephalic  EYES: conjunctiva and sclera clear  ENMT: Moist mucous membranes,  No visible lesions  NECK: Supple, No JVD, Normal thyroid  NERVOUS SYSTEM:  Alert & Oriented X3, Good concentration; moves all extremities   CHEST/LUNG: good air entry   HEART: Regular rate and rhythm; No murmurs, rubs, or gallops  ABDOMEN: Soft, Nontender, Nondistended; Bowel sounds present  EXTREMITIES:  RLE BKA, LE wrapped with knee immobilizer in place   LYMPH: No lymphadenopathy noted  SKIN: No rashes or lesions    Consultant(s) Notes Reviewed:  [x ] YES  [ ] NO  Care Discussed with Consultants/Other Providers [ x] YES  [ ] NO    LAB:                        8.5    8.55  )-----------( 481      ( 11 Nov 2023 09:18 )             26.7     11-11    134<L>  |  97<L>  |  42<H>  ----------------------------<  187<H>  4.6   |  26  |  2.5<H>    Ca    9.3      11 Nov 2023 09:18  Phos  3.3     11-11  Mg     2.2     11-11    TPro  6.7  /  Alb  3.2<L>  /  TBili  0.3  /  DBili  x   /  AST  12  /  ALT  9   /  AlkPhos  156<H>  11-11    LIVER FUNCTIONS - ( 11 Nov 2023 09:18 )  Alb: 3.2 g/dL / Pro: 6.7 g/dL / ALK PHOS: 156 U/L / ALT: 9 U/L / AST: 12 U/L / GGT: x               Drug Dosing Weight  Height (cm): 182.9 (09 Nov 2023 07:20)  Weight (kg): 100 (09 Nov 2023 07:20)  BMI (kg/m2): 29.9 (09 Nov 2023 07:20)  BSA (m2): 2.22 (09 Nov 2023 07:20)        CAPILLARY BLOOD GLUCOSE  POCT Blood Glucose.: 251 mg/dL (11 Nov 2023 07:32)  POCT Blood Glucose.: 228 mg/dL (10 Nov 2023 22:03)  POCT Blood Glucose.: 286 mg/dL (10 Nov 2023 17:06)        I&O's Summary    10 Nov 2023 07:01  -  11 Nov 2023 07:00  --------------------------------------------------------  IN: 250 mL / OUT: 1700 mL / NET: -1450 mL    11 Nov 2023 07:01  -  11 Nov 2023 12:06  --------------------------------------------------------  IN: 240 mL / OUT: 150 mL / NET: 90 mL      Urinalysis Basic - ( 11 Nov 2023 09:18 )    Color: x / Appearance: x / SG: x / pH: x  Gluc: 187 mg/dL / Ketone: x  / Bili: x / Urobili: x   Blood: x / Protein: x / Nitrite: x   Leuk Esterase: x / RBC: x / WBC x   Sq Epi: x / Non Sq Epi: x / Bacteria: x        RADIOLOGY & ADDITIONAL TESTS:  Imaging Personally Reviewed:  [x] YES  [ ] NO    HEALTH ISSUES - PROBLEM Dx:  S/P transmetatarsal amputation of foot    S/P BKA (below knee amputation), right        MEDS:  ALPRAZolam 0.5 milliGRAM(s) Oral four times a day PRN  aspirin  chewable 81 milliGRAM(s) Oral daily  dextrose 50% Injectable 25 Gram(s) IV Push once  dextrose Oral Gel 15 Gram(s) Oral once PRN  DULoxetine 40 milliGRAM(s) Oral every 12 hours  glucagon  Injectable 1 milliGRAM(s) IntraMuscular once  heparin   Injectable 5000 Unit(s) SubCutaneous every 8 hours  HYDROmorphone  Injectable 1 milliGRAM(s) IV Push every 4 hours PRN  HYDROmorphone PCA (1 mG/mL) 30 milliLiter(s) PCA Continuous PCA Continuous  insulin glargine Injectable (LANTUS) 27 Unit(s) SubCutaneous every morning  insulin lispro (ADMELOG) corrective regimen sliding scale   SubCutaneous three times a day before meals  insulin lispro Injectable (ADMELOG) 9 Unit(s) SubCutaneous before breakfast  lactobacillus acidophilus 1 Tablet(s) Oral three times a day with meals  losartan 100 milliGRAM(s) Oral daily  magnesium oxide 400 milliGRAM(s) Oral three times a day with meals  meropenem  IVPB 1000 milliGRAM(s) IV Intermittent every 12 hours  oxycodone    5 mG/acetaminophen 325 mG 2 Tablet(s) Oral every 4 hours PRN  polyethylene glycol 3350 17 Gram(s) Oral daily  pregabalin 50 milliGRAM(s) Oral every 8 hours  senna 2 Tablet(s) Oral at bedtime  sodium bicarbonate 650 milliGRAM(s) Oral three times a day

## 2023-11-12 LAB
ALBUMIN SERPL ELPH-MCNC: 3.1 G/DL — LOW (ref 3.5–5.2)
ALBUMIN SERPL ELPH-MCNC: 3.1 G/DL — LOW (ref 3.5–5.2)
ALP SERPL-CCNC: 146 U/L — HIGH (ref 30–115)
ALP SERPL-CCNC: 146 U/L — HIGH (ref 30–115)
ALT FLD-CCNC: 9 U/L — SIGNIFICANT CHANGE UP (ref 0–41)
ALT FLD-CCNC: 9 U/L — SIGNIFICANT CHANGE UP (ref 0–41)
ANION GAP SERPL CALC-SCNC: 10 MMOL/L — SIGNIFICANT CHANGE UP (ref 7–14)
ANION GAP SERPL CALC-SCNC: 10 MMOL/L — SIGNIFICANT CHANGE UP (ref 7–14)
AST SERPL-CCNC: 12 U/L — SIGNIFICANT CHANGE UP (ref 0–41)
AST SERPL-CCNC: 12 U/L — SIGNIFICANT CHANGE UP (ref 0–41)
BASOPHILS # BLD AUTO: 0.04 K/UL — SIGNIFICANT CHANGE UP (ref 0–0.2)
BASOPHILS # BLD AUTO: 0.04 K/UL — SIGNIFICANT CHANGE UP (ref 0–0.2)
BASOPHILS NFR BLD AUTO: 0.5 % — SIGNIFICANT CHANGE UP (ref 0–1)
BASOPHILS NFR BLD AUTO: 0.5 % — SIGNIFICANT CHANGE UP (ref 0–1)
BILIRUB SERPL-MCNC: 0.3 MG/DL — SIGNIFICANT CHANGE UP (ref 0.2–1.2)
BILIRUB SERPL-MCNC: 0.3 MG/DL — SIGNIFICANT CHANGE UP (ref 0.2–1.2)
BUN SERPL-MCNC: 41 MG/DL — HIGH (ref 10–20)
BUN SERPL-MCNC: 41 MG/DL — HIGH (ref 10–20)
CALCIUM SERPL-MCNC: 8.9 MG/DL — SIGNIFICANT CHANGE UP (ref 8.4–10.5)
CALCIUM SERPL-MCNC: 8.9 MG/DL — SIGNIFICANT CHANGE UP (ref 8.4–10.5)
CHLORIDE SERPL-SCNC: 95 MMOL/L — LOW (ref 98–110)
CHLORIDE SERPL-SCNC: 95 MMOL/L — LOW (ref 98–110)
CO2 SERPL-SCNC: 28 MMOL/L — SIGNIFICANT CHANGE UP (ref 17–32)
CO2 SERPL-SCNC: 28 MMOL/L — SIGNIFICANT CHANGE UP (ref 17–32)
CREAT SERPL-MCNC: 2.5 MG/DL — HIGH (ref 0.7–1.5)
CREAT SERPL-MCNC: 2.5 MG/DL — HIGH (ref 0.7–1.5)
EGFR: 29 ML/MIN/1.73M2 — LOW
EGFR: 29 ML/MIN/1.73M2 — LOW
EOSINOPHIL # BLD AUTO: 0.42 K/UL — SIGNIFICANT CHANGE UP (ref 0–0.7)
EOSINOPHIL # BLD AUTO: 0.42 K/UL — SIGNIFICANT CHANGE UP (ref 0–0.7)
EOSINOPHIL NFR BLD AUTO: 5.6 % — SIGNIFICANT CHANGE UP (ref 0–8)
EOSINOPHIL NFR BLD AUTO: 5.6 % — SIGNIFICANT CHANGE UP (ref 0–8)
GLUCOSE BLDC GLUCOMTR-MCNC: 157 MG/DL — HIGH (ref 70–99)
GLUCOSE BLDC GLUCOMTR-MCNC: 157 MG/DL — HIGH (ref 70–99)
GLUCOSE BLDC GLUCOMTR-MCNC: 184 MG/DL — HIGH (ref 70–99)
GLUCOSE BLDC GLUCOMTR-MCNC: 184 MG/DL — HIGH (ref 70–99)
GLUCOSE BLDC GLUCOMTR-MCNC: 199 MG/DL — HIGH (ref 70–99)
GLUCOSE BLDC GLUCOMTR-MCNC: 199 MG/DL — HIGH (ref 70–99)
GLUCOSE BLDC GLUCOMTR-MCNC: 54 MG/DL — CRITICAL LOW (ref 70–99)
GLUCOSE BLDC GLUCOMTR-MCNC: 54 MG/DL — CRITICAL LOW (ref 70–99)
GLUCOSE SERPL-MCNC: 163 MG/DL — HIGH (ref 70–99)
GLUCOSE SERPL-MCNC: 163 MG/DL — HIGH (ref 70–99)
HCT VFR BLD CALC: 26.1 % — LOW (ref 42–52)
HCT VFR BLD CALC: 26.1 % — LOW (ref 42–52)
HGB BLD-MCNC: 8.2 G/DL — LOW (ref 14–18)
HGB BLD-MCNC: 8.2 G/DL — LOW (ref 14–18)
IMM GRANULOCYTES NFR BLD AUTO: 0.3 % — SIGNIFICANT CHANGE UP (ref 0.1–0.3)
IMM GRANULOCYTES NFR BLD AUTO: 0.3 % — SIGNIFICANT CHANGE UP (ref 0.1–0.3)
LYMPHOCYTES # BLD AUTO: 1.57 K/UL — SIGNIFICANT CHANGE UP (ref 1.2–3.4)
LYMPHOCYTES # BLD AUTO: 1.57 K/UL — SIGNIFICANT CHANGE UP (ref 1.2–3.4)
LYMPHOCYTES # BLD AUTO: 20.8 % — SIGNIFICANT CHANGE UP (ref 20.5–51.1)
LYMPHOCYTES # BLD AUTO: 20.8 % — SIGNIFICANT CHANGE UP (ref 20.5–51.1)
MAGNESIUM SERPL-MCNC: 2.3 MG/DL — SIGNIFICANT CHANGE UP (ref 1.8–2.4)
MAGNESIUM SERPL-MCNC: 2.3 MG/DL — SIGNIFICANT CHANGE UP (ref 1.8–2.4)
MCHC RBC-ENTMCNC: 26.5 PG — LOW (ref 27–31)
MCHC RBC-ENTMCNC: 26.5 PG — LOW (ref 27–31)
MCHC RBC-ENTMCNC: 31.4 G/DL — LOW (ref 32–37)
MCHC RBC-ENTMCNC: 31.4 G/DL — LOW (ref 32–37)
MCV RBC AUTO: 84.2 FL — SIGNIFICANT CHANGE UP (ref 80–94)
MCV RBC AUTO: 84.2 FL — SIGNIFICANT CHANGE UP (ref 80–94)
MONOCYTES # BLD AUTO: 0.75 K/UL — HIGH (ref 0.1–0.6)
MONOCYTES # BLD AUTO: 0.75 K/UL — HIGH (ref 0.1–0.6)
MONOCYTES NFR BLD AUTO: 9.9 % — HIGH (ref 1.7–9.3)
MONOCYTES NFR BLD AUTO: 9.9 % — HIGH (ref 1.7–9.3)
NEUTROPHILS # BLD AUTO: 4.76 K/UL — SIGNIFICANT CHANGE UP (ref 1.4–6.5)
NEUTROPHILS # BLD AUTO: 4.76 K/UL — SIGNIFICANT CHANGE UP (ref 1.4–6.5)
NEUTROPHILS NFR BLD AUTO: 62.9 % — SIGNIFICANT CHANGE UP (ref 42.2–75.2)
NEUTROPHILS NFR BLD AUTO: 62.9 % — SIGNIFICANT CHANGE UP (ref 42.2–75.2)
NRBC # BLD: 0 /100 WBCS — SIGNIFICANT CHANGE UP (ref 0–0)
NRBC # BLD: 0 /100 WBCS — SIGNIFICANT CHANGE UP (ref 0–0)
PHOSPHATE SERPL-MCNC: 3.9 MG/DL — SIGNIFICANT CHANGE UP (ref 2.1–4.9)
PHOSPHATE SERPL-MCNC: 3.9 MG/DL — SIGNIFICANT CHANGE UP (ref 2.1–4.9)
PLATELET # BLD AUTO: 477 K/UL — HIGH (ref 130–400)
PLATELET # BLD AUTO: 477 K/UL — HIGH (ref 130–400)
PMV BLD: 8.9 FL — SIGNIFICANT CHANGE UP (ref 7.4–10.4)
PMV BLD: 8.9 FL — SIGNIFICANT CHANGE UP (ref 7.4–10.4)
POTASSIUM SERPL-MCNC: 4.8 MMOL/L — SIGNIFICANT CHANGE UP (ref 3.5–5)
POTASSIUM SERPL-MCNC: 4.8 MMOL/L — SIGNIFICANT CHANGE UP (ref 3.5–5)
POTASSIUM SERPL-SCNC: 4.8 MMOL/L — SIGNIFICANT CHANGE UP (ref 3.5–5)
POTASSIUM SERPL-SCNC: 4.8 MMOL/L — SIGNIFICANT CHANGE UP (ref 3.5–5)
PROT SERPL-MCNC: 6.4 G/DL — SIGNIFICANT CHANGE UP (ref 6–8)
PROT SERPL-MCNC: 6.4 G/DL — SIGNIFICANT CHANGE UP (ref 6–8)
RBC # BLD: 3.1 M/UL — LOW (ref 4.7–6.1)
RBC # BLD: 3.1 M/UL — LOW (ref 4.7–6.1)
RBC # FLD: 17.4 % — HIGH (ref 11.5–14.5)
RBC # FLD: 17.4 % — HIGH (ref 11.5–14.5)
SODIUM SERPL-SCNC: 133 MMOL/L — LOW (ref 135–146)
SODIUM SERPL-SCNC: 133 MMOL/L — LOW (ref 135–146)
WBC # BLD: 7.56 K/UL — SIGNIFICANT CHANGE UP (ref 4.8–10.8)
WBC # BLD: 7.56 K/UL — SIGNIFICANT CHANGE UP (ref 4.8–10.8)
WBC # FLD AUTO: 7.56 K/UL — SIGNIFICANT CHANGE UP (ref 4.8–10.8)
WBC # FLD AUTO: 7.56 K/UL — SIGNIFICANT CHANGE UP (ref 4.8–10.8)

## 2023-11-12 PROCEDURE — 99232 SBSQ HOSP IP/OBS MODERATE 35: CPT

## 2023-11-12 RX ORDER — INSULIN GLARGINE 100 [IU]/ML
24 INJECTION, SOLUTION SUBCUTANEOUS EVERY MORNING
Refills: 0 | Status: DISCONTINUED | OUTPATIENT
Start: 2023-11-13 | End: 2023-11-15

## 2023-11-12 RX ORDER — CLOPIDOGREL BISULFATE 75 MG/1
75 TABLET, FILM COATED ORAL DAILY
Refills: 0 | Status: DISCONTINUED | OUTPATIENT
Start: 2023-11-12 | End: 2023-11-15

## 2023-11-12 RX ADMIN — HYDROMORPHONE HYDROCHLORIDE 30 MILLILITER(S): 2 INJECTION INTRAMUSCULAR; INTRAVENOUS; SUBCUTANEOUS at 19:29

## 2023-11-12 RX ADMIN — Medication 75 MILLIGRAM(S): at 21:17

## 2023-11-12 RX ADMIN — Medication 1 TABLET(S): at 11:31

## 2023-11-12 RX ADMIN — Medication 650 MILLIGRAM(S): at 13:33

## 2023-11-12 RX ADMIN — Medication 650 MILLIGRAM(S): at 21:12

## 2023-11-12 RX ADMIN — LOSARTAN POTASSIUM 100 MILLIGRAM(S): 100 TABLET, FILM COATED ORAL at 05:56

## 2023-11-12 RX ADMIN — Medication 1 TABLET(S): at 17:21

## 2023-11-12 RX ADMIN — Medication 81 MILLIGRAM(S): at 11:29

## 2023-11-12 RX ADMIN — HYDROMORPHONE HYDROCHLORIDE 30 MILLILITER(S): 2 INJECTION INTRAMUSCULAR; INTRAVENOUS; SUBCUTANEOUS at 08:34

## 2023-11-12 RX ADMIN — Medication 120 MILLIGRAM(S): at 05:56

## 2023-11-12 RX ADMIN — DULOXETINE HYDROCHLORIDE 40 MILLIGRAM(S): 30 CAPSULE, DELAYED RELEASE ORAL at 06:00

## 2023-11-12 RX ADMIN — Medication 2: at 08:16

## 2023-11-12 RX ADMIN — INSULIN GLARGINE 27 UNIT(S): 100 INJECTION, SOLUTION SUBCUTANEOUS at 08:16

## 2023-11-12 RX ADMIN — HYDROMORPHONE HYDROCHLORIDE 30 MILLILITER(S): 2 INJECTION INTRAMUSCULAR; INTRAVENOUS; SUBCUTANEOUS at 07:05

## 2023-11-12 RX ADMIN — MAGNESIUM OXIDE 400 MG ORAL TABLET 400 MILLIGRAM(S): 241.3 TABLET ORAL at 17:21

## 2023-11-12 RX ADMIN — HEPARIN SODIUM 5000 UNIT(S): 5000 INJECTION INTRAVENOUS; SUBCUTANEOUS at 05:57

## 2023-11-12 RX ADMIN — MAGNESIUM OXIDE 400 MG ORAL TABLET 400 MILLIGRAM(S): 241.3 TABLET ORAL at 11:32

## 2023-11-12 RX ADMIN — Medication 75 MILLIGRAM(S): at 05:56

## 2023-11-12 RX ADMIN — MAGNESIUM OXIDE 400 MG ORAL TABLET 400 MILLIGRAM(S): 241.3 TABLET ORAL at 08:16

## 2023-11-12 RX ADMIN — Medication 75 MILLIGRAM(S): at 13:33

## 2023-11-12 RX ADMIN — Medication 1 TABLET(S): at 08:16

## 2023-11-12 RX ADMIN — HEPARIN SODIUM 5000 UNIT(S): 5000 INJECTION INTRAVENOUS; SUBCUTANEOUS at 21:12

## 2023-11-12 RX ADMIN — DULOXETINE HYDROCHLORIDE 40 MILLIGRAM(S): 30 CAPSULE, DELAYED RELEASE ORAL at 17:21

## 2023-11-12 RX ADMIN — Medication 9 UNIT(S): at 08:17

## 2023-11-12 RX ADMIN — Medication 2: at 17:19

## 2023-11-12 RX ADMIN — CLOPIDOGREL BISULFATE 75 MILLIGRAM(S): 75 TABLET, FILM COATED ORAL at 11:29

## 2023-11-12 RX ADMIN — Medication 650 MILLIGRAM(S): at 05:57

## 2023-11-12 RX ADMIN — MEROPENEM 100 MILLIGRAM(S): 1 INJECTION INTRAVENOUS at 05:55

## 2023-11-12 RX ADMIN — HEPARIN SODIUM 5000 UNIT(S): 5000 INJECTION INTRAVENOUS; SUBCUTANEOUS at 13:34

## 2023-11-12 NOTE — PHYSICAL THERAPY INITIAL EVALUATION ADULT - GAIT TRAINING, PT EVAL
Pt. will amb at least 100 ft using a rolling walker with CGA by D/C
Improve ambulation using crutches independent by D/c

## 2023-11-12 NOTE — PHYSICAL THERAPY INITIAL EVALUATION ADULT - ADDITIONAL COMMENTS
Pt. reports he lives with his mother and was fully independent PTA. Pt. reports he was previously using axillary crutches to help him walk after his R foot sx. Pt. reports he has 5 ASTER and one flight to the second floor.
Patient lives with is mother in house with steps to enter and 12 steps to bedroom. Patient claims to be independent in ADLs and ambulation using crutches NWB RLE. As per patient he ascends and descends stairs on his knees or buttocks.

## 2023-11-12 NOTE — PROGRESS NOTE ADULT - SUBJECTIVE AND OBJECTIVE BOX
MARIOLA CURRY  58y  Male      Patient is a 58y old Male who presents with a chief complaint of Non healing R TMA (10 Nov 2023 02:07)      INTERVAL HPI/OVERNIGHT EVENTS:  Patient seen and examined earlier this morning  lying in bed  in nad  no overnight events - await PT treat today     REVIEW OF SYSTEMS:  CONSTITUTIONAL: No fever, weight loss, or fatigue  EYES: No eye pain, visual disturbances, or discharge  ENMT:  No difficulty hearing, tinnitus, vertigo; No sinus or throat pain  NECK: No pain or stiffness  RESPIRATORY: No cough, wheezing, chills or hemoptysis; No shortness of breath  CARDIOVASCULAR: No chest pain, palpitations, dizziness, or leg swelling  GASTROINTESTINAL: No abdominal or epigastric pain. No nausea, vomiting, or hematemesis; No diarrhea or constipation. No melena or hematochezia.  GENITOURINARY: No dysuria, frequency, hematuria, or incontinence  NEUROLOGICAL: No headaches, memory loss, loss of strength, numbness, or tremors  SKIN: No itching, burning, rashes, or lesions   LYMPH NODES: No enlarged glands  ENDOCRINE: No heat or cold intolerance; No hair loss  MUSCULOSKELETAL: No joint pain or swelling; No muscle, back, or extremity pain  PSYCHIATRIC: No depression, anxiety, mood swings, or difficulty sleeping  HEME/LYMPH: No easy bruising, or bleeding gums  ALLERY AND IMMUNOLOGIC: No hives or eczema    Vital Signs Last 24 Hrs  T(C): 36.4 (12 Nov 2023 08:00), Max: 36.4 (12 Nov 2023 08:00)  T(F): 97.6 (12 Nov 2023 08:00), Max: 97.6 (12 Nov 2023 08:00)  HR: 76 (12 Nov 2023 08:00) (69 - 80)  BP: 151/72 (12 Nov 2023 08:00) (116/55 - 173/71)  BP(mean): 97 (12 Nov 2023 05:50) (97 - 97)  RR: 18 (12 Nov 2023 08:00) (18 - 20)  SpO2: 97% (12 Nov 2023 00:08) (97% - 98%)    Parameters below as of 12 Nov 2023 00:08  Patient On (Oxygen Delivery Method): room air      PHYSICAL EXAM:  GENERAL: NAD, well-groomed,   HEAD:  Atraumatic, Normocephalic  EYES: conjunctiva and sclera clear  ENMT: Moist mucous membranes,  No visible lesions  NECK: Supple, No JVD, Normal thyroid  NERVOUS SYSTEM:  Alert & Oriented X3, Good concentration; moves all extremities   CHEST/LUNG: good air entry   HEART: Regular rate and rhythm; No murmurs, rubs, or gallops  ABDOMEN: Soft, Nontender, Nondistended; Bowel sounds present  EXTREMITIES:  RLE BKA, LE wrapped with knee immobilizer in place   LYMPH: No lymphadenopathy noted  SKIN: No rashes or lesions    Consultant(s) Notes Reviewed:  [x ] YES  [ ] NO  Care Discussed with Consultants/Other Providers [ x] YES  [ ] NO    LAB:                           8.2    7.56  )-----------( 477      ( 12 Nov 2023 07:19 )             26.1     11-12    133<L>  |  95<L>  |  41<H>  ----------------------------<  163<H>  4.8   |  28  |  2.5<H>    Ca    8.9      12 Nov 2023 07:19  Phos  3.9     11-12  Mg     2.3     11-12    TPro  6.4  /  Alb  3.1<L>  /  TBili  0.3  /  DBili  x   /  AST  12  /  ALT  9   /  AlkPhos  146<H>  11-12              Drug Dosing Weight  Height (cm): 182.9 (09 Nov 2023 07:20)  Weight (kg): 100 (09 Nov 2023 07:20)  BMI (kg/m2): 29.9 (09 Nov 2023 07:20)  BSA (m2): 2.22 (09 Nov 2023 07:20)    CAPILLARY BLOOD GLUCOSE  POCT Blood Glucose.: 184 mg/dL (12 Nov 2023 07:36)  POCT Blood Glucose.: 197 mg/dL (11 Nov 2023 21:09)  POCT Blood Glucose.: 98 mg/dL (11 Nov 2023 16:47)  POCT Blood Glucose.: 59 mg/dL (11 Nov 2023 11:38)        Urinalysis Basic - ( 11 Nov 2023 09:18 )    Color: x / Appearance: x / SG: x / pH: x  Gluc: 187 mg/dL / Ketone: x  / Bili: x / Urobili: x   Blood: x / Protein: x / Nitrite: x   Leuk Esterase: x / RBC: x / WBC x   Sq Epi: x / Non Sq Epi: x / Bacteria: x        RADIOLOGY & ADDITIONAL TESTS:  Imaging Personally Reviewed:  [x] YES  [ ] NO    HEALTH ISSUES - PROBLEM Dx:  S/P transmetatarsal amputation of foot    S/P BKA (below knee amputation), right        MEDICATIONS  (STANDING):  aspirin  chewable 81 milliGRAM(s) Oral daily  clopidogrel Tablet 75 milliGRAM(s) Oral daily  dextrose 50% Injectable 25 Gram(s) IV Push once  DULoxetine 40 milliGRAM(s) Oral every 12 hours  glucagon  Injectable 1 milliGRAM(s) IntraMuscular once  heparin   Injectable 5000 Unit(s) SubCutaneous every 8 hours  HYDROmorphone PCA (1 mG/mL) 30 milliLiter(s) PCA Continuous PCA Continuous  insulin glargine Injectable (LANTUS) 27 Unit(s) SubCutaneous every morning  insulin lispro (ADMELOG) corrective regimen sliding scale   SubCutaneous three times a day before meals  insulin lispro Injectable (ADMELOG) 9 Unit(s) SubCutaneous before breakfast  lactobacillus acidophilus 1 Tablet(s) Oral three times a day with meals  losartan 100 milliGRAM(s) Oral daily  magnesium oxide 400 milliGRAM(s) Oral three times a day with meals  meropenem  IVPB 1000 milliGRAM(s) IV Intermittent every 12 hours  NIFEdipine  milliGRAM(s) Oral daily  polyethylene glycol 3350 17 Gram(s) Oral daily  pregabalin 75 milliGRAM(s) Oral every 8 hours  senna 2 Tablet(s) Oral at bedtime  sodium bicarbonate 650 milliGRAM(s) Oral three times a day    MEDICATIONS  (PRN):  ALPRAZolam 0.5 milliGRAM(s) Oral four times a day PRN anxiety  dextrose Oral Gel 15 Gram(s) Oral once PRN Blood Glucose LESS THAN 70 milliGRAM(s)/deciliter  HYDROmorphone  Injectable 1 milliGRAM(s) IV Push every 4 hours PRN Severe Pain (7 - 10)  oxycodone    5 mG/acetaminophen 325 mG 2 Tablet(s) Oral every 4 hours PRN Moderate Pain (4 - 6)

## 2023-11-12 NOTE — PHYSICAL THERAPY INITIAL EVALUATION ADULT - TRANSFER SAFETY CONCERNS NOTED: SIT/STAND, REHAB EVAL
decreased safety awareness/decreased sequencing ability/decreased weight-shifting ability
decreased balance during turns/decreased safety awareness/losing balance/decreased proprioception/decreased weight-shifting ability

## 2023-11-12 NOTE — PHYSICAL THERAPY INITIAL EVALUATION ADULT - PLANNED THERAPY INTERVENTIONS, PT EVAL
balance training/bed mobility training/gait training/transfer training
balance training/bed mobility training/gait training/transfer training

## 2023-11-12 NOTE — PROGRESS NOTE ADULT - ASSESSMENT
58yoM with Charcot foot, s/p Rt BKA, CKD (baseline Cr 2.5-3.5, on list for kidney transplant), and IDDM (A1c 9.9), PAD, s/p  peripheral angiogram on 10/23/2023 , seen for CKD management.    CKD stage 4/ S/p right  BKA for gangrene / DM  - severe Urinary Retention   - 2 to neurogenic bladder, bedbound state,  Hydromorphone drip  - awaiting kidney bladder sono for prostate size and bladder volume  - per Urology  pt can do Self Cath in house after trained by the RN  - Urodynamic studies as OP  - creat at baseline  High K - change diet to renal diabetic, Lokelma 1o gr daily  - Anemia of CKD- Get iron studies   - On renal transplant list  - AVOID blood tx  - iron studies noted  - needs Retacrit 10, 000 Units sq once a week  Severely uncontrolled DM - on Insulin  On Hydromorphone drip - laxatives to be used, not fleet enema, Dulcolax, Senna, Miralax  - - Avoid nephrotoxic meds/NSAIDs/contrast , maintain hydration , BP control,  - Vascular surgery recommendations  appreciated  will follow .   58yoM with Charcot foot, s/p Rt BKA, CKD (baseline Cr 2.5-3.5, on list for kidney transplant), and IDDM (A1c 9.9), PAD, s/p  peripheral angiogram on 10/23/2023 , seen for CKD management.    CKD stage 4/ S/p right  BKA for gangrene / DM  - severe Urinary Retention   - 2 to neurogenic bladder, bedbound state,  Hydromorphone drip  - per Urology  pt can do Self Cath in house after trained by the RN  - Urodynamic studies as OP  - creat at baseline  High K - change diet to renal diabetic, Lokelma 1o gr daily  - Anemia of CKD- Get iron studies   - On renal transplant list  - AVOID blood tx  - iron studies noted  - needs Retacrit 10, 000 Units sq once a week  Severely uncontrolled DM - on Insulin  On Hydromorphone drip - laxatives to be used, not fleet enema, Dulcolax, Senna, Miralax  - - Avoid nephrotoxic meds/NSAIDs/contrast , maintain hydration , BP control,  - Vascular surgery recommendations  appreciated  will follow .   58yoM with Charcot foot, s/p Rt BKA, CKD (baseline Cr 2.5-3.5, on list for kidney transplant), and IDDM (A1c 9.9), PAD, s/p  peripheral angiogram on 10/23/2023 , seen for CKD management.    CKD stage 4/ S/p right  BKA for gangrene / DM  - severe Urinary Retention   - 2 to neurogenic bladder, bedbound state,  Hydromorphone drip  - per Urology  pt can do Self Cath in house after trained by the RN  - Urodynamic studies as OP  - creat at baseline  High K - change diet to renal diabetic,  at home takes lokelma 10mg Every other day -resume  - Anemia of CKD- Get iron studies   - On renal transplant list  - AVOID blood tx  - iron studies noted  - needs Retacrit 10, 000 Units sq once a week  Severely uncontrolled DM - on Insulin  On Hydromorphone drip - laxatives to be used, not fleet enema, Dulcolax, Senna, Miralax  - - Avoid nephrotoxic meds/NSAIDs/contrast , maintain hydration , BP control,  - Vascular surgery recommendations  appreciated  will follow .

## 2023-11-12 NOTE — PHYSICAL THERAPY INITIAL EVALUATION ADULT - PATIENT PROFILE REVIEW, REHAB EVAL
Chart reviewed. Re-eval./yes
Physical Therapy Re-Evaluation Chart reviewed; pt. seen 13:21-13:59/yes
yes

## 2023-11-12 NOTE — PHYSICAL THERAPY INITIAL EVALUATION ADULT - PERTINENT HX OF CURRENT PROBLEM, REHAB EVAL
Patient  is a 58yoM with Charcot foot s/p placement of an external fixator on 9/28/23 which had to be removed on 10/19 due to infection, CKD (baseline Cr 2.5-3.5, on list for kidney transplant), and IDDM (A1c 9.9), PAD, s/p  peripheral angiogram on 10/23/2023 with balloon angioplasty of PTA and DCB of R SFA and PTA of R PT and R AT via R groin and R pedal access, anemia recently received 2u pRBCS, pt was dc from hospital on 10/24/23 and now returns to hospital as directed by his podiatrist Dr Benson for TMA which will be done on 10/27/23 as pt has non healing wound and gangrene of the right foot. Patient complains of moderate to sever pain of foot which is partially relieved with percocet and associated wound discharge.  Pt denies fevers or chills.
Patient  is a 58yoM with Charcot foot s/p placement of an external fixator on 9/28/23 which had to be removed on 10/19 due to infection, CKD (baseline Cr 2.5-3.5, on list for kidney transplant), and IDDM (A1c 9.9), PAD, s/p  peripheral angiogram on 10/23/2023 with balloon angioplasty of PTA and DCB of R SFA and PTA of R PT and R AT via R groin and R pedal access, anemia recently received 2u pRBCS, pt was dc from hospital on 10/24/23 and now returns to hospital as directed by his podiatrist Dr Benson for TMA which will be done on 10/27/23 as pt has non healing wound and gangrene of the right foot. Patient complains of moderate to sever pain of foot which is partially relieved with percocet and associated wound discharge.  Pt denies fevers or chills.   Patient scheduled for R BKA on 11/9

## 2023-11-12 NOTE — PROGRESS NOTE ADULT - ASSESSMENT
58-year-old male with Charcot foot s/p placement of an external fixator on 9/28/23 which had to be removed on 10/19 due to infection, CKD (baseline Cr 2.5-3.5, on list for kidney transplant), and IDDM (A1c 9.9), PAD, s/p  peripheral angiogram on 10/23/2023 with balloon angioplasty of PTA and DCB of R SFA and PTA of R PT and R AT via R groin and R pedal access, anemia recently received 2u pRBCS, pt was dc from hospital on 10/24/23 and now returns to hospital as directed by his podiatrist Dr Benson for TMA which will be done on 10/27/23 as pt has non healing wound and gangrene of the right foot.     #R Foot DFU with gangrene  #R TMA 10/27, revision 10/30, debridement 11/4/23  #R BKA 11/9/23  #Severe PAD  - Pain Management following - on Dilaudid PCA   - Vascular following- has R Knee immobilizer   - ID following - Continue IV meropenem 1gm q12hrs (CrCl ~34) until 48hrs post-op, then can D/C abx - stop today   - pain management follow up   - PT eval today - discussed with physical therapist   - patient interested in 4A placement     #DM II - well controlled   - missed lantus on 11/10  - monitor fs today and adjust as needed     #Hx of CKD IV  #nephrolithiasis  #on renal transplant list  - nephrology following  - cr stable  - avoid nephrotoxics   - continue sodium bicarb  - low K diet    #PAD, s/p angioplasty  -c/w asa  -resumed plavix today   -vascular follow up appreciated     #Anemia  -stable post transfusion    #HTN - better controlled   - continue current tx and add hydralazine if needed     #Anxiety disorder  #depression - duloxetine   -xanax prn    Progress Note Handoff  Pending Consults:  PT and CM follow up   Pending Tests: none  Pending Results: none  Family Discussion: Discussed labs, meds, PT follow up and possible 4A placement in 24-48hrs  Disposition: Home_____/SNF______/Other_4a____/Unknown at this time_____  Spent over 55 min reviewing chart, speaking with patient/family and on coordinating patient care during interdisciplinary rounds     Please call me with any questions at extension [phone]

## 2023-11-12 NOTE — PHYSICAL THERAPY INITIAL EVALUATION ADULT - CRITERIA FOR SKILLED THERAPEUTIC INTERVENTIONS
impairments found
impairments found/functional limitations in following categories/risk reduction/prevention/rehab potential

## 2023-11-12 NOTE — PROGRESS NOTE ADULT - SUBJECTIVE AND OBJECTIVE BOX
Nephrology progress note    Patient was seen and examined, events over the last 24 h noted .    Allergies:  No Known Allergies    Hospital Medications:   MEDICATIONS  (STANDING):  aspirin  chewable 81 milliGRAM(s) Oral daily  clopidogrel Tablet 75 milliGRAM(s) Oral daily  dextrose 50% Injectable 25 Gram(s) IV Push once  DULoxetine 40 milliGRAM(s) Oral every 12 hours  glucagon  Injectable 1 milliGRAM(s) IntraMuscular once  heparin   Injectable 5000 Unit(s) SubCutaneous every 8 hours  HYDROmorphone PCA (1 mG/mL) 30 milliLiter(s) PCA Continuous PCA Continuous  insulin lispro (ADMELOG) corrective regimen sliding scale   SubCutaneous three times a day before meals  lactobacillus acidophilus 1 Tablet(s) Oral three times a day with meals  losartan 100 milliGRAM(s) Oral daily  magnesium oxide 400 milliGRAM(s) Oral three times a day with meals  NIFEdipine  milliGRAM(s) Oral daily  polyethylene glycol 3350 17 Gram(s) Oral daily  pregabalin 75 milliGRAM(s) Oral every 8 hours  senna 2 Tablet(s) Oral at bedtime  sodium bicarbonate 650 milliGRAM(s) Oral three times a day        VITALS:  T(F): 97.6 (11-12-23 @ 08:00), Max: 97.6 (11-12-23 @ 08:00)  HR: 76 (11-12-23 @ 08:00)  BP: 151/72 (11-12-23 @ 08:00)  RR: 18 (11-12-23 @ 08:00)  SpO2: 97% (11-12-23 @ 00:08)  Wt(kg): --    11-10 @ 07:01  -  11-11 @ 07:00  --------------------------------------------------------  IN: 250 mL / OUT: 1700 mL / NET: -1450 mL    11-11 @ 07:01  -  11-12 @ 07:00  --------------------------------------------------------  IN: 930 mL / OUT: 1675 mL / NET: -745 mL    11-12 @ 07:01  -  11-12 @ 12:26  --------------------------------------------------------  IN: 240 mL / OUT: 0 mL / NET: 240 mL          PHYSICAL EXAM:  Constitutional: NAD  HEENT: anicteric sclera, oropharynx clear, MMM  Neck: No JVD  Respiratory: CTAB, no wheezes, rales or rhonchi  Cardiovascular: S1, S2, RRR  Gastrointestinal: BS+, soft, NT/ND  Extremities: No cyanosis or clubbing. No peripheral edema  :  No urbano.   Skin: No rashes    LABS:  11-12    133<L>  |  95<L>  |  41<H>  ----------------------------<  163<H>  4.8   |  28  |  2.5<H>    Ca    8.9      12 Nov 2023 07:19  Phos  3.9     11-12  Mg     2.3     11-12    TPro  6.4  /  Alb  3.1<L>  /  TBili  0.3  /  DBili      /  AST  12  /  ALT  9   /  AlkPhos  146<H>  11-12                          8.2    7.56  )-----------( 477      ( 12 Nov 2023 07:19 )             26.1       Urine Studies:  Urinalysis Basic - ( 12 Nov 2023 07:19 )    Color:  / Appearance:  / SG:  / pH:   Gluc: 163 mg/dL / Ketone:   / Bili:  / Urobili:    Blood:  / Protein:  / Nitrite:    Leuk Esterase:  / RBC:  / WBC    Sq Epi:  / Non Sq Epi:  / Bacteria:         RADIOLOGY & ADDITIONAL STUDIES:

## 2023-11-12 NOTE — PHYSICAL THERAPY INITIAL EVALUATION ADULT - BALANCE TRAINING, PT EVAL
Improve standing balance to Fair +
Pt. will have at least fair+ static and dynamic standing balance by D/C

## 2023-11-12 NOTE — PHYSICAL THERAPY INITIAL EVALUATION ADULT - IMPAIRMENTS CONTRIBUTING TO GAIT DEVIATIONS, PT EVAL
decreased endurance/impaired balance/decreased flexibility/decreased strength
impaired balance/decreased strength

## 2023-11-12 NOTE — PHYSICAL THERAPY INITIAL EVALUATION ADULT - GENERAL OBSERVATIONS, REHAB EVAL
Pt. encountered alert and NAD, supine in bed (+)PCA pump, agreeable to PT IE. Pt. left as found, semifowlers in bed (+)alarms (+)call bell in reach, NAD
Patient encountered lying in bed. Agreed to participate in therapy

## 2023-11-12 NOTE — PHYSICAL THERAPY INITIAL EVALUATION ADULT - IMPAIRED TRANSFERS: SIT/STAND, REHAB EVAL
impaired balance/decreased strength
decreased endurance/impaired balance/decreased flexibility/pain/decreased strength

## 2023-11-13 LAB
GLUCOSE BLDC GLUCOMTR-MCNC: 117 MG/DL — HIGH (ref 70–99)
GLUCOSE BLDC GLUCOMTR-MCNC: 117 MG/DL — HIGH (ref 70–99)
GLUCOSE BLDC GLUCOMTR-MCNC: 277 MG/DL — HIGH (ref 70–99)
GLUCOSE BLDC GLUCOMTR-MCNC: 277 MG/DL — HIGH (ref 70–99)
GLUCOSE BLDC GLUCOMTR-MCNC: 75 MG/DL — SIGNIFICANT CHANGE UP (ref 70–99)
GLUCOSE BLDC GLUCOMTR-MCNC: 75 MG/DL — SIGNIFICANT CHANGE UP (ref 70–99)
GLUCOSE BLDC GLUCOMTR-MCNC: 76 MG/DL — SIGNIFICANT CHANGE UP (ref 70–99)
GLUCOSE BLDC GLUCOMTR-MCNC: 76 MG/DL — SIGNIFICANT CHANGE UP (ref 70–99)

## 2023-11-13 PROCEDURE — 99233 SBSQ HOSP IP/OBS HIGH 50: CPT

## 2023-11-13 RX ORDER — HYDROMORPHONE HYDROCHLORIDE 2 MG/ML
4 INJECTION INTRAMUSCULAR; INTRAVENOUS; SUBCUTANEOUS EVERY 4 HOURS
Refills: 0 | Status: DISCONTINUED | OUTPATIENT
Start: 2023-11-13 | End: 2023-11-15

## 2023-11-13 RX ADMIN — MAGNESIUM OXIDE 400 MG ORAL TABLET 400 MILLIGRAM(S): 241.3 TABLET ORAL at 12:19

## 2023-11-13 RX ADMIN — DULOXETINE HYDROCHLORIDE 40 MILLIGRAM(S): 30 CAPSULE, DELAYED RELEASE ORAL at 17:45

## 2023-11-13 RX ADMIN — Medication 6: at 08:17

## 2023-11-13 RX ADMIN — DULOXETINE HYDROCHLORIDE 40 MILLIGRAM(S): 30 CAPSULE, DELAYED RELEASE ORAL at 05:38

## 2023-11-13 RX ADMIN — Medication 1 TABLET(S): at 08:17

## 2023-11-13 RX ADMIN — Medication 75 MILLIGRAM(S): at 05:43

## 2023-11-13 RX ADMIN — Medication 81 MILLIGRAM(S): at 12:19

## 2023-11-13 RX ADMIN — Medication 120 MILLIGRAM(S): at 05:34

## 2023-11-13 RX ADMIN — Medication 650 MILLIGRAM(S): at 14:59

## 2023-11-13 RX ADMIN — Medication 1 TABLET(S): at 17:44

## 2023-11-13 RX ADMIN — HEPARIN SODIUM 5000 UNIT(S): 5000 INJECTION INTRAVENOUS; SUBCUTANEOUS at 21:48

## 2023-11-13 RX ADMIN — Medication 1 TABLET(S): at 12:19

## 2023-11-13 RX ADMIN — Medication 650 MILLIGRAM(S): at 21:34

## 2023-11-13 RX ADMIN — INSULIN GLARGINE 24 UNIT(S): 100 INJECTION, SOLUTION SUBCUTANEOUS at 08:17

## 2023-11-13 RX ADMIN — MAGNESIUM OXIDE 400 MG ORAL TABLET 400 MILLIGRAM(S): 241.3 TABLET ORAL at 08:17

## 2023-11-13 RX ADMIN — LOSARTAN POTASSIUM 100 MILLIGRAM(S): 100 TABLET, FILM COATED ORAL at 05:34

## 2023-11-13 RX ADMIN — CLOPIDOGREL BISULFATE 75 MILLIGRAM(S): 75 TABLET, FILM COATED ORAL at 12:19

## 2023-11-13 RX ADMIN — MAGNESIUM OXIDE 400 MG ORAL TABLET 400 MILLIGRAM(S): 241.3 TABLET ORAL at 17:44

## 2023-11-13 RX ADMIN — HYDROMORPHONE HYDROCHLORIDE 30 MILLILITER(S): 2 INJECTION INTRAMUSCULAR; INTRAVENOUS; SUBCUTANEOUS at 07:15

## 2023-11-13 RX ADMIN — Medication 75 MILLIGRAM(S): at 14:59

## 2023-11-13 RX ADMIN — HEPARIN SODIUM 5000 UNIT(S): 5000 INJECTION INTRAVENOUS; SUBCUTANEOUS at 15:00

## 2023-11-13 RX ADMIN — HEPARIN SODIUM 5000 UNIT(S): 5000 INJECTION INTRAVENOUS; SUBCUTANEOUS at 05:37

## 2023-11-13 RX ADMIN — Medication 650 MILLIGRAM(S): at 05:38

## 2023-11-13 NOTE — OCCUPATIONAL THERAPY INITIAL EVALUATION ADULT - IMPAIRMENTS CONTRIBUTING IMPAIRED BED MOBILITY, REHAB EVAL
Pain R LE increased to 7/10 VAS. See above regarding details./decreased flexibility/pain/decreased ROM/decreased strength

## 2023-11-13 NOTE — OCCUPATIONAL THERAPY INITIAL EVALUATION ADULT - OTHER, REHAB EVAL
Detail Level: Simple
Additional Notes: Patient consent was obtained to proceed with the visit and recommended plan of care after discussion of all risks and benefits, including the risks of COVID-19 exposure.
R hand dominant . + atrophy R thenar eminence.

## 2023-11-13 NOTE — PROGRESS NOTE ADULT - ASSESSMENT
58-year-old male with Charcot foot s/p placement of an external fixator on 9/28/23 which had to be removed on 10/19 due to infection, CKD (baseline Cr 2.5-3.5, on list for kidney transplant), and IDDM (A1c 9.9), PAD, s/p  peripheral angiogram on 10/23/2023 with balloon angioplasty of PTA and DCB of R SFA and PTA of R PT and R AT via R groin and R pedal access, anemia recently received 2u pRBCS, pt was dc from hospital on 10/24/23 and now returns to hospital as directed by his podiatrist Dr Benson for TMA which will be done on 10/27/23 as pt has non healing wound and gangrene of the right foot.    #R Foot DFU with gangrene  #R TMA 10/27, revision 10/30, debridement 11/4/23  #R BKA 11/9/23  #Severe PAD  - Pain Management following  - Vascular following- has R Knee immobilizer   - ID following, s/p linda  - PT eval: walked 20 feet x 2  - Physiatry: good candidate for 4A inpatient rehab    #DM II - well controlled   - adjusted yesterday due to episodes of hypoglycemia in the early afternoon; better controlled today     #Hx of CKD IV  #nephrolithiasis  #on renal transplant list  - nephrology following  - Cr stable  - avoid nephrotoxics   - continue sodium bicarb  - low K diet    #PAD, s/p angioplasty  - c/w asa  - resume plavix  - vascular follow up appreciated     #Anemia  - stable post transfusion    #HTN - better controlled   - continue current tx and add hydralazine if needed     #Anxiety disorder  #depression  - c/w duloxetine   - xanax prn

## 2023-11-13 NOTE — OCCUPATIONAL THERAPY INITIAL EVALUATION ADULT - NSOTDISCHREC_GEN_A_CORE
Patient requires assistance with activities of daily living. OT recommends D/C to rehabilitation facility OT when medically appropriate. Refer to evaluation for details.

## 2023-11-13 NOTE — CHART NOTE - NSCHARTNOTEFT_GEN_A_CORE
informed by RN that patient fell around 20:45 and denies head trauma. Assessed and evaluated patient at bedside. No signs of ecchymosis or major trauma. Motor strength in UE and LE is 5/5 bilaterally and sensation intact. Only tenderness to palpation over lower RT paraspinal muscles. No need for imaging currently. Advised patient to call for PCA for assistance in getting up to walk or use the restroom. Patient understands.

## 2023-11-13 NOTE — PROGRESS NOTE ADULT - SUBJECTIVE AND OBJECTIVE BOX
MARIOLA CURRY 58y Male ad to [facility] for unhealing gangrenous R foot ulcer in context of R Charcot Foot Deformity  and PAD for TMA with Podiatry/R THAK.  The R TMA took place on 10/27, revision on 10/30, further debridement on 11/4.   The C&S grew pseudomonas and yeast.  The pt has severe PAD with occ of dorsal and metatarsal aa.  The pt is on Meropenem IV.  [place] for [place]KA on  THURS 11/9.    PMHx :  HTN, ASHD, DLD, PAD, sp balloon angioplasty 10/23/23,  IDDM, CKD 4, (2.5-3.5), nephrolithiasis, on renal transplant list, Chr Anemia, sp prior PRBCs, GERD, anxiety, depression    INTERVAL HPI/OVERNIGHT EVENTS: pt is sp RBKA on 11/9    MEDICATIONS  (STANDING):  aspirin  chewable 81 milliGRAM(s) Oral daily  dextrose 50% Injectable 25 Gram(s) IV Push once  epoetin ban (PROCRIT) Injectable 2000 Unit(s) SubCutaneous every 7 days  glucagon  Injectable 1 milliGRAM(s) IntraMuscular once  heparin   Injectable 5000 Unit(s) SubCutaneous every 8 hours  insulin glargine Injectable (LANTUS) 25 Unit(s) SubCutaneous every morning  insulin lispro (ADMELOG) corrective regimen sliding scale   SubCutaneous three times a day before meals  insulin lispro Injectable (ADMELOG) 8 Unit(s) SubCutaneous before breakfast  losartan 100 milliGRAM(s) Oral daily  magnesium oxide 400 milliGRAM(s) Oral three times a day with meals  meropenem  IVPB 1000 milliGRAM(s) IV Intermittent every 12 hours  NIFEdipine XL 90 milliGRAM(s) Oral daily  rosuvastatin 20 milliGRAM(s) Oral at bedtime  sodium bicarbonate 650 milliGRAM(s) Oral three times a day    MEDICATIONS  (PRN):  ALPRAZolam 0.5 milliGRAM(s) Oral four times a day PRN anxiety  cloNIDine 0.1 milliGRAM(s) Oral every 8 hours PRN sbp>150  dextrose Oral Gel 15 Gram(s) Oral once PRN Blood Glucose LESS THAN 70 milliGRAM(s)/deciliter  melatonin 3 milliGRAM(s) Oral at bedtime PRN Insomnia  oxycodone    5 mG/acetaminophen 325 mG 2 Tablet(s) Oral every 4 hours PRN Moderate Pain (4 - 6)  oxyCODONE    IR 2.5 milliGRAM(s) Oral every 4 hours PRN Severe Pain (7 - 10)      Allergies    No Known Allergies	    Vital Signs Last 24 Hrs    T(F): 98.4  HR:  65  BP: 140/69    RR: 18   SpO2: 96-98%        PHYSICAL EXAM:      Constitutional: stable post op today    Eyes: nonicteric    ENMT: dry oral mucosa, dental defects    Neck: supple, no JVD/bruit/stridor    Respiratory: shallow resp, no wheezing/crackles/rales    Cardiovascular: S1S2 reg    Gastrointestinal: globose, soft and benign, + BS, no organomegaly    Genitourinary: no urbano    Extremities: moves all limbs, R mid foot bandage,    Vascular:    Neurological: nonfocal    Skin:    Lymph Nodes: not enlarged    Psychiatric: stable        LABS:     11/8                   9.8   8.78 )-----------( 532             31     140  |  100  |  37  ----------------------------< 171  4.9   |  27  |  2.6  GFR 28  Ca    9.7        Phos  4.3     11-06  Mg     2.2     11-07  O+  TPro  7.0  /  Alb  3.8  /  TBili  0.3  /  DBili  x   /  AST  15  /  ALT  18  /  AlkPhos  186<H>  11-07    PT/INR - ( 07 Nov 2023 08:35 )   PT: 11.40 sec;   INR: 1.00 ratio         PTT - ( 06 Nov 2023 22:30 )  PTT:36.0 sec  Urinalysis Basic - ( 07 Nov 2023 08:35 )    Color: x / Appearance: x / SG: x / pH: x  Gluc: 236 mg/dL / Ketone: x  / Bili: x / Urobili: x   Blood: x / Protein: x / Nitrite: x   Leuk Esterase: x / RBC: x / WBC x   Sq Epi: x / Non Sq Epi: x / Bacteria: x        RADIOLOGY & ADDITIONAL TESTS:     MARIOLA CURRY 58y Male ad to [facility] for unhealing gangrenous R foot ulcer in context of R Charcot Foot Deformity  and PAD for TMA with Podiatry/R THAK.  The R TMA took place on 10/27, revision on 10/30, further debridement on 11/4.   The C&S grew pseudomonas and yeast.  The pt has severe PAD with occ of dorsal and metatarsal aa.  The pt is on Meropenem IV.  [place] is [place] for RBKA on  THURS 11/9.    PMHx :  HTN, ASHD, DLD, PAD, sp balloon angioplasty 10/23/23,  IDDM, CKD 4, (2.5-3.5), nephrolithiasis, on renal transplant list, Chr Anemia, sp prior PRBCs, GERD, anxiety, depression    INTERVAL HPI/OVERNIGHT EVENTS: pt is sp R BKA on 11/9, OOB in chair    MEDICATIONS  (STANDING):  aspirin  chewable 81 milliGRAM(s) Oral daily  dextrose 50% Injectable 25 Gram(s) IV Push once  epoetin ban (PROCRIT) Injectable 2000 Unit(s) SubCutaneous every 7 days  glucagon  Injectable 1 milliGRAM(s) IntraMuscular once  heparin   Injectable 5000 Unit(s) SubCutaneous every 8 hours  insulin glargine Injectable (LANTUS) 25 Unit(s) SubCutaneous every morning  insulin lispro (ADMELOG) corrective regimen sliding scale   SubCutaneous three times a day before meals  insulin lispro Injectable (ADMELOG) 8 Unit(s) SubCutaneous before breakfast  losartan 100 milliGRAM(s) Oral daily  magnesium oxide 400 milliGRAM(s) Oral three times a day with meals  meropenem  IVPB 1000 milliGRAM(s) IV Intermittent every 12 hours  NIFEdipine XL 90 milliGRAM(s) Oral daily  rosuvastatin 20 milliGRAM(s) Oral at bedtime  sodium bicarbonate 650 milliGRAM(s) Oral three times a day    MEDICATIONS  (PRN):  ALPRAZolam 0.5 milliGRAM(s) Oral four times a day PRN anxiety  cloNIDine 0.1 milliGRAM(s) Oral every 8 hours PRN sbp>150  dextrose Oral Gel 15 Gram(s) Oral once PRN Blood Glucose LESS THAN 70 milliGRAM(s)/deciliter  melatonin 3 milliGRAM(s) Oral at bedtime PRN Insomnia  oxycodone    5 mG/acetaminophen 325 mG 2 Tablet(s) Oral every 4 hours PRN Moderate Pain (4 - 6)  oxyCODONE    IR 2.5 milliGRAM(s) Oral every 4 hours PRN Severe Pain (7 - 10)      Allergies    No Known Allergies	    Vital Signs Last 24 Hrs    T(F): 97.1  HR:  65  BP: 152/82    RR: 18   SpO2: 92% RA        PHYSICAL EXAM:      Constitutional: stable post op today    Eyes: nonicteric    ENMT: dry oral mucosa, dental defects    Neck: supple, no JVD/bruit/stridor    Respiratory: shallow resp, no wheezing/crackles/rales    Cardiovascular: S1S2 reg    Gastrointestinal: globose, soft and benign, + BS, no organomegaly    Genitourinary: no urbano    Extremities: moves all limbs, R BKbandage, + immobilizer    Vascular: dec L pedal pulse    Neurological: nonfocall    Lymph Nodes: not enlarged    Psychiatric: stable        LABS:     11/12                8.2   7.5 )-----------( 477             26    133  |  95  |  45  ----------------------------< 163  4.8   |  28  |  2.1  GFR 28, 29  Ca    9.7        Phos  4.3, 3.9  Mg     2.2, 2.3  O+  TPro  7.0  /  Alb  3.8  /  TBili  0.3  /  DBili  x   /  AST  15  /  ALT  18  /  AlkPhos  186<H>  11-07    PT/INR - ( 07 Nov 2023 08:35 )   PT: 11.40 sec;   INR: 1.00 ratio         PTT - ( 06 Nov 2023 22:30 )  PTT:36.0 sec  Urinalysis Basic - ( 07 Nov 2023 08:35 )    Color: x / Appearance: x / SG: x / pH: x  Gluc: 236 mg/dL / Ketone: x  / Bili: x / Urobili: x   Blood: x / Protein: x / Nitrite: x   Leuk Esterase: x / RBC: x / WBC x   Sq Epi: x / Non Sq Epi: x / Bacteria: x        RADIOLOGY & ADDITIONAL TESTS:

## 2023-11-13 NOTE — PROGRESS NOTE ADULT - SUBJECTIVE AND OBJECTIVE BOX
Nephrology progress note    THIS IS AN INCOMPLETE NOTE . FULL NOTE TO FOLLOW SHORTLY    Patient is seen and examined, events over the last 24 h noted .    Allergies:  No Known Allergies    Hospital Medications:   MEDICATIONS  (STANDING):  aspirin  chewable 81 milliGRAM(s) Oral daily  clopidogrel Tablet 75 milliGRAM(s) Oral daily  dextrose 50% Injectable 25 Gram(s) IV Push once  DULoxetine 40 milliGRAM(s) Oral every 12 hours  glucagon  Injectable 1 milliGRAM(s) IntraMuscular once  heparin   Injectable 5000 Unit(s) SubCutaneous every 8 hours  insulin glargine Injectable (LANTUS) 24 Unit(s) SubCutaneous every morning  insulin lispro (ADMELOG) corrective regimen sliding scale   SubCutaneous three times a day before meals  lactobacillus acidophilus 1 Tablet(s) Oral three times a day with meals  losartan 100 milliGRAM(s) Oral daily  magnesium oxide 400 milliGRAM(s) Oral three times a day with meals  NIFEdipine  milliGRAM(s) Oral daily  polyethylene glycol 3350 17 Gram(s) Oral daily  pregabalin 75 milliGRAM(s) Oral every 8 hours  senna 2 Tablet(s) Oral at bedtime  sodium bicarbonate 650 milliGRAM(s) Oral three times a day        VITALS:  T(F): 97.1 (11-13-23 @ 07:35), Max: 98.1 (11-13-23 @ 00:09)  HR: 76 (11-13-23 @ 07:35)  BP: 152/82 (11-13-23 @ 07:35)  RR: 18 (11-13-23 @ 05:20)  SpO2: 92% (11-13-23 @ 00:09)  Wt(kg): --    11-11 @ 07:01  -  11-12 @ 07:00  --------------------------------------------------------  IN: 930 mL / OUT: 1675 mL / NET: -745 mL    11-12 @ 07:01  -  11-13 @ 07:00  --------------------------------------------------------  IN: 480 mL / OUT: 1250 mL / NET: -770 mL    11-13 @ 07:01  -  11-13 @ 09:59  --------------------------------------------------------  IN: 0 mL / OUT: 500 mL / NET: -500 mL          PHYSICAL EXAM:  Constitutional: NAD  HEENT: anicteric sclera, oropharynx clear, MMM  Neck: No JVD  Respiratory: CTAB, no wheezes, rales or rhonchi  Cardiovascular: S1, S2, RRR  Gastrointestinal: BS+, soft, NT/ND  Extremities: No cyanosis or clubbing. No peripheral edema  :  No urbano.   Skin: No rashes    LABS:  11-12    133<L>  |  95<L>  |  41<H>  ----------------------------<  163<H>  4.8   |  28  |  2.5<H>    Ca    8.9      12 Nov 2023 07:19  Phos  3.9     11-12  Mg     2.3     11-12    TPro  6.4  /  Alb  3.1<L>  /  TBili  0.3  /  DBili      /  AST  12  /  ALT  9   /  AlkPhos  146<H>  11-12                          8.2    7.56  )-----------( 477      ( 12 Nov 2023 07:19 )             26.1       Urine Studies:  Urinalysis Basic - ( 12 Nov 2023 07:19 )    Color:  / Appearance:  / SG:  / pH:   Gluc: 163 mg/dL / Ketone:   / Bili:  / Urobili:    Blood:  / Protein:  / Nitrite:    Leuk Esterase:  / RBC:  / WBC    Sq Epi:  / Non Sq Epi:  / Bacteria:           Iron 28, TIBC 163, %sat 17      [11-10-23 @ 17:09]  Ferritin 436      [11-10-23 @ 17:09]  PTH -- (Ca --)      [11-10-23 @ 17:09]   81  PTH -- (Ca 9.0)      [10-23-23 @ 23:10]   103  Vitamin D (25OH) 25      [10-24-23 @ 06:34]  HbA1c 10.9      [12-04-18 @ 19:15]  Lipid: chol 106, TG 93, HDL 35, LDL --      [10-23-23 @ 06:35]          RADIOLOGY & ADDITIONAL STUDIES:   Nephrology progress note    Patient is seen and examined, events over the last 24 h noted .  Lying in bed comfortable     Allergies:  No Known Allergies    Hospital Medications:   MEDICATIONS  (STANDING):  aspirin  chewable 81 milliGRAM(s) Oral daily  clopidogrel Tablet 75 milliGRAM(s) Oral daily  dextrose 50% Injectable 25 Gram(s) IV Push once  DULoxetine 40 milliGRAM(s) Oral every 12 hours  glucagon  Injectable 1 milliGRAM(s) IntraMuscular once  heparin   Injectable 5000 Unit(s) SubCutaneous every 8 hours  insulin glargine Injectable (LANTUS) 24 Unit(s) SubCutaneous every morning  insulin lispro (ADMELOG) corrective regimen sliding scale   SubCutaneous three times a day before meals  lactobacillus acidophilus 1 Tablet(s) Oral three times a day with meals  losartan 100 milliGRAM(s) Oral daily  magnesium oxide 400 milliGRAM(s) Oral three times a day with meals  NIFEdipine  milliGRAM(s) Oral daily  polyethylene glycol 3350 17 Gram(s) Oral daily  pregabalin 75 milliGRAM(s) Oral every 8 hours  senna 2 Tablet(s) Oral at bedtime  sodium bicarbonate 650 milliGRAM(s) Oral three times a day        VITALS:  T(F): 97.1 (11-13-23 @ 07:35), Max: 98.1 (11-13-23 @ 00:09)  HR: 76 (11-13-23 @ 07:35)  BP: 152/82 (11-13-23 @ 07:35)  RR: 18 (11-13-23 @ 05:20)  SpO2: 92% (11-13-23 @ 00:09)      11-11 @ 07:01  -  11-12 @ 07:00  --------------------------------------------------------  IN: 930 mL / OUT: 1675 mL / NET: -745 mL    11-12 @ 07:01  -  11-13 @ 07:00  --------------------------------------------------------  IN: 480 mL / OUT: 1250 mL / NET: -770 mL    11-13 @ 07:01  -  11-13 @ 09:59  --------------------------------------------------------  IN: 0 mL / OUT: 500 mL / NET: -500 mL          PHYSICAL EXAM:  Constitutional: NAD  Respiratory: CTAB, no wheezes, rales or rhonchi  Cardiovascular: S1, S2, RRR  Gastrointestinal: BS+, soft, NT/ND  Extremities: No cyanosis or clubbing. right BKA   :  No urbano.   Skin: No rashes    LABS:    11-12    133<L>  |  95<L>  |  41<H>  ----------------------------<  163<H>  4.8   |  28  |  2.5<H>    Ca    8.9      12 Nov 2023 07:19  Phos  3.9     11-12  Mg     2.3     11-12    TPro  6.4  /  Alb  3.1<L>  /  TBili  0.3  /  DBili      /  AST  12  /  ALT  9   /  AlkPhos  146<H>  11-12                          8.2    7.56  )-----------( 477      ( 12 Nov 2023 07:19 )             26.1       Urine Studies:  Urinalysis Basic - ( 12 Nov 2023 07:19 )    Color:  / Appearance:  / SG:  / pH:   Gluc: 163 mg/dL / Ketone:   / Bili:  / Urobili:    Blood:  / Protein:  / Nitrite:    Leuk Esterase:  / RBC:  / WBC    Sq Epi:  / Non Sq Epi:  / Bacteria:           Iron 28, TIBC 163, %sat 17      [11-10-23 @ 17:09]  Ferritin 436      [11-10-23 @ 17:09]  PTH -- (Ca --)      [11-10-23 @ 17:09]   81  PTH -- (Ca 9.0)      [10-23-23 @ 23:10]   103  Vitamin D (25OH) 25      [10-24-23 @ 06:34]  HbA1c 10.9      [12-04-18 @ 19:15]  Lipid: chol 106, TG 93, HDL 35, LDL --      [10-23-23 @ 06:35]          RADIOLOGY & ADDITIONAL STUDIES:

## 2023-11-13 NOTE — PROGRESS NOTE ADULT - ASSESSMENT
Imp: Rehab of R BKA / Charcot foot s/p placement of an external fixator on 9/28/23 which had to be removed on 10/19 due to infection, CKD (baseline Cr 2.5-3.5, on list for kidney transplant), and IDDM (A1c 9.9), PAD, s/p  peripheral angiogram on 10/23/2023 with balloon angioplasty of PTA and DCB of R SFA and PTA of R PT and R AT via R groin and R pedal access, anemia   Prior to hospitalization he  was independent in all activities with crutches.  Currently he  needs assist with all ADLs and ambulate short distance with walker with assist. He can tolerate 3hr/day PT/OT and medically necessary. He needs acute inpatient rehab to improve functions for safe return home. Pt also needs physiatrist to monitor his medical status and functional progress during his rehab stay. He warrants acute inpatient rehab.     Plan: continue bedside therapy as tolerated           Good 4A acute inpatient rehab candidate

## 2023-11-13 NOTE — PROGRESS NOTE ADULT - SUBJECTIVE AND OBJECTIVE BOX
24H events:    Patient is a 58y old Male who presents with a chief complaint of R BKA (13 Nov 2023 11:53)    Primary diagnosis of Gangrene of foot    Today is hospital day 18d. This morning patient was seen and examined at bedside, resting comfortably in bed.    No acute or major events overnight.    Code Status: Full    PAST MEDICAL & SURGICAL HISTORY  DM (diabetes mellitus)    HTN (hypertension)    HLD (hyperlipidemia)    Herpes labialis    Anxiety    GERD (gastroesophageal reflux disease)    Kidney stones  20 years ago    Kidney disease  stage 4    Chronic kidney disease, unspecified CKD stage    Diabetic Charcot foot    PAD (peripheral artery disease)    S/P foot surgery, right  x 5 ( 2006 - 2013 )    Kidney stone  Removed by Laser x 2 ( 20 years ago )    H/O arthroscopy of right knee    Status post peripheral artery angioplasty      SOCIAL HISTORY:  Social History:      ALLERGIES:  No Known Allergies    MEDICATIONS:  STANDING MEDICATIONS  aspirin  chewable 81 milliGRAM(s) Oral daily  clopidogrel Tablet 75 milliGRAM(s) Oral daily  dextrose 50% Injectable 25 Gram(s) IV Push once  DULoxetine 40 milliGRAM(s) Oral every 12 hours  glucagon  Injectable 1 milliGRAM(s) IntraMuscular once  heparin   Injectable 5000 Unit(s) SubCutaneous every 8 hours  insulin glargine Injectable (LANTUS) 24 Unit(s) SubCutaneous every morning  insulin lispro (ADMELOG) corrective regimen sliding scale   SubCutaneous three times a day before meals  lactobacillus acidophilus 1 Tablet(s) Oral three times a day with meals  losartan 100 milliGRAM(s) Oral daily  magnesium oxide 400 milliGRAM(s) Oral three times a day with meals  NIFEdipine  milliGRAM(s) Oral daily  polyethylene glycol 3350 17 Gram(s) Oral daily  pregabalin 75 milliGRAM(s) Oral every 8 hours  senna 2 Tablet(s) Oral at bedtime  sodium bicarbonate 650 milliGRAM(s) Oral three times a day    PRN MEDICATIONS  ALPRAZolam 0.5 milliGRAM(s) Oral four times a day PRN  dextrose Oral Gel 15 Gram(s) Oral once PRN  HYDROmorphone   Tablet 4 milliGRAM(s) Oral every 4 hours PRN  HYDROmorphone  Injectable 1 milliGRAM(s) IV Push every 4 hours PRN    VITALS:   T(F): 97.1  HR: 76  BP: 152/82  RR: 18  SpO2: 92%    PHYSICAL EXAM:  GENERAL:   ( x ) NAD, lying in bed comfortably     (  ) obtunded     (  ) lethargic     (  ) somnolent    HEAD:   ( x ) Atraumatic     (  ) hematoma     (  ) laceration (specify location:       )     NECK:  ( x ) Supple     (  ) neck stiffness     (  ) nuchal rigidity     (  )  no JVD     (  ) JVD present ( -- cm)    HEART:  Rate -->     ( x ) normal rate     (  ) bradycardic     (  ) tachycardic  Rhythm -->     ( x ) regular     (  ) regularly irregular     (  ) irregularly irregular  Murmurs -->     ( x ) normal s1s2     (  ) systolic murmur     (  ) diastolic murmur     (  ) continuous murmur      (  ) S3 present     (  ) S4 present    LUNGS:   ( x ) Unlabored respirations     (  ) tachypnea  ( x ) B/L air entry     (  ) decreased breath sounds in:  (location     )    ( x ) no adventitious sound     (  ) crackles     (  ) wheezing      (  ) rhonchi      (specify location:       )  (  ) chest wall tenderness (specify location:       )    ABDOMEN:   ( x ) Soft     (  ) tense   |   ( x ) nondistended     (  ) distended   |   (  ) +BS     (  ) hypoactive bowel sounds     (  ) hyperactive bowel sounds  ( x ) nontender     (  ) RUQ tenderness     (  ) RLQ tenderness     (  ) LLQ tenderness     (  ) epigastric tenderness     (  ) diffuse tenderness  (  ) Splenomegaly      (  ) Hepatomegaly      (  ) Jaundice     (  ) ecchymosis     EXTREMITIES: no edema. R foot amputation    NERVOUS SYSTEM:    ( x ) A&Ox3     (  ) confused     (  ) lethargic      LABS:                        8.2    7.56  )-----------( 477      ( 12 Nov 2023 07:19 )             26.1     11-12    133<L>  |  95<L>  |  41<H>  ----------------------------<  163<H>  4.8   |  28  |  2.5<H>    Ca    8.9      12 Nov 2023 07:19  Phos  3.9     11-12  Mg     2.3     11-12    TPro  6.4  /  Alb  3.1<L>  /  TBili  0.3  /  DBili  x   /  AST  12  /  ALT  9   /  AlkPhos  146<H>  11-12      Urinalysis Basic - ( 12 Nov 2023 07:19 )    Color: x / Appearance: x / SG: x / pH: x  Gluc: 163 mg/dL / Ketone: x  / Bili: x / Urobili: x   Blood: x / Protein: x / Nitrite: x   Leuk Esterase: x / RBC: x / WBC x   Sq Epi: x / Non Sq Epi: x / Bacteria: x

## 2023-11-13 NOTE — OCCUPATIONAL THERAPY INITIAL EVALUATION ADULT - BATHING, PREVIOUS LEVEL OF FUNCTION, OT EVAL
As per pr report, owns and uses 2 wall grab bars and shower chair in tub. Will be installing hand held shower head./independent/needs device

## 2023-11-13 NOTE — OCCUPATIONAL THERAPY INITIAL EVALUATION ADULT - ADAPTIVE EQUIPMENT NEEDED
raised toilet seat without armrests in 1/2 bath, ADA height toilet in full bathroom, 2 wall grab bars and shower chair in tub./yes

## 2023-11-13 NOTE — OCCUPATIONAL THERAPY INITIAL EVALUATION ADULT - SHORT TERM MEMORY, REHAB EVAL
Recalled 3/3 items s/p immediate delay. Recalled 2/3 items s/p 5 minute delay. With 1 general vc, recalled third item./impaired

## 2023-11-13 NOTE — PROGRESS NOTE ADULT - SUBJECTIVE AND OBJECTIVE BOX
MARIOLA CURRY  58y  Male      Patient is a 58y old Male who presents with a chief complaint of Non healing R TMA (10 Nov 2023 02:07)      INTERVAL HPI/OVERNIGHT EVENTS:  Patient seen and examined earlier this morning  lying in bed  in nad  no overnight events - await 4A bed    REVIEW OF SYSTEMS:  CONSTITUTIONAL: No fever, weight loss, or fatigue  EYES: No eye pain, visual disturbances, or discharge  ENMT:  No difficulty hearing, tinnitus, vertigo; No sinus or throat pain  NECK: No pain or stiffness  RESPIRATORY: No cough, wheezing, chills or hemoptysis; No shortness of breath  CARDIOVASCULAR: No chest pain, palpitations, dizziness, or leg swelling  GASTROINTESTINAL: No abdominal or epigastric pain. No nausea, vomiting, or hematemesis; No diarrhea or constipation. No melena or hematochezia.  GENITOURINARY: No dysuria, frequency, hematuria, or incontinence  NEUROLOGICAL: No headaches, memory loss, loss of strength, numbness, or tremors  SKIN: No itching, burning, rashes, or lesions   LYMPH NODES: No enlarged glands  ENDOCRINE: No heat or cold intolerance; No hair loss  MUSCULOSKELETAL: No joint pain or swelling; No muscle, back, or extremity pain  PSYCHIATRIC: No depression, anxiety, mood swings, or difficulty sleeping  HEME/LYMPH: No easy bruising, or bleeding gums  ALLERY AND IMMUNOLOGIC: No hives or eczema    Vital Signs Last 24 Hrs  T(C): 36.2 (13 Nov 2023 07:35), Max: 36.7 (13 Nov 2023 00:09)  T(F): 97.1 (13 Nov 2023 07:35), Max: 98.1 (13 Nov 2023 00:09)  HR: 76 (13 Nov 2023 07:35) (69 - 79)  BP: 152/82 (13 Nov 2023 07:35) (135/69 - 155/69)  BP(mean): --  RR: 18 (13 Nov 2023 05:20) (18 - 18)  SpO2: 92% (13 Nov 2023 00:09) (92% - 92%)    Parameters below as of 13 Nov 2023 00:09  Patient On (Oxygen Delivery Method): room air      PHYSICAL EXAM:  GENERAL: NAD, well-groomed,   HEAD:  Atraumatic, Normocephalic  EYES: conjunctiva and sclera clear  ENMT: Moist mucous membranes,  No visible lesions  NECK: Supple, No JVD, Normal thyroid  NERVOUS SYSTEM:  Alert & Oriented X3, Good concentration; moves all extremities   CHEST/LUNG: good air entry   HEART: Regular rate and rhythm; No murmurs, rubs, or gallops  ABDOMEN: Soft, Nontender, Nondistended; Bowel sounds present  EXTREMITIES:  RLE BKA, LE wrapped with knee immobilizer in place   LYMPH: No lymphadenopathy noted  SKIN: No rashes or lesions    Consultant(s) Notes Reviewed:  [x ] YES  [ ] NO  Care Discussed with Consultants/Other Providers [ x] YES  [ ] NO    LAB:                           8.2    7.56  )-----------( 477      ( 12 Nov 2023 07:19 )             26.1     11-12    133<L>  |  95<L>  |  41<H>  ----------------------------<  163<H>  4.8   |  28  |  2.5<H>    Ca    8.9      12 Nov 2023 07:19  Phos  3.9     11-12  Mg     2.3     11-12    TPro  6.4  /  Alb  3.1<L>  /  TBili  0.3  /  DBili  x   /  AST  12  /  ALT  9   /  AlkPhos  146<H>  11-12              Drug Dosing Weight  Height (cm): 182.9 (09 Nov 2023 07:20)  Weight (kg): 100 (09 Nov 2023 07:20)  BMI (kg/m2): 29.9 (09 Nov 2023 07:20)  BSA (m2): 2.22 (09 Nov 2023 07:20)    CAPILLARY BLOOD GLUCOSE  POCT Blood Glucose.: 76 mg/dL (13 Nov 2023 11:31)  POCT Blood Glucose.: 277 mg/dL (13 Nov 2023 08:07)  POCT Blood Glucose.: 157 mg/dL (12 Nov 2023 22:18)  POCT Blood Glucose.: 199 mg/dL (12 Nov 2023 16:41)      Urinalysis Basic - ( 11 Nov 2023 09:18 )    Color: x / Appearance: x / SG: x / pH: x  Gluc: 187 mg/dL / Ketone: x  / Bili: x / Urobili: x   Blood: x / Protein: x / Nitrite: x   Leuk Esterase: x / RBC: x / WBC x   Sq Epi: x / Non Sq Epi: x / Bacteria: x        RADIOLOGY & ADDITIONAL TESTS:  Imaging Personally Reviewed:  [x] YES  [ ] NO    HEALTH ISSUES - PROBLEM Dx:  S/P transmetatarsal amputation of foot    S/P BKA (below knee amputation), right        MEDICATIONS  (STANDING):  aspirin  chewable 81 milliGRAM(s) Oral daily  clopidogrel Tablet 75 milliGRAM(s) Oral daily  dextrose 50% Injectable 25 Gram(s) IV Push once  DULoxetine 40 milliGRAM(s) Oral every 12 hours  glucagon  Injectable 1 milliGRAM(s) IntraMuscular once  heparin   Injectable 5000 Unit(s) SubCutaneous every 8 hours  insulin glargine Injectable (LANTUS) 24 Unit(s) SubCutaneous every morning  insulin lispro (ADMELOG) corrective regimen sliding scale   SubCutaneous three times a day before meals  lactobacillus acidophilus 1 Tablet(s) Oral three times a day with meals  losartan 100 milliGRAM(s) Oral daily  magnesium oxide 400 milliGRAM(s) Oral three times a day with meals  NIFEdipine  milliGRAM(s) Oral daily  polyethylene glycol 3350 17 Gram(s) Oral daily  pregabalin 75 milliGRAM(s) Oral every 8 hours  senna 2 Tablet(s) Oral at bedtime  sodium bicarbonate 650 milliGRAM(s) Oral three times a day    MEDICATIONS  (PRN):  ALPRAZolam 0.5 milliGRAM(s) Oral four times a day PRN anxiety  dextrose Oral Gel 15 Gram(s) Oral once PRN Blood Glucose LESS THAN 70 milliGRAM(s)/deciliter  HYDROmorphone  Injectable 1 milliGRAM(s) IV Push every 4 hours PRN Severe Pain (7 - 10)  oxycodone    5 mG/acetaminophen 325 mG 2 Tablet(s) Oral every 4 hours PRN Moderate Pain (4 - 6)

## 2023-11-13 NOTE — PROGRESS NOTE ADULT - SUBJECTIVE AND OBJECTIVE BOX
Patient is a 58y old  Male who presents with a chief complaint of R foot gancrenous ulcer, PAD (13 Nov 2023 11:43)      HPI:   Patient  is a 58yoM with Charcot foot s/p placement of an external fixator on 9/28/23 which had to be removed on 10/19 due to infection, CKD (baseline Cr 2.5-3.5, on list for kidney transplant), and IDDM (A1c 9.9), PAD, s/p  peripheral angiogram on 10/23/2023 with balloon angioplasty of PTA and DCB of R SFA and PTA of R PT and R AT via R groin and R pedal access, anemia recently received 2u pRBCS, pt was dc from hospital on 10/24/23 and now returns to hospital as directed by his podiatrist Dr Benson for TMA which will be done on 10/27/23 as pt has non healing wound and gangrene of the right foot. Patient complains of moderate to sever pain of foot which is partially relieved with percocet and associated wound discharge.  Pt denies fevers or chills.      R Foot unhealing DFU with gangrene  R TMA 10/27, revision 10/30, debridement 11/4/23  R BKA 11/9/23  severe PAD      PHYSICAL EXAM    Vital Signs Last 24 Hrs  T(C): 36.2 (13 Nov 2023 07:35), Max: 36.7 (13 Nov 2023 00:09)  T(F): 97.1 (13 Nov 2023 07:35), Max: 98.1 (13 Nov 2023 00:09)  HR: 76 (13 Nov 2023 07:35) (69 - 79)  BP: 152/82 (13 Nov 2023 07:35) (135/69 - 155/69)  BP(mean): --  RR: 18 (13 Nov 2023 05:20) (18 - 18)  SpO2: 92% (13 Nov 2023 00:09) (92% - 92%)    Parameters below as of 13 Nov 2023 00:09  Patient On (Oxygen Delivery Method): room air        Constitutional - NAD  Chest - CTA  Cardiovascular - S1S2+  Abdomen -  Soft  Extremities -  No calf tenderness   Function : transfer mod  A / ambulate 20'x2RW mod A              lower body dressing CG assist     ALPRAZolam 0.5 milliGRAM(s) Oral four times a day PRN  aspirin  chewable 81 milliGRAM(s) Oral daily  clopidogrel Tablet 75 milliGRAM(s) Oral daily  dextrose 50% Injectable 25 Gram(s) IV Push once  dextrose Oral Gel 15 Gram(s) Oral once PRN  DULoxetine 40 milliGRAM(s) Oral every 12 hours  glucagon  Injectable 1 milliGRAM(s) IntraMuscular once  heparin   Injectable 5000 Unit(s) SubCutaneous every 8 hours  HYDROmorphone  Injectable 1 milliGRAM(s) IV Push every 4 hours PRN  insulin glargine Injectable (LANTUS) 24 Unit(s) SubCutaneous every morning  insulin lispro (ADMELOG) corrective regimen sliding scale   SubCutaneous three times a day before meals  lactobacillus acidophilus 1 Tablet(s) Oral three times a day with meals  losartan 100 milliGRAM(s) Oral daily  magnesium oxide 400 milliGRAM(s) Oral three times a day with meals  NIFEdipine  milliGRAM(s) Oral daily  oxycodone    5 mG/acetaminophen 325 mG 2 Tablet(s) Oral every 4 hours PRN  polyethylene glycol 3350 17 Gram(s) Oral daily  pregabalin 75 milliGRAM(s) Oral every 8 hours  senna 2 Tablet(s) Oral at bedtime  sodium bicarbonate 650 milliGRAM(s) Oral three times a day      RECENT LABS/IMAGING                        8.2    7.56  )-----------( 477      ( 12 Nov 2023 07:19 )             26.1     11-12    133<L>  |  95<L>  |  41<H>  ----------------------------<  163<H>  4.8   |  28  |  2.5<H>    Ca    8.9      12 Nov 2023 07:19  Phos  3.9     11-12  Mg     2.3     11-12    TPro  6.4  /  Alb  3.1<L>  /  TBili  0.3  /  DBili  x   /  AST  12  /  ALT  9   /  AlkPhos  146<H>  11-12      Urinalysis Basic - ( 12 Nov 2023 07:19 )    Color: x / Appearance: x / SG: x / pH: x  Gluc: 163 mg/dL / Ketone: x  / Bili: x / Urobili: x   Blood: x / Protein: x / Nitrite: x   Leuk Esterase: x / RBC: x / WBC x   Sq Epi: x / Non Sq Epi: x / Bacteria: x

## 2023-11-13 NOTE — OCCUPATIONAL THERAPY INITIAL EVALUATION ADULT - TOILETING, PREVIOUS LEVEL OF FUNCTION, OT EVAL
1/2 bath has raised toilet seat without arm rests. Full bathroom has ADA height toilet./independent/needs device

## 2023-11-13 NOTE — PROVIDER CONTACT NOTE (FALL NOTIFICATION) - ASSESSMENT
Patient noted standing at bedside walking towards bathroom. At this time RN assisted patient to bathroom. PCA waited for patient at bathroom door. PCA walked patient back to bed. Patient grabbed side table. PCa assisted with fall and patient fell on buttock.

## 2023-11-13 NOTE — OCCUPATIONAL THERAPY INITIAL EVALUATION ADULT - GENERAL OBSERVATIONS, REHAB EVAL
To complete assessment when pt is better able to tolerate and participate
Pt received semifowler in bed. + IV lock, + immobilizer R knee. Pt left seated in bedside chair. + IV lock, + R knee immobilizer. c/o pain R LE 3/3 VAS at rest. Increased to 7/10 VAS during bed mobility. RN provided opioid  medication at beginning of OT session. Partial relief s/p repositioning  and medication.

## 2023-11-13 NOTE — PROVIDER CONTACT NOTE (FALL NOTIFICATION) - RECOMMENDATIONS
MD Ho made aware. Patient vitals stable, no distress noted at this time. Patient assisted back to bed by PCA and RN. Patient continued to refuse bed alarm at this time. Manager Felicia made aware. MD Ho made aware. Patient vitals stable, no distress noted at this time. Patient assisted back to bed by PCA and RN. Manager Felicia ron spoke with patient, bed alarm in place.

## 2023-11-13 NOTE — PROGRESS NOTE ADULT - ASSESSMENT
58-year-old male with Charcot foot s/p placement of an external fixator on 9/28/23 which had to be removed on 10/19 due to infection, CKD (baseline Cr 2.5-3.5, on list for kidney transplant), and IDDM (A1c 9.9), PAD, s/p  peripheral angiogram on 10/23/2023 with balloon angioplasty of PTA and DCB of R SFA and PTA of R PT and R AT via R groin and R pedal access, anemia recently received 2u pRBCS, pt was dc from hospital on 10/24/23 and now returns to hospital as directed by his podiatrist Dr Benson for TMA which will be done on 10/27/23 as pt has non healing wound and gangrene of the right foot.     #R Foot DFU with gangrene  #R TMA 10/27, revision 10/30, debridement 11/4/23  #R BKA 11/9/23  #Severe PAD  - Pain Management following - meds changed as per recommendations  - Vascular following- has R Knee immobilizer   - ID following - s/p linda  - PT eval appreciated - walked 20 feet x 2 - discussed with physical therapist - good candidate for 4A - auth pending  - patient is also interested in 4A placement     #DM II - well controlled   - adjusted yesterday due to episodes of hypoglycemia in the early afternoon; better controlled today     #Hx of CKD IV  #nephrolithiasis  #on renal transplant list  - nephrology following  - cr stable  - avoid nephrotoxics   - continue sodium bicarb  - low K diet    #PAD, s/p angioplasty  -c/w asa  -resumed plavix   -vascular follow up appreciated     #Anemia  -stable post transfusion    #HTN - better controlled   - continue current tx and add hydralazine if needed     #Anxiety disorder  #depression - duloxetine   -xanax prn    Progress Note Handoff  Pending Consults:  PT and CM follow up   Pending Tests: none  Pending Results: none  Family Discussion: Discussed labs, meds, PT follow up and possible 4A placement in 24-48hrs  Disposition: Home_____/SNF______/Other_4a____/Unknown at this time_____  Spent over 55 min reviewing chart, speaking with patient/family and on coordinating patient care during interdisciplinary rounds     Please call me with any questions at extension [phone]

## 2023-11-13 NOTE — PROGRESS NOTE ADULT - ASSESSMENT
59 YO WM with IDDM, CKD4, PAD, Charcot Foot Deformity, underwent R MTA 10/27 with revision and debridement, un healing wound,  noted to have aa occ and trans ferred N for R BKA 11/9.    R Foot unhealing DFU with gangrene  R TMA 10/27, revision 10/30, debridement 11/4/23  R BKA 11/9/23  severe PAD  Anemia  Hx of HTN, ASHD  Hx of DLD  Hx of IDDM, d neuropathy, R Charcot Foot Deformity, R DFU with gangrene  Hx of CKD 4, nephrolithiasis, on renal transplant list  Hx of Ch Anemia  Hx of OA, DJD, mobility dysfunction  Hx of anxiety, depression    maintain present management  Vasc Dr Machado, R BKA 11/9  pt underwent R TMA with several debridements and cont unhealing wound at the [facility]  Podiatry ff  Cardio clearaned pt,  DR Kathleen  pt has occ of dorsal and metatarsal aa,in context of PAD prior balloon-plasty  pt has + wound pseudomonas and yeast  cont Meropenem  cont wound changes as per podiatry  cont all meds  att to bowel regimen  add lactobacillus q AC  incentive spirometry  monitor renal parameters, electrolytes and H/H  Rehab for possible 4A PT  Occupational   Social SVC for D/C planning 59 YO WM with IDDM, CKD4, PAD, Charcot Foot Deformity, underwent R MTA 10/27 with revision and debridement, un healing wound,  noted to have aa occ and trans ferred N for R BKA 11/9.    R Foot unhealing DFU with gangrene  R TMA 10/27, revision 10/30, debridement 11/4/23  R BKA 11/9/23  severe PAD  Anemia  Hx of HTN, ASHD  Hx of DLD  Hx of IDDM, d neuropathy, R Charcot Foot Deformity, R DFU with gangrene  Hx of CKD 4, nephrolithiasis, on renal transplant list  Hx of Ch Anemia  Hx of OA, DJD, mobility dysfunction  Hx of anxiety, depression    maintain present management  Vasc Dr Machado, R BKA 11/9  pt underwent R TMA with several debridements and cont unhealing wound at the [facility]  Podiatry ff    pt has occ of dorsal and metatarsal aa in context of PAD prior balloon-plasty  pt + wound pseudomonas and yeast  Meropenem  cont wound changes as per podiatry  cont all meds  att to bowel regimen  add lactobacillus q AC  incentive spirometry  monitor renal parameters, electrolytes and H/H  Rehab for possible 4A PT  Occupational   Social SVC for D/C planning

## 2023-11-13 NOTE — OCCUPATIONAL THERAPY INITIAL EVALUATION ADULT - ADDITIONAL COMMENTS
As per pt report, resides in private house with mandatory steps to enter, full flight to bedroom/full bath. 1/2 bath on first floor. As per pt report, has been driving using LE only for past 3 years. Educated on researching adaptations for car.

## 2023-11-13 NOTE — PROGRESS NOTE ADULT - ASSESSMENT
58yoM with Charcot foot, s/p Rt BKA, CKD (baseline Cr 2.5-3.5, on list for kidney transplant), and IDDM (A1c 9.9), PAD, s/p  peripheral angiogram on 10/23/2023 , seen for CKD management.    CKD stage 4/ S/p right  BKA for gangrene / DM  - severe Urinary Retention  2 to neurogenic bladder, bedbound state,  Hydromorphone drip  - per Urology  pt can do Self Cath in house after trained by the RN  - Urodynamic studies as OP  - creat at baseline  - Anemia of CKD- iron studies noted give venofer 200 mg IV q24h x5/ retacrit 27014 units sq qweek   - On renal transplant list  - AVOID blood tx  Severely uncontrolled DM - on Insulin  On Hydromorphone drip - laxatives to be used, not fleet enema, Dulcolax, Senna, Miralax  - - Avoid nephrotoxic meds/NSAIDs/contrast , maintain hydration , BP control,  - Vascular surgery recommendations  appreciated    OP renal follow up

## 2023-11-14 LAB
ALBUMIN SERPL ELPH-MCNC: 3.2 G/DL — LOW (ref 3.5–5.2)
ALBUMIN SERPL ELPH-MCNC: 3.2 G/DL — LOW (ref 3.5–5.2)
ALP SERPL-CCNC: 141 U/L — HIGH (ref 30–115)
ALP SERPL-CCNC: 141 U/L — HIGH (ref 30–115)
ALT FLD-CCNC: 9 U/L — SIGNIFICANT CHANGE UP (ref 0–41)
ALT FLD-CCNC: 9 U/L — SIGNIFICANT CHANGE UP (ref 0–41)
ANION GAP SERPL CALC-SCNC: 7 MMOL/L — SIGNIFICANT CHANGE UP (ref 7–14)
ANION GAP SERPL CALC-SCNC: 7 MMOL/L — SIGNIFICANT CHANGE UP (ref 7–14)
AST SERPL-CCNC: 12 U/L — SIGNIFICANT CHANGE UP (ref 0–41)
AST SERPL-CCNC: 12 U/L — SIGNIFICANT CHANGE UP (ref 0–41)
BASOPHILS # BLD AUTO: 0.03 K/UL — SIGNIFICANT CHANGE UP (ref 0–0.2)
BASOPHILS # BLD AUTO: 0.03 K/UL — SIGNIFICANT CHANGE UP (ref 0–0.2)
BASOPHILS NFR BLD AUTO: 0.5 % — SIGNIFICANT CHANGE UP (ref 0–1)
BASOPHILS NFR BLD AUTO: 0.5 % — SIGNIFICANT CHANGE UP (ref 0–1)
BILIRUB SERPL-MCNC: 0.3 MG/DL — SIGNIFICANT CHANGE UP (ref 0.2–1.2)
BILIRUB SERPL-MCNC: 0.3 MG/DL — SIGNIFICANT CHANGE UP (ref 0.2–1.2)
BUN SERPL-MCNC: 42 MG/DL — HIGH (ref 10–20)
BUN SERPL-MCNC: 42 MG/DL — HIGH (ref 10–20)
CALCIUM SERPL-MCNC: 8.9 MG/DL — SIGNIFICANT CHANGE UP (ref 8.4–10.5)
CALCIUM SERPL-MCNC: 8.9 MG/DL — SIGNIFICANT CHANGE UP (ref 8.4–10.5)
CHLORIDE SERPL-SCNC: 99 MMOL/L — SIGNIFICANT CHANGE UP (ref 98–110)
CHLORIDE SERPL-SCNC: 99 MMOL/L — SIGNIFICANT CHANGE UP (ref 98–110)
CO2 SERPL-SCNC: 29 MMOL/L — SIGNIFICANT CHANGE UP (ref 17–32)
CO2 SERPL-SCNC: 29 MMOL/L — SIGNIFICANT CHANGE UP (ref 17–32)
CREAT SERPL-MCNC: 2.4 MG/DL — HIGH (ref 0.7–1.5)
CREAT SERPL-MCNC: 2.4 MG/DL — HIGH (ref 0.7–1.5)
EGFR: 30 ML/MIN/1.73M2 — LOW
EGFR: 30 ML/MIN/1.73M2 — LOW
EOSINOPHIL # BLD AUTO: 0.21 K/UL — SIGNIFICANT CHANGE UP (ref 0–0.7)
EOSINOPHIL # BLD AUTO: 0.21 K/UL — SIGNIFICANT CHANGE UP (ref 0–0.7)
EOSINOPHIL NFR BLD AUTO: 3.3 % — SIGNIFICANT CHANGE UP (ref 0–8)
EOSINOPHIL NFR BLD AUTO: 3.3 % — SIGNIFICANT CHANGE UP (ref 0–8)
GLUCOSE BLDC GLUCOMTR-MCNC: 121 MG/DL — HIGH (ref 70–99)
GLUCOSE BLDC GLUCOMTR-MCNC: 121 MG/DL — HIGH (ref 70–99)
GLUCOSE BLDC GLUCOMTR-MCNC: 138 MG/DL — HIGH (ref 70–99)
GLUCOSE BLDC GLUCOMTR-MCNC: 138 MG/DL — HIGH (ref 70–99)
GLUCOSE BLDC GLUCOMTR-MCNC: 299 MG/DL — HIGH (ref 70–99)
GLUCOSE BLDC GLUCOMTR-MCNC: 299 MG/DL — HIGH (ref 70–99)
GLUCOSE BLDC GLUCOMTR-MCNC: 97 MG/DL — SIGNIFICANT CHANGE UP (ref 70–99)
GLUCOSE BLDC GLUCOMTR-MCNC: 97 MG/DL — SIGNIFICANT CHANGE UP (ref 70–99)
GLUCOSE SERPL-MCNC: 113 MG/DL — HIGH (ref 70–99)
GLUCOSE SERPL-MCNC: 113 MG/DL — HIGH (ref 70–99)
HCT VFR BLD CALC: 24.9 % — LOW (ref 42–52)
HCT VFR BLD CALC: 24.9 % — LOW (ref 42–52)
HGB BLD-MCNC: 8 G/DL — LOW (ref 14–18)
HGB BLD-MCNC: 8 G/DL — LOW (ref 14–18)
IMM GRANULOCYTES NFR BLD AUTO: 0.3 % — SIGNIFICANT CHANGE UP (ref 0.1–0.3)
IMM GRANULOCYTES NFR BLD AUTO: 0.3 % — SIGNIFICANT CHANGE UP (ref 0.1–0.3)
LYMPHOCYTES # BLD AUTO: 1.38 K/UL — SIGNIFICANT CHANGE UP (ref 1.2–3.4)
LYMPHOCYTES # BLD AUTO: 1.38 K/UL — SIGNIFICANT CHANGE UP (ref 1.2–3.4)
LYMPHOCYTES # BLD AUTO: 21.8 % — SIGNIFICANT CHANGE UP (ref 20.5–51.1)
LYMPHOCYTES # BLD AUTO: 21.8 % — SIGNIFICANT CHANGE UP (ref 20.5–51.1)
MAGNESIUM SERPL-MCNC: 2.6 MG/DL — HIGH (ref 1.8–2.4)
MAGNESIUM SERPL-MCNC: 2.6 MG/DL — HIGH (ref 1.8–2.4)
MCHC RBC-ENTMCNC: 27.1 PG — SIGNIFICANT CHANGE UP (ref 27–31)
MCHC RBC-ENTMCNC: 27.1 PG — SIGNIFICANT CHANGE UP (ref 27–31)
MCHC RBC-ENTMCNC: 32.1 G/DL — SIGNIFICANT CHANGE UP (ref 32–37)
MCHC RBC-ENTMCNC: 32.1 G/DL — SIGNIFICANT CHANGE UP (ref 32–37)
MCV RBC AUTO: 84.4 FL — SIGNIFICANT CHANGE UP (ref 80–94)
MCV RBC AUTO: 84.4 FL — SIGNIFICANT CHANGE UP (ref 80–94)
MONOCYTES # BLD AUTO: 0.59 K/UL — SIGNIFICANT CHANGE UP (ref 0.1–0.6)
MONOCYTES # BLD AUTO: 0.59 K/UL — SIGNIFICANT CHANGE UP (ref 0.1–0.6)
MONOCYTES NFR BLD AUTO: 9.3 % — SIGNIFICANT CHANGE UP (ref 1.7–9.3)
MONOCYTES NFR BLD AUTO: 9.3 % — SIGNIFICANT CHANGE UP (ref 1.7–9.3)
NEUTROPHILS # BLD AUTO: 4.11 K/UL — SIGNIFICANT CHANGE UP (ref 1.4–6.5)
NEUTROPHILS # BLD AUTO: 4.11 K/UL — SIGNIFICANT CHANGE UP (ref 1.4–6.5)
NEUTROPHILS NFR BLD AUTO: 64.8 % — SIGNIFICANT CHANGE UP (ref 42.2–75.2)
NEUTROPHILS NFR BLD AUTO: 64.8 % — SIGNIFICANT CHANGE UP (ref 42.2–75.2)
NRBC # BLD: 0 /100 WBCS — SIGNIFICANT CHANGE UP (ref 0–0)
NRBC # BLD: 0 /100 WBCS — SIGNIFICANT CHANGE UP (ref 0–0)
PLATELET # BLD AUTO: 436 K/UL — HIGH (ref 130–400)
PLATELET # BLD AUTO: 436 K/UL — HIGH (ref 130–400)
PMV BLD: 8.4 FL — SIGNIFICANT CHANGE UP (ref 7.4–10.4)
PMV BLD: 8.4 FL — SIGNIFICANT CHANGE UP (ref 7.4–10.4)
POTASSIUM SERPL-MCNC: 4.2 MMOL/L — SIGNIFICANT CHANGE UP (ref 3.5–5)
POTASSIUM SERPL-MCNC: 4.2 MMOL/L — SIGNIFICANT CHANGE UP (ref 3.5–5)
POTASSIUM SERPL-SCNC: 4.2 MMOL/L — SIGNIFICANT CHANGE UP (ref 3.5–5)
POTASSIUM SERPL-SCNC: 4.2 MMOL/L — SIGNIFICANT CHANGE UP (ref 3.5–5)
PROT SERPL-MCNC: 6.2 G/DL — SIGNIFICANT CHANGE UP (ref 6–8)
PROT SERPL-MCNC: 6.2 G/DL — SIGNIFICANT CHANGE UP (ref 6–8)
RBC # BLD: 2.95 M/UL — LOW (ref 4.7–6.1)
RBC # BLD: 2.95 M/UL — LOW (ref 4.7–6.1)
RBC # FLD: 17.2 % — HIGH (ref 11.5–14.5)
RBC # FLD: 17.2 % — HIGH (ref 11.5–14.5)
SODIUM SERPL-SCNC: 135 MMOL/L — SIGNIFICANT CHANGE UP (ref 135–146)
SODIUM SERPL-SCNC: 135 MMOL/L — SIGNIFICANT CHANGE UP (ref 135–146)
WBC # BLD: 6.34 K/UL — SIGNIFICANT CHANGE UP (ref 4.8–10.8)
WBC # BLD: 6.34 K/UL — SIGNIFICANT CHANGE UP (ref 4.8–10.8)
WBC # FLD AUTO: 6.34 K/UL — SIGNIFICANT CHANGE UP (ref 4.8–10.8)
WBC # FLD AUTO: 6.34 K/UL — SIGNIFICANT CHANGE UP (ref 4.8–10.8)

## 2023-11-14 RX ADMIN — Medication 1 TABLET(S): at 11:31

## 2023-11-14 RX ADMIN — HEPARIN SODIUM 5000 UNIT(S): 5000 INJECTION INTRAVENOUS; SUBCUTANEOUS at 17:06

## 2023-11-14 RX ADMIN — LOSARTAN POTASSIUM 100 MILLIGRAM(S): 100 TABLET, FILM COATED ORAL at 05:42

## 2023-11-14 RX ADMIN — Medication 1 TABLET(S): at 17:06

## 2023-11-14 RX ADMIN — Medication 120 MILLIGRAM(S): at 05:43

## 2023-11-14 RX ADMIN — CLOPIDOGREL BISULFATE 75 MILLIGRAM(S): 75 TABLET, FILM COATED ORAL at 11:31

## 2023-11-14 RX ADMIN — HEPARIN SODIUM 5000 UNIT(S): 5000 INJECTION INTRAVENOUS; SUBCUTANEOUS at 23:28

## 2023-11-14 RX ADMIN — MAGNESIUM OXIDE 400 MG ORAL TABLET 400 MILLIGRAM(S): 241.3 TABLET ORAL at 08:27

## 2023-11-14 RX ADMIN — Medication 1 TABLET(S): at 08:26

## 2023-11-14 RX ADMIN — Medication 650 MILLIGRAM(S): at 05:42

## 2023-11-14 RX ADMIN — Medication 650 MILLIGRAM(S): at 13:34

## 2023-11-14 RX ADMIN — DULOXETINE HYDROCHLORIDE 40 MILLIGRAM(S): 30 CAPSULE, DELAYED RELEASE ORAL at 05:42

## 2023-11-14 RX ADMIN — Medication 81 MILLIGRAM(S): at 11:30

## 2023-11-14 RX ADMIN — HEPARIN SODIUM 5000 UNIT(S): 5000 INJECTION INTRAVENOUS; SUBCUTANEOUS at 05:43

## 2023-11-14 RX ADMIN — Medication 0.5 MILLIGRAM(S): at 23:31

## 2023-11-14 RX ADMIN — Medication 650 MILLIGRAM(S): at 23:28

## 2023-11-14 RX ADMIN — MAGNESIUM OXIDE 400 MG ORAL TABLET 400 MILLIGRAM(S): 241.3 TABLET ORAL at 11:31

## 2023-11-14 RX ADMIN — Medication 6: at 17:05

## 2023-11-14 RX ADMIN — INSULIN GLARGINE 24 UNIT(S): 100 INJECTION, SOLUTION SUBCUTANEOUS at 08:26

## 2023-11-14 NOTE — PROVIDER CONTACT NOTE (FALL NOTIFICATION) - BACKGROUND
Patient s/p Right BKA.
Patient is fall risk due to BKA, unsteady gait and Narcotic use. RN discussed this with patient along with hospital policy and patient still refused.

## 2023-11-14 NOTE — PROVIDER CONTACT NOTE (FALL NOTIFICATION) - ACTION/TREATMENT ORDERED:
No interventions at this time. Will continue to monitor patient status and round on patient more frequently

## 2023-11-14 NOTE — PROVIDER CONTACT NOTE (FALL NOTIFICATION) - SITUATION
Patient found sitting on bathroom floor. Patient s/p fall this afternoon.
Patient refused bed alarm in am. Patient observed walking to bathroom unsteady without assistance. Patient  s/p R BKA and is using crutches to ambulate.

## 2023-11-14 NOTE — PROGRESS NOTE ADULT - SUBJECTIVE AND OBJECTIVE BOX
MARIOLA CURRY 58y Male ad to [facility] for unhealing gangrenous R foot ulcer in context of R Charcot Foot Deformity  and PAD for TMA with Podiatry/R ABHIJEET.  The R TMA took place on 10/27, revision on 10/30, further debridement on 11/4.   The C&S grew pseudomonas and yeast.  The pt has severe PAD with occ of dorsal and metatarsal aa.  The pt is on Meropenem IV.  [place] for [place]KA on  [place] 11/9.    PMHx :  HTN, ASHD, DLD, PAD, sp balloon angioplasty 10/23/23,  IDDM, CKD 4, (2.5-3.5), nephrolithiasis, on renal transplant list, Chr Anemia, sp prior PRBCs, GERD, anxiety, depression    INTERVAL HPI/OVERNIGHT EVENTS: pt is sp R BKA on 11/9, pt has had 2 falls in the last 24 hrs, one at bedside last night and one this AM on bathroom floor, pt states he still thinks he can do it on his own  " I forget I have no R foot", discussed need to call for assistance when he wants to move, awaiting rehab, R LE wound care in place    MEDICATIONS  (STANDING):  aspirin  chewable 81 milliGRAM(s) Oral daily  dextrose 50% Injectable 25 Gram(s) IV Push once  epoetin ban (PROCRIT) Injectable 2000 Unit(s) SubCutaneous every 7 days  glucagon  Injectable 1 milliGRAM(s) IntraMuscular once  heparin   Injectable 5000 Unit(s) SubCutaneous every 8 hours  insulin glargine Injectable (LANTUS) 25 Unit(s) SubCutaneous every morning  insulin lispro (ADMELOG) corrective regimen sliding scale   SubCutaneous three times a day before meals  insulin lispro Injectable (ADMELOG) 8 Unit(s) SubCutaneous before breakfast  losartan 100 milliGRAM(s) Oral daily  magnesium oxide 400 milliGRAM(s) Oral three times a day with meals  meropenem  IVPB 1000 milliGRAM(s) IV Intermittent every 12 hours  NIFEdipine XL 90 milliGRAM(s) Oral daily  rosuvastatin 20 milliGRAM(s) Oral at bedtime  sodium bicarbonate 650 milliGRAM(s) Oral three times a day    MEDICATIONS  (PRN):  ALPRAZolam 0.5 milliGRAM(s) Oral four times a day PRN anxiety  cloNIDine 0.1 milliGRAM(s) Oral every 8 hours PRN sbp>150  dextrose Oral Gel 15 Gram(s) Oral once PRN Blood Glucose LESS THAN 70 milliGRAM(s)/deciliter  melatonin 3 milliGRAM(s) Oral at bedtime PRN Insomnia  oxycodone    5 mG/acetaminophen 325 mG 2 Tablet(s) Oral every 4 hours PRN Moderate Pain (4 - 6)  oxyCODONE    IR 2.5 milliGRAM(s) Oral every 4 hours PRN Severe Pain (7 - 10)      Allergies    No Known Allergies	    Vital Signs Last 24 Hrs    T(F): 98.4  HR:  65  BP: 140/69    RR: 18   SpO2: 95% RA        PHYSICAL EXAM:      Constitutional: stable, v talkative, chr ill looking but in NAD    Eyes: nonicteric    ENMT: dry oral mucosa, dental defects    Neck: supple, no JVD/bruit/stridor    Respiratory: shallow resp, no wheezing/crackles/rales    Cardiovascular: S1S2 reg    Gastrointestinal: globose, soft and benign, + BS, no organomegaly    Genitourinary: no urbano    Extremities: moves all limbs, R BKbandage, + immobilizer    Vascular: dec L pedal pulse    Neurological: nonfocall    Lymph Nodes: not enlarged    Psychiatric: stable        LABS:                  8  6.3 )-----------( 436             24    135  |  99  |  42  ----------------------------< 113  4.2   |  29  |  2.4  GFR 28, 29, 30  Ca    9.7        Phos  4.3, 3.9  Mg     2.2, 2.3, 2.6  O+  TPro  7.0  /  Alb  3.8  /  TBili  0.3  /  DBili  x   /  AST  15  /  ALT  18  /  AlkPhos  186<H>  11-07    PT/INR - ( 07 Nov 2023 08:35 )   PT: 11.40 sec;   INR: 1.00 ratio         PTT - ( 06 Nov 2023 22:30 )  PTT:36.0 sec  Urinalysis Basic - ( 07 Nov 2023 08:35 )    Color: x / Appearance: x / SG: x / pH: x  Gluc: 236 mg/dL / Ketone: x  / Bili: x / Urobili: x   Blood: x / Protein: x / Nitrite: x   Leuk Esterase: x / RBC: x / WBC x   Sq Epi: x / Non Sq Epi: x / Bacteria: x        RADIOLOGY & ADDITIONAL TESTS:

## 2023-11-14 NOTE — PROGRESS NOTE ADULT - SUBJECTIVE AND OBJECTIVE BOX
24H events:    Patient is a 58y old Male who presents with a chief complaint of For RBKA, Hx of R foot gangrene, severe PAD (14 Nov 2023 11:37)    Primary diagnosis of Gangrene of foot    Today is hospital day 19d. This morning patient was seen and examined at bedside, resting comfortably in bed. Patient has no concerns or complaints. Endorses large bowel movement overnight and this morning. Patient fell in the bathroom overnight (second fall since admission). Hemodynamically stable, tolerating oral diet, voiding appropriately with appropriate bowel movements.     PAST MEDICAL & SURGICAL HISTORY  DM (diabetes mellitus)    HTN (hypertension)    HLD (hyperlipidemia)    Herpes labialis    Anxiety    GERD (gastroesophageal reflux disease)    Kidney stones  20 years ago    Kidney disease  stage 4    Chronic kidney disease, unspecified CKD stage    Diabetic Charcot foot    PAD (peripheral artery disease)    S/P foot surgery, right  x 5 ( 2006 - 2013 )    Kidney stone  Removed by Laser x 2 ( 20 years ago )    H/O arthroscopy of right knee    Status post peripheral artery angioplasty      SOCIAL HISTORY:  Social History:      ALLERGIES:  No Known Allergies    MEDICATIONS:  STANDING MEDICATIONS  aspirin  chewable 81 milliGRAM(s) Oral daily  clopidogrel Tablet 75 milliGRAM(s) Oral daily  dextrose 50% Injectable 25 Gram(s) IV Push once  DULoxetine 40 milliGRAM(s) Oral every 12 hours  glucagon  Injectable 1 milliGRAM(s) IntraMuscular once  heparin   Injectable 5000 Unit(s) SubCutaneous every 8 hours  insulin glargine Injectable (LANTUS) 24 Unit(s) SubCutaneous every morning  insulin lispro (ADMELOG) corrective regimen sliding scale   SubCutaneous three times a day before meals  lactobacillus acidophilus 1 Tablet(s) Oral three times a day with meals  losartan 100 milliGRAM(s) Oral daily  magnesium oxide 400 milliGRAM(s) Oral three times a day with meals  NIFEdipine  milliGRAM(s) Oral daily  polyethylene glycol 3350 17 Gram(s) Oral daily  pregabalin 75 milliGRAM(s) Oral every 8 hours  senna 2 Tablet(s) Oral at bedtime  sodium bicarbonate 650 milliGRAM(s) Oral three times a day    PRN MEDICATIONS  ALPRAZolam 0.5 milliGRAM(s) Oral four times a day PRN  dextrose Oral Gel 15 Gram(s) Oral once PRN  HYDROmorphone   Tablet 4 milliGRAM(s) Oral every 4 hours PRN  HYDROmorphone  Injectable 1 milliGRAM(s) IV Push every 4 hours PRN    VITALS:   T(F): 97.1  HR: 90  BP: 160/75  RR: 18  SpO2: --    PHYSICAL EXAM:  GENERAL: NAD, lying in bed comfortably, cooperative  HEAD: NCAD  NECK: Supple     HEART: Systolic murmur in aortic area, Regular rate and rhythm, normal S1/S2  LUNGS: No acute respiratory distress, clear b/l breath sounds  ABDOMEN:  soft, non-tender, non-distended  EXTREMITIES: no edema, s/p R BKA  NERVOUS SYSTEM:  A&Ox3, follows commands, answers questions appropriately   SKIN: No rashes or lesions       LABS:                        8.0    6.34  )-----------( 436      ( 14 Nov 2023 09:50 )             24.9     11-14    135  |  99  |  42<H>  ----------------------------<  113<H>  4.2   |  29  |  2.4<H>    Ca    8.9      14 Nov 2023 09:50  Mg     2.6     11-14    TPro  6.2  /  Alb  3.2<L>  /  TBili  0.3  /  DBili  x   /  AST  12  /  ALT  9   /  AlkPhos  141<H>  11-14      Urinalysis Basic - ( 14 Nov 2023 09:50 )    Color: x / Appearance: x / SG: x / pH: x  Gluc: 113 mg/dL / Ketone: x  / Bili: x / Urobili: x   Blood: x / Protein: x / Nitrite: x   Leuk Esterase: x / RBC: x / WBC x   Sq Epi: x / Non Sq Epi: x / Bacteria: x                RADIOLOGY:    RADIOLOGY

## 2023-11-14 NOTE — PROVIDER CONTACT NOTE (FALL NOTIFICATION) - ASSESSMENT
Patient aox4 vitals wnl. Patient denies any pain or discomfort at this time. Patient stated that he fell on buttock. No s/s of bleeding/or trauma noted at this time

## 2023-11-14 NOTE — PROGRESS NOTE ADULT - ASSESSMENT
57 YO WM with IDDM, CKD4, PAD, Charcot Foot Deformity, underwent R MTA 10/27 with revision and debridement, un healing wound,  noted to have aa occ and trans ferred N for R BKA 11/9.    R Foot unhealing DFU with gangrene  R TMA 10/27, revision 10/30, debridement 11/4/23  R BKA 11/9/23  severe PAD  Anemia  Hx of HTN, ASHD  Hx of DLD  Hx of IDDM, d neuropathy, R Charcot Foot Deformity, R DFU with gangrene  Hx of CKD 4, nephrolithiasis, on renal transplant list  Hx of Ch Anemia  Hx of OA, DJD, mobility dysfunction  Hx of anxiety, depression    pt has had 2 recent falls, noncompliant with calling for help, discussed the need for safety, encouraged him with the fact taht at some point he will have prosthesis  maintain present management  Vasc Dr Machado, R BKA 11/9  pt underwent R TMA with several debridements and cont unhealing wound at the [facility]  Podiatry ff    pt has occ of dorsal and metatarsal aa in context of PAD prior balloon-plasty  pt + wound pseudomonas and yeast  Meropenem  cont wound changes as per surgery  cont all meds  monitor CBC, renal parameters and electrolytes  att to bowel regimen  add lactobacillus q AC  incentive spirometry  monitor renal parameters, electrolytes and H/H  Rehab for possible 4A PT  Occupational   Social SVC for D/C planning

## 2023-11-15 ENCOUNTER — INPATIENT (INPATIENT)
Facility: HOSPITAL | Age: 58
LOS: 12 days | Discharge: HOME HEALTH | DRG: 560 | End: 2023-11-28
Attending: PHYSICAL MEDICINE & REHABILITATION | Admitting: PHYSICAL MEDICINE & REHABILITATION
Payer: MEDICARE

## 2023-11-15 ENCOUNTER — TRANSCRIPTION ENCOUNTER (OUTPATIENT)
Age: 58
End: 2023-11-15

## 2023-11-15 VITALS
RESPIRATION RATE: 18 BRPM | TEMPERATURE: 98 F | SYSTOLIC BLOOD PRESSURE: 165 MMHG | DIASTOLIC BLOOD PRESSURE: 78 MMHG | HEART RATE: 73 BPM

## 2023-11-15 VITALS
DIASTOLIC BLOOD PRESSURE: 78 MMHG | TEMPERATURE: 98 F | SYSTOLIC BLOOD PRESSURE: 165 MMHG | RESPIRATION RATE: 18 BRPM | HEART RATE: 73 BPM

## 2023-11-15 DIAGNOSIS — Z98.890 OTHER SPECIFIED POSTPROCEDURAL STATES: Chronic | ICD-10-CM

## 2023-11-15 DIAGNOSIS — N20.0 CALCULUS OF KIDNEY: Chronic | ICD-10-CM

## 2023-11-15 DIAGNOSIS — Z89.511 ACQUIRED ABSENCE OF RIGHT LEG BELOW KNEE: ICD-10-CM

## 2023-11-15 DIAGNOSIS — Z98.62 PERIPHERAL VASCULAR ANGIOPLASTY STATUS: Chronic | ICD-10-CM

## 2023-11-15 PROBLEM — I73.9 PERIPHERAL VASCULAR DISEASE, UNSPECIFIED: Chronic | Status: ACTIVE | Noted: 2023-10-26

## 2023-11-15 LAB
ALBUMIN SERPL ELPH-MCNC: 3.2 G/DL — LOW (ref 3.5–5.2)
ALBUMIN SERPL ELPH-MCNC: 3.2 G/DL — LOW (ref 3.5–5.2)
ALP SERPL-CCNC: 141 U/L — HIGH (ref 30–115)
ALP SERPL-CCNC: 141 U/L — HIGH (ref 30–115)
ALT FLD-CCNC: 8 U/L — SIGNIFICANT CHANGE UP (ref 0–41)
ALT FLD-CCNC: 8 U/L — SIGNIFICANT CHANGE UP (ref 0–41)
ANION GAP SERPL CALC-SCNC: 11 MMOL/L — SIGNIFICANT CHANGE UP (ref 7–14)
ANION GAP SERPL CALC-SCNC: 11 MMOL/L — SIGNIFICANT CHANGE UP (ref 7–14)
AST SERPL-CCNC: 10 U/L — SIGNIFICANT CHANGE UP (ref 0–41)
AST SERPL-CCNC: 10 U/L — SIGNIFICANT CHANGE UP (ref 0–41)
BASOPHILS # BLD AUTO: 0.04 K/UL — SIGNIFICANT CHANGE UP (ref 0–0.2)
BASOPHILS # BLD AUTO: 0.04 K/UL — SIGNIFICANT CHANGE UP (ref 0–0.2)
BASOPHILS NFR BLD AUTO: 0.7 % — SIGNIFICANT CHANGE UP (ref 0–1)
BASOPHILS NFR BLD AUTO: 0.7 % — SIGNIFICANT CHANGE UP (ref 0–1)
BILIRUB SERPL-MCNC: <0.2 MG/DL — SIGNIFICANT CHANGE UP (ref 0.2–1.2)
BILIRUB SERPL-MCNC: <0.2 MG/DL — SIGNIFICANT CHANGE UP (ref 0.2–1.2)
BUN SERPL-MCNC: 35 MG/DL — HIGH (ref 10–20)
BUN SERPL-MCNC: 35 MG/DL — HIGH (ref 10–20)
CALCIUM SERPL-MCNC: 9 MG/DL — SIGNIFICANT CHANGE UP (ref 8.4–10.5)
CALCIUM SERPL-MCNC: 9 MG/DL — SIGNIFICANT CHANGE UP (ref 8.4–10.5)
CHLORIDE SERPL-SCNC: 101 MMOL/L — SIGNIFICANT CHANGE UP (ref 98–110)
CHLORIDE SERPL-SCNC: 101 MMOL/L — SIGNIFICANT CHANGE UP (ref 98–110)
CO2 SERPL-SCNC: 27 MMOL/L — SIGNIFICANT CHANGE UP (ref 17–32)
CO2 SERPL-SCNC: 27 MMOL/L — SIGNIFICANT CHANGE UP (ref 17–32)
CREAT SERPL-MCNC: 2.5 MG/DL — HIGH (ref 0.7–1.5)
CREAT SERPL-MCNC: 2.5 MG/DL — HIGH (ref 0.7–1.5)
EGFR: 29 ML/MIN/1.73M2 — LOW
EGFR: 29 ML/MIN/1.73M2 — LOW
EOSINOPHIL # BLD AUTO: 0.4 K/UL — SIGNIFICANT CHANGE UP (ref 0–0.7)
EOSINOPHIL # BLD AUTO: 0.4 K/UL — SIGNIFICANT CHANGE UP (ref 0–0.7)
EOSINOPHIL NFR BLD AUTO: 7.4 % — SIGNIFICANT CHANGE UP (ref 0–8)
EOSINOPHIL NFR BLD AUTO: 7.4 % — SIGNIFICANT CHANGE UP (ref 0–8)
GLUCOSE BLDC GLUCOMTR-MCNC: 136 MG/DL — HIGH (ref 70–99)
GLUCOSE BLDC GLUCOMTR-MCNC: 136 MG/DL — HIGH (ref 70–99)
GLUCOSE BLDC GLUCOMTR-MCNC: 141 MG/DL — HIGH (ref 70–99)
GLUCOSE BLDC GLUCOMTR-MCNC: 141 MG/DL — HIGH (ref 70–99)
GLUCOSE BLDC GLUCOMTR-MCNC: 163 MG/DL — HIGH (ref 70–99)
GLUCOSE BLDC GLUCOMTR-MCNC: 163 MG/DL — HIGH (ref 70–99)
GLUCOSE BLDC GLUCOMTR-MCNC: 171 MG/DL — HIGH (ref 70–99)
GLUCOSE BLDC GLUCOMTR-MCNC: 171 MG/DL — HIGH (ref 70–99)
GLUCOSE BLDC GLUCOMTR-MCNC: 180 MG/DL — HIGH (ref 70–99)
GLUCOSE BLDC GLUCOMTR-MCNC: 180 MG/DL — HIGH (ref 70–99)
GLUCOSE SERPL-MCNC: 202 MG/DL — HIGH (ref 70–99)
GLUCOSE SERPL-MCNC: 202 MG/DL — HIGH (ref 70–99)
HCT VFR BLD CALC: 25.2 % — LOW (ref 42–52)
HCT VFR BLD CALC: 25.2 % — LOW (ref 42–52)
HGB BLD-MCNC: 7.9 G/DL — LOW (ref 14–18)
HGB BLD-MCNC: 7.9 G/DL — LOW (ref 14–18)
IMM GRANULOCYTES NFR BLD AUTO: 0.4 % — HIGH (ref 0.1–0.3)
IMM GRANULOCYTES NFR BLD AUTO: 0.4 % — HIGH (ref 0.1–0.3)
LYMPHOCYTES # BLD AUTO: 1.76 K/UL — SIGNIFICANT CHANGE UP (ref 1.2–3.4)
LYMPHOCYTES # BLD AUTO: 1.76 K/UL — SIGNIFICANT CHANGE UP (ref 1.2–3.4)
LYMPHOCYTES # BLD AUTO: 32.8 % — SIGNIFICANT CHANGE UP (ref 20.5–51.1)
LYMPHOCYTES # BLD AUTO: 32.8 % — SIGNIFICANT CHANGE UP (ref 20.5–51.1)
MAGNESIUM SERPL-MCNC: 2.5 MG/DL — HIGH (ref 1.8–2.4)
MAGNESIUM SERPL-MCNC: 2.5 MG/DL — HIGH (ref 1.8–2.4)
MCHC RBC-ENTMCNC: 27 PG — SIGNIFICANT CHANGE UP (ref 27–31)
MCHC RBC-ENTMCNC: 27 PG — SIGNIFICANT CHANGE UP (ref 27–31)
MCHC RBC-ENTMCNC: 31.3 G/DL — LOW (ref 32–37)
MCHC RBC-ENTMCNC: 31.3 G/DL — LOW (ref 32–37)
MCV RBC AUTO: 86 FL — SIGNIFICANT CHANGE UP (ref 80–94)
MCV RBC AUTO: 86 FL — SIGNIFICANT CHANGE UP (ref 80–94)
MONOCYTES # BLD AUTO: 0.57 K/UL — SIGNIFICANT CHANGE UP (ref 0.1–0.6)
MONOCYTES # BLD AUTO: 0.57 K/UL — SIGNIFICANT CHANGE UP (ref 0.1–0.6)
MONOCYTES NFR BLD AUTO: 10.6 % — HIGH (ref 1.7–9.3)
MONOCYTES NFR BLD AUTO: 10.6 % — HIGH (ref 1.7–9.3)
NEUTROPHILS # BLD AUTO: 2.58 K/UL — SIGNIFICANT CHANGE UP (ref 1.4–6.5)
NEUTROPHILS # BLD AUTO: 2.58 K/UL — SIGNIFICANT CHANGE UP (ref 1.4–6.5)
NEUTROPHILS NFR BLD AUTO: 48.1 % — SIGNIFICANT CHANGE UP (ref 42.2–75.2)
NEUTROPHILS NFR BLD AUTO: 48.1 % — SIGNIFICANT CHANGE UP (ref 42.2–75.2)
NRBC # BLD: 0 /100 WBCS — SIGNIFICANT CHANGE UP (ref 0–0)
NRBC # BLD: 0 /100 WBCS — SIGNIFICANT CHANGE UP (ref 0–0)
PLATELET # BLD AUTO: 432 K/UL — HIGH (ref 130–400)
PLATELET # BLD AUTO: 432 K/UL — HIGH (ref 130–400)
PMV BLD: 8.5 FL — SIGNIFICANT CHANGE UP (ref 7.4–10.4)
PMV BLD: 8.5 FL — SIGNIFICANT CHANGE UP (ref 7.4–10.4)
POTASSIUM SERPL-MCNC: 4.6 MMOL/L — SIGNIFICANT CHANGE UP (ref 3.5–5)
POTASSIUM SERPL-MCNC: 4.6 MMOL/L — SIGNIFICANT CHANGE UP (ref 3.5–5)
POTASSIUM SERPL-SCNC: 4.6 MMOL/L — SIGNIFICANT CHANGE UP (ref 3.5–5)
POTASSIUM SERPL-SCNC: 4.6 MMOL/L — SIGNIFICANT CHANGE UP (ref 3.5–5)
PROT SERPL-MCNC: 6.1 G/DL — SIGNIFICANT CHANGE UP (ref 6–8)
PROT SERPL-MCNC: 6.1 G/DL — SIGNIFICANT CHANGE UP (ref 6–8)
RBC # BLD: 2.93 M/UL — LOW (ref 4.7–6.1)
RBC # BLD: 2.93 M/UL — LOW (ref 4.7–6.1)
RBC # FLD: 17.2 % — HIGH (ref 11.5–14.5)
RBC # FLD: 17.2 % — HIGH (ref 11.5–14.5)
SODIUM SERPL-SCNC: 139 MMOL/L — SIGNIFICANT CHANGE UP (ref 135–146)
SODIUM SERPL-SCNC: 139 MMOL/L — SIGNIFICANT CHANGE UP (ref 135–146)
WBC # BLD: 5.37 K/UL — SIGNIFICANT CHANGE UP (ref 4.8–10.8)
WBC # BLD: 5.37 K/UL — SIGNIFICANT CHANGE UP (ref 4.8–10.8)
WBC # FLD AUTO: 5.37 K/UL — SIGNIFICANT CHANGE UP (ref 4.8–10.8)
WBC # FLD AUTO: 5.37 K/UL — SIGNIFICANT CHANGE UP (ref 4.8–10.8)

## 2023-11-15 PROCEDURE — 90834 PSYTX W PT 45 MINUTES: CPT

## 2023-11-15 PROCEDURE — 97110 THERAPEUTIC EXERCISES: CPT | Mod: GP

## 2023-11-15 PROCEDURE — 80053 COMPREHEN METABOLIC PANEL: CPT

## 2023-11-15 PROCEDURE — 97166 OT EVAL MOD COMPLEX 45 MIN: CPT | Mod: GO

## 2023-11-15 PROCEDURE — 36415 COLL VENOUS BLD VENIPUNCTURE: CPT

## 2023-11-15 PROCEDURE — 97530 THERAPEUTIC ACTIVITIES: CPT | Mod: GP

## 2023-11-15 PROCEDURE — 90791 PSYCH DIAGNOSTIC EVALUATION: CPT

## 2023-11-15 PROCEDURE — 85025 COMPLETE CBC W/AUTO DIFF WBC: CPT

## 2023-11-15 PROCEDURE — 97162 PT EVAL MOD COMPLEX 30 MIN: CPT | Mod: GP

## 2023-11-15 PROCEDURE — 97112 NEUROMUSCULAR REEDUCATION: CPT | Mod: GO

## 2023-11-15 PROCEDURE — 97140 MANUAL THERAPY 1/> REGIONS: CPT | Mod: GO

## 2023-11-15 PROCEDURE — 76770 US EXAM ABDO BACK WALL COMP: CPT

## 2023-11-15 PROCEDURE — 93010 ELECTROCARDIOGRAM REPORT: CPT

## 2023-11-15 PROCEDURE — 82962 GLUCOSE BLOOD TEST: CPT

## 2023-11-15 PROCEDURE — 83735 ASSAY OF MAGNESIUM: CPT

## 2023-11-15 PROCEDURE — 97535 SELF CARE MNGMENT TRAINING: CPT | Mod: GO

## 2023-11-15 PROCEDURE — 97537 COMMUNITY/WORK REINTEGRATION: CPT | Mod: GO

## 2023-11-15 PROCEDURE — 93005 ELECTROCARDIOGRAM TRACING: CPT

## 2023-11-15 PROCEDURE — 97116 GAIT TRAINING THERAPY: CPT | Mod: GP

## 2023-11-15 RX ORDER — INSULIN GLARGINE 100 [IU]/ML
24 INJECTION, SOLUTION SUBCUTANEOUS EVERY MORNING
Refills: 0 | Status: DISCONTINUED | OUTPATIENT
Start: 2023-11-15 | End: 2023-11-28

## 2023-11-15 RX ORDER — TAMSULOSIN HYDROCHLORIDE 0.4 MG/1
0.4 CAPSULE ORAL AT BEDTIME
Refills: 0 | Status: DISCONTINUED | OUTPATIENT
Start: 2023-11-15 | End: 2023-11-24

## 2023-11-15 RX ORDER — LACTOBACILLUS ACIDOPHILUS 100MM CELL
1 CAPSULE ORAL
Refills: 0 | Status: DISCONTINUED | OUTPATIENT
Start: 2023-11-15 | End: 2023-11-28

## 2023-11-15 RX ORDER — OXYCODONE AND ACETAMINOPHEN 5; 325 MG/1; MG/1
1 TABLET ORAL
Refills: 0 | DISCHARGE

## 2023-11-15 RX ORDER — NIFEDIPINE 30 MG
2 TABLET, EXTENDED RELEASE 24 HR ORAL
Qty: 180 | Refills: 0
Start: 2023-11-15 | End: 2024-02-12

## 2023-11-15 RX ORDER — HEPARIN SODIUM 5000 [USP'U]/ML
5000 INJECTION INTRAVENOUS; SUBCUTANEOUS EVERY 8 HOURS
Refills: 0 | Status: DISCONTINUED | OUTPATIENT
Start: 2023-11-15 | End: 2023-11-25

## 2023-11-15 RX ORDER — CHLORHEXIDINE GLUCONATE 213 G/1000ML
1 SOLUTION TOPICAL ONCE
Refills: 0 | Status: COMPLETED | OUTPATIENT
Start: 2023-11-15 | End: 2023-11-19

## 2023-11-15 RX ORDER — NIFEDIPINE 30 MG
90 TABLET, EXTENDED RELEASE 24 HR ORAL DAILY
Refills: 0 | Status: DISCONTINUED | OUTPATIENT
Start: 2023-11-15 | End: 2023-11-28

## 2023-11-15 RX ORDER — SODIUM BICARBONATE 1 MEQ/ML
650 SYRINGE (ML) INTRAVENOUS THREE TIMES A DAY
Refills: 0 | Status: DISCONTINUED | OUTPATIENT
Start: 2023-11-15 | End: 2023-11-28

## 2023-11-15 RX ORDER — OXYCODONE HYDROCHLORIDE 5 MG/1
5 TABLET ORAL EVERY 4 HOURS
Refills: 0 | Status: DISCONTINUED | OUTPATIENT
Start: 2023-11-15 | End: 2023-11-15

## 2023-11-15 RX ORDER — ALPRAZOLAM 0.25 MG
0.5 TABLET ORAL
Refills: 0 | Status: DISCONTINUED | OUTPATIENT
Start: 2023-11-15 | End: 2023-11-22

## 2023-11-15 RX ORDER — ACETAMINOPHEN 500 MG
650 TABLET ORAL EVERY 6 HOURS
Refills: 0 | Status: DISCONTINUED | OUTPATIENT
Start: 2023-11-15 | End: 2023-11-28

## 2023-11-15 RX ORDER — ATORVASTATIN CALCIUM 80 MG/1
80 TABLET, FILM COATED ORAL AT BEDTIME
Refills: 0 | Status: DISCONTINUED | OUTPATIENT
Start: 2023-11-15 | End: 2023-11-19

## 2023-11-15 RX ORDER — ASPIRIN/CALCIUM CARB/MAGNESIUM 324 MG
81 TABLET ORAL DAILY
Refills: 0 | Status: DISCONTINUED | OUTPATIENT
Start: 2023-11-15 | End: 2023-11-28

## 2023-11-15 RX ORDER — DEXTROSE 50 % IN WATER 50 %
15 SYRINGE (ML) INTRAVENOUS ONCE
Refills: 0 | Status: DISCONTINUED | OUTPATIENT
Start: 2023-11-15 | End: 2023-11-28

## 2023-11-15 RX ORDER — CHLORHEXIDINE GLUCONATE 213 G/1000ML
1 SOLUTION TOPICAL ONCE
Refills: 0 | Status: DISCONTINUED | OUTPATIENT
Start: 2023-11-15 | End: 2023-11-15

## 2023-11-15 RX ORDER — LANOLIN ALCOHOL/MO/W.PET/CERES
5 CREAM (GRAM) TOPICAL AT BEDTIME
Refills: 0 | Status: DISCONTINUED | OUTPATIENT
Start: 2023-11-15 | End: 2023-11-27

## 2023-11-15 RX ORDER — NIFEDIPINE 30 MG
90 TABLET, EXTENDED RELEASE 24 HR ORAL DAILY
Refills: 0 | Status: DISCONTINUED | OUTPATIENT
Start: 2023-11-15 | End: 2023-11-15

## 2023-11-15 RX ORDER — OXYCODONE HYDROCHLORIDE 5 MG/1
10 TABLET ORAL EVERY 4 HOURS
Refills: 0 | Status: DISCONTINUED | OUTPATIENT
Start: 2023-11-15 | End: 2023-11-22

## 2023-11-15 RX ORDER — NIFEDIPINE 30 MG
1 TABLET, EXTENDED RELEASE 24 HR ORAL
Qty: 0 | Refills: 0 | DISCHARGE

## 2023-11-15 RX ORDER — MAGNESIUM OXIDE 400 MG ORAL TABLET 241.3 MG
400 TABLET ORAL
Refills: 0 | Status: DISCONTINUED | OUTPATIENT
Start: 2023-11-15 | End: 2023-11-28

## 2023-11-15 RX ORDER — HYDROMORPHONE HYDROCHLORIDE 2 MG/ML
1 INJECTION INTRAMUSCULAR; INTRAVENOUS; SUBCUTANEOUS EVERY 4 HOURS
Refills: 0 | Status: DISCONTINUED | OUTPATIENT
Start: 2023-11-15 | End: 2023-11-17

## 2023-11-15 RX ORDER — LOSARTAN POTASSIUM 100 MG/1
100 TABLET, FILM COATED ORAL DAILY
Refills: 0 | Status: DISCONTINUED | OUTPATIENT
Start: 2023-11-15 | End: 2023-11-27

## 2023-11-15 RX ORDER — DULOXETINE HYDROCHLORIDE 30 MG/1
1 CAPSULE, DELAYED RELEASE ORAL
Qty: 180 | Refills: 0
Start: 2023-11-15 | End: 2024-02-12

## 2023-11-15 RX ORDER — CLOPIDOGREL BISULFATE 75 MG/1
75 TABLET, FILM COATED ORAL DAILY
Refills: 0 | Status: DISCONTINUED | OUTPATIENT
Start: 2023-11-15 | End: 2023-11-28

## 2023-11-15 RX ORDER — SENNA PLUS 8.6 MG/1
2 TABLET ORAL AT BEDTIME
Refills: 0 | Status: DISCONTINUED | OUTPATIENT
Start: 2023-11-15 | End: 2023-11-28

## 2023-11-15 RX ORDER — POLYETHYLENE GLYCOL 3350 17 G/17G
17 POWDER, FOR SOLUTION ORAL
Refills: 0 | Status: DISCONTINUED | OUTPATIENT
Start: 2023-11-15 | End: 2023-11-27

## 2023-11-15 RX ORDER — POLYETHYLENE GLYCOL 3350 17 G/17G
17 POWDER, FOR SOLUTION ORAL DAILY
Refills: 0 | Status: DISCONTINUED | OUTPATIENT
Start: 2023-11-15 | End: 2023-11-28

## 2023-11-15 RX ORDER — INSULIN LISPRO 100/ML
VIAL (ML) SUBCUTANEOUS
Refills: 0 | Status: DISCONTINUED | OUTPATIENT
Start: 2023-11-15 | End: 2023-11-28

## 2023-11-15 RX ADMIN — MAGNESIUM OXIDE 400 MG ORAL TABLET 400 MILLIGRAM(S): 241.3 TABLET ORAL at 17:01

## 2023-11-15 RX ADMIN — TAMSULOSIN HYDROCHLORIDE 0.4 MILLIGRAM(S): 0.4 CAPSULE ORAL at 21:33

## 2023-11-15 RX ADMIN — Medication 2: at 17:00

## 2023-11-15 RX ADMIN — SENNA PLUS 2 TABLET(S): 8.6 TABLET ORAL at 21:33

## 2023-11-15 RX ADMIN — Medication 81 MILLIGRAM(S): at 11:34

## 2023-11-15 RX ADMIN — MAGNESIUM OXIDE 400 MG ORAL TABLET 400 MILLIGRAM(S): 241.3 TABLET ORAL at 09:12

## 2023-11-15 RX ADMIN — CLOPIDOGREL BISULFATE 75 MILLIGRAM(S): 75 TABLET, FILM COATED ORAL at 11:43

## 2023-11-15 RX ADMIN — LOSARTAN POTASSIUM 100 MILLIGRAM(S): 100 TABLET, FILM COATED ORAL at 06:57

## 2023-11-15 RX ADMIN — Medication 120 MILLIGRAM(S): at 06:57

## 2023-11-15 RX ADMIN — HEPARIN SODIUM 5000 UNIT(S): 5000 INJECTION INTRAVENOUS; SUBCUTANEOUS at 06:58

## 2023-11-15 RX ADMIN — Medication 1 TABLET(S): at 09:13

## 2023-11-15 RX ADMIN — Medication 650 MILLIGRAM(S): at 06:56

## 2023-11-15 RX ADMIN — OXYCODONE HYDROCHLORIDE 10 MILLIGRAM(S): 5 TABLET ORAL at 22:15

## 2023-11-15 RX ADMIN — HEPARIN SODIUM 5000 UNIT(S): 5000 INJECTION INTRAVENOUS; SUBCUTANEOUS at 21:33

## 2023-11-15 RX ADMIN — OXYCODONE HYDROCHLORIDE 10 MILLIGRAM(S): 5 TABLET ORAL at 21:38

## 2023-11-15 RX ADMIN — Medication 0.5 MILLIGRAM(S): at 09:13

## 2023-11-15 RX ADMIN — INSULIN GLARGINE 24 UNIT(S): 100 INJECTION, SOLUTION SUBCUTANEOUS at 09:12

## 2023-11-15 RX ADMIN — Medication 650 MILLIGRAM(S): at 14:46

## 2023-11-15 RX ADMIN — Medication 2: at 11:46

## 2023-11-15 RX ADMIN — Medication 1 TABLET(S): at 17:01

## 2023-11-15 RX ADMIN — Medication 650 MILLIGRAM(S): at 21:32

## 2023-11-15 NOTE — PATIENT PROFILE ADULT - STATED REASON FOR ADMISSION
Addressed in ED   Mild degenerative joint disease     Future Appointments  10/25/2022 3:30 PM    Angela Butterfield MD     [facility]

## 2023-11-15 NOTE — PROGRESS NOTE ADULT - SUBJECTIVE AND OBJECTIVE BOX
MARIOLA CURRY 58y Male ad to [facility] for unhealing gangrenous R foot ulcer in context of R Charcot Foot Deformity  and PAD for TMA with Podiatry/R THAK.  The R TMA took place on 10/27, revision on 10/30, further debridement on 11/4.   The C&S grew pseudomonas and yeast.  The pt has severe PAD with occ of dorsal and metatarsal aa.  The pt is on Meropenem IV.  [place] for [place]KA on  [place]UR[place] 11/9.    PMHx :  HTN, ASHD, DLD, PAD, sp balloon angioplasty 10/23/23,  IDDM, CKD 4, (2.5-3.5), nephrolithiasis, on renal transplant list, Chr Anemia, sp prior PRBCs, GERD, anxiety, depression    INTERVAL HPI/OVERNIGHT EVENTS: pt is sp R BKA on 11/9, pt med stable, andmay be transfered to in pt Rehab for PT    MEDICATIONS  (STANDING):  aspirin  chewable 81 milliGRAM(s) Oral daily  dextrose 50% Injectable 25 Gram(s) IV Push once  epoetin ban (PROCRIT) Injectable 2000 Unit(s) SubCutaneous every 7 days  glucagon  Injectable 1 milliGRAM(s) IntraMuscular once  heparin   Injectable 5000 Unit(s) SubCutaneous every 8 hours  insulin glargine Injectable (LANTUS) 25 Unit(s) SubCutaneous every morning  insulin lispro (ADMELOG) corrective regimen sliding scale   SubCutaneous three times a day before meals  insulin lispro Injectable (ADMELOG) 8 Unit(s) SubCutaneous before breakfast  losartan 100 milliGRAM(s) Oral daily  nifedipine XL 90mg po qd  magnesium oxide 400 milliGRAM(s) Oral three times a day with meals  meropenem  IVPB 1000 milliGRAM(s) IV Intermittent every 12 hours  NIFEdipine XL 90 milliGRAM(s) Oral daily  rosuvastatin 20 milliGRAM(s) Oral at bedtime  sodium bicarbonate 650 milliGRAM(s) Oral three times a day    MEDICATIONS  (PRN):  ALPRAZolam 0.5 milliGRAM(s) Oral four times a day PRN anxiety  cloNIDine 0.1 milliGRAM(s) Oral every 8 hours PRN sbp>150  dextrose Oral Gel 15 Gram(s) Oral once PRN Blood Glucose LESS THAN 70 milliGRAM(s)/deciliter  melatonin 3 milliGRAM(s) Oral at bedtime PRN Insomnia  oxycodone    5 mG/acetaminophen 325 mG 2 Tablet(s) Oral every 4 hours PRN Moderate Pain (4 - 6)  oxyCODONE    IR 2.5 milliGRAM(s) Oral every 4 hours PRN Severe Pain (7 - 10)      Allergies    No Known Allergies	    Vital Signs Last 24 Hrs    T(F): 97.6  HR:  65  BP: 174/81    RR: 18   SpO2: 95% RA        PHYSICAL EXAM:      Constitutional: stable, v talkative, chr ill looking but in NAD    Eyes: nonicteric    ENMT: dry oral mucosa, dental defects    Neck: supple, no JVD/bruit/stridor    Respiratory: shallow resp, no wheezing/crackles/rales    Cardiovascular: S1S2 reg    Gastrointestinal: globose, soft and benign, + BS, no organomegaly    Genitourinary: no urbano    Extremities: moves all limbs, R BKbandage, + immobilizer    Vascular: dec L pedal pulse    Neurological: nonfocall    Lymph Nodes: not enlarged    Psychiatric: stable        LABS:                  7.9  5.3 )-----------( 432             25    139  |  101  |  35  ----------------------------< 202  4.0   |  27  |  2.5  GFR 28, 29, 30, 29  Ca    9.7        Phos  4.3, 3.9  Mg     2.2, 2.3, 2.6  O+  TPro  7.0  /  Alb  3.8  /  TBili  0.3  /  DBili  x   /  AST  15  /  ALT  18  /  AlkPhos  186<H>  11-07    PT/INR - ( 07 Nov 2023 08:35 )   PT: 11.40 sec;   INR: 1.00 ratio         PTT - ( 06 Nov 2023 22:30 )  PTT:36.0 sec  Urinalysis Basic - ( 07 Nov 2023 08:35 )    Color: x / Appearance: x / SG: x / pH: x  Gluc: 236 mg/dL / Ketone: x  / Bili: x / Urobili: x   Blood: x / Protein: x / Nitrite: x   Leuk Esterase: x / RBC: x / WBC x   Sq Epi: x / Non Sq Epi: x / Bacteria: x        RADIOLOGY & ADDITIONAL TESTS:

## 2023-11-15 NOTE — H&P ADULT - REASON FOR ADMISSION
Rehab of left foot gangrene s/p left BKA 11/9/23 in setting of severe PAD with previous balloon angioplasty and diabetic charcot arthropathy causing gait dysfunction and decline in ADLs Rehab of right BKA secondary to left foot gangrene in setting of severe PAD with previous balloon angioplasty and diabetic charcot arthropathy causing gait dysfunction and decline in ADLs

## 2023-11-15 NOTE — PROGRESS NOTE ADULT - PROVIDER SPECIALTY LIST ADULT
Internal Medicine
Nephrology
Pain Medicine
Podiatry
Hospitalist
Hospitalist
Infectious Disease
Internal Medicine
Podiatry
Vascular Surgery
Vascular Surgery
Hospitalist
Internal Medicine
Nephrology
Physiatry
Vascular Surgery
Internal Medicine
Internal Medicine
Physiatry
Vascular Surgery

## 2023-11-15 NOTE — H&P ADULT - NSHPLABSRESULTS_GEN_ALL_CORE
7.9    5.37  )-----------( 432      ( 15 Nov 2023 06:40 )             25.2     11-15    139  |  101  |  35<H>  ----------------------------<  202<H>  4.6   |  27  |  2.5<H>    Ca    9.0      15 Nov 2023 06:40  Mg     2.5     11-15    TPro  6.1  /  Alb  3.2<L>  /  TBili  <0.2  /  DBili  x   /  AST  10  /  ALT  8   /  AlkPhos  141<H>  11-15      Urinalysis Basic - ( 15 Nov 2023 06:40 )    Color: x / Appearance: x / SG: x / pH: x  Gluc: 202 mg/dL / Ketone: x  / Bili: x / Urobili: x   Blood: x / Protein: x / Nitrite: x   Leuk Esterase: x / RBC: x / WBC x   Sq Epi: x / Non Sq Epi: x / Bacteria: x      POCT Blood Glucose.: 171 mg/dL (11-15-23 @ 11:43)  POCT Blood Glucose.: 136 mg/dL (11-15-23 @ 08:48)  POCT Blood Glucose.: 163 mg/dL (11-15-23 @ 07:27)  POCT Blood Glucose.: 97 mg/dL (11-14-23 @ 20:49)  POCT Blood Glucose.: 299 mg/dL (11-14-23 @ 16:23)  POCT Blood Glucose.: 138 mg/dL (11-14-23 @ 10:56)  POCT Blood Glucose.: 121 mg/dL (11-14-23 @ 08:08)  POCT Blood Glucose.: 117 mg/dL (11-13-23 @ 21:18)  POCT Blood Glucose.: 75 mg/dL (11-13-23 @ 16:43)  POCT Blood Glucose.: 76 mg/dL (11-13-23 @ 11:31)  POCT Blood Glucose.: 277 mg/dL (11-13-23 @ 08:07)  POCT Blood Glucose.: 157 mg/dL (11-12-23 @ 22:18)

## 2023-11-15 NOTE — DISCHARGE NOTE NURSING/CASE MANAGEMENT/SOCIAL WORK - PATIENT PORTAL LINK FT
You can access the FollowMyHealth Patient Portal offered by [facility] by registering at the following website: [email or web address]. By joining [employer]’s FollowMyHealth portal, you will also be able to view your health information using other applications (apps) compatible with our system.

## 2023-11-15 NOTE — PROGRESS NOTE ADULT - ASSESSMENT
59 YO WM with IDDM, CKD4, PAD, Charcot Foot Deformity, underwent R MTA 10/27 with revision and debridement, un healing wound,  noted to have aa occ and trans ferred N for R BKA 11/9.    R Foot unhealing DFU with gangrene  R TMA 10/27, revision 10/30, debridement 11/4/23  R BKA 11/9/23  severe PAD  Anemia  Hx of HTN, ASHD  Hx of DLD  Hx of IDDM, d neuropathy, R Charcot Foot Deformity, R DFU with gangrene  Hx of CKD 4, nephrolithiasis, on renal transplant list  Hx of Ch Anemia  Hx of OA, DJD, mobility dysfunction  Hx of anxiety, depression      maintain present management  med stable and may be transferred to in pt Rehab for PT  for BP running high cont losartan and add nifedipine xl 90mg po qd  Vasc Dr Machado, R BKA 11/9  pt underwent R TMA with several debridements and cont unhealing wound at the [facility]  Podiatry ff    pt has occ of dorsal and metatarsal aa in context of PAD prior balloon-plasty    cont wound changes as per  vascsurgery  cont all meds  monitor CBC, renal parameters and electrolytes  att to bowel regimen  add lactobacillus q AC  incentive spirometry  monitor renal parameters, electrolytes and H/H  Rehab for possible 4A PT  Occupational   Social SVC for D/C planning to 4A

## 2023-11-15 NOTE — DISCHARGE NOTE NURSING/CASE MANAGEMENT/SOCIAL WORK - NSDCVIVACCINE_GEN_ALL_CORE_FT
Tdap; 21-Mar-2018 02:44; Wesley Castañeda (RN); [place]; [id number]; IntraMuscular; Deltoid Right.; 0.5 milliLiter(s); VIS (VIS Published: 09-May-2013, VIS Presented: 21-Mar-2018);

## 2023-11-15 NOTE — H&P ADULT - NSHPPHYSICALEXAM_GEN_ALL_CORE
PHYSICAL EXAMINATION   VItals: T(C): 36.4 (11-15-23 @ 07:41), Max: 37 (11-14-23 @ 23:45)  HR: 65 (11-15-23 @ 07:41) (65 - 84)  BP: 174/81 (11-15-23 @ 07:41) (152/76 - 192/92)  RR: 18 (11-15-23 @ 07:41) (18 - 18)  SpO2: --    General:[ x  ] NAD, Resting Comfortable,   [   ] other:                                HEENT: [  x ] NC/AT, EOMI, PERRL , Normal Conjunctivae,   [   ] other:  Cardio: [  x ] RRR, no murmer,   [   ] other:                              Pulm: [  x ] No Respiratory Distress,  Lungs CTAB,   [   ] other:                       Abdomen: [ x  ]ND/NT, Soft,   [   ] other:    : [ x  ] NO KING CATHETER, [   ] KING CATHETER- no meatal tear, no discharge, [   ] other:                                            MSK: [   ] No joint swelling, Full ROM,   [ x  ] other:    R knee immobilizer in place                                      Ext: [  x ]No C/C/E, No calf tenderness,   [   ]other:   Pt had good distal pedal pulses on LLE with intact proprioception and sensation   Skin: [ x  ]intact,   [   ] other:                                                                  Neurological Examination:  Cognitive: [  x  ] AAO x 3,   [    ]  other:                                                                      Attention:  [  x  ] intact,   [    ]  other:                            Memory: [  x  ] intact,    [    ]  other:     Mood/Affect: [  x  ] wnl,    [    ]  other:                                                                             Communication: [ x   ]Fluent, no dysarthria, following commands:  [    ] other:   CN II - XII:  [ x   ] intact,  [    ] other:                                                                                        Motor:   RIGHT UE: [  x ] WNL,  [   ] other:  LEFT    UE: [ x  ] WNL,  [   ] other:  RIGHT LE: [   ] WNL,  [   ] other: R HF 5/5, R BKA w/ knee immobilizer   LEFT    LE: [ x  ] WNL,  [   ] other:    Tone: [  x  ] wnl,   [    ]  other:  DTRs: [  x ]symmetric, [   ] other:  Coordination:   [  x  ] intact,   [    ] other:                                                                           Sensory: [  x  ] Intact to light touch,   [    ] other: PHYSICAL EXAMINATION   VItals: T(C): 36.4 (11-15-23 @ 07:41), Max: 37 (11-14-23 @ 23:45)  HR: 65 (11-15-23 @ 07:41) (65 - 84)  BP: 174/81 (11-15-23 @ 07:41) (152/76 - 192/92)  RR: 18 (11-15-23 @ 07:41) (18 - 18)  SpO2: --    General:[ x  ] NAD, Resting Comfortable,   [   ] other:                                HEENT: [  x ] NC/AT, EOMI, PERRL , Normal Conjunctivae,   [   ] other:  Cardio: [  x ] RRR, no murmer,   [   ] other:                              Pulm: [  x ] No Respiratory Distress,  Lungs CTAB,   [   ] other:                       Abdomen: [ x  ]ND/NT, Soft,   [   ] other:    : [ x  ] NO KING CATHETER, [   ] KING CATHETER- no meatal tear, no discharge, [   ] other:                                            MSK: [   ] No joint swelling, Full ROM,   [ x  ] other:  right BKA,  R knee immobilizer in place                                      Ext: [  x ]No C/C/E, No calf tenderness,   [   ]other:   Pt had good distal pedal pulses on LLE with intact proprioception and sensation   Skin: [ x  ]intact,   [   ] other:                                                                  Neurological Examination:  Cognitive: [  x  ] AAO x 3,   [    ]  other:                                                                      Attention:  [  x  ] intact,   [    ]  other:                            Memory: [  x  ] intact,    [    ]  other:     Mood/Affect: [  x  ] wnl,    [    ]  other:                                                                             Communication: [ x   ]Fluent, no dysarthria, following commands:  [    ] other:   CN II - XII:  [ x   ] intact,  [    ] other:                                                                                        Motor:   RIGHT UE: [  x ] WNL,  [   ] other:  LEFT    UE: [ x  ] WNL,  [   ] other:  RIGHT LE: [   ] WNL,  [   ] other: R HF 5/5, R BKA w/ knee immobilizer   LEFT    LE: [ x  ] WNL,  [   ] other:    Tone: [  x  ] wnl,   [    ]  other:  DTRs: [  x ]symmetric, [   ] other:  Coordination:   [  x  ] intact,   [    ] other:                                                                           Sensory: [  x  ] Intact to light touch,   [    ] other:

## 2023-11-15 NOTE — PROGRESS NOTE ADULT - REASON FOR ADMISSION
Non healing R TMA
R gangrenous ulcer, R  BKA
For RBKA, Hx of R foot gangrene, severe PAD
R Charcot foot Deformity with gangrenous ulcer for R TMA, IDDM, CKD 4, PAD
R charcot Foot Deformity, R foot gangrenous ulcer, PAD for R TMA
R foot gancrenous ulcer, PAD
R foot gangrene
RYDER SHAFER
Right foot infection
DFU

## 2023-11-15 NOTE — DISCHARGE NOTE PROVIDER - CARE PROVIDERS DIRECT ADDRESSES
,DirectAddress_Unknown,[email or web address] ,DirectAddress_Unknown,DirectAddress_Unknown

## 2023-11-15 NOTE — PROGRESS NOTE ADULT - SUBJECTIVE AND OBJECTIVE BOX
24H events:    Patient is a 58y old Male who presents with a chief complaint of For RBKA, Hx of R foot gangrene, severe PAD (14 Nov 2023 11:37)    Primary diagnosis of Gangrene of foot    Today is hospital day 20d. This morning patient was seen and examined at bedside, resting comfortably in bed. Patient had questions about insulin regimen and potassium levels. Patient informed of current regimen and that his blood sugars are well controlled. Patient's sister had questions regarding reevaluation of surgical site and need for antibiotics. Informed sister that antibiotics have not been recommended per ID and that vascular will be looking at surgical site. Vascular surgery will remove staples and redress outpatient. No acute or major events overnight. Hemodynamically stable, tolerating oral diet, voiding appropriately with appropriate bowel movements.     PAST MEDICAL & SURGICAL HISTORY  DM (diabetes mellitus)    HTN (hypertension)    HLD (hyperlipidemia)    Herpes labialis    Anxiety    GERD (gastroesophageal reflux disease)    Kidney stones  20 years ago    Kidney disease  stage 4    Chronic kidney disease, unspecified CKD stage    Diabetic Charcot foot    PAD (peripheral artery disease)    S/P foot surgery, right  x 5 ( 2006 - 2013 )    Kidney stone  Removed by Laser x 2 ( 20 years ago )    H/O arthroscopy of right knee    Status post peripheral artery angioplasty      SOCIAL HISTORY:  Social History:      ALLERGIES:  No Known Allergies    MEDICATIONS:  STANDING MEDICATIONS  aspirin  chewable 81 milliGRAM(s) Oral daily  chlorhexidine 2% Cloths 1 Application(s) Topical once  clopidogrel Tablet 75 milliGRAM(s) Oral daily  dextrose 50% Injectable 25 Gram(s) IV Push once  DULoxetine 40 milliGRAM(s) Oral every 12 hours  glucagon  Injectable 1 milliGRAM(s) IntraMuscular once  heparin   Injectable 5000 Unit(s) SubCutaneous every 8 hours  insulin glargine Injectable (LANTUS) 24 Unit(s) SubCutaneous every morning  insulin lispro (ADMELOG) corrective regimen sliding scale   SubCutaneous three times a day before meals  lactobacillus acidophilus 1 Tablet(s) Oral three times a day with meals  losartan 100 milliGRAM(s) Oral daily  magnesium oxide 400 milliGRAM(s) Oral three times a day with meals  NIFEdipine  milliGRAM(s) Oral daily  polyethylene glycol 3350 17 Gram(s) Oral daily  pregabalin 75 milliGRAM(s) Oral every 8 hours  senna 2 Tablet(s) Oral at bedtime  sodium bicarbonate 650 milliGRAM(s) Oral three times a day    PRN MEDICATIONS  ALPRAZolam 0.5 milliGRAM(s) Oral four times a day PRN  dextrose Oral Gel 15 Gram(s) Oral once PRN  HYDROmorphone   Tablet 4 milliGRAM(s) Oral every 4 hours PRN  HYDROmorphone  Injectable 1 milliGRAM(s) IV Push every 4 hours PRN  oxycodone    5 mG/acetaminophen 325 mG 2 Tablet(s) Oral every 4 hours PRN    VITALS:   T(F): 97.6  HR: 65  BP: 174/81  RR: 18  SpO2: --    PHYSICAL EXAM:  GENERAL: NAD, lying in bed comfortably, cooperative,   HEAD: NCAD,    NECK: Supple,   HEART: Regular rate and rhythm,   LUNGS: No acute respiratory distress,   EXTREMITIES: no rashes, extremities warm/dry, no edema,   NERVOUS SYSTEM: follows commands, answers questions appropriately         LABS:                        7.9    5.37  )-----------( 432      ( 15 Nov 2023 06:40 )             25.2     11-15    139  |  101  |  35<H>  ----------------------------<  202<H>  4.6   |  27  |  2.5<H>    Ca    9.0      15 Nov 2023 06:40  Mg     2.5     11-15    TPro  6.1  /  Alb  3.2<L>  /  TBili  <0.2  /  DBili  x   /  AST  10  /  ALT  8   /  AlkPhos  141<H>  11-15      Urinalysis Basic - ( 15 Nov 2023 06:40 )    Color: x / Appearance: x / SG: x / pH: x  Gluc: 202 mg/dL / Ketone: x  / Bili: x / Urobili: x   Blood: x / Protein: x / Nitrite: x   Leuk Esterase: x / RBC: x / WBC x   Sq Epi: x / Non Sq Epi: x / Bacteria: x                RADIOLOGY:    RADIOLOGY

## 2023-11-15 NOTE — DISCHARGE NOTE PROVIDER - NSDCMRMEDTOKEN_GEN_ALL_CORE_FT
aspirin 81 mg oral tablet, chewable: 1 tab(s) orally once a day  clopidogrel 75 mg oral tablet: 1 tab(s) orally once a day  Insulin Lispro KwikPen 100 units/mL injectable solution: 8 unit(s) injectable 3 times a day (before meals)  losartan 100 mg oral tablet: 1 tab(s) orally once a day  NIFEdipine 90 mg oral tablet, extended release: 1 tab(s) orally once a day  Percocet 5 mg-325 mg oral tablet: 1 tab(s) orally every 4 hours as needed for  moderate pain  rosuvastatin 20 mg oral tablet: 1 tab(s) orally once a day  sodium bicarbonate 650 mg oral tablet: 1 tab(s) orally 3 times a day  Tresiba FlexTouch 200 units/mL subcutaneous solution: 20 subcutaneous once a day  Xanax 1 mg oral tablet: 0.5 tab(s) orally 4 times a day, As Needed   aspirin 81 mg oral tablet, chewable: 1 tab(s) orally once a day  clopidogrel 75 mg oral tablet: 1 tab(s) orally once a day  DULoxetine 40 mg oral delayed release capsule: 1 cap(s) orally every 12 hours  Insulin Lispro KwikPen 100 units/mL injectable solution: 8 unit(s) injectable 3 times a day (before meals)  losartan 100 mg oral tablet: 1 tab(s) orally once a day  NIFEdipine 60 mg oral tablet, extended release: 2 tab(s) orally once a day  rosuvastatin 20 mg oral tablet: 1 tab(s) orally once a day  sodium bicarbonate 650 mg oral tablet: 1 tab(s) orally 3 times a day  Tresiba FlexTouch 200 units/mL subcutaneous solution: 20 subcutaneous once a day  Xanax 1 mg oral tablet: 0.5 tab(s) orally 4 times a day, As Needed

## 2023-11-15 NOTE — H&P ADULT - NSHPLANGLIMITEDENGLISH_GEN_A_CORE
[FreeTextEntry1] : \par \par \par #Epigastric Pain:\par -DDx: H. Pylori infection vs Cholelithiasis vs Pancreatitis\par - Will order H pylori stool antigen test, amylase, lipase, CMP,CBC\par - Will prescribe famotidine 20 mg QD for now- instructed not to take prilosec\par - Instructed to monitor urine and stool for blood, avoid fatty foods- try to eat smaller meals spread throughout day\par \par \par RTC in 1 week to be seen by me for follow up \par \par \par Above discussed with Dr. Tillman\par \par  No

## 2023-11-15 NOTE — DISCHARGE NOTE PROVIDER - NSDCFUADDAPPT_GEN_ALL_CORE_FT
Please schedule a follow-up appointment with Dr. Machado to reassess surgical site, remove staples and re-eavluation by calling [phone] within 1 week.    Please schedule a follow-up appointment with Dr. Kirkland for reassessment of your kidney disease at by calling [phone] within 1 month.    Please schedule a follow-up appointment with Dr. Machado to reassess surgical site, remove staples and re-eavluation by calling [phone] within 1 week.    Please schedule a follow-up appointment with Dr. Beverly for reassessment of your kidney disease and blood pressure by calling [phone] within 2 month.

## 2023-11-15 NOTE — PATIENT PROFILE ADULT - FUNCTIONAL ASSESSMENT - BASIC MOBILITY 1.
Telephone Encounter by Kaitlin Terry NCMA at 10/19/17 09:53 AM     Author:  Kaitlin Terry NCMA Service:  (none) Author Type:  Certified Medical Assistant     Filed:  10/19/17 09:54 AM Encounter Date:  10/18/2017 Status:  Signed     :  Kaitlin Terry NCMA (Certified Medical Assistant)            Please advise per last ov dated 10/09/2017 discussed   Ok to obtain PA  Which diagnosis?[LM1.1M]      Revision History        User Key Date/Time User Provider Type Action    > LM1.1 10/19/17 09:54 AM Kaitlin Terry NCMA Certified Medical Assistant Sign    M - Manual             4 = No assist / stand by assistance

## 2023-11-15 NOTE — H&P ADULT - HISTORY OF PRESENT ILLNESS
This is a 58 year old male with pmh significant for IDDM w/ charcot arthropathy, hypertension, PAD s/p balloon angioplasty 10/23/23, CKD 4 (2.5-3.5), nephrolithiasis, chronic anemia with hx of transfusions, anxiety, depression presents for nonhealing right foot gangrene. Pt underwent R TMA on 10/27 with revision on 10/30 and 11/4. Eventually underwent BKA on 11/9/23. Cultures grew pseudomonas and yeast and treated with Meropenem. Hospital course is complicated by acute urinary retention 2/2 neurogenic bladder and two mechanical out of bed falls, as the patient is not accustomed to missing a foot. Pt was seen by urology and has completed a trial of void after several straight caths. Pt is currently voiding spontaneously without use of urbano or straight catheterization but feels like he is going frequently. Having regular bowel movements. Of note the pt states that Cymbalta and Lyrica makes him hallucinate and has been refusing the medications on the previous unit. Complains of phantom limb pain and incisional pain that is currently controlled on Oxycodone.     Pt seen and evaluated by Physiatry and is currently functioning at supervision for bed mobility , transfers minimum assist, ambulates 25ft RW contact guard assist, upper body dressing independent, lower body dressing contact guard assist, grooming independent. Pt is stable for acute inpatient rehabilitation.     LS: lives in private home with mother, 4 ASTER, another 13 ASTER to get to bedroom, half bathroom on 1st floor   PLOF: ambulates with crutches, independent in ADL/iADLs, no HHA    This is a 58 year old male with pmh significant for IDDM w/ charcot arthropathy, hypertension, PAD s/p balloon angioplasty 10/23/23, CKD 4 (2.5-3.5), nephrolithiasis, chronic anemia with hx of transfusions, anxiety, depression presents for nonhealing right foot gangrene. Pt underwent R TMA on 10/27 with revision on 10/30 and 11/4. 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 Cultures grew pseudomonas and yeast and treated with Meropenem. Hospital course is complicated by acute urinary retention 2/2 neurogenic bladder and two mechanical out of bed falls, as the patient is not accustomed to missing a foot. Pt was seen by urology and has completed a trial of void after several straight caths. Pt is currently voiding spontaneously without use of urbano or straight catheterization but feels like he is going frequently. Having regular bowel movements. Of note the pt states that Cymbalta and Lyrica makes him hallucinate and has been refusing the medications on the previous unit. Complains of phantom limb pain and incisional pain that is currently controlled on Oxycodone.     Pt seen and evaluated by Physiatry and is currently functioning at supervision for bed mobility , transfers minimum assist, ambulates 25ft RW contact guard assist, upper body dressing independent, lower body dressing contact guard assist, grooming independent. Pt is stable for acute inpatient rehabilitation.     LS: lives in private home with mother, 4 ASTER, another 13 ASTER to get to bedroom, half bathroom on 1st floor   PLOF: ambulates with crutches, independent in ADL/iADLs, no HHA

## 2023-11-15 NOTE — H&P ADULT - TIME BILLING
chart review, comprehensive teaching history and physical  with resident, patient counseling and education regarding disease risk factors and expected outcome and the inpatient rehab process, coordination with nursing, ACP and therapists and discussion with discharging team regarding handoff of the patient's condition and hospital course.

## 2023-11-15 NOTE — DISCHARGE NOTE PROVIDER - PROVIDER TOKENS
PROVIDER:[TOKEN:[[id number]],FOLLOWUP:[1 week],ESTABLISHEDPATIENT:[T]],PROVIDER:[TOKEN:[[id number]],FOLLOWUP:[1 month],ESTABLISHEDPATIENT:[T]] PROVIDER:[TOKEN:[[id number]],FOLLOWUP:[1 week],ESTABLISHEDPATIENT:[T]],PROVIDER:[TOKEN:[[id number]],FOLLOWUP:[1 month],ESTABLISHEDPATIENT:[T]]

## 2023-11-15 NOTE — DISCHARGE NOTE NURSING/CASE MANAGEMENT/SOCIAL WORK - NSDCFUADDAPPT_GEN_ALL_CORE_FT
Please schedule a follow-up appointment with Dr. Machado to reassess surgical site, remove staples and re-eavluation by calling [phone] within 1 week.    Please schedule a follow-up appointment with Dr. Beverly for reassessment of your kidney disease and blood pressure by calling [phone] within 2 month.

## 2023-11-15 NOTE — H&P ADULT - NSHPREVIEWOFSYSTEMS_GEN_ALL_CORE
Constiutional:    [  x ] WNL           [   ] poor appetite   [   ] insomnia   [   ] tired   Cardio:                [ x  ] WNL           [   ] CP   [   ] URENA   [   ] palpitations               Resp:                   [ x  ] WNL           [   ] SOB   [   ] cough   [   ] wheezing   GI:                        [  x ] WNL           [   ] constipation   [   ] diarrhea   [   ] abdominal pain   [   ] nausea   [   ] emesis                                :                      [   ] WNL           [   ] KING  [   ] dysuria   [   ] difficulty voiding    [ x ] frequent voiding         Endo:                   [ x  ] WNL          [   ] polyuria   [   ] temperature intolerance                 Skin:                     [ x  ] WNL          [   ] pain   [   ] wound   [   ] rash   MSK:                    [ x  ] WNL          [   ] muscle pain   [   ] joint pain/ stiffness   [   ] muscle tenderness   [   ] swelling   Neuro:                 [   ] WNL          [   ] HA   [   ] change in vision   [   ] tremor   [   ] weakness   [   ]dysphagia  [ x ]  R limb phantom, neuropathic and incisional pain            Cognitive:           [  x ] WNL           [   ]confusion      Psych:                  [  x ] WNL           [   ] hallucinations   [   ]agitation   [   ] delusion   [   ]depression Constitutional:    [  x ] WNL           [   ] poor appetite   [   ] insomnia   [   ] tired   Cardio:                [ x  ] WNL           [   ] CP   [   ] URENA   [   ] palpitations               Resp:                   [ x  ] WNL           [   ] SOB   [   ] cough   [   ] wheezing   GI:                        [  x ] WNL           [   ] constipation   [   ] diarrhea   [   ] abdominal pain   [   ] nausea   [   ] emesis                                :                      [   ] WNL           [   ] KING  [   ] dysuria   [   ] difficulty voiding    [ x ] frequent voiding small amounts      Endo:                   [ x  ] WNL          [   ] polyuria   [   ] temperature intolerance                 Skin:                     [ x  ] WNL          [   ] pain   [   ] wound   [   ] rash   MSK:                    [    ] WNL          [   ] muscle pain   [   ] joint pain/ stiffness   [   ] muscle tenderness   [   ] swelling  [ x]  right BKA with distal limb and phantom pain   Neuro:                 [   ] WNL          [   ] HA   [   ] change in vision   [   ] tremor   [   ] weakness   [   ]dysphagia  [ x ]  R limb phantom, neuropathic and incisional pain            Cognitive:           [  x ] WNL           [   ]confusion      Psych:                  [  x ] WNL           [   ] hallucinations   [   ]agitation   [   ] delusion   [   ]depression

## 2023-11-15 NOTE — DISCHARGE NOTE PROVIDER - CARE PROVIDER_API CALL
Tenisha Means  Vascular Surgery  [address], [address]  [place] [address]  Phone: [phone]  Fax: [phone]  Established Patient  Follow Up Time: 1 week    Jenaro Kirkland  Nephrology  [address] [place] [address]  Phone: [phone]  Fax: [phone]  Established Patient  Follow Up Time: 1 month   Tenisha Means  Vascular Surgery  [address], [address]  [place] [address]  Phone: [phone]  Fax: [phone]  Established Patient  Follow Up Time: 1 week    Tasha Beverly  Nephrology  [address], [address]  [place] [address]  Phone: [phone]  Fax: [phone]  Established Patient  Follow Up Time: 1 month

## 2023-11-15 NOTE — H&P ADULT - ASSESSMENT
ASSESSMENT/PLAN    his is a 58 year old male with pmh significant for IDDM w/ charcot arthropathy, hypertension, PAD s/p balloon angioplasty 10/23/23, CKD 4 (2.5-3.5), nephrolithiasis, chronic anemia with hx of transfusions, anxiety, depression presents for nonhealing right foot gangrene. Pt underwent R TMA on 10/27 with revision on 10/30 and 11/4. Eventually underwent BKA on 11/9/23. Cultures grew pseudomonas and yeast and treated with Meropenem. Hospital course is complicated by acute urinary retention 2/2 neurogenic bladder and two mechanical out of bed falls    Prior to the knee amputation, the patient was independent in ADLs and ambulation with crutches. Patient now requires minimal assistance with ambulation with a RW and ADLs 2/2 to his below knee amputation. Therefore, there is a significant functional decline from baseline. Patients also with medical comorbidities of diabetes, peripheral artery disease, chronic kidney disease requiring blood transfusions, anxiety, depression, requiring medication management and monitoring of vitals by Physiatry team and nursing. May also need specialist consulting from nephrology for patient’s stage 4 kidney disease. Patient is a complex medical case with mixed medical maladies. There are significant comorbidities which warrants acute rehab hospitalization. To return home it is in the patient’s best interest to have acute inpatient rehabilitative services to undergo intensive interdisciplinary therapy. Of note, the patient lives with his mother with multiple stairs to enter as well as inside his home and would have difficulty performing ADLs and iADLs individually. Furthermore, the patient is motivated and is able to tolerate up to 3 hours of daily therapy for 5-6 days a week for a total of 15 hours a week. Evaluated by Physiatry and deemed to be an excellent candidate for admission to  for acute inpatient rehab. Admitted to Acute Rehab on 11/15/2023.     Rehab of left foot gangrene s/p left BKA 11/9/23 in setting of severe PAD with previous balloon angioplasty and diabetic charcot arthropathy causing gait dysfunction and decline in ADLs   - s/p Meropenem 1000mg q12h before source control    - PT/OT  - RLE NWB   - R Knee immobilizer   - c/w Plavix 75mg, ASA 81mg    Pain control   - c/w Oxycodone 10mg prn   - Pt was started on Lyrica 75mg q8h, Cymbalta 40mg q12h on previous unit, however patient states that he gets hallucinations and has been refusing the medications for the past several days    CKD stage 4 (2.5-3.5)  Chronic anemia 2/2 CKD  - on renal transplant list - avoid blood tx  - give Venofer 200mg IV q24h x5, retacrit 02325 units sq weekly per nephro   magnesium oxide 400mg TID  Sodium bicarb 650mg TID     IDDM w/ charcot arthropathy  - c/w Lantus 24 units AM   - ISS, BGM AC    Urinary retention  - US bladder and kidney 11/10: negative   - seen by urology 11/10: flomax, self-cath q4-6h, fu outpatient   - pt is currently voiding spontaneously without urbano or straight cath but is complaining of frequency   - continue to monitor for signs of infection or retention   - check PVR q6h     Hypertension   - c/w Nifedipine 120mg daily, Losartan 100mg  - monitor and adjust bp meds as necessary     Anxiety   Depression  - c/w Xanax 0.5mg QID prn for anxiety   - monitor for symptoms of SI/HI  - can recommend psychosocial services if needed   - pt is currently in good spirits\      -Pain control: Oxycodone 10/5 prn, Tylenol     -GI/Bowel Mgmt: Senna, Miralax     -Bladder management: PVR q6h, currently no urbano      -Skin:  No active issues at this time    -FEN   - continue to monitor and replete electrolytes prn     - Diet: renal diet          Precautions / PROPHYLAXIS:      - Falls    - Ortho: Weight bearing status: NWB RLE       - DVT prophylaxis: SQH TID       MEDICAL PROGNOSIS: GOOD            REHAB POTENTIAL: GOOD             ESTIMATED DISPOSITION: HOME WITH HOME CARE       [ x ]  The goals of the IRF admission were discussed with the patient and or family member, who agreed             ELOS:  [     ] 7-14    [    ]  14-21    [    ]  Other    THERAPY ORDERS and INITIAL INDIVIDUALIZED PLAN OF CARE:  This initial individualized interdisciplinary plan of care, which was established by me (the attending physiatrist), is based on elements from the post admission evaluation. The interdisciplinary therapy program is to be at least 3 hrs a day, at least 5 days per week from from physical, occupational and/ or speech therapies as ordered by me below.      [ x  ] P.T. 90 mins. /day at least 5 out of 7 days:  [  x ] superficial  modalities prn, [ x  ] A/AAROM, [ x  ] PREs, [ x  ] transfer training,            [ x  ] progressive ambulation, [x   ] stairs                                               [ x  ] O.T. 90 mins. /day at least 5 out of 7 days::  [ x  ] modalities prn,  [ x  ]A/AAROM, [ x  ] PREs, [  x ] functional transfer training, [ x  ] ADL training           [   ] cognitive/ perceptual eval and training, [   ] splint eval                                                  [   ] S.L.P:  [   ] speech eval, [   ] swallow eval     [   ] Neuropsychology eval     [ x  ] Individualized rec. therapy      RATIONALE FOR INPATIENT ADMISSION - Patient demonstrates the following: (check all that apply)  [X] Medically appropriate for acute rehabilitation admission. Requires interdisiplinary therapy consisting of at least PT and OT, at least 3 hrs. a day at least 5 days a week  [X] Has attainable rehab goals with an appropriate initial discharge plan  [X] Has rehabilitation potential (expected to make a significant improvement within a reasonable period of time)  [X] Requires close medical management by a rehab physician, rehab nursing care,  and comprehensive interdisciplinary team (including PT, OT)    [X] Requires evaluation by a physiatrist at least 3 days a week to evaluate and manage and coordinate rehab and medical problems   ASSESSMENT/PLAN    his is a 58 year old male with pmh significant for IDDM w/ Charcot arthropathy, hypertension, PAD s/p balloon angioplasty 10/23/23, CKD 4 (2.5-3.5), nephrolithiasis, chronic anemia with hx of transfusions, anxiety, depression presents for nonhealing right foot gangrene. Pt underwent R TMA on 10/27 with revision on 10/30 and 11/4. 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 Cultures grew pseudomonas and yeast and treated with Meropenem. Hospital course is complicated by acute urinary retention 2/2 neurogenic bladder and two mechanical out of bed falls    Prior to the knee amputation, the patient was independent in ADLs and ambulation with crutches. Patient now requires minimal assistance with ambulation with a RW and ADLs 2/2 to his below knee amputation. Therefore, there is a significant functional decline from baseline. Patients also with medical comorbidities of diabetes, peripheral artery disease, chronic kidney disease requiring blood transfusions, anxiety, depression, requiring medication management and monitoring of vitals by Physiatry team and nursing. May also need specialist consulting from nephrology for patient’s stage 4 kidney disease. Patient is a complex medical case with mixed medical maladies. There are significant comorbidities which warrants acute rehab hospitalization. To return home it is in the patient’s best interest to have acute inpatient rehabilitative services to undergo intensive interdisciplinary therapy. Of note, the patient lives with his mother with multiple stairs to enter as well as inside his home and would have difficulty performing ADLs and iADLs individually. Furthermore, the patient is motivated and is able to tolerate up to 3 hours of daily therapy for 5-6 days a week for a total of 15 hours a week. Evaluated by Physiatry and deemed to be an excellent candidate for admission to  for acute inpatient rehab. Admitted to Acute Rehab on 11/15/2023.     Rehab of right BKA in the setting of severe PAD with previous balloon angioplasty and diabetic Charcot arthropathy causing gait dysfunction and decline in ADLs   - s/p Meropenem 1000mg q12h before source control    - PT/OT  - RLE NWB   - R Knee immobilizer in bed  - c/w Plavix 75mg, ASA 81mg    Pain control   - c/w Oxycodone 10mg prn   - Pt was started on Lyrica 75mg q8h, Cymbalta 40mg q12h on previous unit, however patient states that he gets hallucinations and has been refusing the medications for the past several days    CKD stage 4 (2.5-3.5)  Chronic anemia 2/2 CKD  - on renal transplant list - avoid blood tx  - give Venofer 200mg IV q24h x5, retacrit 29653 units sq weekly per nephro   magnesium oxide 400mg TID  Sodium bicarb 650mg TID     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  - c/w Lantus 24 units AM   - ISS, BGM AC    Urinary retention  - US bladder and kidney 11/10: negative   - seen by urology 11/10: flomax, self-cath q4-6h, fu outpatient   - pt is currently voiding spontaneously without urbano or straight cath but is complaining of frequency   - continue to monitor for signs of infection or retention   - check PVR q6h and straight cath prn    Hypertension   - c/w Nifedipine 120mg daily, Losartan 100mg  - monitor and adjust bp meds as necessary     Anxiety   Depression  - c/w Xanax 0.5mg QID prn for anxiety   - monitor for symptoms of SI/HI  - can recommend psychosocial services if needed   - pt is currently in good spirits\      -Pain control: Oxycodone 10/5 prn, Tylenol     -GI/Bowel Mgmt: Senna, Miralax      -Skin:  No active issues at this time    -FEN   - continue to monitor and replete electrolytes prn     - Diet: renal diet          Precautions / PROPHYLAXIS:      - Falls    - Ortho: Weight bearing status: NWB RLE       - DVT prophylaxis: SQH TID       MEDICAL PROGNOSIS: GOOD            REHAB POTENTIAL: GOOD             ESTIMATED DISPOSITION: HOME WITH HOME CARE       [ x ]  The goals of the IRF admission were discussed with the patient, who agreed             ELOS:  [   x  ] 7-14    [    ]  14-21    [    ]  Other    THERAPY ORDERS and INITIAL INDIVIDUALIZED PLAN OF CARE:  This initial individualized interdisciplinary plan of care, which was established by me (the attending physiatrist), is based on elements from the post admission evaluation. The interdisciplinary therapy program is to be at least 3 hrs a day, at least 5 days per week from from physical, occupational and/ or speech therapies as ordered by me below.      [ x  ] P.T. 90 mins. /day at least 5 out of 7 days:  [  x ] superficial  modalities prn, [ x  ] A/AAROM, [ x  ] PREs, [ x  ] transfer training,            [ x  ] progressive ambulation, [x   ] stairs                                               [ x  ] O.T. 90 mins. /day at least 5 out of 7 days::  [ x  ] modalities prn,  [ x  ]A/AAROM, [ x  ] PREs, [  x ] functional transfer training, [ x  ] ADL training           [x   ] cognitive/ perceptual eval and training, [   ] splint eval                                                  [   ] S.L.P:  [   ] speech eval, [   ] swallow eval     [   ] Neuropsychology eval     [ x  ] Individualized rec. therapy      RATIONALE FOR INPATIENT ADMISSION - Patient demonstrates the following: (check all that apply)  [X] Medically appropriate for acute rehabilitation admission. Requires interdisiplinary therapy consisting of at least PT and OT, at least 3 hrs. a day at least 5 days a week  [X] Has attainable rehab goals with an appropriate initial discharge plan  [X] Has rehabilitation potential (expected to make a significant improvement within a reasonable period of time)  [X] Requires close medical management by a rehab physician, rehab nursing care,  and comprehensive interdisciplinary team (including PT, OT)    [X] Requires evaluation by a physiatrist at least 3 days a week to evaluate and manage and coordinate rehab and medical problems

## 2023-11-15 NOTE — DISCHARGE NOTE PROVIDER - NSDCCPCAREPLAN_GEN_ALL_CORE_FT
PRINCIPAL DISCHARGE DIAGNOSIS  Diagnosis: Gangrene of foot  Assessment and Plan of Treatment: What is a foot ulcer?  A foot ulcer is an open wound or sore on the foot. People with diabetes have a higher risk of foot ulcers. When this happens, doctors sometimes use the term "diabetic foot ulcer."  People with diabetes can have problems if their blood sugar is not well managed. These include:  -Nerve damage – The medical term for this is "diabetic neuropathy." If you have problems with the nerves in your feet, you might not be able to feel a blister, cut, or sore on your foot.  -Damage to the blood vessels – The medical term for this is "peripheral artery disease." This can decrease blood flow to your feet. When this happens, it can weaken your skin and wounds can take longer to heal.  These things can increase the risk of foot ulcers. For this reason, people with diabetes need to take special care of their feet.  If you notice any signs of a foot ulcer, tell your doctor and get treatment right away. The earlier you get treatment, the less likely you will get an infection or other complications.  What are the symptoms of a foot ulcer?  Symptoms might include:  -Irritated skin – The skin might look red.  -Pain  -Bleeding from the area – You might also notice blood on your sock or on the floor.  -A bad smell from the area  -Swelling  -Skin that looks dead or discolored  -Pus draining from the area  Should I see a doctor or nurse?  Yes. See your doctor or nurse if you notice:  -Broken skin  -Ulcers  -Blisters  -Areas of increased warmth or redness  -Any changes in the shape of your toes or bottoms of your feet      SECONDARY DISCHARGE DIAGNOSES  Diagnosis: DM (diabetes mellitus)  Assessment and Plan of Treatment: Diabetes is a chronic condition caused by high blood sugar levels. Your blood sugar levels become high because your body does not have enough insulin. Insulin helps move sugar out of the blood so it can be used for energy. Increased sugar in your blood can cause problems in several organs of your body including but not limited to your blood vessels, your kidneys, your brain, and your eyes. The lack of insulin forces your body to use fat instead of sugar for energy. This can be dangerous if not controlled.  Seek Medical Attention If:  You have a seizure.  You begin to breathe fast, or are short of breath.  You become weak and confused.  You are more drowsy than usual.  You have fruity, sweet breath.  You have severe, new stomach pain and are vomiting.  Your blood sugar level is lower or higher than your healthcare provider says it should be.  You have ketones in your blood or urine.  You have a fever or chills.  You are more thirsty than usual.  You are urinating more often than usual.  You have questions or concerns about your condition or care.  Insulin and diabetes medicine decreases the amount of sugar in your blood.  The best way to prevent problems from Diabetes is to control your blood sugars.   Monitor your blood sugar levels closely. Administer Insulin as directed by your physician.   Speak with your doctor and/or a nutritionist about the best way to change your lifestyle and dietary habits to avoid any problems from Diabetes in the future.    Diagnosis: Hypomagnesemia  Assessment and Plan of Treatment:

## 2023-11-15 NOTE — H&P ADULT - ATTENDING COMMENTS
I reviewed the chart and examined the patient with the resident and we discussed the findings and treatment plan.  The patient is tolerating the bedside rehab program well. I agree with the findings and treatment plan above, which I modified as indicated. The patient requires 3 hrs a day of acute inpatient rehab. Patient with PMH above, including severe PAD, DM2, CDK 4 and an extensive smoking history, admitted for a right gangrenous foot and underwent TMA and eventually right BKA. His course was complicated by severe anemia, and mental status changes/ hallucinations apparently related to pain meds and local and phantom pain, residual limb infection and urinary retention. He is now stable and tolerating bedside therapy well and requires min assist to stand and CG to ambulate with a RW and CG assist for dressing. PTA, he was full independent with crutches. The patient requires acute interdisciplinary rehab including PT and OT to maximize function for safe d/c home in a reasonable time. The patient can tolerate and benefit from 3 hrs daily therapy and requires Physiatry f/u at least 3 days a week to manage his multiple significant comorbidities mentioned above. He requires close monitoring of his vitals and labs and judicious pain management.     I read, edited and agree with the Assessment:  Rehab of right BKA in the setting of severe PAD with previous balloon angioplasty and diabetic Charcot arthropathy causing gait dysfunction and decline in ADLs   - s/p Meropenem 1000mg q12h before source control    - PT/OT  - RLE NWB   - R Knee immobilizer in bed  - c/w Plavix 75mg, ASA 81mg    Pain control   - c/w Oxycodone 10mg prn   - Pt was started on Lyrica 75mg q8h, Cymbalta 40mg q12h on previous unit, however patient states that he gets hallucinations and has been refusing the medications for the past several days    CKD stage 4 (2.5-3.5)  Chronic anemia 2/2 CKD  - on renal transplant list - avoid blood tx  - give Venofer 200mg IV q24h x5, retacrit 06293 units sq weekly per nephro   magnesium oxide 400mg TID  Sodium bicarb 650mg TID     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  - c/w Lantus 24 units AM   - ISS, BGM AC    Urinary retention  - US bladder and kidney 11/10: negative   - seen by urology 11/10: flomax, self-cath q4-6h, fu outpatient   - pt is currently voiding spontaneously without urbano or straight cath but is complaining of frequency   - continue to monitor for signs of infection or retention   - check PVR q6h and straight cath prn    Hypertension   - c/w Nifedipine 120mg daily, Losartan 100mg  - monitor and adjust bp meds as necessary     Anxiety   Depression  - c/w Xanax 0.5mg QID prn for anxiety   - monitor for symptoms of SI/HI  - can recommend psychosocial services if needed   - pt is currently in good spirits\      -Pain control: Oxycodone 10/5 prn, Tylenol     -GI/Bowel Mgmt: Senna, Miralax      -Skin:  No active issues at this time    -FEN   - continue to monitor and replete electrolytes prn     - Diet: renal diet

## 2023-11-15 NOTE — PROGRESS NOTE ADULT - ASSESSMENT
58-year-old male with Charcot foot s/p placement of an external fixator on 9/28/23 which had to be removed on 10/19 due to infection, CKD (baseline Cr 2.5-3.5, on list for kidney transplant), and IDDM (A1c 9.9), PAD, s/p  peripheral angiogram on 10/23/2023 with balloon angioplasty of PTA and DCB of R SFA and PTA of R PT and R AT via R groin and R pedal access, anemia recently received 2u pRBCS, pt was dc from hospital on 10/24/23 and now returns to hospital as directed by his podiatrist Dr Benson for TMA which will be done on 10/27/23 as pt has non healing wound and gangrene of the right foot.    #R Foot DFU with gangrene  #R TMA 10/27, revision 10/30, debridement 11/4/23  #R BKA 11/9/23  #Severe PAD  - Pain Management following  - Vascular following- has R Knee immobilizer   - ID following, s/p linda  - PT eval: walked 20 feet x 2  - Physiatry: good candidate for 4A inpatient rehab  11/15  - Pending physiatry placement      #DM II - well controlled   - adjusted yesterday due to episodes of hypoglycemia in the early afternoon; better controlled today     #Hx of CKD IV  #nephrolithiasis  #on renal transplant list  - nephrology following  - Cr stable  - avoid nephrotoxics   - continue sodium bicarb  - low K diet    #PAD, s/p angioplasty  - c/w asa  - resume plavix  - vascular follow up appreciated     #Anemia  - stable post transfusion    #HTN - better controlled   - continue current tx and add hydralazine if needed     #Anxiety disorder  #depression  - c/w duloxetine   - xanax prn

## 2023-11-15 NOTE — DISCHARGE NOTE PROVIDER - HOSPITAL COURSE
59 YO WM with IDDM, CKD4, PAD, Charcot Foot Deformity, underwent R MTA 10/27 with revision and debridement, un healing wound,  noted to have aa occ and trans ferred N for R BKA 11/9.    R Foot unhealing DFU with gangrene  R TMA 10/27, revision 10/30, debridement 11/4/23  R BKA 11/9/23  severe PAD  Anemia  Hx of HTN, ASHD  Hx of DLD  Hx of IDDM, d neuropathy, R Charcot Foot Deformity, R DFU with gangrene  Hx of CKD 4, nephrolithiasis, on renal transplant list  Hx of Ch Anemia  Hx of OA, DJD, mobility dysfunction  Hx of anxiety, depression      maintain present management  med stable and may be transferred to in pt Rehab for PT  for BP running high cont losartan and add nifedipine xl 90mg po qd  Vasc Dr Machado, R BKA 11/9  pt underwent R TMA with several debridements and cont unhealing wound at the [facility]  Podiatry ff    pt has occ of dorsal and metatarsal aa in context of PAD prior balloon-plasty    cont wound changes as per  vascsurgery  cont all meds  monitor CBC, renal parameters and electrolytes  att to bowel regimen  add lactobacillus q AC  incentive spirometry  monitor renal parameters, electrolytes and H/H  Rehab for possible 4A PT  Occupational   Social SVC for D/C planning to 4A    Patient is medically stable and ready for discharge. Patient will be discharged to 4A physiatry.  Accepting physician: Dr. Blood.

## 2023-11-15 NOTE — H&P ADULT - NSHPSOCIALHISTORY_GEN_ALL_CORE
LS: lives in private home with mother, 4 ASTER, another 13 ASTER to get to bedroom, half bathroom on 1st floor   PLOF: ambulates with crutches, independent in ADL/iADLs, no HHA     Social: denies alcohol use, former smoker (quit 3 years ago) smoked 2ppd for 30 years, denies any illicit drug use

## 2023-11-15 NOTE — DISCHARGE NOTE PROVIDER - NSDCFUSCHEDAPPT_GEN_ALL_CORE_FT
Rosie Wilkins Physician [facility]  CARDIOLOGY [address] [facility]  Scheduled Appointment: 12/18/2023

## 2023-11-16 LAB
ALBUMIN SERPL ELPH-MCNC: 3.5 G/DL — SIGNIFICANT CHANGE UP (ref 3.5–5.2)
ALBUMIN SERPL ELPH-MCNC: 3.5 G/DL — SIGNIFICANT CHANGE UP (ref 3.5–5.2)
ALP SERPL-CCNC: 146 U/L — HIGH (ref 30–115)
ALP SERPL-CCNC: 146 U/L — HIGH (ref 30–115)
ALT FLD-CCNC: 10 U/L — SIGNIFICANT CHANGE UP (ref 0–41)
ALT FLD-CCNC: 10 U/L — SIGNIFICANT CHANGE UP (ref 0–41)
ANION GAP SERPL CALC-SCNC: 10 MMOL/L — SIGNIFICANT CHANGE UP (ref 7–14)
ANION GAP SERPL CALC-SCNC: 10 MMOL/L — SIGNIFICANT CHANGE UP (ref 7–14)
AST SERPL-CCNC: 13 U/L — SIGNIFICANT CHANGE UP (ref 0–41)
AST SERPL-CCNC: 13 U/L — SIGNIFICANT CHANGE UP (ref 0–41)
BASOPHILS # BLD AUTO: 0.04 K/UL — SIGNIFICANT CHANGE UP (ref 0–0.2)
BASOPHILS # BLD AUTO: 0.04 K/UL — SIGNIFICANT CHANGE UP (ref 0–0.2)
BASOPHILS NFR BLD AUTO: 0.7 % — SIGNIFICANT CHANGE UP (ref 0–1)
BASOPHILS NFR BLD AUTO: 0.7 % — SIGNIFICANT CHANGE UP (ref 0–1)
BILIRUB SERPL-MCNC: 0.3 MG/DL — SIGNIFICANT CHANGE UP (ref 0.2–1.2)
BILIRUB SERPL-MCNC: 0.3 MG/DL — SIGNIFICANT CHANGE UP (ref 0.2–1.2)
BUN SERPL-MCNC: 29 MG/DL — HIGH (ref 10–20)
BUN SERPL-MCNC: 29 MG/DL — HIGH (ref 10–20)
CALCIUM SERPL-MCNC: 9 MG/DL — SIGNIFICANT CHANGE UP (ref 8.4–10.5)
CALCIUM SERPL-MCNC: 9 MG/DL — SIGNIFICANT CHANGE UP (ref 8.4–10.5)
CHLORIDE SERPL-SCNC: 100 MMOL/L — SIGNIFICANT CHANGE UP (ref 98–110)
CHLORIDE SERPL-SCNC: 100 MMOL/L — SIGNIFICANT CHANGE UP (ref 98–110)
CO2 SERPL-SCNC: 27 MMOL/L — SIGNIFICANT CHANGE UP (ref 17–32)
CO2 SERPL-SCNC: 27 MMOL/L — SIGNIFICANT CHANGE UP (ref 17–32)
CREAT SERPL-MCNC: 2.6 MG/DL — HIGH (ref 0.7–1.5)
CREAT SERPL-MCNC: 2.6 MG/DL — HIGH (ref 0.7–1.5)
EGFR: 28 ML/MIN/1.73M2 — LOW
EGFR: 28 ML/MIN/1.73M2 — LOW
EOSINOPHIL # BLD AUTO: 0.44 K/UL — SIGNIFICANT CHANGE UP (ref 0–0.7)
EOSINOPHIL # BLD AUTO: 0.44 K/UL — SIGNIFICANT CHANGE UP (ref 0–0.7)
EOSINOPHIL NFR BLD AUTO: 7.7 % — SIGNIFICANT CHANGE UP (ref 0–8)
EOSINOPHIL NFR BLD AUTO: 7.7 % — SIGNIFICANT CHANGE UP (ref 0–8)
GLUCOSE BLDC GLUCOMTR-MCNC: 187 MG/DL — HIGH (ref 70–99)
GLUCOSE BLDC GLUCOMTR-MCNC: 187 MG/DL — HIGH (ref 70–99)
GLUCOSE BLDC GLUCOMTR-MCNC: 327 MG/DL — HIGH (ref 70–99)
GLUCOSE BLDC GLUCOMTR-MCNC: 327 MG/DL — HIGH (ref 70–99)
GLUCOSE BLDC GLUCOMTR-MCNC: 360 MG/DL — HIGH (ref 70–99)
GLUCOSE BLDC GLUCOMTR-MCNC: 360 MG/DL — HIGH (ref 70–99)
GLUCOSE BLDC GLUCOMTR-MCNC: 83 MG/DL — SIGNIFICANT CHANGE UP (ref 70–99)
GLUCOSE BLDC GLUCOMTR-MCNC: 83 MG/DL — SIGNIFICANT CHANGE UP (ref 70–99)
GLUCOSE SERPL-MCNC: 315 MG/DL — HIGH (ref 70–99)
GLUCOSE SERPL-MCNC: 315 MG/DL — HIGH (ref 70–99)
HCT VFR BLD CALC: 26.7 % — LOW (ref 42–52)
HCT VFR BLD CALC: 26.7 % — LOW (ref 42–52)
HGB BLD-MCNC: 8.3 G/DL — LOW (ref 14–18)
HGB BLD-MCNC: 8.3 G/DL — LOW (ref 14–18)
IMM GRANULOCYTES NFR BLD AUTO: 0.2 % — SIGNIFICANT CHANGE UP (ref 0.1–0.3)
IMM GRANULOCYTES NFR BLD AUTO: 0.2 % — SIGNIFICANT CHANGE UP (ref 0.1–0.3)
LYMPHOCYTES # BLD AUTO: 2 K/UL — SIGNIFICANT CHANGE UP (ref 1.2–3.4)
LYMPHOCYTES # BLD AUTO: 2 K/UL — SIGNIFICANT CHANGE UP (ref 1.2–3.4)
LYMPHOCYTES # BLD AUTO: 34.8 % — SIGNIFICANT CHANGE UP (ref 20.5–51.1)
LYMPHOCYTES # BLD AUTO: 34.8 % — SIGNIFICANT CHANGE UP (ref 20.5–51.1)
MAGNESIUM SERPL-MCNC: 2.3 MG/DL — SIGNIFICANT CHANGE UP (ref 1.8–2.4)
MAGNESIUM SERPL-MCNC: 2.3 MG/DL — SIGNIFICANT CHANGE UP (ref 1.8–2.4)
MCHC RBC-ENTMCNC: 26.8 PG — LOW (ref 27–31)
MCHC RBC-ENTMCNC: 26.8 PG — LOW (ref 27–31)
MCHC RBC-ENTMCNC: 31.1 G/DL — LOW (ref 32–37)
MCHC RBC-ENTMCNC: 31.1 G/DL — LOW (ref 32–37)
MCV RBC AUTO: 86.1 FL — SIGNIFICANT CHANGE UP (ref 80–94)
MCV RBC AUTO: 86.1 FL — SIGNIFICANT CHANGE UP (ref 80–94)
MONOCYTES # BLD AUTO: 0.57 K/UL — SIGNIFICANT CHANGE UP (ref 0.1–0.6)
MONOCYTES # BLD AUTO: 0.57 K/UL — SIGNIFICANT CHANGE UP (ref 0.1–0.6)
MONOCYTES NFR BLD AUTO: 9.9 % — HIGH (ref 1.7–9.3)
MONOCYTES NFR BLD AUTO: 9.9 % — HIGH (ref 1.7–9.3)
NEUTROPHILS # BLD AUTO: 2.68 K/UL — SIGNIFICANT CHANGE UP (ref 1.4–6.5)
NEUTROPHILS # BLD AUTO: 2.68 K/UL — SIGNIFICANT CHANGE UP (ref 1.4–6.5)
NEUTROPHILS NFR BLD AUTO: 46.7 % — SIGNIFICANT CHANGE UP (ref 42.2–75.2)
NEUTROPHILS NFR BLD AUTO: 46.7 % — SIGNIFICANT CHANGE UP (ref 42.2–75.2)
NRBC # BLD: 0 /100 WBCS — SIGNIFICANT CHANGE UP (ref 0–0)
NRBC # BLD: 0 /100 WBCS — SIGNIFICANT CHANGE UP (ref 0–0)
PLATELET # BLD AUTO: 447 K/UL — HIGH (ref 130–400)
PLATELET # BLD AUTO: 447 K/UL — HIGH (ref 130–400)
PMV BLD: 8.5 FL — SIGNIFICANT CHANGE UP (ref 7.4–10.4)
PMV BLD: 8.5 FL — SIGNIFICANT CHANGE UP (ref 7.4–10.4)
POTASSIUM SERPL-MCNC: 5.1 MMOL/L — HIGH (ref 3.5–5)
POTASSIUM SERPL-MCNC: 5.1 MMOL/L — HIGH (ref 3.5–5)
POTASSIUM SERPL-SCNC: 5.1 MMOL/L — HIGH (ref 3.5–5)
POTASSIUM SERPL-SCNC: 5.1 MMOL/L — HIGH (ref 3.5–5)
PROT SERPL-MCNC: 6.4 G/DL — SIGNIFICANT CHANGE UP (ref 6–8)
PROT SERPL-MCNC: 6.4 G/DL — SIGNIFICANT CHANGE UP (ref 6–8)
RBC # BLD: 3.1 M/UL — LOW (ref 4.7–6.1)
RBC # BLD: 3.1 M/UL — LOW (ref 4.7–6.1)
RBC # FLD: 17.4 % — HIGH (ref 11.5–14.5)
RBC # FLD: 17.4 % — HIGH (ref 11.5–14.5)
SODIUM SERPL-SCNC: 137 MMOL/L — SIGNIFICANT CHANGE UP (ref 135–146)
SODIUM SERPL-SCNC: 137 MMOL/L — SIGNIFICANT CHANGE UP (ref 135–146)
WBC # BLD: 5.74 K/UL — SIGNIFICANT CHANGE UP (ref 4.8–10.8)
WBC # BLD: 5.74 K/UL — SIGNIFICANT CHANGE UP (ref 4.8–10.8)
WBC # FLD AUTO: 5.74 K/UL — SIGNIFICANT CHANGE UP (ref 4.8–10.8)
WBC # FLD AUTO: 5.74 K/UL — SIGNIFICANT CHANGE UP (ref 4.8–10.8)

## 2023-11-16 RX ADMIN — OXYCODONE HYDROCHLORIDE 10 MILLIGRAM(S): 5 TABLET ORAL at 01:50

## 2023-11-16 RX ADMIN — MAGNESIUM OXIDE 400 MG ORAL TABLET 400 MILLIGRAM(S): 241.3 TABLET ORAL at 18:39

## 2023-11-16 RX ADMIN — OXYCODONE HYDROCHLORIDE 10 MILLIGRAM(S): 5 TABLET ORAL at 11:06

## 2023-11-16 RX ADMIN — HEPARIN SODIUM 5000 UNIT(S): 5000 INJECTION INTRAVENOUS; SUBCUTANEOUS at 05:45

## 2023-11-16 RX ADMIN — OXYCODONE HYDROCHLORIDE 10 MILLIGRAM(S): 5 TABLET ORAL at 19:10

## 2023-11-16 RX ADMIN — HEPARIN SODIUM 5000 UNIT(S): 5000 INJECTION INTRAVENOUS; SUBCUTANEOUS at 21:30

## 2023-11-16 RX ADMIN — INSULIN GLARGINE 24 UNIT(S): 100 INJECTION, SOLUTION SUBCUTANEOUS at 11:30

## 2023-11-16 RX ADMIN — TAMSULOSIN HYDROCHLORIDE 0.4 MILLIGRAM(S): 0.4 CAPSULE ORAL at 21:30

## 2023-11-16 RX ADMIN — OXYCODONE HYDROCHLORIDE 10 MILLIGRAM(S): 5 TABLET ORAL at 18:40

## 2023-11-16 RX ADMIN — OXYCODONE HYDROCHLORIDE 10 MILLIGRAM(S): 5 TABLET ORAL at 02:20

## 2023-11-16 RX ADMIN — CLOPIDOGREL BISULFATE 75 MILLIGRAM(S): 75 TABLET, FILM COATED ORAL at 12:16

## 2023-11-16 RX ADMIN — Medication 0.5 MILLIGRAM(S): at 22:44

## 2023-11-16 RX ADMIN — OXYCODONE HYDROCHLORIDE 10 MILLIGRAM(S): 5 TABLET ORAL at 18:31

## 2023-11-16 RX ADMIN — Medication 81 MILLIGRAM(S): at 12:16

## 2023-11-16 RX ADMIN — Medication 650 MILLIGRAM(S): at 14:13

## 2023-11-16 RX ADMIN — POLYETHYLENE GLYCOL 3350 17 GRAM(S): 17 POWDER, FOR SOLUTION ORAL at 12:17

## 2023-11-16 RX ADMIN — OXYCODONE HYDROCHLORIDE 10 MILLIGRAM(S): 5 TABLET ORAL at 14:29

## 2023-11-16 RX ADMIN — OXYCODONE HYDROCHLORIDE 10 MILLIGRAM(S): 5 TABLET ORAL at 10:06

## 2023-11-16 RX ADMIN — Medication 2: at 11:18

## 2023-11-16 RX ADMIN — Medication 1 TABLET(S): at 18:30

## 2023-11-16 RX ADMIN — OXYCODONE HYDROCHLORIDE 10 MILLIGRAM(S): 5 TABLET ORAL at 06:03

## 2023-11-16 RX ADMIN — MAGNESIUM OXIDE 400 MG ORAL TABLET 400 MILLIGRAM(S): 241.3 TABLET ORAL at 08:13

## 2023-11-16 RX ADMIN — LOSARTAN POTASSIUM 100 MILLIGRAM(S): 100 TABLET, FILM COATED ORAL at 05:46

## 2023-11-16 RX ADMIN — Medication 8: at 16:35

## 2023-11-16 RX ADMIN — Medication 650 MILLIGRAM(S): at 05:46

## 2023-11-16 RX ADMIN — HEPARIN SODIUM 5000 UNIT(S): 5000 INJECTION INTRAVENOUS; SUBCUTANEOUS at 14:11

## 2023-11-16 RX ADMIN — Medication 10: at 08:09

## 2023-11-16 RX ADMIN — Medication 90 MILLIGRAM(S): at 05:45

## 2023-11-16 RX ADMIN — Medication 0.5 MILLIGRAM(S): at 12:15

## 2023-11-16 RX ADMIN — Medication 1 TABLET(S): at 08:12

## 2023-11-16 RX ADMIN — OXYCODONE HYDROCHLORIDE 10 MILLIGRAM(S): 5 TABLET ORAL at 06:35

## 2023-11-16 RX ADMIN — Medication 650 MILLIGRAM(S): at 21:30

## 2023-11-16 RX ADMIN — OXYCODONE HYDROCHLORIDE 10 MILLIGRAM(S): 5 TABLET ORAL at 22:44

## 2023-11-16 RX ADMIN — MAGNESIUM OXIDE 400 MG ORAL TABLET 400 MILLIGRAM(S): 241.3 TABLET ORAL at 12:17

## 2023-11-16 RX ADMIN — Medication 1 TABLET(S): at 12:16

## 2023-11-16 RX ADMIN — Medication 0.5 MILLIGRAM(S): at 01:50

## 2023-11-16 NOTE — PROGRESS NOTE ADULT - SUBJECTIVE AND OBJECTIVE BOX
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 Pt underwent R TMA on 10/27 with revision on 10/30 and 11/4. 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 Cultures grew pseudomonas and yeast and treated with Meropenem. Hospital course is complicated by acute urinary retention 2/2 neurogenic bladder and two mechanical out of bed falls, as the patient is not accustomed to missing a foot. Pt was seen by urology and has completed a trial of void after several straight caths. Pt is currently voiding spontaneously without use of urbano or straight catheterization but feels like he is going frequently. Having regular bowel movements. Of note the pt states that Cymbalta and Lyrica makes him hallucinate and has been refusing the medications on the previous unit. Complains of phantom limb pain and incisional pain that is currently controlled on Oxycodone.     Pt seen and evaluated by Physiatry and is currently functioning at supervision for bed mobility , transfers minimum assist, ambulates 25ft RW contact guard assist, upper body dressing independent, lower body dressing contact guard assist, grooming independent. Pt is stable for acute inpatient rehabilitation.     LS: lives in private home with mother, 4 ASTER, another 13 ASTER to get to bedroom, half bathroom on 1st floor   PLOF: ambulates with crutches, independent in ADL/iADLs, no HHA    (15 Nov 2023 14:45)      I examined the patient and reviewed the chart. There have been no significant changes since my history and physical except where documented below.    TODAY'S SUBJECTIVE & REVIEW OF SYMPTOMS:    Patient with urinary frequency and urgency, reportedly straight cathed for 1700cc and 900cc since admission. Admits to drinking large amounts of fluids.   Residual limb stable with no significant pain. No further hallucinations or unsafe behaviors off Gabapentin. Labs and vitals noted. Full rehab eval in progress.    Review of Systems:   Constitutional:    [  x ] WNL           [   ] poor appetite   [   ] insomnia   [   ] tired   Cardio:                [ x  ] WNL           [   ] CP   [   ] URENA   [   ] palpitations               Resp:                   [ x  ] WNL           [   ] SOB   [   ] cough   [   ] wheezing   GI:                        [  x ] WNL           [   ] constipation   [   ] diarrhea   [   ] abdominal pain   [   ] nausea   [   ] emesis                                :                      [   ] WNL           [   ] URBANO  [   ] dysuria   [   ] difficulty voiding    [ x ] frequent voiding small amounts      Endo:                   [ x  ] WNL          [   ] polyuria   [   ] temperature intolerance                 Skin:                     [ x  ] WNL          [   ] pain   [   ] wound   [   ] rash   MSK:                    [    ] WNL          [   ] muscle pain   [   ] joint pain/ stiffness   [   ] muscle tenderness   [   ] swelling  [ x]  right BKA with distal limb and phantom pain   Neuro:                 [   ] WNL          [   ] HA   [   ] change in vision   [   ] tremor   [   ] weakness   [   ]dysphagia  [ x ]  R limb phantom, neuropathic and incisional pain            Cognitive:           [  x ] WNL           [   ]confusion      Psych:                  [  x ] WNL           [   ] hallucinations   [   ]agitation   [   ] delusion   [   ]depression      PHYSICAL EXAM    Vital Signs Last 24 Hrs  T(C): 36.1 (16 Nov 2023 14:37), Max: 36.8 (15 Nov 2023 19:50)  T(F): 96.9 (16 Nov 2023 14:37), Max: 98.3 (15 Nov 2023 19:50)  HR: 80 (16 Nov 2023 05:56) (67 - 80)  BP: 179/82 (16 Nov 2023 14:37) (140/75 - 179/82)  BP(mean): 119 (16 Nov 2023 05:56) (119 - 119)  RR: 20 (16 Nov 2023 14:37) (18 - 20)  SpO2: --    General:[ x  ] NAD, Resting Comfortable,   [   ] other:                                HEENT: [  x ] NC/AT, EOMI, PERRL , Normal Conjunctivae,   [   ] other:  Cardio: [  x ] RRR, no murmer,   [   ] other:                              Pulm: [  x ] No Respiratory Distress,  Lungs CTAB,   [   ] other:                       Abdomen: [ x  ]ND/NT, Soft,   [   ] other:    : [ x  ] NO URBANO CATHETER, [   ] URBANO CATHETER- no meatal tear, no discharge, [   ] other:                                            MSK: [   ] No joint swelling, Full ROM,   [ x  ] other:  right BKA,  R knee immobilizer in place                                      Ext: [  x ]No C/C/E, No calf tenderness,   [   ]other:   Pt had good distal pedal pulses on LLE with intact proprioception and sensation   Skin: [ x  ]intact,   [   ] other:                                                                  Neurological Examination:  Cognitive: [  x  ] AAO x 3,   [    ]  other:                                                                      Attention:  [  x  ] intact,   [    ]  other:                            Memory: [  x  ] intact,    [    ]  other:     Mood/Affect: [  x  ] wnl,    [    ]  other:                                                                             Communication: [ x   ]Fluent, no dysarthria, following commands:  [    ] other:   CN II - XII:  [ x   ] intact,  [    ] other:                                                                                        Motor:   RIGHT UE: [  x ] WNL,  [   ] other:  LEFT    UE: [ x  ] WNL,  [   ] other:  RIGHT LE: [   ] WNL,  [   ] other: R HF 5/5, R BKA w/ knee immobilizer   LEFT    LE: [ x  ] WNL,  [   ] other:    Tone: [  x  ] wnl,   [    ]  other:  DTRs: [  x ]symmetric, [   ] other:  Coordination:   [  x  ] intact,   [    ] other:                                                                           Sensory: [  x  ] Intact to light touch,   [    ] other:    acetaminophen     Tablet .. 650 milliGRAM(s) Oral every 6 hours PRN  ALPRAZolam 0.5 milliGRAM(s) Oral four times a day PRN  aluminum hydroxide/magnesium hydroxide/simethicone Suspension 30 milliLiter(s) Oral every 4 hours PRN  aspirin  chewable 81 milliGRAM(s) Oral daily  atorvastatin 80 milliGRAM(s) Oral at bedtime  chlorhexidine 2% Cloths 1 Application(s) Topical once  clopidogrel Tablet 75 milliGRAM(s) Oral daily  dextrose Oral Gel 15 Gram(s) Oral once PRN  heparin   Injectable 5000 Unit(s) SubCutaneous every 8 hours  HYDROmorphone  Injectable 1 milliGRAM(s) IV Push every 4 hours PRN  insulin glargine Injectable (LANTUS) 24 Unit(s) SubCutaneous every morning  insulin lispro (ADMELOG) corrective regimen sliding scale   SubCutaneous three times a day before meals  lactobacillus acidophilus 1 Tablet(s) Oral three times a day with meals  losartan 100 milliGRAM(s) Oral daily  magnesium oxide 400 milliGRAM(s) Oral three times a day with meals  melatonin 5 milliGRAM(s) Oral at bedtime PRN  NIFEdipine XL 90 milliGRAM(s) Oral daily  oxyCODONE    IR 10 milliGRAM(s) Oral every 4 hours PRN  oxyCODONE    IR 5 milliGRAM(s) Oral every 4 hours PRN  polyethylene glycol 3350 17 Gram(s) Oral daily  polyethylene glycol 3350 17 Gram(s) Oral two times a day PRN  senna 2 Tablet(s) Oral at bedtime  sodium bicarbonate 650 milliGRAM(s) Oral three times a day  tamsulosin 0.4 milliGRAM(s) Oral at bedtime      RECENT LABS/IMAGING                        8.3    5.74  )-----------( 447      ( 16 Nov 2023 06:13 )             26.7     11-16    137  |  100  |  29<H>  ----------------------------<  315<H>  5.1<H>   |  27  |  2.6<H>    Ca    9.0      16 Nov 2023 06:13  Mg     2.3     11-16    TPro  6.4  /  Alb  3.5  /  TBili  0.3  /  DBili  x   /  AST  13  /  ALT  10  /  AlkPhos  146<H>  11-16    CAPILLARY BLOOD GLUCOSE      POCT Blood Glucose.: 187 mg/dL (16 Nov 2023 11:03)  POCT Blood Glucose.: 360 mg/dL (16 Nov 2023 08:04)  POCT Blood Glucose.: 141 mg/dL (15 Nov 2023 22:29)  POCT Blood Glucose.: 180 mg/dL (15 Nov 2023 16:11)

## 2023-11-16 NOTE — PROGRESS NOTE ADULT - ASSESSMENT
Rehab of right BKA in the setting of severe PAD with previous balloon angioplasty and diabetic Charcot arthropathy causing gait dysfunction and decline in ADLs   - s/p Meropenem 1000mg q12h before source control    - PT/OT  - RLE NWB   - R Knee immobilizer in bed  - c/w Plavix 75mg, ASA 81mg    Pain control   - c/w Oxycodone 10mg prn   - Pt was started on Lyrica 75mg q8h, Cymbalta 40mg q12h on previous unit, however patient states that he gets hallucinations and has been refusing the medications for the past several days    CKD stage 4 (2.5-3.5)  Chronic anemia 2/2 CKD  - on renal transplant list - avoid blood tx  - give Venofer 200mg IV q24h x5, retacrit 95940 units sq weekly per nephro   - magnesium oxide 400mg TID  - Sodium bicarb 650mg TID   - stable     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 CIC discussed with patient  - He admits to a very large fluid intake and agrees to cut down fluids to about 1500 - 1800cc daily  - Continue Flomax and CIC q 6hrs and monitor volumes    Hypertension   - c/w Nifedipine 120mg daily, Losartan 100mg  - monitor and adjust bp meds as necessary     Anxiety   Depression  - c/w Xanax 0.5mg QID prn for anxiety   - monitor for symptoms of SI/HI  - can recommend psychosocial services if needed   - pt is currently in good spirits     -Pain control: Oxycodone 10/5 prn, Tylenol     -GI/Bowel Mgmt: Senna, Miralax      -Skin:  monitor incition    -FEN   - continue to monitor and replete electrolytes prn     - Diet: renal diet          Precautions / PROPHYLAXIS:      - Falls    - Ortho: Weight bearing status: NWB RLE       - DVT prophylaxis: SQH TID

## 2023-11-16 NOTE — PROGRESS NOTE ADULT - REASON FOR ADMISSION
Rehab of right BKA secondary to left foot gangrene in setting of severe PAD with previous balloon angioplasty and diabetic charcot arthropathy causing gait dysfunction and decline in ADLs

## 2023-11-17 LAB
GLUCOSE BLDC GLUCOMTR-MCNC: 118 MG/DL — HIGH (ref 70–99)
GLUCOSE BLDC GLUCOMTR-MCNC: 118 MG/DL — HIGH (ref 70–99)
GLUCOSE BLDC GLUCOMTR-MCNC: 186 MG/DL — HIGH (ref 70–99)
GLUCOSE BLDC GLUCOMTR-MCNC: 186 MG/DL — HIGH (ref 70–99)
GLUCOSE BLDC GLUCOMTR-MCNC: 390 MG/DL — HIGH (ref 70–99)
GLUCOSE BLDC GLUCOMTR-MCNC: 390 MG/DL — HIGH (ref 70–99)

## 2023-11-17 RX ADMIN — Medication 2: at 17:25

## 2023-11-17 RX ADMIN — OXYCODONE HYDROCHLORIDE 10 MILLIGRAM(S): 5 TABLET ORAL at 08:32

## 2023-11-17 RX ADMIN — Medication 10: at 07:50

## 2023-11-17 RX ADMIN — Medication 1 TABLET(S): at 12:43

## 2023-11-17 RX ADMIN — HEPARIN SODIUM 5000 UNIT(S): 5000 INJECTION INTRAVENOUS; SUBCUTANEOUS at 22:23

## 2023-11-17 RX ADMIN — Medication 650 MILLIGRAM(S): at 22:24

## 2023-11-17 RX ADMIN — MAGNESIUM OXIDE 400 MG ORAL TABLET 400 MILLIGRAM(S): 241.3 TABLET ORAL at 18:07

## 2023-11-17 RX ADMIN — Medication 81 MILLIGRAM(S): at 12:42

## 2023-11-17 RX ADMIN — SENNA PLUS 2 TABLET(S): 8.6 TABLET ORAL at 22:24

## 2023-11-17 RX ADMIN — Medication 1 TABLET(S): at 07:52

## 2023-11-17 RX ADMIN — Medication 0.5 MILLIGRAM(S): at 18:05

## 2023-11-17 RX ADMIN — LOSARTAN POTASSIUM 100 MILLIGRAM(S): 100 TABLET, FILM COATED ORAL at 05:57

## 2023-11-17 RX ADMIN — POLYETHYLENE GLYCOL 3350 17 GRAM(S): 17 POWDER, FOR SOLUTION ORAL at 12:45

## 2023-11-17 RX ADMIN — Medication 650 MILLIGRAM(S): at 05:56

## 2023-11-17 RX ADMIN — MAGNESIUM OXIDE 400 MG ORAL TABLET 400 MILLIGRAM(S): 241.3 TABLET ORAL at 07:52

## 2023-11-17 RX ADMIN — Medication 650 MILLIGRAM(S): at 14:32

## 2023-11-17 RX ADMIN — INSULIN GLARGINE 24 UNIT(S): 100 INJECTION, SOLUTION SUBCUTANEOUS at 07:51

## 2023-11-17 RX ADMIN — HEPARIN SODIUM 5000 UNIT(S): 5000 INJECTION INTRAVENOUS; SUBCUTANEOUS at 05:57

## 2023-11-17 RX ADMIN — OXYCODONE HYDROCHLORIDE 10 MILLIGRAM(S): 5 TABLET ORAL at 15:37

## 2023-11-17 RX ADMIN — ATORVASTATIN CALCIUM 80 MILLIGRAM(S): 80 TABLET, FILM COATED ORAL at 22:26

## 2023-11-17 RX ADMIN — MAGNESIUM OXIDE 400 MG ORAL TABLET 400 MILLIGRAM(S): 241.3 TABLET ORAL at 12:42

## 2023-11-17 RX ADMIN — OXYCODONE HYDROCHLORIDE 10 MILLIGRAM(S): 5 TABLET ORAL at 12:21

## 2023-11-17 RX ADMIN — HEPARIN SODIUM 5000 UNIT(S): 5000 INJECTION INTRAVENOUS; SUBCUTANEOUS at 14:33

## 2023-11-17 RX ADMIN — Medication 90 MILLIGRAM(S): at 05:57

## 2023-11-17 RX ADMIN — OXYCODONE HYDROCHLORIDE 10 MILLIGRAM(S): 5 TABLET ORAL at 04:51

## 2023-11-17 RX ADMIN — TAMSULOSIN HYDROCHLORIDE 0.4 MILLIGRAM(S): 0.4 CAPSULE ORAL at 22:24

## 2023-11-17 RX ADMIN — CLOPIDOGREL BISULFATE 75 MILLIGRAM(S): 75 TABLET, FILM COATED ORAL at 12:45

## 2023-11-17 RX ADMIN — Medication 1 TABLET(S): at 18:07

## 2023-11-17 RX ADMIN — OXYCODONE HYDROCHLORIDE 10 MILLIGRAM(S): 5 TABLET ORAL at 17:51

## 2023-11-17 NOTE — PROGRESS NOTE ADULT - SUBJECTIVE AND OBJECTIVE BOX
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 Pt underwent R TMA on 10/27 with revision on 10/30 and 11/4. 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 Cultures grew pseudomonas and yeast and treated with Meropenem. Hospital course is complicated by acute urinary retention 2/2 neurogenic bladder and two mechanical out of bed falls, as the patient is not accustomed to missing a foot. Pt was seen by urology and has completed a trial of void after several straight caths. Pt is currently voiding spontaneously without use of urbano or straight catheterization but feels like he is going frequently. Having regular bowel movements. Of note the pt states that Cymbalta and Lyrica makes him hallucinate and has been refusing the medications on the previous unit. Complains of phantom limb pain and incisional pain that is currently controlled on Oxycodone.     Pt seen and evaluated by Physiatry and is currently functioning at supervision for bed mobility , transfers minimum assist, ambulates 25ft RW contact guard assist, upper body dressing independent, lower body dressing contact guard assist, grooming independent. Pt is stable for acute inpatient rehabilitation.     LS: lives in private home with mother, 4 ASTER, another 13 ASTER to get to bedroom, half bathroom on 1st floor   PLOF: ambulates with crutches, independent in ADL/iADLs, no HHA    (15 Nov 2023 14:45)      TODAY'S SUBJECTIVE & REVIEW OF SYMPTOMS:    Patient doing well. Pain controlled. VSS. Decreased fluid intake and CIC decreased to 700cc and then 300cc    Review of Systems:   Constitutional:    [  x ] WNL           [   ] poor appetite   [   ] insomnia   [   ] tired   Cardio:                [ x  ] WNL           [   ] CP   [   ] URENA   [   ] palpitations               Resp:                   [ x  ] WNL           [   ] SOB   [   ] cough   [   ] wheezing   GI:                        [  x ] WNL           [   ] constipation   [   ] diarrhea   [   ] abdominal pain   [   ] nausea   [   ] emesis                                :                      [   ] WNL           [   ] URBANO  [   ] dysuria   [   ] difficulty voiding    [ x ] frequent voiding small amounts  - non CIC for retention    Endo:                   [ x  ] WNL          [   ] polyuria   [   ] temperature intolerance                 Skin:                     [ x  ] WNL          [   ] pain   [   ] wound   [   ] rash   MSK:                    [    ] WNL          [   ] muscle pain   [   ] joint pain/ stiffness   [   ] muscle tenderness   [   ] swelling  [ x]  right BKA with distal limb and phantom pain   Neuro:                 [   ] WNL          [   ] HA   [   ] change in vision   [   ] tremor   [   ] weakness   [   ]dysphagia  [ x ]  R limb phantom, neuropathic and incisional pain            Cognitive:           [  x ] WNL           [   ]confusion      Psych:                  [  x ] WNL           [   ] hallucinations   [   ]agitation   [   ] delusion   [   ]depression      PHYSICAL EXAM    Vital Signs Last 24 Hrs  T(C): 36.3 (17 Nov 2023 05:16), Max: 36.9 (16 Nov 2023 18:41)  T(F): 97.3 (17 Nov 2023 05:16), Max: 98.4 (16 Nov 2023 18:41)  HR: 77 (17 Nov 2023 05:16) (77 - 79)  BP: 156/75 (17 Nov 2023 06:00) (144/69 - 179/82)  BP(mean): --  RR: 19 (17 Nov 2023 05:16) (19 - 20)  SpO2: --      General:[ x  ] NAD, Resting Comfortable,   [   ] other:                                HEENT: [  x ] NC/AT, EOMI, PERRL , Normal Conjunctivae,   [   ] other:  Cardio: [  x ] RRR, no murmer,   [   ] other:                              Pulm: [  x ] No Respiratory Distress,  Lungs CTAB,   [   ] other:                       Abdomen: [ x  ]ND/NT, Soft,   [   ] other:    : [ x  ] NO URBANO CATHETER, [   ] URBANO CATHETER- no meatal tear, no discharge, [   ] other:                                            MSK: [   ] No joint swelling, Full ROM,   [ x  ] other:                                    Ext: [  x ]No C/C/E, No calf tenderness,   [   ]other:   right BKA incision C&D with staples   Pt had good distal pedal pulses on LLE with intact proprioception and sensation   Skin: [ x  ]intact,   [   ] other:                                                                  Neurological Examination:  Cognitive: [  x  ] AAO x 3,   [    ]  other:                                                                      Attention:  [  x  ] intact,   [    ]  other:                            Memory: [  x  ] intact,    [    ]  other:     Mood/Affect: [  x  ] wnl,    [    ]  other:                                                                             Communication: [ x   ]Fluent, no dysarthria, following commands:  [    ] other:   CN II - XII:  [ x   ] intact,  [    ] other:                                                                                        Motor:   RIGHT UE: [  x ] WNL,  [   ] other:  LEFT    UE: [ x  ] WNL,  [   ] other:  RIGHT LE: [   ] WNL,  [   ] other: R HF 5/5, R BKA w/ knee immobilizer   LEFT    LE: [ x  ] WNL,  [   ] other:    Tone: [  x  ] wnl,   [    ]  other:  DTRs: [  x ]symmetric, [   ] other:  Coordination:   [  x  ] intact,   [    ] other:                                                                           Sensory: [  x  ] Intact to light touch,   [    ] other:    MEDICATIONS  (STANDING):  aspirin  chewable 81 milliGRAM(s) Oral daily  atorvastatin 80 milliGRAM(s) Oral at bedtime  chlorhexidine 2% Cloths 1 Application(s) Topical once  clopidogrel Tablet 75 milliGRAM(s) Oral daily  heparin   Injectable 5000 Unit(s) SubCutaneous every 8 hours  insulin glargine Injectable (LANTUS) 24 Unit(s) SubCutaneous every morning  insulin lispro (ADMELOG) corrective regimen sliding scale   SubCutaneous three times a day before meals  lactobacillus acidophilus 1 Tablet(s) Oral three times a day with meals  losartan 100 milliGRAM(s) Oral daily  magnesium oxide 400 milliGRAM(s) Oral three times a day with meals  NIFEdipine XL 90 milliGRAM(s) Oral daily  polyethylene glycol 3350 17 Gram(s) Oral daily  senna 2 Tablet(s) Oral at bedtime  sodium bicarbonate 650 milliGRAM(s) Oral three times a day  tamsulosin 0.4 milliGRAM(s) Oral at bedtime    MEDICATIONS  (PRN):  acetaminophen     Tablet .. 650 milliGRAM(s) Oral every 6 hours PRN Temp greater or equal to 38C (100.4F), Mild Pain (1 - 3)  ALPRAZolam 0.5 milliGRAM(s) Oral four times a day PRN anxiety  aluminum hydroxide/magnesium hydroxide/simethicone Suspension 30 milliLiter(s) Oral every 4 hours PRN Dyspepsia  dextrose Oral Gel 15 Gram(s) Oral once PRN Blood Glucose LESS THAN 70 milliGRAM(s)/deciliter  melatonin 5 milliGRAM(s) Oral at bedtime PRN Insomnia  oxyCODONE    IR 10 milliGRAM(s) Oral every 4 hours PRN Severe Pain (7 - 10)  oxyCODONE    IR 5 milliGRAM(s) Oral every 4 hours PRN Moderate Pain (4 - 6)  polyethylene glycol 3350 17 Gram(s) Oral two times a day PRN Constipation        RECENT LABS/IMAGING                        8.3    5.74  )-----------( 447      ( 16 Nov 2023 06:13 )             26.7     11-16    137  |  100  |  29<H>  ----------------------------<  315<H>  5.1<H>   |  27  |  2.6<H>    Ca    9.0      16 Nov 2023 06:13  Mg     2.3     11-16    TPro  6.4  /  Alb  3.5  /  TBili  0.3  /  DBili  x   /  AST  13  /  ALT  10  /  AlkPhos  146<H>  11-16    CAPILLARY BLOOD GLUCOSE    CAPILLARY BLOOD GLUCOSE      POCT Blood Glucose.: 118 mg/dL (17 Nov 2023 12:06)  POCT Blood Glucose.: 390 mg/dL (17 Nov 2023 07:30)  POCT Blood Glucose.: 83 mg/dL (16 Nov 2023 21:17)  POCT Blood Glucose.: 327 mg/dL (16 Nov 2023 16:20)    POCT Blood Glucose.: 187 mg/dL (16 Nov 2023 11:03)  POCT Blood Glucose.: 360 mg/dL (16 Nov 2023 08:04)  POCT Blood Glucose.: 141 mg/dL (15 Nov 2023 22:29)  POCT Blood Glucose.: 180 mg/dL (15 Nov 2023 16:11)

## 2023-11-17 NOTE — PROGRESS NOTE ADULT - ASSESSMENT
Rehab of right BKA in the setting of severe PAD with previous balloon angioplasty and diabetic Charcot arthropathy causing gait dysfunction and decline in ADLs   - s/p Meropenem 1000mg q12h before source control    - PT/OT  - RLE NWB   - R Knee immobilizer in bed  - c/w Plavix 75mg, ASA 81mg  - continue acute rehab program    Pain control   - c/w Oxycodone 10mg prn   - Pt was started on Lyrica 75mg q8h, Cymbalta 40mg q12h on previous unit, however patient states that he gets hallucinations and has been refusing the medications for the past several days and MS cleared    CKD stage 4 (2.5-3.5)  Chronic anemia 2/2 CKD  - on renal transplant list - avoid blood tx  - give Venofer 200mg IV q24h x5, retacrit 68881 units sq weekly per nephro   - magnesium oxide 400mg TID  - Sodium bicarb 650mg TID   - stable     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 CIC discussed with patient  - He admits to a very large fluid intake and agrees to cut down fluids to about 1500 - 1800cc daily.  - PVR volumes decreasing 700- 300cc.   - Continue Flomax and CIC q 6hrs and monitor volumes    Hypertension   - c/w Nifedipine 120mg daily, Losartan 100mg  - monitor and adjust bp meds as necessary     Anxiety   Depression  - c/w Xanax 0.5mg QID prn for anxiety   - monitor for symptoms of SI/HI  - can recommend psychosocial services if needed   - pt is currently in good spirits     -Pain control: Oxycodone 10/5 prn, Tylenol     -GI/Bowel Mgmt: Senna, Miralax      -Skin:  monitor incition    -FEN   - continue to monitor and replete electrolytes prn     - Diet: renal diet          Precautions / PROPHYLAXIS:      - Falls    - Ortho: Weight bearing status: NWB RLE       - DVT prophylaxis: SQH TID

## 2023-11-17 NOTE — PROGRESS NOTE ADULT - ASSESSMENT
MOOD INITIAL EVALUATION           Pain: Endorsed	Location: Right LE surgical site Ratin/10    Intervention: 		RN Notified                Orientation: AOx4 (oriented to Self /Place/ Event /Month/Year/Day/ Date/ Time)          Arousal Level: Alert          Behavior: Pleasant, cooperative          Affect Range: somewhat constricted           Needed: No 		 #: N/A          Attention: WFL          Insight into illness/deficits: Good           Mood Presenting Problem:     Pt underwent right BKA recently and unexpectedly and reports frustrations with changes in his post-surgical functional status; diabetes-related treatment regimen per hospital protocols (vs homecare protocol); medication changes; urinary retention/catheterization.          Barbra denied anger and sadness, but reported to “frustrations” but appeared to be coping adequately and time of evaluation.  He reported a history of anxiety but stated there has been no increase in his levels of worry/anxiousness (and in fact it has been diminished since admission to ), and denied sadness.  This is in contrast to noted orders; however, it is unclear the extent to which this may represent guardedness or a function of adjustment to unit as he reported a great deal of pleasure with exercises completed today in physical therapy.            Patient identified protective factors of family; pleasurable acitvities (spending time at beach; watching tv).  He reported no history of alcohol use, stated he stopped using nictoine approx 3 years ago and denied any history of substance use.          Patient explicitly stated that he would prefer family/loved ones NOT be contacted by individuals on NP service to discuss his care, but that he remained open to future visits by writer/others on NP service.  Pt will be retained on service to maximize positive adjustment.  Psychoeducation regarding recovery process provided. Brief cognitive assessment at bedside reveals intact skill sin all spheres assessed.  NP service can be reconsulted to assess cognition as determined appropriate by ordering provider.                         Mood Symptoms      Present           Absent            Comments     Perceptual Disturbances                       x                                     Pt reported hallucinations approx 2 weeks ago while taking cymbalta which has since resolved once medication was d/c’ed     Thought Process Disturbances                               x     Sadness/Depression                                             x     Changes in Appetite                                               x     Changes in sleep                                                    x     Reduced energy              x                                               Pt stated present x many years and feels is a function of aging and lack of physical involvment since disability-related care home and notices no new increase in fatigue     Decreased concentration                                          x     Decreased Interest                                                   x     Anxiety                                                                     x              As above, pt reports a history of anxiety but states he has no formal diagnosis but that he has been taking Xanax approx “15 years” with benefit; noting that anxiety levels have been diminished since admission to      panic                                                                    x    Hopelessness                                                       x       Suicidal/Homicidal/ Ideation/ Plan                      (absent)    Current Stressors: as per above         Psychology impressions  Anxiety disorder unspecified    r/o Adjustment disorder with anxiety    Prognosis: Good    Plan: Manage and facilitate psychological/social adjustment; pt psychoeducation    NP service 1-2x weekly at  minutes; individual psychotherapy    No referral recommended; however psychiatry referral may be considered in case of clear decompensation or self-reported emotional distress given potential for guarded report as noted above

## 2023-11-18 LAB
GLUCOSE BLDC GLUCOMTR-MCNC: 112 MG/DL — HIGH (ref 70–99)
GLUCOSE BLDC GLUCOMTR-MCNC: 112 MG/DL — HIGH (ref 70–99)
GLUCOSE BLDC GLUCOMTR-MCNC: 188 MG/DL — HIGH (ref 70–99)
GLUCOSE BLDC GLUCOMTR-MCNC: 188 MG/DL — HIGH (ref 70–99)
GLUCOSE BLDC GLUCOMTR-MCNC: 191 MG/DL — HIGH (ref 70–99)
GLUCOSE BLDC GLUCOMTR-MCNC: 191 MG/DL — HIGH (ref 70–99)
GLUCOSE BLDC GLUCOMTR-MCNC: 217 MG/DL — HIGH (ref 70–99)
GLUCOSE BLDC GLUCOMTR-MCNC: 217 MG/DL — HIGH (ref 70–99)

## 2023-11-18 RX ADMIN — MAGNESIUM OXIDE 400 MG ORAL TABLET 400 MILLIGRAM(S): 241.3 TABLET ORAL at 16:43

## 2023-11-18 RX ADMIN — Medication 650 MILLIGRAM(S): at 11:59

## 2023-11-18 RX ADMIN — Medication 1 TABLET(S): at 16:43

## 2023-11-18 RX ADMIN — OXYCODONE HYDROCHLORIDE 10 MILLIGRAM(S): 5 TABLET ORAL at 20:31

## 2023-11-18 RX ADMIN — Medication 1 TABLET(S): at 08:29

## 2023-11-18 RX ADMIN — TAMSULOSIN HYDROCHLORIDE 0.4 MILLIGRAM(S): 0.4 CAPSULE ORAL at 21:03

## 2023-11-18 RX ADMIN — HEPARIN SODIUM 5000 UNIT(S): 5000 INJECTION INTRAVENOUS; SUBCUTANEOUS at 21:04

## 2023-11-18 RX ADMIN — Medication 4: at 07:58

## 2023-11-18 RX ADMIN — CLOPIDOGREL BISULFATE 75 MILLIGRAM(S): 75 TABLET, FILM COATED ORAL at 11:59

## 2023-11-18 RX ADMIN — POLYETHYLENE GLYCOL 3350 17 GRAM(S): 17 POWDER, FOR SOLUTION ORAL at 12:14

## 2023-11-18 RX ADMIN — OXYCODONE HYDROCHLORIDE 10 MILLIGRAM(S): 5 TABLET ORAL at 09:33

## 2023-11-18 RX ADMIN — Medication 81 MILLIGRAM(S): at 11:59

## 2023-11-18 RX ADMIN — Medication 2: at 12:09

## 2023-11-18 RX ADMIN — HEPARIN SODIUM 5000 UNIT(S): 5000 INJECTION INTRAVENOUS; SUBCUTANEOUS at 11:59

## 2023-11-18 RX ADMIN — LOSARTAN POTASSIUM 100 MILLIGRAM(S): 100 TABLET, FILM COATED ORAL at 05:00

## 2023-11-18 RX ADMIN — MAGNESIUM OXIDE 400 MG ORAL TABLET 400 MILLIGRAM(S): 241.3 TABLET ORAL at 08:29

## 2023-11-18 RX ADMIN — OXYCODONE HYDROCHLORIDE 10 MILLIGRAM(S): 5 TABLET ORAL at 14:06

## 2023-11-18 RX ADMIN — HEPARIN SODIUM 5000 UNIT(S): 5000 INJECTION INTRAVENOUS; SUBCUTANEOUS at 05:01

## 2023-11-18 RX ADMIN — SENNA PLUS 2 TABLET(S): 8.6 TABLET ORAL at 21:03

## 2023-11-18 RX ADMIN — MAGNESIUM OXIDE 400 MG ORAL TABLET 400 MILLIGRAM(S): 241.3 TABLET ORAL at 11:59

## 2023-11-18 RX ADMIN — Medication 0.5 MILLIGRAM(S): at 14:06

## 2023-11-18 RX ADMIN — Medication 650 MILLIGRAM(S): at 05:00

## 2023-11-18 RX ADMIN — Medication 650 MILLIGRAM(S): at 21:03

## 2023-11-18 RX ADMIN — Medication 0.5 MILLIGRAM(S): at 05:09

## 2023-11-18 RX ADMIN — Medication 90 MILLIGRAM(S): at 05:01

## 2023-11-18 RX ADMIN — Medication 0.5 MILLIGRAM(S): at 22:41

## 2023-11-18 RX ADMIN — INSULIN GLARGINE 24 UNIT(S): 100 INJECTION, SOLUTION SUBCUTANEOUS at 08:24

## 2023-11-18 RX ADMIN — Medication 1 TABLET(S): at 11:59

## 2023-11-18 NOTE — PROGRESS NOTE ADULT - SUBJECTIVE AND OBJECTIVE BOX
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 Pt underwent R TMA on 10/27 with revision on 10/30 and 11/4. 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 Cultures grew pseudomonas and yeast and treated with Meropenem. Hospital course is complicated by acute urinary retention 2/2 neurogenic bladder and two mechanical out of bed falls, as the patient is not accustomed to missing a foot. Pt was seen by urology and has completed a trial of void after several straight caths. Pt is currently voiding spontaneously without use of urbano or straight catheterization but feels like he is going frequently. Having regular bowel movements. Of note the pt states that Cymbalta and Lyrica makes him hallucinate and has been refusing the medications on the previous unit. Complains of phantom limb pain and incisional pain that is currently controlled on Oxycodone.     Pt seen and evaluated by Physiatry and is currently functioning at supervision for bed mobility , transfers minimum assist, ambulates 25ft RW contact guard assist, upper body dressing independent, lower body dressing contact guard assist, grooming independent. Pt is stable for acute inpatient rehabilitation.     LS: lives in private home with mother, 4 ASTER, another 13 ASTER to get to bedroom, half bathroom on 1st floor   PLOF: ambulates with crutches, independent in ADL/iADLs, no HHA    (15 Nov 2023 14:45)      TODAY'S SUBJECTIVE & REVIEW OF SYMPTOMS:  No acute overnight events. Pt doing well.  Patient doing well. Pain controlled. VSS.   Pt refused bladder scan, straight cathed 200cc  Having regular bowel movements.  Participating and tolerating therapy.     Review of Systems:   Constitutional:    [  x ] WNL           [   ] poor appetite   [   ] insomnia   [   ] tired   Cardio:                [ x  ] WNL           [   ] CP   [   ] URENA   [   ] palpitations               Resp:                   [ x  ] WNL           [   ] SOB   [   ] cough   [   ] wheezing   GI:                        [  x ] WNL           [   ] constipation   [   ] diarrhea   [   ] abdominal pain   [   ] nausea   [   ] emesis                                :                      [   ] WNL           [   ] URBANO  [   ] dysuria   [   ] difficulty voiding    [ x ] frequent voiding small amounts  - non CIC for retention    Endo:                   [ x  ] WNL          [   ] polyuria   [   ] temperature intolerance                 Skin:                     [ x  ] WNL          [   ] pain   [   ] wound   [   ] rash   MSK:                    [    ] WNL          [   ] muscle pain   [   ] joint pain/ stiffness   [   ] muscle tenderness   [   ] swelling  [ x]  right BKA with distal limb and phantom pain   Neuro:                 [   ] WNL          [   ] HA   [   ] change in vision   [   ] tremor   [   ] weakness   [   ]dysphagia  [ x ]  R limb phantom, neuropathic and incisional pain            Cognitive:           [  x ] WNL           [   ]confusion      Psych:                  [  x ] WNL           [   ] hallucinations   [   ]agitation   [   ] delusion   [   ]depression      PHYSICAL EXAM  Vital Signs Last 24 Hrs  T(C): 36.3 (18 Nov 2023 05:02), Max: 36.4 (17 Nov 2023 14:20)  T(F): 97.3 (18 Nov 2023 05:02), Max: 97.6 (17 Nov 2023 14:20)  HR: 76 (18 Nov 2023 05:02) (76 - 86)  BP: 160/74 (18 Nov 2023 05:02) (110/53 - 160/74)  BP(mean): --  RR: 18 (18 Nov 2023 05:02) (18 - 20)  SpO2: --    General:[ x  ] NAD, Resting Comfortable,   [   ] other:                                HEENT: [  x ] NC/AT, EOMI, PERRL , Normal Conjunctivae,   [   ] other:  Cardio: [  x ] RRR, no murmer,   [   ] other:                              Pulm: [  x ] No Respiratory Distress,  Lungs CTAB,   [   ] other:                       Abdomen: [ x  ]ND/NT, Soft,   [   ] other:    : [ x  ] NO URBANO CATHETER, [   ] URBANO CATHETER- no meatal tear, no discharge, [   ] other:                                            MSK: [   ] No joint swelling, Full ROM,   [ x  ] other:                                    Ext: [  x ]No C/C/E, No calf tenderness,   [   ]other:   right BKA incision C&D with staples   Pt had good distal pedal pulses on LLE with intact proprioception and sensation   Skin: [ x  ]intact,   [   ] other:                                                                  Neurological Examination:  Cognitive: [  x  ] AAO x 3,   [    ]  other:                                                                      Attention:  [  x  ] intact,   [    ]  other:                            Memory: [  x  ] intact,    [    ]  other:     Mood/Affect: [  x  ] wnl,    [    ]  other:                                                                             Communication: [ x   ]Fluent, no dysarthria, following commands:  [    ] other:   CN II - XII:  [ x   ] intact,  [    ] other:                                                                                        Motor:   RIGHT UE: [  x ] WNL,  [   ] other:  LEFT    UE: [ x  ] WNL,  [   ] other:  RIGHT LE: [   ] WNL,  [   ] other: R HF 5/5, R BKA w/ knee immobilizer   LEFT    LE: [ x  ] WNL,  [   ] other:    Tone: [  x  ] wnl,   [    ]  other:  DTRs: [  x ]symmetric, [   ] other:  Coordination:   [  x  ] intact,   [    ] other:                                                                           Sensory: [  x  ] Intact to light touch,   [    ] other:    MEDICATIONS  (STANDING):  aspirin  chewable 81 milliGRAM(s) Oral daily  atorvastatin 80 milliGRAM(s) Oral at bedtime  chlorhexidine 2% Cloths 1 Application(s) Topical once  clopidogrel Tablet 75 milliGRAM(s) Oral daily  heparin   Injectable 5000 Unit(s) SubCutaneous every 8 hours  insulin glargine Injectable (LANTUS) 24 Unit(s) SubCutaneous every morning  insulin lispro (ADMELOG) corrective regimen sliding scale   SubCutaneous three times a day before meals  lactobacillus acidophilus 1 Tablet(s) Oral three times a day with meals  losartan 100 milliGRAM(s) Oral daily  magnesium oxide 400 milliGRAM(s) Oral three times a day with meals  NIFEdipine XL 90 milliGRAM(s) Oral daily  polyethylene glycol 3350 17 Gram(s) Oral daily  senna 2 Tablet(s) Oral at bedtime  sodium bicarbonate 650 milliGRAM(s) Oral three times a day  tamsulosin 0.4 milliGRAM(s) Oral at bedtime    MEDICATIONS  (PRN):  acetaminophen     Tablet .. 650 milliGRAM(s) Oral every 6 hours PRN Temp greater or equal to 38C (100.4F), Mild Pain (1 - 3)  ALPRAZolam 0.5 milliGRAM(s) Oral four times a day PRN anxiety  aluminum hydroxide/magnesium hydroxide/simethicone Suspension 30 milliLiter(s) Oral every 4 hours PRN Dyspepsia  dextrose Oral Gel 15 Gram(s) Oral once PRN Blood Glucose LESS THAN 70 milliGRAM(s)/deciliter  melatonin 5 milliGRAM(s) Oral at bedtime PRN Insomnia  oxyCODONE    IR 10 milliGRAM(s) Oral every 4 hours PRN Severe Pain (7 - 10)  oxyCODONE    IR 5 milliGRAM(s) Oral every 4 hours PRN Moderate Pain (4 - 6)  polyethylene glycol 3350 17 Gram(s) Oral two times a day PRN Constipation        RECENT LABS/IMAGING             POCT Blood Glucose.: 217 mg/dL (11-18-23 @ 07:04)  POCT Blood Glucose.: 186 mg/dL (11-17-23 @ 16:03)  POCT Blood Glucose.: 118 mg/dL (11-17-23 @ 12:06)  POCT Blood Glucose.: 390 mg/dL (11-17-23 @ 07:30)  POCT Blood Glucose.: 83 mg/dL (11-16-23 @ 21:17)  POCT Blood Glucose.: 327 mg/dL (11-16-23 @ 16:20)  POCT Blood Glucose.: 187 mg/dL (11-16-23 @ 11:03)  POCT Blood Glucose.: 360 mg/dL (11-16-23 @ 08:04)  POCT Blood Glucose.: 141 mg/dL (11-15-23 @ 22:29)  POCT Blood Glucose.: 180 mg/dL (11-15-23 @ 16:11)  POCT Blood Glucose.: 171 mg/dL (11-15-23 @ 11:43)  POCT Blood Glucose.: 136 mg/dL (11-15-23 @ 08:48)

## 2023-11-18 NOTE — PROGRESS NOTE ADULT - ASSESSMENT
Rehab of right BKA in the setting of severe PAD with previous balloon angioplasty and diabetic Charcot arthropathy causing gait dysfunction and decline in ADLs   - s/p Meropenem 1000mg q12h before source control    - PT/OT  - RLE NWB   - R Knee immobilizer in bed  - c/w Plavix 75mg, ASA 81mg  - continue acute rehab program    Pain control   - c/w Oxycodone 10mg prn   - Pt was started on Lyrica 75mg q8h, Cymbalta 40mg q12h on previous unit, however patient states that he gets hallucinations and has been refusing the medications for the past several days and MS cleared    CKD stage 4 (2.5-3.5)  Chronic anemia 2/2 CKD  - on renal transplant list - avoid blood tx  - give Venofer 200mg IV q24h x5, retacrit 48029 units sq weekly per nephro   - magnesium oxide 400mg TID  - Sodium bicarb 650mg TID   - stable     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 CIC discussed with patient  - He admits to a very large fluid intake and agrees to cut down fluids to about 1500 - 1800cc daily.  - PVR volumes decreasing 700- 300cc.   - Continue Flomax and CIC q 6hrs and monitor volumes    Hypertension   - c/w Nifedipine 120mg daily, Losartan 100mg  - monitor and adjust bp meds as necessary     Anxiety   Depression  - c/w Xanax 0.5mg QID prn for anxiety   - monitor for symptoms of SI/HI  - can recommend psychosocial services if needed   - pt is currently in good spirits     -Pain control: Oxycodone 10/5 prn, Tylenol     -GI/Bowel Mgmt: Senna, Miralax      -Skin:  monitor incition    -FEN   - continue to monitor and replete electrolytes prn     - Diet: renal diet          Precautions / PROPHYLAXIS:      - Falls    - Ortho: Weight bearing status: NWB RLE       - DVT prophylaxis: SQH TID

## 2023-11-19 LAB
GLUCOSE BLDC GLUCOMTR-MCNC: 124 MG/DL — HIGH (ref 70–99)
GLUCOSE BLDC GLUCOMTR-MCNC: 124 MG/DL — HIGH (ref 70–99)
GLUCOSE BLDC GLUCOMTR-MCNC: 217 MG/DL — HIGH (ref 70–99)
GLUCOSE BLDC GLUCOMTR-MCNC: 217 MG/DL — HIGH (ref 70–99)
GLUCOSE BLDC GLUCOMTR-MCNC: 270 MG/DL — HIGH (ref 70–99)
GLUCOSE BLDC GLUCOMTR-MCNC: 270 MG/DL — HIGH (ref 70–99)
GLUCOSE BLDC GLUCOMTR-MCNC: 287 MG/DL — HIGH (ref 70–99)
GLUCOSE BLDC GLUCOMTR-MCNC: 287 MG/DL — HIGH (ref 70–99)

## 2023-11-19 RX ORDER — ROSUVASTATIN CALCIUM 5 MG/1
20 TABLET ORAL AT BEDTIME
Refills: 0 | Status: DISCONTINUED | OUTPATIENT
Start: 2023-11-19 | End: 2023-11-28

## 2023-11-19 RX ADMIN — MAGNESIUM OXIDE 400 MG ORAL TABLET 400 MILLIGRAM(S): 241.3 TABLET ORAL at 18:29

## 2023-11-19 RX ADMIN — Medication 90 MILLIGRAM(S): at 05:04

## 2023-11-19 RX ADMIN — OXYCODONE HYDROCHLORIDE 10 MILLIGRAM(S): 5 TABLET ORAL at 04:49

## 2023-11-19 RX ADMIN — TAMSULOSIN HYDROCHLORIDE 0.4 MILLIGRAM(S): 0.4 CAPSULE ORAL at 21:42

## 2023-11-19 RX ADMIN — OXYCODONE HYDROCHLORIDE 10 MILLIGRAM(S): 5 TABLET ORAL at 21:58

## 2023-11-19 RX ADMIN — CLOPIDOGREL BISULFATE 75 MILLIGRAM(S): 75 TABLET, FILM COATED ORAL at 14:49

## 2023-11-19 RX ADMIN — SENNA PLUS 2 TABLET(S): 8.6 TABLET ORAL at 21:42

## 2023-11-19 RX ADMIN — Medication 650 MILLIGRAM(S): at 21:43

## 2023-11-19 RX ADMIN — INSULIN GLARGINE 24 UNIT(S): 100 INJECTION, SOLUTION SUBCUTANEOUS at 08:05

## 2023-11-19 RX ADMIN — ROSUVASTATIN CALCIUM 20 MILLIGRAM(S): 5 TABLET ORAL at 21:42

## 2023-11-19 RX ADMIN — CHLORHEXIDINE GLUCONATE 1 APPLICATION(S): 213 SOLUTION TOPICAL at 05:03

## 2023-11-19 RX ADMIN — Medication 0.5 MILLIGRAM(S): at 15:13

## 2023-11-19 RX ADMIN — HEPARIN SODIUM 5000 UNIT(S): 5000 INJECTION INTRAVENOUS; SUBCUTANEOUS at 14:50

## 2023-11-19 RX ADMIN — OXYCODONE HYDROCHLORIDE 10 MILLIGRAM(S): 5 TABLET ORAL at 11:25

## 2023-11-19 RX ADMIN — Medication 6: at 18:27

## 2023-11-19 RX ADMIN — OXYCODONE HYDROCHLORIDE 10 MILLIGRAM(S): 5 TABLET ORAL at 07:01

## 2023-11-19 RX ADMIN — HEPARIN SODIUM 5000 UNIT(S): 5000 INJECTION INTRAVENOUS; SUBCUTANEOUS at 21:41

## 2023-11-19 RX ADMIN — OXYCODONE HYDROCHLORIDE 10 MILLIGRAM(S): 5 TABLET ORAL at 08:35

## 2023-11-19 RX ADMIN — OXYCODONE HYDROCHLORIDE 10 MILLIGRAM(S): 5 TABLET ORAL at 18:53

## 2023-11-19 RX ADMIN — MAGNESIUM OXIDE 400 MG ORAL TABLET 400 MILLIGRAM(S): 241.3 TABLET ORAL at 14:49

## 2023-11-19 RX ADMIN — Medication 1 TABLET(S): at 14:49

## 2023-11-19 RX ADMIN — Medication 6: at 08:03

## 2023-11-19 RX ADMIN — OXYCODONE HYDROCHLORIDE 10 MILLIGRAM(S): 5 TABLET ORAL at 20:10

## 2023-11-19 RX ADMIN — Medication 650 MILLIGRAM(S): at 14:50

## 2023-11-19 RX ADMIN — Medication 81 MILLIGRAM(S): at 14:49

## 2023-11-19 RX ADMIN — Medication 650 MILLIGRAM(S): at 05:04

## 2023-11-19 RX ADMIN — Medication 0.5 MILLIGRAM(S): at 08:35

## 2023-11-19 RX ADMIN — MAGNESIUM OXIDE 400 MG ORAL TABLET 400 MILLIGRAM(S): 241.3 TABLET ORAL at 08:19

## 2023-11-19 RX ADMIN — OXYCODONE HYDROCHLORIDE 10 MILLIGRAM(S): 5 TABLET ORAL at 15:13

## 2023-11-19 RX ADMIN — POLYETHYLENE GLYCOL 3350 17 GRAM(S): 17 POWDER, FOR SOLUTION ORAL at 14:49

## 2023-11-19 RX ADMIN — Medication 1 TABLET(S): at 08:18

## 2023-11-19 RX ADMIN — Medication 1 TABLET(S): at 18:29

## 2023-11-19 RX ADMIN — OXYCODONE HYDROCHLORIDE 10 MILLIGRAM(S): 5 TABLET ORAL at 00:46

## 2023-11-19 RX ADMIN — HEPARIN SODIUM 5000 UNIT(S): 5000 INJECTION INTRAVENOUS; SUBCUTANEOUS at 05:04

## 2023-11-19 RX ADMIN — LOSARTAN POTASSIUM 100 MILLIGRAM(S): 100 TABLET, FILM COATED ORAL at 05:04

## 2023-11-19 NOTE — PROGRESS NOTE ADULT - SUBJECTIVE AND OBJECTIVE BOX
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 Pt underwent R TMA on 10/27 with revision on 10/30 and 11/4. 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 Cultures grew pseudomonas and yeast and treated with Meropenem. Hospital course is complicated by acute urinary retention 2/2 neurogenic bladder and two mechanical out of bed falls, as the patient is not accustomed to missing a foot. Pt was seen by urology and has completed a trial of void after several straight caths. Pt is currently voiding spontaneously without use of urbano or straight catheterization but feels like he is going frequently. Having regular bowel movements. Of note the pt states that Cymbalta and Lyrica makes him hallucinate and has been refusing the medications on the previous unit. Complains of phantom limb pain and incisional pain that is currently controlled on Oxycodone.     Pt seen and evaluated by Physiatry and is currently functioning at supervision for bed mobility , transfers minimum assist, ambulates 25ft RW contact guard assist, upper body dressing independent, lower body dressing contact guard assist, grooming independent. Pt is stable for acute inpatient rehabilitation.     LS: lives in private home with mother, 4 ASTER, another 13 ASTER to get to bedroom, half bathroom on 1st floor   PLOF: ambulates with crutches, independent in ADL/iADLs, no HHA    (15 Nov 2023 14:45)      TODAY'S SUBJECTIVE & REVIEW OF SYMPTOMS:  No acute overnight events. Pt doing well.  He states that he does not wish to take the lipitor as he is endorses a reaction to it in the past. He states he takes Crestor 20mg qhs instead. Non-formulary sheet filled in, and lipitor discontinued.   As well, patient does not wish for renal diet as he endorses a long standing history of CKD4 and wishes for a regular diet.   No CIC requirement as of today.   Having regular bowel movements.  Participating and tolerating therapy.     Review of Systems:   Constitutional:    [  x ] WNL           [   ] poor appetite   [   ] insomnia   [   ] tired   Cardio:                [ x  ] WNL           [   ] CP   [   ] URENA   [   ] palpitations               Resp:                   [ x  ] WNL           [   ] SOB   [   ] cough   [   ] wheezing   GI:                        [  x ] WNL           [   ] constipation   [   ] diarrhea   [   ] abdominal pain   [   ] nausea   [   ] emesis                                :                      [   ] WNL           [   ] URBANO  [   ] dysuria   [   ] difficulty voiding    [ x ] frequent voiding small amounts  - non CIC for retention    Endo:                   [ x  ] WNL          [   ] polyuria   [   ] temperature intolerance                 Skin:                     [ x  ] WNL          [   ] pain   [   ] wound   [   ] rash   MSK:                    [    ] WNL          [   ] muscle pain   [   ] joint pain/ stiffness   [   ] muscle tenderness   [   ] swelling  [ x]  right BKA with distal limb and phantom pain   Neuro:                 [   ] WNL          [   ] HA   [   ] change in vision   [   ] tremor   [   ] weakness   [   ]dysphagia  [ x ]  R limb phantom, neuropathic and incisional pain            Cognitive:           [  x ] WNL           [   ]confusion      Psych:                  [  x ] WNL           [   ] hallucinations   [   ]agitation   [   ] delusion   [   ]depression      PHYSICAL EXAM  Vital Signs Last 24 Hrs  T(C): 36.3 (19 Nov 2023 07:31), Max: 36.7 (18 Nov 2023 13:41)  T(F): 97.3 (19 Nov 2023 07:31), Max: 98.1 (18 Nov 2023 13:41)  HR: 85 (19 Nov 2023 07:31) (71 - 88)  BP: 162/75 (19 Nov 2023 07:31) (123/71 - 173/81)  BP(mean): --  RR: 18 (19 Nov 2023 07:31) (18 - 19)  SpO2: --    General:[ x  ] NAD, Resting Comfortable,   [   ] other:                                HEENT: [  x ] NC/AT, EOMI, PERRL , Normal Conjunctivae,   [   ] other:  Cardio: [  x ] RRR, no murmer,   [   ] other:                              Pulm: [  x ] No Respiratory Distress,  Lungs CTAB,   [   ] other:                       Abdomen: [ x  ]ND/NT, Soft,   [   ] other:    : [ x  ] NO URBANO CATHETER, [   ] URBANO CATHETER- no meatal tear, no discharge, [   ] other:                                            MSK: [   ] No joint swelling, Full ROM,   [ x  ] other:                                    Ext: [  x ]No C/C/E, No calf tenderness,   [   ]other:   right BKA incision C&D with staples   Pt had good distal pedal pulses on LLE with intact proprioception and sensation   Skin: [ x  ]intact,   [   ] other:                                                                  Neurological Examination:  Cognitive: [  x  ] AAO x 3,   [    ]  other:                                                                      Attention:  [  x  ] intact,   [    ]  other:                            Memory: [  x  ] intact,    [    ]  other:     Mood/Affect: [  x  ] wnl,    [    ]  other:                                                                             Communication: [ x   ]Fluent, no dysarthria, following commands:  [    ] other:   CN II - XII:  [ x   ] intact,  [    ] other:                                                                                        Motor:   RIGHT UE: [  x ] WNL,  [   ] other:  LEFT    UE: [ x  ] WNL,  [   ] other:  RIGHT LE: [   ] WNL,  [   ] other: R HF 5/5, R BKA w/ knee immobilizer   LEFT    LE: [ x  ] WNL,  [   ] other:    Tone: [  x  ] wnl,   [    ]  other:  DTRs: [  x ]symmetric, [   ] other:  Coordination:   [  x  ] intact,   [    ] other:                                                                           Sensory: [  x  ] Intact to light touch,   [    ] other:    MEDICATIONS  (STANDING):  aspirin  chewable 81 milliGRAM(s) Oral daily  clopidogrel Tablet 75 milliGRAM(s) Oral daily  heparin   Injectable 5000 Unit(s) SubCutaneous every 8 hours  insulin glargine Injectable (LANTUS) 24 Unit(s) SubCutaneous every morning  insulin lispro (ADMELOG) corrective regimen sliding scale   SubCutaneous three times a day before meals  lactobacillus acidophilus 1 Tablet(s) Oral three times a day with meals  losartan 100 milliGRAM(s) Oral daily  magnesium oxide 400 milliGRAM(s) Oral three times a day with meals  NIFEdipine XL 90 milliGRAM(s) Oral daily  polyethylene glycol 3350 17 Gram(s) Oral daily  senna 2 Tablet(s) Oral at bedtime  sodium bicarbonate 650 milliGRAM(s) Oral three times a day  tamsulosin 0.4 milliGRAM(s) Oral at bedtime    MEDICATIONS  (PRN):  acetaminophen     Tablet .. 650 milliGRAM(s) Oral every 6 hours PRN Temp greater or equal to 38C (100.4F), Mild Pain (1 - 3)  ALPRAZolam 0.5 milliGRAM(s) Oral four times a day PRN anxiety  aluminum hydroxide/magnesium hydroxide/simethicone Suspension 30 milliLiter(s) Oral every 4 hours PRN Dyspepsia  dextrose Oral Gel 15 Gram(s) Oral once PRN Blood Glucose LESS THAN 70 milliGRAM(s)/deciliter  melatonin 5 milliGRAM(s) Oral at bedtime PRN Insomnia  oxyCODONE    IR 10 milliGRAM(s) Oral every 4 hours PRN Severe Pain (7 - 10)  oxyCODONE    IR 5 milliGRAM(s) Oral every 4 hours PRN Moderate Pain (4 - 6)  polyethylene glycol 3350 17 Gram(s) Oral two times a day PRN Constipation        RECENT LABS/IMAGING                 POCT Blood Glucose.: 287 mg/dL (11-19-23 @ 07:02)  POCT Blood Glucose.: 188 mg/dL (11-18-23 @ 21:45)  POCT Blood Glucose.: 112 mg/dL (11-18-23 @ 16:31)  POCT Blood Glucose.: 191 mg/dL (11-18-23 @ 12:04)  POCT Blood Glucose.: 217 mg/dL (11-18-23 @ 07:04)  POCT Blood Glucose.: 186 mg/dL (11-17-23 @ 16:03)  POCT Blood Glucose.: 118 mg/dL (11-17-23 @ 12:06)  POCT Blood Glucose.: 390 mg/dL (11-17-23 @ 07:30)  POCT Blood Glucose.: 83 mg/dL (11-16-23 @ 21:17)  POCT Blood Glucose.: 327 mg/dL (11-16-23 @ 16:20)  POCT Blood Glucose.: 187 mg/dL (11-16-23 @ 11:03)  POCT Blood Glucose.: 360 mg/dL (11-16-23 @ 08:04)

## 2023-11-19 NOTE — PROGRESS NOTE ADULT - ASSESSMENT
Rehab of right BKA in the setting of severe PAD with previous balloon angioplasty and diabetic Charcot arthropathy causing gait dysfunction and decline in ADLs   - s/p Meropenem 1000mg q12h before source control    - PT/OT  - RLE NWB   - R Knee immobilizer in bed  - c/w Plavix 75mg, ASA 81mg  - continue acute rehab program  - Started Crestor 20mg qhs (instead of lipitor 80mg due to patient request).     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 CIC discussed with patient  - He admits to a very large fluid intake and agrees to cut down fluids to about 1500 - 1800cc daily.  - PVR volumes decreasing 700- 300cc.   - Continue Flomax and CIC q 6hrs and monitor volumes    Hypertension   - c/w Nifedipine 120mg daily, Losartan 100mg  - monitor and adjust bp meds as necessary     Anxiety   Depression  - c/w Xanax 0.5mg QID prn for anxiety   - monitor for symptoms of SI/HI  - can recommend psychosocial services if needed   - pt is currently in good spirits     -Pain control: Oxycodone 10/5 prn, Tylenol     -GI/Bowel Mgmt: Senna, Miralax      -Skin:  monitor incition    -FEN   - continue to monitor and replete electrolytes prn     - Diet: renal diet discontinued (per patient request), started Diabetic diet         Precautions / PROPHYLAXIS:      - Falls    - Ortho: Weight bearing status: NWB RLE       - DVT prophylaxis: SQH TID

## 2023-11-20 LAB
ALBUMIN SERPL ELPH-MCNC: 3.7 G/DL — SIGNIFICANT CHANGE UP (ref 3.5–5.2)
ALBUMIN SERPL ELPH-MCNC: 3.7 G/DL — SIGNIFICANT CHANGE UP (ref 3.5–5.2)
ALP SERPL-CCNC: 143 U/L — HIGH (ref 30–115)
ALP SERPL-CCNC: 143 U/L — HIGH (ref 30–115)
ALT FLD-CCNC: 14 U/L — SIGNIFICANT CHANGE UP (ref 0–41)
ALT FLD-CCNC: 14 U/L — SIGNIFICANT CHANGE UP (ref 0–41)
ANION GAP SERPL CALC-SCNC: 10 MMOL/L — SIGNIFICANT CHANGE UP (ref 7–14)
ANION GAP SERPL CALC-SCNC: 10 MMOL/L — SIGNIFICANT CHANGE UP (ref 7–14)
AST SERPL-CCNC: 13 U/L — SIGNIFICANT CHANGE UP (ref 0–41)
AST SERPL-CCNC: 13 U/L — SIGNIFICANT CHANGE UP (ref 0–41)
BASOPHILS # BLD AUTO: 0.04 K/UL — SIGNIFICANT CHANGE UP (ref 0–0.2)
BASOPHILS # BLD AUTO: 0.04 K/UL — SIGNIFICANT CHANGE UP (ref 0–0.2)
BASOPHILS NFR BLD AUTO: 0.8 % — SIGNIFICANT CHANGE UP (ref 0–1)
BASOPHILS NFR BLD AUTO: 0.8 % — SIGNIFICANT CHANGE UP (ref 0–1)
BILIRUB SERPL-MCNC: 0.3 MG/DL — SIGNIFICANT CHANGE UP (ref 0.2–1.2)
BILIRUB SERPL-MCNC: 0.3 MG/DL — SIGNIFICANT CHANGE UP (ref 0.2–1.2)
BUN SERPL-MCNC: 39 MG/DL — HIGH (ref 10–20)
BUN SERPL-MCNC: 39 MG/DL — HIGH (ref 10–20)
CALCIUM SERPL-MCNC: 9.3 MG/DL — SIGNIFICANT CHANGE UP (ref 8.4–10.4)
CALCIUM SERPL-MCNC: 9.3 MG/DL — SIGNIFICANT CHANGE UP (ref 8.4–10.4)
CHLORIDE SERPL-SCNC: 101 MMOL/L — SIGNIFICANT CHANGE UP (ref 98–110)
CHLORIDE SERPL-SCNC: 101 MMOL/L — SIGNIFICANT CHANGE UP (ref 98–110)
CO2 SERPL-SCNC: 26 MMOL/L — SIGNIFICANT CHANGE UP (ref 17–32)
CO2 SERPL-SCNC: 26 MMOL/L — SIGNIFICANT CHANGE UP (ref 17–32)
CREAT SERPL-MCNC: 2.9 MG/DL — HIGH (ref 0.7–1.5)
CREAT SERPL-MCNC: 2.9 MG/DL — HIGH (ref 0.7–1.5)
EGFR: 24 ML/MIN/1.73M2 — LOW
EGFR: 24 ML/MIN/1.73M2 — LOW
EOSINOPHIL # BLD AUTO: 0.3 K/UL — SIGNIFICANT CHANGE UP (ref 0–0.7)
EOSINOPHIL # BLD AUTO: 0.3 K/UL — SIGNIFICANT CHANGE UP (ref 0–0.7)
EOSINOPHIL NFR BLD AUTO: 6.3 % — SIGNIFICANT CHANGE UP (ref 0–8)
EOSINOPHIL NFR BLD AUTO: 6.3 % — SIGNIFICANT CHANGE UP (ref 0–8)
GLUCOSE BLDC GLUCOMTR-MCNC: 111 MG/DL — HIGH (ref 70–99)
GLUCOSE BLDC GLUCOMTR-MCNC: 111 MG/DL — HIGH (ref 70–99)
GLUCOSE BLDC GLUCOMTR-MCNC: 199 MG/DL — HIGH (ref 70–99)
GLUCOSE BLDC GLUCOMTR-MCNC: 199 MG/DL — HIGH (ref 70–99)
GLUCOSE BLDC GLUCOMTR-MCNC: 279 MG/DL — HIGH (ref 70–99)
GLUCOSE BLDC GLUCOMTR-MCNC: 279 MG/DL — HIGH (ref 70–99)
GLUCOSE SERPL-MCNC: 284 MG/DL — HIGH (ref 70–99)
GLUCOSE SERPL-MCNC: 284 MG/DL — HIGH (ref 70–99)
HCT VFR BLD CALC: 26.6 % — LOW (ref 42–52)
HCT VFR BLD CALC: 26.6 % — LOW (ref 42–52)
HGB BLD-MCNC: 8.3 G/DL — LOW (ref 14–18)
HGB BLD-MCNC: 8.3 G/DL — LOW (ref 14–18)
IMM GRANULOCYTES NFR BLD AUTO: 0.4 % — HIGH (ref 0.1–0.3)
IMM GRANULOCYTES NFR BLD AUTO: 0.4 % — HIGH (ref 0.1–0.3)
LYMPHOCYTES # BLD AUTO: 1.85 K/UL — SIGNIFICANT CHANGE UP (ref 1.2–3.4)
LYMPHOCYTES # BLD AUTO: 1.85 K/UL — SIGNIFICANT CHANGE UP (ref 1.2–3.4)
LYMPHOCYTES # BLD AUTO: 38.9 % — SIGNIFICANT CHANGE UP (ref 20.5–51.1)
LYMPHOCYTES # BLD AUTO: 38.9 % — SIGNIFICANT CHANGE UP (ref 20.5–51.1)
MAGNESIUM SERPL-MCNC: 2.3 MG/DL — SIGNIFICANT CHANGE UP (ref 1.8–2.4)
MAGNESIUM SERPL-MCNC: 2.3 MG/DL — SIGNIFICANT CHANGE UP (ref 1.8–2.4)
MCHC RBC-ENTMCNC: 26.5 PG — LOW (ref 27–31)
MCHC RBC-ENTMCNC: 26.5 PG — LOW (ref 27–31)
MCHC RBC-ENTMCNC: 31.2 G/DL — LOW (ref 32–37)
MCHC RBC-ENTMCNC: 31.2 G/DL — LOW (ref 32–37)
MCV RBC AUTO: 85 FL — SIGNIFICANT CHANGE UP (ref 80–94)
MCV RBC AUTO: 85 FL — SIGNIFICANT CHANGE UP (ref 80–94)
MONOCYTES # BLD AUTO: 0.5 K/UL — SIGNIFICANT CHANGE UP (ref 0.1–0.6)
MONOCYTES # BLD AUTO: 0.5 K/UL — SIGNIFICANT CHANGE UP (ref 0.1–0.6)
MONOCYTES NFR BLD AUTO: 10.5 % — HIGH (ref 1.7–9.3)
MONOCYTES NFR BLD AUTO: 10.5 % — HIGH (ref 1.7–9.3)
NEUTROPHILS # BLD AUTO: 2.04 K/UL — SIGNIFICANT CHANGE UP (ref 1.4–6.5)
NEUTROPHILS # BLD AUTO: 2.04 K/UL — SIGNIFICANT CHANGE UP (ref 1.4–6.5)
NEUTROPHILS NFR BLD AUTO: 43.1 % — SIGNIFICANT CHANGE UP (ref 42.2–75.2)
NEUTROPHILS NFR BLD AUTO: 43.1 % — SIGNIFICANT CHANGE UP (ref 42.2–75.2)
NRBC # BLD: 0 /100 WBCS — SIGNIFICANT CHANGE UP (ref 0–0)
NRBC # BLD: 0 /100 WBCS — SIGNIFICANT CHANGE UP (ref 0–0)
PLATELET # BLD AUTO: 436 K/UL — HIGH (ref 130–400)
PLATELET # BLD AUTO: 436 K/UL — HIGH (ref 130–400)
PMV BLD: 9.1 FL — SIGNIFICANT CHANGE UP (ref 7.4–10.4)
PMV BLD: 9.1 FL — SIGNIFICANT CHANGE UP (ref 7.4–10.4)
POTASSIUM SERPL-MCNC: 5 MMOL/L — SIGNIFICANT CHANGE UP (ref 3.5–5)
POTASSIUM SERPL-MCNC: 5 MMOL/L — SIGNIFICANT CHANGE UP (ref 3.5–5)
POTASSIUM SERPL-SCNC: 5 MMOL/L — SIGNIFICANT CHANGE UP (ref 3.5–5)
POTASSIUM SERPL-SCNC: 5 MMOL/L — SIGNIFICANT CHANGE UP (ref 3.5–5)
PROT SERPL-MCNC: 6.6 G/DL — SIGNIFICANT CHANGE UP (ref 6–8)
PROT SERPL-MCNC: 6.6 G/DL — SIGNIFICANT CHANGE UP (ref 6–8)
RBC # BLD: 3.13 M/UL — LOW (ref 4.7–6.1)
RBC # BLD: 3.13 M/UL — LOW (ref 4.7–6.1)
RBC # FLD: 17 % — HIGH (ref 11.5–14.5)
RBC # FLD: 17 % — HIGH (ref 11.5–14.5)
SODIUM SERPL-SCNC: 137 MMOL/L — SIGNIFICANT CHANGE UP (ref 135–146)
SODIUM SERPL-SCNC: 137 MMOL/L — SIGNIFICANT CHANGE UP (ref 135–146)
SURGICAL PATHOLOGY STUDY: SIGNIFICANT CHANGE UP
SURGICAL PATHOLOGY STUDY: SIGNIFICANT CHANGE UP
WBC # BLD: 4.75 K/UL — LOW (ref 4.8–10.8)
WBC # BLD: 4.75 K/UL — LOW (ref 4.8–10.8)
WBC # FLD AUTO: 4.75 K/UL — LOW (ref 4.8–10.8)
WBC # FLD AUTO: 4.75 K/UL — LOW (ref 4.8–10.8)

## 2023-11-20 RX ORDER — OXYCODONE HYDROCHLORIDE 5 MG/1
5 TABLET ORAL EVERY 4 HOURS
Refills: 0 | Status: DISCONTINUED | OUTPATIENT
Start: 2023-11-23 | End: 2023-11-26

## 2023-11-20 RX ORDER — ALPRAZOLAM 0.25 MG
0.5 TABLET ORAL
Refills: 0 | Status: DISCONTINUED | OUTPATIENT
Start: 2023-11-23 | End: 2023-11-28

## 2023-11-20 RX ORDER — OXYCODONE HYDROCHLORIDE 5 MG/1
10 TABLET ORAL EVERY 4 HOURS
Refills: 0 | Status: DISCONTINUED | OUTPATIENT
Start: 2023-11-23 | End: 2023-11-28

## 2023-11-20 RX ORDER — INSULIN LISPRO 100/ML
5 VIAL (ML) SUBCUTANEOUS
Refills: 0 | Status: DISCONTINUED | OUTPATIENT
Start: 2023-11-20 | End: 2023-11-21

## 2023-11-20 RX ADMIN — HEPARIN SODIUM 5000 UNIT(S): 5000 INJECTION INTRAVENOUS; SUBCUTANEOUS at 06:11

## 2023-11-20 RX ADMIN — OXYCODONE HYDROCHLORIDE 10 MILLIGRAM(S): 5 TABLET ORAL at 12:49

## 2023-11-20 RX ADMIN — HEPARIN SODIUM 5000 UNIT(S): 5000 INJECTION INTRAVENOUS; SUBCUTANEOUS at 13:36

## 2023-11-20 RX ADMIN — Medication 0.5 MILLIGRAM(S): at 00:19

## 2023-11-20 RX ADMIN — Medication 650 MILLIGRAM(S): at 13:36

## 2023-11-20 RX ADMIN — LOSARTAN POTASSIUM 100 MILLIGRAM(S): 100 TABLET, FILM COATED ORAL at 06:10

## 2023-11-20 RX ADMIN — OXYCODONE HYDROCHLORIDE 10 MILLIGRAM(S): 5 TABLET ORAL at 08:34

## 2023-11-20 RX ADMIN — OXYCODONE HYDROCHLORIDE 10 MILLIGRAM(S): 5 TABLET ORAL at 08:04

## 2023-11-20 RX ADMIN — TAMSULOSIN HYDROCHLORIDE 0.4 MILLIGRAM(S): 0.4 CAPSULE ORAL at 21:57

## 2023-11-20 RX ADMIN — Medication 0.5 MILLIGRAM(S): at 12:49

## 2023-11-20 RX ADMIN — Medication 5 UNIT(S): at 17:22

## 2023-11-20 RX ADMIN — MAGNESIUM OXIDE 400 MG ORAL TABLET 400 MILLIGRAM(S): 241.3 TABLET ORAL at 12:25

## 2023-11-20 RX ADMIN — Medication 1 TABLET(S): at 12:26

## 2023-11-20 RX ADMIN — OXYCODONE HYDROCHLORIDE 10 MILLIGRAM(S): 5 TABLET ORAL at 18:52

## 2023-11-20 RX ADMIN — Medication 650 MILLIGRAM(S): at 21:58

## 2023-11-20 RX ADMIN — MAGNESIUM OXIDE 400 MG ORAL TABLET 400 MILLIGRAM(S): 241.3 TABLET ORAL at 17:08

## 2023-11-20 RX ADMIN — Medication 1 TABLET(S): at 08:01

## 2023-11-20 RX ADMIN — CLOPIDOGREL BISULFATE 75 MILLIGRAM(S): 75 TABLET, FILM COATED ORAL at 12:25

## 2023-11-20 RX ADMIN — Medication 2: at 17:22

## 2023-11-20 RX ADMIN — MAGNESIUM OXIDE 400 MG ORAL TABLET 400 MILLIGRAM(S): 241.3 TABLET ORAL at 08:01

## 2023-11-20 RX ADMIN — SENNA PLUS 2 TABLET(S): 8.6 TABLET ORAL at 21:57

## 2023-11-20 RX ADMIN — Medication 90 MILLIGRAM(S): at 06:10

## 2023-11-20 RX ADMIN — OXYCODONE HYDROCHLORIDE 10 MILLIGRAM(S): 5 TABLET ORAL at 00:19

## 2023-11-20 RX ADMIN — OXYCODONE HYDROCHLORIDE 10 MILLIGRAM(S): 5 TABLET ORAL at 01:41

## 2023-11-20 RX ADMIN — INSULIN GLARGINE 24 UNIT(S): 100 INJECTION, SOLUTION SUBCUTANEOUS at 08:53

## 2023-11-20 RX ADMIN — Medication 81 MILLIGRAM(S): at 12:25

## 2023-11-20 RX ADMIN — Medication 6: at 08:01

## 2023-11-20 RX ADMIN — HEPARIN SODIUM 5000 UNIT(S): 5000 INJECTION INTRAVENOUS; SUBCUTANEOUS at 21:57

## 2023-11-20 RX ADMIN — Medication 1 TABLET(S): at 17:09

## 2023-11-20 RX ADMIN — Medication 650 MILLIGRAM(S): at 06:10

## 2023-11-20 RX ADMIN — OXYCODONE HYDROCHLORIDE 10 MILLIGRAM(S): 5 TABLET ORAL at 19:12

## 2023-11-20 RX ADMIN — OXYCODONE HYDROCHLORIDE 10 MILLIGRAM(S): 5 TABLET ORAL at 13:19

## 2023-11-20 NOTE — CHART NOTE - NSCHARTNOTEFT_GEN_A_CORE
As per RN patient always  takes xanax and oxy ir 10 mg  together  almost all the time , he is s/p  RT BKA on 11/9 .  POST op  day # 11 , he looks very comfortable  , But says his pain is 7/10 , informed him about the need to take 5 mg Oxy 1r for moderate pain and 10 mg for severe pain.  he insisted on taking both xanax 0.5 mg and 10 mg oxy ir  together every 4 hours ( xanax is dosed 4 times a day  prn )he was started on Lyrica and Cymbalta  on surgical floor ,but he has been refusing it and discontinued that on admission to rehab ,     HPI:  This is a 58 year old male with pmh significant for IDDM w/ charcot arthropathy, hypertension, PAD s/p balloon angioplasty 10/23/23, CKD 4 (2.5-3.5), nephrolithiasis, chronic anemia with hx of transfusions, anxiety, depression presents for nonhealing right foot gangrene. Pt underwent R TMA on 10/27 with revision on 10/30 and 11/4. 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 Cultures grew pseudomonas and yeast and treated with Meropenem. Hospital course is complicated by acute urinary retention 2/2 neurogenic bladder and two mechanical out of bed falls, as the patient is not accustomed to missing a foot. Pt was seen by urology and has completed a trial of void after several straight caths. Pt is currently voiding spontaneously without use of urbano or straight catheterization but feels like he is going frequently. Having regular bowel movements. Of note the pt states that Cymbalta and Lyrica makes him hallucinate and has been refusing the medications on the previous unit. Complains of phantom limb pain and incisional pain that is currently controlled on Oxycodone.                            8.3    4.75  )-----------( 436      ( 20 Nov 2023 07:41 )             26.6       11-20    137  |  101  |  39<H>  ----------------------------<  284<H>  5.0   |  26  |  2.9<H>    Ca    9.3      20 Nov 2023 07:41  Mg     2.3     11-20    TPro  6.6  /  Alb  3.7  /  TBili  0.3  /  DBili  x   /  AST  13  /  ALT  14  /  AlkPhos  143<H>  11-20          Urinalysis Basic - ( 20 Nov 2023 07:41 )    Color: x / Appearance: x / SG: x / pH: x  Gluc: 284 mg/dL / Ketone: x  / Bili: x / Urobili: x   Blood: x / Protein: x / Nitrite: x   Leuk Esterase: x / RBC: x / WBC x   Sq Epi: x / Non Sq Epi: x / Bacteria: x      POCT Blood Glucose.: 111 mg/dL (20 Nov 2023 11:58)      Vital Signs Last 24 Hrs  T(C): 36.2 (20 Nov 2023 05:10), Max: 36.7 (19 Nov 2023 21:17)  T(F): 97.2 (20 Nov 2023 05:10), Max: 98.1 (19 Nov 2023 21:17)  HR: 63 (20 Nov 2023 05:10) (63 - 79)  BP: 139/65 (20 Nov 2023 05:10) (123/66 - 147/67)    RR: 18 (20 Nov 2023 05:10) (18 - 19)      Discussed with patient regarding the need to take lowest dose of pain med possible to get relief  from pain  and discomfort , can add other options or standing medications .

## 2023-11-20 NOTE — PROGRESS NOTE ADULT - ASSESSMENT
Rehab of right BKA in the setting of severe PAD with previous balloon angioplasty and diabetic Charcot arthropathy causing gait dysfunction and decline in ADLs   - s/p Meropenem 1000mg q12h before source control    - PT/OT  - RLE NWB   - R Knee immobilizer in bed  - c/w Plavix 75mg, ASA 81mg  - continue acute rehab program  - Started Crestor 20mg qhs (instead of Lipitor 80mg due to patient request).     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 CIC discussed with patient  - He admits to a very large fluid intake and agrees to cut down fluids to about 1500 - 1800cc daily.  - PVR volumes decreasing 700- 300cc.   - Continue Flomax and CIC q 6hrs and monitor volumes    Hypertension   - c/w Nifedipine 120mg daily, Losartan 100mg  - monitor and adjust bp meds as necessary     Anxiety   Depression  - c/w Xanax 0.5mg QID prn for anxiety   - monitor for symptoms of SI/HI  - can recommend psychosocial services if needed   - pt is currently in good spirits     -Pain control: Oxycodone 10/5 prn, Tylenol     -GI/Bowel Mgmt: Senna, Miralax      -Skin:  monitor incition    -FEN   - continue to monitor and replete electrolytes prn     - Diet: renal diet discontinued (per patient request), started Diabetic diet         Precautions / PROPHYLAXIS:      - Falls    - Ortho: Weight bearing status: NWB RLE       - DVT prophylaxis: SQH TID  Rehab of right BKA in the setting of severe PAD with previous balloon angioplasty and diabetic Charcot arthropathy causing gait dysfunction and decline in ADLs   - s/p Meropenem 1000mg q12h before source control    - PT/OT  - RLE NWB   - R Knee immobilizer in bed  - c/w Plavix 75mg, ASA 81mg  - continue acute rehab program  - resumed home Crestor 20mg qhs (instead of Lipitor 80mg due to patient request).     Pain control   - c/w Oxycodone 10mg prn   - Pt was started on Lyrica 75mg q8h, Cymbalta 40mg q12h on previous unit, however patient states that he gets hallucinations and has been refusing the medications for the past several days and MS cleared    CKD stage 4 (2.5-3.5)  Chronic anemia 2/2 CKD  - on renal transplant list - avoid blood tx  - give Venofer 200mg IV q24h x5, retacrit 32276 units sq weekly per nephro   - magnesium oxide 400mg TID  - Sodium bicarb 650mg TID   - stable     IDDM 2 w/ Charcot arthropathy and nephropathy  - c/w Lantus 24 units AM   - ISS, BGM AC  - running consistently high on 11/20  - add Lispro 5 unts q AC and monitor    Urinary retention  - US bladder and kidney 11/10: negative   - seen by urology 11/10: flomax, self-cath q4-6h, fu outpatient   - on admission eval, patient states voiding spontaneously without urbano or straight cath but is complaining of frequency   - PVRs on rehab unit revealed large PVRs 1700cc and 900cc  - risks and benefits of Urbano vs. CIC discussed with patient  - He admits to a very large fluid intake and agrees to cut down fluids to about 1500 - 1800cc daily.  - PVR volumes decreasing - below 600 (no spontaneous void, but drinking less)  - Continue Flomax and CIC q 6hrs and monitor volumes    Hypertension   - c/w Nifedipine 120mg daily, Losartan 100mg  - monitor and adjust bp meds as necessary     Anxiety   Depression  - c/w Xanax 0.5mg QID prn for anxiety   - monitor for symptoms of SI/HI  - can recommend psychosocial services if needed   - pt is currently in good spirits     -Pain control: Oxycodone 10/5 prn, Tylenol     -GI/Bowel Mgmt: Senna, Miralax      -Skin:  monitor incision - clean and dry    -FEN   - continue to monitor and replete electrolytes prn     - Diet: renal diet discontinued (per patient request), started Diabetic diet         Precautions / PROPHYLAXIS:      - Falls    - Ortho: Weight bearing status: NWB RLE       - DVT prophylaxis: SQH TID

## 2023-11-20 NOTE — PROGRESS NOTE ADULT - SUBJECTIVE AND OBJECTIVE BOX
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 Pt underwent R TMA on 10/27 with revision on 10/30 and 11/4. 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 Cultures grew pseudomonas and yeast and treated with Meropenem. Hospital course is complicated by acute urinary retention 2/2 neurogenic bladder and two mechanical out of bed falls, as the patient is not accustomed to missing a foot. Pt was seen by urology and has completed a trial of void after several straight caths. Pt is currently voiding spontaneously without use of urbano or straight catheterization but feels like he is going frequently. Having regular bowel movements. Of note the pt states that Cymbalta and Lyrica makes him hallucinate and has been refusing the medications on the previous unit. Complains of phantom limb pain and incisional pain that is currently controlled on Oxycodone.     Pt seen and evaluated by Physiatry and is currently functioning at supervision for bed mobility , transfers minimum assist, ambulates 25ft RW contact guard assist, upper body dressing independent, lower body dressing contact guard assist, grooming independent. Pt is stable for acute inpatient rehabilitation.     LS: lives in private home with mother, 4 ASTER, another 13 ASTER to get to bedroom, half bathroom on 1st floor   PLOF: ambulates with crutches, independent in ADL/iADLs, no HHA    (15 Nov 2023 14:45)      TODAY'S SUBJECTIVE & REVIEW OF SYMPTOMS:  No acute overnight events. Pt doing well. Pt continuing to get PVR and straight cath. Decreased from last week. Monitoring glucose. Labs reviewed.   As well, patient does not wish for renal diet as he endorses a long standing history of CKD4 and wishes for a regular diet.   Having regular bowel movements.  Participating and tolerating therapy.     Review of Systems:   Constitutional:    [  x ] WNL           [   ] poor appetite   [   ] insomnia   [   ] tired   Cardio:                [ x  ] WNL           [   ] CP   [   ] URENA   [   ] palpitations               Resp:                   [ x  ] WNL           [   ] SOB   [   ] cough   [   ] wheezing   GI:                        [  x ] WNL           [   ] constipation   [   ] diarrhea   [   ] abdominal pain   [   ] nausea   [   ] emesis                                :                      [   ] WNL           [   ] URBANO  [   ] dysuria   [   ] difficulty voiding    [ x ] frequent voiding small amounts  - non CIC for retention    Endo:                   [ x  ] WNL          [   ] polyuria   [   ] temperature intolerance                 Skin:                     [ x  ] WNL          [   ] pain   [   ] wound   [   ] rash   MSK:                    [    ] WNL          [   ] muscle pain   [   ] joint pain/ stiffness   [   ] muscle tenderness   [   ] swelling  [ x]  right BKA with distal limb and phantom pain   Neuro:                 [   ] WNL          [   ] HA   [   ] change in vision   [   ] tremor   [   ] weakness   [   ]dysphagia  [ x ]  R limb phantom, neuropathic and incisional pain            Cognitive:           [  x ] WNL           [   ]confusion      Psych:                  [  x ] WNL           [   ] hallucinations   [   ]agitation   [   ] delusion   [   ]depression      PHYSICAL EXAM  Vital Signs Last 24 Hrs  T(C): 36.2 (20 Nov 2023 05:10), Max: 36.7 (19 Nov 2023 21:17)  T(F): 97.2 (20 Nov 2023 05:10), Max: 98.1 (19 Nov 2023 21:17)  HR: 63 (20 Nov 2023 05:10) (63 - 79)  BP: 139/65 (20 Nov 2023 05:10) (123/66 - 147/67)  BP(mean): --  RR: 18 (20 Nov 2023 05:10) (18 - 19)  SpO2: --    General:[ x  ] NAD, Resting Comfortable,   [   ] other:                                HEENT: [  x ] NC/AT, EOMI, PERRL , Normal Conjunctivae,   [   ] other:  Cardio: [  x ] RRR, no murmer,   [   ] other:                              Pulm: [  x ] No Respiratory Distress,  Lungs CTAB,   [   ] other:                       Abdomen: [ x  ]ND/NT, Soft,   [   ] other:    : [ x  ] NO URBANO CATHETER, [   ] URBANO CATHETER- no meatal tear, no discharge, [   ] other:                                            MSK: [   ] No joint swelling, Full ROM,   [ x  ] other:                                    Ext: [  x ]No C/C/E, No calf tenderness,   [   ]other:   right BKA incision C&D with staples   Pt had good distal pedal pulses on LLE with intact proprioception and sensation   Skin: [ x  ]intact,   [   ] other:                                                                  Neurological Examination:  Cognitive: [  x  ] AAO x 3,   [    ]  other:                                                                      Attention:  [  x  ] intact,   [    ]  other:                            Memory: [  x  ] intact,    [    ]  other:     Mood/Affect: [  x  ] wnl,    [    ]  other:                                                                             Communication: [ x   ]Fluent, no dysarthria, following commands:  [    ] other:   CN II - XII:  [ x   ] intact,  [    ] other:                                                                                        Motor:   RIGHT UE: [  x ] WNL,  [   ] other:  LEFT    UE: [ x  ] WNL,  [   ] other:  RIGHT LE: [   ] WNL,  [   ] other: R HF 5/5, R BKA w/ knee immobilizer   LEFT    LE: [ x  ] WNL,  [   ] other:    Tone: [  x  ] wnl,   [    ]  other:  DTRs: [  x ]symmetric, [   ] other:  Coordination:   [  x  ] intact,   [    ] other:                                                                           Sensory: [  x  ] Intact to light touch,   [    ] other:    MEDICATIONS  (STANDING):  aspirin  chewable 81 milliGRAM(s) Oral daily  clopidogrel Tablet 75 milliGRAM(s) Oral daily  heparin   Injectable 5000 Unit(s) SubCutaneous every 8 hours  insulin glargine Injectable (LANTUS) 24 Unit(s) SubCutaneous every morning  insulin lispro (ADMELOG) corrective regimen sliding scale   SubCutaneous three times a day before meals  lactobacillus acidophilus 1 Tablet(s) Oral three times a day with meals  losartan 100 milliGRAM(s) Oral daily  magnesium oxide 400 milliGRAM(s) Oral three times a day with meals  NIFEdipine XL 90 milliGRAM(s) Oral daily  polyethylene glycol 3350 17 Gram(s) Oral daily  rosuvastatin 20 milliGRAM(s) Oral at bedtime  senna 2 Tablet(s) Oral at bedtime  sodium bicarbonate 650 milliGRAM(s) Oral three times a day  tamsulosin 0.4 milliGRAM(s) Oral at bedtime    MEDICATIONS  (PRN):  acetaminophen     Tablet .. 650 milliGRAM(s) Oral every 6 hours PRN Temp greater or equal to 38C (100.4F), Mild Pain (1 - 3)  ALPRAZolam 0.5 milliGRAM(s) Oral four times a day PRN anxiety  aluminum hydroxide/magnesium hydroxide/simethicone Suspension 30 milliLiter(s) Oral every 4 hours PRN Dyspepsia  dextrose Oral Gel 15 Gram(s) Oral once PRN Blood Glucose LESS THAN 70 milliGRAM(s)/deciliter  melatonin 5 milliGRAM(s) Oral at bedtime PRN Insomnia  oxyCODONE    IR 10 milliGRAM(s) Oral every 4 hours PRN Severe Pain (7 - 10)  oxyCODONE    IR 5 milliGRAM(s) Oral every 4 hours PRN Moderate Pain (4 - 6)  polyethylene glycol 3350 17 Gram(s) Oral two times a day PRN Constipation    RECENT LABS/IMAGING                                   8.3    4.75  )-----------( 436      ( 20 Nov 2023 07:41 )             26.6     11-20    137  |  101  |  39<H>  ----------------------------<  284<H>  5.0   |  26  |  2.9<H>    Ca    9.3      20 Nov 2023 07:41  Mg     2.3     11-20    TPro  6.6  /  Alb  3.7  /  TBili  0.3  /  DBili  x   /  AST  13  /  ALT  14  /  AlkPhos  143<H>  11-20      Urinalysis Basic - ( 20 Nov 2023 07:41 )    Color: x / Appearance: x / SG: x / pH: x  Gluc: 284 mg/dL / Ketone: x  / Bili: x / Urobili: x   Blood: x / Protein: x / Nitrite: x   Leuk Esterase: x / RBC: x / WBC x   Sq Epi: x / Non Sq Epi: x / Bacteria: x      POCT Blood Glucose.: 279 mg/dL (11-20-23 @ 07:40)  POCT Blood Glucose.: 217 mg/dL (11-19-23 @ 21:57)  POCT Blood Glucose.: 270 mg/dL (11-19-23 @ 17:37)  POCT Blood Glucose.: 124 mg/dL (11-19-23 @ 12:02)  POCT Blood Glucose.: 287 mg/dL (11-19-23 @ 07:02)  POCT Blood Glucose.: 188 mg/dL (11-18-23 @ 21:45)  POCT Blood Glucose.: 112 mg/dL (11-18-23 @ 16:31)  POCT Blood Glucose.: 191 mg/dL (11-18-23 @ 12:04)  POCT Blood Glucose.: 217 mg/dL (11-18-23 @ 07:04)  POCT Blood Glucose.: 186 mg/dL (11-17-23 @ 16:03)  POCT Blood Glucose.: 118 mg/dL (11-17-23 @ 12:06)  POCT Blood Glucose.: 390 mg/dL (11-17-23 @ 07:30)     Patient is a 58y old  Male who presents with a chief complaint of Rehab of 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 Pt underwent R TMA on 10/27 with revision on 10/30 and 11/4. 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 Cultures grew pseudomonas and yeast and treated with Meropenem. Hospital course is complicated by acute urinary retention 2/2 neurogenic bladder and two mechanical out of bed falls, as the patient is not accustomed to missing a foot. Pt was seen by urology and has completed a trial of void after several straight caths. Pt is currently voiding spontaneously without use of urbano or straight catheterization but feels like he is going frequently. Having regular bowel movements. Of note the pt states that Cymbalta and Lyrica makes him hallucinate and has been refusing the medications on the previous unit. Complains of phantom limb pain and incisional pain that is currently controlled on Oxycodone.     Pt seen and evaluated by Physiatry and is currently functioning at supervision for bed mobility , transfers minimum assist, ambulates 25ft RW contact guard assist, upper body dressing independent, lower body dressing contact guard assist, grooming independent. Pt is stable for acute inpatient rehabilitation.     LS: lives in private home with mother, 4 ASTER, another 13 ASTER to get to bedroom, half bathroom on 1st floor   PLOF: ambulates with crutches, independent in ADL/iADLs, no HHA    (15 Nov 2023 14:45)      TODAY'S SUBJECTIVE & REVIEW OF SYMPTOMS:  No acute overnight events. Pt doing well. Pt continuing to get PVR and straight cath. (no spontaneous void). Monitoring glucose. Labs reviewed.   As well, patient does not wish for renal diet as he endorses a long standing history of CKD4 and wishes for a regular diet.   Having regular bowel movements.  Participating and tolerating therapy.     Review of Systems:   Constitutional:    [  x ] WNL           [   ] poor appetite   [   ] insomnia   [   ] tired   Cardio:                [ x  ] WNL           [   ] CP   [   ] URENA   [   ] palpitations               Resp:                   [ x  ] WNL           [   ] SOB   [   ] cough   [   ] wheezing   GI:                        [  x ] WNL           [   ] constipation   [   ] diarrhea   [   ] abdominal pain   [   ] nausea   [   ] emesis                                :                      [   ] WNL           [   ] URBANO  [   ] dysuria   [   ] difficulty voiding    [ x ] frequent voiding small amounts  - non CIC for retention    Endo:                   [ x  ] WNL          [   ] polyuria   [   ] temperature intolerance                 Skin:                     [ x  ] WNL          [   ] pain   [   ] wound   [   ] rash   MSK:                    [    ] WNL          [   ] muscle pain   [   ] joint pain/ stiffness   [   ] muscle tenderness   [   ] swelling  [ x]  right BKA with distal limb and phantom pain   Neuro:                 [   ] WNL          [   ] HA   [   ] change in vision   [   ] tremor   [   ] weakness   [   ]dysphagia  [ x ]  R limb phantom, neuropathic and incisional pain            Cognitive:           [  x ] WNL           [   ]confusion      Psych:                  [  x ] WNL           [   ] hallucinations   [   ]agitation   [   ] delusion   [   ]depression      PHYSICAL EXAM  Vital Signs Last 24 Hrs  T(C): 36.2 (20 Nov 2023 05:10), Max: 36.7 (19 Nov 2023 21:17)  T(F): 97.2 (20 Nov 2023 05:10), Max: 98.1 (19 Nov 2023 21:17)  HR: 63 (20 Nov 2023 05:10) (63 - 79)  BP: 139/65 (20 Nov 2023 05:10) (123/66 - 147/67)  BP(mean): --  RR: 18 (20 Nov 2023 05:10) (18 - 19)  SpO2: --    General:[ x  ] NAD, Resting Comfortable,   [   ] other:                                HEENT: [  x ] NC/AT, EOMI, PERRL , Normal Conjunctivae,   [   ] other:  Cardio: [  x ] RRR, no murmer,   [   ] other:                              Pulm: [  x ] No Respiratory Distress,  Lungs CTAB,   [   ] other:                       Abdomen: [ x  ]ND/NT, Soft,   [   ] other:    : [ x  ] NO URBANO CATHETER, [   ] URBANO CATHETER- no meatal tear, no discharge, [   ] other:                                            MSK: [   ] No joint swelling, Full ROM,   [ x  ] other:                                    Ext: [  x ]No C/C/E, No calf tenderness,   [   ]other:   right BKA incision C&D with staples   Pt had good distal pedal pulses on LLE with intact proprioception and sensation   Skin: [ x  ]intact,   [   ] other:                                                                  Neurological Examination:  Cognitive: [  x  ] AAO x 3,   [    ]  other:                                                                      Attention:  [  x  ] intact,   [    ]  other:                            Memory: [  x  ] intact,    [    ]  other:     Mood/Affect: [  x  ] wnl,    [    ]  other:                                                                             Communication: [ x   ]Fluent, no dysarthria, following commands:  [    ] other:   CN II - XII:  [ x   ] intact,  [    ] other:                                                                                        Motor:   RIGHT UE: [  x ] WNL,  [   ] other:  LEFT    UE: [ x  ] WNL,  [   ] other:  RIGHT LE: [   ] WNL,  [   ] other: R HF 5/5, R BKA w/ knee immobilizer   LEFT    LE: [ x  ] WNL,  [   ] other:    Tone: [  x  ] wnl,   [    ]  other:  DTRs: [  x ]symmetric, [   ] other:  Coordination:   [  x  ] intact,   [    ] other:                                                                           Sensory: [  x  ] Intact to light touch,   [    ] other:    MEDICATIONS  (STANDING):  aspirin  chewable 81 milliGRAM(s) Oral daily  clopidogrel Tablet 75 milliGRAM(s) Oral daily  heparin   Injectable 5000 Unit(s) SubCutaneous every 8 hours  insulin glargine Injectable (LANTUS) 24 Unit(s) SubCutaneous every morning  insulin lispro (ADMELOG) corrective regimen sliding scale   SubCutaneous three times a day before meals  lactobacillus acidophilus 1 Tablet(s) Oral three times a day with meals  losartan 100 milliGRAM(s) Oral daily  magnesium oxide 400 milliGRAM(s) Oral three times a day with meals  NIFEdipine XL 90 milliGRAM(s) Oral daily  polyethylene glycol 3350 17 Gram(s) Oral daily  rosuvastatin 20 milliGRAM(s) Oral at bedtime  senna 2 Tablet(s) Oral at bedtime  sodium bicarbonate 650 milliGRAM(s) Oral three times a day  tamsulosin 0.4 milliGRAM(s) Oral at bedtime    MEDICATIONS  (PRN):  acetaminophen     Tablet .. 650 milliGRAM(s) Oral every 6 hours PRN Temp greater or equal to 38C (100.4F), Mild Pain (1 - 3)  ALPRAZolam 0.5 milliGRAM(s) Oral four times a day PRN anxiety  aluminum hydroxide/magnesium hydroxide/simethicone Suspension 30 milliLiter(s) Oral every 4 hours PRN Dyspepsia  dextrose Oral Gel 15 Gram(s) Oral once PRN Blood Glucose LESS THAN 70 milliGRAM(s)/deciliter  melatonin 5 milliGRAM(s) Oral at bedtime PRN Insomnia  oxyCODONE    IR 10 milliGRAM(s) Oral every 4 hours PRN Severe Pain (7 - 10)  oxyCODONE    IR 5 milliGRAM(s) Oral every 4 hours PRN Moderate Pain (4 - 6)  polyethylene glycol 3350 17 Gram(s) Oral two times a day PRN Constipation    RECENT LABS/IMAGING                                   8.3    4.75  )-----------( 436      ( 20 Nov 2023 07:41 )             26.6     11-20    137  |  101  |  39<H>  ----------------------------<  284<H>  5.0   |  26  |  2.9<H>    Ca    9.3      20 Nov 2023 07:41  Mg     2.3     11-20    TPro  6.6  /  Alb  3.7  /  TBili  0.3  /  DBili  x   /  AST  13  /  ALT  14  /  AlkPhos  143<H>  11-20      POCT Blood Glucose.: 279 mg/dL (11-20-23 @ 07:40)  POCT Blood Glucose.: 217 mg/dL (11-19-23 @ 21:57)  POCT Blood Glucose.: 270 mg/dL (11-19-23 @ 17:37)  POCT Blood Glucose.: 124 mg/dL (11-19-23 @ 12:02)  POCT Blood Glucose.: 287 mg/dL (11-19-23 @ 07:02)  POCT Blood Glucose.: 188 mg/dL (11-18-23 @ 21:45)  POCT Blood Glucose.: 112 mg/dL (11-18-23 @ 16:31)  POCT Blood Glucose.: 191 mg/dL (11-18-23 @ 12:04)  POCT Blood Glucose.: 217 mg/dL (11-18-23 @ 07:04)  POCT Blood Glucose.: 186 mg/dL (11-17-23 @ 16:03)  POCT Blood Glucose.: 118 mg/dL (11-17-23 @ 12:06)  POCT Blood Glucose.: 390 mg/dL (11-17-23 @ 07:30)

## 2023-11-20 NOTE — PROGRESS NOTE ADULT - ASSESSMENT
Neuropsychology Follow Up           Treatment focused on: Cognitive functioning     Pain: Denied  Location: N/A  Ratin/10  Intervention: N/A      Orientation: WFL     Arousal Level: Alert     Behavior: Cooperative, friendly     Affect/Mood Range: Euthymic      Needed: No   #: N/A     Attention: WFL     Insight into illness/deficits: WFL          Patient was initially seen for a mood evaluation/supportive session was provided. Patient completed the PCL-5 PTSD Checklist for DSM-5.      Patient endorsed feelings of happiness as he adjusts to the unit. Patient indicated an increase in mood since being transferred into the unit. Patient denied any feelings of post-traumatic stress, depression, anxiety, and hopelessness. This is contradicted by depressive/anxious symptoms/presentation and xanax prescription noted in the medical record, but is consistent with his report on initial eval by neuropsych.  The patient identified his spiritual beliefs, hospital staff, and family as protective factors. Patient indicated he enjoys completing puzzles and watching tv during his free time in the unit. The patient also enjoys journaling his daily activities as a method of recall and reflection. The plan is to continue to monitor mood and provide appropriate recommendations moving forward.           Goals:  ? Facilitate Positive Coping      Plan:  1-3 times a week 30-60 minutes

## 2023-11-21 LAB
GLUCOSE BLDC GLUCOMTR-MCNC: 151 MG/DL — HIGH (ref 70–99)
GLUCOSE BLDC GLUCOMTR-MCNC: 151 MG/DL — HIGH (ref 70–99)
GLUCOSE BLDC GLUCOMTR-MCNC: 257 MG/DL — HIGH (ref 70–99)
GLUCOSE BLDC GLUCOMTR-MCNC: 257 MG/DL — HIGH (ref 70–99)
GLUCOSE BLDC GLUCOMTR-MCNC: 277 MG/DL — HIGH (ref 70–99)
GLUCOSE BLDC GLUCOMTR-MCNC: 277 MG/DL — HIGH (ref 70–99)
GLUCOSE BLDC GLUCOMTR-MCNC: 66 MG/DL — LOW (ref 70–99)
GLUCOSE BLDC GLUCOMTR-MCNC: 66 MG/DL — LOW (ref 70–99)

## 2023-11-21 PROCEDURE — 99222 1ST HOSP IP/OBS MODERATE 55: CPT

## 2023-11-21 RX ORDER — INSULIN LISPRO 100/ML
5 VIAL (ML) SUBCUTANEOUS
Refills: 0 | Status: DISCONTINUED | OUTPATIENT
Start: 2023-11-21 | End: 2023-11-24

## 2023-11-21 RX ADMIN — OXYCODONE HYDROCHLORIDE 10 MILLIGRAM(S): 5 TABLET ORAL at 15:11

## 2023-11-21 RX ADMIN — MAGNESIUM OXIDE 400 MG ORAL TABLET 400 MILLIGRAM(S): 241.3 TABLET ORAL at 07:43

## 2023-11-21 RX ADMIN — Medication 81 MILLIGRAM(S): at 12:05

## 2023-11-21 RX ADMIN — OXYCODONE HYDROCHLORIDE 10 MILLIGRAM(S): 5 TABLET ORAL at 00:00

## 2023-11-21 RX ADMIN — Medication 650 MILLIGRAM(S): at 06:42

## 2023-11-21 RX ADMIN — ROSUVASTATIN CALCIUM 20 MILLIGRAM(S): 5 TABLET ORAL at 06:38

## 2023-11-21 RX ADMIN — SENNA PLUS 2 TABLET(S): 8.6 TABLET ORAL at 21:40

## 2023-11-21 RX ADMIN — OXYCODONE HYDROCHLORIDE 10 MILLIGRAM(S): 5 TABLET ORAL at 10:30

## 2023-11-21 RX ADMIN — TAMSULOSIN HYDROCHLORIDE 0.4 MILLIGRAM(S): 0.4 CAPSULE ORAL at 21:39

## 2023-11-21 RX ADMIN — Medication 1 TABLET(S): at 07:44

## 2023-11-21 RX ADMIN — INSULIN GLARGINE 24 UNIT(S): 100 INJECTION, SOLUTION SUBCUTANEOUS at 07:42

## 2023-11-21 RX ADMIN — ROSUVASTATIN CALCIUM 20 MILLIGRAM(S): 5 TABLET ORAL at 21:41

## 2023-11-21 RX ADMIN — Medication 5 UNIT(S): at 17:19

## 2023-11-21 RX ADMIN — HEPARIN SODIUM 5000 UNIT(S): 5000 INJECTION INTRAVENOUS; SUBCUTANEOUS at 21:39

## 2023-11-21 RX ADMIN — HEPARIN SODIUM 5000 UNIT(S): 5000 INJECTION INTRAVENOUS; SUBCUTANEOUS at 15:14

## 2023-11-21 RX ADMIN — Medication 6: at 17:18

## 2023-11-21 RX ADMIN — LOSARTAN POTASSIUM 100 MILLIGRAM(S): 100 TABLET, FILM COATED ORAL at 06:42

## 2023-11-21 RX ADMIN — OXYCODONE HYDROCHLORIDE 10 MILLIGRAM(S): 5 TABLET ORAL at 19:16

## 2023-11-21 RX ADMIN — Medication 1 TABLET(S): at 17:20

## 2023-11-21 RX ADMIN — Medication 650 MILLIGRAM(S): at 15:14

## 2023-11-21 RX ADMIN — OXYCODONE HYDROCHLORIDE 10 MILLIGRAM(S): 5 TABLET ORAL at 18:13

## 2023-11-21 RX ADMIN — Medication 0.5 MILLIGRAM(S): at 00:00

## 2023-11-21 RX ADMIN — Medication 650 MILLIGRAM(S): at 21:39

## 2023-11-21 RX ADMIN — OXYCODONE HYDROCHLORIDE 10 MILLIGRAM(S): 5 TABLET ORAL at 22:37

## 2023-11-21 RX ADMIN — OXYCODONE HYDROCHLORIDE 10 MILLIGRAM(S): 5 TABLET ORAL at 10:00

## 2023-11-21 RX ADMIN — MAGNESIUM OXIDE 400 MG ORAL TABLET 400 MILLIGRAM(S): 241.3 TABLET ORAL at 12:05

## 2023-11-21 RX ADMIN — Medication 1 TABLET(S): at 12:05

## 2023-11-21 RX ADMIN — Medication 0.5 MILLIGRAM(S): at 22:37

## 2023-11-21 RX ADMIN — Medication 0.5 MILLIGRAM(S): at 15:10

## 2023-11-21 RX ADMIN — Medication 5 UNIT(S): at 07:41

## 2023-11-21 RX ADMIN — CLOPIDOGREL BISULFATE 75 MILLIGRAM(S): 75 TABLET, FILM COATED ORAL at 12:04

## 2023-11-21 RX ADMIN — HEPARIN SODIUM 5000 UNIT(S): 5000 INJECTION INTRAVENOUS; SUBCUTANEOUS at 06:40

## 2023-11-21 RX ADMIN — MAGNESIUM OXIDE 400 MG ORAL TABLET 400 MILLIGRAM(S): 241.3 TABLET ORAL at 17:20

## 2023-11-21 RX ADMIN — Medication 6: at 07:41

## 2023-11-21 NOTE — CHART NOTE - NSCHARTNOTEFT_GEN_A_CORE
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 182.88 cm  - Wt.204 lbs  - %wt change  - BMI 27.7 --- overweight       Pertinent Lab Data:  11/20: BUN: 39, Creatinine: 2.9, glucose: 284, eGFR: 24     Pertinent Meds:  MEDICATIONS  (STANDING):  insulin glargine Injectable (LANTUS) 24 Unit(s) SubCutaneous every morning  insulin lispro (ADMELOG) corrective regimen sliding scale   SubCutaneous three times a day before meals  insulin lispro Injectable (ADMELOG) 5 Unit(s) SubCutaneous <User Schedule>  lactobacillus acidophilus 1 Tablet(s) Oral three times a day with meals  losartan 100 milliGRAM(s) Oral daily  magnesium oxide 400 milliGRAM(s) Oral three times a day with meals  polyethylene glycol 3350 17 Gram(s) Oral daily  senna 2 Tablet(s) Oral at bedtime  sodium bicarbonate 650 milliGRAM(s) Oral three times a day    MEDICATIONS  (PRN):  aluminum hydroxide/magnesium hydroxide/simethicone Suspension 30 milliLiter(s) Oral every 4 hours PRN Dyspepsia  dextrose Oral Gel 15 Gram(s) Oral once PRN Blood Glucose LESS THAN 70 milliGRAM(s)/deciliter  oxyCODONE    IR 5 milliGRAM(s) Oral every 4 hours PRN Moderate Pain (4 - 6)  polyethylene glycol 3350 17 Gram(s) Oral two times a day PRN Constipation       Physical Findings:  - Appearance: alert and oriented x 4 per RN flow sheets   - GI function: No GI s/s at this time  - Tubes: n/a  - Oral/Mouth cavity: no chewing/swallowing difficulties at this time  - Skin: intact/ no idema noted     Nutrition Requirements  Weight Used: 204 lbs --- needs used from initial dietitian assessment      Estimated Energy Needs    Continue [x]  Adjust []  Adjusted Energy Recommendations:   7649-6915 kcal/day (20-22 kcal/kg)        Estimated Protein Needs    Continue [x]  Adjust []  Adjusted Protein Recommendations:   gm/day        Estimated Fluid Needs        Continue [x]  Adjust []  1 ml/kcal     Nutrient Intake: Patient with poor po intake, nepro added on 11/20, will continue to monitor intake of meals and supplements         [] Previous Nutrition Diagnosis: Inadequate energy protein intake            [] Ongoing          [] Resolved    Nutrition Intervention meals and snacks, medical nutrition supplements     Goal/Expected Outcome: Patient to consume ~75% of estimated needs in the next 4 days     Indicator/Monitoring: RD to monitor: diet order, body composition, energy intake, nutrition focused physical finding: high risk due in 4 days    Recommendations:     Continue current diet order  Continue nepro supplementation, will continue to monitor intake of meals and supplements

## 2023-11-21 NOTE — PROGRESS NOTE ADULT - SUBJECTIVE AND OBJECTIVE BOX
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 Pt underwent R TMA on 10/27 with revision on 10/30 and 11/4. 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 Cultures grew pseudomonas and yeast and treated with Meropenem. Hospital course is complicated by acute urinary retention 2/2 neurogenic bladder and two mechanical out of bed falls, as the patient is not accustomed to missing a foot. Pt was seen by urology and has completed a trial of void after several straight caths. Pt is currently voiding spontaneously without use of urbano or straight catheterization but feels like he is going frequently. Having regular bowel movements. Of note the pt states that Cymbalta and Lyrica makes him hallucinate and has been refusing the medications on the previous unit. Complains of phantom limb pain and incisional pain that is currently controlled on Oxycodone.     Pt seen and evaluated by Physiatry and is currently functioning at supervision for bed mobility , transfers minimum assist, ambulates 25ft RW contact guard assist, upper body dressing independent, lower body dressing contact guard assist, grooming independent. Pt is stable for acute inpatient rehabilitation.     LS: lives in private home with mother, 4 ASTER, another 13 ASTER to get to bedroom, half bathroom on 1st floor   PLOF: ambulates with crutches, independent in ADL/iADLs, no HHA    (15 Nov 2023 14:45)      TODAY'S SUBJECTIVE & REVIEW OF SYMPTOMS:  No acute overnight events. Pt doing well. Pt continuing to get PVR and straight cath. (no spontaneous void).   Added short acting insulin 5 TID AC yesterday, continuing to monitor blood glucose.   Pain management consulted forinpatient pain and discharge recommendations.  Having regular bowel movements.  Participating and tolerating therapy.     Review of Systems:   Constitutional:    [  x ] WNL           [   ] poor appetite   [   ] insomnia   [   ] tired   Cardio:                [ x  ] WNL           [   ] CP   [   ] URENA   [   ] palpitations               Resp:                   [ x  ] WNL           [   ] SOB   [   ] cough   [   ] wheezing   GI:                        [  x ] WNL           [   ] constipation   [   ] diarrhea   [   ] abdominal pain   [   ] nausea   [   ] emesis                                :                      [   ] WNL           [   ] URBANO  [   ] dysuria   [   ] difficulty voiding    [ x ] frequent voiding small amounts  - non CIC for retention    Endo:                   [ x  ] WNL          [   ] polyuria   [   ] temperature intolerance                 Skin:                     [ x  ] WNL          [   ] pain   [   ] wound   [   ] rash   MSK:                    [    ] WNL          [   ] muscle pain   [   ] joint pain/ stiffness   [   ] muscle tenderness   [   ] swelling  [ x]  right BKA with distal limb and phantom pain   Neuro:                 [   ] WNL          [   ] HA   [   ] change in vision   [   ] tremor   [   ] weakness   [   ]dysphagia  [ x ]  R limb phantom, neuropathic and incisional pain            Cognitive:           [  x ] WNL           [   ]confusion      Psych:                  [  x ] WNL           [   ] hallucinations   [   ]agitation   [   ] delusion   [   ]depression      PHYSICAL EXAM  Vital Signs Last 24 Hrs  T(C): 36.2 (21 Nov 2023 05:36), Max: 36.8 (20 Nov 2023 14:00)  T(F): 97.1 (21 Nov 2023 05:36), Max: 98.2 (20 Nov 2023 14:00)  HR: 71 (21 Nov 2023 05:36) (71 - 79)  BP: 116/65 (21 Nov 2023 05:36) (98/54 - 130/61)  BP(mean): 85 (21 Nov 2023 05:36) (85 - 88)  RR: 20 (21 Nov 2023 05:36) (18 - 20)  SpO2: --    General:[ x  ] NAD, Resting Comfortable,   [   ] other:                                HEENT: [  x ] NC/AT, EOMI, PERRL , Normal Conjunctivae,   [   ] other:  Cardio: [  x ] RRR, no murmer,   [   ] other:                              Pulm: [  x ] No Respiratory Distress,  Lungs CTAB,   [   ] other:                       Abdomen: [ x  ]ND/NT, Soft,   [   ] other:    : [ x  ] NO URBANO CATHETER, [   ] URBANO CATHETER- no meatal tear, no discharge, [   ] other:                                            MSK: [   ] No joint swelling, Full ROM,   [ x  ] other:                                    Ext: [  x ]No C/C/E, No calf tenderness,   [   ]other:   right BKA incision C&D with staples   Pt had good distal pedal pulses on LLE with intact proprioception and sensation   Skin: [ x  ]intact,   [   ] other:                                                                  Neurological Examination:  Cognitive: [  x  ] AAO x 3,   [    ]  other:                                                                      Attention:  [  x  ] intact,   [    ]  other:                            Memory: [  x  ] intact,    [    ]  other:     Mood/Affect: [  x  ] wnl,    [    ]  other:                                                                             Communication: [ x   ]Fluent, no dysarthria, following commands:  [    ] other:   CN II - XII:  [ x   ] intact,  [    ] other:                                                                                        Motor:   RIGHT UE: [  x ] WNL,  [   ] other:  Vital Signs Last 24 Hrs  T(C): 36.2 (21 Nov 2023 05:36), Max: 36.8 (20 Nov 2023 14:00)  T(F): 97.1 (21 Nov 2023 05:36), Max: 98.2 (20 Nov 2023 14:00)  HR: 71 (21 Nov 2023 05:36) (71 - 79)  BP: 116/65 (21 Nov 2023 05:36) (98/54 - 130/61)  BP(mean): 85 (21 Nov 2023 05:36) (85 - 88)  RR: 20 (21 Nov 2023 05:36) (18 - 20)  SpO2: --    LEFT    UE: [ x  ] WNL,  [   ] other:  RIGHT LE: [   ] WNL,  [   ] other: R HF 5/5, R BKA w/ knee immobilizer   LEFT    LE: [ x  ] WNL,  [   ] other:    Tone: [  x  ] wnl,   [    ]  other:  DTRs: [  x ]symmetric, [   ] other:  Coordination:   [  x  ] intact,   [    ] other:                                                                           Sensory: [  x  ] Intact to light touch,   [    ] other:      RECENT LABS/IMAGING                          8.3    4.75  )-----------( 436      ( 20 Nov 2023 07:41 )             26.6     11-20    137  |  101  |  39<H>  ----------------------------<  284<H>  5.0   |  26  |  2.9<H>    Ca    9.3      20 Nov 2023 07:41  Mg     2.3     11-20    TPro  6.6  /  Alb  3.7  /  TBili  0.3  /  DBili  x   /  AST  13  /  ALT  14  /  AlkPhos  143<H>  11-20      Urinalysis Basic - ( 20 Nov 2023 07:41 )    Color: x / Appearance: x / SG: x / pH: x  Gluc: 284 mg/dL / Ketone: x  / Bili: x / Urobili: x   Blood: x / Protein: x / Nitrite: x   Leuk Esterase: x / RBC: x / WBC x   Sq Epi: x / Non Sq Epi: x / Bacteria: x      POCT Blood Glucose.: 277 mg/dL (11-21-23 @ 07:01)  POCT Blood Glucose.: 199 mg/dL (11-20-23 @ 17:14)  POCT Blood Glucose.: 111 mg/dL (11-20-23 @ 11:58)  POCT Blood Glucose.: 279 mg/dL (11-20-23 @ 07:40)  POCT Blood Glucose.: 217 mg/dL (11-19-23 @ 21:57)  POCT Blood Glucose.: 270 mg/dL (11-19-23 @ 17:37)  POCT Blood Glucose.: 124 mg/dL (11-19-23 @ 12:02)  POCT Blood Glucose.: 287 mg/dL (11-19-23 @ 07:02)  POCT Blood Glucose.: 188 mg/dL (11-18-23 @ 21:45)  POCT Blood Glucose.: 112 mg/dL (11-18-23 @ 16:31)  POCT Blood Glucose.: 191 mg/dL (11-18-23 @ 12:04)  POCT Blood Glucose.: 217 mg/dL (11-18-23 @ 07:04)       Patient is a 58y old  Male who presents with a chief complaint of Rehab of 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 Pt underwent R TMA on 10/27 with revision on 10/30 and 11/4. 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 Cultures grew pseudomonas and yeast and treated with Meropenem. Hospital course is complicated by acute urinary retention 2/2 neurogenic bladder and two mechanical out of bed falls, as the patient is not accustomed to missing a foot. Pt was seen by urology and has completed a trial of void after several straight caths. Pt is currently voiding spontaneously without use of urbano or straight catheterization but feels like he is going frequently. Having regular bowel movements. Of note the pt states that Cymbalta and Lyrica makes him hallucinate and has been refusing the medications on the previous unit. Complains of phantom limb pain and incisional pain that is currently controlled on Oxycodone.     Pt seen and evaluated by Physiatry and is currently functioning at supervision for bed mobility , transfers minimum assist, ambulates 25ft RW contact guard assist, upper body dressing independent, lower body dressing contact guard assist, grooming independent. Pt is stable for acute inpatient rehabilitation.     LS: lives in private home with mother, 4 ASTER, another 13 ASTER to get to bedroom, half bathroom on 1st floor   PLOF: ambulates with crutches, independent in ADL/iADLs, no HHA    (15 Nov 2023 14:45)      TODAY'S SUBJECTIVE & REVIEW OF SYMPTOMS:  No acute overnight events. Pt doing well. Pt continuing to get PVR and straight cath. (no spontaneous void).   Added short acting insulin 5 TID AC yesterday, continuing to monitor blood glucose.   Pain management consulted forinpatient pain and discharge recommendations.  Having regular bowel movements.  Participating and tolerating therapy.     CLOF: bed mobility set up, transfers touch assist, ambulates 80ftx2 axillary crutches touch assist, 4 steps partial assist, LBD set up    Review of Systems:   Constitutional:    [  x ] WNL           [   ] poor appetite   [   ] insomnia   [   ] tired   Cardio:                [ x  ] WNL           [   ] CP   [   ] URENA   [   ] palpitations               Resp:                   [ x  ] WNL           [   ] SOB   [   ] cough   [   ] wheezing   GI:                        [  x ] WNL           [   ] constipation   [   ] diarrhea   [   ] abdominal pain   [   ] nausea   [   ] emesis                                :                      [   ] WNL           [   ] URBANO  [   ] dysuria   [   ] difficulty voiding    [ x ] frequent voiding small amounts  - non CIC for retention    Endo:                   [ x  ] WNL          [   ] polyuria   [   ] temperature intolerance                 Skin:                     [ x  ] WNL          [   ] pain   [   ] wound   [   ] rash   MSK:                    [    ] WNL          [   ] muscle pain   [   ] joint pain/ stiffness   [   ] muscle tenderness   [   ] swelling  [ x]  right BKA with distal limb and phantom pain   Neuro:                 [   ] WNL          [   ] HA   [   ] change in vision   [   ] tremor   [   ] weakness   [   ]dysphagia  [ x ]  R limb phantom, neuropathic and incisional pain            Cognitive:           [  x ] WNL           [   ]confusion      Psych:                  [  x ] WNL           [   ] hallucinations   [   ]agitation   [   ] delusion   [   ]depression      PHYSICAL EXAM  Vital Signs Last 24 Hrs  T(C): 36.2 (21 Nov 2023 05:36), Max: 36.8 (20 Nov 2023 14:00)  T(F): 97.1 (21 Nov 2023 05:36), Max: 98.2 (20 Nov 2023 14:00)  HR: 71 (21 Nov 2023 05:36) (71 - 79)  BP: 116/65 (21 Nov 2023 05:36) (98/54 - 130/61)  BP(mean): 85 (21 Nov 2023 05:36) (85 - 88)  RR: 20 (21 Nov 2023 05:36) (18 - 20)  SpO2: --    General:[ x  ] NAD, Resting Comfortable,   [   ] other:                                HEENT: [  x ] NC/AT, EOMI, PERRL , Normal Conjunctivae,   [   ] other:  Cardio: [  x ] RRR, no murmer,   [   ] other:                              Pulm: [  x ] No Respiratory Distress,  Lungs CTAB,   [   ] other:                       Abdomen: [ x  ]ND/NT, Soft,   [   ] other:    : [ x  ] NO URBANO CATHETER, [   ] URBANO CATHETER- no meatal tear, no discharge, [   ] other:                                            MSK: [   ] No joint swelling, Full ROM,   [ x  ] other:                                    Ext: [  x ]No C/C/E, No calf tenderness,   [   ]other:   right BKA incision C&D with staples   Pt had good distal pedal pulses on LLE with intact proprioception and sensation   Skin: [ x  ]intact,   [   ] other:                                                                  Neurological Examination:  Cognitive: [  x  ] AAO x 3,   [    ]  other:                                                                      Attention:  [  x  ] intact,   [    ]  other:                            Memory: [  x  ] intact,    [    ]  other:     Mood/Affect: [  x  ] wnl,    [    ]  other:                                                                             Communication: [ x   ]Fluent, no dysarthria, following commands:  [    ] other:   CN II - XII:  [ x   ] intact,  [    ] other:                                                                                        Motor:   RIGHT UE: [  x ] WNL,  [   ] other:  Vital Signs Last 24 Hrs  T(C): 36.2 (21 Nov 2023 05:36), Max: 36.8 (20 Nov 2023 14:00)  T(F): 97.1 (21 Nov 2023 05:36), Max: 98.2 (20 Nov 2023 14:00)  HR: 71 (21 Nov 2023 05:36) (71 - 79)  BP: 116/65 (21 Nov 2023 05:36) (98/54 - 130/61)  BP(mean): 85 (21 Nov 2023 05:36) (85 - 88)  RR: 20 (21 Nov 2023 05:36) (18 - 20)  SpO2: --    LEFT    UE: [ x  ] WNL,  [   ] other:  RIGHT LE: [   ] WNL,  [   ] other: R HF 5/5, R BKA w/ knee immobilizer   LEFT    LE: [ x  ] WNL,  [   ] other:    Tone: [  x  ] wnl,   [    ]  other:  DTRs: [  x ]symmetric, [   ] other:  Coordination:   [  x  ] intact,   [    ] other:                                                                           Sensory: [  x  ] Intact to light touch,   [    ] other:      RECENT LABS/IMAGING                          8.3    4.75  )-----------( 436      ( 20 Nov 2023 07:41 )             26.6     11-20    137  |  101  |  39<H>  ----------------------------<  284<H>  5.0   |  26  |  2.9<H>    Ca    9.3      20 Nov 2023 07:41  Mg     2.3     11-20    TPro  6.6  /  Alb  3.7  /  TBili  0.3  /  DBili  x   /  AST  13  /  ALT  14  /  AlkPhos  143<H>  11-20      Urinalysis Basic - ( 20 Nov 2023 07:41 )    Color: x / Appearance: x / SG: x / pH: x  Gluc: 284 mg/dL / Ketone: x  / Bili: x / Urobili: x   Blood: x / Protein: x / Nitrite: x   Leuk Esterase: x / RBC: x / WBC x   Sq Epi: x / Non Sq Epi: x / Bacteria: x      POCT Blood Glucose.: 277 mg/dL (11-21-23 @ 07:01)  POCT Blood Glucose.: 199 mg/dL (11-20-23 @ 17:14)  POCT Blood Glucose.: 111 mg/dL (11-20-23 @ 11:58)  POCT Blood Glucose.: 279 mg/dL (11-20-23 @ 07:40)  POCT Blood Glucose.: 217 mg/dL (11-19-23 @ 21:57)  POCT Blood Glucose.: 270 mg/dL (11-19-23 @ 17:37)  POCT Blood Glucose.: 124 mg/dL (11-19-23 @ 12:02)  POCT Blood Glucose.: 287 mg/dL (11-19-23 @ 07:02)  POCT Blood Glucose.: 188 mg/dL (11-18-23 @ 21:45)  POCT Blood Glucose.: 112 mg/dL (11-18-23 @ 16:31)  POCT Blood Glucose.: 191 mg/dL (11-18-23 @ 12:04)  POCT Blood Glucose.: 217 mg/dL (11-18-23 @ 07:04)       Patient is a 58y old  Male who presents with a chief complaint of Rehab of 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 Pt underwent R TMA on 10/27 with revision on 10/30 and 11/4. 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 Cultures grew pseudomonas and yeast and treated with Meropenem. Hospital course is complicated by acute urinary retention 2/2 neurogenic bladder and two mechanical out of bed falls, as the patient is not accustomed to missing a foot. Pt was seen by urology and has completed a trial of void after several straight caths. Pt is currently voiding spontaneously without use of urbano or straight catheterization but feels like he is going frequently. Having regular bowel movements. Of note the pt states that Cymbalta and Lyrica makes him hallucinate and has been refusing the medications on the previous unit. Complains of phantom limb pain and incisional pain that is currently controlled on Oxycodone.     Pt seen and evaluated by Physiatry and is currently functioning at supervision for bed mobility , transfers minimum assist, ambulates 25ft RW contact guard assist, upper body dressing independent, lower body dressing contact guard assist, grooming independent. Pt is stable for acute inpatient rehabilitation.     LS: lives in private home with mother, 4 ASTER, another 13 ASTER to get to bedroom, half bathroom on 1st floor   PLOF: ambulates with crutches, independent in ADL/iADLs, no HHA    (15 Nov 2023 14:45)    TODAY'S SUBJECTIVE & REVIEW OF SYMPTOMS:  No acute overnight events. Pt doing well. Pt continuing to get PVR and straight cath. (no spontaneous void).   Added short acting insulin 5 TID AC yesterday, continuing to monitor blood glucose. Pt's blood glucose low at afternoon as patient doesn't eat heavy breakfast. Will continue short acting for lunch and dinner and discontinue AM dose. Will continue ISS.   Pain management consulted forinpatient pain and discharge recommendations.  Having regular bowel movements.  Participating and tolerating therapy.     CLOF: bed mobility set up, transfers touch assist, ambulates 80ftx2 axillary crutches touch assist, 4 steps partial assist, LBD set up    Review of Systems:   Constitutional:    [  x ] WNL           [   ] poor appetite   [   ] insomnia   [   ] tired   Cardio:                [ x  ] WNL           [   ] CP   [   ] URENA   [   ] palpitations               Resp:                   [ x  ] WNL           [   ] SOB   [   ] cough   [   ] wheezing   GI:                        [  x ] WNL           [   ] constipation   [   ] diarrhea   [   ] abdominal pain   [   ] nausea   [   ] emesis                                :                      [   ] WNL           [   ] URBANO  [   ] dysuria   [   ] difficulty voiding    [ x ] frequent voiding small amounts  - non CIC for retention    Endo:                   [ x  ] WNL          [   ] polyuria   [   ] temperature intolerance                 Skin:                     [ x  ] WNL          [   ] pain   [   ] wound   [   ] rash   MSK:                    [    ] WNL          [   ] muscle pain   [   ] joint pain/ stiffness   [   ] muscle tenderness   [   ] swelling  [ x]  right BKA with distal limb and phantom pain   Neuro:                 [   ] WNL          [   ] HA   [   ] change in vision   [   ] tremor   [   ] weakness   [   ]dysphagia  [ x ]  R limb phantom, neuropathic and incisional pain            Cognitive:           [  x ] WNL           [   ]confusion      Psych:                  [  x ] WNL           [   ] hallucinations   [   ]agitation   [   ] delusion   [   ]depression      PHYSICAL EXAM  Vital Signs Last 24 Hrs  T(C): 36.2 (21 Nov 2023 05:36), Max: 36.8 (20 Nov 2023 14:00)  T(F): 97.1 (21 Nov 2023 05:36), Max: 98.2 (20 Nov 2023 14:00)  HR: 71 (21 Nov 2023 05:36) (71 - 79)  BP: 116/65 (21 Nov 2023 05:36) (98/54 - 130/61)  BP(mean): 85 (21 Nov 2023 05:36) (85 - 88)  RR: 20 (21 Nov 2023 05:36) (18 - 20)  SpO2: --    General:[ x  ] NAD, Resting Comfortable,   [   ] other:                                HEENT: [  x ] NC/AT, EOMI, PERRL , Normal Conjunctivae,   [   ] other:  Cardio: [  x ] RRR, no murmer,   [   ] other:                              Pulm: [  x ] No Respiratory Distress,  Lungs CTAB,   [   ] other:                       Abdomen: [ x  ]ND/NT, Soft,   [   ] other:    : [ x  ] NO URBANO CATHETER, [   ] URBANO CATHETER- no meatal tear, no discharge, [   ] other:                                            MSK: [   ] No joint swelling, Full ROM,   [ x  ] other:                                    Ext: [  x ]No C/C/E, No calf tenderness,   [   ]other:   right BKA incision C&D with staples   Pt had good distal pedal pulses on LLE with intact proprioception and sensation   Skin: [ x  ]intact,   [   ] other:                                                                  Neurological Examination:  Cognitive: [  x  ] AAO x 3,   [    ]  other:                                                                      Attention:  [  x  ] intact,   [    ]  other:                            Memory: [  x  ] intact,    [    ]  other:     Mood/Affect: [  x  ] wnl,    [    ]  other:                                                                             Communication: [ x   ]Fluent, no dysarthria, following commands:  [    ] other:   CN II - XII:  [ x   ] intact,  [    ] other:                                                                                        Motor:   RIGHT UE: [  x ] WNL,  [   ] other:  Vital Signs Last 24 Hrs  T(C): 36.2 (21 Nov 2023 05:36), Max: 36.8 (20 Nov 2023 14:00)  T(F): 97.1 (21 Nov 2023 05:36), Max: 98.2 (20 Nov 2023 14:00)  HR: 71 (21 Nov 2023 05:36) (71 - 79)  BP: 116/65 (21 Nov 2023 05:36) (98/54 - 130/61)  BP(mean): 85 (21 Nov 2023 05:36) (85 - 88)  RR: 20 (21 Nov 2023 05:36) (18 - 20)  SpO2: --    LEFT    UE: [ x  ] WNL,  [   ] other:  RIGHT LE: [   ] WNL,  [   ] other: R HF 5/5, R BKA w/ knee immobilizer   LEFT    LE: [ x  ] WNL,  [   ] other:    Tone: [  x  ] wnl,   [    ]  other:  DTRs: [  x ]symmetric, [   ] other:  Coordination:   [  x  ] intact,   [    ] other:                                                                           Sensory: [  x  ] Intact to light touch,   [    ] other:    MEDICATIONS  (STANDING):  aspirin  chewable 81 milliGRAM(s) Oral daily  clopidogrel Tablet 75 milliGRAM(s) Oral daily  heparin   Injectable 5000 Unit(s) SubCutaneous every 8 hours  insulin glargine Injectable (LANTUS) 24 Unit(s) SubCutaneous every morning  insulin lispro (ADMELOG) corrective regimen sliding scale   SubCutaneous three times a day before meals  insulin lispro Injectable (ADMELOG) 5 Unit(s) SubCutaneous <User Schedule>  lactobacillus acidophilus 1 Tablet(s) Oral three times a day with meals  losartan 100 milliGRAM(s) Oral daily  magnesium oxide 400 milliGRAM(s) Oral three times a day with meals  NIFEdipine XL 90 milliGRAM(s) Oral daily  polyethylene glycol 3350 17 Gram(s) Oral daily  rosuvastatin 20 milliGRAM(s) Oral at bedtime  senna 2 Tablet(s) Oral at bedtime  sodium bicarbonate 650 milliGRAM(s) Oral three times a day  tamsulosin 0.4 milliGRAM(s) Oral at bedtime    MEDICATIONS  (PRN):  acetaminophen     Tablet .. 650 milliGRAM(s) Oral every 6 hours PRN Temp greater or equal to 38C (100.4F), Mild Pain (1 - 3)  ALPRAZolam 0.5 milliGRAM(s) Oral four times a day PRN anxiety  aluminum hydroxide/magnesium hydroxide/simethicone Suspension 30 milliLiter(s) Oral every 4 hours PRN Dyspepsia  dextrose Oral Gel 15 Gram(s) Oral once PRN Blood Glucose LESS THAN 70 milliGRAM(s)/deciliter  melatonin 5 milliGRAM(s) Oral at bedtime PRN Insomnia  oxyCODONE    IR 10 milliGRAM(s) Oral every 4 hours PRN Severe Pain (7 - 10)  oxyCODONE    IR 5 milliGRAM(s) Oral every 4 hours PRN Moderate Pain (4 - 6)  polyethylene glycol 3350 17 Gram(s) Oral two times a day PRN Constipation    RECENT LABS/IMAGING                          8.3    4.75  )-----------( 436      ( 20 Nov 2023 07:41 )             26.6     11-20    137  |  101  |  39<H>  ----------------------------<  284<H>  5.0   |  26  |  2.9<H>    Ca    9.3      20 Nov 2023 07:41  Mg     2.3     11-20    TPro  6.6  /  Alb  3.7  /  TBili  0.3  /  DBili  x   /  AST  13  /  ALT  14  /  AlkPhos  143<H>  11-20      Urinalysis Basic - ( 20 Nov 2023 07:41 )    Color: x / Appearance: x / SG: x / pH: x  Gluc: 284 mg/dL / Ketone: x  / Bili: x / Urobili: x   Blood: x / Protein: x / Nitrite: x   Leuk Esterase: x / RBC: x / WBC x   Sq Epi: x / Non Sq Epi: x / Bacteria: x      POCT Blood Glucose.: 277 mg/dL (11-21-23 @ 07:01)  POCT Blood Glucose.: 199 mg/dL (11-20-23 @ 17:14)  POCT Blood Glucose.: 111 mg/dL (11-20-23 @ 11:58)  POCT Blood Glucose.: 279 mg/dL (11-20-23 @ 07:40)  POCT Blood Glucose.: 217 mg/dL (11-19-23 @ 21:57)  POCT Blood Glucose.: 270 mg/dL (11-19-23 @ 17:37)  POCT Blood Glucose.: 124 mg/dL (11-19-23 @ 12:02)  POCT Blood Glucose.: 287 mg/dL (11-19-23 @ 07:02)  POCT Blood Glucose.: 188 mg/dL (11-18-23 @ 21:45)  POCT Blood Glucose.: 112 mg/dL (11-18-23 @ 16:31)  POCT Blood Glucose.: 191 mg/dL (11-18-23 @ 12:04)  POCT Blood Glucose.: 217 mg/dL (11-18-23 @ 07:04)       Patient is a 58y old  Male who presents with a chief complaint of Rehab of 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 Pt underwent R TMA on 10/27 with revision on 10/30 and 11/4. 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 Cultures grew pseudomonas and yeast and treated with Meropenem. Hospital course is complicated by acute urinary retention 2/2 neurogenic bladder and two mechanical out of bed falls, as the patient is not accustomed to missing a foot. Pt was seen by urology and has completed a trial of void after several straight caths. Pt is currently voiding spontaneously without use of urbano or straight catheterization but feels like he is going frequently. Having regular bowel movements. Of note the pt states that Cymbalta and Lyrica makes him hallucinate and has been refusing the medications on the previous unit. Complains of phantom limb pain and incisional pain that is currently controlled on Oxycodone.     Pt seen and evaluated by Physiatry and is currently functioning at supervision for bed mobility , transfers minimum assist, ambulates 25ft RW contact guard assist, upper body dressing independent, lower body dressing contact guard assist, grooming independent. Pt is stable for acute inpatient rehabilitation.     LS: lives in private home with mother, 4 ASTER, another 13 ASTER to get to bedroom, half bathroom on 1st floor   PLOF: ambulates with crutches, independent in ADL/iADLs, no HHA    (15 Nov 2023 14:45)    TODAY'S SUBJECTIVE & REVIEW OF SYMPTOMS:  No acute overnight events. Pt doing well. Pt continuing to get PVR and straight cath. (no spontaneous void).   Added short acting insulin 5 TID AC yesterday, continuing to monitor blood glucose. Pt's blood glucose low at afternoon as patient doesn't eat heavy breakfast. Will continue short acting for lunch and dinner and discontinue AM dose. Will continue ISS.   Pain management consulted for inpatient pain and discharge recommendations.  Having regular bowel movements.  Participating and tolerating therapy.     CLOF: bed mobility set up, transfers touch assist, ambulates 80ftx2 axillary crutches touch assist, 4 steps partial assist, LBD set up    Review of Systems:   Constitutional:    [  x ] WNL           [   ] poor appetite   [   ] insomnia   [   ] tired   Cardio:                [ x  ] WNL           [   ] CP   [   ] URENA   [   ] palpitations               Resp:                   [ x  ] WNL           [   ] SOB   [   ] cough   [   ] wheezing   GI:                        [  x ] WNL           [   ] constipation   [   ] diarrhea   [   ] abdominal pain   [   ] nausea   [   ] emesis                                :                      [   ] WNL           [   ] URBANO  [   ] dysuria   [   ] difficulty voiding    [  ] frequent voiding small amounts  - non CIC for retention    Endo:                   [ x  ] WNL          [   ] polyuria   [   ] temperature intolerance                 Skin:                     [ x  ] WNL          [   ] pain   [   ] wound   [   ] rash   MSK:                    [    ] WNL          [   ] muscle pain   [   ] joint pain/ stiffness   [   ] muscle tenderness   [   ] swelling  [ x]  right BKA with distal limb and phantom pain   Neuro:                 [   ] WNL          [   ] HA   [   ] change in vision   [   ] tremor   [   ] weakness   [   ]dysphagia  [ x ]  R limb phantom, neuropathic and incisional pain            Cognitive:           [  x ] WNL           [   ]confusion      Psych:                  [  x ] WNL           [   ] hallucinations   [   ]agitation   [   ] delusion   [   ]depression      PHYSICAL EXAM  Vital Signs Last 24 Hrs  T(C): 36.2 (21 Nov 2023 05:36), Max: 36.8 (20 Nov 2023 14:00)  T(F): 97.1 (21 Nov 2023 05:36), Max: 98.2 (20 Nov 2023 14:00)  HR: 71 (21 Nov 2023 05:36) (71 - 79)  BP: 116/65 (21 Nov 2023 05:36) (98/54 - 130/61)  BP(mean): 85 (21 Nov 2023 05:36) (85 - 88)  RR: 20 (21 Nov 2023 05:36) (18 - 20)  SpO2: --    General:[ x  ] NAD, Resting Comfortable,   [   ] other:                                HEENT: [  x ] NC/AT, EOMI, PERRL , Normal Conjunctivae,   [   ] other:  Cardio: [  x ] RRR, no murmer,   [   ] other:                              Pulm: [  x ] No Respiratory Distress,  Lungs CTAB,   [   ] other:                       Abdomen: [ x  ]ND/NT, Soft,   [   ] other:    : [ x  ] NO URBANO CATHETER, [   ] URBANO CATHETER- no meatal tear, no discharge, [   ] other:                                            MSK: [   ] No joint swelling, Full ROM,   [ x  ] other:                                    Ext: [  x ]No C/C/E, No calf tenderness,   [   ]other:   right BKA incision C&D with staples   Pt had good distal pedal pulses on LLE with intact proprioception and sensation   Skin: [ x  ]intact,   [   ] other:                                                                  Neurological Examination:  Cognitive: [  x  ] AAO x 3,   [    ]  other:                                                                      Attention:  [  x  ] intact,   [    ]  other:                            Memory: [  x  ] intact,    [    ]  other:     Mood/Affect: [  x  ] wnl,    [    ]  other:                                                                             Communication: [ x   ]Fluent, no dysarthria, following commands:  [    ] other:   CN II - XII:  [ x   ] intact,  [    ] other:                                                                                        Motor:   RIGHT UE: [  x ] WNL,  [   ] other:  Vital Signs Last 24 Hrs  T(C): 36.2 (21 Nov 2023 05:36), Max: 36.8 (20 Nov 2023 14:00)  T(F): 97.1 (21 Nov 2023 05:36), Max: 98.2 (20 Nov 2023 14:00)  HR: 71 (21 Nov 2023 05:36) (71 - 79)  BP: 116/65 (21 Nov 2023 05:36) (98/54 - 130/61)  BP(mean): 85 (21 Nov 2023 05:36) (85 - 88)  RR: 20 (21 Nov 2023 05:36) (18 - 20)  SpO2: --    LEFT    UE: [ x  ] WNL,  [   ] other:  RIGHT LE: [   ] WNL,  [   ] other: R HF 5/5, R BKA w/ knee immobilizer   LEFT    LE: [ x  ] WNL,  [   ] other:    Tone: [  x  ] wnl,   [    ]  other:  DTRs: [  x ]symmetric, [   ] other:  Coordination:   [  x  ] intact,   [    ] other:                                                                           Sensory: [  x  ] Intact to light touch,   [    ] other:    MEDICATIONS  (STANDING):  aspirin  chewable 81 milliGRAM(s) Oral daily  clopidogrel Tablet 75 milliGRAM(s) Oral daily  heparin   Injectable 5000 Unit(s) SubCutaneous every 8 hours  insulin glargine Injectable (LANTUS) 24 Unit(s) SubCutaneous every morning  insulin lispro (ADMELOG) corrective regimen sliding scale   SubCutaneous three times a day before meals  insulin lispro Injectable (ADMELOG) 5 Unit(s) SubCutaneous <User Schedule>  lactobacillus acidophilus 1 Tablet(s) Oral three times a day with meals  losartan 100 milliGRAM(s) Oral daily  magnesium oxide 400 milliGRAM(s) Oral three times a day with meals  NIFEdipine XL 90 milliGRAM(s) Oral daily  polyethylene glycol 3350 17 Gram(s) Oral daily  rosuvastatin 20 milliGRAM(s) Oral at bedtime  senna 2 Tablet(s) Oral at bedtime  sodium bicarbonate 650 milliGRAM(s) Oral three times a day  tamsulosin 0.4 milliGRAM(s) Oral at bedtime    MEDICATIONS  (PRN):  acetaminophen     Tablet .. 650 milliGRAM(s) Oral every 6 hours PRN Temp greater or equal to 38C (100.4F), Mild Pain (1 - 3)  ALPRAZolam 0.5 milliGRAM(s) Oral four times a day PRN anxiety  aluminum hydroxide/magnesium hydroxide/simethicone Suspension 30 milliLiter(s) Oral every 4 hours PRN Dyspepsia  dextrose Oral Gel 15 Gram(s) Oral once PRN Blood Glucose LESS THAN 70 milliGRAM(s)/deciliter  melatonin 5 milliGRAM(s) Oral at bedtime PRN Insomnia  oxyCODONE    IR 10 milliGRAM(s) Oral every 4 hours PRN Severe Pain (7 - 10)  oxyCODONE    IR 5 milliGRAM(s) Oral every 4 hours PRN Moderate Pain (4 - 6)  polyethylene glycol 3350 17 Gram(s) Oral two times a day PRN Constipation    RECENT LABS/IMAGING                          8.3    4.75  )-----------( 436      ( 20 Nov 2023 07:41 )             26.6     11-20    137  |  101  |  39<H>  ----------------------------<  284<H>  5.0   |  26  |  2.9<H>    Ca    9.3      20 Nov 2023 07:41  Mg     2.3     11-20    TPro  6.6  /  Alb  3.7  /  TBili  0.3  /  DBili  x   /  AST  13  /  ALT  14  /  AlkPhos  143<H>  11-20    POCT Blood Glucose.: 277 mg/dL (11-21-23 @ 07:01)  POCT Blood Glucose.: 199 mg/dL (11-20-23 @ 17:14)  POCT Blood Glucose.: 111 mg/dL (11-20-23 @ 11:58)  POCT Blood Glucose.: 279 mg/dL (11-20-23 @ 07:40)  POCT Blood Glucose.: 217 mg/dL (11-19-23 @ 21:57)  POCT Blood Glucose.: 270 mg/dL (11-19-23 @ 17:37)  POCT Blood Glucose.: 124 mg/dL (11-19-23 @ 12:02)  POCT Blood Glucose.: 287 mg/dL (11-19-23 @ 07:02)  POCT Blood Glucose.: 188 mg/dL (11-18-23 @ 21:45)  POCT Blood Glucose.: 112 mg/dL (11-18-23 @ 16:31)  POCT Blood Glucose.: 191 mg/dL (11-18-23 @ 12:04)  POCT Blood Glucose.: 217 mg/dL (11-18-23 @ 07:04)

## 2023-11-21 NOTE — PROGRESS NOTE ADULT - ASSESSMENT
Rehab of right BKA in the setting of severe PAD with previous balloon angioplasty and diabetic Charcot arthropathy causing gait dysfunction and decline in ADLs   - s/p Meropenem 1000mg q12h before source control    - PT/OT  - RLE NWB   - R Knee immobilizer in bed  - c/w Plavix 75mg, ASA 81mg  - continue acute rehab program  - resumed home Crestor 20mg qhs (instead of Lipitor 80mg due to patient request).     Pain control   - c/w Oxycodone 10mg prn   - Pt was started on Lyrica 75mg q8h, Cymbalta 40mg q12h on previous unit, however patient states that he gets hallucinations and has been refusing the medications for the past several days and MS cleared  - pain management consulted, recs appreciated     CKD stage 4 (2.5-3.5)  Chronic anemia 2/2 CKD  - on renal transplant list - avoid blood tx  - give Venofer 200mg IV q24h x5, retacrit 17888 units sq weekly per nephro   - magnesium oxide 400mg TID  - Sodium bicarb 650mg TID   - stable     IDDM 2 w/ Charcot arthropathy and nephropathy  - c/w Lantus 24 units AM   - ISS, BGM AC  - running consistently high on 11/20  - add Lispro 5 unts q AC and monitor  - 11/21  this morning, will continue to monitor  - pt reports that he does not eat much for breakfast generally but eats for lunch and dinner     Urinary retention  - US bladder and kidney 11/10: negative   - seen by urology 11/10: flomax, self-cath q4-6h, fu outpatient   - on admission eval, patient states voiding spontaneously without urbano or straight cath but is complaining of frequency   - PVRs on rehab unit revealed large PVRs 1700cc and 900cc  - risks and benefits of Urbano vs. CIC discussed with patient  - He admits to a very large fluid intake and agrees to cut down fluids to about 1500 - 1800cc daily.  - PVR volumes decreasing - below 600 (no spontaneous void, but drinking less)  - Continue Flomax and CIC q 6hrs and monitor volumes    Hypertension   - c/w Nifedipine 120mg daily, Losartan 100mg  - monitor and adjust bp meds as necessary     Anxiety   Depression  - c/w Xanax 0.5mg QID prn for anxiety   - monitor for symptoms of SI/HI  - can recommend psychosocial services if needed   - pt is currently in good spirits     -Pain control: Oxycodone 10/5 prn, Tylenol     -GI/Bowel Mgmt: Senna, Miralax      -Skin:  monitor incision - clean and dry    -FEN   - continue to monitor and replete electrolytes prn     - Diet: renal diet discontinued (per patient request), started Diabetic diet         Precautions / PROPHYLAXIS:      - Falls    - Ortho: Weight bearing status: NWB RLE       - DVT prophylaxis: SQH TID  Rehab of right BKA in the setting of severe PAD with previous balloon angioplasty and diabetic Charcot arthropathy causing gait dysfunction and decline in ADLs   - s/p Meropenem 1000mg q12h before source control    - PT/OT  - RLE NWB   - R Knee immobilizer in bed  - c/w Plavix 75mg, ASA 81mg  - continue acute rehab program  - resumed home Crestor 20mg qhs (instead of Lipitor 80mg due to patient request).     Pain control   - c/w Oxycodone 10mg prn   - Pt was started on Lyrica 75mg q8h, Cymbalta 40mg q12h on previous unit, however patient states that he gets hallucinations and has been refusing the medications for the past several days and MS cleared  - pain management consulted, recs appreciated     CKD stage 4 (2.5-3.5)  Chronic anemia 2/2 CKD  - on renal transplant list - avoid blood tx  - give Venofer 200mg IV q24h x5, retacrit 06805 units sq weekly per nephro   - magnesium oxide 400mg TID  - Sodium bicarb 650mg TID   - stable     IDDM 2 w/ Charcot arthropathy and nephropathy  - c/w Lantus 24 units AM   - ISS, BGM AC  - running consistently high on 11/20  - add Lispro 5 unts q AC and monitor  - pt reports that he does not eat much for breakfast generally but eats for lunch and dinner   - 11/21 change to Lispro 5 units lunch and dinner only   - 11/21  this morning, will continue to monitor    Urinary retention  - US bladder and kidney 11/10: negative   - seen by urology 11/10: flomax, self-cath q4-6h, fu outpatient   - on admission eval, patient states voiding spontaneously without urbano or straight cath but is complaining of frequency   - PVRs on rehab unit revealed large PVRs 1700cc and 900cc  - risks and benefits of Urbano vs. CIC discussed with patient  - He admits to a very large fluid intake and agrees to cut down fluids to about 1500 - 1800cc daily.  - PVR volumes decreasing - below 600 (no spontaneous void, but drinking less)  - Continue Flomax and CIC q 6hrs and monitor volumes    Hypertension   - c/w Nifedipine 120mg daily, Losartan 100mg  - monitor and adjust bp meds as necessary     Anxiety   Depression  - c/w Xanax 0.5mg QID prn for anxiety   - monitor for symptoms of SI/HI  - can recommend psychosocial services if needed   - pt is currently in good spirits     -Pain control: Oxycodone 10/5 prn, Tylenol     -GI/Bowel Mgmt: Senna, Miralax      -Skin:  monitor incision - clean and dry    -FEN   - continue to monitor and replete electrolytes prn     - Diet: renal diet discontinued (per patient request), started Diabetic diet         Precautions / PROPHYLAXIS:      - Falls    - Ortho: Weight bearing status: NWB RLE       - DVT prophylaxis: SQH TID

## 2023-11-21 NOTE — CONSULT NOTE ADULT - SUBJECTIVE AND OBJECTIVE BOX
PAIN MANAGEMENT CONSULT NOTE    Chief Complaint:    HPI:  This is a 58 year old male with pmh significant for IDDM w/ charcot arthropathy, hypertension, PAD s/p balloon angioplasty 10/23/23, CKD 4 (2.5-3.5), nephrolithiasis, chronic anemia with hx of transfusions, anxiety, depression presents for nonhealing right foot gangrene. Pt underwent R TMA on 10/27 with revision on 10/30 and 11/4. 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 Cultures grew pseudomonas and yeast and treated with Meropenem. Hospital course is complicated by acute urinary retention 2/2 neurogenic bladder and two mechanical out of bed falls, as the patient is not accustomed to missing a foot. Pt was seen by urology and has completed a trial of void after several straight caths. Pt is currently voiding spontaneously without use of urbano or straight catheterization but feels like he is going frequently. Having regular bowel movements. Of note the pt states that Cymbalta and Lyrica makes him hallucinate and has been refusing the medications on the previous unit. Complains of phantom limb pain and incisional pain that is currently controlled on Oxycodone.     Pt seen and evaluated by Physiatry and is currently functioning at supervision for bed mobility , transfers minimum assist, ambulates 25ft RW contact guard assist, upper body dressing independent, lower body dressing contact guard assist, grooming independent. Pt is stable for acute inpatient rehabilitation.     LS: lives in private home with mother, 4 ASTER, another 13 ASTER to get to bedroom, half bathroom on 1st floor   PLOF: ambulates with crutches, independent in ADL/iADLs, no HHA       Patient seen & examined @ bedside.     PAST MEDICAL & SURGICAL HISTORY:  DM (diabetes mellitus)      HTN (hypertension)      HLD (hyperlipidemia)      Herpes labialis      Anxiety      GERD (gastroesophageal reflux disease)      Kidney stones  20 years ago      Kidney disease  stage 4      Chronic kidney disease, unspecified CKD stage      Diabetic Charcot foot      PAD (peripheral artery disease)      S/P foot surgery, right  x 5 ( 2006 - 2013 )      Kidney stone  Removed by Laser x 2 ( 20 years ago )      H/O arthroscopy of right knee      Status post peripheral artery angioplasty          FAMILY HISTORY:  Family history of cancer in father (Father)  Lung        SOCIAL HISTORY:  [ ] Denies Smoking, Alcohol, or Drug Use    HOME MEDICATIONS:   Please refer to initial HNP    PAIN HOME MEDICATIONS:    Allergies    No Known Allergies    Intolerances        PAIN MEDICATIONS:  acetaminophen     Tablet .. 650 milliGRAM(s) Oral every 6 hours PRN  ALPRAZolam 0.5 milliGRAM(s) Oral four times a day PRN  melatonin 5 milliGRAM(s) Oral at bedtime PRN  oxyCODONE    IR 10 milliGRAM(s) Oral every 4 hours PRN  oxyCODONE    IR 5 milliGRAM(s) Oral every 4 hours PRN    Heme:  aspirin  chewable 81 milliGRAM(s) Oral daily  clopidogrel Tablet 75 milliGRAM(s) Oral daily  heparin   Injectable 5000 Unit(s) SubCutaneous every 8 hours    Antibiotics:    Cardiovascular:  losartan 100 milliGRAM(s) Oral daily  NIFEdipine XL 90 milliGRAM(s) Oral daily    GI:  aluminum hydroxide/magnesium hydroxide/simethicone Suspension 30 milliLiter(s) Oral every 4 hours PRN  polyethylene glycol 3350 17 Gram(s) Oral two times a day PRN  polyethylene glycol 3350 17 Gram(s) Oral daily  senna 2 Tablet(s) Oral at bedtime    Endocrine:  dextrose Oral Gel 15 Gram(s) Oral once PRN  insulin glargine Injectable (LANTUS) 24 Unit(s) SubCutaneous every morning  insulin lispro (ADMELOG) corrective regimen sliding scale   SubCutaneous three times a day before meals  insulin lispro Injectable (ADMELOG) 5 Unit(s) SubCutaneous <User Schedule>  rosuvastatin 20 milliGRAM(s) Oral at bedtime    All Other Medications:  lactobacillus acidophilus 1 Tablet(s) Oral three times a day with meals  magnesium oxide 400 milliGRAM(s) Oral three times a day with meals  sodium bicarbonate 650 milliGRAM(s) Oral three times a day  tamsulosin 0.4 milliGRAM(s) Oral at bedtime      Vital Signs Last 24 Hrs  T(C): 36.1 (21 Nov 2023 21:12), Max: 36.2 (21 Nov 2023 05:36)  T(F): 96.9 (21 Nov 2023 21:12), Max: 97.1 (21 Nov 2023 05:36)  HR: 84 (21 Nov 2023 21:12) (70 - 84)  BP: 120/62 (21 Nov 2023 21:12) (116/65 - 125/63)  BP(mean): 84 (21 Nov 2023 21:12) (84 - 85)  RR: 19 (21 Nov 2023 21:12) (18 - 20)  SpO2: --        LABS:                        8.3    4.75  )-----------( 436      ( 20 Nov 2023 07:41 )             26.6     11-20    137  |  101  |  39<H>  ----------------------------<  284<H>  5.0   |  26  |  2.9<H>    Ca    9.3      20 Nov 2023 07:41  Mg     2.3     11-20    TPro  6.6  /  Alb  3.7  /  TBili  0.3  /  DBili  x   /  AST  13  /  ALT  14  /  AlkPhos  143<H>  11-20      Urinalysis Basic - ( 20 Nov 2023 07:41 )    Color: x / Appearance: x / SG: x / pH: x  Gluc: 284 mg/dL / Ketone: x  / Bili: x / Urobili: x   Blood: x / Protein: x / Nitrite: x   Leuk Esterase: x / RBC: x / WBC x   Sq Epi: x / Non Sq Epi: x / Bacteria: x      PHYSICAL EXAM  GENERAL: Laying in bed  Motor exam: 5/5 b/l UE. 5/5 b/l LE  Sensation intact to LT in UE/LE   CHEST/LUNG: Clear to auscultation bilaterally  ABDOMEN: Soft, Nontender, Nondistended  EXTREMITIES:  s/p BKA,R      ASSESSMENT:   HPI:  This is a 58 year old male with pmh significant for IDDM w/ charcot arthropathy, hypertension, PAD s/p balloon angioplasty 10/23/23, CKD 4 (2.5-3.5), nephrolithiasis, chronic anemia with hx of transfusions, anxiety, depression presents for nonhealing right foot gangrene. Pt underwent R TMA on 10/27 with revision on 10/30 and 11/4. 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 Cultures grew pseudomonas and yeast and treated with Meropenem. Hospital course is complicated by acute urinary retention 2/2 neurogenic bladder and two mechanical out of bed falls, as the patient is not accustomed to missing a foot. Pt was seen by urology and has completed a trial of void after several straight caths. Pt is currently voiding spontaneously without use of urbano or straight catheterization but feels like he is going frequently. Having regular bowel movements. Of note the pt states that Cymbalta and Lyrica makes him hallucinate and has been refusing the medications on the previous unit. Complains of phantom limb pain and incisional pain that is currently controlled on Oxycodone.     Pt seen and evaluated by Physiatry and is currently functioning at supervision for bed mobility , transfers minimum assist, ambulates 25ft RW contact guard assist, upper body dressing independent, lower body dressing contact guard assist, grooming independent. Pt is stable for acute inpatient rehabilitation.     s/p BKA  Phantom limb pain    Recommendations  1. acetaminophen 1000mg q8h   2. would trial duloxetine again 20mg q12h standing. hallucinations likely 2/2 to previous trial of lyrica. Once tolerating duloxetine, titrate up dose as tolerated  3. oxycodone 10mg q4h PRN for breakthrough pain  Can d/c with 7 day supply to f/u with PCP as o/p    Bowel regimen: miralax / colace  Physical therapy

## 2023-11-22 ENCOUNTER — TRANSCRIPTION ENCOUNTER (OUTPATIENT)
Age: 58
End: 2023-11-22

## 2023-11-22 LAB
GLUCOSE BLDC GLUCOMTR-MCNC: 110 MG/DL — HIGH (ref 70–99)
GLUCOSE BLDC GLUCOMTR-MCNC: 110 MG/DL — HIGH (ref 70–99)
GLUCOSE BLDC GLUCOMTR-MCNC: 123 MG/DL — HIGH (ref 70–99)
GLUCOSE BLDC GLUCOMTR-MCNC: 123 MG/DL — HIGH (ref 70–99)
GLUCOSE BLDC GLUCOMTR-MCNC: 252 MG/DL — HIGH (ref 70–99)
GLUCOSE BLDC GLUCOMTR-MCNC: 252 MG/DL — HIGH (ref 70–99)
GLUCOSE BLDC GLUCOMTR-MCNC: 357 MG/DL — HIGH (ref 70–99)
GLUCOSE BLDC GLUCOMTR-MCNC: 357 MG/DL — HIGH (ref 70–99)

## 2023-11-22 RX ADMIN — CLOPIDOGREL BISULFATE 75 MILLIGRAM(S): 75 TABLET, FILM COATED ORAL at 11:55

## 2023-11-22 RX ADMIN — ROSUVASTATIN CALCIUM 20 MILLIGRAM(S): 5 TABLET ORAL at 21:26

## 2023-11-22 RX ADMIN — Medication 650 MILLIGRAM(S): at 06:35

## 2023-11-22 RX ADMIN — Medication 650 MILLIGRAM(S): at 21:27

## 2023-11-22 RX ADMIN — Medication 0.5 MILLIGRAM(S): at 13:36

## 2023-11-22 RX ADMIN — INSULIN GLARGINE 24 UNIT(S): 100 INJECTION, SOLUTION SUBCUTANEOUS at 07:37

## 2023-11-22 RX ADMIN — Medication 10: at 07:36

## 2023-11-22 RX ADMIN — Medication 81 MILLIGRAM(S): at 11:55

## 2023-11-22 RX ADMIN — OXYCODONE HYDROCHLORIDE 10 MILLIGRAM(S): 5 TABLET ORAL at 08:43

## 2023-11-22 RX ADMIN — MAGNESIUM OXIDE 400 MG ORAL TABLET 400 MILLIGRAM(S): 241.3 TABLET ORAL at 07:38

## 2023-11-22 RX ADMIN — Medication 90 MILLIGRAM(S): at 06:35

## 2023-11-22 RX ADMIN — Medication 5 UNIT(S): at 11:52

## 2023-11-22 RX ADMIN — Medication 1 TABLET(S): at 11:55

## 2023-11-22 RX ADMIN — TAMSULOSIN HYDROCHLORIDE 0.4 MILLIGRAM(S): 0.4 CAPSULE ORAL at 21:26

## 2023-11-22 RX ADMIN — Medication 1 TABLET(S): at 07:38

## 2023-11-22 RX ADMIN — OXYCODONE HYDROCHLORIDE 10 MILLIGRAM(S): 5 TABLET ORAL at 21:00

## 2023-11-22 RX ADMIN — MAGNESIUM OXIDE 400 MG ORAL TABLET 400 MILLIGRAM(S): 241.3 TABLET ORAL at 17:56

## 2023-11-22 RX ADMIN — Medication 5 UNIT(S): at 17:53

## 2023-11-22 RX ADMIN — OXYCODONE HYDROCHLORIDE 10 MILLIGRAM(S): 5 TABLET ORAL at 20:45

## 2023-11-22 RX ADMIN — SENNA PLUS 2 TABLET(S): 8.6 TABLET ORAL at 21:27

## 2023-11-22 RX ADMIN — Medication 6: at 17:54

## 2023-11-22 RX ADMIN — HEPARIN SODIUM 5000 UNIT(S): 5000 INJECTION INTRAVENOUS; SUBCUTANEOUS at 13:41

## 2023-11-22 RX ADMIN — OXYCODONE HYDROCHLORIDE 10 MILLIGRAM(S): 5 TABLET ORAL at 07:43

## 2023-11-22 RX ADMIN — HEPARIN SODIUM 5000 UNIT(S): 5000 INJECTION INTRAVENOUS; SUBCUTANEOUS at 06:34

## 2023-11-22 RX ADMIN — OXYCODONE HYDROCHLORIDE 10 MILLIGRAM(S): 5 TABLET ORAL at 14:36

## 2023-11-22 RX ADMIN — MAGNESIUM OXIDE 400 MG ORAL TABLET 400 MILLIGRAM(S): 241.3 TABLET ORAL at 11:55

## 2023-11-22 RX ADMIN — HEPARIN SODIUM 5000 UNIT(S): 5000 INJECTION INTRAVENOUS; SUBCUTANEOUS at 21:27

## 2023-11-22 RX ADMIN — OXYCODONE HYDROCHLORIDE 10 MILLIGRAM(S): 5 TABLET ORAL at 13:36

## 2023-11-22 RX ADMIN — Medication 1 TABLET(S): at 17:56

## 2023-11-22 RX ADMIN — Medication 650 MILLIGRAM(S): at 13:40

## 2023-11-22 RX ADMIN — LOSARTAN POTASSIUM 100 MILLIGRAM(S): 100 TABLET, FILM COATED ORAL at 06:35

## 2023-11-22 NOTE — DISCHARGE NOTE NURSING/CASE MANAGEMENT/SOCIAL WORK - NSDCVIVACCINE_GEN_ALL_CORE_FT
Tdap; 21-Mar-2018 02:44; Wesley Castañeda (RN); [place]; [id number]; IntraMuscular; Deltoid Right.; 0.5 milliLiter(s); VIS (VIS Published: 09-May-2013, VIS Presented: 21-Mar-2018);

## 2023-11-22 NOTE — DISCHARGE NOTE NURSING/CASE MANAGEMENT/SOCIAL WORK - NSDCPEFALRISK_GEN_ALL_CORE
For information on Fall & Injury Prevention, visit: [email or web address] OR  [email or web address] OR  https://www.cdc.gov/steadi/patient.html

## 2023-11-22 NOTE — PROGRESS NOTE ADULT - ASSESSMENT
Rehab of right BKA in the setting of severe PAD with previous balloon angioplasty and diabetic Charcot arthropathy causing gait dysfunction and decline in ADLs   - s/p Meropenem 1000mg q12h before source control    - PT/OT  - RLE NWB   - R Knee immobilizer in bed  - c/w Plavix 75mg, ASA 81mg  - continue acute rehab program  - resumed home Crestor 20mg qhs (instead of Lipitor 80mg due to patient request).     Pain control   - c/w Oxycodone 10mg prn   - Pt was started on Lyrica 75mg q8h, Cymbalta 40mg q12h on previous unit, however patient states that he gets hallucinations and has been refusing the medications for the past several days and MS cleared  - pain management consulted, recs appreciated   - pt was getting narcotics from podiatrist in past but does not see physician anymore.     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  - c/w Lantus 24 units AM   - ISS, BGM AC  - running consistently high on 11/20  - add Lispro 5 unts q AC and monitor  - pt reports that he does not eat much for breakfast generally but eats for lunch and dinner   - 11/21 change to Lispro 5 units lunch and dinner only   - 11/21  this morning, will continue to monitor    Urinary retention  - US bladder and kidney 11/10: negative   - seen by urology 11/10: flomax, self-cath q4-6h, fu outpatient   - on admission eval, patient states voiding spontaneously without urbano or straight cath but is complaining of frequency   - PVRs on rehab unit revealed large PVRs 1700cc and 900cc  - risks and benefits of Urbano vs. CIC discussed with patient  - He admits to a very large fluid intake and agrees to cut down fluids to about 1500 - 1800cc daily.  - PVR volumes decreasing - below 600 (no spontaneous void, but drinking less)  - Continue Flomax and CIC q 6hrs and monitor volumes    Hypertension   - c/w Nifedipine 120mg daily, Losartan 100mg  - monitor and adjust bp meds as necessary     Anxiety   Depression  - c/w Xanax 0.5mg QID prn for anxiety   - monitor for symptoms of SI/HI  - can recommend psychosocial services if needed   - pt is currently in good spirits     -Pain control: Oxycodone 10/5 prn, Tylenol     -GI/Bowel Mgmt: Senna, Miralax      -Skin:  monitor incision - clean and dry    -FEN   - continue to monitor and replete electrolytes prn     - Diet: renal diet discontinued (per patient request), started Diabetic diet     Precautions / PROPHYLAXIS:      - Falls    - Ortho: Weight bearing status: NWB RLE       - DVT prophylaxis: SQH TID  Rehab of right BKA in the setting of severe PAD with previous balloon angioplasty and diabetic Charcot arthropathy causing gait dysfunction and decline in ADLs   - s/p Meropenem 1000mg q12h before source control    - PT/OT  - RLE NWB   - R Knee immobilizer in bed. Able to actively achieve full knee extension  - c/w Plavix 75mg, ASA 81mg  - continue acute rehab program  - resumed home Crestor 20mg qhs (instead of Lipitor 80mg due to patient request).     Pain control   - c/w Oxycodone 10mg prn   - Pt was started on Lyrica 75mg q8h, Cymbalta 40mg q12h on previous unit, however patient states that he gets hallucinations and has been refusing the medications for the past several days and MS cleared  - pain management consulted, recs appreciated   - pt was getting narcotics from podiatrist in past but does not see physician anymore.     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  - c/w Lantus 24 units AM   - ISS, BGM AC  - running consistently high on 11/20  - add Lispro 5 unts q AC and monitor  - pt reports that he does not eat much for breakfast generally but eats for lunch and dinner   - 11/21 change to Lispro 5 units lunch and dinner only   - 11/21  this morning, will continue to monitor    Urinary retention  - US bladder and kidney 11/10: negative   - seen by urology 11/10: flomax, self-cath q4-6h, fu outpatient   - on admission eval, patient states voiding spontaneously without urbano or straight cath but is complaining of frequency   - PVRs on rehab unit revealed large PVRs 1700cc and 900cc  - risks and benefits of Urbano vs. CIC discussed with patient  - He admits to a very large fluid intake and agrees to cut down fluids to about 1500 - 1800cc daily.  - PVR volumes decreasing - below 600 (no spontaneous void, but drinking less)  - Continue Flomax and CIC q 6hrs and monitor volumes    Hypertension   - c/w Nifedipine 120mg daily, Losartan 100mg  - monitor and adjust bp meds as necessary     Anxiety   Depression  - c/w Xanax 0.5mg QID prn for anxiety   - monitor for symptoms of SI/HI  - can recommend psychosocial services if needed   - pt is currently in good spirits     -Pain control: Oxycodone 10/5 prn, Tylenol     -GI/Bowel Mgmt: Senna, Miralax      -Skin:  monitor incision - clean and dry    -FEN   - continue to monitor and replete electrolytes prn     - Diet: renal diet discontinued (per patient request), started Diabetic diet     Precautions / PROPHYLAXIS:      - Falls    - Ortho: Weight bearing status: NWB RLE       - DVT prophylaxis: SQH TID

## 2023-11-22 NOTE — PROGRESS NOTE ADULT - SUBJECTIVE AND OBJECTIVE BOX
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 Pt underwent R TMA on 10/27 with revision on 10/30 and 11/4. 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 Cultures grew pseudomonas and yeast and treated with Meropenem. Hospital course is complicated by acute urinary retention 2/2 neurogenic bladder and two mechanical out of bed falls, as the patient is not accustomed to missing a foot. Pt was seen by urology and has completed a trial of void after several straight caths. Pt is currently voiding spontaneously without use of urbano or straight catheterization but feels like he is going frequently. Having regular bowel movements. Of note the pt states that Cymbalta and Lyrica makes him hallucinate and has been refusing the medications on the previous unit. Complains of phantom limb pain and incisional pain that is currently controlled on Oxycodone.     Pt seen and evaluated by Physiatry and is currently functioning at supervision for bed mobility , transfers minimum assist, ambulates 25ft RW contact guard assist, upper body dressing independent, lower body dressing contact guard assist, grooming independent. Pt is stable for acute inpatient rehabilitation.     LS: lives in private home with mother, 4 ASTER, another 13 ASTER to get to bedroom, half bathroom on 1st floor   PLOF: ambulates with crutches, independent in ADL/iADLs, no HHA    (15 Nov 2023 14:45)    TODAY'S SUBJECTIVE & REVIEW OF SYMPTOMS:  No acute overnight events. Pt doing well. Pt continuing to get PVR and straight cath. (no spontaneous void).   Will continue to monitor blood glucose and make adjustments.   Having regular bowel movements.  Participating and tolerating therapy.     CLOF: bed mobility set up, transfers touch assist, ambulates 80ftx2 axillary crutches touch assist, 4 steps partial assist, LBD set up    Review of Systems:   Constitutional:    [  x ] WNL           [   ] poor appetite   [   ] insomnia   [   ] tired   Cardio:                [ x  ] WNL           [   ] CP   [   ] RUENA   [   ] palpitations               Resp:                   [ x  ] WNL           [   ] SOB   [   ] cough   [   ] wheezing   GI:                        [  x ] WNL           [   ] constipation   [   ] diarrhea   [   ] abdominal pain   [   ] nausea   [   ] emesis                                :                      [   ] WNL           [   ] URBANO  [   ] dysuria   [   ] difficulty voiding    [  ] frequent voiding small amounts  - non CIC for retention    Endo:                   [ x  ] WNL          [   ] polyuria   [   ] temperature intolerance                 Skin:                     [ x  ] WNL          [   ] pain   [   ] wound   [   ] rash   MSK:                    [    ] WNL          [   ] muscle pain   [   ] joint pain/ stiffness   [   ] muscle tenderness   [   ] swelling  [ x]  right BKA with distal limb and phantom pain   Neuro:                 [   ] WNL          [   ] HA   [   ] change in vision   [   ] tremor   [   ] weakness   [   ]dysphagia  [ x ]  R limb phantom, neuropathic and incisional pain            Cognitive:           [  x ] WNL           [   ]confusion      Psych:                  [  x ] WNL           [   ] hallucinations   [   ]agitation   [   ] delusion   [   ]depression      PHYSICAL EXAM  Vital Signs Last 24 Hrs  T(C): 36.2 (22 Nov 2023 06:00), Max: 36.2 (22 Nov 2023 06:00)  T(F): 97.1 (22 Nov 2023 06:00), Max: 97.1 (22 Nov 2023 06:00)  HR: 64 (22 Nov 2023 06:00) (64 - 84)  BP: 158/73 (22 Nov 2023 06:00) (120/62 - 158/73)  BP(mean): 84 (21 Nov 2023 21:12) (84 - 84)  RR: 18 (22 Nov 2023 06:00) (18 - 19)  SpO2: --    General:[ x  ] NAD, Resting Comfortable,   [   ] other:                                HEENT: [  x ] NC/AT, EOMI, PERRL , Normal Conjunctivae,   [   ] other:  Cardio: [  x ] RRR, no murmer,   [   ] other:                              Pulm: [  x ] No Respiratory Distress,  Lungs CTAB,   [   ] other:                       Abdomen: [ x  ]ND/NT, Soft,   [   ] other:    : [ x  ] NO URBANO CATHETER, [   ] URBANO CATHETER- no meatal tear, no discharge, [   ] other:                                            MSK: [   ] No joint swelling, Full ROM,   [ x  ] other:                                    Ext: [  x ]No C/C/E, No calf tenderness,   [   ]other:   right BKA incision C&D with staples   Pt had good distal pedal pulses on LLE with intact proprioception and sensation   Skin: [ x  ]intact,   [   ] other:                                                                  Neurological Examination:  Cognitive: [  x  ] AAO x 3,   [    ]  other:                                                                      Attention:  [  x  ] intact,   [    ]  other:                            Memory: [  x  ] intact,    [    ]  other:     Mood/Affect: [  x  ] wnl,    [    ]  other:                                                                             Communication: [ x   ]Fluent, no dysarthria, following commands:  [    ] other:   CN II - XII:  [ x   ] intact,  [    ] other:                                                                                        Motor:   RIGHT UE: [  x ] WNL,  [   ] other:  Vital Signs Last 24 Hrs  T(C): 36.2 (21 Nov 2023 05:36), Max: 36.8 (20 Nov 2023 14:00)  T(F): 97.1 (21 Nov 2023 05:36), Max: 98.2 (20 Nov 2023 14:00)  HR: 71 (21 Nov 2023 05:36) (71 - 79)  BP: 116/65 (21 Nov 2023 05:36) (98/54 - 130/61)  BP(mean): 85 (21 Nov 2023 05:36) (85 - 88)  RR: 20 (21 Nov 2023 05:36) (18 - 20)  SpO2: --    LEFT    UE: [ x  ] WNL,  [   ] other:  RIGHT LE: [   ] WNL,  [   ] other: R HF 5/5, R BKA w/ knee immobilizer   LEFT    LE: [ x  ] WNL,  [   ] other:    Tone: [  x  ] wnl,   [    ]  other:  DTRs: [  x ]symmetric, [   ] other:  Coordination:   [  x  ] intact,   [    ] other:                                                                           Sensory: [  x  ] Intact to light touch,   [    ] other:    MEDICATIONS  (STANDING):  aspirin  chewable 81 milliGRAM(s) Oral daily  clopidogrel Tablet 75 milliGRAM(s) Oral daily  heparin   Injectable 5000 Unit(s) SubCutaneous every 8 hours  insulin glargine Injectable (LANTUS) 24 Unit(s) SubCutaneous every morning  insulin lispro (ADMELOG) corrective regimen sliding scale   SubCutaneous three times a day before meals  insulin lispro Injectable (ADMELOG) 5 Unit(s) SubCutaneous <User Schedule>  lactobacillus acidophilus 1 Tablet(s) Oral three times a day with meals  losartan 100 milliGRAM(s) Oral daily  magnesium oxide 400 milliGRAM(s) Oral three times a day with meals  NIFEdipine XL 90 milliGRAM(s) Oral daily  polyethylene glycol 3350 17 Gram(s) Oral daily  rosuvastatin 20 milliGRAM(s) Oral at bedtime  senna 2 Tablet(s) Oral at bedtime  sodium bicarbonate 650 milliGRAM(s) Oral three times a day  tamsulosin 0.4 milliGRAM(s) Oral at bedtime    MEDICATIONS  (PRN):  acetaminophen     Tablet .. 650 milliGRAM(s) Oral every 6 hours PRN Temp greater or equal to 38C (100.4F), Mild Pain (1 - 3)  ALPRAZolam 0.5 milliGRAM(s) Oral four times a day PRN anxiety  aluminum hydroxide/magnesium hydroxide/simethicone Suspension 30 milliLiter(s) Oral every 4 hours PRN Dyspepsia  dextrose Oral Gel 15 Gram(s) Oral once PRN Blood Glucose LESS THAN 70 milliGRAM(s)/deciliter  melatonin 5 milliGRAM(s) Oral at bedtime PRN Insomnia  oxyCODONE    IR 5 milliGRAM(s) Oral every 4 hours PRN Moderate Pain (4 - 6)  oxyCODONE    IR 10 milliGRAM(s) Oral every 4 hours PRN Severe Pain (7 - 10)  polyethylene glycol 3350 17 Gram(s) Oral two times a day PRN Constipation      RECENT LABS/IMAGING    POCT Blood Glucose.: 357 mg/dL (11-22-23 @ 07:18)  POCT Blood Glucose.: 151 mg/dL (11-21-23 @ 21:51)  POCT Blood Glucose.: 257 mg/dL (11-21-23 @ 17:04)  POCT Blood Glucose.: 66 mg/dL (11-21-23 @ 11:10)  POCT Blood Glucose.: 277 mg/dL (11-21-23 @ 07:01)  POCT Blood Glucose.: 199 mg/dL (11-20-23 @ 17:14)  POCT Blood Glucose.: 111 mg/dL (11-20-23 @ 11:58)  POCT Blood Glucose.: 279 mg/dL (11-20-23 @ 07:40)  POCT Blood Glucose.: 217 mg/dL (11-19-23 @ 21:57)  POCT Blood Glucose.: 270 mg/dL (11-19-23 @ 17:37)  POCT Blood Glucose.: 124 mg/dL (11-19-23 @ 12:02)  POCT Blood Glucose.: 287 mg/dL (11-19-23 @ 07:02)     Patient is a 58y old  Male who presents with a chief complaint of Rehab of 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 Pt underwent R TMA on 10/27 with revision on 10/30 and 11/4. 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 Cultures grew pseudomonas and yeast and treated with Meropenem. Hospital course is complicated by acute urinary retention 2/2 neurogenic bladder and two mechanical out of bed falls, as the patient is not accustomed to missing a foot. Pt was seen by urology and has completed a trial of void after several straight caths. Pt is currently voiding spontaneously without use of urbano or straight catheterization but feels like he is going frequently. Having regular bowel movements. Of note the pt states that Cymbalta and Lyrica makes him hallucinate and has been refusing the medications on the previous unit. Complains of phantom limb pain and incisional pain that is currently controlled on Oxycodone.     Pt seen and evaluated by Physiatry and is currently functioning at supervision for bed mobility , transfers minimum assist, ambulates 25ft RW contact guard assist, upper body dressing independent, lower body dressing contact guard assist, grooming independent. Pt is stable for acute inpatient rehabilitation.     LS: lives in private home with mother, 4 ASTER, another 13 ASTER to get to bedroom, half bathroom on 1st floor   PLOF: ambulates with crutches, independent in ADL/iADLs, no HHA    (15 Nov 2023 14:45)    TODAY'S SUBJECTIVE & REVIEW OF SYMPTOMS:  No acute overnight events. Pt doing well. Pt continuing to get PVR and straight cath. (trace spontaneous void).   Will continue to monitor blood glucose and make adjustments.   Having regular bowel movements.  Participating and tolerating therapy.     CLOF: bed mobility set up, transfers touch assist, ambulates 80ftx2 axillary crutches touch assist, 4 steps partial assist, LBD set up    Review of Systems:   Constitutional:    [  x ] WNL           [   ] poor appetite   [   ] insomnia   [   ] tired   Cardio:                [ x  ] WNL           [   ] CP   [   ] URENA   [   ] palpitations               Resp:                   [ x  ] WNL           [   ] SOB   [   ] cough   [   ] wheezing   GI:                        [  x ] WNL           [   ] constipation   [   ] diarrhea   [   ] abdominal pain   [   ] nausea   [   ] emesis                                :                      [   ] WNL           [   ] URBANO  [   ] dysuria   [ x  ] difficulty voiding requiring CIC   [  ] frequent voiding small amounts  - non CIC for retention    Endo:                   [ x  ] WNL          [   ] polyuria   [   ] temperature intolerance                 Skin:                     [ x  ] WNL          [   ] pain   [   ] wound   [   ] rash   MSK:                    [    ] WNL          [   ] muscle pain   [   ] joint pain/ stiffness   [   ] muscle tenderness   [   ] swelling  [ x]  right BKA with distal limb and phantom pain   Neuro:                 [   ] WNL          [   ] HA   [   ] change in vision   [   ] tremor   [   ] weakness   [   ]dysphagia  [ x ]  R limb phantom, neuropathic and incisional pain            Cognitive:           [  x ] WNL           [   ]confusion      Psych:                  [  x ] WNL           [   ] hallucinations   [   ]agitation   [   ] delusion   [   ]depression      PHYSICAL EXAM  Vital Signs Last 24 Hrs  T(C): 36.2 (22 Nov 2023 06:00), Max: 36.2 (22 Nov 2023 06:00)  T(F): 97.1 (22 Nov 2023 06:00), Max: 97.1 (22 Nov 2023 06:00)  HR: 64 (22 Nov 2023 06:00) (64 - 84)  BP: 158/73 (22 Nov 2023 06:00) (120/62 - 158/73)  BP(mean): 84 (21 Nov 2023 21:12) (84 - 84)  RR: 18 (22 Nov 2023 06:00) (18 - 19)  SpO2: --    General:[ x  ] NAD, Resting Comfortable,   [   ] other:                                HEENT: [  x ] NC/AT, EOMI, PERRL , Normal Conjunctivae,   [   ] other:  Cardio: [  x ] RRR, no murmer,   [   ] other:                              Pulm: [  x ] No Respiratory Distress,  Lungs CTAB,   [   ] other:                       Abdomen: [ x  ]ND/NT, Soft,   [   ] other:    : [ x  ] NO URBANO CATHETER, [   ] URBANO CATHETER- no meatal tear, no discharge, [   ] other:                                            MSK: [   ] No joint swelling, Full ROM,   [ x  ] other:                                    Ext: [  x ]No C/C/E, No calf tenderness,   [   ]other:   right BKA incision C&D with staples   Pt had good distal pedal pulses on LLE with intact proprioception and sensation   Skin: [ x  ]intact,   [   ] other:                                                                  Neurological Examination:  Cognitive: [  x  ] AAO x 3,   [    ]  other:                                                                      Attention:  [  x  ] intact,   [    ]  other:                            Memory: [  x  ] intact,    [    ]  other:     Mood/Affect: [  x  ] wnl,    [    ]  other:                                                                             Communication: [ x   ]Fluent, no dysarthria, following commands:  [    ] other:   CN II - XII:  [ x   ] intact,  [    ] other:                                                                                        Motor:   RIGHT UE: [  x ] WNL,  [   ] other:  LEFT    UE: [ x  ] WNL,  [   ] other:  RIGHT LE: [   ] WNL,  [   ] other: R HF 5/5, R BKA w/ knee immobilizer   LEFT    LE: [ x  ] WNL,  [   ] other:    Tone: [  x  ] wnl,   [    ]  other:  DTRs: [  x ]symmetric, [   ] other:  Coordination:   [  x  ] intact,   [    ] other:                                                                           Sensory: [  x  ] Intact to light touch,   [    ] other:    MEDICATIONS  (STANDING):  aspirin  chewable 81 milliGRAM(s) Oral daily  clopidogrel Tablet 75 milliGRAM(s) Oral daily  heparin   Injectable 5000 Unit(s) SubCutaneous every 8 hours  insulin glargine Injectable (LANTUS) 24 Unit(s) SubCutaneous every morning  insulin lispro (ADMELOG) corrective regimen sliding scale   SubCutaneous three times a day before meals  insulin lispro Injectable (ADMELOG) 5 Unit(s) SubCutaneous <User Schedule>  lactobacillus acidophilus 1 Tablet(s) Oral three times a day with meals  losartan 100 milliGRAM(s) Oral daily  magnesium oxide 400 milliGRAM(s) Oral three times a day with meals  NIFEdipine XL 90 milliGRAM(s) Oral daily  polyethylene glycol 3350 17 Gram(s) Oral daily  rosuvastatin 20 milliGRAM(s) Oral at bedtime  senna 2 Tablet(s) Oral at bedtime  sodium bicarbonate 650 milliGRAM(s) Oral three times a day  tamsulosin 0.4 milliGRAM(s) Oral at bedtime    MEDICATIONS  (PRN):  acetaminophen     Tablet .. 650 milliGRAM(s) Oral every 6 hours PRN Temp greater or equal to 38C (100.4F), Mild Pain (1 - 3)  ALPRAZolam 0.5 milliGRAM(s) Oral four times a day PRN anxiety  aluminum hydroxide/magnesium hydroxide/simethicone Suspension 30 milliLiter(s) Oral every 4 hours PRN Dyspepsia  dextrose Oral Gel 15 Gram(s) Oral once PRN Blood Glucose LESS THAN 70 milliGRAM(s)/deciliter  melatonin 5 milliGRAM(s) Oral at bedtime PRN Insomnia  oxyCODONE    IR 5 milliGRAM(s) Oral every 4 hours PRN Moderate Pain (4 - 6)  oxyCODONE    IR 10 milliGRAM(s) Oral every 4 hours PRN Severe Pain (7 - 10)  polyethylene glycol 3350 17 Gram(s) Oral two times a day PRN Constipation      RECENT LABS/IMAGING    POCT Blood Glucose.: 357 mg/dL (11-22-23 @ 07:18)  POCT Blood Glucose.: 151 mg/dL (11-21-23 @ 21:51)  POCT Blood Glucose.: 257 mg/dL (11-21-23 @ 17:04)  POCT Blood Glucose.: 66 mg/dL (11-21-23 @ 11:10)  POCT Blood Glucose.: 277 mg/dL (11-21-23 @ 07:01)  POCT Blood Glucose.: 199 mg/dL (11-20-23 @ 17:14)  POCT Blood Glucose.: 111 mg/dL (11-20-23 @ 11:58)  POCT Blood Glucose.: 279 mg/dL (11-20-23 @ 07:40)  POCT Blood Glucose.: 217 mg/dL (11-19-23 @ 21:57)  POCT Blood Glucose.: 270 mg/dL (11-19-23 @ 17:37)  POCT Blood Glucose.: 124 mg/dL (11-19-23 @ 12:02)  POCT Blood Glucose.: 287 mg/dL (11-19-23 @ 07:02)

## 2023-11-22 NOTE — DISCHARGE NOTE NURSING/CASE MANAGEMENT/SOCIAL WORK - PATIENT PORTAL LINK FT
You can access the FollowMyHealth Patient Portal offered by [facility] by registering at the following website: [email or web address]. By joining [facility]’s FollowMyHealth portal, you will also be able to view your health information using other applications (apps) compatible with our system.

## 2023-11-23 LAB
GLUCOSE BLDC GLUCOMTR-MCNC: 127 MG/DL — HIGH (ref 70–99)
GLUCOSE BLDC GLUCOMTR-MCNC: 127 MG/DL — HIGH (ref 70–99)
GLUCOSE BLDC GLUCOMTR-MCNC: 181 MG/DL — HIGH (ref 70–99)
GLUCOSE BLDC GLUCOMTR-MCNC: 181 MG/DL — HIGH (ref 70–99)
GLUCOSE BLDC GLUCOMTR-MCNC: 386 MG/DL — HIGH (ref 70–99)
GLUCOSE BLDC GLUCOMTR-MCNC: 386 MG/DL — HIGH (ref 70–99)
GLUCOSE BLDC GLUCOMTR-MCNC: 78 MG/DL — SIGNIFICANT CHANGE UP (ref 70–99)
GLUCOSE BLDC GLUCOMTR-MCNC: 78 MG/DL — SIGNIFICANT CHANGE UP (ref 70–99)

## 2023-11-23 RX ADMIN — OXYCODONE HYDROCHLORIDE 5 MILLIGRAM(S): 5 TABLET ORAL at 14:56

## 2023-11-23 RX ADMIN — HEPARIN SODIUM 5000 UNIT(S): 5000 INJECTION INTRAVENOUS; SUBCUTANEOUS at 21:14

## 2023-11-23 RX ADMIN — HEPARIN SODIUM 5000 UNIT(S): 5000 INJECTION INTRAVENOUS; SUBCUTANEOUS at 05:22

## 2023-11-23 RX ADMIN — Medication 1 TABLET(S): at 17:22

## 2023-11-23 RX ADMIN — CLOPIDOGREL BISULFATE 75 MILLIGRAM(S): 75 TABLET, FILM COATED ORAL at 12:00

## 2023-11-23 RX ADMIN — MAGNESIUM OXIDE 400 MG ORAL TABLET 400 MILLIGRAM(S): 241.3 TABLET ORAL at 12:01

## 2023-11-23 RX ADMIN — OXYCODONE HYDROCHLORIDE 10 MILLIGRAM(S): 5 TABLET ORAL at 21:00

## 2023-11-23 RX ADMIN — Medication 0.5 MILLIGRAM(S): at 03:43

## 2023-11-23 RX ADMIN — MAGNESIUM OXIDE 400 MG ORAL TABLET 400 MILLIGRAM(S): 241.3 TABLET ORAL at 07:49

## 2023-11-23 RX ADMIN — ROSUVASTATIN CALCIUM 20 MILLIGRAM(S): 5 TABLET ORAL at 21:14

## 2023-11-23 RX ADMIN — OXYCODONE HYDROCHLORIDE 10 MILLIGRAM(S): 5 TABLET ORAL at 08:27

## 2023-11-23 RX ADMIN — Medication 650 MILLIGRAM(S): at 05:22

## 2023-11-23 RX ADMIN — Medication 650 MILLIGRAM(S): at 17:22

## 2023-11-23 RX ADMIN — OXYCODONE HYDROCHLORIDE 10 MILLIGRAM(S): 5 TABLET ORAL at 03:39

## 2023-11-23 RX ADMIN — Medication 10: at 07:52

## 2023-11-23 RX ADMIN — SENNA PLUS 2 TABLET(S): 8.6 TABLET ORAL at 21:15

## 2023-11-23 RX ADMIN — Medication 1 TABLET(S): at 12:01

## 2023-11-23 RX ADMIN — HEPARIN SODIUM 5000 UNIT(S): 5000 INJECTION INTRAVENOUS; SUBCUTANEOUS at 15:03

## 2023-11-23 RX ADMIN — Medication 5 UNIT(S): at 12:04

## 2023-11-23 RX ADMIN — Medication 1 TABLET(S): at 07:49

## 2023-11-23 RX ADMIN — Medication 650 MILLIGRAM(S): at 21:14

## 2023-11-23 RX ADMIN — OXYCODONE HYDROCHLORIDE 10 MILLIGRAM(S): 5 TABLET ORAL at 04:44

## 2023-11-23 RX ADMIN — INSULIN GLARGINE 24 UNIT(S): 100 INJECTION, SOLUTION SUBCUTANEOUS at 07:51

## 2023-11-23 RX ADMIN — Medication 5 UNIT(S): at 17:22

## 2023-11-23 RX ADMIN — Medication 81 MILLIGRAM(S): at 12:00

## 2023-11-23 RX ADMIN — OXYCODONE HYDROCHLORIDE 10 MILLIGRAM(S): 5 TABLET ORAL at 12:02

## 2023-11-23 RX ADMIN — TAMSULOSIN HYDROCHLORIDE 0.4 MILLIGRAM(S): 0.4 CAPSULE ORAL at 21:14

## 2023-11-23 RX ADMIN — MAGNESIUM OXIDE 400 MG ORAL TABLET 400 MILLIGRAM(S): 241.3 TABLET ORAL at 17:22

## 2023-11-23 RX ADMIN — Medication 90 MILLIGRAM(S): at 05:22

## 2023-11-23 RX ADMIN — OXYCODONE HYDROCHLORIDE 10 MILLIGRAM(S): 5 TABLET ORAL at 20:16

## 2023-11-23 RX ADMIN — LOSARTAN POTASSIUM 100 MILLIGRAM(S): 100 TABLET, FILM COATED ORAL at 05:22

## 2023-11-23 RX ADMIN — Medication 0.5 MILLIGRAM(S): at 15:01

## 2023-11-23 RX ADMIN — OXYCODONE HYDROCHLORIDE 5 MILLIGRAM(S): 5 TABLET ORAL at 15:22

## 2023-11-23 NOTE — PROGRESS NOTE ADULT - SUBJECTIVE AND OBJECTIVE BOX
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 Pt underwent R TMA on 10/27 with revision on 10/30 and 11/4. 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 Cultures grew pseudomonas and yeast and treated with Meropenem. Hospital course is complicated by acute urinary retention 2/2 neurogenic bladder and two mechanical out of bed falls, as the patient is not accustomed to missing a foot. Pt was seen by urology and has completed a trial of void after several straight caths. Pt is currently voiding spontaneously without use of urbano or straight catheterization but feels like he is going frequently. Having regular bowel movements. Of note the pt states that Cymbalta and Lyrica makes him hallucinate and has been refusing the medications on the previous unit. Complains of phantom limb pain and incisional pain that is currently controlled on Oxycodone.     Pt seen and evaluated by Physiatry and is currently functioning at supervision for bed mobility , transfers minimum assist, ambulates 25ft RW contact guard assist, upper body dressing independent, lower body dressing contact guard assist, grooming independent. Pt is stable for acute inpatient rehabilitation.     LS: lives in private home with mother, 4 ASTER, another 13 ASTER to get to bedroom, half bathroom on 1st floor   PLOF: ambulates with crutches, independent in ADL/iADLs, no HHA    (15 Nov 2023 14:45)    TODAY'S SUBJECTIVE & REVIEW OF SYMPTOMS:  No acute overnight events. Pt doing well. Pt continuing to get PVR and straight cath. However was able to void 100 - 200cc spontaneously this morning. Follow up cath still 700cc. Told patient to restrict more fluid intake.   Will continue to monitor blood glucose and make adjustments.   Having regular bowel movements.  Participating and tolerating therapy.     CLOF: bed mobility independent, transfers supervision, ambulates 40ft RW TA, 8 steps touch assist, LBD set up, toileting supervision     Review of Systems:   Constitutional:    [  x ] WNL           [   ] poor appetite   [   ] insomnia   [   ] tired   Cardio:                [ x  ] WNL           [   ] CP   [   ] URENA   [   ] palpitations               Resp:                   [ x  ] WNL           [   ] SOB   [   ] cough   [   ] wheezing   GI:                        [  x ] WNL           [   ] constipation   [   ] diarrhea   [   ] abdominal pain   [   ] nausea   [   ] emesis                                :                      [   ] WNL           [   ] URBANO  [   ] dysuria   [ x  ] difficulty voiding requiring CIC   [  ] frequent voiding small amounts  - non CIC for retention    Endo:                   [ x  ] WNL          [   ] polyuria   [   ] temperature intolerance                 Skin:                     [ x  ] WNL          [   ] pain   [   ] wound   [   ] rash   MSK:                    [    ] WNL          [   ] muscle pain   [   ] joint pain/ stiffness   [   ] muscle tenderness   [   ] swelling  [ x]  right BKA with distal limb and phantom pain   Neuro:                 [   ] WNL          [   ] HA   [   ] change in vision   [   ] tremor   [   ] weakness   [   ]dysphagia  [ x ]  R limb phantom, neuropathic and incisional pain            Cognitive:           [  x ] WNL           [   ]confusion      Psych:                  [  x ] WNL           [   ] hallucinations   [   ]agitation   [   ] delusion   [   ]depression      PHYSICAL EXAM  Vital Signs Last 24 Hrs  T(C): 36.1 (23 Nov 2023 05:35), Max: 36.1 (23 Nov 2023 05:35)  T(F): 97 (23 Nov 2023 05:35), Max: 97 (23 Nov 2023 05:35)  HR: 70 (23 Nov 2023 05:35) (63 - 71)  BP: 131/66 (23 Nov 2023 05:35) (111/54 - 138/66)  BP(mean): 95 (22 Nov 2023 21:25) (95 - 95)  RR: 18 (23 Nov 2023 05:35) (18 - 18)  SpO2: --    General:[ x  ] NAD, Resting Comfortable,   [   ] other:                                HEENT: [  x ] NC/AT, EOMI, PERRL , Normal Conjunctivae,   [   ] other:  Cardio: [  x ] RRR, no murmer,   [   ] other:                              Pulm: [  x ] No Respiratory Distress,  Lungs CTAB,   [   ] other:                       Abdomen: [ x  ]ND/NT, Soft,   [   ] other:    : [ x  ] NO URBANO CATHETER, [   ] URBANO CATHETER- no meatal tear, no discharge, [   ] other:                                            MSK: [   ] No joint swelling, Full ROM,   [ x  ] other:                                    Ext: [  x ]No C/C/E, No calf tenderness,   [   ]other:   right BKA incision C&D with staples   Pt had good distal pedal pulses on LLE with intact proprioception and sensation   Skin: [ x  ]intact,   [   ] other:                                                                  Neurological Examination:  Cognitive: [  x  ] AAO x 3,   [    ]  other:                                                                      Attention:  [  x  ] intact,   [    ]  other:                            Memory: [  x  ] intact,    [    ]  other:     Mood/Affect: [  x  ] wnl,    [    ]  other:                                                                             Communication: [ x   ]Fluent, no dysarthria, following commands:  [    ] other:   CN II - XII:  [ x   ] intact,  [    ] other:                                                                                        Motor:   RIGHT UE: [  x ] WNL,  [   ] other:  LEFT    UE: [ x  ] WNL,  [   ] other:  RIGHT LE: [   ] WNL,  [   ] other: R HF 5/5, R BKA w/ knee immobilizer   LEFT    LE: [ x  ] WNL,  [   ] other:    Tone: [  x  ] wnl,   [    ]  other:  DTRs: [  x ]symmetric, [   ] other:  Coordination:   [  x  ] intact,   [    ] other:                                                                           Sensory: [  x  ] Intact to light touch,   [    ] other:    MEDICATIONS  (STANDING):  aspirin  chewable 81 milliGRAM(s) Oral daily  clopidogrel Tablet 75 milliGRAM(s) Oral daily  heparin   Injectable 5000 Unit(s) SubCutaneous every 8 hours  insulin glargine Injectable (LANTUS) 24 Unit(s) SubCutaneous every morning  insulin lispro (ADMELOG) corrective regimen sliding scale   SubCutaneous three times a day before meals  insulin lispro Injectable (ADMELOG) 5 Unit(s) SubCutaneous <User Schedule>  lactobacillus acidophilus 1 Tablet(s) Oral three times a day with meals  losartan 100 milliGRAM(s) Oral daily  magnesium oxide 400 milliGRAM(s) Oral three times a day with meals  NIFEdipine XL 90 milliGRAM(s) Oral daily  polyethylene glycol 3350 17 Gram(s) Oral daily  rosuvastatin 20 milliGRAM(s) Oral at bedtime  senna 2 Tablet(s) Oral at bedtime  sodium bicarbonate 650 milliGRAM(s) Oral three times a day  tamsulosin 0.4 milliGRAM(s) Oral at bedtime    MEDICATIONS  (PRN):  acetaminophen     Tablet .. 650 milliGRAM(s) Oral every 6 hours PRN Temp greater or equal to 38C (100.4F), Mild Pain (1 - 3)  ALPRAZolam 0.5 milliGRAM(s) Oral four times a day PRN anxiety  aluminum hydroxide/magnesium hydroxide/simethicone Suspension 30 milliLiter(s) Oral every 4 hours PRN Dyspepsia  dextrose Oral Gel 15 Gram(s) Oral once PRN Blood Glucose LESS THAN 70 milliGRAM(s)/deciliter  melatonin 5 milliGRAM(s) Oral at bedtime PRN Insomnia  oxyCODONE    IR 5 milliGRAM(s) Oral every 4 hours PRN Moderate Pain (4 - 6)  oxyCODONE    IR 10 milliGRAM(s) Oral every 4 hours PRN Severe Pain (7 - 10)  polyethylene glycol 3350 17 Gram(s) Oral two times a day PRN Constipation      RECENT LABS/IMAGING    POCT Blood Glucose.: 386 mg/dL (11-23-23 @ 07:39)  POCT Blood Glucose.: 123 mg/dL (11-22-23 @ 21:49)  POCT Blood Glucose.: 252 mg/dL (11-22-23 @ 17:35)  POCT Blood Glucose.: 110 mg/dL (11-22-23 @ 11:40)  POCT Blood Glucose.: 357 mg/dL (11-22-23 @ 07:18)  POCT Blood Glucose.: 151 mg/dL (11-21-23 @ 21:51)  POCT Blood Glucose.: 257 mg/dL (11-21-23 @ 17:04)  POCT Blood Glucose.: 66 mg/dL (11-21-23 @ 11:10)  POCT Blood Glucose.: 277 mg/dL (11-21-23 @ 07:01)  POCT Blood Glucose.: 199 mg/dL (11-20-23 @ 17:14)  POCT Blood Glucose.: 111 mg/dL (11-20-23 @ 11:58)  POCT Blood Glucose.: 279 mg/dL (11-20-23 @ 07:40)

## 2023-11-23 NOTE — PROGRESS NOTE ADULT - ASSESSMENT
Rehab of right BKA in the setting of severe PAD with previous balloon angioplasty and diabetic Charcot arthropathy causing gait dysfunction and decline in ADLs   - s/p Meropenem 1000mg q12h before source control    - PT/OT  - RLE NWB   - R Knee immobilizer in bed. Able to actively achieve full knee extension  - c/w Plavix 75mg, ASA 81mg  - continue acute rehab program  - resumed home Crestor 20mg qhs (instead of Lipitor 80mg due to patient request).     Pain control   - c/w Oxycodone 10mg prn   - Pt was started on Lyrica 75mg q8h, Cymbalta 40mg q12h on previous unit, however patient states that he gets hallucinations and has been refusing the medications for the past several days and MS cleared  - pain management consulted, recs appreciated   - pt was getting narcotics from podiatrist in past but does not see physician anymore.     CKD stage 4 (2.5-3.5)  Chronic anemia 2/2 CKD  - on renal transplant list - avoid blood tx  - give Venofer 200mg IV q24h x5, retacrit 08030 units sq weekly per nephro   - magnesium oxide 400mg TID  - Sodium bicarb 650mg TID   - stable     IDDM 2 w/ Charcot arthropathy and nephropathy  - c/w Lantus 24 units AM   - ISS, BGM AC  - running consistently high on 11/20  - add Lispro 5 unts q AC and monitor  - pt reports that he does not eat much for breakfast generally but eats for lunch and dinner   - 11/21 change to Lispro 5 units lunch and dinner only   - Having elevation with morning blood glucose, will continue to monitor     Urinary retention  - US bladder and kidney 11/10: negative   - seen by urology 11/10: flomax, self-cath q4-6h, fu outpatient   - on admission eval, patient states voiding spontaneously without urbano or straight cath but is complaining of frequency   - PVRs on rehab unit revealed large PVRs 1700cc and 900cc  - risks and benefits of Urbano vs. CIC discussed with patient  - He admits to a very large fluid intake and agrees to cut down fluids to about 1500 - 1800cc daily.  - PVR volumes decreasing - below 600 (no spontaneous void, but drinking less)  - Continue Flomax and CIC q 6hrs and monitor volumes  - 11/23 was able to urinate 100 and 200cc without cath, cath after 700cc, told patient to decrease fluid intake     Hypertension   - c/w Nifedipine 120mg daily, Losartan 100mg  - monitor and adjust bp meds as necessary     Anxiety   Depression  - c/w Xanax 0.5mg QID prn for anxiety   - monitor for symptoms of SI/HI  - can recommend psychosocial services if needed   - pt is currently in good spirits     -Pain control: Oxycodone 10/5 prn, Tylenol     -GI/Bowel Mgmt: Senna, Miralax      -Skin:  monitor incision - clean and dry    -FEN   - continue to monitor and replete electrolytes prn     - Diet: renal diet discontinued (per patient request), started Diabetic diet     Precautions / PROPHYLAXIS:      - Falls    - Ortho: Weight bearing status: NWB RLE       - DVT prophylaxis: SQH TID

## 2023-11-24 LAB
GLUCOSE BLDC GLUCOMTR-MCNC: 102 MG/DL — HIGH (ref 70–99)
GLUCOSE BLDC GLUCOMTR-MCNC: 102 MG/DL — HIGH (ref 70–99)
GLUCOSE BLDC GLUCOMTR-MCNC: 124 MG/DL — HIGH (ref 70–99)
GLUCOSE BLDC GLUCOMTR-MCNC: 124 MG/DL — HIGH (ref 70–99)
GLUCOSE BLDC GLUCOMTR-MCNC: 168 MG/DL — HIGH (ref 70–99)
GLUCOSE BLDC GLUCOMTR-MCNC: 168 MG/DL — HIGH (ref 70–99)
GLUCOSE BLDC GLUCOMTR-MCNC: 242 MG/DL — HIGH (ref 70–99)
GLUCOSE BLDC GLUCOMTR-MCNC: 242 MG/DL — HIGH (ref 70–99)

## 2023-11-24 RX ORDER — INSULIN LISPRO 100/ML
5 VIAL (ML) SUBCUTANEOUS
Refills: 0 | Status: DISCONTINUED | OUTPATIENT
Start: 2023-11-24 | End: 2023-11-24

## 2023-11-24 RX ORDER — INSULIN LISPRO 100/ML
5 VIAL (ML) SUBCUTANEOUS
Refills: 0 | Status: DISCONTINUED | OUTPATIENT
Start: 2023-11-24 | End: 2023-11-28

## 2023-11-24 RX ORDER — INSULIN LISPRO 100/ML
8 VIAL (ML) SUBCUTANEOUS
Refills: 0 | Status: DISCONTINUED | OUTPATIENT
Start: 2023-11-24 | End: 2023-11-28

## 2023-11-24 RX ORDER — SILODOSIN 4 MG/1
8 CAPSULE ORAL AT BEDTIME
Refills: 0 | Status: DISCONTINUED | OUTPATIENT
Start: 2023-11-24 | End: 2023-11-28

## 2023-11-24 RX ADMIN — Medication 1 TABLET(S): at 08:43

## 2023-11-24 RX ADMIN — LOSARTAN POTASSIUM 100 MILLIGRAM(S): 100 TABLET, FILM COATED ORAL at 05:24

## 2023-11-24 RX ADMIN — MAGNESIUM OXIDE 400 MG ORAL TABLET 400 MILLIGRAM(S): 241.3 TABLET ORAL at 08:44

## 2023-11-24 RX ADMIN — OXYCODONE HYDROCHLORIDE 10 MILLIGRAM(S): 5 TABLET ORAL at 19:10

## 2023-11-24 RX ADMIN — Medication 1 TABLET(S): at 12:18

## 2023-11-24 RX ADMIN — OXYCODONE HYDROCHLORIDE 10 MILLIGRAM(S): 5 TABLET ORAL at 00:53

## 2023-11-24 RX ADMIN — Medication 0.5 MILLIGRAM(S): at 00:53

## 2023-11-24 RX ADMIN — Medication 650 MILLIGRAM(S): at 05:24

## 2023-11-24 RX ADMIN — MAGNESIUM OXIDE 400 MG ORAL TABLET 400 MILLIGRAM(S): 241.3 TABLET ORAL at 17:29

## 2023-11-24 RX ADMIN — CLOPIDOGREL BISULFATE 75 MILLIGRAM(S): 75 TABLET, FILM COATED ORAL at 12:18

## 2023-11-24 RX ADMIN — ROSUVASTATIN CALCIUM 20 MILLIGRAM(S): 5 TABLET ORAL at 22:59

## 2023-11-24 RX ADMIN — Medication 4: at 07:44

## 2023-11-24 RX ADMIN — OXYCODONE HYDROCHLORIDE 10 MILLIGRAM(S): 5 TABLET ORAL at 05:23

## 2023-11-24 RX ADMIN — MAGNESIUM OXIDE 400 MG ORAL TABLET 400 MILLIGRAM(S): 241.3 TABLET ORAL at 12:18

## 2023-11-24 RX ADMIN — SILODOSIN 8 MILLIGRAM(S): 4 CAPSULE ORAL at 23:00

## 2023-11-24 RX ADMIN — Medication 650 MILLIGRAM(S): at 22:59

## 2023-11-24 RX ADMIN — Medication 8 UNIT(S): at 17:30

## 2023-11-24 RX ADMIN — Medication 0.5 MILLIGRAM(S): at 12:18

## 2023-11-24 RX ADMIN — Medication 650 MILLIGRAM(S): at 16:17

## 2023-11-24 RX ADMIN — Medication 90 MILLIGRAM(S): at 05:25

## 2023-11-24 RX ADMIN — SENNA PLUS 2 TABLET(S): 8.6 TABLET ORAL at 22:59

## 2023-11-24 RX ADMIN — OXYCODONE HYDROCHLORIDE 10 MILLIGRAM(S): 5 TABLET ORAL at 12:50

## 2023-11-24 RX ADMIN — HEPARIN SODIUM 5000 UNIT(S): 5000 INJECTION INTRAVENOUS; SUBCUTANEOUS at 05:25

## 2023-11-24 RX ADMIN — Medication 5 UNIT(S): at 12:20

## 2023-11-24 RX ADMIN — INSULIN GLARGINE 24 UNIT(S): 100 INJECTION, SOLUTION SUBCUTANEOUS at 07:44

## 2023-11-24 RX ADMIN — Medication 2: at 12:20

## 2023-11-24 RX ADMIN — HEPARIN SODIUM 5000 UNIT(S): 5000 INJECTION INTRAVENOUS; SUBCUTANEOUS at 22:58

## 2023-11-24 RX ADMIN — OXYCODONE HYDROCHLORIDE 10 MILLIGRAM(S): 5 TABLET ORAL at 12:18

## 2023-11-24 RX ADMIN — HEPARIN SODIUM 5000 UNIT(S): 5000 INJECTION INTRAVENOUS; SUBCUTANEOUS at 16:17

## 2023-11-24 RX ADMIN — Medication 81 MILLIGRAM(S): at 12:18

## 2023-11-24 RX ADMIN — OXYCODONE HYDROCHLORIDE 10 MILLIGRAM(S): 5 TABLET ORAL at 18:36

## 2023-11-24 RX ADMIN — Medication 1 TABLET(S): at 17:29

## 2023-11-24 NOTE — CHART NOTE - NSCHARTNOTEFT_GEN_A_CORE
Family (sister ) and patient wants a follow up study on bladder  since he had initially around 11/10 retention and when pt was seen by urologist Ladarius Duarte   he was able to urinate without retention , then the retention reoccurred on 11/15 , since then he is on CIC ,  the initial bladder and kidney  Sono showed  no calculi, hydronephrosis , had some mild bladder wall thickness , Dr Blanco ( Covering for dr Blood) discussed in detail with sister who had lot of Questions  about his urinary issues , all her questions answered , also explained all this to patient too .  he is anticipated to be discharged on Monday 11/27/23 . His pharmacy Is [facility].  patients PMD is dr DANIELSON  who does not prescribe pain meds as per family , will need a  Pain management follow up with dr Cordova.

## 2023-11-24 NOTE — PROGRESS NOTE ADULT - ASSESSMENT
Neuropsychology Follow-Up        Treatment Session Focused on Mood       Pain: Endorsed   Location:  incisional site (RLE) Ratin/10     Intervention:  NA     Orientation: WFL       Arousal Level: Alert      Behavior: Cooperative, pleasant      Affect Range:  Euthymic, frustrated at times      Needed:  No  #: N/A      Attention: WFL       Insight into illness/deficits: Good            Patient was seen for a follow up session to address mood and follow up on continued adjustment to the unit.  Patient appeared initially to be happy , and reported his mood as "good,”; however, over course of session, patient described frustrations regarding urinary issues and perceived lack of diagnostic workup to address.  He discussed journaling/note-taking and emotional reactions were processed in session.  Support provided.  Psychoeducation regarding mirror therapy was provided.  Plan is to continue to support mood and positive adjustment/coping.  Of note, patient discussed displeasure surrounding voiding difficulties.           Recommendations:??       Support coping/adjustment and mood.  Provide patient time and space to discuss frustrations and explain available options in clear language that is understandable.            Goals: ? Facilitate Positive Coping       Plan: 1-2 times a week 30-60 minutes

## 2023-11-24 NOTE — PROGRESS NOTE ADULT - SUBJECTIVE AND OBJECTIVE BOX
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 Pt underwent R TMA on 10/27 with revision on 10/30 and 11/4. 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 Cultures grew pseudomonas and yeast and treated with Meropenem. Hospital course is complicated by acute urinary retention 2/2 neurogenic bladder and two mechanical out of bed falls, as the patient is not accustomed to missing a foot. Pt was seen by urology and has completed a trial of void after several straight caths. Pt is currently voiding spontaneously without use of urbano or straight catheterization but feels like he is going frequently. Having regular bowel movements. Of note the pt states that Cymbalta and Lyrica makes him hallucinate and has been refusing the medications on the previous unit. Complains of phantom limb pain and incisional pain that is currently controlled on Oxycodone.     Pt seen and evaluated by Physiatry and is currently functioning at supervision for bed mobility , transfers minimum assist, ambulates 25ft RW contact guard assist, upper body dressing independent, lower body dressing contact guard assist, grooming independent. Pt is stable for acute inpatient rehabilitation.     LS: lives in private home with mother, 4 ASTER, another 13 ASTER to get to bedroom, half bathroom on 1st floor   PLOF: ambulates with crutches, independent in ADL/iADLs, no HHA    (15 Nov 2023 14:45)    TODAY'S SUBJECTIVE & REVIEW OF SYMPTOMS:  No new complaints. Still requiring CIC q 6 hrs with mimimal spontaneous void and 400- 600cc PVRs.   Having regular bowel movements.  Participating and tolerating therapy.   VSS    CLOF: bed mobility independent, transfers CG/supervision, ambulates 40ft RW TA, 8 steps touch assist, LBD set up, toileting supervision     Review of Systems:   Constitutional:    [  x ] WNL           [   ] poor appetite   [   ] insomnia   [   ] tired   Cardio:                [ x  ] WNL           [   ] CP   [   ] URENA   [   ] palpitations               Resp:                   [ x  ] WNL           [   ] SOB   [   ] cough   [   ] wheezing   GI:                        [  x ] WNL           [   ] constipation   [   ] diarrhea   [   ] abdominal pain   [   ] nausea   [   ] emesis                                :                      [   ] WNL           [   ] URBANO  [   ] dysuria   [ x  ] difficulty voiding requiring CIC   [  ] frequent voiding small amounts  - non CIC for retention    Endo:                   [ x  ] WNL          [   ] polyuria   [   ] temperature intolerance                 Skin:                     [ x  ] WNL          [   ] pain   [   ] wound   [   ] rash   MSK:                    [    ] WNL          [   ] muscle pain   [   ] joint pain/ stiffness   [   ] muscle tenderness   [   ] swelling  [ x]  right BKA with distal limb and phantom pain   Neuro:                 [   ] WNL          [   ] HA   [   ] change in vision   [   ] tremor   [   ] weakness   [   ]dysphagia  [ x ]  R limb phantom, neuropathic and incisional pain            Cognitive:           [  x ] WNL           [   ]confusion      Psych:                  [  x ] WNL           [   ] hallucinations   [   ]agitation   [   ] delusion   [   ]depression      PHYSICAL EXAM    Vital Signs Last 24 Hrs  T(C): 36.1 (24 Nov 2023 05:05), Max: 36.2 (23 Nov 2023 14:15)  T(F): 96.9 (24 Nov 2023 05:05), Max: 97.2 (23 Nov 2023 14:15)  HR: 67 (24 Nov 2023 05:05) (65 - 69)  BP: 125/61 (24 Nov 2023 05:05) (119/65 - 128/64)  BP(mean): --  RR: 19 (24 Nov 2023 05:05) (18 - 19)  SpO2: --    General:[ x  ] NAD, Resting Comfortable,   [   ] other:                                HEENT: [  x ] NC/AT, EOMI, PERRL , Normal Conjunctivae,   [   ] other:  Cardio: [  x ] RRR, no murmer,   [   ] other:                              Pulm: [  x ] No Respiratory Distress,  Lungs CTAB,   [   ] other:                       Abdomen: [ x  ]ND/NT, Soft,   [   ] other:    : [ x  ] NO URBANO CATHETER, [   ] URBANO CATHETER- no meatal tear, no discharge, [   ] other:                                            MSK: [   ] No joint swelling, Full ROM,   [ x  ] other:                                    Ext: [  x ]No C/C/E, No calf tenderness,   [   ]other:   right BKA incision C&D with staples   Pt had good distal pedal pulses on LLE with intact proprioception and sensation   Skin: [ x  ]intact,   [   ] other:                                                                  Neurological Examination:  Cognitive: [  x  ] AAO x 3,   [    ]  other:                                                                      Attention:  [  x  ] intact,   [    ]  other:                            Memory: [  x  ] intact,    [    ]  other:     Mood/Affect: [  x  ] wnl,    [    ]  other:                                                                             Communication: [ x   ]Fluent, no dysarthria, following commands:  [    ] other:   CN II - XII:  [ x   ] intact,  [    ] other:                                                                                        Motor:   RIGHT UE: [  x ] WNL,  [   ] other:  LEFT    UE: [ x  ] WNL,  [   ] other:  RIGHT LE: [   ] WNL,  [   ] other: R HF 5/5, R BKA w/ knee immobilizer   LEFT    LE: [ x  ] WNL,  [   ] other:    Tone: [  x  ] wnl,   [    ]  other:  DTRs: [  x ]symmetric, [   ] other:  Coordination:   [  x  ] intact,   [    ] other:                                                                           Sensory: [  x  ] Intact to light touch,   [    ] other:    MEDICATIONS  (STANDING):  aspirin  chewable 81 milliGRAM(s) Oral daily  clopidogrel Tablet 75 milliGRAM(s) Oral daily  heparin   Injectable 5000 Unit(s) SubCutaneous every 8 hours  insulin glargine Injectable (LANTUS) 24 Unit(s) SubCutaneous every morning  insulin lispro (ADMELOG) corrective regimen sliding scale   SubCutaneous three times a day before meals  insulin lispro Injectable (ADMELOG) 5 Unit(s) SubCutaneous <User Schedule>  lactobacillus acidophilus 1 Tablet(s) Oral three times a day with meals  losartan 100 milliGRAM(s) Oral daily  magnesium oxide 400 milliGRAM(s) Oral three times a day with meals  NIFEdipine XL 90 milliGRAM(s) Oral daily  polyethylene glycol 3350 17 Gram(s) Oral daily  rosuvastatin 20 milliGRAM(s) Oral at bedtime  senna 2 Tablet(s) Oral at bedtime  sodium bicarbonate 650 milliGRAM(s) Oral three times a day  tamsulosin 0.4 milliGRAM(s) Oral at bedtime    MEDICATIONS  (PRN):  acetaminophen     Tablet .. 650 milliGRAM(s) Oral every 6 hours PRN Temp greater or equal to 38C (100.4F), Mild Pain (1 - 3)  ALPRAZolam 0.5 milliGRAM(s) Oral four times a day PRN anxiety  aluminum hydroxide/magnesium hydroxide/simethicone Suspension 30 milliLiter(s) Oral every 4 hours PRN Dyspepsia  dextrose Oral Gel 15 Gram(s) Oral once PRN Blood Glucose LESS THAN 70 milliGRAM(s)/deciliter  melatonin 5 milliGRAM(s) Oral at bedtime PRN Insomnia  oxyCODONE    IR 5 milliGRAM(s) Oral every 4 hours PRN Moderate Pain (4 - 6)  oxyCODONE    IR 10 milliGRAM(s) Oral every 4 hours PRN Severe Pain (7 - 10)  polyethylene glycol 3350 17 Gram(s) Oral two times a day PRN Constipation      RECENT LABS/IMAGING      POCT Blood Glucose.: 242 mg/dL (11-24-23 @ 07:27)  POCT Blood Glucose.: 181 mg/dL (11-23-23 @ 21:43)  POCT Blood Glucose.: 78 mg/dL (11-23-23 @ 16:28)  POCT Blood Glucose.: 127 mg/dL (11-23-23 @ 11:41)  POCT Blood Glucose.: 386 mg/dL (11-23-23 @ 07:39)  POCT Blood Glucose.: 123 mg/dL (11-22-23 @ 21:49)  POCT Blood Glucose.: 252 mg/dL (11-22-23 @ 17:35)  POCT Blood Glucose.: 110 mg/dL (11-22-23 @ 11:40)  POCT Blood Glucose.: 357 mg/dL (11-22-23 @ 07:18)  POCT Blood Glucose.: 151 mg/dL (11-21-23 @ 21:51)  POCT Blood Glucose.: 257 mg/dL (11-21-23 @ 17:04)  POCT Blood Glucose.: 66 mg/dL (11-21-23 @ 11:10)

## 2023-11-24 NOTE — PROGRESS NOTE ADULT - ASSESSMENT
Rehab of right BKA in the setting of severe PAD with previous balloon angioplasty and diabetic Charcot arthropathy causing gait dysfunction and decline in ADLs   - s/p Meropenem 1000mg q12h before source control    - PT/OT  - RLE NWB   - R Knee immobilizer in bed. Able to actively achieve full knee extension  - c/w Plavix 75mg, ASA 81mg  - continue acute rehab program  - resumed home Crestor 20mg qhs (instead of Lipitor 80mg due to patient request).     Pain control   - c/w Oxycodone 10mg prn   - Pt was started on Lyrica 75mg q8h, Cymbalta 40mg q12h on previous unit, however patient states that he gets hallucinations and has been refusing the medications for the past several days and MS cleared  - pain management consulted, recs appreciated   - pt was getting narcotics from podiatrist in past but does not see physician anymore. 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 Will increase dinner Lispro to 8 units and monitor    Urinary retention  - US bladder and kidney 11/10: negative   - seen by urology 11/10: flomax, self-cath q4-6h, fu outpatient   - on admission eval, patient states voiding spontaneously without urbano or straight cath but is complaining of frequency   - PVRs on rehab unit revealed large PVRs 1700cc and 900cc  - risks and benefits of U[place] vs. CIC discussed with patient  - He admits to a very large fluid intake and agrees to cut down fluids to about 1500 - 1800cc daily.  - PVR volumes decreasing - below 600 (no spontaneous void, but drinking less)  - Continue Flomax and CIC q 6hrs and monitor volumes  - 11/23 was able to urinate 100 and 200cc without cath, cath after 700cc, told patient to decrease fluid intake     Hypertension   - c/w Nifedipine 120mg daily, Losartan 100mg  - monitor and adjust bp meds as necessary     Anxiety   Depression  - c/w Xanax 0.5mg QID prn for anxiety   - monitor for symptoms of SI/HI  - can recommend psychosocial services if needed   - pt is currently in good spirits     -Pain control: Oxycodone 10/5 prn, Tylenol     -GI/Bowel Mgmt: Senna, Miralax      -Skin:  monitor incision - clean and dry    -FEN   - continue to monitor and replete electrolytes prn     - Diet: renal diet discontinued (per patient request), started Diabetic diet     Precautions / PROPHYLAXIS:      - Falls    - Ortho: Weight bearing status: NWB RLE       - DVT prophylaxis: SQH TID  Rehab of right BKA in the setting of severe PAD with previous balloon angioplasty and diabetic Charcot arthropathy causing gait dysfunction and decline in ADLs   - s/p Meropenem 1000mg q12h before source control    - PT/OT  - RLE NWB   - R Knee immobilizer in bed. Able to actively achieve full knee extension  - c/w Plavix 75mg, ASA 81mg  - continue acute rehab program    HLD  - resumed home Crestor 20mg qhs (instead of Lipitor 80mg due to patient request).     Pain control   - c/w Oxycodone 10mg prn   - Pt was started on Lyrica 75mg q8h, Cymbalta 40mg q12h on previous unit, however patient states that he gets hallucinations and has been refusing the medications for the past several days and MS cleared  - pain management consulted, recs appreciated   - pt was getting narcotics from podiatrist in past but does not see physician anymore. 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 Will increase dinner Lispro to 8 units and monitor    Urinary retention  - US bladder and kidney 11/10: negative   - seen by urology 11/10: flomax, self-cath q4-6h, fu outpatient   - on admission eval, patient states voiding spontaneously without urbano or straight cath but is complaining of frequency   - PVRs on rehab unit revealed large PVRs 1700cc and 900cc  - risks and benefits of [facility] vs. CIC discussed with patient  - He admits to a very large fluid intake and agrees to cut down fluids to about 1500 - 1800cc daily.  - PVR volumes decreasing - below 600 (no spontaneous void, but drinking less)  - Continue Flomax and CIC q 6hrs and monitor volumes  - 11/23 was able to urinate 100 and 200cc without cath, cath after 700cc, told patient to decrease fluid intake   - Has been on Flomax 8-9 days with no significant benefit. Will try change to Rapaflo     Hypertension   - c/w Nifedipine 120mg daily, Losartan 100mg  - well controlled    Anxiety   Depression  - c/w Xanax 0.5mg QID prn for anxiety   - monitor for symptoms of SI/HI  - can recommend psychosocial services if needed   - pt is currently in good spirits     -Pain control: Oxycodone 10/5 prn, Tylenol     -GI/Bowel Mgmt: Senna, Miralax      -Skin:  monitor incision - clean and dry    -FEN   - continue to monitor and replete electrolytes prn     - Diet: renal diet discontinued (per patient request), started Diabetic diet     Precautions / PROPHYLAXIS:      - Falls    - Ortho: Weight bearing status: NWB RLE       - DVT prophylaxis: SQH TID

## 2023-11-24 NOTE — PROGRESS NOTE ADULT - TIME BILLING
Psychodiagnostic evaluation of patient at bedside
Patient seen at bedside for supportive session
Patient seen at bedside for supportive therapy session.

## 2023-11-25 LAB
GLUCOSE BLDC GLUCOMTR-MCNC: 105 MG/DL — HIGH (ref 70–99)
GLUCOSE BLDC GLUCOMTR-MCNC: 105 MG/DL — HIGH (ref 70–99)
GLUCOSE BLDC GLUCOMTR-MCNC: 110 MG/DL — HIGH (ref 70–99)
GLUCOSE BLDC GLUCOMTR-MCNC: 110 MG/DL — HIGH (ref 70–99)
GLUCOSE BLDC GLUCOMTR-MCNC: 369 MG/DL — HIGH (ref 70–99)
GLUCOSE BLDC GLUCOMTR-MCNC: 369 MG/DL — HIGH (ref 70–99)
GLUCOSE BLDC GLUCOMTR-MCNC: 81 MG/DL — SIGNIFICANT CHANGE UP (ref 70–99)
GLUCOSE BLDC GLUCOMTR-MCNC: 81 MG/DL — SIGNIFICANT CHANGE UP (ref 70–99)

## 2023-11-25 PROCEDURE — 76770 US EXAM ABDO BACK WALL COMP: CPT | Mod: 26

## 2023-11-25 RX ORDER — HEPARIN SODIUM 5000 [USP'U]/ML
5000 INJECTION INTRAVENOUS; SUBCUTANEOUS EVERY 8 HOURS
Refills: 0 | Status: DISCONTINUED | OUTPATIENT
Start: 2023-11-26 | End: 2023-11-28

## 2023-11-25 RX ADMIN — Medication 1 TABLET(S): at 08:56

## 2023-11-25 RX ADMIN — SENNA PLUS 2 TABLET(S): 8.6 TABLET ORAL at 21:20

## 2023-11-25 RX ADMIN — Medication 0.5 MILLIGRAM(S): at 14:29

## 2023-11-25 RX ADMIN — OXYCODONE HYDROCHLORIDE 10 MILLIGRAM(S): 5 TABLET ORAL at 20:04

## 2023-11-25 RX ADMIN — MAGNESIUM OXIDE 400 MG ORAL TABLET 400 MILLIGRAM(S): 241.3 TABLET ORAL at 08:56

## 2023-11-25 RX ADMIN — Medication 650 MILLIGRAM(S): at 21:24

## 2023-11-25 RX ADMIN — Medication 81 MILLIGRAM(S): at 12:18

## 2023-11-25 RX ADMIN — SILODOSIN 8 MILLIGRAM(S): 4 CAPSULE ORAL at 21:25

## 2023-11-25 RX ADMIN — Medication 8 UNIT(S): at 18:04

## 2023-11-25 RX ADMIN — OXYCODONE HYDROCHLORIDE 10 MILLIGRAM(S): 5 TABLET ORAL at 01:00

## 2023-11-25 RX ADMIN — Medication 0.5 MILLIGRAM(S): at 00:28

## 2023-11-25 RX ADMIN — INSULIN GLARGINE 24 UNIT(S): 100 INJECTION, SOLUTION SUBCUTANEOUS at 08:55

## 2023-11-25 RX ADMIN — Medication 5 UNIT(S): at 12:20

## 2023-11-25 RX ADMIN — ROSUVASTATIN CALCIUM 20 MILLIGRAM(S): 5 TABLET ORAL at 21:24

## 2023-11-25 RX ADMIN — Medication 650 MILLIGRAM(S): at 14:15

## 2023-11-25 RX ADMIN — Medication 10: at 08:53

## 2023-11-25 RX ADMIN — OXYCODONE HYDROCHLORIDE 10 MILLIGRAM(S): 5 TABLET ORAL at 19:53

## 2023-11-25 RX ADMIN — Medication 1 TABLET(S): at 18:05

## 2023-11-25 RX ADMIN — MAGNESIUM OXIDE 400 MG ORAL TABLET 400 MILLIGRAM(S): 241.3 TABLET ORAL at 18:05

## 2023-11-25 RX ADMIN — Medication 650 MILLIGRAM(S): at 07:19

## 2023-11-25 RX ADMIN — MAGNESIUM OXIDE 400 MG ORAL TABLET 400 MILLIGRAM(S): 241.3 TABLET ORAL at 12:18

## 2023-11-25 RX ADMIN — OXYCODONE HYDROCHLORIDE 10 MILLIGRAM(S): 5 TABLET ORAL at 14:38

## 2023-11-25 RX ADMIN — OXYCODONE HYDROCHLORIDE 10 MILLIGRAM(S): 5 TABLET ORAL at 17:45

## 2023-11-25 RX ADMIN — CLOPIDOGREL BISULFATE 75 MILLIGRAM(S): 75 TABLET, FILM COATED ORAL at 12:18

## 2023-11-25 RX ADMIN — Medication 1 TABLET(S): at 12:18

## 2023-11-25 RX ADMIN — OXYCODONE HYDROCHLORIDE 10 MILLIGRAM(S): 5 TABLET ORAL at 00:29

## 2023-11-25 RX ADMIN — OXYCODONE HYDROCHLORIDE 10 MILLIGRAM(S): 5 TABLET ORAL at 08:53

## 2023-11-25 NOTE — PROGRESS NOTE ADULT - ASSESSMENT
Rehab of right BKA in the setting of severe PAD with previous balloon angioplasty and diabetic Charcot arthropathy causing gait dysfunction and decline in ADLs   - s/p Meropenem 1000mg q12h before source control    - PT/OT  - RLE NWB   - R Knee immobilizer in bed. Able to actively achieve full knee extension  - c/w Plavix 75mg, ASA 81mg  - continue acute rehab program  - 11/25 pt and pt's sister worried about increasing erythema around incision, spoke with vascular who will assess     HLD  - resumed home Crestor 20mg qhs (instead of Lipitor 80mg due to patient request).     Pain control   - c/w Oxycodone 10mg prn   - Pt was started on Lyrica 75mg q8h, Cymbalta 40mg q12h on previous unit, however patient states that he gets hallucinations and has been refusing the medications for the past several days and MS cleared  - pain management consulted, recs appreciated   - pt was getting narcotics from podiatrist in past but does not see physician anymore. 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 Will increase dinner Lispro to 8 units and monitor    Urinary retention  - US bladder and kidney 11/10: negative   - seen by urology 11/10: flomax, self-cath q4-6h, fu outpatient   - on admission eval, patient states voiding spontaneously without urbano or straight cath but is complaining of frequency   - PVRs on rehab unit revealed large PVRs 1700cc and 900cc  - risks and benefits of [place] vs. CIC discussed with patient  - He admits to a very large fluid intake and agrees to cut down fluids to about 1500 - 1800cc daily.  - PVR volumes decreasing - below 600 (no spontaneous void, but drinking less)  - Continue Flomax and CIC q 6hrs and monitor volumes  - 11/23 was able to urinate 100 and 200cc without cath, cath after 700cc, told patient to decrease fluid intake   - Has been on Flomax 8-9 days with no significant benefit. Will try change to Rapaflo     Hypertension   - c/w Nifedipine 120mg daily, Losartan 100mg  - well controlled  - 11/25 BP meds in AM held for soft bp     Anxiety   Depression  - c/w Xanax 0.5mg QID prn for anxiety   - monitor for symptoms of SI/HI  - can recommend psychosocial services if needed   - pt is currently in good spirits     -Pain control: Oxycodone 10/5 prn, Tylenol     -GI/Bowel Mgmt: Senna, Miralax      -Skin:  monitor incision - clean and dry    -FEN   - continue to monitor and replete electrolytes prn     - Diet: renal diet discontinued (per patient request), started Diabetic diet     Precautions / PROPHYLAXIS:      - Falls    - Ortho: Weight bearing status: NWB RLE       - DVT prophylaxis: SQH TID  Rehab for right BKA in the setting of severe PAD with previous balloon angioplasty and diabetic Charcot arthropathy causing gait dysfunction and decline in ADLs   - s/p meropenem 1000 mg q12h before source control    - PT/OT  - RLE NWB   - R Knee immobilizer in bed. Able to actively achieve full knee extension  - c/w Plavix 75 mg, ASA 81 mg  - continue acute rehab program  - 11/25 pt and pt's sister worried about increasing erythema around incision, spoke with vascular who will assess     HLD  - resumed home Crestor 20 mg qhs (instead of Lipitor 80 mg due to patient request).     Pain control   - c/w Oxycodone 10mg prn   - Pt was started on Lyrica 75 mg q8h, Cymbalta 40 mg q12h on previous unit, however patient states that he gets hallucinations and has been refusing the medications for the past several days and MS cleared  - pain management consulted, recs appreciated   - pt was getting narcotics from podiatrist in past but does not see physician anymore. 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 Will increase dinner Lispro to 8 units and monitor    Urinary retention  - US bladder and kidney 11/10: negative   - seen by urology 11/10: Flomax, self-cath q4-6h, fu outpatient   - on admission eval, patient states voiding spontaneously without urbano or straight cath but is complaining of frequency   - PVRs on rehab unit revealed large PVRs 1700cc and 900cc  - risks and benefits of [place] vs. CIC discussed with patient  - He admits to a very large fluid intake and agrees to cut down fluids to about 1500 - 1800cc daily.  - PVR volumes decreasing - below 600 (no spontaneous void, but drinking less)  - Continue Flomax and CIC q 6hrs and monitor volumes  - 11/23 was able to urinate 100 and 200cc without cath, cath after 700cc, told patient to decrease fluid intake   - Has been on Flomax 8-9 days with no significant benefit. Will try change to Rapaflo     Hypertension   - c/w nifedipine 120 mg daily, losartan 100mg  - well controlled  - 11/25 BP meds in AM held for soft bp     Anxiety   Depression  - c/w Xanax 0.5 mg QID prn for anxiety   - monitor for symptoms of SI/HI  - can recommend psychosocial services if needed   - pt is currently in good spirits     -Pain control: Oxycodone 10/5 prn, Tylenol     -GI/Bowel Mgmt: Senna, Miralax      -Skin:  monitor incision - clean and dry    -FEN   - continue to monitor and replete electrolytes prn     - Diet: renal diet discontinued (per patient request), started Diabetic diet     Precautions / PROPHYLAXIS:      - Falls    - Ortho: Weight bearing status: NWB RLE       - DVT prophylaxis: SQ heparin TID

## 2023-11-25 NOTE — PROGRESS NOTE ADULT - SUBJECTIVE AND OBJECTIVE BOX
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 Pt underwent R TMA on 10/27 with revision on 10/30 and 11/4. 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 Cultures grew pseudomonas and yeast and treated with Meropenem. Hospital course is complicated by acute urinary retention 2/2 neurogenic bladder and two mechanical out of bed falls, as the patient is not accustomed to missing a foot. Pt was seen by urology and has completed a trial of void after several straight caths. Pt is currently voiding spontaneously without use of urbano or straight catheterization but feels like he is going frequently. Having regular bowel movements. Of note the pt states that Cymbalta and Lyrica makes him hallucinate and has been refusing the medications on the previous unit. Complains of phantom limb pain and incisional pain that is currently controlled on Oxycodone.     Pt seen and evaluated by Physiatry and is currently functioning at supervision for bed mobility , transfers minimum assist, ambulates 25ft RW contact guard assist, upper body dressing independent, lower body dressing contact guard assist, grooming independent. Pt is stable for acute inpatient rehabilitation.     LS: lives in private home with mother, 4 ASTER, another 13 ASTER to get to bedroom, half bathroom on 1st floor   PLOF: ambulates with crutches, independent in ADL/iADLs, no HHA    (15 Nov 2023 14:45)    TODAY'S SUBJECTIVE & REVIEW OF SYMPTOMS:  Pt and pt's sister worried about increased erythema near BKA incision site. Mild erythema seen without drainage or purulence. Reached out to vascular team who said they will come by to evaluate the wound and speak with the patient.   Pt had minor bleeding in RUE when given SQH for dvt ppx. pressure and taped. Will stop SQH for today and restart tomorrow.   Losartan and Nifedipine held at 600 AM for soft BP. Will adjust accordingly if continues to be low.   Still requiring CIC q 6 hrs with mimimal spontaneous void and 400- 600cc PVRs.   Having regular bowel movements.  Participating and tolerating therapy.   VSS    CLOF: bed mobility independent, transfers CG/supervision, ambulates 40ft RW TA, 8 steps touch assist, LBD set up, toileting supervision     Review of Systems:   Constitutional:    [  x ] WNL           [   ] poor appetite   [   ] insomnia   [   ] tired   Cardio:                [ x  ] WNL           [   ] CP   [   ] URENA   [   ] palpitations               Resp:                   [ x  ] WNL           [   ] SOB   [   ] cough   [   ] wheezing   GI:                        [  x ] WNL           [   ] constipation   [   ] diarrhea   [   ] abdominal pain   [   ] nausea   [   ] emesis                                :                      [   ] WNL           [   ] URBANO  [   ] dysuria   [ x  ] difficulty voiding requiring CIC   [  ] frequent voiding small amounts  - non CIC for retention    Endo:                   [ x  ] WNL          [   ] polyuria   [   ] temperature intolerance                 Skin:                     [ x  ] WNL          [   ] pain   [   ] wound   [   ] rash   MSK:                    [    ] WNL          [   ] muscle pain   [   ] joint pain/ stiffness   [   ] muscle tenderness   [   ] swelling  [ x]  right BKA with distal limb and phantom pain   Neuro:                 [   ] WNL          [   ] HA   [   ] change in vision   [   ] tremor   [   ] weakness   [   ]dysphagia  [ x ]  R limb phantom, neuropathic and incisional pain            Cognitive:           [  x ] WNL           [   ]confusion      Psych:                  [  x ] WNL           [   ] hallucinations   [   ]agitation   [   ] delusion   [   ]depression      PHYSICAL EXAM  Vital Signs Last 24 Hrs  T(C): 36.4 (25 Nov 2023 05:36), Max: 36.4 (25 Nov 2023 05:36)  T(F): 97.6 (25 Nov 2023 05:36), Max: 97.6 (25 Nov 2023 05:36)  HR: 80 (25 Nov 2023 05:36) (74 - 80)  BP: 108/62 (25 Nov 2023 05:36) (108/62 - 134/60)  BP(mean): 80 (25 Nov 2023 05:36) (80 - 80)  RR: 18 (25 Nov 2023 05:36) (18 - 18)  SpO2: --    Parameters below as of 25 Nov 2023 05:36  Patient On (Oxygen Delivery Method): room air      General:[ x  ] NAD, Resting Comfortable,   [   ] other:                                HEENT: [  x ] NC/AT, EOMI, PERRL , Normal Conjunctivae,   [   ] other:  Cardio: [  x ] RRR, no murmer,   [   ] other:                              Pulm: [  x ] No Respiratory Distress,  Lungs CTAB,   [   ] other:                       Abdomen: [ x  ]ND/NT, Soft,   [   ] other:    : [ x  ] NO URBANO CATHETER, [   ] URBANO CATHETER- no meatal tear, no discharge, [   ] other:                                            MSK: [   ] No joint swelling, Full ROM,   [ x  ] other:                                    Ext: [  x ]No C/C/E, No calf tenderness,   [   ]other:   right BKA incision C&D with staples   Pt had good distal pedal pulses on LLE with intact proprioception and sensation   Skin: [ x  ]intact,   [   ] other:                                                                  Neurological Examination:  Cognitive: [  x  ] AAO x 3,   [    ]  other:                                                                      Attention:  [  x  ] intact,   [    ]  other:                            Memory: [  x  ] intact,    [    ]  other:     Mood/Affect: [  x  ] wnl,    [    ]  other:                                                                             Communication: [ x   ]Fluent, no dysarthria, following commands:  [    ] other:   CN II - XII:  [ x   ] intact,  [    ] other:                                                                                        Motor:   RIGHT UE: [  x ] WNL,  [   ] other:  LEFT    UE: [ x  ] WNL,  [   ] other:  RIGHT LE: [   ] WNL,  [   ] other: R HF 5/5, R BKA w/ knee immobilizer   LEFT    LE: [ x  ] WNL,  [   ] other:    Tone: [  x  ] wnl,   [    ]  other:  DTRs: [  x ]symmetric, [   ] other:  Coordination:   [  x  ] intact,   [    ] other:                                                                           Sensory: [  x  ] Intact to light touch,   [    ] other:    MEDICATIONS  (STANDING):  aspirin  chewable 81 milliGRAM(s) Oral daily  clopidogrel Tablet 75 milliGRAM(s) Oral daily  insulin glargine Injectable (LANTUS) 24 Unit(s) SubCutaneous every morning  insulin lispro (ADMELOG) corrective regimen sliding scale   SubCutaneous three times a day before meals  insulin lispro Injectable (ADMELOG) 5 Unit(s) SubCutaneous before lunch  insulin lispro Injectable (ADMELOG) 8 Unit(s) SubCutaneous before dinner  lactobacillus acidophilus 1 Tablet(s) Oral three times a day with meals  losartan 100 milliGRAM(s) Oral daily  magnesium oxide 400 milliGRAM(s) Oral three times a day with meals  NIFEdipine XL 90 milliGRAM(s) Oral daily  polyethylene glycol 3350 17 Gram(s) Oral daily  rosuvastatin 20 milliGRAM(s) Oral at bedtime  senna 2 Tablet(s) Oral at bedtime  silodosin 8 milliGRAM(s) Oral at bedtime  sodium bicarbonate 650 milliGRAM(s) Oral three times a day    MEDICATIONS  (PRN):  acetaminophen     Tablet .. 650 milliGRAM(s) Oral every 6 hours PRN Temp greater or equal to 38C (100.4F), Mild Pain (1 - 3)  ALPRAZolam 0.5 milliGRAM(s) Oral four times a day PRN anxiety  aluminum hydroxide/magnesium hydroxide/simethicone Suspension 30 milliLiter(s) Oral every 4 hours PRN Dyspepsia  dextrose Oral Gel 15 Gram(s) Oral once PRN Blood Glucose LESS THAN 70 milliGRAM(s)/deciliter  melatonin 5 milliGRAM(s) Oral at bedtime PRN Insomnia  oxyCODONE    IR 10 milliGRAM(s) Oral every 4 hours PRN Severe Pain (7 - 10)  oxyCODONE    IR 5 milliGRAM(s) Oral every 4 hours PRN Moderate Pain (4 - 6)  polyethylene glycol 3350 17 Gram(s) Oral two times a day PRN Constipation    RECENT LABS/IMAGING    POCT Blood Glucose.: 105 mg/dL (11-25-23 @ 11:43)  POCT Blood Glucose.: 369 mg/dL (11-25-23 @ 08:30)  POCT Blood Glucose.: 102 mg/dL (11-24-23 @ 21:18)  POCT Blood Glucose.: 124 mg/dL (11-24-23 @ 16:44)  POCT Blood Glucose.: 168 mg/dL (11-24-23 @ 11:58)  POCT Blood Glucose.: 242 mg/dL (11-24-23 @ 07:27)  POCT Blood Glucose.: 181 mg/dL (11-23-23 @ 21:43)  POCT Blood Glucose.: 78 mg/dL (11-23-23 @ 16:28)  POCT Blood Glucose.: 127 mg/dL (11-23-23 @ 11:41)  POCT Blood Glucose.: 386 mg/dL (11-23-23 @ 07:39)  POCT Blood Glucose.: 123 mg/dL (11-22-23 @ 21:49)  POCT Blood Glucose.: 252 mg/dL (11-22-23 @ 17:35)   Patient is a 58 y old  Male who presents with a chief complaint of Rehab of 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 Pt underwent R TMA on 10/27/2023 with revision on 10/30 and 11/4. 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 Cultures grew pseudomonas and yeast and treated with Meropenem. Hospital course is complicated by acute urinary retention 2/2 neurogenic bladder and two mechanical out of bed falls, as the patient is not accustomed to missing a foot. Pt was seen by urology and has completed a trial of void after several straight caths. Pt is currently voiding spontaneously without use of urbano or straight catheterization but feels like he is going frequently. Having regular bowel movements. Of note the pt states that Cymbalta and Lyrica makes him hallucinate and has been refusing the medications on the previous unit. Complains of phantom limb pain and incisional pain that is currently controlled on Oxycodone.     Pt seen and evaluated by Physiatry and is currently functioning at supervision for bed mobility , transfers minimum assist, ambulates 25ft RW contact guard assist, upper body dressing independent, lower body dressing contact guard assist, grooming independent. Pt is stable for acute inpatient rehabilitation.     LS: lives in private home with mother, 4 ASTER, another 13 ASTER to get to bedroom, half bathroom on 1st floor   PLOF: ambulates with crutches, independent in ADL/iADLs, no HHA    (15 Nov 2023 14:45)    TODAY'S SUBJECTIVE & REVIEW OF SYMPTOMS:  Pt and pt's sister worried about increased erythema near BKA incision site. Mild erythema seen without drainage or purulence. Reached out to vascular team who said they will come by to evaluate the wound and speak with the patient.   Pt had minor bleeding in RUE when given SQH for dvt ppx. pressure and taped. Will stop SQH for today and restart tomorrow.   Losartan and Nifedipine held at 600 AM for soft BP. Will adjust accordingly if continues to be low.   Still requiring CIC q 6 hrs with mimimal spontaneous void and 400- 600cc PVRs.   Having regular bowel movements.  Participating and tolerating therapy.   VSS    CLOF: bed mobility independent, transfers CG/supervision, ambulates 40ft RW TA, 8 steps touch assist, LBD set up, toileting supervision     Review of Systems:   Constitutional:    [  x ] WNL           [   ] poor appetite   [   ] insomnia   [   ] tired   Cardio:                [ x  ] WNL           [   ] CP   [   ] URENA   [   ] palpitations               Resp:                   [ x  ] WNL           [   ] SOB   [   ] cough   [   ] wheezing   GI:                        [  x ] WNL           [   ] constipation   [   ] diarrhea   [   ] abdominal pain   [   ] nausea   [   ] emesis                                :                      [   ] WNL           [   ] URBANO  [   ] dysuria   [ x  ] difficulty voiding requiring CIC   [  ] frequent voiding small amounts  - non CIC for retention    Endo:                   [ x  ] WNL          [   ] polyuria   [   ] temperature intolerance                 Skin:                     [ x  ] WNL          [   ] pain   [   ] wound   [   ] rash   MSK:                    [    ] WNL          [   ] muscle pain   [   ] joint pain/ stiffness   [   ] muscle tenderness   [   ] swelling  [ x]  right BKA with distal limb and phantom pain   Neuro:                 [   ] WNL          [   ] HA   [   ] change in vision   [   ] tremor   [   ] weakness   [   ]dysphagia  [ x ]  R limb phantom, neuropathic and incisional pain            Cognitive:           [  x ] WNL           [   ]confusion      Psych:                  [  x ] WNL           [   ] hallucinations   [   ]agitation   [   ] delusion   [   ]depression      PHYSICAL EXAM  Vital Signs Last 24 Hrs  T(C): 36.4 (25 Nov 2023 05:36), Max: 36.4 (25 Nov 2023 05:36)  T(F): 97.6 (25 Nov 2023 05:36), Max: 97.6 (25 Nov 2023 05:36)  HR: 80 (25 Nov 2023 05:36) (74 - 80)  BP: 108/62 (25 Nov 2023 05:36) (108/62 - 134/60)  BP(mean): 80 (25 Nov 2023 05:36) (80 - 80)  RR: 18 (25 Nov 2023 05:36) (18 - 18)  SpO2: --    Parameters below as of 25 Nov 2023 05:36  Patient On (Oxygen Delivery Method): room air      General:[ x  ] NAD, Resting Comfortable,   [   ] other:                                HEENT: [  x ] NC/AT, EOMI, PERRL , Normal Conjunctivae,   [   ] other:  Cardio: [  x ] RRR, no murmer,   [   ] other:                              Pulm: [  x ] No Respiratory Distress,  Lungs CTAB,   [   ] other:                       Abdomen: [ x  ]ND/NT, Soft,   [   ] other:    : [ x  ] NO URBANO CATHETER, [   ] URBANO CATHETER- no meatal tear, no discharge, [   ] other:                                            MSK: [   ] No joint swelling, Full ROM,   [ x  ] other:                                    Ext: [  x ]No C/C/E, No calf tenderness,   [   ]other:   right BKA incision C&D with staples   Pt had good distal pedal pulses on LLE with intact proprioception and sensation   Skin: [ x  ]intact,   [   ] other:                                                                  Neurological Examination:  Cognitive: [  x  ] AAO x 3,   [    ]  other:                                                                      Attention:  [  x  ] intact,   [    ]  other:                            Memory: [  x  ] intact,    [    ]  other:     Mood/Affect: [  x  ] wnl,    [    ]  other:                                                                             Communication: [ x   ]Fluent, no dysarthria, following commands:  [    ] other:   CN II - XII:  [ x   ] intact,  [    ] other:                                                                                        Motor:   RIGHT UE: [  x ] WNL,  [   ] other:  LEFT    UE: [ x  ] WNL,  [   ] other:  RIGHT LE: [   ] WNL,  [   ] other: R HF 5/5, R BKA w/ knee immobilizer   LEFT    LE: [ x  ] WNL,  [   ] other:    Tone: [  x  ] wnl,   [    ]  other:  DTRs: [  x ]symmetric, [   ] other:  Coordination:   [  x  ] intact,   [    ] other:                                                                           Sensory: [  x  ] Intact to light touch,   [    ] other:    MEDICATIONS  (STANDING):  aspirin  chewable 81 milliGRAM(s) Oral daily  clopidogrel Tablet 75 milliGRAM(s) Oral daily  insulin glargine Injectable (LANTUS) 24 Unit(s) SubCutaneous every morning  insulin lispro (ADMELOG) corrective regimen sliding scale   SubCutaneous three times a day before meals  insulin lispro Injectable (ADMELOG) 5 Unit(s) SubCutaneous before lunch  insulin lispro Injectable (ADMELOG) 8 Unit(s) SubCutaneous before dinner  lactobacillus acidophilus 1 Tablet(s) Oral three times a day with meals  losartan 100 milliGRAM(s) Oral daily  magnesium oxide 400 milliGRAM(s) Oral three times a day with meals  NIFEdipine XL 90 milliGRAM(s) Oral daily  polyethylene glycol 3350 17 Gram(s) Oral daily  rosuvastatin 20 milliGRAM(s) Oral at bedtime  senna 2 Tablet(s) Oral at bedtime  silodosin 8 milliGRAM(s) Oral at bedtime  sodium bicarbonate 650 milliGRAM(s) Oral three times a day    MEDICATIONS  (PRN):  acetaminophen     Tablet .. 650 milliGRAM(s) Oral every 6 hours PRN Temp greater or equal to 38C (100.4F), Mild Pain (1 - 3)  ALPRAZolam 0.5 milliGRAM(s) Oral four times a day PRN anxiety  aluminum hydroxide/magnesium hydroxide/simethicone Suspension 30 milliLiter(s) Oral every 4 hours PRN Dyspepsia  dextrose Oral Gel 15 Gram(s) Oral once PRN Blood Glucose LESS THAN 70 milliGRAM(s)/deciliter  melatonin 5 milliGRAM(s) Oral at bedtime PRN Insomnia  oxyCODONE    IR 10 milliGRAM(s) Oral every 4 hours PRN Severe Pain (7 - 10)  oxyCODONE    IR 5 milliGRAM(s) Oral every 4 hours PRN Moderate Pain (4 - 6)  polyethylene glycol 3350 17 Gram(s) Oral two times a day PRN Constipation    RECENT LABS/IMAGING    POCT Blood Glucose.: 105 mg/dL (11-25-23 @ 11:43)  POCT Blood Glucose.: 369 mg/dL (11-25-23 @ 08:30)  POCT Blood Glucose.: 102 mg/dL (11-24-23 @ 21:18)  POCT Blood Glucose.: 124 mg/dL (11-24-23 @ 16:44)  POCT Blood Glucose.: 168 mg/dL (11-24-23 @ 11:58)  POCT Blood Glucose.: 242 mg/dL (11-24-23 @ 07:27)  POCT Blood Glucose.: 181 mg/dL (11-23-23 @ 21:43)  POCT Blood Glucose.: 78 mg/dL (11-23-23 @ 16:28)  POCT Blood Glucose.: 127 mg/dL (11-23-23 @ 11:41)  POCT Blood Glucose.: 386 mg/dL (11-23-23 @ 07:39)  POCT Blood Glucose.: 123 mg/dL (11-22-23 @ 21:49)  POCT Blood Glucose.: 252 mg/dL (11-22-23 @ 17:35)

## 2023-11-26 LAB
GLUCOSE BLDC GLUCOMTR-MCNC: 109 MG/DL — HIGH (ref 70–99)
GLUCOSE BLDC GLUCOMTR-MCNC: 109 MG/DL — HIGH (ref 70–99)
GLUCOSE BLDC GLUCOMTR-MCNC: 287 MG/DL — HIGH (ref 70–99)
GLUCOSE BLDC GLUCOMTR-MCNC: 287 MG/DL — HIGH (ref 70–99)
GLUCOSE BLDC GLUCOMTR-MCNC: 72 MG/DL — SIGNIFICANT CHANGE UP (ref 70–99)
GLUCOSE BLDC GLUCOMTR-MCNC: 72 MG/DL — SIGNIFICANT CHANGE UP (ref 70–99)
GLUCOSE BLDC GLUCOMTR-MCNC: 92 MG/DL — SIGNIFICANT CHANGE UP (ref 70–99)
GLUCOSE BLDC GLUCOMTR-MCNC: 92 MG/DL — SIGNIFICANT CHANGE UP (ref 70–99)

## 2023-11-26 RX ORDER — OXYCODONE HYDROCHLORIDE 5 MG/1
5 TABLET ORAL EVERY 4 HOURS
Refills: 0 | Status: DISCONTINUED | OUTPATIENT
Start: 2023-11-26 | End: 2023-11-28

## 2023-11-26 RX ADMIN — Medication 650 MILLIGRAM(S): at 21:02

## 2023-11-26 RX ADMIN — Medication 0.5 MILLIGRAM(S): at 12:27

## 2023-11-26 RX ADMIN — ROSUVASTATIN CALCIUM 20 MILLIGRAM(S): 5 TABLET ORAL at 21:02

## 2023-11-26 RX ADMIN — LOSARTAN POTASSIUM 100 MILLIGRAM(S): 100 TABLET, FILM COATED ORAL at 06:35

## 2023-11-26 RX ADMIN — MAGNESIUM OXIDE 400 MG ORAL TABLET 400 MILLIGRAM(S): 241.3 TABLET ORAL at 08:18

## 2023-11-26 RX ADMIN — Medication 8 UNIT(S): at 17:50

## 2023-11-26 RX ADMIN — CLOPIDOGREL BISULFATE 75 MILLIGRAM(S): 75 TABLET, FILM COATED ORAL at 12:27

## 2023-11-26 RX ADMIN — Medication 1 TABLET(S): at 17:50

## 2023-11-26 RX ADMIN — Medication 90 MILLIGRAM(S): at 06:35

## 2023-11-26 RX ADMIN — MAGNESIUM OXIDE 400 MG ORAL TABLET 400 MILLIGRAM(S): 241.3 TABLET ORAL at 12:25

## 2023-11-26 RX ADMIN — SILODOSIN 8 MILLIGRAM(S): 4 CAPSULE ORAL at 21:02

## 2023-11-26 RX ADMIN — OXYCODONE HYDROCHLORIDE 10 MILLIGRAM(S): 5 TABLET ORAL at 06:53

## 2023-11-26 RX ADMIN — HEPARIN SODIUM 5000 UNIT(S): 5000 INJECTION INTRAVENOUS; SUBCUTANEOUS at 06:35

## 2023-11-26 RX ADMIN — Medication 650 MILLIGRAM(S): at 06:35

## 2023-11-26 RX ADMIN — Medication 650 MILLIGRAM(S): at 14:23

## 2023-11-26 RX ADMIN — Medication 0.5 MILLIGRAM(S): at 00:29

## 2023-11-26 RX ADMIN — HEPARIN SODIUM 5000 UNIT(S): 5000 INJECTION INTRAVENOUS; SUBCUTANEOUS at 21:01

## 2023-11-26 RX ADMIN — Medication 81 MILLIGRAM(S): at 12:25

## 2023-11-26 RX ADMIN — Medication 1 TABLET(S): at 08:18

## 2023-11-26 RX ADMIN — Medication 0.5 MILLIGRAM(S): at 21:01

## 2023-11-26 RX ADMIN — OXYCODONE HYDROCHLORIDE 10 MILLIGRAM(S): 5 TABLET ORAL at 02:03

## 2023-11-26 RX ADMIN — HEPARIN SODIUM 5000 UNIT(S): 5000 INJECTION INTRAVENOUS; SUBCUTANEOUS at 14:22

## 2023-11-26 RX ADMIN — Medication 6: at 08:15

## 2023-11-26 RX ADMIN — MAGNESIUM OXIDE 400 MG ORAL TABLET 400 MILLIGRAM(S): 241.3 TABLET ORAL at 17:50

## 2023-11-26 RX ADMIN — Medication 5 UNIT(S): at 12:35

## 2023-11-26 RX ADMIN — SENNA PLUS 2 TABLET(S): 8.6 TABLET ORAL at 21:02

## 2023-11-26 RX ADMIN — OXYCODONE HYDROCHLORIDE 10 MILLIGRAM(S): 5 TABLET ORAL at 12:26

## 2023-11-26 RX ADMIN — OXYCODONE HYDROCHLORIDE 10 MILLIGRAM(S): 5 TABLET ORAL at 17:49

## 2023-11-26 RX ADMIN — INSULIN GLARGINE 24 UNIT(S): 100 INJECTION, SOLUTION SUBCUTANEOUS at 08:17

## 2023-11-26 RX ADMIN — OXYCODONE HYDROCHLORIDE 10 MILLIGRAM(S): 5 TABLET ORAL at 18:40

## 2023-11-26 RX ADMIN — Medication 1 TABLET(S): at 12:27

## 2023-11-26 RX ADMIN — OXYCODONE HYDROCHLORIDE 10 MILLIGRAM(S): 5 TABLET ORAL at 14:28

## 2023-11-26 RX ADMIN — OXYCODONE HYDROCHLORIDE 10 MILLIGRAM(S): 5 TABLET ORAL at 00:29

## 2023-11-26 RX ADMIN — OXYCODONE HYDROCHLORIDE 10 MILLIGRAM(S): 5 TABLET ORAL at 23:45

## 2023-11-26 NOTE — PROGRESS NOTE ADULT - ASSESSMENT
Rehab for right BKA in the setting of severe PAD with previous balloon angioplasty and diabetic Charcot arthropathy causing gait dysfunction and decline in ADLs   - s/p meropenem 1000 mg q12h before source control    - PT/OT  - RLE NWB   - R Knee immobilizer in bed. Able to actively achieve full knee extension  - c/w Plavix 75 mg, ASA 81 mg  - continue acute rehab program  - 11/25 reached out to vascular to discuss with patient about incision.    HLD  - resumed home Crestor 20 mg qhs (instead of Lipitor 80 mg due to patient request).     Pain control   - c/w Oxycodone 10mg prn severe pain, oxycodone 5mg PRN mild pain.   - Pt was started on Lyrica 75 mg q8h, Cymbalta 40 mg q12h on previous unit, however patient states that he gets hallucinations and has been refusing the medications for the past several days and MS cleared  - pain management consulted, recs appreciated   - pt was getting narcotics from podiatrist in past but does not see physician anymore. 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 Will increase dinner Lispro to 8 units and monitor    Urinary retention  - US bladder and kidney 11/10: negative   - seen by urology 11/10: Flomax, self-cath q4-6h, fu outpatient   - on admission eval, patient states voiding spontaneously without urbano or straight cath but is complaining of frequency   - PVRs on rehab unit revealed large PVRs 1700cc and 900cc  - risks and benefits of Urbano vs. CIC discussed with patient  - He admits to a very large fluid intake and agrees to cut down fluids to about 1500 - 1800cc daily.  - PVR volumes decreasing - below 600 (no spontaneous void, but drinking less)  - Continue Flomax and CIC q 6hrs and monitor volumes  - 11/23 was able to urinate 100 and 200cc without cath, cath after 700cc, told patient to decrease fluid intake   - Has been on Flomax 8-9 days with no significant benefit. Will try change to Rapaflo   - US Kidneys 11/25 showing mulitple R renal cysts, prostate volume 52.5 ml, no evidence of hydronephrosis.     Hypertension   - c/w nifedipine 120 mg daily, losartan 100mg  - well controlled  - 11/25 BP meds in AM held for soft bp     Anxiety   Depression  - c/w Xanax 0.5 mg QID prn for anxiety   - monitor for symptoms of SI/HI  - can recommend psychosocial services if needed   - pt is currently in good spirits     -Pain control: Oxycodone 10/5 prn, Tylenol     -GI/Bowel Mgmt: Senna, Miralax      -Skin:  monitor incision - clean and dry    -FEN   - continue to monitor and replete electrolytes prn   - Diet: renal diet discontinued (per patient request), started Diabetic diet     Precautions / PROPHYLAXIS:      - Falls    - Ortho: Weight bearing status: NWB RLE       - DVT prophylaxis: SQ heparin TID  Rehab for right BKA in the setting of severe PAD with previous balloon angioplasty and diabetic Charcot arthropathy causing gait dysfunction and decline in ADLs   - s/p meropenem 1000 mg q12h before source control    - PT/OT  - RLE NWB   - R Knee immobilizer in bed. Able to actively achieve full knee extension  - c/w Plavix 75 mg, ASA 81 mg  - continue acute rehab program  - 11/25 reached out to vascular to discuss with patient about incision.    HLD  - resumed home Crestor 20 mg qhs (instead of Lipitor 80 mg due to patient request).     Pain control   - c/w Oxycodone 10mg prn severe pain, oxycodone 5mg PRN mild pain.   - Pt was started on Lyrica 75 mg q8h, Cymbalta 40 mg q12h on previous unit, however patient states that he gets hallucinations and has been refusing the medications for the past several days and MS cleared  - pain management consulted, recs appreciated   - pt was getting narcotics from podiatrist in past but does not see physician anymore. 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 increased dinner Lispro to 8 units and monitor    Urinary retention/ BPH  - seen by urology 11/10: Flomax, self-cath q4-6h, fu outpatient   - on admission eval, patient states voiding spontaneously without urbano or straight cath but is complaining of frequency   - PVRs on rehab unit revealed large PVRs 1700cc and 900cc  - risks and benefits of Urbano vs. CIC discussed with patient  - He admits to a very large fluid intake and agrees to cut down fluids to about 1500 - 1800cc daily.  - PVR volumes decreasing - below 600 (no spontaneous void, but drinking less)  - Flomax switched to Rapaflo 11/24  - 11/23 was able to urinate 100 and 200cc without cath, cath after 700cc, told patient to decrease fluid intake   - US Kidneys 11/25 showing mulitple R renal cysts, prostate volume 52.5 ml, no evidence of hydronephrosis.     Hypertension   - c/w nifedipine 120 mg daily, losartan 100mg  - well controlled  - 11/25 BP meds in AM held for soft bp     Anxiety   Depression  - c/w Xanax 0.5 mg QID prn for anxiety   - monitor for symptoms of SI/HI  - can recommend psychosocial services if needed   - pt is currently in good spirits     -Pain control: Oxycodone 10/5 prn, Tylenol     -GI/Bowel Mgmt: Senna, Miralax      -Skin:  monitor incision - clean and dry    -FEN   - continue to monitor and replete electrolytes prn   - Diet: renal diet discontinued (per patient request), started Diabetic diet     Precautions / PROPHYLAXIS:      - Falls    - Ortho: Weight bearing status: NWB RLE       - DVT prophylaxis: SQ heparin TID

## 2023-11-26 NOTE — PROGRESS NOTE ADULT - SUBJECTIVE AND OBJECTIVE BOX
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 Pt underwent R TMA on 10/27/2023 with revision on 10/30 and 11/4. 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 Cultures grew pseudomonas and yeast and treated with Meropenem. Hospital course is complicated by acute urinary retention 2/2 neurogenic bladder and two mechanical out of bed falls, as the patient is not accustomed to missing a foot. Pt was seen by urology and has completed a trial of void after several straight caths. Pt is currently voiding spontaneously without use of urbano or straight catheterization but feels like he is going frequently. Having regular bowel movements. Of note the pt states that Cymbalta and Lyrica makes him hallucinate and has been refusing the medications on the previous unit. Complains of phantom limb pain and incisional pain that is currently controlled on Oxycodone.     Pt seen and evaluated by Physiatry and is currently functioning at supervision for bed mobility , transfers minimum assist, ambulates 25ft RW contact guard assist, upper body dressing independent, lower body dressing contact guard assist, grooming independent. Pt is stable for acute inpatient rehabilitation.     LS: lives in private home with mother, 4 ASTER, another 13 ASTER to get to bedroom, half bathroom on 1st floor   PLOF: ambulates with crutches, independent in ADL/iADLs, no HHA    (15 Nov 2023 14:45)    TODAY'S SUBJECTIVE & REVIEW OF SYMPTOMS:  No acute overnight events. Patient seen this morning, stating that he feels some minor improvement with his urine output, as his straight cath volumes have been slowly decreasing   Otherwise vitals stable this morning. He has no other concerns.   Still requiring CIC q 6 hrs.   Having regular bowel movements.  Participating and tolerating therapy.   VSS    CLOF: bed mobility independent, transfers CG/supervision, ambulates 40ft RW TA, 8 steps touch assist, LBD set up, toileting supervision     Review of Systems:   Constitutional:    [  x ] WNL           [   ] poor appetite   [   ] insomnia   [   ] tired   Cardio:                [ x  ] WNL           [   ] CP   [   ] URENA   [   ] palpitations               Resp:                   [ x  ] WNL           [   ] SOB   [   ] cough   [   ] wheezing   GI:                        [  x ] WNL           [   ] constipation   [   ] diarrhea   [   ] abdominal pain   [   ] nausea   [   ] emesis                                :                      [   ] WNL           [   ] URBANO  [   ] dysuria   [ x  ] difficulty voiding requiring CIC   [  ] frequent voiding small amounts  - non CIC for retention    Endo:                   [ x  ] WNL          [   ] polyuria   [   ] temperature intolerance                 Skin:                     [ x  ] WNL          [   ] pain   [   ] wound   [   ] rash   MSK:                    [    ] WNL          [   ] muscle pain   [   ] joint pain/ stiffness   [   ] muscle tenderness   [   ] swelling  [ x]  right BKA with distal limb and phantom pain   Neuro:                 [   ] WNL          [   ] HA   [   ] change in vision   [   ] tremor   [   ] weakness   [   ]dysphagia  [ x ]  R limb phantom, neuropathic and incisional pain            Cognitive:           [  x ] WNL           [   ]confusion      Psych:                  [  x ] WNL           [   ] hallucinations   [   ]agitation   [   ] delusion   [   ]depression      PHYSICAL EXAM  Vital Signs Last 24 Hrs  T(C): --  T(F): --  HR: 77 (25 Nov 2023 20:19) (68 - 77)  BP: 136/62 (25 Nov 2023 20:19) (120/62 - 136/62)  BP(mean): --  RR: 18 (25 Nov 2023 20:19) (18 - 18)  SpO2: --    General:[ x  ] NAD, Resting Comfortable,   [   ] other:                                HEENT: [  x ] NC/AT, EOMI, PERRL , Normal Conjunctivae,   [   ] other:  Cardio: [  x ] RRR, no murmer,   [   ] other:                              Pulm: [  x ] No Respiratory Distress,  Lungs CTAB,   [   ] other:                       Abdomen: [ x  ]ND/NT, Soft,   [   ] other:    : [ x  ] NO URBANO CATHETER, [   ] URBANO CATHETER- no meatal tear, no discharge, [   ] other:                                            MSK: [   ] No joint swelling, Full ROM,   [ x  ] other:                                    Ext: [  x ]No C/C/E, No calf tenderness,   [   ]other:   right BKA incision C&D with staples   Pt had good distal pedal pulses on LLE with intact proprioception and sensation   Skin: [ x  ]intact,   [   ] other:                                                                  Neurological Examination:  Cognitive: [  x  ] AAO x 3,   [    ]  other:                                                                      Attention:  [  x  ] intact,   [    ]  other:                            Memory: [  x  ] intact,    [    ]  other:     Mood/Affect: [  x  ] wnl,    [    ]  other:                                                                             Communication: [ x   ]Fluent, no dysarthria, following commands:  [    ] other:   CN II - XII:  [ x   ] intact,  [    ] other:                                                                                        Motor:   RIGHT UE: [  x ] WNL,  [   ] other:  LEFT    UE: [ x  ] WNL,  [   ] other:  RIGHT LE: [   ] WNL,  [   ] other: R HF 5/5, R BKA w/ knee immobilizer   LEFT    LE: [ x  ] WNL,  [   ] other:    Tone: [  x  ] wnl,   [    ]  other:  DTRs: [  x ]symmetric, [   ] other:  Coordination:   [  x  ] intact,   [    ] other:                                                                           Sensory: [  x  ] Intact to light touch,   [    ] other:    MEDICATIONS  (STANDING):  aspirin  chewable 81 milliGRAM(s) Oral daily  clopidogrel Tablet 75 milliGRAM(s) Oral daily  heparin   Injectable 5000 Unit(s) SubCutaneous every 8 hours  insulin glargine Injectable (LANTUS) 24 Unit(s) SubCutaneous every morning  insulin lispro (ADMELOG) corrective regimen sliding scale   SubCutaneous three times a day before meals  insulin lispro Injectable (ADMELOG) 5 Unit(s) SubCutaneous before lunch  insulin lispro Injectable (ADMELOG) 8 Unit(s) SubCutaneous before dinner  lactobacillus acidophilus 1 Tablet(s) Oral three times a day with meals  losartan 100 milliGRAM(s) Oral daily  magnesium oxide 400 milliGRAM(s) Oral three times a day with meals  NIFEdipine XL 90 milliGRAM(s) Oral daily  polyethylene glycol 3350 17 Gram(s) Oral daily  rosuvastatin 20 milliGRAM(s) Oral at bedtime  senna 2 Tablet(s) Oral at bedtime  silodosin 8 milliGRAM(s) Oral at bedtime  sodium bicarbonate 650 milliGRAM(s) Oral three times a day    MEDICATIONS  (PRN):  acetaminophen     Tablet .. 650 milliGRAM(s) Oral every 6 hours PRN Temp greater or equal to 38C (100.4F), Mild Pain (1 - 3)  ALPRAZolam 0.5 milliGRAM(s) Oral four times a day PRN anxiety  aluminum hydroxide/magnesium hydroxide/simethicone Suspension 30 milliLiter(s) Oral every 4 hours PRN Dyspepsia  dextrose Oral Gel 15 Gram(s) Oral once PRN Blood Glucose LESS THAN 70 milliGRAM(s)/deciliter  melatonin 5 milliGRAM(s) Oral at bedtime PRN Insomnia  oxyCODONE    IR 10 milliGRAM(s) Oral every 4 hours PRN Severe Pain (7 - 10)  oxyCODONE    IR 5 milliGRAM(s) Oral every 4 hours PRN Mild Pain (1 - 3)  polyethylene glycol 3350 17 Gram(s) Oral two times a day PRN Constipation      RECENT LABS/IMAGING        POCT Blood Glucose.: 287 mg/dL (11-26-23 @ 07:31)  POCT Blood Glucose.: 81 mg/dL (11-25-23 @ 20:50)  POCT Blood Glucose.: 110 mg/dL (11-25-23 @ 16:12)  POCT Blood Glucose.: 105 mg/dL (11-25-23 @ 11:43)  POCT Blood Glucose.: 369 mg/dL (11-25-23 @ 08:30)  POCT Blood Glucose.: 102 mg/dL (11-24-23 @ 21:18)  POCT Blood Glucose.: 124 mg/dL (11-24-23 @ 16:44)  POCT Blood Glucose.: 168 mg/dL (11-24-23 @ 11:58)  POCT Blood Glucose.: 242 mg/dL (11-24-23 @ 07:27)  POCT Blood Glucose.: 181 mg/dL (11-23-23 @ 21:43)  POCT Blood Glucose.: 78 mg/dL (11-23-23 @ 16:28)  POCT Blood Glucose.: 127 mg/dL (11-23-23 @ 11:41)    US Kidney and Bladder 11/25/23  IMPRESSION:  1.  Multiple right renal cysts, measuring up to 1.1 cm.  2.  0.9 cm mid-pole, left renal cyst.  3.  Post-void residual not measured, otherwise unremarkable ultrasound   examination of the urinary bladder.  4.  Prostate volume of 52.5 mL. 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 Pt underwent R TMA on 10/27/2023 with revision on 10/30 and 11/4. 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 Cultures grew pseudomonas and yeast and treated with Meropenem. Hospital course is complicated by acute urinary retention 2/2 neurogenic bladder and two mechanical out of bed falls, as the patient is not accustomed to missing a foot. Pt was seen by urology and has completed a trial of void after several straight caths. Pt is currently voiding spontaneously without use of urbano or straight catheterization but feels like he is going frequently. Having regular bowel movements. Of note the pt states that Cymbalta and Lyrica makes him hallucinate and has been refusing the medications on the previous unit. Complains of phantom limb pain and incisional pain that is currently controlled on Oxycodone.     Pt seen and evaluated by Physiatry and is currently functioning at supervision for bed mobility , transfers minimum assist, ambulates 25ft RW contact guard assist, upper body dressing independent, lower body dressing contact guard assist, grooming independent. Pt is stable for acute inpatient rehabilitation.     LS: lives in private home with mother, 4 ASTER, another 13 ASTER to get to bedroom, half bathroom on 1st floor   PLOF: ambulates with crutches, independent in ADL/iADLs, no HHA    (15 Nov 2023 14:45)    TODAY'S SUBJECTIVE & REVIEW OF SYMPTOMS:  No acute overnight events. Patient seen this morning, stating that he feels some minor improvement with his urine output, as his straight cath volumes have been slowly decreasing - switched Friday to Rap[place]flo  Otherwise vitals stable this morning. He has no other concerns.   Still requiring CIC q 6 hrs.   Having regular bowel movements.  Participating and tolerating therapy.   VSS    CLOF: bed mobility independent, transfers CG/supervision, ambulates 40ft RW TA, 8 steps touch assist, LBD set up, toileting supervision     Review of Systems:   Constitutional:    [  x ] WNL           [   ] poor appetite   [   ] insomnia   [   ] tired   Cardio:                [ x  ] WNL           [   ] CP   [   ] URENA   [   ] palpitations               Resp:                   [ x  ] WNL           [   ] SOB   [   ] cough   [   ] wheezing   GI:                        [  x ] WNL           [   ] constipation   [   ] diarrhea   [   ] abdominal pain   [   ] nausea   [   ] emesis                                :                      [   ] WNL           [   ] URBANO  [   ] dysuria   [ x  ] difficulty voiding requiring CIC    Endo:                   [ x  ] WNL          [   ] polyuria   [   ] temperature intolerance                 Skin:                     [ x  ] WNL          [   ] pain   [   ] wound   [   ] rash   MSK:                    [    ] WNL          [   ] muscle pain   [   ] joint pain/ stiffness   [   ] muscle tenderness   [   ] swelling  [ x]  right BKA with distal limb and phantom pain and intermittent c/o LBP   Neuro:                 [   ] WNL          [   ] HA   [   ] change in vision   [   ] tremor   [   ] weakness   [   ]dysphagia  [ x ]  R limb phantom, neuropathic and incisional pain            Cognitive:           [  x ] WNL           [   ]confusion      Psych:                  [  x ] WNL           [   ] hallucinations   [   ]agitation   [   ] delusion   [   ]depression      PHYSICAL EXAM  Vital Signs Last 24 Hrs  T(C): --  T(F): --  HR: 77 (25 Nov 2023 20:19) (68 - 77)  BP: 136/62 (25 Nov 2023 20:19) (120/62 - 136/62)  BP(mean): --  RR: 18 (25 Nov 2023 20:19) (18 - 18)  SpO2: --    General:[ x  ] NAD, Resting Comfortable,   [   ] other:                                HEENT: [  x ] NC/AT, EOMI, PERRL , Normal Conjunctivae,   [   ] other:  Cardio: [  x ] RRR, no murmer,   [   ] other:                              Pulm: [  x ] No Respiratory Distress,  Lungs CTAB,   [   ] other:                       Abdomen: [ x  ]ND/NT, Soft,   [   ] other:    : [ x  ] NO URBANO CATHETER, [   ] URBANO CATHETER- no meatal tear, no discharge, [   ] other:                                            MSK: [   ] No joint swelling, Full ROM,   [ x  ] other:                                    Ext: [  x ]No C/C/E, No calf tenderness,   [   ]other:   right BKA incision C&D with staples   Pt had good distal pedal pulses on LLE with intact proprioception and sensation   Skin: [ x  ]intact,   [   ] other:                                                                  Neurological Examination:  Cognitive: [  x  ] AAO x 3,   [    ]  other:                                                                      Attention:  [  x  ] intact,   [    ]  other:                            Memory: [  x  ] intact,    [    ]  other:     Mood/Affect: [  x  ] wnl,    [    ]  other:                                                                             Communication: [ x   ]Fluent, no dysarthria, following commands:  [    ] other:   CN II - XII:  [ x   ] intact,  [    ] other:                                                                                        Motor:   RIGHT UE: [  x ] WNL,  [   ] other:  LEFT    UE: [ x  ] WNL,  [   ] other:  RIGHT LE: [   ] WNL,  [   ] other: R HF 5/5, R BKA w/ knee immobilizer   LEFT    LE: [ x  ] WNL,  [   ] other:    Tone: [  x  ] wnl,   [    ]  other:  DTRs: [  x ]symmetric, [   ] other:  Coordination:   [  x  ] intact,   [    ] other:                                                                           Sensory: [  x  ] Intact to light touch,   [    ] other:    MEDICATIONS  (STANDING):  aspirin  chewable 81 milliGRAM(s) Oral daily  clopidogrel Tablet 75 milliGRAM(s) Oral daily  heparin   Injectable 5000 Unit(s) SubCutaneous every 8 hours  insulin glargine Injectable (LANTUS) 24 Unit(s) SubCutaneous every morning  insulin lispro (ADMELOG) corrective regimen sliding scale   SubCutaneous three times a day before meals  insulin lispro Injectable (ADMELOG) 5 Unit(s) SubCutaneous before lunch  insulin lispro Injectable (ADMELOG) 8 Unit(s) SubCutaneous before dinner  lactobacillus acidophilus 1 Tablet(s) Oral three times a day with meals  losartan 100 milliGRAM(s) Oral daily  magnesium oxide 400 milliGRAM(s) Oral three times a day with meals  NIFEdipine XL 90 milliGRAM(s) Oral daily  polyethylene glycol 3350 17 Gram(s) Oral daily  rosuvastatin 20 milliGRAM(s) Oral at bedtime  senna 2 Tablet(s) Oral at bedtime  silodosin 8 milliGRAM(s) Oral at bedtime  sodium bicarbonate 650 milliGRAM(s) Oral three times a day    MEDICATIONS  (PRN):  acetaminophen     Tablet .. 650 milliGRAM(s) Oral every 6 hours PRN Temp greater or equal to 38C (100.4F), Mild Pain (1 - 3)  ALPRAZolam 0.5 milliGRAM(s) Oral four times a day PRN anxiety  aluminum hydroxide/magnesium hydroxide/simethicone Suspension 30 milliLiter(s) Oral every 4 hours PRN Dyspepsia  dextrose Oral Gel 15 Gram(s) Oral once PRN Blood Glucose LESS THAN 70 milliGRAM(s)/deciliter  melatonin 5 milliGRAM(s) Oral at bedtime PRN Insomnia  oxyCODONE    IR 10 milliGRAM(s) Oral every 4 hours PRN Severe Pain (7 - 10)  oxyCODONE    IR 5 milliGRAM(s) Oral every 4 hours PRN Mild Pain (1 - 3)  polyethylene glycol 3350 17 Gram(s) Oral two times a day PRN Constipation      RECENT LABS/IMAGING        POCT Blood Glucose.: 287 mg/dL (11-26-23 @ 07:31)  POCT Blood Glucose.: 81 mg/dL (11-25-23 @ 20:50)  POCT Blood Glucose.: 110 mg/dL (11-25-23 @ 16:12)  POCT Blood Glucose.: 105 mg/dL (11-25-23 @ 11:43)  POCT Blood Glucose.: 369 mg/dL (11-25-23 @ 08:30)  POCT Blood Glucose.: 102 mg/dL (11-24-23 @ 21:18)  POCT Blood Glucose.: 124 mg/dL (11-24-23 @ 16:44)  POCT Blood Glucose.: 168 mg/dL (11-24-23 @ 11:58)  POCT Blood Glucose.: 242 mg/dL (11-24-23 @ 07:27)  POCT Blood Glucose.: 181 mg/dL (11-23-23 @ 21:43)  POCT Blood Glucose.: 78 mg/dL (11-23-23 @ 16:28)  POCT Blood Glucose.: 127 mg/dL (11-23-23 @ 11:41)    US Kidney and Bladder 11/25/23  IMPRESSION:  1.  Multiple right renal cysts, measuring up to 1.1 cm.  2.  0.9 cm mid-pole, left renal cyst.  3.  Post-void residual not measured, otherwise unremarkable ultrasound   examination of the urinary bladder.  4.  Prostate volume of 52.5 mL.

## 2023-11-26 NOTE — PROGRESS NOTE ADULT - ATTENDING COMMENTS
I reviewed the chart and examined the patient with the rehab resident and we discussed the findings and treatment plan.  The patient is tolerating the rehab program well. I agree with the findings and treatment plan above, which I modified as indicated. The patient requires 3 hrs a day of acute inpatient rehab. No new complaints.    I read, edited and agree with the Assessment:  Rehab for right BKA in the setting of severe PAD with previous balloon angioplasty and diabetic Charcot arthropathy causing gait dysfunction and decline in ADLs   - s/p meropenem 1000 mg q12h before source control    - PT/OT  - RLE NWB   - R Knee immobilizer in bed. Able to actively achieve full knee extension  - c/w Plavix 75 mg, ASA 81 mg  - continue acute rehab program  - 11/25 pt and pt's sister worried about increasing erythema around incision, spoke with vascular who will assess     HLD  - resumed home Crestor 20 mg qhs (instead of Lipitor 80 mg due to patient request).     Pain control   - c/w Oxycodone 10mg prn   - Pt was started on Lyrica 75 mg q8h, Cymbalta 40 mg q12h on previous unit, however patient states that he gets hallucinations and has been refusing the medications for the past several days and MS cleared  - pain management consulted, recs appreciated   - pt was getting narcotics from podiatrist in past but does not see physician anymore. 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 Will increase dinner Lispro to 8 units and monitor    Urinary retention  - US bladder and kidney 11/10: negative   - seen by urology 11/10: Flomax, self-cath q4-6h, fu outpatient   - on admission eval, patient states voiding spontaneously without urbano or straight cath but is complaining of frequency   - PVRs on rehab unit revealed large PVRs 1700cc and 900cc  - risks and benefits of [place] vs. CIC discussed with patient  - He admits to a very large fluid intake and agrees to cut down fluids to about 1500 - 1800cc daily.  - PVR volumes decreasing - below 600 (no spontaneous void, but drinking less)  - Continue Flomax and CIC q 6hrs and monitor volumes  - 11/23 was able to urinate 100 and 200cc without cath, cath after 700cc, told patient to decrease fluid intake   - Has been on Flomax 8-9 days with no significant benefit. Will try change to Rapaflo     Hypertension   - c/w nifedipine 120 mg daily, losartan 100mg  - well controlled  - 11/25 BP meds in AM held for soft bp     Anxiety   Depression  - c/w Xanax 0.5 mg QID prn for anxiety   - monitor for symptoms of SI/HI  - can recommend psychosocial services if needed   - pt is currently in good spirits     -Pain control: Oxycodone 10/5 prn, Tylenol     -GI/Bowel Mgmt: Senna, Miralax      -Skin:  monitor incision - clean and dry    -FEN   - continue to monitor and replete electrolytes prn     - Diet: renal diet discontinued (per patient request), started Diabetic diet     Precautions / PROPHYLAXIS:      - Falls    - Ortho: Weight bearing status: NWB RLE       - DVT prophylaxis: SQ heparin TID
I reviewed the chart and examined the patient with the resident and we discussed the findings and treatment plan.  The patient is tolerating the rehab program well. I agree with the findings and treatment plan above, which I modified as indicated. The patient requires 3 hrs a day of acute inpatient rehab. No new complaints. Pain controlled on Oxycodone. VSS. Started having minimal spontaneous void before cath.   Ambulates with RW and CG. Continue acute rehab program.    I read, edited and agree with the Assessment:  Rehab of right BKA in the setting of severe PAD with previous balloon angioplasty and diabetic Charcot arthropathy causing gait dysfunction and decline in ADLs   - s/p Meropenem 1000mg q12h before source control    - PT/OT  - RLE NWB   - R Knee immobilizer in bed. Able to actively achieve full knee extension  - c/w Plavix 75mg, ASA 81mg  - continue acute rehab program  - resumed home Crestor 20mg qhs (instead of Lipitor 80mg due to patient request).     Pain control   - c/w Oxycodone 10mg prn   - Pt was started on Lyrica 75mg q8h, Cymbalta 40mg q12h on previous unit, however patient states that he gets hallucinations and has been refusing the medications for the past several days and MS cleared  - pain management consulted, recs appreciated   - pt was getting narcotics from podiatrist in past but does not see physician anymore.     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  - c/w Lantus 24 units AM   - ISS, BGM AC  - running consistently high on 11/20  - add Lispro 5 unts q AC and monitor  - pt reports that he does not eat much for breakfast generally but eats for lunch and dinner   - 11/21 change to Lispro 5 units lunch and dinner only   - 11/21  this morning, will continue to monitor    Urinary retention  - US bladder and kidney 11/10: negative   - seen by urology 11/10: flomax, self-cath q4-6h, fu outpatient   - on admission eval, patient states voiding spontaneously without urbano or straight cath but is complaining of frequency   - PVRs on rehab unit revealed large PVRs 1700cc and 900cc  - risks and benefits of [facility] vs. CIC discussed with patient  - He admits to a very large fluid intake and agrees to cut down fluids to about 1500 - 1800cc daily.  - PVR volumes decreasing - below 600 (no spontaneous void, but drinking less)  - Continue Flomax and CIC q 6hrs and monitor volumes    Hypertension   - c/w Nifedipine 120mg daily, Losartan 100mg  - monitor and adjust bp meds as necessary     Anxiety   Depression  - c/w Xanax 0.5mg QID prn for anxiety   - monitor for symptoms of SI/HI  - can recommend psychosocial services if needed   - pt is currently in good spirits
Patient seen and examined with the resident. We discussed the case. I have directed the care. I edited the note. The patient requires acute rehab with 3 hours of daily therapies at least 5 out of 7 days and close physiatry follow up.
I reviewed the chart and examined the patient with the resident and we discussed the findings and treatment plan.  The patient is tolerating the rehab program well. I agree with the findings and treatment plan above, which I modified as indicated. The patient requires 3 hrs a day of acute inpatient rehab. No new complaints. Having some small spontaneous void now prior to CIC. Pain controlled. Ambulates with RW with CG. Continue full rehab program.    I read, edited and agree with the Assessment:  Rehab of right BKA in the setting of severe PAD with previous balloon angioplasty and diabetic Charcot arthropathy causing gait dysfunction and decline in ADLs   - s/p Meropenem 1000mg q12h before source control    - PT/OT  - RLE NWB   - R Knee immobilizer in bed. Able to actively achieve full knee extension  - c/w Plavix 75mg, ASA 81mg  - continue acute rehab program  - resumed home Crestor 20mg qhs (instead of Lipitor 80mg due to patient request).     Pain control   - c/w Oxycodone 10mg prn   - Pt was started on Lyrica 75mg q8h, Cymbalta 40mg q12h on previous unit, however patient states that he gets hallucinations and has been refusing the medications for the past several days and MS cleared  - pain management consulted, recs appreciated   - pt was getting narcotics from podiatrist in past but does not see physician anymore.     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  - c/w Lantus 24 units AM   - ISS, BGM AC  - running consistently high on 11/20  - add Lispro 5 unts q AC and monitor  - pt reports that he does not eat much for breakfast generally but eats for lunch and dinner   - 11/21 change to Lispro 5 units lunch and dinner only   - Having elevation with morning blood glucose, will continue to monitor     Urinary retention  - US bladder and kidney 11/10: negative   - seen by urology 11/10: flomax, self-cath q4-6h, fu outpatient   - on admission eval, patient states voiding spontaneously without urbano or straight cath but is complaining of frequency   - PVRs on rehab unit revealed large PVRs 1700cc and 900cc  - risks and benefits of [place] vs. CIC discussed with patient  - He admits to a very large fluid intake and agrees to cut down fluids to about 1500 - 1800cc daily.  - PVR volumes decreasing - below 600 (no spontaneous void, but drinking less)  - Continue Flomax and CIC q 6hrs and monitor volumes  - 11/23 was able to urinate 100 and 200cc without cath, cath after 700cc, told patient to decrease fluid intake     Hypertension   - c/w Nifedipine 120mg daily, Losartan 100mg  - monitor and adjust bp meds as necessary     Anxiety   Depression  - c/w Xanax 0.5mg QID prn for anxiety   - monitor for symptoms of SI/HI  - can recommend psychosocial services if needed   - pt is currently in good spirits
Patient seen and examined with the resident. We discussed the case. I have directed the care. I edited the note. The patient requires acute rehab with 3 hours of daily therapies at least 5 out of 7 days and close physiatry follow up.
I reviewed the chart and examined the patient with the resident and we discussed the findings and treatment plan.  The patient is tolerating the rehab program well. I agree with the findings and treatment plan above, which I modified as indicated. The patient requires 3 hrs a day of acute inpatient rehab. No new complaints. Alert. NAD. ROS unchanged. VSS. C/o pain in AKA limb and back, but does not appear uncomfortable. Ambulating with RW with CG/ CS. Continue rehab program.    I read, edited and agree with the Assessment:  Rehab for right BKA in the setting of severe PAD with previous balloon angioplasty and diabetic Charcot arthropathy causing gait dysfunction and decline in ADLs   - s/p meropenem 1000 mg q12h before source control    - PT/OT  - RLE NWB   - R Knee immobilizer in bed. Able to actively achieve full knee extension  - c/w Plavix 75 mg, ASA 81 mg  - continue acute rehab program  - 11/25 reached out to vascular to discuss with patient about incision.    HLD  - resumed home Crestor 20 mg qhs (instead of Lipitor 80 mg due to patient request).     Pain control   - c/w Oxycodone 10mg prn severe pain, oxycodone 5mg PRN mild pain.   - Pt was started on Lyrica 75 mg q8h, Cymbalta 40 mg q12h on previous unit, however patient states that he gets hallucinations and has been refusing the medications for the past several days and MS cleared  - pain management consulted, recs appreciated   - pt was getting narcotics from podiatrist in past but does not see physician anymore. 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 increased dinner Lispro to 8 units and monitor    Urinary retention/ BPH  - seen by urology 11/10: Flomax, self-cath q4-6h, fu outpatient   - on admission christopher, patient states voiding spontaneously without urbano or straight cath but is complaining of frequency   - PVRs on rehab unit revealed large PVRs 1700cc and 900cc  - risks and benefits of [facility] vs. CIC discussed with patient  - He admits to a very large fluid intake and agrees to cut down fluids to about 1500 - 1800cc daily.  - PVR volumes decreasing - below 600 (no spontaneous void, but drinking less)  - Flomax switched to Rapaflo 11/24  - 11/23 was able to urinate 100 and 200cc without cath, cath after 700cc, told patient to decrease fluid intake   - US Kidneys 11/25 showing mulitple R renal cysts, prostate volume 52.5 ml, no evidence of hydronephrosis.     Hypertension   - c/w nifedipine 120 mg daily, losartan 100mg  - well controlled  - 11/25 BP meds in AM held for soft bp     Anxiety   Depression  - c/w Xanax 0.5 mg QID prn for anxiety   - monitor for symptoms of SI/HI  - can recommend psychosocial services if needed   - pt is currently in good spirits
I reviewed the chart and examined the patient with the resident and we discussed the findings and treatment plan.  The patient is tolerating the rehab program well. I agree with the findings and treatment plan above, which I modified as indicated. The patient requires 3 hrs a day of acute inpatient rehab. No new complaints. Alert. Pain controlled. Blood sugars running high and Lispro 5 units q AC added. Monitor and adjust. Participating well in therapies. Ambulates with RW CG. Continue rehab program.    I read, edited and agree with the Assessment:  Rehab of right BKA in the setting of severe PAD with previous balloon angioplasty and diabetic Charcot arthropathy causing gait dysfunction and decline in ADLs   - s/p Meropenem 1000mg q12h before source control    - PT/OT  - RLE NWB   - R Knee immobilizer in bed  - c/w Plavix 75mg, ASA 81mg  - continue acute rehab program  - resumed home Crestor 20mg qhs (instead of Lipitor 80mg due to patient request).     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 CIC discussed with patient  - He admits to a very large fluid intake and agrees to cut down fluids to about 1500 - 1800cc daily.  - PVR volumes decreasing - below 600 (no spontaneous void, but drinking less)  - Continue Flomax and CIC q 6hrs and monitor volumes    Hypertension   - c/w Nifedipine 120mg daily, Losartan 100mg  - monitor and adjust bp meds as necessary     Anxiety   Depression  - c/w Xanax 0.5mg QID prn for anxiety   - monitor for symptoms of SI/HI  - can recommend psychosocial services if needed   - pt is currently in good spirits     -Pain control: Oxycodone 10/5 prn, Tylenol
I reviewed the chart and examined the patient with the resident and we discussed the findings and treatment plan.  The patient is tolerating the rehab program well. I agree with the findings and treatment plan above, which I modified as indicated. The patient requires 3 hrs a day of acute inpatient rehab. No new complaints. Alert. VSS. Blood sugars labile, insulin adjusted. Ambulates with crutch or walker with CG. Still getting CIC q6 hrs with no spontaneous void. Continue full rehab program.    I read, edited and agree with the Assessment:  Rehab of right BKA in the setting of severe PAD with previous balloon angioplasty and diabetic Charcot arthropathy causing gait dysfunction and decline in ADLs   - s/p Meropenem 1000mg q12h before source control    - PT/OT  - RLE NWB   - R Knee immobilizer in bed  - c/w Plavix 75mg, ASA 81mg  - continue acute rehab program  - resumed home Crestor 20mg qhs (instead of Lipitor 80mg due to patient request).     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  - c/w Lantus 24 units AM   - ISS, BGM AC  - running consistently high on 11/20  - add Lispro 5 unts q AC and monitor  - pt reports that he does not eat much for breakfast generally but eats for lunch and dinner   - 11/21 change to Lispro 5 units lunch and dinner only   - 11/21  this morning, will continue to monitor    Urinary retention  - US bladder and kidney 11/10: negative   - seen by urology 11/10: flomax, self-cath q4-6h, fu outpatient   - on admission eval, patient states voiding spontaneously without urbano or straight cath but is complaining of frequency   - PVRs on rehab unit revealed large PVRs 1700cc and 900cc  - risks and benefits of [place] vs. CIC discussed with patient  - He admits to a very large fluid intake and agrees to cut down fluids to about 1500 - 1800cc daily.  - PVR volumes decreasing - below 600 (no spontaneous void, but drinking less)  - Continue Flomax and CIC q 6hrs and monitor volumes    Hypertension   - c/w Nifedipine 120mg daily, Losartan 100mg  - monitor and adjust bp meds as necessary     Anxiety   Depression  - c/w Xanax 0.5mg QID prn for anxiety   - monitor for symptoms of SI/HI  - can recommend psychosocial services if needed   - pt is currently in good spirits     -Pain control: Oxycodone 10/5 prn, Tylenol     -GI/Bowel Mgmt: Senna, Miralax      -Skin:  monitor incision - clean and dry

## 2023-11-27 ENCOUNTER — TRANSCRIPTION ENCOUNTER (OUTPATIENT)
Age: 58
End: 2023-11-27

## 2023-11-27 LAB
ALBUMIN SERPL ELPH-MCNC: 4 G/DL — SIGNIFICANT CHANGE UP (ref 3.5–5.2)
ALBUMIN SERPL ELPH-MCNC: 4 G/DL — SIGNIFICANT CHANGE UP (ref 3.5–5.2)
ALP SERPL-CCNC: 152 U/L — HIGH (ref 30–115)
ALP SERPL-CCNC: 152 U/L — HIGH (ref 30–115)
ALT FLD-CCNC: 15 U/L — SIGNIFICANT CHANGE UP (ref 0–41)
ALT FLD-CCNC: 15 U/L — SIGNIFICANT CHANGE UP (ref 0–41)
ANION GAP SERPL CALC-SCNC: 13 MMOL/L — SIGNIFICANT CHANGE UP (ref 7–14)
ANION GAP SERPL CALC-SCNC: 13 MMOL/L — SIGNIFICANT CHANGE UP (ref 7–14)
AST SERPL-CCNC: 14 U/L — SIGNIFICANT CHANGE UP (ref 0–41)
AST SERPL-CCNC: 14 U/L — SIGNIFICANT CHANGE UP (ref 0–41)
BASOPHILS # BLD AUTO: 0.06 K/UL — SIGNIFICANT CHANGE UP (ref 0–0.2)
BASOPHILS # BLD AUTO: 0.06 K/UL — SIGNIFICANT CHANGE UP (ref 0–0.2)
BASOPHILS NFR BLD AUTO: 0.9 % — SIGNIFICANT CHANGE UP (ref 0–1)
BASOPHILS NFR BLD AUTO: 0.9 % — SIGNIFICANT CHANGE UP (ref 0–1)
BILIRUB SERPL-MCNC: 0.3 MG/DL — SIGNIFICANT CHANGE UP (ref 0.2–1.2)
BILIRUB SERPL-MCNC: 0.3 MG/DL — SIGNIFICANT CHANGE UP (ref 0.2–1.2)
BUN SERPL-MCNC: 47 MG/DL — HIGH (ref 10–20)
BUN SERPL-MCNC: 47 MG/DL — HIGH (ref 10–20)
CALCIUM SERPL-MCNC: 9.4 MG/DL — SIGNIFICANT CHANGE UP (ref 8.4–10.5)
CALCIUM SERPL-MCNC: 9.4 MG/DL — SIGNIFICANT CHANGE UP (ref 8.4–10.5)
CHLORIDE SERPL-SCNC: 98 MMOL/L — SIGNIFICANT CHANGE UP (ref 98–110)
CHLORIDE SERPL-SCNC: 98 MMOL/L — SIGNIFICANT CHANGE UP (ref 98–110)
CO2 SERPL-SCNC: 24 MMOL/L — SIGNIFICANT CHANGE UP (ref 17–32)
CO2 SERPL-SCNC: 24 MMOL/L — SIGNIFICANT CHANGE UP (ref 17–32)
CREAT SERPL-MCNC: 3.4 MG/DL — HIGH (ref 0.7–1.5)
CREAT SERPL-MCNC: 3.4 MG/DL — HIGH (ref 0.7–1.5)
EGFR: 20 ML/MIN/1.73M2 — LOW
EGFR: 20 ML/MIN/1.73M2 — LOW
EOSINOPHIL # BLD AUTO: 0.32 K/UL — SIGNIFICANT CHANGE UP (ref 0–0.7)
EOSINOPHIL # BLD AUTO: 0.32 K/UL — SIGNIFICANT CHANGE UP (ref 0–0.7)
EOSINOPHIL NFR BLD AUTO: 4.7 % — SIGNIFICANT CHANGE UP (ref 0–8)
EOSINOPHIL NFR BLD AUTO: 4.7 % — SIGNIFICANT CHANGE UP (ref 0–8)
GLUCOSE BLDC GLUCOMTR-MCNC: 140 MG/DL — HIGH (ref 70–99)
GLUCOSE BLDC GLUCOMTR-MCNC: 140 MG/DL — HIGH (ref 70–99)
GLUCOSE BLDC GLUCOMTR-MCNC: 151 MG/DL — HIGH (ref 70–99)
GLUCOSE BLDC GLUCOMTR-MCNC: 151 MG/DL — HIGH (ref 70–99)
GLUCOSE BLDC GLUCOMTR-MCNC: 180 MG/DL — HIGH (ref 70–99)
GLUCOSE BLDC GLUCOMTR-MCNC: 180 MG/DL — HIGH (ref 70–99)
GLUCOSE BLDC GLUCOMTR-MCNC: 184 MG/DL — HIGH (ref 70–99)
GLUCOSE BLDC GLUCOMTR-MCNC: 184 MG/DL — HIGH (ref 70–99)
GLUCOSE SERPL-MCNC: 137 MG/DL — HIGH (ref 70–99)
GLUCOSE SERPL-MCNC: 137 MG/DL — HIGH (ref 70–99)
HCT VFR BLD CALC: 27.2 % — LOW (ref 42–52)
HCT VFR BLD CALC: 27.2 % — LOW (ref 42–52)
HGB BLD-MCNC: 8.6 G/DL — LOW (ref 14–18)
HGB BLD-MCNC: 8.6 G/DL — LOW (ref 14–18)
IMM GRANULOCYTES NFR BLD AUTO: 0.1 % — SIGNIFICANT CHANGE UP (ref 0.1–0.3)
IMM GRANULOCYTES NFR BLD AUTO: 0.1 % — SIGNIFICANT CHANGE UP (ref 0.1–0.3)
LYMPHOCYTES # BLD AUTO: 2.24 K/UL — SIGNIFICANT CHANGE UP (ref 1.2–3.4)
LYMPHOCYTES # BLD AUTO: 2.24 K/UL — SIGNIFICANT CHANGE UP (ref 1.2–3.4)
LYMPHOCYTES # BLD AUTO: 33.2 % — SIGNIFICANT CHANGE UP (ref 20.5–51.1)
LYMPHOCYTES # BLD AUTO: 33.2 % — SIGNIFICANT CHANGE UP (ref 20.5–51.1)
MAGNESIUM SERPL-MCNC: 2.9 MG/DL — HIGH (ref 1.8–2.4)
MAGNESIUM SERPL-MCNC: 2.9 MG/DL — HIGH (ref 1.8–2.4)
MCHC RBC-ENTMCNC: 26.6 PG — LOW (ref 27–31)
MCHC RBC-ENTMCNC: 26.6 PG — LOW (ref 27–31)
MCHC RBC-ENTMCNC: 31.6 G/DL — LOW (ref 32–37)
MCHC RBC-ENTMCNC: 31.6 G/DL — LOW (ref 32–37)
MCV RBC AUTO: 84.2 FL — SIGNIFICANT CHANGE UP (ref 80–94)
MCV RBC AUTO: 84.2 FL — SIGNIFICANT CHANGE UP (ref 80–94)
MONOCYTES # BLD AUTO: 0.52 K/UL — SIGNIFICANT CHANGE UP (ref 0.1–0.6)
MONOCYTES # BLD AUTO: 0.52 K/UL — SIGNIFICANT CHANGE UP (ref 0.1–0.6)
MONOCYTES NFR BLD AUTO: 7.7 % — SIGNIFICANT CHANGE UP (ref 1.7–9.3)
MONOCYTES NFR BLD AUTO: 7.7 % — SIGNIFICANT CHANGE UP (ref 1.7–9.3)
NEUTROPHILS # BLD AUTO: 3.59 K/UL — SIGNIFICANT CHANGE UP (ref 1.4–6.5)
NEUTROPHILS # BLD AUTO: 3.59 K/UL — SIGNIFICANT CHANGE UP (ref 1.4–6.5)
NEUTROPHILS NFR BLD AUTO: 53.4 % — SIGNIFICANT CHANGE UP (ref 42.2–75.2)
NEUTROPHILS NFR BLD AUTO: 53.4 % — SIGNIFICANT CHANGE UP (ref 42.2–75.2)
NRBC # BLD: 0 /100 WBCS — SIGNIFICANT CHANGE UP (ref 0–0)
NRBC # BLD: 0 /100 WBCS — SIGNIFICANT CHANGE UP (ref 0–0)
PLATELET # BLD AUTO: 466 K/UL — HIGH (ref 130–400)
PLATELET # BLD AUTO: 466 K/UL — HIGH (ref 130–400)
PMV BLD: 9.1 FL — SIGNIFICANT CHANGE UP (ref 7.4–10.4)
PMV BLD: 9.1 FL — SIGNIFICANT CHANGE UP (ref 7.4–10.4)
POTASSIUM SERPL-MCNC: 5 MMOL/L — SIGNIFICANT CHANGE UP (ref 3.5–5)
POTASSIUM SERPL-MCNC: 5 MMOL/L — SIGNIFICANT CHANGE UP (ref 3.5–5)
POTASSIUM SERPL-SCNC: 5 MMOL/L — SIGNIFICANT CHANGE UP (ref 3.5–5)
POTASSIUM SERPL-SCNC: 5 MMOL/L — SIGNIFICANT CHANGE UP (ref 3.5–5)
PROT SERPL-MCNC: 7 G/DL — SIGNIFICANT CHANGE UP (ref 6–8)
PROT SERPL-MCNC: 7 G/DL — SIGNIFICANT CHANGE UP (ref 6–8)
RBC # BLD: 3.23 M/UL — LOW (ref 4.7–6.1)
RBC # BLD: 3.23 M/UL — LOW (ref 4.7–6.1)
RBC # FLD: 16.5 % — HIGH (ref 11.5–14.5)
RBC # FLD: 16.5 % — HIGH (ref 11.5–14.5)
SODIUM SERPL-SCNC: 135 MMOL/L — SIGNIFICANT CHANGE UP (ref 135–146)
SODIUM SERPL-SCNC: 135 MMOL/L — SIGNIFICANT CHANGE UP (ref 135–146)
WBC # BLD: 6.74 K/UL — SIGNIFICANT CHANGE UP (ref 4.8–10.8)
WBC # BLD: 6.74 K/UL — SIGNIFICANT CHANGE UP (ref 4.8–10.8)
WBC # FLD AUTO: 6.74 K/UL — SIGNIFICANT CHANGE UP (ref 4.8–10.8)
WBC # FLD AUTO: 6.74 K/UL — SIGNIFICANT CHANGE UP (ref 4.8–10.8)

## 2023-11-27 PROCEDURE — 99222 1ST HOSP IP/OBS MODERATE 55: CPT

## 2023-11-27 RX ORDER — ERGOCALCIFEROL 1.25 MG/1
50000 CAPSULE ORAL ONCE
Refills: 0 | Status: COMPLETED | OUTPATIENT
Start: 2023-11-27 | End: 2023-11-27

## 2023-11-27 RX ORDER — ERGOCALCIFEROL 1.25 MG/1
1 CAPSULE ORAL
Qty: 6 | Refills: 0
Start: 2023-11-27 | End: 2024-01-10

## 2023-11-27 RX ORDER — SILODOSIN 4 MG/1
1 CAPSULE ORAL
Qty: 30 | Refills: 0
Start: 2023-11-27 | End: 2023-12-26

## 2023-11-27 RX ORDER — ASPIRIN/CALCIUM CARB/MAGNESIUM 324 MG
1 TABLET ORAL
Qty: 30 | Refills: 1
Start: 2023-11-27 | End: 2024-01-25

## 2023-11-27 RX ORDER — NIFEDIPINE 30 MG
1 TABLET, EXTENDED RELEASE 24 HR ORAL
Qty: 30 | Refills: 0
Start: 2023-11-27 | End: 2023-12-26

## 2023-11-27 RX ORDER — SODIUM BICARBONATE 1 MEQ/ML
1 SYRINGE (ML) INTRAVENOUS
Qty: 90 | Refills: 0
Start: 2023-11-27 | End: 2023-12-26

## 2023-11-27 RX ORDER — CEPHALEXIN 500 MG
1 CAPSULE ORAL
Qty: 21 | Refills: 0
Start: 2023-11-27 | End: 2023-12-03

## 2023-11-27 RX ORDER — SODIUM CHLORIDE 9 MG/ML
1000 INJECTION INTRAMUSCULAR; INTRAVENOUS; SUBCUTANEOUS
Refills: 0 | Status: DISCONTINUED | OUTPATIENT
Start: 2023-11-27 | End: 2023-11-28

## 2023-11-27 RX ORDER — MAGNESIUM OXIDE 400 MG ORAL TABLET 241.3 MG
1 TABLET ORAL
Qty: 0 | Refills: 0 | DISCHARGE
Start: 2023-11-27

## 2023-11-27 RX ORDER — OXYCODONE HYDROCHLORIDE 5 MG/1
2 TABLET ORAL
Qty: 56 | Refills: 0
Start: 2023-11-27 | End: 2023-12-03

## 2023-11-27 RX ORDER — ACETAMINOPHEN 500 MG
2 TABLET ORAL
Qty: 0 | Refills: 0 | DISCHARGE
Start: 2023-11-27

## 2023-11-27 RX ORDER — ALPRAZOLAM 0.25 MG
1 TABLET ORAL
Qty: 0 | Refills: 0 | DISCHARGE
Start: 2023-11-27

## 2023-11-27 RX ORDER — BACILLUS COAGULANS/INULIN 1B-250 MG
1 CAPSULE ORAL
Qty: 60 | Refills: 0
Start: 2023-11-27 | End: 2023-12-26

## 2023-11-27 RX ORDER — CEPHALEXIN 500 MG
250 CAPSULE ORAL EVERY 8 HOURS
Refills: 0 | Status: DISCONTINUED | OUTPATIENT
Start: 2023-11-27 | End: 2023-11-28

## 2023-11-27 RX ORDER — SENNA PLUS 8.6 MG/1
2 TABLET ORAL
Qty: 0 | Refills: 0 | DISCHARGE
Start: 2023-11-27

## 2023-11-27 RX ORDER — ROSUVASTATIN CALCIUM 5 MG/1
1 TABLET ORAL
Qty: 30 | Refills: 1
Start: 2023-11-27 | End: 2024-01-25

## 2023-11-27 RX ORDER — POLYETHYLENE GLYCOL 3350 17 G/17G
17 POWDER, FOR SOLUTION ORAL
Qty: 0 | Refills: 0 | DISCHARGE
Start: 2023-11-27

## 2023-11-27 RX ORDER — LOSARTAN POTASSIUM 100 MG/1
1 TABLET, FILM COATED ORAL
Qty: 30 | Refills: 0
Start: 2023-11-27 | End: 2023-12-26

## 2023-11-27 RX ORDER — INSULIN LISPRO 100/ML
5 VIAL (ML) SUBCUTANEOUS
Qty: 8 | Refills: 0
Start: 2023-11-27 | End: 2023-12-26

## 2023-11-27 RX ORDER — ALPRAZOLAM 0.25 MG
0.5 TABLET ORAL
Qty: 0 | Refills: 0 | DISCHARGE

## 2023-11-27 RX ORDER — ERGOCALCIFEROL 1.25 MG/1
50000 CAPSULE ORAL
Refills: 0 | Status: CANCELLED | OUTPATIENT
Start: 2023-12-04 | End: 2023-11-28

## 2023-11-27 RX ORDER — CLOPIDOGREL BISULFATE 75 MG/1
1 TABLET, FILM COATED ORAL
Qty: 30 | Refills: 1
Start: 2023-11-27 | End: 2024-01-25

## 2023-11-27 RX ORDER — LOSARTAN POTASSIUM 100 MG/1
50 TABLET, FILM COATED ORAL DAILY
Refills: 0 | Status: DISCONTINUED | OUTPATIENT
Start: 2023-11-27 | End: 2023-11-28

## 2023-11-27 RX ORDER — LOSARTAN POTASSIUM 100 MG/1
1 TABLET, FILM COATED ORAL
Qty: 0 | Refills: 0 | DISCHARGE

## 2023-11-27 RX ORDER — SODIUM BICARBONATE 1 MEQ/ML
1 SYRINGE (ML) INTRAVENOUS
Refills: 0 | DISCHARGE

## 2023-11-27 RX ADMIN — CLOPIDOGREL BISULFATE 75 MILLIGRAM(S): 75 TABLET, FILM COATED ORAL at 12:06

## 2023-11-27 RX ADMIN — Medication 1 TABLET(S): at 12:06

## 2023-11-27 RX ADMIN — MAGNESIUM OXIDE 400 MG ORAL TABLET 400 MILLIGRAM(S): 241.3 TABLET ORAL at 17:28

## 2023-11-27 RX ADMIN — OXYCODONE HYDROCHLORIDE 10 MILLIGRAM(S): 5 TABLET ORAL at 12:05

## 2023-11-27 RX ADMIN — MAGNESIUM OXIDE 400 MG ORAL TABLET 400 MILLIGRAM(S): 241.3 TABLET ORAL at 12:05

## 2023-11-27 RX ADMIN — Medication 0.5 MILLIGRAM(S): at 17:59

## 2023-11-27 RX ADMIN — Medication 90 MILLIGRAM(S): at 05:12

## 2023-11-27 RX ADMIN — HEPARIN SODIUM 5000 UNIT(S): 5000 INJECTION INTRAVENOUS; SUBCUTANEOUS at 05:13

## 2023-11-27 RX ADMIN — LOSARTAN POTASSIUM 100 MILLIGRAM(S): 100 TABLET, FILM COATED ORAL at 05:12

## 2023-11-27 RX ADMIN — SODIUM CHLORIDE 75 MILLILITER(S): 9 INJECTION INTRAMUSCULAR; INTRAVENOUS; SUBCUTANEOUS at 20:09

## 2023-11-27 RX ADMIN — Medication 1 TABLET(S): at 07:58

## 2023-11-27 RX ADMIN — Medication 1 TABLET(S): at 17:27

## 2023-11-27 RX ADMIN — ROSUVASTATIN CALCIUM 20 MILLIGRAM(S): 5 TABLET ORAL at 21:56

## 2023-11-27 RX ADMIN — Medication 650 MILLIGRAM(S): at 05:13

## 2023-11-27 RX ADMIN — OXYCODONE HYDROCHLORIDE 10 MILLIGRAM(S): 5 TABLET ORAL at 17:59

## 2023-11-27 RX ADMIN — Medication 2: at 16:47

## 2023-11-27 RX ADMIN — Medication 650 MILLIGRAM(S): at 14:36

## 2023-11-27 RX ADMIN — ERGOCALCIFEROL 50000 UNIT(S): 1.25 CAPSULE ORAL at 17:27

## 2023-11-27 RX ADMIN — OXYCODONE HYDROCHLORIDE 10 MILLIGRAM(S): 5 TABLET ORAL at 12:45

## 2023-11-27 RX ADMIN — OXYCODONE HYDROCHLORIDE 10 MILLIGRAM(S): 5 TABLET ORAL at 18:33

## 2023-11-27 RX ADMIN — Medication 2: at 08:02

## 2023-11-27 RX ADMIN — SILODOSIN 8 MILLIGRAM(S): 4 CAPSULE ORAL at 21:57

## 2023-11-27 RX ADMIN — Medication 81 MILLIGRAM(S): at 12:05

## 2023-11-27 RX ADMIN — HEPARIN SODIUM 5000 UNIT(S): 5000 INJECTION INTRAVENOUS; SUBCUTANEOUS at 14:36

## 2023-11-27 RX ADMIN — Medication 0.5 MILLIGRAM(S): at 12:05

## 2023-11-27 RX ADMIN — HEPARIN SODIUM 5000 UNIT(S): 5000 INJECTION INTRAVENOUS; SUBCUTANEOUS at 21:55

## 2023-11-27 RX ADMIN — OXYCODONE HYDROCHLORIDE 10 MILLIGRAM(S): 5 TABLET ORAL at 06:38

## 2023-11-27 RX ADMIN — OXYCODONE HYDROCHLORIDE 10 MILLIGRAM(S): 5 TABLET ORAL at 05:12

## 2023-11-27 RX ADMIN — Medication 8 UNIT(S): at 16:48

## 2023-11-27 RX ADMIN — MAGNESIUM OXIDE 400 MG ORAL TABLET 400 MILLIGRAM(S): 241.3 TABLET ORAL at 07:58

## 2023-11-27 RX ADMIN — INSULIN GLARGINE 24 UNIT(S): 100 INJECTION, SOLUTION SUBCUTANEOUS at 07:59

## 2023-11-27 RX ADMIN — Medication 5 UNIT(S): at 12:26

## 2023-11-27 RX ADMIN — Medication 250 MILLIGRAM(S): at 21:57

## 2023-11-27 RX ADMIN — OXYCODONE HYDROCHLORIDE 10 MILLIGRAM(S): 5 TABLET ORAL at 00:38

## 2023-11-27 RX ADMIN — SENNA PLUS 2 TABLET(S): 8.6 TABLET ORAL at 21:55

## 2023-11-27 RX ADMIN — Medication 650 MILLIGRAM(S): at 21:55

## 2023-11-27 NOTE — CONSULT NOTE ADULT - REASON FOR ADMISSION
Rehab of right BKA secondary to left foot gangrene in setting of severe PAD with previous balloon angioplasty and diabetic charcot arthropathy causing gait dysfunction and decline in ADLs
Rehab of right BKA secondary to left foot gangrene in setting of severe PAD with previous balloon angioplasty and diabetic charcot arthropathy causing gait dysfunction and decline in ADLs

## 2023-11-27 NOTE — DISCHARGE NOTE PROVIDER - NSDCHHENCOUNTER_GEN_ALL_CORE
Spoke to pt notified and voiced understanding. Advise to go to urgent care to get an evaluation    27-Nov-2023

## 2023-11-27 NOTE — DISCHARGE NOTE PROVIDER - NSDCHHNEEDSERVICEOTHER_GEN_ALL_CORE_FT
rt BKA  stump dressing daily with 4x4 and kerlix wrap , inspect wound daily for worsening site , need physical therapy , insulin administration teaching

## 2023-11-27 NOTE — CONSULT NOTE ADULT - SUBJECTIVE AND OBJECTIVE BOX
VASCULAR SURGERY CONSULT NOTE      HPI:  This is a 58 year old male with pmh significant for IDDM w/ charcot arthropathy, hypertension, PAD s/p balloon angioplasty 10/23/23, CKD 4 (2.5-3.5), nephrolithiasis, chronic anemia with hx of transfusions, anxiety, depression presents for nonhealing right foot gangrene. Pt underwent R TMA on 10/27 with revision on 10/30 and 11/4. 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 Cultures grew pseudomonas and yeast and treated with Meropenem. Hospital course is complicated by acute urinary retention 2/2 neurogenic bladder and two mechanical out of bed falls, as the patient is not accustomed to missing a foot. Pt was seen by urology and has completed a trial of void after several straight caths. Pt is currently voiding spontaneously without use of urbano or straight catheterization but feels like he is going frequently. Having regular bowel movements. Of note the pt states that Cymbalta and Lyrica makes him hallucinate and has been refusing the medications on the previous unit. Complains of phantom limb pain and incisional pain that is currently controlled on Oxycodone.     Pt seen and evaluated by Physiatry and is currently functioning at supervision for bed mobility , transfers minimum assist, ambulates 25ft RW contact guard assist, upper body dressing independent, lower body dressing contact guard assist, grooming independent. Pt is stable for acute inpatient rehabilitation.     LS: lives in private home with mother, 4 ASTER, another 13 ASTER to get to bedroom, half bathroom on 1st floor   PLOF: ambulates with crutches, independent in ADL/iADLs, no HHA    (15 Nov 2023 14:45)        PAST MEDICAL & SURGICAL HISTORY:  DM (diabetes mellitus)      HTN (hypertension)      HLD (hyperlipidemia)      Herpes labialis      Anxiety      GERD (gastroesophageal reflux disease)      Kidney stones  20 years ago      Kidney disease  stage 4      Chronic kidney disease, unspecified CKD stage      Diabetic Charcot foot      PAD (peripheral artery disease)      S/P foot surgery, right  x 5 ( 2006 - 2013 )      Kidney stone  Removed by Laser x 2 ( 20 years ago )      H/O arthroscopy of right knee      Status post peripheral artery angioplasty        No Known Allergies    Home Medications:  losartan 100 mg oral tablet: 1 tab(s) orally once a day (26 Oct 2023 18:32)  sodium bicarbonate 650 mg oral tablet: 1 tab(s) orally 3 times a day (26 Oct 2023 18:32)  Xanax 1 mg oral tablet: 0.5 tab(s) orally 4 times a day, As Needed (26 Oct 2023 18:32)    No permtinent family history of PVD    REVIEW OF SYSTEMS:  GENERAL:                                         negative  SKIN:                                                 see HPI  OPTHALMOLOGIC:                          negative  ENMT:                                               negative  RESPIRATORY AND THORAX:        negative  CARDIOVASCULAR:                         see HPI  GASTROINTESTINAL:                       negative  NEPHROLOGY:                                  negative  MUSCULOSKELETAL:                       negative  NEUROLOGIC:                                   negative  PSYCHIATRIC:                                    negative  HEMATOLOGY/LYMPHATICS:         negative  ENDOCRINE:                                     negative  ALLERGIC/IMMUNOLOGIC:            negative    12 point ROS otherwise normal except as stated in HPI  FHx: none  SHX:  [ ]  smoking     [ ] alcohol use    PHYSICAL EXAM  Vital Signs Last 24 Hrs  T(C): 36.1 (27 Nov 2023 05:35), Max: 36.1 (27 Nov 2023 05:35)  T(F): 97 (27 Nov 2023 05:35), Max: 97 (27 Nov 2023 05:35)  HR: 69 (27 Nov 2023 05:35) (69 - 81)  BP: 123/67 (27 Nov 2023 05:35) (94/55 - 123/67)  BP(mean): --  RR: 18 (27 Nov 2023 05:35) (18 - 18)  SpO2: --        Appearance: Normal	  HEENT:   Normal oral mucosa, PERRL, EOMI	  Neck: Supple, - JVD;   Cardiovascular: Normal S1 S2, No JVD, No murmurs,   Respiratory: Lungs clear to auscultation, No Rales, Rhonchi, Wheezing	  Gastrointestinal:  Soft, Non-tender, positive BS	  Skin: No rashes, No ecchymoses, No cyanosis  Extremities: Normal range of motion, No clubbing, cyanosis or edema  right BKA with an area of incision edge gangrene, minimal erythema around it  Neurologic: Non-focal  Psychiatry: A & O x 3, Mood & affect appropriate    MEDICATIONS:   MEDICATIONS  (STANDING):  aspirin  chewable 81 milliGRAM(s) Oral daily  clopidogrel Tablet 75 milliGRAM(s) Oral daily  heparin   Injectable 5000 Unit(s) SubCutaneous every 8 hours  insulin glargine Injectable (LANTUS) 24 Unit(s) SubCutaneous every morning  insulin lispro (ADMELOG) corrective regimen sliding scale   SubCutaneous three times a day before meals  insulin lispro Injectable (ADMELOG) 8 Unit(s) SubCutaneous before dinner  insulin lispro Injectable (ADMELOG) 5 Unit(s) SubCutaneous before lunch  lactobacillus acidophilus 1 Tablet(s) Oral three times a day with meals  losartan 100 milliGRAM(s) Oral daily  magnesium oxide 400 milliGRAM(s) Oral three times a day with meals  NIFEdipine XL 90 milliGRAM(s) Oral daily  polyethylene glycol 3350 17 Gram(s) Oral daily  rosuvastatin 20 milliGRAM(s) Oral at bedtime  senna 2 Tablet(s) Oral at bedtime  silodosin 8 milliGRAM(s) Oral at bedtime  sodium bicarbonate 650 milliGRAM(s) Oral three times a day    MEDICATIONS  (PRN):  acetaminophen     Tablet .. 650 milliGRAM(s) Oral every 6 hours PRN Temp greater or equal to 38C (100.4F), Mild Pain (1 - 3)  ALPRAZolam 0.5 milliGRAM(s) Oral four times a day PRN anxiety  aluminum hydroxide/magnesium hydroxide/simethicone Suspension 30 milliLiter(s) Oral every 4 hours PRN Dyspepsia  dextrose Oral Gel 15 Gram(s) Oral once PRN Blood Glucose LESS THAN 70 milliGRAM(s)/deciliter  melatonin 5 milliGRAM(s) Oral at bedtime PRN Insomnia  oxyCODONE    IR 10 milliGRAM(s) Oral every 4 hours PRN Severe Pain (7 - 10)  oxyCODONE    IR 5 milliGRAM(s) Oral every 4 hours PRN Mild Pain (1 - 3)  polyethylene glycol 3350 17 Gram(s) Oral two times a day PRN Constipation      LAB/STUDIES:                        8.6    6.74  )-----------( 466      ( 27 Nov 2023 07:27 )             27.2     11-27    135  |  98  |  47<H>  ----------------------------<  137<H>  5.0   |  24  |  3.4<H>    Ca    9.4      27 Nov 2023 07:27  Mg     2.9     11-27    TPro  7.0  /  Alb  4.0  /  TBili  0.3  /  DBili  x   /  AST  14  /  ALT  15  /  AlkPhos  152<H>  11-27      LIVER FUNCTIONS - ( 27 Nov 2023 07:27 )  Alb: 4.0 g/dL / Pro: 7.0 g/dL / ALK PHOS: 152 U/L / ALT: 15 U/L / AST: 14 U/L / GGT: x             Urinalysis Basic - ( 27 Nov 2023 07:27 )    Color: x / Appearance: x / SG: x / pH: x  Gluc: 137 mg/dL / Ketone: x  / Bili: x / Urobili: x   Blood: x / Protein: x / Nitrite: x   Leuk Esterase: x / RBC: x / WBC x   Sq Epi: x / Non Sq Epi: x / Bacteria: x                    IMAGING:

## 2023-11-27 NOTE — PROGRESS NOTE ADULT - SUBJECTIVE AND OBJECTIVE BOX
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 Pt underwent R TMA on 10/27/2023 with revision on 10/30 and 11/4. 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 Cultures grew pseudomonas and yeast and treated with Meropenem. Hospital course is complicated by acute urinary retention 2/2 neurogenic bladder and two mechanical out of bed falls, as the patient is not accustomed to missing a foot. Pt was seen by urology and has completed a trial of void after several straight caths. Pt is currently voiding spontaneously without use of urbano or straight catheterization but feels like he is going frequently. Having regular bowel movements. Of note the pt states that Cymbalta and Lyrica makes him hallucinate and has been refusing the medications on the previous unit. Complains of phantom limb pain and incisional pain that is currently controlled on Oxycodone.     Pt seen and evaluated by Physiatry and is currently functioning at supervision for bed mobility , transfers minimum assist, ambulates 25ft RW contact guard assist, upper body dressing independent, lower body dressing contact guard assist, grooming independent. Pt is stable for acute inpatient rehabilitation.     LS: lives in private home with mother, 4 ASTER, another 13 ASTER to get to bedroom, half bathroom on 1st floor   PLOF: ambulates with crutches, independent in ADL/iADLs, no HHA    (15 Nov 2023 14:45)    TODAY'S SUBJECTIVE & REVIEW OF SYMPTOMS:  No acute overnight events. Patient seen this morning, stating that he feels some minor improvement with his urine output, as his straight cath volumes have been slowly decreasing - switched Friday to Rapaflo  Otherwise vitals stable this morning. He has no other concerns.   Still requiring CIC q 6 hrs. but having increase spontaneous voiding.  Having regular bowel movements.  Participating and tolerating therapy.   VSS. Labs noted.     CLOF: bed mobility independent, transfers CG/supervision, ambulates 40ft RW TA, 8 steps touch assist, LBD set up, toileting supervision     Review of Systems:   Constitutional:    [  x ] WNL           [   ] poor appetite   [   ] insomnia   [   ] tired   Cardio:                [ x  ] WNL           [   ] CP   [   ] URENA   [   ] palpitations               Resp:                   [ x  ] WNL           [   ] SOB   [   ] cough   [   ] wheezing   GI:                        [  x ] WNL           [   ] constipation   [   ] diarrhea   [   ] abdominal pain   [   ] nausea   [   ] emesis                                :                      [   ] WNL           [   ] URBANO  [   ] dysuria   [ x  ] difficulty voiding requiring CIC    Endo:                   [ x  ] WNL          [   ] polyuria   [   ] temperature intolerance                 Skin:                     [ x  ] WNL          [   ] pain   [   ] wound   [   ] rash   MSK:                    [    ] WNL          [   ] muscle pain   [   ] joint pain/ stiffness   [   ] muscle tenderness   [   ] swelling  [ x]  right BKA with distal limb and phantom pain and intermittent c/o LBP   Neuro:                 [   ] WNL          [   ] HA   [   ] change in vision   [   ] tremor   [   ] weakness   [   ]dysphagia  [ x ]  R limb phantom, neuropathic and incisional pain            Cognitive:           [  x ] WNL           [   ]confusion      Psych:                  [  x ] WNL           [   ] hallucinations   [   ]agitation   [   ] delusion   [   ]depression      PHYSICAL EXAM    Vital Signs Last 24 Hrs  T(C): 36.2 (27 Nov 2023 14:45), Max: 36.2 (27 Nov 2023 14:45)  T(F): 97.1 (27 Nov 2023 14:45), Max: 97.1 (27 Nov 2023 14:45)  HR: 80 (27 Nov 2023 14:45) (69 - 81)  BP: 115/64 (27 Nov 2023 14:45) (94/55 - 123/67)  BP(mean): --  RR: 20 (27 Nov 2023 14:45) (18 - 20)  SpO2: --      General:[ x  ] NAD, Resting Comfortable,   [   ] other:                                HEENT: [  x ] NC/AT, EOMI, PERRL , Normal Conjunctivae,   [   ] other:  Cardio: [  x ] RRR, no murmer,   [   ] other:                              Pulm: [  x ] No Respiratory Distress,  Lungs CTAB,   [   ] other:                       Abdomen: [ x  ]ND/NT, Soft,   [   ] other:    : [ x  ] NO URBANO CATHETER, [   ] URBANO CATHETER- no meatal tear, no discharge, [   ] other:                                            MSK: [   ] No joint swelling, Full ROM,   [ x  ] other:                                    Ext: [  x ]No C/C/E, No calf tenderness,   [   ]other:   right BKA incision C&D with staples.   Skin: [   ]intact,   [  x ] other: ~1.5 x 1 cm area of necrotic, blackened skin at mid incision flap. Mild local erythema with no fluctuance, tenderness or fluid expressed.                                                                 Neurological Examination:  Cognitive: [  x  ] AAO x 3,   [    ]  other:                                                                      Attention:  [  x  ] intact,   [    ]  other:                            Memory: [  x  ] intact,    [    ]  other:     Mood/Affect: [  x  ] wnl,    [    ]  other:                                                                             Communication: [ x   ]Fluent, no dysarthria, following commands:  [    ] other:   CN II - XII:  [ x   ] intact,  [    ] other:                                                                                        Motor:   RIGHT UE: [  x ] WNL,  [   ] other:  LEFT    UE: [ x  ] WNL,  [   ] other:  RIGHT LE: [   ] WNL,  [   ] other: R HF 5/5, R BKA w/ knee immobilizer   LEFT    LE: [ x  ] WNL,  [   ] other:    Tone: [  x  ] wnl,   [    ]  other:  DTRs: [  x ]symmetric, [   ] other:  Coordination:   [  x  ] intact,   [    ] other:                                                                           Sensory: [  x  ] Intact to light touch,   [    ] other:    MEDICATIONS  (STANDING):  aspirin  chewable 81 milliGRAM(s) Oral daily  clopidogrel Tablet 75 milliGRAM(s) Oral daily  heparin   Injectable 5000 Unit(s) SubCutaneous every 8 hours  insulin glargine Injectable (LANTUS) 24 Unit(s) SubCutaneous every morning  insulin lispro (ADMELOG) corrective regimen sliding scale   SubCutaneous three times a day before meals  insulin lispro Injectable (ADMELOG) 5 Unit(s) SubCutaneous before lunch  insulin lispro Injectable (ADMELOG) 8 Unit(s) SubCutaneous before dinner  lactobacillus acidophilus 1 Tablet(s) Oral three times a day with meals  losartan 100 milliGRAM(s) Oral daily  magnesium oxide 400 milliGRAM(s) Oral three times a day with meals  NIFEdipine XL 90 milliGRAM(s) Oral daily  polyethylene glycol 3350 17 Gram(s) Oral daily  rosuvastatin 20 milliGRAM(s) Oral at bedtime  senna 2 Tablet(s) Oral at bedtime  silodosin 8 milliGRAM(s) Oral at bedtime  sodium bicarbonate 650 milliGRAM(s) Oral three times a day    MEDICATIONS  (PRN):  acetaminophen     Tablet .. 650 milliGRAM(s) Oral every 6 hours PRN Temp greater or equal to 38C (100.4F), Mild Pain (1 - 3)  ALPRAZolam 0.5 milliGRAM(s) Oral four times a day PRN anxiety  aluminum hydroxide/magnesium hydroxide/simethicone Suspension 30 milliLiter(s) Oral every 4 hours PRN Dyspepsia  dextrose Oral Gel 15 Gram(s) Oral once PRN Blood Glucose LESS THAN 70 milliGRAM(s)/deciliter  melatonin 5 milliGRAM(s) Oral at bedtime PRN Insomnia  oxyCODONE    IR 10 milliGRAM(s) Oral every 4 hours PRN Severe Pain (7 - 10)  oxyCODONE    IR 5 milliGRAM(s) Oral every 4 hours PRN Mild Pain (1 - 3)  polyethylene glycol 3350 17 Gram(s) Oral two times a day PRN Constipation        RECENT LABS/IMAGING                          8.6    6.74  )-----------( 466      ( 27 Nov 2023 07:27 )             27.2     11-27    135  |  98  |  47<H>  ----------------------------<  137<H>  5.0   |  24  |  3.4<H>    Ca    9.4      27 Nov 2023 07:27  Mg     2.9     11-27    TPro  7.0  /  Alb  4.0  /  TBili  0.3  /  DBili  x   /  AST  14  /  ALT  15  /  AlkPhos  152<H>  11-27        POCT Blood Glucose.: 140 mg/dL (11-27-23 @ 12:09)  POCT Blood Glucose.: 151 mg/dL (11-27-23 @ 07:04)  POCT Blood Glucose.: 72 mg/dL (11-26-23 @ 21:20)  POCT Blood Glucose.: 109 mg/dL (11-26-23 @ 16:29)  POCT Blood Glucose.: 92 mg/dL (11-26-23 @ 11:52)  POCT Blood Glucose.: 287 mg/dL (11-26-23 @ 07:31)  POCT Blood Glucose.: 81 mg/dL (11-25-23 @ 20:50)  POCT Blood Glucose.: 110 mg/dL (11-25-23 @ 16:12)  POCT Blood Glucose.: 105 mg/dL (11-25-23 @ 11:43)  POCT Blood Glucose.: 369 mg/dL (11-25-23 @ 08:30)  POCT Blood Glucose.: 102 mg/dL (11-24-23 @ 21:18)  POCT Blood Glucose.: 124 mg/dL (11-24-23 @ 16:44)      US Kidney and Bladder 11/25/23  IMPRESSION:  1.  Multiple right renal cysts, measuring up to 1.1 cm.  2.  0.9 cm mid-pole, left renal cyst.  3.  Post-void residual not measured, otherwise unremarkable ultrasound   examination of the urinary bladder.  4.  Prostate volume of 52.5 mL.

## 2023-11-27 NOTE — DISCHARGE NOTE PROVIDER - CARE PROVIDERS DIRECT ADDRESSES
,DirectAddress_Unknown,[email or web address],DirectAddress_Unknown,DirectAddress_Unknown

## 2023-11-27 NOTE — DISCHARGE NOTE PROVIDER - INSTRUCTIONS
nepro cans 2 per day .  no concentrated potassium  in diet , limit fluid intake to 6 to cup sharath day including all liquids

## 2023-11-27 NOTE — DISCHARGE NOTE PROVIDER - CARE PROVIDER_API CALL
Tenisha Means  Vascular Surgery  [address] [place] [address]  Phone: [phone]  Fax: [phone]  Follow Up Time:     Polo Conroy  Internal Medicine  [address], [address]  [place] [address]  Phone: [phone]  Fax: [phone]  Follow Up Time:     Juan Randle  Anesthesiology  [address] [facility]  [place] [address]  Phone: [phone]  Fax: [phone]  Follow Up Time:     Ladarius Holden  Urology  [address], [address]  [place] [address]  Phone: [phone]  Fax: [phone]  Follow Up Time:

## 2023-11-27 NOTE — CONSULT NOTE ADULT - CONSULT REASON
new BKA with pain, tolerating Oxycodone, intolerant of other meds (Lyrica, Cymbalta) 2/2 hallucinations. Please eval for medication recommendations on inpatient and for discharge.
s/p right BKA, wound check

## 2023-11-27 NOTE — DISCHARGE NOTE PROVIDER - PROVIDER TOKENS
PROVIDER:[TOKEN:[[id number]]],PROVIDER:[TOKEN:[[id number]]],PROVIDER:[TOKEN:[[id number]]],PROVIDER:[TOKEN:[[id number]]]

## 2023-11-27 NOTE — DISCHARGE NOTE PROVIDER - HOSPITAL COURSE
HPI:  This is a 58 year old male with PMH significant for IDDM w/ Charcot arthropathy, hypertension, PAD s/p balloon angioplasty 10/23/23, CKD 4 (2.5-3.5), nephrolithiasis, chronic anemia with hx of transfusions, anxiety, depression presents for nonhealing right foot gangrene. Pt underwent R TMA on 10/27/2023 with revision on 10/30 and 11/4. 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 Cultures grew pseudomonas and yeast and treated with Meropenem. Hospital course is complicated by acute urinary retention 2/2 neurogenic bladder and two mechanical out of bed falls, as the patient is not accustomed to missing a foot. Pt was seen by urology and has completed a trial of void after several straight caths. Pt is currently voiding spontaneously without use of urbano or straight catheterization but feels like he is going frequently. Having regular bowel movements. Of note the pt states that Cymbalta and Lyrica makes him hallucinate and has been refusing the medications on the previous unit. Complains of phantom limb pain and incisional pain that is currently controlled on Oxycodone. Pt is stable for acute inpatient rehabilitation. Pt is stable for acute inpatient rehabilitation.   · Assessment    Rehab for right BKA in the setting of severe PAD with previous balloon angioplasty and diabetic Charcot arthropathy causing gait dysfunction and decline in ADLs   - s/p meropenem 1000 mg q12h before source control  , acute rehab  PT Ot continued daily , RLE NWB , can use Ambu shield (  - R Knee immobilizer) in bed. Able to actively achieve full knee extension  - c/w Plavix 75 mg, ASA 81 mg  now with small necrotic area at mid-incision flap with mild local erythema.   Vascular team saw pt on 11/27 , removed few staples on necrosed area  today with  some redness ,  Keflex renal dose started for 7 days . .    HLD  - resumed home Crestor 20 mg qhs (instead of Lipitor 80 mg due to patient request).     Pain control   - c/w Oxycodone 10mg prn severe pain, oxycodone 5mg PRN mild pain.  discussed options for following up with medical doctor , vascular doctor or pain management dr Spencer for pain med prescriptions after discharge . Patient is given dr Spencer's number too , dr Conroy and dr Orozco are her medical doctors . dr Machado is vascular doctor .  - Pt was started on Lyrica 75 mg q8h, Cymbalta 40 mg q12h on previous unit, however patient states that he gets hallucinations and has been refusing the medications for the past several days and MS cleared    CKD stage 4 (2.5-3.5)   can follow up with nephrologist dr medina as an out patient , BUN and Creat 11/27 increased to 47/ 3.4. Not dry - making 500 - 700 cc of urine q 6 hrs. No hydronephrosis on US. his baseline has been in that range as per nephro Art Clark   as per discussion with  doctor  he can follow up with dr Medina as an out patient ,  Chronic anemia 2/2 CKD-  - on renal transplant list - avoid blood tx  - give Venofer 200mg IV q24h x5, Retacrit 45309 units sq weekly per nephrology  - magnesium oxide 400mg TID  - Sodium bicarb 650mg TID   - BP running low, so decreased perfusion a likely factor. Will decrease Losartan from 100mg to 50. Also on Nifedipine 90 mg.    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 increased dinner Lispro to 8 units and monitor with better control    Urinary retention/ BPH  - seen by urology 11/10: Flomax, self-cath q4-6h, fu outpatient   - on admission eval, patient states voiding spontaneously without urbano or straight cath but is complaining of frequency   - PVRs on rehab unit revealed large PVRs 1700cc and 900cc· Assessment   risks and benefits of Urbano vs. CIC discussed with patient    - He admits to a very large fluid intake and agrees to cut down fluids to about 1500 - 1800cc daily.  - PVR volumes decreasing - below 600 (no spontaneous void, but drinking less)  - Flomax switched to Rapaflo 11/24  - 11/23 was able to urinate 100 and 200cc without cath, cath after 700cc, told patient to decrease fluid intake   - US Kidneys 11/25 showing mulitple R renal cysts, prostate volume 52.5 ml, no evidence of hydronephrosis.   - cont. CIC at home and f/u with urology    Hypertension   - - BP running low, so decreased perfusion a likely factor in worsening CKD/ ALI. Will decrease Losartan from 100mg to 50. Also on Nifedipine 90 mg.    Anxiety   Depression  - c/w Xanax 0.5 mg QID prn for anxiety   - monitor for symptoms of SI/HI  - can recommend psychosocial services if needed   - pt is currently in good spirits     -Pain control: Oxycodone 10/5 prn, Tylenol     -GI/Bowel Mgmt: Senna, Miralax      -Skin:  monitor incision - clean and dry     - Ortho: Weight bearing status: NWB RLE       - DVT prophylaxis: SQ heparin TID     Diet, Consistent Carbohydrate w/Evening Snack:   DASH/TLC Sodium & Cholesterol Restricted      Patient seen by vascular team, released staple from the necrosed / mildly reddened  area  and recommended to start  keflex ( renal dose 250 mg q8h started  for 7 days , had conversation with family sisiter and patient  regarding discharge  , intermittent cath need , dressing changes ETC ,  patient can be discharged  in am from vascular   aspect .

## 2023-11-27 NOTE — DISCHARGE NOTE PROVIDER - NSDCFUSCHEDAPPT_GEN_ALL_CORE_FT
Michael Camarena  [facility]  UROLOGY [address]  Scheduled Appointment: 12/06/2023    Rosie Wilkins  [facility]  CARDIOLOGY [address]  Scheduled Appointment: 12/18/2023

## 2023-11-27 NOTE — DISCHARGE NOTE PROVIDER - NSDCMRMEDTOKEN_GEN_ALL_CORE_FT
aspirin 81 mg oral tablet, chewable: 1 tab(s) orally once a day  clopidogrel 75 mg oral tablet: 1 tab(s) orally once a day  DULoxetine 40 mg oral delayed release capsule: 1 cap(s) orally every 12 hours  Insulin Lispro KwikPen 100 units/mL injectable solution: 8 unit(s) injectable 3 times a day (before meals)  losartan 100 mg oral tablet: 1 tab(s) orally once a day  NIFEdipine 60 mg oral tablet, extended release: 2 tab(s) orally once a day  rosuvastatin 20 mg oral tablet: 1 tab(s) orally once a day  sodium bicarbonate 650 mg oral tablet: 1 tab(s) orally 3 times a day  Tresiba FlexTouch 200 units/mL subcutaneous solution: 20 subcutaneous once a day  Xanax 1 mg oral tablet: 0.5 tab(s) orally 4 times a day, As Needed   acetaminophen 325 mg oral tablet: 2 tab(s) orally every 6 hours As needed Temp greater or equal to 38C (100.4F), Mild Pain (1 - 3)  ALPRAZolam 0.5 mg oral tablet: 1 tab(s) orally 4 times a day As needed anxiety  aspirin 81 mg oral tablet, chewable: 1 tab(s) orally once a day  cephalexin 250 mg oral capsule: 1 cap(s) orally every 8 hours skin /stump  clopidogrel 75 mg oral tablet: 1 tab(s) orally once a day  ergocalciferol 1.25 mg (50,000 intl units) oral capsule: 1 cap(s) orally once a week take every Monday  for next 6 more weeks  Insulin Lispro KwikPen 100 units/mL injectable solution: 5 unit(s) injectable 2 times a day take 5 units with lunch and 8 units with dinner  losartan 50 mg oral tablet: 1 tab(s) orally once a day  magnesium oxide 400 mg oral tablet: 1 tab(s) orally 3 times a day (with meals)  NIFEdipine 90 mg oral tablet, extended release: 1 tab(s) orally once a day  oxyCODONE 5 mg oral tablet: 2 tab(s) orally every 6 hours as needed for  severe pain please take 1 tab for mild  to moderate pain and 2 for severe pain MDD: 8  polyethylene glycol 3350 oral powder for reconstitution: 17 gram(s) orally once a day  Probiotic Formula (Bacillus Coagulans) oral capsule: 1 cap(s) orally 2 times a day  rosuvastatin 20 mg oral tablet: 1 tab(s) orally once a day  senna leaf extract oral tablet: 2 tab(s) orally once a day (at bedtime)  silodosin 8 mg oral capsule: 1 cap(s) orally once a day (at bedtime)  sodium bicarbonate 650 mg oral tablet: 1 tab(s) orally 3 times a day

## 2023-11-27 NOTE — CHART NOTE - NSCHARTNOTEFT_GEN_A_CORE
Patient examined at bedside, few staples removed from L BKA and healing incision site intact with minimal surrounding erythema, no dehiscence, eschar forming over incisional edge. Patient stable for discharge from a Vascular surgery perspective on 7 days Keflex. Patient should follow up outpatient with Dr. Machado in one week.    No acute Vascular surgery intervention necessary.   Recall as needed.

## 2023-11-27 NOTE — PROGRESS NOTE ADULT - ASSESSMENT
Rehab for right BKA in the setting of severe PAD with previous balloon angioplasty and diabetic Charcot arthropathy causing gait dysfunction and decline in ADLs   - s/p meropenem 1000 mg q12h before source control    - PT/OT  - RLE NWB   - R Knee immobilizer in bed. Able to actively achieve full knee extension  - c/w Plavix 75 mg, ASA 81 mg  - continue acute rehab program  - now with small necrotic area at mid-incision flap with mild local erythema.  - Vascular attending to see patient today, as per vacular ACP.    HLD  - resumed home Crestor 20 mg qhs (instead of Lipitor 80 mg due to patient request).     Pain control   - c/w Oxycodone 10mg prn severe pain, oxycodone 5mg PRN mild pain.   - Pt was started on Lyrica 75 mg q8h, Cymbalta 40 mg q12h on previous unit, however patient states that he gets hallucinations and has been refusing the medications for the past several days and MS cleared  - pain management consulted, recs appreciated   - pt was getting narcotics from podiatrist in past but does not see physician anymore. Will arrange Pain Management/ PMD to f/u    CKD stage 4 (2.5-3.5)  Chronic anemia 2/2 CKD  - on renal transplant list - avoid blood tx  - give Venofer 200mg IV q24h x5, Retacrit 04150 units sq weekly per nephrology  - magnesium oxide 400mg TID  - Sodium bicarb 650mg TID   - BUN and Creat 11/27 increased to 47/ 3.4. Not dry - making 500 - 700 cc of urine q 6 hrs. No hydronephrosis on US.  - BP running low, so decreased perfusion a likely factor. Will decrease Losartan from 100mg to 50. Also on Nifedipine 90 mg.    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 increased dinner Lispro to 8 units and monitor with better conrol    Urinary retention/ BPH  - seen by urology 11/10: Flomax, self-cath q4-6h, fu outpatient   - on admission eval, patient states voiding spontaneously without urbano or straight cath but is complaining of frequency   - PVRs on rehab unit revealed large PVRs 1700cc and 900cc  - risks and benefits of Urbano vs. CIC discussed with patient  - He admits to a very large fluid intake and agrees to cut down fluids to about 1500 - 1800cc daily.  - PVR volumes decreasing - below 600 (no spontaneous void, but drinking less)  - Flomax switched to Rapaflo 11/24  - 11/23 was able to urinate 100 and 200cc without cath, cath after 700cc, told patient to decrease fluid intake   - US Kidneys 11/25 showing mulitple R renal cysts, prostate volume 52.5 ml, no evidence of hydronephrosis.   - cont. CIC at home and f/u with urology    Hypertension   - - BP running low, so decreased perfusion a likely factor in worsening CKD/ ALI. Will decrease Losartan from 100mg to 50. Also on Nifedipine 90 mg.    Anxiety   Depression  - c/w Xanax 0.5 mg QID prn for anxiety   - monitor for symptoms of SI/HI  - can recommend psychosocial services if needed   - pt is currently in good spirits     -Pain control: Oxycodone 10/5 prn, Tylenol     -GI/Bowel Mgmt: Senna, Miralax      -Skin:  monitor incision - clean and dry     Precautions / PROPHYLAXIS:      - Falls    - Ortho: Weight bearing status: NWB RLE       - DVT prophylaxis: SQ heparin TID     Diet, Consistent Carbohydrate w/Evening Snack:   DASH/TLC Sodium & Cholesterol Restricted  No Concentrated Potassium  Low Sodium  Supplement Feeding Modality:  Oral  Nepro Cans or Servings Per Day:  1       Frequency:  Two Times a day (11-20-23 @ 11:26) [Active]

## 2023-11-27 NOTE — DISCHARGE NOTE PROVIDER - DISCHARGE DIET
DASH Diet/Low Sodium Diet/Consistent Carbohydrate Diabetic Diets/Nutrition Supplements/Other Diet Instructions

## 2023-11-27 NOTE — CONSULT NOTE ADULT - ASSESSMENT
58 year old male with pmh significant for IDDM w/ charcot arthropathy, hypertension, PAD s/p balloon angioplasty 10/23/23, CKD 4 (2.5-3.5), nephrolithiasis, chronic anemia with hx of transfusions, anxiety, depression presents for nonhealing right foot gangrene. Pt underwent R TMA on 10/27 with revision on 10/30 and 11/4. 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 Cultures grew pseudomonas and yeast and treated with Meropenem. Hospital course is complicated by acute urinary retention 2/2 neurogenic bladder and two mechanical out of bed falls, as the patient is not accustomed to missing a foot. Pt was seen by urology and has completed a trial of void after several straight caths.  In rehab for PT/OT    Vascular surgery called to evaluate Right BKA stump with new gangrenous edge anteriorly and some erythema.  No fluctuance on exam. WBC normal  - I reviewed today's labs  - I reviewed and personally visualized all the radiology imagings  - I discussed the plan with attending Dr. Machado:  - will return to remove a few staples and re-evaluate    SPECTRA [id number]

## 2023-11-27 NOTE — CHART NOTE - NSCHARTNOTEFT_GEN_A_CORE
· Subjective and Objective:   Patient is a 58 y old  Male who presents with a chief complaint of Rehab of right BKA secondary to left foot gangrene in setting of severe PAD with previous balloon angioplasty and diabetic charcot arthropathy causing gait dysfunction and decline in ADLs (15 Nov 2023 14:45)  also his incision site with  necrotic tissue with slight redness , vascular team notified , Pa assessed patient , fellow or attending would come to eveluate an dmay remove one staple  and assess for abscess.       His labs from today as below , bun   creat 3.4 .                         8.6    6.74  )-----------( 466      ( 27 Nov 2023 07:27 )             27.2       11-27    135  |  98  |  47<H>  ----------------------------<  137<H>  5.0   |  24  |  3.4<H>    Ca    9.4      27 Nov 2023 07:27  Mg     2.9     11-27    TPro  7.0  /  Alb  4.0  /  TBili  0.3  /  DBili  x   /  AST  14  /  ALT  15  /  AlkPhos  152<H>  11-27              Urinalysis Basic - ( 27 Nov 2023 07:27 )    Color: x / Appearance: x / SG: x / pH: x  Gluc: 137 mg/dL / Ketone: x  / Bili: x / Urobili: x   Blood: x / Protein: x / Nitrite: x   Leuk Esterase: x / RBC: x / WBC x   Sq Epi: x / Non Sq Epi: x / Bacteria: x      CAPILLARY BLOOD GLUCOSE      POCT Blood Glucose.: 140 mg/dL (27 Nov 2023 12:09)  POCT Blood Glucose.: 151 mg/dL (27 Nov 2023 07:04)  POCT Blood Glucose.: 72 mg/dL (26 Nov 2023 21:20)  POCT Blood Glucose.: 109 mg/dL (26 Nov 2023 16:29)      Vital Signs Last 24 Hrs  T(C): 36.2 (27 Nov 2023 14:45), Max: 36.2 (27 Nov 2023 14:45)  T(F): 97.1 (27 Nov 2023 14:45), Max: 97.1 (27 Nov 2023 14:45)  HR: 80 (27 Nov 2023 14:45) (69 - 81)  BP: 115/64 (27 Nov 2023 14:45) (94/55 - 123/67)    RR: 20 (27 Nov 2023 14:45) (18 - 20)       Spoke with nephrology team  dr Kirkland  regarding  increase creat , his baseline has been in that range , as per discussion with dr Art Clark he can follow up with dr Beverly as an out patient ,       HPI:  This is a 58 year old male with pmh significant for IDDM w/ charcot arthropathy, hypertension, PAD s/p balloon angioplasty 10/23/23, CKD 4 (2.5-3.5), nephrolithiasis, chronic anemia with hx of transfusions, anxiety, depression presents for nonhealing right foot gangrene. Pt underwent R TMA on 10/27/2023 with revision on 10/30 and 11/4. 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 Cultures grew pseudomonas and yeast and treated with Meropenem. Hospital course is complicated by acute urinary retention 2/2 neurogenic bladder and two mechanical out of bed falls, as the patient is not accustomed to missing a foot. Pt was seen by urology and has completed a trial of void after several straight caths. Pt is currently voiding spontaneously without use of urbano or straight catheterization but feels like he is going frequently. Having regular bowel movements. Of note the pt states that Cymbalta and Lyrica makes him hallucinate and has been refusing the medications on the previous unit. Complains of phantom limb pain and incisional pain that is currently controlled on Oxycodone. Pt is stable for acute inpatient rehabilitation. Pt is stable for acute inpatient rehabilitation.   · Assessment    Rehab for right BKA in the setting of severe PAD with previous balloon angioplasty and diabetic Charcot arthropathy causing gait dysfunction and decline in ADLs   - s/p meropenem 1000 mg q12h before source control    - PT/OT  - RLE NWB   - R Knee immobilizer in bed. Able to actively achieve full knee extension  - c/w Plavix 75 mg, ASA 81 mg  - continue acute rehab program  - now with small necrotic area at mid-incision flap with mild local erythema.    - Vascular attending to see patient today, as per vacular ACP. has some redness on the necrosed area of incision site , team to follow up for removal of staple from that area  and check for abscess .    HLD  - resumed home Crestor 20 mg qhs (instead of Lipitor 80 mg due to patient request).     Pain control   - c/w Oxycodone 10mg prn severe pain, oxycodone 5mg PRN mild pain.   - Pt was started on Lyrica 75 mg q8h, Cymbalta 40 mg q12h on previous unit, however patient states that he gets hallucinations and has been refusing the medications for the past several days and MS cleared  - pain management consulted, recs appreciated   - pt was getting narcotics from podiatrist in past but does not see physician anymore. Will arrange Pain Management/ PMD to f/u  CKD stage 4 (2.5-3.5)  Chronic anemia 2/2 CKD  - on renal transplant list - avoid blood tx  - give Venofer 200mg IV q24h x5, Retacrit 24318 units sq weekly per nephrology  - magnesium oxide 400mg TID  - Sodium bicarb 650mg TID   - BUN and Creat 11/27 increased to 47/ 3.4. Not dry - making 500 - 700 cc of urine q 6 hrs. No hydronephrosis on US. his baseline has been in that range as per nephvira Clark  , as per discussion with  doctor  he can follow up with dr eBverly as an out patient ,   - BP running low, so decreased perfusion a likely factor. Will decrease Losartan from 100mg to 50. Also on Nifedipine 90 mg.    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 increased dinner Lispro to 8 units and monitor with better conrol    Urinary retention/ BPH  - seen by urology 11/10: Flomax, self-cath q4-6h, fu outpatient   - on admission eval, patient states voiding spontaneously without urbano or straight cath but is complaining of frequency   - PVRs on rehab unit revealed large PVRs 1700cc and 900cc· Assessment · Subjective and Objective:   Patient is a 58 y old  Male who presents with a chief complaint of Rehab of right BKA secondary to left foot gangrene in setting of severe PAD with previous balloon angioplasty and diabetic charcot arthropathy causing gait dysfunction and decline in ADLs (15 Nov 2023 14:45)  also his incision site with  necrotic tissue with slight redness , vascular team notified , Pa assessed patient , fellow or attending would come to eveluate an dmay remove one staple  and assess for abscess.       His labs from today as below , bun   creat 3.4 .                         8.6    6.74  )-----------( 466      ( 27 Nov 2023 07:27 )             27.2       11-27    135  |  98  |  47<H>  ----------------------------<  137<H>  5.0   |  24  |  3.4<H>    Ca    9.4      27 Nov 2023 07:27  Mg     2.9     11-27    TPro  7.0  /  Alb  4.0  /  TBili  0.3  /  DBili  x   /  AST  14  /  ALT  15  /  AlkPhos  152<H>  11-27              Urinalysis Basic - ( 27 Nov 2023 07:27 )    Color: x / Appearance: x / SG: x / pH: x  Gluc: 137 mg/dL / Ketone: x  / Bili: x / Urobili: x   Blood: x / Protein: x / Nitrite: x   Leuk Esterase: x / RBC: x / WBC x   Sq Epi: x / Non Sq Epi: x / Bacteria: x      CAPILLARY BLOOD GLUCOSE      POCT Blood Glucose.: 140 mg/dL (27 Nov 2023 12:09)  POCT Blood Glucose.: 151 mg/dL (27 Nov 2023 07:04)  POCT Blood Glucose.: 72 mg/dL (26 Nov 2023 21:20)  POCT Blood Glucose.: 109 mg/dL (26 Nov 2023 16:29)      Vital Signs Last 24 Hrs  T(C): 36.2 (27 Nov 2023 14:45), Max: 36.2 (27 Nov 2023 14:45)  T(F): 97.1 (27 Nov 2023 14:45), Max: 97.1 (27 Nov 2023 14:45)  HR: 80 (27 Nov 2023 14:45) (69 - 81)  BP: 115/64 (27 Nov 2023 14:45) (94/55 - 123/67)    RR: 20 (27 Nov 2023 14:45) (18 - 20)       Spoke with nephrology team  dr Kirkland  regarding  increase creat , his baseline has been in that range ,,  as per discussion with dr Art Clark he can follow up with dr Beverly as an out patient ,       HPI:  This is a 58 year old male with PMH significant for IDDM w/ Charcot arthropathy, hypertension, PAD s/p balloon angioplasty 10/23/23, CKD 4 (2.5-3.5), nephrolithiasis, chronic anemia with hx of transfusions, anxiety, depression presents for nonhealing right foot gangrene. Pt underwent R TMA on 10/27/2023 with revision on 10/30 and 11/4. 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 Cultures grew pseudomonas and yeast and treated with Meropenem. Hospital course is complicated by acute urinary retention 2/2 neurogenic bladder and two mechanical out of bed falls, as the patient is not accustomed to missing a foot. Pt was seen by urology and has completed a trial of void after several straight caths. Pt is currently voiding spontaneously without use of urbano or straight catheterization but feels like he is going frequently. Having regular bowel movements. Of note the pt states that Cymbalta and Lyrica makes him hallucinate and has been refusing the medications on the previous unit. Complains of phantom limb pain and incisional pain that is currently controlled on Oxycodone. Pt is stable for acute inpatient rehabilitation. Pt is stable for acute inpatient rehabilitation.   · Assessment    Rehab for right BKA in the setting of severe PAD with previous balloon angioplasty and diabetic Charcot arthropathy causing gait dysfunction and decline in ADLs   - s/p meropenem 1000 mg q12h before source control    - PT/OT  - RLE NWB   - R Knee immobilizer in bed. Able to actively achieve full knee extension  - c/w Plavix 75 mg, ASA 81 mg  - continue acute rehab program  - now with small necrotic area at mid-incision flap with mild local erythema.    - Vascular attending to see patient today, as per vacular ACP. has some redness on the necrosed area of incision site , team to follow up for removal of staple from that area  and check for abscess .    HLD  - resumed home Crestor 20 mg qhs (instead of Lipitor 80 mg due to patient request).     Pain control   - c/w Oxycodone 10mg prn severe pain, oxycodone 5mg PRN mild pain.   - Pt was started on Lyrica 75 mg q8h, Cymbalta 40 mg q12h on previous unit, however patient states that he gets hallucinations and has been refusing the medications for the past several days and MS cleared  - pain management consulted, recs appreciated   - pt was getting narcotics from podiatrist in past but does not see physician anymore. Will arrange Pain Management/ PMD to f/u  CKD stage 4 (2.5-3.5)  Chronic anemia 2/2 CKD  - on renal transplant list - avoid blood tx  - give Venofer 200mg IV q24h x5, Retacrit 08218 units sq weekly per nephrology  - magnesium oxide 400mg TID  - Sodium bicarb 650mg TID   - BUN and Creat 11/27 increased to 47/ 3.4. Not dry - making 500 - 700 cc of urine q 6 hrs. No hydronephrosis on US. his baseline has been in that range as per nephvira Clark  , as per discussion with  doctor  he can follow up with dr Beverly as an out patient ,   - BP running low, so decreased perfusion a likely factor. Will decrease Losartan from 100mg to 50. Also on Nifedipine 90 mg.    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 increased dinner Lispro to 8 units and monitor with better conrol    Urinary retention/ BPH  - seen by urology 11/10: Flomax, self-cath q4-6h, fu outpatient   - on admission eval, patient states voiding spontaneously without urbano or straight cath but is complaining of frequency   - PVRs on rehab unit revealed large PVRs 1700cc and 900cc· Assessment      Patient seen by vascular team, released staple from the necrosed / mildly reddened  area  and recommended to start  keflex ( renal dose 250 mg q8h started  for 7 days , had conversation with family sisiter and patient  regarding discharge  , intermittent cath need , dressing changes ETC ,  patient can be discharged  in am from vascular   aspect .

## 2023-11-27 NOTE — DISCHARGE NOTE PROVIDER - NSDCFUADDINST_GEN_ALL_CORE_FT
Activity - Ambulate with Assistance:     Time/Priority:  Routine,NWB RLE  Keep RLE residual limb in extension splint when in bed

## 2023-11-27 NOTE — DISCHARGE NOTE PROVIDER - NSDCCPCAREPLAN_GEN_ALL_CORE_FT
PRINCIPAL DISCHARGE DIAGNOSIS  Diagnosis: S/P BKA (below knee amputation), right  Assessment and Plan of Treatment: You underwent rt below knee amputation in the setting of PAD and diabetic Charcot Joint .on 11/19 by dr bales , incision healing weel , medial area with staple area with necrotic staple area withslight redness , evaluate dby vascular team,staple from the area removed and starte don Keflex for 7 days , please continue physical therapy and  follow up with Vascular doctor in  1 week .  need daily dressing 9 woumd care  with 4x4 and abd pad and kerlix wrap loosly , continue physical therapy , follow up with vascular for advise on starting  limb wrapping etc .      SECONDARY DISCHARGE DIAGNOSES  Diagnosis: DM (diabetes mellitus)  Assessment and Plan of Treatment: You are on Insulin lantus 24 units every morning and 5 units lispro or short acting with linch and 8 units with dinner  , check blood glucose levels befor breakfast and dinner , take the numbers to your mewdical doctor upon appointmnent , watch for low sugar  symptoms ..    Diagnosis: Stage 4 chronic kidney disease  Assessment and Plan of Treatment: onwaiting list for kidney transplant ,please follow up with your  kidney ( nephrologist ) docotor  in 2 weeks .    Diagnosis: Anemia of chronic disease  Assessment and Plan of Treatment: was given venofer iniitilally , can take retacrit 10,000 units as per your renal doctor as an out patient .    Diagnosis: Urine retention  Assessment and Plan of Treatment: Requiring  intermittent cath , after switching to rapaflow  and decreasing oral intqake of fluid to 6 to 7 cups in a day  his unrine retention slighly  improved, he is noot putting out as much as like 900to 1100 at atime . now he is voiding 250 cc cic 300 to 400 cc . urology dr mobley  on pat and he has to follow up with him for urodynamic studies  as an outpatient .    Diagnosis: Pain management  Assessment and Plan of Treatment: Yo may continue to take oxycodone 5 mg every 6 hours for moderate pain ,if pain is not controlle don it thentake additional 5 to make 10 , pain mamngemnt doctor rafael saw you  in the hospital, may follow up with him in a week  for rx filling as needed , or with pmd or vascular doctor  .    Diagnosis: Vitamin D deficiency  Assessment and Plan of Treatment: Placed you on 50,000 units ergocalciderol , take vit c with it  for 6 more weeks on every monday , then may take lower over the counter dosing .

## 2023-11-28 VITALS
SYSTOLIC BLOOD PRESSURE: 138 MMHG | TEMPERATURE: 97 F | HEART RATE: 77 BPM | RESPIRATION RATE: 18 BRPM | DIASTOLIC BLOOD PRESSURE: 71 MMHG

## 2023-11-28 LAB
GLUCOSE BLDC GLUCOMTR-MCNC: 137 MG/DL — HIGH (ref 70–99)
GLUCOSE BLDC GLUCOMTR-MCNC: 137 MG/DL — HIGH (ref 70–99)
GLUCOSE BLDC GLUCOMTR-MCNC: 396 MG/DL — HIGH (ref 70–99)
GLUCOSE BLDC GLUCOMTR-MCNC: 396 MG/DL — HIGH (ref 70–99)
GLUCOSE BLDC GLUCOMTR-MCNC: 90 MG/DL — SIGNIFICANT CHANGE UP (ref 70–99)
GLUCOSE BLDC GLUCOMTR-MCNC: 90 MG/DL — SIGNIFICANT CHANGE UP (ref 70–99)

## 2023-11-28 RX ORDER — CEFAZOLIN SODIUM 1 G
500 VIAL (EA) INJECTION ONCE
Refills: 0 | Status: COMPLETED | OUTPATIENT
Start: 2023-11-28 | End: 2023-11-28

## 2023-11-28 RX ADMIN — INSULIN GLARGINE 24 UNIT(S): 100 INJECTION, SOLUTION SUBCUTANEOUS at 08:04

## 2023-11-28 RX ADMIN — Medication 650 MILLIGRAM(S): at 05:58

## 2023-11-28 RX ADMIN — Medication 650 MILLIGRAM(S): at 15:17

## 2023-11-28 RX ADMIN — Medication 81 MILLIGRAM(S): at 11:39

## 2023-11-28 RX ADMIN — Medication 250 MILLIGRAM(S): at 05:59

## 2023-11-28 RX ADMIN — HEPARIN SODIUM 5000 UNIT(S): 5000 INJECTION INTRAVENOUS; SUBCUTANEOUS at 05:58

## 2023-11-28 RX ADMIN — MAGNESIUM OXIDE 400 MG ORAL TABLET 400 MILLIGRAM(S): 241.3 TABLET ORAL at 17:05

## 2023-11-28 RX ADMIN — OXYCODONE HYDROCHLORIDE 10 MILLIGRAM(S): 5 TABLET ORAL at 02:27

## 2023-11-28 RX ADMIN — MAGNESIUM OXIDE 400 MG ORAL TABLET 400 MILLIGRAM(S): 241.3 TABLET ORAL at 08:03

## 2023-11-28 RX ADMIN — HEPARIN SODIUM 5000 UNIT(S): 5000 INJECTION INTRAVENOUS; SUBCUTANEOUS at 15:17

## 2023-11-28 RX ADMIN — Medication 90 MILLIGRAM(S): at 05:58

## 2023-11-28 RX ADMIN — Medication 1 TABLET(S): at 17:04

## 2023-11-28 RX ADMIN — LOSARTAN POTASSIUM 50 MILLIGRAM(S): 100 TABLET, FILM COATED ORAL at 05:58

## 2023-11-28 RX ADMIN — OXYCODONE HYDROCHLORIDE 10 MILLIGRAM(S): 5 TABLET ORAL at 01:27

## 2023-11-28 RX ADMIN — OXYCODONE HYDROCHLORIDE 10 MILLIGRAM(S): 5 TABLET ORAL at 08:02

## 2023-11-28 RX ADMIN — CLOPIDOGREL BISULFATE 75 MILLIGRAM(S): 75 TABLET, FILM COATED ORAL at 11:38

## 2023-11-28 RX ADMIN — Medication 0.5 MILLIGRAM(S): at 15:18

## 2023-11-28 RX ADMIN — Medication 10: at 08:04

## 2023-11-28 RX ADMIN — MAGNESIUM OXIDE 400 MG ORAL TABLET 400 MILLIGRAM(S): 241.3 TABLET ORAL at 11:38

## 2023-11-28 RX ADMIN — OXYCODONE HYDROCHLORIDE 10 MILLIGRAM(S): 5 TABLET ORAL at 08:40

## 2023-11-28 RX ADMIN — OXYCODONE HYDROCHLORIDE 10 MILLIGRAM(S): 5 TABLET ORAL at 15:50

## 2023-11-28 RX ADMIN — Medication 1 TABLET(S): at 08:03

## 2023-11-28 RX ADMIN — Medication 100 MILLIGRAM(S): at 11:44

## 2023-11-28 RX ADMIN — Medication 0.5 MILLIGRAM(S): at 01:27

## 2023-11-28 RX ADMIN — Medication 5 UNIT(S): at 11:39

## 2023-11-28 RX ADMIN — Medication 8 UNIT(S): at 17:07

## 2023-11-28 RX ADMIN — Medication 0.5 MILLIGRAM(S): at 08:03

## 2023-11-28 RX ADMIN — Medication 1 TABLET(S): at 11:39

## 2023-11-28 RX ADMIN — OXYCODONE HYDROCHLORIDE 10 MILLIGRAM(S): 5 TABLET ORAL at 15:18

## 2023-11-28 RX ADMIN — POLYETHYLENE GLYCOL 3350 17 GRAM(S): 17 POWDER, FOR SOLUTION ORAL at 11:39

## 2023-11-28 NOTE — PROGRESS NOTE ADULT - ASSESSMENT
Rehab for right BKA in the setting of severe PAD with previous balloon angioplasty and diabetic Charcot arthropathy causing gait dysfunction and decline in ADLs   - s/p meropenem 1000 mg q12h before source control    - RLE NWB   - c/w Plavix 75 mg, ASA 81 mg  - now with small necrotic area at mid-incision flap with mild local erythema opened by vascular 11/27 and cultrured and started on Keflex.  - To f/u with Vascular surgery 1-2 weeks. VN for local wound care    HLD  - resumed home Crestor 20 mg qhs (instead of Lipitor 80 mg due to patient request).     Pain control   - c/w Oxycodone 10mg prn severe pain, oxycodone 5mg PRN mild pain.   - Pt was started on Lyrica 75 mg q8h, Cymbalta 40 mg q12h on previous unit, however patient states that he gets hallucinations and has been refusing the medications for the past several days and MS cleared  - pain management consulted, recs appreciated   - pt was getting narcotics from podiatrist in past but does not see physician anymore. Will arrange Pain Management/ PMD to f/u  - Rehab ACP Eliane spoke with Dr. Orozco who said that her office will follow patient upon discharge.    CKD stage 4 (2.5-3.5)  Chronic anemia 2/2 CKD  - on renal transplant list - avoid blood tx  - give Venofer 200mg IV q24h x5, Retacrit 77637 units sq weekly per nephrology  - magnesium oxide 400mg TID  - Sodium bicarb 650mg TID   - BUN and Creat 11/27 increased to 47/ 3.4. Not dry - making 500 - 700 cc of urine q 6 hrs. No hydronephrosis on US. Still near baseline function.  - BP running low, so decreased perfusion a likely factor. Will decrease Losartan from 100mg to 50. Also on Nifedipine 90 mg.  - Dr. 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 increased dinner Lispro to 8 units and monitor with better control  - Patient manages his blood sugars at home with continuous monitor and follow with Dr. Szymanski    Urinary retention/ BPH  - seen by urology 11/10: Flomax, self-cath q4-6h, fu outpatient   - on admission eval, patient states voiding spontaneously without urbano or straight cath but is complaining of frequency   - PVRs on rehab unit revealed large PVRs 1700cc and 900cc  - risks and benefits of Urbano vs. CIC discussed with patient  - He admits to a very large fluid intake and agrees to cut down fluids to about 1500 - 1800cc daily.  - PVR volumes decreasing - below 600 (no spontaneous void, but drinking less)  - Flomax switched to Rapaflo 11/24  - 11/23 was able to urinate 100 and 200cc without cath, cath after 700cc, told patient to decrease fluid intake   - US Kidneys 11/25 showing mulitple R renal cysts, prostate volume 52.5 ml, no evidence of hydronephrosis.   - cont. CIC at home and f/u with urology as scheduled    Hypertension   - - BP running low, so decreased perfusion a likely factor in worsening CKD/ ALI. Decreased Losartan from 100mg to 50. Also on Nifedipine 90 mg.    Anxiety   Depression  - c/w Xanax 0.5 mg QID prn for anxiety   - monitor for symptoms of SI/HI  - can recommend psychosocial services if needed   - pt is currently in good spirits     -Pain control: Oxycodone 10/5 prn, Tylenol       Diet, Consistent Carbohydrate w/Evening Snack:   DASH/TLC Sodium & Cholesterol Restricted  No Concentrated Potassium  Low Sodium  Supplement Feeding Modality:  Oral  Nepro Cans or Servings Per Day:  1       Frequency:  Two Times a day (11-20-23 @ 11:26) [Active]      F/u with PMD as above and Vascular surgery and renal and urology.  VN home rehab and SN/ wound care.  Activities as tolerated except no weight bearing on residual limb.  Physiatry f/u prn.    For further summary of hospital course and d/c med rec and patient instructions, see Provider D/C Note, reviewed and signed by me.

## 2023-11-28 NOTE — PROGRESS NOTE ADULT - SUBJECTIVE AND OBJECTIVE BOX
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 Pt underwent R TMA on 10/27/2023 with revision on 10/30 and 11/4. 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 Cultures grew pseudomonas and yeast and treated with Meropenem. Hospital course is complicated by acute urinary retention 2/2 neurogenic bladder and two mechanical out of bed falls, as the patient is not accustomed to missing a foot. Pt was seen by urology and has completed a trial of void after several straight caths. Pt is currently voiding spontaneously without use of urbano or straight catheterization but feels like he is going frequently. Having regular bowel movements. Of note the pt states that Cymbalta and Lyrica makes him hallucinate and has been refusing the medications on the previous unit. Complains of phantom limb pain and incisional pain that is currently controlled on Oxycodone.     Pt seen and evaluated by Physiatry and is currently functioning at supervision for bed mobility , transfers minimum assist, ambulates 25ft RW contact guard assist, upper body dressing independent, lower body dressing contact guard assist, grooming independent. Pt is stable for acute inpatient rehabilitation.     LS: lives in private home with mother, 4 ASTER, another 13 ASTER to get to bedroom, half bathroom on 1st floor   PLOF: ambulates with crutches, independent in ADL/iADLs, no HHA    (15 Nov 2023 14:45)    TODAY'S SUBJECTIVE & REVIEW OF SYMPTOMS:  No new complaints. Alert and appears comfortable. Vascular f/u recs appreciated. Started on 1 week course of Keflex. 3 staples removed yesterday by vascular surgery.  For d/c home today with family  VSS.     CLOF: Independent in transfers and ambulation with RW    Review of Systems:   Constitutional:    [  x ] WNL           [   ] poor appetite   [   ] insomnia   [   ] tired   Cardio:                [ x  ] WNL           [   ] CP   [   ] URENA   [   ] palpitations               Resp:                   [ x  ] WNL           [   ] SOB   [   ] cough   [   ] wheezing   GI:                        [  x ] WNL           [   ] constipation   [   ] diarrhea   [   ] abdominal pain   [   ] nausea   [   ] emesis                                :                      [   ] WNL           [   ] URBANO  [   ] dysuria   [ x  ] difficulty voiding requiring CIC    Endo:                   [ x  ] WNL          [   ] polyuria   [   ] temperature intolerance                 Skin:                     [   ] WNL          [   ] pain   [ x  ] open wound along incision   [   ] rash   MSK:                    [    ] WNL          [   ] muscle pain   [   ] joint pain/ stiffness   [   ] muscle tenderness   [   ] swelling  [ x]  right BKA with distal limb and phantom pain and intermittent c/o LBP   Neuro:                 [   ] WNL          [   ] HA   [   ] change in vision   [   ] tremor   [   ] weakness   [   ]dysphagia  [ x ]  R limb phantom, neuropathic and incisional pain            Cognitive:           [  x ] WNL           [   ]confusion      Psych:                  [  x ] WNL           [   ] hallucinations   [   ]agitation   [   ] delusion   [   ]depression      PHYSICAL EXAM    Vital Signs Last 24 Hrs  T(C): 36.3 (27 Nov 2023 21:15), Max: 36.3 (27 Nov 2023 21:15)  T(F): 97.3 (27 Nov 2023 21:15), Max: 97.3 (27 Nov 2023 21:15)  HR: 85 (28 Nov 2023 04:44) (76 - 85)  BP: 180/85 (28 Nov 2023 04:44) (115/64 - 180/85)  BP(mean): --  RR: 16 (28 Nov 2023 04:44) (16 - 20)  SpO2: --      General:[ x  ] NAD, Resting Comfortable,   [   ] other:                                HEENT: [  x ] NC/AT, EOMI, PERRL , Normal Conjunctivae,   [   ] other:  Cardio: [  x ] RRR, no murmer,   [   ] other:                              Pulm: [  x ] No Respiratory Distress,  Lungs CTAB,   [   ] other:                       Abdomen: [ x  ]ND/NT, Soft,   [   ] other:    : [ x  ] NO URBANO CATHETER, [   ] URBANO CATHETER- no meatal tear, no discharge, [   ] other:                                            MSK: [   ] No joint swelling, Full ROM,   [ x  ] other:                                    Ext: [  x ]No C/C/E, No calf tenderness,   [   ]other:   right BKA incision C&D with staples.   Skin: [   ]intact,   [  x ] other: ~1.5 x 1 cm area of necrotic, blackened skin at mid incision flap. Mild local erythema with no fluctuance, tenderness or fluid expressed.                                                                 Neurological Examination:  Cognitive: [  x  ] AAO x 3,   [    ]  other:                                                                      Attention:  [  x  ] intact,   [    ]  other:                            Memory: [  x  ] intact,    [    ]  other:     Mood/Affect: [  x  ] wnl,    [    ]  other:                                                                             Communication: [ x   ]Fluent, no dysarthria, following commands:  [    ] other:   CN II - XII:  [ x   ] intact,  [    ] other:                                                                                        Motor:   RIGHT UE: [  x ] WNL,  [   ] other:  LEFT    UE: [ x  ] WNL,  [   ] other:  RIGHT LE: [   ] WNL,  [   ] other: R HF 5/5, R BKA w/ knee immobilizer   LEFT    LE: [ x  ] WNL,  [   ] other:    Tone: [  x  ] wnl,   [    ]  other:  DTRs: [  x ]symmetric, [   ] other:  Coordination:   [  x  ] intact,   [    ] other:                                                                           Sensory: [  x  ] Intact to light touch,   [    ] other:    MEDICATIONS  (STANDING):  aspirin  chewable 81 milliGRAM(s) Oral daily  clopidogrel Tablet 75 milliGRAM(s) Oral daily  heparin   Injectable 5000 Unit(s) SubCutaneous every 8 hours  insulin glargine Injectable (LANTUS) 24 Unit(s) SubCutaneous every morning  insulin lispro (ADMELOG) corrective regimen sliding scale   SubCutaneous three times a day before meals  insulin lispro Injectable (ADMELOG) 5 Unit(s) SubCutaneous before lunch  insulin lispro Injectable (ADMELOG) 8 Unit(s) SubCutaneous before dinner  lactobacillus acidophilus 1 Tablet(s) Oral three times a day with meals  losartan 50 milliGRAM(s) Oral daily  magnesium oxide 400 milliGRAM(s) Oral three times a day with meals  NIFEdipine XL 90 milliGRAM(s) Oral daily  polyethylene glycol 3350 17 Gram(s) Oral daily  rosuvastatin 20 milliGRAM(s) Oral at bedtime  senna 2 Tablet(s) Oral at bedtime  silodosin 8 milliGRAM(s) Oral at bedtime  sodium bicarbonate 650 milliGRAM(s) Oral three times a day  sodium chloride 0.9%. 1000 milliLiter(s) (75 mL/Hr) IV Continuous <Continuous>    MEDICATIONS  (PRN):  acetaminophen     Tablet .. 650 milliGRAM(s) Oral every 6 hours PRN Temp greater or equal to 38C (100.4F), Mild Pain (1 - 3)  ALPRAZolam 0.5 milliGRAM(s) Oral four times a day PRN anxiety  aluminum hydroxide/magnesium hydroxide/simethicone Suspension 30 milliLiter(s) Oral every 4 hours PRN Dyspepsia  dextrose Oral Gel 15 Gram(s) Oral once PRN Blood Glucose LESS THAN 70 milliGRAM(s)/deciliter  oxyCODONE    IR 10 milliGRAM(s) Oral every 4 hours PRN Severe Pain (7 - 10)  oxyCODONE    IR 5 milliGRAM(s) Oral every 4 hours PRN Mild Pain (1 - 3)          RECENT LABS/IMAGING                          8.6    6.74  )-----------( 466      ( 27 Nov 2023 07:27 )             27.2     11-27    135  |  98  |  47<H>  ----------------------------<  137<H>  5.0   |  24  |  3.4<H>    Ca    9.4      27 Nov 2023 07:27  Mg     2.9     11-27    TPro  7.0  /  Alb  4.0  /  TBili  0.3  /  DBili  x   /  AST  14  /  ALT  15  /  AlkPhos  152<H>  11-27      CAPILLARY BLOOD GLUCOSE      POCT Blood Glucose.: 90 mg/dL (28 Nov 2023 11:31)  POCT Blood Glucose.: 396 mg/dL (28 Nov 2023 07:57)  POCT Blood Glucose.: 184 mg/dL (27 Nov 2023 21:04)  POCT Blood Glucose.: 180 mg/dL (27 Nov 2023 16:18)    POCT Blood Glucose.: 140 mg/dL (11-27-23 @ 12:09)  POCT Blood Glucose.: 151 mg/dL (11-27-23 @ 07:04)  POCT Blood Glucose.: 72 mg/dL (11-26-23 @ 21:20)  POCT Blood Glucose.: 109 mg/dL (11-26-23 @ 16:29)  POCT Blood Glucose.: 92 mg/dL (11-26-23 @ 11:52)  POCT Blood Glucose.: 287 mg/dL (11-26-23 @ 07:31)  POCT Blood Glucose.: 81 mg/dL (11-25-23 @ 20:50)  POCT Blood Glucose.: 110 mg/dL (11-25-23 @ 16:12)  POCT Blood Glucose.: 105 mg/dL (11-25-23 @ 11:43)  POCT Blood Glucose.: 369 mg/dL (11-25-23 @ 08:30)  POCT Blood Glucose.: 102 mg/dL (11-24-23 @ 21:18)  POCT Blood Glucose.: 124 mg/dL (11-24-23 @ 16:44)      US Kidney and Bladder 11/25/23  IMPRESSION:  1.  Multiple right renal cysts, measuring up to 1.1 cm.  2.  0.9 cm mid-pole, left renal cyst.  3.  Post-void residual not measured, otherwise unremarkable ultrasound   examination of the urinary bladder.  4.  Prostate volume of 52.5 mL.

## 2023-11-28 NOTE — PROGRESS NOTE ADULT - PROVIDER SPECIALTY LIST ADULT
Neuropsychology
Rehab Medicine
Neuropsychology
Rehab Medicine
Neuropsychology
Rehab Medicine

## 2023-12-01 ENCOUNTER — APPOINTMENT (OUTPATIENT)
Dept: UROLOGY | Facility: CLINIC | Age: 58
End: 2023-12-01
Payer: MEDICARE

## 2023-12-01 ENCOUNTER — APPOINTMENT (OUTPATIENT)
Dept: PAIN MANAGEMENT | Facility: CLINIC | Age: 58
End: 2023-12-01
Payer: MEDICARE

## 2023-12-01 VITALS — BODY MASS INDEX: 23.43 KG/M2 | HEIGHT: 72 IN | WEIGHT: 173 LBS

## 2023-12-01 DIAGNOSIS — N28.1 CYST OF KIDNEY, ACQUIRED: ICD-10-CM

## 2023-12-01 DIAGNOSIS — M54.16 RADICULOPATHY, LUMBAR REGION: ICD-10-CM

## 2023-12-01 PROCEDURE — 99204 OFFICE O/P NEW MOD 45 MIN: CPT

## 2023-12-01 PROCEDURE — 99214 OFFICE O/P EST MOD 30 MIN: CPT

## 2023-12-01 RX ORDER — FINASTERIDE 5 MG/1
5 TABLET, FILM COATED ORAL DAILY
Qty: 90 | Refills: 3 | Status: ACTIVE | COMMUNITY
Start: 2023-12-01 | End: 1900-01-01

## 2023-12-01 RX ORDER — SILODOSIN 8 MG/1
8 CAPSULE ORAL
Qty: 90 | Refills: 1 | Status: ACTIVE | COMMUNITY
Start: 2023-12-01 | End: 1900-01-01

## 2023-12-03 PROBLEM — M54.16 LUMBAR RADICULITIS: Status: ACTIVE | Noted: 2023-12-03

## 2023-12-04 ENCOUNTER — APPOINTMENT (OUTPATIENT)
Dept: VASCULAR SURGERY | Facility: CLINIC | Age: 58
End: 2023-12-04
Payer: MEDICARE

## 2023-12-04 VITALS
HEIGHT: 72 IN | BODY MASS INDEX: 24.92 KG/M2 | WEIGHT: 184 LBS | DIASTOLIC BLOOD PRESSURE: 72 MMHG | SYSTOLIC BLOOD PRESSURE: 137 MMHG

## 2023-12-04 PROCEDURE — 99024 POSTOP FOLLOW-UP VISIT: CPT

## 2023-12-05 ENCOUNTER — LABORATORY RESULT (OUTPATIENT)
Age: 58
End: 2023-12-05

## 2023-12-05 ENCOUNTER — APPOINTMENT (OUTPATIENT)
Dept: PAIN MANAGEMENT | Facility: CLINIC | Age: 58
End: 2023-12-05
Payer: MEDICARE

## 2023-12-05 DIAGNOSIS — E11.22 TYPE 2 DIABETES MELLITUS WITH DIABETIC CHRONIC KIDNEY DISEASE: ICD-10-CM

## 2023-12-05 DIAGNOSIS — B00.1 HERPESVIRAL VESICULAR DERMATITIS: ICD-10-CM

## 2023-12-05 DIAGNOSIS — F32.A DEPRESSION, UNSPECIFIED: ICD-10-CM

## 2023-12-05 DIAGNOSIS — Z79.4 LONG TERM (CURRENT) USE OF INSULIN: ICD-10-CM

## 2023-12-05 DIAGNOSIS — Z79.02 LONG TERM (CURRENT) USE OF ANTITHROMBOTICS/ANTIPLATELETS: ICD-10-CM

## 2023-12-05 DIAGNOSIS — R26.89 OTHER ABNORMALITIES OF GAIT AND MOBILITY: ICD-10-CM

## 2023-12-05 DIAGNOSIS — Z47.81 ENCOUNTER FOR ORTHOPEDIC AFTERCARE FOLLOWING SURGICAL AMPUTATION: ICD-10-CM

## 2023-12-05 DIAGNOSIS — N18.4 CHRONIC KIDNEY DISEASE, STAGE 4 (SEVERE): ICD-10-CM

## 2023-12-05 DIAGNOSIS — E78.00 PURE HYPERCHOLESTEROLEMIA, UNSPECIFIED: ICD-10-CM

## 2023-12-05 DIAGNOSIS — F41.9 ANXIETY DISORDER, UNSPECIFIED: ICD-10-CM

## 2023-12-05 DIAGNOSIS — I48.20 CHRONIC ATRIAL FIBRILLATION, UNSPECIFIED: ICD-10-CM

## 2023-12-05 DIAGNOSIS — D63.1 ANEMIA IN CHRONIC KIDNEY DISEASE: ICD-10-CM

## 2023-12-05 DIAGNOSIS — N31.9 NEUROMUSCULAR DYSFUNCTION OF BLADDER, UNSPECIFIED: ICD-10-CM

## 2023-12-05 DIAGNOSIS — E11.51 TYPE 2 DIABETES MELLITUS WITH DIABETIC PERIPHERAL ANGIOPATHY WITHOUT GANGRENE: ICD-10-CM

## 2023-12-05 DIAGNOSIS — Z79.82 LONG TERM (CURRENT) USE OF ASPIRIN: ICD-10-CM

## 2023-12-05 DIAGNOSIS — Z89.511 ACQUIRED ABSENCE OF RIGHT LEG BELOW KNEE: ICD-10-CM

## 2023-12-05 DIAGNOSIS — N40.1 BENIGN PROSTATIC HYPERPLASIA WITH LOWER URINARY TRACT SYMPTOMS: ICD-10-CM

## 2023-12-05 DIAGNOSIS — E11.610 TYPE 2 DIABETES MELLITUS WITH DIABETIC NEUROPATHIC ARTHROPATHY: ICD-10-CM

## 2023-12-05 DIAGNOSIS — K21.9 GASTRO-ESOPHAGEAL REFLUX DISEASE WITHOUT ESOPHAGITIS: ICD-10-CM

## 2023-12-05 DIAGNOSIS — R33.8 OTHER RETENTION OF URINE: ICD-10-CM

## 2023-12-05 LAB
AMP / AMPHETAMINE: NEGATIVE
BAR / SECOBARBITAL: NEGATIVE
BUP / BUPRENORPHINE: NEGATIVE
BZO / OXAZEPAM: POSITIVE
COC / COCAINE: NEGATIVE
CREATININE: 50 MG/DL
MDMA / METHYLENEDIOXYMETHAMPHETAMINE: NEGATIVE
MET / METHAMPHETAMINES: NEGATIVE
MOP / MORPHINE: NEGATIVE
MTD / METHADONE: NEGATIVE
OXY / OXYCODONE: POSITIVE
PCP / PHENCYCLIDINE: NEGATIVE
PH: 7
SPECIFIC GRAVITY: 1.02
TEMPERATURE: 94 C
THC / MARIJUANA: NEGATIVE

## 2023-12-05 PROCEDURE — 80305 DRUG TEST PRSMV DIR OPT OBS: CPT | Mod: QW

## 2023-12-06 ENCOUNTER — APPOINTMENT (OUTPATIENT)
Dept: UROLOGY | Facility: CLINIC | Age: 58
End: 2023-12-06

## 2023-12-07 ENCOUNTER — EMERGENCY (EMERGENCY)
Facility: HOSPITAL | Age: 58
LOS: 0 days | Discharge: HOME | End: 2023-12-07
Attending: EMERGENCY MEDICINE
Payer: MEDICARE

## 2023-12-07 VITALS
RESPIRATION RATE: 19 BRPM | OXYGEN SATURATION: 99 % | SYSTOLIC BLOOD PRESSURE: 154 MMHG | DIASTOLIC BLOOD PRESSURE: 76 MMHG | HEART RATE: 78 BPM | TEMPERATURE: 99 F

## 2023-12-07 VITALS
WEIGHT: 173.06 LBS | HEIGHT: 72 IN | DIASTOLIC BLOOD PRESSURE: 71 MMHG | SYSTOLIC BLOOD PRESSURE: 122 MMHG | RESPIRATION RATE: 20 BRPM | HEART RATE: 88 BPM | TEMPERATURE: 98 F | OXYGEN SATURATION: 100 %

## 2023-12-07 DIAGNOSIS — T87.89 OTHER COMPLICATIONS OF AMPUTATION STUMP: ICD-10-CM

## 2023-12-07 DIAGNOSIS — Z86.2 PERSONAL HISTORY OF DISEASES OF THE BLOOD AND BLOOD-FORMING ORGANS AND CERTAIN DISORDERS INVOLVING THE IMMUNE MECHANISM: ICD-10-CM

## 2023-12-07 DIAGNOSIS — Z98.890 OTHER SPECIFIED POSTPROCEDURAL STATES: Chronic | ICD-10-CM

## 2023-12-07 DIAGNOSIS — F32.A DEPRESSION, UNSPECIFIED: ICD-10-CM
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
## 2023-12-07 DIAGNOSIS — Z89.421 ACQUIRED ABSENCE OF OTHER RIGHT TOE(S): ICD-10-CM

## 2023-12-07 DIAGNOSIS — Z86.39 PERSONAL HISTORY OF OTHER ENDOCRINE, NUTRITIONAL AND METABOLIC DISEASE: ICD-10-CM

## 2023-12-07 DIAGNOSIS — Z98.61 CORONARY ANGIOPLASTY STATUS: ICD-10-CM

## 2023-12-07 DIAGNOSIS — Z87.442 PERSONAL HISTORY OF URINARY CALCULI: ICD-10-CM

## 2023-12-07 DIAGNOSIS — I12.9 HYPERTENSIVE CHRONIC KIDNEY DISEASE WITH STAGE 1 THROUGH STAGE 4 CHRONIC KIDNEY DISEASE, OR UNSPECIFIED CHRONIC KIDNEY DISEASE: ICD-10-CM
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
## 2023-12-07 DIAGNOSIS — Z89.511 ACQUIRED ABSENCE OF RIGHT LEG BELOW KNEE: ICD-10-CM

## 2023-12-07 DIAGNOSIS — Z79.02 LONG TERM (CURRENT) USE OF ANTITHROMBOTICS/ANTIPLATELETS: ICD-10-CM

## 2023-12-07 DIAGNOSIS — Z98.62 PERIPHERAL VASCULAR ANGIOPLASTY STATUS: Chronic | ICD-10-CM

## 2023-12-07 DIAGNOSIS — Z79.82 LONG TERM (CURRENT) USE OF ASPIRIN: ICD-10-CM

## 2023-12-07 DIAGNOSIS — Z86.79 PERSONAL HISTORY OF OTHER DISEASES OF THE CIRCULATORY SYSTEM: ICD-10-CM

## 2023-12-07 DIAGNOSIS — E11.22 TYPE 2 DIABETES MELLITUS WITH DIABETIC CHRONIC KIDNEY DISEASE: ICD-10-CM

## 2023-12-07 DIAGNOSIS — F41.9 ANXIETY DISORDER, UNSPECIFIED: ICD-10-CM

## 2023-12-07 DIAGNOSIS — N18.30 CHRONIC KIDNEY DISEASE, STAGE 3 UNSPECIFIED: ICD-10-CM

## 2023-12-07 DIAGNOSIS — N20.0 CALCULUS OF KIDNEY: Chronic | ICD-10-CM
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
## 2023-12-07 LAB
ALBUMIN SERPL ELPH-MCNC: 4.1 G/DL — SIGNIFICANT CHANGE UP (ref 3.5–5.2)
ALBUMIN SERPL ELPH-MCNC: 4.1 G/DL — SIGNIFICANT CHANGE UP (ref 3.5–5.2)
ALP SERPL-CCNC: 126 U/L — HIGH (ref 30–115)
ALP SERPL-CCNC: 126 U/L — HIGH (ref 30–115)
ALT FLD-CCNC: 17 U/L — SIGNIFICANT CHANGE UP (ref 0–41)
ALT FLD-CCNC: 17 U/L — SIGNIFICANT CHANGE UP (ref 0–41)
ANION GAP SERPL CALC-SCNC: 14 MMOL/L — SIGNIFICANT CHANGE UP (ref 7–14)
ANION GAP SERPL CALC-SCNC: 14 MMOL/L — SIGNIFICANT CHANGE UP (ref 7–14)
AST SERPL-CCNC: 15 U/L — SIGNIFICANT CHANGE UP (ref 0–41)
AST SERPL-CCNC: 15 U/L — SIGNIFICANT CHANGE UP (ref 0–41)
BASOPHILS # BLD AUTO: 0.05 K/UL — SIGNIFICANT CHANGE UP (ref 0–0.2)
BASOPHILS # BLD AUTO: 0.05 K/UL — SIGNIFICANT CHANGE UP (ref 0–0.2)
BASOPHILS NFR BLD AUTO: 0.5 % — SIGNIFICANT CHANGE UP (ref 0–1)
BASOPHILS NFR BLD AUTO: 0.5 % — SIGNIFICANT CHANGE UP (ref 0–1)
BILIRUB SERPL-MCNC: 0.2 MG/DL — SIGNIFICANT CHANGE UP (ref 0.2–1.2)
BILIRUB SERPL-MCNC: 0.2 MG/DL — SIGNIFICANT CHANGE UP (ref 0.2–1.2)
BUN SERPL-MCNC: 47 MG/DL — HIGH (ref 10–20)
BUN SERPL-MCNC: 47 MG/DL — HIGH (ref 10–20)
CALCIUM SERPL-MCNC: 9.4 MG/DL — SIGNIFICANT CHANGE UP (ref 8.4–10.5)
CALCIUM SERPL-MCNC: 9.4 MG/DL — SIGNIFICANT CHANGE UP (ref 8.4–10.5)
CHLORIDE SERPL-SCNC: 99 MMOL/L — SIGNIFICANT CHANGE UP (ref 98–110)
CHLORIDE SERPL-SCNC: 99 MMOL/L — SIGNIFICANT CHANGE UP (ref 98–110)
CO2 SERPL-SCNC: 22 MMOL/L — SIGNIFICANT CHANGE UP (ref 17–32)
CO2 SERPL-SCNC: 22 MMOL/L — SIGNIFICANT CHANGE UP (ref 17–32)
CREAT SERPL-MCNC: 2.7 MG/DL — HIGH (ref 0.7–1.5)
CREAT SERPL-MCNC: 2.7 MG/DL — HIGH (ref 0.7–1.5)
EGFR: 26 ML/MIN/1.73M2 — LOW
EGFR: 26 ML/MIN/1.73M2 — LOW
EOSINOPHIL # BLD AUTO: 0.31 K/UL — SIGNIFICANT CHANGE UP (ref 0–0.7)
EOSINOPHIL # BLD AUTO: 0.31 K/UL — SIGNIFICANT CHANGE UP (ref 0–0.7)
EOSINOPHIL NFR BLD AUTO: 3 % — SIGNIFICANT CHANGE UP (ref 0–8)
EOSINOPHIL NFR BLD AUTO: 3 % — SIGNIFICANT CHANGE UP (ref 0–8)
GLUCOSE SERPL-MCNC: 99 MG/DL — SIGNIFICANT CHANGE UP (ref 70–99)
GLUCOSE SERPL-MCNC: 99 MG/DL — SIGNIFICANT CHANGE UP (ref 70–99)
HCT VFR BLD CALC: 27.1 % — LOW (ref 42–52)
HCT VFR BLD CALC: 27.1 % — LOW (ref 42–52)
HGB BLD-MCNC: 8.6 G/DL — LOW (ref 14–18)
HGB BLD-MCNC: 8.6 G/DL — LOW (ref 14–18)
IMM GRANULOCYTES NFR BLD AUTO: 0.2 % — SIGNIFICANT CHANGE UP (ref 0.1–0.3)
IMM GRANULOCYTES NFR BLD AUTO: 0.2 % — SIGNIFICANT CHANGE UP (ref 0.1–0.3)
LYMPHOCYTES # BLD AUTO: 2.44 K/UL — SIGNIFICANT CHANGE UP (ref 1.2–3.4)
LYMPHOCYTES # BLD AUTO: 2.44 K/UL — SIGNIFICANT CHANGE UP (ref 1.2–3.4)
LYMPHOCYTES # BLD AUTO: 23.7 % — SIGNIFICANT CHANGE UP (ref 20.5–51.1)
LYMPHOCYTES # BLD AUTO: 23.7 % — SIGNIFICANT CHANGE UP (ref 20.5–51.1)
MCHC RBC-ENTMCNC: 26.9 PG — LOW (ref 27–31)
MCHC RBC-ENTMCNC: 26.9 PG — LOW (ref 27–31)
MCHC RBC-ENTMCNC: 31.7 G/DL — LOW (ref 32–37)
MCHC RBC-ENTMCNC: 31.7 G/DL — LOW (ref 32–37)
MCV RBC AUTO: 84.7 FL — SIGNIFICANT CHANGE UP (ref 80–94)
MCV RBC AUTO: 84.7 FL — SIGNIFICANT CHANGE UP (ref 80–94)
MONOCYTES # BLD AUTO: 0.66 K/UL — HIGH (ref 0.1–0.6)
MONOCYTES # BLD AUTO: 0.66 K/UL — HIGH (ref 0.1–0.6)
MONOCYTES NFR BLD AUTO: 6.4 % — SIGNIFICANT CHANGE UP (ref 1.7–9.3)
MONOCYTES NFR BLD AUTO: 6.4 % — SIGNIFICANT CHANGE UP (ref 1.7–9.3)
NEUTROPHILS # BLD AUTO: 6.8 K/UL — HIGH (ref 1.4–6.5)
NEUTROPHILS # BLD AUTO: 6.8 K/UL — HIGH (ref 1.4–6.5)
NEUTROPHILS NFR BLD AUTO: 66.2 % — SIGNIFICANT CHANGE UP (ref 42.2–75.2)
NEUTROPHILS NFR BLD AUTO: 66.2 % — SIGNIFICANT CHANGE UP (ref 42.2–75.2)
NRBC # BLD: 0 /100 WBCS — SIGNIFICANT CHANGE UP (ref 0–0)
NRBC # BLD: 0 /100 WBCS — SIGNIFICANT CHANGE UP (ref 0–0)
PLATELET # BLD AUTO: 376 K/UL — SIGNIFICANT CHANGE UP (ref 130–400)
PLATELET # BLD AUTO: 376 K/UL — SIGNIFICANT CHANGE UP (ref 130–400)
PMV BLD: 8.7 FL — SIGNIFICANT CHANGE UP (ref 7.4–10.4)
PMV BLD: 8.7 FL — SIGNIFICANT CHANGE UP (ref 7.4–10.4)
POTASSIUM SERPL-MCNC: 4.5 MMOL/L — SIGNIFICANT CHANGE UP (ref 3.5–5)
POTASSIUM SERPL-MCNC: 4.5 MMOL/L — SIGNIFICANT CHANGE UP (ref 3.5–5)
POTASSIUM SERPL-SCNC: 4.5 MMOL/L — SIGNIFICANT CHANGE UP (ref 3.5–5)
POTASSIUM SERPL-SCNC: 4.5 MMOL/L — SIGNIFICANT CHANGE UP (ref 3.5–5)
PROT SERPL-MCNC: 7.2 G/DL — SIGNIFICANT CHANGE UP (ref 6–8)
PROT SERPL-MCNC: 7.2 G/DL — SIGNIFICANT CHANGE UP (ref 6–8)
RBC # BLD: 3.2 M/UL — LOW (ref 4.7–6.1)
RBC # BLD: 3.2 M/UL — LOW (ref 4.7–6.1)
RBC # FLD: 15.8 % — HIGH (ref 11.5–14.5)
RBC # FLD: 15.8 % — HIGH (ref 11.5–14.5)
SODIUM SERPL-SCNC: 135 MMOL/L — SIGNIFICANT CHANGE UP (ref 135–146)
SODIUM SERPL-SCNC: 135 MMOL/L — SIGNIFICANT CHANGE UP (ref 135–146)
WBC # BLD: 10.28 K/UL — SIGNIFICANT CHANGE UP (ref 4.8–10.8)
WBC # BLD: 10.28 K/UL — SIGNIFICANT CHANGE UP (ref 4.8–10.8)
WBC # FLD AUTO: 10.28 K/UL — SIGNIFICANT CHANGE UP (ref 4.8–10.8)
WBC # FLD AUTO: 10.28 K/UL — SIGNIFICANT CHANGE UP (ref 4.8–10.8)

## 2023-12-07 PROCEDURE — 73564 X-RAY EXAM KNEE 4 OR MORE: CPT | Mod: 26,RT

## 2023-12-07 PROCEDURE — 73552 X-RAY EXAM OF FEMUR 2/>: CPT | Mod: RT

## 2023-12-07 PROCEDURE — 85025 COMPLETE CBC W/AUTO DIFF WBC: CPT

## 2023-12-07 PROCEDURE — 80053 COMPREHEN METABOLIC PANEL: CPT

## 2023-12-07 PROCEDURE — 73590 X-RAY EXAM OF LOWER LEG: CPT | Mod: 26,RT

## 2023-12-07 PROCEDURE — 72170 X-RAY EXAM OF PELVIS: CPT

## 2023-12-07 PROCEDURE — 99284 EMERGENCY DEPT VISIT MOD MDM: CPT | Mod: 25

## 2023-12-07 PROCEDURE — 36415 COLL VENOUS BLD VENIPUNCTURE: CPT

## 2023-12-07 PROCEDURE — 96374 THER/PROPH/DIAG INJ IV PUSH: CPT

## 2023-12-07 PROCEDURE — 99285 EMERGENCY DEPT VISIT HI MDM: CPT

## 2023-12-07 PROCEDURE — 73564 X-RAY EXAM KNEE 4 OR MORE: CPT | Mod: RT

## 2023-12-07 PROCEDURE — 73502 X-RAY EXAM HIP UNI 2-3 VIEWS: CPT | Mod: RT

## 2023-12-07 PROCEDURE — 96376 TX/PRO/DX INJ SAME DRUG ADON: CPT

## 2023-12-07 PROCEDURE — 73502 X-RAY EXAM HIP UNI 2-3 VIEWS: CPT | Mod: 26,RT

## 2023-12-07 PROCEDURE — 73590 X-RAY EXAM OF LOWER LEG: CPT | Mod: RT

## 2023-12-07 PROCEDURE — 73552 X-RAY EXAM OF FEMUR 2/>: CPT | Mod: 26,RT

## 2023-12-07 RX ORDER — MORPHINE SULFATE 50 MG/1
4 CAPSULE, EXTENDED RELEASE ORAL ONCE
Refills: 0 | Status: DISCONTINUED | OUTPATIENT
Start: 2023-12-07 | End: 2023-12-07

## 2023-12-07 RX ADMIN — MORPHINE SULFATE 4 MILLIGRAM(S): 50 CAPSULE, EXTENDED RELEASE ORAL at 06:15

## 2023-12-07 RX ADMIN — MORPHINE SULFATE 4 MILLIGRAM(S): 50 CAPSULE, EXTENDED RELEASE ORAL at 07:24

## 2023-12-07 NOTE — ED ADULT TRIAGE NOTE - HEIGHT IN CM
----- Message from Brianne Mcdaniel sent at 5/11/2022 11:44 AM CDT -----  Contact: Mom [phone]  Would like to receive medical advice.    Would they like a call back or a response via [facility]:  portal    Additional information:  Calling to request a letter for medication cefdinir (OMNICEF) 250 mg/5 mL suspension. Mom states pt  will need  a letter stating diarrhea is a side effect from medication. Mom is requesting letter sent to [employer].              [phone]

## 2023-12-07 NOTE — ED ADULT NURSE NOTE - OBJECTIVE STATEMENT
Pt states he was walking early this morning with my walker and landed on his right leg(stump) and left side. Pt endorses leg pain. Pt denies HT, no blood thinners, on ASA/Plavix. Just discharged from rehab facility a week ago for S/P right BKA on 11/9

## 2023-12-07 NOTE — ED PROVIDER NOTE - ATTENDING CONTRIBUTION TO CARE
58M PMH DM, HTN, PAD, Charcot disease, CKD, kidney stones, anemia, s/p right BKA 11/9 by Vasc Sx, anxiety/depression, presenting with mechanical fall.  Patient had a mechanical trip and fall while trying to use his walker tonight landed directly onto right lower extremity stump, no HD or LOC.  Complaining of pain to end of stump.  No focal numbness or weakness.     PE:  nad  skin warm, dry  ncat  neck supple  rrr nl s1s2 no mrg  ctab no wrr  abd soft ntnd no palpable masses no rgr  back non-tender no cvat  ext- R bka - (per picture from pt's phone) eschar to anterior aspect of wound, however otherwise healing well, sutures in place, minimal erythema, no drainage; remainder of ext exam nml  neuro aaox3 grossly nf exam

## 2023-12-07 NOTE — ED PROVIDER NOTE - NSFOLLOWUPINSTRUCTIONS_ED_ALL_ED_FT
Fall Prevention in the Home, Adult  Falls can cause injuries and can affect people from all age groups. There are many simple things that you can do to make your home safe and to help prevent falls. Ask for help when making these changes, if needed.    What actions can I take to prevent falls?  General instructions     Use good lighting in all rooms. Replace any light bulbs that burn out.  Turn on lights if it is dark. Use night-lights.  Place frequently used items in easy-to-reach places. Lower the shelves around your home if necessary.  Set up furniture so that there are clear paths around it. Avoid moving your furniture around.  Remove throw rugs and other tripping hazards from the floor.  Avoid walking on wet floors.  Fix any uneven floor surfaces.  Add color or contrast paint or tape to grab bars and handrails in your home. Place contrasting color strips on the first and last steps of stairways.  When you use a stepladder, make sure that it is completely opened and that the sides are firmly locked. Have someone hold the ladder while you are using it. Do not climb a closed stepladder.  Be aware of any and all pets.  What can I do in the bathroom?     Keep the floor dry. Immediately clean up any water that spills onto the floor.  Remove soap buildup in the tub or shower on a regular basis.  Use non-skid mats or decals on the floor of the tub or shower.  Attach bath mats securely with double-sided, non-slip rug tape.  If you need to sit down while you are in the shower, use a plastic, non-slip stool.  Image ImageInstall grab bars by the toilet and in the tub and shower. Do not use towel bars as grab bars.  What can I do in the bedroom?     Make sure that a bedside light is easy to reach.  Do not use oversized bedding that drapes onto the floor.  Have a firm chair that has side arms to use for getting dressed.  What can I do in the kitchen?     Clean up any spills right away.  If you need to reach for something above you, use a sturdy step stool that has a grab bar.  Keep electrical cables out of the way.  Do not use floor polish or wax that makes floors slippery. If you must use wax, make sure that it is non-skid floor wax.  What can I do in the stairways?     Do not leave any items on the stairs.  Make sure that you have a light switch at the top of the stairs and the bottom of the stairs. Have them installed if you do not have them.  Make sure that there are handrails on both sides of the stairs. Fix handrails that are broken or loose. Make sure that handrails are as long as the stairways.  Install non-slip stair treads on all stairs in your home.  Avoid having throw rugs at the top or bottom of stairways, or secure the rugs with carpet tape to prevent them from moving.  Choose a carpet design that does not hide the edge of steps on the stairway.  Check any carpeting to make sure that it is firmly attached to the stairs. Fix any carpet that is loose or worn.  What can I do on the outside of my home?     Use bright outdoor lighting.  Regularly repair the edges of walkways and driveways and fix any cracks.  Remove high doorway thresholds.  Trim any shrubbery on the main path into your home.  Regularly check that handrails are securely fastened and in good repair. Both sides of any steps should have handrails.  Install guardrails along the edges of any raised decks or porches.  Clear walkways of debris and clutter, including tools and rocks.  Have leaves, snow, and ice cleared regularly.  Use sand or salt on walkways during winter months.  In the garage, clean up any spills right away, including grease or oil spills.  What other actions can I take?     Wear closed-toe shoes that fit well and support your feet. Wear shoes that have rubber soles or low heels.  Use mobility aids as needed, such as canes, walkers, scooters, and crutches.  Review your medicines with your health care provider. Some medicines can cause dizziness or changes in blood pressure, which increase your risk of falling.  Talk with your health care provider about other ways that you can decrease your risk of falls. This may include working with a physical therapist or  to improve your strength, balance, and endurance.    Where to find more information  Centers for Disease Control and Prevention, YARELIS: https://www.cdc.gov  National [place] on Aging: https://kh0pflc.sary.nih.gov  Contact a health care provider if:  You are afraid of falling at home.  You feel weak, drowsy, or dizzy at home.  You fall at home.  Summary  There are many simple things that you can do to make your home safe and to help prevent falls.  Ways to make your home safe include removing tripping hazards and installing grab bars in the bathroom.  Ask for help when making these changes in your home.  This information is not intended to replace advice given to you by your health care provider. Make sure you discuss any questions you have with your health care provider. Fall Prevention in the Home, Adult  Falls can cause injuries and can affect people from all age groups. There are many simple things that you can do to make your home safe and to help prevent falls. Ask for help when making these changes, if needed.    What actions can I take to prevent falls?  General instructions     Use good lighting in all rooms. Replace any light bulbs that burn out.  Turn on lights if it is dark. Use night-lights.  Place frequently used items in easy-to-reach places. Lower the shelves around your home if necessary.  Set up furniture so that there are clear paths around it. Avoid moving your furniture around.  Remove throw rugs and other tripping hazards from the floor.  Avoid walking on wet floors.  Fix any uneven floor surfaces.  Add color or contrast paint or tape to grab bars and handrails in your home. Place contrasting color strips on the first and last steps of stairways.  When you use a stepladder, make sure that it is completely opened and that the sides are firmly locked. Have someone hold the ladder while you are using it. Do not climb a closed stepladder.  Be aware of any and all pets.  What can I do in the bathroom?     Keep the floor dry. Immediately clean up any water that spills onto the floor.  Remove soap buildup in the tub or shower on a regular basis.  Use non-skid mats or decals on the floor of the tub or shower.  Attach bath mats securely with double-sided, non-slip rug tape.  If you need to sit down while you are in the shower, use a plastic, non-slip stool.  Image ImageInstall grab bars by the toilet and in the tub and shower. Do not use towel bars as grab bars.  What can I do in the bedroom?     Make sure that a bedside light is easy to reach.  Do not use oversized bedding that drapes onto the floor.  Have a firm chair that has side arms to use for getting dressed.  What can I do in the kitchen?     Clean up any spills right away.  If you need to reach for something above you, use a sturdy step stool that has a grab bar.  Keep electrical cables out of the way.  Do not use floor polish or wax that makes floors slippery. If you must use wax, make sure that it is non-skid floor wax.  What can I do in the stairways?     Do not leave any items on the stairs.  Make sure that you have a light switch at the top of the stairs and the bottom of the stairs. Have them installed if you do not have them.  Make sure that there are handrails on both sides of the stairs. Fix handrails that are broken or loose. Make sure that handrails are as long as the stairways.  Install non-slip stair treads on all stairs in your home.  Avoid having throw rugs at the top or bottom of stairways, or secure the rugs with carpet tape to prevent them from moving.  Choose a carpet design that does not hide the edge of steps on the stairway.  Check any carpeting to make sure that it is firmly attached to the stairs. Fix any carpet that is loose or worn.  What can I do on the outside of my home?     Use bright outdoor lighting.  Regularly repair the edges of walkways and driveways and fix any cracks.  Remove high doorway thresholds.  Trim any shrubbery on the main path into your home.  Regularly check that handrails are securely fastened and in good repair. Both sides of any steps should have handrails.  Install guardrails along the edges of any raised decks or porches.  Clear walkways of debris and clutter, including tools and rocks.  Have leaves, snow, and ice cleared regularly.  Use sand or salt on walkways during winter months.  In the garage, clean up any spills right away, including grease or oil spills.  What other actions can I take?     Wear closed-toe shoes that fit well and support your feet. Wear shoes that have rubber soles or low heels.  Use mobility aids as needed, such as canes, walkers, scooters, and crutches.  Review your medicines with your health care provider. Some medicines can cause dizziness or changes in blood pressure, which increase your risk of falling.  Talk with your health care provider about other ways that you can decrease your risk of falls. This may include working with a physical therapist or  to improve your strength, balance, and endurance.    Where to find more information  Centers for Disease Control and Prevention, [place]: https://www.cdc.gov  National [place] on Aging: https://aa0zfja.sary.nih.gov  Contact a health care provider if:  You are afraid of falling at home.  You feel weak, drowsy, or dizzy at home.  You fall at home.  Summary  There are many simple things that you can do to make your home safe and to help prevent falls.  Ways to make your home safe include removing tripping hazards and installing grab bars in the bathroom.  Ask for help when making these changes in your home.  This information is not intended to replace advice given to you by your health care provider. Make sure you discuss any questions you have with your health care provider.

## 2023-12-07 NOTE — ED PROVIDER NOTE - CARE PROVIDER_API CALL
Tenisha Means [facility]  Vascular Surgery  [address] [place] [id number]  Phone: [phone]  Fax: [phone]  Established Patient  Follow Up Time: 1-3 Days   Tenisha Means [facility]  Vascular Surgery  [address] [place] [id number]  Phone: [phone]  Fax: [phone]  Established Patient  Follow Up Time: 1-3 Days

## 2023-12-07 NOTE — ED PROVIDER NOTE - OBJECTIVE STATEMENT
58 year old male with PMHx of DM, Charcot arthropathy, hypertension, PAD s/p balloon angioplasty, nephrolithiasis, chronic anemia with hx of transfusions, anxiety, depression, recent admission for R TMA on 10/27/2023 with revision on 10/30 and 11/4, followed by right BKA, presents to ED w sister present for significant pain to R stump since earlier today. Patient states he was walking with his walker and accidentally slipped resulting in him falling and landing directly on his right lower extremity stump.  Patient needed assistance to get up.  Patient took oxycodone 5 mg x 2 tablets several hours ago, but still reports significant pain.  Denies fever recent illness nausea vomiting chest pain abdominal pain dizziness lightheadedness loss of consciousness head injury or anticoagulation use.  Patient is on aspirin and Plavix.

## 2023-12-07 NOTE — ED PROVIDER NOTE - PROGRESS NOTE DETAILS
Authored by Dr. Good: consulted vascular as R BKA was recently performed by Dr Machado Patient seen and cleared by vascular, wet read of x-ray is unremarkable, pain controlled, DC will DC with vascular follow-up, strict return precautions -CD

## 2023-12-07 NOTE — ED PROVIDER NOTE - PHYSICAL EXAMINATION
VITAL SIGNS: I have reviewed nursing notes and confirm.  CONSTITUTIONAL: non-toxic, + chronically ill appearing  SKIN: no rash, no petechiae.  EYES: PERRL, EOMI, pink conjunctiva, anicteric  ENT: tongue midline, no exudates, MMM  NECK: Supple; no meningismus  CARD: RRR  RESP: no respiratory distress  EXT: + R BKA with ttp to stump, no streaking, no discharge, sutures and staples in place, LLE with FROM   NEURO: Alert, oriented. no focal deficits   PSYCH: Cooperative, appropriate.

## 2023-12-07 NOTE — ED PROVIDER NOTE - PROVIDER TOKENS
PROVIDER:[TOKEN:[[id number]],FOLLOWUP:[1-3 Days],ESTABLISHEDPATIENT:[T]] PROVIDER:[TOKEN:[[id number]],FOLLOWUP:[1-3 Days],ESTABLISHEDPATIENT:[T]]

## 2023-12-07 NOTE — ED PROVIDER NOTE - PATIENT PORTAL LINK FT
You can access the FollowMyHealth Patient Portal offered by [facility] by registering at the following website: [email or web address]. By joining [employer]’s FollowMyHealth portal, you will also be able to view your health information using other applications (apps) compatible with our system. You can access the FollowMyHealth Patient Portal offered by [facility] by registering at the following website: [email or web address]. By joining [employer]’s FollowMyHealth portal, you will also be able to view your health information using other applications (apps) compatible with our system.

## 2023-12-07 NOTE — CONSULT NOTE ADULT - SUBJECTIVE AND OBJECTIVE BOX
VASCULAR SURGERY CONSULT NOTE      HPI: 58 year old male with pmh significant for IDDM w/ charcot arthropathy, hypertension, PAD s/p balloon angioplasty 10/23/23, CKD 4 (2.5-3.5), nephrolithiasis, chronic anemia with hx of transfusions, anxiety, depression, s/p right BKA 11/9/23  Presented to ED after a fall on the stump. X-ray did not show any tissue/bone disruption Labs within normal.        PAST MEDICAL & SURGICAL HISTORY:  DM (diabetes mellitus)      HTN (hypertension)      HLD (hyperlipidemia)      Herpes labialis      Anxiety      GERD (gastroesophageal reflux disease)      Kidney stones  20 years ago      Kidney disease  stage 4      Chronic kidney disease, unspecified CKD stage      Diabetic Charcot foot      PAD (peripheral artery disease)      S/P foot surgery, right  x 5 ( 2006 - 2013 )      Kidney stone  Removed by Laser x 2 ( 20 years ago )      H/O arthroscopy of right knee      Status post peripheral artery angioplasty        No Known Allergies    Home Medications:  acetaminophen 325 mg oral tablet: 2 tab(s) orally every 6 hours As needed Temp greater or equal to 38C (100.4F), Mild Pain (1 - 3) (27 Nov 2023 21:36)  ALPRAZolam 0.5 mg oral tablet: 1 tab(s) orally 4 times a day As needed anxiety (27 Nov 2023 21:36)  magnesium oxide 400 mg oral tablet: 1 tab(s) orally 3 times a day (with meals) (27 Nov 2023 21:36)  polyethylene glycol 3350 oral powder for reconstitution: 17 gram(s) orally once a day (27 Nov 2023 21:36)  senna leaf extract oral tablet: 2 tab(s) orally once a day (at bedtime) (27 Nov 2023 21:36)    No permtinent family history of PVD    REVIEW OF SYSTEMS:  GENERAL:                                         negative  SKIN:                                                 negative  OPTHALMOLOGIC:                          negative  ENMT:                                               negative  RESPIRATORY AND THORAX:        negative  CARDIOVASCULAR:                      see HPI  GASTROINTESTINAL:                       negative  NEPHROLOGY:                                  negative  MUSCULOSKELETAL:                       negative  NEUROLOGIC:                                   negative  PSYCHIATRIC:                                    negative  HEMATOLOGY/LYMPHATICS:         negative  ENDOCRINE:                                     negative  ALLERGIC/IMMUNOLOGIC:            negative    12 point ROS otherwise normal except as stated in HPI  FHx: none  SHX:  [ ]  smoking     [ ] alcohol use    PHYSICAL EXAM  Vital Signs Last 24 Hrs  T(C): 36.6 (07 Dec 2023 05:02), Max: 36.6 (07 Dec 2023 05:02)  T(F): 97.8 (07 Dec 2023 05:02), Max: 97.8 (07 Dec 2023 05:02)  HR: 88 (07 Dec 2023 05:02) (88 - 88)  BP: 122/71 (07 Dec 2023 05:02) (122/71 - 122/71)  BP(mean): --  RR: 20 (07 Dec 2023 05:02) (20 - 20)  SpO2: 100% (07 Dec 2023 05:02) (100% - 100%)    Parameters below as of 07 Dec 2023 05:02  Patient On (Oxygen Delivery Method): room air        Appearance: Normal	  HEENT:   Normal oral mucosa, PERRL, EOMI	  Neck: Supple, - JVD;   Cardiovascular: Normal S1 S2, No JVD, No murmurs,   Respiratory: Lungs clear to auscultation, No Rales, Rhonchi, Wheezing	  Gastrointestinal:  Soft, Non-tender, positive BS	  Skin: No rashes, No ecchymoses, No cyanosis  Extremities: Normal range of motion, No clubbing, cyanosis or edema  right BKA no hematoma/swellilng, incision clean intact, small area of gangrenous tissue at the incision site, c/w previous examinations  Neurologic: Non-focal  Psychiatry: A & O x 3, Mood & affect appropriate        MEDICATIONS:   MEDICATIONS  (STANDING):    MEDICATIONS  (PRN):      LAB/STUDIES:                        8.6    10.28 )-----------( 376      ( 07 Dec 2023 06:20 )             27.1     12-07    135  |  99  |  47<H>  ----------------------------<  99  4.5   |  22  |  2.7<H>    Ca    9.4      07 Dec 2023 06:20    TPro  7.2  /  Alb  4.1  /  TBili  0.2  /  DBili  x   /  AST  15  /  ALT  17  /  AlkPhos  126<H>  12-07      LIVER FUNCTIONS - ( 07 Dec 2023 06:20 )  Alb: 4.1 g/dL / Pro: 7.2 g/dL / ALK PHOS: 126 U/L / ALT: 17 U/L / AST: 15 U/L / GGT: x             Urinalysis Basic - ( 07 Dec 2023 06:20 )    Color: x / Appearance: x / SG: x / pH: x  Gluc: 99 mg/dL / Ketone: x  / Bili: x / Urobili: x   Blood: x / Protein: x / Nitrite: x   Leuk Esterase: x / RBC: x / WBC x   Sq Epi: x / Non Sq Epi: x / Bacteria: x                    IMAGING:

## 2023-12-07 NOTE — CONSULT NOTE ADULT - ASSESSMENT
58 year old male with pmh significant for IDDM w/ charcot arthropathy, hypertension, PAD s/p balloon angioplasty 10/23/23, CKD 4 (2.5-3.5), nephrolithiasis, chronic anemia with hx of transfusions, anxiety, depression, s/p right BKA 11/9/23  Presented to ED after a fall on the stump. X-ray did not show any tissue/bone disruption Labs within normal.  On exam: BKA stump is intact    - I reviewed today's labs  - I reviewed and personally visualized all the radiology imagings  - I discussed the plan with attending Dr. Machado:  - no need for any vascular interventions  - pain meds  - ok for discharge home from ED    Spoke with pt and his sister in details and re-assured  Follow up as mentioned on recent office visit    SPECTRA [id number] 58 year old male with pmh significant for IDDM w/ charcot arthropathy, hypertension, PAD s/p balloon angioplasty 10/23/23, CKD 4 (2.5-3.5), nephrolithiasis, chronic anemia with hx of transfusions, anxiety, depression, s/p right BKA 11/9/23  Presented to ED after a fall on the stump. X-ray did not show any tissue/bone disruption Labs within normal.  On exam: BKA stump is intact    - I reviewed today's labs  - I reviewed and personally visualized all the radiology imagings  - I discussed the plan with attending Dr. Machado:  - no need for any vascular interventions  - pain meds  - ok for discharge home from ED    Spoke with pt and his sister in details and re-assured  Follow up as mentioned on recent office visit    SPECTRA [id number]

## 2023-12-07 NOTE — ED ADULT TRIAGE NOTE - CHIEF COMPLAINT QUOTE
" I was walking early this morning with my walker and landed on my right stump and then on my left side." Did not hit head, no blood thinners. Just discharged from rehab facility for S/P right BKA on 11/9 " I was walking early this morning with my walker and landed on my right leg(stump) and then on my left side and my leg hurts." Did not hit head, no blood thinners, on ASA/Plavix. Just discharged from rehab facility a week ago for S/P right BKA on 11/9

## 2023-12-11 ENCOUNTER — TRANSCRIPTION ENCOUNTER (OUTPATIENT)
Age: 58
End: 2023-12-11

## 2023-12-11 ENCOUNTER — APPOINTMENT (OUTPATIENT)
Dept: VASCULAR SURGERY | Facility: CLINIC | Age: 58
End: 2023-12-11
Payer: MEDICARE

## 2023-12-11 VITALS
BODY MASS INDEX: 24.92 KG/M2 | DIASTOLIC BLOOD PRESSURE: 73 MMHG | SYSTOLIC BLOOD PRESSURE: 137 MMHG | HEIGHT: 72 IN | WEIGHT: 184 LBS

## 2023-12-11 LAB
PM 6 MAM: NEGATIVE NG/ML
PM 7-AMINO-CLONAZ: NEGATIVE NG/ML
PM ALPHA-HYDROXY-ALPRAZOLAM: 107 NG/ML
PM ALPHA-HYDROXY-MIDAZOLAM: NEGATIVE NG/ML
PM ALPRAZOLAM: 43 NG/ML
PM AMOBARBITAL: NEGATIVE NG/ML
PM AMPHETAMINE INTERP: NEGATIVE
PM AMPHETAMINE: NEGATIVE NG/ML
PM BARBURATES INTERP: NEGATIVE
PM BEG: NEGATIVE NG/ML
PM BENZODIAZEPINES INTERP: POSITIVE
PM BUPRENORPHINE INTERP: NEGATIVE
PM BUPRENORPHINE: NEGATIVE NG/ML
PM BUTALBITAL: NEGATIVE NG/ML
PM CLONAZEPAM: NEGATIVE NG/ML
PM COCAINE INTERP: NEGATIVE
PM COCAINE: NEGATIVE NG/ML
PM CODIENE: NEGATIVE NG/ML
PM COTININE: NEGATIVE NG/ML
PM DIAZEPAM: NEGATIVE NG/ML
PM DIHYROCODEINE: NEGATIVE NG/ML
PM EDDP: NEGATIVE NG/ML
PM FENTANYL INTERP: NEGATIVE
PM FENTANYL: NEGATIVE NG/ML
PM FLUNITRAZEPAM: NEGATIVE NG/ML
PM FLURAZEPAM: NEGATIVE NG/ML
PM HYDROCODONE: NEGATIVE NG/ML
PM HYDROMORPHONE: NEGATIVE NG/ML
PM LORAZEPAM: NEGATIVE NG/ML
PM MARIJUANA (DELTA-9-THC): NEGATIVE NG/ML
PM MARIJUANA INTERP: NEGATIVE
PM MDA: NEGATIVE NG/ML
PM MDEA: NEGATIVE NG/ML
PM MDMA: NEGATIVE NG/ML
PM MEPERIDINE: NEGATIVE NG/ML
PM METHADONE INTERP: NEGATIVE
PM METHADONE: NEGATIVE NG/ML
PM METHAMPHETAMINE: NEGATIVE NG/ML
PM MIDAZOLAM: NEGATIVE NG/ML
PM MORPHINE: NEGATIVE NG/ML
PM NALOXONE: NEGATIVE NG/ML
PM NALTREXONE: NEGATIVE NG/ML
PM NICOTINE INTERP: NEGATIVE
PM NORBUPRENORPHINE: NEGATIVE NG/ML
PM NORDIAZEPAM: NEGATIVE NG/ML
PM NORFENTANYL: NEGATIVE NG/ML
PM NORMEPERIDINE: NEGATIVE NG/ML
PM NOROXYCODONE: 629 NG/ML
PM OPIOID INTERP: NEGATIVE
PM OXAZEPAM: NEGATIVE NG/ML
PM OXXYCODONE INTERP: POSITIVE
PM OXYCODONE: 113 NG/ML
PM OXYMORPHONE: 63 NG/ML
PM PCP: NEGATIVE NG/ML
PM PHENCYCLIDINE INTERP: NEGATIVE
PM PHENOBARBITAL: NEGATIVE NG/ML
PM PPX: NEGATIVE NG/ML
PM PROPOXYPHENE INTERP: NEGATIVE
PM SECOBARBITAL: NEGATIVE NG/ML
PM SUFENTANIL: NEGATIVE NG/ML
PM TAPENTADOL: NEGATIVE NG/ML
PM TEMAZEPAM: NEGATIVE NG/ML
PM TRAMADOL INTERP: NEGATIVE
PM TRAMADOL: NEGATIVE NG/ML

## 2023-12-11 PROCEDURE — 99024 POSTOP FOLLOW-UP VISIT: CPT

## 2023-12-11 PROCEDURE — 93971 EXTREMITY STUDY: CPT

## 2023-12-13 NOTE — PHYSICAL THERAPY INITIAL EVALUATION ADULT - LEVEL OF INDEPENDENCE, REHAB EVAL
Department of Anesthesiology  Postprocedure Note    Patient: Otilio Hager  MRN: [id number]  YOB: 1969  Date of evaluation: 12/12/2023    Procedure Summary       Date: 12/12/23 Room / Location: [facility] MAIN 03 / [facility] MAIN OR    Anesthesia Start: 1025 Anesthesia Stop: 1217    Procedure: RIGHT DISTAL RADIUS OPEN REDUCTION INTERNAL FIXATION, RIGHT THIRD METACARPAL OPEN REDUCTION INTERNAL FIXATION;MINI C-ARM, SUPRACLAVICULAR BLOCK; [ACUMED Sherice Kin (Right: Wrist) Diagnosis:       Closed displaced fracture of shaft of third metacarpal bone of left hand, initial encounter      Closed Colles' fracture of right radius, initial encounter      (Closed displaced fracture of shaft of third metacarpal bone of left hand, initial encounter [S62.323A])      (Closed Colles' fracture of right radius, initial encounter Prachi Antonio)    Surgeons: Lazarus Sauger, DO Responsible Provider: Shakira Scott MD    Anesthesia Type: General ASA Status: 3            Anesthesia Type: General    Ulises Phase I: Ulises Score: 9    Ulises Phase II: Ulises Score: 10    Anesthesia Post Evaluation    Patient location during evaluation: bedside  Patient participation: complete - patient participated  Level of consciousness: awake  Pain score: 4  Airway patency: patent  Nausea & Vomiting: no nausea  Cardiovascular status: hemodynamically stable  Respiratory status: acceptable  Hydration status: euvolemic  Pain management: adequate        No notable events documented.
supervision
supervision

## 2023-12-18 ENCOUNTER — APPOINTMENT (OUTPATIENT)
Dept: CARDIOLOGY | Facility: CLINIC | Age: 58
End: 2023-12-18

## 2023-12-19 ENCOUNTER — APPOINTMENT (OUTPATIENT)
Dept: PAIN MANAGEMENT | Facility: CLINIC | Age: 58
End: 2023-12-19

## 2023-12-19 ENCOUNTER — APPOINTMENT (OUTPATIENT)
Dept: PAIN MANAGEMENT | Facility: CLINIC | Age: 58
End: 2023-12-19
Payer: MEDICARE

## 2023-12-19 DIAGNOSIS — Z89.511 ACQUIRED ABSENCE OF RIGHT LEG BELOW KNEE: ICD-10-CM

## 2023-12-19 PROCEDURE — 99214 OFFICE O/P EST MOD 30 MIN: CPT

## 2023-12-19 NOTE — PHYSICAL EXAM
[de-identified] : Cervical Spine Exam:  Inspection: erythema (-)  ecchymosis (-)  rashes (-)   Palpation:                                         Cervical paraspinal mm tenderness:   R (+); L(+) Upper trapezius mm tenderness:        R (-); L (-) Rhomboids tenderness:                       R (-); L (-) Scalenes mm tenderness:                   R (-); L (-) Occipital Ridge:                                    R (-); L (-) Supraspinatus mm tenderness:           R (-); L (-)  ROM:   limited rom 2/2 to pain  Strength Testing:            Right    Left Deltoid                             (5/5)    (5/5) Biceps:                            (5/5)    (5/5) Triceps:                           (5/5)    (5/5) Finger Abductors:           (5/5)    (5/5) Grasp:                             (5/5)    (5/5)  Special Testing: Spurling Test:                  R (-); L (-) Facet load test:               R (+; L (+)             Lumbar Spine Exam: Inspection: erythema (-) ecchymosis (-) rashes (-) alignment: no scoliosis  Palpation: Midline lumbar tenderness:             (-) paraspinal tenderness:                  L (+) ; R (+) sciatic nerve tenderness :             L (+) ; R (+) SI joint tenderness:                        L (-) ; R (-) GTB tenderness:                            L (-);  R (-)  Limited ROM 2/2 to pain with back flexion and extension w/ rotation b/l  Strength: 5/5 throughout all muscle groups of the lower extremity                                     Right       Left    Hip Flexion:                (5/5)       (5/5) Quadriceps:               (5/5)       (5/5) Hamstrings:                (5/5)       (5/5) Ankle Dorsiflexion:     (5/5)       (5/5) EHL:                           (5/5)       (5/5) Ankle Plantarflexion:  (5/5)       (5/5)  Special Tests: SLR:                           R (-) ; L (+) Facet loading:            R (-) ; L (-) SOPHY test:               R (+) ; L (+)  Neurologic: Light touch intact throughout LE  Reflexes normal and symmetric   Gait: non- antalgic gait ambulates w/o assistive device     PE RLE: In brace. Skin inspected - clean dry and intact. No erythema/edema noted No effusion, no foul smelling odor.

## 2023-12-19 NOTE — DISCUSSION/SUMMARY
[de-identified] : A discussion regarding available pain management treatment options occurred with the patient.  These included interventional, rehabilitative, pharmacological, and alternative modalities. We will proceed with the following:     The patient is on Plavix and cannot tolerate NSAIDS   1. Titrating up Duloxetine 40 mg q12. increased dose since patient is tolerating the medication w/o side effect.  2. Will refill Oxycodone 5mg q12h PRN. This will be the last prescription of controlled opioid medication for his post surgical pain. Chronic opioids are not indicated and will not be prescribed chronically for his pain condition. He understands that and is agreeable. He did report concern if he needed the medication chronically - for which I did recommend having his pain management physician switched to someone in the community that would be agreeable to manage it with monthly opioid medication. He understands that will not be me. The patient is very reasonable and understanding.   ISTOP CHECKED REF #[id number] The R/B/A of chronic opiate therapy for non-malignant pain including, but not limited to, the risk of addiction, overdose, respiratory depression, and death was discussed and accepted by the patient.  Patient was also informed that they should not consume alcohol or drive a motor vehicle under any circumstances while taking opiate pain medications.  The patient was explained the narcotic contract and given adequate time and explanation regarding our policies. The patient agreed and signed our narcotic contract.  istop registry was checked and verified by myself.  Urine toxicology screen will be performed at random and occasionally to monitor for any inconsistencies. There is a zero tolerance policy for inconsistencies and are grounds for discharge from the practice at the discretion of the physician and the physician alone.  The patient understands to never expect any controlled substance be sent over the phone or without an in-office visit and examination.  follow up one month   I Michaela Anderson attest that this documentation has been prepared under the direction and in the presence of provider Dr. Juan Randle.   The documentation recorded by the scribe in my presence, accurately reflects the service I personally performed, and the decisions made by me with my edits as appropriate.       Juan Randle, DO

## 2023-12-19 NOTE — HISTORY OF PRESENT ILLNESS
[FreeTextEntry1] : HPI: Mr. Quiroga is a 58 year old male with pain complaints in his neck, ow back, right leg stump pain. The pain in the RLE started 22 days ago after right leg amputation on 11/9/23. The pain is moderate to severe that is nearly constant of the time with symptoms worsening in the morning. The pain is burning, sharp, and shooting. The patient admits to weakness in UE and LE. The pain is increased with standing, sitting, walking, and exercise. The pain is decreased with lying down. The patient has done PT with no pain relief.  Post op the patient was given Cymbalta but needed to stop due to hallucinations, the patient was also given Oxycodone 10 mg and takes for breakthrough pain, which has been moderately effective.  As for his neck pain - he is complaining of pain that refers into the bilateral upper extremities. Pain is associated w/ numbness and tingling and occasional feelings of weakness.  As for his low back pain - pain is located on both sides of the low back. Pain begins to travel in to the b/l LE especially with prolonged sitting / prolonged standing.   12/19/23: This patient is here for a revisit encounter. The patient unfortunately just had a recent fall on 12/7/23, the patient reports of going to the ER and obtained X rays. The patient reports of walking down steps with walker when he fell down the second step and landed on his right lower extremity stump. The area is bruised and swollen. The pain is severely TTP. The patient states his staples are out this week. The patient is utilizing Duloxetine 60 mg routinely with no side effects along with Oxycodone 5 mg. The patient states he did 2 weeks of intense therapy/OT in the hospital for his right leg with mild improvement.   He also complains of persistent neck pain. The pain is associated w/ numbness/tingling in the bilateral upper extremities. Pain ispersistent and moderate in nature. It does affect his daily quality of life. He reports similar symptoms for his low back pain as well. Pain is associated with numbness/tingling in the b/l LE. Patient denies any bowel or bladder dysfunction, incontinence, or saddle anesthesia.

## 2023-12-26 RX ORDER — CEPHALEXIN 250 MG/1
250 CAPSULE ORAL
Qty: 9 | Refills: 0 | Status: ACTIVE | COMMUNITY
Start: 2023-12-26 | End: 1900-01-01

## 2023-12-28 ENCOUNTER — LABORATORY RESULT (OUTPATIENT)
Age: 58
End: 2023-12-28

## 2023-12-28 ENCOUNTER — APPOINTMENT (OUTPATIENT)
Dept: VASCULAR SURGERY | Facility: CLINIC | Age: 58
End: 2023-12-28
Payer: MEDICARE

## 2023-12-28 VITALS
SYSTOLIC BLOOD PRESSURE: 102 MMHG | HEIGHT: 72 IN | HEART RATE: 86 BPM | DIASTOLIC BLOOD PRESSURE: 64 MMHG | OXYGEN SATURATION: 99 % | WEIGHT: 187 LBS | BODY MASS INDEX: 25.33 KG/M2

## 2023-12-28 PROCEDURE — 99024 POSTOP FOLLOW-UP VISIT: CPT

## 2023-12-28 NOTE — DISCUSSION/SUMMARY
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 He fell on the stump 4 weeks post op and had some hematoma but no major tissue break-down.  R BKA is healing, no cellulitis, drainage or redness. The scab in the middle will be left alone to heal and fall off on its own. He can start using the  and PT with no limitation. FU in 3 months.

## 2023-12-28 NOTE — PHYSICAL EXAM
[Normal Rate and Rhythm] : normal rate and rhythm [2+] : left 2+ [FreeTextEntry1] : R BKA is healing, no cellulitis, drainage or redness. The scab in the middle will be left alone to heal and fall off on its own.

## 2023-12-28 NOTE — REASON FOR VISIT
[Family Member] : family member [de-identified] : RYDER SHAFER [de-identified] : 11/9/2023 [de-identified] : Patient has R BKA, which is healing well. He fell on the stump 4 weeks post op and had some hematoma but no major tissue break down.

## 2024-01-02 ENCOUNTER — NON-APPOINTMENT (OUTPATIENT)
Age: 59
End: 2024-01-02

## 2024-01-02 LAB — BACTERIA UR CULT: NORMAL

## 2024-01-03 ENCOUNTER — OUTPATIENT (OUTPATIENT)
Dept: OUTPATIENT SERVICES | Facility: HOSPITAL | Age: 59
LOS: 1 days | End: 2024-01-03
Payer: MEDICARE

## 2024-01-03 ENCOUNTER — APPOINTMENT (OUTPATIENT)
Dept: UROLOGY | Facility: HOSPITAL | Age: 59
End: 2024-01-03
Payer: MEDICARE

## 2024-01-03 DIAGNOSIS — N20.0 CALCULUS OF KIDNEY: Chronic | ICD-10-CM

## 2024-01-03 DIAGNOSIS — E11.9 TYPE 2 DIABETES MELLITUS WITHOUT COMPLICATIONS: ICD-10-CM

## 2024-01-03 DIAGNOSIS — Z98.890 OTHER SPECIFIED POSTPROCEDURAL STATES: Chronic | ICD-10-CM

## 2024-01-03 DIAGNOSIS — E11.9 TYPE 2 DIABETES MELLITUS W/OUT COMPLICATIONS: ICD-10-CM

## 2024-01-03 DIAGNOSIS — R33.9 RETENTION OF URINE, UNSPECIFIED: ICD-10-CM

## 2024-01-03 DIAGNOSIS — Z98.62 PERIPHERAL VASCULAR ANGIOPLASTY STATUS: Chronic | ICD-10-CM

## 2024-01-03 PROCEDURE — 51797 INTRAABDOMINAL PRESSURE TEST: CPT | Mod: 26

## 2024-01-03 PROCEDURE — 74455 X-RAY URETHRA/BLADDER: CPT | Mod: 26

## 2024-01-03 PROCEDURE — 76000 FLUOROSCOPY <1 HR PHYS/QHP: CPT

## 2024-01-03 PROCEDURE — 51784 ANAL/URINARY MUSCLE STUDY: CPT | Mod: 26

## 2024-01-03 PROCEDURE — 51784 ANAL/URINARY MUSCLE STUDY: CPT

## 2024-01-03 PROCEDURE — 51797 INTRAABDOMINAL PRESSURE TEST: CPT

## 2024-01-03 PROCEDURE — 51728 CYSTOMETROGRAM W/VP: CPT | Mod: 26

## 2024-01-03 PROCEDURE — 51728 CYSTOMETROGRAM W/VP: CPT

## 2024-01-03 PROCEDURE — 51600 INJECTION FOR BLADDER X-RAY: CPT

## 2024-01-04 PROBLEM — E11.9 DIABETES MELLITUS: Status: ACTIVE | Noted: 2024-01-04

## 2024-01-04 RX ORDER — ROSUVASTATIN CALCIUM 5 MG/1
TABLET, FILM COATED ORAL
Refills: 0 | Status: ACTIVE | COMMUNITY

## 2024-01-04 RX ORDER — SILODOSIN 8 MG/1
CAPSULE ORAL
Refills: 0 | Status: ACTIVE | COMMUNITY

## 2024-01-04 RX ORDER — CLOPIDOGREL BISULFATE 75 MG/1
75 TABLET, FILM COATED ORAL
Refills: 0 | Status: ACTIVE | COMMUNITY

## 2024-01-04 RX ORDER — KRILL/OM-3/DHA/EPA/PHOSPHO/AST 1000-230MG
CAPSULE ORAL
Refills: 0 | Status: ACTIVE | COMMUNITY

## 2024-01-08 ENCOUNTER — APPOINTMENT (OUTPATIENT)
Dept: VASCULAR SURGERY | Facility: CLINIC | Age: 59
End: 2024-01-08
Payer: MEDICARE

## 2024-01-08 VITALS
SYSTOLIC BLOOD PRESSURE: 181 MMHG | BODY MASS INDEX: 25.73 KG/M2 | DIASTOLIC BLOOD PRESSURE: 77 MMHG | HEART RATE: 80 BPM | HEIGHT: 72 IN | WEIGHT: 190 LBS

## 2024-01-08 PROCEDURE — 99024 POSTOP FOLLOW-UP VISIT: CPT

## 2024-01-08 RX ORDER — OXYCODONE AND ACETAMINOPHEN 5; 325 MG/1; MG/1
5-325 TABLET ORAL EVERY 6 HOURS
Qty: 14 | Refills: 0 | Status: ACTIVE | COMMUNITY
Start: 2024-01-08 | End: 1900-01-01

## 2024-01-08 NOTE — PROCEDURE
[FreeTextEntry1] : Unroofing of the right below-knee amputation eschar with suture removal kit.  Betadine was applied to the area.  There was a suture granuloma present suture was removed.  No significant infection present

## 2024-01-08 NOTE — ASSESSMENT
[FreeTextEntry1] : The patient is status post right wound debridement debridement and eschar removal.  The patient's wound is clean.  I recommend continue with cleansing daily and applying bacitracin to the wound and keeping it covered for the next 10 days.  I will see the patient back in my office in 3 months time or sooner if any new symptoms develop

## 2024-01-08 NOTE — HISTORY OF PRESENT ILLNESS
[FreeTextEntry1] : The patient is status post right below-knee amputation on November 9, 2023.  The patient had fallen on his stump 5 weeks ago and presents today for a follow-up evaluation.  The patient states he saw some purulent drainage coming out from the eschar.

## 2024-01-08 NOTE — PHYSICAL EXAM
[Normal Rate and Rhythm] : normal rate and rhythm [2+] : left 2+ [FreeTextEntry1] : Right above-knee amputation eschar present

## 2024-01-08 NOTE — DISCUSSION/SUMMARY
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 He fell on the stump 4 weeks post op and had some hematoma but no major tissue break-down.  R BKA is healing, no cellulitis. The scab in the middle was removed today- no deep infection. Dressing can be applied for a few days until granulation is completed.  He can start using the  and PT with no limitation. FU in 3 months.

## 2024-01-08 NOTE — REASON FOR VISIT
[de-identified] : RYDER SHAFER [de-identified] : 11/9/2023 [de-identified] : Patient has R BKA, which is healing well. He fell on the stump 4 weeks post op and had some hematoma but no major tissue break down.  He has small secretions from under the scan in mid-incision (this is the reason for his return to office) [Family Member] : family member [Post Op: _________] : a [unfilled] post op visit

## 2024-01-08 NOTE — REASON FOR VISIT
[de-identified] : RYDER SHAFER [de-identified] : 11/9/2023 [de-identified] : Patient has R BKA, which is healing well. He fell on the stump 4 weeks post op and had some hematoma but no major tissue break down.  He has small secretions from under the scan in mid-incision (this is the reason for his return to office) [Family Member] : family member [Post Op: _________] : a [unfilled] post op visit

## 2024-01-12 ENCOUNTER — LABORATORY RESULT (OUTPATIENT)
Age: 59
End: 2024-01-12

## 2024-01-12 ENCOUNTER — APPOINTMENT (OUTPATIENT)
Dept: UROLOGY | Facility: CLINIC | Age: 59
End: 2024-01-12
Payer: MEDICARE

## 2024-01-12 DIAGNOSIS — R33.9 RETENTION OF URINE, UNSPECIFIED: ICD-10-CM

## 2024-01-12 PROCEDURE — 99214 OFFICE O/P EST MOD 30 MIN: CPT

## 2024-01-12 PROCEDURE — G2211 COMPLEX E/M VISIT ADD ON: CPT

## 2024-01-12 PROCEDURE — 51798 US URINE CAPACITY MEASURE: CPT

## 2024-01-12 RX ORDER — PAPAVERINE HYDROCHLORIDE 30 MG/ML
30 INJECTION, SOLUTION INTRAVENOUS
Qty: 0.5 | Refills: 0 | Status: ACTIVE | COMMUNITY
Start: 2024-01-12 | End: 1900-01-01

## 2024-01-12 NOTE — ASSESSMENT
[FreeTextEntry1] : #Urinary Retention/BPH Obstructive LUTS - performs CIC q6hrs, VUDS shows no evidence of high grade obstruction with a weak underactive detrusor muscle - PVR 55cc - c/w Rapaflo - c/w finasteride -patient has not been taking this medication as he reports he read online that there is controversial adverse effects on renal function - Held off on cystoscopy today given urodynamics findings which showed no high-grade of obstruction with evidence of detrusor underactivity - Given pts stable symptoms and VUDS findings I do not feel at this time patient will benefit significantly from any BPH surgical procedure as there is no clear high-grade obstruction and his difficulty voiding likely stems from his weak underactive bladder  #PSA screening  - PSA today  #ED -patient has a prior history of Trimix injection use over 5 years ago, does not recall his dosage - Plan for Trial of Intracavernosal penile injections with Trimix (Papaverine 30mg/mL, phentolamine 1mg/mL, PGE1 10 mcg/mL), prescription sent to compounding pharmacy for test dose to be administered by MD/PA  Erectile dysfunction, its etiology, risk factors and natural history were discussed with the patient. Work-up and empiric management were discussed. From least to most invasive, treatments including behavioral changes (weight loss, exercise, improved sleep), oral PDE5i, vacuum erection device, intraurethral pellets, intracavernosal injections, and penile prosthetic implantation were described. Relevant individual risks and benefits disclosed. I explained that there are different causes for ED including psychogenic, vasculogenic, neurogenic and medication side-effect related causes. Oftentimes there are multiple causes.   The patient understands that the risks of PDE5is include facial redness, flushing, GERD, back pain, priapism, chest pain/MI, arrhythmia, dizziness, drop in BP, impaired vision and loss of hearing. He understands that the medication may take up to an hour to function and requires sexual stimulation. He was told not to take his medication within 4 hours of alpha blockers, or not at all if ever taking nitroglycerin. Both sildenafil and vardenafil, but not tadalafil, have some cross-reactivity with PDE6 and thus may produce visual side effects. He also was told to go to the ER immediately if he noticed any blindness or visual changes, or for any painful erection lasting > 4 hrs. He denies any history nitrate medication use for angina.

## 2024-01-12 NOTE — PLAN

## 2024-01-12 NOTE — HISTORY OF PRESENT ILLNESS
[FreeTextEntry1] : 58M w hx of DM, CKD4, charcot foot & PAD s/p multiple procedures within the last month p/w right foot gangrene s/p right BKA during previous hospital admission. UROLOGY consulted for urinary retention - has been performing CIC q6-8 hrs. Tolerates CIC well without any issues. Typical catheterized volumes of roughly 500 cc. He is having some episodes of voiding, usually after he takes Rapaflo however, he does not void often.  Currently on oxycodone for pain management.  RBUS 11/2023 Bilateral renal cysts, No PVR has pt had no urge to void, Prostate volume is 52.5 mL  Office visit 1/12/24 Pt had VUDS on 1/3/24 which showed normal bladder compliance, hyposensitive bladder, capacity of 551mL, Grade 2/6 obstruction with weak detrusor function and reasonable emptying - no evidence of a high grade obstruction.  Today pt reports that since December 3 he has not had to self catheterize.  He has been voiding without issues on his own while taking silodosin.  He has not been taking finasteride as he reports his daughter told him that it has some controversial effects on renal function.  He has not noticed any episodes of gross hematuria.  No recent fevers or chills.   PVR obtained with bladder scan in office today showed a residual of 55 cc

## 2024-01-16 ENCOUNTER — APPOINTMENT (OUTPATIENT)
Dept: PAIN MANAGEMENT | Facility: CLINIC | Age: 59
End: 2024-01-16
Payer: MEDICARE

## 2024-01-16 DIAGNOSIS — G54.6 PHANTOM LIMB SYNDROME WITH PAIN: ICD-10-CM

## 2024-01-16 DIAGNOSIS — M54.12 RADICULOPATHY, CERVICAL REGION: ICD-10-CM

## 2024-01-16 PROCEDURE — 99213 OFFICE O/P EST LOW 20 MIN: CPT

## 2024-01-16 RX ORDER — DULOXETINE HYDROCHLORIDE 40 MG/1
40 CAPSULE, DELAYED RELEASE PELLETS ORAL
Qty: 60 | Refills: 0 | Status: DISCONTINUED | COMMUNITY
Start: 2023-12-01 | End: 2024-01-16

## 2024-01-16 RX ORDER — OXYCODONE AND ACETAMINOPHEN 5; 325 MG/1; MG/1
5-325 TABLET ORAL
Qty: 28 | Refills: 0 | Status: DISCONTINUED | COMMUNITY
Start: 2023-12-05 | End: 2024-01-16

## 2024-02-13 ENCOUNTER — OUTPATIENT (OUTPATIENT)
Dept: OUTPATIENT SERVICES | Facility: HOSPITAL | Age: 59
LOS: 1 days | End: 2024-02-13
Payer: MEDICARE

## 2024-02-13 ENCOUNTER — RESULT REVIEW (OUTPATIENT)
Age: 59
End: 2024-02-13

## 2024-02-13 DIAGNOSIS — Z00.8 ENCOUNTER FOR OTHER GENERAL EXAMINATION: ICD-10-CM

## 2024-02-13 DIAGNOSIS — Z98.890 OTHER SPECIFIED POSTPROCEDURAL STATES: Chronic | ICD-10-CM

## 2024-02-13 DIAGNOSIS — Z98.62 PERIPHERAL VASCULAR ANGIOPLASTY STATUS: Chronic | ICD-10-CM

## 2024-02-13 DIAGNOSIS — M54.12 RADICULOPATHY, CERVICAL REGION: ICD-10-CM

## 2024-02-13 DIAGNOSIS — N20.0 CALCULUS OF KIDNEY: Chronic | ICD-10-CM

## 2024-02-13 PROCEDURE — 72141 MRI NECK SPINE W/O DYE: CPT

## 2024-02-13 PROCEDURE — 72141 MRI NECK SPINE W/O DYE: CPT | Mod: 26

## 2024-02-14 DIAGNOSIS — M54.12 RADICULOPATHY, CERVICAL REGION: ICD-10-CM

## 2024-02-29 NOTE — ASU PATIENT PROFILE, ADULT - PREOP PAIN SCORE

## 2024-02-29 NOTE — PATIENT PROFILE ADULT - FUNCTIONAL ASSESSMENT - BASIC MOBILITY 6.
3-calculated by average/Not able to assess (calculate score using [facility] averaging method) Abdominal Pain, N/V/D

## 2024-03-01 NOTE — ED PROVIDER NOTE - RATE
Abdomen , soft, nontender, nondistended , no guarding or rigidity , no masses palpable , normal bowel sounds , Liver and Spleen , no hepatomegaly present , liver nontender 
79

## 2024-03-15 ENCOUNTER — APPOINTMENT (OUTPATIENT)
Dept: UROLOGY | Facility: CLINIC | Age: 59
End: 2024-03-15
Payer: MEDICARE

## 2024-03-15 DIAGNOSIS — R39.9 UNSPECIFIED SYMPTOMS AND SIGNS INVOLVING THE GENITOURINARY SYSTEM: ICD-10-CM

## 2024-03-15 DIAGNOSIS — N52.9 MALE ERECTILE DYSFUNCTION, UNSPECIFIED: ICD-10-CM

## 2024-03-15 DIAGNOSIS — Z12.5 ENCOUNTER FOR SCREENING FOR MALIGNANT NEOPLASM OF PROSTATE: ICD-10-CM

## 2024-03-15 PROCEDURE — 99213 OFFICE O/P EST LOW 20 MIN: CPT | Mod: 25

## 2024-03-15 PROCEDURE — 51798 US URINE CAPACITY MEASURE: CPT

## 2024-03-15 PROCEDURE — J3490T: CUSTOM | Mod: NC

## 2024-03-15 PROCEDURE — 54235 NJX CORPORA CAVERNOSA RX AGT: CPT

## 2024-03-15 RX ORDER — PAPAVERINE HYDROCHLORIDE 30 MG/ML
30 INJECTION, SOLUTION INTRAVENOUS
Qty: 10 | Refills: 3 | Status: ACTIVE | COMMUNITY
Start: 2024-03-15 | End: 1900-01-01

## 2024-03-15 NOTE — PLAN
[TextEntry] : The patient was seen and examined with the PA/NP. I agree with the assessment/plan and made any necessary adjustments.  The submitted E/M billing level for this visit reflects the total time spent on the day of the visit including face-to-face time spent with the patient, non-face-to-face review of medical records and relevant information, documentation, and asynchronous communication with the patient after a visit via phone, email, or patients EHR portal after the visit. The medical records reviewed are either scanned into the chart or reviewed with the patient using a patient's electronic medical records portal for patients with records not available to [facility] via electronic transmission platforms from other institutions and labs.   I have reviewed and verified information regarding the chief complaint and history recorded by the ancillary staff and/or the patient. I have independently reviewed and interpreted tests performed by other physicians and facilities as necessary. I have discussed with the patient differential diagnosis, reason for auxiliary tests if ordered, risks, benefits, alternatives, and complications of each form of therapy were discussed. Social determinants of disease were assessed and necessary measures implemented.   Time spent counseling and performing coordination of care was also included in determining the appropriate EM billing level.

## 2024-03-15 NOTE — HISTORY OF PRESENT ILLNESS
[FreeTextEntry1] : 58M w hx of DM, CKD4, charcot foot & PAD s/p multiple procedures within the last month p/w right foot gangrene s/p right BKA during previous hospital admission. UROLOGY consulted for urinary retention - has been performing CIC q6-8 hrs. Tolerates CIC well without any issues. Typical catheterized volumes of roughly 500 cc. He is having some episodes of voiding, usually after he takes Rapaflo however, he does not void often.  Currently on oxycodone for pain management. RBUS 11/2023 Bilateral renal cysts, No PVR has pt had no urge to void, Prostate volume is 52.5 mL  Office visit 1/12/24 Pt had VUDS on 1/3/24 which showed normal bladder compliance, hyposensitive bladder, capacity of 551mL, Grade 2/6 obstruction with weak detrusor function and reasonable emptying - no evidence of a high grade obstruction. Today pt reports that since December 3 he has not had to self catheterize. He has been voiding without issues on his own while taking silodosin. He has not been taking finasteride as he reports his daughter told him that it has some controversial effects on renal function. He has not noticed any episodes of gross hematuria. No recent fevers or chills. PVR obtained with bladder scan in office today showed a residual of 55 cc  Office visit 3/15/24 Pt presents today for ICI Trimix injection/teaching, refer to procedure note for further details. Also to reassess his voiding symptoms.  He is being followed by nephrology for CKD. PVR 51 cc Bloodwork from his nephrologist:  Cr 3.25 eGFR 21 H/H 9.9/29.9 hA1C 6 PSA 0.97

## 2024-03-15 NOTE — ASSESSMENT
[FreeTextEntry1] : #Urinary Retention/BPH Obstructive LUTS - previously performed CIC q6hrs, VUDS shows no evidence of high grade obstruction with a weak underactive detrusor muscle - Now voiding freely w/o issues - PVR 51cc recorded today - c/w Rapaflo - c/w finasteride -patient has not been taking this medication as he reports he read online that there is controversial adverse effects on renal function - Given pts stable symptoms and VUDS findings I do not feel at this time patient will benefit significantly from any BPH surgical procedure as there is no clear high-grade obstruction on UDS findings and his difficulty voiding likely stems from his weak underactive bladder  #PSA screening - PSA 0.97 1/2024 - Can repeat in 1 year  #ED -patient has a prior history of Trimix injection use over 5 years ago, does not recall his dosage - Injection with Trimix (Papaverine 30mg/mL, phentolamine 1mg/mL, PGE1 10 mcg/mL), had a decent response. prescription sent to compounding pharmacy for test dose to be administered by MD/PA. He understands to start at 20 units and go up by 5units to a max of 50 units.  -Keep phenylephrine/Sudafed on hand however, reviewed indications for ER consult, including erection lasting 4 hrs. F/U 3-6 months symptom index

## 2024-03-21 ENCOUNTER — APPOINTMENT (OUTPATIENT)
Dept: VASCULAR SURGERY | Facility: CLINIC | Age: 59
End: 2024-03-21
Payer: MEDICARE

## 2024-03-21 VITALS
BODY MASS INDEX: 27.9 KG/M2 | WEIGHT: 206 LBS | OXYGEN SATURATION: 98 % | HEART RATE: 74 BPM | DIASTOLIC BLOOD PRESSURE: 76 MMHG | SYSTOLIC BLOOD PRESSURE: 118 MMHG | HEIGHT: 72 IN

## 2024-03-21 PROCEDURE — 99213 OFFICE O/P EST LOW 20 MIN: CPT

## 2024-03-21 NOTE — HISTORY OF PRESENT ILLNESS
[FreeTextEntry1] : The patient is status post right below-knee amputation on November 9, 2023.  He was seen 3 months ago and is using prosthetic now. No issues with prosthetic and he is here for 3 months FU.

## 2024-03-21 NOTE — REASON FOR VISIT
[Follow-Up: _____] : a [unfilled] follow-up visit [FreeTextEntry1] :  Right below knee amputation on 11/9/23, patient presents today with new prosthetic.

## 2024-03-21 NOTE — ASSESSMENT
[FreeTextEntry1] : The patient is status post right BKA in Nov 2023. Using prosthetic with no issues. He will FU in 6 months or earlier if there is a problem. We gave him script for PT to assist him with prosthetic use.  He will FU with Dr Villalobos for his atherosclerotic disease (procedure was done by him)

## 2024-03-21 NOTE — PHYSICAL EXAM
[2+] : left 2+ [Varicose Veins Of Lower Extremities] : not present [] : not present [FreeTextEntry1] : Right BK amputation stump healed well and has no issues.

## 2024-03-22 NOTE — ED ADULT NURSE NOTE - CHIEF COMPLAINT QUOTE
Attending Only " I was walking early this morning with my walker and landed on my right leg(stump) and then on my left side and my leg hurts." Did not hit head, no blood thinners, on ASA/Plavix. Just discharged from rehab facility a week ago for S/P right BKA on 11/9

## 2024-04-02 ENCOUNTER — EMERGENCY (EMERGENCY)
Facility: HOSPITAL | Age: 59
LOS: 0 days | Discharge: HOME | End: 2024-04-02
Attending: EMERGENCY MEDICINE
Payer: MEDICARE

## 2024-04-02 VITALS
SYSTOLIC BLOOD PRESSURE: 158 MMHG | RESPIRATION RATE: 20 BRPM | HEIGHT: 72 IN | TEMPERATURE: 98 F | HEART RATE: 67 BPM | DIASTOLIC BLOOD PRESSURE: 85 MMHG | OXYGEN SATURATION: 100 % | WEIGHT: 205.03 LBS

## 2024-04-02 VITALS
TEMPERATURE: 98 F | HEART RATE: 76 BPM | DIASTOLIC BLOOD PRESSURE: 68 MMHG | SYSTOLIC BLOOD PRESSURE: 142 MMHG | RESPIRATION RATE: 18 BRPM | OXYGEN SATURATION: 100 %

## 2024-04-02 DIAGNOSIS — E78.00 PURE HYPERCHOLESTEROLEMIA, UNSPECIFIED: ICD-10-CM

## 2024-04-02 DIAGNOSIS — I12.9 HYPERTENSIVE CHRONIC KIDNEY DISEASE WITH STAGE 1 THROUGH STAGE 4 CHRONIC KIDNEY DISEASE, OR UNSPECIFIED CHRONIC KIDNEY DISEASE: ICD-10-CM

## 2024-04-02 DIAGNOSIS — F41.9 ANXIETY DISORDER, UNSPECIFIED: ICD-10-CM

## 2024-04-02 DIAGNOSIS — Z98.890 OTHER SPECIFIED POSTPROCEDURAL STATES: Chronic | ICD-10-CM

## 2024-04-02 DIAGNOSIS — N20.0 CALCULUS OF KIDNEY: Chronic | ICD-10-CM

## 2024-04-02 DIAGNOSIS — N18.4 CHRONIC KIDNEY DISEASE, STAGE 4 (SEVERE): ICD-10-CM

## 2024-04-02 DIAGNOSIS — Z01.89 ENCOUNTER FOR OTHER SPECIFIED SPECIAL EXAMINATIONS: ICD-10-CM

## 2024-04-02 DIAGNOSIS — E11.22 TYPE 2 DIABETES MELLITUS WITH DIABETIC CHRONIC KIDNEY DISEASE: ICD-10-CM

## 2024-04-02 DIAGNOSIS — Z98.62 PERIPHERAL VASCULAR ANGIOPLASTY STATUS: Chronic | ICD-10-CM

## 2024-04-02 LAB
ALBUMIN SERPL ELPH-MCNC: 4.6 G/DL — SIGNIFICANT CHANGE UP (ref 3.5–5.2)
ALP SERPL-CCNC: 134 U/L — HIGH (ref 30–115)
ALT FLD-CCNC: 22 U/L — SIGNIFICANT CHANGE UP (ref 0–41)
ANION GAP SERPL CALC-SCNC: 14 MMOL/L — SIGNIFICANT CHANGE UP (ref 7–14)
AST SERPL-CCNC: 14 U/L — SIGNIFICANT CHANGE UP (ref 0–41)
BASE EXCESS BLDV CALC-SCNC: -1.5 MMOL/L — SIGNIFICANT CHANGE UP (ref -2–3)
BASOPHILS # BLD AUTO: 0.03 K/UL — SIGNIFICANT CHANGE UP (ref 0–0.2)
BASOPHILS NFR BLD AUTO: 0.6 % — SIGNIFICANT CHANGE UP (ref 0–1)
BILIRUB SERPL-MCNC: 0.2 MG/DL — SIGNIFICANT CHANGE UP (ref 0.2–1.2)
BUN SERPL-MCNC: 52 MG/DL — HIGH (ref 10–20)
CA-I SERPL-SCNC: 1.18 MMOL/L — SIGNIFICANT CHANGE UP (ref 1.15–1.33)
CALCIUM SERPL-MCNC: 9.2 MG/DL — SIGNIFICANT CHANGE UP (ref 8.4–10.5)
CHLORIDE SERPL-SCNC: 101 MMOL/L — SIGNIFICANT CHANGE UP (ref 98–110)
CO2 SERPL-SCNC: 22 MMOL/L — SIGNIFICANT CHANGE UP (ref 17–32)
CREAT SERPL-MCNC: 2.8 MG/DL — HIGH (ref 0.7–1.5)
EGFR: 25 ML/MIN/1.73M2 — LOW
EOSINOPHIL # BLD AUTO: 0.19 K/UL — SIGNIFICANT CHANGE UP (ref 0–0.7)
EOSINOPHIL NFR BLD AUTO: 3.7 % — SIGNIFICANT CHANGE UP (ref 0–8)
GAS PNL BLDV: 134 MMOL/L — LOW (ref 136–145)
GAS PNL BLDV: SIGNIFICANT CHANGE UP
GLUCOSE SERPL-MCNC: 87 MG/DL — SIGNIFICANT CHANGE UP (ref 70–99)
HCO3 BLDV-SCNC: 23 MMOL/L — SIGNIFICANT CHANGE UP (ref 22–29)
HCT VFR BLD CALC: 33.1 % — LOW (ref 42–52)
HCT VFR BLDA CALC: 45 % — SIGNIFICANT CHANGE UP (ref 39–51)
HGB BLD CALC-MCNC: 15.1 G/DL — SIGNIFICANT CHANGE UP (ref 12.6–17.4)
HGB BLD-MCNC: 11 G/DL — LOW (ref 14–18)
IMM GRANULOCYTES NFR BLD AUTO: 0.2 % — SIGNIFICANT CHANGE UP (ref 0.1–0.3)
LACTATE BLDV-MCNC: 0.9 MMOL/L — SIGNIFICANT CHANGE UP (ref 0.5–2)
LYMPHOCYTES # BLD AUTO: 2.19 K/UL — SIGNIFICANT CHANGE UP (ref 1.2–3.4)
LYMPHOCYTES # BLD AUTO: 43.2 % — SIGNIFICANT CHANGE UP (ref 20.5–51.1)
MCHC RBC-ENTMCNC: 26.4 PG — LOW (ref 27–31)
MCHC RBC-ENTMCNC: 33.2 G/DL — SIGNIFICANT CHANGE UP (ref 32–37)
MCV RBC AUTO: 79.6 FL — LOW (ref 80–94)
MONOCYTES # BLD AUTO: 0.42 K/UL — SIGNIFICANT CHANGE UP (ref 0.1–0.6)
MONOCYTES NFR BLD AUTO: 8.3 % — SIGNIFICANT CHANGE UP (ref 1.7–9.3)
NEUTROPHILS # BLD AUTO: 2.23 K/UL — SIGNIFICANT CHANGE UP (ref 1.4–6.5)
NEUTROPHILS NFR BLD AUTO: 44 % — SIGNIFICANT CHANGE UP (ref 42.2–75.2)
NRBC # BLD: 0 /100 WBCS — SIGNIFICANT CHANGE UP (ref 0–0)
PCO2 BLDV: 37 MMHG — LOW (ref 42–55)
PH BLDV: 7.4 — SIGNIFICANT CHANGE UP (ref 7.32–7.43)
PLATELET # BLD AUTO: 282 K/UL — SIGNIFICANT CHANGE UP (ref 130–400)
PMV BLD: 9.2 FL — SIGNIFICANT CHANGE UP (ref 7.4–10.4)
PO2 BLDV: 57 MMHG — HIGH (ref 25–45)
POTASSIUM BLDV-SCNC: 4.4 MMOL/L — SIGNIFICANT CHANGE UP (ref 3.5–5.1)
POTASSIUM SERPL-MCNC: 4.6 MMOL/L — SIGNIFICANT CHANGE UP (ref 3.5–5)
POTASSIUM SERPL-SCNC: 4.6 MMOL/L — SIGNIFICANT CHANGE UP (ref 3.5–5)
PROT SERPL-MCNC: 6.8 G/DL — SIGNIFICANT CHANGE UP (ref 6–8)
RBC # BLD: 4.16 M/UL — LOW (ref 4.7–6.1)
RBC # FLD: 14.3 % — SIGNIFICANT CHANGE UP (ref 11.5–14.5)
SAO2 % BLDV: 88.9 % — HIGH (ref 67–88)
SODIUM SERPL-SCNC: 137 MMOL/L — SIGNIFICANT CHANGE UP (ref 135–146)
WBC # BLD: 5.07 K/UL — SIGNIFICANT CHANGE UP (ref 4.8–10.8)
WBC # FLD AUTO: 5.07 K/UL — SIGNIFICANT CHANGE UP (ref 4.8–10.8)

## 2024-04-02 PROCEDURE — 82330 ASSAY OF CALCIUM: CPT

## 2024-04-02 PROCEDURE — 93005 ELECTROCARDIOGRAM TRACING: CPT

## 2024-04-02 PROCEDURE — 85018 HEMOGLOBIN: CPT

## 2024-04-02 PROCEDURE — 80053 COMPREHEN METABOLIC PANEL: CPT

## 2024-04-02 PROCEDURE — 85025 COMPLETE CBC W/AUTO DIFF WBC: CPT

## 2024-04-02 PROCEDURE — 93010 ELECTROCARDIOGRAM REPORT: CPT

## 2024-04-02 PROCEDURE — 84295 ASSAY OF SERUM SODIUM: CPT

## 2024-04-02 PROCEDURE — 99285 EMERGENCY DEPT VISIT HI MDM: CPT

## 2024-04-02 PROCEDURE — 82803 BLOOD GASES ANY COMBINATION: CPT

## 2024-04-02 PROCEDURE — 99283 EMERGENCY DEPT VISIT LOW MDM: CPT | Mod: 25

## 2024-04-02 PROCEDURE — 36415 COLL VENOUS BLD VENIPUNCTURE: CPT

## 2024-04-02 PROCEDURE — 83605 ASSAY OF LACTIC ACID: CPT

## 2024-04-02 PROCEDURE — 85014 HEMATOCRIT: CPT

## 2024-04-02 PROCEDURE — 84132 ASSAY OF SERUM POTASSIUM: CPT

## 2024-04-02 RX ORDER — SODIUM CHLORIDE 9 MG/ML
500 INJECTION INTRAMUSCULAR; INTRAVENOUS; SUBCUTANEOUS ONCE
Refills: 0 | Status: COMPLETED | OUTPATIENT
Start: 2024-04-02 | End: 2024-04-02

## 2024-04-02 RX ADMIN — SODIUM CHLORIDE 500 MILLILITER(S): 9 INJECTION INTRAMUSCULAR; INTRAVENOUS; SUBCUTANEOUS at 01:43

## 2024-04-02 NOTE — ED PROVIDER NOTE - OBJECTIVE STATEMENT
59 years old male history of hypertension, high cholesterol, diabetes, CKD, anxiety presents complaint of abnormal lab results.  As per patient, he had routine blood work done earlier today.  On his potassium was 6.0 at the portal so he started taking 2 doses of 10 mg Lokelma (first dose 2 PM second dose prior to ED arrival).  Patient otherwise denies any symptoms.

## 2024-04-02 NOTE — ED PROVIDER NOTE - PATIENT PORTAL LINK FT
You can access the FollowMyHealth Patient Portal offered by [facility] by registering at the following website: [email or web address]. By joining [employer]’s FollowMyHealth portal, you will also be able to view your health information using other applications (apps) compatible with our system.

## 2024-04-02 NOTE — ED PROVIDER NOTE - NSFOLLOWUPINSTRUCTIONS_ED_ALL_ED_FT
Medical Clearance    Your Potassium level found to be 4.6 in ED tonight. please follow up with your primary care provider as needed.    A medical screening exam has been done. This exam helps find the cause of your problem and determines whether you need emergency treatment. Your exam has shown that you do not need emergency treatment at this point. It is safe for you to go to your caregiver's office or clinic for treatment. You should make an appointment today to see your caregiver as soon as he or she is available.

## 2024-04-02 NOTE — ED ADULT NURSE NOTE - NSFALLUNIVINTERV_ED_ALL_ED
Bed/Stretcher in lowest position, wheels locked, appropriate side rails in place/Call bell, personal items and telephone in reach/Instruct patient to call for assistance before getting out of bed/chair/stretcher/Non-slip footwear applied when patient is off stretcher/[place] to call system/Physically safe environment - no spills, clutter or unnecessary equipment/Purposeful proactive rounding/Room/bathroom lighting operational, light cord in reach

## 2024-04-02 NOTE — ED PROVIDER NOTE - WHICH SHOWED
I  Dr Coon performed independent interpretation of EKG  -  nsr no stemi no acute ischemia  no hyperK

## 2024-04-02 NOTE — ED PROVIDER NOTE - CLINICAL SUMMARY MEDICAL DECISION MAKING FREE TEXT BOX
59-year-old male history of CKD 4 presents with outpatient blood work with a potassium of 6.0 taken at 2 PM.  Patient takes Lokelma 10 mg twice daily , patient saw his potassium results on his venice at 11:00 tonight took a second look, and came to ED.  Patient asymptomatic well-appearing potassium 4.6.  Patient to be discharged from ED in well appearing condition. Any available test results were discussed with and printed  for patient.  Verbal instructions given, including instructions to return to ED immediately for any new, worsening, or concerning symptoms. Limitations of ED work up discussed.  Patient reports understanding of above with capacity and insight. Written discharge instructions additionally given, including follow-up plan.

## 2024-05-13 NOTE — ED PROVIDER NOTE - NS ED MD DISPO SPECIAL CONSIDERATION1
GASTROENTEROLOGY Follow-up VIDEO VISIT    CC/REFERRING MD:    Rachel Steve    REASON FOR CONSULTATION:   Bernard Chambers for   Chief Complaint   Patient presents with    RECHECK       HISTORY OF PRESENT ILLNESS:    Marcelino Ventura is a 18 year old male who is being evaluated via a billable video visit for follow-up.  Marcelino is accompanied by his mother, Esme, who helps provide most of the history.  Marcelino has a medical history significant for autism spectrum disorder, mostly nonverbal.  I initially saw Marcelino for poor oral intake with associated unintentional weight loss of roughly 15 pounds.  When we talked in February, Marceilno had been reducing his oral intake, sipping small amounts of liquid through the corner of his mouth and generally refusing solids.  He had not been experiencing any gagging, vomiting, or regurgitation.  There had been less frequent urination and stool output.  Prior to seeing me, his primary doctor had ordered a CT chest, abdomen, and pelvis, which did not show any obvious abnormalities.  Laboratory testing had been notable for normal CBC, CMP with mildly elevated total bilirubin (similar to previous) but normal LFTs, albumin, lipase, amylase, TSH, GGT, and a mildly elevated CRP.  He had been previously seen through the pediatric GI clinic for recurrent nausea and vomiting as well as suspected IBS-C.    After his visit with me, I did an expanded laboratory panel to assess for any nutritional deficiency and this came back normal.  We did also check an abdominal x-ray which was notable for moderate to large amount of stool burden.  I had recommended some laxative therapies to improve stool output.  From there, he had EGD that was notable for LA grade a reflux esophagitis, with esophageal biopsies compatible with reflux.  Normal-appearing stomach and duodenum.    Over the past couple of months, Esme has continued to work with Marcelino on improving his oral intake.  I had sent a  prescription for cyproheptadine to help stimulate appetite, but this made him pretty drowsy and they ended up discontinuing it.  Currently, Marcelino is usually skipping breakfast but is eating some lunch and dinner.  There has been no vomiting or regurgitation.  She thinks that he is having a BM most days, tends to have pretty large caliber, hard looking stools.  Liquid intake tends to be intermittent, but he has added a few pounds back, now measuring 115 pounds.  No other new symptoms to report.  We had discussed some additional testing on the upper GI tract, he is currently scheduled for a video swallow exam and esophagram later this week.  He is concerned that he may not be able to tolerate this test as he is not tolerating large amounts of liquids.      I have reviewed and updated the patient's Past Medical History, Social History, Family History and Medication List.    Exam:    General appearance:  Healthy appearing adult, in no acute distress  Eyes:  Sclera anicteric, Pupils round and reactive to light  Ears, nose, mouth and throat:  No obvious external lesions of ears and nose.  Hearing intact  Neck:  Symmetric, No obvious external lesions  Respiratory:  Normal respiration, no use of accessory muscles   MSK:  No visual upper extremity, neck or facial muscle atrophy  ABD:  No visual abdominal distention, no audible borborygmi  Skin:  No rashes or jaundice   Psychiatric:  Oriented to person, place and time, Appropriate mood and affect.   Neurologic:  Peripheral muscle function and dexterity appear to be intact      PERTINENT STUDIES have been reviewed.    ASSESSMENT/PLAN:    Marcelino Ventura is a 18 year old male who presents for follow up of poor oral intake and unintentional weight loss.  Fortunately, since last visit, weight has stabilized and he has added a few pounds back on.  He is eating at least a couple of meals every day and there does not seem to be any obvious upper GI distress.  Stool output has been  somewhat better as well, though stools are still pretty firm and large caliber.  We spent some time discussing next steps.  It sounds like Marcelino would benefit from a stool softener, I recommended some OTC docusate to help soften up the stool and make it easier to pass.  We did talk about the contrast based x-rays.  I do share and he is concerned that Marcelino may not be able to tolerate all of the liquid necessary to complete the video swallow study and esophagram.  Ultimately, I do think these are beneficial tests to determine if there is any abnormal esophageal function that might be contributing to his poor oral intake.  I will see if one of the x-ray techs can reach out to Esme so they can discuss further to see if this is worth pursuing.  Otherwise, I recommended continuing on the same plan, omeprazole 40 mg daily, and the dietary modifications that have already been made.    1. Weight loss    2. Dysphagia, unspecified type    3. Food aversion      Video-Visit Details    Video Visit Time: 17 minutes    Type of service:  Video Visit    Originating Location (pt. Location): Home    Distant Location (provider location):  Off-site    Platform used for Video Visit: [employer]    A total of 23 minutes was spent with reviewing the chart, discussing with the patient, documentation and coordination of care.    Bernard Chambers PA-C  Division of Gastroenterology, Hepatology, and Nutrition  [facility]  [phone]       None

## 2024-05-23 NOTE — CONSULT NOTE ADULT - TIME-BASED BILLING (NON-CRITICAL CARE)
David Garcia,    Attached are your test results.  -Normal red blood cell (hgb) levels, normal white blood cell count and normal platelet levels.   Please contact us if you have any questions.    Ani Slater, CNP     Time-based billing (NON-critical care)

## 2024-06-19 ENCOUNTER — INPATIENT (INPATIENT)
Facility: HOSPITAL | Age: 59
LOS: 8 days | Discharge: HOME | DRG: 72 | End: 2024-06-28
Attending: INTERNAL MEDICINE | Admitting: INTERNAL MEDICINE
Payer: MEDICARE

## 2024-06-19 VITALS
DIASTOLIC BLOOD PRESSURE: 73 MMHG | OXYGEN SATURATION: 100 % | RESPIRATION RATE: 18 BRPM | HEART RATE: 114 BPM | SYSTOLIC BLOOD PRESSURE: 156 MMHG | TEMPERATURE: 102 F

## 2024-06-19 DIAGNOSIS — Z98.890 OTHER SPECIFIED POSTPROCEDURAL STATES: Chronic | ICD-10-CM

## 2024-06-19 DIAGNOSIS — G93.41 METABOLIC ENCEPHALOPATHY: ICD-10-CM

## 2024-06-19 DIAGNOSIS — N20.0 CALCULUS OF KIDNEY: Chronic | ICD-10-CM

## 2024-06-19 DIAGNOSIS — Z98.62 PERIPHERAL VASCULAR ANGIOPLASTY STATUS: Chronic | ICD-10-CM

## 2024-06-19 LAB
ALBUMIN SERPL ELPH-MCNC: 4.3 G/DL — SIGNIFICANT CHANGE UP (ref 3.5–5.2)
ALP SERPL-CCNC: 181 U/L — HIGH (ref 30–115)
ALT FLD-CCNC: 16 U/L — SIGNIFICANT CHANGE UP (ref 0–41)
ANION GAP SERPL CALC-SCNC: 38 MMOL/L — HIGH (ref 7–14)
APAP SERPL-MCNC: <5 UG/ML — LOW (ref 10–30)
APPEARANCE UR: CLEAR — SIGNIFICANT CHANGE UP
APTT BLD: 21.9 SEC — CRITICAL LOW (ref 27–39.2)
AST SERPL-CCNC: 13 U/L — SIGNIFICANT CHANGE UP (ref 0–41)
B-OH-BUTYR SERPL-SCNC: >9 MMOL/L — HIGH
BACTERIA # UR AUTO: ABNORMAL /HPF
BASE EXCESS BLDV CALC-SCNC: -19.8 MMOL/L — LOW (ref -2–3)
BASOPHILS # BLD AUTO: 0 K/UL — SIGNIFICANT CHANGE UP (ref 0–0.2)
BASOPHILS NFR BLD AUTO: 0 % — SIGNIFICANT CHANGE UP (ref 0–1)
BILIRUB SERPL-MCNC: 0.4 MG/DL — SIGNIFICANT CHANGE UP (ref 0.2–1.2)
BILIRUB UR-MCNC: NEGATIVE — SIGNIFICANT CHANGE UP
BUN SERPL-MCNC: 66 MG/DL — CRITICAL HIGH (ref 10–20)
CA-I SERPL-SCNC: 1.02 MMOL/L — LOW (ref 1.15–1.33)
CALCIUM SERPL-MCNC: 8.8 MG/DL — SIGNIFICANT CHANGE UP (ref 8.4–10.5)
CHLORIDE SERPL-SCNC: 76 MMOL/L — LOW (ref 98–110)
CK SERPL-CCNC: 141 U/L — SIGNIFICANT CHANGE UP (ref 0–225)
CO2 SERPL-SCNC: 8 MMOL/L — CRITICAL LOW (ref 17–32)
COLOR SPEC: YELLOW — SIGNIFICANT CHANGE UP
CREAT SERPL-MCNC: 3.7 MG/DL — HIGH (ref 0.7–1.5)
DIFF PNL FLD: ABNORMAL
EGFR: 18 ML/MIN/1.73M2 — LOW
EOSINOPHIL NFR BLD AUTO: 0 % — SIGNIFICANT CHANGE UP (ref 0–8)
EPI CELLS # UR: PRESENT
ETHANOL SERPL-MCNC: <10 MG/DL — SIGNIFICANT CHANGE UP
FLUAV AG NPH QL: SIGNIFICANT CHANGE UP
FLUBV AG NPH QL: SIGNIFICANT CHANGE UP
GAS PNL BLDV: 120 MMOL/L — CRITICAL LOW (ref 136–145)
GAS PNL BLDV: SIGNIFICANT CHANGE UP
GLUCOSE BLDC GLUCOMTR-MCNC: >600 MG/DL — CRITICAL HIGH (ref 70–99)
GLUCOSE BLDC GLUCOMTR-MCNC: >600 MG/DL — CRITICAL HIGH (ref 70–99)
GLUCOSE SERPL-MCNC: 1336 MG/DL — CRITICAL HIGH (ref 70–99)
GLUCOSE UR QL: >=1000 MG/DL
HCO3 BLDV-SCNC: 8 MMOL/L — CRITICAL LOW (ref 22–29)
HCT VFR BLD CALC: 29.8 % — LOW (ref 42–52)
HCT VFR BLDA CALC: 29 % — LOW (ref 39–51)
HGB BLD CALC-MCNC: 9.5 G/DL — LOW (ref 12.6–17.4)
HGB BLD-MCNC: 9.1 G/DL — LOW (ref 14–18)
HYPOCHROMIA BLD QL: SLIGHT — SIGNIFICANT CHANGE UP
INR BLD: 1 RATIO — SIGNIFICANT CHANGE UP (ref 0.65–1.3)
KETONES UR-MCNC: 40 MG/DL
LACTATE BLDV-MCNC: 4.5 MMOL/L — CRITICAL HIGH (ref 0.5–2)
LACTATE SERPL-SCNC: 5 MMOL/L — CRITICAL HIGH (ref 0.7–2)
LEUKOCYTE ESTERASE UR-ACNC: NEGATIVE — SIGNIFICANT CHANGE UP
LYMPHOCYTES # BLD AUTO: 0.44 K/UL — LOW (ref 1.2–3.4)
LYMPHOCYTES # BLD AUTO: 3 % — LOW (ref 20.5–51.1)
MAGNESIUM SERPL-MCNC: 2.3 MG/DL — SIGNIFICANT CHANGE UP (ref 1.8–2.4)
MANUAL SMEAR VERIFICATION: SIGNIFICANT CHANGE UP
MCHC RBC-ENTMCNC: 28.2 PG — SIGNIFICANT CHANGE UP (ref 27–31)
MCHC RBC-ENTMCNC: 30.5 G/DL — LOW (ref 32–37)
MCV RBC AUTO: 92.3 FL — SIGNIFICANT CHANGE UP (ref 80–94)
MONOCYTES # BLD AUTO: 0.44 K/UL — SIGNIFICANT CHANGE UP (ref 0.1–0.6)
MONOCYTES NFR BLD AUTO: 3 % — SIGNIFICANT CHANGE UP (ref 1.7–9.3)
NEUTROPHILS # BLD AUTO: 13.86 K/UL — HIGH (ref 1.4–6.5)
NEUTROPHILS NFR BLD AUTO: 94 % — HIGH (ref 42.2–75.2)
NITRITE UR-MCNC: NEGATIVE — SIGNIFICANT CHANGE UP
NRBC # BLD: 0 /100 WBCS — SIGNIFICANT CHANGE UP (ref 0–0)
NRBC # BLD: SIGNIFICANT CHANGE UP /100 WBCS (ref 0–0)
NT-PROBNP SERPL-SCNC: 1155 PG/ML — HIGH (ref 0–300)
OVALOCYTES BLD QL SMEAR: SLIGHT — SIGNIFICANT CHANGE UP
PCO2 BLDV: 22 MMHG — LOW (ref 42–55)
PH BLDV: 7.14 — CRITICAL LOW (ref 7.32–7.43)
PH UR: 5.5 — SIGNIFICANT CHANGE UP (ref 5–8)
PLAT MORPH BLD: NORMAL — SIGNIFICANT CHANGE UP
PLATELET # BLD AUTO: 385 K/UL — SIGNIFICANT CHANGE UP (ref 130–400)
PMV BLD: 9.9 FL — SIGNIFICANT CHANGE UP (ref 7.4–10.4)
PO2 BLDV: 28 MMHG — SIGNIFICANT CHANGE UP (ref 25–45)
POTASSIUM BLDV-SCNC: 7.5 MMOL/L — CRITICAL HIGH (ref 3.5–5.1)
POTASSIUM SERPL-MCNC: 7.3 MMOL/L — CRITICAL HIGH (ref 3.5–5)
POTASSIUM SERPL-SCNC: 7.3 MMOL/L — CRITICAL HIGH (ref 3.5–5)
PROT SERPL-MCNC: 6.8 G/DL — SIGNIFICANT CHANGE UP (ref 6–8)
PROT UR-MCNC: 100 MG/DL
PROTHROM AB SERPL-ACNC: 11.4 SEC — SIGNIFICANT CHANGE UP (ref 9.95–12.87)
RBC # BLD: 3.23 M/UL — LOW (ref 4.7–6.1)
RBC # FLD: 14.7 % — HIGH (ref 11.5–14.5)
RBC BLD AUTO: ABNORMAL
RBC CASTS # UR COMP ASSIST: 2 /HPF — SIGNIFICANT CHANGE UP (ref 0–4)
RSV RNA NPH QL NAA+NON-PROBE: SIGNIFICANT CHANGE UP
SALICYLATES SERPL-MCNC: <0.3 MG/DL — LOW (ref 4–30)
SAO2 % BLDV: 47.8 % — LOW (ref 67–88)
SARS-COV-2 RNA SPEC QL NAA+PROBE: SIGNIFICANT CHANGE UP
SODIUM SERPL-SCNC: 122 MMOL/L — LOW (ref 135–146)
SP GR SPEC: 1.02 — SIGNIFICANT CHANGE UP (ref 1–1.03)
TROPONIN T, HIGH SENSITIVITY RESULT: 47 NG/L — HIGH (ref 6–21)
UROBILINOGEN FLD QL: 0.2 MG/DL — SIGNIFICANT CHANGE UP (ref 0.2–1)
WBC # BLD: 14.74 K/UL — HIGH (ref 4.8–10.8)
WBC # FLD AUTO: 14.74 K/UL — HIGH (ref 4.8–10.8)
WBC UR QL: 2 /HPF — SIGNIFICANT CHANGE UP (ref 0–5)

## 2024-06-19 PROCEDURE — 83605 ASSAY OF LACTIC ACID: CPT

## 2024-06-19 PROCEDURE — 70450 CT HEAD/BRAIN W/O DYE: CPT | Mod: MC

## 2024-06-19 PROCEDURE — 71045 X-RAY EXAM CHEST 1 VIEW: CPT | Mod: 26

## 2024-06-19 PROCEDURE — 74176 CT ABD & PELVIS W/O CONTRAST: CPT | Mod: MC

## 2024-06-19 PROCEDURE — 82803 BLOOD GASES ANY COMBINATION: CPT

## 2024-06-19 PROCEDURE — 80053 COMPREHEN METABOLIC PANEL: CPT

## 2024-06-19 PROCEDURE — 87641 MR-STAPH DNA AMP PROBE: CPT

## 2024-06-19 PROCEDURE — 92526 ORAL FUNCTION THERAPY: CPT | Mod: GN

## 2024-06-19 PROCEDURE — 0225U NFCT DS DNA&RNA 21 SARSCOV2: CPT

## 2024-06-19 PROCEDURE — 95819 EEG AWAKE AND ASLEEP: CPT

## 2024-06-19 PROCEDURE — 82140 ASSAY OF AMMONIA: CPT

## 2024-06-19 PROCEDURE — 82962 GLUCOSE BLOOD TEST: CPT

## 2024-06-19 PROCEDURE — 84145 PROCALCITONIN (PCT): CPT

## 2024-06-19 PROCEDURE — 87389 HIV-1 AG W/HIV-1&-2 AB AG IA: CPT

## 2024-06-19 PROCEDURE — 85027 COMPLETE CBC AUTOMATED: CPT

## 2024-06-19 PROCEDURE — 74018 RADEX ABDOMEN 1 VIEW: CPT

## 2024-06-19 PROCEDURE — 84443 ASSAY THYROID STIM HORMONE: CPT

## 2024-06-19 PROCEDURE — 85014 HEMATOCRIT: CPT

## 2024-06-19 PROCEDURE — 76770 US EXAM ABDO BACK WALL COMP: CPT

## 2024-06-19 PROCEDURE — 85018 HEMOGLOBIN: CPT

## 2024-06-19 PROCEDURE — 82550 ASSAY OF CK (CPK): CPT

## 2024-06-19 PROCEDURE — 95714 VEEG EA 12-26 HR UNMNTR: CPT

## 2024-06-19 PROCEDURE — 70450 CT HEAD/BRAIN W/O DYE: CPT | Mod: 26,MC

## 2024-06-19 PROCEDURE — 85730 THROMBOPLASTIN TIME PARTIAL: CPT

## 2024-06-19 PROCEDURE — 94760 N-INVAS EAR/PLS OXIMETRY 1: CPT

## 2024-06-19 PROCEDURE — 83036 HEMOGLOBIN GLYCOSYLATED A1C: CPT

## 2024-06-19 PROCEDURE — 80076 HEPATIC FUNCTION PANEL: CPT

## 2024-06-19 PROCEDURE — 71250 CT THORAX DX C-: CPT | Mod: MC

## 2024-06-19 PROCEDURE — 71045 X-RAY EXAM CHEST 1 VIEW: CPT

## 2024-06-19 PROCEDURE — 80354 DRUG SCREENING FENTANYL: CPT

## 2024-06-19 PROCEDURE — 82746 ASSAY OF FOLIC ACID SERUM: CPT

## 2024-06-19 PROCEDURE — 70553 MRI BRAIN STEM W/O & W/DYE: CPT | Mod: MC

## 2024-06-19 PROCEDURE — 80048 BASIC METABOLIC PNL TOTAL CA: CPT

## 2024-06-19 PROCEDURE — 93010 ELECTROCARDIOGRAM REPORT: CPT

## 2024-06-19 PROCEDURE — 93005 ELECTROCARDIOGRAM TRACING: CPT

## 2024-06-19 PROCEDURE — 83735 ASSAY OF MAGNESIUM: CPT

## 2024-06-19 PROCEDURE — 73590 X-RAY EXAM OF LOWER LEG: CPT | Mod: RT

## 2024-06-19 PROCEDURE — 82607 VITAMIN B-12: CPT

## 2024-06-19 PROCEDURE — 80202 ASSAY OF VANCOMYCIN: CPT

## 2024-06-19 PROCEDURE — ZZZZZ: CPT

## 2024-06-19 PROCEDURE — 80061 LIPID PANEL: CPT

## 2024-06-19 PROCEDURE — 80307 DRUG TEST PRSMV CHEM ANLYZR: CPT

## 2024-06-19 PROCEDURE — 36415 COLL VENOUS BLD VENIPUNCTURE: CPT

## 2024-06-19 PROCEDURE — 84132 ASSAY OF SERUM POTASSIUM: CPT

## 2024-06-19 PROCEDURE — 93306 TTE W/DOPPLER COMPLETE: CPT

## 2024-06-19 PROCEDURE — A9579: CPT

## 2024-06-19 PROCEDURE — 80346 BENZODIAZEPINES1-12: CPT

## 2024-06-19 PROCEDURE — 87040 BLOOD CULTURE FOR BACTERIA: CPT

## 2024-06-19 PROCEDURE — 87640 STAPH A DNA AMP PROBE: CPT

## 2024-06-19 PROCEDURE — 84100 ASSAY OF PHOSPHORUS: CPT

## 2024-06-19 PROCEDURE — 84484 ASSAY OF TROPONIN QUANT: CPT

## 2024-06-19 PROCEDURE — 99285 EMERGENCY DEPT VISIT HI MDM: CPT

## 2024-06-19 PROCEDURE — 94002 VENT MGMT INPAT INIT DAY: CPT

## 2024-06-19 PROCEDURE — 84295 ASSAY OF SERUM SODIUM: CPT

## 2024-06-19 PROCEDURE — 92610 EVALUATE SWALLOWING FUNCTION: CPT | Mod: GN

## 2024-06-19 PROCEDURE — 99222 1ST HOSP IP/OBS MODERATE 55: CPT

## 2024-06-19 PROCEDURE — 93971 EXTREMITY STUDY: CPT | Mod: LT

## 2024-06-19 PROCEDURE — 95700 EEG CONT REC W/VID EEG TECH: CPT

## 2024-06-19 PROCEDURE — 80353 DRUG SCREENING COCAINE: CPT

## 2024-06-19 PROCEDURE — 87070 CULTURE OTHR SPECIMN AEROBIC: CPT

## 2024-06-19 PROCEDURE — 82330 ASSAY OF CALCIUM: CPT

## 2024-06-19 PROCEDURE — 85025 COMPLETE CBC W/AUTO DIFF WBC: CPT

## 2024-06-19 PROCEDURE — 94003 VENT MGMT INPAT SUBQ DAY: CPT

## 2024-06-19 RX ORDER — DEXTROSE MONOHYDRATE AND SODIUM CHLORIDE 5; .3 G/100ML; G/100ML
1000 INJECTION, SOLUTION INTRAVENOUS ONCE
Refills: 0 | Status: COMPLETED | OUTPATIENT
Start: 2024-06-19 | End: 2024-06-19

## 2024-06-19 RX ORDER — MIDAZOLAM HYDROCHLORIDE 1 MG/ML
5 INJECTION INTRAMUSCULAR; INTRAVENOUS ONCE
Refills: 0 | Status: DISCONTINUED | OUTPATIENT
Start: 2024-06-19 | End: 2024-06-19

## 2024-06-19 RX ORDER — CALCIUM GLUCONATE 98 MG/ML
1 INJECTION, SOLUTION INTRAVENOUS ONCE
Refills: 0 | Status: COMPLETED | OUTPATIENT
Start: 2024-06-19 | End: 2024-06-19

## 2024-06-19 RX ORDER — CLOPIDOGREL BISULFATE 75 MG/1
75 TABLET, FILM COATED ORAL DAILY
Refills: 0 | Status: DISCONTINUED | OUTPATIENT
Start: 2024-06-19 | End: 2024-06-28

## 2024-06-19 RX ORDER — INSULIN REGULAR, HUMAN 100/ML
10 VIAL (ML) INJECTION ONCE
Refills: 0 | Status: COMPLETED | OUTPATIENT
Start: 2024-06-19 | End: 2024-06-19

## 2024-06-19 RX ORDER — INSULIN REGULAR, HUMAN 100/ML
8 VIAL (ML) INJECTION
Qty: 100 | Refills: 0 | Status: DISCONTINUED | OUTPATIENT
Start: 2024-06-19 | End: 2024-06-20

## 2024-06-19 RX ORDER — HEPARIN SODIUM 50 [USP'U]/ML
5000 INJECTION, SOLUTION INTRAVENOUS EVERY 12 HOURS
Refills: 0 | Status: DISCONTINUED | OUTPATIENT
Start: 2024-06-19 | End: 2024-06-24

## 2024-06-19 RX ORDER — CEFEPIME 1 G/1
2000 INJECTION, POWDER, FOR SOLUTION INTRAMUSCULAR; INTRAVENOUS ONCE
Refills: 0 | Status: COMPLETED | OUTPATIENT
Start: 2024-06-19 | End: 2024-06-19

## 2024-06-19 RX ORDER — LORAZEPAM 0.5 MG
1 TABLET ORAL ONCE
Refills: 0 | Status: DISCONTINUED | OUTPATIENT
Start: 2024-06-19 | End: 2024-06-19

## 2024-06-19 RX ORDER — SODIUM BICARBONATE 650 MG/1
50 TABLET ORAL ONCE
Refills: 0 | Status: COMPLETED | OUTPATIENT
Start: 2024-06-19 | End: 2024-06-19

## 2024-06-19 RX ORDER — DEXTROSE MONOHYDRATE AND SODIUM CHLORIDE 5; .3 G/100ML; G/100ML
2000 INJECTION, SOLUTION INTRAVENOUS ONCE
Refills: 0 | Status: COMPLETED | OUTPATIENT
Start: 2024-06-19 | End: 2024-06-19

## 2024-06-19 RX ORDER — TAMSULOSIN HYDROCHLORIDE 0.4 MG/1
0.4 CAPSULE ORAL AT BEDTIME
Refills: 0 | Status: DISCONTINUED | OUTPATIENT
Start: 2024-06-19 | End: 2024-06-24

## 2024-06-19 RX ORDER — SODIUM BICARBONATE 650 MG/1
0.28 TABLET ORAL
Qty: 150 | Refills: 0 | Status: DISCONTINUED | OUTPATIENT
Start: 2024-06-19 | End: 2024-06-20

## 2024-06-19 RX ORDER — ATORVASTATIN CALCIUM 20 MG/1
80 TABLET, FILM COATED ORAL AT BEDTIME
Refills: 0 | Status: DISCONTINUED | OUTPATIENT
Start: 2024-06-19 | End: 2024-06-28

## 2024-06-19 RX ORDER — ACETAMINOPHEN 325 MG
650 TABLET ORAL ONCE
Refills: 0 | Status: COMPLETED | OUTPATIENT
Start: 2024-06-19 | End: 2024-06-19

## 2024-06-19 RX ORDER — CEFEPIME 1 G/1
2000 INJECTION, POWDER, FOR SOLUTION INTRAMUSCULAR; INTRAVENOUS EVERY 24 HOURS
Refills: 0 | Status: DISCONTINUED | OUTPATIENT
Start: 2024-06-20 | End: 2024-06-22

## 2024-06-19 RX ORDER — ASPIRIN 325 MG/1
81 TABLET, FILM COATED ORAL DAILY
Refills: 0 | Status: DISCONTINUED | OUTPATIENT
Start: 2024-06-19 | End: 2024-06-28

## 2024-06-19 RX ORDER — DEXMEDETOMIDINE HYDROCHLORIDE 4 UG/ML
0.5 INJECTION, SOLUTION INTRAVENOUS
Qty: 400 | Refills: 0 | Status: DISCONTINUED | OUTPATIENT
Start: 2024-06-19 | End: 2024-06-23

## 2024-06-19 RX ADMIN — Medication 1 MILLIGRAM(S): at 20:10

## 2024-06-19 RX ADMIN — CEFEPIME 100 MILLIGRAM(S): 1 INJECTION, POWDER, FOR SOLUTION INTRAMUSCULAR; INTRAVENOUS at 18:19

## 2024-06-19 RX ADMIN — CALCIUM GLUCONATE 100 GRAM(S): 98 INJECTION, SOLUTION INTRAVENOUS at 19:54

## 2024-06-19 RX ADMIN — Medication 650 MILLIGRAM(S): at 18:19

## 2024-06-19 RX ADMIN — MIDAZOLAM HYDROCHLORIDE 5 MILLIGRAM(S): 1 INJECTION INTRAMUSCULAR; INTRAVENOUS at 18:39

## 2024-06-19 RX ADMIN — SODIUM BICARBONATE 50 MILLIEQUIVALENT(S): 650 TABLET ORAL at 20:50

## 2024-06-19 RX ADMIN — DEXMEDETOMIDINE HYDROCHLORIDE 10 MICROGRAM(S)/KG/HR: 4 INJECTION, SOLUTION INTRAVENOUS at 22:54

## 2024-06-19 RX ADMIN — Medication 10 UNIT(S): at 19:54

## 2024-06-19 RX ADMIN — MIDAZOLAM HYDROCHLORIDE 5 MILLIGRAM(S): 1 INJECTION INTRAMUSCULAR; INTRAVENOUS at 22:53

## 2024-06-19 RX ADMIN — DEXTROSE MONOHYDRATE AND SODIUM CHLORIDE 1000 MILLILITER(S): 5; .3 INJECTION, SOLUTION INTRAVENOUS at 21:16

## 2024-06-19 RX ADMIN — DEXTROSE MONOHYDRATE AND SODIUM CHLORIDE 2000 MILLILITER(S): 5; .3 INJECTION, SOLUTION INTRAVENOUS at 18:17

## 2024-06-19 RX ADMIN — MIDAZOLAM HYDROCHLORIDE 5 MILLIGRAM(S): 1 INJECTION INTRAMUSCULAR; INTRAVENOUS at 18:09

## 2024-06-19 RX ADMIN — Medication 8 UNIT(S)/HR: at 21:15

## 2024-06-19 RX ADMIN — DEXTROSE MONOHYDRATE AND SODIUM CHLORIDE 2000 MILLILITER(S): 5; .3 INJECTION, SOLUTION INTRAVENOUS at 20:30

## 2024-06-19 RX ADMIN — SODIUM BICARBONATE 150 MEQ/KG/HR: 650 TABLET ORAL at 22:53

## 2024-06-20 ENCOUNTER — RESULT REVIEW (OUTPATIENT)
Age: 59
End: 2024-06-20

## 2024-06-20 LAB
A1C WITH ESTIMATED AVERAGE GLUCOSE RESULT: 9.5 % — HIGH (ref 4–5.6)
ALBUMIN SERPL ELPH-MCNC: 3.5 G/DL — SIGNIFICANT CHANGE UP (ref 3.5–5.2)
ALP SERPL-CCNC: 125 U/L — HIGH (ref 30–115)
ALT FLD-CCNC: 16 U/L — SIGNIFICANT CHANGE UP (ref 0–41)
AMMONIA BLD-MCNC: 24 UMOL/L — SIGNIFICANT CHANGE UP (ref 11–55)
ANION GAP SERPL CALC-SCNC: 11 MMOL/L — SIGNIFICANT CHANGE UP (ref 7–14)
ANION GAP SERPL CALC-SCNC: 11 MMOL/L — SIGNIFICANT CHANGE UP (ref 7–14)
ANION GAP SERPL CALC-SCNC: 14 MMOL/L — SIGNIFICANT CHANGE UP (ref 7–14)
ANION GAP SERPL CALC-SCNC: 15 MMOL/L — HIGH (ref 7–14)
ANION GAP SERPL CALC-SCNC: 30 MMOL/L — HIGH (ref 7–14)
ANION GAP SERPL CALC-SCNC: 9 MMOL/L — SIGNIFICANT CHANGE UP (ref 7–14)
AST SERPL-CCNC: 28 U/L — SIGNIFICANT CHANGE UP (ref 0–41)
BILIRUB DIRECT SERPL-MCNC: <0.2 MG/DL — SIGNIFICANT CHANGE UP (ref 0–0.3)
BILIRUB INDIRECT FLD-MCNC: SIGNIFICANT CHANGE UP MG/DL (ref 0.2–1.2)
BILIRUB SERPL-MCNC: <0.2 MG/DL — SIGNIFICANT CHANGE UP (ref 0.2–1.2)
BUN SERPL-MCNC: 54 MG/DL — HIGH (ref 10–20)
BUN SERPL-MCNC: 56 MG/DL — HIGH (ref 10–20)
BUN SERPL-MCNC: 59 MG/DL — HIGH (ref 10–20)
BUN SERPL-MCNC: 64 MG/DL — CRITICAL HIGH (ref 10–20)
BUN SERPL-MCNC: 65 MG/DL — CRITICAL HIGH (ref 10–20)
BUN SERPL-MCNC: 68 MG/DL — CRITICAL HIGH (ref 10–20)
CALCIUM SERPL-MCNC: 8.2 MG/DL — LOW (ref 8.4–10.5)
CALCIUM SERPL-MCNC: 8.2 MG/DL — LOW (ref 8.4–10.5)
CALCIUM SERPL-MCNC: 8.3 MG/DL — LOW (ref 8.4–10.5)
CALCIUM SERPL-MCNC: 8.7 MG/DL — SIGNIFICANT CHANGE UP (ref 8.4–10.5)
CALCIUM SERPL-MCNC: 8.7 MG/DL — SIGNIFICANT CHANGE UP (ref 8.4–10.5)
CALCIUM SERPL-MCNC: 8.8 MG/DL — SIGNIFICANT CHANGE UP (ref 8.4–10.5)
CHLORIDE SERPL-SCNC: 101 MMOL/L — SIGNIFICANT CHANGE UP (ref 98–110)
CHLORIDE SERPL-SCNC: 102 MMOL/L — SIGNIFICANT CHANGE UP (ref 98–110)
CHLORIDE SERPL-SCNC: 84 MMOL/L — LOW (ref 98–110)
CHLORIDE SERPL-SCNC: 95 MMOL/L — LOW (ref 98–110)
CHLORIDE SERPL-SCNC: 98 MMOL/L — SIGNIFICANT CHANGE UP (ref 98–110)
CHLORIDE SERPL-SCNC: 99 MMOL/L — SIGNIFICANT CHANGE UP (ref 98–110)
CHOLEST SERPL-MCNC: 167 MG/DL — SIGNIFICANT CHANGE UP
CO2 SERPL-SCNC: 16 MMOL/L — LOW (ref 17–32)
CO2 SERPL-SCNC: 29 MMOL/L — SIGNIFICANT CHANGE UP (ref 17–32)
CO2 SERPL-SCNC: 29 MMOL/L — SIGNIFICANT CHANGE UP (ref 17–32)
CO2 SERPL-SCNC: 31 MMOL/L — SIGNIFICANT CHANGE UP (ref 17–32)
CO2 SERPL-SCNC: 31 MMOL/L — SIGNIFICANT CHANGE UP (ref 17–32)
CO2 SERPL-SCNC: 32 MMOL/L — SIGNIFICANT CHANGE UP (ref 17–32)
CREAT SERPL-MCNC: 2.7 MG/DL — HIGH (ref 0.7–1.5)
CREAT SERPL-MCNC: 2.8 MG/DL — HIGH (ref 0.7–1.5)
CREAT SERPL-MCNC: 2.9 MG/DL — HIGH (ref 0.7–1.5)
CREAT SERPL-MCNC: 3.2 MG/DL — HIGH (ref 0.7–1.5)
CREAT SERPL-MCNC: 3.3 MG/DL — HIGH (ref 0.7–1.5)
CREAT SERPL-MCNC: 3.6 MG/DL — HIGH (ref 0.7–1.5)
EGFR: 19 ML/MIN/1.73M2 — LOW
EGFR: 21 ML/MIN/1.73M2 — LOW
EGFR: 21 ML/MIN/1.73M2 — LOW
EGFR: 24 ML/MIN/1.73M2 — LOW
EGFR: 25 ML/MIN/1.73M2 — LOW
EGFR: 26 ML/MIN/1.73M2 — LOW
ESTIMATED AVERAGE GLUCOSE: 226 MG/DL — HIGH (ref 68–114)
GLUCOSE BLDC GLUCOMTR-MCNC: 133 MG/DL — HIGH (ref 70–99)
GLUCOSE BLDC GLUCOMTR-MCNC: 147 MG/DL — HIGH (ref 70–99)
GLUCOSE BLDC GLUCOMTR-MCNC: 161 MG/DL — HIGH (ref 70–99)
GLUCOSE BLDC GLUCOMTR-MCNC: 178 MG/DL — HIGH (ref 70–99)
GLUCOSE BLDC GLUCOMTR-MCNC: 209 MG/DL — HIGH (ref 70–99)
GLUCOSE BLDC GLUCOMTR-MCNC: 224 MG/DL — HIGH (ref 70–99)
GLUCOSE BLDC GLUCOMTR-MCNC: 228 MG/DL — HIGH (ref 70–99)
GLUCOSE BLDC GLUCOMTR-MCNC: 234 MG/DL — HIGH (ref 70–99)
GLUCOSE BLDC GLUCOMTR-MCNC: 260 MG/DL — HIGH (ref 70–99)
GLUCOSE BLDC GLUCOMTR-MCNC: 264 MG/DL — HIGH (ref 70–99)
GLUCOSE BLDC GLUCOMTR-MCNC: 390 MG/DL — HIGH (ref 70–99)
GLUCOSE BLDC GLUCOMTR-MCNC: 390 MG/DL — HIGH (ref 70–99)
GLUCOSE BLDC GLUCOMTR-MCNC: 490 MG/DL — CRITICAL HIGH (ref 70–99)
GLUCOSE BLDC GLUCOMTR-MCNC: 579 MG/DL — CRITICAL HIGH (ref 70–99)
GLUCOSE BLDC GLUCOMTR-MCNC: 69 MG/DL — LOW (ref 70–99)
GLUCOSE BLDC GLUCOMTR-MCNC: 74 MG/DL — SIGNIFICANT CHANGE UP (ref 70–99)
GLUCOSE BLDC GLUCOMTR-MCNC: 99 MG/DL — SIGNIFICANT CHANGE UP (ref 70–99)
GLUCOSE BLDC GLUCOMTR-MCNC: >600 MG/DL — CRITICAL HIGH (ref 70–99)
GLUCOSE BLDC GLUCOMTR-MCNC: >600 MG/DL — CRITICAL HIGH (ref 70–99)
GLUCOSE SERPL-MCNC: 170 MG/DL — HIGH (ref 70–99)
GLUCOSE SERPL-MCNC: 209 MG/DL — HIGH (ref 70–99)
GLUCOSE SERPL-MCNC: 408 MG/DL — HIGH (ref 70–99)
GLUCOSE SERPL-MCNC: 78 MG/DL — SIGNIFICANT CHANGE UP (ref 70–99)
GLUCOSE SERPL-MCNC: 956 MG/DL — CRITICAL HIGH (ref 70–99)
GLUCOSE SERPL-MCNC: 98 MG/DL — SIGNIFICANT CHANGE UP (ref 70–99)
HCT VFR BLD CALC: 21.4 % — LOW (ref 42–52)
HDLC SERPL-MCNC: 39 MG/DL — LOW
HGB BLD-MCNC: 7.7 G/DL — LOW (ref 14–18)
LACTATE SERPL-SCNC: 1.3 MMOL/L — SIGNIFICANT CHANGE UP (ref 0.7–2)
LACTATE SERPL-SCNC: 2.7 MMOL/L — HIGH (ref 0.7–2)
LACTATE SERPL-SCNC: 3.2 MMOL/L — HIGH (ref 0.7–2)
LACTATE SERPL-SCNC: 4.3 MMOL/L — CRITICAL HIGH (ref 0.7–2)
LIPID PNL WITH DIRECT LDL SERPL: 100 MG/DL — HIGH
MAGNESIUM SERPL-MCNC: 2 MG/DL — SIGNIFICANT CHANGE UP (ref 1.8–2.4)
MAGNESIUM SERPL-MCNC: 2.3 MG/DL — SIGNIFICANT CHANGE UP (ref 1.8–2.4)
MCHC RBC-ENTMCNC: 28.1 PG — SIGNIFICANT CHANGE UP (ref 27–31)
MCHC RBC-ENTMCNC: 36 G/DL — SIGNIFICANT CHANGE UP (ref 32–37)
MCV RBC AUTO: 78.1 FL — LOW (ref 80–94)
NON HDL CHOLESTEROL: 128 MG/DL — SIGNIFICANT CHANGE UP
NRBC # BLD: 0 /100 WBCS — SIGNIFICANT CHANGE UP (ref 0–0)
PHOSPHATE SERPL-MCNC: 2.1 MG/DL — SIGNIFICANT CHANGE UP (ref 2.1–4.9)
PHOSPHATE SERPL-MCNC: 2.5 MG/DL — SIGNIFICANT CHANGE UP (ref 2.1–4.9)
PLATELET # BLD AUTO: 317 K/UL — SIGNIFICANT CHANGE UP (ref 130–400)
PMV BLD: 9.4 FL — SIGNIFICANT CHANGE UP (ref 7.4–10.4)
POTASSIUM SERPL-MCNC: 3.7 MMOL/L — SIGNIFICANT CHANGE UP (ref 3.5–5)
POTASSIUM SERPL-MCNC: 3.8 MMOL/L — SIGNIFICANT CHANGE UP (ref 3.5–5)
POTASSIUM SERPL-MCNC: 3.9 MMOL/L — SIGNIFICANT CHANGE UP (ref 3.5–5)
POTASSIUM SERPL-MCNC: 4 MMOL/L — SIGNIFICANT CHANGE UP (ref 3.5–5)
POTASSIUM SERPL-MCNC: 4.1 MMOL/L — SIGNIFICANT CHANGE UP (ref 3.5–5)
POTASSIUM SERPL-MCNC: 4.7 MMOL/L — SIGNIFICANT CHANGE UP (ref 3.5–5)
POTASSIUM SERPL-SCNC: 3.7 MMOL/L — SIGNIFICANT CHANGE UP (ref 3.5–5)
POTASSIUM SERPL-SCNC: 3.8 MMOL/L — SIGNIFICANT CHANGE UP (ref 3.5–5)
POTASSIUM SERPL-SCNC: 3.9 MMOL/L — SIGNIFICANT CHANGE UP (ref 3.5–5)
POTASSIUM SERPL-SCNC: 4 MMOL/L — SIGNIFICANT CHANGE UP (ref 3.5–5)
POTASSIUM SERPL-SCNC: 4.1 MMOL/L — SIGNIFICANT CHANGE UP (ref 3.5–5)
POTASSIUM SERPL-SCNC: 4.7 MMOL/L — SIGNIFICANT CHANGE UP (ref 3.5–5)
PROT SERPL-MCNC: 5.7 G/DL — LOW (ref 6–8)
RBC # BLD: 2.74 M/UL — LOW (ref 4.7–6.1)
RBC # FLD: 14.9 % — HIGH (ref 11.5–14.5)
SODIUM SERPL-SCNC: 130 MMOL/L — LOW (ref 135–146)
SODIUM SERPL-SCNC: 139 MMOL/L — SIGNIFICANT CHANGE UP (ref 135–146)
SODIUM SERPL-SCNC: 141 MMOL/L — SIGNIFICANT CHANGE UP (ref 135–146)
SODIUM SERPL-SCNC: 141 MMOL/L — SIGNIFICANT CHANGE UP (ref 135–146)
SODIUM SERPL-SCNC: 143 MMOL/L — SIGNIFICANT CHANGE UP (ref 135–146)
SODIUM SERPL-SCNC: 143 MMOL/L — SIGNIFICANT CHANGE UP (ref 135–146)
TRIGL SERPL-MCNC: 141 MG/DL — SIGNIFICANT CHANGE UP
TSH SERPL-MCNC: 0.13 UIU/ML — LOW (ref 0.27–4.2)
WBC # BLD: 8.52 K/UL — SIGNIFICANT CHANGE UP (ref 4.8–10.8)
WBC # FLD AUTO: 8.52 K/UL — SIGNIFICANT CHANGE UP (ref 4.8–10.8)

## 2024-06-20 PROCEDURE — 71045 X-RAY EXAM CHEST 1 VIEW: CPT | Mod: 26

## 2024-06-20 PROCEDURE — 93306 TTE W/DOPPLER COMPLETE: CPT | Mod: 26

## 2024-06-20 PROCEDURE — 99291 CRITICAL CARE FIRST HOUR: CPT | Mod: GC

## 2024-06-20 PROCEDURE — 93010 ELECTROCARDIOGRAM REPORT: CPT

## 2024-06-20 PROCEDURE — 93010 ELECTROCARDIOGRAM REPORT: CPT | Mod: 77

## 2024-06-20 RX ORDER — NOREPINEPHRINE BITARTRATE 1 MG/ML
0.05 INJECTION INTRAVENOUS
Qty: 16 | Refills: 0 | Status: DISCONTINUED | OUTPATIENT
Start: 2024-06-20 | End: 2024-06-21

## 2024-06-20 RX ORDER — LORAZEPAM 0.5 MG
2 TABLET ORAL ONCE
Refills: 0 | Status: DISCONTINUED | OUTPATIENT
Start: 2024-06-20 | End: 2024-06-20

## 2024-06-20 RX ORDER — INSULIN GLARGINE 100 [IU]/ML
40 INJECTION, SOLUTION SUBCUTANEOUS AT BEDTIME
Refills: 0 | Status: DISCONTINUED | OUTPATIENT
Start: 2024-06-20 | End: 2024-06-21

## 2024-06-20 RX ORDER — DEXTROSE MONOHYDRATE AND SODIUM CHLORIDE 5; .3 G/100ML; G/100ML
1000 INJECTION, SOLUTION INTRAVENOUS ONCE
Refills: 0 | Status: COMPLETED | OUTPATIENT
Start: 2024-06-20 | End: 2024-06-20

## 2024-06-20 RX ORDER — DEXTROSE 30 % IN WATER 30 %
50 VIAL (ML) INTRAVENOUS ONCE
Refills: 0 | Status: COMPLETED | OUTPATIENT
Start: 2024-06-20 | End: 2024-06-20

## 2024-06-20 RX ORDER — DEXTROSE 30 % IN WATER 30 %
25 VIAL (ML) INTRAVENOUS ONCE
Refills: 0 | Status: DISCONTINUED | OUTPATIENT
Start: 2024-06-20 | End: 2024-06-28

## 2024-06-20 RX ORDER — SODIUM CHLORIDE 0.9 % (FLUSH) 0.9 %
1000 SYRINGE (ML) INJECTION
Refills: 0 | Status: DISCONTINUED | OUTPATIENT
Start: 2024-06-20 | End: 2024-06-20

## 2024-06-20 RX ORDER — INSULIN REGULAR, HUMAN 100/ML
12 VIAL (ML) INJECTION
Qty: 100 | Refills: 0 | Status: DISCONTINUED | OUTPATIENT
Start: 2024-06-20 | End: 2024-06-21

## 2024-06-20 RX ORDER — OLANZAPINE 2.5 MG/1
2 TABLET, FILM COATED ORAL ONCE
Refills: 0 | Status: DISCONTINUED | OUTPATIENT
Start: 2024-06-20 | End: 2024-06-20

## 2024-06-20 RX ORDER — INSULIN GLARGINE 100 [IU]/ML
40 INJECTION, SOLUTION SUBCUTANEOUS ONCE
Refills: 0 | Status: COMPLETED | OUTPATIENT
Start: 2024-06-20 | End: 2024-06-20

## 2024-06-20 RX ORDER — LORAZEPAM 0.5 MG
2 TABLET ORAL
Refills: 0 | Status: DISCONTINUED | OUTPATIENT
Start: 2024-06-20 | End: 2024-06-21

## 2024-06-20 RX ORDER — THIAMINE HCL 100 MG
500 TABLET ORAL ONCE
Refills: 0 | Status: COMPLETED | OUTPATIENT
Start: 2024-06-20 | End: 2024-06-20

## 2024-06-20 RX ORDER — VANCOMYCIN HYDROCHLORIDE 50 MG/ML
1250 KIT ORAL EVERY 24 HOURS
Refills: 0 | Status: DISCONTINUED | OUTPATIENT
Start: 2024-06-20 | End: 2024-06-22

## 2024-06-20 RX ORDER — INSULIN REGULAR, HUMAN 100/ML
10 VIAL (ML) INJECTION ONCE
Refills: 0 | Status: COMPLETED | OUTPATIENT
Start: 2024-06-20 | End: 2024-06-20

## 2024-06-20 RX ORDER — DEXTROSE MONOHYDRATE AND SODIUM CHLORIDE 5; .3 G/100ML; G/100ML
1000 INJECTION, SOLUTION INTRAVENOUS
Refills: 0 | Status: DISCONTINUED | OUTPATIENT
Start: 2024-06-20 | End: 2024-06-23

## 2024-06-20 RX ORDER — OLANZAPINE 2.5 MG/1
2.5 TABLET, FILM COATED ORAL ONCE
Refills: 0 | Status: COMPLETED | OUTPATIENT
Start: 2024-06-20 | End: 2024-06-20

## 2024-06-20 RX ORDER — DEXTROSE MONOHYDRATE AND SODIUM CHLORIDE 5; .3 G/100ML; G/100ML
1000 INJECTION, SOLUTION INTRAVENOUS
Refills: 0 | Status: DISCONTINUED | OUTPATIENT
Start: 2024-06-20 | End: 2024-06-28

## 2024-06-20 RX ORDER — DEXTROSE 30 % IN WATER 30 %
12.5 VIAL (ML) INTRAVENOUS ONCE
Refills: 0 | Status: DISCONTINUED | OUTPATIENT
Start: 2024-06-20 | End: 2024-06-28

## 2024-06-20 RX ORDER — DEXTROSE 30 % IN WATER 30 %
15 VIAL (ML) INTRAVENOUS ONCE
Refills: 0 | Status: DISCONTINUED | OUTPATIENT
Start: 2024-06-20 | End: 2024-06-28

## 2024-06-20 RX ORDER — HALOPERIDOL DECANOATE 100 MG/ML
5 VIAL (ML) INTRAMUSCULAR ONCE
Refills: 0 | Status: COMPLETED | OUTPATIENT
Start: 2024-06-20 | End: 2024-06-20

## 2024-06-20 RX ORDER — FOLIC ACID
1 POWDER (GRAM) MISCELLANEOUS DAILY
Refills: 0 | Status: DISCONTINUED | OUTPATIENT
Start: 2024-06-20 | End: 2024-06-28

## 2024-06-20 RX ORDER — GLUCAGON HYDROCHLORIDE 1 MG/ML
1 INJECTION, POWDER, FOR SOLUTION INTRAMUSCULAR; INTRAVENOUS; SUBCUTANEOUS ONCE
Refills: 0 | Status: DISCONTINUED | OUTPATIENT
Start: 2024-06-20 | End: 2024-06-28

## 2024-06-20 RX ORDER — DEXTROSE MONOHYDRATE 100 MG/ML
125 INJECTION, SOLUTION INTRAVENOUS ONCE
Refills: 0 | Status: DISCONTINUED | OUTPATIENT
Start: 2024-06-20 | End: 2024-06-28

## 2024-06-20 RX ORDER — DEXTROSE MONOHYDRATE, SODIUM CHLORIDE, AND POTASSIUM CHLORIDE 50; 4.5; 2.24 G/1000ML; G/1000ML; G/1000ML
1000 INJECTION, SOLUTION INTRAVENOUS
Refills: 0 | Status: DISCONTINUED | OUTPATIENT
Start: 2024-06-20 | End: 2024-06-20

## 2024-06-20 RX ORDER — LORAZEPAM 0.5 MG
1 TABLET ORAL
Refills: 0 | Status: DISCONTINUED | OUTPATIENT
Start: 2024-06-20 | End: 2024-06-21

## 2024-06-20 RX ADMIN — Medication 50 MILLILITER(S): at 09:23

## 2024-06-20 RX ADMIN — DEXMEDETOMIDINE HYDROCHLORIDE 10 MICROGRAM(S)/KG/HR: 4 INJECTION, SOLUTION INTRAVENOUS at 04:37

## 2024-06-20 RX ADMIN — DEXMEDETOMIDINE HYDROCHLORIDE 10 MICROGRAM(S)/KG/HR: 4 INJECTION, SOLUTION INTRAVENOUS at 13:25

## 2024-06-20 RX ADMIN — Medication 2 MILLIGRAM(S): at 00:19

## 2024-06-20 RX ADMIN — HEPARIN SODIUM 5000 UNIT(S): 50 INJECTION, SOLUTION INTRAVENOUS at 17:04

## 2024-06-20 RX ADMIN — Medication 75 MILLILITER(S): at 16:48

## 2024-06-20 RX ADMIN — DEXMEDETOMIDINE HYDROCHLORIDE 10 MICROGRAM(S)/KG/HR: 4 INJECTION, SOLUTION INTRAVENOUS at 21:16

## 2024-06-20 RX ADMIN — CEFEPIME 100 MILLIGRAM(S): 1 INJECTION, POWDER, FOR SOLUTION INTRAMUSCULAR; INTRAVENOUS at 06:14

## 2024-06-20 RX ADMIN — Medication 5 MILLIGRAM(S): at 13:30

## 2024-06-20 RX ADMIN — DEXTROSE MONOHYDRATE AND SODIUM CHLORIDE 3000 MILLILITER(S): 5; .3 INJECTION, SOLUTION INTRAVENOUS at 12:07

## 2024-06-20 RX ADMIN — VANCOMYCIN HYDROCHLORIDE 166.67 MILLIGRAM(S): KIT at 09:16

## 2024-06-20 RX ADMIN — DEXTROSE MONOHYDRATE, SODIUM CHLORIDE, AND POTASSIUM CHLORIDE 150 MILLILITER(S): 50; 4.5; 2.24 INJECTION, SOLUTION INTRAVENOUS at 09:23

## 2024-06-20 RX ADMIN — Medication 50 MILLILITER(S): at 22:34

## 2024-06-20 RX ADMIN — Medication 2 MILLIGRAM(S): at 06:14

## 2024-06-20 RX ADMIN — Medication 2 MILLIGRAM(S): at 23:36

## 2024-06-20 RX ADMIN — HEPARIN SODIUM 5000 UNIT(S): 50 INJECTION, SOLUTION INTRAVENOUS at 06:15

## 2024-06-20 RX ADMIN — Medication 2 MILLIGRAM(S): at 14:32

## 2024-06-20 RX ADMIN — DEXTROSE MONOHYDRATE AND SODIUM CHLORIDE 75 MILLILITER(S): 5; .3 INJECTION, SOLUTION INTRAVENOUS at 21:54

## 2024-06-20 RX ADMIN — DEXMEDETOMIDINE HYDROCHLORIDE 10 MICROGRAM(S)/KG/HR: 4 INJECTION, SOLUTION INTRAVENOUS at 17:04

## 2024-06-20 RX ADMIN — Medication 50 MILLILITER(S): at 18:10

## 2024-06-20 RX ADMIN — INSULIN GLARGINE 40 UNIT(S): 100 INJECTION, SOLUTION SUBCUTANEOUS at 16:43

## 2024-06-20 RX ADMIN — Medication 105 MILLIGRAM(S): at 20:12

## 2024-06-20 RX ADMIN — Medication 1 APPLICATION(S): at 06:13

## 2024-06-20 RX ADMIN — OLANZAPINE 2.5 MILLIGRAM(S): 2.5 TABLET, FILM COATED ORAL at 12:12

## 2024-06-20 RX ADMIN — Medication 10 UNIT(S): at 01:43

## 2024-06-21 ENCOUNTER — APPOINTMENT (OUTPATIENT)
Dept: UROLOGY | Facility: CLINIC | Age: 59
End: 2024-06-21

## 2024-06-21 LAB
ALBUMIN SERPL ELPH-MCNC: 3.4 G/DL — LOW (ref 3.5–5.2)
ALP SERPL-CCNC: 121 U/L — HIGH (ref 30–115)
ALT FLD-CCNC: 20 U/L — SIGNIFICANT CHANGE UP (ref 0–41)
AMPHET UR-MCNC: NEGATIVE — SIGNIFICANT CHANGE UP
ANION GAP SERPL CALC-SCNC: 10 MMOL/L — SIGNIFICANT CHANGE UP (ref 7–14)
ANION GAP SERPL CALC-SCNC: 8 MMOL/L — SIGNIFICANT CHANGE UP (ref 7–14)
AST SERPL-CCNC: 29 U/L — SIGNIFICANT CHANGE UP (ref 0–41)
BARBITURATES UR SCN-MCNC: NEGATIVE — SIGNIFICANT CHANGE UP
BENZODIAZ UR-MCNC: POSITIVE
BILIRUB DIRECT SERPL-MCNC: <0.2 MG/DL — SIGNIFICANT CHANGE UP (ref 0–0.3)
BILIRUB INDIRECT FLD-MCNC: >0 MG/DL — LOW (ref 0.2–1.2)
BILIRUB SERPL-MCNC: 0.2 MG/DL — SIGNIFICANT CHANGE UP (ref 0.2–1.2)
BUN SERPL-MCNC: 40 MG/DL — HIGH (ref 10–20)
BUN SERPL-MCNC: 50 MG/DL — HIGH (ref 10–20)
CALCIUM SERPL-MCNC: 8.3 MG/DL — LOW (ref 8.4–10.5)
CALCIUM SERPL-MCNC: 8.9 MG/DL — SIGNIFICANT CHANGE UP (ref 8.4–10.5)
CHLORIDE SERPL-SCNC: 100 MMOL/L — SIGNIFICANT CHANGE UP (ref 98–110)
CHLORIDE SERPL-SCNC: 103 MMOL/L — SIGNIFICANT CHANGE UP (ref 98–110)
CO2 SERPL-SCNC: 30 MMOL/L — SIGNIFICANT CHANGE UP (ref 17–32)
CO2 SERPL-SCNC: 31 MMOL/L — SIGNIFICANT CHANGE UP (ref 17–32)
COCAINE METAB.OTHER UR-MCNC: POSITIVE
CREAT SERPL-MCNC: 2.3 MG/DL — HIGH (ref 0.7–1.5)
CREAT SERPL-MCNC: 2.7 MG/DL — HIGH (ref 0.7–1.5)
EGFR: 26 ML/MIN/1.73M2 — LOW
EGFR: 32 ML/MIN/1.73M2 — LOW
FENTANYL UR QL: NEGATIVE — SIGNIFICANT CHANGE UP
FOLATE SERPL-MCNC: 17.8 NG/ML — SIGNIFICANT CHANGE UP
GLUCOSE BLDC GLUCOMTR-MCNC: 105 MG/DL — HIGH (ref 70–99)
GLUCOSE BLDC GLUCOMTR-MCNC: 115 MG/DL — HIGH (ref 70–99)
GLUCOSE BLDC GLUCOMTR-MCNC: 120 MG/DL — HIGH (ref 70–99)
GLUCOSE BLDC GLUCOMTR-MCNC: 135 MG/DL — HIGH (ref 70–99)
GLUCOSE BLDC GLUCOMTR-MCNC: 140 MG/DL — HIGH (ref 70–99)
GLUCOSE BLDC GLUCOMTR-MCNC: 144 MG/DL — HIGH (ref 70–99)
GLUCOSE BLDC GLUCOMTR-MCNC: 175 MG/DL — HIGH (ref 70–99)
GLUCOSE BLDC GLUCOMTR-MCNC: 191 MG/DL — HIGH (ref 70–99)
GLUCOSE BLDC GLUCOMTR-MCNC: 198 MG/DL — HIGH (ref 70–99)
GLUCOSE BLDC GLUCOMTR-MCNC: 62 MG/DL — LOW (ref 70–99)
GLUCOSE BLDC GLUCOMTR-MCNC: 79 MG/DL — SIGNIFICANT CHANGE UP (ref 70–99)
GLUCOSE BLDC GLUCOMTR-MCNC: >600 MG/DL — CRITICAL HIGH (ref 70–99)
GLUCOSE SERPL-MCNC: 191 MG/DL — HIGH (ref 70–99)
GLUCOSE SERPL-MCNC: 67 MG/DL — LOW (ref 70–99)
HCT VFR BLD CALC: 27.5 % — LOW (ref 42–52)
HGB BLD-MCNC: 9.5 G/DL — LOW (ref 14–18)
MAGNESIUM SERPL-MCNC: 1.8 MG/DL — SIGNIFICANT CHANGE UP (ref 1.8–2.4)
MAGNESIUM SERPL-MCNC: 2 MG/DL — SIGNIFICANT CHANGE UP (ref 1.8–2.4)
MCHC RBC-ENTMCNC: 27.8 PG — SIGNIFICANT CHANGE UP (ref 27–31)
MCHC RBC-ENTMCNC: 34.5 G/DL — SIGNIFICANT CHANGE UP (ref 32–37)
MCV RBC AUTO: 80.4 FL — SIGNIFICANT CHANGE UP (ref 80–94)
METHADONE UR-MCNC: NEGATIVE — SIGNIFICANT CHANGE UP
NRBC # BLD: 0 /100 WBCS — SIGNIFICANT CHANGE UP (ref 0–0)
OPIATES UR-MCNC: NEGATIVE — SIGNIFICANT CHANGE UP
PCP SPEC-MCNC: SIGNIFICANT CHANGE UP
PHOSPHATE SERPL-MCNC: 2.9 MG/DL — SIGNIFICANT CHANGE UP (ref 2.1–4.9)
PHOSPHATE SERPL-MCNC: 2.9 MG/DL — SIGNIFICANT CHANGE UP (ref 2.1–4.9)
PLATELET # BLD AUTO: 312 K/UL — SIGNIFICANT CHANGE UP (ref 130–400)
PMV BLD: 9.2 FL — SIGNIFICANT CHANGE UP (ref 7.4–10.4)
POTASSIUM SERPL-MCNC: 3.8 MMOL/L — SIGNIFICANT CHANGE UP (ref 3.5–5)
POTASSIUM SERPL-MCNC: 4.1 MMOL/L — SIGNIFICANT CHANGE UP (ref 3.5–5)
POTASSIUM SERPL-SCNC: 3.8 MMOL/L — SIGNIFICANT CHANGE UP (ref 3.5–5)
POTASSIUM SERPL-SCNC: 4.1 MMOL/L — SIGNIFICANT CHANGE UP (ref 3.5–5)
PROPOXYPHENE QUALITATIVE URINE RESULT: NEGATIVE — SIGNIFICANT CHANGE UP
PROT SERPL-MCNC: 5.7 G/DL — LOW (ref 6–8)
RBC # BLD: 3.42 M/UL — LOW (ref 4.7–6.1)
RBC # FLD: 15.9 % — HIGH (ref 11.5–14.5)
SODIUM SERPL-SCNC: 138 MMOL/L — SIGNIFICANT CHANGE UP (ref 135–146)
SODIUM SERPL-SCNC: 144 MMOL/L — SIGNIFICANT CHANGE UP (ref 135–146)
VIT B12 SERPL-MCNC: 908 PG/ML — SIGNIFICANT CHANGE UP (ref 232–1245)
WBC # BLD: 8.2 K/UL — SIGNIFICANT CHANGE UP (ref 4.8–10.8)
WBC # FLD AUTO: 8.2 K/UL — SIGNIFICANT CHANGE UP (ref 4.8–10.8)

## 2024-06-21 PROCEDURE — 99291 CRITICAL CARE FIRST HOUR: CPT | Mod: GC

## 2024-06-21 PROCEDURE — 99232 SBSQ HOSP IP/OBS MODERATE 35: CPT

## 2024-06-21 RX ORDER — LORAZEPAM 0.5 MG
1 TABLET ORAL EVERY 4 HOURS
Refills: 0 | Status: DISCONTINUED | OUTPATIENT
Start: 2024-06-23 | End: 2024-06-23

## 2024-06-21 RX ORDER — THIAMINE HCL 100 MG
500 TABLET ORAL EVERY 8 HOURS
Refills: 0 | Status: COMPLETED | OUTPATIENT
Start: 2024-06-21 | End: 2024-06-24

## 2024-06-21 RX ORDER — LORAZEPAM 0.5 MG
TABLET ORAL
Refills: 0 | Status: DISCONTINUED | OUTPATIENT
Start: 2024-06-22 | End: 2024-06-23

## 2024-06-21 RX ORDER — DEXTROSE 30 % IN WATER 30 %
50 VIAL (ML) INTRAVENOUS ONCE
Refills: 0 | Status: COMPLETED | OUTPATIENT
Start: 2024-06-21 | End: 2024-06-21

## 2024-06-21 RX ORDER — LORAZEPAM 0.5 MG
2 TABLET ORAL EVERY 4 HOURS
Refills: 0 | Status: DISCONTINUED | OUTPATIENT
Start: 2024-06-21 | End: 2024-06-22

## 2024-06-21 RX ORDER — LORAZEPAM 0.5 MG
1.5 TABLET ORAL EVERY 4 HOURS
Refills: 0 | Status: DISCONTINUED | OUTPATIENT
Start: 2024-06-22 | End: 2024-06-23

## 2024-06-21 RX ORDER — LORAZEPAM 0.5 MG
2 TABLET ORAL ONCE
Refills: 0 | Status: DISCONTINUED | OUTPATIENT
Start: 2024-06-21 | End: 2024-06-21

## 2024-06-21 RX ORDER — INSULIN GLARGINE 100 [IU]/ML
20 INJECTION, SOLUTION SUBCUTANEOUS AT BEDTIME
Refills: 0 | Status: DISCONTINUED | OUTPATIENT
Start: 2024-06-21 | End: 2024-06-28

## 2024-06-21 RX ADMIN — Medication 1 APPLICATION(S): at 05:20

## 2024-06-21 RX ADMIN — HEPARIN SODIUM 5000 UNIT(S): 50 INJECTION, SOLUTION INTRAVENOUS at 05:20

## 2024-06-21 RX ADMIN — DEXMEDETOMIDINE HYDROCHLORIDE 10 MICROGRAM(S)/KG/HR: 4 INJECTION, SOLUTION INTRAVENOUS at 20:36

## 2024-06-21 RX ADMIN — Medication 2 MILLIGRAM(S): at 21:59

## 2024-06-21 RX ADMIN — DEXMEDETOMIDINE HYDROCHLORIDE 10 MICROGRAM(S)/KG/HR: 4 INJECTION, SOLUTION INTRAVENOUS at 05:19

## 2024-06-21 RX ADMIN — VANCOMYCIN HYDROCHLORIDE 166.67 MILLIGRAM(S): KIT at 11:08

## 2024-06-21 RX ADMIN — DEXMEDETOMIDINE HYDROCHLORIDE 10 MICROGRAM(S)/KG/HR: 4 INJECTION, SOLUTION INTRAVENOUS at 18:29

## 2024-06-21 RX ADMIN — Medication 2 MILLIGRAM(S): at 17:13

## 2024-06-21 RX ADMIN — CEFEPIME 100 MILLIGRAM(S): 1 INJECTION, POWDER, FOR SOLUTION INTRAMUSCULAR; INTRAVENOUS at 05:20

## 2024-06-21 RX ADMIN — Medication 50 MILLILITER(S): at 01:15

## 2024-06-21 RX ADMIN — INSULIN GLARGINE 20 UNIT(S): 100 INJECTION, SOLUTION SUBCUTANEOUS at 21:59

## 2024-06-21 RX ADMIN — Medication 105 MILLIGRAM(S): at 16:56

## 2024-06-21 RX ADMIN — DEXMEDETOMIDINE HYDROCHLORIDE 10 MICROGRAM(S)/KG/HR: 4 INJECTION, SOLUTION INTRAVENOUS at 02:20

## 2024-06-21 RX ADMIN — DEXTROSE MONOHYDRATE AND SODIUM CHLORIDE 75 MILLILITER(S): 5; .3 INJECTION, SOLUTION INTRAVENOUS at 11:23

## 2024-06-21 RX ADMIN — Medication 105 MILLIGRAM(S): at 21:59

## 2024-06-21 RX ADMIN — HEPARIN SODIUM 5000 UNIT(S): 50 INJECTION, SOLUTION INTRAVENOUS at 17:49

## 2024-06-21 RX ADMIN — Medication 105 MILLIGRAM(S): at 10:04

## 2024-06-21 RX ADMIN — DEXMEDETOMIDINE HYDROCHLORIDE 10 MICROGRAM(S)/KG/HR: 4 INJECTION, SOLUTION INTRAVENOUS at 21:58

## 2024-06-21 RX ADMIN — Medication 2 MILLIGRAM(S): at 15:44

## 2024-06-21 RX ADMIN — Medication 2 MILLIGRAM(S): at 00:58

## 2024-06-21 RX ADMIN — Medication 50 MILLILITER(S): at 06:55

## 2024-06-21 RX ADMIN — Medication 2 MILLIGRAM(S): at 16:18

## 2024-06-22 LAB
ANION GAP SERPL CALC-SCNC: 11 MMOL/L — SIGNIFICANT CHANGE UP (ref 7–14)
BUN SERPL-MCNC: 35 MG/DL — HIGH (ref 10–20)
CALCIUM SERPL-MCNC: 8.6 MG/DL — SIGNIFICANT CHANGE UP (ref 8.4–10.5)
CHLORIDE SERPL-SCNC: 103 MMOL/L — SIGNIFICANT CHANGE UP (ref 98–110)
CO2 SERPL-SCNC: 29 MMOL/L — SIGNIFICANT CHANGE UP (ref 17–32)
CREAT SERPL-MCNC: 2.2 MG/DL — HIGH (ref 0.7–1.5)
EGFR: 33 ML/MIN/1.73M2 — LOW
GLUCOSE BLDC GLUCOMTR-MCNC: 146 MG/DL — HIGH (ref 70–99)
GLUCOSE BLDC GLUCOMTR-MCNC: 159 MG/DL — HIGH (ref 70–99)
GLUCOSE BLDC GLUCOMTR-MCNC: 160 MG/DL — HIGH (ref 70–99)
GLUCOSE SERPL-MCNC: 156 MG/DL — HIGH (ref 70–99)
HCT VFR BLD CALC: 28.7 % — LOW (ref 42–52)
HGB BLD-MCNC: 9.7 G/DL — LOW (ref 14–18)
HIV 1+2 AB+HIV1 P24 AG SERPL QL IA: SIGNIFICANT CHANGE UP
MAGNESIUM SERPL-MCNC: 1.8 MG/DL — SIGNIFICANT CHANGE UP (ref 1.8–2.4)
MCHC RBC-ENTMCNC: 28 PG — SIGNIFICANT CHANGE UP (ref 27–31)
MCHC RBC-ENTMCNC: 33.8 G/DL — SIGNIFICANT CHANGE UP (ref 32–37)
MCV RBC AUTO: 82.9 FL — SIGNIFICANT CHANGE UP (ref 80–94)
NRBC # BLD: 0 /100 WBCS — SIGNIFICANT CHANGE UP (ref 0–0)
PHOSPHATE SERPL-MCNC: 3.1 MG/DL — SIGNIFICANT CHANGE UP (ref 2.1–4.9)
PLATELET # BLD AUTO: 265 K/UL — SIGNIFICANT CHANGE UP (ref 130–400)
PMV BLD: 9.4 FL — SIGNIFICANT CHANGE UP (ref 7.4–10.4)
POTASSIUM SERPL-MCNC: 3.9 MMOL/L — SIGNIFICANT CHANGE UP (ref 3.5–5)
POTASSIUM SERPL-SCNC: 3.9 MMOL/L — SIGNIFICANT CHANGE UP (ref 3.5–5)
RBC # BLD: 3.46 M/UL — LOW (ref 4.7–6.1)
RBC # FLD: 16.2 % — HIGH (ref 11.5–14.5)
SODIUM SERPL-SCNC: 143 MMOL/L — SIGNIFICANT CHANGE UP (ref 135–146)
VANCOMYCIN TROUGH SERPL-MCNC: 14.2 UG/ML — HIGH (ref 5–10)
WBC # BLD: 4.82 K/UL — SIGNIFICANT CHANGE UP (ref 4.8–10.8)
WBC # FLD AUTO: 4.82 K/UL — SIGNIFICANT CHANGE UP (ref 4.8–10.8)

## 2024-06-22 PROCEDURE — 99233 SBSQ HOSP IP/OBS HIGH 50: CPT

## 2024-06-22 RX ORDER — CEFTRIAXONE SODIUM 500 MG
1000 VIAL (EA) INJECTION EVERY 24 HOURS
Refills: 0 | Status: DISCONTINUED | OUTPATIENT
Start: 2024-06-23 | End: 2024-06-23

## 2024-06-22 RX ORDER — MAGNESIUM SULFATE 100 %
2 POWDER (GRAM) MISCELLANEOUS ONCE
Refills: 0 | Status: COMPLETED | OUTPATIENT
Start: 2024-06-22 | End: 2024-06-22

## 2024-06-22 RX ADMIN — Medication 2 MILLIGRAM(S): at 09:03

## 2024-06-22 RX ADMIN — DEXMEDETOMIDINE HYDROCHLORIDE 10 MICROGRAM(S)/KG/HR: 4 INJECTION, SOLUTION INTRAVENOUS at 21:22

## 2024-06-22 RX ADMIN — Medication 25 GRAM(S): at 09:01

## 2024-06-22 RX ADMIN — DEXMEDETOMIDINE HYDROCHLORIDE 10 MICROGRAM(S)/KG/HR: 4 INJECTION, SOLUTION INTRAVENOUS at 15:23

## 2024-06-22 RX ADMIN — Medication 1.5 MILLIGRAM(S): at 22:03

## 2024-06-22 RX ADMIN — DEXMEDETOMIDINE HYDROCHLORIDE 10 MICROGRAM(S)/KG/HR: 4 INJECTION, SOLUTION INTRAVENOUS at 07:20

## 2024-06-22 RX ADMIN — Medication 105 MILLIGRAM(S): at 21:55

## 2024-06-22 RX ADMIN — HEPARIN SODIUM 5000 UNIT(S): 50 INJECTION, SOLUTION INTRAVENOUS at 05:22

## 2024-06-22 RX ADMIN — Medication 2 MILLIGRAM(S): at 05:22

## 2024-06-22 RX ADMIN — Medication 1 APPLICATION(S): at 05:24

## 2024-06-22 RX ADMIN — Medication 1.5 MILLIGRAM(S): at 13:03

## 2024-06-22 RX ADMIN — DEXTROSE MONOHYDRATE AND SODIUM CHLORIDE 75 MILLILITER(S): 5; .3 INJECTION, SOLUTION INTRAVENOUS at 02:35

## 2024-06-22 RX ADMIN — Medication 105 MILLIGRAM(S): at 05:22

## 2024-06-22 RX ADMIN — HEPARIN SODIUM 5000 UNIT(S): 50 INJECTION, SOLUTION INTRAVENOUS at 17:22

## 2024-06-22 RX ADMIN — Medication 1.5 MILLIGRAM(S): at 17:21

## 2024-06-22 RX ADMIN — CEFEPIME 100 MILLIGRAM(S): 1 INJECTION, POWDER, FOR SOLUTION INTRAMUSCULAR; INTRAVENOUS at 05:23

## 2024-06-22 RX ADMIN — DEXMEDETOMIDINE HYDROCHLORIDE 10 MICROGRAM(S)/KG/HR: 4 INJECTION, SOLUTION INTRAVENOUS at 02:38

## 2024-06-22 RX ADMIN — DEXMEDETOMIDINE HYDROCHLORIDE 10 MICROGRAM(S)/KG/HR: 4 INJECTION, SOLUTION INTRAVENOUS at 18:26

## 2024-06-22 RX ADMIN — DEXTROSE MONOHYDRATE AND SODIUM CHLORIDE 75 MILLILITER(S): 5; .3 INJECTION, SOLUTION INTRAVENOUS at 07:21

## 2024-06-22 RX ADMIN — INSULIN GLARGINE 20 UNIT(S): 100 INJECTION, SOLUTION SUBCUTANEOUS at 21:55

## 2024-06-22 RX ADMIN — DEXMEDETOMIDINE HYDROCHLORIDE 10 MICROGRAM(S)/KG/HR: 4 INJECTION, SOLUTION INTRAVENOUS at 04:41

## 2024-06-22 RX ADMIN — DEXTROSE MONOHYDRATE AND SODIUM CHLORIDE 75 MILLILITER(S): 5; .3 INJECTION, SOLUTION INTRAVENOUS at 17:22

## 2024-06-22 RX ADMIN — Medication 105 MILLIGRAM(S): at 13:04

## 2024-06-22 RX ADMIN — Medication 2 MILLIGRAM(S): at 02:29

## 2024-06-22 NOTE — ASSESSMENT
[FreeTextEntry1] : #Urinary Retention/BPH Obstructive LUTS - previously performed CIC q6hrs, VUDS shows no evidence of high grade obstruction with a weak underactive detrusor muscle - Now voiding freely w/o issues - PVR 51cc recorded today - c/w Rapaflo - c/w finasteride -patient has not been taking this medication as he reports he read online that there is controversial adverse effects on renal function - Given pts stable symptoms and VUDS findings I do not feel at this time patient will benefit significantly from any BPH surgical procedure as there is no clear high-grade obstruction on UDS findings and his difficulty voiding likely stems from his weak underactive bladder  #PSA screening - PSA 0.97 1/2024 - Can repeat in 1 year  #ED -patient has a prior history of Trimix injection use over 5 years ago, does not recall his dosage - Injection with Trimix (Papaverine 30mg/mL, phentolamine 1mg/mL, PGE1 10 mcg/mL), had a decent response. prescription sent to compounding pharmacy for test dose to be administered by MD/PA. He understands to start at 20 units and go up by 5units to a max of 50 units. -Keep phenylephrine/Sudafed on hand however, reviewed indications for ER consult, including erection lasting 4 hrs. F/U 3-6 months symptom index.

## 2024-06-22 NOTE — HISTORY OF PRESENT ILLNESS
[FreeTextEntry1] : 58M w hx of DM, CKD4, charcot foot & PAD s/p multiple procedures within the last month p/w right foot gangrene s/p right BKA during previous hospital admission. UROLOGY consulted for urinary retention - has been performing CIC q6-8 hrs. Tolerates CIC well without any issues. Typical catheterized volumes of roughly 500 cc. He is having some episodes of voiding, usually after he takes Rapaflo however, he does not void often.  Currently on oxycodone for pain management. RBUS 11/2023 Bilateral renal cysts, No PVR has pt had no urge to void, Prostate volume is 52.5 mL  Office visit 1/12/24 Pt had VUDS on 1/3/24 which showed normal bladder compliance, hyposensitive bladder, capacity of 551mL, Grade 2/6 obstruction with weak detrusor function and reasonable emptying - no evidence of a high grade obstruction. Today pt reports that since December 3 he has not had to self catheterize. He has been voiding without issues on his own while taking silodosin. He has not been taking finasteride as he reports his daughter told him that it has some controversial effects on renal function. He has not noticed any episodes of gross hematuria. No recent fevers or chills. PVR obtained with bladder scan in office today showed a residual of 55 cc  Office visit 3/15/24 Pt presents today for ICI Trimix injection/teaching, refer to procedure note for further details. Also to reassess his voiding symptoms. He is being followed by nephrology for CKD. PVR 51 cc Bloodwork from his nephrologist:  Cr 3.25 eGFR 21 H/H 9.9/29.9 hA1C 6 PSA 0.97  Office visit 6/20/24

## 2024-06-22 NOTE — PLAN
[TextEntry] : The submitted E/M billing level for this visit reflects the total time spent on the day of the visit including face-to-face time spent with the patient, non-face-to-face review of medical records and relevant information, documentation, and asynchronous communication with the patient after a visit via phone, email, or patients EHR portal after the visit. The medical records reviewed are either scanned into the chart or reviewed with the patient using a patient's electronic medical records portal for patients with records not available to [facility] via electronic transmission platforms from other institutions and labs.   I have reviewed and verified information regarding the chief complaint and history recorded by the ancillary staff and/or the patient. I have independently reviewed and interpreted tests performed by other physicians and facilities as necessary. I have discussed with the patient differential diagnosis, reason for auxiliary tests if ordered, risks, benefits, alternatives, and complications of each form of therapy were discussed. Social determinants of disease were assessed and necessary measures implemented.   Time spent counseling and performing coordination of care was also included in determining the appropriate EM billing level.

## 2024-06-23 DIAGNOSIS — F14.10 COCAINE ABUSE, UNCOMPLICATED: ICD-10-CM

## 2024-06-23 LAB
ALBUMIN SERPL ELPH-MCNC: 3.2 G/DL — LOW (ref 3.5–5.2)
ALP SERPL-CCNC: 116 U/L — HIGH (ref 30–115)
ALT FLD-CCNC: 17 U/L — SIGNIFICANT CHANGE UP (ref 0–41)
AMMONIA BLD-MCNC: 11 UMOL/L — SIGNIFICANT CHANGE UP (ref 11–55)
ANION GAP SERPL CALC-SCNC: 10 MMOL/L — SIGNIFICANT CHANGE UP (ref 7–14)
ANION GAP SERPL CALC-SCNC: 11 MMOL/L — SIGNIFICANT CHANGE UP (ref 7–14)
AST SERPL-CCNC: 20 U/L — SIGNIFICANT CHANGE UP (ref 0–41)
BASOPHILS # BLD AUTO: 0.02 K/UL — SIGNIFICANT CHANGE UP (ref 0–0.2)
BASOPHILS NFR BLD AUTO: 0.3 % — SIGNIFICANT CHANGE UP (ref 0–1)
BILIRUB DIRECT SERPL-MCNC: <0.2 MG/DL — SIGNIFICANT CHANGE UP (ref 0–0.3)
BILIRUB INDIRECT FLD-MCNC: >0.1 MG/DL — LOW (ref 0.2–1.2)
BILIRUB SERPL-MCNC: 0.3 MG/DL — SIGNIFICANT CHANGE UP (ref 0.2–1.2)
BUN SERPL-MCNC: 25 MG/DL — HIGH (ref 10–20)
BUN SERPL-MCNC: 25 MG/DL — HIGH (ref 10–20)
CALCIUM SERPL-MCNC: 8 MG/DL — LOW (ref 8.4–10.5)
CALCIUM SERPL-MCNC: 8.7 MG/DL — SIGNIFICANT CHANGE UP (ref 8.4–10.5)
CHLORIDE SERPL-SCNC: 106 MMOL/L — SIGNIFICANT CHANGE UP (ref 98–110)
CHLORIDE SERPL-SCNC: 106 MMOL/L — SIGNIFICANT CHANGE UP (ref 98–110)
CK SERPL-CCNC: 341 U/L — HIGH (ref 0–225)
CO2 SERPL-SCNC: 25 MMOL/L — SIGNIFICANT CHANGE UP (ref 17–32)
CO2 SERPL-SCNC: 27 MMOL/L — SIGNIFICANT CHANGE UP (ref 17–32)
CREAT SERPL-MCNC: 2.1 MG/DL — HIGH (ref 0.7–1.5)
CREAT SERPL-MCNC: 2.2 MG/DL — HIGH (ref 0.7–1.5)
EGFR: 33 ML/MIN/1.73M2 — LOW
EGFR: 35 ML/MIN/1.73M2 — LOW
EOSINOPHIL # BLD AUTO: 0.05 K/UL — SIGNIFICANT CHANGE UP (ref 0–0.7)
EOSINOPHIL NFR BLD AUTO: 0.7 % — SIGNIFICANT CHANGE UP (ref 0–8)
GAS PNL BLDA: SIGNIFICANT CHANGE UP
GLUCOSE BLDC GLUCOMTR-MCNC: 109 MG/DL — HIGH (ref 70–99)
GLUCOSE BLDC GLUCOMTR-MCNC: 162 MG/DL — HIGH (ref 70–99)
GLUCOSE BLDC GLUCOMTR-MCNC: 268 MG/DL — HIGH (ref 70–99)
GLUCOSE BLDC GLUCOMTR-MCNC: 303 MG/DL — HIGH (ref 70–99)
GLUCOSE SERPL-MCNC: 125 MG/DL — HIGH (ref 70–99)
GLUCOSE SERPL-MCNC: 309 MG/DL — HIGH (ref 70–99)
HCT VFR BLD CALC: 25 % — LOW (ref 42–52)
HCT VFR BLD CALC: 30.2 % — LOW (ref 42–52)
HGB BLD-MCNC: 8.1 G/DL — LOW (ref 14–18)
HGB BLD-MCNC: 9.8 G/DL — LOW (ref 14–18)
IMM GRANULOCYTES NFR BLD AUTO: 0.6 % — HIGH (ref 0.1–0.3)
LYMPHOCYTES # BLD AUTO: 1.48 K/UL — SIGNIFICANT CHANGE UP (ref 1.2–3.4)
LYMPHOCYTES # BLD AUTO: 22.1 % — SIGNIFICANT CHANGE UP (ref 20.5–51.1)
MAGNESIUM SERPL-MCNC: 1.8 MG/DL — SIGNIFICANT CHANGE UP (ref 1.8–2.4)
MCHC RBC-ENTMCNC: 27.4 PG — SIGNIFICANT CHANGE UP (ref 27–31)
MCHC RBC-ENTMCNC: 27.4 PG — SIGNIFICANT CHANGE UP (ref 27–31)
MCHC RBC-ENTMCNC: 32.4 G/DL — SIGNIFICANT CHANGE UP (ref 32–37)
MCHC RBC-ENTMCNC: 32.5 G/DL — SIGNIFICANT CHANGE UP (ref 32–37)
MCV RBC AUTO: 84.4 FL — SIGNIFICANT CHANGE UP (ref 80–94)
MCV RBC AUTO: 84.5 FL — SIGNIFICANT CHANGE UP (ref 80–94)
MONOCYTES # BLD AUTO: 0.38 K/UL — SIGNIFICANT CHANGE UP (ref 0.1–0.6)
MONOCYTES NFR BLD AUTO: 5.7 % — SIGNIFICANT CHANGE UP (ref 1.7–9.3)
MRSA PCR RESULT.: POSITIVE
NEUTROPHILS # BLD AUTO: 4.72 K/UL — SIGNIFICANT CHANGE UP (ref 1.4–6.5)
NEUTROPHILS NFR BLD AUTO: 70.6 % — SIGNIFICANT CHANGE UP (ref 42.2–75.2)
NRBC # BLD: 0 /100 WBCS — SIGNIFICANT CHANGE UP (ref 0–0)
NRBC # BLD: 0 /100 WBCS — SIGNIFICANT CHANGE UP (ref 0–0)
PLATELET # BLD AUTO: 262 K/UL — SIGNIFICANT CHANGE UP (ref 130–400)
PLATELET # BLD AUTO: 305 K/UL — SIGNIFICANT CHANGE UP (ref 130–400)
PMV BLD: 10.4 FL — SIGNIFICANT CHANGE UP (ref 7.4–10.4)
PMV BLD: 9.8 FL — SIGNIFICANT CHANGE UP (ref 7.4–10.4)
POTASSIUM SERPL-MCNC: 3.9 MMOL/L — SIGNIFICANT CHANGE UP (ref 3.5–5)
POTASSIUM SERPL-MCNC: 4.2 MMOL/L — SIGNIFICANT CHANGE UP (ref 3.5–5)
POTASSIUM SERPL-SCNC: 3.9 MMOL/L — SIGNIFICANT CHANGE UP (ref 3.5–5)
POTASSIUM SERPL-SCNC: 4.2 MMOL/L — SIGNIFICANT CHANGE UP (ref 3.5–5)
PROT SERPL-MCNC: 5 G/DL — LOW (ref 6–8)
RBC # BLD: 2.96 M/UL — LOW (ref 4.7–6.1)
RBC # BLD: 3.58 M/UL — LOW (ref 4.7–6.1)
RBC # FLD: 15.7 % — HIGH (ref 11.5–14.5)
RBC # FLD: 15.9 % — HIGH (ref 11.5–14.5)
SODIUM SERPL-SCNC: 141 MMOL/L — SIGNIFICANT CHANGE UP (ref 135–146)
SODIUM SERPL-SCNC: 144 MMOL/L — SIGNIFICANT CHANGE UP (ref 135–146)
TROPONIN T, HIGH SENSITIVITY RESULT: 162 NG/L — CRITICAL HIGH (ref 6–21)
WBC # BLD: 3.98 K/UL — LOW (ref 4.8–10.8)
WBC # BLD: 6.69 K/UL — SIGNIFICANT CHANGE UP (ref 4.8–10.8)
WBC # FLD AUTO: 3.98 K/UL — LOW (ref 4.8–10.8)
WBC # FLD AUTO: 6.69 K/UL — SIGNIFICANT CHANGE UP (ref 4.8–10.8)

## 2024-06-23 PROCEDURE — 31500 INSERT EMERGENCY AIRWAY: CPT

## 2024-06-23 PROCEDURE — 99232 SBSQ HOSP IP/OBS MODERATE 35: CPT

## 2024-06-23 PROCEDURE — 71045 X-RAY EXAM CHEST 1 VIEW: CPT | Mod: 26

## 2024-06-23 PROCEDURE — 71045 X-RAY EXAM CHEST 1 VIEW: CPT | Mod: 26,77,76

## 2024-06-23 PROCEDURE — 99233 SBSQ HOSP IP/OBS HIGH 50: CPT

## 2024-06-23 PROCEDURE — 93971 EXTREMITY STUDY: CPT | Mod: 26,LT

## 2024-06-23 PROCEDURE — 93010 ELECTROCARDIOGRAM REPORT: CPT

## 2024-06-23 RX ORDER — INSULIN LISPRO 100 [IU]/ML
INJECTION, SOLUTION SUBCUTANEOUS
Refills: 0 | Status: DISCONTINUED | OUTPATIENT
Start: 2024-06-23 | End: 2024-06-28

## 2024-06-23 RX ORDER — DEXTROSE MONOHYDRATE AND SODIUM CHLORIDE 5; .3 G/100ML; G/100ML
1000 INJECTION, SOLUTION INTRAVENOUS
Refills: 0 | Status: DISCONTINUED | OUTPATIENT
Start: 2024-06-23 | End: 2024-06-23

## 2024-06-23 RX ORDER — HALOPERIDOL DECANOATE 100 MG/ML
2 VIAL (ML) INTRAMUSCULAR ONCE
Refills: 0 | Status: COMPLETED | OUTPATIENT
Start: 2024-06-23 | End: 2024-06-23

## 2024-06-23 RX ORDER — LORAZEPAM 0.5 MG
4 TABLET ORAL ONCE
Refills: 0 | Status: DISCONTINUED | OUTPATIENT
Start: 2024-06-23 | End: 2024-06-23

## 2024-06-23 RX ORDER — VANCOMYCIN HYDROCHLORIDE 50 MG/ML
1000 KIT ORAL ONCE
Refills: 0 | Status: COMPLETED | OUTPATIENT
Start: 2024-06-23 | End: 2024-06-23

## 2024-06-23 RX ORDER — PIPERACILLIN SODIUM AND TAZOBACTAM SODIUM 3; .375 G/15ML; G/15ML
2.25 INJECTION, POWDER, LYOPHILIZED, FOR SOLUTION INTRAVENOUS ONCE
Refills: 0 | Status: COMPLETED | OUTPATIENT
Start: 2024-06-24 | End: 2024-06-24

## 2024-06-23 RX ORDER — PHENOBARBITAL 15 MG/1
60 TABLET ORAL ONCE
Refills: 0 | Status: DISCONTINUED | OUTPATIENT
Start: 2024-06-23 | End: 2024-06-23

## 2024-06-23 RX ORDER — PHENOBARBITAL 15 MG/1
65 TABLET ORAL ONCE
Refills: 0 | Status: DISCONTINUED | OUTPATIENT
Start: 2024-06-23 | End: 2024-06-23

## 2024-06-23 RX ORDER — FENTANYL CITRATE 50 UG/ML
0.5 INJECTION, SOLUTION INTRAMUSCULAR; INTRAVENOUS
Qty: 2500 | Refills: 0 | Status: DISCONTINUED | OUTPATIENT
Start: 2024-06-23 | End: 2024-06-26

## 2024-06-23 RX ORDER — PROPOFOL 10 MG/ML
10 INJECTION, EMULSION INTRAVENOUS ONCE
Refills: 0 | Status: COMPLETED | OUTPATIENT
Start: 2024-06-23 | End: 2024-06-23

## 2024-06-23 RX ORDER — MIDAZOLAM HYDROCHLORIDE 1 MG/ML
2 INJECTION INTRAMUSCULAR; INTRAVENOUS
Refills: 0 | Status: DISCONTINUED | OUTPATIENT
Start: 2024-06-23 | End: 2024-06-23

## 2024-06-23 RX ORDER — PIPERACILLIN SODIUM AND TAZOBACTAM SODIUM 3; .375 G/15ML; G/15ML
2.25 INJECTION, POWDER, LYOPHILIZED, FOR SOLUTION INTRAVENOUS ONCE
Refills: 0 | Status: COMPLETED | OUTPATIENT
Start: 2024-06-23 | End: 2024-06-23

## 2024-06-23 RX ORDER — MIDAZOLAM HYDROCHLORIDE 1 MG/ML
0.02 INJECTION INTRAMUSCULAR; INTRAVENOUS
Qty: 100 | Refills: 0 | Status: DISCONTINUED | OUTPATIENT
Start: 2024-06-23 | End: 2024-06-26

## 2024-06-23 RX ORDER — PIPERACILLIN SODIUM AND TAZOBACTAM SODIUM 3; .375 G/15ML; G/15ML
3.38 INJECTION, POWDER, LYOPHILIZED, FOR SOLUTION INTRAVENOUS ONCE
Refills: 0 | Status: COMPLETED | OUTPATIENT
Start: 2024-06-23 | End: 2024-06-23

## 2024-06-23 RX ORDER — NOREPINEPHRINE BITARTRATE 1 MG/ML
0.05 INJECTION INTRAVENOUS
Qty: 8 | Refills: 0 | Status: DISCONTINUED | OUTPATIENT
Start: 2024-06-23 | End: 2024-06-25

## 2024-06-23 RX ORDER — ETOMIDATE 2 MG/ML
20 INJECTION, SOLUTION INTRAVENOUS ONCE
Refills: 0 | Status: COMPLETED | OUTPATIENT
Start: 2024-06-23 | End: 2024-06-23

## 2024-06-23 RX ORDER — PROPOFOL 10 MG/ML
10 INJECTION, EMULSION INTRAVENOUS
Qty: 1000 | Refills: 0 | Status: DISCONTINUED | OUTPATIENT
Start: 2024-06-23 | End: 2024-06-26

## 2024-06-23 RX ORDER — DEXTROSE MONOHYDRATE AND SODIUM CHLORIDE 5; .3 G/100ML; G/100ML
1000 INJECTION, SOLUTION INTRAVENOUS
Refills: 0 | Status: DISCONTINUED | OUTPATIENT
Start: 2024-06-23 | End: 2024-06-24

## 2024-06-23 RX ORDER — DEXMEDETOMIDINE HYDROCHLORIDE 4 UG/ML
0.2 INJECTION, SOLUTION INTRAVENOUS
Qty: 400 | Refills: 0 | Status: DISCONTINUED | OUTPATIENT
Start: 2024-06-23 | End: 2024-06-28

## 2024-06-23 RX ORDER — LORAZEPAM 0.5 MG
4 TABLET ORAL
Refills: 0 | Status: DISCONTINUED | OUTPATIENT
Start: 2024-06-23 | End: 2024-06-23

## 2024-06-23 RX ORDER — SODIUM CHLORIDE 0.9 % (FLUSH) 0.9 %
1000 SYRINGE (ML) INJECTION ONCE
Refills: 0 | Status: COMPLETED | OUTPATIENT
Start: 2024-06-23 | End: 2024-06-23

## 2024-06-23 RX ORDER — PHENOBARBITAL 15 MG/1
260 TABLET ORAL ONCE
Refills: 0 | Status: DISCONTINUED | OUTPATIENT
Start: 2024-06-23 | End: 2024-06-23

## 2024-06-23 RX ORDER — PIPERACILLIN SODIUM AND TAZOBACTAM SODIUM 3; .375 G/15ML; G/15ML
2.25 INJECTION, POWDER, LYOPHILIZED, FOR SOLUTION INTRAVENOUS EVERY 6 HOURS
Refills: 0 | Status: DISCONTINUED | OUTPATIENT
Start: 2024-06-24 | End: 2024-06-26

## 2024-06-23 RX ADMIN — Medication 1 APPLICATION(S): at 05:44

## 2024-06-23 RX ADMIN — INSULIN GLARGINE 20 UNIT(S): 100 INJECTION, SOLUTION SUBCUTANEOUS at 21:11

## 2024-06-23 RX ADMIN — Medication 105 MILLIGRAM(S): at 05:45

## 2024-06-23 RX ADMIN — DEXMEDETOMIDINE HYDROCHLORIDE 4.1 MICROGRAM(S)/KG/HR: 4 INJECTION, SOLUTION INTRAVENOUS at 17:44

## 2024-06-23 RX ADMIN — PHENOBARBITAL 65 MILLIGRAM(S): 15 TABLET ORAL at 14:34

## 2024-06-23 RX ADMIN — FENTANYL CITRATE 4.1 MICROGRAM(S)/KG/HR: 50 INJECTION, SOLUTION INTRAMUSCULAR; INTRAVENOUS at 18:35

## 2024-06-23 RX ADMIN — TAMSULOSIN HYDROCHLORIDE 0.4 MILLIGRAM(S): 0.4 CAPSULE ORAL at 21:11

## 2024-06-23 RX ADMIN — INSULIN LISPRO 8: 100 INJECTION, SOLUTION SUBCUTANEOUS at 19:04

## 2024-06-23 RX ADMIN — MIDAZOLAM HYDROCHLORIDE 2 MILLIGRAM(S): 1 INJECTION INTRAMUSCULAR; INTRAVENOUS at 16:09

## 2024-06-23 RX ADMIN — Medication 1.5 MILLIGRAM(S): at 01:32

## 2024-06-23 RX ADMIN — Medication 2 MILLIGRAM(S): at 12:11

## 2024-06-23 RX ADMIN — Medication 105 MILLIGRAM(S): at 18:15

## 2024-06-23 RX ADMIN — NOREPINEPHRINE BITARTRATE 7.68 MICROGRAM(S)/KG/MIN: 1 INJECTION INTRAVENOUS at 18:09

## 2024-06-23 RX ADMIN — HEPARIN SODIUM 5000 UNIT(S): 50 INJECTION, SOLUTION INTRAVENOUS at 05:44

## 2024-06-23 RX ADMIN — Medication 100 MILLIGRAM(S): at 05:48

## 2024-06-23 RX ADMIN — Medication 1.5 MILLIGRAM(S): at 05:44

## 2024-06-23 RX ADMIN — PIPERACILLIN SODIUM AND TAZOBACTAM SODIUM 2.25 GRAM(S): 3; .375 INJECTION, POWDER, LYOPHILIZED, FOR SOLUTION INTRAVENOUS at 22:20

## 2024-06-23 RX ADMIN — Medication 1.5 MILLIGRAM(S): at 10:36

## 2024-06-23 RX ADMIN — Medication 4 MILLIGRAM(S): at 12:38

## 2024-06-23 RX ADMIN — VANCOMYCIN HYDROCHLORIDE 250 MILLIGRAM(S): KIT at 18:15

## 2024-06-23 RX ADMIN — Medication 4 MILLIGRAM(S): at 12:30

## 2024-06-23 RX ADMIN — MIDAZOLAM HYDROCHLORIDE 2 MILLIGRAM(S): 1 INJECTION INTRAMUSCULAR; INTRAVENOUS at 16:11

## 2024-06-23 RX ADMIN — PROPOFOL 10 MILLIGRAM(S): 10 INJECTION, EMULSION INTRAVENOUS at 16:09

## 2024-06-23 RX ADMIN — DEXTROSE MONOHYDRATE AND SODIUM CHLORIDE 75 MILLILITER(S): 5; .3 INJECTION, SOLUTION INTRAVENOUS at 05:43

## 2024-06-23 RX ADMIN — ATORVASTATIN CALCIUM 80 MILLIGRAM(S): 20 TABLET, FILM COATED ORAL at 21:11

## 2024-06-23 RX ADMIN — PIPERACILLIN SODIUM AND TAZOBACTAM SODIUM 200 GRAM(S): 3; .375 INJECTION, POWDER, LYOPHILIZED, FOR SOLUTION INTRAVENOUS at 18:34

## 2024-06-23 RX ADMIN — HEPARIN SODIUM 5000 UNIT(S): 50 INJECTION, SOLUTION INTRAVENOUS at 17:49

## 2024-06-23 RX ADMIN — Medication 1000 MILLILITER(S): at 18:35

## 2024-06-23 RX ADMIN — PHENOBARBITAL 65 MILLIGRAM(S): 15 TABLET ORAL at 14:35

## 2024-06-23 RX ADMIN — ETOMIDATE 20 MILLIGRAM(S): 2 INJECTION, SOLUTION INTRAVENOUS at 14:40

## 2024-06-23 RX ADMIN — DEXMEDETOMIDINE HYDROCHLORIDE 10 MICROGRAM(S)/KG/HR: 4 INJECTION, SOLUTION INTRAVENOUS at 05:43

## 2024-06-23 RX ADMIN — Medication 15 MILLILITER(S): at 17:50

## 2024-06-23 RX ADMIN — Medication 105 MILLIGRAM(S): at 21:11

## 2024-06-23 RX ADMIN — PIPERACILLIN SODIUM AND TAZOBACTAM SODIUM 200 GRAM(S): 3; .375 INJECTION, POWDER, LYOPHILIZED, FOR SOLUTION INTRAVENOUS at 21:49

## 2024-06-23 RX ADMIN — Medication 4 MILLIGRAM(S): at 12:57

## 2024-06-24 DIAGNOSIS — F19.10 OTHER PSYCHOACTIVE SUBSTANCE ABUSE, UNCOMPLICATED: ICD-10-CM

## 2024-06-24 LAB
ALBUMIN SERPL ELPH-MCNC: 3.2 G/DL — LOW (ref 3.5–5.2)
ALP SERPL-CCNC: 125 U/L — HIGH (ref 30–115)
ALT FLD-CCNC: 18 U/L — SIGNIFICANT CHANGE UP (ref 0–41)
ANION GAP SERPL CALC-SCNC: 9 MMOL/L — SIGNIFICANT CHANGE UP (ref 7–14)
APTT BLD: 28.3 SEC — SIGNIFICANT CHANGE UP (ref 27–39.2)
AST SERPL-CCNC: 16 U/L — SIGNIFICANT CHANGE UP (ref 0–41)
BASOPHILS # BLD AUTO: 0.02 K/UL — SIGNIFICANT CHANGE UP (ref 0–0.2)
BASOPHILS NFR BLD AUTO: 0.4 % — SIGNIFICANT CHANGE UP (ref 0–1)
BILIRUB SERPL-MCNC: 0.3 MG/DL — SIGNIFICANT CHANGE UP (ref 0.2–1.2)
BUN SERPL-MCNC: 26 MG/DL — HIGH (ref 10–20)
CALCIUM SERPL-MCNC: 8.2 MG/DL — LOW (ref 8.4–10.5)
CHLORIDE SERPL-SCNC: 108 MMOL/L — SIGNIFICANT CHANGE UP (ref 98–110)
CO2 SERPL-SCNC: 27 MMOL/L — SIGNIFICANT CHANGE UP (ref 17–32)
CREAT SERPL-MCNC: 2.6 MG/DL — HIGH (ref 0.7–1.5)
EGFR: 27 ML/MIN/1.73M2 — LOW
EOSINOPHIL # BLD AUTO: 0.16 K/UL — SIGNIFICANT CHANGE UP (ref 0–0.7)
EOSINOPHIL NFR BLD AUTO: 3 % — SIGNIFICANT CHANGE UP (ref 0–8)
GAS PNL BLDA: SIGNIFICANT CHANGE UP
GLUCOSE BLDC GLUCOMTR-MCNC: 125 MG/DL — HIGH (ref 70–99)
GLUCOSE BLDC GLUCOMTR-MCNC: 127 MG/DL — HIGH (ref 70–99)
GLUCOSE BLDC GLUCOMTR-MCNC: 149 MG/DL — HIGH (ref 70–99)
GLUCOSE BLDC GLUCOMTR-MCNC: 156 MG/DL — HIGH (ref 70–99)
GLUCOSE BLDC GLUCOMTR-MCNC: 69 MG/DL — LOW (ref 70–99)
GLUCOSE SERPL-MCNC: 75 MG/DL — SIGNIFICANT CHANGE UP (ref 70–99)
HCT VFR BLD CALC: 26.2 % — LOW (ref 42–52)
HGB BLD-MCNC: 8.4 G/DL — LOW (ref 14–18)
IMM GRANULOCYTES NFR BLD AUTO: 0.4 % — HIGH (ref 0.1–0.3)
LYMPHOCYTES # BLD AUTO: 2.32 K/UL — SIGNIFICANT CHANGE UP (ref 1.2–3.4)
LYMPHOCYTES # BLD AUTO: 43.9 % — SIGNIFICANT CHANGE UP (ref 20.5–51.1)
MAGNESIUM SERPL-MCNC: 1.7 MG/DL — LOW (ref 1.8–2.4)
MCHC RBC-ENTMCNC: 27.5 PG — SIGNIFICANT CHANGE UP (ref 27–31)
MCHC RBC-ENTMCNC: 32.1 G/DL — SIGNIFICANT CHANGE UP (ref 32–37)
MCV RBC AUTO: 85.9 FL — SIGNIFICANT CHANGE UP (ref 80–94)
MONOCYTES # BLD AUTO: 0.33 K/UL — SIGNIFICANT CHANGE UP (ref 0.1–0.6)
MONOCYTES NFR BLD AUTO: 6.2 % — SIGNIFICANT CHANGE UP (ref 1.7–9.3)
NEUTROPHILS # BLD AUTO: 2.44 K/UL — SIGNIFICANT CHANGE UP (ref 1.4–6.5)
NEUTROPHILS NFR BLD AUTO: 46.1 % — SIGNIFICANT CHANGE UP (ref 42.2–75.2)
NRBC # BLD: 0 /100 WBCS — SIGNIFICANT CHANGE UP (ref 0–0)
PLATELET # BLD AUTO: 312 K/UL — SIGNIFICANT CHANGE UP (ref 130–400)
PMV BLD: 10 FL — SIGNIFICANT CHANGE UP (ref 7.4–10.4)
POTASSIUM SERPL-MCNC: 3.9 MMOL/L — SIGNIFICANT CHANGE UP (ref 3.5–5)
POTASSIUM SERPL-SCNC: 3.9 MMOL/L — SIGNIFICANT CHANGE UP (ref 3.5–5)
PROCALCITONIN SERPL-MCNC: 0.93 NG/ML — HIGH (ref 0.02–0.1)
PROCALCITONIN SERPL-MCNC: 0.93 NG/ML — HIGH (ref 0.02–0.1)
PROT SERPL-MCNC: 5.4 G/DL — LOW (ref 6–8)
RBC # BLD: 3.05 M/UL — LOW (ref 4.7–6.1)
RBC # FLD: 16.1 % — HIGH (ref 11.5–14.5)
SODIUM SERPL-SCNC: 144 MMOL/L — SIGNIFICANT CHANGE UP (ref 135–146)
TROPONIN T, HIGH SENSITIVITY RESULT: 194 NG/L — CRITICAL HIGH (ref 6–21)
TROPONIN T, HIGH SENSITIVITY RESULT: 206 NG/L — CRITICAL HIGH (ref 6–21)
WBC # BLD: 5.29 K/UL — SIGNIFICANT CHANGE UP (ref 4.8–10.8)
WBC # FLD AUTO: 5.29 K/UL — SIGNIFICANT CHANGE UP (ref 4.8–10.8)

## 2024-06-24 PROCEDURE — 95816 EEG AWAKE AND DROWSY: CPT | Mod: 26

## 2024-06-24 PROCEDURE — 74176 CT ABD & PELVIS W/O CONTRAST: CPT | Mod: 26

## 2024-06-24 PROCEDURE — 99291 CRITICAL CARE FIRST HOUR: CPT

## 2024-06-24 PROCEDURE — 99231 SBSQ HOSP IP/OBS SF/LOW 25: CPT | Mod: 95

## 2024-06-24 PROCEDURE — 76770 US EXAM ABDO BACK WALL COMP: CPT | Mod: 26

## 2024-06-24 PROCEDURE — 73590 X-RAY EXAM OF LOWER LEG: CPT | Mod: 26,RT

## 2024-06-24 PROCEDURE — 99222 1ST HOSP IP/OBS MODERATE 55: CPT

## 2024-06-24 PROCEDURE — 99223 1ST HOSP IP/OBS HIGH 75: CPT

## 2024-06-24 PROCEDURE — 70450 CT HEAD/BRAIN W/O DYE: CPT | Mod: 26

## 2024-06-24 PROCEDURE — 71045 X-RAY EXAM CHEST 1 VIEW: CPT | Mod: 26

## 2024-06-24 PROCEDURE — 71250 CT THORAX DX C-: CPT | Mod: 26

## 2024-06-24 PROCEDURE — 99233 SBSQ HOSP IP/OBS HIGH 50: CPT

## 2024-06-24 RX ORDER — HEPARIN SODIUM 50 [USP'U]/ML
INJECTION, SOLUTION INTRAVENOUS
Qty: 25000 | Refills: 0 | Status: DISCONTINUED | OUTPATIENT
Start: 2024-06-24 | End: 2024-06-27

## 2024-06-24 RX ORDER — HEPARIN SODIUM 50 [USP'U]/ML
6500 INJECTION, SOLUTION INTRAVENOUS EVERY 6 HOURS
Refills: 0 | Status: DISCONTINUED | OUTPATIENT
Start: 2024-06-24 | End: 2024-06-27

## 2024-06-24 RX ORDER — HEPARIN SODIUM 50 [USP'U]/ML
INJECTION, SOLUTION INTRAVENOUS
Qty: 25000 | Refills: 0 | Status: DISCONTINUED | OUTPATIENT
Start: 2024-06-24 | End: 2024-06-24

## 2024-06-24 RX ORDER — ALPRAZOLAM 2 MG/1
0.5 TABLET ORAL EVERY 12 HOURS
Refills: 0 | Status: DISCONTINUED | OUTPATIENT
Start: 2024-06-24 | End: 2024-06-25

## 2024-06-24 RX ORDER — HEPARIN SODIUM 50 [USP'U]/ML
6500 INJECTION, SOLUTION INTRAVENOUS ONCE
Refills: 0 | Status: COMPLETED | OUTPATIENT
Start: 2024-06-24 | End: 2024-06-24

## 2024-06-24 RX ORDER — MAGNESIUM SULFATE 100 %
2 POWDER (GRAM) MISCELLANEOUS ONCE
Refills: 0 | Status: COMPLETED | OUTPATIENT
Start: 2024-06-24 | End: 2024-06-24

## 2024-06-24 RX ORDER — HEPARIN SODIUM 50 [USP'U]/ML
3000 INJECTION, SOLUTION INTRAVENOUS EVERY 6 HOURS
Refills: 0 | Status: DISCONTINUED | OUTPATIENT
Start: 2024-06-24 | End: 2024-06-27

## 2024-06-24 RX ORDER — MUPIROCIN 20 MG/G
1 OINTMENT TOPICAL
Refills: 0 | Status: DISCONTINUED | OUTPATIENT
Start: 2024-06-24 | End: 2024-06-28

## 2024-06-24 RX ADMIN — ATORVASTATIN CALCIUM 80 MILLIGRAM(S): 20 TABLET, FILM COATED ORAL at 21:40

## 2024-06-24 RX ADMIN — Medication 1 MILLIGRAM(S): at 12:12

## 2024-06-24 RX ADMIN — DEXMEDETOMIDINE HYDROCHLORIDE 4.1 MICROGRAM(S)/KG/HR: 4 INJECTION, SOLUTION INTRAVENOUS at 20:43

## 2024-06-24 RX ADMIN — Medication 105 MILLIGRAM(S): at 06:45

## 2024-06-24 RX ADMIN — PIPERACILLIN SODIUM AND TAZOBACTAM SODIUM 200 GRAM(S): 3; .375 INJECTION, POWDER, LYOPHILIZED, FOR SOLUTION INTRAVENOUS at 23:49

## 2024-06-24 RX ADMIN — PIPERACILLIN SODIUM AND TAZOBACTAM SODIUM 200 GRAM(S): 3; .375 INJECTION, POWDER, LYOPHILIZED, FOR SOLUTION INTRAVENOUS at 04:55

## 2024-06-24 RX ADMIN — Medication 1 APPLICATION(S): at 06:22

## 2024-06-24 RX ADMIN — Medication 15 MILLILITER(S): at 06:21

## 2024-06-24 RX ADMIN — PIPERACILLIN SODIUM AND TAZOBACTAM SODIUM 200 GRAM(S): 3; .375 INJECTION, POWDER, LYOPHILIZED, FOR SOLUTION INTRAVENOUS at 13:53

## 2024-06-24 RX ADMIN — MUPIROCIN 1 APPLICATION(S): 20 OINTMENT TOPICAL at 17:17

## 2024-06-24 RX ADMIN — HEPARIN SODIUM 5000 UNIT(S): 50 INJECTION, SOLUTION INTRAVENOUS at 06:21

## 2024-06-24 RX ADMIN — PIPERACILLIN SODIUM AND TAZOBACTAM SODIUM 200 GRAM(S): 3; .375 INJECTION, POWDER, LYOPHILIZED, FOR SOLUTION INTRAVENOUS at 17:19

## 2024-06-24 RX ADMIN — ASPIRIN 81 MILLIGRAM(S): 325 TABLET, FILM COATED ORAL at 12:12

## 2024-06-24 RX ADMIN — PIPERACILLIN SODIUM AND TAZOBACTAM SODIUM 2.25 GRAM(S): 3; .375 INJECTION, POWDER, LYOPHILIZED, FOR SOLUTION INTRAVENOUS at 04:30

## 2024-06-24 RX ADMIN — CLOPIDOGREL BISULFATE 75 MILLIGRAM(S): 75 TABLET, FILM COATED ORAL at 12:12

## 2024-06-24 RX ADMIN — DEXMEDETOMIDINE HYDROCHLORIDE 4.1 MICROGRAM(S)/KG/HR: 4 INJECTION, SOLUTION INTRAVENOUS at 10:28

## 2024-06-24 RX ADMIN — ALPRAZOLAM 0.5 MILLIGRAM(S): 2 TABLET ORAL at 09:55

## 2024-06-24 RX ADMIN — FENTANYL CITRATE 4.1 MICROGRAM(S)/KG/HR: 50 INJECTION, SOLUTION INTRAMUSCULAR; INTRAVENOUS at 20:34

## 2024-06-24 RX ADMIN — Medication 15 MILLILITER(S): at 17:17

## 2024-06-24 RX ADMIN — INSULIN GLARGINE 20 UNIT(S): 100 INJECTION, SOLUTION SUBCUTANEOUS at 21:40

## 2024-06-24 RX ADMIN — DEXMEDETOMIDINE HYDROCHLORIDE 4.1 MICROGRAM(S)/KG/HR: 4 INJECTION, SOLUTION INTRAVENOUS at 06:30

## 2024-06-24 RX ADMIN — HEPARIN SODIUM 6500 UNIT(S): 50 INJECTION, SOLUTION INTRAVENOUS at 17:33

## 2024-06-24 RX ADMIN — NOREPINEPHRINE BITARTRATE 7.68 MICROGRAM(S)/KG/MIN: 1 INJECTION INTRAVENOUS at 20:44

## 2024-06-24 RX ADMIN — Medication 25 GRAM(S): at 13:49

## 2024-06-24 RX ADMIN — HEPARIN SODIUM 1500 UNIT(S)/HR: 50 INJECTION, SOLUTION INTRAVENOUS at 17:34

## 2024-06-24 RX ADMIN — ALPRAZOLAM 0.5 MILLIGRAM(S): 2 TABLET ORAL at 17:17

## 2024-06-25 LAB
ALBUMIN SERPL ELPH-MCNC: 2.9 G/DL — LOW (ref 3.5–5.2)
ALP SERPL-CCNC: 124 U/L — HIGH (ref 30–115)
ALT FLD-CCNC: 16 U/L — SIGNIFICANT CHANGE UP (ref 0–41)
ANION GAP SERPL CALC-SCNC: 6 MMOL/L — LOW (ref 7–14)
APTT BLD: 192 SEC — CRITICAL HIGH (ref 27–39.2)
APTT BLD: 45 SEC — HIGH (ref 27–39.2)
APTT BLD: 72.9 SEC — CRITICAL HIGH (ref 27–39.2)
AST SERPL-CCNC: 16 U/L — SIGNIFICANT CHANGE UP (ref 0–41)
BASOPHILS # BLD AUTO: 0.02 K/UL — SIGNIFICANT CHANGE UP (ref 0–0.2)
BASOPHILS NFR BLD AUTO: 0.4 % — SIGNIFICANT CHANGE UP (ref 0–1)
BILIRUB SERPL-MCNC: 0.2 MG/DL — SIGNIFICANT CHANGE UP (ref 0.2–1.2)
BUN SERPL-MCNC: 25 MG/DL — HIGH (ref 10–20)
CALCIUM SERPL-MCNC: 8.3 MG/DL — LOW (ref 8.4–10.5)
CHLORIDE SERPL-SCNC: 107 MMOL/L — SIGNIFICANT CHANGE UP (ref 98–110)
CO2 SERPL-SCNC: 28 MMOL/L — SIGNIFICANT CHANGE UP (ref 17–32)
CREAT SERPL-MCNC: 2.8 MG/DL — HIGH (ref 0.7–1.5)
CULTURE RESULTS: SIGNIFICANT CHANGE UP
CULTURE RESULTS: SIGNIFICANT CHANGE UP
EGFR: 25 ML/MIN/1.73M2 — LOW
EOSINOPHIL # BLD AUTO: 0.14 K/UL — SIGNIFICANT CHANGE UP (ref 0–0.7)
EOSINOPHIL NFR BLD AUTO: 2.5 % — SIGNIFICANT CHANGE UP (ref 0–8)
GAS PNL BLDA: SIGNIFICANT CHANGE UP
GLUCOSE BLDC GLUCOMTR-MCNC: 101 MG/DL — HIGH (ref 70–99)
GLUCOSE BLDC GLUCOMTR-MCNC: 110 MG/DL — HIGH (ref 70–99)
GLUCOSE BLDC GLUCOMTR-MCNC: 128 MG/DL — HIGH (ref 70–99)
GLUCOSE BLDC GLUCOMTR-MCNC: 72 MG/DL — SIGNIFICANT CHANGE UP (ref 70–99)
GLUCOSE BLDC GLUCOMTR-MCNC: 97 MG/DL — SIGNIFICANT CHANGE UP (ref 70–99)
GLUCOSE SERPL-MCNC: 115 MG/DL — HIGH (ref 70–99)
GRAM STN FLD: SIGNIFICANT CHANGE UP
HCT VFR BLD CALC: 25 % — LOW (ref 42–52)
HGB BLD-MCNC: 8.1 G/DL — LOW (ref 14–18)
IMM GRANULOCYTES NFR BLD AUTO: 0.7 % — HIGH (ref 0.1–0.3)
LYMPHOCYTES # BLD AUTO: 2.01 K/UL — SIGNIFICANT CHANGE UP (ref 1.2–3.4)
LYMPHOCYTES # BLD AUTO: 35.7 % — SIGNIFICANT CHANGE UP (ref 20.5–51.1)
MAGNESIUM SERPL-MCNC: 1.8 MG/DL — SIGNIFICANT CHANGE UP (ref 1.8–2.4)
MCHC RBC-ENTMCNC: 27.7 PG — SIGNIFICANT CHANGE UP (ref 27–31)
MCHC RBC-ENTMCNC: 32.4 G/DL — SIGNIFICANT CHANGE UP (ref 32–37)
MCV RBC AUTO: 85.6 FL — SIGNIFICANT CHANGE UP (ref 80–94)
MONOCYTES # BLD AUTO: 0.45 K/UL — SIGNIFICANT CHANGE UP (ref 0.1–0.6)
MONOCYTES NFR BLD AUTO: 8 % — SIGNIFICANT CHANGE UP (ref 1.7–9.3)
NEUTROPHILS # BLD AUTO: 2.97 K/UL — SIGNIFICANT CHANGE UP (ref 1.4–6.5)
NEUTROPHILS NFR BLD AUTO: 52.7 % — SIGNIFICANT CHANGE UP (ref 42.2–75.2)
NRBC # BLD: 0 /100 WBCS — SIGNIFICANT CHANGE UP (ref 0–0)
PLATELET # BLD AUTO: 222 K/UL — SIGNIFICANT CHANGE UP (ref 130–400)
PMV BLD: 10 FL — SIGNIFICANT CHANGE UP (ref 7.4–10.4)
POTASSIUM SERPL-MCNC: 3.9 MMOL/L — SIGNIFICANT CHANGE UP (ref 3.5–5)
POTASSIUM SERPL-SCNC: 3.9 MMOL/L — SIGNIFICANT CHANGE UP (ref 3.5–5)
PROT SERPL-MCNC: 5.2 G/DL — LOW (ref 6–8)
RBC # BLD: 2.92 M/UL — LOW (ref 4.7–6.1)
RBC # FLD: 15.7 % — HIGH (ref 11.5–14.5)
SODIUM SERPL-SCNC: 141 MMOL/L — SIGNIFICANT CHANGE UP (ref 135–146)
SPECIMEN SOURCE: SIGNIFICANT CHANGE UP
TROPONIN SAMPLING TIME: SIGNIFICANT CHANGE UP
TROPONIN T, HIGH SENSITIVITY RESULT: 203 NG/L — CRITICAL HIGH (ref 6–21)
VANCOMYCIN TROUGH SERPL-MCNC: 13.4 UG/ML — HIGH (ref 5–10)
WBC # BLD: 5.63 K/UL — SIGNIFICANT CHANGE UP (ref 4.8–10.8)
WBC # FLD AUTO: 5.63 K/UL — SIGNIFICANT CHANGE UP (ref 4.8–10.8)

## 2024-06-25 PROCEDURE — 95720 EEG PHY/QHP EA INCR W/VEEG: CPT

## 2024-06-25 PROCEDURE — 71045 X-RAY EXAM CHEST 1 VIEW: CPT | Mod: 26

## 2024-06-25 PROCEDURE — 99291 CRITICAL CARE FIRST HOUR: CPT

## 2024-06-25 RX ORDER — FENTANYL CITRATE 50 UG/ML
50 INJECTION, SOLUTION INTRAMUSCULAR; INTRAVENOUS ONCE
Refills: 0 | Status: DISCONTINUED | OUTPATIENT
Start: 2024-06-25 | End: 2024-06-25

## 2024-06-25 RX ORDER — MIDAZOLAM HYDROCHLORIDE 1 MG/ML
2 INJECTION INTRAMUSCULAR; INTRAVENOUS ONCE
Refills: 0 | Status: DISCONTINUED | OUTPATIENT
Start: 2024-06-25 | End: 2024-06-26

## 2024-06-25 RX ORDER — SENNOSIDES 8.6 MG
2 TABLET ORAL AT BEDTIME
Refills: 0 | Status: DISCONTINUED | OUTPATIENT
Start: 2024-06-25 | End: 2024-06-28

## 2024-06-25 RX ORDER — DIAZEPAM 10 MG/1
5 TABLET ORAL EVERY 4 HOURS
Refills: 0 | Status: DISCONTINUED | OUTPATIENT
Start: 2024-06-25 | End: 2024-06-26

## 2024-06-25 RX ORDER — HYDRALAZINE HYDROCHLORIDE 50 MG/1
10 TABLET ORAL EVERY 4 HOURS
Refills: 0 | Status: DISCONTINUED | OUTPATIENT
Start: 2024-06-25 | End: 2024-06-28

## 2024-06-25 RX ORDER — OLANZAPINE 2.5 MG/1
5 TABLET, FILM COATED ORAL ONCE
Refills: 0 | Status: COMPLETED | OUTPATIENT
Start: 2024-06-25 | End: 2024-06-25

## 2024-06-25 RX ORDER — DEXTROSE MONOHYDRATE AND SODIUM CHLORIDE 5; .3 G/100ML; G/100ML
1000 INJECTION, SOLUTION INTRAVENOUS
Refills: 0 | Status: DISCONTINUED | OUTPATIENT
Start: 2024-06-25 | End: 2024-06-25

## 2024-06-25 RX ORDER — DIAZEPAM 10 MG/1
10 TABLET ORAL
Refills: 0 | Status: DISCONTINUED | OUTPATIENT
Start: 2024-06-25 | End: 2024-06-25

## 2024-06-25 RX ORDER — DIAZEPAM 10 MG/1
5 TABLET ORAL
Refills: 0 | Status: DISCONTINUED | OUTPATIENT
Start: 2024-06-25 | End: 2024-06-25

## 2024-06-25 RX ORDER — CLONAZEPAM 2 MG/1
0.5 TABLET ORAL EVERY 12 HOURS
Refills: 0 | Status: DISCONTINUED | OUTPATIENT
Start: 2024-06-25 | End: 2024-06-28

## 2024-06-25 RX ORDER — DEXTROSE MONOHYDRATE AND SODIUM CHLORIDE 5; .3 G/100ML; G/100ML
1000 INJECTION, SOLUTION INTRAVENOUS
Refills: 0 | Status: DISCONTINUED | OUTPATIENT
Start: 2024-06-25 | End: 2024-06-28

## 2024-06-25 RX ORDER — HALOPERIDOL DECANOATE 100 MG/ML
5 VIAL (ML) INTRAMUSCULAR ONCE
Refills: 0 | Status: DISCONTINUED | OUTPATIENT
Start: 2024-06-25 | End: 2024-06-25

## 2024-06-25 RX ORDER — HYDRALAZINE HYDROCHLORIDE 50 MG/1
20 TABLET ORAL ONCE
Refills: 0 | Status: COMPLETED | OUTPATIENT
Start: 2024-06-25 | End: 2024-06-25

## 2024-06-25 RX ORDER — DIAZEPAM 10 MG/1
10 TABLET ORAL ONCE
Refills: 0 | Status: DISCONTINUED | OUTPATIENT
Start: 2024-06-25 | End: 2024-06-25

## 2024-06-25 RX ORDER — OLANZAPINE 2.5 MG/1
5 TABLET, FILM COATED ORAL EVERY 8 HOURS
Refills: 0 | Status: DISCONTINUED | OUTPATIENT
Start: 2024-06-25 | End: 2024-06-25

## 2024-06-25 RX ORDER — CLONIDINE HYDROCHLORIDE 0.3 MG/1
1 TABLET ORAL ONCE
Refills: 0 | Status: COMPLETED | OUTPATIENT
Start: 2024-06-25 | End: 2024-06-25

## 2024-06-25 RX ORDER — FENTANYL CITRATE 50 UG/ML
15 INJECTION, SOLUTION INTRAMUSCULAR; INTRAVENOUS ONCE
Refills: 0 | Status: DISCONTINUED | OUTPATIENT
Start: 2024-06-25 | End: 2024-06-25

## 2024-06-25 RX ORDER — LABETALOL HYDROCHLORIDE 300 MG/1
10 TABLET ORAL ONCE
Refills: 0 | Status: DISCONTINUED | OUTPATIENT
Start: 2024-06-25 | End: 2024-06-25

## 2024-06-25 RX ORDER — POLYETHYLENE GLYCOL 3350 1 G/G
17 POWDER ORAL
Refills: 0 | Status: DISCONTINUED | OUTPATIENT
Start: 2024-06-25 | End: 2024-06-28

## 2024-06-25 RX ADMIN — MUPIROCIN 1 APPLICATION(S): 20 OINTMENT TOPICAL at 05:54

## 2024-06-25 RX ADMIN — ALPRAZOLAM 0.5 MILLIGRAM(S): 2 TABLET ORAL at 05:55

## 2024-06-25 RX ADMIN — HEPARIN SODIUM 3000 UNIT(S): 50 INJECTION, SOLUTION INTRAVENOUS at 10:29

## 2024-06-25 RX ADMIN — Medication 1 MILLIGRAM(S): at 11:34

## 2024-06-25 RX ADMIN — PIPERACILLIN SODIUM AND TAZOBACTAM SODIUM 200 GRAM(S): 3; .375 INJECTION, POWDER, LYOPHILIZED, FOR SOLUTION INTRAVENOUS at 12:23

## 2024-06-25 RX ADMIN — HEPARIN SODIUM 1200 UNIT(S)/HR: 50 INJECTION, SOLUTION INTRAVENOUS at 07:17

## 2024-06-25 RX ADMIN — DEXMEDETOMIDINE HYDROCHLORIDE 4.1 MICROGRAM(S)/KG/HR: 4 INJECTION, SOLUTION INTRAVENOUS at 23:34

## 2024-06-25 RX ADMIN — HEPARIN SODIUM 1200 UNIT(S)/HR: 50 INJECTION, SOLUTION INTRAVENOUS at 03:11

## 2024-06-25 RX ADMIN — DEXTROSE MONOHYDRATE AND SODIUM CHLORIDE 100 MILLILITER(S): 5; .3 INJECTION, SOLUTION INTRAVENOUS at 19:49

## 2024-06-25 RX ADMIN — HYDRALAZINE HYDROCHLORIDE 20 MILLIGRAM(S): 50 TABLET ORAL at 21:23

## 2024-06-25 RX ADMIN — Medication 15 MILLILITER(S): at 05:55

## 2024-06-25 RX ADMIN — HYDRALAZINE HYDROCHLORIDE 10 MILLIGRAM(S): 50 TABLET ORAL at 18:34

## 2024-06-25 RX ADMIN — HEPARIN SODIUM 1400 UNIT(S)/HR: 50 INJECTION, SOLUTION INTRAVENOUS at 18:46

## 2024-06-25 RX ADMIN — HEPARIN SODIUM 1400 UNIT(S)/HR: 50 INJECTION, SOLUTION INTRAVENOUS at 10:28

## 2024-06-25 RX ADMIN — CLONAZEPAM 0.5 MILLIGRAM(S): 2 TABLET ORAL at 09:45

## 2024-06-25 RX ADMIN — PIPERACILLIN SODIUM AND TAZOBACTAM SODIUM 200 GRAM(S): 3; .375 INJECTION, POWDER, LYOPHILIZED, FOR SOLUTION INTRAVENOUS at 05:53

## 2024-06-25 RX ADMIN — HEPARIN SODIUM 1400 UNIT(S)/HR: 50 INJECTION, SOLUTION INTRAVENOUS at 14:12

## 2024-06-25 RX ADMIN — POLYETHYLENE GLYCOL 3350 17 GRAM(S): 1 POWDER ORAL at 14:13

## 2024-06-25 RX ADMIN — DEXMEDETOMIDINE HYDROCHLORIDE 4.1 MICROGRAM(S)/KG/HR: 4 INJECTION, SOLUTION INTRAVENOUS at 02:20

## 2024-06-25 RX ADMIN — DEXTROSE MONOHYDRATE AND SODIUM CHLORIDE 50 MILLILITER(S): 5; .3 INJECTION, SOLUTION INTRAVENOUS at 13:23

## 2024-06-25 RX ADMIN — MUPIROCIN 1 APPLICATION(S): 20 OINTMENT TOPICAL at 18:08

## 2024-06-25 RX ADMIN — PIPERACILLIN SODIUM AND TAZOBACTAM SODIUM 200 GRAM(S): 3; .375 INJECTION, POWDER, LYOPHILIZED, FOR SOLUTION INTRAVENOUS at 23:35

## 2024-06-25 RX ADMIN — FENTANYL CITRATE 4.1 MICROGRAM(S)/KG/HR: 50 INJECTION, SOLUTION INTRAMUSCULAR; INTRAVENOUS at 07:17

## 2024-06-25 RX ADMIN — CLOPIDOGREL BISULFATE 75 MILLIGRAM(S): 75 TABLET, FILM COATED ORAL at 11:34

## 2024-06-25 RX ADMIN — Medication 1 APPLICATION(S): at 05:53

## 2024-06-25 RX ADMIN — DEXMEDETOMIDINE HYDROCHLORIDE 4.1 MICROGRAM(S)/KG/HR: 4 INJECTION, SOLUTION INTRAVENOUS at 07:17

## 2024-06-25 RX ADMIN — OLANZAPINE 5 MILLIGRAM(S): 2.5 TABLET, FILM COATED ORAL at 17:27

## 2024-06-25 RX ADMIN — CLONIDINE HYDROCHLORIDE 1 PATCH: 0.3 TABLET ORAL at 21:14

## 2024-06-25 RX ADMIN — HEPARIN SODIUM 0 UNIT(S)/HR: 50 INJECTION, SOLUTION INTRAVENOUS at 01:47

## 2024-06-25 RX ADMIN — DEXMEDETOMIDINE HYDROCHLORIDE 4.1 MICROGRAM(S)/KG/HR: 4 INJECTION, SOLUTION INTRAVENOUS at 11:24

## 2024-06-25 RX ADMIN — DEXMEDETOMIDINE HYDROCHLORIDE 4.1 MICROGRAM(S)/KG/HR: 4 INJECTION, SOLUTION INTRAVENOUS at 20:18

## 2024-06-25 RX ADMIN — DIAZEPAM 5 MILLIGRAM(S): 10 TABLET ORAL at 21:41

## 2024-06-25 RX ADMIN — DIAZEPAM 10 MILLIGRAM(S): 10 TABLET ORAL at 19:53

## 2024-06-25 RX ADMIN — ASPIRIN 81 MILLIGRAM(S): 325 TABLET, FILM COATED ORAL at 11:34

## 2024-06-25 RX ADMIN — HEPARIN SODIUM 1400 UNIT(S)/HR: 50 INJECTION, SOLUTION INTRAVENOUS at 20:18

## 2024-06-25 RX ADMIN — PIPERACILLIN SODIUM AND TAZOBACTAM SODIUM 200 GRAM(S): 3; .375 INJECTION, POWDER, LYOPHILIZED, FOR SOLUTION INTRAVENOUS at 17:56

## 2024-06-25 RX ADMIN — FENTANYL CITRATE 4.1 MICROGRAM(S)/KG/HR: 50 INJECTION, SOLUTION INTRAMUSCULAR; INTRAVENOUS at 13:22

## 2024-06-25 RX ADMIN — FENTANYL CITRATE 50 MICROGRAM(S): 50 INJECTION, SOLUTION INTRAMUSCULAR; INTRAVENOUS at 09:49

## 2024-06-26 LAB
ALBUMIN SERPL ELPH-MCNC: 3 G/DL — LOW (ref 3.5–5.2)
ALP SERPL-CCNC: 120 U/L — HIGH (ref 30–115)
ALT FLD-CCNC: 14 U/L — SIGNIFICANT CHANGE UP (ref 0–41)
ANION GAP SERPL CALC-SCNC: 15 MMOL/L — HIGH (ref 7–14)
APTT BLD: 55.7 SEC — HIGH (ref 27–39.2)
APTT BLD: 81.3 SEC — CRITICAL HIGH (ref 27–39.2)
APTT BLD: 97.4 SEC — CRITICAL HIGH (ref 27–39.2)
AST SERPL-CCNC: 21 U/L — SIGNIFICANT CHANGE UP (ref 0–41)
BASOPHILS # BLD AUTO: 0.02 K/UL — SIGNIFICANT CHANGE UP (ref 0–0.2)
BASOPHILS NFR BLD AUTO: 0.4 % — SIGNIFICANT CHANGE UP (ref 0–1)
BILIRUB SERPL-MCNC: 0.3 MG/DL — SIGNIFICANT CHANGE UP (ref 0.2–1.2)
BUN SERPL-MCNC: 20 MG/DL — SIGNIFICANT CHANGE UP (ref 10–20)
CALCIUM SERPL-MCNC: 8.1 MG/DL — LOW (ref 8.4–10.5)
CHLORIDE SERPL-SCNC: 102 MMOL/L — SIGNIFICANT CHANGE UP (ref 98–110)
CO2 SERPL-SCNC: 22 MMOL/L — SIGNIFICANT CHANGE UP (ref 17–32)
CREAT SERPL-MCNC: 2.5 MG/DL — HIGH (ref 0.7–1.5)
CULTURE RESULTS: NO GROWTH — SIGNIFICANT CHANGE UP
CULTURE RESULTS: SIGNIFICANT CHANGE UP
EGFR: 29 ML/MIN/1.73M2 — LOW
EOSINOPHIL # BLD AUTO: 0.08 K/UL — SIGNIFICANT CHANGE UP (ref 0–0.7)
EOSINOPHIL NFR BLD AUTO: 1.4 % — SIGNIFICANT CHANGE UP (ref 0–8)
GLUCOSE BLDC GLUCOMTR-MCNC: 184 MG/DL — HIGH (ref 70–99)
GLUCOSE BLDC GLUCOMTR-MCNC: 206 MG/DL — HIGH (ref 70–99)
GLUCOSE BLDC GLUCOMTR-MCNC: 211 MG/DL — HIGH (ref 70–99)
GLUCOSE BLDC GLUCOMTR-MCNC: 228 MG/DL — HIGH (ref 70–99)
GLUCOSE BLDC GLUCOMTR-MCNC: 229 MG/DL — HIGH (ref 70–99)
GLUCOSE BLDC GLUCOMTR-MCNC: 272 MG/DL — HIGH (ref 70–99)
GLUCOSE SERPL-MCNC: 230 MG/DL — HIGH (ref 70–99)
HCT VFR BLD CALC: 22.9 % — LOW (ref 42–52)
HGB BLD-MCNC: 7.5 G/DL — LOW (ref 14–18)
IMM GRANULOCYTES NFR BLD AUTO: 0.5 % — HIGH (ref 0.1–0.3)
LYMPHOCYTES # BLD AUTO: 0.75 K/UL — LOW (ref 1.2–3.4)
LYMPHOCYTES # BLD AUTO: 13.2 % — LOW (ref 20.5–51.1)
MAGNESIUM SERPL-MCNC: 1.4 MG/DL — LOW (ref 1.8–2.4)
MCHC RBC-ENTMCNC: 27.6 PG — SIGNIFICANT CHANGE UP (ref 27–31)
MCHC RBC-ENTMCNC: 32.8 G/DL — SIGNIFICANT CHANGE UP (ref 32–37)
MCV RBC AUTO: 84.2 FL — SIGNIFICANT CHANGE UP (ref 80–94)
MONOCYTES # BLD AUTO: 0.37 K/UL — SIGNIFICANT CHANGE UP (ref 0.1–0.6)
MONOCYTES NFR BLD AUTO: 6.5 % — SIGNIFICANT CHANGE UP (ref 1.7–9.3)
NEUTROPHILS # BLD AUTO: 4.42 K/UL — SIGNIFICANT CHANGE UP (ref 1.4–6.5)
NEUTROPHILS NFR BLD AUTO: 78 % — HIGH (ref 42.2–75.2)
NRBC # BLD: 0 /100 WBCS — SIGNIFICANT CHANGE UP (ref 0–0)
PLATELET # BLD AUTO: 268 K/UL — SIGNIFICANT CHANGE UP (ref 130–400)
PMV BLD: 10.2 FL — SIGNIFICANT CHANGE UP (ref 7.4–10.4)
POTASSIUM SERPL-MCNC: 3.6 MMOL/L — SIGNIFICANT CHANGE UP (ref 3.5–5)
POTASSIUM SERPL-SCNC: 3.6 MMOL/L — SIGNIFICANT CHANGE UP (ref 3.5–5)
PROT SERPL-MCNC: 5.2 G/DL — LOW (ref 6–8)
RBC # BLD: 2.72 M/UL — LOW (ref 4.7–6.1)
RBC # FLD: 15.6 % — HIGH (ref 11.5–14.5)
SODIUM SERPL-SCNC: 139 MMOL/L — SIGNIFICANT CHANGE UP (ref 135–146)
SPECIMEN SOURCE: SIGNIFICANT CHANGE UP
SPECIMEN SOURCE: SIGNIFICANT CHANGE UP
WBC # BLD: 5.67 K/UL — SIGNIFICANT CHANGE UP (ref 4.8–10.8)
WBC # FLD AUTO: 5.67 K/UL — SIGNIFICANT CHANGE UP (ref 4.8–10.8)

## 2024-06-26 PROCEDURE — 93010 ELECTROCARDIOGRAM REPORT: CPT

## 2024-06-26 PROCEDURE — 71045 X-RAY EXAM CHEST 1 VIEW: CPT | Mod: 26,76

## 2024-06-26 PROCEDURE — 70553 MRI BRAIN STEM W/O & W/DYE: CPT | Mod: 26

## 2024-06-26 PROCEDURE — 99291 CRITICAL CARE FIRST HOUR: CPT

## 2024-06-26 RX ORDER — CLONIDINE HYDROCHLORIDE 0.3 MG/1
1 TABLET ORAL ONCE
Refills: 0 | Status: COMPLETED | OUTPATIENT
Start: 2024-06-26 | End: 2024-06-26

## 2024-06-26 RX ORDER — CLONIDINE HYDROCHLORIDE 0.3 MG/1
1 TABLET ORAL
Refills: 0 | Status: DISCONTINUED | OUTPATIENT
Start: 2024-06-26 | End: 2024-06-26

## 2024-06-26 RX ORDER — RISPERIDONE 0.5 MG/1
1 TABLET ORAL
Refills: 0 | Status: DISCONTINUED | OUTPATIENT
Start: 2024-06-26 | End: 2024-06-26

## 2024-06-26 RX ORDER — INSULIN LISPRO 100 [IU]/ML
5 INJECTION, SOLUTION SUBCUTANEOUS ONCE
Refills: 0 | Status: COMPLETED | OUTPATIENT
Start: 2024-06-26 | End: 2024-06-26

## 2024-06-26 RX ORDER — PIPERACILLIN SODIUM AND TAZOBACTAM SODIUM 3; .375 G/15ML; G/15ML
3.38 INJECTION, POWDER, LYOPHILIZED, FOR SOLUTION INTRAVENOUS EVERY 8 HOURS
Refills: 0 | Status: DISCONTINUED | OUTPATIENT
Start: 2024-06-26 | End: 2024-06-28

## 2024-06-26 RX ORDER — RISPERIDONE 0.5 MG/1
0.5 TABLET ORAL EVERY 12 HOURS
Refills: 0 | Status: DISCONTINUED | OUTPATIENT
Start: 2024-06-26 | End: 2024-06-28

## 2024-06-26 RX ORDER — MAGNESIUM SULFATE 100 %
2 POWDER (GRAM) MISCELLANEOUS ONCE
Refills: 0 | Status: COMPLETED | OUTPATIENT
Start: 2024-06-26 | End: 2024-06-26

## 2024-06-26 RX ADMIN — HEPARIN SODIUM 1600 UNIT(S)/HR: 50 INJECTION, SOLUTION INTRAVENOUS at 00:35

## 2024-06-26 RX ADMIN — HEPARIN SODIUM 1600 UNIT(S)/HR: 50 INJECTION, SOLUTION INTRAVENOUS at 07:30

## 2024-06-26 RX ADMIN — HEPARIN SODIUM 1600 UNIT(S)/HR: 50 INJECTION, SOLUTION INTRAVENOUS at 21:11

## 2024-06-26 RX ADMIN — Medication 1 MILLIGRAM(S): at 12:11

## 2024-06-26 RX ADMIN — ATORVASTATIN CALCIUM 80 MILLIGRAM(S): 20 TABLET, FILM COATED ORAL at 21:12

## 2024-06-26 RX ADMIN — MUPIROCIN 1 APPLICATION(S): 20 OINTMENT TOPICAL at 18:17

## 2024-06-26 RX ADMIN — INSULIN LISPRO 4: 100 INJECTION, SOLUTION SUBCUTANEOUS at 11:43

## 2024-06-26 RX ADMIN — PIPERACILLIN SODIUM AND TAZOBACTAM SODIUM 200 GRAM(S): 3; .375 INJECTION, POWDER, LYOPHILIZED, FOR SOLUTION INTRAVENOUS at 13:59

## 2024-06-26 RX ADMIN — PIPERACILLIN SODIUM AND TAZOBACTAM SODIUM 25 GRAM(S): 3; .375 INJECTION, POWDER, LYOPHILIZED, FOR SOLUTION INTRAVENOUS at 21:12

## 2024-06-26 RX ADMIN — Medication 25 GRAM(S): at 08:23

## 2024-06-26 RX ADMIN — DEXTROSE MONOHYDRATE AND SODIUM CHLORIDE 100 MILLILITER(S): 5; .3 INJECTION, SOLUTION INTRAVENOUS at 05:15

## 2024-06-26 RX ADMIN — CLONAZEPAM 0.5 MILLIGRAM(S): 2 TABLET ORAL at 21:12

## 2024-06-26 RX ADMIN — RISPERIDONE 0.5 MILLIGRAM(S): 0.5 TABLET ORAL at 12:12

## 2024-06-26 RX ADMIN — INSULIN LISPRO 4: 100 INJECTION, SOLUTION SUBCUTANEOUS at 18:32

## 2024-06-26 RX ADMIN — DEXMEDETOMIDINE HYDROCHLORIDE 4.1 MICROGRAM(S)/KG/HR: 4 INJECTION, SOLUTION INTRAVENOUS at 06:19

## 2024-06-26 RX ADMIN — CLONIDINE HYDROCHLORIDE 1 PATCH: 0.3 TABLET ORAL at 19:45

## 2024-06-26 RX ADMIN — CLONAZEPAM 0.5 MILLIGRAM(S): 2 TABLET ORAL at 12:11

## 2024-06-26 RX ADMIN — RISPERIDONE 0.5 MILLIGRAM(S): 0.5 TABLET ORAL at 22:14

## 2024-06-26 RX ADMIN — HEPARIN SODIUM 3000 UNIT(S): 50 INJECTION, SOLUTION INTRAVENOUS at 00:40

## 2024-06-26 RX ADMIN — INSULIN LISPRO 5 UNIT(S): 100 INJECTION, SOLUTION SUBCUTANEOUS at 23:56

## 2024-06-26 RX ADMIN — INSULIN LISPRO 4: 100 INJECTION, SOLUTION SUBCUTANEOUS at 06:02

## 2024-06-26 RX ADMIN — Medication 1 APPLICATION(S): at 05:05

## 2024-06-26 RX ADMIN — ASPIRIN 81 MILLIGRAM(S): 325 TABLET, FILM COATED ORAL at 12:11

## 2024-06-26 RX ADMIN — DEXMEDETOMIDINE HYDROCHLORIDE 4.1 MICROGRAM(S)/KG/HR: 4 INJECTION, SOLUTION INTRAVENOUS at 02:33

## 2024-06-26 RX ADMIN — DEXMEDETOMIDINE HYDROCHLORIDE 4.1 MICROGRAM(S)/KG/HR: 4 INJECTION, SOLUTION INTRAVENOUS at 16:00

## 2024-06-26 RX ADMIN — POLYETHYLENE GLYCOL 3350 17 GRAM(S): 1 POWDER ORAL at 18:31

## 2024-06-26 RX ADMIN — Medication 2 TABLET(S): at 21:11

## 2024-06-26 RX ADMIN — DEXTROSE MONOHYDRATE AND SODIUM CHLORIDE 100 MILLILITER(S): 5; .3 INJECTION, SOLUTION INTRAVENOUS at 21:11

## 2024-06-26 RX ADMIN — HYDRALAZINE HYDROCHLORIDE 10 MILLIGRAM(S): 50 TABLET ORAL at 21:24

## 2024-06-26 RX ADMIN — PIPERACILLIN SODIUM AND TAZOBACTAM SODIUM 200 GRAM(S): 3; .375 INJECTION, POWDER, LYOPHILIZED, FOR SOLUTION INTRAVENOUS at 05:05

## 2024-06-26 RX ADMIN — DIAZEPAM 5 MILLIGRAM(S): 10 TABLET ORAL at 02:26

## 2024-06-26 RX ADMIN — HEPARIN SODIUM 1600 UNIT(S)/HR: 50 INJECTION, SOLUTION INTRAVENOUS at 15:08

## 2024-06-26 RX ADMIN — INSULIN GLARGINE 20 UNIT(S): 100 INJECTION, SOLUTION SUBCUTANEOUS at 22:11

## 2024-06-26 RX ADMIN — HYDRALAZINE HYDROCHLORIDE 10 MILLIGRAM(S): 50 TABLET ORAL at 01:30

## 2024-06-26 RX ADMIN — CLOPIDOGREL BISULFATE 75 MILLIGRAM(S): 75 TABLET, FILM COATED ORAL at 12:11

## 2024-06-26 RX ADMIN — DEXMEDETOMIDINE HYDROCHLORIDE 4.1 MICROGRAM(S)/KG/HR: 4 INJECTION, SOLUTION INTRAVENOUS at 21:11

## 2024-06-26 RX ADMIN — MUPIROCIN 1 APPLICATION(S): 20 OINTMENT TOPICAL at 05:04

## 2024-06-27 LAB
ALBUMIN SERPL ELPH-MCNC: 2.9 G/DL — LOW (ref 3.5–5.2)
ALP SERPL-CCNC: 117 U/L — HIGH (ref 30–115)
ALT FLD-CCNC: 12 U/L — SIGNIFICANT CHANGE UP (ref 0–41)
ANION GAP SERPL CALC-SCNC: 11 MMOL/L — SIGNIFICANT CHANGE UP (ref 7–14)
APTT BLD: 61.5 SEC — HIGH (ref 27–39.2)
AST SERPL-CCNC: 14 U/L — SIGNIFICANT CHANGE UP (ref 0–41)
BASOPHILS # BLD AUTO: 0.03 K/UL — SIGNIFICANT CHANGE UP (ref 0–0.2)
BASOPHILS NFR BLD AUTO: 0.5 % — SIGNIFICANT CHANGE UP (ref 0–1)
BILIRUB SERPL-MCNC: 0.3 MG/DL — SIGNIFICANT CHANGE UP (ref 0.2–1.2)
BUN SERPL-MCNC: 22 MG/DL — HIGH (ref 10–20)
CALCIUM SERPL-MCNC: 8.3 MG/DL — LOW (ref 8.4–10.5)
CHLORIDE SERPL-SCNC: 104 MMOL/L — SIGNIFICANT CHANGE UP (ref 98–110)
CO2 SERPL-SCNC: 23 MMOL/L — SIGNIFICANT CHANGE UP (ref 17–32)
CREAT SERPL-MCNC: 2.4 MG/DL — HIGH (ref 0.7–1.5)
EGFR: 30 ML/MIN/1.73M2 — LOW
EOSINOPHIL # BLD AUTO: 0.13 K/UL — SIGNIFICANT CHANGE UP (ref 0–0.7)
EOSINOPHIL NFR BLD AUTO: 2.2 % — SIGNIFICANT CHANGE UP (ref 0–8)
GLUCOSE BLDC GLUCOMTR-MCNC: 172 MG/DL — HIGH (ref 70–99)
GLUCOSE BLDC GLUCOMTR-MCNC: 187 MG/DL — HIGH (ref 70–99)
GLUCOSE BLDC GLUCOMTR-MCNC: 293 MG/DL — HIGH (ref 70–99)
GLUCOSE BLDC GLUCOMTR-MCNC: 323 MG/DL — HIGH (ref 70–99)
GLUCOSE BLDC GLUCOMTR-MCNC: 336 MG/DL — HIGH (ref 70–99)
GLUCOSE BLDC GLUCOMTR-MCNC: 354 MG/DL — HIGH (ref 70–99)
GLUCOSE SERPL-MCNC: 157 MG/DL — HIGH (ref 70–99)
HCT VFR BLD CALC: 22.8 % — LOW (ref 42–52)
HGB BLD-MCNC: 7.7 G/DL — LOW (ref 14–18)
IMM GRANULOCYTES NFR BLD AUTO: 0.5 % — HIGH (ref 0.1–0.3)
LYMPHOCYTES # BLD AUTO: 1.37 K/UL — SIGNIFICANT CHANGE UP (ref 1.2–3.4)
LYMPHOCYTES # BLD AUTO: 23.3 % — SIGNIFICANT CHANGE UP (ref 20.5–51.1)
MAGNESIUM SERPL-MCNC: 1.7 MG/DL — LOW (ref 1.8–2.4)
MCHC RBC-ENTMCNC: 28.1 PG — SIGNIFICANT CHANGE UP (ref 27–31)
MCHC RBC-ENTMCNC: 33.8 G/DL — SIGNIFICANT CHANGE UP (ref 32–37)
MCV RBC AUTO: 83.2 FL — SIGNIFICANT CHANGE UP (ref 80–94)
MONOCYTES # BLD AUTO: 0.54 K/UL — SIGNIFICANT CHANGE UP (ref 0.1–0.6)
MONOCYTES NFR BLD AUTO: 9.2 % — SIGNIFICANT CHANGE UP (ref 1.7–9.3)
NEUTROPHILS # BLD AUTO: 3.78 K/UL — SIGNIFICANT CHANGE UP (ref 1.4–6.5)
NEUTROPHILS NFR BLD AUTO: 64.3 % — SIGNIFICANT CHANGE UP (ref 42.2–75.2)
NRBC # BLD: 0 /100 WBCS — SIGNIFICANT CHANGE UP (ref 0–0)
PLATELET # BLD AUTO: 319 K/UL — SIGNIFICANT CHANGE UP (ref 130–400)
PMV BLD: 10.2 FL — SIGNIFICANT CHANGE UP (ref 7.4–10.4)
POTASSIUM SERPL-MCNC: 3.5 MMOL/L — SIGNIFICANT CHANGE UP (ref 3.5–5)
POTASSIUM SERPL-SCNC: 3.5 MMOL/L — SIGNIFICANT CHANGE UP (ref 3.5–5)
PROT SERPL-MCNC: 5 G/DL — LOW (ref 6–8)
RBC # BLD: 2.74 M/UL — LOW (ref 4.7–6.1)
RBC # FLD: 15.7 % — HIGH (ref 11.5–14.5)
SODIUM SERPL-SCNC: 138 MMOL/L — SIGNIFICANT CHANGE UP (ref 135–146)
TROPONIN T, HIGH SENSITIVITY RESULT: 113 NG/L — CRITICAL HIGH (ref 6–21)
WBC # BLD: 5.88 K/UL — SIGNIFICANT CHANGE UP (ref 4.8–10.8)
WBC # FLD AUTO: 5.88 K/UL — SIGNIFICANT CHANGE UP (ref 4.8–10.8)

## 2024-06-27 PROCEDURE — 71045 X-RAY EXAM CHEST 1 VIEW: CPT | Mod: 26

## 2024-06-27 PROCEDURE — 99232 SBSQ HOSP IP/OBS MODERATE 35: CPT

## 2024-06-27 PROCEDURE — 99231 SBSQ HOSP IP/OBS SF/LOW 25: CPT

## 2024-06-27 PROCEDURE — 99223 1ST HOSP IP/OBS HIGH 75: CPT

## 2024-06-27 PROCEDURE — 99291 CRITICAL CARE FIRST HOUR: CPT

## 2024-06-27 RX ORDER — INSULIN LISPRO 100 [IU]/ML
6 INJECTION, SOLUTION SUBCUTANEOUS ONCE
Refills: 0 | Status: COMPLETED | OUTPATIENT
Start: 2024-06-27 | End: 2024-06-27

## 2024-06-27 RX ORDER — ONDANSETRON HYDROCHLORIDE 2 MG/ML
4 INJECTION INTRAMUSCULAR; INTRAVENOUS EVERY 8 HOURS
Refills: 0 | Status: DISCONTINUED | OUTPATIENT
Start: 2024-06-27 | End: 2024-06-28

## 2024-06-27 RX ORDER — METOCLOPRAMIDE 5 MG/5ML
10 SOLUTION ORAL ONCE
Refills: 0 | Status: COMPLETED | OUTPATIENT
Start: 2024-06-27 | End: 2024-06-27

## 2024-06-27 RX ORDER — LORAZEPAM 0.5 MG
1 TABLET ORAL ONCE
Refills: 0 | Status: DISCONTINUED | OUTPATIENT
Start: 2024-06-27 | End: 2024-06-27

## 2024-06-27 RX ORDER — ACETAMINOPHEN 325 MG
650 TABLET ORAL EVERY 6 HOURS
Refills: 0 | Status: DISCONTINUED | OUTPATIENT
Start: 2024-06-27 | End: 2024-06-28

## 2024-06-27 RX ORDER — LABETALOL HYDROCHLORIDE 300 MG/1
100 TABLET ORAL ONCE
Refills: 0 | Status: COMPLETED | OUTPATIENT
Start: 2024-06-27 | End: 2024-06-27

## 2024-06-27 RX ORDER — MIDAZOLAM HYDROCHLORIDE 1 MG/ML
2 INJECTION INTRAMUSCULAR; INTRAVENOUS ONCE
Refills: 0 | Status: DISCONTINUED | OUTPATIENT
Start: 2024-06-27 | End: 2024-06-27

## 2024-06-27 RX ORDER — APIXABAN 5 MG/1
10 TABLET, FILM COATED ORAL EVERY 12 HOURS
Refills: 0 | Status: DISCONTINUED | OUTPATIENT
Start: 2024-06-27 | End: 2024-06-28

## 2024-06-27 RX ORDER — INSULIN LISPRO 100 [IU]/ML
5 INJECTION, SOLUTION SUBCUTANEOUS ONCE
Refills: 0 | Status: COMPLETED | OUTPATIENT
Start: 2024-06-27 | End: 2024-06-27

## 2024-06-27 RX ADMIN — POLYETHYLENE GLYCOL 3350 17 GRAM(S): 1 POWDER ORAL at 05:09

## 2024-06-27 RX ADMIN — INSULIN LISPRO 5 UNIT(S): 100 INJECTION, SOLUTION SUBCUTANEOUS at 23:57

## 2024-06-27 RX ADMIN — DEXTROSE MONOHYDRATE AND SODIUM CHLORIDE 100 MILLILITER(S): 5; .3 INJECTION, SOLUTION INTRAVENOUS at 21:09

## 2024-06-27 RX ADMIN — MUPIROCIN 1 APPLICATION(S): 20 OINTMENT TOPICAL at 05:17

## 2024-06-27 RX ADMIN — Medication 1 MILLIGRAM(S): at 11:48

## 2024-06-27 RX ADMIN — Medication 1 APPLICATION(S): at 05:08

## 2024-06-27 RX ADMIN — INSULIN LISPRO 6 UNIT(S): 100 INJECTION, SOLUTION SUBCUTANEOUS at 21:08

## 2024-06-27 RX ADMIN — DEXTROSE MONOHYDRATE AND SODIUM CHLORIDE 100 MILLILITER(S): 5; .3 INJECTION, SOLUTION INTRAVENOUS at 12:58

## 2024-06-27 RX ADMIN — Medication 650 MILLIGRAM(S): at 18:43

## 2024-06-27 RX ADMIN — RISPERIDONE 0.5 MILLIGRAM(S): 0.5 TABLET ORAL at 18:17

## 2024-06-27 RX ADMIN — INSULIN LISPRO 2: 100 INJECTION, SOLUTION SUBCUTANEOUS at 06:00

## 2024-06-27 RX ADMIN — PIPERACILLIN SODIUM AND TAZOBACTAM SODIUM 25 GRAM(S): 3; .375 INJECTION, POWDER, LYOPHILIZED, FOR SOLUTION INTRAVENOUS at 05:08

## 2024-06-27 RX ADMIN — CLONAZEPAM 0.5 MILLIGRAM(S): 2 TABLET ORAL at 21:08

## 2024-06-27 RX ADMIN — CLONIDINE HYDROCHLORIDE 1 PATCH: 0.3 TABLET ORAL at 19:31

## 2024-06-27 RX ADMIN — RISPERIDONE 0.5 MILLIGRAM(S): 0.5 TABLET ORAL at 05:08

## 2024-06-27 RX ADMIN — HEPARIN SODIUM 1600 UNIT(S)/HR: 50 INJECTION, SOLUTION INTRAVENOUS at 08:23

## 2024-06-27 RX ADMIN — Medication 1 MILLIGRAM(S): at 15:23

## 2024-06-27 RX ADMIN — PIPERACILLIN SODIUM AND TAZOBACTAM SODIUM 25 GRAM(S): 3; .375 INJECTION, POWDER, LYOPHILIZED, FOR SOLUTION INTRAVENOUS at 21:09

## 2024-06-27 RX ADMIN — ASPIRIN 81 MILLIGRAM(S): 325 TABLET, FILM COATED ORAL at 11:48

## 2024-06-27 RX ADMIN — METOCLOPRAMIDE 10 MILLIGRAM(S): 5 SOLUTION ORAL at 22:03

## 2024-06-27 RX ADMIN — PIPERACILLIN SODIUM AND TAZOBACTAM SODIUM 25 GRAM(S): 3; .375 INJECTION, POWDER, LYOPHILIZED, FOR SOLUTION INTRAVENOUS at 14:26

## 2024-06-27 RX ADMIN — APIXABAN 10 MILLIGRAM(S): 5 TABLET, FILM COATED ORAL at 18:17

## 2024-06-27 RX ADMIN — INSULIN LISPRO 2: 100 INJECTION, SOLUTION SUBCUTANEOUS at 11:48

## 2024-06-27 RX ADMIN — MIDAZOLAM HYDROCHLORIDE 2 MILLIGRAM(S): 1 INJECTION INTRAMUSCULAR; INTRAVENOUS at 21:29

## 2024-06-27 RX ADMIN — ATORVASTATIN CALCIUM 80 MILLIGRAM(S): 20 TABLET, FILM COATED ORAL at 21:08

## 2024-06-27 RX ADMIN — CLOPIDOGREL BISULFATE 75 MILLIGRAM(S): 75 TABLET, FILM COATED ORAL at 11:49

## 2024-06-27 RX ADMIN — INSULIN LISPRO 6: 100 INJECTION, SOLUTION SUBCUTANEOUS at 16:31

## 2024-06-27 RX ADMIN — CLONAZEPAM 0.5 MILLIGRAM(S): 2 TABLET ORAL at 09:46

## 2024-06-27 RX ADMIN — Medication 1 MILLIGRAM(S): at 17:42

## 2024-06-27 RX ADMIN — INSULIN GLARGINE 20 UNIT(S): 100 INJECTION, SOLUTION SUBCUTANEOUS at 21:08

## 2024-06-27 RX ADMIN — ONDANSETRON HYDROCHLORIDE 4 MILLIGRAM(S): 2 INJECTION INTRAMUSCULAR; INTRAVENOUS at 17:42

## 2024-06-28 ENCOUNTER — TRANSCRIPTION ENCOUNTER (OUTPATIENT)
Age: 59
End: 2024-06-28

## 2024-06-28 VITALS
DIASTOLIC BLOOD PRESSURE: 85 MMHG | OXYGEN SATURATION: 96 % | RESPIRATION RATE: 22 BRPM | TEMPERATURE: 99 F | HEART RATE: 105 BPM | SYSTOLIC BLOOD PRESSURE: 193 MMHG

## 2024-06-28 LAB
1OH-MIDAZOLAM UR CFM-MCNC: 3163 NG/ML — HIGH
7AMINOCLONAZEPAM UR CFM-MCNC: NEGATIVE NG/ML — SIGNIFICANT CHANGE UP
ALBUMIN SERPL ELPH-MCNC: 3.4 G/DL — LOW (ref 3.5–5.2)
ALP SERPL-CCNC: 129 U/L — HIGH (ref 30–115)
ALPHA-HYDROXYALPRAZOLAM, UR RESULT: NEGATIVE NG/ML — SIGNIFICANT CHANGE UP
ALPRAZ UR CFM-MCNC: NEGATIVE NG/ML — SIGNIFICANT CHANGE UP
ALT FLD-CCNC: 15 U/L — SIGNIFICANT CHANGE UP (ref 0–41)
ANION GAP SERPL CALC-SCNC: 17 MMOL/L — HIGH (ref 7–14)
APTT BLD: 33.7 SEC — SIGNIFICANT CHANGE UP (ref 27–39.2)
AST SERPL-CCNC: 16 U/L — SIGNIFICANT CHANGE UP (ref 0–41)
BASOPHILS # BLD AUTO: 0.02 K/UL — SIGNIFICANT CHANGE UP (ref 0–0.2)
BASOPHILS NFR BLD AUTO: 0.2 % — SIGNIFICANT CHANGE UP (ref 0–1)
BENZODIAZ UR QL SCN: POSITIVE
BENZODIAZEPINES IN-HOUSE INTERPRETATION: POSITIVE
BENZOYLEGONINE, UR RESULT: 6116 NG/ML — HIGH
BILIRUB SERPL-MCNC: 0.3 MG/DL — SIGNIFICANT CHANGE UP (ref 0.2–1.2)
BUN SERPL-MCNC: 25 MG/DL — HIGH (ref 10–20)
BZE UR QL SCN: 6116 NG/ML — HIGH
CALCIUM SERPL-MCNC: 8.4 MG/DL — SIGNIFICANT CHANGE UP (ref 8.4–10.5)
CHLORIDE SERPL-SCNC: 102 MMOL/L — SIGNIFICANT CHANGE UP (ref 98–110)
CLONAZEPAM UR CFM-MCNC: NEGATIVE NG/ML — SIGNIFICANT CHANGE UP
CO2 SERPL-SCNC: 24 MMOL/L — SIGNIFICANT CHANGE UP (ref 17–32)
COCAINE IN-HOUSE INTERPRETATION: POSITIVE
COCAINE UR QL SCN: POSITIVE
CREAT SERPL-MCNC: 2.9 MG/DL — HIGH (ref 0.7–1.5)
CULTURE RESULTS: SIGNIFICANT CHANGE UP
CULTURE RESULTS: SIGNIFICANT CHANGE UP
DIAZEPAM UR CFM-MCNC: NEGATIVE NG/ML — SIGNIFICANT CHANGE UP
EGFR: 24 ML/MIN/1.73M2 — LOW
EOSINOPHIL # BLD AUTO: 0 K/UL — SIGNIFICANT CHANGE UP (ref 0–0.7)
EOSINOPHIL NFR BLD AUTO: 0 % — SIGNIFICANT CHANGE UP (ref 0–8)
FLUNITRAZEPAM UR CFM-MCNC: NEGATIVE NG/ML — SIGNIFICANT CHANGE UP
FLURAZEPAM UR CFM-MCNC: NEGATIVE NG/ML — SIGNIFICANT CHANGE UP
GLUCOSE BLDC GLUCOMTR-MCNC: 253 MG/DL — HIGH (ref 70–99)
GLUCOSE BLDC GLUCOMTR-MCNC: 281 MG/DL — HIGH (ref 70–99)
GLUCOSE BLDC GLUCOMTR-MCNC: 284 MG/DL — HIGH (ref 70–99)
GLUCOSE SERPL-MCNC: 228 MG/DL — HIGH (ref 70–99)
HCT VFR BLD CALC: 24.6 % — LOW (ref 42–52)
HGB BLD-MCNC: 7.9 G/DL — LOW (ref 14–18)
IMM GRANULOCYTES NFR BLD AUTO: 1.3 % — HIGH (ref 0.1–0.3)
LORAZEPAM UR CFM-MCNC: 342 NG/ML — HIGH
LYMPHOCYTES # BLD AUTO: 1.49 K/UL — SIGNIFICANT CHANGE UP (ref 1.2–3.4)
LYMPHOCYTES # BLD AUTO: 17.7 % — LOW (ref 20.5–51.1)
MAGNESIUM SERPL-MCNC: 1.8 MG/DL — SIGNIFICANT CHANGE UP (ref 1.8–2.4)
MCHC RBC-ENTMCNC: 27.6 PG — SIGNIFICANT CHANGE UP (ref 27–31)
MCHC RBC-ENTMCNC: 32.1 G/DL — SIGNIFICANT CHANGE UP (ref 32–37)
MCV RBC AUTO: 86 FL — SIGNIFICANT CHANGE UP (ref 80–94)
MIDAZOLAM UR CFM-MCNC: NEGATIVE NG/ML — SIGNIFICANT CHANGE UP
MONOCYTES # BLD AUTO: 1.19 K/UL — HIGH (ref 0.1–0.6)
MONOCYTES NFR BLD AUTO: 14.1 % — HIGH (ref 1.7–9.3)
MRSA PCR RESULT.: POSITIVE
NEUTROPHILS # BLD AUTO: 5.63 K/UL — SIGNIFICANT CHANGE UP (ref 1.4–6.5)
NEUTROPHILS NFR BLD AUTO: 66.7 % — SIGNIFICANT CHANGE UP (ref 42.2–75.2)
NORDIAZEPAM, UR RESULT: NEGATIVE NG/ML — SIGNIFICANT CHANGE UP
NRBC # BLD: 0 /100 WBCS — SIGNIFICANT CHANGE UP (ref 0–0)
OXAZEPAM UR QL SCN: NEGATIVE NG/ML — SIGNIFICANT CHANGE UP
OXAZEPAM, UR RESULT: NEGATIVE NG/ML — SIGNIFICANT CHANGE UP
PHOSPHATE SERPL-MCNC: 3.1 MG/DL — SIGNIFICANT CHANGE UP (ref 2.1–4.9)
PLATELET # BLD AUTO: 404 K/UL — HIGH (ref 130–400)
PMV BLD: 9.9 FL — SIGNIFICANT CHANGE UP (ref 7.4–10.4)
POTASSIUM SERPL-MCNC: 3.3 MMOL/L — LOW (ref 3.5–5)
POTASSIUM SERPL-SCNC: 3.3 MMOL/L — LOW (ref 3.5–5)
PROT SERPL-MCNC: 6 G/DL — SIGNIFICANT CHANGE UP (ref 6–8)
RBC # BLD: 2.86 M/UL — LOW (ref 4.7–6.1)
RBC # FLD: 15.9 % — HIGH (ref 11.5–14.5)
SODIUM SERPL-SCNC: 143 MMOL/L — SIGNIFICANT CHANGE UP (ref 135–146)
SPECIMEN SOURCE: SIGNIFICANT CHANGE UP
SPECIMEN SOURCE: SIGNIFICANT CHANGE UP
TEMAZEPAM UR CFM-MCNC: NEGATIVE NG/ML — SIGNIFICANT CHANGE UP
WBC # BLD: 8.44 K/UL — SIGNIFICANT CHANGE UP (ref 4.8–10.8)
WBC # FLD AUTO: 8.44 K/UL — SIGNIFICANT CHANGE UP (ref 4.8–10.8)

## 2024-06-28 PROCEDURE — 93010 ELECTROCARDIOGRAM REPORT: CPT

## 2024-06-28 PROCEDURE — 99233 SBSQ HOSP IP/OBS HIGH 50: CPT

## 2024-06-28 PROCEDURE — 71045 X-RAY EXAM CHEST 1 VIEW: CPT | Mod: 26

## 2024-06-28 PROCEDURE — 74176 CT ABD & PELVIS W/O CONTRAST: CPT | Mod: 26

## 2024-06-28 PROCEDURE — 74018 RADEX ABDOMEN 1 VIEW: CPT | Mod: 26

## 2024-06-28 RX ORDER — METOCLOPRAMIDE 5 MG/5ML
10 SOLUTION ORAL ONCE
Refills: 0 | Status: COMPLETED | OUTPATIENT
Start: 2024-06-28 | End: 2024-06-28

## 2024-06-28 RX ORDER — INSULIN DEGLUDEC 100 U/ML
24 INJECTION, SOLUTION SUBCUTANEOUS
Qty: 3 | Refills: 0
Start: 2024-06-28 | End: 2024-07-27

## 2024-06-28 RX ORDER — VANCOMYCIN HYDROCHLORIDE 50 MG/ML
1000 KIT ORAL ONCE
Refills: 0 | Status: COMPLETED | OUTPATIENT
Start: 2024-06-28 | End: 2024-06-28

## 2024-06-28 RX ORDER — FOLIC ACID
1 POWDER (GRAM) MISCELLANEOUS
Qty: 30 | Refills: 0
Start: 2024-06-28 | End: 2024-07-27

## 2024-06-28 RX ORDER — LEVOFLOXACIN 25 MG/ML
1 INJECTION INTRAVENOUS
Qty: 2 | Refills: 0
Start: 2024-06-28 | End: 2024-06-30

## 2024-06-28 RX ORDER — METRONIDAZOLE 500 MG/1
1 TABLET ORAL
Qty: 6 | Refills: 0
Start: 2024-06-28 | End: 2024-06-30

## 2024-06-28 RX ORDER — APIXABAN 5 MG/1
2 TABLET, FILM COATED ORAL
Qty: 24 | Refills: 0
Start: 2024-06-28 | End: 2024-07-03

## 2024-06-28 RX ORDER — RISPERIDONE 0.5 MG/1
1 TABLET ORAL
Qty: 60 | Refills: 0
Start: 2024-06-28 | End: 2024-07-27

## 2024-06-28 RX ORDER — POTASSIUM CHLORIDE 600 MG/1
20 TABLET, FILM COATED, EXTENDED RELEASE ORAL ONCE
Refills: 0 | Status: COMPLETED | OUTPATIENT
Start: 2024-06-28 | End: 2024-06-28

## 2024-06-28 RX ORDER — IOHEXOL 350 MG/ML
30 INJECTION, SOLUTION INTRAVENOUS ONCE
Refills: 0 | Status: COMPLETED | OUTPATIENT
Start: 2024-06-28 | End: 2024-06-28

## 2024-06-28 RX ORDER — LORAZEPAM 0.5 MG
2 TABLET ORAL ONCE
Refills: 0 | Status: DISCONTINUED | OUTPATIENT
Start: 2024-06-28 | End: 2024-06-28

## 2024-06-28 RX ORDER — AMLODIPINE BESYLATE 2.5 MG/1
5 TABLET ORAL DAILY
Refills: 0 | Status: DISCONTINUED | OUTPATIENT
Start: 2024-06-28 | End: 2024-06-28

## 2024-06-28 RX ADMIN — AMLODIPINE BESYLATE 5 MILLIGRAM(S): 2.5 TABLET ORAL at 09:21

## 2024-06-28 RX ADMIN — PIPERACILLIN SODIUM AND TAZOBACTAM SODIUM 25 GRAM(S): 3; .375 INJECTION, POWDER, LYOPHILIZED, FOR SOLUTION INTRAVENOUS at 14:07

## 2024-06-28 RX ADMIN — Medication 1 MILLIGRAM(S): at 11:25

## 2024-06-28 RX ADMIN — DEXTROSE MONOHYDRATE AND SODIUM CHLORIDE 100 MILLILITER(S): 5; .3 INJECTION, SOLUTION INTRAVENOUS at 09:37

## 2024-06-28 RX ADMIN — HYDRALAZINE HYDROCHLORIDE 10 MILLIGRAM(S): 50 TABLET ORAL at 08:37

## 2024-06-28 RX ADMIN — RISPERIDONE 0.5 MILLIGRAM(S): 0.5 TABLET ORAL at 05:00

## 2024-06-28 RX ADMIN — CLONIDINE HYDROCHLORIDE 1 PATCH: 0.3 TABLET ORAL at 07:32

## 2024-06-28 RX ADMIN — ASPIRIN 81 MILLIGRAM(S): 325 TABLET, FILM COATED ORAL at 11:25

## 2024-06-28 RX ADMIN — APIXABAN 10 MILLIGRAM(S): 5 TABLET, FILM COATED ORAL at 06:31

## 2024-06-28 RX ADMIN — RISPERIDONE 0.5 MILLIGRAM(S): 0.5 TABLET ORAL at 17:03

## 2024-06-28 RX ADMIN — INSULIN LISPRO 6: 100 INJECTION, SOLUTION SUBCUTANEOUS at 06:43

## 2024-06-28 RX ADMIN — MUPIROCIN 1 APPLICATION(S): 20 OINTMENT TOPICAL at 17:03

## 2024-06-28 RX ADMIN — Medication 650 MILLIGRAM(S): at 03:04

## 2024-06-28 RX ADMIN — METOCLOPRAMIDE 10 MILLIGRAM(S): 5 SOLUTION ORAL at 11:32

## 2024-06-28 RX ADMIN — Medication 10 MILLIGRAM(S): at 11:25

## 2024-06-28 RX ADMIN — PIPERACILLIN SODIUM AND TAZOBACTAM SODIUM 25 GRAM(S): 3; .375 INJECTION, POWDER, LYOPHILIZED, FOR SOLUTION INTRAVENOUS at 05:00

## 2024-06-28 RX ADMIN — Medication 1 APPLICATION(S): at 05:01

## 2024-06-28 RX ADMIN — INSULIN LISPRO 6: 100 INJECTION, SOLUTION SUBCUTANEOUS at 17:06

## 2024-06-28 RX ADMIN — INSULIN LISPRO 6: 100 INJECTION, SOLUTION SUBCUTANEOUS at 11:33

## 2024-06-28 RX ADMIN — CLOPIDOGREL BISULFATE 75 MILLIGRAM(S): 75 TABLET, FILM COATED ORAL at 11:25

## 2024-06-28 RX ADMIN — APIXABAN 10 MILLIGRAM(S): 5 TABLET, FILM COATED ORAL at 17:08

## 2024-06-28 RX ADMIN — MUPIROCIN 1 APPLICATION(S): 20 OINTMENT TOPICAL at 05:00

## 2024-06-28 RX ADMIN — POTASSIUM CHLORIDE 50 MILLIEQUIVALENT(S): 600 TABLET, FILM COATED, EXTENDED RELEASE ORAL at 11:43

## 2024-06-28 RX ADMIN — IOHEXOL 30 MILLILITER(S): 350 INJECTION, SOLUTION INTRAVENOUS at 11:40

## 2024-06-28 RX ADMIN — ONDANSETRON HYDROCHLORIDE 4 MILLIGRAM(S): 2 INJECTION INTRAMUSCULAR; INTRAVENOUS at 09:36

## 2024-06-28 RX ADMIN — VANCOMYCIN HYDROCHLORIDE 250 MILLIGRAM(S): KIT at 08:19

## 2024-06-28 RX ADMIN — CLONAZEPAM 0.5 MILLIGRAM(S): 2 TABLET ORAL at 08:19

## 2024-06-28 RX ADMIN — Medication 650 MILLIGRAM(S): at 02:15

## 2024-06-29 ENCOUNTER — INPATIENT (INPATIENT)
Facility: HOSPITAL | Age: 59
LOS: 1 days | Discharge: LEFT BEFORE DISCHARGE | DRG: 194 | End: 2024-07-01
Attending: STUDENT IN AN ORGANIZED HEALTH CARE EDUCATION/TRAINING PROGRAM | Admitting: INTERNAL MEDICINE
Payer: MEDICARE

## 2024-06-29 VITALS
SYSTOLIC BLOOD PRESSURE: 188 MMHG | WEIGHT: 199.96 LBS | OXYGEN SATURATION: 98 % | RESPIRATION RATE: 20 BRPM | HEART RATE: 106 BPM | DIASTOLIC BLOOD PRESSURE: 86 MMHG | HEIGHT: 70 IN | TEMPERATURE: 99 F

## 2024-06-29 DIAGNOSIS — Z98.62 PERIPHERAL VASCULAR ANGIOPLASTY STATUS: Chronic | ICD-10-CM

## 2024-06-29 DIAGNOSIS — Z98.890 OTHER SPECIFIED POSTPROCEDURAL STATES: Chronic | ICD-10-CM

## 2024-06-29 DIAGNOSIS — N20.0 CALCULUS OF KIDNEY: Chronic | ICD-10-CM

## 2024-06-29 DIAGNOSIS — R11.2 NAUSEA WITH VOMITING, UNSPECIFIED: ICD-10-CM

## 2024-06-29 LAB
ALBUMIN SERPL ELPH-MCNC: 3.6 G/DL — SIGNIFICANT CHANGE UP (ref 3.5–5.2)
ALP SERPL-CCNC: 110 U/L — SIGNIFICANT CHANGE UP (ref 30–115)
ALT FLD-CCNC: 16 U/L — SIGNIFICANT CHANGE UP (ref 0–41)
ANION GAP SERPL CALC-SCNC: 13 MMOL/L — SIGNIFICANT CHANGE UP (ref 7–14)
AST SERPL-CCNC: 15 U/L — SIGNIFICANT CHANGE UP (ref 0–41)
B-OH-BUTYR SERPL-SCNC: 1.8 MMOL/L — HIGH
BASOPHILS # BLD AUTO: 0.03 K/UL — SIGNIFICANT CHANGE UP (ref 0–0.2)
BASOPHILS NFR BLD AUTO: 0.3 % — SIGNIFICANT CHANGE UP (ref 0–1)
BILIRUB SERPL-MCNC: 0.2 MG/DL — SIGNIFICANT CHANGE UP (ref 0.2–1.2)
BUN SERPL-MCNC: 22 MG/DL — HIGH (ref 10–20)
CALCIUM SERPL-MCNC: 8.6 MG/DL — SIGNIFICANT CHANGE UP (ref 8.4–10.5)
CHLORIDE SERPL-SCNC: 102 MMOL/L — SIGNIFICANT CHANGE UP (ref 98–110)
CO2 SERPL-SCNC: 28 MMOL/L — SIGNIFICANT CHANGE UP (ref 17–32)
CREAT SERPL-MCNC: 2.8 MG/DL — HIGH (ref 0.7–1.5)
EGFR: 25 ML/MIN/1.73M2 — LOW
EOSINOPHIL # BLD AUTO: 0.03 K/UL — SIGNIFICANT CHANGE UP (ref 0–0.7)
EOSINOPHIL NFR BLD AUTO: 0.3 % — SIGNIFICANT CHANGE UP (ref 0–8)
GLUCOSE BLDC GLUCOMTR-MCNC: 128 MG/DL — HIGH (ref 70–99)
GLUCOSE BLDC GLUCOMTR-MCNC: 290 MG/DL — HIGH (ref 70–99)
GLUCOSE BLDC GLUCOMTR-MCNC: 310 MG/DL — HIGH (ref 70–99)
GLUCOSE SERPL-MCNC: 292 MG/DL — HIGH (ref 70–99)
HCT VFR BLD CALC: 23.3 % — LOW (ref 42–52)
HGB BLD-MCNC: 7.5 G/DL — LOW (ref 14–18)
IMM GRANULOCYTES NFR BLD AUTO: 0.5 % — HIGH (ref 0.1–0.3)
LIDOCAIN IGE QN: 34 U/L — SIGNIFICANT CHANGE UP (ref 7–60)
LYMPHOCYTES # BLD AUTO: 1.73 K/UL — SIGNIFICANT CHANGE UP (ref 1.2–3.4)
LYMPHOCYTES # BLD AUTO: 16.6 % — LOW (ref 20.5–51.1)
MAGNESIUM SERPL-MCNC: 1.8 MG/DL — SIGNIFICANT CHANGE UP (ref 1.8–2.4)
MCHC RBC-ENTMCNC: 27.5 PG — SIGNIFICANT CHANGE UP (ref 27–31)
MCHC RBC-ENTMCNC: 32.2 G/DL — SIGNIFICANT CHANGE UP (ref 32–37)
MCV RBC AUTO: 85.3 FL — SIGNIFICANT CHANGE UP (ref 80–94)
MONOCYTES # BLD AUTO: 1 K/UL — HIGH (ref 0.1–0.6)
MONOCYTES NFR BLD AUTO: 9.6 % — HIGH (ref 1.7–9.3)
NEUTROPHILS # BLD AUTO: 7.56 K/UL — HIGH (ref 1.4–6.5)
NEUTROPHILS NFR BLD AUTO: 72.7 % — SIGNIFICANT CHANGE UP (ref 42.2–75.2)
NRBC # BLD: 0 /100 WBCS — SIGNIFICANT CHANGE UP (ref 0–0)
PLATELET # BLD AUTO: 380 K/UL — SIGNIFICANT CHANGE UP (ref 130–400)
PMV BLD: 9.7 FL — SIGNIFICANT CHANGE UP (ref 7.4–10.4)
POTASSIUM SERPL-MCNC: 3.2 MMOL/L — LOW (ref 3.5–5)
POTASSIUM SERPL-SCNC: 3.2 MMOL/L — LOW (ref 3.5–5)
PROT SERPL-MCNC: 6 G/DL — SIGNIFICANT CHANGE UP (ref 6–8)
RAPID RVP RESULT: SIGNIFICANT CHANGE UP
RBC # BLD: 2.73 M/UL — LOW (ref 4.7–6.1)
RBC # FLD: 15.9 % — HIGH (ref 11.5–14.5)
SARS-COV-2 RNA SPEC QL NAA+PROBE: SIGNIFICANT CHANGE UP
SODIUM SERPL-SCNC: 143 MMOL/L — SIGNIFICANT CHANGE UP (ref 135–146)
WBC # BLD: 10.4 K/UL — SIGNIFICANT CHANGE UP (ref 4.8–10.8)
WBC # FLD AUTO: 10.4 K/UL — SIGNIFICANT CHANGE UP (ref 4.8–10.8)

## 2024-06-29 PROCEDURE — 85027 COMPLETE CBC AUTOMATED: CPT

## 2024-06-29 PROCEDURE — 82962 GLUCOSE BLOOD TEST: CPT

## 2024-06-29 PROCEDURE — 80053 COMPREHEN METABOLIC PANEL: CPT

## 2024-06-29 PROCEDURE — 84100 ASSAY OF PHOSPHORUS: CPT

## 2024-06-29 PROCEDURE — 83540 ASSAY OF IRON: CPT

## 2024-06-29 PROCEDURE — 71045 X-RAY EXAM CHEST 1 VIEW: CPT | Mod: 26

## 2024-06-29 PROCEDURE — 84466 ASSAY OF TRANSFERRIN: CPT

## 2024-06-29 PROCEDURE — 97162 PT EVAL MOD COMPLEX 30 MIN: CPT | Mod: GP

## 2024-06-29 PROCEDURE — 83735 ASSAY OF MAGNESIUM: CPT

## 2024-06-29 PROCEDURE — 36415 COLL VENOUS BLD VENIPUNCTURE: CPT

## 2024-06-29 PROCEDURE — 82728 ASSAY OF FERRITIN: CPT

## 2024-06-29 PROCEDURE — 99285 EMERGENCY DEPT VISIT HI MDM: CPT

## 2024-06-29 PROCEDURE — 85025 COMPLETE CBC W/AUTO DIFF WBC: CPT

## 2024-06-29 PROCEDURE — 83550 IRON BINDING TEST: CPT

## 2024-06-29 PROCEDURE — 99223 1ST HOSP IP/OBS HIGH 75: CPT

## 2024-06-29 PROCEDURE — 87040 BLOOD CULTURE FOR BACTERIA: CPT

## 2024-06-29 RX ORDER — ALPRAZOLAM 2 MG/1
0.5 TABLET ORAL
Refills: 0 | Status: DISCONTINUED | OUTPATIENT
Start: 2024-06-29 | End: 2024-06-29

## 2024-06-29 RX ORDER — SODIUM CHLORIDE 0.9 % (FLUSH) 0.9 %
1000 SYRINGE (ML) INJECTION ONCE
Refills: 0 | Status: COMPLETED | OUTPATIENT
Start: 2024-06-29 | End: 2024-06-29

## 2024-06-29 RX ORDER — CLOPIDOGREL BISULFATE 75 MG/1
75 TABLET, FILM COATED ORAL DAILY
Refills: 0 | Status: DISCONTINUED | OUTPATIENT
Start: 2024-06-29 | End: 2024-06-29

## 2024-06-29 RX ORDER — DEXTROSE 30 % IN WATER 30 %
12.5 VIAL (ML) INTRAVENOUS ONCE
Refills: 0 | Status: DISCONTINUED | OUTPATIENT
Start: 2024-06-29 | End: 2024-07-01

## 2024-06-29 RX ORDER — SENNOSIDES 8.6 MG
2 TABLET ORAL AT BEDTIME
Refills: 0 | Status: DISCONTINUED | OUTPATIENT
Start: 2024-06-29 | End: 2024-07-01

## 2024-06-29 RX ORDER — TAMSULOSIN HYDROCHLORIDE 0.4 MG/1
0.4 CAPSULE ORAL AT BEDTIME
Refills: 0 | Status: DISCONTINUED | OUTPATIENT
Start: 2024-06-29 | End: 2024-07-01

## 2024-06-29 RX ORDER — DEXTROSE MONOHYDRATE AND SODIUM CHLORIDE 5; .3 G/100ML; G/100ML
1000 INJECTION, SOLUTION INTRAVENOUS
Refills: 0 | Status: DISCONTINUED | OUTPATIENT
Start: 2024-06-29 | End: 2024-07-01

## 2024-06-29 RX ORDER — APIXABAN 5 MG/1
2 TABLET, FILM COATED ORAL
Qty: 24 | Refills: 0
Start: 2024-06-29 | End: 2024-07-04

## 2024-06-29 RX ORDER — FOLIC ACID
1 POWDER (GRAM) MISCELLANEOUS DAILY
Refills: 0 | Status: DISCONTINUED | OUTPATIENT
Start: 2024-06-29 | End: 2024-07-01

## 2024-06-29 RX ORDER — MUPIROCIN 20 MG/G
1 OINTMENT TOPICAL
Refills: 0 | Status: DISCONTINUED | OUTPATIENT
Start: 2024-06-29 | End: 2024-07-01

## 2024-06-29 RX ORDER — RISPERIDONE 0.5 MG/1
0.5 TABLET ORAL EVERY 12 HOURS
Refills: 0 | Status: DISCONTINUED | OUTPATIENT
Start: 2024-06-29 | End: 2024-07-01

## 2024-06-29 RX ORDER — DEXTROSE 30 % IN WATER 30 %
25 VIAL (ML) INTRAVENOUS ONCE
Refills: 0 | Status: DISCONTINUED | OUTPATIENT
Start: 2024-06-29 | End: 2024-07-01

## 2024-06-29 RX ORDER — ASPIRIN 325 MG/1
81 TABLET, FILM COATED ORAL DAILY
Refills: 0 | Status: DISCONTINUED | OUTPATIENT
Start: 2024-06-29 | End: 2024-07-01

## 2024-06-29 RX ORDER — GLUCAGON HYDROCHLORIDE 1 MG/ML
1 INJECTION, POWDER, FOR SOLUTION INTRAMUSCULAR; INTRAVENOUS; SUBCUTANEOUS ONCE
Refills: 0 | Status: DISCONTINUED | OUTPATIENT
Start: 2024-06-29 | End: 2024-07-01

## 2024-06-29 RX ORDER — INSULIN LISPRO 100 [IU]/ML
10 INJECTION, SOLUTION SUBCUTANEOUS
Refills: 0 | Status: DISCONTINUED | OUTPATIENT
Start: 2024-06-30 | End: 2024-07-01

## 2024-06-29 RX ORDER — INSULIN LISPRO 100 [IU]/ML
INJECTION, SOLUTION SUBCUTANEOUS
Refills: 0 | Status: DISCONTINUED | OUTPATIENT
Start: 2024-06-29 | End: 2024-07-01

## 2024-06-29 RX ORDER — LORAZEPAM 0.5 MG
1 TABLET ORAL ONCE
Refills: 0 | Status: DISCONTINUED | OUTPATIENT
Start: 2024-06-29 | End: 2024-06-29

## 2024-06-29 RX ORDER — DEXTROSE 30 % IN WATER 30 %
15 VIAL (ML) INTRAVENOUS ONCE
Refills: 0 | Status: DISCONTINUED | OUTPATIENT
Start: 2024-06-29 | End: 2024-07-01

## 2024-06-29 RX ORDER — PIPERACILLIN SODIUM AND TAZOBACTAM SODIUM 3; .375 G/15ML; G/15ML
3.38 INJECTION, POWDER, LYOPHILIZED, FOR SOLUTION INTRAVENOUS EVERY 8 HOURS
Refills: 0 | Status: DISCONTINUED | OUTPATIENT
Start: 2024-06-29 | End: 2024-07-01

## 2024-06-29 RX ORDER — ACETAMINOPHEN 325 MG
650 TABLET ORAL EVERY 6 HOURS
Refills: 0 | Status: DISCONTINUED | OUTPATIENT
Start: 2024-06-29 | End: 2024-07-01

## 2024-06-29 RX ORDER — INSULIN LISPRO 100 [IU]/ML
INJECTION, SOLUTION SUBCUTANEOUS AT BEDTIME
Refills: 0 | Status: DISCONTINUED | OUTPATIENT
Start: 2024-06-29 | End: 2024-07-01

## 2024-06-29 RX ORDER — APIXABAN 5 MG/1
10 TABLET, FILM COATED ORAL
Refills: 0 | Status: DISCONTINUED | OUTPATIENT
Start: 2024-06-29 | End: 2024-07-01

## 2024-06-29 RX ORDER — CLONAZEPAM 2 MG/1
0.5 TABLET ORAL EVERY 12 HOURS
Refills: 0 | Status: DISCONTINUED | OUTPATIENT
Start: 2024-06-29 | End: 2024-07-01

## 2024-06-29 RX ORDER — SODIUM BICARBONATE 650 MG/1
650 TABLET ORAL THREE TIMES A DAY
Refills: 0 | Status: DISCONTINUED | OUTPATIENT
Start: 2024-06-29 | End: 2024-07-01

## 2024-06-29 RX ORDER — ONDANSETRON HYDROCHLORIDE 2 MG/ML
4 INJECTION INTRAMUSCULAR; INTRAVENOUS ONCE
Refills: 0 | Status: COMPLETED | OUTPATIENT
Start: 2024-06-29 | End: 2024-06-29

## 2024-06-29 RX ORDER — DEXTROSE MONOHYDRATE 100 MG/ML
125 INJECTION, SOLUTION INTRAVENOUS ONCE
Refills: 0 | Status: DISCONTINUED | OUTPATIENT
Start: 2024-06-29 | End: 2024-07-01

## 2024-06-29 RX ORDER — INSULIN GLARGINE 100 [IU]/ML
50 INJECTION, SOLUTION SUBCUTANEOUS EVERY MORNING
Refills: 0 | Status: DISCONTINUED | OUTPATIENT
Start: 2024-06-30 | End: 2024-07-01

## 2024-06-29 RX ORDER — INSULIN LISPRO 100 [IU]/ML
10 INJECTION, SOLUTION SUBCUTANEOUS
Refills: 0 | Status: DISCONTINUED | OUTPATIENT
Start: 2024-06-29 | End: 2024-07-01

## 2024-06-29 RX ORDER — ATORVASTATIN CALCIUM 20 MG/1
80 TABLET, FILM COATED ORAL AT BEDTIME
Refills: 0 | Status: DISCONTINUED | OUTPATIENT
Start: 2024-06-29 | End: 2024-07-01

## 2024-06-29 RX ORDER — MAGNESIUM OXIDE 400 MG/1
400 TABLET ORAL
Refills: 0 | Status: DISCONTINUED | OUTPATIENT
Start: 2024-06-29 | End: 2024-07-01

## 2024-06-29 RX ORDER — ONDANSETRON HYDROCHLORIDE 2 MG/ML
4 INJECTION INTRAMUSCULAR; INTRAVENOUS EVERY 8 HOURS
Refills: 0 | Status: DISCONTINUED | OUTPATIENT
Start: 2024-06-29 | End: 2024-07-01

## 2024-06-29 RX ORDER — LOSARTAN POTASSIUM 100 MG/1
50 TABLET, FILM COATED ORAL DAILY
Refills: 0 | Status: DISCONTINUED | OUTPATIENT
Start: 2024-06-29 | End: 2024-07-01

## 2024-06-29 RX ORDER — INSULIN LISPRO 100 [IU]/ML
3 INJECTION, SOLUTION SUBCUTANEOUS
Refills: 0 | Status: DISCONTINUED | OUTPATIENT
Start: 2024-06-30 | End: 2024-07-01

## 2024-06-29 RX ORDER — POLYETHYLENE GLYCOL 3350 1 G/G
17 POWDER ORAL DAILY
Refills: 0 | Status: DISCONTINUED | OUTPATIENT
Start: 2024-06-29 | End: 2024-07-01

## 2024-06-29 RX ADMIN — MUPIROCIN 1 APPLICATION(S): 20 OINTMENT TOPICAL at 18:19

## 2024-06-29 RX ADMIN — TAMSULOSIN HYDROCHLORIDE 0.4 MILLIGRAM(S): 0.4 CAPSULE ORAL at 21:41

## 2024-06-29 RX ADMIN — ONDANSETRON HYDROCHLORIDE 4 MILLIGRAM(S): 2 INJECTION INTRAMUSCULAR; INTRAVENOUS at 13:38

## 2024-06-29 RX ADMIN — Medication 1 MILLIGRAM(S): at 13:38

## 2024-06-29 RX ADMIN — DEXTROSE MONOHYDRATE AND SODIUM CHLORIDE 75 MILLILITER(S): 5; .3 INJECTION, SOLUTION INTRAVENOUS at 18:24

## 2024-06-29 RX ADMIN — INSULIN LISPRO 10 UNIT(S): 100 INJECTION, SOLUTION SUBCUTANEOUS at 17:05

## 2024-06-29 RX ADMIN — Medication 1000 MILLILITER(S): at 13:38

## 2024-06-29 RX ADMIN — INSULIN LISPRO 4: 100 INJECTION, SOLUTION SUBCUTANEOUS at 17:04

## 2024-06-29 RX ADMIN — CLONAZEPAM 0.5 MILLIGRAM(S): 2 TABLET ORAL at 18:01

## 2024-06-29 RX ADMIN — SODIUM BICARBONATE 650 MILLIGRAM(S): 650 TABLET ORAL at 21:41

## 2024-06-29 RX ADMIN — PIPERACILLIN SODIUM AND TAZOBACTAM SODIUM 25 GRAM(S): 3; .375 INJECTION, POWDER, LYOPHILIZED, FOR SOLUTION INTRAVENOUS at 21:41

## 2024-06-29 RX ADMIN — ATORVASTATIN CALCIUM 80 MILLIGRAM(S): 20 TABLET, FILM COATED ORAL at 21:41

## 2024-06-29 RX ADMIN — ASPIRIN 81 MILLIGRAM(S): 325 TABLET, FILM COATED ORAL at 18:01

## 2024-06-30 LAB
ALBUMIN SERPL ELPH-MCNC: 3.4 G/DL — LOW (ref 3.5–5.2)
ALP SERPL-CCNC: 102 U/L — SIGNIFICANT CHANGE UP (ref 30–115)
ALT FLD-CCNC: 15 U/L — SIGNIFICANT CHANGE UP (ref 0–41)
ANION GAP SERPL CALC-SCNC: 10 MMOL/L — SIGNIFICANT CHANGE UP (ref 7–14)
AST SERPL-CCNC: 16 U/L — SIGNIFICANT CHANGE UP (ref 0–41)
BASOPHILS # BLD AUTO: 0.04 K/UL — SIGNIFICANT CHANGE UP (ref 0–0.2)
BASOPHILS NFR BLD AUTO: 0.3 % — SIGNIFICANT CHANGE UP (ref 0–1)
BILIRUB SERPL-MCNC: 0.3 MG/DL — SIGNIFICANT CHANGE UP (ref 0.2–1.2)
BUN SERPL-MCNC: 20 MG/DL — SIGNIFICANT CHANGE UP (ref 10–20)
CALCIUM SERPL-MCNC: 8.4 MG/DL — SIGNIFICANT CHANGE UP (ref 8.4–10.5)
CHLORIDE SERPL-SCNC: 103 MMOL/L — SIGNIFICANT CHANGE UP (ref 98–110)
CO2 SERPL-SCNC: 29 MMOL/L — SIGNIFICANT CHANGE UP (ref 17–32)
CREAT SERPL-MCNC: 2.5 MG/DL — HIGH (ref 0.7–1.5)
EGFR: 29 ML/MIN/1.73M2 — LOW
EOSINOPHIL # BLD AUTO: 0.25 K/UL — SIGNIFICANT CHANGE UP (ref 0–0.7)
EOSINOPHIL NFR BLD AUTO: 2.1 % — SIGNIFICANT CHANGE UP (ref 0–8)
GLUCOSE BLDC GLUCOMTR-MCNC: 114 MG/DL — HIGH (ref 70–99)
GLUCOSE BLDC GLUCOMTR-MCNC: 122 MG/DL — HIGH (ref 70–99)
GLUCOSE BLDC GLUCOMTR-MCNC: 124 MG/DL — HIGH (ref 70–99)
GLUCOSE BLDC GLUCOMTR-MCNC: 138 MG/DL — HIGH (ref 70–99)
GLUCOSE BLDC GLUCOMTR-MCNC: 182 MG/DL — HIGH (ref 70–99)
GLUCOSE SERPL-MCNC: 160 MG/DL — HIGH (ref 70–99)
HCT VFR BLD CALC: 23.1 % — LOW (ref 42–52)
HGB BLD-MCNC: 7.6 G/DL — LOW (ref 14–18)
IMM GRANULOCYTES NFR BLD AUTO: 0.5 % — HIGH (ref 0.1–0.3)
LYMPHOCYTES # BLD AUTO: 18.3 % — LOW (ref 20.5–51.1)
LYMPHOCYTES # BLD AUTO: 2.13 K/UL — SIGNIFICANT CHANGE UP (ref 1.2–3.4)
MAGNESIUM SERPL-MCNC: 1.7 MG/DL — LOW (ref 1.8–2.4)
MCHC RBC-ENTMCNC: 28 PG — SIGNIFICANT CHANGE UP (ref 27–31)
MCHC RBC-ENTMCNC: 32.9 G/DL — SIGNIFICANT CHANGE UP (ref 32–37)
MCV RBC AUTO: 85.2 FL — SIGNIFICANT CHANGE UP (ref 80–94)
MONOCYTES # BLD AUTO: 0.82 K/UL — HIGH (ref 0.1–0.6)
MONOCYTES NFR BLD AUTO: 7 % — SIGNIFICANT CHANGE UP (ref 1.7–9.3)
NEUTROPHILS # BLD AUTO: 8.36 K/UL — HIGH (ref 1.4–6.5)
NEUTROPHILS NFR BLD AUTO: 71.8 % — SIGNIFICANT CHANGE UP (ref 42.2–75.2)
NRBC # BLD: 0 /100 WBCS — SIGNIFICANT CHANGE UP (ref 0–0)
PHOSPHATE SERPL-MCNC: 2.3 MG/DL — SIGNIFICANT CHANGE UP (ref 2.1–4.9)
PLATELET # BLD AUTO: 413 K/UL — HIGH (ref 130–400)
PMV BLD: 9.5 FL — SIGNIFICANT CHANGE UP (ref 7.4–10.4)
POTASSIUM SERPL-MCNC: 3.1 MMOL/L — LOW (ref 3.5–5)
POTASSIUM SERPL-SCNC: 3.1 MMOL/L — LOW (ref 3.5–5)
PROT SERPL-MCNC: 5.5 G/DL — LOW (ref 6–8)
RBC # BLD: 2.71 M/UL — LOW (ref 4.7–6.1)
RBC # FLD: 15.8 % — HIGH (ref 11.5–14.5)
SODIUM SERPL-SCNC: 142 MMOL/L — SIGNIFICANT CHANGE UP (ref 135–146)
WBC # BLD: 11.66 K/UL — HIGH (ref 4.8–10.8)
WBC # FLD AUTO: 11.66 K/UL — HIGH (ref 4.8–10.8)

## 2024-06-30 PROCEDURE — 99232 SBSQ HOSP IP/OBS MODERATE 35: CPT

## 2024-06-30 RX ORDER — POTASSIUM CHLORIDE 600 MG/1
40 TABLET, FILM COATED, EXTENDED RELEASE ORAL ONCE
Refills: 0 | Status: COMPLETED | OUTPATIENT
Start: 2024-06-30 | End: 2024-06-30

## 2024-06-30 RX ORDER — POTASSIUM CHLORIDE 600 MG/1
40 TABLET, FILM COATED, EXTENDED RELEASE ORAL
Refills: 0 | Status: DISCONTINUED | OUTPATIENT
Start: 2024-06-30 | End: 2024-06-30

## 2024-06-30 RX ADMIN — INSULIN GLARGINE 50 UNIT(S): 100 INJECTION, SOLUTION SUBCUTANEOUS at 08:04

## 2024-06-30 RX ADMIN — APIXABAN 10 MILLIGRAM(S): 5 TABLET, FILM COATED ORAL at 06:04

## 2024-06-30 RX ADMIN — CLONAZEPAM 0.5 MILLIGRAM(S): 2 TABLET ORAL at 17:03

## 2024-06-30 RX ADMIN — INSULIN LISPRO 10 UNIT(S): 100 INJECTION, SOLUTION SUBCUTANEOUS at 11:55

## 2024-06-30 RX ADMIN — CLONAZEPAM 0.5 MILLIGRAM(S): 2 TABLET ORAL at 06:05

## 2024-06-30 RX ADMIN — APIXABAN 10 MILLIGRAM(S): 5 TABLET, FILM COATED ORAL at 17:04

## 2024-06-30 RX ADMIN — PIPERACILLIN SODIUM AND TAZOBACTAM SODIUM 25 GRAM(S): 3; .375 INJECTION, POWDER, LYOPHILIZED, FOR SOLUTION INTRAVENOUS at 06:04

## 2024-06-30 RX ADMIN — ATORVASTATIN CALCIUM 80 MILLIGRAM(S): 20 TABLET, FILM COATED ORAL at 21:27

## 2024-06-30 RX ADMIN — RISPERIDONE 0.5 MILLIGRAM(S): 0.5 TABLET ORAL at 06:05

## 2024-06-30 RX ADMIN — PIPERACILLIN SODIUM AND TAZOBACTAM SODIUM 25 GRAM(S): 3; .375 INJECTION, POWDER, LYOPHILIZED, FOR SOLUTION INTRAVENOUS at 21:27

## 2024-06-30 RX ADMIN — SODIUM BICARBONATE 650 MILLIGRAM(S): 650 TABLET ORAL at 06:05

## 2024-06-30 RX ADMIN — ASPIRIN 81 MILLIGRAM(S): 325 TABLET, FILM COATED ORAL at 11:12

## 2024-06-30 RX ADMIN — INSULIN LISPRO 1: 100 INJECTION, SOLUTION SUBCUTANEOUS at 07:44

## 2024-06-30 RX ADMIN — INSULIN LISPRO 3 UNIT(S): 100 INJECTION, SOLUTION SUBCUTANEOUS at 07:45

## 2024-06-30 RX ADMIN — PIPERACILLIN SODIUM AND TAZOBACTAM SODIUM 25 GRAM(S): 3; .375 INJECTION, POWDER, LYOPHILIZED, FOR SOLUTION INTRAVENOUS at 13:01

## 2024-06-30 RX ADMIN — LOSARTAN POTASSIUM 50 MILLIGRAM(S): 100 TABLET, FILM COATED ORAL at 06:05

## 2024-06-30 RX ADMIN — MUPIROCIN 1 APPLICATION(S): 20 OINTMENT TOPICAL at 06:09

## 2024-06-30 RX ADMIN — MUPIROCIN 1 APPLICATION(S): 20 OINTMENT TOPICAL at 17:04

## 2024-06-30 RX ADMIN — Medication 90 MILLIGRAM(S): at 06:05

## 2024-06-30 RX ADMIN — RISPERIDONE 0.5 MILLIGRAM(S): 0.5 TABLET ORAL at 17:04

## 2024-06-30 RX ADMIN — POTASSIUM CHLORIDE 40 MILLIEQUIVALENT(S): 600 TABLET, FILM COATED, EXTENDED RELEASE ORAL at 17:37

## 2024-07-01 ENCOUNTER — TRANSCRIPTION ENCOUNTER (OUTPATIENT)
Age: 59
End: 2024-07-01

## 2024-07-01 VITALS
SYSTOLIC BLOOD PRESSURE: 154 MMHG | TEMPERATURE: 99 F | OXYGEN SATURATION: 97 % | HEART RATE: 97 BPM | RESPIRATION RATE: 19 BRPM | DIASTOLIC BLOOD PRESSURE: 80 MMHG

## 2024-07-01 LAB
ALBUMIN SERPL ELPH-MCNC: 3.3 G/DL — LOW (ref 3.5–5.2)
ALP SERPL-CCNC: 93 U/L — SIGNIFICANT CHANGE UP (ref 30–115)
ALT FLD-CCNC: 17 U/L — SIGNIFICANT CHANGE UP (ref 0–41)
ANION GAP SERPL CALC-SCNC: 9 MMOL/L — SIGNIFICANT CHANGE UP (ref 7–14)
AST SERPL-CCNC: 15 U/L — SIGNIFICANT CHANGE UP (ref 0–41)
BILIRUB SERPL-MCNC: 0.2 MG/DL — SIGNIFICANT CHANGE UP (ref 0.2–1.2)
BUN SERPL-MCNC: 22 MG/DL — HIGH (ref 10–20)
CALCIUM SERPL-MCNC: 8.4 MG/DL — SIGNIFICANT CHANGE UP (ref 8.4–10.5)
CHLORIDE SERPL-SCNC: 105 MMOL/L — SIGNIFICANT CHANGE UP (ref 98–110)
CO2 SERPL-SCNC: 29 MMOL/L — SIGNIFICANT CHANGE UP (ref 17–32)
CREAT SERPL-MCNC: 2.6 MG/DL — HIGH (ref 0.7–1.5)
EGFR: 27 ML/MIN/1.73M2 — LOW
FERRITIN SERPL-MCNC: 402 NG/ML — HIGH (ref 30–400)
GLUCOSE BLDC GLUCOMTR-MCNC: 254 MG/DL — HIGH (ref 70–99)
GLUCOSE BLDC GLUCOMTR-MCNC: 63 MG/DL — LOW (ref 70–99)
GLUCOSE BLDC GLUCOMTR-MCNC: 73 MG/DL — SIGNIFICANT CHANGE UP (ref 70–99)
GLUCOSE BLDC GLUCOMTR-MCNC: 74 MG/DL — SIGNIFICANT CHANGE UP (ref 70–99)
GLUCOSE SERPL-MCNC: 122 MG/DL — HIGH (ref 70–99)
HCT VFR BLD CALC: 22.7 % — LOW (ref 42–52)
HGB BLD-MCNC: 7.4 G/DL — LOW (ref 14–18)
IRON SATN MFR SERPL: 28 % — SIGNIFICANT CHANGE UP (ref 15–50)
IRON SATN MFR SERPL: 42 UG/DL — SIGNIFICANT CHANGE UP (ref 35–150)
MAGNESIUM SERPL-MCNC: 1.6 MG/DL — LOW (ref 1.8–2.4)
MCHC RBC-ENTMCNC: 28 PG — SIGNIFICANT CHANGE UP (ref 27–31)
MCHC RBC-ENTMCNC: 32.6 G/DL — SIGNIFICANT CHANGE UP (ref 32–37)
MCV RBC AUTO: 86 FL — SIGNIFICANT CHANGE UP (ref 80–94)
NRBC # BLD: 0 /100 WBCS — SIGNIFICANT CHANGE UP (ref 0–0)
PLATELET # BLD AUTO: 385 K/UL — SIGNIFICANT CHANGE UP (ref 130–400)
PMV BLD: 9.6 FL — SIGNIFICANT CHANGE UP (ref 7.4–10.4)
POTASSIUM SERPL-MCNC: 3.4 MMOL/L — LOW (ref 3.5–5)
POTASSIUM SERPL-SCNC: 3.4 MMOL/L — LOW (ref 3.5–5)
PROT SERPL-MCNC: 5.8 G/DL — LOW (ref 6–8)
RBC # BLD: 2.64 M/UL — LOW (ref 4.7–6.1)
RBC # FLD: 16 % — HIGH (ref 11.5–14.5)
SODIUM SERPL-SCNC: 143 MMOL/L — SIGNIFICANT CHANGE UP (ref 135–146)
TIBC SERPL-MCNC: 151 UG/DL — LOW (ref 220–430)
TRANSFERRIN SERPL-MCNC: 137 MG/DL — LOW (ref 200–360)
UIBC SERPL-MCNC: 109 UG/DL — LOW (ref 110–370)
WBC # BLD: 8.76 K/UL — SIGNIFICANT CHANGE UP (ref 4.8–10.8)
WBC # FLD AUTO: 8.76 K/UL — SIGNIFICANT CHANGE UP (ref 4.8–10.8)

## 2024-07-01 PROCEDURE — 99232 SBSQ HOSP IP/OBS MODERATE 35: CPT

## 2024-07-01 RX ORDER — MAGNESIUM SULFATE 100 %
2 POWDER (GRAM) MISCELLANEOUS ONCE
Refills: 0 | Status: COMPLETED | OUTPATIENT
Start: 2024-07-01 | End: 2024-07-01

## 2024-07-01 RX ORDER — GLUCAGON HYDROCHLORIDE 1 MG/ML
1 INJECTION, POWDER, FOR SOLUTION INTRAMUSCULAR; INTRAVENOUS; SUBCUTANEOUS ONCE
Refills: 0 | Status: DISCONTINUED | OUTPATIENT
Start: 2024-07-01 | End: 2024-07-01

## 2024-07-01 RX ORDER — INSULIN GLARGINE 100 [IU]/ML
50 INJECTION, SOLUTION SUBCUTANEOUS
Qty: 0 | Refills: 0 | DISCHARGE
Start: 2024-07-01

## 2024-07-01 RX ORDER — MAGNESIUM OXIDE 400 MG/1
400 TABLET ORAL EVERY 4 HOURS
Refills: 0 | Status: DISCONTINUED | OUTPATIENT
Start: 2024-07-01 | End: 2024-07-01

## 2024-07-01 RX ORDER — APIXABAN 5 MG/1
1 TABLET, FILM COATED ORAL
Qty: 70 | Refills: 0
Start: 2024-07-01 | End: 2024-07-30

## 2024-07-01 RX ORDER — POTASSIUM CHLORIDE 600 MG/1
40 TABLET, FILM COATED, EXTENDED RELEASE ORAL ONCE
Refills: 0 | Status: COMPLETED | OUTPATIENT
Start: 2024-07-01 | End: 2024-07-01

## 2024-07-01 RX ORDER — POTASSIUM CHLORIDE 600 MG/1
40 TABLET, FILM COATED, EXTENDED RELEASE ORAL
Refills: 0 | Status: DISCONTINUED | OUTPATIENT
Start: 2024-07-01 | End: 2024-07-01

## 2024-07-01 RX ADMIN — PIPERACILLIN SODIUM AND TAZOBACTAM SODIUM 25 GRAM(S): 3; .375 INJECTION, POWDER, LYOPHILIZED, FOR SOLUTION INTRAVENOUS at 05:24

## 2024-07-01 RX ADMIN — ASPIRIN 81 MILLIGRAM(S): 325 TABLET, FILM COATED ORAL at 11:23

## 2024-07-01 RX ADMIN — INSULIN LISPRO 3: 100 INJECTION, SOLUTION SUBCUTANEOUS at 08:22

## 2024-07-01 RX ADMIN — INSULIN LISPRO 3 UNIT(S): 100 INJECTION, SOLUTION SUBCUTANEOUS at 08:22

## 2024-07-01 RX ADMIN — Medication 25 GRAM(S): at 12:23

## 2024-07-01 RX ADMIN — CLONAZEPAM 0.5 MILLIGRAM(S): 2 TABLET ORAL at 05:45

## 2024-07-01 RX ADMIN — POTASSIUM CHLORIDE 40 MILLIEQUIVALENT(S): 600 TABLET, FILM COATED, EXTENDED RELEASE ORAL at 12:23

## 2024-07-01 RX ADMIN — APIXABAN 10 MILLIGRAM(S): 5 TABLET, FILM COATED ORAL at 05:45

## 2024-07-01 RX ADMIN — RISPERIDONE 0.5 MILLIGRAM(S): 0.5 TABLET ORAL at 05:45

## 2024-07-01 RX ADMIN — LOSARTAN POTASSIUM 50 MILLIGRAM(S): 100 TABLET, FILM COATED ORAL at 05:45

## 2024-07-01 RX ADMIN — INSULIN GLARGINE 50 UNIT(S): 100 INJECTION, SOLUTION SUBCUTANEOUS at 08:46

## 2024-07-01 RX ADMIN — Medication 90 MILLIGRAM(S): at 05:54

## 2024-07-01 RX ADMIN — MUPIROCIN 1 APPLICATION(S): 20 OINTMENT TOPICAL at 05:24

## 2024-07-03 DIAGNOSIS — I82.629 ACUTE EMBOLISM AND THROMBOSIS OF DEEP VEINS OF UNSPECIFIED UPPER EXTREMITY: ICD-10-CM

## 2024-07-03 RX ORDER — APIXABAN 5 MG/1
5 TABLET, FILM COATED ORAL TWICE DAILY
Qty: 60 | Refills: 2 | Status: ACTIVE | COMMUNITY
Start: 2024-07-03 | End: 1900-01-01

## 2024-07-05 LAB
CULTURE RESULTS: SIGNIFICANT CHANGE UP
CULTURE RESULTS: SIGNIFICANT CHANGE UP
SPECIMEN SOURCE: SIGNIFICANT CHANGE UP
SPECIMEN SOURCE: SIGNIFICANT CHANGE UP

## 2024-07-05 RX ORDER — APIXABAN 5 MG/1
1 TABLET, FILM COATED ORAL
Qty: 48 | Refills: 0
Start: 2024-07-05 | End: 2024-07-28

## 2024-07-08 ENCOUNTER — APPOINTMENT (OUTPATIENT)
Dept: VASCULAR SURGERY | Facility: CLINIC | Age: 59
End: 2024-07-08

## 2024-07-08 DIAGNOSIS — N18.4 CHRONIC KIDNEY DISEASE, STAGE 4 (SEVERE): ICD-10-CM

## 2024-07-08 DIAGNOSIS — N17.9 ACUTE KIDNEY FAILURE, UNSPECIFIED: ICD-10-CM

## 2024-07-08 DIAGNOSIS — F41.9 ANXIETY DISORDER, UNSPECIFIED: ICD-10-CM

## 2024-07-08 DIAGNOSIS — A41.9 SEPSIS, UNSPECIFIED ORGANISM: ICD-10-CM

## 2024-07-08 DIAGNOSIS — E78.5 HYPERLIPIDEMIA, UNSPECIFIED: ICD-10-CM

## 2024-07-08 DIAGNOSIS — E87.5 HYPERKALEMIA: ICD-10-CM

## 2024-07-08 DIAGNOSIS — Z89.511 ACQUIRED ABSENCE OF RIGHT LEG BELOW KNEE: ICD-10-CM

## 2024-07-08 DIAGNOSIS — K56.609 UNSPECIFIED INTESTINAL OBSTRUCTION, UNSPECIFIED AS TO PARTIAL VERSUS COMPLETE OBSTRUCTION: ICD-10-CM

## 2024-07-08 DIAGNOSIS — K21.9 GASTRO-ESOPHAGEAL REFLUX DISEASE WITHOUT ESOPHAGITIS: ICD-10-CM

## 2024-07-08 DIAGNOSIS — I12.9 HYPERTENSIVE CHRONIC KIDNEY DISEASE WITH STAGE 1 THROUGH STAGE 4 CHRONIC KIDNEY DISEASE, OR UNSPECIFIED CHRONIC KIDNEY DISEASE: ICD-10-CM

## 2024-07-08 DIAGNOSIS — E10.22 TYPE 1 DIABETES MELLITUS WITH DIABETIC CHRONIC KIDNEY DISEASE: ICD-10-CM

## 2024-07-08 DIAGNOSIS — I95.9 HYPOTENSION, UNSPECIFIED: ICD-10-CM

## 2024-07-08 DIAGNOSIS — E11.22 TYPE 2 DIABETES MELLITUS WITH DIABETIC CHRONIC KIDNEY DISEASE: ICD-10-CM

## 2024-07-08 DIAGNOSIS — D84.81 IMMUNODEFICIENCY DUE TO CONDITIONS CLASSIFIED ELSEWHERE: ICD-10-CM

## 2024-07-08 DIAGNOSIS — I12.0 HYPERTENSIVE CHRONIC KIDNEY DISEASE WITH STAGE 5 CHRONIC KIDNEY DISEASE OR END STAGE RENAL DISEASE: ICD-10-CM

## 2024-07-08 DIAGNOSIS — E10.51 TYPE 1 DIABETES MELLITUS WITH DIABETIC PERIPHERAL ANGIOPATHY WITHOUT GANGRENE: ICD-10-CM

## 2024-07-08 DIAGNOSIS — E10.10 TYPE 1 DIABETES MELLITUS WITH KETOACIDOSIS WITHOUT COMA: ICD-10-CM

## 2024-07-08 DIAGNOSIS — R11.2 NAUSEA WITH VOMITING, UNSPECIFIED: ICD-10-CM

## 2024-07-08 DIAGNOSIS — J98.11 ATELECTASIS: ICD-10-CM

## 2024-07-08 DIAGNOSIS — E11.51 TYPE 2 DIABETES MELLITUS WITH DIABETIC PERIPHERAL ANGIOPATHY WITHOUT GANGRENE: ICD-10-CM

## 2024-07-08 DIAGNOSIS — F32.A DEPRESSION, UNSPECIFIED: ICD-10-CM

## 2024-07-08 DIAGNOSIS — Z79.82 LONG TERM (CURRENT) USE OF ASPIRIN: ICD-10-CM

## 2024-07-08 DIAGNOSIS — J18.9 PNEUMONIA, UNSPECIFIED ORGANISM: ICD-10-CM

## 2024-07-08 DIAGNOSIS — R45.1 RESTLESSNESS AND AGITATION: ICD-10-CM

## 2024-07-08 DIAGNOSIS — Z76.82 AWAITING ORGAN TRANSPLANT STATUS: ICD-10-CM

## 2024-07-08 DIAGNOSIS — Z86.73 PERSONAL HISTORY OF TRANSIENT ISCHEMIC ATTACK (TIA), AND CEREBRAL INFARCTION WITHOUT RESIDUAL DEFICITS: ICD-10-CM

## 2024-07-08 DIAGNOSIS — F19.10 OTHER PSYCHOACTIVE SUBSTANCE ABUSE, UNCOMPLICATED: ICD-10-CM

## 2024-07-08 DIAGNOSIS — J96.01 ACUTE RESPIRATORY FAILURE WITH HYPOXIA: ICD-10-CM

## 2024-07-08 DIAGNOSIS — I82.622 ACUTE EMBOLISM AND THROMBOSIS OF DEEP VEINS OF LEFT UPPER EXTREMITY: ICD-10-CM

## 2024-07-08 DIAGNOSIS — N18.6 END STAGE RENAL DISEASE: ICD-10-CM

## 2024-07-08 DIAGNOSIS — E83.42 HYPOMAGNESEMIA: ICD-10-CM

## 2024-07-08 DIAGNOSIS — G92.8 OTHER TOXIC ENCEPHALOPATHY: ICD-10-CM

## 2024-07-08 DIAGNOSIS — D84.89 OTHER IMMUNODEFICIENCIES: ICD-10-CM

## 2024-07-08 DIAGNOSIS — E87.1 HYPO-OSMOLALITY AND HYPONATREMIA: ICD-10-CM

## 2024-07-08 DIAGNOSIS — Z79.01 LONG TERM (CURRENT) USE OF ANTICOAGULANTS: ICD-10-CM

## 2024-07-08 DIAGNOSIS — D63.1 ANEMIA IN CHRONIC KIDNEY DISEASE: ICD-10-CM

## 2024-07-08 DIAGNOSIS — I82.A12 ACUTE EMBOLISM AND THROMBOSIS OF LEFT AXILLARY VEIN: ICD-10-CM

## 2024-07-15 ENCOUNTER — APPOINTMENT (OUTPATIENT)
Dept: NEUROLOGY | Facility: CLINIC | Age: 59
End: 2024-07-15

## 2024-07-29 ENCOUNTER — NON-APPOINTMENT (OUTPATIENT)
Age: 59
End: 2024-07-29

## 2024-07-29 ENCOUNTER — APPOINTMENT (OUTPATIENT)
Dept: NEUROLOGY | Facility: CLINIC | Age: 59
End: 2024-07-29
Payer: MEDICARE

## 2024-07-29 VITALS
TEMPERATURE: 97.3 F | HEART RATE: 77 BPM | WEIGHT: 195 LBS | HEIGHT: 72 IN | SYSTOLIC BLOOD PRESSURE: 132 MMHG | OXYGEN SATURATION: 99 % | DIASTOLIC BLOOD PRESSURE: 77 MMHG | BODY MASS INDEX: 26.41 KG/M2

## 2024-07-29 DIAGNOSIS — I63.9 CEREBRAL INFARCTION, UNSPECIFIED: ICD-10-CM

## 2024-07-29 DIAGNOSIS — E78.5 HYPERLIPIDEMIA, UNSPECIFIED: ICD-10-CM

## 2024-07-29 DIAGNOSIS — E11.9 TYPE 2 DIABETES MELLITUS W/OUT COMPLICATIONS: ICD-10-CM

## 2024-07-29 DIAGNOSIS — I82.622 ACUTE EMBOLISM AND THROMBOSIS OF DEEP VEINS OF LEFT UPPER EXTREMITY: ICD-10-CM

## 2024-07-29 PROCEDURE — 99205 OFFICE O/P NEW HI 60 MIN: CPT

## 2024-07-29 RX ORDER — INSULIN GLARGINE 300 U/ML
INJECTION, SOLUTION SUBCUTANEOUS
Refills: 0 | Status: ACTIVE | COMMUNITY

## 2024-07-29 RX ORDER — CALCITRIOL 0.25 UG/1
0.25 CAPSULE, LIQUID FILLED ORAL
Refills: 0 | Status: ACTIVE | COMMUNITY

## 2024-07-29 RX ORDER — ATORVASTATIN CALCIUM 80 MG/1
80 TABLET, FILM COATED ORAL
Qty: 90 | Refills: 1 | Status: ACTIVE | COMMUNITY
Start: 2024-07-29 | End: 1900-01-01

## 2024-07-29 RX ORDER — INSULIN ASPART 100 [IU]/ML
INJECTION, SOLUTION INTRAVENOUS; SUBCUTANEOUS
Refills: 0 | Status: ACTIVE | COMMUNITY

## 2024-07-31 LAB
PA ADP PRP-ACNC: 89 PRU
PLATELET RESPONSE ASPIRIN: 470 ARU

## 2024-08-01 NOTE — ASSESSMENT
[FreeTextEntry1] : Juan Obando is a 59 year old male with PMH of ESRD (on kidney transplant list), DVT (on Eliquis), PAD, right BKA, poorly controlled T2DM, substance abuse and recent stroke who presents for post hospitalization follow up. Her is accompanied today by his sister, Lelia (telephone #[phone]). Stroke etiology currently unknown, therefore further work up is needed to prevent reoccurrence.   Plan: -Continue Eliquis 5 mg BID likely for 3 months for LUE DVT with repeat LUE US prior to discontinuation; emphasized importance of taking twice per day  -Continue Aspirin and Plavix until ARU/PRU is obtained as only AP monotherapy is needed from stroke perspective; will touch base with Vascular for plan going forward -Increase Atorvastatin to 80 mg for LDL goal <70; prescription sent to preferred pharmacy -MRA H/N to assess vascular stenosis -TTE with bubble study to r/o PFO; consider LENKA pending results -14 day Ziopatch to r/o A fib; patient not currently interested in ILR placement -Continue aggressive vascular risk factor control with PCP/Cardiology/Nephrologist, BP goal <140/90  -F/u with Endocrinology for DM management, A1c goal <7% -Emphasized continuation of smoking cessation, avoiding alcohol and recreational drug use -Counselled on healthy eating (DASH/Mediterranean diet, limiting red meats and fried foods); goal to only eat red meat once weekly -Counselled on importance of regular exercise and remaining active; goal of exercise bike for 15 minutes -Counselled on f/u with PCP regarding regular health maintenance and prevention, including routine screening -Counselled on signs of stroke BEFAST and to call 911 with any new or worsening neurological symptoms -RTO in 3 months; will call with ARU/PRU results once available

## 2024-08-01 NOTE — HISTORY OF PRESENT ILLNESS
[FreeTextEntry1] : Juan Obando is a 59 year old male with PMH of ESRD (on kidney transplant list), DVT (on Eliquis), PAD, right BKA, poorly controlled T2DM, substance abuse and recent stroke who presents for post hospitalization follow up. Her is accompanied today by his sister, Lelia (telephone #[phone]).  Patient reports being found by his family incoherent on a Wednesday at the end of June and was admitted to [facility] with DKA with a BS of 1300. He was nonresponsive and agitated throughout his hospital stay. He and his family deny any noticeable focal neurological deficits, other than imbalance after leaving AMA. Of note, urine screen positive for cocaine at the time of admission.  HCT: No evidence of acute intracranial pathology. Mild cerebral atrophy. Mild chronic microvascular ischemic changes. MRI head w/wo: Unilateral right middle cerebellar peduncle lesion, about 1 cm. Nonenhancing with restricted diffusion. Imaging characteristics suggest acute infarction, although this is an atypical location. Chronic brainstem lacunar infarct. Mild microvascular changes.  HCT: No acute intracranial pathology. Stable mild chronic microvascular ischemic changes and chronic lacunar infarcts. Discharge lab work with A1c 9.5% and . B12 908. TTE: Left ventricular ejection fraction is 55 to 60%. Normal left atrial size. Structurally normal mitral valve, with normal leaflet excursion. Mild tricupsid regurgitation. Normal trileaflet aortic valve with normal opening.  LUE US: DVT is visualized in the left axillary vein. Superficial thrombophlebitis is visualized in left basilic vein. Discharge medications include Eliquis 5 mg BID, Aspirin and Plavix. Of note, patient only reports taking Eliquis 5 mg once per day since discharge.  BP today 132/77. He does not take his BP at home, usually SBPs in the 140-160s, followed by Nephrology. On DAPT per vascular. He tries to minimal fried foods but does eat red meat on a regular basis. He has been trying to ride the bike at the gym for exercise. He takes Xanax to help him sleep and is told that he snores loudly. He is not interested in GUSTAVO work up since he is not interested in any available treatment options. He denies any family history of stroke or blood clots. He drinks about 4 glasses of wine per year and attends AA. He is a former cigarette smoker who reports quitting about 2-3 years ago. He reports 2 ppd since he was 17. He endorses prior substance abuse for which he receives support on a regular basis. He is scheduled to see his Nephrologist next week and Cardiologist in 2 weeks. He was discharged on Rosuvastatin but was recently switched to Atorvastatin 40 mg by his PCP. He reports some dizziness after leaving the hospital, which has now resolved.

## 2024-08-01 NOTE — ASSESSMENT
[FreeTextEntry1] : Juan Obando is a 59 year old male with PMH of ESRD (on kidney transplant list), DVT (on Eliquis), PAD, right BKA, poorly controlled T2DM, substance abuse and recent stroke who presents for post hospitalization follow up. Her is accompanied today by his sister, Lelia (telephone #[phone]). Stroke etiology currently unknown, therefore further work up is needed to prevent reoccurrence.   Plan: -Continue Eliquis 5 mg BID likely for 3 months for LUE DVT with repeat LUE US prior to discontinuation; emphasized importance of taking twice per day  -Continue Aspirin and Plavix until ARU/PRU is obtained as only AP monotherapy is needed from stroke perspective; will touch base with Vascular for plan going forward -Increase Atorvastatin to 80 mg for LDL goal <70; prescription sent to preferred pharmacy -MRA H/N to assess vascular stenosis -TTE with bubble study to r/o PFO; consider LENKA pending results -14 day Ziopatch to r/o A fib; patient not currently interested in ILR placement -Continue aggressive vascular risk factor control with PCP/Cardiology/Nephrologist, BP goal <140/90  -F/u with Endocrinology for DM management, A1c goal <7% -Emphasized continuation of smoking cessation, avoiding alcohol and recreational drug use -Counselled on healthy eating (DASH/Mediterranean diet, limiting red meats and fried foods); goal to only eat red meat once weekly -Counselled on importance of regular exercise and remaining active; goal of exercise bike for 15 minutes -Counselled on f/u with PCP regarding regular health maintenance and prevention, including routine screening -Counselled on signs of stroke BEFAST and to call 911 with any new or worsening neurological symptoms -RTO in 3 months; will call with ARU/PRU results once available

## 2024-08-01 NOTE — PHYSICAL EXAM
[FreeTextEntry1] : Alert. Fully oriented. Speech and language are intact. Cranial nerves II-XII are intact. No droop. Motor exam reveals intact strength with individual muscle testing in bilateral upper and lower extremities. R BKA. No drift. Tone is normal. Sensation is intact to light touch in distal extremities. Finger-to-nose is intact. Gait is normal.

## 2024-08-01 NOTE — HISTORY OF PRESENT ILLNESS
[FreeTextEntry1] : Juan Obando is a 59 year old male with PMH of ESRD (on kidney transplant list), DVT (on Eliquis), PAD, right BKA, poorly controlled T2DM, substance abuse and recent stroke who presents for post hospitalization follow up. Her is accompanied today by his sister, Lelia (telephone #[phone]).  Patient reports being found by his family incoherent on a Wednesday at the end of June and was admitted to [facility] with DKA with a BS of 1300. He was nonresponsive and agitated throughout his hospital stay. He and his family deny any noticeable focal neurological deficits, other than imbalance after leaving AMA. Of note, urine screen positive for cocaine at the time of admission.  HCT: No evidence of acute intracranial pathology. Mild cerebral atrophy. Mild chronic microvascular ischemic changes. MRI head w/wo: Unilateral right middle cerebellar peduncle lesion, about 1 cm. Nonenhancing with restricted diffusion. Imaging characteristics suggest acute infarction, although this is an atypical location. Chronic brainstem lacunar infarct. Mild microvascular changes.  HCT: No acute intracranial pathology. Stable mild chronic microvascular ischemic changes and chronic lacunar infarcts. Discharge lab work with A1c 9.5% and . B12 908. TTE: Left ventricular ejection fraction is 55 to 60%. Normal left atrial size. Structurally normal mitral valve, with normal leaflet excursion. Mild tricupsid regurgitation. Normal trileaflet aortic valve with normal opening.  LUE US: DVT is visualized in the left axillary vein. Superficial thrombophlebitis is visualized in left basilic vein. Discharge medications include Eliquis 5 mg BID, Aspirin and Plavix. Of note, patient only reports taking Eliquis 5 mg once per day since discharge.  BP today 132/77. He does not take his BP at home, usually SBPs in the 140-160s, followed by Nephrology. On DAPT per vascular. He tries to minimal fried foods but does eat red meat on a regular basis. He has been trying to ride the bike at the gym for exercise. He takes Xanax to help him sleep and is told that he snores loudly. He is not interested in GUSTAVO work up since he is not interested in any available treatment options. He denies any family history of stroke or blood clots. He drinks about 4 glasses of wine per year and attends AA. He is a former cigarette smoker who reports quitting about 2-3 years ago. He reports 2 ppd since he was 17. He endorses prior substance abuse for which he receives support on a regular basis. He is scheduled to see his Nephrologist next week and Cardiologist in 2 weeks. He was discharged on Rosuvastatin but was recently switched to Atorvastatin 40 mg by his PCP. He reports some dizziness after leaving the hospital, which has now resolved.

## 2024-08-01 NOTE — HISTORY OF PRESENT ILLNESS
[FreeTextEntry1] : Juan Obando is a 59 year old male with PMH of ESRD (on kidney transplant list), DVT (on Eliquis), PAD, right BKA, poorly controlled T2DM, substance abuse and recent stroke who presents for post hospitalization follow up. Her is accompanied today by his sister, Lelia (telephone #[phone]).  Patient reports being found by his family incoherent on a Wednesday at the end of June and was admitted to [facility] with DKA with a BS of 1300. He was nonresponsive and agitated throughout his hospital stay. He and his family deny any noticeable focal neurological deficits, other than imbalance after leaving AMA. Of note, urine screen positive for cocaine at the time of admission.  HCT: No evidence of acute intracranial pathology. Mild cerebral atrophy. Mild chronic microvascular ischemic changes. MRI head w/wo: Unilateral right middle cerebellar peduncle lesion, about 1 cm. Nonenhancing with restricted diffusion. Imaging characteristics suggest acute infarction, although this is an atypical location. Chronic brainstem lacunar infarct. Mild microvascular changes.  HCT: No acute intracranial pathology. Stable mild chronic microvascular ischemic changes and chronic lacunar infarcts. Discharge lab work with A1c 9.5% and . B12 908. TTE: Left ventricular ejection fraction is 55 to 60%. Normal left atrial size. Structurally normal mitral valve, with normal leaflet excursion. Mild tricupsid regurgitation. Normal trileaflet aortic valve with normal opening.  LUE US: DVT is visualized in the left axillary vein. Superficial thrombophlebitis is visualized in left basilic vein. Discharge medications include Eliquis 5 mg BID, Aspirin and Plavix. Of note, patient only reports taking Eliquis 5 mg once per day since discharge.  BP today 132/77. He does not take his BP at home, usually SBPs in the 140-160s, followed by Nephrology. On DAPT per vascular. He tries to minimal fried foods but does eat red meat on a regular basis. He has been trying to ride the bike at the gym for exercise. He takes Xanax to help him sleep and is told that he snores loudly. He is not interested in GUSTAVO work up since he is not interested in any available treatment options. He denies any family history of stroke or blood clots. He drinks about 4 glasses of wine per year and attends AA. He is a former cigarette smoker who reports quitting about 2-3 years ago. He reports 2 ppd since he was 17. He endorses prior substance abuse for which he receives support on a regular basis. He is scheduled to see his Nephrologist next week and Cardiologist in 2 weeks. He was discharged on Rosuvastatin but was recently switched to Atorvastatin 40 mg by his PCP. He reports some dizziness after leaving the hospital, which has now resolved.

## 2024-08-01 NOTE — ASSESSMENT
[FreeTextEntry1] : Juan Obando is a 59 year old male with PMH of ESRD (on kidney transplant list), DVT (on Eliquis), PAD, right BKA, poorly controlled T2DM, substance abuse and recent stroke who presents for post hospitalization follow up. Her is accompanied today by his sister, Lelia (telephone #[phone]). Stroke etiology currently unknown, therefore further work up is needed to prevent reoccurrence.   Plan: -Continue Eliquis 5 mg BID likely for 3 months for LUE DVT with repeat LUE US prior to discontinuation; emphasized importance of taking twice per day  -Continue Aspirin and Plavix until ARU/PRU is obtained as only AP monotherapy is needed from stroke perspective; will touch base with Vascular for plan going forward -Increase Atorvastatin to 80 mg for LDL goal <70; prescription sent to preferred pharmacy -MRA H/N to assess vascular stenosis -TTE with bubble study to r/o PFO; consider LENKA pending results -14 day Ziopatch to r/o A fib; patient not currently interested in ILR placement -Continue aggressive vascular risk factor control with PCP/Cardiology/Nephrologist, BP goal <140/90  -F/u with Endocrinology for DM management, A1c goal <7% -Emphasized continuation of smoking cessation, avoiding alcohol and recreational drug use -Counselled on healthy eating (DASH/Mediterranean diet, limiting red meats and fried foods); goal to only eat red meat once weekly -Counselled on importance of regular exercise and remaining active; goal of exercise bike for 15 minutes -Counselled on f/u with PCP regarding regular health maintenance and prevention, including routine screening -Counselled on signs of stroke BEFAST and to call 911 with any new or worsening neurological symptoms -RTO in 3 months; will call with ARU/PRU results once available

## 2024-08-08 ENCOUNTER — APPOINTMENT (OUTPATIENT)
Dept: NEUROLOGY | Facility: CLINIC | Age: 59
End: 2024-08-08

## 2024-08-08 PROCEDURE — 93880 EXTRACRANIAL BILAT STUDY: CPT

## 2024-08-12 ENCOUNTER — RESULT REVIEW (OUTPATIENT)
Age: 59
End: 2024-08-12

## 2024-08-12 ENCOUNTER — APPOINTMENT (OUTPATIENT)
Dept: MRI IMAGING | Facility: HOSPITAL | Age: 59
End: 2024-08-12

## 2024-08-12 PROCEDURE — 70547 MR ANGIOGRAPHY NECK W/O DYE: CPT | Mod: 26

## 2024-08-12 PROCEDURE — 70544 MR ANGIOGRAPHY HEAD W/O DYE: CPT | Mod: 26

## 2024-08-13 ENCOUNTER — APPOINTMENT (OUTPATIENT)
Dept: CARDIOLOGY | Facility: CLINIC | Age: 59
End: 2024-08-13
Payer: MEDICARE

## 2024-08-13 VITALS
HEIGHT: 72 IN | DIASTOLIC BLOOD PRESSURE: 78 MMHG | WEIGHT: 185 LBS | BODY MASS INDEX: 25.06 KG/M2 | SYSTOLIC BLOOD PRESSURE: 131 MMHG | OXYGEN SATURATION: 100 % | HEART RATE: 82 BPM | TEMPERATURE: 97.3 F

## 2024-08-13 DIAGNOSIS — N18.9 CHRONIC KIDNEY DISEASE, UNSPECIFIED: ICD-10-CM

## 2024-08-13 DIAGNOSIS — Z01.810 ENCOUNTER FOR PREPROCEDURAL CARDIOVASCULAR EXAMINATION: ICD-10-CM

## 2024-08-13 PROCEDURE — G2211 COMPLEX E/M VISIT ADD ON: CPT

## 2024-08-13 PROCEDURE — 99214 OFFICE O/P EST MOD 30 MIN: CPT

## 2024-08-13 NOTE — HISTORY OF PRESENT ILLNESS
[FreeTextEntry1] : 58 year old male PMH of CKD, DM1, HTN, right foot Charcot on list for kidney transplant. Takes nifedipine 90 , losartan 100 for, basal bolus insulin. Presents for yearly follow up. Denies any chest pain, sob, dyspnea, palpitations. Needs yearly stress test and echo for kidney transplant list.     ================================= Nuclear stress test 9/2023 unremarkable. EF 68% Recent echo in June 2024: EF 55-60%. Unremarkable   EKG WNL

## 2024-08-13 NOTE — PHYSICAL EXAM
[Well Developed] : well developed [Well Nourished] : well nourished [No Acute Distress] : no acute distress [Normal S1, S2] : normal S1, S2 [No Murmur] : no murmur [No Rub] : no rub [No Gallop] : no gallop [Clear Lung Fields] : clear lung fields [Good Air Entry] : good air entry [No Respiratory Distress] : no respiratory distress  [Soft] : abdomen soft [Non Tender] : non-tender [No Masses/organomegaly] : no masses/organomegaly [Normal] : no rash, no skin lesions [de-identified] : trace edema on L leg

## 2024-08-13 NOTE — ASSESSMENT
[FreeTextEntry1] : #CKD on transplant list - ordering nuclear exercise stress test    recent echo in June 2024- EF 55-60% unremarkable findings -f/u cardiology clinic in a year -pt to f/u w/ [facility] in regards to ESRD/transplant care  #PAD -c/w current regimen: ASA, plavix -vascular f/u  #HLD  -Pt to see PCP for lipid management  -c/w atorvastatin 80 mg  #HTN Pt is normotensive today -c/w current regiment (losartan 100mg, nifedipine 90mg )  -f/u with nephrologist and pcp for further management  The primary prevention of heart disease was discussed in detail with the patient, including adhering to a heart healthy, plant based, or Mediterranean diet, and the importance of 30 minutes of moderate intensity activity for 30 minutes, 5 times a week. All the patient's questions were answered.

## 2024-08-13 NOTE — REVIEW OF SYSTEMS
[Negative] : Musculoskeletal [Fever] : no fever [Chills] : no chills [SOB] : no shortness of breath [Dyspnea on exertion] : not dyspnea during exertion [Chest Discomfort] : no chest discomfort [Leg Claudication] : no intermittent leg claudication [Palpitations] : no palpitations [Orthopnea] : no orthopnea [Syncope] : no syncope [Cough] : no cough [Wheezing] : no wheezing [Coughing Up Blood] : no hemoptysis [Dizziness] : no dizziness [Numbness (Hypoesthesia)] : no numbness [Limb Weakness (Paresis)] : no limb weakness (Paresis)

## 2024-08-14 ENCOUNTER — NON-APPOINTMENT (OUTPATIENT)
Age: 59
End: 2024-08-14

## 2024-08-18 ENCOUNTER — EMERGENCY (EMERGENCY)
Facility: HOSPITAL | Age: 59
LOS: 0 days | Discharge: HOME | End: 2024-08-18
Attending: EMERGENCY MEDICINE
Payer: MEDICARE

## 2024-08-18 ENCOUNTER — RESULT CHARGE (OUTPATIENT)
Age: 59
End: 2024-08-18

## 2024-08-18 VITALS
OXYGEN SATURATION: 97 % | HEART RATE: 78 BPM | RESPIRATION RATE: 18 BRPM | WEIGHT: 186.07 LBS | DIASTOLIC BLOOD PRESSURE: 60 MMHG | HEIGHT: 72 IN | TEMPERATURE: 98 F | SYSTOLIC BLOOD PRESSURE: 128 MMHG

## 2024-08-18 DIAGNOSIS — Z98.62 PERIPHERAL VASCULAR ANGIOPLASTY STATUS: Chronic | ICD-10-CM

## 2024-08-18 DIAGNOSIS — E11.22 TYPE 2 DIABETES MELLITUS WITH DIABETIC CHRONIC KIDNEY DISEASE: ICD-10-CM

## 2024-08-18 DIAGNOSIS — N18.6 END STAGE RENAL DISEASE: ICD-10-CM

## 2024-08-18 DIAGNOSIS — Z98.890 OTHER SPECIFIED POSTPROCEDURAL STATES: Chronic | ICD-10-CM

## 2024-08-18 DIAGNOSIS — Z86.73 PERSONAL HISTORY OF TRANSIENT ISCHEMIC ATTACK (TIA), AND CEREBRAL INFARCTION WITHOUT RESIDUAL DEFICITS: ICD-10-CM

## 2024-08-18 DIAGNOSIS — Z89.511 ACQUIRED ABSENCE OF RIGHT LEG BELOW KNEE: ICD-10-CM

## 2024-08-18 DIAGNOSIS — L03.115 CELLULITIS OF RIGHT LOWER LIMB: ICD-10-CM

## 2024-08-18 DIAGNOSIS — N20.0 CALCULUS OF KIDNEY: Chronic | ICD-10-CM

## 2024-08-18 DIAGNOSIS — E11.51 TYPE 2 DIABETES MELLITUS WITH DIABETIC PERIPHERAL ANGIOPATHY WITHOUT GANGRENE: ICD-10-CM

## 2024-08-18 DIAGNOSIS — Z86.718 PERSONAL HISTORY OF OTHER VENOUS THROMBOSIS AND EMBOLISM: ICD-10-CM

## 2024-08-18 PROCEDURE — 99282 EMERGENCY DEPT VISIT SF MDM: CPT

## 2024-08-18 PROCEDURE — 99284 EMERGENCY DEPT VISIT MOD MDM: CPT

## 2024-08-18 RX ORDER — CEPHALEXIN 250 MG
1 CAPSULE ORAL
Qty: 28 | Refills: 0
Start: 2024-08-18 | End: 2024-08-24

## 2024-08-18 RX ORDER — DOXYCYCLINE 100 MG/1
1 CAPSULE ORAL
Qty: 14 | Refills: 0
Start: 2024-08-18 | End: 2024-08-24

## 2024-08-18 NOTE — ED PROVIDER NOTE - OBJECTIVE STATEMENT
59-year-old male past medical history of upper extremity DVT, not on AC, pneumonia, diabetes, CVA, diabetes, PAD, status post right BKA, ESRD presents to the ED for evaluation of leg wound.  Patient with several days of right lower extremity leg wound, worsening since onset.  Tried taking leftover Keflex without improvement.  Denies any fevers, chills or rash, nausea,, vomiting, cough, shortness of breath.

## 2024-08-18 NOTE — ED PROVIDER NOTE - PHYSICAL EXAMINATION
CONST: Well appearing in NAD  SKIN: Erythema to the R distal thigh, minimal tenderness w/ fluctuance. No induration or drainable collection.   NEURO: A&Ox3, No focal deficits. Strength 5/5 with no sensory deficits. Steady gait

## 2024-08-18 NOTE — ED PROVIDER NOTE - CARE PROVIDER_API CALL
Polo Conroy.  Internal Medicine  [address] [place] [address], [facility] [address]  [place] [address]  Phone: [phone]  Fax: [phone]  Follow Up Time: 4-6 Days

## 2024-08-18 NOTE — ED PROVIDER NOTE - NSFOLLOWUPINSTRUCTIONS_ED_ALL_ED_FT
Cellulitis    Please take your prescribed antibiotic Keflex 4 times a day and doxycycline twice a day for the next week and follow-up with a primary care doctor.  While taking doxycycline you will be more susceptible to sunburn so please use sunscreen and avoid unnecessary sun exposure.      Cellulitis is a skin infection caused by bacteria. This condition occurs most often in the arms and lower legs but can occur anywhere over the body. Symptoms include redness, swelling, warm skin, tenderness, and chills/fever. If you were prescribed an antibiotic medicine, take it as told by your health care provider. Do not stop taking the antibiotic even if you start to feel better.    SEEK IMMEDIATE MEDICAL CARE IF YOU HAVE THE FOLLOWING SYMPTOMS: worsening fever, red streaks coming from affected area, vomiting or diarrhea, or dizziness/lightheadedness.

## 2024-08-18 NOTE — ED ADULT TRIAGE NOTE - CHIEF COMPLAINT QUOTE
pt states " I woke up 3 days ago and saw a red spot on my right leg and its getting worse". pt denies fevers. hx of diabetes and right BKA.

## 2024-08-18 NOTE — ED PROVIDER NOTE - PATIENT PORTAL LINK FT
You can access the FollowMyHealth Patient Portal offered by [facility] by registering at the following website: [email or web address]. By joining [employer]’s FollowMyHealth portal, you will also be able to view your health information using other applications (apps) compatible with our system.

## 2024-08-18 NOTE — ED PROVIDER NOTE - CLINICAL SUMMARY MEDICAL DECISION MAKING FREE TEXT BOX
pt with history of as above presenting for eval of RLE erythema, warmth, tenderness x2d. no fevers/chills, trauma. initially took 250mg keflex wo improvement for 1st day, then took 800mg augmentin wo improvement, came in for further eval. no other acute complaints. Well appearing, NAD, non toxic. NCAT PERRLA EOMI RLE BKA stump intact, knee FROM active and passive. R lower thigh anterior aspect with mild erythema, warmth, tenderness. no crepitus, induration, fluctuance. pt prefers po abx, discharge and follow-up outpatient. strict return precautions given. Endorses understanding of all of this and aware that they can return at any time for new or concerning symptoms. No further questions or concerns at this time

## 2024-08-18 NOTE — ED PROVIDER NOTE - PATIENT'S SEXUAL ORIENTATION
His PCP reached out stating he was still having some issues, although was better overall.    We can hold off on follow up for now if he wants to keep monitoring things.    He can try metamucil to help bulk up stool in the meantime or can just give it more time.  
Message routed to Kylee RUIZ to advise.  
Please see below information.    Spoke with pt who states since February, he has had frequent, loose and watery stools 10-12 times/day with urgency. Had episodes of incontinence.  Pt states he has had \"every test under the sun\" to evaluate diarrhea.  He completed tapering course of Budesonide 2 days ago.    First 3-4 weeks while taking the Budesonide, noticed no change in stools.  After that,  he noted that he started to have more \"control\" over stools and could get to bathroom in time. Now having stools \"couple times\" a day. Still loose, but not watery.  Today, had soft stool \"like pudding\".   Denies any blood in stool.  Denies any N/V.  Feels about \"75 % better\" since completing Budesonide.    Also reports that last week, had a little bit of pain when he pressed on abdomen. Location was in center of abdomen, between umbilicus and bottom of sternum. Last few days this seems to have resolved and does not \"bother\" him.    Pt states his A1c has decreased to 6, and his PCP has told him to stop taking the Metformin for a couple of weeks. Will re-evaluate his blood sugars at that time. Pt was also told by PCP that perhaps the Metformin could have contributed to his diarrhea.    Advised pt to avoid/minimize fatty, greasy foods, hot and spicy foods, dairy products as these foods can aggravate diarrhea. Pt to stay well hydrated.    Will send to JOSEPH Pichardo to advise on need for appt, other recommendations.  Pt states he got the Budesonide filled at the [facility] pharmacy in [place], as it was cheaper there.  Otherwise, pt goes to [facility] in [place].    
Pt s/p EGD/colonoscopy on 5/3/23 for hx of colon polyps, diarrhea, HAROLDO, weight loss.  Per telephone encounter dated 5/5/23:  Colonoscopy  Random colon tissue biopsies showed signs compatible with Lymphocytic colitis.    Per office visit with PCP, Dr. Kaplan dated 6/27/23, pt was on tapering dose of Budesonide. Was to contact GI team on further direction regarding diarrhea.    Per telephone encounter dated today under PCP, pt finished the medication and diarrhea about \"70 %\" better, but not gone.  
Pt was called and given the information below from Kylee RUIZ.  Pt gave a verbal understanding and had no further questions at this time.  
Writer called pt per PA / PCP note to offer an appointment with Kylee Ham. Pt asking why he should be seen, as he is better now, but he will if it's necessary.    Please advise [phone].      
Heterosexual

## 2024-08-19 ENCOUNTER — APPOINTMENT (OUTPATIENT)
Dept: CARDIOLOGY | Facility: CLINIC | Age: 59
End: 2024-08-19
Payer: MEDICARE

## 2024-08-19 ENCOUNTER — NON-APPOINTMENT (OUTPATIENT)
Age: 59
End: 2024-08-19

## 2024-08-19 VITALS
WEIGHT: 184 LBS | DIASTOLIC BLOOD PRESSURE: 80 MMHG | OXYGEN SATURATION: 98 % | HEIGHT: 72 IN | RESPIRATION RATE: 16 BRPM | SYSTOLIC BLOOD PRESSURE: 120 MMHG | BODY MASS INDEX: 24.92 KG/M2 | HEART RATE: 79 BPM

## 2024-08-19 DIAGNOSIS — E78.5 HYPERLIPIDEMIA, UNSPECIFIED: ICD-10-CM

## 2024-08-19 DIAGNOSIS — I63.9 CEREBRAL INFARCTION, UNSPECIFIED: ICD-10-CM

## 2024-08-19 DIAGNOSIS — E11.9 TYPE 2 DIABETES MELLITUS W/OUT COMPLICATIONS: ICD-10-CM

## 2024-08-19 DIAGNOSIS — I82.A12 ACUTE EMBOLISM AND THROMBOSIS OF LEFT AXILLARY VEIN: ICD-10-CM

## 2024-08-19 DIAGNOSIS — I73.9 PERIPHERAL VASCULAR DISEASE, UNSPECIFIED: ICD-10-CM

## 2024-08-19 PROCEDURE — ZZZZZ: CPT

## 2024-08-19 PROCEDURE — 99214 OFFICE O/P EST MOD 30 MIN: CPT

## 2024-08-19 NOTE — PHYSICAL EXAM
[General Appearance - Well Developed] : well developed [Normal Appearance] : normal appearance [Well Groomed] : well groomed [General Appearance - Well Nourished] : well nourished [No Deformities] : no deformities [General Appearance - In No Acute Distress] : no acute distress [Heart Rate And Rhythm] : heart rate and rhythm were normal [Heart Sounds] : normal S1 and S2 [Edema] : no peripheral edema present [Veins - Varicosity Changes] : no varicosital changes were noted in the lower extremities [Nail Clubbing] : no clubbing of the fingernails [Cyanosis, Localized] : no localized cyanosis [Petechial Hemorrhages (___cm)] : no petechial hemorrhages [Nail Splinter Hemorrhages] : no splinter hemorrhages of the nails [Fingers Osler's Nodes] : Osler's nodes were not seenon the fingers [Oriented To Time, Place, And Person] : oriented to person, place, and time [1+] : left 1+ [No Abnormalities] : the abdominal aorta was not enlarged and no bruit was heard [Right Carotid Bruit] : no bruit heard over the right carotid [Left Carotid Bruit] : no bruit heard over the left carotid [Fingers] :  capillary refill of the fingers was normal [Toes] :  capillary refill of the toes was normal

## 2024-08-19 NOTE — ASSESSMENT
[FreeTextEntry1] : #Diagnosed Cryptogenic Stroke (Recently Diagnosed at [facility]) - referral to JOSE Viera for Loop Recorder - LENKA  - [facility] today - Stroke neurology referral to Dr. Ryan De Santiago  #Left Axillary Vein DVT - c/w Eliquis to complete 90 day course.   #PAD  #Charcot Foot s/p R BKA -c/w current regimen: Plavix - discontinued ASA while on Eliquis, will revisit in 30 days -vascular f/u  #CKD on transplant list - ordering nuclear exercise stress test  recent echo in June 2024- EF 55-60% unremarkable findings -f/u cardiology clinic in a year -pt to f/u w/ [facility] in regards to ESRD/transplant care  #HLD -Pt to see PCP for lipid management -c/w atorvastatin 80 mg  #HTN Pt is normotensive today -c/w current regiment (losartan 100mg, nifedipine 90mg ) -f/u with nephrologist and pcp for further management  Follow-up in 1 month  I was physically present with the resident at time of visit. I agree with the findings, assessment and plan as written, unless noted below.

## 2024-08-19 NOTE — HISTORY OF PRESENT ILLNESS
[FreeTextEntry1] : 59 year old male PMH of CKD, DM1, HTN, right foot Charcot on list for kidney transplant on nifedipine 90 , losartan 100 for, basal bolus insulin presents for yearly follow up. Denies any chest pain, sob, dyspnea, palpitations, leg pain.   Was recently found to have LUE DVT and started on Eliquis for 90 days about 60 days back, started on 6/24/24. Left axillary DVT 2/2 IV placement.  Was recently hospitalized at [facility] for PNA.    ==Data Reviewed== MRA (08/2024): intracranial atherosclerosis cavernous and clinoid segments of internal carotid arteries, mild to moderate on the right and mild on the left.   Carotid Doppler (08/2024): Mild wall thickening of intima noted bilateral CC and bilateral proximal ICA, without hemodynamically significant stenosis.   TTE 06/2024:LVEF 55-60%, nomrla LA size, mild TR.   Nuclear stress test 9/2023 unremarkable. EF 68% Recent echo in June 2024: EF 55-60%. Unremarkable EKG WNL

## 2024-08-19 NOTE — PHYSICAL EXAM
[Normal Appearance] : normal appearance [General Appearance - Well Developed] : well developed [General Appearance - Well Nourished] : well nourished [Well Groomed] : well groomed [No Deformities] : no deformities [General Appearance - In No Acute Distress] : no acute distress [Heart Rate And Rhythm] : heart rate and rhythm were normal [Heart Sounds] : normal S1 and S2 [Edema] : no peripheral edema present [Veins - Varicosity Changes] : no varicosital changes were noted in the lower extremities [Nail Clubbing] : no clubbing of the fingernails [Cyanosis, Localized] : no localized cyanosis [Petechial Hemorrhages (___cm)] : no petechial hemorrhages [Nail Splinter Hemorrhages] : no splinter hemorrhages of the nails [Fingers Osler's Nodes] : Osler's nodes were not seenon the fingers [Oriented To Time, Place, And Person] : oriented to person, place, and time [1+] : left 1+ [No Abnormalities] : the abdominal aorta was not enlarged and no bruit was heard [Right Carotid Bruit] : no bruit heard over the right carotid [Left Carotid Bruit] : no bruit heard over the left carotid [Fingers] :  capillary refill of the fingers was normal [Toes] :  capillary refill of the toes was normal

## 2024-08-19 NOTE — ASSESSMENT
[FreeTextEntry1] : #Diagnosed Cryptogenic Stroke (Recently Diagnosed at [facility]) - referral to JO[facility]iera for Loop Recorder - LENKA  - [facility] today - Stroke neurology referral to Dr. Ryan De Santiago  #Left Axillary Vein DVT - c/w Eliquis to complete 90 day course.   #PAD  #Charcot Foot s/p R BKA -c/w current regimen: Plavix - discontinued ASA while on Eliquis, will revisit in 30 days -vascular f/u  #CKD on transplant list - ordering nuclear exercise stress test  recent echo in June 2024- EF 55-60% unremarkable findings -f/u cardiology clinic in a year -pt to f/u w/ [facility] in regards to ESRD/transplant care  #HLD -Pt to see PCP for lipid management -c/w atorvastatin 80 mg  #HTN Pt is normotensive today -c/w current regiment (losartan 100mg, nifedipine 90mg ) -f/u with nephrologist and pcp for further management  Follow-up in 1 month  I was physically present with the resident at time of visit. I agree with the findings, assessment and plan as written, unless noted below.      unk

## 2024-08-21 ENCOUNTER — APPOINTMENT (OUTPATIENT)
Dept: CARDIOLOGY | Facility: CLINIC | Age: 59
End: 2024-08-21

## 2024-08-26 ENCOUNTER — INPATIENT (INPATIENT)
Facility: HOSPITAL | Age: 59
LOS: 9 days | Discharge: HOME | DRG: 871 | End: 2024-09-05
Attending: INTERNAL MEDICINE | Admitting: INTERNAL MEDICINE
Payer: MEDICARE

## 2024-08-26 VITALS
WEIGHT: 160.06 LBS | DIASTOLIC BLOOD PRESSURE: 52 MMHG | HEART RATE: 106 BPM | OXYGEN SATURATION: 97 % | HEIGHT: 72 IN | RESPIRATION RATE: 106 BRPM | SYSTOLIC BLOOD PRESSURE: 94 MMHG

## 2024-08-26 DIAGNOSIS — N20.0 CALCULUS OF KIDNEY: Chronic | ICD-10-CM

## 2024-08-26 DIAGNOSIS — Z89.511 ACQUIRED ABSENCE OF RIGHT LEG BELOW KNEE: Chronic | ICD-10-CM

## 2024-08-26 DIAGNOSIS — Z98.890 OTHER SPECIFIED POSTPROCEDURAL STATES: Chronic | ICD-10-CM

## 2024-08-26 DIAGNOSIS — Z98.62 PERIPHERAL VASCULAR ANGIOPLASTY STATUS: Chronic | ICD-10-CM

## 2024-08-26 LAB
ALBUMIN SERPL ELPH-MCNC: 3.9 G/DL — SIGNIFICANT CHANGE UP (ref 3.5–5.2)
ALP SERPL-CCNC: 191 U/L — HIGH (ref 30–115)
ALT FLD-CCNC: 11 U/L — SIGNIFICANT CHANGE UP (ref 0–41)
ANION GAP SERPL CALC-SCNC: 12 MMOL/L — SIGNIFICANT CHANGE UP (ref 7–14)
ANION GAP SERPL CALC-SCNC: 19 MMOL/L — HIGH (ref 7–14)
ANION GAP SERPL CALC-SCNC: 34 MMOL/L — HIGH (ref 7–14)
ANION GAP SERPL CALC-SCNC: 43 MMOL/L — HIGH (ref 7–14)
APTT BLD: 31.1 SEC — SIGNIFICANT CHANGE UP (ref 27–39.2)
AST SERPL-CCNC: 14 U/L — SIGNIFICANT CHANGE UP (ref 0–41)
B-OH-BUTYR SERPL-SCNC: >9 MMOL/L — HIGH
BASE EXCESS BLDV CALC-SCNC: -28.2 MMOL/L — LOW (ref -2–3)
BASE EXCESS BLDV CALC-SCNC: -29.5 MMOL/L — LOW (ref -2–3)
BASOPHILS # BLD AUTO: 0.07 K/UL — SIGNIFICANT CHANGE UP (ref 0–0.2)
BASOPHILS # BLD AUTO: 0.08 K/UL — SIGNIFICANT CHANGE UP (ref 0–0.2)
BASOPHILS NFR BLD AUTO: 0.3 % — SIGNIFICANT CHANGE UP (ref 0–1)
BASOPHILS NFR BLD AUTO: 0.4 % — SIGNIFICANT CHANGE UP (ref 0–1)
BILIRUB SERPL-MCNC: 0.2 MG/DL — SIGNIFICANT CHANGE UP (ref 0.2–1.2)
BUN SERPL-MCNC: 56 MG/DL — HIGH (ref 10–20)
BUN SERPL-MCNC: 58 MG/DL — HIGH (ref 10–20)
BUN SERPL-MCNC: 96 MG/DL — CRITICAL HIGH (ref 10–20)
BUN SERPL-MCNC: 99 MG/DL — CRITICAL HIGH (ref 10–20)
CA-I SERPL-SCNC: 1.03 MMOL/L — LOW (ref 1.15–1.33)
CALCIUM SERPL-MCNC: 7.7 MG/DL — LOW (ref 8.4–10.5)
CALCIUM SERPL-MCNC: 7.7 MG/DL — LOW (ref 8.4–10.5)
CALCIUM SERPL-MCNC: 7.9 MG/DL — LOW (ref 8.4–10.5)
CALCIUM SERPL-MCNC: 8.3 MG/DL — LOW (ref 8.4–10.5)
CHLORIDE SERPL-SCNC: 83 MMOL/L — LOW (ref 98–110)
CHLORIDE SERPL-SCNC: 87 MMOL/L — LOW (ref 98–110)
CHLORIDE SERPL-SCNC: 95 MMOL/L — LOW (ref 98–110)
CHLORIDE SERPL-SCNC: 95 MMOL/L — LOW (ref 98–110)
CO2 SERPL-SCNC: 11 MMOL/L — LOW (ref 17–32)
CO2 SERPL-SCNC: 22 MMOL/L — SIGNIFICANT CHANGE UP (ref 17–32)
CO2 SERPL-SCNC: 26 MMOL/L — SIGNIFICANT CHANGE UP (ref 17–32)
CO2 SERPL-SCNC: 5 MMOL/L — CRITICAL LOW (ref 17–32)
CREAT SERPL-MCNC: 3.3 MG/DL — HIGH (ref 0.7–1.5)
CREAT SERPL-MCNC: 3.6 MG/DL — HIGH (ref 0.7–1.5)
CREAT SERPL-MCNC: 5.8 MG/DL — CRITICAL HIGH (ref 0.7–1.5)
CREAT SERPL-MCNC: 6.1 MG/DL — CRITICAL HIGH (ref 0.7–1.5)
EGFR: 10 ML/MIN/1.73M2 — LOW
EGFR: 10 ML/MIN/1.73M2 — LOW
EGFR: 19 ML/MIN/1.73M2 — LOW
EGFR: 21 ML/MIN/1.73M2 — LOW
EOSINOPHIL # BLD AUTO: 0.01 K/UL — SIGNIFICANT CHANGE UP (ref 0–0.7)
EOSINOPHIL # BLD AUTO: 0.01 K/UL — SIGNIFICANT CHANGE UP (ref 0–0.7)
EOSINOPHIL NFR BLD AUTO: 0 % — SIGNIFICANT CHANGE UP (ref 0–8)
EOSINOPHIL NFR BLD AUTO: 0 % — SIGNIFICANT CHANGE UP (ref 0–8)
FLUAV AG NPH QL: SIGNIFICANT CHANGE UP
FLUBV AG NPH QL: SIGNIFICANT CHANGE UP
GAS PNL BLDV: 123 MMOL/L — LOW (ref 136–145)
GAS PNL BLDV: 126 MMOL/L — LOW (ref 136–145)
GAS PNL BLDV: SIGNIFICANT CHANGE UP
GAS PNL BLDV: SIGNIFICANT CHANGE UP
GLUCOSE BLDC GLUCOMTR-MCNC: 296 MG/DL — HIGH (ref 70–99)
GLUCOSE BLDC GLUCOMTR-MCNC: 311 MG/DL — HIGH (ref 70–99)
GLUCOSE BLDC GLUCOMTR-MCNC: 326 MG/DL — HIGH (ref 70–99)
GLUCOSE BLDC GLUCOMTR-MCNC: 380 MG/DL — HIGH (ref 70–99)
GLUCOSE BLDC GLUCOMTR-MCNC: 388 MG/DL — HIGH (ref 70–99)
GLUCOSE BLDC GLUCOMTR-MCNC: 394 MG/DL — HIGH (ref 70–99)
GLUCOSE BLDC GLUCOMTR-MCNC: 538 MG/DL — CRITICAL HIGH (ref 70–99)
GLUCOSE BLDC GLUCOMTR-MCNC: >600 MG/DL — CRITICAL HIGH (ref 70–99)
GLUCOSE SERPL-MCNC: 1128 MG/DL — CRITICAL HIGH (ref 70–99)
GLUCOSE SERPL-MCNC: 319 MG/DL — HIGH (ref 70–99)
GLUCOSE SERPL-MCNC: 472 MG/DL — CRITICAL HIGH (ref 70–99)
GLUCOSE SERPL-MCNC: 988 MG/DL — CRITICAL HIGH (ref 70–99)
HCO3 BLDV-SCNC: 4 MMOL/L — CRITICAL LOW (ref 22–29)
HCO3 BLDV-SCNC: 4 MMOL/L — CRITICAL LOW (ref 22–29)
HCT VFR BLD CALC: 43 % — SIGNIFICANT CHANGE UP (ref 42–52)
HCT VFR BLD CALC: 45.8 % — SIGNIFICANT CHANGE UP (ref 42–52)
HCT VFR BLDA CALC: 28 % — LOW (ref 39–51)
HCT VFR BLDA CALC: 40 % — SIGNIFICANT CHANGE UP (ref 39–51)
HGB BLD CALC-MCNC: 13.3 G/DL — SIGNIFICANT CHANGE UP (ref 12.6–17.4)
HGB BLD CALC-MCNC: 9.2 G/DL — LOW (ref 12.6–17.4)
HGB BLD-MCNC: 12 G/DL — LOW (ref 14–18)
HGB BLD-MCNC: 12.8 G/DL — LOW (ref 14–18)
IMM GRANULOCYTES NFR BLD AUTO: 0.7 % — HIGH (ref 0.1–0.3)
IMM GRANULOCYTES NFR BLD AUTO: 0.8 % — HIGH (ref 0.1–0.3)
INR BLD: 1.03 RATIO — SIGNIFICANT CHANGE UP (ref 0.65–1.3)
LACTATE BLDV-MCNC: 6.8 MMOL/L — CRITICAL HIGH (ref 0.5–2)
LACTATE BLDV-MCNC: 7.1 MMOL/L — CRITICAL HIGH (ref 0.5–2)
LACTATE SERPL-SCNC: 4.6 MMOL/L — CRITICAL HIGH (ref 0.7–2)
LYMPHOCYTES # BLD AUTO: 0.47 K/UL — LOW (ref 1.2–3.4)
LYMPHOCYTES # BLD AUTO: 0.5 K/UL — LOW (ref 1.2–3.4)
LYMPHOCYTES # BLD AUTO: 1.9 % — LOW (ref 20.5–51.1)
LYMPHOCYTES # BLD AUTO: 2.2 % — LOW (ref 20.5–51.1)
MAGNESIUM SERPL-MCNC: 2 MG/DL — SIGNIFICANT CHANGE UP (ref 1.8–2.4)
MAGNESIUM SERPL-MCNC: 2.1 MG/DL — SIGNIFICANT CHANGE UP (ref 1.8–2.4)
MAGNESIUM SERPL-MCNC: 2.7 MG/DL — HIGH (ref 1.8–2.4)
MCHC RBC-ENTMCNC: 26 PG — LOW (ref 27–31)
MCHC RBC-ENTMCNC: 26.1 PG — LOW (ref 27–31)
MCHC RBC-ENTMCNC: 27.9 G/DL — LOW (ref 32–37)
MCHC RBC-ENTMCNC: 27.9 G/DL — LOW (ref 32–37)
MCV RBC AUTO: 93.1 FL — SIGNIFICANT CHANGE UP (ref 80–94)
MCV RBC AUTO: 93.3 FL — SIGNIFICANT CHANGE UP (ref 80–94)
MONOCYTES # BLD AUTO: 0.81 K/UL — HIGH (ref 0.1–0.6)
MONOCYTES # BLD AUTO: 0.88 K/UL — HIGH (ref 0.1–0.6)
MONOCYTES NFR BLD AUTO: 3.4 % — SIGNIFICANT CHANGE UP (ref 1.7–9.3)
MONOCYTES NFR BLD AUTO: 3.8 % — SIGNIFICANT CHANGE UP (ref 1.7–9.3)
NEUTROPHILS # BLD AUTO: 19.94 K/UL — HIGH (ref 1.4–6.5)
NEUTROPHILS # BLD AUTO: 24.55 K/UL — HIGH (ref 1.4–6.5)
NEUTROPHILS NFR BLD AUTO: 92.8 % — HIGH (ref 42.2–75.2)
NEUTROPHILS NFR BLD AUTO: 93.7 % — HIGH (ref 42.2–75.2)
NRBC # BLD: 0 /100 WBCS — SIGNIFICANT CHANGE UP (ref 0–0)
NRBC # BLD: 0 /100 WBCS — SIGNIFICANT CHANGE UP (ref 0–0)
PCO2 BLDV: 19 MMHG — LOW (ref 42–55)
PCO2 BLDV: 23 MMHG — LOW (ref 42–55)
PH BLDV: <6.9 — CRITICAL LOW (ref 7.32–7.43)
PH BLDV: <6.9 — CRITICAL LOW (ref 7.32–7.43)
PHOSPHATE SERPL-MCNC: 2.5 MG/DL — SIGNIFICANT CHANGE UP (ref 2.1–4.9)
PHOSPHATE SERPL-MCNC: 3.2 MG/DL — SIGNIFICANT CHANGE UP (ref 2.1–4.9)
PHOSPHATE SERPL-MCNC: 6.1 MG/DL — HIGH (ref 2.1–4.9)
PLATELET # BLD AUTO: 591 K/UL — HIGH (ref 130–400)
PLATELET # BLD AUTO: 645 K/UL — HIGH (ref 130–400)
PMV BLD: 10.3 FL — SIGNIFICANT CHANGE UP (ref 7.4–10.4)
PMV BLD: 10.6 FL — HIGH (ref 7.4–10.4)
PO2 BLDV: 55 MMHG — HIGH (ref 25–45)
PO2 BLDV: 72 MMHG — HIGH (ref 25–45)
POTASSIUM BLDV-SCNC: 8 MMOL/L — CRITICAL HIGH (ref 3.5–5.1)
POTASSIUM BLDV-SCNC: 8.9 MMOL/L — CRITICAL HIGH (ref 3.5–5.1)
POTASSIUM SERPL-MCNC: 3.9 MMOL/L — SIGNIFICANT CHANGE UP (ref 3.5–5)
POTASSIUM SERPL-MCNC: 4.1 MMOL/L — SIGNIFICANT CHANGE UP (ref 3.5–5)
POTASSIUM SERPL-MCNC: 5.8 MMOL/L — HIGH (ref 3.5–5)
POTASSIUM SERPL-MCNC: 7.9 MMOL/L — CRITICAL HIGH (ref 3.5–5)
POTASSIUM SERPL-SCNC: 3.9 MMOL/L — SIGNIFICANT CHANGE UP (ref 3.5–5)
POTASSIUM SERPL-SCNC: 4.1 MMOL/L — SIGNIFICANT CHANGE UP (ref 3.5–5)
POTASSIUM SERPL-SCNC: 5.8 MMOL/L — HIGH (ref 3.5–5)
POTASSIUM SERPL-SCNC: 7.9 MMOL/L — CRITICAL HIGH (ref 3.5–5)
PROT SERPL-MCNC: 6.7 G/DL — SIGNIFICANT CHANGE UP (ref 6–8)
PROTHROM AB SERPL-ACNC: 11.7 SEC — SIGNIFICANT CHANGE UP (ref 9.95–12.87)
RBC # BLD: 4.62 M/UL — LOW (ref 4.7–6.1)
RBC # BLD: 4.91 M/UL — SIGNIFICANT CHANGE UP (ref 4.7–6.1)
RBC # FLD: 16.4 % — HIGH (ref 11.5–14.5)
RBC # FLD: 16.5 % — HIGH (ref 11.5–14.5)
RSV RNA NPH QL NAA+NON-PROBE: SIGNIFICANT CHANGE UP
SAO2 % BLDV: 79.9 % — SIGNIFICANT CHANGE UP (ref 67–88)
SAO2 % BLDV: 93.6 % — HIGH (ref 67–88)
SARS-COV-2 RNA SPEC QL NAA+PROBE: SIGNIFICANT CHANGE UP
SODIUM SERPL-SCNC: 131 MMOL/L — LOW (ref 135–146)
SODIUM SERPL-SCNC: 132 MMOL/L — LOW (ref 135–146)
SODIUM SERPL-SCNC: 133 MMOL/L — LOW (ref 135–146)
SODIUM SERPL-SCNC: 136 MMOL/L — SIGNIFICANT CHANGE UP (ref 135–146)
TROPONIN T, HIGH SENSITIVITY RESULT: 59 NG/L — CRITICAL HIGH (ref 6–21)
TROPONIN T, HIGH SENSITIVITY RESULT: 60 NG/L — CRITICAL HIGH (ref 6–21)
WBC # BLD: 21.49 K/UL — HIGH (ref 4.8–10.8)
WBC # BLD: 26.2 K/UL — HIGH (ref 4.8–10.8)
WBC # FLD AUTO: 21.49 K/UL — HIGH (ref 4.8–10.8)
WBC # FLD AUTO: 26.2 K/UL — HIGH (ref 4.8–10.8)

## 2024-08-26 PROCEDURE — 86704 HEP B CORE ANTIBODY TOTAL: CPT

## 2024-08-26 PROCEDURE — 85014 HEMATOCRIT: CPT

## 2024-08-26 PROCEDURE — 36415 COLL VENOUS BLD VENIPUNCTURE: CPT

## 2024-08-26 PROCEDURE — 86803 HEPATITIS C AB TEST: CPT

## 2024-08-26 PROCEDURE — 84480 ASSAY TRIIODOTHYRONINE (T3): CPT

## 2024-08-26 PROCEDURE — 86663 EPSTEIN-BARR ANTIBODY: CPT

## 2024-08-26 PROCEDURE — 84295 ASSAY OF SERUM SODIUM: CPT

## 2024-08-26 PROCEDURE — 84100 ASSAY OF PHOSPHORUS: CPT

## 2024-08-26 PROCEDURE — 82962 GLUCOSE BLOOD TEST: CPT

## 2024-08-26 PROCEDURE — 87799 DETECT AGENT NOS DNA QUANT: CPT

## 2024-08-26 PROCEDURE — 86706 HEP B SURFACE ANTIBODY: CPT

## 2024-08-26 PROCEDURE — 93970 EXTREMITY STUDY: CPT | Mod: 26

## 2024-08-26 PROCEDURE — 85018 HEMOGLOBIN: CPT

## 2024-08-26 PROCEDURE — 87640 STAPH A DNA AMP PROBE: CPT

## 2024-08-26 PROCEDURE — 84132 ASSAY OF SERUM POTASSIUM: CPT

## 2024-08-26 PROCEDURE — 94660 CPAP INITIATION&MGMT: CPT

## 2024-08-26 PROCEDURE — 92526 ORAL FUNCTION THERAPY: CPT | Mod: GN

## 2024-08-26 PROCEDURE — 36556 INSERT NON-TUNNEL CV CATH: CPT

## 2024-08-26 PROCEDURE — 80321 ALCOHOLS BIOMARKERS 1OR 2: CPT

## 2024-08-26 PROCEDURE — 83735 ASSAY OF MAGNESIUM: CPT

## 2024-08-26 PROCEDURE — 84484 ASSAY OF TROPONIN QUANT: CPT

## 2024-08-26 PROCEDURE — 86709 HEPATITIS A IGM ANTIBODY: CPT

## 2024-08-26 PROCEDURE — 80353 DRUG SCREENING COCAINE: CPT

## 2024-08-26 PROCEDURE — 86778 TOXOPLASMA ANTIBODY IGM: CPT

## 2024-08-26 PROCEDURE — 82330 ASSAY OF CALCIUM: CPT

## 2024-08-26 PROCEDURE — 70450 CT HEAD/BRAIN W/O DYE: CPT | Mod: MC

## 2024-08-26 PROCEDURE — 99291 CRITICAL CARE FIRST HOUR: CPT | Mod: 25

## 2024-08-26 PROCEDURE — 81001 URINALYSIS AUTO W/SCOPE: CPT

## 2024-08-26 PROCEDURE — 86664 EPSTEIN-BARR NUCLEAR ANTIGEN: CPT

## 2024-08-26 PROCEDURE — 93005 ELECTROCARDIOGRAM TRACING: CPT

## 2024-08-26 PROCEDURE — 93010 ELECTROCARDIOGRAM REPORT: CPT

## 2024-08-26 PROCEDURE — 97162 PT EVAL MOD COMPLEX 30 MIN: CPT | Mod: GP

## 2024-08-26 PROCEDURE — 86777 TOXOPLASMA ANTIBODY: CPT

## 2024-08-26 PROCEDURE — 82803 BLOOD GASES ANY COMBINATION: CPT

## 2024-08-26 PROCEDURE — 86705 HEP B CORE ANTIBODY IGM: CPT

## 2024-08-26 PROCEDURE — 71045 X-RAY EXAM CHEST 1 VIEW: CPT | Mod: 26

## 2024-08-26 PROCEDURE — 87641 MR-STAPH DNA AMP PROBE: CPT

## 2024-08-26 PROCEDURE — 99291 CRITICAL CARE FIRST HOUR: CPT

## 2024-08-26 PROCEDURE — 83036 HEMOGLOBIN GLYCOSYLATED A1C: CPT

## 2024-08-26 PROCEDURE — 93970 EXTREMITY STUDY: CPT

## 2024-08-26 PROCEDURE — 87081 CULTURE SCREEN ONLY: CPT

## 2024-08-26 PROCEDURE — 85730 THROMBOPLASTIN TIME PARTIAL: CPT

## 2024-08-26 PROCEDURE — 76536 US EXAM OF HEAD AND NECK: CPT

## 2024-08-26 PROCEDURE — 80048 BASIC METABOLIC PNL TOTAL CA: CPT

## 2024-08-26 PROCEDURE — 70490 CT SOFT TISSUE NECK W/O DYE: CPT | Mod: MC

## 2024-08-26 PROCEDURE — 85025 COMPLETE CBC W/AUTO DIFF WBC: CPT

## 2024-08-26 PROCEDURE — 87389 HIV-1 AG W/HIV-1&-2 AB AG IA: CPT

## 2024-08-26 PROCEDURE — 80307 DRUG TEST PRSMV CHEM ANLYZR: CPT

## 2024-08-26 PROCEDURE — 82010 KETONE BODYS QUAN: CPT

## 2024-08-26 PROCEDURE — 84481 FREE ASSAY (FT-3): CPT

## 2024-08-26 PROCEDURE — 97110 THERAPEUTIC EXERCISES: CPT | Mod: GP

## 2024-08-26 PROCEDURE — 85027 COMPLETE CBC AUTOMATED: CPT

## 2024-08-26 PROCEDURE — 83605 ASSAY OF LACTIC ACID: CPT

## 2024-08-26 PROCEDURE — 87340 HEPATITIS B SURFACE AG IA: CPT

## 2024-08-26 PROCEDURE — 84439 ASSAY OF FREE THYROXINE: CPT

## 2024-08-26 PROCEDURE — 87086 URINE CULTURE/COLONY COUNT: CPT

## 2024-08-26 PROCEDURE — 71045 X-RAY EXAM CHEST 1 VIEW: CPT

## 2024-08-26 PROCEDURE — 80076 HEPATIC FUNCTION PANEL: CPT

## 2024-08-26 PROCEDURE — 86665 EPSTEIN-BARR CAPSID VCA: CPT

## 2024-08-26 PROCEDURE — 82550 ASSAY OF CK (CPK): CPT

## 2024-08-26 PROCEDURE — 92610 EVALUATE SWALLOWING FUNCTION: CPT | Mod: GN

## 2024-08-26 PROCEDURE — 80053 COMPREHEN METABOLIC PANEL: CPT

## 2024-08-26 PROCEDURE — 80354 DRUG SCREENING FENTANYL: CPT

## 2024-08-26 PROCEDURE — 97116 GAIT TRAINING THERAPY: CPT | Mod: GP

## 2024-08-26 RX ORDER — CALCIUM GLUCONATE 61(648) MG
1 TABLET ORAL ONCE
Refills: 0 | Status: COMPLETED | OUTPATIENT
Start: 2024-08-26 | End: 2024-08-26

## 2024-08-26 RX ORDER — VANCOMYCIN/0.9 % SOD CHLORIDE 1.75G/25
PLASTIC BAG, INJECTION (ML) INTRAVENOUS
Refills: 0 | Status: DISCONTINUED | OUTPATIENT
Start: 2024-08-26 | End: 2024-08-27

## 2024-08-26 RX ORDER — INSULIN REGULAR, HUMAN 100/ML (3)
10 INSULIN PEN (ML) SUBCUTANEOUS
Qty: 100 | Refills: 0 | Status: DISCONTINUED | OUTPATIENT
Start: 2024-08-26 | End: 2024-08-27

## 2024-08-26 RX ORDER — HEPARIN SODIUM,BOVINE 1000/ML
1300 VIAL (ML) INJECTION
Qty: 25000 | Refills: 0 | Status: DISCONTINUED | OUTPATIENT
Start: 2024-08-26 | End: 2024-08-27

## 2024-08-26 RX ORDER — CHLORHEXIDINE GLUCONATE 40 MG/ML
1 SOLUTION TOPICAL
Refills: 0 | Status: DISCONTINUED | OUTPATIENT
Start: 2024-08-26 | End: 2024-09-05

## 2024-08-26 RX ORDER — SODIUM BICARBONATE 84 MG/ML
0.15 INJECTION, SOLUTION INTRAVENOUS
Qty: 75 | Refills: 0 | Status: DISCONTINUED | OUTPATIENT
Start: 2024-08-26 | End: 2024-08-27

## 2024-08-26 RX ORDER — CEFEPIME 2 G/1
2000 INJECTION, POWDER, FOR SOLUTION INTRAVENOUS ONCE
Refills: 0 | Status: COMPLETED | OUTPATIENT
Start: 2024-08-26 | End: 2024-08-26

## 2024-08-26 RX ORDER — SODIUM BICARBONATE 84 MG/ML
50 INJECTION, SOLUTION INTRAVENOUS ONCE
Refills: 0 | Status: COMPLETED | OUTPATIENT
Start: 2024-08-26 | End: 2024-08-26

## 2024-08-26 RX ORDER — SODIUM BICARBONATE 84 MG/ML
0.21 INJECTION, SOLUTION INTRAVENOUS
Qty: 150 | Refills: 0 | Status: DISCONTINUED | OUTPATIENT
Start: 2024-08-26 | End: 2024-08-26

## 2024-08-26 RX ORDER — OXYCODONE HYDROCHLORIDE 5 MG/1
10 TABLET ORAL EVERY 6 HOURS
Refills: 0 | Status: DISCONTINUED | OUTPATIENT
Start: 2024-08-26 | End: 2024-08-27

## 2024-08-26 RX ORDER — THIAMINE HCL 250 MG
500 TABLET ORAL DAILY
Refills: 0 | Status: COMPLETED | OUTPATIENT
Start: 2024-08-26 | End: 2024-08-28

## 2024-08-26 RX ORDER — VANCOMYCIN/0.9 % SOD CHLORIDE 1.75G/25
1500 PLASTIC BAG, INJECTION (ML) INTRAVENOUS ONCE
Refills: 0 | Status: COMPLETED | OUTPATIENT
Start: 2024-08-26 | End: 2024-08-26

## 2024-08-26 RX ORDER — INSULIN REGULAR, HUMAN 100/ML (3)
10 INSULIN PEN (ML) SUBCUTANEOUS ONCE
Refills: 0 | Status: COMPLETED | OUTPATIENT
Start: 2024-08-26 | End: 2024-08-26

## 2024-08-26 RX ORDER — OLANZAPINE 7.5 MG/1
5 TABLET ORAL ONCE
Refills: 0 | Status: COMPLETED | OUTPATIENT
Start: 2024-08-26 | End: 2024-08-26

## 2024-08-26 RX ORDER — IPRATROPIUM BROMIDE AND ALBUTEROL SULFATE .5; 3 MG/3ML; MG/3ML
3 SOLUTION RESPIRATORY (INHALATION)
Refills: 0 | Status: DISCONTINUED | OUTPATIENT
Start: 2024-08-26 | End: 2024-08-26

## 2024-08-26 RX ORDER — ONDANSETRON 2 MG/ML
4 INJECTION, SOLUTION INTRAMUSCULAR; INTRAVENOUS ONCE
Refills: 0 | Status: COMPLETED | OUTPATIENT
Start: 2024-08-26 | End: 2024-08-26

## 2024-08-26 RX ORDER — CEFEPIME 2 G/1
1000 INJECTION, POWDER, FOR SOLUTION INTRAVENOUS EVERY 24 HOURS
Refills: 0 | Status: DISCONTINUED | OUTPATIENT
Start: 2024-08-27 | End: 2024-08-27

## 2024-08-26 RX ORDER — HALOPERIDOL 1 MG
5 TABLET ORAL EVERY 8 HOURS
Refills: 0 | Status: DISCONTINUED | OUTPATIENT
Start: 2024-08-26 | End: 2024-08-27

## 2024-08-26 RX ORDER — SODIUM ZIRCONIUM CYCLOSILICATE 5 G/5G
10 POWDER, FOR SUSPENSION ORAL ONCE
Refills: 0 | Status: COMPLETED | OUTPATIENT
Start: 2024-08-26 | End: 2024-08-26

## 2024-08-26 RX ORDER — DEXMEDETOMIDINE HYDROCHLORIDE IN 0.9% SODIUM CHLORIDE 4 UG/ML
0.5 INJECTION INTRAVENOUS
Qty: 400 | Refills: 0 | Status: DISCONTINUED | OUTPATIENT
Start: 2024-08-26 | End: 2024-09-03

## 2024-08-26 RX ADMIN — Medication 100 GRAM(S): at 11:22

## 2024-08-26 RX ADMIN — Medication 2.5 MILLIGRAM(S): at 12:26

## 2024-08-26 RX ADMIN — Medication 1000 MILLILITER(S): at 11:17

## 2024-08-26 RX ADMIN — Medication 1000 MILLILITER(S): at 13:50

## 2024-08-26 RX ADMIN — Medication 105 MILLIGRAM(S): at 16:16

## 2024-08-26 RX ADMIN — Medication 7 UNIT(S)/HR: at 11:25

## 2024-08-26 RX ADMIN — Medication 5 MILLIGRAM(S): at 16:13

## 2024-08-26 RX ADMIN — Medication 1300 UNIT(S)/HR: at 17:40

## 2024-08-26 RX ADMIN — SODIUM BICARBONATE 50 MILLIEQUIVALENT(S): 84 INJECTION, SOLUTION INTRAVENOUS at 11:09

## 2024-08-26 RX ADMIN — Medication 10 UNIT(S): at 14:06

## 2024-08-26 RX ADMIN — SODIUM BICARBONATE 150 MEQ/KG/HR: 84 INJECTION, SOLUTION INTRAVENOUS at 16:00

## 2024-08-26 RX ADMIN — DEXMEDETOMIDINE HYDROCHLORIDE IN 0.9% SODIUM CHLORIDE 9.48 MICROGRAM(S)/KG/HR: 4 INJECTION INTRAVENOUS at 20:54

## 2024-08-26 RX ADMIN — Medication 2.5 MILLIGRAM(S): at 12:53

## 2024-08-26 RX ADMIN — Medication 1500 MILLIGRAM(S): at 20:46

## 2024-08-26 RX ADMIN — ONDANSETRON 4 MILLIGRAM(S): 2 INJECTION, SOLUTION INTRAMUSCULAR; INTRAVENOUS at 11:12

## 2024-08-26 RX ADMIN — Medication 10 UNIT(S): at 11:10

## 2024-08-26 RX ADMIN — Medication 10 UNIT(S)/HR: at 16:17

## 2024-08-26 RX ADMIN — Medication 10 UNIT(S): at 16:21

## 2024-08-26 RX ADMIN — Medication 1000 MILLILITER(S): at 13:30

## 2024-08-26 RX ADMIN — IPRATROPIUM BROMIDE AND ALBUTEROL SULFATE 3 MILLILITER(S): .5; 3 SOLUTION RESPIRATORY (INHALATION) at 11:18

## 2024-08-26 RX ADMIN — Medication 1000 MILLILITER(S): at 12:15

## 2024-08-26 RX ADMIN — SODIUM BICARBONATE 100 MEQ/KG/HR: 84 INJECTION, SOLUTION INTRAVENOUS at 11:24

## 2024-08-26 RX ADMIN — Medication 2.5 MILLIGRAM(S): at 12:27

## 2024-08-26 RX ADMIN — CEFEPIME 100 MILLIGRAM(S): 2 INJECTION, POWDER, FOR SOLUTION INTRAVENOUS at 13:00

## 2024-08-26 RX ADMIN — DEXMEDETOMIDINE HYDROCHLORIDE IN 0.9% SODIUM CHLORIDE 9.48 MICROGRAM(S)/KG/HR: 4 INJECTION INTRAVENOUS at 16:52

## 2024-08-26 RX ADMIN — Medication 1000 MILLILITER(S): at 11:08

## 2024-08-26 RX ADMIN — OLANZAPINE 5 MILLIGRAM(S): 7.5 TABLET ORAL at 17:16

## 2024-08-26 NOTE — H&P ADULT - NSICDXPASTSURGICALHX_GEN_ALL_CORE_FT
PAST SURGICAL HISTORY:  Amputee, below knee, right     H/O arthroscopy of right knee     Kidney stone Removed by Laser x 2 ( 20 years ago )    S/P foot surgery, right x 5 ( 2006 - 2013 )    Status post peripheral artery angioplasty

## 2024-08-26 NOTE — H&P ADULT - ASSESSMENT
60 y/o male with hx of CKD 4, anemia of chronic disease, LUE on eliquis, recent CVA on DAPT, PAD, s/p right BKA, IDDM, substance abuse and recent hospital admission for PNA was brought in for confusion and agitation     #DKA?  #HAGMA  #metabolic encephalopathy  #pre-renal DINESH on CKD 4  #NSTEMI  #Hyperkalemia with EKG changes  #ho LUE dvt on eliquis  #ho anemia due to ckd  #ho PAD sp R BKA  #ho substance abuse    Neuro: avoid sedation.  haldol 5 mg IV prn for agitation. Consider CT head if no improvement in MS after GAP closes    HEENT: oral care. Protecting airway. Monitor for decompensation    Cardiovascular: avoid overload. Holding home nifedipine and losartan in view of DKA. Restart plavix when more awake. Cardiology fu.  Trop downtrending.      Pulmonary: HOB at 45 degrees. aspiration precautions.     Gastrointestinal:  NPO till S+S eval. Protonix 40 IV qd. Bowel regimen once on po diet.     Renal: Pending [facility] placement with ED for HD. On On 75 mEq of HCO3 in 0.45 NS at 150 cc/h. Monitor lytes. Urine drug screen.    ID: procal. BCx, Vanc + cefepime.     Endocrine: Insulin ggt. Endocrine Cs    Hematology: heparin ggt for dvt.    MSK: Out of bed to chair when more with it.    Lines:   A- Line  pending [place] and TLC w ED.

## 2024-08-26 NOTE — ED ADULT NURSE NOTE - HISTORY OF COVID-19 VACCINATION
Speech Therapy    Visit Type: Treatment  Born at Gestational Age: 27w1d and now corrected gestational age 40w 1d    Nursing comments: RN reported infant was gagging with po feeding at 0500. She noted that infant was disorganized with occasional gagging at 0800.     SUBJECTIVE  Infant awake and alert upon clinician's arrival. Intermittent tachypnea with RR ranging from mid 50s-low 80s noted. No parent present.   Patient / Family Goals: discharge to home and maintain positive oral/feeding experiences  Face, Legs, Activity, Cry, Consolability Scale (FLACC)     Face: 0 - No particular expression or smile    Legs: 0 - Normal position or relaxed    Activity: 0 - Lying quietly, normal position, moves easily    Cry: 0 - No cry (awake or asleep)    Consolability: 0 - Content, relaxed    Score: 0    OBJECTIVE    Diet:  Non-oral:  NG tube  Schedule: fortified breastmilk   - 64 ml every 3, calories: 24  Environment and Equipment:   - Bed type: bassinet   - Lights: low   - Sound: decrease auditory stimuli  Room air     Oral Structure: intact    Infant Feeding   - Neurobehavioral Status: Onset of Session       • Physiologic stability: heart rate within parameters and oxygen saturations within parameters       • Physiologic instability: tachypnea (intermittent)       • State stability: quiet alert       • Motor stability: balance of flexion/extension   - Interventions prior to feeding: neurodevelopmental positioning, swaddle, flexed midline positioning, non nutritive suck and neurodevelopmental handling  Non-Nutritive Suck   - Interest in rooting: with mild stimulation and present   - Root to latch sequencing: present   - Drive to suck: present   - Tongue movement: adequate   - Suck strength: weak   - Suck endurance: intermittent suck bursts   - Sucking pattern: arrhythmic  Feeding Readiness   - Weight is over 1300 grams: yes   - Stable on appropriate flow: yes   - Neurodevelopmental maturation including alertness for feedings:  yes   - Cardio-respiratory stability with cares and tube feedings: yes   - Able to display NNS intermittently for 5-10 minutes: yes   - Infant appropriate for: PO feedings   - Oral motor treatment: respiratory intervention  Pre-Feeding Session   - Infant able to maintain RR below 60 when positioned in sidelying with non-nutritive sucking.  Oral Feeding Session   - Feeder: SLP   - Infant consumed       • 50 mls by mouth   - Mode: Dr. Brown's bottle system and preemie flow   - Interest in rooting: yes   - Root to latch sequencing: yes   - Drive/interest in PO: present   - Suck initiation: without stimulation required   - Suck strength: variable   - Suck endurance: intermittent suck bursts   - Sucks per burst: 5 - 7   - Oral phase: uncoordinated suck-swallow-breathe pattern, impaired tongue cup and physiological changes (intermittent tachypnea initially that improved with pacing; able to maintain RR with self-pacing as feeding progressed)   - Feeding pattern: disorganized   - Neurobehavioral Status: During Session       • Physiologic stability: heart rate within parameters and oxygen saturations within parameters       • Physiologic instability: tachypnea (RR increased to mid 70s that improved with pacing; able to self-pace and maintain RR as feeding progressed)       • State stability: quiet alert       • Motor stability: hands to mouth and sucking  Feeding Interventions   - Interventions: elevated sidelying positioning and external pacing   - Paced: every 4-5 sucks, pace per infant cues and tip bottle for rest break with nipple remaining in mouth   - Neurobehavioral Status: After Session       • Physiologic stability: heart rate within parameters, oxygen saturations within parameters and respiratory rate <60       • State instability: low level of alertness       • Motor instability: changes in tone           Session Summary  - Session focused on: providing positive feeding experience and feeding    Education  -  Parent(s) not present: SLP will meet with parent(s) as available and SLP shared impressions with RN       ASSESSMENT                                                                          • Functional Limitations: eating/drinking  • Skilled therapy is required to address changes in swallow/feeding function.  • Infant demonstrated a disorganized po feeding pattern with an uncoordinated suck/swallow/breathe pattern that benefited from external pacing every 4-5 sucks initially. Infant was able to self-pace as feeding progressed. Initially intermittent tachypnea was noted but improved as feeding progressed as self-pacing increased. Continue PO feeding with the Dr. Brown bottle with preemie nipple only if awake, alert, actively cueing and able to maintain vitals while non-nutritively sucking. With this flow, infant may require increased external pacing every 4-5 sucks if infant does not self-pace. If poor tolerance for the preemie nipple is noted, return to po feeding with the ultra preemie nipple on the Dr. Brown bottle. Speech to continue to follow to address po feeding skills.     Requires SLP Follow Up: Yes    Discharge Recommendations  SLP Referrals/Discharge Recommendations: Refer to NICU follow up clinic    • Rehab Potential: good  • Progress: Progressing toward goals and Slow progress, decreased activity tolerance    Patient at end of session:    - location: [facility]    - safety measures: lines intact    - hand off to: nurse    PLAN     Frequency: 2-3x/week    Interventions:  Dysphagia therapy    RECOMMENDATIONS  Diet: PO/NG, thin liquids   - Continue po feeding with the Dr. Brown bottle with preemie nipple only if awake, alert, actively cueing and able to maintain vitals while non-nutritively sucking. With this flow, infant may require increased external pacing every 4-5 sucks if infant does not self-pace. If poor tolerance for the preemie nipple is noted, return to po feeding with the ultra preemie nipple on  the Dr. Callahan bottle.  Aspiration risk: minimal  Factors impacting feeding: prematurity  Infant Feeding Plan:    - read and respect infant cues, PO only when infant is showing sign of engagement and interest and establish NNS prior to feeding   - Mode: Dr. Callahan's bottle system and preemie flow   - Positioning: elevated sidelying   - Pacing: every 4-5 sucks and pace per infant cues    GOALS    Long Term Goals:   Infant will tolerate neurodevelopmental techniques, without stress cues, to improve overall organization and ability to initiate and sustain a suck pattern    Infant will tolerate oral-facial stimulation and organizing techniques to improve tongue movement, tongue cup and strength of seal to improve overall efficiency of non-nutritive and nutritive suck    Infant will demonstrate a safe, organized oral feeding pattern to support nutritional needs      Documented in the chart in the following areas: Assessment.      Therapy procedure time and total treatment time can be found documented on the Time Entry flowsheet.   Yes

## 2024-08-26 NOTE — ED PROVIDER NOTE - OBJECTIVE STATEMENT
59-year-old male with past medical history of diabetes, CVA, PAD, s/p right BKA, ESRD, upper extremity DVT not on AC's, pneumonia presents for altered mental status and hypoglycemia.  Per patient's family at bedside, over the past 24 hours patient has complained of sore throat yesterday morning and throughout the last day has had high blood sugar above 500, and multiple episodes emesis.  Also endorsing heavy breathing consistent with shortness of breath.  Denies fever/chills, chest pain, diarrhea, change in bowel/bladder habits.

## 2024-08-26 NOTE — ED ADULT NURSE NOTE - NSFALLRISKINTERV_ED_ALL_ED
Assistance OOB with selected safe patient handling equipment if applicable/Assistance with ambulation/Communicate fall risk and risk factors to all staff, patient, and family/Monitor gait and stability/Monitor for mental status changes and reorient to person, place, and time, as needed/Provide visual cue: yellow wristband, yellow gown, etc/Reinforce activity limits and safety measures with patient and family/Toileting schedule using arm’s reach rule for commode and bathroom/Use of alarms - bed, stretcher, chair and/or video monitoring/Call bell, personal items and telephone in reach/Instruct patient to call for assistance before getting out of bed/chair/stretcher/Non-slip footwear applied when patient is off stretcher/[place] to call system/Physically safe environment - no spills, clutter or unnecessary equipment/Purposeful Proactive Rounding/Room/bathroom lighting operational, light cord in reach

## 2024-08-26 NOTE — ED PROVIDER NOTE - PROGRESS NOTE DETAILS
discussed case with patients sister mom at bedside urging no sedation no intubation Posted  EKG in STEMI teams chat, spoke with cardiology fellow who states patient not likely candidate for cath but will call back with any further recommendations from cardiology attending. AY: Patient displaying significant agitation and aggressive behavior towards self with trying to pull lines out and laying albuterol mask off.  Ordered two point restraints. AY:Per cardiology fellow, patient not candidate for cath and can remain at outside AY: Patient approved for ICU by Dr. Juarez, signed out to Dr. Hernández who states is covering Dr. Conroy AY:Per cardiology fellow, patient not candidate for cath and can remain at outside    Sepsis called at 1136, blood cultures, 30 cc/KG fluids, antibiotics, lactate ordered.  Charge RN and patient RN notified AY: attempted to reach Dr. Santamaria and was told by his office he stepped out but they will try to reach him central line placed labs hemolyzed initially repeat sent spoke with dr. obregon advised placement of udall emergent dialysis planned fs: considering his clinical status we have consulted nephrology via protocols nephro called via teams as well as service number

## 2024-08-26 NOTE — ED PROCEDURE NOTE - ATTENDING APP SHARED VISIT CONTRIBUTION OF CARE
I have personally performed a history and physical exam on this patient and personally directed the management of the patient.
I have personally performed a history and physical exam on this patient and personally directed the management of the patient.

## 2024-08-26 NOTE — CHART NOTE - NSCHARTNOTEFT_GEN_A_CORE
Called for a concerning EKG that shows hyperacute T waves. I responded immediately and spoke to the ED physician at [phone]. 60 yo M with PMHx of ESRD on HD coming in with AMS, confused, unable to provide history. No urgent indication for cardiac catheterization, correct lytes and hyperglycemia. Discussed with interventional attending on call.    **History obtained from [facility] over the phone and chart review. Called for a concerning EKG that shows hyperacute T waves. I responded immediately and spoke to the ED physician at [phone]. 60 yo M with PMHx of ESRD on HD coming in with AMS, confused, unable to provide history. No urgent indication for cardiac catheterization, correct lytes and hyperglycemia. Discussed with interventional attending on call- Dr Alberto.    **History obtained from [facility] over the phone and chart review.

## 2024-08-26 NOTE — CONSULT NOTE ADULT - ASSESSMENT
IMPRESSION:    Toxic metabolic encephalopathy  DKA/HHS  Sepsis POA  Elevated LA  DM  HTN  CKD 4  HO CVA  HO LUE DVT on eliquis    PLAN:    CNS: CT Head, Thiamine and Folate. precedex for agitation if needed     HEENT: Oral care.    PULMONARY:  HOB @ 45 degrees.  Aspiration precautions.  Wean O2 as needed to target SpO2 92-96%. cxr     CARDIOVASCULAR: Monitor for signs of volume overload.  TTE noted. strict i/os, IVF LR 200cc/hr    GI: GI prophylaxis. diet per Speech and swallow when more awake, CT A&P    RENAL:  BMP q4 hours, nephro for urgent dialysis, trend K/lactate, s/p insulin/ca, may need another dose of Ca, serum osmolarity, CK, repeat VBG, c/w bicarb drip for now    INFECTIOUS DISEASE: cefepime/vanc for now, MRSA swab, RVP (-), bcx, ua, ucx, ID consult    HEMATOLOGICAL:  c/w home eliquis, duplex    ENDOCRINOLOGY: c/w insulin drip, FS q1 hr, c/w IVF 200cc/hr LR    MUSCULOSKELETAL: Bedrest    MICU IMPRESSION:    Toxic metabolic encephalopathy  DKA/HHS  Sepsis POA  Elevated LA  DM  HTN  CKD 4  HO CVA  HO LUE DVT on eliquis    PLAN:    CNS: CT Head, Thiamine and Folate. precedex for agitation if needed, UDS/SDS    HEENT: Oral care.    PULMONARY:  HOB @ 45 degrees.  Aspiration precautions.  Wean O2 as needed to target SpO2 92-96%. cxr     CARDIOVASCULAR: Monitor for signs of volume overload.  TTE noted. strict i/os    GI: GI prophylaxis. diet per Speech and swallow when more awake, CT A&P    RENAL:  BMP q4 hours, nephro for urgent dialysis, trend K/lactate, s/p insulin/ca, may need another dose of Ca, serum osmolarity, CK, repeat VBG, c/w bicarb drip for now, switch to LR 200cc/hr when pH>7    INFECTIOUS DISEASE: cefepime/vanc for now, MRSA swab, RVP (-), bcx, ua, ucx, ID consult    HEMATOLOGICAL:  heparin drip, duplex L and UE    ENDOCRINOLOGY: c/w insulin drip, FS q1 hr    MUSCULOSKELETAL: Bedrest    MICU

## 2024-08-26 NOTE — H&P ADULT - ATTENDING COMMENTS
58 y/o male with hx of CKD 4, anemia of chronic disease, LUE on eliquis, recent CVA on DAPT, PAD, s/p right BKA, IDDM, substance abuse and recent hospital admission for PNA was brought in for confusion and agitation by family. 60 y/o male with hx of CKD 4, anemia of chronic disease, LUE on eliquis, recent CVA on DAPT, PAD, s/p right BKA, IDDM, chronic pain with continuous opiate dependance and recent hospital admission for PNA was brought in for confusion and agitation by family.    metabolic encephalopathy / acute kidney injury on CKD4 / severe metabolic acidosis / DKA vs HHS / possible sepsis     - s/p emergent HD yesterday   - IV Insulin    -  IV fluids   -  broad spectrum antibiotics   - follow cultures   - anticoagulate

## 2024-08-26 NOTE — CONSULT NOTE ADULT - ASSESSMENT
1)  DINESH on what appears to Stage 4 CKD.  Suspect pt came in severely volume depleted due to severe hyperglycemia.  R/O sepsis, especially w/ leukocytosis    2)  Severe HAGMA, appears to be due to DKA w/ smaller component of lactic acidosis, perhaps uremic acidosis.    3)  Severe hyperkalemia due to severe renal failure, severe metabolic acidosis, on Losartan as outpt    Recommendations:    1)  Emergent HD today x 2 hrs, 2K+ bath x 1 hr then 1K+ bath, zero UF, optiflux dialyzer,     2)  2 sets BC's as part of sepsis evaluation    3)  Insulin drip    4)  Anticipate needing 2nd HD tomorrow

## 2024-08-26 NOTE — ED PROVIDER NOTE - ATTENDING CONTRIBUTION TO CARE
I have personally performed a history and physical exam on this patient and personally directed the management of the patient. I have personally performed a history and physical exam on this patient and personally directed the management of the patient.   Patient presents for evaluation of altered mental status and confusion brought in by the his mother noting increasing vomiting and increasing dehydration over the past several days patient has a history of prior CVA prior diabetes status post a right BKA, hypertension, recent cellulitis patient is unable to contribute to exam mother corroborates history in addition I have had a phone conversation with the patient's sister who also corroborates history stating that patient has been deteriorating clinically over the past several days with increasing vomiting decreasing p.o. intake increasing lethargy however she denies any fevers chills denies any illicit substance use    On physical exam upon initial evaluation of the patient he is displaying Kussmal breathing he is unable to meaningfully contribute to exam one-word answers appears confused normocephalic atraumatic oropharynx clear but dry chest clear to auscultation bilaterally patient has increased rate of breathing consistent with metabolic causes abdomen soft nontender nondistended bowel sounds positive radial pulse pedal + on left patient is able to move right sided lower extremity reveals mild warmth.    Assessment plan patient presents for evaluation of increasing vomiting dehydration and worsening mental status with  small breathing we obtained EKG per my independent evaluation consistent with wide-complex QRS concerning for hyperkalemia initially possible ST elevation we per protocol placed the patient's EKG on teams chat discussed with cardiology not consistent with STEMI advised to cancel STEMI code furthermore because of the risk of hyperkalemia patient had IV placed we administered IV calcium insulin bicarbonate low, and albuterol D5 held secondary to hyperglycemia in addition patient had IV fluids administered once the VBG was analyzed and recognized we initiated IV insulin drip IV bicarbonate drip discussed case with attending nephrologist discussed case with Dr. Juarez advised admission to ICU although this patient's presentation is not consistent with sepsis at this time we did follow septic guidelines given IV fluids IV antibiotics we obtain lactate ordered a repeat lactate family updated agrees with plan of care patient improved comparison to repeat EKG at 1343 reveals narrowing QRS complex we placed a femoral central line attending nephrologist advised placing Korbel catheter for emergent dialysis which was completed   patient admitted to ICU for further evaluation

## 2024-08-26 NOTE — ED PROVIDER NOTE - CARE PLAN
Principal Discharge DX:	DKA (diabetic ketoacidosis)  Secondary Diagnosis:	Hyperkalemia  Secondary Diagnosis:	Altered mental status   1

## 2024-08-26 NOTE — ED PROCEDURE NOTE - CPROC ED INFORMED CONSENT1
See Scanned Documents.  
This was an emergent procedure and consent was implied.
This was an emergent procedure and consent was implied.

## 2024-08-26 NOTE — ED ADULT TRIAGE NOTE - CHIEF COMPLAINT QUOTE
BIBA as per EMS patient altered, breathing heavy and having altered mental status, and hyperglycemia.

## 2024-08-26 NOTE — PATIENT PROFILE ADULT - FALL HARM RISK - HARM RISK INTERVENTIONS
Assistance with ambulation/Assistance OOB with selected safe patient handling equipment/Communicate Risk of Fall with Harm to all staff/Discuss with provider need for PT consult/Monitor for mental status changes/Monitor gait and stability/Move patient closer to nurses' station/Reinforce activity limits and safety measures with patient and family/Reorient to person, place and time as needed/Review medications for side effects contributing to fall risk/Sit up slowly, dangle for a short time, stand at bedside before walking/Tailored Fall Risk Interventions/Toileting schedule using arm’s reach rule for commode and bathroom/Use of alarms - bed, chair and/or voice tab/Visual Cue: Yellow wristband and red socks/Bed in lowest position, wheels locked, appropriate side rails in place/Call bell, personal items and telephone in reach/Instruct patient to call for assistance before getting out of bed or chair/Non-slip footwear when patient is out of bed/[place] to call system/Physically safe environment - no spills, clutter or unnecessary equipment/Purposeful Proactive Rounding/Room/bathroom lighting operational, light cord in reach

## 2024-08-26 NOTE — CONSULT NOTE ADULT - SUBJECTIVE AND OBJECTIVE BOX
Patient is a 59y old  Male who presents with a chief complaint of     HPI:  59-year-old male with past medical history of diabetes, CVA, PAD, s/p right BKA, CKD4, upper extremity DVT not on AC's, pneumonia presents for altered mental status and hypoglycemia.  Per patient's family at bedside, over the past 24 hours patient has complained of sore throat yesterday morning and throughout the last day has had high blood sugar above 500, and multiple episodes emesis.  Also endorsing heavy breathing consistent with shortness of breath.  Denies fever/chills, chest pain, diarrhea, change in bowel/bladder habits.    PAST MEDICAL & SURGICAL HISTORY:  DM (diabetes mellitus)      HTN (hypertension)      HLD (hyperlipidemia)      Herpes labialis      Anxiety      GERD (gastroesophageal reflux disease)      Kidney stones  20 years ago      Kidney disease  stage 4      Chronic kidney disease, unspecified CKD stage      Diabetic Charcot foot      PAD (peripheral artery disease)      S/P foot surgery, right  x 5 ( 2006 - 2013 )      Kidney stone  Removed by Laser x 2 ( 20 years ago )      H/O arthroscopy of right knee      Status post peripheral artery angioplasty      FAMILY HISTORY:  Family history of cancer in father (Father)  Lung    :  No known cardiovacular family hisotry     Review Of Systems:     All ROS are negative except per HPI       Allergies    No Known Allergies    Intolerances          PHYSICAL EXAM    ICU Vital Signs Last 24 Hrs  T(C): 36.1 (26 Aug 2024 12:37), Max: 36.1 (26 Aug 2024 12:37)  T(F): 97 (26 Aug 2024 12:37), Max: 97 (26 Aug 2024 12:37)  HR: 103 (26 Aug 2024 13:15) (88 - 107)  BP: 141/63 (26 Aug 2024 13:15) (94/52 - 151/67)  BP(mean): --  ABP: --  ABP(mean): --  RR: 20 (26 Aug 2024 13:15) (20 - 106)  SpO2: 100% (26 Aug 2024 13:15) (97% - 100%)    O2 Parameters below as of 26 Aug 2024 13:15  Patient On (Oxygen Delivery Method): room air            CONSTITUTIONAL:  Ill appearing    CARDIAC:   Normal rate,   Regular rhythm.    No edema    RESPIRATORY:   No wheezing  Bilateral BS   Not tachypneic,  No use of accessory muscles    GASTROINTESTINAL:  Abdomen soft,   tender to palpation    NEUROLOGICAL:   Alert and oriented x1-2    SKIN:   Skin normal color for race,   No evidence of rash.      LABS:                          12.0   26.20 )-----------( 591      ( 26 Aug 2024 12:45 )             43.0                                               08-26    131<L>  |  83<L>  |  x   ----------------------------<  x   x    |  x   |  x     Ca    8.3<L>      26 Aug 2024 12:45    TPro  6.7  /  Alb  3.9  /  TBili  0.2  /  DBili  x   /  AST  14  /  ALT  11  /  AlkPhos  191<H>  08-26      PT/INR - ( 26 Aug 2024 12:45 )   PT: 11.70 sec;   INR: 1.03 ratio         PTT - ( 26 Aug 2024 12:45 )  PTT:31.1 sec                                                                                     LIVER FUNCTIONS - ( 26 Aug 2024 12:45 )  Alb: 3.9 g/dL / Pro: 6.7 g/dL / ALK PHOS: 191 U/L / ALT: 11 U/L / AST: 14 U/L / GGT: x                                                                                                                                       X-Rays reviewed                                                                                     ECHO    CXR interpreted by me     MEDICATIONS  (STANDING):  insulin regular Infusion 7 Unit(s)/Hr (7 mL/Hr) IV Continuous <Continuous>  lactated ringers Bolus 1000 milliLiter(s) IV Bolus once  lactated ringers Bolus 1000 milliLiter(s) IV Bolus once  sodium bicarbonate  Infusion 0.207 mEq/kG/Hr (100 mL/Hr) IV Continuous <Continuous>    MEDICATIONS  (PRN):

## 2024-08-26 NOTE — H&P ADULT - NSHPPHYSICALEXAM_GEN_ALL_CORE
Gen: appears to be anxious  HEENT: PERRL, EOMI, mouth clr, nose clr  Neck: no nodes, no JVD, thyroid nl  lungs: clr  hrt: s1 s2 rrr no murmur  abd: soft, NT/ND, no HS megaly  ext: sp R BKA  neuro: unable to assess, pt is very agitated an d anxious

## 2024-08-26 NOTE — ED PROVIDER NOTE - PHYSICAL EXAMINATION
Vital Signs: I have reviewed the initial vital signs.  Constitutional: appears stated age, no acute distress  Eyes: Sclera clear, EOMI.  Cardiovascular: S1 and S2, regular rate, regular rhythm, well-perfused extremities, radial pulses equal and 2+, pedal pulses 2+ and equal  Respiratory: unlabored respiratory effort, clear to auscultation bilaterally no wheezing, rales, or rhonchi  Gastrointestinal:  abdomen soft, non-tender  Musculoskeletal: supple neck, no lower extremity edema  Integumentary: warm, dry, no rash  Neurologic: awake, alert, oriented x3, extremities’ motor and sensory functions grossly intact Vital Signs: I have reviewed the initial vital signs.  Constitutional: appears stated age, no acute distress  Eyes: Sclera clear, EOMI.  Cardiovascular: S1 and S2, regular rate, regular rhythm, well-perfused extremities, radial pulses equal and 2+, pedal pulse 2+ on Left  Respiratory: increased work of breathing, mild bilateral rhonchi  Gastrointestinal:  abdomen soft, non-tender  Musculoskeletal: supple neck, no lower extremity edema  Integumentary: warm, dry, no rash  Neurologic: awake, alert, oriented x3, extremities’ motor and sensory functions grossly intact

## 2024-08-26 NOTE — ED PROVIDER NOTE - CLINICAL SUMMARY MEDICAL DECISION MAKING FREE TEXT BOX
I have personally performed a history and physical exam on this patient and personally directed the management of the patient.   Patient presents for evaluation of altered mental status and confusion brought in by the his mother noting increasing vomiting and increasing dehydration over the past several days patient has a history of prior CVA prior diabetes status post a right BKA, hypertension, recent cellulitis patient is unable to contribute to exam mother corroborates history in addition I have had a phone conversation with the patient's sister who also corroborates history stating that patient has been deteriorating clinically over the past several days with increasing vomiting decreasing p.o. intake increasing lethargy however she denies any fevers chills denies any illicit substance use    On physical exam upon initial evaluation of the patient he is displaying Kussmal breathing he is unable to meaningfully contribute to exam one-word answers appears confused normocephalic atraumatic oropharynx clear but dry chest clear to auscultation bilaterally patient has increased rate of breathing consistent with metabolic causes abdomen soft nontender nondistended bowel sounds positive radial pulse pedal + on left patient is able to move right sided lower extremity reveals mild warmth.    Assessment plan patient presents for evaluation of increasing vomiting dehydration and worsening mental status with  small breathing we obtained EKG per my independent evaluation consistent with wide-complex QRS concerning for hyperkalemia initially possible ST elevation we per protocol placed the patient's EKG on teams chat discussed with cardiology not consistent with STEMI advised to cancel STEMI code furthermore because of the risk of hyperkalemia patient had IV placed we administered IV calcium insulin bicarbonate low, and albuterol D5 held secondary to hyperglycemia in addition patient had IV fluids administered once the VBG was analyzed and recognized we initiated IV insulin drip IV bicarbonate drip discussed case with attending nephrologist discussed case with Dr. Juarez advised admission to ICU although this patient's presentation is not consistent with sepsis at this time we did follow septic guidelines given IV fluids IV antibiotics we obtain lactate ordered a repeat lactate family updated agrees with plan of care patient improved comparison to repeat EKG at 1343 reveals narrowing QRS complex we placed a femoral central line attending nephrologist advised placing Hammond catheter for emergent dialysis which was completed   patient admitted to ICU for further evaluation

## 2024-08-26 NOTE — CONSULT NOTE ADULT - SUBJECTIVE AND OBJECTIVE BOX
[facility]  INITIAL CONSULT NOTE  --------------------------------------------------------------------------------  HPI:  60 yo male w/ PMHx as below including CKD; sees Dr Beverly, states "GFR around 24-26."  Brought to ER by family for altered mental status.  Found to have Screat 6.1, K+ 7.9, serum HCO3- 5 w/ AG 43.  Treated w/ insulin/D50, NaHCO3, IV Ca++, Lokelma.  Received ~ 3L NS IV.  Pt currently getting femoral udall placed for emergent HD.  ER MD states pt's mental status has improved since here.  Initial glucose > 1100, beta hydroxybutyrate > 9.0        PAST HISTORY  --------------------------------------------------------------------------------  PAST MEDICAL & SURGICAL HISTORY:  DM (diabetes mellitus)      HTN (hypertension)      HLD (hyperlipidemia)      Herpes labialis      Anxiety      GERD (gastroesophageal reflux disease)      Kidney stones  20 years ago      Kidney disease  stage 4      Chronic kidney disease, unspecified CKD stage      Diabetic Charcot foot      PAD (peripheral artery disease)      S/P foot surgery, right  x 5 ( 2006 - 2013 )      Kidney stone  Removed by Laser x 2 ( 20 years ago )      H/O arthroscopy of right knee      Status post peripheral artery angioplasty        FAMILY HISTORY:  Family history of cancer in father (Father)  Lung      PAST SOCIAL HISTORY:    ALLERGIES & MEDICATIONS  --------------------------------------------------------------------------------  Allergies    No Known Allergies    Intolerances      Standing Inpatient Medications  insulin regular Infusion 7 Unit(s)/Hr IV Continuous <Continuous>  sodium bicarbonate  Infusion 0.155 mEq/kG/Hr IV Continuous <Continuous>    PRN Inpatient Medications  oxyCODONE    IR 10 milliGRAM(s) Oral every 6 hours PRN      REVIEW OF SYSTEMS  --------------------------------------------------------------------------------  Gen: No weight changes, fatigue, fevers/chills, weakness  Skin: No rashes  Head/Eyes/Ears/Mouth: No headache; Normal hearing; Normal vision w/o blurriness; No sinus pain/discomfort, sore throat  Respiratory: No dyspnea, cough, wheezing, hemoptysis  CV: No chest pain, PND, orthopnea  GI: No abdominal pain, diarrhea, constipation, nausea, vomiting, melena, hematochezia  : No increased frequency, dysuria, hematuria, nocturia  MSK: No joint pain/swelling; no back pain; no edema  Neuro: No dizziness/lightheadedness, weakness, seizures, numbness, tingling  Heme: No easy bruising or bleeding  Endo: No heat/cold intolerance  Psych: No significant nervousness, anxiety, stress, depression    All other systems were reviewed and are negative, except as noted.    VITALS/PHYSICAL EXAM  --------------------------------------------------------------------------------  T(C): 36.1 (08-26-24 @ 12:37), Max: 36.1 (08-26-24 @ 12:37)  HR: 107 (08-26-24 @ 14:03) (88 - 107)  BP: 150/65 (08-26-24 @ 14:03) (94/52 - 151/67)  RR: 20 (08-26-24 @ 14:03) (20 - 106)  SpO2: 99% (08-26-24 @ 14:03) (97% - 100%)  Wt(kg): --  Height (cm): 182.9 (08-26-24 @ 10:23)  Weight (kg): 72.6 (08-26-24 @ 10:23)  BMI (kg/m2): 21.7 (08-26-24 @ 10:23)  BSA (m2): 1.94 (08-26-24 @ 10:23)      Physical Exam:  Unable to examine pt as w/ sterile field in place while Dora being placed    LABS/STUDIES  --------------------------------------------------------------------------------              12.0   26.20 >-----------<  591      [08-26-24 @ 12:45]              43.0     131  |  83  |  99  ----------------------------<  1128      [08-26-24 @ 12:45]  7.9   |  5   |  6.1        Ca     8.3     [08-26-24 @ 12:45]    TPro  6.7  /  Alb  3.9  /  TBili  0.2  /  DBili  x   /  AST  14  /  ALT  11  /  AlkPhos  191  [08-26-24 @ 12:45]    PT/INR: PT 11.70, INR 1.03       [08-26-24 @ 12:45]  PTT: 31.1       [08-26-24 @ 12:45]      Creatinine Trend:  SCr 6.1 [08-26 @ 12:45]    Urinalysis - [08-26-24 @ 12:45]      Color  / Appearance  / SG  / pH       Gluc 1128 / Ketone   / Bili  / Urobili        Blood  / Protein  / Leuk Est  / Nitrite       RBC  / WBC  / Hyaline  / Gran  / Sq Epi  / Non Sq Epi  / Bacteria       Iron 42, TIBC 151, %sat 28      [07-01-24 @ 10:51]  Ferritin 402      [07-01-24 @ 10:51]  PTH -- (Ca --)      [11-10-23 @ 17:09]   81  PTH -- (Ca 9.0)      [10-23-23 @ 23:10]   103  Vitamin D (25OH) 25      [10-24-23 @ 06:34]  TSH 0.13      [06-20-24 @ 05:42]  Lipid: chol 167, , HDL 39, LDL --      [06-20-24 @ 05:42]    HIV Nonreact      [06-21-24 @ 15:59]

## 2024-08-26 NOTE — H&P ADULT - NSHPLABSRESULTS_GEN_ALL_CORE
LABS  08-26    133<L>  |  95<L>  |  58<H>  ----------------------------<  319<H>  4.1   |  26  |  3.6<H>    Ca    7.7<L>      26 Aug 2024 22:09  Phos  3.2     08-26  Mg     2.0     08-26    TPro  6.7  /  Alb  3.9  /  TBili  0.2  /  DBili  x   /  AST  14  /  ALT  11  /  AlkPhos  191<H>  08-26                          10.7   18.16 )-----------( 304      ( 27 Aug 2024 06:36 )             32.3       PT/INR - ( 26 Aug 2024 12:45 )   PT: 11.70 sec;   INR: 1.03 ratio         PTT - ( 27 Aug 2024 06:36 )  PTT:60.3 sec    < from: Xray Chest 1 View- PORTABLE-Urgent (08.26.24 @ 10:43) >    Impression:      No acute pulmonary process    < end of copied text >

## 2024-08-26 NOTE — PATIENT PROFILE ADULT - ...
SURVEY:    You may be receiving a survey from [employer] regarding your visit today. You may get this in the mail, through your MyChart or in your email. Please complete the survey to enable us to provide the highest quality of care to you and your family. If you cannot score us as very good ( 5 Stars) on any question, please feel free to call the office to discuss how we could have made your experience exceptional.     Thank you.     Clinical Care Team:  Dr. Abram Lane, DO Elvira Frye, [address] [place] [address] Team:  [address] [place] [address] 26-Aug-2024 18:23:02

## 2024-08-26 NOTE — H&P ADULT - HISTORY OF PRESENT ILLNESS
60 y/o male with hx of CKD 4, anemia of chronic disease, LUE on eliquis, recent CVA on DAPT, PAD, s/p right BKA, IDDM, substance abuse and recent hospital admission for PNA was brought in for confusion and agitation by family. At encounter pt appears to be confused answers questions, says has no history of drug abuse. When asked if he is complaint with insulin, he agrees. This has been confirmed with the mother, who also agrees that he is complaint with his meds. Of note pt had a sore throat yesterday morning and the sister at bedside added that His BG has been > 600 for the past few day nina home.    in ED   - 107  wbc 26  platelets 591  K 7.9  HCO3 5  AG 43  Cr 6.9  BG 1128  VBG: lact 6.8 / pH 6.9   rvp NG

## 2024-08-26 NOTE — ED PROCEDURE NOTE - CPROC ED TIME OUT STATEMENT1
“Patient's name, , procedure and correct site were confirmed during the [facility] Timeout.”
“Patient's name, , procedure and correct site were confirmed during the [facility] Timeout.”

## 2024-08-26 NOTE — ED PROVIDER NOTE - INPATIENT RESIDENT/ACP NOTIFIED DATE
26-Aug-2024 13:50 I will STOP taking the medications listed below when I get home from the hospital:  None

## 2024-08-26 NOTE — CONSULT NOTE ADULT - ATTENDING COMMENTS
Attending Statement: I have personally performed a face to face diagnostic evaluation on this patient. The patient is suffering from:  Toxic metabolic encephalopathy  DKA/HHS  Sepsis POA  Elevated LA  DM  HTN  CKD 4  I have made amendments to the documentation where necessary. I have personally seen and examined this patient.  I have fully participated in the care of this patient.  I have reviewed all pertinent clinical information, including history, physical exam, plan and note. 23

## 2024-08-26 NOTE — ED PROCEDURE NOTE - NS ED ATTENDING STATEMENT MOD
This was a shared visit with the ANDREW. I reviewed and verified the documentation.
This was a shared visit with the ANDREW. I reviewed and verified the documentation.

## 2024-08-27 LAB
ALBUMIN SERPL ELPH-MCNC: 3.1 G/DL — LOW (ref 3.5–5.2)
ALBUMIN SERPL ELPH-MCNC: 3.2 G/DL — LOW (ref 3.5–5.2)
ALP SERPL-CCNC: 120 U/L — HIGH (ref 30–115)
ALP SERPL-CCNC: 129 U/L — HIGH (ref 30–115)
ALT FLD-CCNC: 11 U/L — SIGNIFICANT CHANGE UP (ref 0–41)
ALT FLD-CCNC: 13 U/L — SIGNIFICANT CHANGE UP (ref 0–41)
AMPHET UR-MCNC: NEGATIVE — SIGNIFICANT CHANGE UP
ANION GAP SERPL CALC-SCNC: 11 MMOL/L — SIGNIFICANT CHANGE UP (ref 7–14)
ANION GAP SERPL CALC-SCNC: 11 MMOL/L — SIGNIFICANT CHANGE UP (ref 7–14)
ANION GAP SERPL CALC-SCNC: 12 MMOL/L — SIGNIFICANT CHANGE UP (ref 7–14)
ANISOCYTOSIS BLD QL: SLIGHT — SIGNIFICANT CHANGE UP
APPEARANCE UR: CLEAR — SIGNIFICANT CHANGE UP
APTT BLD: 27.4 SEC — SIGNIFICANT CHANGE UP (ref 27–39.2)
APTT BLD: 60.3 SEC — HIGH (ref 27–39.2)
APTT BLD: 87.2 SEC — CRITICAL HIGH (ref 27–39.2)
AST SERPL-CCNC: 15 U/L — SIGNIFICANT CHANGE UP (ref 0–41)
AST SERPL-CCNC: 24 U/L — SIGNIFICANT CHANGE UP (ref 0–41)
BACTERIA # UR AUTO: ABNORMAL /HPF
BARBITURATES UR SCN-MCNC: NEGATIVE — SIGNIFICANT CHANGE UP
BASOPHILS # BLD AUTO: 0 K/UL — SIGNIFICANT CHANGE UP (ref 0–0.2)
BASOPHILS NFR BLD AUTO: 0 % — SIGNIFICANT CHANGE UP (ref 0–1)
BENZODIAZ UR-MCNC: NEGATIVE — SIGNIFICANT CHANGE UP
BILIRUB DIRECT SERPL-MCNC: <0.2 MG/DL — SIGNIFICANT CHANGE UP (ref 0–0.3)
BILIRUB DIRECT SERPL-MCNC: <0.2 MG/DL — SIGNIFICANT CHANGE UP (ref 0–0.3)
BILIRUB INDIRECT FLD-MCNC: SIGNIFICANT CHANGE UP MG/DL (ref 0.2–1.2)
BILIRUB INDIRECT FLD-MCNC: SIGNIFICANT CHANGE UP MG/DL (ref 0.2–1.2)
BILIRUB SERPL-MCNC: <0.2 MG/DL — SIGNIFICANT CHANGE UP (ref 0.2–1.2)
BILIRUB SERPL-MCNC: <0.2 MG/DL — SIGNIFICANT CHANGE UP (ref 0.2–1.2)
BILIRUB UR-MCNC: NEGATIVE — SIGNIFICANT CHANGE UP
BUN SERPL-MCNC: 50 MG/DL — HIGH (ref 10–20)
BUN SERPL-MCNC: 56 MG/DL — HIGH (ref 10–20)
BUN SERPL-MCNC: 57 MG/DL — HIGH (ref 10–20)
CALCIUM SERPL-MCNC: 7.4 MG/DL — LOW (ref 8.4–10.5)
CALCIUM SERPL-MCNC: 7.6 MG/DL — LOW (ref 8.4–10.5)
CALCIUM SERPL-MCNC: 7.8 MG/DL — LOW (ref 8.4–10.5)
CHLORIDE SERPL-SCNC: 101 MMOL/L — SIGNIFICANT CHANGE UP (ref 98–110)
CHLORIDE SERPL-SCNC: 104 MMOL/L — SIGNIFICANT CHANGE UP (ref 98–110)
CHLORIDE SERPL-SCNC: 98 MMOL/L — SIGNIFICANT CHANGE UP (ref 98–110)
CO2 SERPL-SCNC: 26 MMOL/L — SIGNIFICANT CHANGE UP (ref 17–32)
CO2 SERPL-SCNC: 27 MMOL/L — SIGNIFICANT CHANGE UP (ref 17–32)
CO2 SERPL-SCNC: 27 MMOL/L — SIGNIFICANT CHANGE UP (ref 17–32)
COCAINE METAB.OTHER UR-MCNC: POSITIVE
COLOR SPEC: ABNORMAL
CREAT SERPL-MCNC: 2.8 MG/DL — HIGH (ref 0.7–1.5)
CREAT SERPL-MCNC: 3.1 MG/DL — HIGH (ref 0.7–1.5)
CREAT SERPL-MCNC: 3.4 MG/DL — HIGH (ref 0.7–1.5)
DIFF PNL FLD: ABNORMAL
EGFR: 20 ML/MIN/1.73M2 — LOW
EGFR: 22 ML/MIN/1.73M2 — LOW
EGFR: 25 ML/MIN/1.73M2 — LOW
EOSINOPHIL NFR BLD AUTO: 0 % — SIGNIFICANT CHANGE UP (ref 0–8)
EPI CELLS # UR: PRESENT
FENTANYL UR QL: NEGATIVE — SIGNIFICANT CHANGE UP
GLUCOSE BLDC GLUCOMTR-MCNC: 121 MG/DL — HIGH (ref 70–99)
GLUCOSE BLDC GLUCOMTR-MCNC: 158 MG/DL — HIGH (ref 70–99)
GLUCOSE BLDC GLUCOMTR-MCNC: 169 MG/DL — HIGH (ref 70–99)
GLUCOSE BLDC GLUCOMTR-MCNC: 176 MG/DL — HIGH (ref 70–99)
GLUCOSE BLDC GLUCOMTR-MCNC: 196 MG/DL — HIGH (ref 70–99)
GLUCOSE BLDC GLUCOMTR-MCNC: 208 MG/DL — HIGH (ref 70–99)
GLUCOSE BLDC GLUCOMTR-MCNC: 213 MG/DL — HIGH (ref 70–99)
GLUCOSE BLDC GLUCOMTR-MCNC: 222 MG/DL — HIGH (ref 70–99)
GLUCOSE BLDC GLUCOMTR-MCNC: 222 MG/DL — HIGH (ref 70–99)
GLUCOSE BLDC GLUCOMTR-MCNC: 230 MG/DL — HIGH (ref 70–99)
GLUCOSE BLDC GLUCOMTR-MCNC: 264 MG/DL — HIGH (ref 70–99)
GLUCOSE SERPL-MCNC: 172 MG/DL — HIGH (ref 70–99)
GLUCOSE SERPL-MCNC: 235 MG/DL — HIGH (ref 70–99)
GLUCOSE SERPL-MCNC: 69 MG/DL — LOW (ref 70–99)
GLUCOSE UR QL: 100 MG/DL
GRAN CASTS # UR COMP ASSIST: PRESENT
HCT VFR BLD CALC: 32.3 % — LOW (ref 42–52)
HCT VFR BLD CALC: 32.4 % — LOW (ref 42–52)
HCV AB S/CO SERPL IA: 0.05 COI — SIGNIFICANT CHANGE UP
HCV AB SERPL-IMP: SIGNIFICANT CHANGE UP
HGB BLD-MCNC: 10.7 G/DL — LOW (ref 14–18)
HGB BLD-MCNC: 10.7 G/DL — LOW (ref 14–18)
HYPOCHROMIA BLD QL: SLIGHT — SIGNIFICANT CHANGE UP
KETONES UR-MCNC: NEGATIVE MG/DL — SIGNIFICANT CHANGE UP
LACTATE SERPL-SCNC: 1.5 MMOL/L — SIGNIFICANT CHANGE UP (ref 0.7–2)
LEUKOCYTE ESTERASE UR-ACNC: NEGATIVE — SIGNIFICANT CHANGE UP
LYMPHOCYTES # BLD AUTO: 0.9 K/UL — LOW (ref 1.2–3.4)
LYMPHOCYTES # BLD AUTO: 5 % — LOW (ref 20.5–51.1)
MAGNESIUM SERPL-MCNC: 1.7 MG/DL — LOW (ref 1.8–2.4)
MAGNESIUM SERPL-MCNC: 1.9 MG/DL — SIGNIFICANT CHANGE UP (ref 1.8–2.4)
MANUAL SMEAR VERIFICATION: SIGNIFICANT CHANGE UP
MCHC RBC-ENTMCNC: 26 PG — LOW (ref 27–31)
MCHC RBC-ENTMCNC: 26.1 PG — LOW (ref 27–31)
MCHC RBC-ENTMCNC: 33 G/DL — SIGNIFICANT CHANGE UP (ref 32–37)
MCHC RBC-ENTMCNC: 33.1 G/DL — SIGNIFICANT CHANGE UP (ref 32–37)
MCV RBC AUTO: 78.4 FL — LOW (ref 80–94)
MCV RBC AUTO: 79 FL — LOW (ref 80–94)
METHADONE UR-MCNC: NEGATIVE — SIGNIFICANT CHANGE UP
MONOCYTES # BLD AUTO: 0.36 K/UL — SIGNIFICANT CHANGE UP (ref 0.1–0.6)
MONOCYTES NFR BLD AUTO: 2 % — SIGNIFICANT CHANGE UP (ref 1.7–9.3)
MRSA PCR RESULT.: POSITIVE
NEUTROPHILS # BLD AUTO: 16.67 K/UL — HIGH (ref 1.4–6.5)
NEUTROPHILS NFR BLD AUTO: 93 % — HIGH (ref 42.2–75.2)
NITRITE UR-MCNC: NEGATIVE — SIGNIFICANT CHANGE UP
NRBC # BLD: 0 /100 WBCS — SIGNIFICANT CHANGE UP (ref 0–0)
NRBC # BLD: 0 /100 WBCS — SIGNIFICANT CHANGE UP (ref 0–0)
NRBC # BLD: SIGNIFICANT CHANGE UP /100 WBCS (ref 0–0)
OPIATES UR-MCNC: NEGATIVE — SIGNIFICANT CHANGE UP
PCP SPEC-MCNC: SIGNIFICANT CHANGE UP
PH UR: 5.5 — SIGNIFICANT CHANGE UP (ref 5–8)
PHOSPHATE SERPL-MCNC: 2.8 MG/DL — SIGNIFICANT CHANGE UP (ref 2.1–4.9)
PHOSPHATE SERPL-MCNC: 4.2 MG/DL — SIGNIFICANT CHANGE UP (ref 2.1–4.9)
PLAT MORPH BLD: NORMAL — SIGNIFICANT CHANGE UP
PLATELET # BLD AUTO: 304 K/UL — SIGNIFICANT CHANGE UP (ref 130–400)
PLATELET # BLD AUTO: 329 K/UL — SIGNIFICANT CHANGE UP (ref 130–400)
PLATELET CLUMP BLD QL SMEAR: SIGNIFICANT CHANGE UP
PLATELET COUNT - ESTIMATE: NORMAL — SIGNIFICANT CHANGE UP
PMV BLD: 9.3 FL — SIGNIFICANT CHANGE UP (ref 7.4–10.4)
PMV BLD: 9.5 FL — SIGNIFICANT CHANGE UP (ref 7.4–10.4)
POTASSIUM SERPL-MCNC: 3.7 MMOL/L — SIGNIFICANT CHANGE UP (ref 3.5–5)
POTASSIUM SERPL-MCNC: 3.9 MMOL/L — SIGNIFICANT CHANGE UP (ref 3.5–5)
POTASSIUM SERPL-MCNC: 4.1 MMOL/L — SIGNIFICANT CHANGE UP (ref 3.5–5)
POTASSIUM SERPL-SCNC: 3.7 MMOL/L — SIGNIFICANT CHANGE UP (ref 3.5–5)
POTASSIUM SERPL-SCNC: 3.9 MMOL/L — SIGNIFICANT CHANGE UP (ref 3.5–5)
POTASSIUM SERPL-SCNC: 4.1 MMOL/L — SIGNIFICANT CHANGE UP (ref 3.5–5)
PROPOXYPHENE QUALITATIVE URINE RESULT: NEGATIVE — SIGNIFICANT CHANGE UP
PROT SERPL-MCNC: 5.2 G/DL — LOW (ref 6–8)
PROT SERPL-MCNC: 5.3 G/DL — LOW (ref 6–8)
PROT UR-MCNC: 100 MG/DL
RBC # BLD: 4.1 M/UL — LOW (ref 4.7–6.1)
RBC # BLD: 4.12 M/UL — LOW (ref 4.7–6.1)
RBC # FLD: 15.6 % — HIGH (ref 11.5–14.5)
RBC # FLD: 15.8 % — HIGH (ref 11.5–14.5)
RBC BLD AUTO: NORMAL — SIGNIFICANT CHANGE UP
RBC CASTS # UR COMP ASSIST: 2 /HPF — SIGNIFICANT CHANGE UP (ref 0–4)
SCHISTOCYTES BLD QL AUTO: SLIGHT — SIGNIFICANT CHANGE UP
SODIUM SERPL-SCNC: 136 MMOL/L — SIGNIFICANT CHANGE UP (ref 135–146)
SODIUM SERPL-SCNC: 140 MMOL/L — SIGNIFICANT CHANGE UP (ref 135–146)
SODIUM SERPL-SCNC: 141 MMOL/L — SIGNIFICANT CHANGE UP (ref 135–146)
SP GR SPEC: 1.01 — SIGNIFICANT CHANGE UP (ref 1–1.03)
SQUAMOUS # UR AUTO: 2 /HPF — SIGNIFICANT CHANGE UP (ref 0–5)
TROPONIN T, HIGH SENSITIVITY RESULT: 54 NG/L — CRITICAL HIGH (ref 6–21)
UROBILINOGEN FLD QL: 0.2 MG/DL — SIGNIFICANT CHANGE UP (ref 0.2–1)
WBC # BLD: 17.93 K/UL — HIGH (ref 4.8–10.8)
WBC # BLD: 18.16 K/UL — HIGH (ref 4.8–10.8)
WBC # FLD AUTO: 17.93 K/UL — HIGH (ref 4.8–10.8)
WBC # FLD AUTO: 18.16 K/UL — HIGH (ref 4.8–10.8)
WBC UR QL: 2 /HPF — SIGNIFICANT CHANGE UP (ref 0–5)

## 2024-08-27 PROCEDURE — 93010 ELECTROCARDIOGRAM REPORT: CPT

## 2024-08-27 PROCEDURE — 99223 1ST HOSP IP/OBS HIGH 75: CPT

## 2024-08-27 PROCEDURE — 70450 CT HEAD/BRAIN W/O DYE: CPT | Mod: 26

## 2024-08-27 PROCEDURE — 99291 CRITICAL CARE FIRST HOUR: CPT

## 2024-08-27 RX ORDER — INSULIN REGULAR, HUMAN 100/ML (3)
3 INSULIN PEN (ML) SUBCUTANEOUS
Qty: 100 | Refills: 0 | Status: DISCONTINUED | OUTPATIENT
Start: 2024-08-27 | End: 2024-08-28

## 2024-08-27 RX ORDER — HEPARIN SODIUM,BOVINE 1000/ML
5000 VIAL (ML) INJECTION EVERY 8 HOURS
Refills: 0 | Status: DISCONTINUED | OUTPATIENT
Start: 2024-08-27 | End: 2024-08-27

## 2024-08-27 RX ORDER — DEXTROSE 15 G/33 G
25 GEL IN PACKET (GRAM) ORAL ONCE
Refills: 0 | Status: DISCONTINUED | OUTPATIENT
Start: 2024-08-27 | End: 2024-09-05

## 2024-08-27 RX ORDER — INSULIN GLARGINE 100 [IU]/ML
25 INJECTION, SOLUTION SUBCUTANEOUS ONCE
Refills: 0 | Status: COMPLETED | OUTPATIENT
Start: 2024-08-27 | End: 2024-08-27

## 2024-08-27 RX ORDER — HALOPERIDOL 1 MG
5 TABLET ORAL EVERY 6 HOURS
Refills: 0 | Status: DISCONTINUED | OUTPATIENT
Start: 2024-08-27 | End: 2024-08-27

## 2024-08-27 RX ORDER — GLUCAGON INJECTION, SOLUTION 1 MG/.2ML
1 INJECTION, SOLUTION SUBCUTANEOUS ONCE
Refills: 0 | Status: DISCONTINUED | OUTPATIENT
Start: 2024-08-27 | End: 2024-09-05

## 2024-08-27 RX ORDER — DIAZEPAM 10 MG
5 TABLET ORAL
Refills: 0 | Status: DISCONTINUED | OUTPATIENT
Start: 2024-08-27 | End: 2024-09-03

## 2024-08-27 RX ORDER — MUPIROCIN 2 %
1 OINTMENT (GRAM) TOPICAL EVERY 12 HOURS
Refills: 0 | Status: DISCONTINUED | OUTPATIENT
Start: 2024-08-27 | End: 2024-09-05

## 2024-08-27 RX ORDER — HEPARIN SODIUM,BOVINE 1000/ML
VIAL (ML) INJECTION
Qty: 25000 | Refills: 0 | Status: DISCONTINUED | OUTPATIENT
Start: 2024-08-27 | End: 2024-08-27

## 2024-08-27 RX ORDER — HEPARIN SODIUM,BOVINE 1000/ML
1300 VIAL (ML) INJECTION
Qty: 25000 | Refills: 0 | Status: DISCONTINUED | OUTPATIENT
Start: 2024-08-27 | End: 2024-08-27

## 2024-08-27 RX ORDER — INSULIN GLARGINE 100 [IU]/ML
25 INJECTION, SOLUTION SUBCUTANEOUS
Refills: 0 | Status: DISCONTINUED | OUTPATIENT
Start: 2024-08-27 | End: 2024-08-29

## 2024-08-27 RX ORDER — HALOPERIDOL 1 MG
5 TABLET ORAL EVERY 6 HOURS
Refills: 0 | Status: DISCONTINUED | OUTPATIENT
Start: 2024-08-27 | End: 2024-09-03

## 2024-08-27 RX ORDER — DEXTROSE 15 G/33 G
12.5 GEL IN PACKET (GRAM) ORAL ONCE
Refills: 0 | Status: DISCONTINUED | OUTPATIENT
Start: 2024-08-27 | End: 2024-09-05

## 2024-08-27 RX ORDER — LORAZEPAM 4 MG/ML
2 INJECTION INTRAMUSCULAR; INTRAVENOUS EVERY 6 HOURS
Refills: 0 | Status: DISCONTINUED | OUTPATIENT
Start: 2024-08-27 | End: 2024-08-27

## 2024-08-27 RX ORDER — HYDRALAZINE HCL 50 MG
10 TABLET ORAL ONCE
Refills: 0 | Status: COMPLETED | OUTPATIENT
Start: 2024-08-27 | End: 2024-08-27

## 2024-08-27 RX ORDER — DEXTROSE 15 G/33 G
15 GEL IN PACKET (GRAM) ORAL ONCE
Refills: 0 | Status: DISCONTINUED | OUTPATIENT
Start: 2024-08-27 | End: 2024-09-05

## 2024-08-27 RX ORDER — DIAZEPAM 10 MG
10 TABLET ORAL
Refills: 0 | Status: DISCONTINUED | OUTPATIENT
Start: 2024-08-27 | End: 2024-09-03

## 2024-08-27 RX ORDER — HEPARIN SODIUM,BOVINE 1000/ML
1300 VIAL (ML) INJECTION
Qty: 25000 | Refills: 0 | Status: DISCONTINUED | OUTPATIENT
Start: 2024-08-27 | End: 2024-08-30

## 2024-08-27 RX ORDER — PIPERACILLIN SODIUM AND TAZOBACTAM SODIUM 3; .375 G/15ML; G/15ML
3.38 INJECTION, POWDER, FOR SOLUTION INTRAVENOUS EVERY 12 HOURS
Refills: 0 | Status: COMPLETED | OUTPATIENT
Start: 2024-08-27 | End: 2024-09-03

## 2024-08-27 RX ORDER — LORAZEPAM 4 MG/ML
2 INJECTION INTRAMUSCULAR; INTRAVENOUS EVERY 4 HOURS
Refills: 0 | Status: DISCONTINUED | OUTPATIENT
Start: 2024-08-27 | End: 2024-08-27

## 2024-08-27 RX ADMIN — DEXMEDETOMIDINE HYDROCHLORIDE IN 0.9% SODIUM CHLORIDE 9.48 MICROGRAM(S)/KG/HR: 4 INJECTION INTRAVENOUS at 21:18

## 2024-08-27 RX ADMIN — Medication 10 MILLIGRAM(S): at 22:22

## 2024-08-27 RX ADMIN — INSULIN GLARGINE 25 UNIT(S): 100 INJECTION, SOLUTION SUBCUTANEOUS at 21:19

## 2024-08-27 RX ADMIN — Medication 5 MILLIGRAM(S): at 10:05

## 2024-08-27 RX ADMIN — CEFEPIME 100 MILLIGRAM(S): 2 INJECTION, POWDER, FOR SOLUTION INTRAVENOUS at 14:12

## 2024-08-27 RX ADMIN — Medication 1 APPLICATION(S): at 17:34

## 2024-08-27 RX ADMIN — INSULIN GLARGINE 25 UNIT(S): 100 INJECTION, SOLUTION SUBCUTANEOUS at 08:09

## 2024-08-27 RX ADMIN — Medication 5 MILLIGRAM(S): at 23:44

## 2024-08-27 RX ADMIN — Medication 105 MILLIGRAM(S): at 14:11

## 2024-08-27 RX ADMIN — Medication 3 UNIT(S)/HR: at 00:30

## 2024-08-27 RX ADMIN — LORAZEPAM 2 MILLIGRAM(S): 4 INJECTION INTRAMUSCULAR; INTRAVENOUS at 14:11

## 2024-08-27 RX ADMIN — CHLORHEXIDINE GLUCONATE 1 APPLICATION(S): 40 SOLUTION TOPICAL at 03:08

## 2024-08-27 RX ADMIN — Medication 17 UNIT(S): at 11:58

## 2024-08-27 RX ADMIN — Medication 4: at 11:58

## 2024-08-27 RX ADMIN — DEXMEDETOMIDINE HYDROCHLORIDE IN 0.9% SODIUM CHLORIDE 9.48 MICROGRAM(S)/KG/HR: 4 INJECTION INTRAVENOUS at 02:39

## 2024-08-27 RX ADMIN — Medication 1300 UNIT(S)/HR: at 22:10

## 2024-08-27 RX ADMIN — Medication 1300 UNIT(S)/HR: at 07:56

## 2024-08-27 RX ADMIN — PIPERACILLIN SODIUM AND TAZOBACTAM SODIUM 25 GRAM(S): 3; .375 INJECTION, POWDER, FOR SOLUTION INTRAVENOUS at 21:18

## 2024-08-27 RX ADMIN — Medication 5 MILLIGRAM(S): at 14:18

## 2024-08-27 RX ADMIN — Medication 100 MILLILITER(S): at 01:00

## 2024-08-27 RX ADMIN — Medication 10 MILLIGRAM(S): at 22:24

## 2024-08-27 RX ADMIN — Medication 1300 UNIT(S)/HR: at 00:55

## 2024-08-27 RX ADMIN — Medication 17 UNIT(S): at 17:26

## 2024-08-27 RX ADMIN — Medication 100 MILLILITER(S): at 21:18

## 2024-08-27 NOTE — CONSULT NOTE ADULT - SUBJECTIVE AND OBJECTIVE BOX
INFECTIOUS DISEASE CONSULT NOTE    Patient is a 59y old  Male who presents with a chief complaint of   HPI:  58 y/o male with hx of CKD 4, anemia of chronic disease, LUE on eliquis, recent CVA on DAPT, PAD, s/p right BKA, IDDM, substance abuse and recent hospital admission for PNA was brought in for confusion and agitation by family. At encounter pt appears to be confused answers questions, says has no history of drug abuse. When asked if he is complaint with insulin, he agrees. This has been confirmed with the mother, who also agrees that he is complaint with his meds. Of note pt had a sore throat yesterday morning and the sister at bedside added that His BG has been > 600 for the past few day nina home.    in ED   - 107  wbc 26  platelets 591  K 7.9  HCO3 5  AG 43  Cr 6.9  BG 1128  VBG: lact 6.8 / pH 6.9   rvp NG   (26 Aug 2024 13:50)         Prior hospital charts reviewed [Yes]  Primary team notes reviewed [Yes]  Other consultant notes reviewed [Yes]    REVIEW OF SYSTEMS:      PAST MEDICAL & SURGICAL HISTORY:  DM (diabetes mellitus)      HTN (hypertension)      HLD (hyperlipidemia)      Herpes labialis      Anxiety      GERD (gastroesophageal reflux disease)      Kidney stones  20 years ago      Kidney disease  stage 4      Chronic kidney disease, unspecified CKD stage      Diabetic Charcot foot      PAD (peripheral artery disease)      S/P foot surgery, right  x 5 ( 2006 - 2013 )      Kidney stone  Removed by Laser x 2 ( 20 years ago )      H/O arthroscopy of right knee      Status post peripheral artery angioplasty      Amputee, below knee, right          SOCIAL HISTORY:  - Born in [place], migrated [place] in 19XX  - Currently working as / Retired  - Lives with _____; no pets  - No recent travel  - Denies tobacco use  - Denies alcohol use  - Denies illicit drug use  - Currently sexually active, has one male/female sexual partner    FAMILY HISTORY:  Family history of cancer in father (Father)  Lung        Allergies:  No Known Allergies      ANTIMICROBIALS:  cefepime   IVPB 1000 every 24 hours  vancomycin  IVPB        ANTIMICROBIALS (past 90 days):  MEDICATIONS  (STANDING):    cefepime   IVPB   100 mL/Hr IV Intermittent (08-26-24 @ 13:00)    vancomycin  IVPB   1500 milliGRAM(s) IV Intermittent (08-26-24 @ 20:46)        OTHER MEDS:   MEDICATIONS  (STANDING):  dexMEDEtomidine Infusion 0.5 <Continuous>  dextrose 50% Injectable 25 once  dextrose 50% Injectable 12.5 once  dextrose 50% Injectable 25 once  dextrose Oral Gel 15 once PRN  glucagon  Injectable 1 once  haloperidol    Injectable 5 every 8 hours PRN  heparin  Infusion. 1300 <Continuous>  insulin glargine Injectable (LANTUS) 25 two times a day  insulin lispro (ADMELOG) corrective regimen sliding scale  three times a day before meals  insulin lispro Injectable (ADMELOG) 17 three times a day before meals  insulin regular Infusion 3 <Continuous>  oxyCODONE    IR 10 every 6 hours PRN      VITALS:  Vital Signs Last 24 Hrs  T(F): 97.2 (08-27-24 @ 07:43), Max: 98.4 (08-26-24 @ 15:55)    Vital Signs Last 24 Hrs  HR: 75 (08-27-24 @ 06:00) (64 - 107)  BP: 125/60 (08-27-24 @ 06:00) (94/52 - 155/70)  RR: 16 (08-27-24 @ 06:00)  SpO2: 97% (08-27-24 @ 07:43) (96% - 100%)  Wt(kg): --    EXAM:    Labs:                        10.7   18.16 )-----------( 304      ( 27 Aug 2024 06:36 )             32.3     08-27    136  |  98  |  57<H>  ----------------------------<  172<H>  3.9   |  27  |  3.4<H>    Ca    7.4<L>      27 Aug 2024 05:57  Phos  4.2     08-27  Mg     1.9     08-27    TPro  5.2<L>  /  Alb  3.1<L>  /  TBili  <0.2  /  DBili  <0.2  /  AST  15  /  ALT  11  /  AlkPhos  129<H>  08-27      WBC Trend:  WBC Count: 18.16 (08-27-24 @ 06:36)  WBC Count: 17.93 (08-27-24 @ 05:57)  WBC Count: 26.20 (08-26-24 @ 12:45)  WBC Count: 21.49 (08-26-24 @ 10:26)      Auto Neutrophil #: 16.67 K/uL (08-27-24 @ 05:57)  Auto Neutrophil #: 24.55 K/uL (08-26-24 @ 12:45)  Auto Neutrophil #: 19.94 K/uL (08-26-24 @ 10:26)  Auto Neutrophil #: 8.36 K/uL (06-30-24 @ 07:57)  Auto Neutrophil #: 7.56 K/uL (06-29-24 @ 13:50)      Creatine Trend:  Creatinine: 3.4 (08-27)  Creatinine: 3.6 (08-26)  Creatinine: 3.3 (08-26)  Creatinine: 5.8 (08-26)      Liver Biochemical Testing Trend:  Alanine Aminotransferase (ALT/SGPT): 11 (08-27)  Alanine Aminotransferase (ALT/SGPT): 11 (08-26)  Alanine Aminotransferase (ALT/SGPT): 17 (07-01)  Alanine Aminotransferase (ALT/SGPT): 15 (06-30)  Alanine Aminotransferase (ALT/SGPT): 16 (06-29)  Aspartate Aminotransferase (AST/SGOT): 15 (08-27-24 @ 05:57)  Aspartate Aminotransferase (AST/SGOT): 14 (08-26-24 @ 12:45)  Aspartate Aminotransferase (AST/SGOT): 15 (07-01-24 @ 10:51)  Aspartate Aminotransferase (AST/SGOT): 16 (06-30-24 @ 07:57)  Aspartate Aminotransferase (AST/SGOT): 15 (06-29-24 @ 13:50)  Bilirubin Direct: <0.2 (08-27)  Bilirubin Total: <0.2 (08-27)  Bilirubin Total: 0.2 (08-26)  Bilirubin Total: 0.2 (07-01)  Bilirubin Total: 0.3 (06-30)      Trend LDH      Auto Eosinophil %: 0.0 % (08-26-24 @ 12:45)  Auto Eosinophil %: 0.0 % (08-26-24 @ 10:26)      Urinalysis Basic - ( 27 Aug 2024 05:57 )    Color: x / Appearance: x / SG: x / pH: x  Gluc: 172 mg/dL / Ketone: x  / Bili: x / Urobili: x   Blood: x / Protein: x / Nitrite: x   Leuk Esterase: x / RBC: x / WBC x   Sq Epi: x / Non Sq Epi: x / Bacteria: x          MICROBIOLOGY:    MRSA PCR Result.: Positive (06-28-24 @ 14:00)  MRSA PCR Result.: Positive (06-23-24 @ 18:00)    HIV-1/2 Combo Result: Nonreact (06-21-24 @ 15:59)      Troponin T, High Sensitivity Result: 54 (08-27)  Troponin T, High Sensitivity Result: 59 (08-26)  Troponin T, High Sensitivity Result: 60 (08-26)    Lactate, Blood: 1.5 (08-27 @ 05:57)  Blood Gas Venous - Lactate: 7.1 (08-26 @ 14:00)  Lactate, Blood: 4.6 (08-26 @ 13:30)  Blood Gas Venous - Lactate: 6.8 (08-26 @ 10:35)    A1C with Estimated Average Glucose Result: 9.5 % (06-20-24 @ 05:42)        RADIOLOGY:  imaging below personally reviewed   INFECTIOUS DISEASE CONSULT NOTE    Patient is a 59y old  Male who presents with a chief complaint of confusion  HPI:  58 y/o male with hx of CKD 4, anemia of chronic disease, LUE on eliquis, recent CVA on DAPT, PAD, s/p right BKA, IDDM, substance abuse and recent hospital admission for PNA was brought in for confusion and agitation by family. At encounter pt appears to be confused answers questions, says has no history of drug abuse. When asked if he is complaint with insulin, he agrees. This has been confirmed with the mother, who also agrees that he is complaint with his meds. Of note pt had a sore throat yesterday morning and the sister at bedside added that His BG has been > 600 for the past few day nina home.    in ED   - 107  wbc 26  platelets 591  K 7.9  HCO3 5  AG 43  Cr 6.9  BG 1128  VBG: lact 6.8 / pH 6.9   rvp NG   (26 Aug 2024 13:50)         Prior hospital charts reviewed [Yes]  Primary team notes reviewed [Yes]  Other consultant notes reviewed [Yes]    REVIEW OF SYSTEMS:  CONSTITUTIONAL: No fever or chills; lethargic but able to answer simple questions  HEAD: No lesion on scalp  EYES: No visual disturbance  ENT: + sore throat  RESPIRATORY: No cough, no shortness of breath  CARDIOVASCULAR: No chest pain or palpitations  GASTROINTESTINAL: No abdominal or epigastric pain  GENITOURINARY: No dysuria  NEUROLOGICAL: No headache/dizziness  MUSCULOSKELETAL: No joint pain, erythema, or swelling; no back pain  SKIN: No itching, rashes  All other ROS negative except noted above      PAST MEDICAL & SURGICAL HISTORY:  DM (diabetes mellitus)      HTN (hypertension)      HLD (hyperlipidemia)      Herpes labialis      Anxiety      GERD (gastroesophageal reflux disease)      Kidney stones  20 years ago      Kidney disease  stage 4      Chronic kidney disease, unspecified CKD stage      Diabetic Charcot foot      PAD (peripheral artery disease)      S/P foot surgery, right  x 5 ( 2006 - 2013 )      Kidney stone  Removed by Laser x 2 ( 20 years ago )      H/O arthroscopy of right knee      Status post peripheral artery angioplasty      Amputee, below knee, right          SOCIAL HISTORY:  - No recent travel  - Denies tobacco use  - Denies alcohol use  - Denies current illicit drug use; hx of cocaine abuse    FAMILY HISTORY:  Family history of cancer in father (Father)  Lung    Allergies:  No Known Allergies      ANTIMICROBIALS:  cefepime   IVPB 1000 every 24 hours  vancomycin  IVPB        ANTIMICROBIALS (past 90 days):  MEDICATIONS  (STANDING):    cefepime   IVPB   100 mL/Hr IV Intermittent (08-26-24 @ 13:00)    vancomycin  IVPB   1500 milliGRAM(s) IV Intermittent (08-26-24 @ 20:46)        OTHER MEDS:   MEDICATIONS  (STANDING):  dexMEDEtomidine Infusion 0.5 <Continuous>  dextrose 50% Injectable 25 once  dextrose 50% Injectable 12.5 once  dextrose 50% Injectable 25 once  dextrose Oral Gel 15 once PRN  glucagon  Injectable 1 once  haloperidol    Injectable 5 every 8 hours PRN  heparin  Infusion. 1300 <Continuous>  insulin glargine Injectable (LANTUS) 25 two times a day  insulin lispro (ADMELOG) corrective regimen sliding scale  three times a day before meals  insulin lispro Injectable (ADMELOG) 17 three times a day before meals  insulin regular Infusion 3 <Continuous>  oxyCODONE    IR 10 every 6 hours PRN      VITALS:  Vital Signs Last 24 Hrs  T(F): 97.2 (08-27-24 @ 07:43), Max: 98.4 (08-26-24 @ 15:55)    Vital Signs Last 24 Hrs  HR: 75 (08-27-24 @ 06:00) (64 - 107)  BP: 125/60 (08-27-24 @ 06:00) (94/52 - 155/70)  RR: 16 (08-27-24 @ 06:00)  SpO2: 97% (08-27-24 @ 07:43) (96% - 100%)  Wt(kg): --    EXAM:  GENERAL: Lethargic, lying in bed  HEAD: No head lesions  EYES: Conjunctiva pink and cornea white  EAR, NOSE, MOUTH, THROAT: Normal external ears and nose, no discharges; moist mucous membranes  NECK: Supple, tender to palpation on bilateral anterior neck; no JVD  RESPIRATORY: Clear to auscultation bilaterally  CARDIOVASCULAR: S1 S2  GASTROINTESTINAL: Soft, nontender, nondistended; normoactive bowel sounds  GENITOURINARY: No urbano catheter, no CVA tenderness  EXTREMITIES: No clubbing, cyanosis, or petal edema; s/p R BKA  NERVOUS SYSTEM: Lethargic but arousable, not fully oriented but able to answer simple questions  MUSCULOSKELETAL: No joint erythema, swelling or pain  SKIN: No rashes or lesions, no superficial thrombophlebitis  PSYCH: Agitated    Labs:                        10.7   18.16 )-----------( 304      ( 27 Aug 2024 06:36 )             32.3     08-27    136  |  98  |  57<H>  ----------------------------<  172<H>  3.9   |  27  |  3.4<H>    Ca    7.4<L>      27 Aug 2024 05:57  Phos  4.2     08-27  Mg     1.9     08-27    TPro  5.2<L>  /  Alb  3.1<L>  /  TBili  <0.2  /  DBili  <0.2  /  AST  15  /  ALT  11  /  AlkPhos  129<H>  08-27      WBC Trend:  WBC Count: 18.16 (08-27-24 @ 06:36)  WBC Count: 17.93 (08-27-24 @ 05:57)  WBC Count: 26.20 (08-26-24 @ 12:45)  WBC Count: 21.49 (08-26-24 @ 10:26)      Auto Neutrophil #: 16.67 K/uL (08-27-24 @ 05:57)  Auto Neutrophil #: 24.55 K/uL (08-26-24 @ 12:45)  Auto Neutrophil #: 19.94 K/uL (08-26-24 @ 10:26)  Auto Neutrophil #: 8.36 K/uL (06-30-24 @ 07:57)  Auto Neutrophil #: 7.56 K/uL (06-29-24 @ 13:50)      Creatine Trend:  Creatinine: 3.4 (08-27)  Creatinine: 3.6 (08-26)  Creatinine: 3.3 (08-26)  Creatinine: 5.8 (08-26)      Liver Biochemical Testing Trend:  Alanine Aminotransferase (ALT/SGPT): 11 (08-27)  Alanine Aminotransferase (ALT/SGPT): 11 (08-26)  Alanine Aminotransferase (ALT/SGPT): 17 (07-01)  Alanine Aminotransferase (ALT/SGPT): 15 (06-30)  Alanine Aminotransferase (ALT/SGPT): 16 (06-29)  Aspartate Aminotransferase (AST/SGOT): 15 (08-27-24 @ 05:57)  Aspartate Aminotransferase (AST/SGOT): 14 (08-26-24 @ 12:45)  Aspartate Aminotransferase (AST/SGOT): 15 (07-01-24 @ 10:51)  Aspartate Aminotransferase (AST/SGOT): 16 (06-30-24 @ 07:57)  Aspartate Aminotransferase (AST/SGOT): 15 (06-29-24 @ 13:50)  Bilirubin Direct: <0.2 (08-27)  Bilirubin Total: <0.2 (08-27)  Bilirubin Total: 0.2 (08-26)  Bilirubin Total: 0.2 (07-01)  Bilirubin Total: 0.3 (06-30)      Trend LDH      Auto Eosinophil %: 0.0 % (08-26-24 @ 12:45)  Auto Eosinophil %: 0.0 % (08-26-24 @ 10:26)      Urinalysis Basic - ( 27 Aug 2024 05:57 )    Color: x / Appearance: x / SG: x / pH: x  Gluc: 172 mg/dL / Ketone: x  / Bili: x / Urobili: x   Blood: x / Protein: x / Nitrite: x   Leuk Esterase: x / RBC: x / WBC x   Sq Epi: x / Non Sq Epi: x / Bacteria: x          MICROBIOLOGY:    MRSA PCR Result.: Positive (06-28-24 @ 14:00)  MRSA PCR Result.: Positive (06-23-24 @ 18:00)    HIV-1/2 Combo Result: Nonreact (06-21-24 @ 15:59)      Troponin T, High Sensitivity Result: 54 (08-27)  Troponin T, High Sensitivity Result: 59 (08-26)  Troponin T, High Sensitivity Result: 60 (08-26)    Lactate, Blood: 1.5 (08-27 @ 05:57)  Blood Gas Venous - Lactate: 7.1 (08-26 @ 14:00)  Lactate, Blood: 4.6 (08-26 @ 13:30)  Blood Gas Venous - Lactate: 6.8 (08-26 @ 10:35)    A1C with Estimated Average Glucose Result: 9.5 % (06-20-24 @ 05:42)        RADIOLOGY:  imaging below personally reviewed    < from: [facility] Duplex Lower Ext Vein Scan, Bilat (08.26.24 @ 17:47) >  IMPRESSION:  No evidence of deep venous thrombosis in either lower extremity.   Bilateral saphenofemoral junction not visualized.    < end of copied text >    < from: Xray Chest 1 View- PORTABLE-Urgent (08.26.24 @ 10:43) >  Impression:      No acute pulmonary process    < end of copied text >    < from: MR Angio Neck No Cont (08.12.24 @ 09:02) >  IMPRESSION:    1.  RIGHT CAROTID NECK CIRCULATION:   Intact.    2.  LEFT CAROTID NECK CIRCULATION:    Intact.    3.  VERTEBRAL NECK CIRCULATION:   Intact    4.  ANTERIOR INTRACRANIAL CIRCULATION:     Intracranial atherosclerosis   cavernous and clinoid segments of the internal carotid arteries,   mild-to-moderate on the right and mild on the left.    5.  POSTERIOR INTRACRANIAL CIRCULATION:   Intact.    6. Right middle cerebellar peduncle lesion demonstrates evolution since   6/26/2024, findings consistent with acute infarction at that time    < end of copied text >

## 2024-08-27 NOTE — PROVIDER CONTACT NOTE (EICU) - RECOMMENDATIONS
will get half home lantus as patient is npo.  wait 2 hours and if BG remain normal will stop insulin drip

## 2024-08-27 NOTE — PROGRESS NOTE ADULT - SUBJECTIVE AND OBJECTIVE BOX
Patient is a 59y old  Male who presents with a chief complaint of Confusion (27 Aug 2024 09:04)      Over Night Events:  Patient seen and examined.   No distress   no agitation   reports of agitation overnight however the patient is expressing that he does not want to be sedated and is reaction is out of anger       ROS:  See HPI    PHYSICAL EXAM    ICU Vital Signs Last 24 Hrs  T(C): 36.2 (27 Aug 2024 07:43), Max: 36.9 (26 Aug 2024 15:55)  T(F): 97.2 (27 Aug 2024 07:43), Max: 98.4 (26 Aug 2024 15:55)  HR: 76 (27 Aug 2024 10:00) (64 - 107)  BP: 164/73 (27 Aug 2024 10:00) (101/50 - 164/73)  BP(mean): 105 (27 Aug 2024 10:00) (75 - 105)  ABP: --  ABP(mean): --  RR: 26 (27 Aug 2024 10:00) (14 - 26)  SpO2: 97% (27 Aug 2024 10:00) (96% - 100%)    O2 Parameters below as of 27 Aug 2024 08:00  Patient On (Oxygen Delivery Method): room air            General: NAD   HEENT:         MMM, slurred speech due to sedation   Lymph Nodes: NO cervical LN   Lungs: Bilateral BS, no wheezing   Cardiovascular: Regular, normal S1 S2, no mgr   Abdomen: Soft, Positive BS  Extremities: No clubbing   Skin: warm, no new rashes   Neurological: awake, mildly sedated but appropriate   Musculoskeletal: move all ext, AKA noted on the right     I&O's Detail    26 Aug 2024 07:01  -  27 Aug 2024 07:00  --------------------------------------------------------  IN:    Dexmedetomidine: 273 mL    dextrose 5% + sodium chloride 0.9%: 700 mL    Heparin Infusion: 91 mL    Heparin Infusion: 91 mL    Insulin: 75 mL    Insulin: 15 mL    IV PiggyBack: 600 mL    Sodium Bicarbonate: 1350 mL  Total IN: 3195 mL    OUT:    Indwelling Catheter - Urethral (mL): 1545 mL    Other (mL): 0 mL  Total OUT: 1545 mL    Total NET: 1650 mL      27 Aug 2024 07:01  -  27 Aug 2024 12:45  --------------------------------------------------------  IN:    Dexmedetomidine: 54.6 mL    dextrose 5% + sodium chloride 0.9%: 400 mL    Heparin Infusion: 26 mL    Insulin: 3 mL  Total IN: 483.6 mL    OUT:    Indwelling Catheter - Urethral (mL): 805 mL  Total OUT: 805 mL    Total NET: -321.4 mL          LABS:                          10.7   18.16 )-----------( 304      ( 27 Aug 2024 06:36 )             32.3         27 Aug 2024 05:57    136    |  98     |  57     ----------------------------<  172    3.9     |  27     |  3.4      Ca    7.4        27 Aug 2024 05:57  Phos  4.2       27 Aug 2024 05:57  Mg     1.9       27 Aug 2024 05:57    TPro  5.2    /  Alb  3.1    /  TBili  <0.2   /  DBili  <0.2   /  AST  15     /  ALT  11     /  AlkPhos  129    27 Aug 2024 05:57  Amylase x     lipase x                                                 PT/INR - ( 26 Aug 2024 12:45 )   PT: 11.70 sec;   INR: 1.03 ratio         PTT - ( 27 Aug 2024 06:36 )  PTT:60.3 sec                                       Urinalysis Basic - ( 27 Aug 2024 10:56 )    Color: Orange / Appearance: Clear / S.012 / pH: x  Gluc: x / Ketone: Negative mg/dL  / Bili: Negative / Urobili: 0.2 mg/dL   Blood: x / Protein: 100 mg/dL / Nitrite: Negative   Leuk Esterase: Negative / RBC: 2 /HPF / WBC 2 /HPF   Sq Epi: x / Non Sq Epi: 2 /HPF / Bacteria: Moderate /HPF        Lactate, Blood: 1.5 mmol/L (24 @ 05:57)  Lactate, Blood: 4.6 mmol/L (24 @ 13:30)                                                          Urinalysis with Rflx Culture (collected 27 Aug 2024 10:56)                                                                                           MEDICATIONS  (STANDING):  cefepime   IVPB 1000 milliGRAM(s) IV Intermittent every 24 hours  chlorhexidine 2% Cloths 1 Application(s) Topical <User Schedule>  dexMEDEtomidine Infusion 0.5 MICROgram(s)/kG/Hr (9.48 mL/Hr) IV Continuous <Continuous>  dextrose 5% + sodium chloride 0.9%. 1000 milliLiter(s) (100 mL/Hr) IV Continuous <Continuous>  dextrose 5%. 1000 milliLiter(s) (50 mL/Hr) IV Continuous <Continuous>  dextrose 5%. 1000 milliLiter(s) (100 mL/Hr) IV Continuous <Continuous>  dextrose 50% Injectable 25 Gram(s) IV Push once  dextrose 50% Injectable 12.5 Gram(s) IV Push once  dextrose 50% Injectable 25 Gram(s) IV Push once  glucagon  Injectable 1 milliGRAM(s) IntraMuscular once  heparin   Injectable 5000 Unit(s) SubCutaneous every 8 hours  insulin glargine Injectable (LANTUS) 25 Unit(s) SubCutaneous two times a day  insulin lispro (ADMELOG) corrective regimen sliding scale   SubCutaneous three times a day before meals  insulin lispro Injectable (ADMELOG) 17 Unit(s) SubCutaneous three times a day before meals  insulin regular Infusion 3 Unit(s)/Hr (3 mL/Hr) IV Continuous <Continuous>  mupirocin 2% Nasal 1 Application(s) Both Nostrils every 12 hours  thiamine IVPB 500 milliGRAM(s) IV Intermittent daily    MEDICATIONS  (PRN):  dextrose Oral Gel 15 Gram(s) Oral once PRN Blood Glucose LESS THAN 70 milliGRAM(s)/deciliter  haloperidol    Injectable 5 milliGRAM(s) IntraMuscular every 6 hours PRN Agitation  LORazepam   Injectable 2 milliGRAM(s) IV Push every 4 hours PRN Agitation  oxyCODONE    IR 10 milliGRAM(s) Oral every 6 hours PRN for severe pain

## 2024-08-27 NOTE — PROGRESS NOTE ADULT - SUBJECTIVE AND OBJECTIVE BOX
Nephrology Progress Note    MARIOLA CURRY  MRN-[id number]  59y  Male    S:  Patient is seen and examined, events over the last 24h noted.    O:  Allergies:  No Known Allergies    Hospital Medications:   MEDICATIONS  (STANDING):  cefepime   IVPB 1000 milliGRAM(s) IV Intermittent every 24 hours  chlorhexidine 2% Cloths 1 Application(s) Topical <User Schedule>  dexMEDEtomidine Infusion 0.5 MICROgram(s)/kG/Hr (9.48 mL/Hr) IV Continuous <Continuous>  dextrose 5% + sodium chloride 0.9%. 1000 milliLiter(s) (100 mL/Hr) IV Continuous <Continuous>  heparin  Infusion. 1300 Unit(s)/Hr (13 mL/Hr) IV Continuous <Continuous>  insulin glargine Injectable (LANTUS) 25 Unit(s) SubCutaneous two times a day  insulin lispro (ADMELOG) corrective regimen sliding scale   SubCutaneous three times a day before meals  insulin lispro Injectable (ADMELOG) 17 Unit(s) SubCutaneous three times a day before meals  insulin regular Infusion 3 Unit(s)/Hr (3 mL/Hr) IV Continuous <Continuous>  thiamine IVPB 500 milliGRAM(s) IV Intermittent daily  vancomycin  IVPB        MEDICATIONS  (PRN):  dextrose Oral Gel 15 Gram(s) Oral once PRN Blood Glucose LESS THAN 70 milliGRAM(s)/deciliter  haloperidol    Injectable 5 milliGRAM(s) IV Push every 8 hours PRN Agitation  oxyCODONE    IR 10 milliGRAM(s) Oral every 6 hours PRN for severe pain    Home Medications:  ALPRAZolam 0.5 mg oral tablet: 1 tab(s) orally 2 times a day as needed for anxiety (26 Aug 2024 14:05)  Cozaar 100 mg oral tablet: 1 tab(s) orally once a day (26 Aug 2024 14:04)  Insulin Aspart: 20 unit(s) subcutaneous 3 times a day (26 Aug 2024 14:03)  insulin glargine 100 units/mL subcutaneous solution: 60 unit(s) subcutaneous once a day (26 Aug 2024 14:02)  Lipitor 80 mg oral tablet: 1 tab(s) orally once a day (at bedtime) (26 Aug 2024 14:04)  Lokelma 10 g oral powder for reconstitution: 10 gram(s) orally 3 times a day (26 Aug 2024 14:06)      VITALS:  Daily Height in cm: 182.88 (26 Aug 2024 15:55)    Daily Weight in k.3 (27 Aug 2024 07:00)  T(F): 97.2 (24 @ 07:43), Max: 98.4 (24 @ 15:55)  HR: 75 (24 @ 06:00)  BP: 125/60 (24 @ 06:00)  RR: 16 (24 @ 06:00)  SpO2: 97% (24 @ 07:43)  Wt(kg): --  I&O's Detail    26 Aug 2024 07:  -  27 Aug 2024 07:00  --------------------------------------------------------  IN:    Dexmedetomidine: 273 mL    dextrose 5% + sodium chloride 0.9%: 700 mL    Heparin Infusion: 91 mL    Heparin Infusion: 91 mL    Insulin: 75 mL    Insulin: 15 mL    IV PiggyBack: 600 mL    Sodium Bicarbonate: 1350 mL  Total IN: 3195 mL    OUT:    Indwelling Catheter - Urethral (mL): 1545 mL    Other (mL): 0 mL  Total OUT: 1545 mL    Total NET: 1650 mL      27 Aug 2024 07:  -  27 Aug 2024 09:29  --------------------------------------------------------  IN:  Total IN: 0 mL    OUT:    Indwelling Catheter - Urethral (mL): 430 mL  Total OUT: 430 mL    Total NET: -430 mL        I&O's Summary    26 Aug 2024 07:  -  27 Aug 2024 07:00  --------------------------------------------------------  IN: 3195 mL / OUT: 1545 mL / NET: 1650 mL    27 Aug 2024 07:01  -  27 Aug 2024 09:29  --------------------------------------------------------  IN: 0 mL / OUT: 430 mL / NET: -430 mL      Height (cm): 182.9 ( @ 15:55)  Weight (kg): 75.8 ( @ 15:55)  BMI (kg/m2): 22.7 ( @ 15:55)  BSA (m2): 1.97 ( @ 15:55)    PHYSICAL EXAM:  Gen: NAD  Chest: b/l breath sounds  Abd: soft  Extremities: no edema  Vascular access:       LABS:    Blood Gas Profile w/Lytes - Venous: Performed in Lab (24 @ 14:00)  Blood Gas Venous - Sodium: 126 mmol/L (24 @ 14:00)  Blood Gas Venous - Potassium: 8.0 mmol/L (24 @ 14:00)        136  |  98  |  57<H>  ----------------------------<  172<H>  3.9   |  27  |  3.4<H>    Ca    7.4<L>      27 Aug 2024 05:57  Phos  4.2       Mg     1.9         TPro  5.2<L>  /  Alb  3.1<L>  /  TBili  <0.2  /  DBili  <0.2  /  AST  15  /  ALT  11  /  AlkPhos  129<H>        Phosphorus: 4.2 mg/dL (24 @ 05:57)  Phosphorus: 3.2 mg/dL (24 @ 22:09)    Vitamin D, 1, 25-Dihydroxy: 39.4 pg/mL (11-10-23 @ 17:09)  Intact PTH: 81 pg/mL (11-10-23 @ 17:09)                          10.7   18.16 )-----------( 304      ( 27 Aug 2024 06:36 )             32.3     Mean Cell Volume: 78.4 fL (24 @ 06:36)    Ferritin: 402 ng/mL (24 @ 10:51)  Iron Total: 42 ug/dL (24 @ 10:51)      % Saturation, Iron: 28 % (24 @ 10:51)  % Saturation, Iron: 17 % (11-10-23 @ 17:09)    Urine Studies:  Protein, Urine: 100 mg/dL (24 @ 19:55)  White Blood Cell - Urine: 2 /HPF (24 @ 19:55)  Red Blood Cell - Urine: 2 /HPF (24 @ 19:55)  Protein, Urine: 300 mg/dL (10-16-23 @ 02:56)  White Blood Cell - Urine: 1 /HPF (10-16-23 @ 02:56)  Red Blood Cell - Urine: 1 /HPF (10-16-23 @ 02:56)  Protein, Urine: 300 mg/dL (23 @ 17:41)  White Blood Cell - Urine: 4 /HPF (23 @ 17:41)  Red Blood Cell - Urine: 1 /HPF (23 @ 17:41)  Protein, Urine: 300 mg/dL (23 @ 06:27)  White Blood Cell - Urine: 2 /HPF (23 @ 06:27)  Red Blood Cell - Urine: 1 /HPF (23 @ 06:27)  Protein, Urine: 100 mg/dL (05-10-22 @ 02:40)  White Blood Cell - Urine: 1-2 /HPF (05-10-22 @ 02:40)        Culture Results:   No growth at 5 days ( @ 18:54)  Culture Results:   No growth at 5 days ( @ 18:54)    Creatinine trend:  Creatinine: 3.4 mg/dL (24 @ 05:57)  Creatinine: 3.6 mg/dL (24 @ 22:09)  Creatinine: 3.3 mg/dL (24 @ 19:00)  Creatinine: 5.8 mg/dL (24 @ 14:58)  Creatinine: 6.1 mg/dL (24 @ 12:45)  Creatinine: 2.6 mg/dL (24 @ 10:51)  Creatinine: 2.5 mg/dL (24 @ 07:57)    Hepatitis C Virus Cutoff Index: .04 COI (10-23-23 @ 06:35)  Hepatitis C Virus Interpretation Result: Nonreact (10-23-23 @ 06:35)  Hemoglobin A1C, Whole Blood: 10.9 % (18 @ 19:15)      US Kidney and Bladder:   ACC: [id number] EXAM:  US KIDNEYS AND BLADDER   ORDERED BY: EDGARD TAVERA     *** ADDENDUM # 1 ***    Dr. Lee spoke to Dr. Tvaera at 2:07 PM on 2024 regarding findings   with read back.    --- End of Report ---    *** END OF ADDENDUM # 1 ***      PROCEDURE DATE:  2024          INTERPRETATION:  CLINICAL INFORMATION: Acute kidney injury.    COMPARISON: Ultrasound kidneys and bladder 2023.    TECHNIQUE: Sonography of the kidneys and bladder.    FINDINGS:  Right kidney: 12.1 cm. No hydronephrosis or calculi. Lower pole cyst with   septation versus debris measuring 1.1 cm.    Left kidney: 10.7 cm. No hydronephrosis or calculi. Upper pole cyst   measuring 0.9 cm.    Urinary bladder: Collapsed around a [place] catheter.    IMPRESSION:    No hydronephrosis or renal stone.    Right renal lower pole cyst with septation versus debris. Recommend   outpatient MRI renal with and without contrast for further evaluation.        --- End of Report ---    ***Please see the addendum at the top of this report. It may contain   additional important information or changes.****        YEMI LEE MD; Attending Radiologist  This document has been electronically signed. 2024  1:58PM  1st Addendum: YEMI LEE MD; Attending Radiologist  The first addendum was electronically signed on: 2024  2:08PM. (24 @ 10:54)     Nephrology Progress Note    MARIOLA CURRY  [id number]  59y  Male    S:  Patient is seen and examined, events over the last 24h noted.  Agitated/combative      O:  Allergies:  No Known Allergies    Hospital Medications:   MEDICATIONS  (STANDING):  cefepime   IVPB 1000 milliGRAM(s) IV Intermittent every 24 hours  chlorhexidine 2% Cloths 1 Application(s) Topical <User Schedule>  dexMEDEtomidine Infusion 0.5 MICROgram(s)/kG/Hr (9.48 mL/Hr) IV Continuous <Continuous>  dextrose 5% + sodium chloride 0.9%. 1000 milliLiter(s) (100 mL/Hr) IV Continuous <Continuous>  heparin  Infusion. 1300 Unit(s)/Hr (13 mL/Hr) IV Continuous <Continuous>  insulin glargine Injectable (LANTUS) 25 Unit(s) SubCutaneous two times a day  insulin lispro (ADMELOG) corrective regimen sliding scale   SubCutaneous three times a day before meals  insulin lispro Injectable (ADMELOG) 17 Unit(s) SubCutaneous three times a day before meals  insulin regular Infusion 3 Unit(s)/Hr (3 mL/Hr) IV Continuous <Continuous>  thiamine IVPB 500 milliGRAM(s) IV Intermittent daily  vancomycin  IVPB        MEDICATIONS  (PRN):  dextrose Oral Gel 15 Gram(s) Oral once PRN Blood Glucose LESS THAN 70 milliGRAM(s)/deciliter  haloperidol    Injectable 5 milliGRAM(s) IV Push every 8 hours PRN Agitation  oxyCODONE    IR 10 milliGRAM(s) Oral every 6 hours PRN for severe pain    Home Medications:  ALPRAZolam 0.5 mg oral tablet: 1 tab(s) orally 2 times a day as needed for anxiety (26 Aug 2024 14:05)  Cozaar 100 mg oral tablet: 1 tab(s) orally once a day (26 Aug 2024 14:04)  Insulin Aspart: 20 unit(s) subcutaneous 3 times a day (26 Aug 2024 14:03)  insulin glargine 100 units/mL subcutaneous solution: 60 unit(s) subcutaneous once a day (26 Aug 2024 14:02)  Lipitor 80 mg oral tablet: 1 tab(s) orally once a day (at bedtime) (26 Aug 2024 14:04)  Lokelma 10 g oral powder for reconstitution: 10 gram(s) orally 3 times a day (26 Aug 2024 14:06)      VITALS:  Daily Height in cm: 182.88 (26 Aug 2024 15:55)    Daily Weight in k.3 (27 Aug 2024 07:00)  T(F): 97.2 (24 @ 07:43), Max: 98.4 (24 @ 15:55)  HR: 75 (24 @ 06:00)  BP: 125/60 (24 @ 06:00)  RR: 16 (24 @ 06:00)  SpO2: 97% (24 @ 07:43)  Wt(kg): --  I&O's Detail    26 Aug 2024 07:  -  27 Aug 2024 07:00  --------------------------------------------------------  IN:    Dexmedetomidine: 273 mL    dextrose 5% + sodium chloride 0.9%: 700 mL    Heparin Infusion: 91 mL    Heparin Infusion: 91 mL    Insulin: 75 mL    Insulin: 15 mL    IV PiggyBack: 600 mL    Sodium Bicarbonate: 1350 mL  Total IN: 3195 mL    OUT:    Indwelling Catheter - Urethral (mL): 1545 mL    Other (mL): 0 mL  Total OUT: 1545 mL    Total NET: 1650 mL      27 Aug 2024 07:  -  27 Aug 2024 09:29  --------------------------------------------------------  IN:  Total IN: 0 mL    OUT:    Indwelling Catheter - Urethral (mL): 430 mL  Total OUT: 430 mL    Total NET: -430 mL        I&O's Summary    26 Aug 2024 07:  -  27 Aug 2024 07:00  --------------------------------------------------------  IN: 3195 mL / OUT: 1545 mL / NET: 1650 mL    27 Aug 2024 07:01  -  27 Aug 2024 09:29  --------------------------------------------------------  IN: 0 mL / OUT: 430 mL / NET: -430 mL      Height (cm): 182.9 ( @ 15:55)  Weight (kg): 75.8 ( @ 15:55)  BMI (kg/m2): 22.7 ( @ 15:55)  BSA (m2): 1.97 ( @ 15:55)    PHYSICAL EXAM:  Gen: NAD  Chest: b/l breath sounds  Abd: soft  Extremities: no edema, BKA      LABS:    Blood Gas Profile w/Lytes - Venous: Performed in Lab (24 @ 14:00)  Blood Gas Venous - Sodium: 126 mmol/L (24 @ 14:00)  Blood Gas Venous - Potassium: 8.0 mmol/L (24 @ 14:00)        136  |  98  |  57<H>  ----------------------------<  172<H>  3.9   |  27  |  3.4<H>    Ca    7.4<L>      27 Aug 2024 05:57  Phos  4.2       Mg     1.9         TPro  5.2<L>  /  Alb  3.1<L>  /  TBili  <0.2  /  DBili  <0.2  /  AST  15  /  ALT  11  /  AlkPhos  129<H>      Phosphorus: 4.2 mg/dL (24 @ 05:57)  Phosphorus: 3.2 mg/dL (24 @ 22:09)                          10.7   18.16 )-----------( 304      ( 27 Aug 2024 06:36 )             32.3     Mean Cell Volume: 78.4 fL (24 @ 06:36)      Creatinine trend:  Creatinine: 3.4 mg/dL (24 @ 05:57)  Creatinine: 3.6 mg/dL (24 @ 22:09)  Creatinine: 3.3 mg/dL (24 @ 19:00)  Creatinine: 5.8 mg/dL (24 @ 14:58)  Creatinine: 6.1 mg/dL (24 @ 12:45)  Creatinine: 2.6 mg/dL (24 @ 10:51)

## 2024-08-27 NOTE — PROGRESS NOTE ADULT - ASSESSMENT
IMPRESSION:    Toxic metabolic encephalopathy  DKA/HHS with recurrent readmission   Hx of substance abuse   Severe HAGMA- improved  Sepsis POA  Elevated LA  DM  HTN  CKD 4  HO CVA  HO LUE DVT on Eliquis DVT is resolved    PLAN:    CNS: CT Head, Thiamine and Folate. taper precedex, and resume home xanax as needed, haldol for extreme agitation as needed, f/u UDS/SDS    HEENT: Oral care.    PULMONARY:  HOB @ 45 degrees.  Aspiration precautions.  maintain  SpO2 92-96%    CARDIOVASCULAR: Monitor for signs of volume overload.  TTE noted. strict i/os    GI: GI prophylaxis. diet per Speech and swallow when more awake    RENAL:  BMP q4 hours, HD per nephro, calcium supplementation     INFECTIOUS DISEASE:   MRSA swab + give nasal mupirocin, DC vanc, and switch cefepime to zosyn to avoid neurotoxicity, ID on board     HEMATOLOGICAL:  heparin drip, resume Eliquis when the patient able to swallow     ENDOCRINOLOGY: c/w insulin drip, FS q1 hr, please BMP q4 until the gap closes twice and the bicarb is >20    MUSCULOSKELETAL: Bedrest    MICU IMPRESSION:    Toxic metabolic encephalopathy  DKA/HHS with recurrent readmission   Hx of substance abuse   Severe HAGMA- improved  Sepsis POA  Elevated LA  DM  HTN  CKD 4  HO CVA  HO LUE DVT on Eliquis DVT is resolved    PLAN:    CNS: CT Head, Thiamine and Folate. taper precedex, and resume home xanax as needed, haldol for extreme agitation as needed, f/u UDS/SDS    HEENT: Oral care.    PULMONARY:  HOB @ 45 degrees.  Aspiration precautions.  maintain  SpO2 92-96%    CARDIOVASCULAR: Monitor for signs of volume overload.  TTE noted. strict i/os    GI: GI prophylaxis. diet per Speech and swallow when more awake    RENAL:  BMP q4 hours, HD per nephro, calcium supplementation     INFECTIOUS DISEASE:   MRSA swab + give nasal mupirocin, DC vanc, and switch cefepime to zosyn to avoid neurotoxicity, ID on board recommended CT neck ,     HEMATOLOGICAL:  heparin drip, resume Eliquis when the patient able to swallow     ENDOCRINOLOGY: c/w insulin drip, FS q1 hr, please BMP q4 until the gap closes twice and the bicarb is >20, ultrasound of the neck, get T3 t4     MUSCULOSKELETAL: Bedrest    MICU

## 2024-08-27 NOTE — CONSULT NOTE ADULT - ASSESSMENT
60 y/o male with hx of CKD 4, anemia of chronic disease, LUE on eliquis, recent CVA on DAPT, PAD, s/p right BKA, IDDM, substance abuse and recent hospital admission for PNA was brought in for confusion and agitation by family.     ID is consulted for leukocytosis  Afebrile, WBC 21.49 > 18.16  On Room air  Reports having sore throat on Saturday 8/24, continued to get sicker  Became very confused on Monday 8/26  Might have missed a few doses of insulin due to not feeling well  BG > 1000  AG 43  Lactate 7.1  Acute renal failure, s/p emergent HD x 1 8/26 via [place]  COVID/flu/RSV negative  BCx pending  UCx pending    CXR no PNA    Antibiotics:  Vancomycin 8/26  Cefepime 8/26      IMPRESSION:  Leukocytosis  DKA  Acute metabolic encephalopathy  DINESH on CKD  Hx CVA  Immunosuppression / Immunosenescence secondary to multiple comorbidities which could result in poor clinical outcome    RECOMMENDATIONS:  - D/C cefepime, start IV Zosyn 3.375g q12hrs  - Hold further doses of vancomycin  - Obtain full RVP  - Obtain Utox  - CT Neck (with contrast if able), patient endorses pain on palpation at anterior neck, has been having sore throat for 3 days  - Follow up BCx and UCx  - DKA management as per primary  - Offloading and frequent position changes, aspiration precaution  - Trend WBC, fever curve, transaminases, creatinine daily      Silvana Snell D.O.  Attending Physician  Division of Infectious Diseases  [facility] - [facility]  Please contact me via [employer] Teams

## 2024-08-27 NOTE — PROVIDER CONTACT NOTE (EICU) - ASSESSMENT
AG now 12. BG have been in the 100's. patient on d5 and 1 unit/hr insulin.  patient npo 2/2 mental status.  Home lantus is 50 units

## 2024-08-27 NOTE — PROGRESS NOTE ADULT - ASSESSMENT
# DINESH on Stage 4 CKD.  Suspect pt came in severely volume depleted due to severe hyperglycemia.  R/O sepsis, especially w/ leukocytosis  # Severe HAGMA, appears to be due to DKA w/ smaller component of lactic acidosis, perhaps uremic acidosis  # Severe hyperkalemia due to severe renal failure, severe metabolic acidosis, on Losartan as outpt / resolved with HD, improving renal function  - s/p HD yesterday 8/26  - currently non oilguric  - creat stable/down-trending    Recommendations:  - will hold off on HD today  - cont iv hydration  - dose abx per eGFR / vanc by level  - will assess for HD tomorrow

## 2024-08-28 LAB
A1C WITH ESTIMATED AVERAGE GLUCOSE RESULT: 8.5 % — HIGH (ref 4–5.6)
ALBUMIN SERPL ELPH-MCNC: 3.1 G/DL — LOW (ref 3.5–5.2)
ALP SERPL-CCNC: 126 U/L — HIGH (ref 30–115)
ALT FLD-CCNC: 12 U/L — SIGNIFICANT CHANGE UP (ref 0–41)
ANION GAP SERPL CALC-SCNC: 12 MMOL/L — SIGNIFICANT CHANGE UP (ref 7–14)
APTT BLD: 39.1 SEC — SIGNIFICANT CHANGE UP (ref 27–39.2)
APTT BLD: 50.2 SEC — HIGH (ref 27–39.2)
APTT BLD: 59.6 SEC — HIGH (ref 27–39.2)
AST SERPL-CCNC: 23 U/L — SIGNIFICANT CHANGE UP (ref 0–41)
BASOPHILS # BLD AUTO: 0.02 K/UL — SIGNIFICANT CHANGE UP (ref 0–0.2)
BASOPHILS NFR BLD AUTO: 0.1 % — SIGNIFICANT CHANGE UP (ref 0–1)
BILIRUB SERPL-MCNC: 0.2 MG/DL — SIGNIFICANT CHANGE UP (ref 0.2–1.2)
BUN SERPL-MCNC: 45 MG/DL — HIGH (ref 10–20)
CALCIUM SERPL-MCNC: 8 MG/DL — LOW (ref 8.4–10.5)
CHLORIDE SERPL-SCNC: 104 MMOL/L — SIGNIFICANT CHANGE UP (ref 98–110)
CO2 SERPL-SCNC: 27 MMOL/L — SIGNIFICANT CHANGE UP (ref 17–32)
CREAT SERPL-MCNC: 2.4 MG/DL — HIGH (ref 0.7–1.5)
EGFR: 30 ML/MIN/1.73M2 — LOW
EOSINOPHIL # BLD AUTO: 0 K/UL — SIGNIFICANT CHANGE UP (ref 0–0.7)
EOSINOPHIL NFR BLD AUTO: 0 % — SIGNIFICANT CHANGE UP (ref 0–8)
ESTIMATED AVERAGE GLUCOSE: 197 MG/DL — HIGH (ref 68–114)
GLUCOSE BLDC GLUCOMTR-MCNC: 125 MG/DL — HIGH (ref 70–99)
GLUCOSE BLDC GLUCOMTR-MCNC: 147 MG/DL — HIGH (ref 70–99)
GLUCOSE BLDC GLUCOMTR-MCNC: 185 MG/DL — HIGH (ref 70–99)
GLUCOSE BLDC GLUCOMTR-MCNC: 237 MG/DL — HIGH (ref 70–99)
GLUCOSE BLDC GLUCOMTR-MCNC: 25 MG/DL — CRITICAL LOW (ref 70–99)
GLUCOSE BLDC GLUCOMTR-MCNC: 30 MG/DL — CRITICAL LOW (ref 70–99)
GLUCOSE BLDC GLUCOMTR-MCNC: 44 MG/DL — CRITICAL LOW (ref 70–99)
GLUCOSE BLDC GLUCOMTR-MCNC: 45 MG/DL — CRITICAL LOW (ref 70–99)
GLUCOSE BLDC GLUCOMTR-MCNC: 58 MG/DL — LOW (ref 70–99)
GLUCOSE BLDC GLUCOMTR-MCNC: 69 MG/DL — LOW (ref 70–99)
GLUCOSE BLDC GLUCOMTR-MCNC: 85 MG/DL — SIGNIFICANT CHANGE UP (ref 70–99)
GLUCOSE SERPL-MCNC: 69 MG/DL — LOW (ref 70–99)
HAV IGM SER-ACNC: SIGNIFICANT CHANGE UP
HBV CORE AB SER-ACNC: REACTIVE
HBV CORE IGM SER-ACNC: SIGNIFICANT CHANGE UP
HBV SURFACE AB SER-ACNC: 362.9 MIU/ML — SIGNIFICANT CHANGE UP
HBV SURFACE AG SER-ACNC: SIGNIFICANT CHANGE UP
HCT VFR BLD CALC: 36.5 % — LOW (ref 42–52)
HGB BLD-MCNC: 11.6 G/DL — LOW (ref 14–18)
IMM GRANULOCYTES NFR BLD AUTO: 0.7 % — HIGH (ref 0.1–0.3)
LYMPHOCYTES # BLD AUTO: 1.01 K/UL — LOW (ref 1.2–3.4)
LYMPHOCYTES # BLD AUTO: 6.6 % — LOW (ref 20.5–51.1)
MAGNESIUM SERPL-MCNC: 1.6 MG/DL — LOW (ref 1.8–2.4)
MCHC RBC-ENTMCNC: 25.6 PG — LOW (ref 27–31)
MCHC RBC-ENTMCNC: 31.8 G/DL — LOW (ref 32–37)
MCV RBC AUTO: 80.6 FL — SIGNIFICANT CHANGE UP (ref 80–94)
MONOCYTES # BLD AUTO: 0.63 K/UL — HIGH (ref 0.1–0.6)
MONOCYTES NFR BLD AUTO: 4.1 % — SIGNIFICANT CHANGE UP (ref 1.7–9.3)
NEUTROPHILS # BLD AUTO: 13.5 K/UL — HIGH (ref 1.4–6.5)
NEUTROPHILS NFR BLD AUTO: 88.5 % — HIGH (ref 42.2–75.2)
NRBC # BLD: 0 /100 WBCS — SIGNIFICANT CHANGE UP (ref 0–0)
PHOSPHATE SERPL-MCNC: 3.6 MG/DL — SIGNIFICANT CHANGE UP (ref 2.1–4.9)
PLATELET # BLD AUTO: 301 K/UL — SIGNIFICANT CHANGE UP (ref 130–400)
PMV BLD: 9.3 FL — SIGNIFICANT CHANGE UP (ref 7.4–10.4)
POTASSIUM SERPL-MCNC: 3.6 MMOL/L — SIGNIFICANT CHANGE UP (ref 3.5–5)
POTASSIUM SERPL-SCNC: 3.6 MMOL/L — SIGNIFICANT CHANGE UP (ref 3.5–5)
PROT SERPL-MCNC: 5.6 G/DL — LOW (ref 6–8)
RBC # BLD: 4.53 M/UL — LOW (ref 4.7–6.1)
RBC # FLD: 15.9 % — HIGH (ref 11.5–14.5)
SODIUM SERPL-SCNC: 143 MMOL/L — SIGNIFICANT CHANGE UP (ref 135–146)
T4 FREE SERPL-MCNC: 1.9 NG/DL — HIGH (ref 0.9–1.8)
WBC # BLD: 15.27 K/UL — HIGH (ref 4.8–10.8)
WBC # FLD AUTO: 15.27 K/UL — HIGH (ref 4.8–10.8)

## 2024-08-28 PROCEDURE — 99233 SBSQ HOSP IP/OBS HIGH 50: CPT

## 2024-08-28 PROCEDURE — 76536 US EXAM OF HEAD AND NECK: CPT | Mod: 26

## 2024-08-28 RX ORDER — DEXTROSE 15 G/33 G
50 GEL IN PACKET (GRAM) ORAL ONCE
Refills: 0 | Status: COMPLETED | OUTPATIENT
Start: 2024-08-28 | End: 2024-08-28

## 2024-08-28 RX ORDER — DEXTROSE 15 G/33 G
25 GEL IN PACKET (GRAM) ORAL ONCE
Refills: 0 | Status: COMPLETED | OUTPATIENT
Start: 2024-08-28 | End: 2024-08-28

## 2024-08-28 RX ORDER — CLONIDINE HCL 0.2 MG
1 TABLET ORAL
Refills: 0 | Status: DISCONTINUED | OUTPATIENT
Start: 2024-08-28 | End: 2024-08-29

## 2024-08-28 RX ADMIN — Medication 1600 UNIT(S)/HR: at 14:33

## 2024-08-28 RX ADMIN — Medication 5 MILLIGRAM(S): at 00:12

## 2024-08-28 RX ADMIN — CHLORHEXIDINE GLUCONATE 1 APPLICATION(S): 40 SOLUTION TOPICAL at 05:13

## 2024-08-28 RX ADMIN — Medication 25 GRAM(S): at 17:10

## 2024-08-28 RX ADMIN — DEXMEDETOMIDINE HYDROCHLORIDE IN 0.9% SODIUM CHLORIDE 9.48 MICROGRAM(S)/KG/HR: 4 INJECTION INTRAVENOUS at 21:29

## 2024-08-28 RX ADMIN — Medication 10 MILLIGRAM(S): at 08:25

## 2024-08-28 RX ADMIN — Medication 1 PATCH: at 22:07

## 2024-08-28 RX ADMIN — Medication 105 MILLIGRAM(S): at 14:35

## 2024-08-28 RX ADMIN — PIPERACILLIN SODIUM AND TAZOBACTAM SODIUM 25 GRAM(S): 3; .375 INJECTION, POWDER, FOR SOLUTION INTRAVENOUS at 17:09

## 2024-08-28 RX ADMIN — Medication 1 APPLICATION(S): at 05:14

## 2024-08-28 RX ADMIN — INSULIN GLARGINE 25 UNIT(S): 100 INJECTION, SOLUTION SUBCUTANEOUS at 09:25

## 2024-08-28 RX ADMIN — PIPERACILLIN SODIUM AND TAZOBACTAM SODIUM 25 GRAM(S): 3; .375 INJECTION, POWDER, FOR SOLUTION INTRAVENOUS at 05:14

## 2024-08-28 RX ADMIN — Medication 5 MILLIGRAM(S): at 22:34

## 2024-08-28 RX ADMIN — Medication 25 GRAM(S): at 08:19

## 2024-08-28 RX ADMIN — Medication 1 PATCH: at 09:26

## 2024-08-28 RX ADMIN — Medication 50 MILLILITER(S): at 21:29

## 2024-08-28 RX ADMIN — Medication 80 MILLILITER(S): at 10:38

## 2024-08-28 RX ADMIN — Medication 5 MILLIGRAM(S): at 16:38

## 2024-08-28 RX ADMIN — DEXMEDETOMIDINE HYDROCHLORIDE IN 0.9% SODIUM CHLORIDE 9.48 MICROGRAM(S)/KG/HR: 4 INJECTION INTRAVENOUS at 05:12

## 2024-08-28 RX ADMIN — Medication 1 APPLICATION(S): at 17:41

## 2024-08-28 RX ADMIN — Medication 1800 UNIT(S)/HR: at 21:29

## 2024-08-28 RX ADMIN — Medication 10 MILLIGRAM(S): at 11:20

## 2024-08-28 RX ADMIN — Medication 17 UNIT(S): at 12:03

## 2024-08-28 RX ADMIN — Medication 1600 UNIT(S)/HR: at 06:58

## 2024-08-28 RX ADMIN — Medication 50 MILLILITER(S): at 06:45

## 2024-08-28 NOTE — PROGRESS NOTE ADULT - ASSESSMENT
# DINESH on Stage 4 CKD.  Suspect pt came in severely volume depleted due to severe hyperglycemia.  R/O sepsis, especially w/ leukocytosis  # Severe HAGMA, appears to be due to DKA w/ smaller component of lactic acidosis, perhaps uremic acidosis  # Severe hyperkalemia due to severe renal failure, severe metabolic acidosis, on Losartan as outpt / resolved with HD, improving renal function    - s/p HD yesterday 8/26  - non oliguric  - creat improved to baseline    Recommendations:  - no need for further HD  - d/c iv fluids if tolerating po intake  - replete Mg++ / K+ as needed  - dose abx per eGFR   # DINESH on Stage 4 CKD.  Suspect pt came in severely volume depleted due to severe hyperglycemia.  R/O sepsis, especially w/ leukocytosis  # Severe HAGMA, appears to be due to DKA w/ smaller component of lactic acidosis, perhaps uremic acidosis  # Severe hyperkalemia due to severe renal failure, severe metabolic acidosis, on Losartan as outpt / resolved with HD, improving renal function    - s/p HD 8/26  - non oliguric  - creat improved to baseline    Recommendations:  - no need for further HD  - d/c iv fluids if tolerating po intake  - replete Mg++ / K+ as needed  - dose abx per eGFR

## 2024-08-28 NOTE — PROGRESS NOTE ADULT - ASSESSMENT
59y old male with confusion     Toxic metabolic encephalopathy  DKA/HHS with recurrent readmission   Hx of substance abuse found cocaine and benzo   Severe HAGMA- improved  Sepsis POA- resolved   Elevated LA- resolved   Throat pain POA   DM  HTN  CKD 4  HO CVA  HO LUE DVT on Eliquis DVT is resolved- completing 3mo ac     monitor sugar   oob

## 2024-08-28 NOTE — PROGRESS NOTE ADULT - ASSESSMENT
60 y/o male with hx of CKD 4, anemia of chronic disease, LUE on eliquis, recent CVA on DAPT, PAD, s/p right BKA, IDDM, substance abuse and recent hospital admission for PNA was brought in for confusion and agitation by family.     ID is following for leukocytosis  Afebrile, WBC 21.49 > 18.16  On Room air  Reports having sore throat on Saturday 8/24, continued to get sicker  Became very confused on Monday 8/26  Might have missed a few doses of insulin due to not feeling well  BG > 1000  AG 43  Lactate 7.1  Acute renal failure, s/p emergent HD x 1 8/26 via [place]  COVID/flu/RSV negative  BCx pending  UCx pending    CXR no PNA    Antibiotics:  Vancomycin 8/26  Cefepime 8/26      IMPRESSION:  Leukocytosis  DKA  Acute metabolic encephalopathy  DINESH on CKD  Hx CVA  Immunosuppression / Immunosenescence secondary to multiple comorbidities which could result in poor clinical outcome    RECOMMENDATIONS:  - Continue IV Zosyn 3.375g q12hrs  - Obtain full RVP  - Unable to go for CT neck due to severe agitation, start with US head + neck soft tissue at this time  - CT Neck when stable to do so  - Follow up BCx and UCx  - DKA management as per primary  - Offloading and frequent position changes, aspiration precaution  - Trend WBC, fever curve, transaminases, creatinine daily      Silvana Snell D.O.  Attending Physician  Division of Infectious Diseases  [facility]  Please contact me via [employer] Teams

## 2024-08-28 NOTE — PROGRESS NOTE ADULT - SUBJECTIVE AND OBJECTIVE BOX
59y old  Male who presents with a chief complaint of Confusion      PAST MEDICAL & SURGICAL HISTORY:    DM (diabetes mellitus)  HTN (hypertension)  HLD (hyperlipidemia)  Herpes labialis  Anxiety  GERD (gastroesophageal reflux disease)  Kidney stones  20 years ago  Kidney disease  stage 4  Chronic kidney disease, unspecified CKD stage  Diabetic Charcot foot  PAD (peripheral artery disease)  S/P foot surgery, right  x 5 ( 2006 - 2013 )  Kidney stone  Removed by Laser x 2 ( 20 years ago )  H/O arthroscopy of right knee  Status post peripheral artery angioplasty  Amputee, below knee, right    Social History:  single (26 Aug 2024 13:50)    MEDICATIONS  (STANDING):  chlorhexidine 2% Cloths 1 Application(s) Topical <User Schedule>  cloNIDine Patch 0.2 mG/24Hr(s) 1 patch Transdermal every 7 days  dexMEDEtomidine Infusion 0.5 MICROgram(s)/kG/Hr (9.48 mL/Hr) IV Continuous <Continuous>  dextrose 5% + lactated ringers. 1000 milliLiter(s) (80 mL/Hr) IV Continuous <Continuous>  dextrose 5%. 1000 milliLiter(s) (50 mL/Hr) IV Continuous <Continuous>  dextrose 5%. 1000 milliLiter(s) (100 mL/Hr) IV Continuous <Continuous>  dextrose 50% Injectable 25 Gram(s) IV Push once  dextrose 50% Injectable 12.5 Gram(s) IV Push once  dextrose 50% Injectable 25 Gram(s) IV Push once  glucagon  Injectable 1 milliGRAM(s) IntraMuscular once  heparin  Infusion. 1300 Unit(s)/Hr (13 mL/Hr) IV Continuous <Continuous>  insulin glargine Injectable (LANTUS) 25 Unit(s) SubCutaneous two times a day  insulin lispro (ADMELOG) corrective regimen sliding scale   SubCutaneous three times a day before meals  insulin lispro Injectable (ADMELOG) 17 Unit(s) SubCutaneous three times a day before meals  insulin regular Infusion 3 Unit(s)/Hr (3 mL/Hr) IV Continuous <Continuous>  mupirocin 2% Nasal 1 Application(s) Both Nostrils every 12 hours  piperacillin/tazobactam IVPB.. 3.375 Gram(s) IV Intermittent every 12 hours  thiamine IVPB 500 milliGRAM(s) IV Intermittent daily    MEDICATIONS  (PRN):  dextrose Oral Gel 15 Gram(s) Oral once PRN Blood Glucose LESS THAN 70 milliGRAM(s)/deciliter  diazepam  Injectable 5 milliGRAM(s) IV Push every 1 hour PRN CIWA-Ar score of 8 or Greater  diazepam  Injectable 10 milliGRAM(s) IV Push every 2 hours PRN CIWA-Ar score increase by 2 points and a total score of 7 or less  haloperidol    Injectable 5 milliGRAM(s) IntraMuscular every 6 hours PRN Agitation    Allergies    No Known Allergies    Intolerances    ICU Vital Signs Last 24 Hrs  T(C): 37.2 (27 Aug 2024 23:00), Max: 37.9 (27 Aug 2024 17:02)  T(F): 99 (27 Aug 2024 23:00), Max: 100.2 (27 Aug 2024 17:02)  HR: 61 (28 Aug 2024 07:00) (61 - 81)  BP: 186/89 (28 Aug 2024 07:00) (140/68 - 212/89)  BP(mean): 128 (28 Aug 2024 07:00) (97 - 141)  ABP: --  ABP(mean): --  RR: 21 (28 Aug 2024 07:00) (6 - 38)  SpO2: 100% (28 Aug 2024 07:00) (95% - 100%)    O2 Parameters below as of 28 Aug 2024 07:00  Patient On (Oxygen Delivery Method): room air        CAPILLARY BLOOD GLUCOSE      POCT Blood Glucose.: 147 mg/dL (28 Aug 2024 07:54)  POCT Blood Glucose.: 69 mg/dL (28 Aug 2024 05:18)  POCT Blood Glucose.: 85 mg/dL (28 Aug 2024 00:02)  POCT Blood Glucose.: 121 mg/dL (27 Aug 2024 17:19)  POCT Blood Glucose.: 213 mg/dL (27 Aug 2024 11:42)    General: NAD, mild agitation   HEENT:    MMM, no new lesions, hoarseness of voice POA             Lymph Nodes: NO cervical LN   Lungs: Bilateral BS, no wheezing   Cardiovascular: Regular, normal S1 S2    Abdomen: Soft, Positive BS  Extremities: No clubbing   Skin: warm, no new rashes   Neurological: Awake mildly sedated   Musculoskeletal: move all ext BKA on the right                           11.6   15.27 )-----------( 301      ( 28 Aug 2024 05:46 )             36.5   08-28    143  |  104  |  45<H>  ----------------------------<  69<L>  3.6   |  27  |  2.4<H>    Ca    8.0<L>      28 Aug 2024 05:46  Phos  3.6     08-28  Mg     1.6     08-28    TPro  5.6<L>  /  Alb  3.1<L>  /  TBili  0.2  /  DBili  x   /  AST  23  /  ALT  12  /  AlkPhos  126<H>  08-28

## 2024-08-28 NOTE — PROGRESS NOTE ADULT - ASSESSMENT
IMPRESSION:    Toxic metabolic encephalopathy  DKA/HHS with recurrent readmission   Hx of substance abuse found cocaine and benzo   Severe HAGMA- improved  Sepsis POA- resolved   Elevated LA- resolved   Throat pain POA   DM  HTN  CKD 4  HO CVA  HO LUE DVT on Eliquis DVT is resolved- completing 3mo ac     PLAN:    CNS: CT Head, Thiamine and Folate. taper clonidine patch , and resume home xanax as needed once out of withdrawal window, haldol for extreme agitation as needed, valium for possible ETOH withrdrawal, monitor CIWA score,     HEENT: Oral care, f/u neck us/ CT neck     PULMONARY:  HOB @ 45 degrees.  Aspiration precautions.  maintain  SpO2 92-96%    CARDIOVASCULAR: Monitor for signs of volume overload.  TTE noted. strict i/os, needs better BP control, add home meds hold ace/arb for now restart nifedipine     GI: GI prophylaxis. diet per Speech and swallow when more awake    RENAL:  BMP q4 hours, HD per nephro no indication for HD as of now,  monitor i/o     INFECTIOUS DISEASE:   MRSA swab + give nasal mupirocin, DC vanc, c/w zosyn, ID on board, recommended CT neck for throat pain     HEMATOLOGICAL:  heparin drip, resume Eliquis when the patient able to swallow     ENDOCRINOLOGY: c/w insulin drip, FS q1 hr, please BMP q4 until the gap closes twice and the bicarb is >20, get neck imaging ,f/u T4 and total t3     MUSCULOSKELETAL: Bedrest    MICU

## 2024-08-28 NOTE — PROGRESS NOTE ADULT - SUBJECTIVE AND OBJECTIVE BOX
INFECTIOUS DISEASE FOLLOW UP NOTE:    Interval History/ROS: Patient is a 59y old  Male who presents with a chief complaint of Confusion (27 Aug 2024 09:04)      Overnight events:    REVIEW OF SYSTEMS:        Prior hospital charts reviewed [Yes]  Primary team notes reviewed [Yes]  Other consultant notes reviewed [Yes]    Allergies:  No Known Allergies      ANTIMICROBIALS:   piperacillin/tazobactam IVPB.. 3.375 every 12 hours      OTHER MEDS: MEDICATIONS  (STANDING):  cloNIDine Patch 0.2 mG/24Hr(s) 1 every 7 days  dexMEDEtomidine Infusion 0.5 <Continuous>  dextrose 50% Injectable 25 once  dextrose 50% Injectable 12.5 once  dextrose 50% Injectable 25 once  dextrose Oral Gel 15 once PRN  diazepam  Injectable 10 every 2 hours PRN  diazepam  Injectable 5 every 1 hour PRN  glucagon  Injectable 1 once  haloperidol    Injectable 5 every 6 hours PRN  heparin  Infusion. 1300 <Continuous>  insulin glargine Injectable (LANTUS) 25 two times a day  insulin lispro (ADMELOG) corrective regimen sliding scale  three times a day before meals  insulin lispro Injectable (ADMELOG) 17 three times a day before meals  insulin regular Infusion 3 <Continuous>      Vital Signs Last 24 Hrs  T(F): 99 (08-27-24 @ 23:00), Max: 100.2 (08-27-24 @ 17:02)    Vital Signs Last 24 Hrs  HR: 61 (08-28-24 @ 07:00) (61 - 81)  BP: 186/89 (08-28-24 @ 07:00) (140/68 - 212/89)  RR: 21 (08-28-24 @ 07:00)  SpO2: 100% (08-28-24 @ 07:00) (95% - 100%)  Wt(kg): --    EXAM:      Labs:                        11.6   15.27 )-----------( 301      ( 28 Aug 2024 05:46 )             36.5     08-28    143  |  104  |  45<H>  ----------------------------<  69<L>  3.6   |  27  |  2.4<H>    Ca    8.0<L>      28 Aug 2024 05:46  Phos  3.6     08-28  Mg     1.6     08-28    TPro  5.6<L>  /  Alb  3.1<L>  /  TBili  0.2  /  DBili  x   /  AST  23  /  ALT  12  /  AlkPhos  126<H>  08-28      WBC Trend:  WBC Count: 15.27 (08-28-24 @ 05:46)  WBC Count: 18.16 (08-27-24 @ 06:36)  WBC Count: 17.93 (08-27-24 @ 05:57)  WBC Count: 26.20 (08-26-24 @ 12:45)      Creatine Trend:  Creatinine: 2.4 (08-28)  Creatinine: 2.8 (08-27)  Creatinine: 3.1 (08-27)  Creatinine: 3.4 (08-27)      Liver Biochemical Testing Trend:  Alanine Aminotransferase (ALT/SGPT): 12 (08-28)  Alanine Aminotransferase (ALT/SGPT): 13 (08-27)  Alanine Aminotransferase (ALT/SGPT): 11 (08-27)  Alanine Aminotransferase (ALT/SGPT): 11 (08-26)  Alanine Aminotransferase (ALT/SGPT): 17 (07-01)  Aspartate Aminotransferase (AST/SGOT): 23 (08-28-24 @ 05:46)  Aspartate Aminotransferase (AST/SGOT): 24 (08-27-24 @ 19:08)  Aspartate Aminotransferase (AST/SGOT): 15 (08-27-24 @ 05:57)  Aspartate Aminotransferase (AST/SGOT): 14 (08-26-24 @ 12:45)  Aspartate Aminotransferase (AST/SGOT): 15 (07-01-24 @ 10:51)  Bilirubin Total: 0.2 (08-28)  Bilirubin Direct: <0.2 (08-27)  Bilirubin Total: <0.2 (08-27)  Bilirubin Direct: <0.2 (08-27)  Bilirubin Total: <0.2 (08-27)      Trend LDH      Urinalysis Basic - ( 28 Aug 2024 05:46 )    Color: x / Appearance: x / SG: x / pH: x  Gluc: 69 mg/dL / Ketone: x  / Bili: x / Urobili: x   Blood: x / Protein: x / Nitrite: x   Leuk Esterase: x / RBC: x / WBC x   Sq Epi: x / Non Sq Epi: x / Bacteria: x        MICROBIOLOGY:    MRSA PCR Result.: Positive (08-27-24 @ 10:54)  MRSA PCR Result.: Positive (06-28-24 @ 14:00)  MRSA PCR Result.: Positive (06-23-24 @ 18:00)      Urinalysis with Rflx Culture (collected 27 Aug 2024 10:56)    Culture - Blood (collected 26 Aug 2024 12:45)  Source: .Blood Blood-Peripheral  Preliminary Report:    No growth at 24 hours    Culture - Blood (collected 26 Aug 2024 12:45)  Source: .Blood Blood-Peripheral  Preliminary Report:    No growth at 24 hours    Culture - Blood (collected 29 Jun 2024 18:54)  Source: .Blood None  Final Report:    No growth at 5 days    Culture - Blood (collected 29 Jun 2024 18:54)  Source: .Blood None  Final Report:    No growth at 5 days    Culture - Sputum (collected 23 Jun 2024 21:05)  Source: Trach Asp Tracheal Aspirate  Final Report:    No growth    Culture - Blood (collected 23 Jun 2024 18:43)  Source: .Blood None  Final Report:    No growth at 5 days    Culture - Blood (collected 23 Jun 2024 18:43)  Source: .Blood None  Final Report:    No growth at 5 days    Culture - Blood (collected 21 Jun 2024 15:59)  Source: .Blood None  Final Report:    No growth at 5 days    Urinalysis with Rflx Culture (collected 19 Jun 2024 19:55)      HIV-1/2 Combo Result: Nonreact (06-21-24 @ 15:59)      Troponin T, High Sensitivity Result: 54 (08-27)  Troponin T, High Sensitivity Result: 59 (08-26)  Troponin T, High Sensitivity Result: 60 (08-26)    Lactate, Blood: 1.5 (08-27 @ 05:57)  Blood Gas Venous - Lactate: 7.1 (08-26 @ 14:00)  Lactate, Blood: 4.6 (08-26 @ 13:30)  Blood Gas Venous - Lactate: 6.8 (08-26 @ 10:35)      RADIOLOGY:  imaging below personally reviewed   INFECTIOUS DISEASE FOLLOW UP NOTE:    Interval History/ROS: Patient is a 59y old  Male who presents with a chief complaint of Confusion (27 Aug 2024 09:04)    Overnight events: Very agitated overnight. Pulling lines. More sedated this morning.     REVIEW OF SYSTEMS:  Unable to provide due to cognitive impairment      Prior hospital charts reviewed [Yes]  Primary team notes reviewed [Yes]  Other consultant notes reviewed [Yes]    Allergies:  No Known Allergies      ANTIMICROBIALS:   piperacillin/tazobactam IVPB.. 3.375 every 12 hours      OTHER MEDS: MEDICATIONS  (STANDING):  cloNIDine Patch 0.2 mG/24Hr(s) 1 every 7 days  dexMEDEtomidine Infusion 0.5 <Continuous>  dextrose 50% Injectable 25 once  dextrose 50% Injectable 12.5 once  dextrose 50% Injectable 25 once  dextrose Oral Gel 15 once PRN  diazepam  Injectable 10 every 2 hours PRN  diazepam  Injectable 5 every 1 hour PRN  glucagon  Injectable 1 once  haloperidol    Injectable 5 every 6 hours PRN  heparin  Infusion. 1300 <Continuous>  insulin glargine Injectable (LANTUS) 25 two times a day  insulin lispro (ADMELOG) corrective regimen sliding scale  three times a day before meals  insulin lispro Injectable (ADMELOG) 17 three times a day before meals  insulin regular Infusion 3 <Continuous>      Vital Signs Last 24 Hrs  T(F): 99 (08-27-24 @ 23:00), Max: 100.2 (08-27-24 @ 17:02)    Vital Signs Last 24 Hrs  HR: 61 (08-28-24 @ 07:00) (61 - 81)  BP: 186/89 (08-28-24 @ 07:00) (140/68 - 212/89)  RR: 21 (08-28-24 @ 07:00)  SpO2: 100% (08-28-24 @ 07:00) (95% - 100%)  Wt(kg): --    EXAM:  GENERAL: Lethargic, lying in bed; in restrains  HEAD: No head lesions  EYES: Conjunctiva pink and cornea white  EAR, NOSE, MOUTH, THROAT: Normal external ears and nose, no discharges; moist mucous membranes  NECK: Supple, tender to palpation on bilateral anterior neck; no JVD  RESPIRATORY: Clear to auscultation bilaterally  CARDIOVASCULAR: S1 S2  GASTROINTESTINAL: Soft, nontender, nondistended; normoactive bowel sounds  GENITOURINARY: + urbano catheter, no CVA tenderness  EXTREMITIES: No clubbing, cyanosis, or petal edema; s/p R BKA  NERVOUS SYSTEM: Lethargic but arousable, not fully oriented but able to answer simple questions  MUSCULOSKELETAL: No joint erythema, swelling or pain  SKIN: No rashes or lesions, no superficial thrombophlebitis  PSYCH: Sedated    Labs:                        11.6   15.27 )-----------( 301      ( 28 Aug 2024 05:46 )             36.5     08-28    143  |  104  |  45<H>  ----------------------------<  69<L>  3.6   |  27  |  2.4<H>    Ca    8.0<L>      28 Aug 2024 05:46  Phos  3.6     08-28  Mg     1.6     08-28    TPro  5.6<L>  /  Alb  3.1<L>  /  TBili  0.2  /  DBili  x   /  AST  23  /  ALT  12  /  AlkPhos  126<H>  08-28      WBC Trend:  WBC Count: 15.27 (08-28-24 @ 05:46)  WBC Count: 18.16 (08-27-24 @ 06:36)  WBC Count: 17.93 (08-27-24 @ 05:57)  WBC Count: 26.20 (08-26-24 @ 12:45)      Creatine Trend:  Creatinine: 2.4 (08-28)  Creatinine: 2.8 (08-27)  Creatinine: 3.1 (08-27)  Creatinine: 3.4 (08-27)      Liver Biochemical Testing Trend:  Alanine Aminotransferase (ALT/SGPT): 12 (08-28)  Alanine Aminotransferase (ALT/SGPT): 13 (08-27)  Alanine Aminotransferase (ALT/SGPT): 11 (08-27)  Alanine Aminotransferase (ALT/SGPT): 11 (08-26)  Alanine Aminotransferase (ALT/SGPT): 17 (07-01)  Aspartate Aminotransferase (AST/SGOT): 23 (08-28-24 @ 05:46)  Aspartate Aminotransferase (AST/SGOT): 24 (08-27-24 @ 19:08)  Aspartate Aminotransferase (AST/SGOT): 15 (08-27-24 @ 05:57)  Aspartate Aminotransferase (AST/SGOT): 14 (08-26-24 @ 12:45)  Aspartate Aminotransferase (AST/SGOT): 15 (07-01-24 @ 10:51)  Bilirubin Total: 0.2 (08-28)  Bilirubin Direct: <0.2 (08-27)  Bilirubin Total: <0.2 (08-27)  Bilirubin Direct: <0.2 (08-27)  Bilirubin Total: <0.2 (08-27)      Trend LDH      Urinalysis Basic - ( 28 Aug 2024 05:46 )    Color: x / Appearance: x / SG: x / pH: x  Gluc: 69 mg/dL / Ketone: x  / Bili: x / Urobili: x   Blood: x / Protein: x / Nitrite: x   Leuk Esterase: x / RBC: x / WBC x   Sq Epi: x / Non Sq Epi: x / Bacteria: x        MICROBIOLOGY:    MRSA PCR Result.: Positive (08-27-24 @ 10:54)  MRSA PCR Result.: Positive (06-28-24 @ 14:00)  MRSA PCR Result.: Positive (06-23-24 @ 18:00)      Urinalysis with Rflx Culture (collected 27 Aug 2024 10:56)    Culture - Blood (collected 26 Aug 2024 12:45)  Source: .Blood Blood-Peripheral  Preliminary Report:    No growth at 24 hours    Culture - Blood (collected 26 Aug 2024 12:45)  Source: .Blood Blood-Peripheral  Preliminary Report:    No growth at 24 hours    Culture - Blood (collected 29 Jun 2024 18:54)  Source: .Blood None  Final Report:    No growth at 5 days    Culture - Blood (collected 29 Jun 2024 18:54)  Source: .Blood None  Final Report:    No growth at 5 days    Culture - Sputum (collected 23 Jun 2024 21:05)  Source: Trach Asp Tracheal Aspirate  Final Report:    No growth    Culture - Blood (collected 23 Jun 2024 18:43)  Source: .Blood None  Final Report:    No growth at 5 days    Culture - Blood (collected 23 Jun 2024 18:43)  Source: .Blood None  Final Report:    No growth at 5 days    Culture - Blood (collected 21 Jun 2024 15:59)  Source: .Blood None  Final Report:    No growth at 5 days    Urinalysis with Rflx Culture (collected 19 Jun 2024 19:55)      HIV-1/2 Combo Result: Nonreact (06-21-24 @ 15:59)      Troponin T, High Sensitivity Result: 54 (08-27)  Troponin T, High Sensitivity Result: 59 (08-26)  Troponin T, High Sensitivity Result: 60 (08-26)    Lactate, Blood: 1.5 (08-27 @ 05:57)  Blood Gas Venous - Lactate: 7.1 (08-26 @ 14:00)  Lactate, Blood: 4.6 (08-26 @ 13:30)  Blood Gas Venous - Lactate: 6.8 (08-26 @ 10:35)      RADIOLOGY:  imaging below personally reviewed    < from: [facility] Duplex Lower Ext Vein Scan, Bilat (08.26.24 @ 17:47) >  IMPRESSION:  No evidence of deep venous thrombosis in either lower extremity.   Bilateral saphenofemoral junction not visualized.    < end of copied text >    < from: Xray Chest 1 View- PORTABLE-Urgent (08.26.24 @ 10:43) >  Impression:      No acute pulmonary process    < end of copied text >    < from: MR Angio Neck No Cont (08.12.24 @ 09:02) >  IMPRESSION:    1.  RIGHT CAROTID NECK CIRCULATION:   Intact.    2.  LEFT CAROTID NECK CIRCULATION:    Intact.    3.  VERTEBRAL NECK CIRCULATION:   Intact    4.  ANTERIOR INTRACRANIAL CIRCULATION:     Intracranial atherosclerosis   cavernous and clinoid segments of the internal carotid arteries,   mild-to-moderate on the right and mild on the left.    5.  POSTERIOR INTRACRANIAL CIRCULATION:   Intact.    6. Right middle cerebellar peduncle lesion demonstrates evolution since   6/26/2024, findings consistent with acute infarction at that time    < end of copied text >

## 2024-08-28 NOTE — PROGRESS NOTE ADULT - SUBJECTIVE AND OBJECTIVE BOX
Patient is a 59y old  Male who presents with a chief complaint of Confusion (27 Aug 2024 09:04)      Over Night Events:  Patient seen and examined.   no events overnight   this AM the patient pulled [place]      ROS:  See HPI    PHYSICAL EXAM    ICU Vital Signs Last 24 Hrs  T(C): 37.2 (27 Aug 2024 23:00), Max: 37.9 (27 Aug 2024 17:02)  T(F): 99 (27 Aug 2024 23:00), Max: 100.2 (27 Aug 2024 17:02)  HR: 61 (28 Aug 2024 07:00) (61 - 81)  BP: 186/89 (28 Aug 2024 07:00) (140/68 - 212/89)  BP(mean): 128 (28 Aug 2024 07:00) (97 - 141)  ABP: --  ABP(mean): --  RR: 21 (28 Aug 2024 07:00) (6 - 38)  SpO2: 100% (28 Aug 2024 07:00) (95% - 100%)    O2 Parameters below as of 28 Aug 2024 07:00  Patient On (Oxygen Delivery Method): room air            General: NAD, mild agitation   HEENT:    MMM, no new lesions, hoarseness of voice POA             Lymph Nodes: NO cervical LN   Lungs: Bilateral BS, no wheezing   Cardiovascular: Regular, normal S1 S2    Abdomen: Soft, Positive BS  Extremities: No clubbing   Skin: warm, no new rashes   Neurological: Awake mildly sedated   Musculoskeletal: move all ext BKA on the right     I&O's Detail    27 Aug 2024 07:01  -  28 Aug 2024 07:00  --------------------------------------------------------  IN:    Dexmedetomidine: 616.8 mL    dextrose 5% + sodium chloride 0.9%: 2400 mL    Heparin Infusion: 26 mL    Heparin Infusion: 133 mL    Insulin: 3 mL    IV PiggyBack: 200 mL  Total IN: 3378.8 mL    OUT:    Indwelling Catheter - Urethral (mL): 2422 mL  Total OUT: 2422 mL    Total NET: 956.8 mL          LABS:                          11.6   15.27 )-----------( 301      ( 28 Aug 2024 05:46 )             36.5         28 Aug 2024 05:46    143    |  104    |  45     ----------------------------<  69     3.6     |  27     |  2.4      Ca    8.0        28 Aug 2024 05:46  Phos  3.6       28 Aug 2024 05:46  Mg     1.6       28 Aug 2024 05:46    TPro  5.6    /  Alb  3.1    /  TBili  0.2    /  DBili  x      /  AST  23     /  ALT  12     /  AlkPhos  126    28 Aug 2024 05:46  Amylase x     lipase x                                                 PT/INR - ( 26 Aug 2024 12:45 )   PT: 11.70 sec;   INR: 1.03 ratio         PTT - ( 28 Aug 2024 06:00 )  PTT:39.1 sec                                       Urinalysis Basic - ( 28 Aug 2024 05:46 )    Color: x / Appearance: x / SG: x / pH: x  Gluc: 69 mg/dL / Ketone: x  / Bili: x / Urobili: x   Blood: x / Protein: x / Nitrite: x   Leuk Esterase: x / RBC: x / WBC x   Sq Epi: x / Non Sq Epi: x / Bacteria: x        Lactate, Blood: 1.5 mmol/L (08-27-24 @ 05:57)  Lactate, Blood: 4.6 mmol/L (08-26-24 @ 13:30)                                                          Urinalysis with Rflx Culture (collected 27 Aug 2024 10:56)    Culture - Blood (collected 26 Aug 2024 12:45)  Source: .Blood Blood-Peripheral  Preliminary Report (27 Aug 2024 22:02):    No growth at 24 hours    Culture - Blood (collected 26 Aug 2024 12:45)  Source: .Blood Blood-Peripheral  Preliminary Report (27 Aug 2024 22:02):    No growth at 24 hours                                                                                           MEDICATIONS  (STANDING):  chlorhexidine 2% Cloths 1 Application(s) Topical <User Schedule>  cloNIDine Patch 0.2 mG/24Hr(s) 1 patch Transdermal every 7 days  dexMEDEtomidine Infusion 0.5 MICROgram(s)/kG/Hr (9.48 mL/Hr) IV Continuous <Continuous>  dextrose 5% + sodium chloride 0.9%. 1000 milliLiter(s) (100 mL/Hr) IV Continuous <Continuous>  dextrose 5%. 1000 milliLiter(s) (100 mL/Hr) IV Continuous <Continuous>  dextrose 5%. 1000 milliLiter(s) (50 mL/Hr) IV Continuous <Continuous>  dextrose 50% Injectable 25 Gram(s) IV Push once  dextrose 50% Injectable 12.5 Gram(s) IV Push once  dextrose 50% Injectable 25 Gram(s) IV Push once  glucagon  Injectable 1 milliGRAM(s) IntraMuscular once  heparin  Infusion. 1300 Unit(s)/Hr (13 mL/Hr) IV Continuous <Continuous>  insulin glargine Injectable (LANTUS) 25 Unit(s) SubCutaneous two times a day  insulin lispro (ADMELOG) corrective regimen sliding scale   SubCutaneous three times a day before meals  insulin lispro Injectable (ADMELOG) 17 Unit(s) SubCutaneous three times a day before meals  insulin regular Infusion 3 Unit(s)/Hr (3 mL/Hr) IV Continuous <Continuous>  mupirocin 2% Nasal 1 Application(s) Both Nostrils every 12 hours  piperacillin/tazobactam IVPB.. 3.375 Gram(s) IV Intermittent every 12 hours  thiamine IVPB 500 milliGRAM(s) IV Intermittent daily    MEDICATIONS  (PRN):  dextrose Oral Gel 15 Gram(s) Oral once PRN Blood Glucose LESS THAN 70 milliGRAM(s)/deciliter  diazepam  Injectable 5 milliGRAM(s) IV Push every 1 hour PRN CIWA-Ar score of 8 or Greater  diazepam  Injectable 10 milliGRAM(s) IV Push every 2 hours PRN CIWA-Ar score increase by 2 points and a total score of 7 or less  haloperidol    Injectable 5 milliGRAM(s) IntraMuscular every 6 hours PRN Agitation

## 2024-08-28 NOTE — PROGRESS NOTE ADULT - SUBJECTIVE AND OBJECTIVE BOX
Nephrology Progress Note    MARIOLA CURRY  MRN-[id number]  59y  Male    S:  Patient is seen and examined, events over the last 24h noted.    O:  Allergies:  No Known Allergies    Hospital Medications:   MEDICATIONS  (STANDING):  chlorhexidine 2% Cloths 1 Application(s) Topical <User Schedule>  cloNIDine Patch 0.2 mG/24Hr(s) 1 patch Transdermal every 7 days  dexMEDEtomidine Infusion 0.5 MICROgram(s)/kG/Hr (9.48 mL/Hr) IV Continuous <Continuous>  dextrose 5% + lactated ringers. 1000 milliLiter(s) (80 mL/Hr) IV Continuous <Continuous>  heparin  Infusion. 1300 Unit(s)/Hr (13 mL/Hr) IV Continuous <Continuous>  insulin glargine Injectable (LANTUS) 25 Unit(s) SubCutaneous two times a day  insulin lispro (ADMELOG) corrective regimen sliding scale   SubCutaneous three times a day before meals  insulin lispro Injectable (ADMELOG) 17 Unit(s) SubCutaneous three times a day before meals  insulin regular Infusion 3 Unit(s)/Hr (3 mL/Hr) IV Continuous <Continuous>  mupirocin 2% Nasal 1 Application(s) Both Nostrils every 12 hours  piperacillin/tazobactam IVPB.. 3.375 Gram(s) IV Intermittent every 12 hours  thiamine IVPB 500 milliGRAM(s) IV Intermittent daily    MEDICATIONS  (PRN):  dextrose Oral Gel 15 Gram(s) Oral once PRN Blood Glucose LESS THAN 70 milliGRAM(s)/deciliter  diazepam  Injectable 5 milliGRAM(s) IV Push every 1 hour PRN CIWA-Ar score of 8 or Greater  diazepam  Injectable 10 milliGRAM(s) IV Push every 2 hours PRN CIWA-Ar score increase by 2 points and a total score of 7 or less  haloperidol    Injectable 5 milliGRAM(s) IntraMuscular every 6 hours PRN Agitation    Home Medications:  ALPRAZolam 0.5 mg oral tablet: 1 tab(s) orally 2 times a day as needed for anxiety (26 Aug 2024 14:05)  Cozaar 100 mg oral tablet: 1 tab(s) orally once a day (26 Aug 2024 14:04)  Insulin Aspart: 20 unit(s) subcutaneous 3 times a day (26 Aug 2024 14:03)  insulin glargine 100 units/mL subcutaneous solution: 60 unit(s) subcutaneous once a day (26 Aug 2024 14:02)  Lipitor 80 mg oral tablet: 1 tab(s) orally once a day (at bedtime) (26 Aug 2024 14:04)  Lokelma 10 g oral powder for reconstitution: 10 gram(s) orally 3 times a day (26 Aug 2024 14:06)      VITALS:  Daily     Daily   T(F): 99 (08-27-24 @ 23:00), Max: 100.2 (08-27-24 @ 17:02)  HR: 61 (08-28-24 @ 07:00)  BP: 186/89 (08-28-24 @ 07:00)  RR: 21 (08-28-24 @ 07:00)  SpO2: 100% (08-28-24 @ 07:00)  Wt(kg): --  I&O's Detail    27 Aug 2024 07:01  -  28 Aug 2024 07:00  --------------------------------------------------------  IN:    Dexmedetomidine: 616.8 mL    dextrose 5% + sodium chloride 0.9%: 2400 mL    Heparin Infusion: 26 mL    Heparin Infusion: 133 mL    Insulin: 3 mL    IV PiggyBack: 200 mL  Total IN: 3378.8 mL    OUT:    Indwelling Catheter - Urethral (mL): 2522 mL  Total OUT: 2522 mL    Total NET: 856.8 mL      28 Aug 2024 07:01  -  28 Aug 2024 13:29  --------------------------------------------------------  IN:  Total IN: 0 mL    OUT:    Indwelling Catheter - Urethral (mL): 1100 mL  Total OUT: 1100 mL    Total NET: -1100 mL        I&O's Summary    27 Aug 2024 07:01  -  28 Aug 2024 07:00  --------------------------------------------------------  IN: 3378.8 mL / OUT: 2522 mL / NET: 856.8 mL    28 Aug 2024 07:01  -  28 Aug 2024 13:29  --------------------------------------------------------  IN: 0 mL / OUT: 1100 mL / NET: -1100 mL          PHYSICAL EXAM:  Gen: NAD  Chest: b/l breath sounds  Abd: soft  Extremities: no edema, BKA      LABS:    08-28    143  |  104  |  45<H>  ----------------------------<  69<L>  3.6   |  27  |  2.4<H>    Ca    8.0<L>      28 Aug 2024 05:46  Phos  3.6     08-28  Mg     1.6     08-28    TPro  5.6<L>  /  Alb  3.1<L>  /  TBili  0.2  /  DBili      /  AST  23  /  ALT  12  /  AlkPhos  126<H>  08-28    Phosphorus: 3.6 mg/dL (08-28-24 @ 05:46)  Phosphorus: 2.8 mg/dL (08-27-24 @ 19:08)                          11.6   15.27 )-----------( 301      ( 28 Aug 2024 05:46 )             36.5     Mean Cell Volume: 80.6 fL (08-28-24 @ 05:46)      Urine Studies:  Protein, Urine: 100 mg/dL (08-27-24 @ 10:56)  White Blood Cell - Urine: 2 /HPF (08-27-24 @ 10:56)  Red Blood Cell - Urine: 2 /HPF (08-27-24 @ 10:56)  Granular Cast: Present (08-27-24 @ 10:56)      Culture Results:   No growth at 24 hours (08-26 @ 12:45)  Culture Results:   No growth at 24 hours (08-26 @ 12:45)    Creatinine trend:  Creatinine: 2.4 mg/dL (08-28-24 @ 05:46)  Creatinine: 2.8 mg/dL (08-27-24 @ 19:08)  Creatinine: 3.1 mg/dL (08-27-24 @ 12:34)  Creatinine: 3.4 mg/dL (08-27-24 @ 05:57)  Creatinine: 3.6 mg/dL (08-26-24 @ 22:09)

## 2024-08-28 NOTE — CHART NOTE - NSCHARTNOTEFT_GEN_A_CORE
patients' FS 45  AMS, lethargic, diaphoretic  gave 2amps of D50  patient has not had PO intake d/t inability to access dysphagia by speech 2/2 AMS  will start D5LR patients' FS 45  AMS, lethargic, diaphoretic  gave 2amps of D50  stopped lispro premeal, changed sliding scale to 1u  continue lantus 25u BID  patient has not had PO intake d/t inability to access dysphagia by speech 2/2 AMS  will start D5LR at 80cc/hr  q6hrs FS

## 2024-08-29 LAB
ALBUMIN SERPL ELPH-MCNC: 3.1 G/DL — LOW (ref 3.5–5.2)
ALP SERPL-CCNC: 150 U/L — HIGH (ref 30–115)
ALT FLD-CCNC: 13 U/L — SIGNIFICANT CHANGE UP (ref 0–41)
ANION GAP SERPL CALC-SCNC: 12 MMOL/L — SIGNIFICANT CHANGE UP (ref 7–14)
APTT BLD: 44.2 SEC — HIGH (ref 27–39.2)
APTT BLD: 73.6 SEC — CRITICAL HIGH (ref 27–39.2)
APTT BLD: 76.5 SEC — CRITICAL HIGH (ref 27–39.2)
AST SERPL-CCNC: 26 U/L — SIGNIFICANT CHANGE UP (ref 0–41)
BASOPHILS # BLD AUTO: 0.02 K/UL — SIGNIFICANT CHANGE UP (ref 0–0.2)
BASOPHILS NFR BLD AUTO: 0.1 % — SIGNIFICANT CHANGE UP (ref 0–1)
BILIRUB SERPL-MCNC: 0.3 MG/DL — SIGNIFICANT CHANGE UP (ref 0.2–1.2)
BUN SERPL-MCNC: 33 MG/DL — HIGH (ref 10–20)
CALCIUM SERPL-MCNC: 8.6 MG/DL — SIGNIFICANT CHANGE UP (ref 8.4–10.5)
CHLORIDE SERPL-SCNC: 101 MMOL/L — SIGNIFICANT CHANGE UP (ref 98–110)
CO2 SERPL-SCNC: 27 MMOL/L — SIGNIFICANT CHANGE UP (ref 17–32)
CREAT SERPL-MCNC: 2.3 MG/DL — HIGH (ref 0.7–1.5)
CULTURE RESULTS: NO GROWTH — SIGNIFICANT CHANGE UP
EGFR: 32 ML/MIN/1.73M2 — LOW
EOSINOPHIL # BLD AUTO: 0.02 K/UL — SIGNIFICANT CHANGE UP (ref 0–0.7)
EOSINOPHIL NFR BLD AUTO: 0.1 % — SIGNIFICANT CHANGE UP (ref 0–8)
GLUCOSE BLDC GLUCOMTR-MCNC: 117 MG/DL — HIGH (ref 70–99)
GLUCOSE BLDC GLUCOMTR-MCNC: 149 MG/DL — HIGH (ref 70–99)
GLUCOSE BLDC GLUCOMTR-MCNC: 150 MG/DL — HIGH (ref 70–99)
GLUCOSE BLDC GLUCOMTR-MCNC: 166 MG/DL — HIGH (ref 70–99)
GLUCOSE BLDC GLUCOMTR-MCNC: 316 MG/DL — HIGH (ref 70–99)
GLUCOSE BLDC GLUCOMTR-MCNC: 370 MG/DL — HIGH (ref 70–99)
GLUCOSE SERPL-MCNC: 150 MG/DL — HIGH (ref 70–99)
HCT VFR BLD CALC: 37.5 % — LOW (ref 42–52)
HGB BLD-MCNC: 12 G/DL — LOW (ref 14–18)
IMM GRANULOCYTES NFR BLD AUTO: 0.7 % — HIGH (ref 0.1–0.3)
LYMPHOCYTES # BLD AUTO: 1.19 K/UL — LOW (ref 1.2–3.4)
LYMPHOCYTES # BLD AUTO: 7.9 % — LOW (ref 20.5–51.1)
MAGNESIUM SERPL-MCNC: 1.6 MG/DL — LOW (ref 1.8–2.4)
MCHC RBC-ENTMCNC: 25.7 PG — LOW (ref 27–31)
MCHC RBC-ENTMCNC: 32 G/DL — SIGNIFICANT CHANGE UP (ref 32–37)
MCV RBC AUTO: 80.3 FL — SIGNIFICANT CHANGE UP (ref 80–94)
MONOCYTES # BLD AUTO: 0.72 K/UL — HIGH (ref 0.1–0.6)
MONOCYTES NFR BLD AUTO: 4.8 % — SIGNIFICANT CHANGE UP (ref 1.7–9.3)
NEUTROPHILS # BLD AUTO: 13.01 K/UL — HIGH (ref 1.4–6.5)
NEUTROPHILS NFR BLD AUTO: 86.4 % — HIGH (ref 42.2–75.2)
NRBC # BLD: 0 /100 WBCS — SIGNIFICANT CHANGE UP (ref 0–0)
PHOSPHATE SERPL-MCNC: 2.5 MG/DL — SIGNIFICANT CHANGE UP (ref 2.1–4.9)
PLATELET # BLD AUTO: 305 K/UL — SIGNIFICANT CHANGE UP (ref 130–400)
PMV BLD: 10.1 FL — SIGNIFICANT CHANGE UP (ref 7.4–10.4)
POTASSIUM SERPL-MCNC: 3.4 MMOL/L — LOW (ref 3.5–5)
POTASSIUM SERPL-SCNC: 3.4 MMOL/L — LOW (ref 3.5–5)
PROT SERPL-MCNC: 5.6 G/DL — LOW (ref 6–8)
RBC # BLD: 4.67 M/UL — LOW (ref 4.7–6.1)
RBC # FLD: 16 % — HIGH (ref 11.5–14.5)
SODIUM SERPL-SCNC: 140 MMOL/L — SIGNIFICANT CHANGE UP (ref 135–146)
SPECIMEN SOURCE: SIGNIFICANT CHANGE UP
T3 SERPL-MCNC: 82 NG/DL — SIGNIFICANT CHANGE UP (ref 80–200)
T3FREE SERPL-MCNC: 2.22 PG/ML — SIGNIFICANT CHANGE UP (ref 2–4.4)
WBC # BLD: 15.06 K/UL — HIGH (ref 4.8–10.8)
WBC # FLD AUTO: 15.06 K/UL — HIGH (ref 4.8–10.8)

## 2024-08-29 PROCEDURE — 99233 SBSQ HOSP IP/OBS HIGH 50: CPT

## 2024-08-29 RX ORDER — POTASSIUM CHLORIDE 10 MEQ
20 TABLET, EXT RELEASE, PARTICLES/CRYSTALS ORAL
Refills: 0 | Status: COMPLETED | OUTPATIENT
Start: 2024-08-29 | End: 2024-08-29

## 2024-08-29 RX ORDER — THIAMINE HCL 250 MG
500 TABLET ORAL DAILY
Refills: 0 | Status: COMPLETED | OUTPATIENT
Start: 2024-08-29 | End: 2024-08-30

## 2024-08-29 RX ORDER — INSULIN GLARGINE 100 [IU]/ML
10 INJECTION, SOLUTION SUBCUTANEOUS
Refills: 0 | Status: DISCONTINUED | OUTPATIENT
Start: 2024-08-29 | End: 2024-08-30

## 2024-08-29 RX ORDER — HYDRALAZINE HCL 50 MG
5 TABLET ORAL ONCE
Refills: 0 | Status: COMPLETED | OUTPATIENT
Start: 2024-08-29 | End: 2024-08-29

## 2024-08-29 RX ORDER — OLANZAPINE 7.5 MG/1
5 TABLET ORAL EVERY 12 HOURS
Refills: 0 | Status: DISCONTINUED | OUTPATIENT
Start: 2024-08-29 | End: 2024-09-05

## 2024-08-29 RX ORDER — HYDRALAZINE HCL 50 MG
2.5 TABLET ORAL EVERY 6 HOURS
Refills: 0 | Status: DISCONTINUED | OUTPATIENT
Start: 2024-08-29 | End: 2024-09-03

## 2024-08-29 RX ORDER — CLONIDINE HCL 0.2 MG
1 TABLET ORAL
Refills: 0 | Status: DISCONTINUED | OUTPATIENT
Start: 2024-08-29 | End: 2024-09-05

## 2024-08-29 RX ADMIN — Medication 105 MILLIGRAM(S): at 11:50

## 2024-08-29 RX ADMIN — Medication 5: at 17:15

## 2024-08-29 RX ADMIN — Medication 5 MILLIGRAM(S): at 03:18

## 2024-08-29 RX ADMIN — Medication 1 PATCH: at 19:23

## 2024-08-29 RX ADMIN — INSULIN GLARGINE 10 UNIT(S): 100 INJECTION, SOLUTION SUBCUTANEOUS at 21:09

## 2024-08-29 RX ADMIN — DEXMEDETOMIDINE HYDROCHLORIDE IN 0.9% SODIUM CHLORIDE 9.48 MICROGRAM(S)/KG/HR: 4 INJECTION INTRAVENOUS at 06:43

## 2024-08-29 RX ADMIN — Medication 2000 UNIT(S)/HR: at 14:33

## 2024-08-29 RX ADMIN — Medication 10 MILLIGRAM(S): at 06:27

## 2024-08-29 RX ADMIN — Medication 1 APPLICATION(S): at 17:17

## 2024-08-29 RX ADMIN — Medication 10 MILLIGRAM(S): at 10:34

## 2024-08-29 RX ADMIN — PIPERACILLIN SODIUM AND TAZOBACTAM SODIUM 25 GRAM(S): 3; .375 INJECTION, POWDER, FOR SOLUTION INTRAVENOUS at 17:15

## 2024-08-29 RX ADMIN — CHLORHEXIDINE GLUCONATE 1 APPLICATION(S): 40 SOLUTION TOPICAL at 05:19

## 2024-08-29 RX ADMIN — OLANZAPINE 5 MILLIGRAM(S): 7.5 TABLET ORAL at 17:17

## 2024-08-29 RX ADMIN — Medication 1 PATCH: at 07:31

## 2024-08-29 RX ADMIN — Medication 80 MILLILITER(S): at 02:57

## 2024-08-29 RX ADMIN — DEXMEDETOMIDINE HYDROCHLORIDE IN 0.9% SODIUM CHLORIDE 9.48 MICROGRAM(S)/KG/HR: 4 INJECTION INTRAVENOUS at 10:14

## 2024-08-29 RX ADMIN — PIPERACILLIN SODIUM AND TAZOBACTAM SODIUM 25 GRAM(S): 3; .375 INJECTION, POWDER, FOR SOLUTION INTRAVENOUS at 05:19

## 2024-08-29 RX ADMIN — Medication 1 APPLICATION(S): at 05:20

## 2024-08-29 RX ADMIN — Medication 2.5 MILLIGRAM(S): at 17:15

## 2024-08-29 RX ADMIN — Medication 1 PATCH: at 14:23

## 2024-08-29 RX ADMIN — Medication 25 GRAM(S): at 09:25

## 2024-08-29 RX ADMIN — Medication 5 MILLIGRAM(S): at 07:49

## 2024-08-29 RX ADMIN — Medication 2000 UNIT(S)/HR: at 04:53

## 2024-08-29 RX ADMIN — DEXMEDETOMIDINE HYDROCHLORIDE IN 0.9% SODIUM CHLORIDE 9.48 MICROGRAM(S)/KG/HR: 4 INJECTION INTRAVENOUS at 21:10

## 2024-08-29 RX ADMIN — Medication 1 PATCH: at 14:21

## 2024-08-29 RX ADMIN — Medication 5 MILLIGRAM(S): at 12:17

## 2024-08-29 RX ADMIN — Medication 10 MILLIGRAM(S): at 20:44

## 2024-08-29 RX ADMIN — DEXMEDETOMIDINE HYDROCHLORIDE IN 0.9% SODIUM CHLORIDE 9.48 MICROGRAM(S)/KG/HR: 4 INJECTION INTRAVENOUS at 02:57

## 2024-08-29 RX ADMIN — Medication 25 GRAM(S): at 07:50

## 2024-08-29 RX ADMIN — Medication 2.5 MILLIGRAM(S): at 10:14

## 2024-08-29 RX ADMIN — Medication 100 MILLIEQUIVALENT(S): at 07:49

## 2024-08-29 NOTE — DIETITIAN INITIAL EVALUATION ADULT - OTHER INFO
pt is 59 year old male with hx of CKD IV, anemia, CVA, PAD, R BKA, IDDM, substance abuse, recent PNA p/w confusion and agitation with hyperglycemia. pt admitted with severe HAGMA, sepsis POA, metabolic encephalopathy, DKA, NSTEMI, DINESH s/p emergent HD.

## 2024-08-29 NOTE — PROGRESS NOTE ADULT - ASSESSMENT
IMPRESSION:    Toxic metabolic encephalopathy  DKA/HHS with recurrent readmission   Hx of substance abuse found cocaine and benzo   Severe HAGMA- improved  Sepsis POA- resolved   Elevated LA- resolved   Throat pain POA   DM  HTN  CKD 4  HO CVA  HO LUE DVT on Eliquis DVT is resolved- completing 3mo ac     PLAN:    CNS: CT Head, Thiamine and Folate. taper clonidine patch , and resume home xanax as needed once out of withdrawal window, haldol for extreme agitation as needed, valium for possible ETOH withrdrawal, monitor CIWA score,     HEENT: Oral care, f/u neck us/ CT neck     PULMONARY:  HOB @ 45 degrees.  Aspiration precautions.  maintain  SpO2 92-96%    CARDIOVASCULAR: Monitor for signs of volume overload.  TTE noted. strict i/os, needs better BP control, add home meds hold ace/arb for now restart nifedipine     GI: GI prophylaxis. diet per Speech and swallow when more awake    RENAL:  BMP q4 hours, HD per nephro no indication for HD as of now,  monitor i/o     INFECTIOUS DISEASE:   MRSA swab + give nasal mupirocin, DC vanc, c/w zosyn, ID on board, recommended CT neck for throat pain     HEMATOLOGICAL:  heparin drip, resume Eliquis when the patient able to swallow     ENDOCRINOLOGY: c/w insulin drip, FS q1 hr, please BMP q4 until the gap closes twice and the bicarb is >20, get neck imaging ,f/u T4 and total t3     MUSCULOSKELETAL: Bedrest    MICU IMPRESSION:    Toxic metabolic encephalopathy  DKA/HHS with recurrent readmission   Hx of substance abuse found cocaine and benzo   Severe HAGMA- improved  Sepsis POA- resolved   Elevated LA- resolved   Throat pain POA- US shows bilateral cervical LAD  borderline hyperthyroid  DM  HTN  CKD 4  HO CVA  HO LUE DVT on Eliquis DVT is resolved- completing 3mo ac       PLAN:    CNS: CT Head, Thiamine and Folate. taper clonidine patch , and resume home xanax as needed once out of withdrawal window, zyprexa for extreme agitation as needed, valium benzo withdrawal, consider NG tube for PO meds     HEENT: Oral care, f/u neck us/ CT neck     PULMONARY:  HOB @ 45 degrees.  Aspiration precautions.  maintain  SpO2 92-96%    CARDIOVASCULAR: Monitor for signs of volume overload.  TTE noted. strict i/os, needs better BP control, add home meds hold ace/arb for now give as needed hydral IV     GI: GI prophylaxis. failed S/S for non - cooperation     RENAL:  BMP q4 hours, HD per nephro no indication for HD as of now,  monitor i/o     INFECTIOUS DISEASE:   MRSA swab + c/w mupirocin, c/w zosyn, ID on board, recommended CT neck for throat pain     HEMATOLOGICAL:  heparin drip, resume Eliquis when the patient able to swallow     ENDOCRINOLOGY: c/w lantus and sliding scale, f/u the total t3     MUSCULOSKELETAL: Bedrest    MICU

## 2024-08-29 NOTE — DIETITIAN INITIAL EVALUATION ADULT - PERTINENT LABORATORY DATA
08-29    140  |  101  |  33<H>  ----------------------------<  150<H>  3.4<L>   |  27  |  2.3<H>    Ca    8.6      29 Aug 2024 05:45  Phos  2.5     08-29  Mg     1.6     08-29    TPro  5.6<L>  /  Alb  3.1<L>  /  TBili  0.3  /  DBili  x   /  AST  26  /  ALT  13  /  AlkPhos  150<H>  08-29  POCT Blood Glucose.: 370 mg/dL (08-29-24 @ 17:11)  A1C with Estimated Average Glucose Result: 8.5 % (08-28-24 @ 05:46)  A1C with Estimated Average Glucose Result: 9.5 % (06-20-24 @ 05:42)  A1C with Estimated Average Glucose Result: 8.2 % (10-24-23 @ 06:34)   08-29    140  |  101  |  33<H>  ----------------------------<  150<H>  3.4<L>   |  27  |  2.3<H>    Ca    8.6      29 Aug 2024 05:45  Phos  2.5     08-29  Mg     1.6     08-29    TPro  5.6<L>  /  Alb  3.1<L>  /  TBili  0.3  /  DBili  x   /  AST  26  /  ALT  13  /  AlkPhos  150<H>  08-29  POCT Blood Glucose.: 370 mg/dL (08-29-24 @ 17:11)  A1C with Estimated Average Glucose Result: 8.5 % (08-28-24 @ 05:46)  A1C with Estimated Average Glucose Result: 9.5 % (06-20-24 @ 05:42)  A1C with Estimated Average Glucose Result: 8.2 % (10-24-23 @ 06:34)                          12.0   15.06 )-----------( 305      ( 29 Aug 2024 05:45 )             37.5

## 2024-08-29 NOTE — DIETITIAN INITIAL EVALUATION ADULT - ORAL INTAKE PTA/DIET HISTORY
unable to assess, pt disorientated, no family at bedside. as per EMR review pt with gradual weight loss of 23# since october of 2023      presently NPO pt was uncooperative for SLP eval

## 2024-08-29 NOTE — DIETITIAN INITIAL EVALUATION ADULT - PERTINENT MEDS FT
MEDICATIONS  (STANDING):  chlorhexidine 2% Cloths 1 Application(s) Topical <User Schedule>  cloNIDine Patch 0.3 mG/24Hr(s) 1 patch Transdermal every 7 days  dexMEDEtomidine Infusion 0.5 MICROgram(s)/kG/Hr (9.48 mL/Hr) IV Continuous <Continuous>  dextrose 5% + lactated ringers. 1000 milliLiter(s) (80 mL/Hr) IV Continuous <Continuous>  dextrose 5%. 1000 milliLiter(s) (100 mL/Hr) IV Continuous <Continuous>  dextrose 5%. 1000 milliLiter(s) (50 mL/Hr) IV Continuous <Continuous>  dextrose 50% Injectable 25 Gram(s) IV Push once  dextrose 50% Injectable 12.5 Gram(s) IV Push once  dextrose 50% Injectable 25 Gram(s) IV Push once  glucagon  Injectable 1 milliGRAM(s) IntraMuscular once  heparin  Infusion. 1300 Unit(s)/Hr (13 mL/Hr) IV Continuous <Continuous>  hydrALAZINE Injectable 2.5 milliGRAM(s) IV Push every 6 hours  insulin glargine Injectable (LANTUS) 10 Unit(s) SubCutaneous two times a day  insulin lispro (ADMELOG) corrective regimen sliding scale   SubCutaneous three times a day before meals  mupirocin 2% Nasal 1 Application(s) Both Nostrils every 12 hours  OLANZapine Injectable 5 milliGRAM(s) IntraMuscular every 12 hours  piperacillin/tazobactam IVPB.. 3.375 Gram(s) IV Intermittent every 12 hours  thiamine IVPB 500 milliGRAM(s) IV Intermittent daily    MEDICATIONS  (PRN):  dextrose Oral Gel 15 Gram(s) Oral once PRN Blood Glucose LESS THAN 70 milliGRAM(s)/deciliter  diazepam  Injectable 10 milliGRAM(s) IV Push every 2 hours PRN CIWA-Ar score increase by 2 points and a total score of 7 or less  diazepam  Injectable 5 milliGRAM(s) IV Push every 1 hour PRN CIWA-Ar score of 8 or Greater  haloperidol    Injectable 5 milliGRAM(s) IntraMuscular every 6 hours PRN Agitation   MEDICATIONS  (STANDING):  cloNIDine Patch 0.3 mG/24Hr(s) 1 patch Transdermal every 7 days  dexMEDEtomidine Infusion 0.5 MICROgram(s)/kG/Hr (9.48 mL/Hr) IV Continuous <Continuous>  dextrose 5% + lactated ringers. 1000 milliLiter(s) (80 mL/Hr) IV Continuous <Continuous>  dextrose 5%. 1000 milliLiter(s) (100 mL/Hr) IV Continuous <Continuous>  dextrose 5%. 1000 milliLiter(s) (50 mL/Hr) IV Continuous <Continuous>    hydrALAZINE Injectable 2.5 milliGRAM(s) IV Push every 6 hours  insulin glargine Injectable (LANTUS) 10 Unit(s) SubCutaneous two times a day  insulin lispro (ADMELOG) corrective regimen sliding scale   SubCutaneous three times a day before meals  mupirocin 2% Nasal 1 Application(s) Both Nostrils every 12 hours  OLANZapine Injectable 5 milliGRAM(s) IntraMuscular every 12 hours  piperacillin/tazobactam IVPB.. 3.375 Gram(s) IV Intermittent every 12 hours  thiamine IVPB 500 milliGRAM(s) IV Intermittent daily    MEDICATIONS  (PRN):  dextrose Oral Gel 15 Gram(s) Oral once PRN Blood Glucose LESS THAN 70 milliGRAM(s)/deciliter  diazepam  Injectable 10 milliGRAM(s) IV Push every 2 hours PRN CIWA-Ar score increase by 2 points and a total score of 7 or less  diazepam  Injectable 5 milliGRAM(s) IV Push every 1 hour PRN CIWA-Ar score of 8 or Greater  haloperidol    Injectable 5 milliGRAM(s) IntraMuscular every 6 hours PRN Agitation

## 2024-08-29 NOTE — DIETITIAN INITIAL EVALUATION ADULT - ADD RECOMMEND
RD to monitor ability to tolerate po diet, labs/meds, NFPF  If pt remains NPO with alternate means of nutrition warranted recommend peptamen AF 1.2, 240 ml q 8 hrs with H20 flushes of 50 ml pre and post feeds will provide pt with 900 kcals, 57g protein and 720+300 ml total volume, pt is at increased risk of refeeding syndrome therefore EN will be gradually increased over 1-3 days to goal rate.  HIgh risk, f/u in 1-3 days

## 2024-08-29 NOTE — PROGRESS NOTE ADULT - ASSESSMENT
# DINESH on Stage 4 CKD.  Suspect pt came in severely volume depleted due to severe hyperglycemia.  R/O sepsis, especially w/ leukocytosis  # Severe HAGMA, appears to be due to DKA w/ smaller component of lactic acidosis, perhaps uremic acidosis  # Severe hyperkalemia due to severe renal failure, severe metabolic acidosis, on Losartan as outpt / resolved with HD, improving renal function    - s/p HD 8/26 / udall d/c'd  - non oliguric / polyuric  - creat improved to baseline    Recommendations:  - cont LR until tolerating po intake  - consider NGT if not tolerating po intake  - replete Mg++ / K+ as needed  - dose abx per eGFR

## 2024-08-29 NOTE — PROGRESS NOTE ADULT - SUBJECTIVE AND OBJECTIVE BOX
Nephrology Progress Note    MARIOLA CURRY  MRN-[id number]  59y  Male    S:  Patient is seen and examined, events over the last 24h noted.    O:  Allergies:  No Known Allergies    Hospital Medications:   MEDICATIONS  (STANDING):  chlorhexidine 2% Cloths 1 Application(s) Topical <User Schedule>  cloNIDine Patch 0.3 mG/24Hr(s) 1 patch Transdermal every 7 days  dexMEDEtomidine Infusion 0.5 MICROgram(s)/kG/Hr (9.48 mL/Hr) IV Continuous <Continuous>  dextrose 5% + lactated ringers. 1000 milliLiter(s) (80 mL/Hr) IV Continuous <Continuous>  dextrose 5%. 1000 milliLiter(s) (100 mL/Hr) IV Continuous <Continuous>  dextrose 5%. 1000 milliLiter(s) (50 mL/Hr) IV Continuous <Continuous>  dextrose 50% Injectable 25 Gram(s) IV Push once  dextrose 50% Injectable 12.5 Gram(s) IV Push once  dextrose 50% Injectable 25 Gram(s) IV Push once  glucagon  Injectable 1 milliGRAM(s) IntraMuscular once  heparin  Infusion. 1300 Unit(s)/Hr (13 mL/Hr) IV Continuous <Continuous>  hydrALAZINE Injectable 2.5 milliGRAM(s) IV Push every 6 hours  insulin glargine Injectable (LANTUS) 10 Unit(s) SubCutaneous two times a day  insulin lispro (ADMELOG) corrective regimen sliding scale   SubCutaneous three times a day before meals  mupirocin 2% Nasal 1 Application(s) Both Nostrils every 12 hours  OLANZapine Injectable 5 milliGRAM(s) IntraMuscular every 12 hours  piperacillin/tazobactam IVPB.. 3.375 Gram(s) IV Intermittent every 12 hours  thiamine IVPB 500 milliGRAM(s) IV Intermittent daily    MEDICATIONS  (PRN):  dextrose Oral Gel 15 Gram(s) Oral once PRN Blood Glucose LESS THAN 70 milliGRAM(s)/deciliter  diazepam  Injectable 10 milliGRAM(s) IV Push every 2 hours PRN CIWA-Ar score increase by 2 points and a total score of 7 or less  diazepam  Injectable 5 milliGRAM(s) IV Push every 1 hour PRN CIWA-Ar score of 8 or Greater  haloperidol    Injectable 5 milliGRAM(s) IntraMuscular every 6 hours PRN Agitation    Home Medications:  ALPRAZolam 0.5 mg oral tablet: 1 tab(s) orally 2 times a day as needed for anxiety (26 Aug 2024 14:05)  Cozaar 100 mg oral tablet: 1 tab(s) orally once a day (26 Aug 2024 14:04)  Insulin Aspart: 20 unit(s) subcutaneous 3 times a day (26 Aug 2024 14:03)  insulin glargine 100 units/mL subcutaneous solution: 60 unit(s) subcutaneous once a day (26 Aug 2024 14:02)  Lipitor 80 mg oral tablet: 1 tab(s) orally once a day (at bedtime) (26 Aug 2024 14:04)  Lokelma 10 g oral powder for reconstitution: 10 gram(s) orally 3 times a day (26 Aug 2024 14:06)      VITALS:  Daily     Daily   T(F): 98.2 (08-29-24 @ 05:34), Max: 99.1 (08-28-24 @ 23:38)  HR: 64 (08-29-24 @ 12:04)  BP: 172/83 (08-29-24 @ 12:04)  RR: 25 (08-29-24 @ 12:04)  SpO2: 96% (08-29-24 @ 12:04)  Wt(kg): --  I&O's Detail    28 Aug 2024 07:01  -  29 Aug 2024 07:00  --------------------------------------------------------  IN:    Dexmedetomidine: 625.6 mL    dextrose 5% + lactated ringers: 1680 mL    dextrose 5% + sodium chloride 0.9%: 200 mL    Heparin Infusion: 242 mL    Insulin: 144 mL    IV PiggyBack: 250 mL  Total IN: 3141.6 mL    OUT:    Indwelling Catheter - Urethral (mL): 4190 mL  Total OUT: 4190 mL    Total NET: -1048.4 mL      29 Aug 2024 07:01  -  29 Aug 2024 13:44  --------------------------------------------------------  IN:  Total IN: 0 mL    OUT:    Indwelling Catheter - Urethral (mL): 665 mL  Total OUT: 665 mL    Total NET: -665 mL        I&O's Summary    28 Aug 2024 07:01  -  29 Aug 2024 07:00  --------------------------------------------------------  IN: 3141.6 mL / OUT: 4190 mL / NET: -1048.4 mL    29 Aug 2024 07:01  -  29 Aug 2024 13:44  --------------------------------------------------------  IN: 0 mL / OUT: 665 mL / NET: -665 mL        Weight (kg): 80.1 (08-29 @ 13:08)    PHYSICAL EXAM:  Gen: NAD  Chest: b/l breath sounds  Abd: soft  Extremities: no edema, BKA      LABS:    08-29    140  |  101  |  33<H>  ----------------------------<  150<H>  3.4<L>   |  27  |  2.3<H>    Ca    8.6      29 Aug 2024 05:45  Phos  2.5     08-29  Mg     1.6     08-29    TPro  5.6<L>  /  Alb  3.1<L>  /  TBili  0.3  /  DBili      /  AST  26  /  ALT  13  /  AlkPhos  150<H>  08-29    Phosphorus: 2.5 mg/dL (08-29-24 @ 05:45)  Phosphorus: 3.6 mg/dL (08-28-24 @ 05:46)                            12.0   15.06 )-----------( 305      ( 29 Aug 2024 05:45 )             37.5     Mean Cell Volume: 80.3 fL (08-29-24 @ 05:45)      Creatinine trend:  Creatinine: 2.3 mg/dL (08-29-24 @ 05:45)  Creatinine: 2.4 mg/dL (08-28-24 @ 05:46)  Creatinine: 2.8 mg/dL (08-27-24 @ 19:08)  Creatinine: 3.1 mg/dL (08-27-24 @ 12:34)  Creatinine: 3.4 mg/dL (08-27-24 @ 05:57)  Creatinine: 3.6 mg/dL (08-26-24 @ 22:09)

## 2024-08-29 NOTE — PROGRESS NOTE ADULT - SUBJECTIVE AND OBJECTIVE BOX
INFECTIOUS DISEASE FOLLOW UP NOTE:    Interval History/ROS: Patient is a 59y old  Male who presents with a chief complaint of AMS (28 Aug 2024 09:13)      Overnight events:    REVIEW OF SYSTEMS:        Prior hospital charts reviewed [Yes]  Primary team notes reviewed [Yes]  Other consultant notes reviewed [Yes]    Allergies:  No Known Allergies      ANTIMICROBIALS:   piperacillin/tazobactam IVPB.. 3.375 every 12 hours      OTHER MEDS: MEDICATIONS  (STANDING):  cloNIDine Patch 0.2 mG/24Hr(s) 1 every 7 days  dexMEDEtomidine Infusion 0.5 <Continuous>  dextrose 50% Injectable 25 once  dextrose 50% Injectable 12.5 once  dextrose 50% Injectable 25 once  dextrose Oral Gel 15 once PRN  diazepam  Injectable 10 every 2 hours PRN  diazepam  Injectable 5 every 1 hour PRN  glucagon  Injectable 1 once  haloperidol    Injectable 5 every 6 hours PRN  heparin  Infusion. 1300 <Continuous>  insulin glargine Injectable (LANTUS) 10 two times a day  insulin lispro (ADMELOG) corrective regimen sliding scale  three times a day before meals      Vital Signs Last 24 Hrs  T(F): 98.2 (08-29-24 @ 05:34), Max: 100.2 (08-27-24 @ 17:02)    Vital Signs Last 24 Hrs  HR: 72 (08-29-24 @ 08:00) (61 - 80)  BP: 190/91 (08-29-24 @ 08:00) (154/72 - 194/91)  RR: 25 (08-29-24 @ 08:00)  SpO2: 97% (08-29-24 @ 08:00) (95% - 100%)  Wt(kg): --    EXAM:      Labs:                        12.0   15.06 )-----------( 305      ( 29 Aug 2024 05:45 )             37.5     08-29    140  |  101  |  33<H>  ----------------------------<  150<H>  3.4<L>   |  27  |  2.3<H>    Ca    8.6      29 Aug 2024 05:45  Phos  2.5     08-29  Mg     1.6     08-29    TPro  5.6<L>  /  Alb  3.1<L>  /  TBili  0.3  /  DBili  x   /  AST  26  /  ALT  13  /  AlkPhos  150<H>  08-29      WBC Trend:  WBC Count: 15.06 (08-29-24 @ 05:45)  WBC Count: 15.27 (08-28-24 @ 05:46)  WBC Count: 18.16 (08-27-24 @ 06:36)  WBC Count: 17.93 (08-27-24 @ 05:57)      Creatine Trend:  Creatinine: 2.3 (08-29)  Creatinine: 2.4 (08-28)  Creatinine: 2.8 (08-27)  Creatinine: 3.1 (08-27)      Liver Biochemical Testing Trend:  Alanine Aminotransferase (ALT/SGPT): 13 (08-29)  Alanine Aminotransferase (ALT/SGPT): 12 (08-28)  Alanine Aminotransferase (ALT/SGPT): 13 (08-27)  Alanine Aminotransferase (ALT/SGPT): 11 (08-27)  Alanine Aminotransferase (ALT/SGPT): 11 (08-26)  Aspartate Aminotransferase (AST/SGOT): 26 (08-29-24 @ 05:45)  Aspartate Aminotransferase (AST/SGOT): 23 (08-28-24 @ 05:46)  Aspartate Aminotransferase (AST/SGOT): 24 (08-27-24 @ 19:08)  Aspartate Aminotransferase (AST/SGOT): 15 (08-27-24 @ 05:57)  Aspartate Aminotransferase (AST/SGOT): 14 (08-26-24 @ 12:45)  Bilirubin Total: 0.3 (08-29)  Bilirubin Total: 0.2 (08-28)  Bilirubin Direct: <0.2 (08-27)  Bilirubin Total: <0.2 (08-27)  Bilirubin Direct: <0.2 (08-27)      Trend LDH      Urinalysis Basic - ( 29 Aug 2024 05:45 )    Color: x / Appearance: x / SG: x / pH: x  Gluc: 150 mg/dL / Ketone: x  / Bili: x / Urobili: x   Blood: x / Protein: x / Nitrite: x   Leuk Esterase: x / RBC: x / WBC x   Sq Epi: x / Non Sq Epi: x / Bacteria: x        MICROBIOLOGY:    MRSA PCR Result.: Positive (08-27-24 @ 10:54)  MRSA PCR Result.: Positive (06-28-24 @ 14:00)  MRSA PCR Result.: Positive (06-23-24 @ 18:00)      Culture - Urine (collected 27 Aug 2024 11:47)  Source: Catheterized Catheterized  Final Report:    No growth    Urinalysis with Rflx Culture (collected 27 Aug 2024 10:56)    Culture - Blood (collected 26 Aug 2024 12:45)  Source: .Blood Blood-Peripheral  Preliminary Report:    No growth at 48 Hours    Culture - Blood (collected 26 Aug 2024 12:45)  Source: .Blood Blood-Peripheral  Preliminary Report:    No growth at 48 Hours    Culture - Blood (collected 29 Jun 2024 18:54)  Source: .Blood None  Final Report:    No growth at 5 days    Culture - Blood (collected 29 Jun 2024 18:54)  Source: .Blood None  Final Report:    No growth at 5 days    Culture - Sputum (collected 23 Jun 2024 21:05)  Source: Trach Asp Tracheal Aspirate  Final Report:    No growth    Culture - Blood (collected 23 Jun 2024 18:43)  Source: .Blood None  Final Report:    No growth at 5 days    Culture - Blood (collected 23 Jun 2024 18:43)  Source: .Blood None  Final Report:    No growth at 5 days    Culture - Blood (collected 21 Jun 2024 15:59)  Source: .Blood None  Final Report:    No growth at 5 days      HIV-1/2 Combo Result: Nonreact (06-21-24 @ 15:59)    Troponin T, High Sensitivity Result: 54 (08-27)  Troponin T, High Sensitivity Result: 59 (08-26)  Troponin T, High Sensitivity Result: 60 (08-26)    Lactate, Blood: 1.5 (08-27 @ 05:57)  Blood Gas Venous - Lactate: 7.1 (08-26 @ 14:00)  Lactate, Blood: 4.6 (08-26 @ 13:30)  Blood Gas Venous - Lactate: 6.8 (08-26 @ 10:35)      RADIOLOGY:  imaging below personally reviewed   INFECTIOUS DISEASE FOLLOW UP NOTE:    Interval History/ROS: Patient is a 59y old  Male who presents with a chief complaint of AMS (28 Aug 2024 09:13)    Overnight events: Remains sedated due to severe agitation. Tried pulling lines overnight.    REVIEW OF SYSTEMS:  Unable to provide due to cognitive impairment      Prior hospital charts reviewed [Yes]  Primary team notes reviewed [Yes]  Other consultant notes reviewed [Yes]    Allergies:  No Known Allergies      ANTIMICROBIALS:   piperacillin/tazobactam IVPB.. 3.375 every 12 hours      OTHER MEDS: MEDICATIONS  (STANDING):  cloNIDine Patch 0.2 mG/24Hr(s) 1 every 7 days  dexMEDEtomidine Infusion 0.5 <Continuous>  dextrose 50% Injectable 25 once  dextrose 50% Injectable 12.5 once  dextrose 50% Injectable 25 once  dextrose Oral Gel 15 once PRN  diazepam  Injectable 10 every 2 hours PRN  diazepam  Injectable 5 every 1 hour PRN  glucagon  Injectable 1 once  haloperidol    Injectable 5 every 6 hours PRN  heparin  Infusion. 1300 <Continuous>  insulin glargine Injectable (LANTUS) 10 two times a day  insulin lispro (ADMELOG) corrective regimen sliding scale  three times a day before meals      Vital Signs Last 24 Hrs  T(F): 98.2 (08-29-24 @ 05:34), Max: 100.2 (08-27-24 @ 17:02)    Vital Signs Last 24 Hrs  HR: 72 (08-29-24 @ 08:00) (61 - 80)  BP: 190/91 (08-29-24 @ 08:00) (154/72 - 194/91)  RR: 25 (08-29-24 @ 08:00)  SpO2: 97% (08-29-24 @ 08:00) (95% - 100%)  Wt(kg): --    EXAM:  GENERAL: Lethargic, lying in bed; in restrains  HEAD: No head lesions  EYES: Conjunctiva pink and cornea white  EAR, NOSE, MOUTH, THROAT: Normal external ears and nose, no discharges; moist mucous membranes  NECK: Supple, tender to palpation on bilateral anterior neck; no JVD  RESPIRATORY: Clear to auscultation bilaterally  CARDIOVASCULAR: S1 S2  GASTROINTESTINAL: Soft, nontender, nondistended; normoactive bowel sounds  GENITOURINARY: + urbano catheter, no CVA tenderness  EXTREMITIES: No clubbing, cyanosis, or petal edema; s/p R BKA  NERVOUS SYSTEM: Lethargic but arousable, not fully oriented but able to answer simple questions  MUSCULOSKELETAL: No joint erythema, swelling or pain  SKIN: No rashes or lesions, no superficial thrombophlebitis  PSYCH: Sedated    Labs:                        12.0   15.06 )-----------( 305      ( 29 Aug 2024 05:45 )             37.5     08-29    140  |  101  |  33<H>  ----------------------------<  150<H>  3.4<L>   |  27  |  2.3<H>    Ca    8.6      29 Aug 2024 05:45  Phos  2.5     08-29  Mg     1.6     08-29    TPro  5.6<L>  /  Alb  3.1<L>  /  TBili  0.3  /  DBili  x   /  AST  26  /  ALT  13  /  AlkPhos  150<H>  08-29      WBC Trend:  WBC Count: 15.06 (08-29-24 @ 05:45)  WBC Count: 15.27 (08-28-24 @ 05:46)  WBC Count: 18.16 (08-27-24 @ 06:36)  WBC Count: 17.93 (08-27-24 @ 05:57)      Creatine Trend:  Creatinine: 2.3 (08-29)  Creatinine: 2.4 (08-28)  Creatinine: 2.8 (08-27)  Creatinine: 3.1 (08-27)      Liver Biochemical Testing Trend:  Alanine Aminotransferase (ALT/SGPT): 13 (08-29)  Alanine Aminotransferase (ALT/SGPT): 12 (08-28)  Alanine Aminotransferase (ALT/SGPT): 13 (08-27)  Alanine Aminotransferase (ALT/SGPT): 11 (08-27)  Alanine Aminotransferase (ALT/SGPT): 11 (08-26)  Aspartate Aminotransferase (AST/SGOT): 26 (08-29-24 @ 05:45)  Aspartate Aminotransferase (AST/SGOT): 23 (08-28-24 @ 05:46)  Aspartate Aminotransferase (AST/SGOT): 24 (08-27-24 @ 19:08)  Aspartate Aminotransferase (AST/SGOT): 15 (08-27-24 @ 05:57)  Aspartate Aminotransferase (AST/SGOT): 14 (08-26-24 @ 12:45)  Bilirubin Total: 0.3 (08-29)  Bilirubin Total: 0.2 (08-28)  Bilirubin Direct: <0.2 (08-27)  Bilirubin Total: <0.2 (08-27)  Bilirubin Direct: <0.2 (08-27)      Trend LDH      Urinalysis Basic - ( 29 Aug 2024 05:45 )    Color: x / Appearance: x / SG: x / pH: x  Gluc: 150 mg/dL / Ketone: x  / Bili: x / Urobili: x   Blood: x / Protein: x / Nitrite: x   Leuk Esterase: x / RBC: x / WBC x   Sq Epi: x / Non Sq Epi: x / Bacteria: x        MICROBIOLOGY:    MRSA PCR Result.: Positive (08-27-24 @ 10:54)  MRSA PCR Result.: Positive (06-28-24 @ 14:00)  MRSA PCR Result.: Positive (06-23-24 @ 18:00)      Culture - Urine (collected 27 Aug 2024 11:47)  Source: Catheterized Catheterized  Final Report:    No growth    Urinalysis with Rflx Culture (collected 27 Aug 2024 10:56)    Culture - Blood (collected 26 Aug 2024 12:45)  Source: .Blood Blood-Peripheral  Preliminary Report:    No growth at 48 Hours    Culture - Blood (collected 26 Aug 2024 12:45)  Source: .Blood Blood-Peripheral  Preliminary Report:    No growth at 48 Hours    Culture - Blood (collected 29 Jun 2024 18:54)  Source: .Blood None  Final Report:    No growth at 5 days    Culture - Blood (collected 29 Jun 2024 18:54)  Source: .Blood None  Final Report:    No growth at 5 days    Culture - Sputum (collected 23 Jun 2024 21:05)  Source: Trach Asp Tracheal Aspirate  Final Report:    No growth    Culture - Blood (collected 23 Jun 2024 18:43)  Source: .Blood None  Final Report:    No growth at 5 days    Culture - Blood (collected 23 Jun 2024 18:43)  Source: .Blood None  Final Report:    No growth at 5 days    Culture - Blood (collected 21 Jun 2024 15:59)  Source: .Blood None  Final Report:    No growth at 5 days      HIV-1/2 Combo Result: Nonreact (06-21-24 @ 15:59)    Troponin T, High Sensitivity Result: 54 (08-27)  Troponin T, High Sensitivity Result: 59 (08-26)  Troponin T, High Sensitivity Result: 60 (08-26)    Lactate, Blood: 1.5 (08-27 @ 05:57)  Blood Gas Venous - Lactate: 7.1 (08-26 @ 14:00)  Lactate, Blood: 4.6 (08-26 @ 13:30)  Blood Gas Venous - Lactate: 6.8 (08-26 @ 10:35)      RADIOLOGY:  imaging below personally reviewed    < from: US Head + Neck Soft Tissue (08.28.24 @ 16:47) >  Findings/  impression:    Study very limited due to patient difficulty tolerating exam.    Enlarged bilateral cervical lymph nodes measuring up to 2.4 x 0.7 cm on   the right and 2.6 x 0.8 x 1.4 cm on the left. Recommend CT evaluation   once patient is able to tolerate exam.    No discrete fluid collection identified on this very limited, truncated   exam. Recommend CT evaluation once patient is able to tolerate exam.    < end of copied text >    < from: [facility] Duplex Lower Ext Vein Scan, Bilat (08.26.24 @ 17:47) >  IMPRESSION:  No evidence of deep venous thrombosis in either lower extremity.   Bilateral saphenofemoral junction not visualized.    < end of copied text >    < from: Xray Chest 1 View- PORTABLE-Urgent (08.26.24 @ 10:43) >  Impression:      No acute pulmonary process    < end of copied text >    < from: MR Angio Neck No Cont (08.12.24 @ 09:02) >  IMPRESSION:    1.  RIGHT CAROTID NECK CIRCULATION:   Intact.    2.  LEFT CAROTID NECK CIRCULATION:    Intact.    3.  VERTEBRAL NECK CIRCULATION:   Intact    4.  ANTERIOR INTRACRANIAL CIRCULATION:     Intracranial atherosclerosis   cavernous and clinoid segments of the internal carotid arteries,   mild-to-moderate on the right and mild on the left.    5.  POSTERIOR INTRACRANIAL CIRCULATION:   Intact.    6. Right middle cerebellar peduncle lesion demonstrates evolution since   6/26/2024, findings consistent with acute infarction at that time    < end of copied text >

## 2024-08-29 NOTE — PROGRESS NOTE ADULT - SUBJECTIVE AND OBJECTIVE BOX
59y old  Male who presents with a chief complaint of Confusion      PAST MEDICAL & SURGICAL HISTORY:    DM (diabetes mellitus)  HTN (hypertension)  HLD (hyperlipidemia)  Herpes labialis  Anxiety  GERD (gastroesophageal reflux disease)  Kidney stones  20 years ago  Kidney disease  stage 4  Chronic kidney disease, unspecified CKD stage  Diabetic Charcot foot  PAD (peripheral artery disease)  S/P foot surgery, right  x 5 ( 2006 - 2013 )  Kidney stone  Removed by Laser x 2 ( 20 years ago )  H/O arthroscopy of right knee  Status post peripheral artery angioplasty  Amputee, below knee, right    Social History:  single (26 Aug 2024 13:50)    MEDICATIONS  (STANDING):  chlorhexidine 2% Cloths 1 Application(s) Topical <User Schedule>  cloNIDine Patch 0.2 mG/24Hr(s) 1 patch Transdermal every 7 days  dexMEDEtomidine Infusion 0.5 MICROgram(s)/kG/Hr (9.48 mL/Hr) IV Continuous <Continuous>  dextrose 5% + lactated ringers. 1000 milliLiter(s) (80 mL/Hr) IV Continuous <Continuous>  dextrose 5%. 1000 milliLiter(s) (50 mL/Hr) IV Continuous <Continuous>  dextrose 5%. 1000 milliLiter(s) (100 mL/Hr) IV Continuous <Continuous>  dextrose 50% Injectable 25 Gram(s) IV Push once  dextrose 50% Injectable 12.5 Gram(s) IV Push once  dextrose 50% Injectable 25 Gram(s) IV Push once  glucagon  Injectable 1 milliGRAM(s) IntraMuscular once  heparin  Infusion. 1300 Unit(s)/Hr (13 mL/Hr) IV Continuous <Continuous>  insulin glargine Injectable (LANTUS) 25 Unit(s) SubCutaneous two times a day  insulin lispro (ADMELOG) corrective regimen sliding scale   SubCutaneous three times a day before meals  insulin lispro Injectable (ADMELOG) 17 Unit(s) SubCutaneous three times a day before meals  insulin regular Infusion 3 Unit(s)/Hr (3 mL/Hr) IV Continuous <Continuous>  mupirocin 2% Nasal 1 Application(s) Both Nostrils every 12 hours  piperacillin/tazobactam IVPB.. 3.375 Gram(s) IV Intermittent every 12 hours  thiamine IVPB 500 milliGRAM(s) IV Intermittent daily    MEDICATIONS  (PRN):  dextrose Oral Gel 15 Gram(s) Oral once PRN Blood Glucose LESS THAN 70 milliGRAM(s)/deciliter  diazepam  Injectable 5 milliGRAM(s) IV Push every 1 hour PRN CIWA-Ar score of 8 or Greater  diazepam  Injectable 10 milliGRAM(s) IV Push every 2 hours PRN CIWA-Ar score increase by 2 points and a total score of 7 or less  haloperidol    Injectable 5 milliGRAM(s) IntraMuscular every 6 hours PRN Agitation    Allergies    No Known Allergies    Intolerances    ICU Vital Signs Last 24 Hrs  T(C): 36.8 (29 Aug 2024 05:34), Max: 37.3 (28 Aug 2024 23:38)  T(F): 98.2 (29 Aug 2024 05:34), Max: 99.1 (28 Aug 2024 23:38)  HR: 74 (29 Aug 2024 07:09) (61 - 80)  BP: 192/91 (29 Aug 2024 07:09) (154/72 - 194/91)  BP(mean): 131 (29 Aug 2024 04:00) (104 - 135)  ABP: --  ABP(mean): --  RR: 20 (29 Aug 2024 07:09) (16 - 31)  SpO2: 95% (29 Aug 2024 07:09) (95% - 100%)    O2 Parameters below as of 28 Aug 2024 08:00  Patient On (Oxygen Delivery Method): room air    CAPILLARY BLOOD GLUCOSE      POCT Blood Glucose.: 150 mg/dL (29 Aug 2024 06:33)  POCT Blood Glucose.: 117 mg/dL (29 Aug 2024 03:07)  POCT Blood Glucose.: 237 mg/dL (28 Aug 2024 21:27)  POCT Blood Glucose.: 30 mg/dL (28 Aug 2024 21:01)  POCT Blood Glucose.: 25 mg/dL (28 Aug 2024 20:56)  POCT Blood Glucose.: 58 mg/dL (28 Aug 2024 20:50)  POCT Blood Glucose.: 185 mg/dL (28 Aug 2024 17:06)  POCT Blood Glucose.: 45 mg/dL (28 Aug 2024 16:32)  POCT Blood Glucose.: 44 mg/dL (28 Aug 2024 16:30)  POCT Blood Glucose.: 125 mg/dL (28 Aug 2024 11:59)  POCT Blood Glucose.: 147 mg/dL (28 Aug 2024 07:54)    General: NAD, mild agitation   HEENT:    MMM, no new lesions, hoarseness of voice POA             Lymph Nodes: NO cervical LN   Lungs: Bilateral BS, no wheezing   Cardiovascular: Regular, normal S1 S2    Abdomen: Soft, Positive BS  Extremities: No clubbing   Skin: warm, no new rashes   Neurological: Awake mildly sedated   Musculoskeletal: move all ext BKA on the right                           12.0   15.06 )-----------( 305      ( 29 Aug 2024 05:45 )             37.5                           11.6   15.27 )-----------( 301      ( 28 Aug 2024 05:46 )             36.5   08-28    143  |  104  |  45<H>  ----------------------------<  69<L>  3.6   |  27  |  2.4<H>    Ca    8.0<L>      28 Aug 2024 05:46  Phos  3.6     08-28  Mg     1.6     08-28    TPro  5.6<L>  /  Alb  3.1<L>  /  TBili  0.2  /  DBili  x   /  AST  23  /  ALT  12  /  AlkPhos  126<H>  08-28 08-28    143  |  104  |  45<H>  ----------------------------<  69<L>  3.6   |  27  |  2.4<H>    Ca    8.0<L>      28 Aug 2024 05:46  Phos  3.6     08-28  Mg     1.6     08-28    TPro  5.6<L>  /  Alb  3.1<L>  /  TBili  0.2  /  DBili  x   /  AST  23  /  ALT  12  /  AlkPhos  126<H>  08-28

## 2024-08-29 NOTE — PROGRESS NOTE ADULT - ASSESSMENT
58 y/o male with hx of CKD 4, anemia of chronic disease, LUE on eliquis, recent CVA on DAPT, PAD, s/p right BKA, IDDM, substance abuse and recent hospital admission for PNA was brought in for confusion and agitation by family.     ID is following for leukocytosis  Afebrile, WBC 21.49 > 18.16 >15.06  On Room air  Reports having sore throat on Saturday 8/24, continued to get sicker  Became very confused on Monday 8/26  Might have missed a few doses of insulin due to not feeling well  BG > 1000  AG 43  Lactate 7.1  Acute renal failure, s/p emergent HD x 1 8/26 via [facility]  COVID/flu/RSV negative  BCx NGTD  UCx NGTD    CXR no PNA    Antibiotics:  Vancomycin 8/26  Cefepime 8/26      IMPRESSION:  Leukocytosis  Bilateral cervical lymphadenopathy  DM  Acute metabolic encephalopathy  DINESH on CKD  Hx CVA  Immunosuppression / Immunosenescence secondary to multiple comorbidities which could result in poor clinical outcome    RECOMMENDATIONS:  - Continue IV Zosyn 3.375g q12hrs  - Follow up CMV, EBV, HIV and toxo serologies  - CT Neck when stable to do so  - Follow up BCx and UCx  - DKA management as per primary  - Offloading and frequent position changes, aspiration precaution  - Trend WBC, fever curve, transaminases, creatinine daily      Silvana Snell D.O.  Attending Physician  Division of Infectious Diseases  [facility]  Please contact me via [employer] Teams

## 2024-08-29 NOTE — PROGRESS NOTE ADULT - SUBJECTIVE AND OBJECTIVE BOX
Patient is a 59y old  Male who presents with a chief complaint of AMS (28 Aug 2024 09:13)      Over Night Events:  Patient seen and examined.   no events     ROS:  See HPI    PHYSICAL EXAM    ICU Vital Signs Last 24 Hrs  T(C): 36.8 (29 Aug 2024 05:34), Max: 37.3 (28 Aug 2024 23:38)  T(F): 98.2 (29 Aug 2024 05:34), Max: 99.1 (28 Aug 2024 23:38)  HR: 74 (29 Aug 2024 07:09) (61 - 80)  BP: 192/91 (29 Aug 2024 07:09) (154/72 - 194/91)  BP(mean): 131 (29 Aug 2024 04:00) (104 - 131)  ABP: --  ABP(mean): --  RR: 20 (29 Aug 2024 07:09) (16 - 31)  SpO2: 95% (29 Aug 2024 07:09) (95% - 100%)        General: NAD, mild agitation   HEENT:    MMM, no new lesions, hoarseness of voice POA             Lymph Nodes: NO cervical LN   Lungs: Bilateral BS, no wheezing   Cardiovascular: Regular, normal S1 S2    Abdomen: Soft, Positive BS  Extremities: No clubbing   Skin: warm, no new rashes   Neurological: Awake mildly sedated   Musculoskeletal: move all ext BKA on the right     I&O's Detail    28 Aug 2024 07:01  -  29 Aug 2024 07:00  --------------------------------------------------------  IN:    Dexmedetomidine: 625.6 mL    dextrose 5% + lactated ringers: 1680 mL    dextrose 5% + sodium chloride 0.9%: 200 mL    Heparin Infusion: 242 mL    Insulin: 144 mL    IV PiggyBack: 250 mL  Total IN: 3141.6 mL    OUT:    Indwelling Catheter - Urethral (mL): 4040 mL  Total OUT: 4040 mL    Total NET: -898.4 mL          LABS:                          12.0   15.06 )-----------( 305      ( 29 Aug 2024 05:45 )             37.5         29 Aug 2024 05:45    140    |  101    |  33     ----------------------------<  150    3.4     |  27     |  2.3      Ca    8.6        29 Aug 2024 05:45  Phos  2.5       29 Aug 2024 05:45  Mg     1.6       29 Aug 2024 05:45    TPro  5.6    /  Alb  3.1    /  TBili  0.3    /  DBili  x      /  AST  26     /  ALT  13     /  AlkPhos  150    29 Aug 2024 05:45  Amylase x     lipase x                                                 PTT - ( 29 Aug 2024 04:25 )  PTT:44.2 sec                                       Urinalysis Basic - ( 29 Aug 2024 05:45 )    Color: x / Appearance: x / SG: x / pH: x  Gluc: 150 mg/dL / Ketone: x  / Bili: x / Urobili: x   Blood: x / Protein: x / Nitrite: x   Leuk Esterase: x / RBC: x / WBC x   Sq Epi: x / Non Sq Epi: x / Bacteria: x        Lactate, Blood: 1.5 mmol/L (08-27-24 @ 05:57)  Lactate, Blood: 4.6 mmol/L (08-26-24 @ 13:30)                                                          Culture - Urine (collected 27 Aug 2024 11:47)  Source: Catheterized Catheterized  Final Report (29 Aug 2024 01:06):    No growth    Urinalysis with Rflx Culture (collected 27 Aug 2024 10:56)    Culture - Blood (collected 26 Aug 2024 12:45)  Source: .Blood Blood-Peripheral  Preliminary Report (28 Aug 2024 22:01):    No growth at 48 Hours    Culture - Blood (collected 26 Aug 2024 12:45)  Source: .Blood Blood-Peripheral  Preliminary Report (28 Aug 2024 22:01):    No growth at 48 Hours                                                                                           MEDICATIONS  (STANDING):  chlorhexidine 2% Cloths 1 Application(s) Topical <User Schedule>  cloNIDine Patch 0.2 mG/24Hr(s) 1 patch Transdermal every 7 days  dexMEDEtomidine Infusion 0.5 MICROgram(s)/kG/Hr (9.48 mL/Hr) IV Continuous <Continuous>  dextrose 5% + lactated ringers. 1000 milliLiter(s) (80 mL/Hr) IV Continuous <Continuous>  dextrose 5%. 1000 milliLiter(s) (50 mL/Hr) IV Continuous <Continuous>  dextrose 5%. 1000 milliLiter(s) (100 mL/Hr) IV Continuous <Continuous>  dextrose 50% Injectable 25 Gram(s) IV Push once  dextrose 50% Injectable 12.5 Gram(s) IV Push once  dextrose 50% Injectable 25 Gram(s) IV Push once  glucagon  Injectable 1 milliGRAM(s) IntraMuscular once  heparin  Infusion. 1300 Unit(s)/Hr (13 mL/Hr) IV Continuous <Continuous>  insulin glargine Injectable (LANTUS) 25 Unit(s) SubCutaneous two times a day  insulin lispro (ADMELOG) corrective regimen sliding scale   SubCutaneous three times a day before meals  magnesium sulfate  IVPB 2 Gram(s) IV Intermittent every 2 hours  mupirocin 2% Nasal 1 Application(s) Both Nostrils every 12 hours  piperacillin/tazobactam IVPB.. 3.375 Gram(s) IV Intermittent every 12 hours  thiamine IVPB 500 milliGRAM(s) IV Intermittent daily    MEDICATIONS  (PRN):  dextrose Oral Gel 15 Gram(s) Oral once PRN Blood Glucose LESS THAN 70 milliGRAM(s)/deciliter  diazepam  Injectable 10 milliGRAM(s) IV Push every 2 hours PRN CIWA-Ar score increase by 2 points and a total score of 7 or less  diazepam  Injectable 5 milliGRAM(s) IV Push every 1 hour PRN CIWA-Ar score of 8 or Greater  haloperidol    Injectable 5 milliGRAM(s) IntraMuscular every 6 hours PRN Agitation

## 2024-08-30 LAB
ALBUMIN SERPL ELPH-MCNC: 3 G/DL — LOW (ref 3.5–5.2)
ALP SERPL-CCNC: 179 U/L — HIGH (ref 30–115)
ALT FLD-CCNC: 12 U/L — SIGNIFICANT CHANGE UP (ref 0–41)
ANION GAP SERPL CALC-SCNC: 19 MMOL/L — HIGH (ref 7–14)
APTT BLD: 56.1 SEC — HIGH (ref 27–39.2)
APTT BLD: 77.1 SEC — CRITICAL HIGH (ref 27–39.2)
AST SERPL-CCNC: 15 U/L — SIGNIFICANT CHANGE UP (ref 0–41)
BASOPHILS # BLD AUTO: 0.04 K/UL — SIGNIFICANT CHANGE UP (ref 0–0.2)
BASOPHILS NFR BLD AUTO: 0.2 % — SIGNIFICANT CHANGE UP (ref 0–1)
BILIRUB SERPL-MCNC: 0.3 MG/DL — SIGNIFICANT CHANGE UP (ref 0.2–1.2)
BUN SERPL-MCNC: 31 MG/DL — HIGH (ref 10–20)
CALCIUM SERPL-MCNC: 9.5 MG/DL — SIGNIFICANT CHANGE UP (ref 8.4–10.5)
CHLORIDE SERPL-SCNC: 98 MMOL/L — SIGNIFICANT CHANGE UP (ref 98–110)
CMV DNA CSF QL NAA+PROBE: SIGNIFICANT CHANGE UP IU/ML
CMV DNA SPEC NAA+PROBE-LOG#: SIGNIFICANT CHANGE UP LOG10IU/ML
CO2 SERPL-SCNC: 22 MMOL/L — SIGNIFICANT CHANGE UP (ref 17–32)
CREAT SERPL-MCNC: 2.4 MG/DL — HIGH (ref 0.7–1.5)
EBV DNA SERPL NAA+PROBE-ACNC: ABNORMAL IU/ML
EBV EA AB SER IA-ACNC: 11.3 U/ML — HIGH
EBV EA AB TITR SER IF: POSITIVE
EBV EA IGG SER-ACNC: POSITIVE
EBV NA IGG SER IA-ACNC: 540 U/ML — HIGH
EBV PATRN SPEC IB-IMP: SIGNIFICANT CHANGE UP
EBV VCA IGG AVIDITY SER QL IA: POSITIVE
EBV VCA IGM SER IA-ACNC: 109 U/ML — HIGH
EBV VCA IGM SER IA-ACNC: <10 U/ML — SIGNIFICANT CHANGE UP
EBV VCA IGM TITR FLD: NEGATIVE — SIGNIFICANT CHANGE UP
EBVPCR LOG: ABNORMAL LOG10IU/ML
EGFR: 30 ML/MIN/1.73M2 — LOW
EOSINOPHIL # BLD AUTO: 0.03 K/UL — SIGNIFICANT CHANGE UP (ref 0–0.7)
EOSINOPHIL NFR BLD AUTO: 0.2 % — SIGNIFICANT CHANGE UP (ref 0–8)
GLUCOSE BLDC GLUCOMTR-MCNC: 186 MG/DL — HIGH (ref 70–99)
GLUCOSE BLDC GLUCOMTR-MCNC: 250 MG/DL — HIGH (ref 70–99)
GLUCOSE BLDC GLUCOMTR-MCNC: 253 MG/DL — HIGH (ref 70–99)
GLUCOSE BLDC GLUCOMTR-MCNC: 308 MG/DL — HIGH (ref 70–99)
GLUCOSE BLDC GLUCOMTR-MCNC: 313 MG/DL — HIGH (ref 70–99)
GLUCOSE SERPL-MCNC: 371 MG/DL — HIGH (ref 70–99)
HCT VFR BLD CALC: 40.9 % — LOW (ref 42–52)
HCT VFR BLD CALC: 41.7 % — LOW (ref 42–52)
HGB BLD-MCNC: 12.8 G/DL — LOW (ref 14–18)
HGB BLD-MCNC: 13 G/DL — LOW (ref 14–18)
HIV 1+2 AB+HIV1 P24 AG SERPL QL IA: SIGNIFICANT CHANGE UP
IMM GRANULOCYTES NFR BLD AUTO: 0.4 % — HIGH (ref 0.1–0.3)
LYMPHOCYTES # BLD AUTO: 1.45 K/UL — SIGNIFICANT CHANGE UP (ref 1.2–3.4)
LYMPHOCYTES # BLD AUTO: 8 % — LOW (ref 20.5–51.1)
MAGNESIUM SERPL-MCNC: 2.1 MG/DL — SIGNIFICANT CHANGE UP (ref 1.8–2.4)
MCHC RBC-ENTMCNC: 25.3 PG — LOW (ref 27–31)
MCHC RBC-ENTMCNC: 25.4 PG — LOW (ref 27–31)
MCHC RBC-ENTMCNC: 31.2 G/DL — LOW (ref 32–37)
MCHC RBC-ENTMCNC: 31.3 G/DL — LOW (ref 32–37)
MCV RBC AUTO: 81.2 FL — SIGNIFICANT CHANGE UP (ref 80–94)
MCV RBC AUTO: 81.3 FL — SIGNIFICANT CHANGE UP (ref 80–94)
MONOCYTES # BLD AUTO: 1.14 K/UL — HIGH (ref 0.1–0.6)
MONOCYTES NFR BLD AUTO: 6.3 % — SIGNIFICANT CHANGE UP (ref 1.7–9.3)
NEUTROPHILS # BLD AUTO: 15.41 K/UL — HIGH (ref 1.4–6.5)
NEUTROPHILS NFR BLD AUTO: 84.9 % — HIGH (ref 42.2–75.2)
NRBC # BLD: 0 /100 WBCS — SIGNIFICANT CHANGE UP (ref 0–0)
NRBC # BLD: 0 /100 WBCS — SIGNIFICANT CHANGE UP (ref 0–0)
PHOSPHATE SERPL-MCNC: 3.9 MG/DL — SIGNIFICANT CHANGE UP (ref 2.1–4.9)
PLATELET # BLD AUTO: 365 K/UL — SIGNIFICANT CHANGE UP (ref 130–400)
PLATELET # BLD AUTO: 373 K/UL — SIGNIFICANT CHANGE UP (ref 130–400)
PMV BLD: 10.2 FL — SIGNIFICANT CHANGE UP (ref 7.4–10.4)
PMV BLD: 9.9 FL — SIGNIFICANT CHANGE UP (ref 7.4–10.4)
POTASSIUM SERPL-MCNC: 4.1 MMOL/L — SIGNIFICANT CHANGE UP (ref 3.5–5)
POTASSIUM SERPL-SCNC: 4.1 MMOL/L — SIGNIFICANT CHANGE UP (ref 3.5–5)
PROT SERPL-MCNC: 5.9 G/DL — LOW (ref 6–8)
RBC # BLD: 5.04 M/UL — SIGNIFICANT CHANGE UP (ref 4.7–6.1)
RBC # BLD: 5.13 M/UL — SIGNIFICANT CHANGE UP (ref 4.7–6.1)
RBC # FLD: 16 % — HIGH (ref 11.5–14.5)
RBC # FLD: 16.1 % — HIGH (ref 11.5–14.5)
SODIUM SERPL-SCNC: 139 MMOL/L — SIGNIFICANT CHANGE UP (ref 135–146)
T GONDII IGG SER QL: 10.4 IU/ML — HIGH
T GONDII IGG SER QL: POSITIVE
T GONDII IGM SER QL: <3 AU/ML — SIGNIFICANT CHANGE UP
T GONDII IGM SER QL: NEGATIVE — SIGNIFICANT CHANGE UP
WBC # BLD: 17.53 K/UL — HIGH (ref 4.8–10.8)
WBC # BLD: 18.14 K/UL — HIGH (ref 4.8–10.8)
WBC # FLD AUTO: 17.53 K/UL — HIGH (ref 4.8–10.8)
WBC # FLD AUTO: 18.14 K/UL — HIGH (ref 4.8–10.8)

## 2024-08-30 PROCEDURE — 70490 CT SOFT TISSUE NECK W/O DYE: CPT | Mod: 26

## 2024-08-30 PROCEDURE — 99233 SBSQ HOSP IP/OBS HIGH 50: CPT

## 2024-08-30 PROCEDURE — 71045 X-RAY EXAM CHEST 1 VIEW: CPT | Mod: 26

## 2024-08-30 RX ORDER — HEPARIN SODIUM,BOVINE 1000/ML
6500 VIAL (ML) INJECTION ONCE
Refills: 0 | Status: COMPLETED | OUTPATIENT
Start: 2024-08-30 | End: 2024-08-30

## 2024-08-30 RX ORDER — DEXAMETHASONE 0.75 MG
6 TABLET ORAL
Refills: 0 | Status: DISCONTINUED | OUTPATIENT
Start: 2024-08-30 | End: 2024-08-31

## 2024-08-30 RX ORDER — HEPARIN SODIUM,BOVINE 1000/ML
3000 VIAL (ML) INJECTION EVERY 6 HOURS
Refills: 0 | Status: DISCONTINUED | OUTPATIENT
Start: 2024-08-30 | End: 2024-09-03

## 2024-08-30 RX ORDER — INSULIN GLARGINE 100 [IU]/ML
17 INJECTION, SOLUTION SUBCUTANEOUS
Refills: 0 | Status: DISCONTINUED | OUTPATIENT
Start: 2024-08-30 | End: 2024-09-01

## 2024-08-30 RX ORDER — HEPARIN SODIUM,BOVINE 1000/ML
6500 VIAL (ML) INJECTION EVERY 6 HOURS
Refills: 0 | Status: DISCONTINUED | OUTPATIENT
Start: 2024-08-30 | End: 2024-09-03

## 2024-08-30 RX ORDER — HEPARIN SODIUM,BOVINE 1000/ML
2000 VIAL (ML) INJECTION
Qty: 25000 | Refills: 0 | Status: DISCONTINUED | OUTPATIENT
Start: 2024-08-30 | End: 2024-09-03

## 2024-08-30 RX ADMIN — Medication 6500 UNIT(S): at 02:20

## 2024-08-30 RX ADMIN — OLANZAPINE 5 MILLIGRAM(S): 7.5 TABLET ORAL at 05:12

## 2024-08-30 RX ADMIN — Medication 5 MILLIGRAM(S): at 07:31

## 2024-08-30 RX ADMIN — Medication 2200 UNIT(S)/HR: at 18:47

## 2024-08-30 RX ADMIN — Medication 1 APPLICATION(S): at 18:14

## 2024-08-30 RX ADMIN — Medication 1 PATCH: at 20:37

## 2024-08-30 RX ADMIN — Medication 2.5 MILLIGRAM(S): at 02:14

## 2024-08-30 RX ADMIN — Medication 80 MILLILITER(S): at 06:08

## 2024-08-30 RX ADMIN — Medication 3000 UNIT(S): at 18:47

## 2024-08-30 RX ADMIN — OLANZAPINE 5 MILLIGRAM(S): 7.5 TABLET ORAL at 18:13

## 2024-08-30 RX ADMIN — INSULIN GLARGINE 17 UNIT(S): 100 INJECTION, SOLUTION SUBCUTANEOUS at 21:55

## 2024-08-30 RX ADMIN — Medication 4: at 06:08

## 2024-08-30 RX ADMIN — DEXMEDETOMIDINE HYDROCHLORIDE IN 0.9% SODIUM CHLORIDE 9.48 MICROGRAM(S)/KG/HR: 4 INJECTION INTRAVENOUS at 23:25

## 2024-08-30 RX ADMIN — Medication 1 PATCH: at 02:45

## 2024-08-30 RX ADMIN — CHLORHEXIDINE GLUCONATE 1 APPLICATION(S): 40 SOLUTION TOPICAL at 05:11

## 2024-08-30 RX ADMIN — DEXMEDETOMIDINE HYDROCHLORIDE IN 0.9% SODIUM CHLORIDE 9.48 MICROGRAM(S)/KG/HR: 4 INJECTION INTRAVENOUS at 04:44

## 2024-08-30 RX ADMIN — Medication 1 APPLICATION(S): at 05:12

## 2024-08-30 RX ADMIN — Medication 1: at 18:16

## 2024-08-30 RX ADMIN — Medication 10 MILLIGRAM(S): at 13:25

## 2024-08-30 RX ADMIN — Medication 2.5 MILLIGRAM(S): at 23:26

## 2024-08-30 RX ADMIN — PIPERACILLIN SODIUM AND TAZOBACTAM SODIUM 25 GRAM(S): 3; .375 INJECTION, POWDER, FOR SOLUTION INTRAVENOUS at 05:11

## 2024-08-30 RX ADMIN — Medication 4: at 14:07

## 2024-08-30 RX ADMIN — PIPERACILLIN SODIUM AND TAZOBACTAM SODIUM 25 GRAM(S): 3; .375 INJECTION, POWDER, FOR SOLUTION INTRAVENOUS at 18:22

## 2024-08-30 RX ADMIN — Medication 10 MILLIGRAM(S): at 00:14

## 2024-08-30 RX ADMIN — INSULIN GLARGINE 17 UNIT(S): 100 INJECTION, SOLUTION SUBCUTANEOUS at 09:02

## 2024-08-30 RX ADMIN — Medication 2000 UNIT(S)/HR: at 02:16

## 2024-08-30 RX ADMIN — Medication 2000 UNIT(S)/HR: at 10:50

## 2024-08-30 RX ADMIN — Medication 105 MILLIGRAM(S): at 14:07

## 2024-08-30 RX ADMIN — Medication 6 MILLIGRAM(S): at 14:04

## 2024-08-30 RX ADMIN — Medication 1 PATCH: at 14:02

## 2024-08-30 RX ADMIN — Medication 1 PATCH: at 07:12

## 2024-08-30 NOTE — PROGRESS NOTE ADULT - ATTENDING COMMENTS
Attending Statement: I have personally performed a face to face diagnostic evaluation on this patient. The patient is suffering from:  Toxic metabolic encephalopathy  DKA/HHS with recurrent readmission   Hx of substance abuse   Severe HAGMA- improved  Sepsis POA  Elevated LA  DM  HTN  CKD  I have made amendments to the documentation where necessary. I have personally seen and examined this patient.  I have fully participated in the care of this patient.  I have reviewed all pertinent clinical information, including history, physical exam, plan and note.

## 2024-08-30 NOTE — PROGRESS NOTE ADULT - NS ATTEST RISK PROBLEM GEN_ALL_CORE FT
The patient's injury acutely impairs one or more vital organ systems, and there is a high probability of imminent or life threatening deterioration in the patient’s condition. The care of this patient requires complex medical decision making in the field of Infectious Diseases and extensive interpretation of laboratory, microbiological, and radiographic data

## 2024-08-30 NOTE — PROGRESS NOTE ADULT - SUBJECTIVE AND OBJECTIVE BOX
59y old  Male who presents with a chief complaint of Confusion      PAST MEDICAL & SURGICAL HISTORY:    DM (diabetes mellitus)  HTN (hypertension)  HLD (hyperlipidemia)  Herpes labialis  Anxiety  GERD (gastroesophageal reflux disease)  Kidney stones  20 years ago  Kidney disease  stage 4  Chronic kidney disease, unspecified CKD stage  Diabetic Charcot foot  PAD (peripheral artery disease)  S/P foot surgery, right  x 5 ( 2006 - 2013 )  Kidney stone  Removed by Laser x 2 ( 20 years ago )  H/O arthroscopy of right knee  Status post peripheral artery angioplasty  Amputee, below knee, right    Social History:  single (26 Aug 2024 13:50)    MEDICATIONS  (STANDING):  chlorhexidine 2% Cloths 1 Application(s) Topical <User Schedule>  cloNIDine Patch 0.3 mG/24Hr(s) 1 patch Transdermal every 7 days  dexMEDEtomidine Infusion 0.5 MICROgram(s)/kG/Hr (9.48 mL/Hr) IV Continuous <Continuous>  dextrose 5% + lactated ringers. 1000 milliLiter(s) (80 mL/Hr) IV Continuous <Continuous>  dextrose 5%. 1000 milliLiter(s) (50 mL/Hr) IV Continuous <Continuous>  dextrose 5%. 1000 milliLiter(s) (100 mL/Hr) IV Continuous <Continuous>  dextrose 50% Injectable 25 Gram(s) IV Push once  dextrose 50% Injectable 12.5 Gram(s) IV Push once  dextrose 50% Injectable 25 Gram(s) IV Push once  glucagon  Injectable 1 milliGRAM(s) IntraMuscular once  heparin  Infusion. 2000 Unit(s)/Hr (20 mL/Hr) IV Continuous <Continuous>  hydrALAZINE Injectable 2.5 milliGRAM(s) IV Push every 6 hours  insulin glargine Injectable (LANTUS) 10 Unit(s) SubCutaneous two times a day  insulin lispro (ADMELOG) corrective regimen sliding scale   SubCutaneous three times a day before meals  mupirocin 2% Nasal 1 Application(s) Both Nostrils every 12 hours  OLANZapine Injectable 5 milliGRAM(s) IntraMuscular every 12 hours  piperacillin/tazobactam IVPB.. 3.375 Gram(s) IV Intermittent every 12 hours  thiamine IVPB 500 milliGRAM(s) IV Intermittent daily    MEDICATIONS  (PRN):  dextrose Oral Gel 15 Gram(s) Oral once PRN Blood Glucose LESS THAN 70 milliGRAM(s)/deciliter  diazepam  Injectable 10 milliGRAM(s) IV Push every 2 hours PRN CIWA-Ar score increase by 2 points and a total score of 7 or less  diazepam  Injectable 5 milliGRAM(s) IV Push every 1 hour PRN CIWA-Ar score of 8 or Greater  haloperidol    Injectable 5 milliGRAM(s) IntraMuscular every 6 hours PRN Agitation  heparin   Injectable 6500 Unit(s) IV Push every 6 hours PRN For aPTT less than 40  heparin   Injectable 3000 Unit(s) IV Push every 6 hours PRN For aPTT between 40 - 57    Allergies    No Known Allergies    Intolerances    ICU Vital Signs Last 24 Hrs  T(C): 37 (30 Aug 2024 07:05), Max: 37.2 (29 Aug 2024 23:00)  T(F): 98.6 (30 Aug 2024 07:05), Max: 99 (29 Aug 2024 23:00)  HR: 73 (30 Aug 2024 07:05) (64 - 92)  BP: 138/77 (30 Aug 2024 07:05) (138/77 - 193/94)  BP(mean): 99 (30 Aug 2024 07:05) (99 - 135)  RR: 39 (30 Aug 2024 07:05) (19 - 39)  SpO2: 95% (30 Aug 2024 07:05) (91% - 97%)    Parameters below as of 29 Aug 2024 12:04  Patient On (Oxygen Delivery Method): room air    CAPILLARY BLOOD GLUCOSE      POCT Blood Glucose.: 313 mg/dL (30 Aug 2024 06:03)  POCT Blood Glucose.: 316 mg/dL (29 Aug 2024 21:07)  POCT Blood Glucose.: 370 mg/dL (29 Aug 2024 17:11)  POCT Blood Glucose.: 166 mg/dL (29 Aug 2024 11:19)  POCT Blood Glucose.: 149 mg/dL (29 Aug 2024 07:44)      General: NAD, mild agitation   HEENT:    MMM, no new lesions, hoarseness of voice POA             Lymph Nodes: NO cervical LN   Lungs: Bilateral BS, no wheezing   Cardiovascular: Regular, normal S1 S2    Abdomen: Soft, Positive BS  Extremities: No clubbing   Skin: warm, no new rashes   Neurological: Awake mildly sedated   Musculoskeletal: move all ext BKA on the right                           12.0   15.06 )-----------( 305      ( 29 Aug 2024 05:45 )             37.5                           11.6   15.27 )-----------( 301      ( 28 Aug 2024 05:46 )             36.5   08-28    143  |  104  |  45<H>  ----------------------------<  69<L>  3.6   |  27  |  2.4<H>    Ca    8.0<L>      28 Aug 2024 05:46  Phos  3.6     08-28  Mg     1.6     08-28    TPro  5.6<L>  /  Alb  3.1<L>  /  TBili  0.2  /  DBili  x   /  AST  23  /  ALT  12  /  AlkPhos  126<H>  08-28 08-28    143  |  104  |  45<H>  ----------------------------<  69<L>  3.6   |  27  |  2.4<H>    Ca    8.0<L>      28 Aug 2024 05:46  Phos  3.6     08-28  Mg     1.6     08-28    TPro  5.6<L>  /  Alb  3.1<L>  /  TBili  0.2  /  DBili  x   /  AST  23  /  ALT  12  /  AlkPhos  126<H>  08-28

## 2024-08-30 NOTE — PROGRESS NOTE ADULT - ASSESSMENT
IMPRESSION:    Toxic metabolic encephalopathy  DKA/HHS with recurrent readmission   Hx of substance abuse found cocaine and benzo   Severe HAGMA- improved  Sepsis POA- resolved   Elevated LA- resolved   Throat pain POA- US shows bilateral cervical LAD  subclinical hyperthyroid  DM  HTN  CKD 4  HO CVA  HO LUE DVT on Eliquis DVT is resolved- completing 3mo ac       PLAN:    CNS: CT Head, Thiamine and Folate. taper clonidine patch , and resume home xanax as needed once out of withdrawal window, c/w zyprexa for extreme agitation as needed, valium benzo withdrawal, consider NG tube for PO meds     HEENT: Oral care, f/u neck us/ CT neck     PULMONARY:  HOB @ 45 degrees.  Aspiration precautions.  maintain  SpO2 92-96%    CARDIOVASCULAR: Monitor for signs of volume overload.  TTE noted. strict i/os, needs better BP control, add home meds hold ace/arb for now give as needed hydral IV     GI: GI prophylaxis. failed S/S for non - cooperation     RENAL:  BMP q4 hours, HD per nephro no indication for HD as of now,  monitor i/o     INFECTIOUS DISEASE:   MRSA swab + c/w mupirocin, c/w zosyn, ID on board, recommended CT neck for throat pain     HEMATOLOGICAL:  heparin drip, resume Eliquis when the patient able to swallow     ENDOCRINOLOGY: c/w lantus and sliding scale    MUSCULOSKELETAL: Bedrest    MICU

## 2024-08-30 NOTE — PROGRESS NOTE ADULT - ASSESSMENT
58 y/o male with hx of CKD 4, anemia of chronic disease, LUE on eliquis, recent CVA on DAPT, PAD, s/p right BKA, IDDM, substance abuse and recent hospital admission for PNA was brought in for confusion and agitation by family.     ID is following for leukocytosis  Afebrile, WBC 21.49 > 18.16 >15.06 > 17.5  On Room air  Reports having sore throat on Saturday 8/24, continued to get sicker  Became very confused on Monday 8/26  Might have missed a few doses of insulin due to not feeling well  BG > 1000  AG 43  Lactate 7.1  Acute renal failure, s/p emergent HD x 1 8/26 via [place]  COVID/flu/RSV negative  BCx NGTD  UCx NGTD  HIV negative    CXR no PNA    Antibiotics:  Vancomycin 8/26  Cefepime 8/26      IMPRESSION:  Leukocytosis  Bilateral cervical lymphadenopathy  DM  Acute metabolic encephalopathy  DINESH on CKD  Hx CVA  substance abuse  Immunosuppression / Immunosenescence secondary to multiple comorbidities which could result in poor clinical outcome    RECOMMENDATIONS:  - Continue IV Zosyn 3.375g q12hrs  - Follow up CMV, EBV, toxo serologies  - CT Neck when stable to do so  - DKA management as per primary  - Withdrawal symptoms managed by Addiction and primary  - Offloading and frequent position changes, aspiration precaution  - Trend WBC, fever curve, transaminases, creatinine daily      * THIS IS AN INCOMPLETE NOTE. FINAL RECOMMENDATION IS PENDING *   60 y/o male with hx of CKD 4, anemia of chronic disease, LUE on eliquis, recent CVA on DAPT, PAD, s/p right BKA, IDDM, substance abuse and recent hospital admission for PNA was brought in for confusion and agitation by family.     ID is following for leukocytosis  Afebrile, WBC 21.49 > 18.16 >15.06 > 17.5  On Room air  Reports having sore throat on Saturday 8/24, continued to get sicker  Became very confused on Monday 8/26  Might have missed a few doses of insulin due to not feeling well  BG > 1000  AG 43  Lactate 7.1  Acute renal failure, s/p emergent HD x 1 8/26 via [place]  COVID/flu/RSV negative  BCx NGTD  UCx NGTD  HIV negative    CXR no PNA    Antibiotics:  Vancomycin 8/26  Cefepime 8/26      IMPRESSION:  Right tonsilitis, cannot rule out peritonsillar abscess  Leukocytosis  Bilateral cervical lymphadenopathy  DM  Acute metabolic encephalopathy  DINESH on CKD  Hx CVA  substance abuse  Immunosuppression / Immunosenescence secondary to multiple comorbidities which could result in poor clinical outcome    RECOMMENDATIONS:  - Continue IV Zosyn 3.375g q12hrs  - Follow up CMV, EBV, toxo serologies  - ENT eval, consider steroid given narrowing of airway  - DKA management as per primary  - Withdrawal symptoms managed by Addiction and primary  - Offloading and frequent position changes, aspiration precaution  - Trend WBC, fever curve, transaminases, creatinine daily      Silvana Snell D.O.  Attending Physician  Division of Infectious Diseases  [facility]  Please contact me via [employer] Teams

## 2024-08-30 NOTE — PROGRESS NOTE ADULT - SUBJECTIVE AND OBJECTIVE BOX
INFECTIOUS DISEASE FOLLOW UP NOTE:    Interval History/ROS: Patient is a 59y old  Male who presents with a chief complaint of Diabetes mellitus due to underlying condition with ketoacidosis without coma     (29 Aug 2024 18:02)      Overnight events:    REVIEW OF SYSTEMS:        Prior hospital charts reviewed [Yes]  Primary team notes reviewed [Yes]  Other consultant notes reviewed [Yes]    Allergies:  No Known Allergies      ANTIMICROBIALS:   piperacillin/tazobactam IVPB.. 3.375 every 12 hours      OTHER MEDS: MEDICATIONS  (STANDING):  cloNIDine Patch 0.3 mG/24Hr(s) 1 every 7 days  dexMEDEtomidine Infusion 0.5 <Continuous>  dextrose 50% Injectable 25 once  dextrose 50% Injectable 12.5 once  dextrose 50% Injectable 25 once  dextrose Oral Gel 15 once PRN  diazepam  Injectable 10 every 2 hours PRN  diazepam  Injectable 5 every 1 hour PRN  glucagon  Injectable 1 once  haloperidol    Injectable 5 every 6 hours PRN  heparin   Injectable 6500 every 6 hours PRN  heparin   Injectable 3000 every 6 hours PRN  heparin  Infusion. 2000 <Continuous>  hydrALAZINE Injectable 2.5 every 6 hours  insulin glargine Injectable (LANTUS) 17 two times a day  insulin lispro (ADMELOG) corrective regimen sliding scale  three times a day before meals  OLANZapine Injectable 5 every 12 hours      Vital Signs Last 24 Hrs  T(F): 98.6 (08-30-24 @ 07:05), Max: 100.2 (08-27-24 @ 17:02)    Vital Signs Last 24 Hrs  HR: 87 (08-30-24 @ 09:00) (64 - 92)  BP: 116/74 (08-30-24 @ 09:00) (116/74 - 181/94)  RR: 42 (08-30-24 @ 09:00)  SpO2: 95% (08-30-24 @ 09:00) (91% - 96%)  Wt(kg): --    EXAM:      Labs:                        13.0   17.53 )-----------( 373      ( 30 Aug 2024 09:00 )             41.7     08-29    140  |  101  |  33<H>  ----------------------------<  150<H>  3.4<L>   |  27  |  2.3<H>    Ca    8.6      29 Aug 2024 05:45  Phos  2.5     08-29  Mg     1.6     08-29    TPro  5.6<L>  /  Alb  3.1<L>  /  TBili  0.3  /  DBili  x   /  AST  26  /  ALT  13  /  AlkPhos  150<H>  08-29      WBC Trend:  WBC Count: 17.53 (08-30-24 @ 09:00)  WBC Count: 18.14 (08-30-24 @ 05:59)  WBC Count: 15.06 (08-29-24 @ 05:45)  WBC Count: 15.27 (08-28-24 @ 05:46)      Creatine Trend:  Creatinine: 2.3 (08-29)  Creatinine: 2.4 (08-28)  Creatinine: 2.8 (08-27)  Creatinine: 3.1 (08-27)      Liver Biochemical Testing Trend:  Alanine Aminotransferase (ALT/SGPT): 13 (08-29)  Alanine Aminotransferase (ALT/SGPT): 12 (08-28)  Alanine Aminotransferase (ALT/SGPT): 13 (08-27)  Alanine Aminotransferase (ALT/SGPT): 11 (08-27)  Alanine Aminotransferase (ALT/SGPT): 11 (08-26)  Aspartate Aminotransferase (AST/SGOT): 26 (08-29-24 @ 05:45)  Aspartate Aminotransferase (AST/SGOT): 23 (08-28-24 @ 05:46)  Aspartate Aminotransferase (AST/SGOT): 24 (08-27-24 @ 19:08)  Aspartate Aminotransferase (AST/SGOT): 15 (08-27-24 @ 05:57)  Aspartate Aminotransferase (AST/SGOT): 14 (08-26-24 @ 12:45)  Bilirubin Total: 0.3 (08-29)  Bilirubin Total: 0.2 (08-28)  Bilirubin Direct: <0.2 (08-27)  Bilirubin Total: <0.2 (08-27)  Bilirubin Direct: <0.2 (08-27)      Trend LDH      Urinalysis Basic - ( 29 Aug 2024 05:45 )    Color: x / Appearance: x / SG: x / pH: x  Gluc: 150 mg/dL / Ketone: x  / Bili: x / Urobili: x   Blood: x / Protein: x / Nitrite: x   Leuk Esterase: x / RBC: x / WBC x   Sq Epi: x / Non Sq Epi: x / Bacteria: x        MICROBIOLOGY:    MRSA PCR Result.: Positive (08-27-24 @ 10:54)  MRSA PCR Result.: Positive (06-28-24 @ 14:00)  MRSA PCR Result.: Positive (06-23-24 @ 18:00)      Culture - Urine (collected 27 Aug 2024 11:47)  Source: Catheterized Catheterized  Final Report:    No growth    Urinalysis with Rflx Culture (collected 27 Aug 2024 10:56)    Culture - Blood (collected 26 Aug 2024 12:45)  Source: .Blood Blood-Peripheral  Preliminary Report:    No growth at 72 Hours    Culture - Blood (collected 26 Aug 2024 12:45)  Source: .Blood Blood-Peripheral  Preliminary Report:    No growth at 72 Hours    Culture - Blood (collected 29 Jun 2024 18:54)  Source: .Blood None  Final Report:    No growth at 5 days    Culture - Blood (collected 29 Jun 2024 18:54)  Source: .Blood None  Final Report:    No growth at 5 days    Culture - Sputum (collected 23 Jun 2024 21:05)  Source: Trach Asp Tracheal Aspirate  Final Report:    No growth    Culture - Blood (collected 23 Jun 2024 18:43)  Source: .Blood None  Final Report:    No growth at 5 days    Culture - Blood (collected 23 Jun 2024 18:43)  Source: .Blood None  Final Report:    No growth at 5 days    Culture - Blood (collected 21 Jun 2024 15:59)  Source: .Blood None  Final Report:    No growth at 5 days      HIV-1/2 Combo Result: Nonreact (08-29-24 @ 11:21)  HIV-1/2 Combo Result: Nonreact (06-21-24 @ 15:59)    Troponin T, High Sensitivity Result: 54 (08-27)  Troponin T, High Sensitivity Result: 59 (08-26)  Troponin T, High Sensitivity Result: 60 (08-26)        RADIOLOGY:  imaging below personally reviewed      < from: US Head + Neck Soft Tissue (08.28.24 @ 16:47) >  Findings/  impression:    Study very limited due to patient difficulty tolerating exam.    Enlarged bilateral cervical lymph nodes measuring up to 2.4 x 0.7 cm on   the right and 2.6 x 0.8 x 1.4 cm on the left. Recommend CT evaluation   once patient is able to tolerate exam.    No discrete fluid collection identified on this very limited, truncated   exam. Recommend CT evaluation once patient is able to tolerate exam.    < end of copied text >    < from: VA Duplex Lower Ext Vein Scan, Bilat (08.26.24 @ 17:47) >  IMPRESSION:  No evidence of deep venous thrombosis in either lower extremity.   Bilateral saphenofemoral junction not visualized.    < end of copied text >    < from: Xray Chest 1 View- PORTABLE-Urgent (08.26.24 @ 10:43) >  Impression:      No acute pulmonary process    < end of copied text >    < from: MR Angio Neck No Cont (08.12.24 @ 09:02) >  IMPRESSION:    1.  RIGHT CAROTID NECK CIRCULATION:   Intact.    2.  LEFT CAROTID NECK CIRCULATION:    Intact.    3.  VERTEBRAL NECK CIRCULATION:   Intact    4.  ANTERIOR INTRACRANIAL CIRCULATION:     Intracranial atherosclerosis   cavernous and clinoid segments of the internal carotid arteries,   mild-to-moderate on the right and mild on the left.    5.  POSTERIOR INTRACRANIAL CIRCULATION:   Intact.    6. Right middle cerebellar peduncle lesion demonstrates evolution since   6/26/2024, findings consistent with acute infarction at that time    < end of copied text > INFECTIOUS DISEASE FOLLOW UP NOTE:    Interval History/ROS: Patient is a 59y old  Male who presents with a chief complaint of Diabetes mellitus due to underlying condition with ketoacidosis without coma     (29 Aug 2024 18:02)    Overnight events: Low dose sedation. Slightly more awake. Still complaining neck pain.    REVIEW OF SYSTEMS:  Unable to provide due to cognitive impairment      Prior hospital charts reviewed [Yes]  Primary team notes reviewed [Yes]  Other consultant notes reviewed [Yes]    Allergies:  No Known Allergies      ANTIMICROBIALS:   piperacillin/tazobactam IVPB.. 3.375 every 12 hours      OTHER MEDS: MEDICATIONS  (STANDING):  cloNIDine Patch 0.3 mG/24Hr(s) 1 every 7 days  dexMEDEtomidine Infusion 0.5 <Continuous>  dextrose 50% Injectable 25 once  dextrose 50% Injectable 12.5 once  dextrose 50% Injectable 25 once  dextrose Oral Gel 15 once PRN  diazepam  Injectable 10 every 2 hours PRN  diazepam  Injectable 5 every 1 hour PRN  glucagon  Injectable 1 once  haloperidol    Injectable 5 every 6 hours PRN  heparin   Injectable 6500 every 6 hours PRN  heparin   Injectable 3000 every 6 hours PRN  heparin  Infusion. 2000 <Continuous>  hydrALAZINE Injectable 2.5 every 6 hours  insulin glargine Injectable (LANTUS) 17 two times a day  insulin lispro (ADMELOG) corrective regimen sliding scale  three times a day before meals  OLANZapine Injectable 5 every 12 hours      Vital Signs Last 24 Hrs  T(F): 98.6 (08-30-24 @ 07:05), Max: 100.2 (08-27-24 @ 17:02)    Vital Signs Last 24 Hrs  HR: 87 (08-30-24 @ 09:00) (64 - 92)  BP: 116/74 (08-30-24 @ 09:00) (116/74 - 181/94)  RR: 42 (08-30-24 @ 09:00)  SpO2: 95% (08-30-24 @ 09:00) (91% - 96%)  Wt(kg): --    EXAM:  GENERAL: Lethargic, lying in bed; in restrains  HEAD: No head lesions  EYES: Conjunctiva pink and cornea white  EAR, NOSE, MOUTH, THROAT: Normal external ears and nose, no discharges; moist mucous membranes  NECK: Supple, tender to palpation on bilateral anterior neck; no JVD  RESPIRATORY: Clear to auscultation bilaterally  CARDIOVASCULAR: S1 S2  GASTROINTESTINAL: Soft, nontender, nondistended; normoactive bowel sounds  GENITOURINARY: + urbano catheter, no CVA tenderness  EXTREMITIES: No clubbing, cyanosis, or petal edema; s/p R BKA  NERVOUS SYSTEM: Lethargic but arousable, not fully oriented but able to answer simple questions  MUSCULOSKELETAL: No joint erythema, swelling or pain  SKIN: No rashes or lesions, no superficial thrombophlebitis  PSYCH: Sedated    Labs:                        13.0   17.53 )-----------( 373      ( 30 Aug 2024 09:00 )             41.7     08-29    140  |  101  |  33<H>  ----------------------------<  150<H>  3.4<L>   |  27  |  2.3<H>    Ca    8.6      29 Aug 2024 05:45  Phos  2.5     08-29  Mg     1.6     08-29    TPro  5.6<L>  /  Alb  3.1<L>  /  TBili  0.3  /  DBili  x   /  AST  26  /  ALT  13  /  AlkPhos  150<H>  08-29      WBC Trend:  WBC Count: 17.53 (08-30-24 @ 09:00)  WBC Count: 18.14 (08-30-24 @ 05:59)  WBC Count: 15.06 (08-29-24 @ 05:45)  WBC Count: 15.27 (08-28-24 @ 05:46)      Creatine Trend:  Creatinine: 2.3 (08-29)  Creatinine: 2.4 (08-28)  Creatinine: 2.8 (08-27)  Creatinine: 3.1 (08-27)      Liver Biochemical Testing Trend:  Alanine Aminotransferase (ALT/SGPT): 13 (08-29)  Alanine Aminotransferase (ALT/SGPT): 12 (08-28)  Alanine Aminotransferase (ALT/SGPT): 13 (08-27)  Alanine Aminotransferase (ALT/SGPT): 11 (08-27)  Alanine Aminotransferase (ALT/SGPT): 11 (08-26)  Aspartate Aminotransferase (AST/SGOT): 26 (08-29-24 @ 05:45)  Aspartate Aminotransferase (AST/SGOT): 23 (08-28-24 @ 05:46)  Aspartate Aminotransferase (AST/SGOT): 24 (08-27-24 @ 19:08)  Aspartate Aminotransferase (AST/SGOT): 15 (08-27-24 @ 05:57)  Aspartate Aminotransferase (AST/SGOT): 14 (08-26-24 @ 12:45)  Bilirubin Total: 0.3 (08-29)  Bilirubin Total: 0.2 (08-28)  Bilirubin Direct: <0.2 (08-27)  Bilirubin Total: <0.2 (08-27)  Bilirubin Direct: <0.2 (08-27)      Trend LDH      Urinalysis Basic - ( 29 Aug 2024 05:45 )    Color: x / Appearance: x / SG: x / pH: x  Gluc: 150 mg/dL / Ketone: x  / Bili: x / Urobili: x   Blood: x / Protein: x / Nitrite: x   Leuk Esterase: x / RBC: x / WBC x   Sq Epi: x / Non Sq Epi: x / Bacteria: x        MICROBIOLOGY:    MRSA PCR Result.: Positive (08-27-24 @ 10:54)  MRSA PCR Result.: Positive (06-28-24 @ 14:00)  MRSA PCR Result.: Positive (06-23-24 @ 18:00)      Culture - Urine (collected 27 Aug 2024 11:47)  Source: Catheterized Catheterized  Final Report:    No growth    Urinalysis with Rflx Culture (collected 27 Aug 2024 10:56)    Culture - Blood (collected 26 Aug 2024 12:45)  Source: .Blood Blood-Peripheral  Preliminary Report:    No growth at 72 Hours    Culture - Blood (collected 26 Aug 2024 12:45)  Source: .Blood Blood-Peripheral  Preliminary Report:    No growth at 72 Hours    Culture - Blood (collected 29 Jun 2024 18:54)  Source: .Blood None  Final Report:    No growth at 5 days    Culture - Blood (collected 29 Jun 2024 18:54)  Source: .Blood None  Final Report:    No growth at 5 days    Culture - Sputum (collected 23 Jun 2024 21:05)  Source: Trach Asp Tracheal Aspirate  Final Report:    No growth    Culture - Blood (collected 23 Jun 2024 18:43)  Source: .Blood None  Final Report:    No growth at 5 days    Culture - Blood (collected 23 Jun 2024 18:43)  Source: .Blood None  Final Report:    No growth at 5 days    Culture - Blood (collected 21 Jun 2024 15:59)  Source: .Blood None  Final Report:    No growth at 5 days      HIV-1/2 Combo Result: Nonreact (08-29-24 @ 11:21)  HIV-1/2 Combo Result: Nonreact (06-21-24 @ 15:59)    Troponin T, High Sensitivity Result: 54 (08-27)  Troponin T, High Sensitivity Result: 59 (08-26)  Troponin T, High Sensitivity Result: 60 (08-26)        RADIOLOGY:  imaging below personally reviewed    < from: CT Neck Soft Tissue No Cont (08.30.24 @ 12:42) >  IMPRESSION:    1.  Enlargement and hypoattenuation of the right palatine tonsil   consistent with tonsillitis. Cannot assess for associated abscess without   IV contrast.    2.  Associated edema along the right nasopharyngeal, oropharyngeal and   supraglottic laryngeal walls with moderate narrowing of the airway.    3.  Probably reactive right upper cervical chain lymphadenopathy.    < end of copied text >    < from: US Head + Neck Soft Tissue (08.28.24 @ 16:47) >  Findings/  impression:    Study very limited due to patient difficulty tolerating exam.    Enlarged bilateral cervical lymph nodes measuring up to 2.4 x 0.7 cm on   the right and 2.6 x 0.8 x 1.4 cm on the left. Recommend CT evaluation   once patient is able to tolerate exam.    No discrete fluid collection identified on this very limited, truncated   exam. Recommend CT evaluation once patient is able to tolerate exam.    < end of copied text >    < from: VA Duplex Lower Ext Vein Scan, Bilat (08.26.24 @ 17:47) >  IMPRESSION:  No evidence of deep venous thrombosis in either lower extremity.   Bilateral saphenofemoral junction not visualized.    < end of copied text >    < from: Xray Chest 1 View- PORTABLE-Urgent (08.26.24 @ 10:43) >  Impression:      No acute pulmonary process    < end of copied text >    < from: MR Angio Neck No Cont (08.12.24 @ 09:02) >  IMPRESSION:    1.  RIGHT CAROTID NECK CIRCULATION:   Intact.    2.  LEFT CAROTID NECK CIRCULATION:    Intact.    3.  VERTEBRAL NECK CIRCULATION:   Intact    4.  ANTERIOR INTRACRANIAL CIRCULATION:     Intracranial atherosclerosis   cavernous and clinoid segments of the internal carotid arteries,   mild-to-moderate on the right and mild on the left.    5.  POSTERIOR INTRACRANIAL CIRCULATION:   Intact.    6. Right middle cerebellar peduncle lesion demonstrates evolution since   6/26/2024, findings consistent with acute infarction at that time    < end of copied text >

## 2024-08-30 NOTE — PROGRESS NOTE ADULT - SUBJECTIVE AND OBJECTIVE BOX
Patient is a 59y old  Male who presents with a chief complaint of AMS (30 Aug 2024 09:38)      Over Night Events:  Patient seen and examined.   The patient is using less valium       ROS:  See HPI    PHYSICAL EXAM    ICU Vital Signs Last 24 Hrs  T(C): 37.3 (30 Aug 2024 11:00), Max: 37.3 (30 Aug 2024 11:00)  T(F): 99.1 (30 Aug 2024 11:00), Max: 99.1 (30 Aug 2024 11:00)  HR: 102 (30 Aug 2024 11:00) (71 - 102)  BP: 166/83 (30 Aug 2024 11:00) (116/74 - 181/83)  BP(mean): 115 (30 Aug 2024 11:00) (90 - 131)  ABP: --  ABP(mean): --  RR: 29 (30 Aug 2024 11:00) (19 - 42)  SpO2: 95% (30 Aug 2024 11:00) (91% - 96%)        General: NAD, mild sedated and more appropriate today   HEENT:    MMM, no new lesions, hoarseness of voice POA             Lymph Nodes: NO cervical LN   Lungs: Bilateral BS, no wheezing   Cardiovascular: Regular, normal S1 S2    Abdomen: Soft, Positive BS  Extremities: No clubbing   Skin: warm, no new rashes   Neurological: Awake mildly sedated   Musculoskeletal: move all ext BKA on the right     I&O's Detail    29 Aug 2024 07:01  -  30 Aug 2024 07:00  --------------------------------------------------------  IN:    Dexmedetomidine: 462.4 mL    dextrose 5% + lactated ringers: 1360 mL    Heparin Infusion: 278 mL    Heparin Infusion: 60 mL    IV PiggyBack: 400 mL  Total IN: 2560.4 mL    OUT:    Indwelling Catheter - Urethral (mL): 2300 mL  Total OUT: 2300 mL    Total NET: 260.4 mL      30 Aug 2024 07:01  -  30 Aug 2024 13:03  --------------------------------------------------------  IN:  Total IN: 0 mL    OUT:    Indwelling Catheter - Urethral (mL): 235 mL  Total OUT: 235 mL    Total NET: -235 mL          LABS:                          13.0   17.53 )-----------( 373      ( 30 Aug 2024 09:00 )             41.7         30 Aug 2024 05:59    139    |  98     |  31     ----------------------------<  371    4.1     |  22     |  2.4      Ca    9.5        30 Aug 2024 05:59  Phos  3.9       30 Aug 2024 05:59  Mg     2.1       30 Aug 2024 05:59    TPro  5.9    /  Alb  3.0    /  TBili  0.3    /  DBili  x      /  AST  15     /  ALT  12     /  AlkPhos  179    30 Aug 2024 05:59  Amylase x     lipase x                                                 PTT - ( 30 Aug 2024 09:00 )  PTT:77.1 sec                                       Urinalysis Basic - ( 30 Aug 2024 05:59 )    Color: x / Appearance: x / SG: x / pH: x  Gluc: 371 mg/dL / Ketone: x  / Bili: x / Urobili: x   Blood: x / Protein: x / Nitrite: x   Leuk Esterase: x / RBC: x / WBC x   Sq Epi: x / Non Sq Epi: x / Bacteria: x                                                                                                                                                     MEDICATIONS  (STANDING):  chlorhexidine 2% Cloths 1 Application(s) Topical <User Schedule>  cloNIDine Patch 0.3 mG/24Hr(s) 1 patch Transdermal every 7 days  dexMEDEtomidine Infusion 0.5 MICROgram(s)/kG/Hr (9.48 mL/Hr) IV Continuous <Continuous>  dextrose 5%. 1000 milliLiter(s) (50 mL/Hr) IV Continuous <Continuous>  dextrose 5%. 1000 milliLiter(s) (100 mL/Hr) IV Continuous <Continuous>  dextrose 50% Injectable 25 Gram(s) IV Push once  dextrose 50% Injectable 12.5 Gram(s) IV Push once  dextrose 50% Injectable 25 Gram(s) IV Push once  glucagon  Injectable 1 milliGRAM(s) IntraMuscular once  heparin  Infusion. 2000 Unit(s)/Hr (20 mL/Hr) IV Continuous <Continuous>  hydrALAZINE Injectable 2.5 milliGRAM(s) IV Push every 6 hours  insulin glargine Injectable (LANTUS) 17 Unit(s) SubCutaneous two times a day  insulin lispro (ADMELOG) corrective regimen sliding scale   SubCutaneous three times a day before meals  mupirocin 2% Nasal 1 Application(s) Both Nostrils every 12 hours  OLANZapine Injectable 5 milliGRAM(s) IntraMuscular every 12 hours  piperacillin/tazobactam IVPB.. 3.375 Gram(s) IV Intermittent every 12 hours  thiamine IVPB 500 milliGRAM(s) IV Intermittent daily    MEDICATIONS  (PRN):  dextrose Oral Gel 15 Gram(s) Oral once PRN Blood Glucose LESS THAN 70 milliGRAM(s)/deciliter  diazepam  Injectable 10 milliGRAM(s) IV Push every 2 hours PRN CIWA-Ar score increase by 2 points and a total score of 7 or less  diazepam  Injectable 5 milliGRAM(s) IV Push every 1 hour PRN CIWA-Ar score of 8 or Greater  haloperidol    Injectable 5 milliGRAM(s) IntraMuscular every 6 hours PRN Agitation  heparin   Injectable 6500 Unit(s) IV Push every 6 hours PRN For aPTT less than 40  heparin   Injectable 3000 Unit(s) IV Push every 6 hours PRN For aPTT between 40 - 57

## 2024-08-31 LAB
ALBUMIN SERPL ELPH-MCNC: 2.8 G/DL — LOW (ref 3.5–5.2)
ALP SERPL-CCNC: 199 U/L — HIGH (ref 30–115)
ALT FLD-CCNC: 12 U/L — SIGNIFICANT CHANGE UP (ref 0–41)
ANION GAP SERPL CALC-SCNC: 17 MMOL/L — HIGH (ref 7–14)
ANION GAP SERPL CALC-SCNC: 18 MMOL/L — HIGH (ref 7–14)
APTT BLD: 109.4 SEC — CRITICAL HIGH (ref 27–39.2)
APTT BLD: 58.3 SEC — HIGH (ref 27–39.2)
APTT BLD: 82.4 SEC — CRITICAL HIGH (ref 27–39.2)
AST SERPL-CCNC: 12 U/L — SIGNIFICANT CHANGE UP (ref 0–41)
B-OH-BUTYR SERPL-SCNC: <0.2 MMOL/L — SIGNIFICANT CHANGE UP
BASOPHILS # BLD AUTO: 0.01 K/UL — SIGNIFICANT CHANGE UP (ref 0–0.2)
BASOPHILS NFR BLD AUTO: 0.1 % — SIGNIFICANT CHANGE UP (ref 0–1)
BILIRUB SERPL-MCNC: <0.2 MG/DL — SIGNIFICANT CHANGE UP (ref 0.2–1.2)
BUN SERPL-MCNC: 47 MG/DL — HIGH (ref 10–20)
BUN SERPL-MCNC: 53 MG/DL — HIGH (ref 10–20)
CALCIUM SERPL-MCNC: 8.9 MG/DL — SIGNIFICANT CHANGE UP (ref 8.4–10.5)
CALCIUM SERPL-MCNC: 8.9 MG/DL — SIGNIFICANT CHANGE UP (ref 8.4–10.5)
CHLORIDE SERPL-SCNC: 100 MMOL/L — SIGNIFICANT CHANGE UP (ref 98–110)
CHLORIDE SERPL-SCNC: 101 MMOL/L — SIGNIFICANT CHANGE UP (ref 98–110)
CO2 SERPL-SCNC: 22 MMOL/L — SIGNIFICANT CHANGE UP (ref 17–32)
CO2 SERPL-SCNC: 24 MMOL/L — SIGNIFICANT CHANGE UP (ref 17–32)
CREAT SERPL-MCNC: 2.8 MG/DL — HIGH (ref 0.7–1.5)
CREAT SERPL-MCNC: 2.9 MG/DL — HIGH (ref 0.7–1.5)
CULTURE RESULTS: SIGNIFICANT CHANGE UP
CULTURE RESULTS: SIGNIFICANT CHANGE UP
EGFR: 24 ML/MIN/1.73M2 — LOW
EGFR: 25 ML/MIN/1.73M2 — LOW
EOSINOPHIL # BLD AUTO: 0 K/UL — SIGNIFICANT CHANGE UP (ref 0–0.7)
EOSINOPHIL NFR BLD AUTO: 0 % — SIGNIFICANT CHANGE UP (ref 0–8)
GLUCOSE BLDC GLUCOMTR-MCNC: 235 MG/DL — HIGH (ref 70–99)
GLUCOSE BLDC GLUCOMTR-MCNC: 250 MG/DL — HIGH (ref 70–99)
GLUCOSE BLDC GLUCOMTR-MCNC: 265 MG/DL — HIGH (ref 70–99)
GLUCOSE BLDC GLUCOMTR-MCNC: 287 MG/DL — HIGH (ref 70–99)
GLUCOSE BLDC GLUCOMTR-MCNC: 309 MG/DL — HIGH (ref 70–99)
GLUCOSE SERPL-MCNC: 289 MG/DL — HIGH (ref 70–99)
GLUCOSE SERPL-MCNC: 323 MG/DL — HIGH (ref 70–99)
HCT VFR BLD CALC: 41.7 % — LOW (ref 42–52)
HGB BLD-MCNC: 12.6 G/DL — LOW (ref 14–18)
IMM GRANULOCYTES NFR BLD AUTO: 0.4 % — HIGH (ref 0.1–0.3)
LYMPHOCYTES # BLD AUTO: 0.9 K/UL — LOW (ref 1.2–3.4)
LYMPHOCYTES # BLD AUTO: 6.4 % — LOW (ref 20.5–51.1)
MAGNESIUM SERPL-MCNC: 1.9 MG/DL — SIGNIFICANT CHANGE UP (ref 1.8–2.4)
MCHC RBC-ENTMCNC: 25.1 PG — LOW (ref 27–31)
MCHC RBC-ENTMCNC: 30.2 G/DL — LOW (ref 32–37)
MCV RBC AUTO: 83.2 FL — SIGNIFICANT CHANGE UP (ref 80–94)
MONOCYTES # BLD AUTO: 0.77 K/UL — HIGH (ref 0.1–0.6)
MONOCYTES NFR BLD AUTO: 5.5 % — SIGNIFICANT CHANGE UP (ref 1.7–9.3)
NEUTROPHILS # BLD AUTO: 12.25 K/UL — HIGH (ref 1.4–6.5)
NEUTROPHILS NFR BLD AUTO: 87.6 % — HIGH (ref 42.2–75.2)
NRBC # BLD: 0 /100 WBCS — SIGNIFICANT CHANGE UP (ref 0–0)
PHOSPHATE SERPL-MCNC: 5.9 MG/DL — HIGH (ref 2.1–4.9)
PLATELET # BLD AUTO: 430 K/UL — HIGH (ref 130–400)
PMV BLD: 10.4 FL — SIGNIFICANT CHANGE UP (ref 7.4–10.4)
POTASSIUM SERPL-MCNC: 4.4 MMOL/L — SIGNIFICANT CHANGE UP (ref 3.5–5)
POTASSIUM SERPL-MCNC: 4.4 MMOL/L — SIGNIFICANT CHANGE UP (ref 3.5–5)
POTASSIUM SERPL-SCNC: 4.4 MMOL/L — SIGNIFICANT CHANGE UP (ref 3.5–5)
POTASSIUM SERPL-SCNC: 4.4 MMOL/L — SIGNIFICANT CHANGE UP (ref 3.5–5)
PROT SERPL-MCNC: 5.7 G/DL — LOW (ref 6–8)
RBC # BLD: 5.01 M/UL — SIGNIFICANT CHANGE UP (ref 4.7–6.1)
RBC # FLD: 16.3 % — HIGH (ref 11.5–14.5)
SODIUM SERPL-SCNC: 141 MMOL/L — SIGNIFICANT CHANGE UP (ref 135–146)
SODIUM SERPL-SCNC: 141 MMOL/L — SIGNIFICANT CHANGE UP (ref 135–146)
SPECIMEN SOURCE: SIGNIFICANT CHANGE UP
SPECIMEN SOURCE: SIGNIFICANT CHANGE UP
WBC # BLD: 13.99 K/UL — HIGH (ref 4.8–10.8)
WBC # FLD AUTO: 13.99 K/UL — HIGH (ref 4.8–10.8)

## 2024-08-31 PROCEDURE — 99233 SBSQ HOSP IP/OBS HIGH 50: CPT

## 2024-08-31 RX ORDER — DEXAMETHASONE 0.75 MG
4 TABLET ORAL EVERY 8 HOURS
Refills: 0 | Status: DISCONTINUED | OUTPATIENT
Start: 2024-08-31 | End: 2024-09-03

## 2024-08-31 RX ORDER — SODIUM CHLORIDE 9 MG/ML
1000 INJECTION INTRAMUSCULAR; INTRAVENOUS; SUBCUTANEOUS
Refills: 0 | Status: DISCONTINUED | OUTPATIENT
Start: 2024-08-31 | End: 2024-09-03

## 2024-08-31 RX ADMIN — Medication 4: at 06:06

## 2024-08-31 RX ADMIN — Medication 2000 UNIT(S)/HR: at 03:06

## 2024-08-31 RX ADMIN — DEXMEDETOMIDINE HYDROCHLORIDE IN 0.9% SODIUM CHLORIDE 9.48 MICROGRAM(S)/KG/HR: 4 INJECTION INTRAVENOUS at 03:07

## 2024-08-31 RX ADMIN — OLANZAPINE 5 MILLIGRAM(S): 7.5 TABLET ORAL at 17:22

## 2024-08-31 RX ADMIN — Medication 4 MILLIGRAM(S): at 13:20

## 2024-08-31 RX ADMIN — INSULIN GLARGINE 17 UNIT(S): 100 INJECTION, SOLUTION SUBCUTANEOUS at 22:12

## 2024-08-31 RX ADMIN — Medication 10 MILLIGRAM(S): at 20:49

## 2024-08-31 RX ADMIN — OLANZAPINE 5 MILLIGRAM(S): 7.5 TABLET ORAL at 05:35

## 2024-08-31 RX ADMIN — Medication 1 PATCH: at 19:00

## 2024-08-31 RX ADMIN — PIPERACILLIN SODIUM AND TAZOBACTAM SODIUM 25 GRAM(S): 3; .375 INJECTION, POWDER, FOR SOLUTION INTRAVENOUS at 05:34

## 2024-08-31 RX ADMIN — SODIUM CHLORIDE 50 MILLILITER(S): 9 INJECTION INTRAMUSCULAR; INTRAVENOUS; SUBCUTANEOUS at 17:23

## 2024-08-31 RX ADMIN — Medication 3: at 11:54

## 2024-08-31 RX ADMIN — INSULIN GLARGINE 17 UNIT(S): 100 INJECTION, SOLUTION SUBCUTANEOUS at 07:54

## 2024-08-31 RX ADMIN — Medication 2.5 MILLIGRAM(S): at 17:17

## 2024-08-31 RX ADMIN — PIPERACILLIN SODIUM AND TAZOBACTAM SODIUM 25 GRAM(S): 3; .375 INJECTION, POWDER, FOR SOLUTION INTRAVENOUS at 17:21

## 2024-08-31 RX ADMIN — Medication 2.5 MILLIGRAM(S): at 12:00

## 2024-08-31 RX ADMIN — Medication 1 PATCH: at 08:13

## 2024-08-31 RX ADMIN — Medication 6 MILLIGRAM(S): at 05:34

## 2024-08-31 RX ADMIN — Medication 2000 UNIT(S)/HR: at 09:34

## 2024-08-31 RX ADMIN — Medication 1 APPLICATION(S): at 17:22

## 2024-08-31 RX ADMIN — Medication 2: at 16:40

## 2024-08-31 RX ADMIN — Medication 1 PATCH: at 08:14

## 2024-08-31 RX ADMIN — Medication 2.5 MILLIGRAM(S): at 23:26

## 2024-08-31 RX ADMIN — Medication 2.5 MILLIGRAM(S): at 05:33

## 2024-08-31 RX ADMIN — Medication 1 APPLICATION(S): at 05:35

## 2024-08-31 RX ADMIN — Medication 4 MILLIGRAM(S): at 22:12

## 2024-08-31 RX ADMIN — DEXMEDETOMIDINE HYDROCHLORIDE IN 0.9% SODIUM CHLORIDE 9.48 MICROGRAM(S)/KG/HR: 4 INJECTION INTRAVENOUS at 12:57

## 2024-08-31 RX ADMIN — Medication 50 MILLILITER(S): at 12:32

## 2024-08-31 RX ADMIN — Medication 2000 UNIT(S)/HR: at 16:32

## 2024-08-31 RX ADMIN — CHLORHEXIDINE GLUCONATE 1 APPLICATION(S): 40 SOLUTION TOPICAL at 05:35

## 2024-08-31 NOTE — PROGRESS NOTE ADULT - SUBJECTIVE AND OBJECTIVE BOX
59y old  Male who presents with a chief complaint of Confusion      PAST MEDICAL & SURGICAL HISTORY:    DM (diabetes mellitus)  HTN (hypertension)  HLD (hyperlipidemia)  Herpes labialis  Anxiety  GERD (gastroesophageal reflux disease)  Kidney stones  20 years ago  Kidney disease  stage 4  Chronic kidney disease, unspecified CKD stage  Diabetic Charcot foot  PAD (peripheral artery disease)  S/P foot surgery, right  x 5 ( 2006 - 2013 )  Kidney stone  Removed by Laser x 2 ( 20 years ago )  H/O arthroscopy of right knee  Status post peripheral artery angioplasty  Amputee, below knee, right    Social History:  single (26 Aug 2024 13:50)    MEDICATIONS  (STANDING):  chlorhexidine 2% Cloths 1 Application(s) Topical <User Schedule>  cloNIDine Patch 0.3 mG/24Hr(s) 1 patch Transdermal every 7 days  dexMEDEtomidine Infusion 0.5 MICROgram(s)/kG/Hr (9.48 mL/Hr) IV Continuous <Continuous>  dextrose 5% + lactated ringers. 1000 milliLiter(s) (80 mL/Hr) IV Continuous <Continuous>  dextrose 5%. 1000 milliLiter(s) (50 mL/Hr) IV Continuous <Continuous>  dextrose 5%. 1000 milliLiter(s) (100 mL/Hr) IV Continuous <Continuous>  dextrose 50% Injectable 25 Gram(s) IV Push once  dextrose 50% Injectable 12.5 Gram(s) IV Push once  dextrose 50% Injectable 25 Gram(s) IV Push once  glucagon  Injectable 1 milliGRAM(s) IntraMuscular once  heparin  Infusion. 2000 Unit(s)/Hr (20 mL/Hr) IV Continuous <Continuous>  hydrALAZINE Injectable 2.5 milliGRAM(s) IV Push every 6 hours  insulin glargine Injectable (LANTUS) 10 Unit(s) SubCutaneous two times a day  insulin lispro (ADMELOG) corrective regimen sliding scale   SubCutaneous three times a day before meals  mupirocin 2% Nasal 1 Application(s) Both Nostrils every 12 hours  OLANZapine Injectable 5 milliGRAM(s) IntraMuscular every 12 hours  piperacillin/tazobactam IVPB.. 3.375 Gram(s) IV Intermittent every 12 hours  thiamine IVPB 500 milliGRAM(s) IV Intermittent daily    MEDICATIONS  (PRN):  dextrose Oral Gel 15 Gram(s) Oral once PRN Blood Glucose LESS THAN 70 milliGRAM(s)/deciliter  diazepam  Injectable 10 milliGRAM(s) IV Push every 2 hours PRN CIWA-Ar score increase by 2 points and a total score of 7 or less  diazepam  Injectable 5 milliGRAM(s) IV Push every 1 hour PRN CIWA-Ar score of 8 or Greater  haloperidol    Injectable 5 milliGRAM(s) IntraMuscular every 6 hours PRN Agitation  heparin   Injectable 6500 Unit(s) IV Push every 6 hours PRN For aPTT less than 40  heparin   Injectable 3000 Unit(s) IV Push every 6 hours PRN For aPTT between 40 - 57    Allergies    No Known Allergies    Intolerances    ICU Vital Signs Last 24 Hrs  T(C): 36.1 (31 Aug 2024 04:00), Max: 38 (30 Aug 2024 13:00)  T(F): 96.9 (31 Aug 2024 04:00), Max: 100.4 (30 Aug 2024 13:00)  HR: 63 (31 Aug 2024 07:19) (56 - 102)  BP: 134/68 (31 Aug 2024 07:19) (111/61 - 170/79)  BP(mean): 90 (31 Aug 2024 06:00) (80 - 115)  ABP: --  ABP(mean): --  RR: 20 (31 Aug 2024 07:19) (18 - 42)  SpO2: 95% (31 Aug 2024 07:19) (91% - 97%)    CAPILLARY BLOOD GLUCOSE      POCT Blood Glucose.: 265 mg/dL (31 Aug 2024 07:32)  POCT Blood Glucose.: 309 mg/dL (31 Aug 2024 06:01)  POCT Blood Glucose.: 250 mg/dL (30 Aug 2024 21:53)  POCT Blood Glucose.: 186 mg/dL (30 Aug 2024 18:00)  POCT Blood Glucose.: 308 mg/dL (30 Aug 2024 13:55)  POCT Blood Glucose.: 253 mg/dL (30 Aug 2024 08:58)          General: NAD, mild agitation   HEENT:    MMM, no new lesions, hoarseness of voice POA             Lymph Nodes: NO cervical LN   Lungs: Bilateral BS, no wheezing   Cardiovascular: Regular, normal S1 S2    Abdomen: Soft, Positive BS  Extremities: No clubbing   Skin: warm, no new rashes   Neurological: Awake mildly sedated   Musculoskeletal: move all ext BKA on the right                           12.6   13.99 )-----------( 430      ( 31 Aug 2024 05:52 )             41.7   08-31    141  |  100  |  47<H>  ----------------------------<  323<H>  4.4   |  24  |  2.8<H>    Ca    8.9      31 Aug 2024 05:52  Phos  5.9     08-31  Mg     1.9     08-31    TPro  5.7<L>  /  Alb  x   /  TBili  <0.2  /  DBili  x   /  AST  12  /  ALT  12  /  AlkPhos  x   08-31

## 2024-08-31 NOTE — PROGRESS NOTE ADULT - SUBJECTIVE AND OBJECTIVE BOX
Patient is a 59y old  Male who presents with a chief complaint of AMS (30 Aug 2024 09:38)        Over Night Events:    Calm on precedex   Low grade temp noted         ROS:  See HPI    PHYSICAL EXAM    ICU Vital Signs Last 24 Hrs  T(C): 36.1 (31 Aug 2024 04:00), Max: 38 (30 Aug 2024 13:00)  T(F): 96.9 (31 Aug 2024 04:00), Max: 100.4 (30 Aug 2024 13:00)  HR: 56 (31 Aug 2024 06:00) (56 - 102)  BP: 123/69 (31 Aug 2024 06:00) (111/61 - 170/79)  BP(mean): 90 (31 Aug 2024 06:00) (80 - 115)  ABP: --  ABP(mean): --  RR: 20 (31 Aug 2024 06:00) (18 - 42)  SpO2: 91% (31 Aug 2024 06:00) (91% - 97%)        General: NAD   HEENT: EMY             Lymphatic system: No cervical LN   Lungs: Bilateral BS  Cardiovascular: Regular   Gastrointestinal: Soft, Positive BS  Extremities: No clubbing.  Moves extremities.  Full Range of motion   Skin: Warm, intact  Neurological: Sleeping comfortable       08-30-24 @ 07:01  -  08-31-24 @ 07:00  --------------------------------------------------------  IN:    Dexmedetomidine: 481 mL    Heparin Infusion: 398 mL    IV PiggyBack: 100 mL  Total IN: 979 mL    OUT:    Indwelling Catheter - Urethral (mL): 585 mL  Total OUT: 585 mL    Total NET: 394 mL          LABS:                            12.6   13.99 )-----------( 430      ( 31 Aug 2024 05:52 )             41.7                                               08-30    139  |  98  |  31<H>  ----------------------------<  371<H>  4.1   |  22  |  2.4<H>    Ca    9.5      30 Aug 2024 05:59  Phos  3.9     08-30  Mg     2.1     08-30    TPro  5.9<L>  /  Alb  3.0<L>  /  TBili  0.3  /  DBili  x   /  AST  15  /  ALT  12  /  AlkPhos  179<H>  08-30      PTT - ( 31 Aug 2024 00:19 )  PTT:109.4 sec                                       Urinalysis Basic - ( 30 Aug 2024 05:59 )    Color: x / Appearance: x / SG: x / pH: x  Gluc: 371 mg/dL / Ketone: x  / Bili: x / Urobili: x   Blood: x / Protein: x / Nitrite: x   Leuk Esterase: x / RBC: x / WBC x   Sq Epi: x / Non Sq Epi: x / Bacteria: x                                                  LIVER FUNCTIONS - ( 30 Aug 2024 05:59 )  Alb: 3.0 g/dL / Pro: 5.9 g/dL / ALK PHOS: 179 U/L / ALT: 12 U/L / AST: 15 U/L / GGT: x                                                                                                                                       MEDICATIONS  (STANDING):  chlorhexidine 2% Cloths 1 Application(s) Topical <User Schedule>  cloNIDine Patch 0.3 mG/24Hr(s) 1 patch Transdermal every 7 days  dexAMETHasone  Injectable 6 milliGRAM(s) IV Push two times a day  dexMEDEtomidine Infusion 0.5 MICROgram(s)/kG/Hr (9.48 mL/Hr) IV Continuous <Continuous>  dextrose 5%. 1000 milliLiter(s) (50 mL/Hr) IV Continuous <Continuous>  dextrose 5%. 1000 milliLiter(s) (100 mL/Hr) IV Continuous <Continuous>  dextrose 50% Injectable 25 Gram(s) IV Push once  dextrose 50% Injectable 12.5 Gram(s) IV Push once  dextrose 50% Injectable 25 Gram(s) IV Push once  glucagon  Injectable 1 milliGRAM(s) IntraMuscular once  heparin  Infusion. 2000 Unit(s)/Hr (20 mL/Hr) IV Continuous <Continuous>  hydrALAZINE Injectable 2.5 milliGRAM(s) IV Push every 6 hours  insulin glargine Injectable (LANTUS) 17 Unit(s) SubCutaneous two times a day  insulin lispro (ADMELOG) corrective regimen sliding scale   SubCutaneous three times a day before meals  mupirocin 2% Nasal 1 Application(s) Both Nostrils every 12 hours  OLANZapine Injectable 5 milliGRAM(s) IntraMuscular every 12 hours  piperacillin/tazobactam IVPB.. 3.375 Gram(s) IV Intermittent every 12 hours    MEDICATIONS  (PRN):  dextrose Oral Gel 15 Gram(s) Oral once PRN Blood Glucose LESS THAN 70 milliGRAM(s)/deciliter  diazepam  Injectable 5 milliGRAM(s) IV Push every 1 hour PRN CIWA-Ar score of 8 or Greater  diazepam  Injectable 10 milliGRAM(s) IV Push every 2 hours PRN CIWA-Ar score increase by 2 points and a total score of 7 or less  haloperidol    Injectable 5 milliGRAM(s) IntraMuscular every 6 hours PRN Agitation  heparin   Injectable 6500 Unit(s) IV Push every 6 hours PRN For aPTT less than 40  heparin   Injectable 3000 Unit(s) IV Push every 6 hours PRN For aPTT between 40 - 57

## 2024-08-31 NOTE — PROGRESS NOTE ADULT - ASSESSMENT
IMPRESSION:    Toxic metabolic encephalopathy  DKA/HHS with recurrent readmission   Hx of substance abuse found cocaine and benzo   Severe HAGMA- improved  Sepsis POA- resolved   Elevated LA- resolved   Throat pain POA- US shows bilateral cervical LAD - tonsillitis and airway narrowing  subclinical hyperthyroid  DM  HTN  CKD 4  HO CVA  HO LUE DVT on Eliquis DVT is resolved- completing 3mo ac       PLAN:    CNS: CT Head negative, Thiamine and Folate. Wean off precedex. BZD symptom driven. On Olazapine for agitation. Addiction medicine.     HEENT: Oral care, CT neck noted. ENT eval. Start Dexamethasone 4mg every 8 hours.     PULMONARY:  HOB @ 45 degrees.  Aspiration precautions.  CXR negative.     CARDIOVASCULAR: BP control. IV hydration if remains NPO.     GI: GI prophylaxis. Speech and swallow re-eval.     RENAL:  CKD 4. Nephrology on board. No further HD. Gentale hydration until PO intake is better.     INFECTIOUS DISEASE:   Zosyn for tonsillitis. ID following.     HEMATOLOGICAL:  Heparin IV; adjust to therapeutic PTT.     ENDOCRINOLOGY: SQ lantus lispro regimen.     MUSCULOSKELETAL: Bedrest    MICU

## 2024-09-01 LAB
ALBUMIN SERPL ELPH-MCNC: 2.6 G/DL — LOW (ref 3.5–5.2)
ALP SERPL-CCNC: 207 U/L — HIGH (ref 30–115)
ALT FLD-CCNC: 15 U/L — SIGNIFICANT CHANGE UP (ref 0–41)
ANION GAP SERPL CALC-SCNC: 15 MMOL/L — HIGH (ref 7–14)
APPEARANCE UR: CLEAR — SIGNIFICANT CHANGE UP
APTT BLD: 66.2 SEC — HIGH (ref 27–39.2)
AST SERPL-CCNC: 13 U/L — SIGNIFICANT CHANGE UP (ref 0–41)
BASOPHILS # BLD AUTO: 0.01 K/UL — SIGNIFICANT CHANGE UP (ref 0–0.2)
BASOPHILS NFR BLD AUTO: 0.1 % — SIGNIFICANT CHANGE UP (ref 0–1)
BILIRUB SERPL-MCNC: <0.2 MG/DL — SIGNIFICANT CHANGE UP (ref 0.2–1.2)
BILIRUB UR-MCNC: NEGATIVE — SIGNIFICANT CHANGE UP
BUN SERPL-MCNC: 58 MG/DL — HIGH (ref 10–20)
CALCIUM SERPL-MCNC: 8.8 MG/DL — SIGNIFICANT CHANGE UP (ref 8.4–10.5)
CHLORIDE SERPL-SCNC: 105 MMOL/L — SIGNIFICANT CHANGE UP (ref 98–110)
CK SERPL-CCNC: 14 U/L — SIGNIFICANT CHANGE UP (ref 0–225)
CO2 SERPL-SCNC: 25 MMOL/L — SIGNIFICANT CHANGE UP (ref 17–32)
COLOR SPEC: YELLOW — SIGNIFICANT CHANGE UP
CREAT SERPL-MCNC: 2.7 MG/DL — HIGH (ref 0.7–1.5)
DIFF PNL FLD: NEGATIVE — SIGNIFICANT CHANGE UP
EGFR: 26 ML/MIN/1.73M2 — LOW
EOSINOPHIL # BLD AUTO: 0 K/UL — SIGNIFICANT CHANGE UP (ref 0–0.7)
EOSINOPHIL NFR BLD AUTO: 0 % — SIGNIFICANT CHANGE UP (ref 0–8)
GLUCOSE BLDC GLUCOMTR-MCNC: 145 MG/DL — HIGH (ref 70–99)
GLUCOSE BLDC GLUCOMTR-MCNC: 171 MG/DL — HIGH (ref 70–99)
GLUCOSE BLDC GLUCOMTR-MCNC: 182 MG/DL — HIGH (ref 70–99)
GLUCOSE BLDC GLUCOMTR-MCNC: 207 MG/DL — HIGH (ref 70–99)
GLUCOSE BLDC GLUCOMTR-MCNC: 207 MG/DL — HIGH (ref 70–99)
GLUCOSE BLDC GLUCOMTR-MCNC: 244 MG/DL — HIGH (ref 70–99)
GLUCOSE BLDC GLUCOMTR-MCNC: 251 MG/DL — HIGH (ref 70–99)
GLUCOSE SERPL-MCNC: 227 MG/DL — HIGH (ref 70–99)
GLUCOSE UR QL: 250 MG/DL
HCT VFR BLD CALC: 40.8 % — LOW (ref 42–52)
HGB BLD-MCNC: 12.6 G/DL — LOW (ref 14–18)
IMM GRANULOCYTES NFR BLD AUTO: 2.6 % — HIGH (ref 0.1–0.3)
KETONES UR-MCNC: NEGATIVE MG/DL — SIGNIFICANT CHANGE UP
LEUKOCYTE ESTERASE UR-ACNC: NEGATIVE — SIGNIFICANT CHANGE UP
LYMPHOCYTES # BLD AUTO: 0.81 K/UL — LOW (ref 1.2–3.4)
LYMPHOCYTES # BLD AUTO: 6.3 % — LOW (ref 20.5–51.1)
MAGNESIUM SERPL-MCNC: 2 MG/DL — SIGNIFICANT CHANGE UP (ref 1.8–2.4)
MCHC RBC-ENTMCNC: 25.1 PG — LOW (ref 27–31)
MCHC RBC-ENTMCNC: 30.9 G/DL — LOW (ref 32–37)
MCV RBC AUTO: 81.3 FL — SIGNIFICANT CHANGE UP (ref 80–94)
MONOCYTES # BLD AUTO: 0.93 K/UL — HIGH (ref 0.1–0.6)
MONOCYTES NFR BLD AUTO: 7.2 % — SIGNIFICANT CHANGE UP (ref 1.7–9.3)
NEUTROPHILS # BLD AUTO: 10.83 K/UL — HIGH (ref 1.4–6.5)
NEUTROPHILS NFR BLD AUTO: 83.8 % — HIGH (ref 42.2–75.2)
NITRITE UR-MCNC: NEGATIVE — SIGNIFICANT CHANGE UP
NRBC # BLD: 0 /100 WBCS — SIGNIFICANT CHANGE UP (ref 0–0)
PETH 16:0/18:1: NEGATIVE NG/ML — SIGNIFICANT CHANGE UP
PETH 16:0/18:2: NEGATIVE NG/ML — SIGNIFICANT CHANGE UP
PETH COMMENTS: SIGNIFICANT CHANGE UP
PH UR: 5.5 — SIGNIFICANT CHANGE UP (ref 5–8)
PHOSPHATE SERPL-MCNC: 4.7 MG/DL — SIGNIFICANT CHANGE UP (ref 2.1–4.9)
PLATELET # BLD AUTO: 519 K/UL — HIGH (ref 130–400)
PMV BLD: 10.2 FL — SIGNIFICANT CHANGE UP (ref 7.4–10.4)
POTASSIUM SERPL-MCNC: 4.3 MMOL/L — SIGNIFICANT CHANGE UP (ref 3.5–5)
POTASSIUM SERPL-SCNC: 4.3 MMOL/L — SIGNIFICANT CHANGE UP (ref 3.5–5)
PROT SERPL-MCNC: 5.8 G/DL — LOW (ref 6–8)
PROT UR-MCNC: 300 MG/DL
RBC # BLD: 5.02 M/UL — SIGNIFICANT CHANGE UP (ref 4.7–6.1)
RBC # FLD: 16.6 % — HIGH (ref 11.5–14.5)
SODIUM SERPL-SCNC: 145 MMOL/L — SIGNIFICANT CHANGE UP (ref 135–146)
SP GR SPEC: 1.02 — SIGNIFICANT CHANGE UP (ref 1–1.03)
UROBILINOGEN FLD QL: 0.2 MG/DL — SIGNIFICANT CHANGE UP (ref 0.2–1)
WBC # BLD: 12.92 K/UL — HIGH (ref 4.8–10.8)
WBC # FLD AUTO: 12.92 K/UL — HIGH (ref 4.8–10.8)

## 2024-09-01 PROCEDURE — 99233 SBSQ HOSP IP/OBS HIGH 50: CPT

## 2024-09-01 RX ORDER — INSULIN GLARGINE 100 [IU]/ML
25 INJECTION, SOLUTION SUBCUTANEOUS
Refills: 0 | Status: DISCONTINUED | OUTPATIENT
Start: 2024-09-01 | End: 2024-09-03

## 2024-09-01 RX ORDER — INSULIN GLARGINE 100 [IU]/ML
10 INJECTION, SOLUTION SUBCUTANEOUS ONCE
Refills: 0 | Status: COMPLETED | OUTPATIENT
Start: 2024-09-01 | End: 2024-09-01

## 2024-09-01 RX ORDER — INSULIN GLARGINE 100 [IU]/ML
25 INJECTION, SOLUTION SUBCUTANEOUS
Refills: 0 | Status: DISCONTINUED | OUTPATIENT
Start: 2024-09-01 | End: 2024-09-01

## 2024-09-01 RX ADMIN — INSULIN GLARGINE 17 UNIT(S): 100 INJECTION, SOLUTION SUBCUTANEOUS at 07:52

## 2024-09-01 RX ADMIN — Medication 2.5 MILLIGRAM(S): at 11:25

## 2024-09-01 RX ADMIN — Medication 2.5 MILLIGRAM(S): at 05:15

## 2024-09-01 RX ADMIN — Medication 1 PATCH: at 19:46

## 2024-09-01 RX ADMIN — Medication 1 PATCH: at 07:24

## 2024-09-01 RX ADMIN — Medication 3: at 11:22

## 2024-09-01 RX ADMIN — OLANZAPINE 5 MILLIGRAM(S): 7.5 TABLET ORAL at 05:18

## 2024-09-01 RX ADMIN — Medication 4 MILLIGRAM(S): at 13:25

## 2024-09-01 RX ADMIN — Medication 2000 UNIT(S)/HR: at 07:09

## 2024-09-01 RX ADMIN — Medication 2000 UNIT(S)/HR: at 16:43

## 2024-09-01 RX ADMIN — DEXMEDETOMIDINE HYDROCHLORIDE IN 0.9% SODIUM CHLORIDE 9.48 MICROGRAM(S)/KG/HR: 4 INJECTION INTRAVENOUS at 16:43

## 2024-09-01 RX ADMIN — INSULIN GLARGINE 25 UNIT(S): 100 INJECTION, SOLUTION SUBCUTANEOUS at 22:27

## 2024-09-01 RX ADMIN — PIPERACILLIN SODIUM AND TAZOBACTAM SODIUM 25 GRAM(S): 3; .375 INJECTION, POWDER, FOR SOLUTION INTRAVENOUS at 17:39

## 2024-09-01 RX ADMIN — DEXMEDETOMIDINE HYDROCHLORIDE IN 0.9% SODIUM CHLORIDE 9.48 MICROGRAM(S)/KG/HR: 4 INJECTION INTRAVENOUS at 00:16

## 2024-09-01 RX ADMIN — Medication 4 MILLIGRAM(S): at 05:17

## 2024-09-01 RX ADMIN — OLANZAPINE 5 MILLIGRAM(S): 7.5 TABLET ORAL at 17:39

## 2024-09-01 RX ADMIN — Medication 1 APPLICATION(S): at 05:23

## 2024-09-01 RX ADMIN — Medication 2.5 MILLIGRAM(S): at 17:39

## 2024-09-01 RX ADMIN — CHLORHEXIDINE GLUCONATE 1 APPLICATION(S): 40 SOLUTION TOPICAL at 05:16

## 2024-09-01 RX ADMIN — Medication 1: at 17:35

## 2024-09-01 RX ADMIN — Medication 2000 UNIT(S)/HR: at 07:17

## 2024-09-01 RX ADMIN — Medication 4 MILLIGRAM(S): at 22:27

## 2024-09-01 RX ADMIN — PIPERACILLIN SODIUM AND TAZOBACTAM SODIUM 25 GRAM(S): 3; .375 INJECTION, POWDER, FOR SOLUTION INTRAVENOUS at 05:16

## 2024-09-01 RX ADMIN — DEXMEDETOMIDINE HYDROCHLORIDE IN 0.9% SODIUM CHLORIDE 9.48 MICROGRAM(S)/KG/HR: 4 INJECTION INTRAVENOUS at 05:23

## 2024-09-01 RX ADMIN — INSULIN GLARGINE 10 UNIT(S): 100 INJECTION, SOLUTION SUBCUTANEOUS at 09:43

## 2024-09-01 RX ADMIN — Medication 1 APPLICATION(S): at 17:35

## 2024-09-01 RX ADMIN — DEXMEDETOMIDINE HYDROCHLORIDE IN 0.9% SODIUM CHLORIDE 9.48 MICROGRAM(S)/KG/HR: 4 INJECTION INTRAVENOUS at 22:52

## 2024-09-01 RX ADMIN — Medication 1 PATCH: at 19:45

## 2024-09-01 RX ADMIN — Medication 2: at 06:42

## 2024-09-01 RX ADMIN — DEXMEDETOMIDINE HYDROCHLORIDE IN 0.9% SODIUM CHLORIDE 9.48 MICROGRAM(S)/KG/HR: 4 INJECTION INTRAVENOUS at 07:16

## 2024-09-01 RX ADMIN — SODIUM CHLORIDE 50 MILLILITER(S): 9 INJECTION INTRAMUSCULAR; INTRAVENOUS; SUBCUTANEOUS at 07:17

## 2024-09-01 NOTE — CHART NOTE - NSCHARTNOTEFT_GEN_A_CORE
Registered Dietitian Follow-Up     Patient Profile Reviewed                           Yes [X]   No []     Nutrition History Previously Obtained        Yes []  No []       Pertinent  Information:  pt is 59 year old male with hx of CKD IV, anemia, CVA, PAD, R BKA, IDDM, substance abuse, recent PNA p/w confusion and agitation with hyperglycemia. pt admitted with severe HAGMA, sepsis POA, metabolic encephalopathy, DKA, NSTEMI, DINESH s/p emergent HD.  pt met criteria for moderate PCM.   s/p SLP re-eval today recommends NPO with alternate means of nutrition/hydration, FEES.    Diet, NPO:   Except Medications (24 @ 16:42) [Active]      Anthropometrics:  Height (cm): 182.9 (24 @ 15:55)  Weight (kg): 80.1 (24 @ 13:08)  BMI (kg/m2): 23.9 (24 @ 13:08)    Daily Weight in k.6 (), Weight in k.3 ()  % Weight Change    MEDICATIONS  (STANDING):  cloNIDine Patch 0.3 mG/24Hr(s) 1 patch Transdermal every 7 days  dexAMETHasone  Injectable 4 milliGRAM(s) IV Push every 8 hours  dexMEDEtomidine Infusion 0.5 MICROgram(s)/kG/Hr (9.48 mL/Hr) IV Continuous <Continuous>  dextrose 5%. 1000 milliLiter(s) (100 mL/Hr) IV Continuous <Continuous>  dextrose 5%. 1000 milliLiter(s) (50 mL/Hr) IV Continuous <Continuous>    hydrALAZINE Injectable 2.5 milliGRAM(s) IV Push every 6 hours  insulin glargine Injectable (LANTUS) 25 Unit(s) SubCutaneous two times a day  insulin lispro (ADMELOG) corrective regimen sliding scale   SubCutaneous three times a day before meals  mupirocin 2% Nasal 1 Application(s) Both Nostrils every 12 hours  OLANZapine Injectable 5 milliGRAM(s) IntraMuscular every 12 hours  piperacillin/tazobactam IVPB.. 3.375 Gram(s) IV Intermittent every 12 hours  sodium chloride 0.9%. 1000 milliLiter(s) (50 mL/Hr) IV Continuous <Continuous>    MEDICATIONS  (PRN):  dextrose Oral Gel 15 Gram(s) Oral once PRN Blood Glucose LESS THAN 70 milliGRAM(s)/deciliter  diazepam  Injectable 10 milliGRAM(s) IV Push every 2 hours PRN CIWA-Ar score increase by 2 points and a total score of 7 or less  diazepam  Injectable 5 milliGRAM(s) IV Push every 1 hour PRN CIWA-Ar score of 8 or Greater  haloperidol    Injectable 5 milliGRAM(s) IntraMuscular every 6 hours PRN Agitation    Pertinent Labs:  @ 05:53: Na 145, BUN 58<H>, Cr 2.7<H>, <H>, K+ 4.3, Phos 4.7, Mg 2.0, Alk Phos 207<H>, ALT/SGPT 15, AST/SGOT 13, HbA1c --    Finger Sticks:  POCT Blood Glucose.: 171 mg/dL ( @ 17:31)  POCT Blood Glucose.: 251 mg/dL ( @ 11:19)  POCT Blood Glucose.: 207 mg/dL ( @ 09:21)  POCT Blood Glucose.: 244 mg/dL ( @ 07:36)  POCT Blood Glucose.: 207 mg/dL ( @ 06:32)  POCT Blood Glucose.: 182 mg/dL ( @ 01:45)  POCT Blood Glucose.: 250 mg/dL ( @ 22:08)    Physical Findings:  - Appearance: awake  - GI function: +BS, no BM Noted?  - Tubes: n/a   - Oral/Mouth cavity:  - Skin: intact   - Edema: n/a      Nutrition Requirements:  Weight Used: 80.1 kgs      Estimated Energy Needs     1196-3539 kcals (25-30 kcals/kg/BW)  96-112g protein (1.2-1.4g/kg/BW)  1;1 kcal for estimated fluid needs        Nutrient Intake: NPO day 6**    [] Previous Nutrition Diagnosis:            [X] Ongoing          [] Resolved  PCM     [] No active nutrition diagnosis identified at this time     Nutrition Education: n/a      Goal/Expected Outcome: pt to tolerate EN and meet at least 80% of estimated needs within 3-5 days      Indicator/Monitoring: bowel fxn, labs/meds, tolerance to EN, NFPF     Recommendation: PLEASE insert NGT, pt is at increased risk of refeeding syndrome therefore EN will be gradually increased over 1-3 days to goal rate. Initiate feeds of Peptamen AF  240 ml q 8 hrs with H20 flushes of 50 ml pre and post feeds will provide pt with 900 kcals, 57g protein and 720+300 ml total volume  -Please start pt on bowel regimen.  High risk

## 2024-09-01 NOTE — PROGRESS NOTE ADULT - ASSESSMENT
IMPRESSION:    Toxic metabolic encephalopathy  DKA/HHS with recurrent readmission   Hx of substance abuse found cocaine and benzo   Severe HAGMA- improved  Sepsis POA- resolved   Elevated LA- resolved   Throat pain POA- US shows bilateral cervical LAD - tonsillitis and airway narrowing  subclinical hyperthyroid  DM  HTN  CKD 4  HO CVA  HO LUE DVT on Eliquis DVT is resolved- completing 3mo ac       PLAN:    CNS: CT Head negative, Thiamine and Folate. Decrease precedex down to 0.5 adn wean off. BZD symptom driven. On Olazapine for agitation. On Clonidine as well. Addiction medicine.     HEENT: Oral care, CT neck noted. ENT eval. Continue Dexamethasone 4mg every 8 hours.     PULMONARY:  HOB @ 45 degrees.  Aspiration precautions.  CXR negative.     CARDIOVASCULAR: BP control. IV hydration if remains NPO with LR at 50cc/hr. Avoid NS.     GI: GI prophylaxis. Speech and swallow re-eval.     RENAL:  CKD 4. Nephrology on board. No further HD. Gentle hydration until PO intake is better.     INFECTIOUS DISEASE:   Zosyn for tonsillitis. ID following.     HEMATOLOGICAL:  Heparin IV;  therapeutic PTT.     ENDOCRINOLOGY: SQ lantus lispro regimen.     MUSCULOSKELETAL: Bedrest    MICU until MS is improved.

## 2024-09-01 NOTE — PROGRESS NOTE ADULT - SUBJECTIVE AND OBJECTIVE BOX
59y old  Male who presents with a chief complaint of Confusion      PAST MEDICAL & SURGICAL HISTORY:    DM (diabetes mellitus)  HTN (hypertension)  HLD (hyperlipidemia)  Herpes labialis  Anxiety  GERD (gastroesophageal reflux disease)  Kidney stones  20 years ago  Kidney disease  stage 4  Chronic kidney disease, unspecified CKD stage  Diabetic Charcot foot  PAD (peripheral artery disease)  S/P foot surgery, right  x 5 ( 2006 - 2013 )  Kidney stone  Removed by Laser x 2 ( 20 years ago )  H/O arthroscopy of right knee  Status post peripheral artery angioplasty  Amputee, below knee, right    Social History:  single (26 Aug 2024 13:50)    MEDICATIONS  (STANDING):  chlorhexidine 2% Cloths 1 Application(s) Topical <User Schedule>  cloNIDine Patch 0.3 mG/24Hr(s) 1 patch Transdermal every 7 days  dexMEDEtomidine Infusion 0.5 MICROgram(s)/kG/Hr (9.48 mL/Hr) IV Continuous <Continuous>  dextrose 5% + lactated ringers. 1000 milliLiter(s) (80 mL/Hr) IV Continuous <Continuous>  dextrose 5%. 1000 milliLiter(s) (50 mL/Hr) IV Continuous <Continuous>  dextrose 5%. 1000 milliLiter(s) (100 mL/Hr) IV Continuous <Continuous>  dextrose 50% Injectable 25 Gram(s) IV Push once  dextrose 50% Injectable 12.5 Gram(s) IV Push once  dextrose 50% Injectable 25 Gram(s) IV Push once  glucagon  Injectable 1 milliGRAM(s) IntraMuscular once  heparin  Infusion. 2000 Unit(s)/Hr (20 mL/Hr) IV Continuous <Continuous>  hydrALAZINE Injectable 2.5 milliGRAM(s) IV Push every 6 hours  insulin glargine Injectable (LANTUS) 10 Unit(s) SubCutaneous two times a day  insulin lispro (ADMELOG) corrective regimen sliding scale   SubCutaneous three times a day before meals  mupirocin 2% Nasal 1 Application(s) Both Nostrils every 12 hours  OLANZapine Injectable 5 milliGRAM(s) IntraMuscular every 12 hours  piperacillin/tazobactam IVPB.. 3.375 Gram(s) IV Intermittent every 12 hours  thiamine IVPB 500 milliGRAM(s) IV Intermittent daily    MEDICATIONS  (PRN):  dextrose Oral Gel 15 Gram(s) Oral once PRN Blood Glucose LESS THAN 70 milliGRAM(s)/deciliter  diazepam  Injectable 10 milliGRAM(s) IV Push every 2 hours PRN CIWA-Ar score increase by 2 points and a total score of 7 or less  diazepam  Injectable 5 milliGRAM(s) IV Push every 1 hour PRN CIWA-Ar score of 8 or Greater  haloperidol    Injectable 5 milliGRAM(s) IntraMuscular every 6 hours PRN Agitation  heparin   Injectable 6500 Unit(s) IV Push every 6 hours PRN For aPTT less than 40  heparin   Injectable 3000 Unit(s) IV Push every 6 hours PRN For aPTT between 40 - 57    Allergies    No Known Allergies    Intolerances    ICU Vital Signs Last 24 Hrs  T(C): 36.2 (01 Sep 2024 09:01), Max: 36.8 (31 Aug 2024 18:00)  T(F): 97.2 (01 Sep 2024 09:01), Max: 98.3 (31 Aug 2024 18:00)  HR: 57 (01 Sep 2024 09:00) (49 - 93)  BP: 148/65 (01 Sep 2024 09:00) (109/59 - 191/80)  BP(mean): 94 (01 Sep 2024 09:00) (76 - 121)  ABP: --  ABP(mean): --  RR: 21 (01 Sep 2024 09:01) (15 - 34)  SpO2: 95% (01 Sep 2024 09:00) (90% - 98%)    O2 Parameters below as of 01 Sep 2024 09:01  Patient On (Oxygen Delivery Method): nasal cannula      CAPILLARY BLOOD GLUCOSE      POCT Blood Glucose.: 207 mg/dL (01 Sep 2024 09:21)  POCT Blood Glucose.: 244 mg/dL (01 Sep 2024 07:36)  POCT Blood Glucose.: 207 mg/dL (01 Sep 2024 06:32)  POCT Blood Glucose.: 182 mg/dL (01 Sep 2024 01:45)  POCT Blood Glucose.: 250 mg/dL (31 Aug 2024 22:08)  POCT Blood Glucose.: 235 mg/dL (31 Aug 2024 16:35)  POCT Blood Glucose.: 287 mg/dL (31 Aug 2024 11:51)      General: NAD  HEENT:    MMM, no new lesions, hoarseness of voice POA             Lymph Nodes: NO cervical LN   Lungs: Bilateral BS, no wheezing   Cardiovascular: Regular, normal S1 S2    Abdomen: Soft, Positive BS  Extremities: No clubbing   Skin: warm, no new rashes   Neurological: Awake mildly sedated   Musculoskeletal: move all ext BKA on the right                           12.6   12.92 )-----------( 519      ( 01 Sep 2024 05:53 )             40.8                       12.6   13.99 )-----------( 430      ( 31 Aug 2024 05:52 )             41.7     09-01    145  |  105  |  58<H>  ----------------------------<  227<H>  4.3   |  25  |  2.7<H>    Ca    8.8      01 Sep 2024 05:53  Phos  4.7     09-01  Mg     2.0     09-01    TPro  5.8<L>  /  Alb  2.6<L>  /  TBili  <0.2  /  DBili  x   /  AST  13  /  ALT  15  /  AlkPhos  207<H>  09-01 08-31    141  |  100  |  47<H>  ----------------------------<  323<H>  4.4   |  24  |  2.8<H>    Ca    8.9      31 Aug 2024 05:52  Phos  5.9     08-31  Mg     1.9     08-31    TPro  5.7<L>  /  Alb  x   /  TBili  <0.2  /  DBili  x   /  AST  12  /  ALT  12  /  AlkPhos  x   08-31

## 2024-09-01 NOTE — PROGRESS NOTE ADULT - SUBJECTIVE AND OBJECTIVE BOX
Patient is a 59y old  Male who presents with a chief complaint of AMS (30 Aug 2024 09:38)        Over Night Events:    On precedex overnight  No events         ROS:  See HPI    PHYSICAL EXAM    ICU Vital Signs Last 24 Hrs  T(C): 36.1 (01 Sep 2024 03:02), Max: 36.8 (31 Aug 2024 18:00)  T(F): 97 (01 Sep 2024 03:02), Max: 98.3 (31 Aug 2024 18:00)  HR: 49 (01 Sep 2024 06:00) (49 - 93)  BP: 154/65 (01 Sep 2024 06:00) (109/59 - 191/80)  BP(mean): 93 (01 Sep 2024 06:00) (76 - 121)  ABP: --  ABP(mean): --  RR: 24 (01 Sep 2024 06:00) (15 - 34)  SpO2: 94% (01 Sep 2024 06:00) (90% - 98%)    O2 Parameters below as of 01 Sep 2024 05:00  Patient On (Oxygen Delivery Method): nasal cannula  O2 Flow (L/min): 4          General: NAD   HEENT: EMY             Lymphatic system: No cervical LN   Lungs: Bilateral BS, clear   Cardiovascular: Regular   Gastrointestinal: Soft, Positive BS  Extremities: No clubbing.  Moves extremities.  Full Range of motion   Skin: Warm, intact  Neurological: No motor or sensory deficit       08-30-24 @ 07:01  -  08-31-24 @ 07:00  --------------------------------------------------------  IN:    Dexmedetomidine: 481 mL    Heparin Infusion: 398 mL    IV PiggyBack: 100 mL  Total IN: 979 mL    OUT:    Indwelling Catheter - Urethral (mL): 585 mL  Total OUT: 585 mL    Total NET: 394 mL      08-31-24 @ 07:01  -  09-01-24 @ 06:52  --------------------------------------------------------  IN:    Dexmedetomidine: 334 mL    Heparin Infusion: 480 mL    Lactated Ringers: 100 mL    sodium chloride 0.9%: 750 mL  Total IN: 1664 mL    OUT:    Indwelling Catheter - Urethral (mL): 1265 mL  Total OUT: 1265 mL    Total NET: 399 mL          LABS:                            12.6   12.92 )-----------( 519      ( 01 Sep 2024 05:53 )             40.8                                               08-31    141  |  101  |  53<H>  ----------------------------<  289<H>  4.4   |  22  |  2.9<H>    Ca    8.9      31 Aug 2024 15:15  Phos  5.9     08-31  Mg     1.9     08-31    TPro  5.7<L>  /  Alb  2.8<L>  /  TBili  <0.2  /  DBili  x   /  AST  12  /  ALT  12  /  AlkPhos  199<H>  08-31      PTT - ( 01 Sep 2024 05:53 )  PTT:66.2 sec                                       Urinalysis Basic - ( 31 Aug 2024 15:15 )    Color: x / Appearance: x / SG: x / pH: x  Gluc: 289 mg/dL / Ketone: x  / Bili: x / Urobili: x   Blood: x / Protein: x / Nitrite: x   Leuk Esterase: x / RBC: x / WBC x   Sq Epi: x / Non Sq Epi: x / Bacteria: x                                                  LIVER FUNCTIONS - ( 31 Aug 2024 05:52 )  Alb: 2.8 g/dL / Pro: 5.7 g/dL / ALK PHOS: 199 U/L / ALT: 12 U/L / AST: 12 U/L / GGT: x                                                                                                                                       MEDICATIONS  (STANDING):  chlorhexidine 2% Cloths 1 Application(s) Topical <User Schedule>  cloNIDine Patch 0.3 mG/24Hr(s) 1 patch Transdermal every 7 days  dexAMETHasone  Injectable 4 milliGRAM(s) IV Push every 8 hours  dexMEDEtomidine Infusion 0.5 MICROgram(s)/kG/Hr (9.48 mL/Hr) IV Continuous <Continuous>  dextrose 5%. 1000 milliLiter(s) (100 mL/Hr) IV Continuous <Continuous>  dextrose 5%. 1000 milliLiter(s) (50 mL/Hr) IV Continuous <Continuous>  dextrose 50% Injectable 25 Gram(s) IV Push once  dextrose 50% Injectable 12.5 Gram(s) IV Push once  dextrose 50% Injectable 25 Gram(s) IV Push once  glucagon  Injectable 1 milliGRAM(s) IntraMuscular once  heparin  Infusion. 2000 Unit(s)/Hr (20 mL/Hr) IV Continuous <Continuous>  hydrALAZINE Injectable 2.5 milliGRAM(s) IV Push every 6 hours  insulin glargine Injectable (LANTUS) 17 Unit(s) SubCutaneous two times a day  insulin lispro (ADMELOG) corrective regimen sliding scale   SubCutaneous three times a day before meals  mupirocin 2% Nasal 1 Application(s) Both Nostrils every 12 hours  OLANZapine Injectable 5 milliGRAM(s) IntraMuscular every 12 hours  piperacillin/tazobactam IVPB.. 3.375 Gram(s) IV Intermittent every 12 hours  sodium chloride 0.9%. 1000 milliLiter(s) (50 mL/Hr) IV Continuous <Continuous>    MEDICATIONS  (PRN):  dextrose Oral Gel 15 Gram(s) Oral once PRN Blood Glucose LESS THAN 70 milliGRAM(s)/deciliter  diazepam  Injectable 5 milliGRAM(s) IV Push every 1 hour PRN CIWA-Ar score of 8 or Greater  diazepam  Injectable 10 milliGRAM(s) IV Push every 2 hours PRN CIWA-Ar score increase by 2 points and a total score of 7 or less  haloperidol    Injectable 5 milliGRAM(s) IntraMuscular every 6 hours PRN Agitation  heparin   Injectable 6500 Unit(s) IV Push every 6 hours PRN For aPTT less than 40  heparin   Injectable 3000 Unit(s) IV Push every 6 hours PRN For aPTT between 40 - 57

## 2024-09-02 LAB
ALBUMIN SERPL ELPH-MCNC: 2.5 G/DL — LOW (ref 3.5–5.2)
ALP SERPL-CCNC: 184 U/L — HIGH (ref 30–115)
ALT FLD-CCNC: 28 U/L — SIGNIFICANT CHANGE UP (ref 0–41)
ANION GAP SERPL CALC-SCNC: 14 MMOL/L — SIGNIFICANT CHANGE UP (ref 7–14)
APTT BLD: 102.3 SEC — CRITICAL HIGH (ref 27–39.2)
AST SERPL-CCNC: 26 U/L — SIGNIFICANT CHANGE UP (ref 0–41)
BASOPHILS # BLD AUTO: 0.02 K/UL — SIGNIFICANT CHANGE UP (ref 0–0.2)
BASOPHILS NFR BLD AUTO: 0.2 % — SIGNIFICANT CHANGE UP (ref 0–1)
BILIRUB SERPL-MCNC: <0.2 MG/DL — SIGNIFICANT CHANGE UP (ref 0.2–1.2)
BUN SERPL-MCNC: 65 MG/DL — CRITICAL HIGH (ref 10–20)
CALCIUM SERPL-MCNC: 8.5 MG/DL — SIGNIFICANT CHANGE UP (ref 8.4–10.5)
CHLORIDE SERPL-SCNC: 106 MMOL/L — SIGNIFICANT CHANGE UP (ref 98–110)
CO2 SERPL-SCNC: 25 MMOL/L — SIGNIFICANT CHANGE UP (ref 17–32)
CREAT SERPL-MCNC: 2.4 MG/DL — HIGH (ref 0.7–1.5)
EGFR: 30 ML/MIN/1.73M2 — LOW
EOSINOPHIL # BLD AUTO: 0 K/UL — SIGNIFICANT CHANGE UP (ref 0–0.7)
EOSINOPHIL NFR BLD AUTO: 0 % — SIGNIFICANT CHANGE UP (ref 0–8)
GLUCOSE BLDC GLUCOMTR-MCNC: 156 MG/DL — HIGH (ref 70–99)
GLUCOSE BLDC GLUCOMTR-MCNC: 54 MG/DL — CRITICAL LOW (ref 70–99)
GLUCOSE BLDC GLUCOMTR-MCNC: 68 MG/DL — LOW (ref 70–99)
GLUCOSE BLDC GLUCOMTR-MCNC: 87 MG/DL — SIGNIFICANT CHANGE UP (ref 70–99)
GLUCOSE BLDC GLUCOMTR-MCNC: 95 MG/DL — SIGNIFICANT CHANGE UP (ref 70–99)
GLUCOSE BLDC GLUCOMTR-MCNC: 98 MG/DL — SIGNIFICANT CHANGE UP (ref 70–99)
GLUCOSE SERPL-MCNC: 121 MG/DL — HIGH (ref 70–99)
HCT VFR BLD CALC: 41.7 % — LOW (ref 42–52)
HGB BLD-MCNC: 12.8 G/DL — LOW (ref 14–18)
IMM GRANULOCYTES NFR BLD AUTO: 1.3 % — HIGH (ref 0.1–0.3)
LYMPHOCYTES # BLD AUTO: 1.19 K/UL — LOW (ref 1.2–3.4)
LYMPHOCYTES # BLD AUTO: 11.1 % — LOW (ref 20.5–51.1)
MAGNESIUM SERPL-MCNC: 2 MG/DL — SIGNIFICANT CHANGE UP (ref 1.8–2.4)
MCHC RBC-ENTMCNC: 25.3 PG — LOW (ref 27–31)
MCHC RBC-ENTMCNC: 30.7 G/DL — LOW (ref 32–37)
MCV RBC AUTO: 82.4 FL — SIGNIFICANT CHANGE UP (ref 80–94)
MONOCYTES # BLD AUTO: 0.91 K/UL — HIGH (ref 0.1–0.6)
MONOCYTES NFR BLD AUTO: 8.5 % — SIGNIFICANT CHANGE UP (ref 1.7–9.3)
NEUTROPHILS # BLD AUTO: 8.5 K/UL — HIGH (ref 1.4–6.5)
NEUTROPHILS NFR BLD AUTO: 78.9 % — HIGH (ref 42.2–75.2)
NRBC # BLD: 0 /100 WBCS — SIGNIFICANT CHANGE UP (ref 0–0)
PHOSPHATE SERPL-MCNC: 4.4 MG/DL — SIGNIFICANT CHANGE UP (ref 2.1–4.9)
PLATELET # BLD AUTO: 579 K/UL — HIGH (ref 130–400)
PMV BLD: 10.6 FL — HIGH (ref 7.4–10.4)
POTASSIUM SERPL-MCNC: 3.7 MMOL/L — SIGNIFICANT CHANGE UP (ref 3.5–5)
POTASSIUM SERPL-SCNC: 3.7 MMOL/L — SIGNIFICANT CHANGE UP (ref 3.5–5)
PROT SERPL-MCNC: 5.7 G/DL — LOW (ref 6–8)
RBC # BLD: 5.06 M/UL — SIGNIFICANT CHANGE UP (ref 4.7–6.1)
RBC # FLD: 16.7 % — HIGH (ref 11.5–14.5)
SODIUM SERPL-SCNC: 145 MMOL/L — SIGNIFICANT CHANGE UP (ref 135–146)
WBC # BLD: 10.76 K/UL — SIGNIFICANT CHANGE UP (ref 4.8–10.8)
WBC # FLD AUTO: 10.76 K/UL — SIGNIFICANT CHANGE UP (ref 4.8–10.8)

## 2024-09-02 PROCEDURE — 99233 SBSQ HOSP IP/OBS HIGH 50: CPT

## 2024-09-02 RX ORDER — DEXTROSE 15 G/33 G
50 GEL IN PACKET (GRAM) ORAL ONCE
Refills: 0 | Status: COMPLETED | OUTPATIENT
Start: 2024-09-02 | End: 2024-09-02

## 2024-09-02 RX ORDER — ALPRAZOLAM 0.25 MG
0.5 TABLET ORAL
Refills: 0 | Status: DISCONTINUED | OUTPATIENT
Start: 2024-09-02 | End: 2024-09-05

## 2024-09-02 RX ADMIN — OLANZAPINE 5 MILLIGRAM(S): 7.5 TABLET ORAL at 18:03

## 2024-09-02 RX ADMIN — Medication 4 MILLIGRAM(S): at 15:53

## 2024-09-02 RX ADMIN — Medication 2.5 MILLIGRAM(S): at 00:13

## 2024-09-02 RX ADMIN — Medication 2.5 MILLIGRAM(S): at 18:03

## 2024-09-02 RX ADMIN — OLANZAPINE 5 MILLIGRAM(S): 7.5 TABLET ORAL at 05:46

## 2024-09-02 RX ADMIN — Medication 2000 UNIT(S)/HR: at 08:17

## 2024-09-02 RX ADMIN — INSULIN GLARGINE 25 UNIT(S): 100 INJECTION, SOLUTION SUBCUTANEOUS at 10:41

## 2024-09-02 RX ADMIN — Medication 1 APPLICATION(S): at 18:21

## 2024-09-02 RX ADMIN — Medication 1 PATCH: at 02:01

## 2024-09-02 RX ADMIN — Medication 1 PATCH: at 08:23

## 2024-09-02 RX ADMIN — Medication 2.5 MILLIGRAM(S): at 23:31

## 2024-09-02 RX ADMIN — Medication 4 MILLIGRAM(S): at 22:09

## 2024-09-02 RX ADMIN — DEXMEDETOMIDINE HYDROCHLORIDE IN 0.9% SODIUM CHLORIDE 9.48 MICROGRAM(S)/KG/HR: 4 INJECTION INTRAVENOUS at 15:53

## 2024-09-02 RX ADMIN — Medication 50 MILLILITER(S): at 18:30

## 2024-09-02 RX ADMIN — Medication 2.5 MILLIGRAM(S): at 05:39

## 2024-09-02 RX ADMIN — Medication 2.5 MILLIGRAM(S): at 12:01

## 2024-09-02 RX ADMIN — Medication 1 PATCH: at 17:54

## 2024-09-02 RX ADMIN — DEXMEDETOMIDINE HYDROCHLORIDE IN 0.9% SODIUM CHLORIDE 9.48 MICROGRAM(S)/KG/HR: 4 INJECTION INTRAVENOUS at 06:30

## 2024-09-02 RX ADMIN — Medication 1800 UNIT(S)/HR: at 19:14

## 2024-09-02 RX ADMIN — Medication 4 MILLIGRAM(S): at 05:38

## 2024-09-02 RX ADMIN — PIPERACILLIN SODIUM AND TAZOBACTAM SODIUM 25 GRAM(S): 3; .375 INJECTION, POWDER, FOR SOLUTION INTRAVENOUS at 18:03

## 2024-09-02 RX ADMIN — PIPERACILLIN SODIUM AND TAZOBACTAM SODIUM 25 GRAM(S): 3; .375 INJECTION, POWDER, FOR SOLUTION INTRAVENOUS at 05:36

## 2024-09-02 RX ADMIN — CHLORHEXIDINE GLUCONATE 1 APPLICATION(S): 40 SOLUTION TOPICAL at 05:37

## 2024-09-02 RX ADMIN — Medication 1 APPLICATION(S): at 05:42

## 2024-09-02 RX ADMIN — Medication 0.5 MILLIGRAM(S): at 18:04

## 2024-09-02 NOTE — PROGRESS NOTE ADULT - SUBJECTIVE AND OBJECTIVE BOX
Patient is a 59y old  Male who presents with a chief complaint of AMS (30 Aug 2024 09:38)      Over Night Events:  Patient seen and examined.   on precedex   heparin drip     ROS:  See HPI    PHYSICAL EXAM    ICU Vital Signs Last 24 Hrs  T(C): 35.7 (01 Sep 2024 19:00), Max: 36.3 (01 Sep 2024 07:00)  T(F): 96.3 (01 Sep 2024 19:00), Max: 97.4 (01 Sep 2024 07:00)  HR: 51 (02 Sep 2024 06:00) (51 - 64)  BP: 166/71 (02 Sep 2024 06:00) (128/58 - 173/75)  BP(mean): 102 (02 Sep 2024 06:00) (83 - 108)  ABP: --  ABP(mean): --  RR: 15 (02 Sep 2024 06:00) (15 - 43)  SpO2: 97% (02 Sep 2024 06:00) (93% - 97%)    O2 Parameters below as of 02 Sep 2024 04:00  Patient On (Oxygen Delivery Method): nasal cannula  O2 Flow (L/min): 4          General:awake   HEENT:   [place]             Lymph Nodes: NO cervical LN   Lungs: Bilateral BS  Cardiovascular: Regular   Abdomen: Soft, Positive BS  Extremities: No clubbing   Skin: warm   Neurological:   Musculoskeletal: move all ext     I&O's Detail    31 Aug 2024 07:01  -  01 Sep 2024 07:00  --------------------------------------------------------  IN:    Dexmedetomidine: 334 mL    Heparin Infusion: 480 mL    Lactated Ringers: 100 mL    sodium chloride 0.9%: 750 mL  Total IN: 1664 mL    OUT:    Indwelling Catheter - Urethral (mL): 1265 mL  Total OUT: 1265 mL    Total NET: 399 mL      01 Sep 2024 07:01  -  02 Sep 2024 06:36  --------------------------------------------------------  IN:    Dexmedetomidine: 252 mL    Heparin Infusion: 460 mL    IV PiggyBack: 50 mL    sodium chloride 0.9%: 400 mL  Total IN: 1162 mL    OUT:    Indwelling Catheter - Urethral (mL): 1665 mL  Total OUT: 1665 mL    Total NET: -503 mL          LABS:                          12.6   12.92 )-----------( 519      ( 01 Sep 2024 05:53 )             40.8         01 Sep 2024 05:53    145    |  105    |  58     ----------------------------<  227    4.3     |  25     |  2.7      Ca    8.8        01 Sep 2024 05:53  Phos  4.7       01 Sep 2024 05:53  Mg     2.0       01 Sep 2024 05:53                                               PTT - ( 01 Sep 2024 05:53 )  PTT:66.2 sec                                       Urinalysis Basic - ( 01 Sep 2024 05:53 )    Color: x / Appearance: x / SG: x / pH: x  Gluc: 227 mg/dL / Ketone: x  / Bili: x / Urobili: x   Blood: x / Protein: x / Nitrite: x   Leuk Esterase: x / RBC: x / WBC x   Sq Epi: x / Non Sq Epi: x / Bacteria: x                                                                                                                                                     MEDICATIONS  (STANDING):  chlorhexidine 2% Cloths 1 Application(s) Topical <User Schedule>  cloNIDine Patch 0.3 mG/24Hr(s) 1 patch Transdermal every 7 days  dexAMETHasone  Injectable 4 milliGRAM(s) IV Push every 8 hours  dexMEDEtomidine Infusion 0.5 MICROgram(s)/kG/Hr (9.48 mL/Hr) IV Continuous <Continuous>  dextrose 5%. 1000 milliLiter(s) (50 mL/Hr) IV Continuous <Continuous>  dextrose 5%. 1000 milliLiter(s) (100 mL/Hr) IV Continuous <Continuous>  dextrose 50% Injectable 25 Gram(s) IV Push once  dextrose 50% Injectable 12.5 Gram(s) IV Push once  dextrose 50% Injectable 25 Gram(s) IV Push once  glucagon  Injectable 1 milliGRAM(s) IntraMuscular once  heparin  Infusion. 2000 Unit(s)/Hr (20 mL/Hr) IV Continuous <Continuous>  hydrALAZINE Injectable 2.5 milliGRAM(s) IV Push every 6 hours  insulin glargine Injectable (LANTUS) 25 Unit(s) SubCutaneous two times a day  insulin lispro (ADMELOG) corrective regimen sliding scale   SubCutaneous three times a day before meals  mupirocin 2% Nasal 1 Application(s) Both Nostrils every 12 hours  OLANZapine Injectable 5 milliGRAM(s) IntraMuscular every 12 hours  piperacillin/tazobactam IVPB.. 3.375 Gram(s) IV Intermittent every 12 hours  sodium chloride 0.9%. 1000 milliLiter(s) (50 mL/Hr) IV Continuous <Continuous>    MEDICATIONS  (PRN):  dextrose Oral Gel 15 Gram(s) Oral once PRN Blood Glucose LESS THAN 70 milliGRAM(s)/deciliter  diazepam  Injectable 10 milliGRAM(s) IV Push every 2 hours PRN CIWA-Ar score increase by 2 points and a total score of 7 or less  diazepam  Injectable 5 milliGRAM(s) IV Push every 1 hour PRN CIWA-Ar score of 8 or Greater  haloperidol    Injectable 5 milliGRAM(s) IntraMuscular every 6 hours PRN Agitation  heparin   Injectable 6500 Unit(s) IV Push every 6 hours PRN For aPTT less than 40  heparin   Injectable 3000 Unit(s) IV Push every 6 hours PRN For aPTT between 40 - 57          Xrays:  TLC:  OG:  ET tube:                                                                                       ECHO:  CAM ICU:

## 2024-09-02 NOTE — PROGRESS NOTE ADULT - SUBJECTIVE AND OBJECTIVE BOX
59y old  Male who presents with a chief complaint of Confusion      PAST MEDICAL & SURGICAL HISTORY:    DM (diabetes mellitus)  HTN (hypertension)  HLD (hyperlipidemia)  Herpes labialis  Anxiety  GERD (gastroesophageal reflux disease)  Kidney stones  20 years ago  Kidney disease  stage 4  Chronic kidney disease, unspecified CKD stage  Diabetic Charcot foot  PAD (peripheral artery disease)  S/P foot surgery, right  x 5 ( 2006 - 2013 )  Kidney stone  Removed by Laser x 2 ( 20 years ago )  H/O arthroscopy of right knee  Status post peripheral artery angioplasty  Amputee, below knee, right    Social History:  single (26 Aug 2024 13:50)    MEDICATIONS  (STANDING):  chlorhexidine 2% Cloths 1 Application(s) Topical <User Schedule>  cloNIDine Patch 0.3 mG/24Hr(s) 1 patch Transdermal every 7 days  dexMEDEtomidine Infusion 0.5 MICROgram(s)/kG/Hr (9.48 mL/Hr) IV Continuous <Continuous>  dextrose 5% + lactated ringers. 1000 milliLiter(s) (80 mL/Hr) IV Continuous <Continuous>  dextrose 5%. 1000 milliLiter(s) (50 mL/Hr) IV Continuous <Continuous>  dextrose 5%. 1000 milliLiter(s) (100 mL/Hr) IV Continuous <Continuous>  dextrose 50% Injectable 25 Gram(s) IV Push once  dextrose 50% Injectable 12.5 Gram(s) IV Push once  dextrose 50% Injectable 25 Gram(s) IV Push once  glucagon  Injectable 1 milliGRAM(s) IntraMuscular once  heparin  Infusion. 2000 Unit(s)/Hr (20 mL/Hr) IV Continuous <Continuous>  hydrALAZINE Injectable 2.5 milliGRAM(s) IV Push every 6 hours  insulin glargine Injectable (LANTUS) 10 Unit(s) SubCutaneous two times a day  insulin lispro (ADMELOG) corrective regimen sliding scale   SubCutaneous three times a day before meals  mupirocin 2% Nasal 1 Application(s) Both Nostrils every 12 hours  OLANZapine Injectable 5 milliGRAM(s) IntraMuscular every 12 hours  piperacillin/tazobactam IVPB.. 3.375 Gram(s) IV Intermittent every 12 hours  thiamine IVPB 500 milliGRAM(s) IV Intermittent daily    MEDICATIONS  (PRN):  dextrose Oral Gel 15 Gram(s) Oral once PRN Blood Glucose LESS THAN 70 milliGRAM(s)/deciliter  diazepam  Injectable 10 milliGRAM(s) IV Push every 2 hours PRN CIWA-Ar score increase by 2 points and a total score of 7 or less  diazepam  Injectable 5 milliGRAM(s) IV Push every 1 hour PRN CIWA-Ar score of 8 or Greater  haloperidol    Injectable 5 milliGRAM(s) IntraMuscular every 6 hours PRN Agitation  heparin   Injectable 6500 Unit(s) IV Push every 6 hours PRN For aPTT less than 40  heparin   Injectable 3000 Unit(s) IV Push every 6 hours PRN For aPTT between 40 - 57    Allergies    No Known Allergies    Intolerances    ICU Vital Signs Last 24 Hrs  T(C): 35.5 (02 Sep 2024 07:00), Max: 36.2 (01 Sep 2024 09:01)  T(F): 95.9 (02 Sep 2024 07:00), Max: 97.2 (01 Sep 2024 09:01)  HR: 55 (02 Sep 2024 07:00) (51 - 64)  BP: 161/72 (02 Sep 2024 07:00) (128/58 - 173/75)  BP(mean): 103 (02 Sep 2024 07:00) (83 - 108)  ABP: --  ABP(mean): --  RR: 22 (02 Sep 2024 07:00) (15 - 43)  SpO2: 95% (02 Sep 2024 07:00) (93% - 97%)    O2 Parameters below as of 02 Sep 2024 07:00  Patient On (Oxygen Delivery Method): nasal cannula  O2 Flow (L/min): 3    CAPILLARY BLOOD GLUCOSE      POCT Blood Glucose.: 98 mg/dL (02 Sep 2024 07:01)  POCT Blood Glucose.: 145 mg/dL (01 Sep 2024 22:24)  POCT Blood Glucose.: 171 mg/dL (01 Sep 2024 17:31)  POCT Blood Glucose.: 251 mg/dL (01 Sep 2024 11:19)  POCT Blood Glucose.: 207 mg/dL (01 Sep 2024 09:21)        Diet, NPO:   Except Medications (08-28-24 @ 16:42) [Active]      General: NAD  HEENT:    MMM, no new lesions, hoarseness of voice POA             Lymph Nodes: NO cervical LN   Lungs: Bilateral BS, no wheezing   Cardiovascular: Regular, normal S1 S2    Abdomen: Soft, Positive BS  Extremities: No clubbing   Skin: warm, no new rashes   Neurological: Awake mildly sedated   Musculoskeletal: move all ext BKA on the right                           12.6   12.92 )-----------( 519      ( 01 Sep 2024 05:53 )             40.8                       12.6   13.99 )-----------( 430      ( 31 Aug 2024 05:52 )             41.7     09-01    145  |  105  |  58<H>  ----------------------------<  227<H>  4.3   |  25  |  2.7<H>    Ca    8.8      01 Sep 2024 05:53  Phos  4.7     09-01  Mg     2.0     09-01    TPro  5.8<L>  /  Alb  2.6<L>  /  TBili  <0.2  /  DBili  x   /  AST  13  /  ALT  15  /  AlkPhos  207<H>  09-01 08-31    141  |  100  |  47<H>  ----------------------------<  323<H>  4.4   |  24  |  2.8<H>    Ca    8.9      31 Aug 2024 05:52  Phos  5.9     08-31  Mg     1.9     08-31    TPro  5.7<L>  /  Alb  x   /  TBili  <0.2  /  DBili  x   /  AST  12  /  ALT  12  /  AlkPhos  x   08-31

## 2024-09-02 NOTE — PROGRESS NOTE ADULT - ASSESSMENT
IMPRESSION:    Toxic metabolic encephalopathy  DKA/HHS with recurrent readmission   Hx of substance abuse found cocaine and benzo   Severe HAGMA- improved  Sepsis POA- resolved   Elevated LA- resolved   Throat pain POA- US shows bilateral cervical LAD - tonsillitis and airway narrowing  subclinical hyperthyroid  DM  HTN  CKD 4  HO CVA  HO LUE DVT on Eliquis DVT is resolved- completing 3mo ac       PLAN:    CNS: CT Head negative, Thiamine and Folate.  taper  precedex down wean off. BZD symptom driven. On Olazapine for agitation. On Clonidine as well. Addiction medicine.     HEENT: Oral care, CT neck noted. ENT eval. Continue Dexamethasone 4mg every 12 hours.     PULMONARY:  HOB @ 45 degrees.  Aspiration precautions.  CXR negative.     CARDIOVASCULAR: BP control. IV hydration if remains NPO with LR at 50cc/hr. Avoid NS.     GI: GI prophylaxis. Speech and swallow re-eval.     RENAL:  CKD 4. Nephrology on board. No further HD. Gentle hydration until PO intake is better.     INFECTIOUS DISEASE:   Zosyn for tonsillitis. ID following.     HEMATOLOGICAL:  Heparin IV;  therapeutic PTT.     ENDOCRINOLOGY: SQ lantus lispro regimen.     MUSCULOSKELETAL: Bedrest    MICU until MS is improved.

## 2024-09-02 NOTE — SWALLOW BEDSIDE ASSESSMENT ADULT - PHARYNGEAL PHASE
Cough post oral intake/Throat clear post oral intake Delayed throat clear post oral intake/Multiple swallows

## 2024-09-02 NOTE — SWALLOW BEDSIDE ASSESSMENT ADULT - COMMENTS
Pt has 1:1 sit for extreme agitation. Pt remains sedated most of the time d/t this agitation.
minimal symptoms of pharyngeal dysphagia

## 2024-09-03 LAB
ALBUMIN SERPL ELPH-MCNC: 2.5 G/DL — LOW (ref 3.5–5.2)
ALP SERPL-CCNC: 173 U/L — HIGH (ref 30–115)
ALT FLD-CCNC: 28 U/L — SIGNIFICANT CHANGE UP (ref 0–41)
ANION GAP SERPL CALC-SCNC: 13 MMOL/L — SIGNIFICANT CHANGE UP (ref 7–14)
ANION GAP SERPL CALC-SCNC: 16 MMOL/L — HIGH (ref 7–14)
APTT BLD: 106.1 SEC — CRITICAL HIGH (ref 27–39.2)
APTT BLD: 106.4 SEC — CRITICAL HIGH (ref 27–39.2)
APTT BLD: 127.5 SEC — CRITICAL HIGH (ref 27–39.2)
AST SERPL-CCNC: 24 U/L — SIGNIFICANT CHANGE UP (ref 0–41)
BASOPHILS # BLD AUTO: 0.03 K/UL — SIGNIFICANT CHANGE UP (ref 0–0.2)
BASOPHILS NFR BLD AUTO: 0.4 % — SIGNIFICANT CHANGE UP (ref 0–1)
BILIRUB SERPL-MCNC: <0.2 MG/DL — SIGNIFICANT CHANGE UP (ref 0.2–1.2)
BUN SERPL-MCNC: 68 MG/DL — CRITICAL HIGH (ref 10–20)
BUN SERPL-MCNC: 79 MG/DL — CRITICAL HIGH (ref 10–20)
CALCIUM SERPL-MCNC: 8.8 MG/DL — SIGNIFICANT CHANGE UP (ref 8.4–10.5)
CALCIUM SERPL-MCNC: 8.8 MG/DL — SIGNIFICANT CHANGE UP (ref 8.4–10.5)
CHLORIDE SERPL-SCNC: 108 MMOL/L — SIGNIFICANT CHANGE UP (ref 98–110)
CHLORIDE SERPL-SCNC: 99 MMOL/L — SIGNIFICANT CHANGE UP (ref 98–110)
CO2 SERPL-SCNC: 24 MMOL/L — SIGNIFICANT CHANGE UP (ref 17–32)
CO2 SERPL-SCNC: 25 MMOL/L — SIGNIFICANT CHANGE UP (ref 17–32)
CREAT SERPL-MCNC: 2.3 MG/DL — HIGH (ref 0.7–1.5)
CREAT SERPL-MCNC: 2.9 MG/DL — HIGH (ref 0.7–1.5)
EGFR: 24 ML/MIN/1.73M2 — LOW
EGFR: 32 ML/MIN/1.73M2 — LOW
EOSINOPHIL # BLD AUTO: 0 K/UL — SIGNIFICANT CHANGE UP (ref 0–0.7)
EOSINOPHIL NFR BLD AUTO: 0 % — SIGNIFICANT CHANGE UP (ref 0–8)
GLUCOSE BLDC GLUCOMTR-MCNC: 324 MG/DL — HIGH (ref 70–99)
GLUCOSE BLDC GLUCOMTR-MCNC: 330 MG/DL — HIGH (ref 70–99)
GLUCOSE BLDC GLUCOMTR-MCNC: 357 MG/DL — HIGH (ref 70–99)
GLUCOSE BLDC GLUCOMTR-MCNC: 70 MG/DL — SIGNIFICANT CHANGE UP (ref 70–99)
GLUCOSE SERPL-MCNC: 396 MG/DL — HIGH (ref 70–99)
GLUCOSE SERPL-MCNC: 56 MG/DL — LOW (ref 70–99)
HCT VFR BLD CALC: 42 % — SIGNIFICANT CHANGE UP (ref 42–52)
HGB BLD-MCNC: 13 G/DL — LOW (ref 14–18)
IMM GRANULOCYTES NFR BLD AUTO: 1.8 % — HIGH (ref 0.1–0.3)
LYMPHOCYTES # BLD AUTO: 1.14 K/UL — LOW (ref 1.2–3.4)
LYMPHOCYTES # BLD AUTO: 13.7 % — LOW (ref 20.5–51.1)
MAGNESIUM SERPL-MCNC: 2 MG/DL — SIGNIFICANT CHANGE UP (ref 1.8–2.4)
MCHC RBC-ENTMCNC: 25.1 PG — LOW (ref 27–31)
MCHC RBC-ENTMCNC: 31 G/DL — LOW (ref 32–37)
MCV RBC AUTO: 81.1 FL — SIGNIFICANT CHANGE UP (ref 80–94)
MONOCYTES # BLD AUTO: 0.61 K/UL — HIGH (ref 0.1–0.6)
MONOCYTES NFR BLD AUTO: 7.3 % — SIGNIFICANT CHANGE UP (ref 1.7–9.3)
NEUTROPHILS # BLD AUTO: 6.42 K/UL — SIGNIFICANT CHANGE UP (ref 1.4–6.5)
NEUTROPHILS NFR BLD AUTO: 76.8 % — HIGH (ref 42.2–75.2)
NRBC # BLD: 0 /100 WBCS — SIGNIFICANT CHANGE UP (ref 0–0)
PHOSPHATE SERPL-MCNC: 5 MG/DL — HIGH (ref 2.1–4.9)
PLATELET # BLD AUTO: 538 K/UL — HIGH (ref 130–400)
PMV BLD: 10.8 FL — HIGH (ref 7.4–10.4)
POTASSIUM SERPL-MCNC: 4.4 MMOL/L — SIGNIFICANT CHANGE UP (ref 3.5–5)
POTASSIUM SERPL-MCNC: 4.5 MMOL/L — SIGNIFICANT CHANGE UP (ref 3.5–5)
POTASSIUM SERPL-SCNC: 4.4 MMOL/L — SIGNIFICANT CHANGE UP (ref 3.5–5)
POTASSIUM SERPL-SCNC: 4.5 MMOL/L — SIGNIFICANT CHANGE UP (ref 3.5–5)
PROT SERPL-MCNC: 5.5 G/DL — LOW (ref 6–8)
RBC # BLD: 5.18 M/UL — SIGNIFICANT CHANGE UP (ref 4.7–6.1)
RBC # FLD: 16.8 % — HIGH (ref 11.5–14.5)
SODIUM SERPL-SCNC: 139 MMOL/L — SIGNIFICANT CHANGE UP (ref 135–146)
SODIUM SERPL-SCNC: 146 MMOL/L — SIGNIFICANT CHANGE UP (ref 135–146)
WBC # BLD: 8.35 K/UL — SIGNIFICANT CHANGE UP (ref 4.8–10.8)
WBC # FLD AUTO: 8.35 K/UL — SIGNIFICANT CHANGE UP (ref 4.8–10.8)

## 2024-09-03 PROCEDURE — 99233 SBSQ HOSP IP/OBS HIGH 50: CPT

## 2024-09-03 RX ORDER — INSULIN GLARGINE 100 [IU]/ML
20 INJECTION, SOLUTION SUBCUTANEOUS
Refills: 0 | Status: DISCONTINUED | OUTPATIENT
Start: 2024-09-03 | End: 2024-09-05

## 2024-09-03 RX ORDER — APIXABAN 5 MG/1
5 TABLET, FILM COATED ORAL EVERY 12 HOURS
Refills: 0 | Status: DISCONTINUED | OUTPATIENT
Start: 2024-09-03 | End: 2024-09-05

## 2024-09-03 RX ORDER — DEXAMETHASONE 0.75 MG
4 TABLET ORAL EVERY 12 HOURS
Refills: 0 | Status: DISCONTINUED | OUTPATIENT
Start: 2024-09-03 | End: 2024-09-04

## 2024-09-03 RX ORDER — INSULIN GLARGINE 100 [IU]/ML
20 INJECTION, SOLUTION SUBCUTANEOUS
Refills: 0 | Status: DISCONTINUED | OUTPATIENT
Start: 2024-09-03 | End: 2024-09-03

## 2024-09-03 RX ORDER — TAMSULOSIN HYDROCHLORIDE 0.4 MG/1
0.4 CAPSULE ORAL AT BEDTIME
Refills: 0 | Status: DISCONTINUED | OUTPATIENT
Start: 2024-09-03 | End: 2024-09-03

## 2024-09-03 RX ORDER — ASPIRIN 81 MG
81 TABLET, DELAYED RELEASE (ENTERIC COATED) ORAL DAILY
Refills: 0 | Status: DISCONTINUED | OUTPATIENT
Start: 2024-09-03 | End: 2024-09-03

## 2024-09-03 RX ADMIN — Medication 1600 UNIT(S)/HR: at 03:55

## 2024-09-03 RX ADMIN — Medication 10: at 16:28

## 2024-09-03 RX ADMIN — APIXABAN 5 MILLIGRAM(S): 5 TABLET, FILM COATED ORAL at 16:25

## 2024-09-03 RX ADMIN — Medication 1 APPLICATION(S): at 17:32

## 2024-09-03 RX ADMIN — Medication 1400 UNIT(S)/HR: at 13:23

## 2024-09-03 RX ADMIN — OLANZAPINE 5 MILLIGRAM(S): 7.5 TABLET ORAL at 17:52

## 2024-09-03 RX ADMIN — Medication 4 MILLIGRAM(S): at 05:55

## 2024-09-03 RX ADMIN — Medication 1600 UNIT(S)/HR: at 07:30

## 2024-09-03 RX ADMIN — Medication 1000 MILLILITER(S): at 17:39

## 2024-09-03 RX ADMIN — Medication 6 UNIT(S): at 16:27

## 2024-09-03 RX ADMIN — Medication 8 UNIT(S): at 21:26

## 2024-09-03 RX ADMIN — Medication 2.5 MILLIGRAM(S): at 05:52

## 2024-09-03 RX ADMIN — PIPERACILLIN SODIUM AND TAZOBACTAM SODIUM 25 GRAM(S): 3; .375 INJECTION, POWDER, FOR SOLUTION INTRAVENOUS at 17:23

## 2024-09-03 RX ADMIN — CHLORHEXIDINE GLUCONATE 1 APPLICATION(S): 40 SOLUTION TOPICAL at 05:54

## 2024-09-03 RX ADMIN — Medication 1 PATCH: at 07:25

## 2024-09-03 RX ADMIN — INSULIN GLARGINE 20 UNIT(S): 100 INJECTION, SOLUTION SUBCUTANEOUS at 10:47

## 2024-09-03 RX ADMIN — OLANZAPINE 5 MILLIGRAM(S): 7.5 TABLET ORAL at 06:51

## 2024-09-03 RX ADMIN — Medication 1 APPLICATION(S): at 05:56

## 2024-09-03 RX ADMIN — Medication 0.5 MILLIGRAM(S): at 05:54

## 2024-09-03 RX ADMIN — INSULIN GLARGINE 20 UNIT(S): 100 INJECTION, SOLUTION SUBCUTANEOUS at 21:16

## 2024-09-03 RX ADMIN — Medication 1 PATCH: at 17:54

## 2024-09-03 RX ADMIN — Medication 4 MILLIGRAM(S): at 17:32

## 2024-09-03 RX ADMIN — Medication 80 MILLIGRAM(S): at 21:15

## 2024-09-03 RX ADMIN — PIPERACILLIN SODIUM AND TAZOBACTAM SODIUM 25 GRAM(S): 3; .375 INJECTION, POWDER, FOR SOLUTION INTRAVENOUS at 05:55

## 2024-09-03 RX ADMIN — Medication 0.5 MILLIGRAM(S): at 17:22

## 2024-09-03 RX ADMIN — Medication 4: at 11:25

## 2024-09-03 NOTE — PROGRESS NOTE ADULT - SUBJECTIVE AND OBJECTIVE BOX
SUBJECTIVE / OVERNIGHT EVENTS  Patient slept well overnight. No acute complaints this AM.    MEDICATIONS  ALPRAZolam 0.5 milliGRAM(s) Oral two times a day  chlorhexidine 2% Cloths 1 Application(s) Topical <User Schedule>  cloNIDine Patch 0.3 mG/24Hr(s) 1 patch Transdermal every 7 days  dexAMETHasone  Injectable 4 milliGRAM(s) IV Push every 12 hours  dexMEDEtomidine Infusion 0.5 MICROgram(s)/kG/Hr IV Continuous <Continuous>  dextrose 5%. 1000 milliLiter(s) IV Continuous <Continuous>  dextrose 5%. 1000 milliLiter(s) IV Continuous <Continuous>  dextrose 5%. 1000 milliLiter(s) IV Continuous <Continuous>  dextrose 50% Injectable 25 Gram(s) IV Push once  dextrose 50% Injectable 12.5 Gram(s) IV Push once  dextrose 50% Injectable 25 Gram(s) IV Push once  glucagon  Injectable 1 milliGRAM(s) IntraMuscular once  heparin  Infusion. 2000 Unit(s)/Hr IV Continuous <Continuous>  hydrALAZINE Injectable 2.5 milliGRAM(s) IV Push every 6 hours  insulin glargine Injectable (LANTUS) 20 Unit(s) SubCutaneous two times a day  insulin lispro (ADMELOG) corrective regimen sliding scale   SubCutaneous three times a day before meals  mupirocin 2% Nasal 1 Application(s) Both Nostrils every 12 hours  OLANZapine Injectable 5 milliGRAM(s) IntraMuscular every 12 hours  piperacillin/tazobactam IVPB.. 3.375 Gram(s) IV Intermittent every 12 hours    dextrose Oral Gel 15 Gram(s) Oral once PRN Blood Glucose LESS THAN 70 milliGRAM(s)/deciliter  diazepam  Injectable 10 milliGRAM(s) IV Push every 2 hours PRN CIWA-Ar score increase by 2 points and a total score of 7 or less  diazepam  Injectable 5 milliGRAM(s) IV Push every 1 hour PRN CIWA-Ar score of 8 or Greater  haloperidol    Injectable 5 milliGRAM(s) IntraMuscular every 6 hours PRN Agitation  heparin   Injectable 6500 Unit(s) IV Push every 6 hours PRN For aPTT less than 40  heparin   Injectable 3000 Unit(s) IV Push every 6 hours PRN For aPTT between 40 - 57  oxycodone    5 mG/acetaminophen 325 mG 1 Tablet(s) Oral every 6 hours PRN Mild Pain (1 - 3)    VITALS /  EXAM    T(C): 35.7 (09-03-24 @ 07:01), Max: 35.9 (09-02-24 @ 23:18)  HR: 56 (09-03-24 @ 07:00) (47 - 61)  BP: 136/63 (09-03-24 @ 07:00) (131/74 - 180/81)  RR: 18 (09-03-24 @ 07:01) (15 - 29)  SpO2: 96% (09-03-24 @ 07:00) (88% - 98%)  POCT Blood Glucose.: 70 mg/dL (09-03-24 @ 06:11)  POCT Blood Glucose.: 95 mg/dL (09-02-24 @ 22:08)  POCT Blood Glucose.: 156 mg/dL (09-02-24 @ 18:56)  POCT Blood Glucose.: 68 mg/dL (09-02-24 @ 18:24)  POCT Blood Glucose.: 54 mg/dL (09-02-24 @ 18:23)  POCT Blood Glucose.: 87 mg/dL (09-02-24 @ 10:39)    GENERAL: Lethargic  CHEST/LUNG: Clear to auscultation bilaterally; No wheezes, rales or rhonchi  HEART: Regular rate and rhythm; No murmurs, rubs, or gallops  ABDOMEN: Soft, Nontender, Nondistended; Bowel sounds present, no masses.  EXTREMITIES: No clubbing, cyanosis, or edema    I's & O's     09-02-24 @ 07:01  -  09-03-24 @ 07:00  --------------------------------------------------------  IN:    Dexmedetomidine: 219.8 mL    Heparin Infusion: 354 mL    IV PiggyBack: 200 mL    Oral Fluid: 340 mL  Total IN: 1113.8 mL    OUT:    Indwelling Catheter - Urethral (mL): 1315 mL  Total OUT: 1315 mL    Total NET: -201.2 mL      09-03-24 @ 07:01  -  09-03-24 @ 10:07  --------------------------------------------------------  IN:    Heparin Infusion: 16 mL    Oral Fluid: 210 mL  Total IN: 226 mL    OUT:    Indwelling Catheter - Urethral (mL): 100 mL  Total OUT: 100 mL    Total NET: 126 mL        LABS             13.0   8.35  )-----------( 538      ( 09-03-24 @ 05:51 )             42.0     146  |  108  |  68  -------------------------<  56   09-03-24 @ 05:51  4.4  |  25  |  2.3    Ca      8.8     09-03-24 @ 05:51  Phos   5.0     09-03-24 @ 05:51  Mg     2.0     09-03-24 @ 05:51    TPro  5.5  /  Alb  2.5  /  TBili  <0.2  /  DBili  x   /  AST  24  /  ALT  28  /  AlkPhos  173  /  GGT  x     09-03-24 @ 05:51    PTT - ( 09-03-24 @ 05:51 )  PTT:106.1 sec                  Urinalysis Basic - ( 03 Sep 2024 05:51 )    Color: x / Appearance: x / SG: x / pH: x  Gluc: 56 mg/dL / Ketone: x  / Bili: x / Urobili: x   Blood: x / Protein: x / Nitrite: x   Leuk Esterase: x / RBC: x / WBC x   Sq Epi: x / Non Sq Epi: x / Bacteria: x      MICRO / IMAGING / CARDIOLOGY  Telemetry: Reviewed   EKG: Reviewed    CULTURES    IMAGING    CARDIOLOGY

## 2024-09-03 NOTE — PROGRESS NOTE ADULT - ASSESSMENT
IMPRESSION:    Toxic metabolic encephalopathy  DKA/HHS with recurrent readmission   Hx of substance abuse found cocaine and benzo   Severe HAGMA- improved  Sepsis POA- resolved   Elevated LA- resolved   Throat pain POA- US shows bilateral cervical LAD - tonsillitis and airway narrowing  subclinical hyperthyroid  DM  HTN  CKD 4  HO CVA  HO LUE DVT on Eliquis DVT is resolved- completing 3mo ac       PLAN:    CNS: CT Head negative, Thiamine and Folate.  off  precedex  . BZD symptom driven. On Olazapine for agitation. On Clonidine as well. Addiction medicine.     HEENT: Oral care, CT neck noted. ENT eval. Continue Dexamethasone 4mg every 12 hours.     PULMONARY:  HOB @ 45 degrees.  Aspiration precautions.  CXR negative.     CARDIOVASCULAR: BP control. Iv D5w 50cc/ hr for 12 hrs and revaluate follow NA level      GI: GI prophylaxis. feed  as per Speech and swallow re-eval.     RENAL:  CKD 4. Nephrology on board. No further HD. Gentle hydration until PO intake is better.   follow NA level   INFECTIOUS DISEASE:   Zosyn for tonsillitis. ID following.     HEMATOLOGICAL:  Heparin IV;  therapeutic PTT.     ENDOCRINOLOGY: SQ lantus lispro regimen.     MUSCULOSKELETAL: Bedrest    SDU

## 2024-09-03 NOTE — PROGRESS NOTE ADULT - SUBJECTIVE AND OBJECTIVE BOX
Patient is a 59y old  Male who presents with a chief complaint of AMS (30 Aug 2024 09:38)      Over Night Events:  Patient seen and examined.   off precedex awake follow command     ROS:  See HPI    PHYSICAL EXAM    ICU Vital Signs Last 24 Hrs  T(C): 35.7 (03 Sep 2024 07:01), Max: 35.9 (02 Sep 2024 23:18)  T(F): 96.2 (03 Sep 2024 07:01), Max: 96.7 (02 Sep 2024 23:18)  HR: 56 (03 Sep 2024 07:00) (47 - 61)  BP: 136/63 (03 Sep 2024 07:00) (131/74 - 181/76)  BP(mean): 91 (03 Sep 2024 07:00) (90 - 116)  ABP: --  ABP(mean): --  RR: 18 (03 Sep 2024 07:01) (15 - 29)  SpO2: 96% (03 Sep 2024 07:00) (88% - 98%)    O2 Parameters below as of 03 Sep 2024 07:00  Patient On (Oxygen Delivery Method): nasal cannula  O2 Flow (L/min): 2          General:awake   HEENT:                Lymph Nodes: NO cervical LN   Lungs: Bilateral BS  Cardiovascular: Regular   Abdomen: Soft, Positive BS  Extremities: No clubbing   Skin: warm   Neurological: no focal   Musculoskeletal: move all ext     I&O's Detail    02 Sep 2024 07:01  -  03 Sep 2024 07:00  --------------------------------------------------------  IN:    Dexmedetomidine: 219.8 mL    Heparin Infusion: 354 mL    IV PiggyBack: 100 mL    Oral Fluid: 340 mL  Total IN: 1013.8 mL    OUT:    Indwelling Catheter - Urethral (mL): 1315 mL  Total OUT: 1315 mL    Total NET: -301.2 mL          LABS:                          13.0   8.35  )-----------( 538      ( 03 Sep 2024 05:51 )             42.0         03 Sep 2024 05:51    146    |  108    |  68     ----------------------------<  56     4.4     |  25     |  2.3      Ca    8.8        03 Sep 2024 05:51  Phos  5.0       03 Sep 2024 05:51  Mg     2.0       03 Sep 2024 05:51    TPro  5.5    /  Alb  2.5    /  TBili  <0.2   /  DBili  x      /  AST  24     /  ALT  28     /  AlkPhos  173    03 Sep 2024 05:51  Amylase x     lipase x                                                 PTT - ( 03 Sep 2024 05:51 )  PTT:106.1 sec                                       Urinalysis Basic - ( 03 Sep 2024 05:51 )    Color: x / Appearance: x / SG: x / pH: x  Gluc: 56 mg/dL / Ketone: x  / Bili: x / Urobili: x   Blood: x / Protein: x / Nitrite: x   Leuk Esterase: x / RBC: x / WBC x   Sq Epi: x / Non Sq Epi: x / Bacteria: x                                                                                                                                                     MEDICATIONS  (STANDING):  ALPRAZolam 0.5 milliGRAM(s) Oral two times a day  chlorhexidine 2% Cloths 1 Application(s) Topical <User Schedule>  cloNIDine Patch 0.3 mG/24Hr(s) 1 patch Transdermal every 7 days  dexAMETHasone  Injectable 4 milliGRAM(s) IV Push every 8 hours  dexMEDEtomidine Infusion 0.5 MICROgram(s)/kG/Hr (9.48 mL/Hr) IV Continuous <Continuous>  dextrose 5%. 1000 milliLiter(s) (50 mL/Hr) IV Continuous <Continuous>  dextrose 5%. 1000 milliLiter(s) (100 mL/Hr) IV Continuous <Continuous>  dextrose 50% Injectable 25 Gram(s) IV Push once  dextrose 50% Injectable 12.5 Gram(s) IV Push once  dextrose 50% Injectable 25 Gram(s) IV Push once  glucagon  Injectable 1 milliGRAM(s) IntraMuscular once  heparin  Infusion. 2000 Unit(s)/Hr (20 mL/Hr) IV Continuous <Continuous>  hydrALAZINE Injectable 2.5 milliGRAM(s) IV Push every 6 hours  insulin glargine Injectable (LANTUS) 25 Unit(s) SubCutaneous two times a day  insulin lispro (ADMELOG) corrective regimen sliding scale   SubCutaneous three times a day before meals  mupirocin 2% Nasal 1 Application(s) Both Nostrils every 12 hours  OLANZapine Injectable 5 milliGRAM(s) IntraMuscular every 12 hours  piperacillin/tazobactam IVPB.. 3.375 Gram(s) IV Intermittent every 12 hours  sodium chloride 0.9%. 1000 milliLiter(s) (50 mL/Hr) IV Continuous <Continuous>    MEDICATIONS  (PRN):  dextrose Oral Gel 15 Gram(s) Oral once PRN Blood Glucose LESS THAN 70 milliGRAM(s)/deciliter  diazepam  Injectable 5 milliGRAM(s) IV Push every 1 hour PRN CIWA-Ar score of 8 or Greater  diazepam  Injectable 10 milliGRAM(s) IV Push every 2 hours PRN CIWA-Ar score increase by 2 points and a total score of 7 or less  haloperidol    Injectable 5 milliGRAM(s) IntraMuscular every 6 hours PRN Agitation  heparin   Injectable 6500 Unit(s) IV Push every 6 hours PRN For aPTT less than 40  heparin   Injectable 3000 Unit(s) IV Push every 6 hours PRN For aPTT between 40 - 57  oxycodone    5 mG/acetaminophen 325 mG 1 Tablet(s) Oral every 6 hours PRN Mild Pain (1 - 3)          Xrays:  TLC:  OG:  ET tube:                                                                                       ECHO:  CAM ICU:

## 2024-09-03 NOTE — PROGRESS NOTE ADULT - ASSESSMENT
59y old male with confusion     Toxic metabolic encephalopathy  DKA/HHS with recurrent readmission   Hx of substance abuse found cocaine and benzo   Severe HAGMA- improved  Sepsis POA- resolved   Elevated LA- resolved   Throat pain POA   DM  HTN  CKD 4  HO CVA  HO LUE DVT on Eliquis DVT is resolved- completing 3mo ac     monitor sugar

## 2024-09-03 NOTE — PROGRESS NOTE ADULT - ASSESSMENT
60 y/o male with hx of CKD 4, anemia of chronic disease, LUE on eliquis, recent CVA on DAPT, PAD, s/p right BKA, IDDM, substance abuse and recent hospital admission for PNA was brought in for confusion and agitation by family.     ID is following for leukocytosis  Afebrile, WBC 21.49 > 18.16 >15.06 > 17.5  On Room air  Reports having sore throat on Saturday 8/24, continued to get sicker  Became very confused on Monday 8/26  Might have missed a few doses of insulin due to not feeling well  BG > 1000  AG 43  Lactate 7.1  Acute renal failure, s/p emergent HD x 1 8/26 via [place]  COVID/flu/RSV negative  BCx NGTD  UCx NGTD  HIV negative    CXR no PNA    Antibiotics:  Vancomycin 8/26  Cefepime 8/26      IMPRESSION:  Right tonsilitis  Bilateral cervical lymphadenopathy  DM  CKD  Hx CVA  substance abuse  Immunosuppression / Immunosenescence secondary to multiple comorbidities which could result in poor clinical outcome    RECOMMENDATIONS:  - Continue IV Zosyn 3.375g q12hrs  - On discharge, can switch to PO Augmentin 500mg q12hrs to complete 10-day treatment (until 9/5)  - Outpatient ENT follow up (patient has a private ENT in [place])  - Withdrawal symptoms managed by Addiction and primary  - Will sign off. Please recall ID PRN for further questions      Silvana Snell D.O.  Attending Physician  Division of Infectious Diseases  [facility]  Please contact me via [employer] Teams

## 2024-09-03 NOTE — SWALLOW BEDSIDE ASSESSMENT ADULT - SWALLOW EVAL: PROGNOSIS
Pt will benefit from FEES during admission when more cooperative
pt will benefit from FEES during admission when more cooperative

## 2024-09-03 NOTE — PROGRESS NOTE ADULT - ASSESSMENT
59 year old male patient known to have HTN, CKD, DM, CVA, LUE DVT, substance use disorder presented with AMS, admitted for sepsis and DKA.    #Sepsis  #HAGMA  #Tonsilitis  -On Zosyn as per ID, stable, afebrile  -US shows bilateral cervical LAD - tonsillitis and airway narrowing  -Dexamethasone tapered down to 4mg q12    #HyperNa  -started d5  -f/u repeat bmp today    #TME  #Hx of substance abuse found cocaine and benzo   -Mental status improving, now off Precedex, on antipsychotics and benzodiazapine     #DM  #DKA  -BH negative, AG closed   -now on subcutaneous insulin  -here having low BG levels, Lantus decreased to 20 twice daily, and on ISS   -will need higher doses prior to discharge    #DVT in LUE  -on heparin drip  -consider switching to Eliquis 5mg twice daily     Downgrade to SDU    MISC  DVT ppx: Heparin drip  Diet: soft and bite sized  Activity: IAT

## 2024-09-03 NOTE — PROGRESS NOTE ADULT - SUBJECTIVE AND OBJECTIVE BOX
INFECTIOUS DISEASE FOLLOW UP NOTE:    Interval History/ROS: Patient is a 59y old  Male who presents with a chief complaint of AMS (30 Aug 2024 09:38)    Overnight events: Back to baseline. Feels well today. Able to tolerate PO diet.    REVIEW OF SYSTEMS:  CONSTITUTIONAL: No fever or chills  HEAD: No lesion on scalp  EYES: No visual disturbance  ENT: No sore throat, + hoarseness  RESPIRATORY: No cough, no shortness of breath  CARDIOVASCULAR: No chest pain or palpitations  GASTROINTESTINAL: No abdominal or epigastric pain  GENITOURINARY: No dysuria  NEUROLOGICAL: No headache/dizziness  MUSCULOSKELETAL: No joint pain, erythema, or swelling; no back pain  SKIN: No itching, rashes  All other ROS negative except noted above      Prior hospital charts reviewed [Yes]  Primary team notes reviewed [Yes]  Other consultant notes reviewed [Yes]    Allergies:  No Known Allergies      ANTIMICROBIALS:       OTHER MEDS: MEDICATIONS  (STANDING):  ALPRAZolam 0.5 two times a day  apixaban 5 every 12 hours  atorvastatin 80 at bedtime  cloNIDine Patch 0.3 mG/24Hr(s) 1 every 7 days  clopidogrel Tablet 75 daily  dexAMETHasone  Injectable 4 every 12 hours  dextrose 50% Injectable 25 once  dextrose 50% Injectable 12.5 once  dextrose 50% Injectable 25 once  dextrose Oral Gel 15 once PRN  glucagon  Injectable 1 once  insulin glargine Injectable (LANTUS) 20 two times a day  insulin lispro (ADMELOG) corrective regimen sliding scale  three times a day before meals  insulin lispro Injectable (ADMELOG) 6 three times a day before meals  OLANZapine Injectable 5 every 12 hours  oxycodone    5 mG/acetaminophen 325 mG 1 every 6 hours PRN      Vital Signs Last 24 Hrs  T(F): 97 (09-03-24 @ 15:01), Max: 100.4 (08-30-24 @ 13:00)    Vital Signs Last 24 Hrs  HR: 117 (09-03-24 @ 15:10) (50 - 123)  BP: 126/65 (09-03-24 @ 15:10) (91/61 - 159/67)  RR: 31 (09-03-24 @ 15:10)  SpO2: 94% (09-03-24 @ 15:10) (94% - 98%)  Wt(kg): --    EXAM:  GENERAL: NAD, sitting chair  HEAD: No head lesions  EYES: Conjunctiva pink and cornea white  EAR, NOSE, MOUTH, THROAT: Normal external ears and nose, no discharges; moist mucous membranes  NECK: Supple, nontender to palpate, no JVD  RESPIRATORY: Clear to auscultation bilaterally  CARDIOVASCULAR: S1 S2  GASTROINTESTINAL: Soft, nontender, nondistended; normoactive bowel sounds  GENITOURINARY: no urbano catheter, no CVA tenderness  EXTREMITIES: No clubbing, cyanosis, or petal edema; s/p R BKA  NERVOUS SYSTEM: Awake, alert, oriented  MUSCULOSKELETAL: No joint erythema, swelling or pain  SKIN: No rashes or lesions, no superficial thrombophlebitis  PSYCH: Calm    Labs:                        13.0   8.35  )-----------( 538      ( 03 Sep 2024 05:51 )             42.0     09-03    139  |  99  |  79<HH>  ----------------------------<  396<H>  4.5   |  24  |  2.9<H>    Ca    8.8      03 Sep 2024 15:31  Phos  5.0     09-03  Mg     2.0     09-03    TPro  5.5<L>  /  Alb  2.5<L>  /  TBili  <0.2  /  DBili  x   /  AST  24  /  ALT  28  /  AlkPhos  173<H>  09-03      WBC Trend:  WBC Count: 8.35 (09-03-24 @ 05:51)  WBC Count: 10.76 (09-02-24 @ 14:00)  WBC Count: 12.92 (09-01-24 @ 05:53)  WBC Count: 13.99 (08-31-24 @ 05:52)      Creatine Trend:  Creatinine: 2.9 (09-03)  Creatinine: 2.3 (09-03)  Creatinine: 2.4 (09-02)  Creatinine: 2.7 (09-01)      Liver Biochemical Testing Trend:  Alanine Aminotransferase (ALT/SGPT): 28 (09-03)  Alanine Aminotransferase (ALT/SGPT): 28 (09-02)  Alanine Aminotransferase (ALT/SGPT): 15 (09-01)  Alanine Aminotransferase (ALT/SGPT): 12 (08-31)  Alanine Aminotransferase (ALT/SGPT): 12 (08-30)  Aspartate Aminotransferase (AST/SGOT): 24 (09-03-24 @ 05:51)  Aspartate Aminotransferase (AST/SGOT): 26 (09-02-24 @ 14:00)  Aspartate Aminotransferase (AST/SGOT): 13 (09-01-24 @ 05:53)  Aspartate Aminotransferase (AST/SGOT): 12 (08-31-24 @ 05:52)  Aspartate Aminotransferase (AST/SGOT): 15 (08-30-24 @ 05:59)  Bilirubin Total: <0.2 (09-03)  Bilirubin Total: <0.2 (09-02)  Bilirubin Total: <0.2 (09-01)  Bilirubin Total: <0.2 (08-31)  Bilirubin Total: 0.3 (08-30)      Trend LDH      Urinalysis Basic - ( 03 Sep 2024 15:31 )    Color: x / Appearance: x / SG: x / pH: x  Gluc: 396 mg/dL / Ketone: x  / Bili: x / Urobili: x   Blood: x / Protein: x / Nitrite: x   Leuk Esterase: x / RBC: x / WBC x   Sq Epi: x / Non Sq Epi: x / Bacteria: x        MICROBIOLOGY:    MRSA PCR Result.: Positive (08-27-24 @ 10:54)  MRSA PCR Result.: Positive (06-28-24 @ 14:00)  MRSA PCR Result.: Positive (06-23-24 @ 18:00)      Culture - Urine (collected 27 Aug 2024 11:47)  Source: Catheterized Catheterized  Final Report:    No growth    Urinalysis with Rflx Culture (collected 27 Aug 2024 10:56)    Culture - Blood (collected 26 Aug 2024 12:45)  Source: .Blood Blood-Peripheral  Final Report:    No growth at 5 days    Culture - Blood (collected 26 Aug 2024 12:45)  Source: .Blood Blood-Peripheral  Final Report:    No growth at 5 days    Culture - Blood (collected 29 Jun 2024 18:54)  Source: .Blood None  Final Report:    No growth at 5 days    Culture - Blood (collected 29 Jun 2024 18:54)  Source: .Blood None  Final Report:    No growth at 5 days    Culture - Sputum (collected 23 Jun 2024 21:05)  Source: Trach Asp Tracheal Aspirate  Final Report:    No growth    Culture - Blood (collected 23 Jun 2024 18:43)  Source: .Blood None  Final Report:    No growth at 5 days    Culture - Blood (collected 23 Jun 2024 18:43)  Source: .Blood None  Final Report:    No growth at 5 days    Culture - Blood (collected 21 Jun 2024 15:59)  Source: .Blood None  Final Report:    No growth at 5 days      HIV-1/2 Combo Result: Nonreact (08-29-24 @ 11:21)  HIV-1/2 Combo Result: Nonreact (06-21-24 @ 15:59)        Cytomegalovirus By PCR: NotDetec IU/mL (08-29-24 @ 11:21)  Toxoplasma IgM Interpretation: Negative (08-29-24 @ 11:21)  Toxoplasma IgG Interpretation: Positive (08-29-24 @ 11:21)    RADIOLOGY:  imaging below personally reviewed    < from: CT Neck Soft Tissue No Cont (08.30.24 @ 12:42) >  IMPRESSION:    1.  Enlargement and hypoattenuation of the right palatine tonsil   consistent with tonsillitis. Cannot assess for associated abscess without   IV contrast.    2.  Associated edema along the right nasopharyngeal, oropharyngeal and   supraglottic laryngeal walls with moderate narrowing of the airway.    3.  Probably reactive right upper cervical chain lymphadenopathy.    < end of copied text >

## 2024-09-03 NOTE — PROGRESS NOTE ADULT - SUBJECTIVE AND OBJECTIVE BOX
59y old  Male who presents with a chief complaint of Confusion      PAST MEDICAL & SURGICAL HISTORY:    DM (diabetes mellitus)  HTN (hypertension)  HLD (hyperlipidemia)  Herpes labialis  Anxiety  GERD (gastroesophageal reflux disease)  Kidney stones  20 years ago  Kidney disease  stage 4  Chronic kidney disease, unspecified CKD stage  Diabetic Charcot foot  PAD (peripheral artery disease)  S/P foot surgery, right  x 5 ( 2006 - 2013 )  Kidney stone  Removed by Laser x 2 ( 20 years ago )  H/O arthroscopy of right knee  Status post peripheral artery angioplasty  Amputee, below knee, right    Social History:  single (26 Aug 2024 13:50)    MEDICATIONS  (STANDING):  chlorhexidine 2% Cloths 1 Application(s) Topical <User Schedule>  cloNIDine Patch 0.3 mG/24Hr(s) 1 patch Transdermal every 7 days  dexMEDEtomidine Infusion 0.5 MICROgram(s)/kG/Hr (9.48 mL/Hr) IV Continuous <Continuous>  dextrose 5% + lactated ringers. 1000 milliLiter(s) (80 mL/Hr) IV Continuous <Continuous>  dextrose 5%. 1000 milliLiter(s) (50 mL/Hr) IV Continuous <Continuous>  dextrose 5%. 1000 milliLiter(s) (100 mL/Hr) IV Continuous <Continuous>  dextrose 50% Injectable 25 Gram(s) IV Push once  dextrose 50% Injectable 12.5 Gram(s) IV Push once  dextrose 50% Injectable 25 Gram(s) IV Push once  glucagon  Injectable 1 milliGRAM(s) IntraMuscular once  heparin  Infusion. 2000 Unit(s)/Hr (20 mL/Hr) IV Continuous <Continuous>  hydrALAZINE Injectable 2.5 milliGRAM(s) IV Push every 6 hours  insulin glargine Injectable (LANTUS) 10 Unit(s) SubCutaneous two times a day  insulin lispro (ADMELOG) corrective regimen sliding scale   SubCutaneous three times a day before meals  mupirocin 2% Nasal 1 Application(s) Both Nostrils every 12 hours  OLANZapine Injectable 5 milliGRAM(s) IntraMuscular every 12 hours  piperacillin/tazobactam IVPB.. 3.375 Gram(s) IV Intermittent every 12 hours  thiamine IVPB 500 milliGRAM(s) IV Intermittent daily    MEDICATIONS  (PRN):  dextrose Oral Gel 15 Gram(s) Oral once PRN Blood Glucose LESS THAN 70 milliGRAM(s)/deciliter  diazepam  Injectable 10 milliGRAM(s) IV Push every 2 hours PRN CIWA-Ar score increase by 2 points and a total score of 7 or less  diazepam  Injectable 5 milliGRAM(s) IV Push every 1 hour PRN CIWA-Ar score of 8 or Greater  haloperidol    Injectable 5 milliGRAM(s) IntraMuscular every 6 hours PRN Agitation  heparin   Injectable 6500 Unit(s) IV Push every 6 hours PRN For aPTT less than 40  heparin   Injectable 3000 Unit(s) IV Push every 6 hours PRN For aPTT between 40 - 57    Allergies    No Known Allergies    Intolerances    ICU Vital Signs Last 24 Hrs  T(C): 35.9 (02 Sep 2024 23:18), Max: 35.9 (02 Sep 2024 23:18)  T(F): 96.7 (02 Sep 2024 23:18), Max: 96.7 (02 Sep 2024 23:18)  HR: 51 (03 Sep 2024 05:00) (47 - 61)  BP: 141/65 (03 Sep 2024 05:00) (131/74 - 181/76)  BP(mean): 94 (03 Sep 2024 05:00) (90 - 116)  ABP: --  ABP(mean): --  RR: 25 (03 Sep 2024 05:00) (15 - 29)  SpO2: 96% (03 Sep 2024 05:00) (88% - 98%)    O2 Parameters below as of 03 Sep 2024 05:00  Patient On (Oxygen Delivery Method): nasal cannula  O2 Flow (L/min): 4    CAPILLARY BLOOD GLUCOSE      POCT Blood Glucose.: 95 mg/dL (02 Sep 2024 22:08)  POCT Blood Glucose.: 156 mg/dL (02 Sep 2024 18:56)  POCT Blood Glucose.: 68 mg/dL (02 Sep 2024 18:24)  POCT Blood Glucose.: 54 mg/dL (02 Sep 2024 18:23)  POCT Blood Glucose.: 87 mg/dL (02 Sep 2024 10:39)  POCT Blood Glucose.: 98 mg/dL (02 Sep 2024 07:01)        Diet, NPO:   Except Medications (08-28-24 @ 16:42) [Active]      General: NAD  HEENT:    MMM, no new lesions, hoarseness of voice POA             Lymph Nodes: NO cervical LN   Lungs: Bilateral BS, no wheezing   Cardiovascular: Regular, normal S1 S2    Abdomen: Soft, Positive BS  Extremities: No clubbing   Skin: warm, no new rashes   Neurological: Awake mildly sedated   Musculoskeletal: move all ext BKA on the right                           12.6   12.92 )-----------( 519      ( 01 Sep 2024 05:53 )             40.8                       12.6   13.99 )-----------( 430      ( 31 Aug 2024 05:52 )             41.7     09-01    145  |  105  |  58<H>  ----------------------------<  227<H>  4.3   |  25  |  2.7<H>    Ca    8.8      01 Sep 2024 05:53  Phos  4.7     09-01  Mg     2.0     09-01    TPro  5.8<L>  /  Alb  2.6<L>  /  TBili  <0.2  /  DBili  x   /  AST  13  /  ALT  15  /  AlkPhos  207<H>  09-01 08-31    141  |  100  |  47<H>  ----------------------------<  323<H>  4.4   |  24  |  2.8<H>    Ca    8.9      31 Aug 2024 05:52  Phos  5.9     08-31  Mg     1.9     08-31    TPro  5.7<L>  /  Alb  x   /  TBili  <0.2  /  DBili  x   /  AST  12  /  ALT  12  /  AlkPhos  x   08-31

## 2024-09-04 LAB
ANION GAP SERPL CALC-SCNC: 13 MMOL/L — SIGNIFICANT CHANGE UP (ref 7–14)
BASOPHILS # BLD AUTO: 0.05 K/UL — SIGNIFICANT CHANGE UP (ref 0–0.2)
BASOPHILS NFR BLD AUTO: 0.4 % — SIGNIFICANT CHANGE UP (ref 0–1)
BUN SERPL-MCNC: 87 MG/DL — CRITICAL HIGH (ref 10–20)
CALCIUM SERPL-MCNC: 8.6 MG/DL — SIGNIFICANT CHANGE UP (ref 8.4–10.5)
CHLORIDE SERPL-SCNC: 100 MMOL/L — SIGNIFICANT CHANGE UP (ref 98–110)
CO2 SERPL-SCNC: 24 MMOL/L — SIGNIFICANT CHANGE UP (ref 17–32)
CREAT SERPL-MCNC: 3.3 MG/DL — HIGH (ref 0.7–1.5)
EGFR: 21 ML/MIN/1.73M2 — LOW
EOSINOPHIL # BLD AUTO: 0.01 K/UL — SIGNIFICANT CHANGE UP (ref 0–0.7)
EOSINOPHIL NFR BLD AUTO: 0.1 % — SIGNIFICANT CHANGE UP (ref 0–8)
GLUCOSE BLDC GLUCOMTR-MCNC: 126 MG/DL — HIGH (ref 70–99)
GLUCOSE BLDC GLUCOMTR-MCNC: 200 MG/DL — HIGH (ref 70–99)
GLUCOSE BLDC GLUCOMTR-MCNC: 247 MG/DL — HIGH (ref 70–99)
GLUCOSE BLDC GLUCOMTR-MCNC: 302 MG/DL — HIGH (ref 70–99)
GLUCOSE SERPL-MCNC: 282 MG/DL — HIGH (ref 70–99)
HCT VFR BLD CALC: 37.2 % — LOW (ref 42–52)
HGB BLD-MCNC: 11.6 G/DL — LOW (ref 14–18)
IMM GRANULOCYTES NFR BLD AUTO: 2.2 % — HIGH (ref 0.1–0.3)
LYMPHOCYTES # BLD AUTO: 19.3 % — LOW (ref 20.5–51.1)
LYMPHOCYTES # BLD AUTO: 2.68 K/UL — SIGNIFICANT CHANGE UP (ref 1.2–3.4)
MAGNESIUM SERPL-MCNC: 1.8 MG/DL — SIGNIFICANT CHANGE UP (ref 1.8–2.4)
MCHC RBC-ENTMCNC: 24.9 PG — LOW (ref 27–31)
MCHC RBC-ENTMCNC: 31.2 G/DL — LOW (ref 32–37)
MCV RBC AUTO: 79.8 FL — LOW (ref 80–94)
MONOCYTES # BLD AUTO: 1.05 K/UL — HIGH (ref 0.1–0.6)
MONOCYTES NFR BLD AUTO: 7.5 % — SIGNIFICANT CHANGE UP (ref 1.7–9.3)
NEUTROPHILS # BLD AUTO: 9.83 K/UL — HIGH (ref 1.4–6.5)
NEUTROPHILS NFR BLD AUTO: 70.5 % — SIGNIFICANT CHANGE UP (ref 42.2–75.2)
NRBC # BLD: 0 /100 WBCS — SIGNIFICANT CHANGE UP (ref 0–0)
PLATELET # BLD AUTO: 556 K/UL — HIGH (ref 130–400)
PMV BLD: 10.2 FL — SIGNIFICANT CHANGE UP (ref 7.4–10.4)
POTASSIUM SERPL-MCNC: 4.4 MMOL/L — SIGNIFICANT CHANGE UP (ref 3.5–5)
POTASSIUM SERPL-SCNC: 4.4 MMOL/L — SIGNIFICANT CHANGE UP (ref 3.5–5)
RBC # BLD: 4.66 M/UL — LOW (ref 4.7–6.1)
RBC # FLD: 17.2 % — HIGH (ref 11.5–14.5)
SODIUM SERPL-SCNC: 137 MMOL/L — SIGNIFICANT CHANGE UP (ref 135–146)
WBC # BLD: 13.92 K/UL — HIGH (ref 4.8–10.8)
WBC # FLD AUTO: 13.92 K/UL — HIGH (ref 4.8–10.8)

## 2024-09-04 PROCEDURE — 99233 SBSQ HOSP IP/OBS HIGH 50: CPT

## 2024-09-04 RX ORDER — DEXAMETHASONE 0.75 MG
6 TABLET ORAL ONCE
Refills: 0 | Status: COMPLETED | OUTPATIENT
Start: 2024-09-04 | End: 2024-09-04

## 2024-09-04 RX ORDER — PREDNISONE 10 MG
40 TABLET, DOSE PACK ORAL DAILY
Refills: 0 | Status: DISCONTINUED | OUTPATIENT
Start: 2024-09-05 | End: 2024-09-05

## 2024-09-04 RX ORDER — MENTHOL/CETYLPYRD CL 3 MG
1 LOZENGE MUCOUS MEMBRANE ONCE
Refills: 0 | Status: COMPLETED | OUTPATIENT
Start: 2024-09-04 | End: 2024-09-04

## 2024-09-04 RX ORDER — CLINDAMYCIN PHOSPHATE 150 MG/ML
300 VIAL (ML) INJECTION THREE TIMES A DAY
Refills: 0 | Status: DISCONTINUED | OUTPATIENT
Start: 2024-09-04 | End: 2024-09-05

## 2024-09-04 RX ADMIN — Medication 1 APPLICATION(S): at 05:32

## 2024-09-04 RX ADMIN — INSULIN GLARGINE 20 UNIT(S): 100 INJECTION, SOLUTION SUBCUTANEOUS at 08:06

## 2024-09-04 RX ADMIN — Medication 4: at 08:05

## 2024-09-04 RX ADMIN — Medication 1 SPRAY(S): at 05:33

## 2024-09-04 RX ADMIN — Medication 0.5 MILLIGRAM(S): at 05:32

## 2024-09-04 RX ADMIN — Medication 1 APPLICATION(S): at 18:03

## 2024-09-04 RX ADMIN — Medication 2: at 17:30

## 2024-09-04 RX ADMIN — APIXABAN 5 MILLIGRAM(S): 5 TABLET, FILM COATED ORAL at 05:32

## 2024-09-04 RX ADMIN — Medication 0.5 MILLIGRAM(S): at 18:03

## 2024-09-04 RX ADMIN — Medication 1 PATCH: at 07:00

## 2024-09-04 RX ADMIN — Medication 10 UNIT(S): at 17:31

## 2024-09-04 RX ADMIN — Medication 75 MILLIGRAM(S): at 12:04

## 2024-09-04 RX ADMIN — Medication 4 MILLIGRAM(S): at 05:32

## 2024-09-04 RX ADMIN — INSULIN GLARGINE 20 UNIT(S): 100 INJECTION, SOLUTION SUBCUTANEOUS at 21:02

## 2024-09-04 RX ADMIN — APIXABAN 5 MILLIGRAM(S): 5 TABLET, FILM COATED ORAL at 18:03

## 2024-09-04 RX ADMIN — Medication 300 MILLIGRAM(S): at 21:02

## 2024-09-04 RX ADMIN — Medication 25 GRAM(S): at 12:04

## 2024-09-04 RX ADMIN — Medication 80 MILLIGRAM(S): at 21:02

## 2024-09-04 RX ADMIN — Medication 1 PATCH: at 19:00

## 2024-09-04 RX ADMIN — Medication 300 MILLIGRAM(S): at 15:29

## 2024-09-04 RX ADMIN — Medication 6 UNIT(S): at 08:05

## 2024-09-04 RX ADMIN — Medication 6 MILLIGRAM(S): at 12:05

## 2024-09-04 NOTE — PHYSICAL THERAPY INITIAL EVALUATION ADULT - ADDITIONAL COMMENTS
Pt reports he lives in house with his mother and has 4 ASTER then a flight to bedroom. Pt was independent  with amb with prosthetic leg PTA.

## 2024-09-04 NOTE — PHYSICAL THERAPY INITIAL EVALUATION ADULT - PERTINENT HX OF CURRENT PROBLEM, REHAB EVAL
History of Present Illness:   60 y/o male with hx of CKD 4, anemia of chronic disease, LUE on eliquis, recent CVA on DAPT, PAD, s/p right BKA, IDDM, substance abuse and recent hospital admission for PNA was brought in for confusion and agitation by family. At encounter pt appears to be confused answers questions, says has no history of drug abuse. When asked if he is complaint with insulin, he agrees. This has been confirmed with the mother, who also agrees that he is complaint with his meds. Of note pt had a sore throat yesterday morning and the sister at bedside added that His BG has been > 600 for the past few day nina home.

## 2024-09-04 NOTE — PROGRESS NOTE ADULT - SUBJECTIVE AND OBJECTIVE BOX
59y old  Male who presents with a chief complaint of Confusion      PAST MEDICAL & SURGICAL HISTORY:    DM (diabetes mellitus)  HTN (hypertension)  HLD (hyperlipidemia)  Herpes labialis  Anxiety  GERD (gastroesophageal reflux disease)  Kidney stones  20 years ago  Kidney disease  stage 4  Chronic kidney disease, unspecified CKD stage  Diabetic Charcot foot  PAD (peripheral artery disease)  S/P foot surgery, right  x 5 ( 2006 - 2013 )  Kidney stone  Removed by Laser x 2 ( 20 years ago )  H/O arthroscopy of right knee  Status post peripheral artery angioplasty  Amputee, below knee, right    Social History:  single (26 Aug 2024 13:50)    MEDICATIONS  (STANDING):  ALPRAZolam 0.5 milliGRAM(s) Oral two times a day  apixaban 5 milliGRAM(s) Oral every 12 hours  atorvastatin 80 milliGRAM(s) Oral at bedtime  chlorhexidine 2% Cloths 1 Application(s) Topical <User Schedule>  cloNIDine Patch 0.3 mG/24Hr(s) 1 patch Transdermal every 7 days  clopidogrel Tablet 75 milliGRAM(s) Oral daily  dexAMETHasone  Injectable 4 milliGRAM(s) IV Push every 12 hours  dextrose 5%. 1000 milliLiter(s) (100 mL/Hr) IV Continuous <Continuous>  dextrose 5%. 1000 milliLiter(s) (50 mL/Hr) IV Continuous <Continuous>  dextrose 5%. 1000 milliLiter(s) (50 mL/Hr) IV Continuous <Continuous>  dextrose 50% Injectable 25 Gram(s) IV Push once  dextrose 50% Injectable 12.5 Gram(s) IV Push once  dextrose 50% Injectable 25 Gram(s) IV Push once  glucagon  Injectable 1 milliGRAM(s) IntraMuscular once  insulin glargine Injectable (LANTUS) 20 Unit(s) SubCutaneous two times a day  insulin lispro (ADMELOG) corrective regimen sliding scale   SubCutaneous three times a day before meals  insulin lispro Injectable (ADMELOG) 6 Unit(s) SubCutaneous three times a day before meals  mupirocin 2% Nasal 1 Application(s) Both Nostrils every 12 hours  OLANZapine Injectable 5 milliGRAM(s) IntraMuscular every 12 hours    MEDICATIONS  (PRN):  dextrose Oral Gel 15 Gram(s) Oral once PRN Blood Glucose LESS THAN 70 milliGRAM(s)/deciliter  oxycodone    5 mG/acetaminophen 325 mG 1 Tablet(s) Oral every 6 hours PRN Mild Pain (1 - 3)      Allergies    No Known Allergies    Intolerances    ICU Vital Signs Last 24 Hrs  T(C): 36.5 (04 Sep 2024 04:30), Max: 36.8 (03 Sep 2024 23:00)  T(F): 97.7 (04 Sep 2024 04:30), Max: 98.3 (03 Sep 2024 23:00)  HR: 82 (04 Sep 2024 04:30) (51 - 123)  BP: 127/59 (04 Sep 2024 04:30) (91/61 - 136/63)  BP(mean): 83 (03 Sep 2024 15:10) (72 - 91)  ABP: --  ABP(mean): --  RR: 16 (04 Sep 2024 04:30) (15 - 37)  SpO2: 94% (04 Sep 2024 04:30) (94% - 97%)    O2 Parameters below as of 03 Sep 2024 15:10  Patient On (Oxygen Delivery Method): room air    CAPILLARY BLOOD GLUCOSE      POCT Blood Glucose.: 324 mg/dL (03 Sep 2024 21:12)  POCT Blood Glucose.: 357 mg/dL (03 Sep 2024 16:25)  POCT Blood Glucose.: 330 mg/dL (03 Sep 2024 10:46)  POCT Blood Glucose.: 70 mg/dL (03 Sep 2024 06:11)    Diet, Easy to Chew (09-03-24 @ 13:16) [Active]    General: NAD  HEENT:    MMM, no new lesions, hoarseness of voice POA             Lymph Nodes: NO cervical LN   Lungs: Bilateral BS, no wheezing   Cardiovascular: Regular, normal S1 S2    Abdomen: Soft, Positive BS  Extremities: No clubbing   Skin: warm, no new rashes   Neurological: Awake mildly sedated   Musculoskeletal: move all ext BKA on the right                               13.0   8.35  )-----------( 538      ( 03 Sep 2024 05:51 )             42.0                       12.6   12.92 )-----------( 519      ( 01 Sep 2024 05:53 )             40.8                       12.6   13.99 )-----------( 430      ( 31 Aug 2024 05:52 )             41.7     09-03    139  |  99  |  79<HH>  ----------------------------<  396<H>  4.5   |  24  |  2.9<H>    Ca    8.8      03 Sep 2024 15:31  Phos  5.0     09-03  Mg     2.0     09-03    TPro  5.5<L>  /  Alb  2.5<L>  /  TBili  <0.2  /  DBili  x   /  AST  24  /  ALT  28  /  AlkPhos  173<H>  09-03 09-01    145  |  105  |  58<H>  ----------------------------<  227<H>  4.3   |  25  |  2.7<H>    Ca    8.8      01 Sep 2024 05:53  Phos  4.7     09-01  Mg     2.0     09-01    TPro  5.8<L>  /  Alb  2.6<L>  /  TBili  <0.2  /  DBili  x   /  AST  13  /  ALT  15  /  AlkPhos  207<H>  09-01 08-31    141  |  100  |  47<H>  ----------------------------<  323<H>  4.4   |  24  |  2.8<H>    Ca    8.9      31 Aug 2024 05:52  Phos  5.9     08-31  Mg     1.9     08-31    TPro  5.7<L>  /  Alb  x   /  TBili  <0.2  /  DBili  x   /  AST  12  /  ALT  12  /  AlkPhos  x   08-31

## 2024-09-04 NOTE — PROGRESS NOTE ADULT - SUBJECTIVE AND OBJECTIVE BOX
Patient is a 59y old  Male who presents with a chief complaint of AMS (03 Sep 2024 11:56)      Over Night Events:  Patient seen and examined.   off precedex for more then 24 hrs   sitting in bed awake following command talking full sentence   still has hoarseness     ROS:  See HPI    PHYSICAL EXAM    ICU Vital Signs Last 24 Hrs  T(C): 36.5 (04 Sep 2024 04:30), Max: 36.8 (03 Sep 2024 23:00)  T(F): 97.7 (04 Sep 2024 04:30), Max: 98.3 (03 Sep 2024 23:00)  HR: 82 (04 Sep 2024 04:30) (63 - 123)  BP: 127/59 (04 Sep 2024 04:30) (91/61 - 134/73)  BP(mean): 83 (03 Sep 2024 15:10) (72 - 83)  ABP: --  ABP(mean): --  RR: 16 (04 Sep 2024 04:30) (16 - 37)  SpO2: 94% (04 Sep 2024 04:30) (94% - 96%)    O2 Parameters below as of 03 Sep 2024 15:10  Patient On (Oxygen Delivery Method): room air            General:awake   HEENT:                Lymph Nodes: NO cervical LN   Lungs: Bilateral BS  Cardiovascular: Regular   Abdomen: Soft, Positive BS  Extremities: No clubbing   Skin: warm   Neurological:   Musculoskeletal: move all ext     I&O's Detail    03 Sep 2024 07:01  -  04 Sep 2024 07:00  --------------------------------------------------------  IN:    Heparin Infusion: 92 mL    IV PiggyBack: 100 mL    Lactated Ringers Bolus: 500 mL    Oral Fluid: 660 mL  Total IN: 1352 mL    OUT:    Indwelling Catheter - Urethral (mL): 100 mL    Intermittent Catheterization - Urethral (mL): 580 mL  Total OUT: 680 mL    Total NET: 672 mL          LABS:                          11.6   13.92 )-----------( 556      ( 04 Sep 2024 05:47 )             37.2         03 Sep 2024 15:31    139    |  99     |  79     ----------------------------<  396    4.5     |  24     |  2.9      Ca    8.8        03 Sep 2024 15:31  Phos  5.0       03 Sep 2024 05:51  Mg     2.0       03 Sep 2024 05:51                                               PTT - ( 03 Sep 2024 12:12 )  PTT:127.5 sec                                       Urinalysis Basic - ( 03 Sep 2024 15:31 )    Color: x / Appearance: x / SG: x / pH: x  Gluc: 396 mg/dL / Ketone: x  / Bili: x / Urobili: x   Blood: x / Protein: x / Nitrite: x   Leuk Esterase: x / RBC: x / WBC x   Sq Epi: x / Non Sq Epi: x / Bacteria: x                                                                                                                                                     MEDICATIONS  (STANDING):  ALPRAZolam 0.5 milliGRAM(s) Oral two times a day  apixaban 5 milliGRAM(s) Oral every 12 hours  atorvastatin 80 milliGRAM(s) Oral at bedtime  chlorhexidine 2% Cloths 1 Application(s) Topical <User Schedule>  cloNIDine Patch 0.3 mG/24Hr(s) 1 patch Transdermal every 7 days  clopidogrel Tablet 75 milliGRAM(s) Oral daily  dexAMETHasone  Injectable 4 milliGRAM(s) IV Push every 12 hours  dextrose 5%. 1000 milliLiter(s) (50 mL/Hr) IV Continuous <Continuous>  dextrose 5%. 1000 milliLiter(s) (50 mL/Hr) IV Continuous <Continuous>  dextrose 5%. 1000 milliLiter(s) (100 mL/Hr) IV Continuous <Continuous>  dextrose 50% Injectable 12.5 Gram(s) IV Push once  dextrose 50% Injectable 25 Gram(s) IV Push once  dextrose 50% Injectable 25 Gram(s) IV Push once  glucagon  Injectable 1 milliGRAM(s) IntraMuscular once  insulin glargine Injectable (LANTUS) 20 Unit(s) SubCutaneous two times a day  insulin lispro (ADMELOG) corrective regimen sliding scale   SubCutaneous three times a day before meals  insulin lispro Injectable (ADMELOG) 6 Unit(s) SubCutaneous three times a day before meals  mupirocin 2% Nasal 1 Application(s) Both Nostrils every 12 hours  OLANZapine Injectable 5 milliGRAM(s) IntraMuscular every 12 hours    MEDICATIONS  (PRN):  dextrose Oral Gel 15 Gram(s) Oral once PRN Blood Glucose LESS THAN 70 milliGRAM(s)/deciliter  oxycodone    5 mG/acetaminophen 325 mG 1 Tablet(s) Oral every 6 hours PRN Mild Pain (1 - 3)          Xrays:  TLC:  OG:  ET tube:                                                                                       ECHO:  CAM ICU:

## 2024-09-04 NOTE — CHART NOTE - NSCHARTNOTEFT_GEN_A_CORE
I spoke with ENT Dr. Go regarding patient's throat pain and tonsilitis on Ct. He recommended one dose of decadrone 10mg today for symptoms, can continue prednisone per pulm. Also recommends starting clindamycin and discharging him on it. Patient should follow up with ENT outpatient.

## 2024-09-04 NOTE — PROGRESS NOTE ADULT - ASSESSMENT
IMPRESSION:    Toxic metabolic encephalopathy  DKA/HHS with recurrent readmission   Hx of substance abuse found cocaine and benzo   Severe HAGMA- improved  Sepsis POA- resolved   Elevated LA- resolved   Throat pain POA- US shows bilateral cervical LAD - tonsillitis and airway narrowing  subclinical hyperthyroid  DM  HTN  CKD 4  HO CVA  HO LUE DVT on Eliquis DVT is resolved- completing 3mo ac       PLAN:    CNS: CT Head negative, Thiamine and Folate.  off  precedex  . BZD symptom driven. On Olazapine for agitation. On Clonidine as well. Addiction medicine.     HEENT: Oral care, CT neck noted. ENT eval. switch to prednisone 40 mg day and taper   ENT eval     PULMONARY:  HOB @ 45 degrees.  Aspiration precautions.  CXR negative.     CARDIOVASCULAR: BP control.  d/c iv fluid     GI: GI prophylaxis. feed  as per Speech and swallow re-eval.     RENAL:  CKD 4. Nephrology on board. No further HD.    follow NA level   monitor for urine retention   follow bun, cr   INFECTIOUS DISEASE:   abx as per ID     HEMATOLOGICAL:   on eliquis      ENDOCRINOLOGY: adjust lantus lisprofor hyperglycemia    MUSCULOSKELETAL: Bedrest    downgrade to floor recall prn

## 2024-09-04 NOTE — PHYSICAL THERAPY INITIAL EVALUATION ADULT - GENERAL OBSERVATIONS, REHAB EVAL
13:20-13:50 Chart reviewed. Patient available to be seen for physical therapy, denies pain, confirmed with RN.  Pt rec'd in bed + tele box, + all CCU lines intact, pt has prosthetic leg at bedside, pt in NAD.

## 2024-09-05 ENCOUNTER — TRANSCRIPTION ENCOUNTER (OUTPATIENT)
Age: 59
End: 2024-09-05

## 2024-09-05 VITALS
RESPIRATION RATE: 18 BRPM | OXYGEN SATURATION: 100 % | DIASTOLIC BLOOD PRESSURE: 70 MMHG | HEART RATE: 85 BPM | TEMPERATURE: 98 F | SYSTOLIC BLOOD PRESSURE: 140 MMHG

## 2024-09-05 LAB
ALBUMIN SERPL ELPH-MCNC: 2.9 G/DL — LOW (ref 3.5–5.2)
ALP SERPL-CCNC: 138 U/L — HIGH (ref 30–115)
ALT FLD-CCNC: 21 U/L — SIGNIFICANT CHANGE UP (ref 0–41)
ANION GAP SERPL CALC-SCNC: 15 MMOL/L — HIGH (ref 7–14)
AST SERPL-CCNC: 18 U/L — SIGNIFICANT CHANGE UP (ref 0–41)
BASOPHILS # BLD AUTO: 0.04 K/UL — SIGNIFICANT CHANGE UP (ref 0–0.2)
BASOPHILS NFR BLD AUTO: 0.2 % — SIGNIFICANT CHANGE UP (ref 0–1)
BILIRUB SERPL-MCNC: <0.2 MG/DL — SIGNIFICANT CHANGE UP (ref 0.2–1.2)
BUN SERPL-MCNC: 69 MG/DL — CRITICAL HIGH (ref 10–20)
CALCIUM SERPL-MCNC: 8.9 MG/DL — SIGNIFICANT CHANGE UP (ref 8.4–10.5)
CHLORIDE SERPL-SCNC: 95 MMOL/L — LOW (ref 98–110)
CO2 SERPL-SCNC: 23 MMOL/L — SIGNIFICANT CHANGE UP (ref 17–32)
CREAT SERPL-MCNC: 2.8 MG/DL — HIGH (ref 0.7–1.5)
EGFR: 25 ML/MIN/1.73M2 — LOW
EOSINOPHIL # BLD AUTO: 0.08 K/UL — SIGNIFICANT CHANGE UP (ref 0–0.7)
EOSINOPHIL NFR BLD AUTO: 0.5 % — SIGNIFICANT CHANGE UP (ref 0–8)
GLUCOSE BLDC GLUCOMTR-MCNC: 116 MG/DL — HIGH (ref 70–99)
GLUCOSE BLDC GLUCOMTR-MCNC: 291 MG/DL — HIGH (ref 70–99)
GLUCOSE SERPL-MCNC: 286 MG/DL — HIGH (ref 70–99)
HCT VFR BLD CALC: 36.8 % — LOW (ref 42–52)
HGB BLD-MCNC: 11.4 G/DL — LOW (ref 14–18)
IMM GRANULOCYTES NFR BLD AUTO: 1.7 % — HIGH (ref 0.1–0.3)
LYMPHOCYTES # BLD AUTO: 16.2 % — LOW (ref 20.5–51.1)
LYMPHOCYTES # BLD AUTO: 2.61 K/UL — SIGNIFICANT CHANGE UP (ref 1.2–3.4)
MAGNESIUM SERPL-MCNC: 1.9 MG/DL — SIGNIFICANT CHANGE UP (ref 1.8–2.4)
MCHC RBC-ENTMCNC: 25.1 PG — LOW (ref 27–31)
MCHC RBC-ENTMCNC: 31 G/DL — LOW (ref 32–37)
MCV RBC AUTO: 81.1 FL — SIGNIFICANT CHANGE UP (ref 80–94)
MONOCYTES # BLD AUTO: 1.07 K/UL — HIGH (ref 0.1–0.6)
MONOCYTES NFR BLD AUTO: 6.6 % — SIGNIFICANT CHANGE UP (ref 1.7–9.3)
NEUTROPHILS # BLD AUTO: 12.05 K/UL — HIGH (ref 1.4–6.5)
NEUTROPHILS NFR BLD AUTO: 74.8 % — SIGNIFICANT CHANGE UP (ref 42.2–75.2)
NRBC # BLD: 0 /100 WBCS — SIGNIFICANT CHANGE UP (ref 0–0)
PHOSPHATE SERPL-MCNC: 3.2 MG/DL — SIGNIFICANT CHANGE UP (ref 2.1–4.9)
PLATELET # BLD AUTO: 561 K/UL — HIGH (ref 130–400)
PMV BLD: 9.8 FL — SIGNIFICANT CHANGE UP (ref 7.4–10.4)
POTASSIUM SERPL-MCNC: 4.5 MMOL/L — SIGNIFICANT CHANGE UP (ref 3.5–5)
POTASSIUM SERPL-SCNC: 4.5 MMOL/L — SIGNIFICANT CHANGE UP (ref 3.5–5)
PROT SERPL-MCNC: 6.1 G/DL — SIGNIFICANT CHANGE UP (ref 6–8)
RBC # BLD: 4.54 M/UL — LOW (ref 4.7–6.1)
RBC # FLD: 16.8 % — HIGH (ref 11.5–14.5)
SODIUM SERPL-SCNC: 133 MMOL/L — LOW (ref 135–146)
WBC # BLD: 16.12 K/UL — HIGH (ref 4.8–10.8)
WBC # FLD AUTO: 16.12 K/UL — HIGH (ref 4.8–10.8)

## 2024-09-05 RX ORDER — NIFEDIPINE 60 MG/1
30 TABLET, FILM COATED, EXTENDED RELEASE ORAL DAILY
Refills: 0 | Status: DISCONTINUED | OUTPATIENT
Start: 2024-09-05 | End: 2024-09-05

## 2024-09-05 RX ORDER — INSULIN ASPART 100 [IU]/ML
20 INJECTION, SOLUTION INTRAVENOUS; SUBCUTANEOUS
Refills: 0 | DISCHARGE

## 2024-09-05 RX ORDER — CLONIDINE HCL 0.2 MG
1 TABLET ORAL
Qty: 4 | Refills: 0
Start: 2024-09-05 | End: 2024-10-04

## 2024-09-05 RX ORDER — APIXABAN 5 MG/1
1 TABLET, FILM COATED ORAL
Qty: 60 | Refills: 0
Start: 2024-09-05 | End: 2024-10-04

## 2024-09-05 RX ORDER — PREDNISONE 10 MG
3 TABLET, DOSE PACK ORAL
Qty: 24 | Refills: 0
Start: 2024-09-05

## 2024-09-05 RX ORDER — INSULIN ASPART 100 [IU]/ML
12 INJECTION, SOLUTION INTRAVENOUS; SUBCUTANEOUS
Qty: 15 | Refills: 0
Start: 2024-09-05 | End: 2024-10-04

## 2024-09-05 RX ORDER — CLINDAMYCIN PHOSPHATE 150 MG/ML
1 VIAL (ML) INJECTION
Qty: 21 | Refills: 0
Start: 2024-09-05 | End: 2024-09-11

## 2024-09-05 RX ORDER — LOSARTAN POTASSIUM 50 MG/1
1 TABLET ORAL
Refills: 0 | DISCHARGE

## 2024-09-05 RX ORDER — SILODOSIN 8 MG/1
1 CAPSULE ORAL
Qty: 30 | Refills: 0
Start: 2024-09-05 | End: 2024-10-04

## 2024-09-05 RX ORDER — SODIUM ZIRCONIUM CYCLOSILICATE 5 G/5G
10 POWDER, FOR SUSPENSION ORAL
Refills: 0 | DISCHARGE

## 2024-09-05 RX ORDER — NIFEDIPINE 60 MG/1
1 TABLET, FILM COATED, EXTENDED RELEASE ORAL
Qty: 30 | Refills: 0
Start: 2024-09-05 | End: 2024-10-04

## 2024-09-05 RX ADMIN — Medication 10 UNIT(S): at 08:00

## 2024-09-05 RX ADMIN — Medication 6: at 07:59

## 2024-09-05 RX ADMIN — Medication 1 PATCH: at 07:34

## 2024-09-05 RX ADMIN — Medication 40 MILLIGRAM(S): at 06:07

## 2024-09-05 RX ADMIN — Medication 1 APPLICATION(S): at 06:07

## 2024-09-05 RX ADMIN — NIFEDIPINE 30 MILLIGRAM(S): 60 TABLET, FILM COATED, EXTENDED RELEASE ORAL at 11:51

## 2024-09-05 RX ADMIN — Medication 0.5 MILLIGRAM(S): at 06:08

## 2024-09-05 RX ADMIN — Medication 300 MILLIGRAM(S): at 06:07

## 2024-09-05 RX ADMIN — Medication 75 MILLIGRAM(S): at 11:24

## 2024-09-05 RX ADMIN — Medication 1 PATCH: at 11:22

## 2024-09-05 RX ADMIN — APIXABAN 5 MILLIGRAM(S): 5 TABLET, FILM COATED ORAL at 06:08

## 2024-09-05 RX ADMIN — Medication 300 MILLIGRAM(S): at 14:01

## 2024-09-05 RX ADMIN — CHLORHEXIDINE GLUCONATE 1 APPLICATION(S): 40 SOLUTION TOPICAL at 06:08

## 2024-09-05 RX ADMIN — Medication 1 PATCH: at 11:25

## 2024-09-05 RX ADMIN — Medication 10 UNIT(S): at 11:50

## 2024-09-05 NOTE — DISCHARGE NOTE NURSING/CASE MANAGEMENT/SOCIAL WORK - NSDCVIVACCINE_GEN_ALL_CORE_FT
Tdap; 21-Mar-2018 02:44; Wesley Castañeda (RN); [facility]; [id number]; IntraMuscular; Deltoid Right.; 0.5 milliLiter(s); VIS (VIS Published: 09-May-2013, VIS Presented: 21-Mar-2018);

## 2024-09-05 NOTE — SWALLOW BEDSIDE ASSESSMENT ADULT - SWALLOW EVAL: ORAL MUSCULATURE
generally intact
unable to assess due to poor participation/comprehension
generally intact
generally intact

## 2024-09-05 NOTE — DISCHARGE NOTE PROVIDER - NSDCFUSCHEDAPPT_GEN_ALL_CORE_FT
Brenda Yan  [facility] Physician [facility]  NEUROLOGY [id number]  Scheduled Appointment: 10/18/2024

## 2024-09-05 NOTE — SWALLOW BEDSIDE ASSESSMENT ADULT - NS ASR SWALLOW FINDINGS DISCUS
RN: Alida/Physician/Nursing/Patient
RAMAN Funk Spoke with MD [id number]/Physician/Nursing/Patient/Family
Physician/Nursing/Patient/Family
RN Blessing HILLS [id number] aware/Physician/Nursing

## 2024-09-05 NOTE — PROGRESS NOTE ADULT - ASSESSMENT
59y old male with confusion     Toxic metabolic encephalopathy  DKA/HHS with recurrent readmission   Hx of substance abuse found cocaine and benzo   Severe HAGMA- improved  Sepsis POA- resolved   Elevated LA- resolved   Throat pain POA   DM  HTN  CKD 4  HO CVA  HO LUE DVT on Eliquis DVT is resolved- completing 3mo ac     monitor sugar     PMR evaluation     [facility] : CPR    Disposition : pending STR vs home

## 2024-09-05 NOTE — DISCHARGE NOTE NURSING/CASE MANAGEMENT/SOCIAL WORK - NSDCPEFALRISK_GEN_ALL_CORE
For information on Fall & Injury Prevention, visit: [email or web address] OR  [email or web address] OR  https://www.cdc.gov/steadi/patient.html

## 2024-09-05 NOTE — SWALLOW BEDSIDE ASSESSMENT ADULT - SWALLOW EVAL: DIAGNOSIS
Spoke with 1:1 and RAMAN Jett, not appropriate for evaluation d/t AMS. Even when awake he is very uncooperative.
Toleration of lunch tray easy to chew with thin liquids w/o overt s/s of penetration/aspiration.
Toleration of easy to chew with thin liquids w/o overt s/s of penetration/aspiration.
Overt s/s of penetration/aspiration for puree, thins, and mildly thick liquids. Wet/gurgly cough post PO intake.
suspected pharyngeal dysphagia; min overts for puree, soft, and mildly thick liquids

## 2024-09-05 NOTE — DISCHARGE NOTE PROVIDER - HOSPITAL COURSE
58 y/o male with hx of CKD 4, anemia of chronic disease, LUE DVT on eliquis, recent CVA on DAPT, PAD, s/p right BKA, IDDM, substance abuse and recent hospital admission for PNA was brought in for confusion and agitation by family. Patient was transferred to medicine floor for evaluation of confusion, DINESH on stage 4 CKD, severe hyperkalemia and HAGMA which has resolved.  During his hospital stay, he has received several units of LR bolus, insulin infusions and his regular home medications. Today, patient states he is feeling well and has a list of home medications that he will need to be refilled. He is currently waiting on self catheters and reports he will be following up in the outpatient setting for rehab and ENT.     #Toxic metabolic encephalopathy  #DKA/HHS with recurrent readmission   #Hx of substance abuse found cocaine and benzo   #Severe HAGMA- improved  #Sepsis POA- resolved   #Elevated LA- resolved   -IVF given  -Pt completed a course of Cefepime/Vancomycin   -Insulin infusions given    #DM  -Continue with Home medications  -Monitor sugar    #HTN  #CKD 4  -Continue home medications.    #Hx of CVA  #Hx of LUE DVT on Eliquis DVT is resolved- completing 3mo ac   -Continue Apixaban and Clopidogrel  -Continue Atorvastatin      #Right BKA  -Home with Outpatient or VN services. Pt need  r/w  for  ambulation 58 y/o male with hx of CKD 4, anemia of chronic disease, LUE DVT on eliquis, recent CVA on DAPT, PAD, s/p right BKA, IDDM, substance abuse and recent hospital admission for PNA was brought in for confusion and agitation by family. Patient was transferred to medicine floor for evaluation of confusion, DINESH on stage 4 CKD, severe hyperkalemia and HAGMA which has resolved.  During his hospital stay, he has received several units of LR bolus, insulin infusions and his regular home medications. Today, patient states he is feeling well and has a list of home medications that he will need to be refilled. He is currently waiting on self catheters and reports he will be following up in the outpatient setting for rehab and ENT.   Rolling walker arranged for patient as per PT.      #Toxic metabolic encephalopathy  #DKA/HHS with recurrent readmission   #Hx of substance abuse found cocaine and benzo   #Severe HAGMA- improved  #Sepsis POA- resolved   #Elevated LA- resolved   -IVF given  -Pt completed a course of Cefepime/Vancomycin   -Insulin infusions given    #DM  -Continue with Home medications  -Monitor sugar    #HTN  #CKD 4  -Continue home medications.    #Hx of CVA  #Hx of LUE DVT on Eliquis DVT is resolved- completing 3mo ac   -Continue Apixaban and Clopidogrel  -Continue Atorvastatin      #Right BKA  -Home with Outpatient or VN services. Pt need  r/w  for  ambulation

## 2024-09-05 NOTE — DISCHARGE NOTE NURSING/CASE MANAGEMENT/SOCIAL WORK - PATIENT PORTAL LINK FT
You can access the FollowMyHealth Patient Portal offered by [facility] by registering at the following website: [email or web address]. By joining [employer]’s FollowMyHealth portal, you will also be able to view your health information using other applications (apps) compatible with our system.

## 2024-09-05 NOTE — SWALLOW BEDSIDE ASSESSMENT ADULT - SLP PERTINENT HISTORY OF CURRENT PROBLEM
60 y/o male with hx of CKD 4, anemia of chronic disease, LUE on eliquis, recent CVA on DAPT, PAD, s/p right BKA, IDDM, substance abuse and recent hospital admission for PNA was brought in for confusion and agitation by family. At encounter pt appears to be confused answers questions, says has no history of drug abuse. When asked if he is complaint with insulin, he agrees. This has been confirmed with the mother, who also agrees that he is complaint with his meds. Of note pt had a sore throat yesterday morning and the sister at bedside added that His BG has been > 600 for the past few day nina home.
58 y/o male with hx of CKD 4, anemia of chronic disease, LUE on eliquis, recent CVA on DAPT, PAD, s/p right BKA, IDDM, substance abuse and recent hospital admission for PNA was brought in for confusion and agitation by family. At encounter pt appears to be confused answers questions, says has no history of drug abuse. When asked if he is complaint with insulin, he agrees. This has been confirmed with the mother, who also agrees that he is complaint with his meds. Of note pt had a sore throat yesterday morning and the sister at bedside added that His BG has been > 600 for the past few day nina home. pt treated for Toxic metabolic encephalopathy, DKA, and severe sepsis

## 2024-09-05 NOTE — SWALLOW BEDSIDE ASSESSMENT ADULT - SWALLOW EVAL: RECOMMENDED DIET
Easy to chew with thin liquids
NPO with alternate means of nutrition/hydration
NPO with alternate means of nutrition/hydration
Easy to chew with thin liquids
soft and bite size with mildly thick liquids

## 2024-09-05 NOTE — PROGRESS NOTE ADULT - NUTRITIONAL ASSESSMENT
This patient has been assessed with a concern for Malnutrition and has been determined to have a diagnosis/diagnoses of Moderate protein-calorie malnutrition.    This patient is being managed with:   Diet NPO-  Except Medications  Entered: Aug 28 2024  4:42PM  
This patient has been assessed with a concern for Malnutrition and has been determined to have a diagnosis/diagnoses of Moderate protein-calorie malnutrition.    This patient is being managed with:   Diet NPO-  Except Medications  Entered: Aug 28 2024  4:42PM  
This patient has been assessed with a concern for Malnutrition and has been determined to have a diagnosis/diagnoses of Moderate protein-calorie malnutrition.    This patient is being managed with:   Diet Soft and Bite Sized-  Consistent Carbohydrate {Evening Snack} (CSTCHOSN)  DASH/TLC {Sodium & Cholesterol Restricted} (DASH)  Moderately Thick Liquids (MODTHICKLIQS)  Entered: Sep  2 2024  1:41PM  
This patient has been assessed with a concern for Malnutrition and has been determined to have a diagnosis/diagnoses of Moderate protein-calorie malnutrition.    This patient is being managed with:   Diet Easy to Chew-  Entered: Sep  3 2024  1:16PM  
This patient has been assessed with a concern for Malnutrition and has been determined to have a diagnosis/diagnoses of Moderate protein-calorie malnutrition.    This patient is being managed with:   Diet NPO-  Except Medications  Entered: Aug 28 2024  4:42PM  
This patient has been assessed with a concern for Malnutrition and has been determined to have a diagnosis/diagnoses of Moderate protein-calorie malnutrition.    This patient is being managed with:   Diet NPO-  Except Medications  Entered: Aug 28 2024  4:42PM  
This patient has been assessed with a concern for Malnutrition and has been determined to have a diagnosis/diagnoses of Moderate protein-calorie malnutrition.    This patient is being managed with:   Diet Easy to Chew-  Entered: Sep  3 2024  1:16PM  
This patient has been assessed with a concern for Malnutrition and has been determined to have a diagnosis/diagnoses of Moderate protein-calorie malnutrition.    This patient is being managed with:   Diet NPO-  Except Medications  Entered: Aug 28 2024  4:42PM  
This patient has been assessed with a concern for Malnutrition and has been determined to have a diagnosis/diagnoses of Moderate protein-calorie malnutrition.    This patient is being managed with:   Diet Soft and Bite Sized-  Consistent Carbohydrate {Evening Snack} (CSTCHOSN)  DASH/TLC {Sodium & Cholesterol Restricted} (DASH)  Moderately Thick Liquids (MODTHICKLIQS)  Entered: Sep  2 2024  1:41PM

## 2024-09-05 NOTE — DISCHARGE NOTE PROVIDER - NSDCFUADDINST_GEN_ALL_CORE_FT
You have refused speech recommendations for further evaluation of swallowing. Follow up with speech therapy and GI,

## 2024-09-05 NOTE — CONSULT NOTE ADULT - SUBJECTIVE AND OBJECTIVE BOX
HPI: 60 y/o male with hx of CKD 4, anemia of chronic disease, LUE on eliquis, recent CVA on DAPT, PAD, s/p right BKA, IDDM, substance abuse and recent hospital admission for PNA was brought in for confusion and agitation by family. At encounter pt appears to be confused answers questions, says has no history of drug abuse. When asked if he is complaint with insulin, he agrees. This has been confirmed with the mother, who also agrees that he is complaint with his meds. Of note pt had a sore throat yesterday morning ,ptn seen and exam  at  bed  side  nad  aox3  fu  command  asking  for  rw  for  ambulation  ,ptn  labs  , iomaging  and  medical  and  rehab  notes are appreciated  and  reviewed .   in ED   - 107  wbc 26  platelets 591  K 7.9  HCO3 5  AG 43  Cr 6.9  BG 1128  VBG: lact 6.8 / pH 6.9   rvp NG   (26 Aug 2024 13:50)    PTN  REFERRED TO ACUTE  REHAB  FOR  EVAL AND  TX   PAST MEDICAL & SURGICAL HISTORY:  DM (diabetes mellitus)      HTN (hypertension)      HLD (hyperlipidemia)      Herpes labialis      Anxiety      GERD (gastroesophageal reflux disease)      Kidney stones  20 years ago      Kidney disease  stage 4      Chronic kidney disease, unspecified CKD stage      Diabetic Charcot foot      PAD (peripheral artery disease)      S/P foot surgery, right  x 5 ( 2006 - 2013 )      Kidney stone  Removed by Laser x 2 ( 20 years ago )      H/O arthroscopy of right knee      Status post peripheral artery angioplasty      Amputee, below knee, right          Hospital Course:    TODAY'S SUBJECTIVE & REVIEW OF SYMPTOMS:     Constitutional WNL   Cardio WNL   Resp WNL   GI WNL  Heme WNL  Endo WNL  Skin WNL  MSK WNL  Neuro WNL  Cognitive WNL  Psych WNL      MEDICATIONS  (STANDING):  ALPRAZolam 0.5 milliGRAM(s) Oral two times a day  apixaban 5 milliGRAM(s) Oral every 12 hours  atorvastatin 80 milliGRAM(s) Oral at bedtime  chlorhexidine 2% Cloths 1 Application(s) Topical <User Schedule>  clindamycin   Capsule 300 milliGRAM(s) Oral three times a day  cloNIDine Patch 0.3 mG/24Hr(s) 1 patch Transdermal every 7 days  clopidogrel Tablet 75 milliGRAM(s) Oral daily  dextrose 50% Injectable 25 Gram(s) IV Push once  dextrose 50% Injectable 12.5 Gram(s) IV Push once  dextrose 50% Injectable 25 Gram(s) IV Push once  glucagon  Injectable 1 milliGRAM(s) IntraMuscular once  insulin glargine Injectable (LANTUS) 20 Unit(s) SubCutaneous two times a day  insulin lispro (ADMELOG) corrective regimen sliding scale   SubCutaneous three times a day before meals  insulin lispro Injectable (ADMELOG) 10 Unit(s) SubCutaneous three times a day before meals  mupirocin 2% Nasal 1 Application(s) Both Nostrils every 12 hours  OLANZapine Injectable 5 milliGRAM(s) IntraMuscular every 12 hours  predniSONE   Tablet 40 milliGRAM(s) Oral daily    MEDICATIONS  (PRN):  dextrose Oral Gel 15 Gram(s) Oral once PRN Blood Glucose LESS THAN 70 milliGRAM(s)/deciliter  oxycodone    5 mG/acetaminophen 325 mG 1 Tablet(s) Oral every 6 hours PRN Mild Pain (1 - 3)      FAMILY HISTORY:  Family history of cancer in father (Father)  Lung        Allergies    No Known Allergies    Intolerances        SOCIAL HISTORY:    [  ] Etoh  [  ] Smoking  [  ] Substance abuse     Home Environment:  [  ] Home Alone  [ x ] Lives with Family  [  ] Home Health Aid    Dwelling:  [  ] Apartment  [ x ] Private House  [  ] Adult Home  [  ] Skilled Nursing Facility      [  ] Short Term  [  ] Long Term  [ x ] Stairs has  chair lift     Elevator [  ]    FUNCTIONAL STATUS PTA: (Check all that apply)  Ambulation: [  x ]Independent  has  prosthesis rt le  and  damir axillary  crutches     [  ] Dependent     [  ] Non-Ambulatory  Assistive Device: [  ] SA Cane  [  ]  Q Cane  [  ] Walker  [  ]  Wheelchair  ADL : [  ] Independent  [  ]  Dependent       Vital Signs Last 24 Hrs  T(C): 36.8 (05 Sep 2024 04:59), Max: 36.8 (05 Sep 2024 04:59)  T(F): 98.2 (05 Sep 2024 04:59), Max: 98.2 (05 Sep 2024 04:59)  HR: 94 (04 Sep 2024 20:19) (94 - 100)  BP: 166/76 (05 Sep 2024 04:59) (143/76 - 183/82)  BP(mean): 118 (04 Sep 2024 17:34) (118 - 119)  RR: 18 (05 Sep 2024 04:59) (18 - 20)  SpO2: 97% (05 Sep 2024 04:59) (97% - 98%)    Parameters below as of 05 Sep 2024 04:59  Patient On (Oxygen Delivery Method): room air          PHYSICAL EXAM: Alert & Oriented X3  GENERAL: NAD, well-groomed, well-developed  HEAD:  Atraumatic, Normocephalic  EYES: EOMI, PERRLA, conjunctiva and sclera clear  NECK: Supple, No JVD, Normal thyroid  CHEST/LUNG: Clear to percussion bilaterally; No rales, rhonchi, wheezing, or rubs  HEART: Regular rate and rhythm; No murmurs, rubs, or gallops  ABDOMEN: Soft, Nontender, Nondistended; Bowel sounds present  EXTREMITIES:  2+ Peripheral Pulses, No clubbing, cyanosis, or edema    NERVOUS SYSTEM:  Cranial Nerves 2-12 intact [  ] Abnormal  [  ]  ROM: WFL all extremities [  ]  Abnormal [  ]  Motor Strength: WFL all extremities  [  ]  Abnormal [  ]  Sensation: intact to light touch [  ] Abnormal [  ]  Reflexes: Symmetric [  ]  Abnormal [  ]    FUNCTIONAL STATUS:  Bed Mobility: Independent [  ]  Supervision [x  ]  Needs Assistance [  ]  N/A [  ]  Transfers: Independent [  ]  Supervision [x  ]  Needs Assistance [  ]  N/A [  ]   Ambulation: Independent [  ]  Supervision [ x ]  Needs Assistance [  ]  N/A [  ]  ADL: Independent [  ] Requires Assistance [  ] N/A [x  ]  SEE PT/OT IE NOTES    LABS:                        11.4   16.12 )-----------( 561      ( 05 Sep 2024 06:15 )             36.8     09-05    133<L>  |  95<L>  |  69<HH>  ----------------------------<  286<H>  4.5   |  23  |  2.8<H>    Ca    8.9      05 Sep 2024 06:15  Phos  3.2     09-05  Mg     1.9     09-05    TPro  6.1  /  Alb  2.9<L>  /  TBili  <0.2  /  DBili  x   /  AST  18  /  ALT  21  /  AlkPhos  138<H>  09-05    PTT - ( 03 Sep 2024 12:12 )  PTT:127.5 sec  Urinalysis Basic - ( 05 Sep 2024 06:15 )    Color: x / Appearance: x / SG: x / pH: x  Gluc: 286 mg/dL / Ketone: x  / Bili: x / Urobili: x   Blood: x / Protein: x / Nitrite: x   Leuk Esterase: x / RBC: x / WBC x   Sq Epi: x / Non Sq Epi: x / Bacteria: x        RADIOLOGY & ADDITIONAL STUDIES:< from: CT Head No Cont (08.27.24 @ 03:46) >  IMPRESSION:    No acute intracranial pathology.    Stable mild chronic microvascular ischemic changes.    < end of copied text >      Assesment:

## 2024-09-05 NOTE — SWALLOW BEDSIDE ASSESSMENT ADULT - SLP GENERAL OBSERVATIONS
Pt. received OOB in chair awake and alert.
pt awake however some confusion and agitation observed. No c/o pain. b/l wrist restraints noted. dysphonic vocal quality however this is reported baseline since v0ocal cord paralysis in 2008 2' traumatic intubation
Pt. received in bed lethargic. Poor oral hygiene, increases secretions and dried secretions in oral cavity. Aggressive oral care provided.
Pt. received in bed awake and alert.

## 2024-09-05 NOTE — DISCHARGE NOTE PROVIDER - DETAILS OF MALNUTRITION DIAGNOSIS/DIAGNOSES
This patient has been assessed with a concern for Malnutrition and was treated during this hospitalization for the following Nutrition diagnosis/diagnoses:     -  08/29/2024: Moderate protein-calorie malnutrition

## 2024-09-05 NOTE — DISCHARGE NOTE PROVIDER - NSDCCPCAREPLAN_GEN_ALL_CORE_FT
PRINCIPAL DISCHARGE DIAGNOSIS  Diagnosis: DKA (diabetic ketoacidosis)  Assessment and Plan of Treatment: You have been seen by medicine team. DKA has resolved. Continue home medications. Follow up with PCP in 1 week.      SECONDARY DISCHARGE DIAGNOSES  Diagnosis: Hyperkalemia  Assessment and Plan of Treatment: You have been seen by medicine team. Hyperkalemia has improved. Continue home medications. Follow uo with PCP in 1 week.    Diagnosis: Altered mental status  Assessment and Plan of Treatment:      PRINCIPAL DISCHARGE DIAGNOSIS  Diagnosis: DKA (diabetic ketoacidosis)  Assessment and Plan of Treatment: You have been seen by medicine team. DKA has resolved. Continue home medications. Follow up with PCP in 1 week.      SECONDARY DISCHARGE DIAGNOSES  Diagnosis: Hyperkalemia  Assessment and Plan of Treatment: You have been seen by medicine team. Hyperkalemia has improved. Continue home medications. Follow uo with PCP in 1 week.    Diagnosis: Altered mental status  Assessment and Plan of Treatment: Secondary to acute illness and medications. Symptoms resolved. Avoid drug abuse. Take all medications as prescribed. Call doctor if any confusion or sedation.    Diagnosis: Hypertension  Assessment and Plan of Treatment: Cozar was stopped. You been started on clonidine patch. Nifedpine was stopped but restared now at lower dose.

## 2024-09-05 NOTE — SWALLOW BEDSIDE ASSESSMENT ADULT - SWALLOW EVAL: FUNCTIONAL LEVEL AT TIME OF EVAL
considering confusion and agitation, it is best that po diet is initiated with 1:1 supervision for aspiration precautions. Pt is not a candidate for NGT and considering h/o VC paralysis there is likely a BASELINE dysphagia
VC paralysis there is likely a BASELINE dysphagia
VC paralysis there is likely a BASELINE dysphagia

## 2024-09-05 NOTE — SWALLOW BEDSIDE ASSESSMENT ADULT - SWALLOW EVAL: FEEDING ASSISTANCE
supervision for aspiration precautions, pacing, and bolus size/dependent

## 2024-09-05 NOTE — DISCHARGE NOTE PROVIDER - NSCORESITESY/N_GEN_A_CORE_RD
Blood Glucose drawn 1 hour after feeding resulted at 57. Instructed mother that Blood glucose will need to be drawn prior to all feeding for first 24 hours. Mother verbalized understanding. No

## 2024-09-05 NOTE — DISCHARGE NOTE PROVIDER - CARE PROVIDER_API CALL
Polo Conroy.  Internal Medicine  [address] [facility] [facility] [address]  [place] [address]  Phone: [phone]  Fax: [phone]  Follow Up Time: 1 week

## 2024-09-05 NOTE — PROGRESS NOTE ADULT - SUBJECTIVE AND OBJECTIVE BOX
59y old  Male who presents with a chief complaint of Confusion    Interval: transferred to floor; reviewed chart      PAST MEDICAL & SURGICAL HISTORY:    DM (diabetes mellitus)  HTN (hypertension)  HLD (hyperlipidemia)  Herpes labialis  Anxiety  GERD (gastroesophageal reflux disease)  Kidney stones  20 years ago  Kidney disease  stage 4  Chronic kidney disease, unspecified CKD stage  Diabetic Charcot foot  PAD (peripheral artery disease)  S/P foot surgery, right  x 5 ( 2006 - 2013 )  Kidney stone  Removed by Laser x 2 ( 20 years ago )  H/O arthroscopy of right knee  Status post peripheral artery angioplasty  Amputee, below knee, right    Social History:  single (26 Aug 2024 13:50)    MEDICATIONS  (STANDING):  ALPRAZolam 0.5 milliGRAM(s) Oral two times a day  apixaban 5 milliGRAM(s) Oral every 12 hours  atorvastatin 80 milliGRAM(s) Oral at bedtime  chlorhexidine 2% Cloths 1 Application(s) Topical <User Schedule>  clindamycin   Capsule 300 milliGRAM(s) Oral three times a day  cloNIDine Patch 0.3 mG/24Hr(s) 1 patch Transdermal every 7 days  clopidogrel Tablet 75 milliGRAM(s) Oral daily  dextrose 50% Injectable 25 Gram(s) IV Push once  dextrose 50% Injectable 12.5 Gram(s) IV Push once  dextrose 50% Injectable 25 Gram(s) IV Push once  glucagon  Injectable 1 milliGRAM(s) IntraMuscular once  insulin glargine Injectable (LANTUS) 20 Unit(s) SubCutaneous two times a day  insulin lispro (ADMELOG) corrective regimen sliding scale   SubCutaneous three times a day before meals  insulin lispro Injectable (ADMELOG) 10 Unit(s) SubCutaneous three times a day before meals  mupirocin 2% Nasal 1 Application(s) Both Nostrils every 12 hours  OLANZapine Injectable 5 milliGRAM(s) IntraMuscular every 12 hours  predniSONE   Tablet 40 milliGRAM(s) Oral daily    MEDICATIONS  (PRN):  dextrose Oral Gel 15 Gram(s) Oral once PRN Blood Glucose LESS THAN 70 milliGRAM(s)/deciliter  oxycodone    5 mG/acetaminophen 325 mG 1 Tablet(s) Oral every 6 hours PRN Mild Pain (1 - 3)    Allergies    No Known Allergies    Intolerances    Vital Signs Last 24 Hrs  T(C): 36.8 (05 Sep 2024 04:59), Max: 36.8 (05 Sep 2024 04:59)  T(F): 98.2 (05 Sep 2024 04:59), Max: 98.2 (05 Sep 2024 04:59)  HR: 94 (04 Sep 2024 20:19) (83 - 100)  BP: 166/76 (05 Sep 2024 04:59) (143/76 - 187/86)  BP(mean): 118 (04 Sep 2024 17:34) (109 - 124)  RR: 18 (05 Sep 2024 04:59) (18 - 20)  SpO2: 97% (05 Sep 2024 04:59) (95% - 98%)    Parameters below as of 05 Sep 2024 04:59  Patient On (Oxygen Delivery Method): room air    Vital Signs Last 24 Hrs  T(C): 36.8 (05 Sep 2024 04:59), Max: 36.8 (05 Sep 2024 04:59)  T(F): 98.2 (05 Sep 2024 04:59), Max: 98.2 (05 Sep 2024 04:59)  HR: 94 (04 Sep 2024 20:19) (83 - 100)  BP: 166/76 (05 Sep 2024 04:59) (143/76 - 187/86)  BP(mean): 118 (04 Sep 2024 17:34) (109 - 124)  RR: 18 (05 Sep 2024 04:59) (18 - 20)  SpO2: 97% (05 Sep 2024 04:59) (95% - 98%)    Parameters below as of 05 Sep 2024 04:59  Patient On (Oxygen Delivery Method): room air    CAPILLARY BLOOD GLUCOSE      POCT Blood Glucose.: 302 mg/dL (04 Sep 2024 21:22)  POCT Blood Glucose.: 200 mg/dL (04 Sep 2024 16:33)  POCT Blood Glucose.: 126 mg/dL (04 Sep 2024 11:39)  POCT Blood Glucose.: 247 mg/dL (04 Sep 2024 07:29)    Diet, Easy to Chew (09-03-24 @ 13:16) [Active]    General: NAD  HEENT:    MMM, no new lesions, hoarseness of voice POA             Lymph Nodes: NO cervical LN   Lungs: Bilateral BS, no wheezing   Cardiovascular: Regular, normal S1 S2    Abdomen: Soft, Positive BS  Extremities: No clubbing   Skin: warm, no new rashes   Neurological: Awake mildly sedated   Musculoskeletal: move all ext BKA on the right     Lab:                           11.6   13.92 )-----------( 556      ( 04 Sep 2024 05:47 )             37.2     09-04    137  |  100  |  87<HH>  ----------------------------<  282<H>  4.4   |  24  |  3.3<H>    Ca    8.6      04 Sep 2024 05:47  Mg     1.8     09-04

## 2024-09-05 NOTE — CONSULT NOTE ADULT - ASSESSMENT
IMPRESSION: Rehab of 59  y.o  m  rehab  for debility  gd  sp  rt BKA     PRECAUTIONS: [  ] Cardiac  [  ] Respiratory  [  ] Seizures [  ] Contact Isolation  [  ] Droplet Isolation  [ FALL ] Other    Weight Bearing Status:     RECOMMENDATION:    Out of Bed to Chair     DVT/Decubiti Prophylaxis    REHAB PLAN:     [   x] Bedside P/T 3-5 times a week   [   ]   Bedside O/T  2-3 times a week             [   ] No Rehab Therapy Indicated                   [   ]  Speech Therapy   Conditioning/ROM                                    ADL  Bed Mobility                                               Conditioning/ROM  Transfers                                                     Bed Mobility  Sitting /Standing Balance                         Transfers                                        Gait Training                                               Sitting/Standing Balance  Stair Training [   ]Applicable                    Home equipment Eval                                                                        Splinting  [   ] Only      GOALS:   ADL   [ x  ]   Independent                    Transfers  [  x ] Independent                          Ambulation  [ x  ] Independent     [    ] With device                            [   ]  CG                                                         [   ]  CG                                                                  [   ] CG                            [    ] Min A                                                   [   ] Min A                                                              [   ] Min  A          DISCHARGE PLAN:   [   ]  Good candidate for Intensive Rehabilitation/Hospital based-[facility]A [facility]                                             Will tolerate 3hrs Intensive Rehab Daily                                       [    ]  Short Term Rehab in Skilled Nursing Facility                                       [  xx  ]  Home with Outpatient or VN services ptn  need  r/w  for  ambulation  d/w  rehab  plan  and  his  sister over  the  phone                                          [    ]  Possible Candidate for Intensive Hospital based Rehab

## 2024-09-05 NOTE — PROGRESS NOTE ADULT - PROVIDER SPECIALTY LIST ADULT
Critical Care
Critical Care
Internal Medicine
Critical Care
Infectious Disease
Infectious Disease
Internal Medicine
Nephrology
Nephrology
Pulmonology
Pulmonology
Critical Care
Infectious Disease
Internal Medicine
Nephrology
Pulmonology
Critical Care
Internal Medicine
Infectious Disease

## 2024-09-05 NOTE — DISCHARGE NOTE PROVIDER - NSDCMRMEDTOKEN_GEN_ALL_CORE_FT
ALPRAZolam 0.5 mg oral tablet: 1 tab(s) orally 2 times a day as needed for anxiety  apixaban 5 mg oral tablet: 1 tab(s) orally 2 times a day take TWO  tabs twice daily for 5 days then take ONE tablet twice daily for maintenance  cephalexin 500 mg oral capsule: 1 cap(s) orally 4 times a day  Cozaar 100 mg oral tablet: 1 tab(s) orally once a day  doxycycline hyclate 100 mg oral tablet: 1 tab(s) orally 2 times a day  ergocalciferol 1.25 mg (50,000 intl units) oral capsule: 1 cap(s) orally once a week take every Monday  for next 6 more weeks  Insulin Aspart: 20 unit(s) subcutaneous 3 times a day  insulin glargine 100 units/mL subcutaneous solution: 60 unit(s) subcutaneous once a day  Lipitor 80 mg oral tablet: 1 tab(s) orally once a day (at bedtime)  Lokelma 10 g oral powder for reconstitution: 10 gram(s) orally 3 times a day  NIFEdipine 90 mg oral tablet, extended release: 1 tab(s) orally once a day  oxyCODONE 5 mg oral tablet: 2 tab(s) orally every 6 hours as needed for  severe pain please take 1 tab for mild  to moderate pain and 2 for severe pain MDD: 8  sodium bicarbonate 650 mg oral tablet: 1 tab(s) orally 3 times a day   ALPRAZolam 0.5 mg oral tablet: 1 tab(s) orally 2 times a day as needed for anxiety  apixaban 5 mg oral tablet: 1 tab(s) orally 2 times a day  clindamycin 300 mg oral capsule: 1 cap(s) orally 3 times a day  cloNIDine 0.3 mg/24 hr transdermal film, extended release: 1 patch transdermal every 7 days  clopidogrel 75 mg oral tablet: 1 tab(s) orally once a day  insulin aspart 100 units/mL injectable solution: 12 unit(s) injectable 3 times a day (before meals) give extra 2 units if blood sugar greater then 200, extra 4 units if blood sugar greater then 300  insulin glargine 100 units/mL subcutaneous solution: 50 unit(s) subcutaneous once a day  Lipitor 80 mg oral tablet: 1 tab(s) orally once a day (at bedtime)  NIFEdipine 30 mg oral tablet, extended release: 1 tab(s) orally once a day  oxyCODONE 5 mg oral tablet: 2 tab(s) orally every 6 hours as needed for  severe pain please take 1 tab for mild  to moderate pain and 2 for severe pain MDD: 8  predniSONE 10 mg oral tablet: 3 tab(s) orally once a day for 4 days then 2 tabs daily for 4 days then 1 tab daily for 4 days  Rapaflo 8 mg oral capsule: 1 cap(s) orally once a day (at bedtime)

## 2024-09-05 NOTE — CHART NOTE - NSCHARTNOTEFT_GEN_A_CORE
Rolling Walker:  Patient requires rolling walker due to right BKA, metabolic encephalopathy, gait disorder, for safe ambulation at discharge.

## 2024-09-06 RX ORDER — INSULIN GLARGINE 100 [IU]/ML
50 INJECTION, SOLUTION SUBCUTANEOUS
Qty: 15 | Refills: 0
Start: 2024-09-06 | End: 2024-10-05

## 2024-09-07 LAB
CULTURE RESULTS: SIGNIFICANT CHANGE UP
SPECIMEN SOURCE: SIGNIFICANT CHANGE UP

## 2024-09-09 ENCOUNTER — APPOINTMENT (OUTPATIENT)
Dept: ELECTROPHYSIOLOGY | Facility: CLINIC | Age: 59
End: 2024-09-09

## 2024-09-09 ENCOUNTER — APPOINTMENT (OUTPATIENT)
Dept: CARDIOLOGY | Facility: CLINIC | Age: 59
End: 2024-09-09

## 2024-09-10 LAB
BZE UR QL SCN: 7160 NG/ML — HIGH
COCAINE UR QL SCN: POSITIVE

## 2024-09-12 DIAGNOSIS — F13.19 SEDATIVE, HYPNOTIC OR ANXIOLYTIC ABUSE WITH UNSPECIFIED SEDATIVE, HYPNOTIC OR ANXIOLYTIC-INDUCED DISORDER: ICD-10-CM

## 2024-09-12 DIAGNOSIS — A41.9 SEPSIS, UNSPECIFIED ORGANISM: ICD-10-CM

## 2024-09-12 DIAGNOSIS — Z11.52 ENCOUNTER FOR SCREENING FOR COVID-19: ICD-10-CM

## 2024-09-12 DIAGNOSIS — D63.1 ANEMIA IN CHRONIC KIDNEY DISEASE: ICD-10-CM

## 2024-09-12 DIAGNOSIS — F41.9 ANXIETY DISORDER, UNSPECIFIED: ICD-10-CM

## 2024-09-12 DIAGNOSIS — E11.10 TYPE 2 DIABETES MELLITUS WITH KETOACIDOSIS WITHOUT COMA: ICD-10-CM

## 2024-09-12 DIAGNOSIS — R41.82 ALTERED MENTAL STATUS, UNSPECIFIED: ICD-10-CM

## 2024-09-12 DIAGNOSIS — N18.4 CHRONIC KIDNEY DISEASE, STAGE 4 (SEVERE): ICD-10-CM

## 2024-09-12 DIAGNOSIS — E11.22 TYPE 2 DIABETES MELLITUS WITH DIABETIC CHRONIC KIDNEY DISEASE: ICD-10-CM

## 2024-09-12 DIAGNOSIS — Z79.01 LONG TERM (CURRENT) USE OF ANTICOAGULANTS: ICD-10-CM

## 2024-09-12 DIAGNOSIS — D84.81 IMMUNODEFICIENCY DUE TO CONDITIONS CLASSIFIED ELSEWHERE: ICD-10-CM

## 2024-09-12 DIAGNOSIS — G92.8 OTHER TOXIC ENCEPHALOPATHY: ICD-10-CM

## 2024-09-12 DIAGNOSIS — E87.5 HYPERKALEMIA: ICD-10-CM

## 2024-09-12 DIAGNOSIS — I12.9 HYPERTENSIVE CHRONIC KIDNEY DISEASE WITH STAGE 1 THROUGH STAGE 4 CHRONIC KIDNEY DISEASE, OR UNSPECIFIED CHRONIC KIDNEY DISEASE: ICD-10-CM

## 2024-09-12 DIAGNOSIS — N17.9 ACUTE KIDNEY FAILURE, UNSPECIFIED: ICD-10-CM

## 2024-09-12 DIAGNOSIS — E87.0 HYPEROSMOLALITY AND HYPERNATREMIA: ICD-10-CM

## 2024-09-12 DIAGNOSIS — J03.90 ACUTE TONSILLITIS, UNSPECIFIED: ICD-10-CM

## 2024-09-12 DIAGNOSIS — E11.51 TYPE 2 DIABETES MELLITUS WITH DIABETIC PERIPHERAL ANGIOPATHY WITHOUT GANGRENE: ICD-10-CM

## 2024-09-12 DIAGNOSIS — Z86.73 PERSONAL HISTORY OF TRANSIENT ISCHEMIC ATTACK (TIA), AND CEREBRAL INFARCTION WITHOUT RESIDUAL DEFICITS: ICD-10-CM

## 2024-09-12 DIAGNOSIS — E05.90 THYROTOXICOSIS, UNSPECIFIED WITHOUT THYROTOXIC CRISIS OR STORM: ICD-10-CM

## 2024-09-12 DIAGNOSIS — Z79.4 LONG TERM (CURRENT) USE OF INSULIN: ICD-10-CM

## 2024-09-12 DIAGNOSIS — R45.1 RESTLESSNESS AND AGITATION: ICD-10-CM

## 2024-09-12 DIAGNOSIS — Z86.718 PERSONAL HISTORY OF OTHER VENOUS THROMBOSIS AND EMBOLISM: ICD-10-CM

## 2024-09-12 DIAGNOSIS — E44.0 MODERATE PROTEIN-CALORIE MALNUTRITION: ICD-10-CM

## 2024-09-12 DIAGNOSIS — F14.10 COCAINE ABUSE, UNCOMPLICATED: ICD-10-CM

## 2024-09-12 DIAGNOSIS — Z79.82 LONG TERM (CURRENT) USE OF ASPIRIN: ICD-10-CM

## 2024-09-12 DIAGNOSIS — Z89.511 ACQUIRED ABSENCE OF RIGHT LEG BELOW KNEE: ICD-10-CM

## 2024-09-13 DIAGNOSIS — M79.604 PAIN IN RIGHT LEG: ICD-10-CM

## 2024-09-13 DIAGNOSIS — M79.605 PAIN IN RIGHT LEG: ICD-10-CM

## 2024-09-18 ENCOUNTER — OUTPATIENT (OUTPATIENT)
Dept: OUTPATIENT SERVICES | Facility: HOSPITAL | Age: 59
LOS: 1 days | End: 2024-09-18
Payer: MEDICARE

## 2024-09-18 ENCOUNTER — RESULT REVIEW (OUTPATIENT)
Age: 59
End: 2024-09-18

## 2024-09-18 DIAGNOSIS — Z98.890 OTHER SPECIFIED POSTPROCEDURAL STATES: Chronic | ICD-10-CM

## 2024-09-18 DIAGNOSIS — N20.0 CALCULUS OF KIDNEY: Chronic | ICD-10-CM

## 2024-09-18 DIAGNOSIS — Z89.511 ACQUIRED ABSENCE OF RIGHT LEG BELOW KNEE: Chronic | ICD-10-CM

## 2024-09-18 DIAGNOSIS — Z00.8 ENCOUNTER FOR OTHER GENERAL EXAMINATION: ICD-10-CM

## 2024-09-18 DIAGNOSIS — I82.A12 ACUTE EMBOLISM AND THROMBOSIS OF LEFT AXILLARY VEIN: ICD-10-CM

## 2024-09-18 DIAGNOSIS — Z98.62 PERIPHERAL VASCULAR ANGIOPLASTY STATUS: Chronic | ICD-10-CM

## 2024-09-18 PROCEDURE — A9500: CPT

## 2024-09-18 PROCEDURE — 78452 HT MUSCLE IMAGE SPECT MULT: CPT | Mod: 26

## 2024-09-18 PROCEDURE — 93018 CV STRESS TEST I&R ONLY: CPT

## 2024-09-18 PROCEDURE — 78452 HT MUSCLE IMAGE SPECT MULT: CPT

## 2024-09-19 ENCOUNTER — APPOINTMENT (OUTPATIENT)
Dept: VASCULAR SURGERY | Facility: CLINIC | Age: 59
End: 2024-09-19

## 2024-09-19 DIAGNOSIS — I82.A12 ACUTE EMBOLISM AND THROMBOSIS OF LEFT AXILLARY VEIN: ICD-10-CM

## 2024-09-20 RX ORDER — INSULIN GLARGINE 300 U/ML
40 INJECTION, SOLUTION SUBCUTANEOUS
Refills: 0 | DISCHARGE

## 2024-09-20 RX ORDER — LOSARTAN POTASSIUM 100 MG/1
1 TABLET, FILM COATED ORAL
Refills: 0 | DISCHARGE

## 2024-09-24 ENCOUNTER — OUTPATIENT (OUTPATIENT)
Dept: OUTPATIENT SERVICES | Facility: HOSPITAL | Age: 59
LOS: 1 days | End: 2024-09-24
Payer: MEDICARE

## 2024-09-24 ENCOUNTER — RESULT REVIEW (OUTPATIENT)
Age: 59
End: 2024-09-24

## 2024-09-24 ENCOUNTER — NON-APPOINTMENT (OUTPATIENT)
Age: 59
End: 2024-09-24

## 2024-09-24 DIAGNOSIS — Z98.890 OTHER SPECIFIED POSTPROCEDURAL STATES: Chronic | ICD-10-CM

## 2024-09-24 DIAGNOSIS — N20.0 CALCULUS OF KIDNEY: Chronic | ICD-10-CM

## 2024-09-24 DIAGNOSIS — I25.10 ATHEROSCLEROTIC HEART DISEASE OF NATIVE CORONARY ARTERY WITHOUT ANGINA PECTORIS: ICD-10-CM

## 2024-09-24 DIAGNOSIS — Z89.511 ACQUIRED ABSENCE OF RIGHT LEG BELOW KNEE: Chronic | ICD-10-CM

## 2024-09-24 DIAGNOSIS — Z98.62 PERIPHERAL VASCULAR ANGIOPLASTY STATUS: Chronic | ICD-10-CM

## 2024-09-24 PROCEDURE — 93320 DOPPLER ECHO COMPLETE: CPT

## 2024-09-24 PROCEDURE — 93320 DOPPLER ECHO COMPLETE: CPT | Mod: 26

## 2024-09-24 PROCEDURE — 93325 DOPPLER ECHO COLOR FLOW MAPG: CPT | Mod: 26

## 2024-09-24 PROCEDURE — 93325 DOPPLER ECHO COLOR FLOW MAPG: CPT

## 2024-09-24 PROCEDURE — 93312 ECHO TRANSESOPHAGEAL: CPT

## 2024-09-24 PROCEDURE — 93312 ECHO TRANSESOPHAGEAL: CPT | Mod: 26,XU

## 2024-09-24 NOTE — CHART NOTE - NSCHARTNOTEFT_GEN_A_CORE
POST OPERATIVE PROCEDURAL DOCUMENTATION  PRE-OP DIAGNOSIS: CVA    POST-OP DIAGNOSIS:     PROCEDURE: Transesophageal Echocardiogram and DCCV    Primary Physician: Dr. Davis  Cardiology Fellow: Belgica    ANESTHESIA TYPE  [  ] General Anesthesia  [ x ] Conscious Sedation  [  ] Local/Regional    CONDITION  [  ] Critical  [  ] Serious  [  ] Fair  [ x ] Good    SPECIMENS REMOVED (IF APPLICABLE): N/A    IMPLANTS (IF APPLICABLE): None    ESTIMATED BLOOD LOSS: None    COMPLICATIONS: None    After risks and benefits of procedures were explained, informed consent was obtained and placed in chart.   The patient received topical anesthetic to the oropharynx with viscous lidocaine and benzocaine spray.  Refer to Anesthesia note for sedation details.  The LENKA probe was passed into the esophagus without difficulty.  Transesophageal and transgastric images were obtained.  The LENKA probe was removed without difficulty and examined.  There was no evidence for bleeding.  The patient tolerated the procedure well without any immediate LENKA-related complications.      Preliminary Findings:  LA: normal size  SHELLEY: Left atrial appendage was clear of clot and smoke.   LV: LVEF was estimated at 55-65%  MV: trace MR, No MS  AV: No AI, no  AS  RA: normal size  RV: Normal size and function  TV: trace TR  PV:trace MS, no PS  IAS: Interatrial septal aneurysm present. Possible color flow with R-L shunt but not seen on transthoracic images. Negative bubble study. Eustachian valve seen.   Aorta: There was mild, non-mobile atheroma seen in the thoracic aorta.       DIAGNOSIS/IMPRESSION:    Full report to follow    PLAN OF CARE:  [x] Discharge home    [x] No eating or drinking for 1 hour  [ ] EP eval to interrogate device  [x] No driving for 24 hours    Results of procedure/ plan of care discussed with patient/  in detail. POST OPERATIVE PROCEDURAL DOCUMENTATION  PRE-OP DIAGNOSIS: CVA    POST-OP DIAGNOSIS:     PROCEDURE: Transesophageal Echocardiogram and DCCV    Primary Physician: Dr. Davis  Cardiology Fellow: Belgica    ANESTHESIA TYPE  [  ] General Anesthesia  [ x ] Conscious Sedation  [  ] Local/Regional    CONDITION  [  ] Critical  [  ] Serious  [  ] Fair  [ x ] Good    SPECIMENS REMOVED (IF APPLICABLE): N/A    IMPLANTS (IF APPLICABLE): None    ESTIMATED BLOOD LOSS: None    COMPLICATIONS: None    After risks and benefits of procedures were explained, informed consent was obtained and placed in chart.   The patient received topical anesthetic to the oropharynx with viscous lidocaine and benzocaine spray.  Refer to Anesthesia note for sedation details.  The LENKA probe was passed into the esophagus without difficulty.  Transesophageal and transgastric images were obtained.  The LENKA probe was removed without difficulty and examined.  There was no evidence for bleeding.  The patient tolerated the procedure well without any immediate LENKA-related complications.      Preliminary Findings:  LA: normal size  SHELLEY: Left atrial appendage was clear of clot and smoke.   LV: LVEF was estimated at 55-65%  MV: trace MR, No MS  AV: No AI, no  AS  RA: normal size  RV: Normal size and function  TV: trace TR  PV:trace UT, no PS  IAS: Interatrial septal aneurysm present. Possible color flow with R-L shunt but negative bubble study at rest and with valsalva. Eustachian valve seen.   Aorta: There was mild, non-mobile atheroma seen in the thoracic aorta.       DIAGNOSIS/IMPRESSION:    Full report to follow    PLAN OF CARE:  [x] Discharge home    [x] No eating or drinking for 1 hour  [ ] EP eval to interrogate device  [x] No driving for 24 hours    Results of procedure/ plan of care discussed with patient/  in detail.

## 2024-09-25 DIAGNOSIS — I25.10 ATHEROSCLEROTIC HEART DISEASE OF NATIVE CORONARY ARTERY WITHOUT ANGINA PECTORIS: ICD-10-CM

## 2024-10-07 ENCOUNTER — APPOINTMENT (OUTPATIENT)
Dept: ELECTROPHYSIOLOGY | Facility: CLINIC | Age: 59
End: 2024-10-07
Payer: MEDICARE

## 2024-10-07 VITALS
HEART RATE: 75 BPM | DIASTOLIC BLOOD PRESSURE: 60 MMHG | HEIGHT: 72 IN | BODY MASS INDEX: 25.33 KG/M2 | SYSTOLIC BLOOD PRESSURE: 148 MMHG | WEIGHT: 187 LBS

## 2024-10-07 DIAGNOSIS — N18.9 CHRONIC KIDNEY DISEASE, UNSPECIFIED: ICD-10-CM

## 2024-10-07 DIAGNOSIS — I12.0 HYPERTENSIVE CHRONIC KIDNEY DISEASE WITH STAGE 5 CHRONIC KIDNEY DISEASE OR END STAGE RENAL DISEASE: ICD-10-CM

## 2024-10-07 DIAGNOSIS — I63.9 CEREBRAL INFARCTION, UNSPECIFIED: ICD-10-CM

## 2024-10-07 DIAGNOSIS — N18.5 HYPERTENSIVE CHRONIC KIDNEY DISEASE WITH STAGE 5 CHRONIC KIDNEY DISEASE OR END STAGE RENAL DISEASE: ICD-10-CM

## 2024-10-07 PROCEDURE — 99215 OFFICE O/P EST HI 40 MIN: CPT

## 2024-10-07 PROCEDURE — 93000 ELECTROCARDIOGRAM COMPLETE: CPT

## 2024-10-07 PROCEDURE — G2211 COMPLEX E/M VISIT ADD ON: CPT

## 2024-10-18 ENCOUNTER — APPOINTMENT (OUTPATIENT)
Dept: NEUROLOGY | Facility: CLINIC | Age: 59
End: 2024-10-18

## 2024-10-24 ENCOUNTER — APPOINTMENT (OUTPATIENT)
Dept: NEUROLOGY | Facility: CLINIC | Age: 59
End: 2024-10-24
Payer: MEDICARE

## 2024-10-24 DIAGNOSIS — I63.9 CEREBRAL INFARCTION, UNSPECIFIED: ICD-10-CM

## 2024-10-24 PROCEDURE — 99215 OFFICE O/P EST HI 40 MIN: CPT

## 2024-10-29 ENCOUNTER — OUTPATIENT (OUTPATIENT)
Dept: OUTPATIENT SERVICES | Facility: HOSPITAL | Age: 59
LOS: 1 days | Discharge: HOME | End: 2024-10-29
Payer: MEDICARE

## 2024-10-29 VITALS
RESPIRATION RATE: 19 BRPM | DIASTOLIC BLOOD PRESSURE: 78 MMHG | HEART RATE: 77 BPM | SYSTOLIC BLOOD PRESSURE: 173 MMHG | OXYGEN SATURATION: 99 %

## 2024-10-29 VITALS
HEART RATE: 78 BPM | RESPIRATION RATE: 18 BRPM | OXYGEN SATURATION: 98 % | SYSTOLIC BLOOD PRESSURE: 174 MMHG | DIASTOLIC BLOOD PRESSURE: 75 MMHG

## 2024-10-29 DIAGNOSIS — I63.9 CEREBRAL INFARCTION, UNSPECIFIED: ICD-10-CM

## 2024-10-29 DIAGNOSIS — N20.0 CALCULUS OF KIDNEY: Chronic | ICD-10-CM

## 2024-10-29 DIAGNOSIS — Z98.890 OTHER SPECIFIED POSTPROCEDURAL STATES: Chronic | ICD-10-CM

## 2024-10-29 DIAGNOSIS — Z89.511 ACQUIRED ABSENCE OF RIGHT LEG BELOW KNEE: Chronic | ICD-10-CM

## 2024-10-29 DIAGNOSIS — Z98.62 PERIPHERAL VASCULAR ANGIOPLASTY STATUS: Chronic | ICD-10-CM

## 2024-10-29 PROCEDURE — C1764: CPT

## 2024-10-29 PROCEDURE — 33285 INSJ SUBQ CAR RHYTHM MNTR: CPT

## 2024-10-29 RX ORDER — CEPHALEXIN 125 MG/5ML
500 SUSPENSION, RECONSTITUTED, ORAL (ML) ORAL ONCE
Refills: 0 | Status: COMPLETED | OUTPATIENT
Start: 2024-10-29 | End: 2024-10-29

## 2024-10-29 RX ADMIN — Medication 500 MILLIGRAM(S): at 07:52

## 2024-10-29 NOTE — CHART NOTE - NSCHARTNOTEFT_GEN_A_CORE
Electrophysiology Brief Post-Op Note    I have personally seen and examined the patient.  I agree with the history and physical which I have reviewed and noted any changes below.  10-29-24 @ 08:56    PRE-OP DIAGNOSIS: Cryptogenic CVA    POST-OP DIAGNOSIS: Cryptogenic CVA    PROCEDURE: Loop Implant    Physician: Dr. Holley Costello  Assistant: FLAQUITA Wei  Referring: Dr. Villalobos    ESTIMATED BLOOD LOSS:  2  mL    ANESTHESIA TYPE:  [  ]General Anesthesia  [  ] Sedation  [X  ] Local/Regional      CONDITION  [  ] Critical  [  ] Serious  [  ]Fair  [ X ]Good      SPECIMENS REMOVED (IF APPLICABLE):  none      IMPLANTS (IF APPLICABLE)  Loop Recorder (Medtronic)  Serial #[id number]  RWA: 0.41      PLAN OF CARE  - F/U 3-4 weeks  - May remove bandaid in 2 days  - May shower in 48 hours                                                                                                                                                                                                                                                                                                                                                                                                                          ESTIMATED BLOOD LOSS:  2  mL    ANESTHESIA TYPE:  [  ]General Anesthesia  [  ] Sedation  [X  ] Local/Regional      CONDITION  [  ] Critical  [  ] Serious  [  ]Fair  [ X ]Good      SPECIMENS REMOVED (IF APPLICABLE):  none      IMPLANTS (IF APPLICABLE)  Loop Recorder (Medtronic)  Serial #  RWA:       PLAN OF CARE  - F/U 3-4 weeks  - May remove bandaid in 2 days  - May shower in 48 hours

## 2024-10-29 NOTE — H&P ADULT - HISTORY OF PRESENT ILLNESS
58 yo M with history of HTN, DM1, CKD, right foot Charcot, on kidney transplant list with h/o LUE DVT on Eliquis   (started 6/24/24), left axillary DVT 2/2 IV placement, noted to have CVA presents today for implantable loop   recorder.   Patient follows with Dr. Costello, and Dr. Villalobos

## 2024-10-29 NOTE — H&P ADULT - ASSESSMENT
Plan:  -Loop implant today  -Home following procedure  -Resume all home meds  -Resume usual activity  -F/u in 1 month with Dr. Costello

## 2024-10-29 NOTE — H&P ADULT - NSHPPHYSICALEXAM_GEN_ALL_CORE
GENERAL:  60y/o Male NAD, resting comfortably.  HEAD:  Atraumatic, Normocephalic  EYES: EOMI, PERRLA, conjunctiva and sclera clear  NECK: Supple, No JVD, no cervical lymphadenopathy, non-tender  CHEST/LUNG: Clear to auscultation bilaterally; No wheeze, rhonchi, or rales  HEART: Regular rate and rhythm; S1&S2  ABDOMEN: Soft, Nontender, Nondistended x 4 quadrants; Bowel sounds present  EXTREMITIES:   RLE amputation with prosthesis in place, No clubbing, no cyanosis, or no edema, no calf tenderness  PSYCH: AAOx3, cooperative, appropriate  NEUROLOGY: WNL  SKIN: WNL

## 2024-11-01 ENCOUNTER — EMERGENCY (EMERGENCY)
Facility: HOSPITAL | Age: 59
LOS: 0 days | Discharge: HOME | End: 2024-11-01
Attending: EMERGENCY MEDICINE
Payer: MEDICARE

## 2024-11-01 VITALS
SYSTOLIC BLOOD PRESSURE: 133 MMHG | OXYGEN SATURATION: 98 % | HEIGHT: 72 IN | RESPIRATION RATE: 18 BRPM | TEMPERATURE: 99 F | DIASTOLIC BLOOD PRESSURE: 88 MMHG | HEART RATE: 99 BPM

## 2024-11-01 DIAGNOSIS — E11.22 TYPE 2 DIABETES MELLITUS WITH DIABETIC CHRONIC KIDNEY DISEASE: ICD-10-CM

## 2024-11-01 DIAGNOSIS — E78.5 HYPERLIPIDEMIA, UNSPECIFIED: ICD-10-CM

## 2024-11-01 DIAGNOSIS — Z79.4 LONG TERM (CURRENT) USE OF INSULIN: ICD-10-CM

## 2024-11-01 DIAGNOSIS — F17.200 NICOTINE DEPENDENCE, UNSPECIFIED, UNCOMPLICATED: ICD-10-CM

## 2024-11-01 DIAGNOSIS — Z79.01 LONG TERM (CURRENT) USE OF ANTICOAGULANTS: ICD-10-CM

## 2024-11-01 DIAGNOSIS — Z79.02 LONG TERM (CURRENT) USE OF ANTITHROMBOTICS/ANTIPLATELETS: ICD-10-CM

## 2024-11-01 DIAGNOSIS — Z98.890 OTHER SPECIFIED POSTPROCEDURAL STATES: Chronic | ICD-10-CM

## 2024-11-01 DIAGNOSIS — I12.9 HYPERTENSIVE CHRONIC KIDNEY DISEASE WITH STAGE 1 THROUGH STAGE 4 CHRONIC KIDNEY DISEASE, OR UNSPECIFIED CHRONIC KIDNEY DISEASE: ICD-10-CM

## 2024-11-01 DIAGNOSIS — N20.0 CALCULUS OF KIDNEY: Chronic | ICD-10-CM

## 2024-11-01 DIAGNOSIS — L29.9 PRURITUS, UNSPECIFIED: ICD-10-CM

## 2024-11-01 DIAGNOSIS — Z89.511 ACQUIRED ABSENCE OF RIGHT LEG BELOW KNEE: Chronic | ICD-10-CM

## 2024-11-01 DIAGNOSIS — E11.51 TYPE 2 DIABETES MELLITUS WITH DIABETIC PERIPHERAL ANGIOPATHY WITHOUT GANGRENE: ICD-10-CM

## 2024-11-01 PROCEDURE — 99283 EMERGENCY DEPT VISIT LOW MDM: CPT

## 2024-11-01 RX ORDER — HYDROXYZINE PAMOATE 50 MG
25 CAPSULE ORAL ONCE
Refills: 0 | Status: COMPLETED | OUTPATIENT
Start: 2024-11-01 | End: 2024-11-01

## 2024-11-01 RX ORDER — APIXABAN 5 MG/1
5 TABLET, FILM COATED ORAL ONCE
Refills: 0 | Status: COMPLETED | OUTPATIENT
Start: 2024-11-01 | End: 2024-11-01

## 2024-11-01 RX ADMIN — Medication 75 MILLIGRAM(S): at 18:56

## 2024-11-01 RX ADMIN — APIXABAN 5 MILLIGRAM(S): 5 TABLET, FILM COATED ORAL at 18:56

## 2024-11-01 RX ADMIN — Medication 25 MILLIGRAM(S): at 18:56

## 2024-11-01 NOTE — ED PROVIDER NOTE - ATTENDING CONTRIBUTION TO CARE
Patient is 59-year-old male who notes some itching and intermittent discoloration of his fingertips.  He also is requesting a med refill of his Plavix.    Exam: Cap refill is in 2 seconds, dried superglue over fingertips, no rash, no acute distress  Plan: Med refill, Atarax

## 2024-11-01 NOTE — ED PROVIDER NOTE - OBJECTIVE STATEMENT
59-year-old, with past medical history of HTN, HLD, DM, CKD, PAD, anxiety, presents to the ED complaining of itchy bug bites to his hands.  No other complaints at this time.  Requesting his dose of Plavix and Eliquis.

## 2024-11-01 NOTE — ED PROVIDER NOTE - PATIENT PORTAL LINK FT
You can access the FollowMyHealth Patient Portal offered by [facility] by registering at the following website: [email or web address]. By joining [employer]’s FollowMyHealth portal, you will also be able to view your health information using other applications (apps) compatible with our system.

## 2024-11-01 NOTE — ED ADULT NURSE NOTE - PATIENT'S SEXUAL ORIENTATION
Physical Therapy Daily Treatment    Visit Count: 2      Plan of Care: 8/28/2019 Through: 10/23/2019  Insurance Information:  Therapy Benefits   Payor: Marquise Fox  Authorization Needed: After 18 visits  Maximum Visit Limit: 60 visits per calendar year combined PT/OT/ST  CoPay: $0  Referred by: Rosalio Bauman PA-C; Next provider visit (if known/scheduled): pending  Medical Diagnosis (from order):       Diagnosis Information             Diagnosis      724.2, 724.3 (ICD-9-CM) - M54.41 (ICD-10-CM) - Acute right-sided low back pain with right-sided s       Precautions: none    SUBJECTIVE   Pt reports that her back is feeling a lot better. Having some trouble standing up tall. No pain while sitting but still has some while standing. Exercises are going well.   Current Pain (0-10 scale): 4  Functional Change: none    OBJECTIVE     Treatment   Therapeutic Exercise:   Seated stepper 5 min  Abdominal brace [id number]  Lower trunk rotation  Left only 10x   Bridge 10x  Supine TA clam with yellow band 10x   Supine figure 4 piriformis stretch [id number]  Prone on elbows 5x5sec  Seated hamstring stretch [id number]  Seated hamstring neural glide 10x  Seated calf stretch [id number]  Standing calf stretch [id number]  Quadriped (into neutral position) TA [id number]    Skilled input: verbal instruction/cues    Home Program:   * above=instructed home program    Access Code: [email or web address]     Writer verbally educated the patient and received verbal consent from the patient on hand placement, positioning of patient, and techniques to be performed today including therapist position for techniques as described above and how they are pertinent to the patient's plan of care.      Suggestions for next session as indicated: progress per plan of care, neutral spine and extension based exercises, abdominal brace progression, estim prn      ASSESSMENT   Pt tolerated today's session well with ability to perform most exercise without pain. Increased time required  for instruction of new exercise and cues for proper performance. Pt doing well with HEP and is able to demonstrate in clinic. Attempted to progress prone exercise to prone on hands with patient having difficulty due to discomfort and was discontinued. Pt given calf stretch due to having complaints of tightness of calf.   Pain after treatment (patient reported, 0-10 scale): not rated  Result of above outlined education: Verbalizes understanding and Demonstrates understanding    THERAPY DAILY BILLING   Insurance: [facility]ST[facility] [employer] 2. N/A    Evaluation Procedures:  No evaluation codes were used on this date of service    Timed Procedures:  Therapeutic Exercise, 38 minutes    Untimed Procedures:  No untimed codes were used on this date of service    Total Treatment Time: 38 minutes   Heterosexual

## 2024-11-01 NOTE — ED PROVIDER NOTE - PHYSICAL EXAMINATION
GENERAL: Well-developed; well-nourished; in no acute distress.   SKIN: warm, dry, adhesive to fingers, no erythema, bumps, or rashes  HEAD: Normocephalic; atraumatic.  EXT: Normal ROM.  No LE TTP or edema bilaterally.  NEURO: A/ox3, grossly unremarkable

## 2024-11-01 NOTE — ED ADULT NURSE NOTE - NSFALLUNIVINTERV_ED_ALL_ED
Bed/Stretcher in lowest position, wheels locked, appropriate side rails in place/Call bell, personal items and telephone in reach/Instruct patient to call for assistance before getting out of bed/chair/stretcher/Non-slip footwear applied when patient is off stretcher/[place] to call system/Physically safe environment - no spills, clutter or unnecessary equipment/Purposeful proactive rounding/Room/bathroom lighting operational, light cord in reach

## 2024-11-04 ENCOUNTER — APPOINTMENT (OUTPATIENT)
Dept: CARDIOLOGY | Facility: CLINIC | Age: 59
End: 2024-11-04
Payer: MEDICARE

## 2024-11-04 VITALS
BODY MASS INDEX: 24.52 KG/M2 | WEIGHT: 181 LBS | HEIGHT: 72 IN | DIASTOLIC BLOOD PRESSURE: 80 MMHG | HEART RATE: 77 BPM | SYSTOLIC BLOOD PRESSURE: 144 MMHG | OXYGEN SATURATION: 99 % | RESPIRATION RATE: 16 BRPM

## 2024-11-04 DIAGNOSIS — I82.A12 ACUTE EMBOLISM AND THROMBOSIS OF LEFT AXILLARY VEIN: ICD-10-CM

## 2024-11-04 DIAGNOSIS — Z00.00 ENCOUNTER FOR GENERAL ADULT MEDICAL EXAMINATION W/OUT ABNORMAL FINDINGS: ICD-10-CM

## 2024-11-04 DIAGNOSIS — E11.9 TYPE 2 DIABETES MELLITUS W/OUT COMPLICATIONS: ICD-10-CM

## 2024-11-04 PROCEDURE — G2211 COMPLEX E/M VISIT ADD ON: CPT

## 2024-11-04 PROCEDURE — 99214 OFFICE O/P EST MOD 30 MIN: CPT

## 2024-11-13 ENCOUNTER — APPOINTMENT (OUTPATIENT)
Dept: ELECTROPHYSIOLOGY | Facility: CLINIC | Age: 59
End: 2024-11-13
Payer: MEDICARE

## 2024-11-13 VITALS
DIASTOLIC BLOOD PRESSURE: 87 MMHG | HEIGHT: 72 IN | HEART RATE: 77 BPM | WEIGHT: 186 LBS | BODY MASS INDEX: 25.19 KG/M2 | TEMPERATURE: 97.1 F | SYSTOLIC BLOOD PRESSURE: 135 MMHG

## 2024-11-13 DIAGNOSIS — Z48.89 ENCOUNTER FOR OTHER SPECIFIED SURGICAL AFTERCARE: ICD-10-CM

## 2024-11-13 DIAGNOSIS — Z45.09 ENCOUNTER FOR ADJUSTMENT AND MANAGEMENT OF OTHER CARDIAC DEVICE: ICD-10-CM

## 2024-11-13 DIAGNOSIS — I63.9 CEREBRAL INFARCTION, UNSPECIFIED: ICD-10-CM

## 2024-11-13 PROCEDURE — 93291 INTERROG DEV EVAL SCRMS IP: CPT

## 2024-11-13 PROCEDURE — 99214 OFFICE O/P EST MOD 30 MIN: CPT | Mod: 25

## 2024-11-20 ENCOUNTER — APPOINTMENT (OUTPATIENT)
Dept: CARDIOLOGY | Facility: CLINIC | Age: 59
End: 2024-11-20

## 2024-11-20 PROCEDURE — 93971 EXTREMITY STUDY: CPT

## 2024-11-20 PROCEDURE — 93926 LOWER EXTREMITY STUDY: CPT

## 2024-11-20 PROCEDURE — ZZZZZ: CPT

## 2024-11-20 NOTE — ASU PREOP CHECKLIST - BP NONINVASIVE SYSTOLIC (MM HG)
142 Detail Level: Detailed General Sunscreen Counseling: I recommended a broad spectrum sunscreen with a SPF of 30 or higher.  I explained that SPF 30 sunscreens block approximately 97 percent of the sun's harmful rays.  Sunscreens should be applied at least 15 minutes prior to expected sun exposure and then every 2 hours after that as long as sun exposure continues. If swimming or exercising sunscreen should be reapplied every 45 minutes to an hour after getting wet or sweating.  One ounce, or the equivalent of a shot glass full of sunscreen, is adequate to protect the skin not covered by a bathing suit. I also recommended a lip balm with a sunscreen as well. Sun protective clothing can be used in lieu of sunscreen but must be worn the entire time you are exposed to the sun's rays. Products Recommended: Vanicream

## 2024-12-06 ENCOUNTER — RX RENEWAL (OUTPATIENT)
Age: 59
End: 2024-12-06

## 2024-12-16 ENCOUNTER — NON-APPOINTMENT (OUTPATIENT)
Age: 59
End: 2024-12-16

## 2024-12-16 ENCOUNTER — APPOINTMENT (OUTPATIENT)
Dept: CARDIOLOGY | Facility: CLINIC | Age: 59
End: 2024-12-16
Payer: MEDICARE

## 2024-12-16 PROCEDURE — 93298 REM INTERROG DEV EVAL SCRMS: CPT

## 2024-12-20 ENCOUNTER — NON-APPOINTMENT (OUTPATIENT)
Age: 59
End: 2024-12-20

## 2024-12-20 ENCOUNTER — APPOINTMENT (OUTPATIENT)
Facility: CLINIC | Age: 59
End: 2024-12-20

## 2024-12-20 PROCEDURE — 92134 CPTRZ OPH DX IMG PST SGM RTA: CPT

## 2024-12-20 PROCEDURE — 99204 OFFICE O/P NEW MOD 45 MIN: CPT

## 2024-12-30 RX ORDER — ASPIRIN 81 MG
81 TABLET, DELAYED RELEASE (ENTERIC COATED) ORAL DAILY
Qty: 90 | Refills: 1 | Status: ACTIVE | COMMUNITY
Start: 2024-12-30 | End: 1900-01-01

## 2025-01-06 ENCOUNTER — EMERGENCY (EMERGENCY)
Facility: HOSPITAL | Age: 60
LOS: 0 days | Discharge: HOME | End: 2025-01-06
Attending: EMERGENCY MEDICINE
Payer: MEDICARE

## 2025-01-06 VITALS
OXYGEN SATURATION: 100 % | DIASTOLIC BLOOD PRESSURE: 76 MMHG | TEMPERATURE: 98 F | HEART RATE: 96 BPM | WEIGHT: 192.46 LBS | RESPIRATION RATE: 18 BRPM | SYSTOLIC BLOOD PRESSURE: 147 MMHG | HEIGHT: 72 IN

## 2025-01-06 DIAGNOSIS — Z98.890 OTHER SPECIFIED POSTPROCEDURAL STATES: Chronic | ICD-10-CM

## 2025-01-06 DIAGNOSIS — E11.51 TYPE 2 DIABETES MELLITUS WITH DIABETIC PERIPHERAL ANGIOPATHY WITHOUT GANGRENE: ICD-10-CM

## 2025-01-06 DIAGNOSIS — Z89.511 ACQUIRED ABSENCE OF RIGHT LEG BELOW KNEE: ICD-10-CM

## 2025-01-06 DIAGNOSIS — T87.89 OTHER COMPLICATIONS OF AMPUTATION STUMP: ICD-10-CM

## 2025-01-06 DIAGNOSIS — E11.22 TYPE 2 DIABETES MELLITUS WITH DIABETIC CHRONIC KIDNEY DISEASE: ICD-10-CM

## 2025-01-06 DIAGNOSIS — R23.8 OTHER SKIN CHANGES: ICD-10-CM

## 2025-01-06 DIAGNOSIS — Z89.511 ACQUIRED ABSENCE OF RIGHT LEG BELOW KNEE: Chronic | ICD-10-CM

## 2025-01-06 DIAGNOSIS — N20.0 CALCULUS OF KIDNEY: Chronic | ICD-10-CM

## 2025-01-06 DIAGNOSIS — Z87.891 PERSONAL HISTORY OF NICOTINE DEPENDENCE: ICD-10-CM

## 2025-01-06 DIAGNOSIS — Z98.62 PERIPHERAL VASCULAR ANGIOPLASTY STATUS: Chronic | ICD-10-CM

## 2025-01-06 PROCEDURE — 73590 X-RAY EXAM OF LOWER LEG: CPT | Mod: 26,RT

## 2025-01-06 PROCEDURE — 99284 EMERGENCY DEPT VISIT MOD MDM: CPT

## 2025-01-06 PROCEDURE — 73590 X-RAY EXAM OF LOWER LEG: CPT | Mod: RT

## 2025-01-06 PROCEDURE — 99283 EMERGENCY DEPT VISIT LOW MDM: CPT | Mod: 25

## 2025-01-06 RX ORDER — MUPIROCIN 2 %
1 OINTMENT (GRAM) TOPICAL
Qty: 1 | Refills: 0
Start: 2025-01-06 | End: 2025-01-10

## 2025-01-06 RX ORDER — DOXYCYCLINE MONOHYDRATE 100 MG
1 TABLET ORAL
Qty: 14 | Refills: 0
Start: 2025-01-06 | End: 2025-01-12

## 2025-01-06 NOTE — ED PROVIDER NOTE - PHYSICAL EXAMINATION
CONSTITUTIONAL: Well-appearing; well-nourished; in no apparent distress.   EYES: PERRL; EOM intact.   MS: Right BKA  SKIN: Small callus skin (2 to 3 cm) formed to the right anterior lateral of the right stump with a tiny bloody blister.  Mild TTP.  The remainder of the stump appear well without any signs of cellulitis (erythema, tenderness or swelling)  NEURO/PSYCH: A & O x 4; grossly unremarkable.

## 2025-01-06 NOTE — ED ADULT TRIAGE NOTE - CHIEF COMPLAINT QUOTE
Patient  with pmh of DM complaining of redness to right stum-patient has BKA on right side done in October c/o pain and redness to site

## 2025-01-06 NOTE — ED PROVIDER NOTE - PATIENT PORTAL LINK FT
You can access the FollowMyHealth Patient Portal offered by [facility] by registering at the following website: [email or web address]. By joining [employer]’s FollowMyHealth portal, you will also be able to view your health information using other applications (apps) compatible with our system.

## 2025-01-06 NOTE — ED PROVIDER NOTE - CLINICAL SUMMARY MEDICAL DECISION MAKING FREE TEXT BOX
58 yo M with PMH of CKD, DM, PAD s/p Right BKA, anxiety presents to the ER for a blister to the right BKA stump area. No signs of surrounding cellulitis. Pt noted the blister getting darker over past month. +tenderness to the blister site, which now seems more indurated in nature. No drainable collection as PA did needle aspiration with no removal of fluid/pus. Placed on ABX and recommend he follow up with his PMD and prsthesis specialist as this may be from constant friction and rubbing from the prosthesis. Return to ER for any worsening of symptoms or concerns.

## 2025-01-06 NOTE — ED ADULT TRIAGE NOTE - ESI TRIAGE ACUITY LEVEL, MLM
Please follow up with Dr. Parks in one week; you may call the office at [phone] to make an appointment at your earliest convenience.    General Discharge Instructions:  Please resume all regular home medications unless specifically advised not to take a particular medication. Also, please take any new medications as prescribed.  Please get plenty of rest, continue to ambulate several times per day, and drink adequate amounts of fluids. Avoid lifting weights greater than 5-10 lbs until you follow-up with your surgeon, who will instruct you further regarding activity restrictions.  Avoid driving or operating heavy machinery while taking pain medications.  Please follow-up with your surgeon and Primary Care Provider (PCP) as advised.  Incision Care:  *Please call your doctor or nurse practitioner if you have increased pain, swelling, redness, or drainage from the incision site.  *Avoid swimming and baths until your follow-up appointment.  *You may shower, and wash surgical incisions with a mild soap and warm water. Gently pat the area dry.  *If you have staples, they will be removed at your follow-up appointment.    Warning Signs:  Please call your doctor or nurse practitioner if you experience the following:  *You experience new chest pain, pressure, squeezing or tightness.  *New or worsening cough, shortness of breath, or wheeze.  *If you are vomiting and cannot keep down fluids or your medications.  *You are getting dehydrated due to continued vomiting, diarrhea, or other reasons. Signs of dehydration include dry mouth, rapid heartbeat, or feeling dizzy or faint when standing.  *You see blood or dark/black material when you vomit or have a bowel movement.  *You experience burning when you urinate, have blood in your urine, or experience a discharge.  *Your pain is not improving within 8-12 hours or is not gone within 24 hours. Call or return immediately if your pain is getting worse, changes location, or moves to your chest or back.  *You have shaking chills, or fever greater than 101.5 degrees Fahrenheit or 38 degrees Celsius.  *Any change in your symptoms, or any new symptoms that concern you. Please follow up with Dr. Parks in one week; you may call the office at [phone] to make an appointment at your earliest convenience.    Please follow up with your cardiologist for continued management of blood pressure, NM pharmacological stress test, and discussion of beginning eliquis for A-fib.     General Discharge Instructions:  Please resume all regular home medications unless specifically advised not to take a particular medication. Also, please take any new medications as prescribed.  Please get plenty of rest, continue to ambulate several times per day, and drink adequate amounts of fluids. Avoid lifting weights greater than 5-10 lbs until you follow-up with your surgeon, who will instruct you further regarding activity restrictions.  Avoid driving or operating heavy machinery while taking pain medications.  Please follow-up with your surgeon and Primary Care Provider (PCP) as advised.  Incision Care:  *Please call your doctor or nurse practitioner if you have increased pain, swelling, redness, or drainage from the incision site.  *Avoid swimming and baths until your follow-up appointment.  *You may shower, and wash surgical incisions with a mild soap and warm water. Gently pat the area dry.  *If you have staples, they will be removed at your follow-up appointment.    Warning Signs:  Please call your doctor or nurse practitioner if you experience the following:  *You experience new chest pain, pressure, squeezing or tightness.  *New or worsening cough, shortness of breath, or wheeze.  *If you are vomiting and cannot keep down fluids or your medications.  *You are getting dehydrated due to continued vomiting, diarrhea, or other reasons. Signs of dehydration include dry mouth, rapid heartbeat, or feeling dizzy or faint when standing.  *You see blood or dark/black material when you vomit or have a bowel movement.  *You experience burning when you urinate, have blood in your urine, or experience a discharge.  *Your pain is not improving within 8-12 hours or is not gone within 24 hours. Call or return immediately if your pain is getting worse, changes location, or moves to your chest or back.  *You have shaking chills, or fever greater than 101.5 degrees Fahrenheit or 38 degrees Celsius.  *Any change in your symptoms, or any new symptoms that concern you. 3

## 2025-01-06 NOTE — ED PROVIDER NOTE - OBJECTIVE STATEMENT
59 years old male history of CKD, diabetes, PAD status post right BKA, anxiety presents complaining of a small painful blister at the right BKA stump.  Reports a small area of the skin was getting dark over the past month.  Noted a small blister on the duct and and is painful to touch so he comes to ED for evaluation.  Denies fever, chills, chest pain, shortness of breath, skin redness around the stump or drainage.

## 2025-01-09 NOTE — ED PROVIDER NOTE - PHYSICAL EXAMINATION
soft/nondistended
soft/nondistended/nontender
CONSTITUTIONAL: Well-appearing;; in no apparent distress.   EYES: PERRL; EOM intact.   CARDIOVASCULAR: Normal S1, S2; no murmurs, rubs, or gallops.   RESPIRATORY: Normal chest excursion with respiration; breath sounds clear and equal bilaterally; no wheezes, rhonchi, or rales.  GI/: Normal bowel sounds; non-distended; non-tender; no palpable organomegaly.   SKIN: Normal for age and race; warm; dry; good turgor; no apparent lesions or exudate.   NEURO/PSYCH: A & O x 4; grossly unremarkable.  Anxious mood
soft/nondistended/nontender

## 2025-01-17 ENCOUNTER — APPOINTMENT (OUTPATIENT)
Dept: CARDIOLOGY | Facility: CLINIC | Age: 60
End: 2025-01-17
Payer: MEDICARE

## 2025-01-17 ENCOUNTER — NON-APPOINTMENT (OUTPATIENT)
Age: 60
End: 2025-01-17

## 2025-01-17 PROCEDURE — 93298 REM INTERROG DEV EVAL SCRMS: CPT

## 2025-02-21 ENCOUNTER — APPOINTMENT (OUTPATIENT)
Dept: CARDIOLOGY | Facility: CLINIC | Age: 60
End: 2025-02-21
Payer: MEDICARE

## 2025-02-21 ENCOUNTER — NON-APPOINTMENT (OUTPATIENT)
Age: 60
End: 2025-02-21

## 2025-02-21 PROCEDURE — 93298 REM INTERROG DEV EVAL SCRMS: CPT

## 2025-03-28 ENCOUNTER — APPOINTMENT (OUTPATIENT)
Dept: CARDIOLOGY | Facility: CLINIC | Age: 60
End: 2025-03-28
Payer: MEDICARE

## 2025-03-28 ENCOUNTER — NON-APPOINTMENT (OUTPATIENT)
Age: 60
End: 2025-03-28

## 2025-03-28 PROCEDURE — 93298 REM INTERROG DEV EVAL SCRMS: CPT

## 2025-05-02 ENCOUNTER — APPOINTMENT (OUTPATIENT)
Dept: CARDIOLOGY | Facility: CLINIC | Age: 60
End: 2025-05-02
Payer: MEDICARE

## 2025-05-02 ENCOUNTER — NON-APPOINTMENT (OUTPATIENT)
Age: 60
End: 2025-05-02

## 2025-05-02 PROCEDURE — 93298 REM INTERROG DEV EVAL SCRMS: CPT

## 2025-05-19 ENCOUNTER — APPOINTMENT (OUTPATIENT)
Dept: CARDIOLOGY | Facility: CLINIC | Age: 60
End: 2025-05-19
Payer: MEDICARE

## 2025-05-19 VITALS
HEART RATE: 70 BPM | HEIGHT: 72 IN | BODY MASS INDEX: 26.82 KG/M2 | WEIGHT: 198 LBS | DIASTOLIC BLOOD PRESSURE: 67 MMHG | SYSTOLIC BLOOD PRESSURE: 109 MMHG

## 2025-05-19 DIAGNOSIS — I73.9 PERIPHERAL VASCULAR DISEASE, UNSPECIFIED: ICD-10-CM

## 2025-05-19 DIAGNOSIS — I82.629 ACUTE EMBOLISM AND THROMBOSIS OF DEEP VEINS OF UNSPECIFIED UPPER EXTREMITY: ICD-10-CM

## 2025-05-19 DIAGNOSIS — E11.9 TYPE 2 DIABETES MELLITUS W/OUT COMPLICATIONS: ICD-10-CM

## 2025-05-19 DIAGNOSIS — E78.5 HYPERLIPIDEMIA, UNSPECIFIED: ICD-10-CM

## 2025-05-19 DIAGNOSIS — I82.622 ACUTE EMBOLISM AND THROMBOSIS OF DEEP VEINS OF LEFT UPPER EXTREMITY: ICD-10-CM

## 2025-05-19 PROCEDURE — 99214 OFFICE O/P EST MOD 30 MIN: CPT

## 2025-06-06 ENCOUNTER — NON-APPOINTMENT (OUTPATIENT)
Age: 60
End: 2025-06-06

## 2025-06-06 ENCOUNTER — OUTPATIENT (OUTPATIENT)
Dept: OUTPATIENT SERVICES | Facility: HOSPITAL | Age: 60
LOS: 1 days | End: 2025-06-06
Payer: MEDICARE

## 2025-06-06 ENCOUNTER — APPOINTMENT (OUTPATIENT)
Dept: CARDIOLOGY | Facility: CLINIC | Age: 60
End: 2025-06-06
Payer: MEDICARE

## 2025-06-06 DIAGNOSIS — Z98.890 OTHER SPECIFIED POSTPROCEDURAL STATES: Chronic | ICD-10-CM

## 2025-06-06 DIAGNOSIS — N32.89 OTHER SPECIFIED DISORDERS OF BLADDER: ICD-10-CM

## 2025-06-06 DIAGNOSIS — Z98.62 PERIPHERAL VASCULAR ANGIOPLASTY STATUS: Chronic | ICD-10-CM

## 2025-06-06 DIAGNOSIS — Z00.8 ENCOUNTER FOR OTHER GENERAL EXAMINATION: ICD-10-CM

## 2025-06-06 DIAGNOSIS — Z89.511 ACQUIRED ABSENCE OF RIGHT LEG BELOW KNEE: Chronic | ICD-10-CM

## 2025-06-06 DIAGNOSIS — N20.0 CALCULUS OF KIDNEY: Chronic | ICD-10-CM

## 2025-06-06 PROCEDURE — 76857 US EXAM PELVIC LIMITED: CPT

## 2025-06-06 PROCEDURE — 76857 US EXAM PELVIC LIMITED: CPT | Mod: 26

## 2025-06-06 PROCEDURE — 93298 REM INTERROG DEV EVAL SCRMS: CPT

## 2025-06-07 DIAGNOSIS — N32.89 OTHER SPECIFIED DISORDERS OF BLADDER: ICD-10-CM

## 2025-06-12 NOTE — PRE-ANESTHESIA EVALUATION ADULT - NSANTHAPLANRD_GEN_ALL_CORE
It should be 180 days. The script was updated. Also, in the original script there were start and stop date, for total of 180 days. Thank you.    general

## 2025-07-09 ENCOUNTER — RX RENEWAL (OUTPATIENT)
Age: 60
End: 2025-07-09

## 2025-07-11 ENCOUNTER — NON-APPOINTMENT (OUTPATIENT)
Age: 60
End: 2025-07-11

## 2025-07-11 ENCOUNTER — APPOINTMENT (OUTPATIENT)
Dept: CARDIOLOGY | Facility: CLINIC | Age: 60
End: 2025-07-11
Payer: MEDICARE

## 2025-07-11 PROCEDURE — 93298 REM INTERROG DEV EVAL SCRMS: CPT

## 2025-08-13 DIAGNOSIS — I65.29 OCCLUSION AND STENOSIS OF UNSPECIFIED CAROTID ARTERY: ICD-10-CM

## 2025-08-15 ENCOUNTER — NON-APPOINTMENT (OUTPATIENT)
Age: 60
End: 2025-08-15

## 2025-08-15 ENCOUNTER — APPOINTMENT (OUTPATIENT)
Dept: CARDIOLOGY | Facility: CLINIC | Age: 60
End: 2025-08-15
Payer: MEDICARE

## 2025-08-15 PROCEDURE — 93298 REM INTERROG DEV EVAL SCRMS: CPT

## 2025-08-19 ENCOUNTER — APPOINTMENT (OUTPATIENT)
Dept: CARDIOLOGY | Facility: CLINIC | Age: 60
End: 2025-08-19

## 2025-09-04 ENCOUNTER — OUTPATIENT (OUTPATIENT)
Dept: OUTPATIENT SERVICES | Facility: HOSPITAL | Age: 60
LOS: 1 days | End: 2025-09-04
Payer: MEDICARE

## 2025-09-04 ENCOUNTER — RESULT REVIEW (OUTPATIENT)
Age: 60
End: 2025-09-04

## 2025-09-04 DIAGNOSIS — I82.A12 ACUTE EMBOLISM AND THROMBOSIS OF LEFT AXILLARY VEIN: ICD-10-CM

## 2025-09-04 DIAGNOSIS — N20.0 CALCULUS OF KIDNEY: Chronic | ICD-10-CM

## 2025-09-04 DIAGNOSIS — Z89.511 ACQUIRED ABSENCE OF RIGHT LEG BELOW KNEE: Chronic | ICD-10-CM

## 2025-09-04 DIAGNOSIS — Z00.8 ENCOUNTER FOR OTHER GENERAL EXAMINATION: ICD-10-CM

## 2025-09-04 DIAGNOSIS — Z98.890 OTHER SPECIFIED POSTPROCEDURAL STATES: Chronic | ICD-10-CM

## 2025-09-04 DIAGNOSIS — Z98.62 PERIPHERAL VASCULAR ANGIOPLASTY STATUS: Chronic | ICD-10-CM

## 2025-09-04 PROCEDURE — 78452 HT MUSCLE IMAGE SPECT MULT: CPT | Mod: 26

## 2025-09-04 PROCEDURE — A9500: CPT

## 2025-09-04 PROCEDURE — 78452 HT MUSCLE IMAGE SPECT MULT: CPT

## 2025-09-04 PROCEDURE — 93018 CV STRESS TEST I&R ONLY: CPT

## 2025-09-05 DIAGNOSIS — I82.A12 ACUTE EMBOLISM AND THROMBOSIS OF LEFT AXILLARY VEIN: ICD-10-CM

## 2025-09-18 ENCOUNTER — APPOINTMENT (OUTPATIENT)
Dept: CARDIOLOGY | Facility: CLINIC | Age: 60
End: 2025-09-18
Payer: MEDICARE

## 2025-09-18 ENCOUNTER — NON-APPOINTMENT (OUTPATIENT)
Age: 60
End: 2025-09-18

## 2025-09-18 PROCEDURE — 93298 REM INTERROG DEV EVAL SCRMS: CPT
